# Patient Record
Sex: MALE | Race: WHITE | NOT HISPANIC OR LATINO | Employment: OTHER | URBAN - METROPOLITAN AREA
[De-identification: names, ages, dates, MRNs, and addresses within clinical notes are randomized per-mention and may not be internally consistent; named-entity substitution may affect disease eponyms.]

---

## 2017-01-30 ENCOUNTER — ALLSCRIPTS OFFICE VISIT (OUTPATIENT)
Dept: OTHER | Facility: OTHER | Age: 62
End: 2017-01-30

## 2017-01-30 ENCOUNTER — TRANSCRIBE ORDERS (OUTPATIENT)
Dept: ADMINISTRATIVE | Facility: HOSPITAL | Age: 62
End: 2017-01-30

## 2017-01-30 DIAGNOSIS — E78.5 HYPERLIPIDEMIA: ICD-10-CM

## 2017-01-30 DIAGNOSIS — J45.20 ASTHMATIC BRONCHITIS, MILD INTERMITTENT, UNCOMPLICATED: Primary | ICD-10-CM

## 2017-01-30 DIAGNOSIS — I10 ESSENTIAL (PRIMARY) HYPERTENSION: ICD-10-CM

## 2017-01-30 DIAGNOSIS — R73.9 HYPERGLYCEMIA: ICD-10-CM

## 2017-01-30 DIAGNOSIS — I50.42 CHRONIC COMBINED SYSTOLIC AND DIASTOLIC HEART FAILURE (HCC): ICD-10-CM

## 2017-02-15 ENCOUNTER — GENERIC CONVERSION - ENCOUNTER (OUTPATIENT)
Dept: OTHER | Facility: OTHER | Age: 62
End: 2017-02-15

## 2017-02-15 ENCOUNTER — HOSPITAL ENCOUNTER (OUTPATIENT)
Dept: PULMONOLOGY | Facility: HOSPITAL | Age: 62
Discharge: HOME | End: 2017-02-15
Attending: INTERNAL MEDICINE
Payer: COMMERCIAL

## 2017-02-15 DIAGNOSIS — J45.20 ASTHMATIC BRONCHITIS, MILD INTERMITTENT, UNCOMPLICATED: ICD-10-CM

## 2017-02-15 PROCEDURE — 94729 DIFFUSING CAPACITY: CPT

## 2017-02-15 PROCEDURE — 94060 EVALUATION OF WHEEZING: CPT

## 2017-02-15 PROCEDURE — 94727 GAS DIL/WSHOT DETER LNG VOL: CPT

## 2017-02-15 RX ORDER — ALBUTEROL SULFATE 2.5 MG/3ML
2.5 SOLUTION RESPIRATORY (INHALATION) ONCE AS NEEDED
Status: DISCONTINUED | OUTPATIENT
Start: 2017-02-15 | End: 2017-02-19 | Stop reason: HOSPADM

## 2017-08-07 ENCOUNTER — LAB CONVERSION - ENCOUNTER (OUTPATIENT)
Dept: OTHER | Facility: OTHER | Age: 62
End: 2017-08-07

## 2017-08-07 LAB — BNP SERPL-MCNC: 59 PG/ML (ref 1–100)

## 2017-08-21 ENCOUNTER — ALLSCRIPTS OFFICE VISIT (OUTPATIENT)
Dept: OTHER | Facility: OTHER | Age: 62
End: 2017-08-21

## 2017-10-16 ENCOUNTER — ALLSCRIPTS OFFICE VISIT (OUTPATIENT)
Dept: OTHER | Facility: OTHER | Age: 62
End: 2017-10-16

## 2017-10-16 DIAGNOSIS — J45.909 UNCOMPLICATED ASTHMA: ICD-10-CM

## 2017-10-17 NOTE — CONSULTS
Assessment  1  Obstructive sleep apnea (327 23) (G47 33)   2  COPD, severe (496) (J44 9)    Plan  Asthma    · (1) CBC/PLT/DIFF; Status:Active; Requested ODR:[id number];    Perform:[facility]; Due:16Oct2018; Ordered; For:Asthma; Ordered By:Windy Corley; Asthma, Cough    · (Q) ALLERGY EVALUATION 1, [facility]; Status:Active; Requested WAZ:[id number];    Perform:[facility]; Due:16Oct2018; Ordered; For:Asthma, Cough; Ordered By:Windy Corley;  Chronic combined systolic and diastolic CHF (congestive heart failure), Obstructive  sleep apnea    · *Polysomnography, Sleep Study, Diagnostic; Status:Need Information - Financial  Authorization; Requested PPZ:[id number];    Perform:[facility]; Due:16Oct2018; Ordered; For:Chronic combined systolic and diastolic CHF (congestive heart failure), Obstructive sleep apnea; Ordered By:Windy Corley;  there any other medical conditions or medications that would explain these      symptoms? : No  is the sleep disturbance affecting the patient's ability to function? : daytime      sleepiness  History/Symptoms: : snoring, daytime sleepiness, CORDELL  a face-to-face visit, with sleep documentation, performed prior to the order for sleep      study? : Yes  Study Only or Consult : Sleep Study Only Follow up with Referring Physician    Results/Data  Results Free Text Form  Lu:   Results   Chest X-ray [place] - 8/2017 - patchy atelectasis noted  PFT Results v2:     Spirometry:   Post Bronchodilator Spirometry:   Lung Volumes:   DLCO:    PFT Interpretation:   spirometry shows evidence of severe expiratory obstruction  Following Bronchodilator therapy there is a 39% increase in expiratory flow which brings the FEV1 to the mild expiratory range  This is a significant response and is compatible with a diagnosis of asthma/COPD overlap given the patient's smoking history  volumes show evidence of trapped gas in that the residual volume is greater than 120% of predicted  diffusing capacity is normal Severe expiratory obstruction which corrects to near normal after bronchodilator therapy  Discussion/Summary  Discussion Summary:   64 y/o M wi PMHx of COPD, CORDELL and CHF who comes in for evaluation and treatment of COPD and CORDELL  Severe obstructive lung disease - possible ACOS (FEV1 49% prebronchodilator) - Positive bronchodilator response in FEV1 with 68% but significant smoking history  - Continue Advair 250/50 BID, PRN albuterol nebulizer and MDI  - Trial of Spiriva 2 5mg Daily  - check CBC, IgE and [place] panel to assess for possible atopic component to asthma  - consider screening CT chest at next visit  History of CORDELL - never pursued treatment  - Check baseline polysomnogram  - may benefit from BiPAP if significant evidence of alveolar hypoventilation  CHF - management as per primary care physician, cardiology  Active Problems   · Anxiety (300 00) (F41 9)   · Asthma (493 90) (J45 909)   · Borderline hyperglycemia (790 29) (R73 9)   · Chronic combined systolic and diastolic CHF (congestive heart failure) (428 42,428 0)  (I50 42)   · Cough (786 2) (R05)   · Dyslipidemia (272 4) (E78 5)   · Hypertension (401 9) (I10)   · Nonischemic cardiomyopathy (425 4) (I42 8)   · Refused influenza vaccine (V64 06) (Z28 21)   · Thyroid disease (246 9) (E07 9)   · Vitamin D deficiency (268 9) (E55 9)    Chief Complaint  Chief Complaint Free Text Note Form: shortness of breath      History of Present Illness  HPI: 64 y/o M wi PMHx of COPD, CORDELL and CHF who comes in for evaluation and treatment of COPD and CORDELL  Mr Becky Houser states that he had an extensive smoking history with a history of approximately 3 packs per day for approximately 20 years and then intermittent smoking for another 15 years  He did not quit smoking until 3 years ago  In addition, he also had significant alcohol abuse history and quit that at approximately the same time    He has noted previous episodes of significant shortness of breath and has not been required emergency room visits and hospitalizations which were frequent in the past 2 years  He had at least 2-3 episodes that he can recall of shortness of breath that required hospitalization  As a result of these hospitalizations and the limitations he experience with his breathing he has since quit smoking and has attempted to become physically fit  He states that he is biking approximately 2 to 3 times a week at almost 20 miles each time  He also will go running as an alternative to biking as well  However, he does admit that he still has significant limitations with his breathing  There are some days where he is unable to perform either bike or running  In addition, he does not chronic cough that is occasionally productive of white sputum  He denies fever, chills, chest tightness, wheezing, night sweats, cough or hemoptysis  For his medications, on average she will be using his nebulizer once a day and his albuterol MDI 4 to 5 times a week  He is concerned as he feels that the albuterol has grown to be less effective over time  In addition he takes advair 250/50 BID everyday  For his sleep, he had been diagnosed with obstructive sleep apnea 20 years ago but never pursued treatment  He states that he has combated this by sleeping in a recliner upright  He admits to snoring, daytime sleepiness with an epworth of 11 but denies witnessed apneas, morning headaches or awakening with dry mouth  He states that he feels that he sleeps well  Review of Systems  Complete-Male Pulm:   Constitutional: No fever or chills, feels well, no tiredness, no recent weight gain or weight loss  Eyes: no complaints of vision problems  ENT: no rhinitis, no PND, no epistaxis  Cardiovascular: no palpitations, no chest pain  Respiratory: as noted in HPI  Gastrointestinal: no complaints of esophageal reflux, no abdominal pain  Genitourinary: no urinary retention  Musculoskeletal: no arthralgias, no joint swelling, no myalgias  Integumentary: no rash, no lesions  Neurological: no headache, no fainting, no weakness  Psychiatric: no anxiety, no depression  Endocrine: Negative  Hematologic/Lymphatic: no complaints of swollen glands  ROS Reviewed:   ROS reviewed  Past Medical History  1  History of Acute sinusitis (461 9) (J01 90)   2  History of Acute upper respiratory infection (465 9) (J06 9)   3  History of Encounter for screening colonoscopy (V76 51) (Z12 11)   4  History of congestive heart failure (V12 59) (Z86 79)   5  History of non-ST elevation myocardial infarction (NSTEMI) (412) (I25 2)   6  History of Mumps (072 9)   7  History of Need for influenza vaccination (V04 81) (Z23)   8  History of Pneumonia (V12 61)   9  History of Special screening examination for neoplasm of prostate (V76 44) (Z12 5)   10  History of Upper respiratory infection (465 9) (J06 9)  Active Problems And Past Medical History Reviewed: The active problems and past medical history were reviewed and updated today  Surgical History  1  History of Cardiac Cath Post-Procedure Data   2  History of Inguinal Hernia Repair   3  History of Knee Replacement   4  History of Tonsillectomy   5  History of Umbilical Hernia Repair  Surgical History Reviewed: The surgical history was reviewed and updated today  Family History  Father    1  Family history of myocardial infarction (V17 3) (Z82 49)  Son    2  Family history of Tuberculosis  Sister    3  Family history of Diabetes Mellitus (V18 0)  Family History    4  Family history of Arthritis (V17 7)   5  Family history of Coronary Artery Disease (V17 49)   6  Family history of Hypertension (V17 49)  Family History Reviewed: The family history was reviewed and updated today         Social History   · Denied: History of Alcohol Use (History)   · Caffeine Use   · Denied: History of Drug Use   · Former smoker (S89 04) (V39 117) ·   Social History Reviewed: The social history was reviewed and updated today  The social history was reviewed and is unchanged  Current Meds   1  Advair Diskus 250-50 MCG/DOSE Inhalation Aerosol Powder Breath Activated; inhale 1   dose by mouth twice a day; Therapy: 28NKQ4214 to (Evaluate:67Pjl7672)  Requested for: 34AOT2057; Last   Rx:20Uhm0049 Ordered   2  Albuterol Sulfate (2 5 MG/3ML) 0 083% Inhalation Nebulization Solution; inhale contents   of 1 vial in nebulizer every 6 hours if needed; Therapy: 90OBB7660 to (Evaluate:22Mar2017)  Requested for: 79Pzb0855; Last   Rx:54Mqs9752 Ordered   3  ALPRAZolam 0 5 MG Oral Tablet; take 1 tablet by mouth once daily; Therapy: 18ZVX5272 to (Evaluate:30Sug3363)  Requested for: 39ZIA9329; Last   Rx:23Nov2015 Ordered   4  ALPRAZolam 0 5 MG Oral Tablet; take 1 tablet daily as needed; Therapy: 82MGW5749 to (Evaluate:01Mar2017); Last Rx:30Jan2017 Ordered   5  Aspirin 325 MG Oral Tablet; Take 1 tablet daily Recorded   6  Carvedilol 6 25 MG Oral Tablet; TAKE 1 TABLET BY MOUTH TWICE A DAY WITH   MORNING AND EVENING MEALS; Therapy: 24JOB1398 to (TNCIXCCY:26SAR9240)  Requested for: 10EWH5114; Last   Rx:49Jmj7075 Ordered   7  Furosemide 20 MG Oral Tablet; take 1 tablet daily prn; Therapy: 81JTF7049 to (Evaluate:47Drx9877)  Requested for: 48Fmi6961; Last   Rx:72Vmn0850 Ordered   8  Lisinopril 20 MG Oral Tablet; take 1 tablet by mouth twice a day; Therapy: 89TRG2139 to (Evaluate:48Jcm1647)  Requested for: 10Oby3446; Last   Rx:58Nmx0763 Ordered   9  PredniSONE 10 MG Oral Tablet; Take 1 daily; Therapy: 38RGM9768 to (Last Rx:30Jan2017)  Requested for: 30Jan2017 Ordered   10  ProAir  (90 Base) MCG/ACT Inhalation Aerosol Solution; inhale 2 puffs by mouth    every 6 hours if needed; Therapy: 91TYC0848 to (Evaluate:90Gvg8049)  Requested for: 18Kvp7608; Last    Rx:33Wxj2948 Ordered   11   Promethazine-Codeine 6 25-10 MG/5ML Oral Syrup; TAKE 1 TEASPOON  EVERY 6    HOURS PRN; Therapy: 16EFH9231 to (Evaluate:17Ufl8651); Last Rx:26Jun2017 Ordered   12  Pulmicort Flexhaler 180 MCG/ACT Inhalation Aerosol Powder Breath Activated; INHALE    TWO PUFFS BID RINSE MOUTH AFTER USE; Therapy: 92JUN4809 to (Evaluate:35Muw4686)  Requested for: 85WTS4970; Last    Rx:38Nqb4805 Ordered   13  Ventolin  (90 Base) MCG/ACT Inhalation Aerosol Solution; inhale 2 puffs by    mouth every 6 hours if needed; Therapy: 04FYU3751 to (Evaluate:16Nov2017)  Requested for: 48UNF5475; Last    Rx:73Jiy5281 Ordered  Medication List Reviewed: The medication list was reviewed and updated today  Allergies  1  No Known Drug Allergies    Vitals  Vital Signs    Recorded: 95KWO2219 03:07PM   Heart Rate 70   Systolic 505   Diastolic 62   Height 6 ft 2 in   Weight 235 lb    BMI Calculated 30 17   BSA Calculated 2 33   O2 Saturation 97, RA     Physical Exam    Constitutional   General appearance: Abnormal  -- disheveled  Head and Face   Head and face: Normal     Palpation of the face and sinuses: No sinus tenderness  Ears, Nose, Mouth, and Throat   Nasal mucosa, septum, and turbinates: Normal without edema or erythema  Lips, teeth, and gums: Normal, good dentition  Oropharynx: Abnormal  -- Mallampati class IV  Neck   Neck: Supple, symmetric, trachea midline, no masses  Thyroid: Normal, no thyromegaly  Jugular veins: Normal     Pulmonary   Chest: Normal     Respiratory effort: No increased work of breathing or signs of respiratory distress  Percussion of chest: Normal     Palpation of chest: Normal     Auscultation of lungs: Abnormal  -- Decreased breath sounds, no wheezing, rhonci or rales  Cardiovascular   Auscultation of heart: Normal rate and rhythm, normal S1 and S2, no murmurs  Pedal pulses: 2+ bilaterally  Examination of extremities for edema and/or varicosities: Normal     Abdomen   Abdomen: Non-tender, no masses      Liver and spleen: No hepatomegaly or splenomegaly  Lymphatic   Palpation of lymph nodes in neck: No lymphadenopathy  Palpation of lymph nodes in axillae: No lymphadenopathy  Palpation of lymph nodes in groin: No lymphadenopathy  Palpation of lymph nodes in other areas: No lymphadenopathy  Musculoskeletal   Gait and station: Normal     Muscle strength/tone: Normal     Skin   Skin and subcutaneous tissue: Normal without rashes or lesions  Palpation of skin and subcutaneous tissue: Normal turgor  Neurologic   Sensation: No sensory loss  Coordination: Normal finger to nose and heel to shin      Psychiatric   Orientation to person, place and time: Normal     Mood and affect: Normal        Signatures   Electronically signed by : Robert Cruz DO; Oct 16 2017 11:10PM EST                       (Author)

## 2017-10-23 ENCOUNTER — APPOINTMENT (OUTPATIENT)
Dept: LAB | Facility: CLINIC | Age: 62
End: 2017-10-23
Payer: COMMERCIAL

## 2017-10-23 ENCOUNTER — TRANSCRIBE ORDERS (OUTPATIENT)
Dept: LAB | Facility: CLINIC | Age: 62
End: 2017-10-23

## 2017-10-23 DIAGNOSIS — J45.909 UNCOMPLICATED ASTHMA, UNSPECIFIED ASTHMA SEVERITY, UNSPECIFIED WHETHER PERSISTENT: Primary | ICD-10-CM

## 2017-10-23 DIAGNOSIS — J45.909 UNCOMPLICATED ASTHMA, UNSPECIFIED ASTHMA SEVERITY, UNSPECIFIED WHETHER PERSISTENT: ICD-10-CM

## 2017-10-23 DIAGNOSIS — J45.909 UNCOMPLICATED ASTHMA: ICD-10-CM

## 2017-10-23 LAB
BASOPHILS # BLD AUTO: 0.08 THOUSANDS/ΜL (ref 0–0.1)
BASOPHILS NFR BLD AUTO: 1 % (ref 0–1)
EOSINOPHIL # BLD AUTO: 0.5 THOUSAND/ΜL (ref 0–0.61)
EOSINOPHIL NFR BLD AUTO: 6 % (ref 0–6)
ERYTHROCYTE [DISTWIDTH] IN BLOOD BY AUTOMATED COUNT: 15.5 % (ref 11.6–15.1)
HCT VFR BLD AUTO: 42.1 % (ref 36.5–49.3)
HGB BLD-MCNC: 13.7 G/DL (ref 12–17)
LYMPHOCYTES # BLD AUTO: 1.77 THOUSANDS/ΜL (ref 0.6–4.47)
LYMPHOCYTES NFR BLD AUTO: 22 % (ref 14–44)
MCH RBC QN AUTO: 27.7 PG (ref 26.8–34.3)
MCHC RBC AUTO-ENTMCNC: 32.5 G/DL (ref 31.4–37.4)
MCV RBC AUTO: 85 FL (ref 82–98)
MONOCYTES # BLD AUTO: 0.6 THOUSAND/ΜL (ref 0.17–1.22)
MONOCYTES NFR BLD AUTO: 7 % (ref 4–12)
NEUTROPHILS # BLD AUTO: 5.21 THOUSANDS/ΜL (ref 1.85–7.62)
NEUTS SEG NFR BLD AUTO: 64 % (ref 43–75)
NRBC BLD AUTO-RTO: 0 /100 WBCS
PLATELET # BLD AUTO: 247 THOUSANDS/UL (ref 149–390)
PMV BLD AUTO: 10.5 FL (ref 8.9–12.7)
RBC # BLD AUTO: 4.95 MILLION/UL (ref 3.88–5.62)
WBC # BLD AUTO: 8.17 THOUSAND/UL (ref 4.31–10.16)

## 2017-10-23 PROCEDURE — 82785 ASSAY OF IGE: CPT

## 2017-10-23 PROCEDURE — 86003 ALLG SPEC IGE CRUDE XTRC EA: CPT

## 2017-10-23 PROCEDURE — 85025 COMPLETE CBC W/AUTO DIFF WBC: CPT

## 2017-10-23 PROCEDURE — 36415 COLL VENOUS BLD VENIPUNCTURE: CPT

## 2017-10-23 NOTE — PROGRESS NOTES
Assessment  Assessed    1  Nonischemic cardiomyopathy (425 4) (I42 8)   2  Chronic combined systolic and diastolic CHF (congestive heart failure) (428 42,428 0)   (I50 42)   3  Hypertension (401 9) (I10)   4  Dyslipidemia (272 4) (E78 5)    Plan  Asthma    · Breo Ellipta 100-25 MCG/INH Inhalation Aerosol Powder Breath Activated   Rx By: Cecilia Weeks; Dispense: 0 Days ; #:1 X 60 Aerosol Powder Breath Activated Disp Pack; Refill: 5;For: Asthma; AUBREE = N; Sent To: [facility] [place] [address] ; Msg to Pharmacy: Please advise patinet of the change due to insurance coverage; Last Updated By: Olena Lee; 8/21/2017 12:55:56 PM  Chronic combined systolic and diastolic CHF (congestive heart failure)    · Furosemide 20 MG Oral Tablet; take 1 tablet daily prn   Rx By: Olena Lee; Dispense: 30 Days ; #:30 Tablet; Refill: 11; For: Chronic combined systolic and diastolic CHF (congestive heart failure); AUBREE = N; [facility] [address] ; Last Updated By: System, [employer]; 8/21/2017 12:56:30 PM  Nonischemic cardiomyopathy    · Lisinopril 20 MG Oral Tablet; take 1 tablet by mouth twice a day   Rx By: Olena Lee; Dispense: 30 Days ; #:60 Tablet; Refill: 11; For: Nonischemic cardiomyopathy; AUBREE = N; Verified Transmission to [place] [address] ; Last Updated By: System, [employer]Scripts; 8/21/2017 12:56:28 PM    Discussion/Summary  Cardiology Discussion Summary Free Text Note Form [facility]:   Mr Kaushal Truong is a pleasant 68-year-old male who presents to the office today for routine followup  Since his last visit he has noted some decrease in his exercise tolerance as he had repeated URIs over the winter  He has noted some improvement since he resumed Advair  I will attempt to assist in making him an appointment with pulmonary pulmonary whom he was referred to by his primary care physician  Otherwise he is on an appropriate regimen for his cardiomyopathy including an ACE-I and beta-blocker   He doesn't appear grossly volume overloaded on exam  He admits to some abdominal bloating but he did run out of his Lasix which I have refilled  His blood pressure is well-controlled  I do not have any recent assessment of his lipids but due to out-of-pocket cost with laboratory testing he declines  He also will not undergo a repeat echo for the same reason  We again discussed total cessation of alcohol given his nonischemic cardiomyopathy which was likely secondary to his alcohol abuse  I will see him back in the office in six months or sooner if deemed necessary  History of Present Illness  Cardiology HPI Free Text Note Form [facility]: Mr Raeford Castleman is a pleasant 72-year-old male who presents to the office for routine follow-up  Since his last visit he has not been feeling well  He states he struggled with multiple upper respiratory infections necessitating the use of antibiotics over the winter  Since that time he feels like his exercise tolerance has decreased  He had not been exercising consistently at the gym  More recently he started biking  He was able to bike for 17 miles in the recent past  He also rode a stationary bike recently for 5 miles  In doing so he got mildly short of breath  He does admit to not being able to obtain his Advair due to cost  However he recently did fill a prescription and notes overall improvement in his respiratory status  He denies any exertional chest pain  He denies any signs or symptoms of heart failure including increasing lower extremity edema, paroxysmal nocturnal dyspnea, or orthopnea although he does admit to some abdominal bloating although he did run out of his Lasix which he had been taking as needed  His weight has been stable at home between 223 and 226 pounds  He denies lightheadedness, syncope, or presyncope  He denies palpitations or claudication  He drinks a six beers a month and also a glass of red wine a month        Review of Systems  Cardiology Male ROS:     Cardiac: as noted in HPI  Skin: No complaints of nonhealing sores or skin rash  Genitourinary: No complaints of recurrent urinary tract infections, frequent urination at night, difficult urination, blood in urine, kidney stones, loss of bladder control, no kidney or prostate problems, no erectile dysfunction  Psychological: No complaints of feeling depressed, anxiety, panic attacks, or difficulty concentrating  General: as noted in HPI  Respiratory: as noted in HPI  HEENT: No complaints of serious problems, hearing problems, nose problems, throat problems, or snoring  Gastrointestinal: No complaints of liver problems, nausea, vomiting, heartburn, constipation, bloody stools, diarrhea, problems swallowing, adbominal pain, or rectal bleeding  Hematologic: No complaints of bleeding disorders, anemia, blood clots, or excessive brusing  Neurological: No complaints of numbness, tingling, dizziness, weakness, seizures, headaches, syncope or fainting, AM fatigue, daytime sleepiness, no witnessed apnea episodes  Musculoskeletal: No complaints of arthritis, back pain, or painfull swelling  Active Problems  Problems    1  Anxiety (300 00) (F41 9)   2  Asthma (493 90) (J45 909)   3  Borderline hyperglycemia (790 29) (R73 9)   4  Chronic combined systolic and diastolic CHF (congestive heart failure) (428 42,428 0)   (I50 42)   5  Cough (786 2) (R05)   6  Dyslipidemia (272 4) (E78 5)   7  Hypertension (401 9) (I10)   8  Nonischemic cardiomyopathy (425 4) (I42 8)   9  Refused influenza vaccine (V64 06) (Z28 21)   10  Thyroid disease (246 9) (E07 9)   11  Vitamin D deficiency (268 9) (E55 9)            Asthma (493 90) (J45 909)       Dyslipidemia (272 4) (E78 5)       Nonischemic cardiomyopathy (425 4) (I42 9)          Past Medical History  Problems    1  History of Acute sinusitis (461 9) (J01 90)   2  History of Acute upper respiratory infection (465 9) (J06 9)   3   History of Encounter for screening colonoscopy (V76 51) (Z12 11)   4  History of congestive heart failure (V12 59) (Z86 79)   5  History of non-ST elevation myocardial infarction (NSTEMI) (412) (I25 2)   6  History of Mumps (072 9)   7  History of Need for influenza vaccination (V04 81) (Z23)   8  History of Pneumonia (V12 61)   9  History of Special screening examination for neoplasm of prostate (V76 44) (Z12 5)   10  History of Upper respiratory infection (465 9) (J06 9)  Active Problems And Past Medical History Reviewed: The active problems and past medical history were reviewed and updated today  Surgical History  Problems    1  History of Cardiac Cath Post-Procedure Data   2  History of Inguinal Hernia Repair   3  History of Knee Replacement   4  History of Tonsillectomy   5  History of Umbilical Hernia Repair  Surgical History Reviewed: The surgical history was reviewed and updated today  Family History  Father    1  Family history of myocardial infarction (V17 3) (Z82 49)  Son    2  Family history of Tuberculosis  Sister    3  Family history of Diabetes Mellitus (V18 0)  Family History    4  Family history of Arthritis (V17 7)   5  Family history of Coronary Artery Disease (V17 49)   6  Family history of Hypertension (V17 49)  Family History Reviewed: The family history was reviewed and updated today  Social History  Problems    · Denied: History of Alcohol Use (History)   · Caffeine Use   · Denied: History of Drug Use   · Former smoker (J73 57) (T28 144)   ·   Social History Reviewed: The social history was reviewed and updated today  Current Meds   1  Advair Diskus 250-50 MCG/DOSE Inhalation Aerosol Powder Breath Activated; inhale 1   dose by mouth twice a day; Therapy: 14HWW8416 to (Evaluate:45Zgm9447)  Requested for: 62SPF6112; Last   Rx:74Ogd5208 Ordered   2  Albuterol Sulfate (2 5 MG/3ML) 0 083% Inhalation Nebulization Solution; inhale contents   of 1 vial in nebulizer every 6 hours if needed;    Therapy: 37RPX4151 to (Kailee Berkowitz)  Requested for: 11Fqp8369; Last   Rx:71Mnk4803 Ordered   3  ALPRAZolam 0 5 MG Oral Tablet; take 1 tablet by mouth once daily; Therapy: 49ADL9027 to (Evaluate:69Jto9361)  Requested for: 60BBJ4798; Last   Rx:23Nov2015 Ordered   4  ALPRAZolam 0 5 MG Oral Tablet; take 1 tablet daily as needed; Therapy: 30HEZ7772 to (Evaluate:01Mar2017); Last Rx:30Jan2017 Ordered   5  Aspirin 325 MG Oral Tablet; Take 1 tablet daily Recorded   6  Azithromycin 250 MG Oral Tablet; TAKE 2 TABLETS ON DAY 1 THEN TAKE 1 TABLET A   DAY FOR 4 DAYS; Therapy: 92NZB7409 to (Abbott Goodpasture)  Requested for: 26Jun2017; Last   LT:14LMT1152 Ordered   7  Breo Ellipta 100-25 MCG/INH Inhalation Aerosol Powder Breath Activated; USE AS   DIRECTED ON PACKAGE; Therapy: 87DBB6139 to (Last Rx:07Feb2017)  Requested for: 51CYC0829 Ordered   8  Carvedilol 6 25 MG Oral Tablet; TAKE 1 TABLET BY MOUTH TWICE A DAY WITH   MORNING AND EVENING MEALS; Therapy: 82EBZ3195 to (OKTWLKNF:54TYG4820)  Requested for: 49UDR7353; Last   Rx:85Mrf8618 Ordered   9  Furosemide 20 MG Oral Tablet; take 1 tablet daily prn; Therapy: 00PRU5160 to (Evaluate:17May2017)  Requested for: 30HWG8438; Last   Rx:18Nov2016 Ordered   10  Lisinopril 20 MG Oral Tablet; take 1 tablet by mouth twice a day; Therapy: 81RUM8300 to (Nadine Valverde)  Requested for: 48RVQ1933; Last    Rx:05May2017 Ordered   11  PredniSONE 10 MG Oral Tablet; Take 1 daily; Therapy: 04OIN9152 to (Last Rx:30Jan2017)  Requested for: 30Jan2017 Ordered   12  ProAir  (90 Base) MCG/ACT Inhalation Aerosol Solution; inhale 2 puffs by mouth    every 6 hours if needed; Therapy: 32BCG7324 to (Evaluate:83Pzc5546)  Requested for: 20Jpi3764; Last    Rx:48Pvl1479 Ordered   13  Promethazine-Codeine 6 25-10 MG/5ML Oral Syrup; TAKE 1 TEASPOON  EVERY 6    HOURS PRN; Therapy: 08CYF5620 to (Evaluate:70Fza4521); Last Rx:26Jun2017 Ordered   14   Pulmicort Flexhaler 180 MCG/ACT Inhalation Aerosol Powder Breath Activated; INHALE    TWO PUFFS BID RINSE MOUTH AFTER USE; Therapy: 66UYI7015 to (Evaluate:21Qda6223)  Requested for: 12LRT1424; Last    Rx:89Lez0789 Ordered   15  Ventolin  (90 Base) MCG/ACT Inhalation Aerosol Solution; inhale 2 puffs by    mouth every 6 hours if needed; Therapy: 55JPF8404 to (Evaluate:79Ivh0046)  Requested for: 54GBT3545; Last    Rx:48Tqn0352 Ordered  Medication List Reviewed: The medication list was reviewed and updated today  Allergies  Medication    1  No Known Drug Allergies    Vitals  Vital Signs    Recorded: 21Aug2017 12:45PM   Heart Rate 66   Systolic 194, LUE, Sitting   Diastolic 70, LUE, Sitting   Height 6 ft 2 in   Weight 229 lb 6 oz   BMI Calculated 29 45   BSA Calculated 2 3     Physical Exam    Constitutional   General appearance: No acute distress, well appearing and well nourished  Eyes   Conjunctiva and Sclera examination: Conjunctiva pink, sclera anicteric  Ears, Nose, Mouth, and Throat - Oropharynx: Clear, nares are clear, mucous membranes are moist    Neck   Neck and thyroid: Normal, supple, trachea midline, no thyromegaly  Pulmonary   Respiratory effort: No increased work of breathing or signs of respiratory distress  Auscultation of lungs: Clear to auscultation, no rales, no rhonchi, no wheezing, good air movement  Cardiovascular   Auscultation of heart: Normal rate and rhythm, normal S1 and S2, no murmurs  Carotid pulses: Normal, 2+ bilaterally  Peripheral vascular exam: Normal pulses throughout, no tenderness, erythema or swelling  Pedal pulses: Normal, 2+ bilaterally  Examination of extremities for edema and/or varicosities: Abnormal   varicosities noted bilaterally  Abdomen   Abdomen: Non-tender and no distention  Liver and spleen: No hepatomegaly or splenomegaly  Musculoskeletal Gait and station: Normal gait  --v6Fdhzit and nails: Normal without clubbing or cyanosis  --j3Rmywixwehu/palpation of joints, bones, and muscles: Normal, ROM normal     Skin - Skin and subcutaneous tissue: Normal without rashes or lesions  Skin is warm and well perfused, normal turgor  Neurologic - Cranial nerves: II - XII intact  --a2Xukmau: Normal     Psychiatric - Orientation to person, place, and time: Normal --b0Mood and affect: Normal       Results/Data  ECG Report: A 12 lead ECG was performed and was normal    Rhythm and rate:  normal sinus rhythm        Signatures   Electronically signed by : Familia Diaz DO; Aug 21 2017  1:19PM EST                       (Author)

## 2017-10-24 LAB
A ALTERNATA IGE QN: <0.1 KUA/I
A FUMIGATUS IGE QN: <0.1 KUA/I
ALLERGEN COMMENT: ABNORMAL
BERMUDA GRASS IGE QN: <0.1 KUA/I
BOXELDER IGE QN: <0.1 KUA/I
C HERBARUM IGE QN: <0.1 KUA/I
CAT DANDER IGE QN: 1.17 KUA/I
CMN PIGWEED IGE QN: <0.1 KUA/I
COMMON RAGWEED IGE QN: 3.28 KUA/I
COTTONWOOD IGE QN: <0.1 KUA/I
D FARINAE IGE QN: <0.1 KUA/I
D PTERONYSS IGE QN: <0.1 KUA/I
DOG DANDER IGE QN: 0.33 KUA/I
LONDON PLANE IGE QN: <0.1 KUA/I
MOUSE URINE PROT IGE QN: <0.1 KUA/I
MT JUNIPER IGE QN: <0.1 KUA/I
MUGWORT IGE QN: <0.1 KUA/I
P NOTATUM IGE QN: <0.1 KUA/I
ROACH IGE QN: <0.1 KUA/I
SHEEP SORREL IGE QN: <0.1 KUA/I
SILVER BIRCH IGE QN: <0.1 KUA/I
TIMOTHY IGE QN: <0.1 KUA/I
TOTAL IGE SMQN RAST: 65.2 KU/L (ref 0–113)
WALNUT IGE QN: 0.15 KUA/I
WHITE ASH IGE QN: 0.38 KUA/I
WHITE ELM IGE QN: <0.1 KUA/I
WHITE MULBERRY IGE QN: <0.1 KUA/I
WHITE OAK IGE QN: <0.1 KUA/I

## 2017-12-05 ENCOUNTER — TRANSCRIBE ORDERS (OUTPATIENT)
Dept: SLEEP CENTER | Facility: HOSPITAL | Age: 62
End: 2017-12-05

## 2017-12-05 DIAGNOSIS — G47.33 OBSTRUCTIVE SLEEP APNEA: Primary | ICD-10-CM

## 2017-12-15 ENCOUNTER — APPOINTMENT (OUTPATIENT)
Dept: RADIOLOGY | Facility: CLINIC | Age: 62
End: 2017-12-15
Payer: COMMERCIAL

## 2017-12-15 ENCOUNTER — OFFICE VISIT (OUTPATIENT)
Dept: URGENT CARE | Facility: CLINIC | Age: 62
End: 2017-12-15
Payer: COMMERCIAL

## 2017-12-15 DIAGNOSIS — M54.9 DORSALGIA: ICD-10-CM

## 2017-12-15 LAB
BILIRUB UR QL STRIP: NORMAL
CLARITY UR: NORMAL
COLOR UR: YELLOW
GLUCOSE (HISTORICAL): NORMAL
HGB UR QL STRIP.AUTO: NORMAL
KETONES UR STRIP-MCNC: NORMAL MG/DL
LEUKOCYTE ESTERASE UR QL STRIP: NORMAL
NITRITE UR QL STRIP: NORMAL
PH UR STRIP.AUTO: 6 [PH]
PROT UR STRIP-MCNC: NORMAL MG/DL
SP GR UR STRIP.AUTO: 1.01
UROBILINOGEN UR QL STRIP.AUTO: 0.2

## 2017-12-15 PROCEDURE — 81002 URINALYSIS NONAUTO W/O SCOPE: CPT

## 2017-12-15 PROCEDURE — 72110 X-RAY EXAM L-2 SPINE 4/>VWS: CPT

## 2017-12-15 PROCEDURE — 99213 OFFICE O/P EST LOW 20 MIN: CPT

## 2017-12-16 NOTE — PROGRESS NOTES
Assessment  1  Acute back pain (724 5) (M54 9)    Plan  Acute back pain    · Cyclobenzaprine HCl - 10 MG Oral Tablet; TAKE 1 TABLET 3 TIMES DAILY ASNEEDED   · PredniSONE 10 MG Oral Tablet; 3 tabs BID X 2 days, 2 Tabs BID X 2 days, 1 TabBID X 2 Days, then 1 Tab daily X 2 days  Back pain    · * XR SPINE LUMBAR MINIMUM 4 VIEWS NON INJURY; Status:Resulted - RequiresVerification,Retrospective By Protocol Authorization;   Done: 35URQ4841 01:28PM   · Urine Dip Non-Automated- POC; Status:Complete - Retrospective By ProtocolAuthorization;   Done: 46USG7231 01:56PM    Discussion/Summary  Discussion Summary:   Start prednisone to reduce inflammation  Can use muscle relaxer as needed  If symptoms are not improving over the next week, follow up with PCP  X-ray appears negative for any fractures  Will follow with radiologist report when available  Medication Side Effects Reviewed: Possible side effects of new medications were reviewed with the patient/guardian today  Understands and agrees with treatment plan: The treatment plan was reviewed with the patient/guardian  The patient/guardian understands and agrees with the treatment plan      Chief Complaint  1  Back Pain  Chief Complaint Free Text Note Form: Pt c/o lower back pain for two weeks  Pt reports no injuries  History of Present Illness  HPI: 82-year-old male here with complaint of lower back pain for the last 2 weeks  No known injury  Also reports having some urinary frequency  Denies dysuria  No fever or chills  No radiation of the pain  Pain is worse on the right lower lumbar area over SI joint  Pain sometimes made worse with certain movements like getting into his truck  Denies any related numbness, tingling or weakness  Back Pain: Joe Espinoza presents with complaints of back pain    Associated symptoms include spasm-- and-- stiffness, but-- no fever,-- no night sweats,-- no abdominal pain,-- no general malaise,-- no weight loss,-- no arm numbness,-- no leg numbness,-- no arm weakness,-- no leg weakness,-- no urinary incontinence,-- no fecal incontinence,-- no urinary retention-- and-- no rash localized to the area of pain  Review of Systems  Focused-Male:  Constitutional: no fever or chills, feels well, no tiredness, no recent weight loss or weight gain  ENT: no complaints of earache, no loss of hearing, no nosebleeds or nasal discharge, no sore throat or hoarseness  Cardiovascular: no complaints of slow or fast heart rate, no chest pain, no palpitations, no leg claudication or lower extremity edema  Respiratory: no complaints of shortness of breath, no wheezing or cough, no dyspnea on exertion, no orthopnea or PND  Gastrointestinal: vomiting  Genitourinary: urinary frequency, but-- as noted in HPI  Musculoskeletal: as noted in HPI  Neurological: no complaints of headache, no confusion, no numbness or tingling, no dizziness or fainting  ROS Reviewed:   ROS reviewed  Active Problems  1  Anxiety (300 00) (F41 9)   2  Asthma (493 90) (J45 909)   3  Borderline hyperglycemia (790 29) (R73 9)   4  Chronic combined systolic and diastolic CHF (congestive heart failure) (428 42,428 0) (I50 42)   5  COPD, severe (496) (J44 9)   6  Cough (786 2) (R05)   7  Dyslipidemia (272 4) (E78 5)   8  Hemorrhoids (455 6) (K64 9)   9  Hypertension (401 9) (I10)   10  Nonischemic cardiomyopathy (425 4) (I42 8)   11  Obstructive sleep apnea (327 23) (G47 33)   12  Refused influenza vaccine (V64 06) (Z28 21)   13  Thyroid disease (246 9) (E07 9)   14  Vitamin D deficiency (268 9) (E55 9)    Past Medical History  1  History of Acute sinusitis (461 9) (J01 90)   2  History of Acute upper respiratory infection (465 9) (J06 9)   3  History of Encounter for screening colonoscopy (V76 51) (Z12 11)   4  History of congestive heart failure (V12 59) (Z86 79)   5  History of non-ST elevation myocardial infarction (NSTEMI) (412) (I25 2)   6  History of Mumps (072 9)   7   History of Need for influenza vaccination (V04 81) (Z23)   8  History of Pneumonia (V12 61)   9  History of Special screening examination for neoplasm of prostate (V76 44) (Z12 5)   10  History of Upper respiratory infection (465 9) (J06 9)  Active Problems And Past Medical History Reviewed: The active problems and past medical history were reviewed and updated today  Family History  Father    1  Family history of myocardial infarction (V17 3) (Z82 49)  Son    2  Family history of Tuberculosis  Sister    3  Family history of Diabetes Mellitus (V18 0)  Family History    4  Family history of Arthritis (V17 7)   5  Family history of Coronary Artery Disease (V17 49)   6  Family history of Hypertension (V17 49)  Family History Reviewed: The family history was reviewed and updated today  Social History   · Denied: History of Alcohol Use (History)   · Caffeine Use   · Denied: History of Drug Use   · Former smoker (Z37 09) (I03 989)   ·   Social History Reviewed: The social history was reviewed and updated today  The social history was reviewed and is unchanged  Surgical History  1  History of Cardiac Cath Post-Procedure Data   2  History of Inguinal Hernia Repair   3  History of Knee Replacement   4  History of Tonsillectomy   5  History of Umbilical Hernia Repair  Surgical History Reviewed: The surgical history was reviewed and updated today  Current Meds   1  Advair Diskus 250-50 MCG/DOSE Inhalation Aerosol Powder Breath Activated; inhale 1 dose by mouth twice a day; Therapy: 95WCQ9755 to (Evaluate:07Iau6583)  Requested for: 56JUT8450; Last Rx:18Ktj4319 Ordered   2  Albuterol Sulfate (2 5 MG/3ML) 0 083% Inhalation Nebulization Solution; inhale contents of 1 vial in nebulizer every 6 hours if needed; Therapy: 75DOQ2109 to (Evaluate:22Mar2017)  Requested for: 38Snk3352; Last Rx:77Ger1988 Ordered   3  ALPRAZolam 0 5 MG Oral Tablet; take 1 tablet by mouth once daily;  Therapy: 14ICK2547 to (Evaluate:23Saz0877)  Requested for: 56PGT3642; Last Rx:2015 Ordered   4  ALPRAZolam 0 5 MG Oral Tablet; take 1 tablet daily as needed; Therapy: 41UMC5659 to (Evaluate:2017); Last Rx:2017 Ordered   5  Aspirin 325 MG Oral Tablet; Take 1 tablet daily Recorded   6  Carvedilol 6 25 MG Oral Tablet; TAKE 1 TABLET BY MOUTH TWICE A DAY WITH MORNING AND EVENING MEALS; Therapy: 69RDA4108 to (HEUQWVHR:55CMV0954)  Requested for: 17NNE4559; Last Rx:13Aun3570 Ordered   7  Furosemide 20 MG Oral Tablet; take 1 tablet daily prn; Therapy: 38EKW8543 to (Evaluate:74Qfz0345)  Requested for: 32Fcg8682; Last Rx:28Wbe6945 Ordered   8  Lisinopril 20 MG Oral Tablet; take 1 tablet by mouth twice a day; Therapy: 17YWX8448 to (Evaluate:20Xrv7184)  Requested for: 26Far2251; Last Rx:45Xhh9687 Ordered   9  ProAir  (90 Base) MCG/ACT Inhalation Aerosol Solution; inhale 2 puffs by mouth every 6 hours if needed; Therapy: 42ZGU7576 to (Evaluate:84Ojs3276)  Requested for: 79Pfn3260; Last Rx:12Qhp9247 Ordered   10  Promethazine-Codeine 6 25-10 MG/5ML Oral Syrup; TAKE 1 TEASPOON  EVERY 6  HOURS PRN; Therapy: 47JHY1631 to (Evaluate:51Clk3713); Last O37WJQ9930 Ordered   11  Pulmicort Flexhaler 180 MCG/ACT Inhalation Aerosol Powder Breath Activated; INHALE  TWO PUFFS BID RINSE MOUTH AFTER USE; Therapy: 66XPE2767 to (Evaluate:21Pjy9133)  Requested for: 09KKS3120; Last  Rx:55Vxc5338 Ordered   12  Ventolin  (90 Base) MCG/ACT Inhalation Aerosol Solution; inhale 2 puffs by  mouth every 6 hours if needed; Therapy: 41JJP5652 to (Evaluate:2017)  Requested for: 82PMJ0394; Last  Rx:31Zek5555 Ordered  Medication List Reviewed: The medication list was reviewed and updated today  Allergies  1   No Known Drug Allergies    Vitals  Signs   Recorded: 15Cda7940 01:12PM   Temperature: 97 4 F  Heart Rate: 77  Respiration: 18  Systolic: 322  Diastolic: 74  O2 Saturation: 95    Physical Exam   Constitutional  General appearance: No acute distress, well appearing and well nourished  Pulmonary  Respiratory effort: No increased work of breathing or signs of respiratory distress  Auscultation of lungs: Clear to auscultation  Cardiovascular  Auscultation of heart: Normal rate and rhythm, normal S1 and S2, without murmurs  Abdomen  Abdomen: Non-tender, no masses  Lymphatic  Palpation of lymph nodes in neck: No lymphadenopathy  Musculoskeletal  Gait and station: Normal  -- Lumbar spine with full range of motion  Tender to palpation over right SI joint area with palpable muscle spasm  Positive straight leg raise on the right  DTRs +2 bilateral lower extremities  Sensation intact to light touch grossly  Strength 5/5 bilateral lower extremities  Results/Data  * XR SPINE LUMBAR MINIMUM 4 VIEWS NON INJURY 04Noo1725 01:28PM [employer] Order Number: [id number]  Performing Comments: RM2     Test Name Result Flag Reference   XR SPINE LUMBAR MINIMUM 4 VIEWS (Report)     LUMBAR SPINE   INDICATION: Lower back pain  COMPARISON: None   VIEWS: AP, lateral, bilateral oblique and coned down projections   IMAGES: 5   FINDINGS:   Straightening of the lumbar lordosis without listhesis or scoliosis  5 lumbar type vertebral bodies  Chronic fragmentation around the facets at L2-3  No acute fractures or focally destructive osseous lesions  Multilevel disc height loss, greatest at L5-S1  Bulky anterolateral osteophytes at multiple levels  Small posteriorly directed discogenic osteophytes are seen at and below L3-4  Facet arthropathy at and below L2-3  Large stool burden  Aortoiliac atherosclerosis  IMPRESSION:   1  Straightening of the lumbar lordosis may suggest muscle spasm  No other acute findings  2  Moderate multilevel discogenic and facet-related degenerative changes      Workstation performed: [id number]   Signed by:  Parker Shafer MD  12/15/17                               Future Appointments    Date/Time Provider Specialty Site   02/01/2018 02:00 PM Disha Ram,  Pulmonary Medicine [facility]     Signatures   Electronically signed by : Damian Moya [facility]; Dec 15 2017  2:07PM EST                       (Author)    Electronically signed by : DIVYA Oliveira ; Dec 15 2017  4:06PM EST                       (Co-author)

## 2018-01-10 ENCOUNTER — TRANSCRIBE ORDERS (OUTPATIENT)
Dept: SLEEP CENTER | Facility: HOSPITAL | Age: 63
End: 2018-01-10

## 2018-01-10 DIAGNOSIS — G47.33 OBSTRUCTIVE SLEEP APNEA: Primary | ICD-10-CM

## 2018-01-11 NOTE — CONSULTS
I had the pleasure of evaluating your patient, Mali Metz  My full evaluation follows:      History of Present Illness  Mr Kassi Jain is a pleasant 29-year-old male who presents to the office for routine follow-up  Since his last visit he has not been feeling well  He states he struggled with multiple upper respiratory infections necessitating the use of antibiotics over the winter  Since that time he feels like his exercise tolerance has decreased  He had not been exercising consistently at the gym  More recently he started biking  He was able to bike for 17 miles in the recent past  He also rode a stationary bike recently for 5 miles  In doing so he got mildly short of breath  He does admit to not being able to obtain his Advair due to cost  However he recently did fill a prescription and notes overall improvement in his respiratory status  He denies any exertional chest pain  He denies any signs or symptoms of heart failure including increasing lower extremity edema, paroxysmal nocturnal dyspnea, or orthopnea although he does admit to some abdominal bloating although he did run out of his Lasix which he had been taking as needed  His weight has been stable at home between 223 and 226 pounds  He denies lightheadedness, syncope, or presyncope  He denies palpitations or claudication  He drinks a six beers a month and also a glass of red wine a month  Review of Systems      Cardiac: as noted in HPI  Skin: No complaints of nonhealing sores or skin rash  Genitourinary: No complaints of recurrent urinary tract infections, frequent urination at night, difficult urination, blood in urine, kidney stones, loss of bladder control, no kidney or prostate problems, no erectile dysfunction  Psychological: No complaints of feeling depressed, anxiety, panic attacks, or difficulty concentrating  General: as noted in HPI  Respiratory: as noted in HPI     HEENT: No complaints of serious problems, hearing problems, nose problems, throat problems, or snoring  Gastrointestinal: No complaints of liver problems, nausea, vomiting, heartburn, constipation, bloody stools, diarrhea, problems swallowing, adbominal pain, or rectal bleeding  Hematologic: No complaints of bleeding disorders, anemia, blood clots, or excessive brusing  Neurological: No complaints of numbness, tingling, dizziness, weakness, seizures, headaches, syncope or fainting, AM fatigue, daytime sleepiness, no witnessed apnea episodes  Musculoskeletal: No complaints of arthritis, back pain, or painfull swelling  Active Problems    1  Anxiety (300 00) (F41 9)   2  Asthma (493 90) (J45 909)   3  Borderline hyperglycemia (790 29) (R73 9)   4  Chronic combined systolic and diastolic CHF (congestive heart failure) (428 42,428 0)   (I50 42)   5  Cough (786 2) (R05)   6  Dyslipidemia (272 4) (E78 5)   7  Hypertension (401 9) (I10)   8  Nonischemic cardiomyopathy (425 4) (I42 8)   9  Refused influenza vaccine (V64 06) (Z28 21)   10  Thyroid disease (246 9) (E07 9)   11  Vitamin D deficiency (268 9) (E55 9)            Asthma (493 90) (J45 909)       Dyslipidemia (272 4) (E78 5)       Nonischemic cardiomyopathy (425 4) (I42 9)          Past Medical History    · History of Acute sinusitis (461 9) (J01 90)   · History of Acute upper respiratory infection (465 9) (J06 9)   · History of Encounter for screening colonoscopy (V76 51) (Z12 11)   · History of congestive heart failure (V12 59) (Z86 79)   · History of non-ST elevation myocardial infarction (NSTEMI) (412) (I25 2)   · History of Mumps (072 9)   · History of Need for influenza vaccination (V04 81) (Z23)   · History of Pneumonia (V12 61)   · History of Special screening examination for neoplasm of prostate (V76 44) (Z12 5)   · History of Upper respiratory infection (465 9) (J06 9)    The active problems and past medical history were reviewed and updated today        Surgical History    · History of Cardiac Cath Post-Procedure Data   · History of Inguinal Hernia Repair   · History of Knee Replacement   · History of Tonsillectomy   · History of Umbilical Hernia Repair    The surgical history was reviewed and updated today  Family History    · Family history of myocardial infarction (V17 3) (Z82 49)    · Family history of Tuberculosis    · Family history of Diabetes Mellitus (V18 0)    · Family history of Arthritis (V17 7)   · Family history of Coronary Artery Disease (V17 49)   · Family history of Hypertension (V17 49)    The family history was reviewed and updated today  Social History    · Denied: History of Alcohol Use (History)   · Caffeine Use   · Denied: History of Drug Use   · Former smoker (D44 34) (U84 124)   ·   The social history was reviewed and updated today  Current Meds   1  Advair Diskus 250-50 MCG/DOSE Inhalation Aerosol Powder Breath Activated; inhale 1   dose by mouth twice a day; Therapy: 44NIW1770 to (Evaluate:44Fiy8960)  Requested for: 18EFS6916; Last   Rx:55Sui9857 Ordered   2  Albuterol Sulfate (2 5 MG/3ML) 0 083% Inhalation Nebulization Solution; inhale contents   of 1 vial in nebulizer every 6 hours if needed; Therapy: 82ECI0543 to (Evaluate:22Mar2017)  Requested for: 35Jhw3028; Last   Rx:86Oer2930 Ordered   3  ALPRAZolam 0 5 MG Oral Tablet; take 1 tablet by mouth once daily; Therapy: 67LPZ5638 to (Evaluate:19Lgn9613)  Requested for: 59SXG9564; Last   Rx:23Nov2015 Ordered   4  ALPRAZolam 0 5 MG Oral Tablet; take 1 tablet daily as needed; Therapy: 36SDL1341 to (Evaluate:01Mar2017); Last Rx:30Jan2017 Ordered   5  Aspirin 325 MG Oral Tablet; Take 1 tablet daily Recorded   6  Azithromycin 250 MG Oral Tablet; TAKE 2 TABLETS ON DAY 1 THEN TAKE 1 TABLET A   DAY FOR 4 DAYS; Therapy: 19WYR5954 to (Zeynep Bassett)  Requested for: 26Jun2017; Last   XL:57HFD7975 Ordered   7   Breo Ellipta 100-25 MCG/INH Inhalation Aerosol Powder Breath Activated; USE AS DIRECTED ON PACKAGE; Therapy: 61JCQ7859 to (Last Rx:12Avt0742)  Requested for: 49OBG2253 Ordered   8  Carvedilol 6 25 MG Oral Tablet; TAKE 1 TABLET BY MOUTH TWICE A DAY WITH   MORNING AND EVENING MEALS; Therapy: 44BFJ0765 to (XWXLCSVF:58SRC0786)  Requested for: 54KXP4419; Last   Rx:73Nob1730 Ordered   9  Furosemide 20 MG Oral Tablet; take 1 tablet daily prn; Therapy: 99FPR3448 to (Evaluate:72Nxk7362)  Requested for: 40WFR0727; Last   Rx:18Nov2016 Ordered   10  Lisinopril 20 MG Oral Tablet; take 1 tablet by mouth twice a day; Therapy: 26RIY7156 to (Jacque Garcia)  Requested for: 87CZN1438; Last    Rx:62Jgi6419 Ordered   11  PredniSONE 10 MG Oral Tablet; Take 1 daily; Therapy: 12GCQ3684 to (Last Rx:30Jan2017)  Requested for: 30Jan2017 Ordered   12  ProAir  (90 Base) MCG/ACT Inhalation Aerosol Solution; inhale 2 puffs by mouth    every 6 hours if needed; Therapy: 01LOR9951 to (Evaluate:27Kjt9829)  Requested for: 62Nnn5707; Last    Rx:78Rfi5702 Ordered   13  Promethazine-Codeine 6 25-10 MG/5ML Oral Syrup; TAKE 1 TEASPOON  EVERY 6    HOURS PRN; Therapy: 38DAY3032 to (Evaluate:70Ibh9555); Last Rx:26Jun2017 Ordered   14  Pulmicort Flexhaler 180 MCG/ACT Inhalation Aerosol Powder Breath Activated; INHALE    TWO PUFFS BID RINSE MOUTH AFTER USE; Therapy: 01NYC2641 to (Evaluate:88Awi9852)  Requested for: 92NXX5358; Last    Rx:83Cmr7063 Ordered   15  Ventolin  (90 Base) MCG/ACT Inhalation Aerosol Solution; inhale 2 puffs by    mouth every 6 hours if needed; Therapy: 00WIT1308 to (Evaluate:16Nov2017)  Requested for: 68LZX1941; Last    Rx:26Fyu0782 Ordered    The medication list was reviewed and updated today  Allergies    1   No Known Drug Allergies    Vitals   Recorded: 21Aug2017 12:45PM   Heart Rate 66   Systolic 802, LUE, Sitting   Diastolic 70, LUE, Sitting   Height 6 ft 2 in   Weight 229 lb 6 oz   BMI Calculated 29 45   BSA Calculated 2 3     Physical Exam    Constitutional General appearance: No acute distress, well appearing and well nourished  Eyes   Conjunctiva and Sclera examination: Conjunctiva pink, sclera anicteric  Ears, Nose, Mouth, and Throat - Oropharynx: Clear, nares are clear, mucous membranes are moist    Neck   Neck and thyroid: Normal, supple, trachea midline, no thyromegaly  Pulmonary   Respiratory effort: No increased work of breathing or signs of respiratory distress  Auscultation of lungs: Clear to auscultation, no rales, no rhonchi, no wheezing, good air movement  Cardiovascular   Auscultation of heart: Normal rate and rhythm, normal S1 and S2, no murmurs  Carotid pulses: Normal, 2+ bilaterally  Peripheral vascular exam: Normal pulses throughout, no tenderness, erythema or swelling  Pedal pulses: Normal, 2+ bilaterally  Examination of extremities for edema and/or varicosities: Abnormal   varicosities noted bilaterally  Abdomen   Abdomen: Non-tender and no distention  Liver and spleen: No hepatomegaly or splenomegaly  Musculoskeletal Gait and station: Normal gait  Digits and nails: Normal without clubbing or cyanosis  Inspection/palpation of joints, bones, and muscles: Normal, ROM normal     Skin - Skin and subcutaneous tissue: Normal without rashes or lesions  Skin is warm and well perfused, normal turgor  Neurologic - Cranial nerves: II - XII intact  Speech: Normal     Psychiatric - Orientation to person, place, and time: Normal  Mood and affect: Normal       Results/Data  A 12 lead ECG was performed and was normal    Rhythm and rate:  normal sinus rhythm  Assessment    1  Nonischemic cardiomyopathy (425 4) (I42 8)   2  Chronic combined systolic and diastolic CHF (congestive heart failure) (428 42,428 0)   (I50 42)   3  Hypertension (401 9) (I10)   4   Dyslipidemia (272 4) (E78 5)    Plan  Asthma    · Breo Ellipta 100-25 MCG/INH Inhalation Aerosol Powder Breath Activated   Rx By: Flo Leblanc; Dispense: 0 Days ; #:1 X 60 Aerosol Powder Breath Activated Disp Pack; Refill: 5; For: Asthma; AUBREE = N; Sent To: [address] ; Msg to Pharmacy: Please advise sara of the change due to insurance coverage; Last Updated By: Kaushal Smith; 8/21/2017 12:55:56 PM  Chronic combined systolic and diastolic CHF (congestive heart failure)    · Furosemide 20 MG Oral Tablet; take 1 tablet daily prn   Rx By: Kaushal Smith; Dispense: 30 Days ; #:30 Tablet; Refill: 11; For: Chronic combined systolic and diastolic CHF (congestive heart failure); AUBREE = N; Verified Transmission to [address] ; Last Updated By: System [employer]; 8/21/2017 12:56:30 PM  Nonischemic cardiomyopathy    · Lisinopril 20 MG Oral Tablet; take 1 tablet by mouth twice a day   Rx By: Kaushal Smith; Dispense: 30 Days ; #:60 Tablet; Refill: 11; For: Nonischemic cardiomyopathy; AUBREE = N; Verified Transmission to [place] [address] ; Last Updated By: System, [employer]; 8/21/2017 12:56:28 PM    Discussion/Summary    Mr Sujata Vitale is a pleasant 79-year-old male who presents to the office today for routine followup  Since his last visit he has noted some decrease in his exercise tolerance as he had repeated URIs over the winter  He has noted some improvement since he resumed Advair  I will attempt to assist in making him an appointment with pulmonary pulmonary whom he was referred to by his primary care physician  Otherwise he is on an appropriate regimen for his cardiomyopathy including an ACE-I and beta-blocker  He doesn't appear grossly volume overloaded on exam  He admits to some abdominal bloating but he did run out of his Lasix which I have refilled  His blood pressure is well-controlled  I do not have any recent assessment of his lipids but due to out-of-pocket cost with laboratory testing he declines  He also will not undergo a repeat echo for the same reason      We again discussed total cessation of alcohol given his nonischemic cardiomyopathy which was likely secondary to his alcohol abuse  I will see him back in the office in six months or sooner if deemed necessary  Thank you very much for allowing me to participate in the care of this patient  If you have any questions, please do not hesitate to contact me        Signatures   Electronically signed by : David Olea DO; Aug 21 2017  1:19PM EST                       (Author)

## 2018-01-13 VITALS
SYSTOLIC BLOOD PRESSURE: 122 MMHG | TEMPERATURE: 96.5 F | WEIGHT: 223.25 LBS | BODY MASS INDEX: 28.65 KG/M2 | DIASTOLIC BLOOD PRESSURE: 78 MMHG | HEIGHT: 74 IN

## 2018-01-13 VITALS
SYSTOLIC BLOOD PRESSURE: 136 MMHG | DIASTOLIC BLOOD PRESSURE: 70 MMHG | BODY MASS INDEX: 29.44 KG/M2 | WEIGHT: 229.38 LBS | HEIGHT: 74 IN | HEART RATE: 66 BPM

## 2018-01-14 VITALS
BODY MASS INDEX: 30.16 KG/M2 | HEART RATE: 70 BPM | DIASTOLIC BLOOD PRESSURE: 62 MMHG | WEIGHT: 235 LBS | HEIGHT: 74 IN | SYSTOLIC BLOOD PRESSURE: 102 MMHG | OXYGEN SATURATION: 97 %

## 2018-01-23 ENCOUNTER — HOSPITAL ENCOUNTER (OUTPATIENT)
Dept: SLEEP CENTER | Facility: HOSPITAL | Age: 63
Discharge: HOME | End: 2018-01-23
Attending: INTERNAL MEDICINE
Payer: COMMERCIAL

## 2018-01-23 VITALS
OXYGEN SATURATION: 95 % | HEART RATE: 77 BPM | RESPIRATION RATE: 18 BRPM | SYSTOLIC BLOOD PRESSURE: 145 MMHG | TEMPERATURE: 97.4 F | DIASTOLIC BLOOD PRESSURE: 74 MMHG

## 2018-01-23 DIAGNOSIS — G47.33 OBSTRUCTIVE SLEEP APNEA: ICD-10-CM

## 2018-01-23 DIAGNOSIS — G47.33 OSA (OBSTRUCTIVE SLEEP APNEA): ICD-10-CM

## 2018-01-23 PROCEDURE — G0399 HOME SLEEP TEST/TYPE 3 PORTA: HCPCS

## 2018-01-28 DIAGNOSIS — G47.33 OSA (OBSTRUCTIVE SLEEP APNEA): Primary | ICD-10-CM

## 2018-02-14 ENCOUNTER — OFFICE VISIT (OUTPATIENT)
Dept: PULMONOLOGY | Facility: HOSPITAL | Age: 63
End: 2018-02-14
Payer: COMMERCIAL

## 2018-02-14 VITALS
HEART RATE: 80 BPM | DIASTOLIC BLOOD PRESSURE: 80 MMHG | WEIGHT: 245 LBS | HEIGHT: 74 IN | SYSTOLIC BLOOD PRESSURE: 148 MMHG | BODY MASS INDEX: 31.44 KG/M2

## 2018-02-14 DIAGNOSIS — G47.33 OSA (OBSTRUCTIVE SLEEP APNEA): ICD-10-CM

## 2018-02-14 DIAGNOSIS — J45.40 CHRONIC ASTHMA, MODERATE PERSISTENT, UNCOMPLICATED: Primary | ICD-10-CM

## 2018-02-14 PROCEDURE — 99213 OFFICE O/P EST LOW 20 MIN: CPT | Performed by: INTERNAL MEDICINE

## 2018-02-14 RX ORDER — ALPRAZOLAM 0.5 MG/1
1 TABLET ORAL DAILY PRN
COMMUNITY
Start: 2017-01-30 | End: 2018-04-10 | Stop reason: SDUPTHER

## 2018-02-14 RX ORDER — FLUTICASONE FUROATE AND VILANTEROL 200; 25 UG/1; UG/1
1 POWDER RESPIRATORY (INHALATION) DAILY
Qty: 1 EACH | Refills: 12 | Status: SHIPPED | OUTPATIENT
Start: 2018-02-14 | End: 2019-02-21 | Stop reason: SDUPTHER

## 2018-02-14 RX ORDER — PROMETHAZINE HYDROCHLORIDE AND CODEINE PHOSPHATE 6.25; 1 MG/5ML; MG/5ML
SYRUP ORAL
COMMUNITY
Start: 2017-01-30 | End: 2018-04-10 | Stop reason: ALTCHOICE

## 2018-02-14 RX ORDER — CARVEDILOL 6.25 MG/1
1 TABLET ORAL 2 TIMES DAILY
COMMUNITY
Start: 2017-07-25 | End: 2018-03-29 | Stop reason: SDUPTHER

## 2018-02-14 RX ORDER — PREDNISONE 10 MG/1
TABLET ORAL
COMMUNITY
Start: 2017-12-15 | End: 2018-02-14 | Stop reason: ALTCHOICE

## 2018-02-14 RX ORDER — IPRATROPIUM BROMIDE AND ALBUTEROL SULFATE 2.5; .5 MG/3ML; MG/3ML
3 SOLUTION RESPIRATORY (INHALATION) EVERY 6 HOURS PRN
Qty: 360 ML | Refills: 4 | Status: SHIPPED | OUTPATIENT
Start: 2018-02-14 | End: 2019-07-30 | Stop reason: SDUPTHER

## 2018-02-14 RX ORDER — CYCLOBENZAPRINE HCL 10 MG
10 TABLET ORAL 3 TIMES DAILY PRN
Refills: 0 | Status: ON HOLD | COMMUNITY
Start: 2017-12-15 | End: 2019-03-11

## 2018-02-14 RX ORDER — LISINOPRIL 20 MG/1
1 TABLET ORAL 2 TIMES DAILY
COMMUNITY
Start: 2017-05-05 | End: 2018-09-06 | Stop reason: SDUPTHER

## 2018-02-14 RX ORDER — FUROSEMIDE 20 MG/1
1 TABLET ORAL DAILY PRN
COMMUNITY
Start: 2015-08-04 | End: 2018-03-29 | Stop reason: SDUPTHER

## 2018-02-14 NOTE — LETTER
February 14, 2018     Jennifer Martell,   [address]  [place] [address] [id number]    Patient: Laura Garcia   YOB: 1955   Date of Visit: 2/14/2018       Dear Dr Stephanie Araujo: Thank you for referring Laura Garcia to me for evaluation  Below are my notes for this consultation  If you have questions, please do not hesitate to call me  I look forward to following your patient along with you  Sincerely,        Delvin Antoine MD        CC: No Recipients  Delvin Antoine MD  2/14/2018  3:07 PM  Sign at close encounter  Assessment/Plan:    Asthma, chronic, moderate persistent, uncomplicated  He was advised to find a way to take his maintenance inhalers  He was given a coupon for Breo 1 puff daily, brush teeth afterward    CORDELL (obstructive sleep apnea)  He never had the home CPAP titration  Will reorder and plan final CPAP setting in 2 weeks  Diagnoses and all orders for this visit:    Chronic asthma, moderate persistent, uncomplicated  -     ipratropium-albuterol (DUO-NEB) 0 5-2 5 mg/3 mL; Take 3 mL by nebulization every 6 (six) hours as needed for wheezing  -     fluticasone furoate-vilanterol (BREO ELLIPTA) 200-25 MCG/INH inhaler; Inhale 1 puff daily    CORDELL (obstructive sleep apnea)  -     Durable Medical Equipment    Other orders  -     Discontinue: fluticasone-salmeterol (ADVAIR DISKUS) 250-50 mcg/dose inhaler; Inhale 2 (two) times a day  -     VENTOLIN  (90 Base) MCG/ACT inhaler;   -     ALPRAZolam (XANAX) 0 5 mg tablet; Take 1 tablet by mouth daily as needed  -     Discontinue: budesonide (PULMICORT FLEXHALER) 180 MCG/ACT inhaler; Inhale  -     promethazine-codeine (PHENERGAN WITH CODEINE) 6 25-10 mg/5 mL syrup; Take by mouth  -     Discontinue: predniSONE 10 mg tablet; Take by mouth  -     lisinopril (ZESTRIL) 20 mg tablet; Take 1 tablet by mouth 2 (two) times a day  -     furosemide (LASIX) 20 mg tablet;  Take 1 tablet by mouth daily as needed  -     cyclobenzaprine (FLEXERIL) 10 mg tablet; Take 10 mg by mouth 3 (three) times a day as needed  -     carvedilol (COREG) 6 25 mg tablet; Take 1 tablet by mouth Twice daily        Subjective:      Patient ID: Romario Osman is a 63 y o  male  Pt is here for re evaluation  He saw Dr Chrissy Galdamez in November and had both allergy testing and a sleep study but doesn't know the results of either  Regarding his asthma:  He is NOT taking his maintenance meds due to cost   He takes Duoneb about 1-2 X daily  He is SOB much of the time  Allergy testing shows he is allergic to cats, dogs, trees and ragweeds  He isn't interested in getting allergy testing nor is he interested in changing his relationship w/ his dogs  Regarding his CORDELL:  He doesn't recall the request to get a CPAP titration but is willing to follow thru  Will set this up thru [facility]         The following portions of the patient's history were reviewed and updated as appropriate: allergies, current medications, past family history, past medical history, past social history, past surgical history and problem list     Review of Systems   Constitutional: Negative  HENT: Negative  Respiratory: Positive for cough, shortness of breath and wheezing  Cardiovascular: Negative for chest pain  Objective:    Vitals:    02/14/18 1339   BP: 148/80   Pulse: 80        Physical Exam   Constitutional: He is oriented to person, place, and time  He appears well-developed and well-nourished  HENT:   Head: Normocephalic and atraumatic  Mouth/Throat: Oropharynx is clear and moist    Teeth in need of dental cleaning   Neck: Normal range of motion  Neck supple  No tracheal deviation present  No thyromegaly present  Cardiovascular: Normal rate and regular rhythm  Exam reveals no gallop and no friction rub  No murmur heard  Pulmonary/Chest: Effort normal and breath sounds normal  He has no wheezes  He has no rales  He exhibits no tenderness  Abdominal: Soft   Bowel sounds are normal    Musculoskeletal: Normal range of motion  He exhibits no edema or deformity  Neurological: He is alert and oriented to person, place, and time  Psychiatric: He has a normal mood and affect   His behavior is normal

## 2018-02-14 NOTE — PROGRESS NOTES
Assessment/Plan:    Asthma, chronic, moderate persistent, uncomplicated  He was advised to find a way to take his maintenance inhalers  He was given a coupon for Breo 1 puff daily, brush teeth afterward    CORDELL (obstructive sleep apnea)  He never had the home CPAP titration  Will reorder and plan final CPAP setting in 2 weeks  Diagnoses and all orders for this visit:    Chronic asthma, moderate persistent, uncomplicated  -     ipratropium-albuterol (DUO-NEB) 0 5-2 5 mg/3 mL; Take 3 mL by nebulization every 6 (six) hours as needed for wheezing  -     fluticasone furoate-vilanterol (BREO ELLIPTA) 200-25 MCG/INH inhaler; Inhale 1 puff daily    CORDELL (obstructive sleep apnea)  -     Durable Medical Equipment    Other orders  -     Discontinue: fluticasone-salmeterol (ADVAIR DISKUS) 250-50 mcg/dose inhaler; Inhale 2 (two) times a day  -     VENTOLIN  (90 Base) MCG/ACT inhaler;   -     ALPRAZolam (XANAX) 0 5 mg tablet; Take 1 tablet by mouth daily as needed  -     Discontinue: budesonide (PULMICORT FLEXHALER) 180 MCG/ACT inhaler; Inhale  -     promethazine-codeine (PHENERGAN WITH CODEINE) 6 25-10 mg/5 mL syrup; Take by mouth  -     Discontinue: predniSONE 10 mg tablet; Take by mouth  -     lisinopril (ZESTRIL) 20 mg tablet; Take 1 tablet by mouth 2 (two) times a day  -     furosemide (LASIX) 20 mg tablet; Take 1 tablet by mouth daily as needed  -     cyclobenzaprine (FLEXERIL) 10 mg tablet; Take 10 mg by mouth 3 (three) times a day as needed  -     carvedilol (COREG) 6 25 mg tablet; Take 1 tablet by mouth Twice daily        Subjective:      Patient ID: Erik Warren is a 63 y o  male  Pt is here for re evaluation  He saw Dr Barbara Whipple in November and had both allergy testing and a sleep study but doesn't know the results of either  Regarding his asthma:  He is NOT taking his maintenance meds due to cost   He takes Duoneb about 1-2 X daily  He is SOB much of the time    Allergy testing shows he is allergic to cats, dogs, trees and ragweeds  He isn't interested in getting allergy testing nor is he interested in changing his relationship w/ his dogs  Regarding his CORDELL:  He doesn't recall the request to get a CPAP titration but is willing to follow thru  Will set this up thru [facility]         The following portions of the patient's history were reviewed and updated as appropriate: allergies, current medications, past family history, past medical history, past social history, past surgical history and problem list     Review of Systems   Constitutional: Negative  HENT: Negative  Respiratory: Positive for cough, shortness of breath and wheezing  Cardiovascular: Negative for chest pain  Objective:    Vitals:    02/14/18 1339   BP: 148/80   Pulse: 80        Physical Exam   Constitutional: He is oriented to person, place, and time  He appears well-developed and well-nourished  HENT:   Head: Normocephalic and atraumatic  Mouth/Throat: Oropharynx is clear and moist    Teeth in need of dental cleaning   Neck: Normal range of motion  Neck supple  No tracheal deviation present  No thyromegaly present  Cardiovascular: Normal rate and regular rhythm  Exam reveals no gallop and no friction rub  No murmur heard  Pulmonary/Chest: Effort normal and breath sounds normal  He has no wheezes  He has no rales  He exhibits no tenderness  Abdominal: Soft  Bowel sounds are normal    Musculoskeletal: Normal range of motion  He exhibits no edema or deformity  Neurological: He is alert and oriented to person, place, and time  Psychiatric: He has a normal mood and affect   His behavior is normal

## 2018-02-14 NOTE — ASSESSMENT & PLAN NOTE
He was advised to find a way to take his maintenance inhalers    He was given a coupon for Breo 1 puff daily, brush teeth afterward

## 2018-03-19 ENCOUNTER — TELEPHONE (OUTPATIENT)
Dept: INTERNAL MEDICINE CLINIC | Facility: CLINIC | Age: 63
End: 2018-03-19

## 2018-03-19 DIAGNOSIS — J06.9 UPPER RESPIRATORY TRACT INFECTION, UNSPECIFIED TYPE: Primary | ICD-10-CM

## 2018-03-19 RX ORDER — AZITHROMYCIN 250 MG/1
TABLET, FILM COATED ORAL
Qty: 6 TABLET | Refills: 0 | Status: SHIPPED | OUTPATIENT
Start: 2018-03-19 | End: 2018-03-23

## 2018-03-26 ENCOUNTER — TELEPHONE (OUTPATIENT)
Dept: INTERNAL MEDICINE CLINIC | Facility: CLINIC | Age: 63
End: 2018-03-26

## 2018-03-26 DIAGNOSIS — R05.9 COUGH: Primary | ICD-10-CM

## 2018-03-26 RX ORDER — PROMETHAZINE HYDROCHLORIDE AND CODEINE PHOSPHATE 6.25; 1 MG/5ML; MG/5ML
5 SYRUP ORAL EVERY 4 HOURS PRN
Qty: 120 ML | Refills: 0 | Status: SHIPPED | OUTPATIENT
Start: 2018-03-26 | End: 2018-12-18 | Stop reason: ALTCHOICE

## 2018-03-29 ENCOUNTER — OFFICE VISIT (OUTPATIENT)
Dept: CARDIOLOGY CLINIC | Facility: HOSPITAL | Age: 63
End: 2018-03-29
Payer: COMMERCIAL

## 2018-03-29 VITALS
SYSTOLIC BLOOD PRESSURE: 142 MMHG | HEART RATE: 77 BPM | BODY MASS INDEX: 31.01 KG/M2 | WEIGHT: 241.6 LBS | HEIGHT: 74 IN | DIASTOLIC BLOOD PRESSURE: 69 MMHG

## 2018-03-29 DIAGNOSIS — E78.5 DYSLIPIDEMIA: ICD-10-CM

## 2018-03-29 DIAGNOSIS — G47.33 OBSTRUCTIVE SLEEP APNEA: ICD-10-CM

## 2018-03-29 DIAGNOSIS — I50.42 CHRONIC COMBINED SYSTOLIC AND DIASTOLIC CHF (CONGESTIVE HEART FAILURE) (HCC): ICD-10-CM

## 2018-03-29 DIAGNOSIS — I10 HYPERTENSION: ICD-10-CM

## 2018-03-29 DIAGNOSIS — I42.8 NONISCHEMIC CARDIOMYOPATHY (HCC): Primary | ICD-10-CM

## 2018-03-29 PROCEDURE — 99214 OFFICE O/P EST MOD 30 MIN: CPT | Performed by: INTERNAL MEDICINE

## 2018-03-29 RX ORDER — CARVEDILOL 6.25 MG/1
12.5 TABLET ORAL 2 TIMES DAILY WITH MEALS
Qty: 30 TABLET | Refills: 11 | Status: SHIPPED | OUTPATIENT
Start: 2018-03-29 | End: 2018-04-24 | Stop reason: DRUGHIGH

## 2018-03-29 RX ORDER — FUROSEMIDE 20 MG/1
20 TABLET ORAL DAILY PRN
Qty: 90 TABLET | Refills: 0 | Status: SHIPPED | OUTPATIENT
Start: 2018-03-29 | End: 2020-08-18 | Stop reason: SDUPTHER

## 2018-03-29 NOTE — PROGRESS NOTES
Cardiology Follow Up    Idris Patient  1955  [id number]  [address]      1  Nonischemic cardiomyopathy (HCC)  furosemide (LASIX) 20 mg tablet    carvedilol (COREG) 6 25 mg tablet    Echo complete with contrast if indicated   2  Chronic combined systolic and diastolic CHF (congestive heart failure) ([address] )     3  Hypertension     4  Dyslipidemia     5  Obstructive sleep apnea         Discussion/Summary:  Mr Michelle Platt is a pleasant 57-year-old male who presents to the office today for routine follow up  Since his last visit he has been feeling about the same  He is on an appropriate regimen for his cardiomyopathy including an ACE-I and beta-blocker  He is agreeable to undergoing reassessment of his EF with an echo  His blood pressure is high in the office today  I have asked that he increase his carvedilol to 12 5 mg twice daily  He will see his primary care physician in the near future who will reassess his blood pressure  His volume status appears stable  He will continue to utilize Lasix on an as-needed basis  He declines blood work  He was asked to have a CPAP titration study given his CORDELL  He declines  I encouraged him to reconsider  We again discussed total cessation of alcohol given his nonischemic cardiomyopathy which was likely secondary to his alcohol abuse  I will see him back in the office in six months or sooner if deemed necessary  Interval History:   Mr Michelle Platt is a pleasant 57-year-old male who presents to the office for routine follow-up  He has been exercising consistently at the gym  In doing so he got mildly short of breath  His Advair was changed to [place] and he notes improvement in his breathing  He denies any exertional chest pain   He denies any signs or symptoms of heart failure including increasing lower extremity edema, paroxysmal nocturnal dyspnea, or orthopnea, acute weight gain or increasing abdominal girth  He weighs himself daily at home with stable weights  He utilizes Lasix on an as-needed basis which he does rarely  He denies lightheadedness, syncope, or presyncope  He denies palpitations or claudication  He drinks a hard liquor and wine a few times per month  He was diagnosed with moderate obstructive sleep apnea after his last visit  Problem List     Chronic combined systolic and diastolic CHF (congestive heart failure) (HCC)    Nonischemic cardiomyopathy (HCC)    Dyslipidemia    Hypertension    Asthma, chronic, moderate persistent, uncomplicated    Obstructive sleep apnea        Past Medical History:   Diagnosis Date    [facility]congest[facility]) ([facility] [address] )     Mumps     Old MI (myocardial infarction)     Pneumonia      Social History     Social History    Marital status: /Civil Union     Spouse name: N/A    Number of children: N/A    Years of education: N/A     Occupational History    Not on file  Social History Main Topics    Smoking status: Former Smoker    Smokeless tobacco: Never Used    Alcohol use No    Drug use: No    Sexual activity: Not on file     Other Topics Concern    Not on file     Social History Narrative    Caffeine use          Family History   Problem Relation Age of Onset    Heart attack Father      MI    Diabetes Sister      DM    Arthritis Family     Coronary artery disease Family     Hypertension Family     Tuberculosis Son      Past Surgical History:   Procedure Laterality Date    CARDIAC CATHETERIZATION  07/24/2015    Left main- normal and maidly tortuous  Circumflex - normal and moderately tortuous  RCA- normal and mildy tortuous  Global LV function was severely depressed  Robby Mode INGUINAL HERNIA REPAIR Bilateral     JOINT REPLACEMENT      knee    TONSILLECTOMY      UMBILICAL HERNIA REPAIR  06/21/2006       Current Outpatient Prescriptions:     albuterol (2 5 mg/3 mL) 0 083 % nebulizer solution, Inhale 1 vial every 6 (six) hours as needed, Disp: , Rfl:     ALPRAZolam (XANAX) 0 5 mg tablet, Take 1 tablet by mouth daily as needed, Disp: , Rfl:     aspirin 325 mg tablet, Take 1 tablet by mouth daily, Disp: , Rfl:     carvedilol (COREG) 6 25 mg tablet, Take 2 tablets (12 5 mg total) by mouth 2 (two) times a day with meals, Disp: 30 tablet, Rfl: 11    fluticasone furoate-vilanterol (BREO ELLIPTA) 200-25 MCG/INH inhaler, Inhale 1 puff daily, Disp: 1 each, Rfl: 12    furosemide (LASIX) 20 mg tablet, Take 1 tablet (20 mg total) by mouth daily as needed (weight gain or edema), Disp: 90 tablet, Rfl: 0    ipratropium-albuterol (DUO-NEB) 0 5-2 5 mg/3 mL, Take 3 mL by nebulization every 6 (six) hours as needed for wheezing, Disp: 360 mL, Rfl: 4    lisinopril (ZESTRIL) 20 mg tablet, Take 1 tablet by mouth 2 (two) times a day, Disp: , Rfl:     promethazine-codeine (PHENERGAN WITH CODEINE) 6 25-10 mg/5 mL syrup, Take 5 mL by mouth every 4 (four) hours as needed for cough, Disp: 120 mL, Rfl: 0    VENTOLIN  (90 Base) MCG/ACT inhaler, , Disp: , Rfl: 0    cyclobenzaprine (FLEXERIL) 10 mg tablet, Take 10 mg by mouth 3 (three) times a day as needed, Disp: , Rfl: 0    promethazine-codeine (PHENERGAN WITH CODEINE) 6 25-10 mg/5 mL syrup, Take by mouth, Disp: , Rfl:   No Known Allergies    Labs:     Chemistry        Component Value Date/Time     07/27/2015 0559    K 3 8 07/27/2015 0559     07/27/2015 0559    CO2 32 07/27/2015 0559    BUN 19 07/27/2015 0559    CREATININE 1 05 07/27/2015 0559        Component Value Date/Time    CALCIUM 8 6 07/27/2015 0559    ALKPHOS 75 07/22/2015 1021    AST 30 07/22/2015 1021    ALT 74 07/22/2015 1021    BILITOT 1 05 (H) 07/22/2015 1021            Lab Results   Component Value Date    CHOL 105 07/23/2015     Lab Results   Component Value Date    HDL 21 07/23/2015     Lab Results   Component Value Date    LDLCALC 69 07/23/2015     Lab Results Component Value Date    TRIG 74 07/23/2015     No components found for: CHOLHDL    Imaging: No results found  Review of Systems   Cardiovascular: Positive for dyspnea on exertion  Negative for chest pain  Respiratory: Positive for cough  All other systems reviewed and are negative  Vitals:    03/29/18 1403   BP: 142/69   Pulse: 77     Vitals:    03/29/18 1403   Weight: 110 kg (241 lb 9 6 oz)     Height: 6' 2" (188 cm)   Body mass index is 31 02 kg/m²      Physical Exam:   General appearance:  Appears stated age, alert, well appearing and in no distress  HEENT:  PERRLA, EOMI, no scleral icterus, no conjunctival pallor  NECK:  Supple, No elevated JVP, no thyromegaly, no carotid bruits  HEART:  Regular rate and rhythm, normal S1/S2, no S3/S4, no murmur or rub  LUNGS:  Clear to auscultation bilaterally  ABDOMEN:  Soft, non-tender, positive bowel sounds, no rebound or guarding, no organomegaly   EXTREMITIES:  No edema  VASCULAR:  Normal pedal pulses   SKIN: No lesions or rashes on exposed skin  NEURO:  CN II-XII intact, no focal deficits

## 2018-04-10 ENCOUNTER — OFFICE VISIT (OUTPATIENT)
Dept: INTERNAL MEDICINE CLINIC | Facility: CLINIC | Age: 63
End: 2018-04-10
Payer: COMMERCIAL

## 2018-04-10 VITALS
HEIGHT: 74 IN | WEIGHT: 242.38 LBS | BODY MASS INDEX: 31.11 KG/M2 | HEART RATE: 70 BPM | OXYGEN SATURATION: 97 % | TEMPERATURE: 95.7 F | DIASTOLIC BLOOD PRESSURE: 70 MMHG | SYSTOLIC BLOOD PRESSURE: 122 MMHG

## 2018-04-10 DIAGNOSIS — I50.42 CHRONIC COMBINED SYSTOLIC AND DIASTOLIC CHF (CONGESTIVE HEART FAILURE) (HCC): ICD-10-CM

## 2018-04-10 DIAGNOSIS — I42.8 NONISCHEMIC CARDIOMYOPATHY (HCC): ICD-10-CM

## 2018-04-10 DIAGNOSIS — J45.40 ASTHMA, CHRONIC, MODERATE PERSISTENT, UNCOMPLICATED: ICD-10-CM

## 2018-04-10 DIAGNOSIS — G47.33 OBSTRUCTIVE SLEEP APNEA: ICD-10-CM

## 2018-04-10 DIAGNOSIS — E03.8 HYPOTHYROIDISM DUE TO HASHIMOTO'S THYROIDITIS: ICD-10-CM

## 2018-04-10 DIAGNOSIS — F41.9 ANXIETY: Primary | ICD-10-CM

## 2018-04-10 DIAGNOSIS — Z12.5 SCREENING FOR MALIGNANT NEOPLASM OF PROSTATE: ICD-10-CM

## 2018-04-10 DIAGNOSIS — E06.3 HYPOTHYROIDISM DUE TO HASHIMOTO'S THYROIDITIS: ICD-10-CM

## 2018-04-10 PROBLEM — J44.9 COPD, SEVERE (HCC): Status: ACTIVE | Noted: 2017-10-16

## 2018-04-10 PROBLEM — M54.9 BACK PAIN: Status: ACTIVE | Noted: 2017-12-15

## 2018-04-10 PROBLEM — R73.9 BORDERLINE HYPERGLYCEMIA: Status: ACTIVE | Noted: 2017-01-30

## 2018-04-10 PROBLEM — K64.9 HEMORRHOIDS: Status: ACTIVE | Noted: 2017-11-27

## 2018-04-10 PROCEDURE — 99214 OFFICE O/P EST MOD 30 MIN: CPT | Performed by: INTERNAL MEDICINE

## 2018-04-10 PROCEDURE — 3008F BODY MASS INDEX DOCD: CPT | Performed by: INTERNAL MEDICINE

## 2018-04-10 PROCEDURE — 1036F TOBACCO NON-USER: CPT | Performed by: INTERNAL MEDICINE

## 2018-04-10 RX ORDER — ALPRAZOLAM 0.5 MG/1
0.5 TABLET ORAL
Qty: 30 TABLET | Refills: 0 | Status: SHIPPED | OUTPATIENT
Start: 2018-04-10 | End: 2018-12-18 | Stop reason: SDUPTHER

## 2018-04-10 RX ORDER — DULOXETIN HYDROCHLORIDE 20 MG/1
20 CAPSULE, DELAYED RELEASE ORAL DAILY
Qty: 30 CAPSULE | Refills: 5 | Status: SHIPPED | OUTPATIENT
Start: 2018-04-10 | End: 2018-12-18 | Stop reason: ALTCHOICE

## 2018-04-10 RX ORDER — ALPRAZOLAM 0.5 MG/1
0.5 TABLET ORAL
Qty: 30 TABLET | Refills: 0 | Status: SHIPPED | OUTPATIENT
Start: 2018-04-10 | End: 2018-12-15 | Stop reason: SDUPTHER

## 2018-04-10 NOTE — PROGRESS NOTES
Assessment/Plan:      Diagnoses and all orders for this visit:    Chronic combined systolic and diastolic CHF (congestive heart failure) ([facility] [address] )  Echo ordered per cardio Saw Dr Lisa De La Cruz recently  He does exercise regularly and is trying to maintain diet    Screening for malignant neoplasm of prostate  -     PSA, total and free; Future    Asthma, chronic, moderate persistent, uncomplicated  Breo has made a positive difference  He does follow with pulmonary    Obstructive sleep apnea  Pt had sleep study and was told should use cpap but he feels he is ok at present  Sleeps in recliner at Pm    Nonischemic cardiomyopathy [facility])         Patient ID: Mario Paul is a 63 y o  male  HPI   Pt doing ok but tires more easily and has upcoming echo after recent Cardio  No chest pain but sob with exertion  Mahseh Dimes has been helpful for his asthma  Has had more stress at work and increased anxiety  Not smoking and drinking    Review of Systems   Constitutional: Positive for fatigue  HENT: Negative  Eyes: Negative  Respiratory: Positive for shortness of breath  Negative for wheezing  Cardiovascular: Negative for chest pain, palpitations and leg swelling  Gastrointestinal: Negative  Endocrine: Negative  Genitourinary: Negative  Musculoskeletal: Negative  Skin: Negative  Allergic/Immunologic: Negative  Neurological: Negative  Hematological: Negative  Psychiatric/Behavioral: The patient is nervous/anxious  Vitals:    04/10/18 1415 04/10/18 1448   BP:  122/70   Pulse: 70    Temp: (!) 95 7 °F (35 4 °C)    TempSrc: Tympanic    SpO2: 97%    Weight: 110 kg (242 lb 6 oz)    Height: 6' 2" (1 88 m)         Physical Exam   Constitutional: He is oriented to person, place, and time  He appears well-developed and well-nourished  No distress  HENT:   Head: Normocephalic and atraumatic     Right Ear: External ear normal    Left Ear: External ear normal    Nose: Nose normal    Mouth/Throat: Oropharynx is clear and moist  No oropharyngeal exudate  Eyes: Conjunctivae and EOM are normal  Pupils are equal, round, and reactive to light  No scleral icterus  Neck: Normal range of motion  Neck supple  No JVD present  Cardiovascular: Normal rate, regular rhythm and intact distal pulses  Murmur heard  Pulmonary/Chest: Effort normal and breath sounds normal  No respiratory distress  He has no wheezes  He has no rales  Abdominal: Soft  Bowel sounds are normal  He exhibits no distension  There is no tenderness  There is no rebound and no guarding  Musculoskeletal: He exhibits deformity  He exhibits no edema or tenderness  Lymphadenopathy:     He has no cervical adenopathy  Neurological: He is alert and oriented to person, place, and time  He has normal reflexes  He displays normal reflexes  No cranial nerve deficit  He exhibits normal muscle tone  Coordination normal    Skin: Skin is warm and dry  He is not diaphoretic  Psychiatric: He has a normal mood and affect  His behavior is normal  Judgment and thought content normal    Nursing note and vitals reviewed

## 2018-04-18 ENCOUNTER — HOSPITAL ENCOUNTER (OUTPATIENT)
Dept: NON INVASIVE DIAGNOSTICS | Facility: HOSPITAL | Age: 63
Discharge: HOME | End: 2018-04-18
Attending: INTERNAL MEDICINE
Payer: COMMERCIAL

## 2018-04-18 DIAGNOSIS — I42.8 NONISCHEMIC CARDIOMYOPATHY (HCC): ICD-10-CM

## 2018-04-18 PROCEDURE — 93306 TTE W/DOPPLER COMPLETE: CPT

## 2018-04-18 PROCEDURE — 93306 TTE W/DOPPLER COMPLETE: CPT | Performed by: INTERNAL MEDICINE

## 2018-04-24 DIAGNOSIS — I42.8 OTHER CARDIOMYOPATHY (HCC): Primary | ICD-10-CM

## 2018-04-24 RX ORDER — CARVEDILOL 12.5 MG/1
12.5 TABLET ORAL 2 TIMES DAILY WITH MEALS
Qty: 60 TABLET | Refills: 5 | Status: SHIPPED | OUTPATIENT
Start: 2018-04-24 | End: 2018-12-07 | Stop reason: SDUPTHER

## 2018-08-07 DIAGNOSIS — J45.20 INTERMITTENT ASTHMA, UNSPECIFIED ASTHMA SEVERITY, UNSPECIFIED WHETHER COMPLICATED: Primary | ICD-10-CM

## 2018-09-06 DIAGNOSIS — I10 ESSENTIAL HYPERTENSION: Primary | ICD-10-CM

## 2018-09-06 RX ORDER — LISINOPRIL 20 MG/1
TABLET ORAL
Qty: 60 TABLET | Refills: 11 | Status: SHIPPED | OUTPATIENT
Start: 2018-09-06 | End: 2019-09-27 | Stop reason: SDUPTHER

## 2018-12-07 DIAGNOSIS — I42.8 OTHER CARDIOMYOPATHY (HCC): ICD-10-CM

## 2018-12-07 RX ORDER — CARVEDILOL 12.5 MG/1
TABLET ORAL
Qty: 60 TABLET | Refills: 5 | Status: SHIPPED | OUTPATIENT
Start: 2018-12-07 | End: 2019-07-03 | Stop reason: SDUPTHER

## 2018-12-12 ENCOUNTER — TELEPHONE (OUTPATIENT)
Dept: INTERNAL MEDICINE CLINIC | Facility: CLINIC | Age: 63
End: 2018-12-12

## 2018-12-12 ENCOUNTER — APPOINTMENT (OUTPATIENT)
Dept: LAB | Facility: CLINIC | Age: 63
End: 2018-12-12
Payer: COMMERCIAL

## 2018-12-12 DIAGNOSIS — N30.00 ACUTE CYSTITIS WITHOUT HEMATURIA: Primary | ICD-10-CM

## 2018-12-12 LAB
BACTERIA UR QL AUTO: NORMAL /HPF
BILIRUB UR QL STRIP: NEGATIVE
CLARITY UR: CLEAR
COLOR UR: YELLOW
GLUCOSE UR STRIP-MCNC: NEGATIVE MG/DL
HGB UR QL STRIP.AUTO: NEGATIVE
HYALINE CASTS #/AREA URNS LPF: NORMAL /LPF
KETONES UR STRIP-MCNC: NEGATIVE MG/DL
LEUKOCYTE ESTERASE UR QL STRIP: NEGATIVE
NITRITE UR QL STRIP: NEGATIVE
NON-SQ EPI CELLS URNS QL MICRO: NORMAL /HPF
PH UR STRIP.AUTO: 6 [PH] (ref 4.5–8)
PROT UR STRIP-MCNC: ABNORMAL MG/DL
RBC #/AREA URNS AUTO: NORMAL /HPF
SP GR UR STRIP.AUTO: 1.01 (ref 1–1.03)
UROBILINOGEN UR QL STRIP.AUTO: 1 E.U./DL
WBC #/AREA URNS AUTO: NORMAL /HPF

## 2018-12-12 PROCEDURE — 81001 URINALYSIS AUTO W/SCOPE: CPT | Performed by: INTERNAL MEDICINE

## 2018-12-12 RX ORDER — CIPROFLOXACIN 500 MG/1
500 TABLET, FILM COATED ORAL EVERY 12 HOURS SCHEDULED
Qty: 14 TABLET | Refills: 0 | Status: SHIPPED | OUTPATIENT
Start: 2018-12-12 | End: 2018-12-18

## 2018-12-12 NOTE — TELEPHONE ENCOUNTER
Patient has increase in urinary frequency for one week    He is also had constipation, started  Miralax yesterday    nkda

## 2018-12-15 ENCOUNTER — HOSPITAL ENCOUNTER (EMERGENCY)
Facility: HOSPITAL | Age: 63
Discharge: HOME | End: 2018-12-15
Attending: EMERGENCY MEDICINE
Payer: COMMERCIAL

## 2018-12-15 ENCOUNTER — APPOINTMENT (EMERGENCY)
Dept: RADIOLOGY | Facility: HOSPITAL | Age: 63
End: 2018-12-15
Payer: COMMERCIAL

## 2018-12-15 VITALS
SYSTOLIC BLOOD PRESSURE: 146 MMHG | HEIGHT: 74 IN | DIASTOLIC BLOOD PRESSURE: 65 MMHG | RESPIRATION RATE: 18 BRPM | BODY MASS INDEX: 30.8 KG/M2 | HEART RATE: 67 BPM | TEMPERATURE: 98.4 F | OXYGEN SATURATION: 100 % | WEIGHT: 240 LBS

## 2018-12-15 DIAGNOSIS — B34.9 VIRAL SYNDROME: Primary | ICD-10-CM

## 2018-12-15 DIAGNOSIS — R06.02 SOB (SHORTNESS OF BREATH): ICD-10-CM

## 2018-12-15 DIAGNOSIS — R19.7 DIARRHEA, UNSPECIFIED TYPE: ICD-10-CM

## 2018-12-15 LAB
ALBUMIN SERPL BCP-MCNC: 4.4 G/DL (ref 3.5–5.7)
ALP SERPL-CCNC: 62 U/L (ref 55–165)
ALT SERPL W P-5'-P-CCNC: 13 U/L (ref 7–52)
ANION GAP SERPL CALCULATED.3IONS-SCNC: 6 MMOL/L (ref 4–13)
AST SERPL W P-5'-P-CCNC: 15 U/L (ref 13–39)
BASOPHILS # BLD AUTO: 0 THOUSANDS/ΜL (ref 0–0.1)
BASOPHILS NFR BLD AUTO: 0 % (ref 0–2)
BILIRUB SERPL-MCNC: 1 MG/DL (ref 0.2–1)
BNP SERPL-MCNC: 43 PG/ML (ref 1–100)
BUN SERPL-MCNC: 34 MG/DL (ref 7–25)
CALCIUM SERPL-MCNC: 9.8 MG/DL (ref 8.6–10.5)
CHLORIDE SERPL-SCNC: 97 MMOL/L (ref 98–107)
CO2 SERPL-SCNC: 28 MMOL/L (ref 21–31)
CREAT SERPL-MCNC: 1.17 MG/DL (ref 0.7–1.3)
DEPRECATED D DIMER PPP: 223 NG/ML (FEU)
EOSINOPHIL # BLD AUTO: 0.4 THOUSAND/ΜL (ref 0–0.61)
EOSINOPHIL NFR BLD AUTO: 3 % (ref 0–5)
ERYTHROCYTE [DISTWIDTH] IN BLOOD BY AUTOMATED COUNT: 14.9 % (ref 11.5–14.5)
FLUAV AG SPEC QL IA: NEGATIVE
FLUBV AG SPEC QL IA: NEGATIVE
GFR SERPL CREATININE-BSD FRML MDRD: 67 ML/MIN/1.73SQ M
GLUCOSE SERPL-MCNC: 110 MG/DL (ref 65–99)
HCT VFR BLD AUTO: 47 % (ref 36.5–49.3)
HGB BLD-MCNC: 15.3 G/DL (ref 14–18)
LYMPHOCYTES # BLD AUTO: 0.3 THOUSANDS/ΜL (ref 0.6–4.47)
LYMPHOCYTES NFR BLD AUTO: 2 % (ref 21–51)
MAGNESIUM SERPL-MCNC: 2 MG/DL (ref 1.9–2.7)
MCH RBC QN AUTO: 27.7 PG (ref 26–34)
MCHC RBC AUTO-ENTMCNC: 32.5 G/DL (ref 31–37)
MCV RBC AUTO: 85 FL (ref 81–99)
MONOCYTES # BLD AUTO: 0.6 THOUSAND/ΜL (ref 0.17–1.22)
MONOCYTES NFR BLD AUTO: 5 % (ref 2–12)
NEUTROPHILS # BLD AUTO: 11.1 THOUSANDS/ΜL (ref 1.4–6.5)
NEUTS SEG NFR BLD AUTO: 89 % (ref 42–75)
NRBC BLD AUTO-RTO: 0 /100 WBCS
PLATELET # BLD AUTO: 264 THOUSANDS/UL (ref 149–390)
PMV BLD AUTO: 7.7 FL (ref 8.6–11.7)
POTASSIUM SERPL-SCNC: 5 MMOL/L (ref 3.5–5.5)
PROT SERPL-MCNC: 7.3 G/DL (ref 6.4–8.9)
RBC # BLD AUTO: 5.52 MILLION/UL (ref 4.3–5.9)
SODIUM SERPL-SCNC: 131 MMOL/L (ref 134–143)
TROPONIN I SERPL-MCNC: 0.03 NG/ML
WBC # BLD AUTO: 12.4 THOUSAND/UL (ref 4.8–10.8)

## 2018-12-15 PROCEDURE — 80053 COMPREHEN METABOLIC PANEL: CPT | Performed by: PHYSICIAN ASSISTANT

## 2018-12-15 PROCEDURE — 36415 COLL VENOUS BLD VENIPUNCTURE: CPT | Performed by: PHYSICIAN ASSISTANT

## 2018-12-15 PROCEDURE — 85025 COMPLETE CBC W/AUTO DIFF WBC: CPT | Performed by: PHYSICIAN ASSISTANT

## 2018-12-15 PROCEDURE — 99284 EMERGENCY DEPT VISIT MOD MDM: CPT

## 2018-12-15 PROCEDURE — 93005 ELECTROCARDIOGRAM TRACING: CPT

## 2018-12-15 PROCEDURE — 83735 ASSAY OF MAGNESIUM: CPT | Performed by: PHYSICIAN ASSISTANT

## 2018-12-15 PROCEDURE — 85379 FIBRIN DEGRADATION QUANT: CPT | Performed by: PHYSICIAN ASSISTANT

## 2018-12-15 PROCEDURE — 96360 HYDRATION IV INFUSION INIT: CPT

## 2018-12-15 PROCEDURE — 71045 X-RAY EXAM CHEST 1 VIEW: CPT

## 2018-12-15 PROCEDURE — 87631 RESP VIRUS 3-5 TARGETS: CPT | Performed by: PHYSICIAN ASSISTANT

## 2018-12-15 PROCEDURE — 84484 ASSAY OF TROPONIN QUANT: CPT | Performed by: PHYSICIAN ASSISTANT

## 2018-12-15 PROCEDURE — 83880 ASSAY OF NATRIURETIC PEPTIDE: CPT | Performed by: PHYSICIAN ASSISTANT

## 2018-12-15 RX ADMIN — SODIUM CHLORIDE 1000 ML: 0.9 INJECTION, SOLUTION INTRAVENOUS at 15:11

## 2018-12-15 NOTE — ED PROVIDER NOTES
History  Chief Complaint   Patient presents with    Allergic Reaction     Patient believes he is having an allergic reaction to cipro c/o shortness of breath, diahrea, fatigue  Was placed on cipro for a UTI on Wensday  82-year-old male, presents for evaluation of possible allergic reaction to ciprofloxacin to the ed  Patient was started on Cipro, for possible urinary tract infection/cystitis although patient's urine dip was negative for leukocytes, nitrates, blood  Patient presented to his PCP, requesting to be evaluated because he has had increased urinary frequency and urgency, and believes that it might be his prostate  PSA was not checked at the doctor's office  Urine culture is not back yet  Ever since Thursday, patient has been having diarrhea  Patient also states that he has COPD, and frequently gets shortness of breath  Ever since Wednesday/Thursday, he has had increased shortness of breath even at rest   He does have a history of CHF, that he only takes the Lasix for when he notices that his legs are swollen  He has not taken it in a long time as per patient  He denies any noticeable upper lower extremity swelling  Denies any facial paralysis, facial numbness  He denies actual chest pain  The diarrhea has been nonbloody nonbilious in nature, he feels has been approximately 3-4 times since Thursday  He denies any vomiting episodes  Overall he does feel fatigued as well  He has never been on Cipro before            History provided by:  Patient   used: No    Allergic Reaction   Presenting symptoms: difficulty breathing    Presenting symptoms: no difficulty swallowing and no wheezing    Severity:  Moderate  Duration:  3 days  Prior allergic episodes:  Unable to specify  Context: medications    Context: not chemicals and not food allergies    Relieved by:  Nothing  Worsened by:  Nothing  Ineffective treatments:  None tried      Prior to Admission Medications Prescriptions Last Dose Informant Patient Reported? Taking?    ALPRAZolam (XANAX) 0 5 mg tablet Past Week at Unknown time  No Yes   Sig: Take 1 tablet (0 5 mg total) by mouth daily at bedtime as needed for anxiety   DULoxetine (CYMBALTA) 20 mg capsule Not Taking at Unknown time  No No   Sig: Take 1 capsule (20 mg total) by mouth daily   Patient not taking: Reported on 12/15/2018    VENTOLIN  (90 Base) MCG/ACT inhaler 12/15/2018 at Unknown time  No Yes   Sig: inhale 2 puffs by mouth every 6 hours if needed   albuterol (2 5 mg/3 mL) 0 083 % nebulizer solution Not Taking at Unknown time  Yes No   Sig: Inhale 1 vial every 6 (six) hours as needed   aspirin 325 mg tablet 12/15/2018 at Unknown time  Yes Yes   Sig: Take 1 tablet by mouth daily   carvedilol (COREG) 12 5 mg tablet 12/15/2018 at Unknown time  No Yes   Sig: take 1 tablet by mouth twice a day with meals   ciprofloxacin (CIPRO) 500 mg tablet 12/15/2018 at Unknown time  No Yes   Sig: Take 1 tablet (500 mg total) by mouth every 12 (twelve) hours for 7 days   cyclobenzaprine (FLEXERIL) 10 mg tablet Past Month at Unknown time Self Yes Yes   Sig: Take 10 mg by mouth 3 (three) times a day as needed   fluticasone furoate-vilanterol (BREO ELLIPTA) 200-25 MCG/INH inhaler 12/15/2018 at Unknown time  No Yes   Sig: Inhale 1 puff daily   furosemide (LASIX) 20 mg tablet Past Month at Unknown time  No Yes   Sig: Take 1 tablet (20 mg total) by mouth daily as needed (weight gain or edema)   ipratropium-albuterol (DUO-NEB) 0 5-2 5 mg/3 mL 12/15/2018 at Unknown time  No Yes   Sig: Take 3 mL by nebulization every 6 (six) hours as needed for wheezing   lisinopril (ZESTRIL) 20 mg tablet 12/15/2018 at Unknown time  No Yes   Sig: take 1 tablet by mouth twice a day   promethazine-codeine (PHENERGAN WITH CODEINE) 6 25-10 mg/5 mL syrup Not Taking at Unknown time  No No   Sig: Take 5 mL by mouth every 4 (four) hours as needed for cough   Patient not taking: Reported on 12/15/2018 Facility-Administered Medications: None       Past Medical History:   Diagnosis Date    Asthma     CHF (congestive heart failure) (HCC)     COPD (chronic obstructive pulmonary disease) (HCC)     Mumps     Old MI (myocardial infarction)     Pneumonia        Past Surgical History:   Procedure Laterality Date    CARDIAC CATHETERIZATION  07/24/2015    Left main- normal and maidly tortuous  Circumflex - normal and moderately tortuous  RCA- normal and mildy tortuous  Global LV function was severely depressed  Curtis Giron INGUINAL HERNIA REPAIR Bilateral     TONSILLECTOMY      UMBILICAL HERNIA REPAIR  06/21/2006       Family History   Problem Relation Age of Onset    Heart attack Father         MI    Diabetes Sister         DM    Arthritis Family     Coronary artery disease Family     Hypertension Family     Tuberculosis Son      I have reviewed and agree with the history as documented  Social History   Substance Use Topics    Smoking status: Former Smoker    Smokeless tobacco: Never Used    Alcohol use Yes      Comment: social         Review of Systems   Constitutional: Negative for fever  HENT: Negative for ear pain, sinus pressure, sore throat and trouble swallowing  Eyes: Negative for photophobia, pain and discharge  Respiratory: Positive for shortness of breath  Negative for wheezing  Cardiovascular: Negative for chest pain  Gastrointestinal: Positive for constipation and diarrhea  Negative for abdominal pain, nausea and vomiting  Genitourinary: Positive for urgency  Negative for dysuria and hematuria  Physical Exam  Physical Exam   Constitutional: He is oriented to person, place, and time  He appears well-developed and well-nourished  HENT:   Head: Normocephalic and atraumatic  Nose: Nose normal    Mouth/Throat: Oropharynx is clear and moist  No oropharyngeal exudate  Eyes: Pupils are equal, round, and reactive to light   Conjunctivae and EOM are normal  No scleral icterus  Neck: Normal range of motion  Neck supple  No JVD present  No tracheal deviation present  Cardiovascular: Normal rate, regular rhythm and normal heart sounds  Pulmonary/Chest: Effort normal and breath sounds normal  No respiratory distress  He has no wheezes  He has no rales  Abdominal: Soft  Bowel sounds are normal  There is no tenderness  There is no guarding  Musculoskeletal: Normal range of motion  He exhibits no edema or tenderness  Lymphadenopathy:     He has no cervical adenopathy  Neurological: He is alert and oriented to person, place, and time  No cranial nerve deficit or sensory deficit  He exhibits normal muscle tone  5/5 motor, nl sens   Skin: Skin is warm and dry  Capillary refill takes less than 2 seconds  Psychiatric: He has a normal mood and affect  His behavior is normal    Nursing note and vitals reviewed  Vital Signs  ED Triage Vitals [12/15/18 1330]   Temperature Pulse Respirations Blood Pressure SpO2   99 °F (37 2 °C) 81 18 140/59 98 %      Temp Source Heart Rate Source Patient Position - Orthostatic VS BP Location FiO2 (%)   Temporal Monitor Sitting Left arm --      Pain Score       No Pain           Vitals:    12/15/18 1330 12/15/18 1345 12/15/18 1622   BP: 140/59 140/59 146/65   Pulse: 81 78 67   Patient Position - Orthostatic VS: Sitting  Lying       Visual Acuity      ED Medications  Medications   sodium chloride 0 9 % bolus 1,000 mL (0 mL Intravenous Stopped 12/15/18 1622)       Diagnostic Studies  Results Reviewed     Procedure Component Value Units Date/Time    Rapid Influenza Screen with Reflex PCR [[id number]]  (Normal) Collected:  12/15/18 1357    Lab Status:  Final result Specimen:  Nasopharyngeal from Nasopharyngeal Swab Updated:  12/15/18 1515     Rapid Influenza A Ag Negative     Rapid Influenza B Ag Negative    Influenza A/B and RSV by PCR [[id number]] Collected:  12/15/18 1357    Lab Status:   In process Specimen:  Nasopharyngeal from Nasopharyngeal Swab Updated:  12/15/18 1514    B-Type Natriuretic Peptide [facility], [place], operated by [facility] ONLY) [[id number]]  (Normal) Collected:  12/15/18 1408    Lab Status:  Final result Specimen:  Blood from Arm, Right Updated:  12/15/18 1445     BNP 43 pg/mL     Magnesium [[id number]]  (Normal) Collected:  12/15/18 1408    Lab Status:  Final result Specimen:  Blood from Arm, Right Updated:  12/15/18 1445     Magnesium 2 0 mg/dL     Troponin I [[id number]]  (Normal) Collected:  12/15/18 1408    Lab Status:  Final result Specimen:  Blood from Arm, Right Updated:  12/15/18 1445     Troponin I 0 03 ng/mL     Comprehensive metabolic panel [[id number]]  (Abnormal) Collected:  12/15/18 1408    Lab Status:  Final result Specimen:  Blood from Arm, Right Updated:  12/15/18 1445     Sodium 131 (L) mmol/L      Potassium 5 0 mmol/L      Chloride 97 (L) mmol/L      CO2 28 mmol/L      ANION GAP 6 mmol/L      BUN 34 (H) mg/dL      Creatinine 1 17 mg/dL      Glucose 110 (H) mg/dL      Calcium 9 8 mg/dL      AST 15 U/L      ALT 13 U/L      Alkaline Phosphatase 62 U/L      Total Protein 7 3 g/dL      Albumin 4 4 g/dL      Total Bilirubin 1 00 mg/dL      eGFR 67 ml/min/1 73sq m     Narrative:         National Kidney Disease Education Program recommendations are as follows:  GFR calculation is accurate only with a steady state creatinine  Chronic Kidney disease less than 60 ml/min/1 73 sq  meters  Kidney failure less than 15 ml/min/1 73 sq  meters      D-Dimer [[id number]]  (Normal) Collected:  12/15/18 1408    Lab Status:  Final result Specimen:  Blood from Arm, Right Updated:  12/15/18 1433     D-Dimer, Quant 223 ng/ml (FEU)     CBC and differential [[id number]]  (Abnormal) Collected:  12/15/18 1408    Lab Status:  Final result Specimen:  Blood from Arm, Right Updated:  12/15/18 1423     WBC 12 40 (H) Thousand/uL      RBC 5 52 Million/uL      Hemoglobin 15 3 g/dL      Hematocrit 47 0 %      MCV 85 fL      MCH 27 7 pg      MCHC 32 5 g/dL      RDW 14 9 (H) %      MPV 7 7 (L) fL      Platelets 386 Thousands/uL      nRBC 0 /100 WBCs      Neutrophils Relative 89 (H) %      Lymphocytes Relative 2 (L) %      Monocytes Relative 5 %      Eosinophils Relative 3 %      Basophils Relative 0 %      Neutrophils Absolute 11 10 (H) Thousands/µL      Lymphocytes Absolute 0 30 (L) Thousands/µL      Monocytes Absolute 0 60 Thousand/µL      Eosinophils Absolute 0 40 Thousand/µL      Basophils Absolute 0 00 Thousands/µL                  XR chest 1 view portable   Final Result by Garrett Morejon (12/15 3581)   Mild underaeration  No active cardiopulmonary pathology  Signed by Claribel Tapia MD                 Procedures  ECG 12 Lead Documentation  Date/Time: 12/15/2018 2:20 PM  Performed by: Yenifer Gilmore by: Bob Laureano     Indications / Diagnosis:  SOB  ECG reviewed by me, the ED Provider: yes    Patient location:  ED and bedside  Previous ECG:     Previous ECG:  Compared to current    Comparison ECG info:  2015    Similarity:  Changes noted    Comparison to cardiac monitor: No    Interpretation:     Interpretation: abnormal    Rate:     ECG rate:  70    ECG rate assessment: normal    Rhythm:     Rhythm: sinus rhythm    Ectopy:     Ectopy: none    QRS:     QRS axis:  Left    QRS intervals:  Normal  Conduction:     Conduction: normal    ST segments:     ST segments:  Normal  T waves:     T waves: non-specific    Other findings:     Other findings: LAE             Phone Contacts  ED Phone Contact    ED Course  ED Course as of Dec 15 1732   Sat Dec 15, 2018   1511 Symptom etiology likely viral in nature causing the acute symptoms  Will give 1L of NS and d/c patient  1539 Questionable if true allergy to cipro, but FQ have well known documented adverse effects  It is generally not first line in treatment for UTI  Will advise patient to d/c cipro  Patient urine dip showed no signs of infection  Etiology may be related to prostate   He will follow up with PCP                                 MDM  Number of Diagnoses or Management Options  Diarrhea, unspecified type: new and requires workup  SOB (shortness of breath): new and requires workup  Viral syndrome: new and requires workup  Diagnosis management comments: Symptom etiology, likely viral syndrome patient is slightly hyponatremic, as well as slightly dehydrated, will give 1 L of normal saline  Questionable if true allergy to cipro, but fluoroquinolones have well known documented adverse effects  It is generally not first line in treatment for possible UTI/cystitis  Will advise patient to d/c cipro  Patient urine dip showed no signs of infection  Etiology may be related to prostate  He will follow up with PCP  Amount and/or Complexity of Data Reviewed  Clinical lab tests: reviewed and ordered  Tests in the radiology section of CPT®: ordered and reviewed  Review and summarize past medical records: yes  Independent visualization of images, tracings, or specimens: yes    Patient Progress  Patient progress: stable    CritCare Time    Disposition  Final diagnoses:   Viral syndrome   Diarrhea, unspecified type   SOB (shortness of breath)     Time reflects when diagnosis was documented in both MDM as applicable and the Disposition within this note     Time User Action Codes Description Comment    12/15/2018  3:54 PM Marcela Drafts [B34 9] Viral syndrome     12/15/2018  3:54 PM Katrinka Faster Add [R19 7] Diarrhea, unspecified type     12/15/2018  3:54 PM Katrinka Faster Add [R06 02] SOB (shortness of breath)       ED Disposition     ED Disposition Condition Comment    Discharge  Rommel Solis discharge to home/self care      Condition at discharge: Stable        Follow-up Information     Follow up With Specialties Details Why [facility], DO Internal Medicine Call For Urine Culture Results [address] [address] Emergency Department Emergency Medicine  If symptoms worsen [address] [facility] [id number] [phone]          Discharge Medication List as of 12/15/2018  3:55 PM      CONTINUE these medications which have NOT CHANGED    Details   ALPRAZolam (XANAX) 0 5 mg tablet Take 1 tablet (0 5 mg total) by mouth daily at bedtime as needed for anxiety, Starting Tue 4/10/2018, Normal      aspirin 325 mg tablet Take 1 tablet by mouth daily, Historical Med      carvedilol (COREG) 12 5 mg tablet take 1 tablet by mouth twice a day with meals, Normal      ciprofloxacin (CIPRO) 500 mg tablet Take 1 tablet (500 mg total) by mouth every 12 (twelve) hours for 7 days, Starting Wed 12/12/2018, Until Wed 12/19/2018, Normal      cyclobenzaprine (FLEXERIL) 10 mg tablet Take 10 mg by mouth 3 (three) times a day as needed, Starting Fri 12/15/2017, Historical Med      fluticasone furoate-vilanterol (BREO ELLIPTA) 200-25 MCG/INH inhaler Inhale 1 puff daily, Starting Wed 2/14/2018, Normal      furosemide (LASIX) 20 mg tablet Take 1 tablet (20 mg total) by mouth daily as needed (weight gain or edema), Starting Thu 3/29/2018, Normal      ipratropium-albuterol (DUO-NEB) 0 5-2 5 mg/3 mL Take 3 mL by nebulization every 6 (six) hours as needed for wheezing, Starting Wed 2/14/2018, Normal      lisinopril (ZESTRIL) 20 mg tablet take 1 tablet by mouth twice a day, Normal      VENTOLIN  (90 Base) MCG/ACT inhaler inhale 2 puffs by mouth every 6 hours if needed, Normal      albuterol (2 5 mg/3 mL) 0 083 % nebulizer solution Inhale 1 vial every 6 (six) hours as needed, Starting Mon 4/7/2014, Historical Med      DULoxetine (CYMBALTA) 20 mg capsule Take 1 capsule (20 mg total) by mouth daily, Starting Tue 4/10/2018, Normal      promethazine-codeine (PHENERGAN WITH CODEINE) 6 25-10 mg/5 mL syrup Take 5 mL by mouth every 4 (four) hours as needed for cough, Starting Mon 3/26/2018, Print           No discharge procedures on file      ED Provider  Electronically Signed by           Xenia Magallon PA-C  12/15/18 1910

## 2018-12-15 NOTE — DISCHARGE INSTRUCTIONS
Acute Diarrhea   WHAT YOU NEED TO KNOW:   Acute diarrhea starts quickly and lasts a short time, usually 1 to 3 days  It can last up to 2 weeks  You may not be able to control your diarrhea  Acute diarrhea usually stops on its own  DISCHARGE INSTRUCTIONS:   Return to the emergency department if:   · You feel confused  · Your heartbeat is faster than normal      · Your eyes look deeply sunken, or you have no tears when you cry  · You urinate less than usual, or your urine is dark yellow  · You have blood or mucus in your stools  · You have severe abdominal pain  · You are unable to drink any liquids  Contact your healthcare provider if:   · Your symptoms do not get better with treatment  · You have a fever higher than 101 3°F (38 5°C)  · You have trouble eating and drinking because you are vomiting  · You are thirsty or have a dry mouth  · Your diarrhea does not get better in 7 days  · You have questions or concerns about your condition or care  Follow up with your healthcare provider as directed:  Write down your questions so you remember to ask them during your visits  Medicines:  · Diarrhea medicine  is an over-the-counter medicine that helps slow or stop your diarrhea  If you take other medicines, talk to your healthcare provider before you take diarrhea medicine  · Antibiotics  may be given to help treat an infection caused by bacteria  · Antiparasitics  may be given to treat an infection caused by parasites  · Take your medicine as directed  Contact your healthcare provider if you think your medicine is not helping or if you have side effects  Tell him of her if you are allergic to any medicine  Keep a list of the medicines, vitamins, and herbs you take  Include the amounts, and when and why you take them  Bring the list or the pill bottles to follow-up visits  Carry your medicine list with you in case of an emergency    Self-care:   · Drink liquids as directed  Liquids will help prevent dehydration caused by diarrhea  Ask your healthcare provider how much liquid to drink each day and which liquids are best for you  You may need to drink an oral rehydration solution (ORS)  An ORS has the right amounts of water, salts, and sugar you need to replace body fluids  You can buy an ORS at most grocery stores and pharmacies  · Eat foods that are easy to digest   Examples include rice, lentils, cereal, bananas, potatoes, and bread  It also includes some fruits (bananas, melon), well-cooked vegetables, and lean meats  Avoid foods high in fiber, fat, and sugar  Also avoid caffeine, alcohol, dairy, and red meat until your diarrhea is gone  Prevent acute diarrhea:   · Wash your hands often  Use soap and water  Wash your hands before you eat or prepare food  Also wash your hands after you use the bathroom  Use an alcohol-based hand gel when soap and water are not available  · Keep bathroom surfaces clean  This helps prevent the spread of germs that cause acute diarrhea  · Wash fruits and vegetables well before you eat them  This can help remove germs that cause diarrhea  If possible, remove the skin from fruits and vegetables, or cook them well before you eat them  · Cook meat as directed  ¨ Cook ground meat  to 160°F      ¨ Cook ground poultry, whole poultry, or cuts of poultry  to at least 165°F  Remove the meat from heat  Let it stand for 3 minutes before you eat it  ¨ Cook whole cuts of meat other than poultry  to at least 145°F  Remove the meat from heat  Let it stand for 3 minutes before you eat it  · Wash dishes that have touched raw meat with hot water and soap  This includes cutting boards, utensils, dishes, and serving containers  · Place raw or cooked meat in the refrigerator as soon as possible  Bacteria can grow in meat that is left at room temperature too long  · Do not eat raw or undercooked oysters, clams, or mussels  These foods may be contaminated and cause infection  · Drink filtered or treated water only when you travel  Do not put ice in your drinks  Drink bottled water whenever possible  © 2017 [address] [employer] Information is for End User's use only and may not be sold, redistributed or otherwise used for commercial purposes  All illustrations and images included in CareNotes® are the copyrighted property of A [employer] M [employer]  or [employer] Hicks  The above information is an  only  It is not intended as medical advice for individual conditions or treatments  Talk to your doctor, nurse or pharmacist before following any medical regimen to see if it is safe and effective for you

## 2018-12-16 LAB
ATRIAL RATE: 70 BPM
FLUAV AG SPEC QL: NORMAL
FLUBV AG SPEC QL: NORMAL
P AXIS: 69 DEGREES
PR INTERVAL: 194 MS
QRS AXIS: -48 DEGREES
QRSD INTERVAL: 106 MS
QT INTERVAL: 396 MS
QTC INTERVAL: 427 MS
RSV B RNA SPEC QL NAA+PROBE: NORMAL
T WAVE AXIS: 57 DEGREES
VENTRICULAR RATE: 70 BPM

## 2018-12-16 PROCEDURE — 93010 ELECTROCARDIOGRAM REPORT: CPT | Performed by: INTERNAL MEDICINE

## 2018-12-18 ENCOUNTER — APPOINTMENT (OUTPATIENT)
Dept: LAB | Facility: CLINIC | Age: 63
End: 2018-12-18
Payer: COMMERCIAL

## 2018-12-18 ENCOUNTER — OFFICE VISIT (OUTPATIENT)
Dept: INTERNAL MEDICINE CLINIC | Facility: CLINIC | Age: 63
End: 2018-12-18
Payer: COMMERCIAL

## 2018-12-18 ENCOUNTER — TELEPHONE (OUTPATIENT)
Dept: UROLOGY | Facility: AMBULATORY SURGERY CENTER | Age: 63
End: 2018-12-18

## 2018-12-18 VITALS
HEIGHT: 74 IN | WEIGHT: 250.5 LBS | DIASTOLIC BLOOD PRESSURE: 70 MMHG | HEART RATE: 62 BPM | SYSTOLIC BLOOD PRESSURE: 124 MMHG | TEMPERATURE: 95 F | BODY MASS INDEX: 32.15 KG/M2 | OXYGEN SATURATION: 96 %

## 2018-12-18 DIAGNOSIS — F41.9 ANXIETY: Primary | ICD-10-CM

## 2018-12-18 DIAGNOSIS — J44.9 COPD, SEVERE (HCC): ICD-10-CM

## 2018-12-18 DIAGNOSIS — I50.42 CHRONIC COMBINED SYSTOLIC AND DIASTOLIC CHF (CONGESTIVE HEART FAILURE) (HCC): ICD-10-CM

## 2018-12-18 DIAGNOSIS — R35.0 URINARY FREQUENCY: ICD-10-CM

## 2018-12-18 DIAGNOSIS — E06.3 HYPOTHYROIDISM DUE TO HASHIMOTO'S THYROIDITIS: ICD-10-CM

## 2018-12-18 DIAGNOSIS — J44.9 CHRONIC OBSTRUCTIVE PULMONARY DISEASE, UNSPECIFIED COPD TYPE (HCC): Primary | ICD-10-CM

## 2018-12-18 DIAGNOSIS — K59.00 CONSTIPATION, UNSPECIFIED CONSTIPATION TYPE: ICD-10-CM

## 2018-12-18 DIAGNOSIS — Z12.5 SCREENING FOR MALIGNANT NEOPLASM OF PROSTATE: ICD-10-CM

## 2018-12-18 DIAGNOSIS — I42.8 NONISCHEMIC CARDIOMYOPATHY (HCC): ICD-10-CM

## 2018-12-18 DIAGNOSIS — J43.9 PULMONARY EMPHYSEMA, UNSPECIFIED EMPHYSEMA TYPE (HCC): ICD-10-CM

## 2018-12-18 DIAGNOSIS — E03.8 HYPOTHYROIDISM DUE TO HASHIMOTO'S THYROIDITIS: ICD-10-CM

## 2018-12-18 LAB
ALBUMIN SERPL BCP-MCNC: 3.4 G/DL (ref 3.5–5)
ALP SERPL-CCNC: 67 U/L (ref 46–116)
ALT SERPL W P-5'-P-CCNC: 20 U/L (ref 12–78)
ANION GAP SERPL CALCULATED.3IONS-SCNC: 4 MMOL/L (ref 4–13)
AST SERPL W P-5'-P-CCNC: 17 U/L (ref 5–45)
BILIRUB SERPL-MCNC: 0.63 MG/DL (ref 0.2–1)
BUN SERPL-MCNC: 29 MG/DL (ref 5–25)
CALCIUM SERPL-MCNC: 8.6 MG/DL (ref 8.3–10.1)
CHLORIDE SERPL-SCNC: 104 MMOL/L (ref 100–108)
CHOLEST SERPL-MCNC: 134 MG/DL (ref 50–200)
CO2 SERPL-SCNC: 27 MMOL/L (ref 21–32)
CREAT SERPL-MCNC: 1.44 MG/DL (ref 0.6–1.3)
GFR SERPL CREATININE-BSD FRML MDRD: 52 ML/MIN/1.73SQ M
GLUCOSE P FAST SERPL-MCNC: 80 MG/DL (ref 65–99)
HDLC SERPL-MCNC: 24 MG/DL (ref 40–60)
LDLC SERPL CALC-MCNC: 89 MG/DL (ref 0–100)
NONHDLC SERPL-MCNC: 110 MG/DL
POTASSIUM SERPL-SCNC: 5 MMOL/L (ref 3.5–5.3)
PROT SERPL-MCNC: 6.8 G/DL (ref 6.4–8.2)
SODIUM SERPL-SCNC: 135 MMOL/L (ref 136–145)
TRIGL SERPL-MCNC: 107 MG/DL
TSH SERPL DL<=0.05 MIU/L-ACNC: 1.57 UIU/ML (ref 0.36–3.74)

## 2018-12-18 PROCEDURE — 36415 COLL VENOUS BLD VENIPUNCTURE: CPT

## 2018-12-18 PROCEDURE — 99214 OFFICE O/P EST MOD 30 MIN: CPT | Performed by: INTERNAL MEDICINE

## 2018-12-18 PROCEDURE — 80053 COMPREHEN METABOLIC PANEL: CPT

## 2018-12-18 PROCEDURE — 80061 LIPID PANEL: CPT

## 2018-12-18 PROCEDURE — 84153 ASSAY OF PSA TOTAL: CPT

## 2018-12-18 PROCEDURE — 84443 ASSAY THYROID STIM HORMONE: CPT

## 2018-12-18 PROCEDURE — 84154 ASSAY OF PSA FREE: CPT

## 2018-12-18 RX ORDER — ALPRAZOLAM 0.5 MG/1
0.5 TABLET ORAL
Qty: 30 TABLET | Refills: 0 | Status: SHIPPED | OUTPATIENT
Start: 2018-12-18 | End: 2019-03-13 | Stop reason: SDUPTHER

## 2018-12-18 RX ORDER — VENLAFAXINE HYDROCHLORIDE 75 MG/1
75 CAPSULE, EXTENDED RELEASE ORAL DAILY
Qty: 30 CAPSULE | Refills: 2 | Status: SHIPPED | OUTPATIENT
Start: 2018-12-18 | End: 2019-03-19 | Stop reason: SDUPTHER

## 2018-12-18 NOTE — PROGRESS NOTES
Assessment/Plan:         Diagnoses and all orders for this visit:    Pulmonary emphysema, unspecified emphysema type ([facility] )  -     Ambulatory referral to Pulmonology; Future  -     Complete pulmonary function test; Future    Anxiety  -     ALPRAZolam (XANAX) 0 5 mg tablet; Take 1 tablet (0 5 mg total) by mouth daily at bedtime as needed for anxiety    Constipation, unspecified constipation type  -     Ambulatory referral to Colorectal Surgery; Future  Increase water, fiber    Nonischemic cardiomyopathy ([facility] )  Pt feels these sxs are stable  Saw cardio in march    Urinary frequency  -     Ambulatory referral to Urology; Future  PSA ordered    COPD, severe ([place] [address] )  -     Complete pulmonary function test; Future  Pulmonary reeval ordered    Rto 2 months       Patient ID: Carissa Bello is a 66 y o  male  HPI   Pt has several issues He has been constipated and using a variety of otc meds  Has had congestion and using neb up to three times/day  He had been exercising regularly up to about 2 weeks ago  Wants to see pulmonary again  He was in er for reaction to cipro - diarrhea and urine was negative but still has frequency  No chest pain Some hwang  No melena No abdominal pain Bowels have been abnormal for awhile  Feels more anxious        Review of Systems   Constitutional: Positive for fatigue  HENT: Positive for congestion  Respiratory: Positive for shortness of breath  Cardiovascular: Negative  Negative for chest pain  Gastrointestinal: Negative  Genitourinary: Positive for frequency  Musculoskeletal: Negative  Skin: Negative  Allergic/Immunologic: Negative  Neurological: Negative  Hematological: Negative  Psychiatric/Behavioral: Negative        Past Medical History:   Diagnosis Date    Asthma     [facility]     COPD [facility]chronic obstructive pulmonary disease) [facility]     Mumps     Old MI (myocardial infarction)     Pneumonia      Past Surgical History: Procedure Laterality Date    CARDIAC CATHETERIZATION  07/24/2015    Left main- normal and maidly tortuous  Circumflex - normal and moderately tortuous  RCA- normal and mildy tortuous  Global LV function was severely depressed  Windy Espinal INGUINAL HERNIA REPAIR Bilateral     TONSILLECTOMY      UMBILICAL HERNIA REPAIR  06/21/2006     Social History     Social History    Marital status: /Civil Union     Spouse name: N/A    Number of children: N/A    Years of education: N/A     Occupational History    Not on file  Social History Main Topics    Smoking status: Former Smoker    Smokeless tobacco: Never Used    Alcohol use Yes      Comment: social     Drug use: No    Sexual activity: Not on file     Other Topics Concern    Not on file     Social History Narrative    Caffeine use         Allergies   Allergen Reactions    Ciprofloxacin Shortness Of Breath and Diarrhea             /70   Pulse 62   Temp (!) 95 °F (35 °C) (Tympanic)   Ht 6' 2" (1 88 m)   Wt 114 kg (250 lb 8 oz)   SpO2 96%   BMI 32 16 kg/m²          Physical Exam   Constitutional: He is oriented to person, place, and time  He appears well-developed and well-nourished  No distress  HENT:   Head: Normocephalic and atraumatic  Right Ear: External ear normal    Left Ear: External ear normal    Nose: Nose normal    Mouth/Throat: Oropharyngeal exudate present  Eyes: Pupils are equal, round, and reactive to light  Conjunctivae and EOM are normal  No scleral icterus  Neck: Normal range of motion  Neck supple  No JVD present  Cardiovascular: Normal rate, regular rhythm, normal heart sounds and intact distal pulses  No murmur heard  Pulmonary/Chest: Effort normal  No respiratory distress  He has wheezes  He has no rales  Abdominal: Soft  Bowel sounds are normal    Musculoskeletal: Normal range of motion  Lymphadenopathy:     He has no cervical adenopathy     Neurological: He is alert and oriented to person, place, and time  He has normal reflexes  He displays normal reflexes  No cranial nerve deficit  He exhibits normal muscle tone  Skin: Skin is warm and dry  He is not diaphoretic  Psychiatric: He has a normal mood and affect  His behavior is normal  Judgment and thought content normal    Nursing note and vitals reviewed

## 2018-12-18 NOTE — TELEPHONE ENCOUNTER
Reason for appointment/Complaint/Diagnosis : urinary frequency      Insurance:[facility] blue cross  History of Cancer? no      If yes, what kind? n/a    Previous urologist? no        Records requested/where?  epic  Outside testing/where?no    Location Preference for office visit? [place]

## 2018-12-19 ENCOUNTER — OFFICE VISIT (OUTPATIENT)
Dept: PULMONOLOGY | Facility: HOSPITAL | Age: 63
End: 2018-12-19
Payer: COMMERCIAL

## 2018-12-19 VITALS
SYSTOLIC BLOOD PRESSURE: 120 MMHG | WEIGHT: 247 LBS | HEIGHT: 74 IN | HEART RATE: 58 BPM | OXYGEN SATURATION: 97 % | BODY MASS INDEX: 31.7 KG/M2 | DIASTOLIC BLOOD PRESSURE: 82 MMHG

## 2018-12-19 DIAGNOSIS — Z87.891 HISTORY OF TOBACCO ABUSE: ICD-10-CM

## 2018-12-19 DIAGNOSIS — J44.9 COPD, SEVERE (HCC): Primary | ICD-10-CM

## 2018-12-19 DIAGNOSIS — J45.40 ASTHMA, CHRONIC, MODERATE PERSISTENT, UNCOMPLICATED: ICD-10-CM

## 2018-12-19 DIAGNOSIS — G47.33 OBSTRUCTIVE SLEEP APNEA: ICD-10-CM

## 2018-12-19 DIAGNOSIS — R05.9 COUGH: ICD-10-CM

## 2018-12-19 LAB
PSA FREE MFR SERPL: 30 %
PSA FREE SERPL-MCNC: 0.3 NG/ML
PSA SERPL-MCNC: 1 NG/ML (ref 0–4)

## 2018-12-19 PROCEDURE — 99214 OFFICE O/P EST MOD 30 MIN: CPT | Performed by: INTERNAL MEDICINE

## 2018-12-19 NOTE — ASSESSMENT & PLAN NOTE
Will proceed with chest CT for lung cancer screening purposes given his significant tobacco history    He quit in 2015 and has a 50+ pack year  history

## 2018-12-19 NOTE — PROGRESS NOTES
Pulmonary Follow Up Note   Zbigniew Niño 66 y o  male MRN: [id number]  12/19/2018      Assessment/Plan:     COPD, severe ([address] )   Based on pulmonary function testing from 2017, he does have moderate to severe obstruction with dramatic improvement with bronchodilators  He is scheduled for repeat PFTs later this month  He derives significant benefit from a nebulizer therapy  He tried Spiriva in the past without improvement  We will try to optimize his regimen by adding Tudorza twice daily  He was counseled on possible side effects, specifically urinary retention  If he develops any change in his urinary symptoms, he was instructed to discontinue the inhaler immediately  He will also continue with [employer]  Asthma, chronic, moderate persistent, uncomplicated   He has an overlap of asthma and COPD  See above for changes to his regimen    History of tobacco abuse   Will proceed with chest CT for lung cancer screening purposes given his significant tobacco history  He quit in 2015 and has a 50+ pack year  history    He declines flu vaccine    Visit orders:    Diagnoses and all orders for this visit:    COPD, severe (HCC)  -     aclidinium (TUDORZA PRESSAIR) 400 MCG/ACT inhaler; Inhale 1 puff (400 mcg total) 2 (two) times a day  -     CT lung screening program; Future    Obstructive sleep apnea    Cough    History of tobacco abuse  -     CT lung screening program; Future    Asthma, chronic, moderate persistent, uncomplicated        Return in about 4 months (around 4/19/2019)  History of Present Illness   HPI:  Zbigniew Niño is a 66 y o  male who is here today for follow-up regarding asthma/ COPD  He is using  Breo Ellipta once daily, but still requiring Ventolin or his nebulizer up to 4 times per day  His symptoms are notable for cough with sputum production  He denies hemoptysis  He has episodic wheezing with associated shortness of breath  He has no change in his environment    He does have dogs which he has known allergy to  Review of Systems   Constitutional: Negative for chills, fever and unexpected weight change  HENT: Negative for postnasal drip and sore throat  Eyes: Negative for visual disturbance  Respiratory:        As noted in HPI   Cardiovascular: Negative for chest pain and leg swelling  Gastrointestinal: Positive for constipation  Negative for abdominal pain, diarrhea and vomiting  Genitourinary: Positive for frequency  Negative for difficulty urinating  Skin: Negative for rash  Neurological: Negative for headaches  Hematological: Negative for adenopathy  Psychiatric/Behavioral: Negative  All other systems reviewed and are negative  Historical Information   Past Medical History:   Diagnosis Date    Asthma     [facility]     COPD [facility]     Mumps     Old MI (myocardial infarction)     Pneumonia      Past Surgical History:   Procedure Laterality Date    CARDIAC CATHETERIZATION  07/24/2015    Left main- normal and maidly tortuous  Circumflex - normal and moderately tortuous  RCA- normal and mildy tortuous  Global LV function was severely depressed  [place] INGUINAL HERNIA REPAIR Bilateral     TONSILLECTOMY      UMBILICAL HERNIA REPAIR  06/21/2006     Family History   Problem Relation Age of Onset    Heart attack Father         MI    Diabetes Sister         DM    Arthritis Family     Coronary artery disease Family     Hypertension Family     Tuberculosis Son        History   Smoking Status    Former Smoker    Packs/day: 3 00    Years: 30 00   Smokeless Tobacco    Never Used       Meds/Allergies     Current Outpatient Prescriptions:     ALPRAZolam (XANAX) 0 5 mg tablet, Take 1 tablet (0 5 mg total) by mouth daily at bedtime as needed for anxiety, Disp: 30 tablet, Rfl: 0    aspirin 325 mg tablet, Take 1 tablet by mouth daily, Disp: , Rfl:     carvedilol (COREG) 12 5 mg tablet, take 1 tablet by mouth twice a day with meals, Disp: 60 tablet, Rfl: 5    cyclobenzaprine (FLEXERIL) 10 mg tablet, Take 10 mg by mouth 3 (three) times a day as needed, Disp: , Rfl: 0    fluticasone furoate-vilanterol (BREO ELLIPTA) 200-25 MCG/INH inhaler, Inhale 1 puff daily, Disp: 1 each, Rfl: 12    furosemide (LASIX) 20 mg tablet, Take 1 tablet (20 mg total) by mouth daily as needed (weight gain or edema), Disp: 90 tablet, Rfl: 0    ipratropium-albuterol (DUO-NEB) 0 5-2 5 mg/3 mL, Take 3 mL by nebulization every 6 (six) hours as needed for wheezing, Disp: 360 mL, Rfl: 4    lisinopril (ZESTRIL) 20 mg tablet, take 1 tablet by mouth twice a day, Disp: 60 tablet, Rfl: 11    venlafaxine (EFFEXOR-XR) 75 mg 24 hr capsule, Take 1 capsule (75 mg total) by mouth daily, Disp: 30 capsule, Rfl: 2    aclidinium (TUDORZA PRESSAIR) 400 MCG/ACT inhaler, Inhale 1 puff (400 mcg total) 2 (two) times a day, Disp: 1 Inhaler, Rfl: 5  Allergies   Allergen Reactions    Ciprofloxacin Shortness Of Breath and Diarrhea       Vitals: Blood pressure 120/82, pulse 58, height 6' 2" (1 88 m), weight 112 kg (247 lb), SpO2 97 %  Body mass index is 31 71 kg/m²  Oxygen Therapy  SpO2: 97 %    Physical Exam   Physical Exam   Constitutional: He is oriented to person, place, and time  No distress  HENT:   Head: Normocephalic  Mouth/Throat: No oropharyngeal exudate  Eyes: Pupils are equal, round, and reactive to light  No scleral icterus  Neck: Neck supple  No JVD present  Cardiovascular: Normal rate and regular rhythm  No murmur heard  Pulmonary/Chest: Effort normal  He has no wheezes  He has no rales  Abdominal: Soft  There is no tenderness  Musculoskeletal: He exhibits no edema  Lymphadenopathy:     He has no cervical adenopathy  Neurological: He is alert and oriented to person, place, and time  Skin: Skin is warm and dry  Psychiatric: He has a normal mood and affect  Labs:  I have personally reviewed pertinent lab results  Lab Results   Component Value Date    WBC 12 40 (H) 12/15/2018    HGB 15 3 12/15/2018    HCT 47 0 12/15/2018    MCV 85 12/15/2018     12/15/2018     Lab Results   Component Value Date    GLUCOSE 87 07/27/2015    CALCIUM 8 6 12/18/2018     07/27/2015    K 5 0 12/18/2018    CO2 27 12/18/2018     12/18/2018    BUN 29 (H) 12/18/2018    CREATININE 1 44 (H) 12/18/2018     Lab Results   Component Value Date    IGE 65 2 10/23/2017     Lab Results   Component Value Date    ALT 20 12/18/2018    AST 17 12/18/2018    ALKPHOS 67 12/18/2018    BILITOT 1 05 (H) 07/22/2015       [place] [facility] - (+) for  Cat epithelium, dog dander, ragweed, walnut and D[place] trees    Imaging and other studies: I have personally reviewed pertinent reports  and I have personally reviewed pertinent films in PACS  Chest x-ray from 12/15/18 shows clear lungs    Pulmonary function testing:  Performed  2017   FEV1/FVC ratio 41%   FEV1 49% predicted  FVC 41% predicted  (+) response to bronchodilators   post bronchodilator FEV1 is 68% predicted   % predicted   % predicted  DLCO corrected for hemoglobin 95 % predicted  PFTs show moderate to severe obstruction with dramatic improvement after administration of bronchodilators    Diffusion capacity is normal

## 2018-12-19 NOTE — ASSESSMENT & PLAN NOTE
Based on pulmonary function testing from 2017, he does have moderate to severe obstruction with dramatic improvement with bronchodilators  He is scheduled for repeat PFTs later this month  He derives significant benefit from a nebulizer therapy  He tried Spiriva in the past without improvement  We will try to optimize his regimen by adding Tudorza twice daily  He was counseled on possible side effects, specifically urinary retention  If he develops any change in his urinary symptoms, he was instructed to discontinue the inhaler immediately  He will also continue with [employer]

## 2018-12-26 ENCOUNTER — HOSPITAL ENCOUNTER (OUTPATIENT)
Dept: PULMONOLOGY | Facility: HOSPITAL | Age: 63
Discharge: HOME | End: 2018-12-26
Attending: INTERNAL MEDICINE
Payer: COMMERCIAL

## 2018-12-26 DIAGNOSIS — J43.9 PULMONARY EMPHYSEMA, UNSPECIFIED EMPHYSEMA TYPE (HCC): ICD-10-CM

## 2018-12-26 DIAGNOSIS — J44.9 COPD, SEVERE (HCC): ICD-10-CM

## 2018-12-26 PROCEDURE — 94729 DIFFUSING CAPACITY: CPT | Performed by: INTERNAL MEDICINE

## 2018-12-26 PROCEDURE — 94726 PLETHYSMOGRAPHY LUNG VOLUMES: CPT | Performed by: INTERNAL MEDICINE

## 2018-12-26 PROCEDURE — 94729 DIFFUSING CAPACITY: CPT

## 2018-12-26 PROCEDURE — 94760 N-INVAS EAR/PLS OXIMETRY 1: CPT

## 2018-12-26 PROCEDURE — 94060 EVALUATION OF WHEEZING: CPT

## 2018-12-26 PROCEDURE — 94727 GAS DIL/WSHOT DETER LNG VOL: CPT

## 2018-12-26 PROCEDURE — 94060 EVALUATION OF WHEEZING: CPT | Performed by: INTERNAL MEDICINE

## 2018-12-26 RX ORDER — ALBUTEROL SULFATE 2.5 MG/3ML
2.5 SOLUTION RESPIRATORY (INHALATION) ONCE AS NEEDED
Status: COMPLETED | OUTPATIENT
Start: 2018-12-26 | End: 2018-12-26

## 2018-12-26 RX ADMIN — ALBUTEROL SULFATE 2.5 MG: 2.5 SOLUTION RESPIRATORY (INHALATION) at 07:47

## 2019-01-11 NOTE — PROGRESS NOTES
Colon and Rectal Surgery   Haydee Ortiz 66 y o  male MRN [id number]  Encounter: [id number]  01/14/19 9:43 AM            Assessment: Haydee Ortiz is a 66 y o  male who presents today for evaluation of constipation  Plan:   Hypertrophied anal papilla  This is large  It is annoying to him  I recommend excision  Risks, not limited to bleeding, pain, persistent disease, need for future surgery, fecal incontinence, or nonhealing wounds were reviewed at length  If fissurectomy is needed, it will be considered at the OR  No sphincterotomy is planned  Questions were answered  He agrees to the procedure  Rectal bleeding  Colonoscopy is recommended  Colonoscopy risks, not limited to bleeding, perforated colon, need for surgery, and missed lesions were discussed  Alternatives were discussed  Questions were answered  He agreed to the procedure  Subjective     HPI    Haydee Ortiz is a 66 y o  male who is referred today by Dr Hortencia Marshall for constipation  He states about 3 weeks ago he experienced a change in bowel habit to constipation  He was having a bowel movement a every couple days with hard stool  He denies abdominal pain, nausea/vomitng, or mucus in the stool  He states symptoms lasted a couple weeks  He also complains of occasional rectal bleeding seen on the toilet paper and believes it's due to hemorrhoids  He also had a questionable UTI for which he took Cipro, resulting in diarrhea and a rash  Thereafter, his constipation recurred  Constipation resolved with Miralax  He denies family history of colon cancer  He had a colonoscopy years ago but doesn't know who did it   His last colonoscopy was about 11 years ago, per patient results were normal      Historical Information   Past Medical History:   Diagnosis Date    Asthma     [facility]     [facility]     Mumps     Old MI (myocardial infarction)     Pneumonia      Past Surgical History:   Procedure Laterality Date    CARDIAC CATHETERIZATION  07/24/2015    Left main- normal and maidly tortuous  Circumflex - normal and moderately tortuous  RCA- normal and mildy tortuous  Global LV function was severely depressed  Maribell Leal INGUINAL HERNIA REPAIR Bilateral     TONSILLECTOMY      UMBILICAL HERNIA REPAIR  06/21/2006       Meds/Allergies       Current Outpatient Prescriptions:     aclidinium (TUDORZA PRESSAIR) 400 MCG/ACT inhaler, Inhale 1 puff (400 mcg total) 2 (two) times a day, Disp: 1 Inhaler, Rfl: 5    ALPRAZolam (XANAX) 0 5 mg tablet, Take 1 tablet (0 5 mg total) by mouth daily at bedtime as needed for anxiety, Disp: 30 tablet, Rfl: 0    aspirin 325 mg tablet, Take 1 tablet by mouth daily, Disp: , Rfl:     carvedilol (COREG) 12 5 mg tablet, take 1 tablet by mouth twice a day with meals, Disp: 60 tablet, Rfl: 5    fluticasone furoate-vilanterol (BREO ELLIPTA) 200-25 MCG/INH inhaler, Inhale 1 puff daily, Disp: 1 each, Rfl: 12    furosemide (LASIX) 20 mg tablet, Take 1 tablet (20 mg total) by mouth daily as needed (weight gain or edema), Disp: 90 tablet, Rfl: 0    ipratropium-albuterol (DUO-NEB) 0 5-2 5 mg/3 mL, Take 3 mL by nebulization every 6 (six) hours as needed for wheezing, Disp: 360 mL, Rfl: 4    lisinopril (ZESTRIL) 20 mg tablet, take 1 tablet by mouth twice a day, Disp: 60 tablet, Rfl: 11    venlafaxine (EFFEXOR-XR) 75 mg 24 hr capsule, Take 1 capsule (75 mg total) by mouth daily, Disp: 30 capsule, Rfl: 2    cyclobenzaprine (FLEXERIL) 10 mg tablet, Take 10 mg by mouth 3 (three) times a day as needed, Disp: , Rfl: 0  Allergies   Allergen Reactions    Ciprofloxacin Shortness Of Breath and Diarrhea       Social History   History   Drug Use No     History   Smoking Status    Former Smoker    Packs/day: 3 00    Years: 30 00   Smokeless Tobacco    Never Used         Family History   Problem Relation Age of Onset    Heart attack Father         MI    Diabetes Sister         DM    Arthritis Family     Coronary artery disease Family     Hypertension Family     Tuberculosis Son          Review of Systems   Constitutional: Negative  Eyes: Negative  Respiratory: Positive for shortness of breath  Negative for apnea, cough, choking, chest tightness, wheezing and stridor  Cardiovascular: Negative  Gastrointestinal: Positive for anal bleeding, blood in stool and constipation  Negative for abdominal distention, abdominal pain, diarrhea, nausea, rectal pain and vomiting  Genitourinary: Negative  Objective   Current Vitals:  Vitals:    01/14/19 0910   Weight: 110 kg (242 lb)   Height: 6' 2" (1 88 m)         Physical Exam   Constitutional: He is oriented to person, place, and time  He appears well-developed and well-nourished  HENT:   Head: Normocephalic and atraumatic  Eyes: Conjunctivae are normal    Cardiovascular: Normal rate and regular rhythm  Pulmonary/Chest: Effort normal  No respiratory distress  He has no wheezes  He has no rales  He exhibits no tenderness  Diminished excursions, minimally   Abdominal: Soft  He exhibits no distension and no mass  There is no tenderness  There is no rebound and no guarding  A hernia is present  Irreducible, nontender ventral hernia   Genitourinary:   Genitourinary Comments: Large, prolapsed hypertrophied anal papilla is present  Neurological: He is alert and oriented to person, place, and time  Psychiatric: He has a normal mood and affect   His behavior is normal  Judgment and thought content normal

## 2019-01-14 ENCOUNTER — OFFICE VISIT (OUTPATIENT)
Dept: SURGERY | Facility: HOSPITAL | Age: 64
End: 2019-01-14
Attending: INTERNAL MEDICINE
Payer: COMMERCIAL

## 2019-01-14 VITALS — WEIGHT: 242 LBS | HEIGHT: 74 IN | BODY MASS INDEX: 31.06 KG/M2

## 2019-01-14 DIAGNOSIS — K62.5 RECTAL BLEEDING: ICD-10-CM

## 2019-01-14 DIAGNOSIS — K62.89 HYPERTROPHIED ANAL PAPILLA: Primary | ICD-10-CM

## 2019-01-14 DIAGNOSIS — K59.00 CONSTIPATION, UNSPECIFIED CONSTIPATION TYPE: ICD-10-CM

## 2019-01-14 PROCEDURE — 99244 OFF/OP CNSLTJ NEW/EST MOD 40: CPT | Performed by: COLON & RECTAL SURGERY

## 2019-01-14 NOTE — ASSESSMENT & PLAN NOTE
This is large  It is annoying to him  I recommend excision  Risks, not limited to bleeding, pain, persistent disease, need for future surgery, fecal incontinence, or nonhealing wounds were reviewed at length  If fissurectomy is needed, it will be considered at the OR  No sphincterotomy is planned  Questions were answered  He agrees to the procedure

## 2019-01-14 NOTE — ASSESSMENT & PLAN NOTE
Colonoscopy is recommended  Colonoscopy risks, not limited to bleeding, perforated colon, need for surgery, and missed lesions were discussed  Alternatives were discussed  Questions were answered  He agreed to the procedure

## 2019-01-14 NOTE — LETTER
January 14, 2019     Luis Carlos Durand, DO  [address] [address] [place] [id number]    Patient: Romario Osman   YOB: 1955   Date of Visit: 1/14/2019       Dear Dr Joanie Oviedo: Thank you for referring Romario Osman to me for evaluation  Below are my notes for this consultation  If you have questions, please do not hesitate to call me  I look forward to following your patient along with you  Sincerely,        Ludivina Perdue MD        CC: No Recipients  Ludivina Perdue MD  1/14/2019  9:43 AM  Sign at close encounter  Colon and Rectal Surgery   Romario Osman 66 y o  male MRN [id number]  Encounter: [id number]  01/14/19 9:43 AM            Assessment: Romario Osman is a 66 y o  male who presents today for evaluation of constipation  Plan:   Hypertrophied anal papilla  This is large  It is annoying to him  I recommend excision  Risks, not limited to bleeding, pain, persistent disease, need for future surgery, fecal incontinence, or nonhealing wounds were reviewed at length  If fissurectomy is needed, it will be considered at the OR  No sphincterotomy is planned  Questions were answered  He agrees to the procedure  Rectal bleeding  Colonoscopy is recommended  Colonoscopy risks, not limited to bleeding, perforated colon, need for surgery, and missed lesions were discussed  Alternatives were discussed  Questions were answered  He agreed to the procedure  Subjective     HPI    Romario Osman is a 66 y o  male who is referred today by Dr Joanie Oviedo for constipation  He states about 3 weeks ago he experienced a change in bowel habit to constipation  He was having a bowel movement a every couple days with hard stool  He denies abdominal pain, nausea/vomitng, or mucus in the stool  He states symptoms lasted a couple weeks  He also complains of occasional rectal bleeding seen on the toilet paper and believes it's due to hemorrhoids   He also had a questionable UTI for which he took Cipro, resulting in diarrhea and a rash  Thereafter, his constipation recurred  Constipation resolved with Miralax  He denies family history of colon cancer  He had a colonoscopy years ago but doesn't know who did it  His last colonoscopy was about 11 years ago, per patient results were normal      Historical Information   Past Medical History:   Diagnosis Date    Asthma     CHF (congestive heart failure) ([facility] [place] [address] )     [facility] )     Mumps     Old MI (myocardial infarction)     Pneumonia      Past Surgical History:   Procedure Laterality Date    CARDIAC CATHETERIZATION  07/24/2015    Left main- normal and maidly tortuous  Circumflex - normal and moderately tortuous  RCA- normal and mildy tortuous  Global LV function was severely depressed  Murali Marquez INGUINAL HERNIA REPAIR Bilateral     TONSILLECTOMY      UMBILICAL HERNIA REPAIR  06/21/2006       Meds/Allergies       Current Outpatient Prescriptions:     aclidinium (TUDORZA PRESSAIR) 400 MCG/ACT inhaler, Inhale 1 puff (400 mcg total) 2 (two) times a day, Disp: 1 Inhaler, Rfl: 5    ALPRAZolam (XANAX) 0 5 mg tablet, Take 1 tablet (0 5 mg total) by mouth daily at bedtime as needed for anxiety, Disp: 30 tablet, Rfl: 0    aspirin 325 mg tablet, Take 1 tablet by mouth daily, Disp: , Rfl:     carvedilol (COREG) 12 5 mg tablet, take 1 tablet by mouth twice a day with meals, Disp: 60 tablet, Rfl: 5    fluticasone furoate-vilanterol (BREO ELLIPTA) 200-25 MCG/INH inhaler, Inhale 1 puff daily, Disp: 1 each, Rfl: 12    furosemide (LASIX) 20 mg tablet, Take 1 tablet (20 mg total) by mouth daily as needed (weight gain or edema), Disp: 90 tablet, Rfl: 0    ipratropium-albuterol (DUO-NEB) 0 5-2 5 mg/3 mL, Take 3 mL by nebulization every 6 (six) hours as needed for wheezing, Disp: 360 mL, Rfl: 4    lisinopril (ZESTRIL) 20 mg tablet, take 1 tablet by mouth twice a day, Disp: 60 tablet, Rfl: 11    venlafaxine (EFFEXOR-XR) 75 mg 24 hr capsule, Take 1 capsule (75 mg total) by mouth daily, Disp: 30 capsule, Rfl: 2    cyclobenzaprine (FLEXERIL) 10 mg tablet, Take 10 mg by mouth 3 (three) times a day as needed, Disp: , Rfl: 0  Allergies   Allergen Reactions    Ciprofloxacin Shortness Of Breath and Diarrhea       Social History   History   Drug Use No     History   Smoking Status    Former Smoker    Packs/day: 3 00    Years: 30 00   Smokeless Tobacco    Never Used         Family History   Problem Relation Age of Onset    Heart attack Father         MI    Diabetes Sister         DM    Arthritis Family     Coronary artery disease Family     Hypertension Family     Tuberculosis Son          Review of Systems   Constitutional: Negative  Eyes: Negative  Respiratory: Positive for shortness of breath  Negative for apnea, cough, choking, chest tightness, wheezing and stridor  Cardiovascular: Negative  Gastrointestinal: Positive for anal bleeding, blood in stool and constipation  Negative for abdominal distention, abdominal pain, diarrhea, nausea, rectal pain and vomiting  Genitourinary: Negative  Objective   Current Vitals:  Vitals:    01/14/19 0910   Weight: 110 kg (242 lb)   Height: 6' 2" (1 88 m)         Physical Exam   Constitutional: He is oriented to person, place, and time  He appears well-developed and well-nourished  HENT:   Head: Normocephalic and atraumatic  Eyes: Conjunctivae are normal    Cardiovascular: Normal rate and regular rhythm  Pulmonary/Chest: Effort normal  No respiratory distress  He has no wheezes  He has no rales  He exhibits no tenderness  Diminished excursions, minimally   Abdominal: Soft  He exhibits no distension and no mass  There is no tenderness  There is no rebound and no guarding  A hernia is present  Irreducible, nontender ventral hernia   Genitourinary:   Genitourinary Comments: Large, prolapsed hypertrophied anal papilla is present     Neurological: He is alert and oriented to person, place, and time  Psychiatric: He has a normal mood and affect   His behavior is normal  Judgment and thought content normal

## 2019-01-14 NOTE — LETTER
January 14, 2019     Sita Mccormick DO  [address] [address] [place] [address]    Patient: Crist Hammans   YOB: 1955   Date of Visit: 1/14/2019       Dear Dr Saloni Kerr: Thank you for referring Crist Hammans to me for evaluation  Below are my notes for this consultation  If you have questions, please do not hesitate to call me  I look forward to following your patient along with you  Sincerely,        Ameya Arshad MD        CC: No Recipients  Ameya Arshad MD  1/14/2019  9:43 AM  Sign at close encounter  Colon and Rectal Surgery   Crist Hammans 66 y o  male MRN [id number]  Encounter: [id number]  01/14/19 9:43 AM            Assessment: Crist Hammans is a 66 y o  male who presents today for evaluation of constipation  Plan:   Hypertrophied anal papilla  This is large  It is annoying to him  I recommend excision  Risks, not limited to bleeding, pain, persistent disease, need for future surgery, fecal incontinence, or nonhealing wounds were reviewed at length  If fissurectomy is needed, it will be considered at the OR  No sphincterotomy is planned  Questions were answered  He agrees to the procedure  Rectal bleeding  Colonoscopy is recommended  Colonoscopy risks, not limited to bleeding, perforated colon, need for surgery, and missed lesions were discussed  Alternatives were discussed  Questions were answered  He agreed to the procedure  Subjective     HPI    Crist Hammans is a 66 y o  male who is referred today by Dr Saloni Kerr for constipation  He states about 3 weeks ago he experienced a change in bowel habit to constipation  He was having a bowel movement a every couple days with hard stool  He denies abdominal pain, nausea/vomitng, or mucus in the stool  He states symptoms lasted a couple weeks  He also complains of occasional rectal bleeding seen on the toilet paper and believes it's due to hemorrhoids   He also had a questionable UTI for which he took Cipro, resulting in diarrhea and a rash  Thereafter, his constipation recurred  Constipation resolved with Miralax  He denies family history of colon cancer  He had a colonoscopy years ago but doesn't know who did it  His last colonoscopy was about 11 years ago, per patient results were normal      Historical Information   Past Medical History:   Diagnosis Date    Asthma     [facility] [address] [facility]     [facility] )     Mumps     Old MI (myocardial infarction)     Pneumonia      Past Surgical History:   Procedure Laterality Date    CARDIAC CATHETERIZATION  07/24/2015    Left main- normal and maidly tortuous  Circumflex - normal and moderately tortuous  RCA- normal and mildy tortuous  Global LV function was severely depressed  Olive [place] INGUINAL HERNIA REPAIR Bilateral     TONSILLECTOMY      UMBILICAL HERNIA REPAIR  06/21/2006       Meds/Allergies       Current Outpatient Prescriptions:     aclidinium (TUDORZA PRESSAIR) 400 MCG/ACT inhaler, Inhale 1 puff (400 mcg total) 2 (two) times a day, Disp: 1 Inhaler, Rfl: 5    ALPRAZolam (XANAX) 0 5 mg tablet, Take 1 tablet (0 5 mg total) by mouth daily at bedtime as needed for anxiety, Disp: 30 tablet, Rfl: 0    aspirin 325 mg tablet, Take 1 tablet by mouth daily, Disp: , Rfl:     carvedilol (COREG) 12 5 mg tablet, take 1 tablet by mouth twice a day with meals, Disp: 60 tablet, Rfl: 5    fluticasone furoate-vilanterol (BREO ELLIPTA) 200-25 MCG/INH inhaler, Inhale 1 puff daily, Disp: 1 each, Rfl: 12    furosemide (LASIX) 20 mg tablet, Take 1 tablet (20 mg total) by mouth daily as needed (weight gain or edema), Disp: 90 tablet, Rfl: 0    ipratropium-albuterol (DUO-NEB) 0 5-2 5 mg/3 mL, Take 3 mL by nebulization every 6 (six) hours as needed for wheezing, Disp: 360 mL, Rfl: 4    lisinopril (ZESTRIL) 20 mg tablet, take 1 tablet by mouth twice a day, Disp: 60 tablet, Rfl: 11    venlafaxine (EFFEXOR-XR) 75 mg 24 hr capsule, Take 1 capsule (75 mg total) by mouth daily, Disp: 30 capsule, Rfl: 2    cyclobenzaprine (FLEXERIL) 10 mg tablet, Take 10 mg by mouth 3 (three) times a day as needed, Disp: , Rfl: 0  Allergies   Allergen Reactions    Ciprofloxacin Shortness Of Breath and Diarrhea       Social History   History   Drug Use No     History   Smoking Status    Former Smoker    Packs/day: 3 00    Years: 30 00   Smokeless Tobacco    Never Used         Family History   Problem Relation Age of Onset    Heart attack Father         MI    Diabetes Sister         DM    Arthritis Family     Coronary artery disease Family     Hypertension Family     Tuberculosis Son          Review of Systems   Constitutional: Negative  Eyes: Negative  Respiratory: Positive for shortness of breath  Negative for apnea, cough, choking, chest tightness, wheezing and stridor  Cardiovascular: Negative  Gastrointestinal: Positive for anal bleeding, blood in stool and constipation  Negative for abdominal distention, abdominal pain, diarrhea, nausea, rectal pain and vomiting  Genitourinary: Negative  Objective   Current Vitals:  Vitals:    01/14/19 0910   Weight: 110 kg (242 lb)   Height: 6' 2" (1 88 m)         Physical Exam   Constitutional: He is oriented to person, place, and time  He appears well-developed and well-nourished  HENT:   Head: Normocephalic and atraumatic  Eyes: Conjunctivae are normal    Cardiovascular: Normal rate and regular rhythm  Pulmonary/Chest: Effort normal  No respiratory distress  He has no wheezes  He has no rales  He exhibits no tenderness  Diminished excursions, minimally   Abdominal: Soft  He exhibits no distension and no mass  There is no tenderness  There is no rebound and no guarding  A hernia is present  Irreducible, nontender ventral hernia   Genitourinary:   Genitourinary Comments: Large, prolapsed hypertrophied anal papilla is present     Neurological: He is alert and oriented to person, place, and time  Psychiatric: He has a normal mood and affect   His behavior is normal  Judgment and thought content normal

## 2019-01-18 ENCOUNTER — TELEPHONE (OUTPATIENT)
Dept: PULMONOLOGY | Facility: CLINIC | Age: 64
End: 2019-01-18

## 2019-01-18 NOTE — TELEPHONE ENCOUNTER
Received fax saying patients insurance does not cover [place]  They prefer [facility]  I see from office note, he already tried [facility]  Please start prior auth

## 2019-01-23 PROBLEM — Z12.11 SPECIAL SCREENING FOR MALIGNANT NEOPLASMS, COLON: Status: ACTIVE | Noted: 2019-01-23

## 2019-02-21 DIAGNOSIS — J45.40 CHRONIC ASTHMA, MODERATE PERSISTENT, UNCOMPLICATED: ICD-10-CM

## 2019-02-27 ENCOUNTER — OFFICE VISIT (OUTPATIENT)
Dept: UROLOGY | Facility: CLINIC | Age: 64
End: 2019-02-27
Payer: COMMERCIAL

## 2019-02-27 VITALS
WEIGHT: 248 LBS | DIASTOLIC BLOOD PRESSURE: 70 MMHG | HEIGHT: 74 IN | HEART RATE: 76 BPM | SYSTOLIC BLOOD PRESSURE: 130 MMHG | BODY MASS INDEX: 31.83 KG/M2

## 2019-02-27 DIAGNOSIS — Z12.5 SCREENING FOR PROSTATE CANCER: ICD-10-CM

## 2019-02-27 DIAGNOSIS — R35.0 URINARY FREQUENCY: Primary | ICD-10-CM

## 2019-02-27 LAB
POST-VOID RESIDUAL VOLUME, ML POC: 14 ML
SL AMB  POCT GLUCOSE, UA: NORMAL
SL AMB LEUKOCYTE ESTERASE,UA: NORMAL
SL AMB POCT BILIRUBIN,UA: NORMAL
SL AMB POCT BLOOD,UA: NORMAL
SL AMB POCT CLARITY,UA: NORMAL
SL AMB POCT COLOR,UA: YELLOW
SL AMB POCT KETONES,UA: NORMAL
SL AMB POCT NITRITE,UA: NORMAL
SL AMB POCT PH,UA: 5
SL AMB POCT SPECIFIC GRAVITY,UA: 1.02
SL AMB POCT URINE PROTEIN: NORMAL
SL AMB POCT UROBILINOGEN: NORMAL

## 2019-02-27 PROCEDURE — 51798 US URINE CAPACITY MEASURE: CPT | Performed by: UROLOGY

## 2019-02-27 PROCEDURE — 81002 URINALYSIS NONAUTO W/O SCOPE: CPT | Performed by: UROLOGY

## 2019-02-27 PROCEDURE — 99244 OFF/OP CNSLTJ NEW/EST MOD 40: CPT | Performed by: UROLOGY

## 2019-02-27 NOTE — PROGRESS NOTES
UROLOGY NEW CONSULT NOTE     CHIEF COMPLAINT   Elida Queen is a 66 y o  male with a complaint of   Chief Complaint   Patient presents with    Urinary Frequency     PVR        History of Present Illness:     66 y o  male with a prior episode of constipation and associated urinary frequency  This occurred approximately 2 months ago  The patient's constipation resolved with MiraLax  His urinary symptoms resolved as well  Patient has no family history of prostate cancer no prior urinary bother  AUA symptom score today is 8 with a quality Life 1  Patient has undergone routine PSA testing but not had a digital rectal exam   He has been referred to Gastroenterology for his GI issues  Past Medical History:     Past Medical History:   Diagnosis Date    Asthma     [facility]) [facility] [address] [facility]     [facility]     Mumps     Old MI (myocardial infarction)     Pneumonia        PAST SURGICAL HISTORY:     Past Surgical History:   Procedure Laterality Date    CARDIAC CATHETERIZATION  07/24/2015    Left main- normal and maidly tortuous  Circumflex - normal and moderately tortuous  RCA- normal and mildy tortuous  Global LV function was severely depressed  Kareem Chaudhry INGUINAL HERNIA REPAIR Bilateral     TONSILLECTOMY      UMBILICAL HERNIA REPAIR  06/21/2006       CURRENT MEDICATIONS:     Current Outpatient Medications   Medication Sig Dispense Refill    aclidinium (TUDORZA PRESSAIR) 400 MCG/ACT inhaler Inhale 1 puff (400 mcg total) 2 (two) times a day 1 Inhaler 5    ALPRAZolam (XANAX) 0 5 mg tablet Take 1 tablet (0 5 mg total) by mouth daily at bedtime as needed for anxiety 30 tablet 0    aspirin 325 mg tablet Take 1 tablet by mouth daily      BREO ELLIPTA 200-25 MCG/INH inhaler INHALE 1 PUFF BY MOUTH ONCE DAILY 1 Inhaler 12    carvedilol (COREG) 12 5 mg tablet take 1 tablet by mouth twice a day with meals 60 tablet 5    cyclobenzaprine (FLEXERIL) 10 mg tablet Take 10 mg by mouth 3 (three) times a day as needed  0    furosemide (LASIX) 20 mg tablet Take 1 tablet (20 mg total) by mouth daily as needed (weight gain or edema) 90 tablet 0    ipratropium-albuterol (DUO-NEB) 0 5-2 5 mg/3 mL Take 3 mL by nebulization every 6 (six) hours as needed for wheezing 360 mL 4    lisinopril (ZESTRIL) 20 mg tablet take 1 tablet by mouth twice a day 60 tablet 11    venlafaxine (EFFEXOR-XR) 75 mg 24 hr capsule Take 1 capsule (75 mg total) by mouth daily 30 capsule 2     No current facility-administered medications for this visit          ALLERGIES:     Allergies   Allergen Reactions    Ciprofloxacin Shortness Of Breath and Diarrhea       SOCIAL HISTORY:     Social History     Socioeconomic History    Marital status: /Civil Union     Spouse name: None    Number of children: None    Years of education: None    Highest education level: None   Occupational History    None   Social Needs    Financial resource strain: None    Food insecurity:     Worry: None     Inability: None    Transportation needs:     Medical: None     Non-medical: None   Tobacco Use    Smoking status: Former Smoker     Packs/day: 3 00     Years: 30 00     Pack years: 90 00    Smokeless tobacco: Never Used   Substance and Sexual Activity    Alcohol use: Yes     Comment: social     Drug use: No    Sexual activity: None   Lifestyle    Physical activity:     Days per week: None     Minutes per session: None    Stress: None   Relationships    Social connections:     Talks on phone: None     Gets together: None     Attends Sabianist service: None     Active member of club or organization: None     Attends meetings of clubs or organizations: None     Relationship status: None    Intimate partner violence:     Fear of current or ex partner: None     Emotionally abused: None     Physically abused: None     Forced sexual activity: None   Other Topics Concern    None   Social History Narrative    Caffeine use       SOCIAL HISTORY:     Family History   Problem Relation Age of Onset    Heart attack Father         MI    Diabetes Sister         DM    Arthritis Family     Coronary artery disease Family     Hypertension Family     Tuberculosis Son        REVIEW OF SYSTEMS:     Review of Systems   Constitutional: Negative  Respiratory: Negative  Cardiovascular: Negative  Gastrointestinal: Positive for constipation  Genitourinary: Negative  Musculoskeletal: Negative  Skin: Negative  Psychiatric/Behavioral: Negative  PHYSICAL EXAM:     /70 (BP Location: Left arm, Patient Position: Sitting, Cuff Size: Adult)   Pulse 76   Ht 6' 2" (1 88 m)   Wt 112 kg (248 lb)   BMI 31 84 kg/m²     General:  Healthy appearing male in no acute distress  They have a normal affect  There is not appear to be any gross neurologic defects or abnormalities  HEENT:  Normocephalic, atraumatic  Neck is supple without any palpable lymphadenopathy  Cardiovascular:  Patient has normal palpable distal radial pulses  There is no significant peripheral edema  No JVD is noted  Respiratory:  Patient has unlabored respirations  There is no audible wheeze or rhonchi  Abdomen:  Abdomen with multiple surgical scars  Abdomen is soft and nontender  Large supraumbilical bulge, reducible  There is no tympany  Bilateral healed inguinal hernia scars, persistent right inguinal hernia which is soft and reducible  Genitourinary: no penile lesions or discharge, no testicular masses or tenderness, no hernias, KAUR with external/internal hemorrhoids, prostate smooth no nodules    Musculoskeletal:  Patient does not have significant CVA tenderness in the flank with palpation or percussion  They full range of motion in all 4 extremities  Strength in all 4 extremities appears congruent  Patient is able to ambulate without assistance or difficulty  Dermatologic:  Patient has no skin abnormalities or rashes      LABS: CBC:   Lab Results   Component Value Date    WBC 12 40 (H) 12/15/2018    HGB 15 3 12/15/2018    HCT 47 0 12/15/2018    MCV 85 12/15/2018     12/15/2018       BMP:   Lab Results   Component Value Date    GLUCOSE 87 07/27/2015    CALCIUM 8 6 12/18/2018     07/27/2015    K 5 0 12/18/2018    CO2 27 12/18/2018     12/18/2018    BUN 29 (H) 12/18/2018    CREATININE 1 44 (H) 12/18/2018     Lab Results   Component Value Date    PSA 1 0 12/18/2018 12/12/18  1:27 PM     RBC, UA None Seen, 0-5 /hpf None Seen    WBC, UA None Seen, 0-5, 5-55, 5-65 /hpf None Seen    Epithelial Cells None Seen, Occasional /hpf None Seen    Bacteria, UA None Seen, Occasional /hpf None Seen    Hyaline Casts, UA None Seen /lpf None Seen      PROCEDURE:     Recent Results (from the past 2 hour(s))   POCT urine dip    Collection Time: 02/27/19  1:44 PM   Result Value Ref Range    LEUKOCYTE ESTERASE,UA -     NITRITE,UA -     SL AMB POCT UROBILINOGEN -     POCT URINE PROTEIN -      PH,UA 5 0     BLOOD,UA trace     SPECIFIC GRAVITY,UA 1 025     KETONES,UA -     BILIRUBIN,UA -     GLUCOSE, UA -      COLOR,UA yellow     CLARITY,UA oliveira    POCT Measure PVR    Collection Time: 02/27/19  1:44 PM   Result Value Ref Range    POST-VOID RESIDUAL VOLUME, ML POC 14 mL        ASSESSMENT:     66 y o  male with self-limited episode of constipation and urinary frequency    PLAN:      Patient's urinary issues were likely related to the constipation episode  I have discussed with him routine fiber supplements, as needed stool softeners and laxatives to reduce the risk of constipation  He does have internal and external hemorrhoids  Patient will be seeing gastroenterology for routine colonoscopy in the near future  I would not recommend any medication for the patient's prostate is urinary symptoms are well controlled at the current time  PSA is low and digital rectal exam is normal   Continued yearly prostate cancer screening

## 2019-02-27 NOTE — PATIENT INSTRUCTIONS
Decision Aid for Clinically Localized Prostate Cancer   AMBULATORY CARE:   What you need to know about decisions for clinically localized prostate cancer: You can work with your healthcare provider to make decisions about being screened or treated for prostate cancer  Screening is a test done to find prostate cancer early  Screening is different from diagnosis because screening is used before you first start to have signs or symptoms  This means management or treatment can start early  You can also help plan treatment if cancer is found with screening, or you develop it later on  What you need to know about clinically localized prostate cancer:   · The prostate is a small gland that is part of the reproductive system  It sits around your urethra (tube that carries urine out of your bladder)  Cancer cells start to grow inside the prostate gland  The cells form a mass that continues to grow if left untreated  Clinically localized means that the cancer has not moved beyond the prostate gland  · Prostate cancer is the second most common form of cancer in men (skin cancer is most common)  About 17% of men in the [place] develop prostate cancer  About 3% of men in the [place] die from prostate cancer  · Prostate cancer often grows slowly  Sometimes the tumor does not grow at all  The cancer may not grow quickly enough to be life-threatening in your lifetime  · The risk for prostate cancer increases with age  Your risk is highest if you are older than 65 years  Your risk is lowest if you are younger than 45 years  Your risk is also increased if you have a history of prostate cancer in your father, brother, or son  · You may have no signs or symptoms of prostate cancer until the tumor grows large  You may have trouble urinating or keeping a strong urine stream because of the tumor  At later stages, prostate cancer can spread to your bones or lymph nodes  You may have bone pain or fractures    How to know if you are a good candidate for prostate cancer screening:  Screening may be helpful for you if any of the following is true:  · You are 72 years or older  · You have a family history of prostate cancer or other prostate problems  · You do not have another health condition that may prevent you from living longer than another 10 years  · You want to have treatment as early as possible if needed  · You are willing to have screening as often as recommended by your healthcare provider  How screening is done:   · A digital rectal exam  is used to check your prostate  Your healthcare provider will insert a gloved finger into your rectum  The provider will be able to feel your prostate for lumps or hard areas that could be tumors  The exam may be repeated over time to check for new or worsening problems  · A PSA test  is used to measure the amount of a protein made by your prostate gland  A blood sample is taken for this test  A high PSA level may be a sign of prostate cancer  Benefits and risks of screening:  Talk with your healthcare provider about the risks and benefits of screening:  · Benefits  include finding prostate cancer early  Cancer treatment is often more successful when it starts early  This means you can make more decisions about treatment  · Risks  include the following:     ¨ You may have a false belief that you will not develop prostate cancer if your screening result is negative  You can still develop prostate cancer later on  ¨ A PSA test can give a false-negative result  This means the result looks like you do not have cancer even though you do  Treatment or monitoring may be delayed because the tests suggested you do not have cancer  ¨ The PSA test can also give a false-positive result  This means you do not actually have cancer but the test shows you do  You may get more tests, a biopsy, or even treatments that are not needed   It can also be stressful to think you have prostate cancer when you do not  Questions to ask your healthcare provider to help you make decisions about screening:   · How high is my risk for prostate cancer? · How often do I need to have screening? · Where is the screening done? · Do I need to do anything to get ready to have screening? What happens after you have screening:   · You will meet with your healthcare provider to go over the results of your screening  · You may need more tests to diagnose anything that showed up on the screening test  Only a biopsy (tissue sample) can show for sure that you have prostate cancer  · After cancer is found, your healthcare provider will assign a number called a [place] score  The number can help you understand how quickly the cancer is likely to grow, and if it may spread:     ¨ A number of 6 or lower means the cancer is likely to grow more slowly  ¨ A score of 7 means it is likely to grow faster, but it may not spread to other areas  ¨ A score of 8 to 10 means it is likely to grow more quickly and also spread  · Your healthcare provider will also assign a T stage to the tumor  This number shows the growth of the tumor and if it is likely to spread to other areas  · You and your healthcare provider will use the [place] score, T stage, and PSA results to talk about your treatment options  Your provider will tell you if your prostate cancer is at low, medium, or high risk for growing and spreading  The need for more tests and the range of treatment options depend on the risk level  Together you and your provider can create a treatment plan that is right for you  How clinically localized prostate cancer is treated, and the benefits of treatment:   · Watchful waiting  means you do not receive treatment right away  If the cancer does not grow or spread, you may never need treatment  Watchful waiting may be used if your tumor risk is low   The benefit of this option is that you will not have invasive or difficult treatment  You can choose a different treatment option if tests show the tumor is growing or spreading over time  · Active surveillance  also means you do not receive treatment right away  You will need to have tests over time to continue to check the tumor  A benefit of this option is that follow-up tests can show a change early  Treatment options may be less invasive than if the tumor is found at a later stage  · Cryoablation  is used to kill cancer cells by freezing them  This is a less invasive treatment  You may have little or no pain after this procedure  · Radiation  may be used to destroy the cancer cells  Radiation is about as effective as surgery to remove the prostate gland  This treatment leaves the prostate in place and only targets the cancer cells  · Surgery  is used to remove the prostate gland  The tumor is in the gland, so it will be removed along with the gland  · Hormone therapy  may be added to surgery or radiation treatment  Your testosterone level is lowered, or it is blocked  This can stop the cancer cells from growing, or slow the growth  Hormone therapy may be given as an injection every 1 to 6 months  Another way to lower your testosterone level is to have surgery to remove your testicles  Your body will no longer make testosterone if your testicles are removed  Risks of treatment:   · Watchful waiting and active surveillance  can cause you to worry that the cancer is growing or spreading  · Surgery  can damage tissue around your prostate  Surgery can increase your risk for trouble urinating, incontinence (leaking), or urinary retention  Surgery can also cause problems with your ability to have or keep an erection  You may bleed more than expected during surgery or develop an infection  You may also need to be treated again if the surgery you have does not relieve your symptoms  Surgery may not be able to remove all the tumor cells      · Radiation  may not kill all the cancer cells  Radiation can increase your risk for trouble urinating, incontinence (leaking), or urinary retention  You may have temporary or permanent hair loss in your scrotum  Your skin can become dry or irritated  You may develop problems urinating or having a bowel movement  Radiation can also cause problems with your ability to have or keep an erection  · Cryoablation  may not kill all the cancer cells  You may develop scar tissue  You can also have swelling, problems urinating, or pain in your pelvis or scrotum  Tissue outside the prostate may be damaged during cryoablation  You may have permanent erection problems  Questions to ask your healthcare provider to help you make decisions about treatment:   · What is my Nice score, T score, and risk number? · How often will I need follow-up tests if I choose active surveillance first?    · How will each treatment option affect my daily activities? · What is the risk for erectile dysfunction or impotence with each treatment? · Am I a good candidate for surgery? · Which surgery may work best to treat my symptoms? · Where is the surgery done? · How long is recovery after surgery? · Will my insurance cover treatment? Questions to consider to help you make decisions about treatment:   · If my cancer risk is low, will I be comfortable with watchful waiting or active surveillance instead of immediate treatment? How will I feel if the cancer grows or spreads? · Will I go in for follow-up tests over time if I do not want treatment right away? · If I have treatment and lose my ability to have an erection, how will I feel? · Am I willing to accept problems with urinating or having a bowel movement that might happen with treatment? · How will my family or others in my life be affected by my treatment decisions?   © 2017 [employer] Information is for End User's use only and may not be sold, redistributed or otherwise used for commercial purposes  All illustrations and images included in CareNotes® are the copyrighted property of A D [employer] M [employer] Inc  or Jorgito Hicks  The above information is an  only  It is not intended as medical advice for individual conditions or treatments  Talk to your doctor, nurse or pharmacist before following any medical regimen to see if it is safe and effective for you

## 2019-03-04 ENCOUNTER — TELEPHONE (OUTPATIENT)
Dept: PULMONOLOGY | Facility: HOSPITAL | Age: 64
End: 2019-03-04

## 2019-03-04 NOTE — TELEPHONE ENCOUNTER
Received call from [employer] pt's prior auth for May Gleason has been approved valid today with no end date

## 2019-03-08 ENCOUNTER — TELEPHONE (OUTPATIENT)
Dept: INTERNAL MEDICINE CLINIC | Facility: CLINIC | Age: 64
End: 2019-03-08

## 2019-03-08 NOTE — TELEPHONE ENCOUNTER
Pt has c/o non prod cough, started yesterday  Afebrile  Asking for cough med with codeine  Scheduled for colonoscopy for Monday, asking if okay to still have done  States he called their office but hasn't heard back yet?      All-cipro

## 2019-03-08 NOTE — TELEPHONE ENCOUNTER
Can take mucinex dm bid and increase fluids  I would change appt if sxs are productive or has a significant cough

## 2019-03-11 ENCOUNTER — HOSPITAL ENCOUNTER (OUTPATIENT)
Facility: HOSPITAL | Age: 64
Setting detail: OUTPATIENT SURGERY
Discharge: HOME | End: 2019-03-11
Attending: COLON & RECTAL SURGERY | Admitting: COLON & RECTAL SURGERY
Payer: COMMERCIAL

## 2019-03-11 ENCOUNTER — ANESTHESIA EVENT (OUTPATIENT)
Dept: PERIOP | Facility: HOSPITAL | Age: 64
End: 2019-03-11
Payer: COMMERCIAL

## 2019-03-11 ENCOUNTER — ANESTHESIA (OUTPATIENT)
Dept: PERIOP | Facility: HOSPITAL | Age: 64
End: 2019-03-11
Payer: COMMERCIAL

## 2019-03-11 VITALS
SYSTOLIC BLOOD PRESSURE: 142 MMHG | DIASTOLIC BLOOD PRESSURE: 72 MMHG | OXYGEN SATURATION: 98 % | RESPIRATION RATE: 18 BRPM | HEART RATE: 57 BPM | TEMPERATURE: 97 F

## 2019-03-11 DIAGNOSIS — Z12.11 SPECIAL SCREENING FOR MALIGNANT NEOPLASMS, COLON: ICD-10-CM

## 2019-03-11 PROCEDURE — 88304 TISSUE EXAM BY PATHOLOGIST: CPT | Performed by: PATHOLOGY

## 2019-03-11 PROCEDURE — 46230 REMOVAL OF ANAL TAGS: CPT | Performed by: COLON & RECTAL SURGERY

## 2019-03-11 PROCEDURE — 88305 TISSUE EXAM BY PATHOLOGIST: CPT | Performed by: PATHOLOGY

## 2019-03-11 PROCEDURE — 45384 COLONOSCOPY W/LESION REMOVAL: CPT | Performed by: COLON & RECTAL SURGERY

## 2019-03-11 RX ORDER — PROPOFOL 10 MG/ML
INJECTION, EMULSION INTRAVENOUS CONTINUOUS PRN
Status: DISCONTINUED | OUTPATIENT
Start: 2019-03-11 | End: 2019-03-11 | Stop reason: SURG

## 2019-03-11 RX ORDER — SODIUM CHLORIDE, SODIUM LACTATE, POTASSIUM CHLORIDE, CALCIUM CHLORIDE 600; 310; 30; 20 MG/100ML; MG/100ML; MG/100ML; MG/100ML
125 INJECTION, SOLUTION INTRAVENOUS CONTINUOUS
Status: DISCONTINUED | OUTPATIENT
Start: 2019-03-11 | End: 2019-03-11 | Stop reason: HOSPADM

## 2019-03-11 RX ORDER — PROPOFOL 10 MG/ML
INJECTION, EMULSION INTRAVENOUS AS NEEDED
Status: DISCONTINUED | OUTPATIENT
Start: 2019-03-11 | End: 2019-03-11 | Stop reason: SURG

## 2019-03-11 RX ORDER — LIDOCAINE HYDROCHLORIDE AND EPINEPHRINE 10; 10 MG/ML; UG/ML
INJECTION, SOLUTION INFILTRATION; PERINEURAL AS NEEDED
Status: DISCONTINUED | OUTPATIENT
Start: 2019-03-11 | End: 2019-03-11 | Stop reason: HOSPADM

## 2019-03-11 RX ORDER — DEXTROMETHORPHAN HYDROBROMIDE AND PROMETHAZINE HYDROCHLORIDE 15; 6.25 MG/5ML; MG/5ML
1.25 SYRUP ORAL 4 TIMES DAILY PRN
COMMUNITY
End: 2019-08-21 | Stop reason: ALTCHOICE

## 2019-03-11 RX ORDER — ALBUTEROL SULFATE 90 UG/1
2 AEROSOL, METERED RESPIRATORY (INHALATION) EVERY 6 HOURS PRN
COMMUNITY
End: 2020-02-24 | Stop reason: ALTCHOICE

## 2019-03-11 RX ORDER — METOPROLOL TARTRATE 5 MG/5ML
INJECTION INTRAVENOUS AS NEEDED
Status: DISCONTINUED | OUTPATIENT
Start: 2019-03-11 | End: 2019-03-11 | Stop reason: SURG

## 2019-03-11 RX ORDER — MIDAZOLAM HYDROCHLORIDE 1 MG/ML
INJECTION INTRAMUSCULAR; INTRAVENOUS AS NEEDED
Status: DISCONTINUED | OUTPATIENT
Start: 2019-03-11 | End: 2019-03-11 | Stop reason: SURG

## 2019-03-11 RX ADMIN — SODIUM CHLORIDE, SODIUM LACTATE, POTASSIUM CHLORIDE, AND CALCIUM CHLORIDE 125 ML/HR: .6; .31; .03; .02 INJECTION, SOLUTION INTRAVENOUS at 12:04

## 2019-03-11 RX ADMIN — PROPOFOL 50 MG: 10 INJECTION, EMULSION INTRAVENOUS at 12:47

## 2019-03-11 RX ADMIN — METOPROLOL TARTRATE 2 MG: 1 INJECTION, SOLUTION INTRAVENOUS at 12:58

## 2019-03-11 RX ADMIN — MIDAZOLAM HYDROCHLORIDE 2 MG: 1 INJECTION, SOLUTION INTRAMUSCULAR; INTRAVENOUS at 12:46

## 2019-03-11 RX ADMIN — PROPOFOL 30 MG: 10 INJECTION, EMULSION INTRAVENOUS at 12:58

## 2019-03-11 RX ADMIN — PROPOFOL 130 MCG/KG/MIN: 10 INJECTION, EMULSION INTRAVENOUS at 12:47

## 2019-03-11 NOTE — DISCHARGE INSTR - AVS FIRST PAGE
OPERATIVE REPORT  PATIENT NAME: Sana Vega    :  1955  MRN: [id number]  Pt Location: [facility] OR ROOM 03    SURGERY DATE: 3/11/2019    Surgeon(s) and Role:     * Falguni Lamas MD - Primary    Preop Diagnosis:  Special screening for malignant neoplasms, colon [Z12 11]    Post-Op Diagnosis Codes: * Special screening for malignant neoplasms, colon [Z12 11]    Procedure:  Colonoscopy and hot biopsy polypectomy  Removal of 2 anal papillae  Specimen(s):  ID Type Source Tests Collected by Time Destination   1 : polyp retrieved by hot biopsy forceps Tissue Large Intestine, Transverse Colon TISSUE EXAM Falguni Lamas MD 3/11/2019 1309    2 : anal papilla x2 Tissue Anus TISSUE EXAM Falguni Lamas MD 3/11/2019 1320        Estimated Blood Loss:   Minimal    Drains:  * No LDAs found *    Anesthesia Type:   IV Sedation with Anesthesia    Operative Indications:  Special screening for malignant neoplasms, colon [Z12 11]  Anal papillae    Operative Findings:  above    Complications:   None    Procedure and Technique:  Digital rectal exam was performed  He revealed 2 large hypertrophied anal papillae, 1 in the anterior midline and 1 in the posterior midline  These were 2 cm in length and had narrow bases  The prostate was nonnodular and unremarkable  Hemorrhoids were substantially engorged  The colonoscope was inserted and advanced to the cecum  The appendiceal orifice, confluence of the tenia coli, and ileocecal valve were clearly seen  The terminal ileum was entered  All the structures were normal   Upon withdrawal of the scope, and under an excellent procedure preparation, the colon was evaluated  A single small flat polyp measuring less than 1 cm in diameter was found in the transverse colon  The polyp was removed with hot biopsy polypectomy technique and sent for pathologic evaluation  Sigmoid diverticulosis was moderate but not associated with any other changes    The study was otherwise unremarkable  Attention was then directed to excision of the anal papillae  The anterior and posterior midline anal papillae were very long but had narrow bases  No fissure was identified in association with either papilla  The papillae were removed with cautery at their base  The posterior papilla left a substantial defect which was oversewn using 3 0 chromic suture  The anterior defect was less than a mm in diameter and was not closed  The area was injected with 1% xylocaine and epinephrine  The patient tolerated the procedure well  Sponge needle and instrument counts were correct  I was present for the entire procedure    Patient Disposition:  PACU      Plan: Colonoscopy in 5 years      SIGNATURE: Ludivina Perdue MD  DATE: March 11, 2019  TIME: 1:27 PM

## 2019-03-11 NOTE — ANESTHESIA PREPROCEDURE EVALUATION
Review of Systems/Medical History  Patient summary reviewed  Chart reviewed      Cardiovascular  Exercise tolerance (METS): >4,  Hypertension controlled, Past MI , CHF , NYHA Classification: II compensated CHF,   Comment: EF 45%,  Pulmonary  Smoker ex-smoker  , Pneumonia, COPD moderate- medication dependent , Asthma , well controlled/ stable Last rescue: today Asthma type of rescue: daily inhaler, No shortness of breath, Sleep apnea ,        GI/Hepatic    Bowel prep       Negative  ROS        Endo/Other  No history of thyroid disease ,      GYN       Hematology  Negative hematology ROS      Musculoskeletal    Arthritis     Neurology  Negative neurology ROS      Psychology   Anxiety,              Physical Exam    Airway    Mallampati score: II  TM Distance: >3 FB  Neck ROM: full     Dental   No notable dental hx     Cardiovascular  Cardiovascular exam normal    Pulmonary  Comment: Left low lobe, Rhonchi,     Other Findings        Anesthesia Plan  ASA Score- 3     Anesthesia Type- IV sedation with anesthesia with ASA Monitors  Additional Monitors:   Airway Plan:     Comment: Warning pt bronchitis   Plan Factors-  Patient did not smoke on day of surgery  Induction- intravenous  Postoperative Plan-     Informed Consent- Anesthetic plan and risks discussed with patient  I personally reviewed this patient with the CRNA  Discussed and agreed on the Anesthesia Plan with the CRNA  Isamar Murillo

## 2019-03-11 NOTE — OP NOTE
OPERATIVE REPORT  PATIENT NAME: Yari Lopes    :  1955  MRN: [id number]  Pt Location: [facility] RO[facility] 03    SURGERY DATE: 3/11/2019    Surgeon(s) and Role:     * Efrain Padgett MD - Primary    Preop Diagnosis:  Special screening for malignant neoplasms, colon [Z12 11]    Post-Op Diagnosis Codes: * Special screening for malignant neoplasms, colon [Z12 11]    Procedure:  Colonoscopy and hot biopsy polypectomy  Removal of 2 anal papillae  Specimen(s):  ID Type Source Tests Collected by Time Destination   1 : polyp retrieved by hot biopsy forceps Tissue Large Intestine, Transverse Colon TISSUE EXAM Efrain Padgett MD 3/11/2019 1309    2 : anal papilla x2 Tissue Anus TISSUE EXAM Efrain Padgett MD 3/11/2019 1320        Estimated Blood Loss:   Minimal    Drains:  * No LDAs found *    Anesthesia Type:   IV Sedation with Anesthesia    Operative Indications:  Special screening for malignant neoplasms, colon [Z12 11]  Anal papillae    Operative Findings:  above    Complications:   None    Procedure and Technique:  Digital rectal exam was performed  He revealed 2 large hypertrophied anal papillae, 1 in the anterior midline and 1 in the posterior midline  These were 2 cm in length and had narrow bases  The prostate was nonnodular and unremarkable  Hemorrhoids were substantially engorged  The colonoscope was inserted and advanced to the cecum  The appendiceal orifice, confluence of the tenia coli, and ileocecal valve were clearly seen  The terminal ileum was entered  All the structures were normal   Upon withdrawal of the scope, and under an excellent procedure preparation, the colon was evaluated  A single small flat polyp measuring less than 1 cm in diameter was found in the transverse colon  The polyp was removed with hot biopsy polypectomy technique and sent for pathologic evaluation  Sigmoid diverticulosis was moderate but not associated with any other changes    The study was otherwise unremarkable  Attention was then directed to excision of the anal papillae  The anterior and posterior midline anal papillae were very long but had narrow bases  No fissure was identified in association with either papilla  The papillae were removed with cautery at their base  The posterior papilla left a substantial defect which was oversewn using 3 0 chromic suture  The anterior defect was less than a mm in diameter and was not closed  The area was injected with 1% xylocaine and epinephrine  The patient tolerated the procedure well  Sponge needle and instrument counts were correct  I was present for the entire procedure    Patient Disposition:  PACU      Plan: Colonoscopy in 5 years      SIGNATURE: Kath Mackenzie MD  DATE: March 11, 2019  TIME: 1:27 PM

## 2019-03-11 NOTE — H&P
History and Physical   Colon and Rectal Surgery   Rosalba Partida 66 y o  male MRN: [id number]  Unit/Bed#: OR [place] Encounter: [id number]  03/11/19   12:35 PM      CC: anal tag/fissure  Colon screeing  History of Present Illness   HPI:  oRsalba Partida is a 66 y o  male with an anal tag and fissure  He has not had colonoscopy for a decade  Historical Information   Past Medical History:   Diagnosis Date    Asthma     [facility]     COPD [facility]ch[facility] obstructive pulmonary disease[facility]     Mumps     Old MI (myocardial infarction)     Pneumonia     Pulmonary emphysema ([facility] [address] )     Sleep apnea      Past Surgical History:   Procedure Laterality Date    CARDIAC CATHETERIZATION  07/24/2015    Left main- normal and maidly tortuous  Circumflex - normal and moderately tortuous  RCA- normal and mildy tortuous  Global LV function was severely depressed  [facility] INGUINAL HERNIA REPAIR Bilateral     TONSILLECTOMY      UMBILICAL HERNIA REPAIR  06/21/2006       Meds/Allergies     Medications Prior to Admission   Medication    albuterol (PROVENTIL HFA,VENTOLIN HFA) 90 mcg/act inhaler    ALPRAZolam (XANAX) 0 5 mg tablet    aspirin 325 mg tablet    BREO ELLIPTA 200-25 MCG/INH inhaler    carvedilol (COREG) 12 5 mg tablet    furosemide (LASIX) 20 mg tablet    ipratropium-albuterol (DUO-NEB) 0 5-2 5 mg/3 mL    lisinopril (ZESTRIL) 20 mg tablet    promethazine-dextromethorphan (PHENERGAN-DM) 6 25-15 mg/5 mL oral syrup    venlafaxine (EFFEXOR-XR) 75 mg 24 hr capsule         Current Facility-Administered Medications:     lactated ringers infusion, 125 mL/hr, Intravenous, Continuous, Patricia Novak MD, Last Rate: 125 mL/hr at 03/11/19 1204, 125 mL/hr at 03/11/19 1204    Allergies   Allergen Reactions    Ciprofloxacin Shortness Of Breath and Diarrhea         Social History   Social History     Substance and Sexual Activity   Alcohol Use Yes    Comment: rarely     Social History     Substance and Sexual Activity   Drug Use No     Social History     Tobacco Use   Smoking Status Former Smoker    Packs/day: 3 00    Years: 30 00    Pack years: 90 00   Smokeless Tobacco Never Used         Family History:   Family History   Problem Relation Age of Onset    Heart attack Father         MI    Diabetes Sister         DM    Arthritis Family     Coronary artery disease Family     Hypertension Family     Tuberculosis Son          Objective     Current Vitals:   Blood Pressure: 147/69 (03/11/19 1157)  Pulse: 58 (03/11/19 1157)  Temperature: 98 °F (36 7 °C) (03/11/19 1157)  Temp Source: Tympanic (03/11/19 1157)  Respirations: 18 (03/11/19 1157)  SpO2: 99 % (03/11/19 1157)  No intake or output data in the 24 hours ending 03/11/19 1235    Physical Exam:  General:  Well nourished, no distress  Neuro: Alert and oriented  Eyes:Sclera anicteric, conjunctiva pink  Pulm: Clear to auscultation bilaterally  No respiratory Distress  CV:  Regular rate and rhythm  No murmurs  Abdomen:  Soft, flat, non-tender, without masses or hepatosplenomegaly  Lab Results:       ASSESSMENT:  Nabeel Álvarez is a 66 y o  male for treatment of anal tag and fissure and colonoscopy  PLAN:  Colonoscopy, anal fissure treatment and removal anal tag  Risks , including, but not limited to, bleeding, perforation, missed lesions, and potential need for surgery, were reviewed  Alternatives to colonoscopy were discussed  Risks from fissure treatment, not limited to bleeding, pain, persistent disease, need for future surgery, fecal incontinence, or nonhealing wounds were reviewed at length  Questions were answered   Carissa Pak MD

## 2019-03-11 NOTE — ANESTHESIA POSTPROCEDURE EVALUATION
Post-Op Assessment Note    CV Status:  Stable  Pain Score: 0    Pain management: adequate     Mental Status:  Alert and sleepy   Hydration Status:  Euvolemic   PONV Controlled:  Controlled   Airway Patency:  Patent   Post Op Vitals Reviewed: Yes      Staff: CRNA           /57 (03/11/19 1327)    Temp (!) 97 °F (36 1 °C) (03/11/19 1327)    Pulse 56 (03/11/19 1327)   Resp 20 (03/11/19 1327)    SpO2 100 % (03/11/19 1327)

## 2019-03-13 DIAGNOSIS — F41.9 ANXIETY: ICD-10-CM

## 2019-03-13 RX ORDER — ALPRAZOLAM 0.5 MG/1
0.5 TABLET ORAL
Qty: 30 TABLET | Refills: 0 | Status: SHIPPED | OUTPATIENT
Start: 2019-03-13 | End: 2019-10-22 | Stop reason: SDUPTHER

## 2019-03-19 DIAGNOSIS — F41.9 ANXIETY: ICD-10-CM

## 2019-03-19 RX ORDER — VENLAFAXINE HYDROCHLORIDE 75 MG/1
CAPSULE, EXTENDED RELEASE ORAL
Qty: 30 CAPSULE | Refills: 2 | Status: SHIPPED | OUTPATIENT
Start: 2019-03-19 | End: 2020-02-24 | Stop reason: ALTCHOICE

## 2019-07-03 DIAGNOSIS — I42.8 OTHER CARDIOMYOPATHY (HCC): ICD-10-CM

## 2019-07-03 RX ORDER — CARVEDILOL 12.5 MG/1
TABLET ORAL
Qty: 60 TABLET | Refills: 5 | Status: SHIPPED | OUTPATIENT
Start: 2019-07-03 | End: 2020-02-04

## 2019-07-16 DIAGNOSIS — J44.9 CHRONIC OBSTRUCTIVE PULMONARY DISEASE, UNSPECIFIED COPD TYPE (HCC): ICD-10-CM

## 2019-07-16 RX ORDER — IPRATROPIUM BROMIDE 17 UG/1
AEROSOL, METERED RESPIRATORY (INHALATION)
Qty: 12.9 G | Refills: 3 | Status: SHIPPED | OUTPATIENT
Start: 2019-07-16 | End: 2020-02-04

## 2019-07-30 DIAGNOSIS — J45.40 CHRONIC ASTHMA, MODERATE PERSISTENT, UNCOMPLICATED: ICD-10-CM

## 2019-08-08 RX ORDER — IPRATROPIUM BROMIDE AND ALBUTEROL SULFATE 2.5; .5 MG/3ML; MG/3ML
SOLUTION RESPIRATORY (INHALATION)
Qty: 360 ML | Refills: 4 | Status: SHIPPED | OUTPATIENT
Start: 2019-08-08 | End: 2019-08-21 | Stop reason: SDUPTHER

## 2019-08-20 RX ORDER — HYDROCODONE BITARTRATE AND ACETAMINOPHEN 5; 325 MG/1; MG/1
1 TABLET ORAL EVERY 6 HOURS PRN
Refills: 0 | COMMUNITY
Start: 2019-07-10 | End: 2019-08-21 | Stop reason: ALTCHOICE

## 2019-08-20 RX ORDER — IBUPROFEN 600 MG/1
TABLET ORAL
Refills: 0 | COMMUNITY
Start: 2019-07-09 | End: 2019-08-21 | Stop reason: ALTCHOICE

## 2019-08-20 RX ORDER — ACLIDINIUM BROMIDE 400 UG/1
POWDER, METERED RESPIRATORY (INHALATION)
Refills: 0 | COMMUNITY
Start: 2019-07-08 | End: 2019-08-21 | Stop reason: SDUPTHER

## 2019-08-21 ENCOUNTER — OFFICE VISIT (OUTPATIENT)
Dept: PULMONOLOGY | Facility: HOSPITAL | Age: 64
End: 2019-08-21
Payer: COMMERCIAL

## 2019-08-21 VITALS
SYSTOLIC BLOOD PRESSURE: 124 MMHG | WEIGHT: 235 LBS | BODY MASS INDEX: 30.16 KG/M2 | DIASTOLIC BLOOD PRESSURE: 80 MMHG | HEART RATE: 58 BPM | OXYGEN SATURATION: 98 % | HEIGHT: 74 IN

## 2019-08-21 DIAGNOSIS — J44.9 COPD, MODERATE (HCC): Primary | ICD-10-CM

## 2019-08-21 DIAGNOSIS — J45.40 CHRONIC ASTHMA, MODERATE PERSISTENT, UNCOMPLICATED: ICD-10-CM

## 2019-08-21 PROCEDURE — 99214 OFFICE O/P EST MOD 30 MIN: CPT | Performed by: PHYSICIAN ASSISTANT

## 2019-08-21 RX ORDER — FLUTICASONE FUROATE AND VILANTEROL 200; 25 UG/1; UG/1
1 POWDER RESPIRATORY (INHALATION) DAILY
Qty: 3 INHALER | Refills: 3 | Status: SHIPPED | OUTPATIENT
Start: 2019-08-21 | End: 2020-09-08

## 2019-08-21 RX ORDER — ACLIDINIUM BROMIDE 400 UG/1
1 POWDER, METERED RESPIRATORY (INHALATION) 2 TIMES DAILY
Qty: 3 INHALER | Refills: 0 | Status: SHIPPED | OUTPATIENT
Start: 2019-08-21 | End: 2019-12-03 | Stop reason: SDUPTHER

## 2019-08-21 RX ORDER — IPRATROPIUM BROMIDE AND ALBUTEROL SULFATE 2.5; .5 MG/3ML; MG/3ML
3 SOLUTION RESPIRATORY (INHALATION) EVERY 6 HOURS PRN
Qty: 90 VIAL | Refills: 3 | Status: SHIPPED | OUTPATIENT
Start: 2019-08-21 | End: 2020-09-14

## 2019-08-21 NOTE — ASSESSMENT & PLAN NOTE
Reviewed results of PFTs with patient in the office today  There was significant improvement after bronchodilator use, consistent with bronchial asthma however given his smoking history and fixed obstruction there is also a component of irreversible COPD  He will continue his current regimen of Tudoza twice daily and Breo once daily  He was reminded to rinse his mouth out after each use  He may also continue his nebulizer on an as needed basis, typically for him once a day  Refills were sent to pharmacy

## 2019-08-21 NOTE — ASSESSMENT & PLAN NOTE
For some reason, patient never underwent low dose CT chest lung screening when ordered by Dr Rob Gregorio 12/28  We will schedule this now

## 2019-08-21 NOTE — ASSESSMENT & PLAN NOTE
Minda Cota has moderate CORDELL based on previous sleep study with AHI of almost 18  Unfortunately, Minda Cota does not wish to wear CPAP at this time  We spent at least 5 minutes in the office today discussing CORDELL and the potential health risk associated with untreated CORDELL including heart disease and pulmonary hypertension  He verbalized understanding and states he would think about it for his next visit

## 2019-08-21 NOTE — PROGRESS NOTES
Pulmonary Follow Up Note   Julia Candelaria 60 y o  male MRN: [id number]  8/21/2019      Assessment:    Asthma-COPD overlap syndrome [facility])  Reviewed results of PFTs with patient in the office today  There was significant improvement after bronchodilator use, consistent with bronchial asthma however given his smoking history and fixed obstruction there is also a component of irreversible COPD  He will continue his current regimen of Tudoza twice daily and Breo once daily  He was reminded to rinse his mouth out after each use  He may also continue his nebulizer on an as needed basis, typically for him once a day  Refills were sent to pharmacy  Obstructive sleep apnea  Cricket has moderate CORDELL based on previous sleep study with AHI of almost 18  Unfortunately, Viviana Estrada does not wish to wear CPAP at this time  We spent at least 5 minutes in the office today discussing CORDELL and the potential health risk associated with untreated CORDELL including heart disease and pulmonary hypertension  He verbalized understanding and states he would think about it for his next visit  History of tobacco abuse  For some reason, patient never underwent low dose CT chest lung screening when ordered by Dr Dony Ram 12/28  We will schedule this now  Plan:    Diagnoses and all orders for this visit:    COPD, moderate ([address] )  -     TUDORZA PRESSAIR 400 MCG/ACT inhaler; Inhale 1 puff (400 mcg total) 2 (two) times a day    Chronic asthma, moderate persistent, uncomplicated  -     fluticasone-vilanterol (BREO ELLIPTA) 200-25 MCG/INH inhaler; Inhale 1 puff daily Rinse mouth after use  -     ipratropium-albuterol (DUO-NEB) 0 5-2 5 mg/3 mL nebulizer solution; Take 1 vial (3 mL total) by nebulization every 6 (six) hours as needed for wheezing or shortness of breath    Other orders  -     Discontinue: TUDORZA PRESSAIR 400 MCG/ACT inhaler  -     Discontinue: HYDROcodone-acetaminophen (NORCO) 5-325 mg per tablet;  Take 1 tablet by mouth every 6 (six) hours as needed  -     Discontinue: ibuprofen (MOTRIN) 600 mg tablet; take 1 tablet by mouth every 6 hours or if needed for pain        Return in about 6 months (around 2/21/2020)  History of Present Illness   HPI:  Davin Pyle is a 60 y o  male who presents to the office today for routine follow-up  Patient was last seen by Dr Afia Posada in December of 2018  He states that his health has been very stable since then  He has not required hospitalizations or steroids for exacerbations  He tells me that he is here for his " COPD check up and refills " Patient has asthma- COPD overlap syndrome for which he takes [place], and as needed duoneb  Patient denies SOB at rest and only notices occasional dyspnea on exertion  He does have a cough every morning productive of clear phlegm  Denies hemoptysis  Denies wheezing, chest pain, pleurisy, or leg swelling  Patient also has CHF for which he follows with cardiology  Patient is a former smoker with approximately a 60 pack year history and a quit date less than 15 years ago  Review of Systems   Respiratory: Positive for cough and shortness of breath (occasional)  Negative for wheezing  Cardiovascular: Negative for chest pain and leg swelling  All other systems reviewed and are negative  Historical Information   Past Medical History:   Diagnosis Date    Asthma     CHF (congestive heart failure) (HCC)     [facility])     Mumps     Old MI (myocardial infarction)     Pneumonia     Pulmonary emphysema ([address] )     Sleep apnea      Past Surgical History:   Procedure Laterality Date    CARDIAC CATHETERIZATION  07/24/2015    Left main- normal and maidly tortuous  Circumflex - normal and moderately tortuous  RCA- normal and mildy tortuous  Global LV function was severely depressed  Jemma Marquez INGUINAL HERNIA REPAIR Bilateral     [place] COLONOSCOPY FLX DX W/COLLJ SPEC WHEN PFRMD N/A 3/11/2019    Procedure: COLONOSCOPY with removal of anal papilla;   Surgeon: Tyler Weiss MD;  Location: [facility];  Service: Colorectal    TONSILLECTOMY      UMBILICAL HERNIA REPAIR  06/21/2006     Family History   Problem Relation Age of Onset    Heart attack Father         MI    Diabetes Sister         DM    Arthritis Family     Coronary artery disease Family     Hypertension Family     Tuberculosis Son        Social History     Tobacco Use   Smoking Status Former Smoker    Packs/day: 3 00    Years: 30 00    Pack years: 90 00   Smokeless Tobacco Never Used         Meds/Allergies     Current Outpatient Medications:     albuterol (PROVENTIL HFA,VENTOLIN HFA) 90 mcg/act inhaler, Inhale 2 puffs every 6 (six) hours as needed for wheezing, Disp: , Rfl:     ALPRAZolam (XANAX) 0 5 mg tablet, Take 1 tablet (0 5 mg total) by mouth daily at bedtime as needed for anxiety, Disp: 30 tablet, Rfl: 0    aspirin 325 mg tablet, Take 1 tablet by mouth daily, Disp: , Rfl:     ATROVENT HFA 17 MCG/ACT inhaler, inhale 1 puff by mouth four times a day, Disp: 12 9 g, Rfl: 3    carvedilol (COREG) 12 5 mg tablet, take 1 tablet by mouth twice a day with meals, Disp: 60 tablet, Rfl: 5    fluticasone-vilanterol (BREO ELLIPTA) 200-25 MCG/INH inhaler, Inhale 1 puff daily Rinse mouth after use , Disp: 3 Inhaler, Rfl: 3    furosemide (LASIX) 20 mg tablet, Take 1 tablet (20 mg total) by mouth daily as needed (weight gain or edema), Disp: 90 tablet, Rfl: 0    ipratropium-albuterol (DUO-NEB) 0 5-2 5 mg/3 mL nebulizer solution, Take 1 vial (3 mL total) by nebulization every 6 (six) hours as needed for wheezing or shortness of breath, Disp: 90 vial, Rfl: 3    lisinopril (ZESTRIL) 20 mg tablet, take 1 tablet by mouth twice a day, Disp: 60 tablet, Rfl: 11    TUDORZA PRESSAIR 400 MCG/ACT inhaler, Inhale 1 puff (400 mcg total) 2 (two) times a day, Disp: 3 Inhaler, Rfl: 0    venlafaxine (EFFEXOR-XR) 75 mg 24 hr capsule, take 1 capsule by mouth once daily, Disp: 30 capsule, Rfl: 2  Allergies   Allergen Reactions    Ciprofloxacin Shortness Of Breath and Diarrhea       Vitals: Blood pressure 124/80, pulse 58, height 6' 2" (1 88 m), weight 107 kg (235 lb), SpO2 98 %  Body mass index is 30 17 kg/m²  Oxygen Therapy  SpO2: 98 %    Physical Exam  Physical Exam   Constitutional: He is oriented to person, place, and time  He appears well-developed and well-nourished  No distress  HENT:   Head: Normocephalic and atraumatic  Right Ear: External ear normal    Left Ear: External ear normal    Mouth/Throat: Oropharynx is clear and moist  No oropharyngeal exudate  Eyes: Pupils are equal, round, and reactive to light  EOM are normal  Right eye exhibits no discharge  Left eye exhibits no discharge  Neck: Normal range of motion  Neck supple  No JVD present  No tracheal deviation present  Cardiovascular: Normal rate, regular rhythm, normal heart sounds and intact distal pulses  No murmur heard  Pulmonary/Chest: Effort normal and breath sounds normal  No respiratory distress  He has no wheezes  He has no rales  He exhibits no tenderness  Abdominal: Soft  Bowel sounds are normal  He exhibits no distension  Musculoskeletal: Normal range of motion  He exhibits no edema or deformity  Neurological: He is alert and oriented to person, place, and time  No cranial nerve deficit  Skin: Skin is warm and dry  He is not diaphoretic  Psychiatric: He has a normal mood and affect  His behavior is normal  Judgment and thought content normal    Vitals reviewed  Labs: I have personally reviewed pertinent lab results    Lab Results   Component Value Date    WBC 12 40 (H) 12/15/2018    HGB 15 3 12/15/2018    HCT 47 0 12/15/2018    MCV 85 12/15/2018     12/15/2018     Lab Results   Component Value Date    GLUCOSE 87 07/27/2015    CALCIUM 8 6 12/18/2018     07/27/2015    K 5 0 12/18/2018    CO2 27 12/18/2018     12/18/2018    BUN 29 (H) 12/18/2018 CREATININE 1 44 (H) 12/18/2018     Lab Results   Component Value Date    IGE 65 2 10/23/2017     Lab Results   Component Value Date    ALT 20 12/18/2018    AST 17 12/18/2018    ALKPHOS 67 12/18/2018    BILITOT 1 05 (H) 07/22/2015       Imaging and other studies: None to review for this visit    Pulmonary function testing:  Performed 12/26/2018  FEV1/FVC ratio 54 %   FEV1 73 % predicted  FVC 93 % predicted  There is significant response to bronchodilators   % predicted   % predicted  DLCO corrected for hemoglobin 110 % predicted  Normal lung volumes with moderate airflow limited patient the bronchus  PFTs consistent with bronchial asthma

## 2019-08-21 NOTE — PATIENT INSTRUCTIONS
Get CT check for lung screening  Keep doing the same inhalers as prescribed  Consider CPAP for CORDELL   Return in 6 months or sooner if needed

## 2019-09-04 ENCOUNTER — HOSPITAL ENCOUNTER (OUTPATIENT)
Dept: CT IMAGING | Facility: HOSPITAL | Age: 64
Discharge: HOME | End: 2019-09-04
Attending: INTERNAL MEDICINE
Payer: COMMERCIAL

## 2019-09-04 DIAGNOSIS — Z87.891 HISTORY OF TOBACCO ABUSE: ICD-10-CM

## 2019-09-04 DIAGNOSIS — J44.9 COPD, SEVERE (HCC): ICD-10-CM

## 2019-09-04 PROCEDURE — G0297 LDCT FOR LUNG CA SCREEN: HCPCS

## 2019-09-27 DIAGNOSIS — I10 ESSENTIAL HYPERTENSION: ICD-10-CM

## 2019-09-27 RX ORDER — LISINOPRIL 20 MG/1
TABLET ORAL
Qty: 60 TABLET | Refills: 11 | Status: SHIPPED | OUTPATIENT
Start: 2019-09-27 | End: 2020-10-04

## 2019-10-22 ENCOUNTER — OFFICE VISIT (OUTPATIENT)
Dept: INTERNAL MEDICINE CLINIC | Facility: CLINIC | Age: 64
End: 2019-10-22
Payer: COMMERCIAL

## 2019-10-22 VITALS
SYSTOLIC BLOOD PRESSURE: 124 MMHG | HEIGHT: 74 IN | WEIGHT: 239.38 LBS | HEART RATE: 64 BPM | BODY MASS INDEX: 30.72 KG/M2 | DIASTOLIC BLOOD PRESSURE: 70 MMHG | OXYGEN SATURATION: 95 % | TEMPERATURE: 98.4 F

## 2019-10-22 DIAGNOSIS — J45.41 MODERATE PERSISTENT ASTHMATIC BRONCHITIS WITH ACUTE EXACERBATION: Primary | ICD-10-CM

## 2019-10-22 DIAGNOSIS — F41.9 ANXIETY: ICD-10-CM

## 2019-10-22 PROCEDURE — 99213 OFFICE O/P EST LOW 20 MIN: CPT | Performed by: INTERNAL MEDICINE

## 2019-10-22 PROCEDURE — 3008F BODY MASS INDEX DOCD: CPT | Performed by: INTERNAL MEDICINE

## 2019-10-22 PROCEDURE — 96372 THER/PROPH/DIAG INJ SC/IM: CPT | Performed by: INTERNAL MEDICINE

## 2019-10-22 RX ORDER — AZITHROMYCIN 250 MG/1
TABLET, FILM COATED ORAL
Qty: 6 TABLET | Refills: 0 | Status: SHIPPED | OUTPATIENT
Start: 2019-10-22 | End: 2019-10-27

## 2019-10-22 RX ORDER — BENZONATATE 100 MG/1
100 CAPSULE ORAL 3 TIMES DAILY PRN
Qty: 20 CAPSULE | Refills: 0 | Status: SHIPPED | OUTPATIENT
Start: 2019-10-22 | End: 2019-10-29

## 2019-10-22 RX ORDER — DEXTROMETHORPHAN HYDROBROMIDE AND PROMETHAZINE HYDROCHLORIDE 15; 6.25 MG/5ML; MG/5ML
5 SOLUTION ORAL 4 TIMES DAILY PRN
Qty: 240 ML | Refills: 0 | Status: SHIPPED | OUTPATIENT
Start: 2019-10-22 | End: 2020-02-24 | Stop reason: SDUPTHER

## 2019-10-22 RX ORDER — ALPRAZOLAM 0.5 MG/1
0.5 TABLET ORAL
Qty: 30 TABLET | Refills: 0 | Status: SHIPPED | OUTPATIENT
Start: 2019-10-22 | End: 2020-02-24 | Stop reason: SDUPTHER

## 2019-10-22 RX ORDER — METHYLPREDNISOLONE ACETATE 40 MG/ML
40 INJECTION, SUSPENSION INTRA-ARTICULAR; INTRALESIONAL; INTRAMUSCULAR; SOFT TISSUE ONCE
Status: COMPLETED | OUTPATIENT
Start: 2019-10-22 | End: 2019-10-22

## 2019-10-22 RX ORDER — PREDNISONE 20 MG/1
20 TABLET ORAL DAILY
Qty: 20 TABLET | Refills: 0 | Status: SHIPPED | OUTPATIENT
Start: 2019-10-22 | End: 2020-02-24 | Stop reason: ALTCHOICE

## 2019-10-22 RX ADMIN — METHYLPREDNISOLONE ACETATE 40 MG: 40 INJECTION, SUSPENSION INTRA-ARTICULAR; INTRALESIONAL; INTRAMUSCULAR; SOFT TISSUE at 10:17

## 2019-10-22 NOTE — PATIENT INSTRUCTIONS
Low Fat Diet   AMBULATORY CARE:   A low-fat diet  is an eating plan that is low in total fat, unhealthy fat, and cholesterol  You may need to follow a low-fat diet if you have trouble digesting or absorbing fat  You may also need to follow this diet if you have high cholesterol  You can also lower your cholesterol by increasing the amount of fiber in your diet  Soluble fiber is a type of fiber that helps to decrease cholesterol levels  Different types of fat in food:   · Limit unhealthy fats  A diet that is high in cholesterol, saturated fat, and trans fat may cause unhealthy cholesterol levels  Unhealthy cholesterol levels increase your risk of heart disease  ¨ Cholesterol:  Limit intake of cholesterol to less than 200 mg per day  Cholesterol is found in meat, eggs, and dairy  ¨ Saturated fat:  Limit saturated fat to less than 7% of your total daily calories  Ask your dietitian how many calories you need each day  Saturated fat is found in butter, cheese, ice cream, whole milk, and palm oil  Saturated fat is also found in meat, such as beef, pork, chicken skin, and processed meats  Processed meats include sausage, hot dogs, and bologna  ¨ Trans fat:  Avoid trans fat as much as possible  Trans fat is used in fried and baked foods  Foods that say trans fat free on the label may still have up to 0 5 grams of trans fat per serving  · Include healthy fats  Replace foods that are high in saturated and trans fat with foods high in healthy fats  This may help to decrease high cholesterol levels  ¨ Monounsaturated fats: These are found in avocados, nuts, and vegetable oils, such as olive, canola, and sunflower oil  ¨ Polyunsaturated fats: These can be found in vegetable oils, such as soybean or corn oil  Omega-3 fats can help to decrease the risk of heart disease  Omega-3 fats are found in fish, such as salmon, herring, trout, and tuna   Omega-3 fats can also be found in plant foods, such as walnuts, flaxseed, soybeans, and canola oil    Foods to limit or avoid:   · Grains:      ¨ Snacks that are made with partially hydrogenated oils, such as chips, regular crackers, and butter-flavored popcorn    ¨ High-fat baked goods, such as biscuits, croissants, doughnuts, pies, cookies, and pastries    · Dairy:      ¨ Whole milk, 2% milk, and yogurt and ice cream made with whole milk    ¨ Half and half creamer, heavy cream, and whipping cream    ¨ Cheese, cream cheese, and sour cream    · Meats and proteins:      ¨ High-fat cuts of meat (T-bone steak, regular hamburger, and ribs)    ¨ Fried meat, poultry (turkey and chicken), and fish    ¨ Poultry (chicken and turkey) with skin    ¨ Cold cuts (salami or bologna), hot dogs, richards, and sausage    ¨ Whole eggs and egg yolks    · Vegetables and fruits with added fat:      ¨ Fried vegetables or vegetables in butter or high-fat sauces, such as cream or cheese sauces    ¨ Fried fruit or fruit served with butter or cream    · Fats:      ¨ Butter, stick margarine, and shortening    ¨ Coconut, palm oil, and palm kernel oil  Foods to include:   · Grains:      ¨ Whole-grain breads, cereals, pasta, and brown rice    ¨ Low-fat crackers and pretzels    · Vegetables and fruits:      ¨ Fresh, frozen, or canned vegetables (no salt or low-sodium)    ¨ Fresh, frozen, dried, or canned fruit (canned in light syrup or fruit juice)    ¨ Avocado    · Low-fat dairy products:      ¨ Nonfat (skim) or 1% milk    ¨ Nonfat or low-fat cheese, yogurt, and cottage cheese    · Meats and proteins:      ¨ Chicken or turkey with no skin    ¨ Baked or broiled fish    ¨ Lean beef and pork (loin, round, extra lean hamburger)    ¨ Beans and peas, unsalted nuts, soy products    ¨ Egg whites and substitutes    ¨ Seeds and nuts    · Fats:      ¨ Unsaturated oil, such as canola, olive, peanut, soybean, or sunflower oil    ¨ Soft or liquid margarine and vegetable oil spread    ¨ Low-fat salad dressing  Other ways to decrease fat:   · Read food labels before you buy foods  Choose foods that have less than 30% of calories from fat  Choose low-fat or fat-free dairy products  Remember that fat free does not mean calorie free  These foods still contain calories, and too many calories can lead to weight gain  · Trim fat from meat and avoid fried food  Trim all visible fat from meat before you cook it  Remove the skin from poultry  Do not martini meat, fish, or poultry  Bake, roast, boil, or broil these foods instead  Avoid fried foods  Eat a baked potato instead of Western Sheri fries  Steam vegetables instead of sautéing them in butter  · Add less fat to foods  Use imitation richards bits on salads and baked potatoes instead of regular richards bits  Use fat-free or low-fat salad dressings instead of regular dressings  Use low-fat or nonfat butter-flavored topping instead of regular butter or margarine on popcorn and other foods  Ways to decrease fat in recipes:  Replace high-fat ingredients with low-fat or nonfat ones  This may cause baked goods to be drier than usual  You may need to use nonfat cooking spray on pans to prevent food from sticking  You also may need to change the amount of other ingredients, such as water, in the recipe  Try the following:  · Use low-fat or light margarine instead of regular margarine or shortening  · Use lean ground turkey breast or chicken, or lean ground beef (less than 5% fat) instead of hamburger  · Add 1 teaspoon of canola oil to 8 ounces of skim milk instead of using cream or half and half  · Use grated zucchini, carrots, or apples in breads instead of coconut  · Use blenderized, low-fat cottage cheese, plain tofu, or low-fat ricotta cheese instead of cream cheese  · Use 1 egg white and 1 teaspoon of canola oil, or use ¼ cup (2 ounces) of fat-free egg substitute instead of a whole egg       · Replace half of the oil that is called for in a recipe with applesauce when you bake  Use 3 tablespoons of cocoa powder and 1 tablespoon of canola oil instead of a square of baking chocolate  How to increase fiber:  Eat enough high-fiber foods to get 20 to 30 grams of fiber every day  Slowly increase your fiber intake to avoid stomach cramps, gas, and other problems  · Eat 3 ounces of whole-grain foods each day  An ounce is about 1 slice of bread  Eat whole-grain breads, such as whole-wheat bread  Whole wheat, whole-wheat flour, or other whole grains should be listed as the first ingredient on the food label  Replace white flour with whole-grain flour or use half of each in recipes  Whole-grain flour is heavier than white flour, so you may have to add more yeast or baking powder  · Eat a high-fiber cereal for breakfast   Oatmeal is a good source of soluble fiber  Look for cereals that have bran or fiber in the name  Choose whole-grain products, such as brown rice, barley, and whole-wheat pasta  · Eat more beans, peas, and lentils  For example, add beans to soups or salads  Eat at least 5 cups of fruits and vegetables each day  Eat fruits and vegetables with the peel because the peel is high in fiber  © 2017 [address] [employer] Information is for End User's use only and may not be sold, redistributed or otherwise used for commercial purposes  All illustrations and images included in CareNotes® are the copyrighted property of A D A M [employer] Inc  or [employer]org[employer] Hicks  The above information is an  only  It is not intended as medical advice for individual conditions or treatments  Talk to your doctor, nurse or pharmacist before following any medical regimen to see if it is safe and effective for you

## 2019-10-22 NOTE — PROGRESS NOTES
Assessment/Plan:      Diagnoses and all orders for this visit:    Moderate persistent asthmatic bronchitis with acute exacerbation  -     methylPREDNISolone acetate (DEPO-MEDROL) injection 40 mg  -     azithromycin (ZITHROMAX) 250 mg tablet; Take 2 tablets (500 mg total) by mouth daily for 1 day, THEN 1 tablet (250 mg total) daily for 4 days  -     predniSONE 20 mg tablet; Take 1 tablet (20 mg total) by mouth daily  -     benzonatate (TESSALON PERLES) 100 mg capsule; Take 1 capsule (100 mg total) by mouth 3 (three) times a day as needed for cough for up to 7 days  -     Promethazine-DM (PHENERGAN-DM) 6 25-15 mg/5 mL oral syrup; Take 5 mL by mouth 4 (four) times a day as needed for cough  Increase water intake , rest Prednisone taper over next 10 days  Depo today - start prednisone taper tomorrow  Anxiety  -     ALPRAZolam (XANAX) 0 5 mg tablet; Take 1 tablet (0 5 mg total) by mouth daily at bedtime as needed for anxiety             Patient ID: Jose Moeller is a 60 y o  male  HPI   Cough,wheeze and congestion since Saturday with progression of sxs Feels feverish Decrease appetite increase sob He did see Pulmonary in August and meds adjusted Sxs are worsening over the past day or so  No n/v/d He has been using his nebulizer regularly without signficant benefit and now has yellow mucous production    Review of Systems   Constitutional: Positive for activity change and fatigue  Negative for chills and fever  HENT: Positive for congestion, postnasal drip and rhinorrhea  Respiratory: Positive for shortness of breath and wheezing  Cardiovascular: Negative  Gastrointestinal: Negative  Musculoskeletal: Negative  Skin: Negative  Neurological: Positive for headaches  Hematological: Negative  Psychiatric/Behavioral: Positive for sleep disturbance         Past Medical History:   Diagnosis Date    Asthma     CHF (congestive heart failure) (HCC)     [facility]     Mumps     Old MI (myocardial infarction)     Pneumonia     Pulmonary emphysema [facility]     Sleep apnea      Past Surgical History:   Procedure Laterality Date    CARDIAC CATHETERIZATION  07/24/2015    Left main- normal and maidly tortuous  Circumflex - normal and moderately tortuous  RCA- normal and mildy tortuous  Global LV function was severely depressed  Consuelo Meo INGUINAL HERNIA REPAIR Bilateral     [place] COLONOSCOPY FLX DX W/COLLJ SPEC WHEN PFRMD N/A 3/11/2019    Procedure: COLONOSCOPY with removal of anal papilla;   Surgeon: Modesto Mcgee MD;  Location: [facility];  Service: Colorectal    TONSILLECTOMY      UMBILICAL HERNIA REPAIR  06/21/2006     Social History     Socioeconomic History    Marital status: /Civil Union     Spouse name: Not on file    Number of children: Not on file    Years of education: Not on file    Highest education level: Not on file   Occupational History    Not on file   Social Needs    Financial resource strain: Not on file    Food insecurity:     Worry: Not on file     Inability: Not on file    Transportation needs:     Medical: Not on file     Non-medical: Not on file   Tobacco Use    Smoking status: Former Smoker     Packs/day: 3 00     Years: 30 00     Pack years: 90 00    Smokeless tobacco: Never Used   Substance and Sexual Activity    Alcohol use: Yes     Comment: rarely    Drug use: No    Sexual activity: Not on file   Lifestyle    Physical activity:     Days per week: Not on file     Minutes per session: Not on file    Stress: Not on file   Relationships    Social connections:     Talks on phone: Not on file     Gets together: Not on file     Attends Restorationist service: Not on file     Active member of club or organization: Not on file     Attends meetings of clubs or organizations: Not on file     Relationship status: Not on file    Intimate partner violence:     Fear of current or ex partner: Not on file     Emotionally abused: Not on file Physically abused: Not on file     Forced sexual activity: Not on file   Other Topics Concern    Not on file   Social History Narrative    Caffeine use     Allergies   Allergen Reactions    Ciprofloxacin Shortness Of Breath and Diarrhea       Vitals:    10/22/19 0958 10/22/19 1011   BP:  124/70   Pulse: 64    Temp: 98 4 °F (36 9 °C)    TempSrc: Tympanic    SpO2: 95%    Weight: 109 kg (239 lb 6 oz)    Height: 6' 2" (1 88 m)           Physical Exam   Constitutional: He is oriented to person, place, and time  He appears well-developed and well-nourished  Harsh cough   HENT:   Head: Normocephalic and atraumatic  Mouth/Throat: Oropharyngeal exudate present  Eyes: Pupils are equal, round, and reactive to light  Conjunctivae and EOM are normal  No scleral icterus  Neck: Normal range of motion  Neck supple  No JVD present  Cardiovascular: Normal rate and regular rhythm  No murmur heard  Pulmonary/Chest: Effort normal  He has wheezes  He exhibits tenderness  Abdominal: Soft  Bowel sounds are normal    Musculoskeletal: Normal range of motion  Lymphadenopathy:     He has no cervical adenopathy  Neurological: He is alert and oriented to person, place, and time  He displays normal reflexes  No cranial nerve deficit  He exhibits abnormal muscle tone  Coordination normal    Skin: Skin is warm and dry  Capillary refill takes less than 2 seconds  No rash noted  He is not diaphoretic  Psychiatric: He has a normal mood and affect  His behavior is normal  Judgment and thought content normal    Nursing note and vitals reviewed  BMI Counseling: Body mass index is 30 73 kg/m²  The BMI is above normal  Nutrition recommendations include decreasing overall calorie intake

## 2019-12-03 DIAGNOSIS — J44.9 COPD, MODERATE (HCC): ICD-10-CM

## 2019-12-03 RX ORDER — ACLIDINIUM BROMIDE 400 UG/1
POWDER, METERED RESPIRATORY (INHALATION)
Qty: 3 INHALER | Refills: 2 | Status: SHIPPED | OUTPATIENT
Start: 2019-12-03 | End: 2020-10-12

## 2020-02-04 DIAGNOSIS — J44.9 CHRONIC OBSTRUCTIVE PULMONARY DISEASE, UNSPECIFIED COPD TYPE (HCC): ICD-10-CM

## 2020-02-04 DIAGNOSIS — I42.8 OTHER CARDIOMYOPATHY (HCC): ICD-10-CM

## 2020-02-04 RX ORDER — IPRATROPIUM BROMIDE 17 UG/1
AEROSOL, METERED RESPIRATORY (INHALATION)
Qty: 12.9 G | Refills: 0 | Status: SHIPPED | OUTPATIENT
Start: 2020-02-04 | End: 2020-03-24

## 2020-02-04 RX ORDER — CARVEDILOL 12.5 MG/1
TABLET ORAL
Qty: 60 TABLET | Refills: 0 | Status: SHIPPED | OUTPATIENT
Start: 2020-02-04 | End: 2020-03-04

## 2020-02-24 ENCOUNTER — OFFICE VISIT (OUTPATIENT)
Dept: INTERNAL MEDICINE CLINIC | Facility: CLINIC | Age: 65
End: 2020-02-24
Payer: COMMERCIAL

## 2020-02-24 VITALS
OXYGEN SATURATION: 96 % | SYSTOLIC BLOOD PRESSURE: 124 MMHG | TEMPERATURE: 99.3 F | DIASTOLIC BLOOD PRESSURE: 70 MMHG | WEIGHT: 246.13 LBS | HEART RATE: 95 BPM | HEIGHT: 74 IN | BODY MASS INDEX: 31.59 KG/M2

## 2020-02-24 DIAGNOSIS — J40 BRONCHITIS: Primary | ICD-10-CM

## 2020-02-24 DIAGNOSIS — F32.A DEPRESSION, UNSPECIFIED DEPRESSION TYPE: ICD-10-CM

## 2020-02-24 DIAGNOSIS — J45.41 MODERATE PERSISTENT ASTHMATIC BRONCHITIS WITH ACUTE EXACERBATION: ICD-10-CM

## 2020-02-24 DIAGNOSIS — F41.9 ANXIETY: ICD-10-CM

## 2020-02-24 PROBLEM — R05.9 COUGH: Status: RESOLVED | Noted: 2018-12-19 | Resolved: 2020-02-24

## 2020-02-24 PROCEDURE — 99214 OFFICE O/P EST MOD 30 MIN: CPT | Performed by: INTERNAL MEDICINE

## 2020-02-24 PROCEDURE — 3074F SYST BP LT 130 MM HG: CPT | Performed by: INTERNAL MEDICINE

## 2020-02-24 PROCEDURE — 3008F BODY MASS INDEX DOCD: CPT | Performed by: INTERNAL MEDICINE

## 2020-02-24 PROCEDURE — 1036F TOBACCO NON-USER: CPT | Performed by: INTERNAL MEDICINE

## 2020-02-24 PROCEDURE — 3078F DIAST BP <80 MM HG: CPT | Performed by: INTERNAL MEDICINE

## 2020-02-24 RX ORDER — DULOXETIN HYDROCHLORIDE 30 MG/1
30 CAPSULE, DELAYED RELEASE ORAL DAILY
Qty: 30 CAPSULE | Refills: 5 | Status: SHIPPED | OUTPATIENT
Start: 2020-02-24 | End: 2020-08-18 | Stop reason: SDUPTHER

## 2020-02-24 RX ORDER — DEXTROMETHORPHAN HYDROBROMIDE AND PROMETHAZINE HYDROCHLORIDE 15; 6.25 MG/5ML; MG/5ML
5 SOLUTION ORAL 4 TIMES DAILY PRN
Qty: 240 ML | Refills: 0 | Status: SHIPPED | OUTPATIENT
Start: 2020-02-24 | End: 2020-09-01 | Stop reason: ALTCHOICE

## 2020-02-24 RX ORDER — AZITHROMYCIN 250 MG/1
TABLET, FILM COATED ORAL
Qty: 6 TABLET | Refills: 0 | Status: SHIPPED | OUTPATIENT
Start: 2020-02-24 | End: 2020-02-29

## 2020-02-24 RX ORDER — ALPRAZOLAM 0.5 MG/1
0.5 TABLET ORAL
Qty: 30 TABLET | Refills: 0 | Status: SHIPPED | OUTPATIENT
Start: 2020-02-24 | End: 2020-03-26 | Stop reason: SDUPTHER

## 2020-02-24 NOTE — PROGRESS NOTES
Assessment/Plan:      Diagnoses and all orders for this visit:    Bronchitis  -     azithromycin (Zithromax) 250 mg tablet; Take 2 tablets (500 mg total) by mouth daily for 1 day, THEN 1 tablet (250 mg total) daily for 4 days  Increase fluids, rest Use nebulizer up to four times/day    Moderate persistent asthmatic bronchitis with acute exacerbation  -     Promethazine-DM (PHENERGAN-DM) 6 25-15 mg/5 mL oral syrup; Take 5 mL by mouth 4 (four) times a day as needed for cough    Anxiety  -     ALPRAZolam (XANAX) 0 5 mg tablet; Take 1 tablet (0 5 mg total) by mouth daily at bedtime as needed for anxiety  -     Ambulatory referral to Psychiatry; Future  -     DULoxetine (CYMBALTA) 30 mg delayed release capsule; Take 1 capsule (30 mg total) by mouth daily  He is agreeable to look into options for therapist and start Cymbalta    Depression, unspecified depression type  -     Ambulatory referral to Psychiatry; Future  -     DULoxetine (CYMBALTA) 30 mg delayed release capsule; Take 1 capsule (30 mg total) by mouth daily    Rto 2 months     Patient ID: Charlie Johnson is a 60 y o  male  HPI   Started with usual bronchitis sxs Friday pm and they blossomed by Saturday AM Cough, congestion He used last bit of cough syrup   Feels more tired, run down Not sleeping due to congestion Platter chills over the weekend More stress with family business and has had increasing anxiety Using xanax more than he had in past He did see therapist awhile back and is not opposed to try again as the sxs can be overwhelming     Review of Systems   Constitutional: Positive for activity change and fatigue  HENT: Positive for congestion and postnasal drip  Respiratory: Positive for cough  Cardiovascular: Negative  Gastrointestinal: Negative  Genitourinary: Negative  Musculoskeletal: Positive for arthralgias  Skin: Negative  Neurological: Positive for headaches  Negative for dizziness  Hematological: Negative  Psychiatric/Behavioral: The patient is nervous/anxious  Past Medical History:   Diagnosis Date    Asthma     CHF (congestive heart failure) (HCC)     COPD (chronic obstructive pulmonary disease) (HCC)     Mumps     Old MI (myocardial infarction)     Pneumonia     Pulmonary emphysema ([address] )     Sleep apnea      Past Surgical History:   Procedure Laterality Date    CARDIAC CATHETERIZATION  07/24/2015    Left main- normal and maidly tortuous  Circumflex - normal and moderately tortuous  RCA- normal and mildy tortuous  Global LV function was severely depressed  Lynlatoya Paget INGUINAL HERNIA REPAIR Bilateral     NM COLONOSCOPY FLX DX W/COLLJ SPEC WHEN PFRMD N/A 3/11/2019    Procedure: COLONOSCOPY with removal of anal papilla;   Surgeon: Yisel Mark MD;  [facility]: [facility];  Service: Colorectal    TONSILLECTOMY      UMBILICAL HERNIA REPAIR  06/21/2006     Social History     Socioeconomic History    Marital status: /Civil Union     Spouse name: Not on file    Number of children: Not on file    Years of education: Not on file    Highest education level: Not on file   Occupational History    Not on file   Social Needs    Financial resource strain: Not on file    Food insecurity:     Worry: Not on file     Inability: Not on file    Transportation needs:     Medical: Not on file     Non-medical: Not on file   Tobacco Use    Smoking status: Former Smoker     Packs/day: 3 00     Years: 30 00     Pack years: 90 00    Smokeless tobacco: Never Used   Substance and Sexual Activity    Alcohol use: Yes     Comment: rarely    Drug use: No    Sexual activity: Not on file   Lifestyle    Physical activity:     Days per week: Not on file     Minutes per session: Not on file    Stress: Not on file   Relationships    Social connections:     Talks on phone: Not on file     Gets together: Not on file     Attends [employer] service: Not on file     Active member of club or organization: Not on file Attends meetings of clubs or organizations: Not on file     Relationship status: Not on file    Intimate partner violence:     Fear of current or ex partner: Not on file     Emotionally abused: Not on file     Physically abused: Not on file     Forced sexual activity: Not on file   Other Topics Concern    Not on file   Social History Narrative    Caffeine use     Allergies   Allergen Reactions    Ciprofloxacin Shortness Of Breath and Diarrhea     Vitals:    02/24/20 1420 02/24/20 1435   BP:  124/70   Pulse: 95    Temp: 99 3 °F (37 4 °C)    TempSrc: Tympanic    SpO2: 96%    Weight: 112 kg (246 lb 2 oz)    Height: 6' 2" (1 88 m)      BMI Counseling: Body mass index is 31 6 kg/m²  The BMI is above normal  Nutrition recommendations include moderation in carbohydrate intake  Exercise recommendations include strength training exercises  Physical Exam   Constitutional: He is oriented to person, place, and time  He appears well-developed and well-nourished  No distress  HENT:   Head: Normocephalic and atraumatic  Mouth/Throat: Oropharyngeal exudate present  Eyes: Pupils are equal, round, and reactive to light  Conjunctivae and EOM are normal  No scleral icterus  Neck: Normal range of motion  Neck supple  No JVD present  Cardiovascular: Normal rate and regular rhythm  Murmur heard  Pulmonary/Chest: Effort normal  No respiratory distress  He has wheezes  Abdominal: Soft  Bowel sounds are normal  He exhibits no distension  There is no tenderness  Musculoskeletal: Normal range of motion  He exhibits no edema  Lymphadenopathy:     He has no cervical adenopathy  Neurological: He is alert and oriented to person, place, and time  He displays normal reflexes  No cranial nerve deficit  He exhibits normal muscle tone  Coordination normal    Skin: Skin is warm and dry  Capillary refill takes less than 2 seconds  He is not diaphoretic  Psychiatric: He has a normal mood and affect   His behavior is normal  Judgment and thought content normal    Nursing note and vitals reviewed

## 2020-03-04 DIAGNOSIS — I42.8 OTHER CARDIOMYOPATHY (HCC): ICD-10-CM

## 2020-03-04 RX ORDER — CARVEDILOL 12.5 MG/1
TABLET ORAL
Qty: 60 TABLET | Refills: 11 | Status: SHIPPED | OUTPATIENT
Start: 2020-03-04 | End: 2021-03-27 | Stop reason: SDUPTHER

## 2020-03-11 ENCOUNTER — OFFICE VISIT (OUTPATIENT)
Dept: PULMONOLOGY | Facility: CLINIC | Age: 65
End: 2020-03-11
Payer: COMMERCIAL

## 2020-03-11 VITALS
OXYGEN SATURATION: 97 % | WEIGHT: 250 LBS | SYSTOLIC BLOOD PRESSURE: 130 MMHG | HEIGHT: 74 IN | HEART RATE: 64 BPM | DIASTOLIC BLOOD PRESSURE: 72 MMHG | BODY MASS INDEX: 32.08 KG/M2

## 2020-03-11 DIAGNOSIS — F17.200 NICOTINE DEPENDENCE: ICD-10-CM

## 2020-03-11 DIAGNOSIS — J44.9 ASTHMA-COPD OVERLAP SYNDROME (HCC): Primary | ICD-10-CM

## 2020-03-11 DIAGNOSIS — G47.33 OBSTRUCTIVE SLEEP APNEA: ICD-10-CM

## 2020-03-11 DIAGNOSIS — R05.9 COUGH: ICD-10-CM

## 2020-03-11 PROCEDURE — 3075F SYST BP GE 130 - 139MM HG: CPT | Performed by: PHYSICIAN ASSISTANT

## 2020-03-11 PROCEDURE — 3008F BODY MASS INDEX DOCD: CPT | Performed by: PHYSICIAN ASSISTANT

## 2020-03-11 PROCEDURE — 1036F TOBACCO NON-USER: CPT | Performed by: PHYSICIAN ASSISTANT

## 2020-03-11 PROCEDURE — 99214 OFFICE O/P EST MOD 30 MIN: CPT | Performed by: PHYSICIAN ASSISTANT

## 2020-03-11 PROCEDURE — 3078F DIAST BP <80 MM HG: CPT | Performed by: PHYSICIAN ASSISTANT

## 2020-03-11 RX ORDER — FLUTICASONE PROPIONATE 50 MCG
1 SPRAY, SUSPENSION (ML) NASAL DAILY
Qty: 1 BOTTLE | Refills: 5 | Status: SHIPPED | OUTPATIENT
Start: 2020-03-11 | End: 2021-02-04 | Stop reason: SDUPTHER

## 2020-03-11 RX ORDER — PREDNISONE 10 MG/1
40 TABLET ORAL DAILY
Qty: 20 TABLET | Refills: 0 | Status: SHIPPED | OUTPATIENT
Start: 2020-03-11 | End: 2020-04-28 | Stop reason: ALTCHOICE

## 2020-03-11 NOTE — PROGRESS NOTES
Pulmonary Follow Up Note   Zackary Veras 60 y o  male MRN: [id number]  3/11/2020      Assessment:    Asthma-COPD overlap syndrome [facility])  Patient is currently in an exacerbation of his COPD/Asthma overlap syndrome  Unclear whether this is related to the bronchitis he had 3 weeks ago  Regardless, will treat with prednisone taper now with instructions to start at 40 mg daily and decrease by 10 mg every 3 days until completion  Will hold off on additional antibiotics given complete course was taken 3 weeks ago  In addition to that, patient will continue pulmonary regimen with Tudorza twice daily, Breo daily, and as needed duo nebs/Atrovent HFA  Cough  In addition to the above mentioned patient may continue promethazine DM 5 mL p o  4 times daily p r n   I have also added Flonase 1 spray each nostril for suspected postnasal drip worse at night and in AM  Patient reports being on this in the past with improvement  He may continue OTC antihistamine as needed for allergic component as well  Nicotine dependence  Reviewed results of LDCT lung cancer screening from September of 2019  No suspicious nodules but we will repeat imaging in September of 2020 based on patient's smoking history  Continue smoking cessation was encouraged  Obstructive sleep apnea  Patient once again declined CPAP therapy  He understands the associated health risks daughter linked to untreated CORDELL  Plan:    Diagnoses and all orders for this visit:    Asthma-COPD overlap syndrome (HCC)  -     predniSONE 10 mg tablet; Take 4 tablets (40 mg total) by mouth daily    Nicotine dependence  -     Cancel: CT lung screening program; Future  -     CT lung screening program; Future    Cough  -     fluticasone (FLONASE) 50 mcg/act nasal spray; 1 spray into each nostril daily    Obstructive sleep apnea        Return in about 6 months (around 9/11/2020)      History of Present Illness   HPI:  Zackary Veras is a 60 y o  male who presents to the office today for 6 month follow up for his asthma-COPD overlap syndrome, CORDELL, CHF, and history of tobacco abuse  Patient was last seen out office in August  Since then he has had one exacerbation about 3 weeks ago and treated by systemic steroids and Zithromax according to patient however per chart review appears it was just a Z-bobby  Patient now stating that over the last few months he has had increased cough productive of thick white phlegm  He notices that the cough is worse first thing in the morning and can sometimes wake him up  Other pertinent symptoms include HADDAD and exertional wheeze, post nasal drip  He does report allergic symptoms that he will use OTC antihistamine as needed  He has not used any in recent months stating that his symptoms are worse in the spring and summer  States he has allergies to cats, dogs, pollen, and dust  He has dogs at home and has no intention of getting rid of them stating that he has them his whole life  Denies recent fevers or chills  No palpitations, chest pain, leg swelling  Denies N/V/D, abdominal pain  Patient has been compliant on his pulmonary regimen of Tudorza, breo, and as needed DuoNeb, and Atrovent HFA  Patient states that he uses DuoNeb daily, maybe sometimes twice daily  The same goes for Atrovent  Patient states that he is very stressed out and this has caused him to lapse in his cessation in the past  He is vague about the last time he smoked a cigarette but it appears that it has not been in the last year at least      Review of Systems   All other systems reviewed and are negative        Historical Information   Past Medical History:   Diagnosis Date    Asthma     [facility]     [facility] disease[facility])     Mumps     Old MI (myocardial infarction)     Pneumonia     Pulmonary emphysema ([facility] [address]     Sleep apnea      Past Surgical History:   Procedure Laterality Date    CARDIAC CATHETERIZATION  07/24/2015 Left main- normal and maidly tortuous  Circumflex - normal and moderately tortuous  RCA- normal and mildy tortuous  Global LV function was severely depressed  Jerry David INGUINAL HERNIA REPAIR Bilateral     [place] COLONOSCOPY FLX DX W/COLLJ SPEC WHEN PFRMD N/A 3/11/2019    Procedure: COLONOSCOPY with removal of anal papilla;   Surgeon: Jean-Paul Natarajan MD;  Location: [facility];  Service: Colorectal    TONSILLECTOMY      UMBILICAL HERNIA REPAIR  06/21/2006     Family History   Problem Relation Age of Onset    Heart attack Father         MI    Diabetes Sister         DM    Arthritis Family     Coronary artery disease Family     Hypertension Family     Tuberculosis Son        Social History     Tobacco Use   Smoking Status Former Smoker    Packs/day: 3 00    Years: 30 00    Pack years: 90 00   Smokeless Tobacco Never Used         Meds/Allergies     Current Outpatient Medications:     ALPRAZolam (XANAX) 0 5 mg tablet, Take 1 tablet (0 5 mg total) by mouth daily at bedtime as needed for anxiety, Disp: 30 tablet, Rfl: 0    aspirin 325 mg tablet, Take 1 tablet by mouth daily, Disp: , Rfl:     ATROVENT HFA 17 MCG/ACT inhaler, inhale 1 puff by mouth four times a day, Disp: 12 9 g, Rfl: 0    carvedilol (COREG) 12 5 mg tablet, take 1 tablet by mouth twice a day with meals, Disp: 60 tablet, Rfl: 11    DULoxetine (CYMBALTA) 30 mg delayed release capsule, Take 1 capsule (30 mg total) by mouth daily, Disp: 30 capsule, Rfl: 5    fluticasone-vilanterol (BREO ELLIPTA) 200-25 MCG/INH inhaler, Inhale 1 puff daily Rinse mouth after use , Disp: 3 Inhaler, Rfl: 3    furosemide (LASIX) 20 mg tablet, Take 1 tablet (20 mg total) by mouth daily as needed (weight gain or edema), Disp: 90 tablet, Rfl: 0    ipratropium-albuterol (DUO-NEB) 0 5-2 5 mg/3 mL nebulizer solution, Take 1 vial (3 mL total) by nebulization every 6 (six) hours as needed for wheezing or shortness of breath, Disp: 90 vial, Rfl: 3    lisinopril (ZESTRIL) 20 mg tablet, take 1 tablet by mouth twice a day, Disp: 60 tablet, Rfl: 11    Promethazine-DM (PHENERGAN-DM) 6 25-15 mg/5 mL oral syrup, Take 5 mL by mouth 4 (four) times a day as needed for cough, Disp: 240 mL, Rfl: 0    TUDORZA PRESSAIR 400 MCG/ACT inhaler, inhale 1 puff by mouth twice a day, Disp: 3 Inhaler, Rfl: 2    fluticasone (FLONASE) 50 mcg/act nasal spray, 1 spray into each nostril daily, Disp: 1 Bottle, Rfl: 5    predniSONE 10 mg tablet, Take 4 tablets (40 mg total) by mouth daily, Disp: 20 tablet, Rfl: 0  Allergies   Allergen Reactions    Ciprofloxacin Shortness Of Breath and Diarrhea       Vitals: Blood pressure 130/72, pulse 64, height 6' 2" (1 88 m), weight 113 kg (250 lb), SpO2 97 %  Body mass index is 32 1 kg/m²  Oxygen Therapy  SpO2: 97 %    Physical Exam  Physical Exam   Constitutional: He is oriented to person, place, and time  He appears well-developed and well-nourished  No distress  HENT:   Head: Normocephalic and atraumatic  Eyes: Pupils are equal, round, and reactive to light  EOM are normal  Right eye exhibits no discharge  Left eye exhibits no discharge  Neck: Normal range of motion  Neck supple  No tracheal deviation present  Cardiovascular: Normal rate, regular rhythm, normal heart sounds and intact distal pulses  No murmur heard  Pulmonary/Chest: Effort normal  No respiratory distress  He has wheezes  He has no rales  He exhibits no tenderness  Abdominal: Soft  Bowel sounds are normal  There is no tenderness  Musculoskeletal: Normal range of motion  He exhibits no edema or deformity  Neurological: He is alert and oriented to person, place, and time  Coordination normal    Skin: Skin is warm and dry  No rash noted  He is not diaphoretic  No erythema  No pallor  Psychiatric: He has a normal mood and affect  His behavior is normal  Judgment and thought content normal    Vitals reviewed  Labs: I have personally reviewed pertinent lab results  , ABG: No results found for: PHART, RYO9YQM, PO2ART, FAA0RQL, U5UXHWYH, BEART, SOURCE, BNP: No results found for: BNP, CBC: No results found for: WBC, HGB, HCT, MCV, PLT, ADJUSTEDWBC, MCH, MCHC, RDW, MPV, NRBC, CMP: No results found for: SODIUM, K, CL, CO2, ANIONGAP, BUN, CREATININE, GLUCOSE, CALCIUM, AST, ALT, ALKPHOS, PROT, BILITOT, EGFR, PT/INR: No results found for: PT, INR, Troponin: No results found for: TROPONINI  Lab Results   Component Value Date    WBC 12 40 (H) 12/15/2018    HGB 15 3 12/15/2018    HCT 47 0 12/15/2018    MCV 85 12/15/2018     12/15/2018     Lab Results   Component Value Date    GLUCOSE 87 07/27/2015    CALCIUM 8 6 12/18/2018     07/27/2015    K 5 0 12/18/2018    CO2 27 12/18/2018     12/18/2018    BUN 29 (H) 12/18/2018    CREATININE 1 44 (H) 12/18/2018     Lab Results   Component Value Date    IGE 65 2 10/23/2017     Lab Results   Component Value Date    ALT 20 12/18/2018    AST 17 12/18/2018    ALKPHOS 67 12/18/2018    BILITOT 1 05 (H) 07/22/2015       Imaging and other studies: I have personally reviewed pertinent reports  and I have personally reviewed pertinent films in PACS     LDCT lung cancer screening 9/4/19  No suspicion nodules     Pulmonary function testing:  Performed 12/26/2018  FEV1/FVC ratio 54 %   FEV1 73 % predicted  FVC 93 % predicted  There is significant response to bronchodilators   % predicted   % predicted  DLCO corrected for hemoglobin 110 % predicted  Normal lung volumes and moderate airflow limitation with significant improvement following bronchodilator administration  Normal lung volumes  Normal diffusion capacity  Consistent with bronchial asthma

## 2020-03-11 NOTE — ASSESSMENT & PLAN NOTE
Reviewed results of LDCT lung cancer screening from September of 2019  No suspicious nodules but we will repeat imaging in September of 2020 based on patient's smoking history  Continue smoking cessation was encouraged

## 2020-03-11 NOTE — ASSESSMENT & PLAN NOTE
In addition to the above mentioned patient may continue promethazine DM 5 mL p o  4 times daily p r n   I have also added Flonase 1 spray each nostril for suspected postnasal drip worse at night and in AM  Patient reports being on this in the past with improvement  He may continue OTC antihistamine as needed for allergic component as well

## 2020-03-11 NOTE — ASSESSMENT & PLAN NOTE
Patient is currently in an exacerbation of his COPD/Asthma overlap syndrome  Unclear whether this is related to the bronchitis he had 3 weeks ago  Regardless, will treat with prednisone taper now with instructions to start at 40 mg daily and decrease by 10 mg every 3 days until completion  Will hold off on additional antibiotics given complete course was taken 3 weeks ago  In addition to that, patient will continue pulmonary regimen with Tudorza twice daily, Breo daily, and as needed duo nebs/Atrovent HFA

## 2020-03-11 NOTE — ASSESSMENT & PLAN NOTE
Patient once again declined CPAP therapy  He understands the associated health risks daughter linked to untreated CORDELL

## 2020-03-11 NOTE — PROGRESS NOTES
Cardiology Follow Up    Scot Deluna  1955  [id number]  [facility] [id number]  [phone]    1  Chronic combined systolic and diastolic congestive heart failure (HCC)  POCT ECG    Comprehensive metabolic panel   2  Nonischemic cardiomyopathy (HCC)  POCT ECG    Echo complete with contrast if indicated    Comprehensive metabolic panel   3  Essential hypertension  Comprehensive metabolic panel   4  Dyslipidemia  Lipid Panel with Direct LDL reflex   5  Obstructive sleep apnea     6  Atypical chest pain  POCT ECG       Discussion/Summary:  Chest pain: with typical and atypical features  Patient has been physically active since without any cardiopulmonary symptoms  EKG in the office today shows normal sinus rhythm without any ischemic changes  Discussed proceeding with a stress test; however will start with an echocardiogram to recheck ejection fraction  Chronic combined systolic and diastolic congestive heart failure: euvolemic on exam  There has been no acute weight gain  Patient is compliant with daily weights and knows to take lasix on a prn basis only for weight gain >3lbs overnight, >5lbs in 1 week, and/or worsening edema  Nonischemic cardiomyopathy: EF of 45% in 2018  Will recheck an echocardiogram  Continue carvedilol and lisinopril  Stressed the importance of alcohol cessation as this is likely the etiology of his cardiomyopathy  Hypertension: initially elevated although it came down upon my recheck  It has been well controlled at two office visits over the past 3 weeks  No changes will be made to his regimen  Continue management for anxiety/depression per PCP and psychiatry  Stressed the importance of having this addressed  He will RTO in 3 months with Dr Wilfred Hagan or sooner if necessary  He will call with any concerns       Interval History:   Scot Deluna is a 60 y o  male with a non-ischemic cardiomyopathy likely secondary to alcohol abuse, chronic combined systolic and diastolic congestive heart failure, hypertension, dyslipidemia, obstructive sleep apnea, and COPD/asthma who presents to the office today to discuss chest pain  He was last seen in the office in 2018  Since then he admits that he has been struggling with anxiety and depression  As a result he states he has reverted back to some old habits  He has been smoking cigarettes on and off  2 weeks ago he smoked a pack of cigarettes in 1 week  He has abstained since  He additionally has been drinking 1-2 beers a few days each week  He has been eating a poor diet  He has been under a lot of stress at work and with personal issues  On Sunday he was working at his job at [employer]  He developed a substernal chest heaviness  He had radiation into his ears and jaw bilaterally  He took a few baby aspirin and the pain resolved in approximately 10 minutes  He has been just as physically active since including ascending flights of stairs without any chest pain or pressure  He denies any shortness of breath  He denies lower extremity edema, orthopnea, and PND  He does weigh himself daily and has gradually gained 20 lbs over the past 6 months or so  He denies any acute weight gain  He does have lasix prescribed prn but has not needed to use it in several months  He denies lightheadedness, dizziness, palpitations, and syncope      Problem List     Asthma, chronic, moderate persistent, uncomplicated    Obstructive sleep apnea    Overview Signed 12/19/2018 10:16 AM by Victorina Wood DO       Declines CPAP         Chronic combined systolic and diastolic CHF (congestive heart failure) (HCC)    Wt Readings from Last 3 Encounters:   03/12/20 111 kg (245 lb 9 5 oz)   03/11/20 113 kg (250 lb)   02/24/20 112 kg (246 lb 2 oz)                 Nonischemic cardiomyopathy (HCC)    Dyslipidemia    Hypertension    Anxiety    Asthma-COPD overlap syndrome ([email or web address] )    Back pain    Borderline hyperglycemia    COPD, severe ([email or web address] )    Overview Signed 12/19/2018 10:16 AM by Uzma Sol DO      Post bronchodilator FEV1 is 68% predicted, 2017         Hemorrhoids    Thyroid disease    Vitamin D deficiency    Constipation    Pulmonary emphysema (HCC)    Urinary frequency    History of tobacco abuse    Hypertrophied anal papilla    Rectal bleeding    Special screening for malignant neoplasms, colon    Overview Signed 1/23/2019 10:32 AM by Laura Patient     Added automatically from request for surgery [id number]         Screening for prostate cancer    Bronchitis        Past Medical History:   Diagnosis Date    Asthma     [facility]congest[facility] failure) ([facility] )     COPD (chronic obstructive pulmonary disease) ([facility] )     Mumps     Old MI (myocardial infarction)     Pneumonia     Pulmonary emphysema ([facility] )     Sleep apnea      Social History     Socioeconomic History    Marital status: /Civil Union     Spouse name: Not on file    Number of children: Not on file    Years of education: Not on file    Highest education level: Not on file   Occupational History    Not on file   Social Needs    Financial resource strain: Not on file    Food insecurity:     Worry: Not on file     Inability: Not on file    Transportation needs:     Medical: Not on file     Non-medical: Not on file   Tobacco Use    Smoking status: Former Smoker     Packs/day: 3 00     Years: 30 00     Pack years: 90 00    Smokeless tobacco: Never Used   Substance and Sexual Activity    Alcohol use: Yes     Comment: rarely    Drug use: No    Sexual activity: Not on file   Lifestyle    Physical activity:     Days per week: Not on file     Minutes per session: Not on file    Stress: Not on file   Relationships    Social connections:     Talks on phone: Not on file     Gets together: Not on file     Attends [employer] service: Not on file     Active member of club or organization: Not on file Attends meetings of clubs or organizations: Not on file     Relationship status: Not on file    Intimate partner violence:     Fear of current or ex partner: Not on file     Emotionally abused: Not on file     Physically abused: Not on file     Forced sexual activity: Not on file   Other Topics Concern    Not on file   Social History Narrative    Caffeine use      Family History   Problem Relation Age of Onset    Heart attack Father         MI    Diabetes Sister         DM    Arthritis Family     Coronary artery disease Family     Hypertension Family     Tuberculosis Son      Past Surgical History:   Procedure Laterality Date    CARDIAC CATHETERIZATION  07/24/2015    Left main- normal and maidly tortuous  Circumflex - normal and moderately tortuous  RCA- normal and mildy tortuous  Global LV function was severely depressed  [facility] INGUINAL HERNIA REPAIR Bilateral     NC COLONOSCOPY FLX DX W/COLLJ SPEC WHEN PFRMD N/A 3/11/2019    Procedure: COLONOSCOPY with removal of anal papilla;   Surgeon: Abran Johnson MD;  Location: [facility];  Service: Colorectal    TONSILLECTOMY      UMBILICAL HERNIA REPAIR  06/21/2006       Current Outpatient Medications:     ALPRAZolam (XANAX) 0 5 mg tablet, Take 1 tablet (0 5 mg total) by mouth daily at bedtime as needed for anxiety, Disp: 30 tablet, Rfl: 0    aspirin 325 mg tablet, Take 1 tablet by mouth daily, Disp: , Rfl:     ATROVENT HFA 17 MCG/ACT inhaler, inhale 1 puff by mouth four times a day, Disp: 12 9 g, Rfl: 0    carvedilol (COREG) 12 5 mg tablet, take 1 tablet by mouth twice a day with meals, Disp: 60 tablet, Rfl: 11    DULoxetine (CYMBALTA) 30 mg delayed release capsule, Take 1 capsule (30 mg total) by mouth daily, Disp: 30 capsule, Rfl: 5    fluticasone-vilanterol (BREO ELLIPTA) 200-25 MCG/INH inhaler, Inhale 1 puff daily Rinse mouth after use , Disp: 3 Inhaler, Rfl: 3    furosemide (LASIX) 20 mg tablet, Take 1 tablet (20 mg total) by mouth daily as needed (weight gain or edema), Disp: 90 tablet, Rfl: 0    ipratropium-albuterol (DUO-NEB) 0 5-2 5 mg/3 mL nebulizer solution, Take 1 vial (3 mL total) by nebulization every 6 (six) hours as needed for wheezing or shortness of breath, Disp: 90 vial, Rfl: 3    lisinopril (ZESTRIL) 20 mg tablet, take 1 tablet by mouth twice a day, Disp: 60 tablet, Rfl: 11    Promethazine-DM (PHENERGAN-DM) 6 25-15 mg/5 mL oral syrup, Take 5 mL by mouth 4 (four) times a day as needed for cough, Disp: 240 mL, Rfl: 0    TUDORZA PRESSAIR 400 MCG/ACT inhaler, inhale 1 puff by mouth twice a day, Disp: 3 Inhaler, Rfl: 2    fluticasone (FLONASE) 50 mcg/act nasal spray, 1 spray into each nostril daily (Patient not taking: Reported on 3/12/2020), Disp: 1 Bottle, Rfl: 5    predniSONE 10 mg tablet, Take 4 tablets (40 mg total) by mouth daily (Patient not taking: Reported on 3/12/2020), Disp: 20 tablet, Rfl: 0  Allergies   Allergen Reactions    Ciprofloxacin Shortness Of Breath and Diarrhea       Labs:     Chemistry        Component Value Date/Time     07/27/2015 0559    K 5 0 12/18/2018 0947    K 3 8 07/27/2015 0559     12/18/2018 0947     07/27/2015 0559    CO2 27 12/18/2018 0947    CO2 32 07/27/2015 0559    BUN 29 (H) 12/18/2018 0947    BUN 19 07/27/2015 0559    CREATININE 1 44 (H) 12/18/2018 0947    CREATININE 1 05 07/27/2015 0559        Component Value Date/Time    CALCIUM 8 6 12/18/2018 0947    CALCIUM 8 6 07/27/2015 0559    ALKPHOS 67 12/18/2018 0947    ALKPHOS 75 07/22/2015 1021    AST 17 12/18/2018 0947    AST 30 07/22/2015 1021    ALT 20 12/18/2018 0947    ALT 74 07/22/2015 1021    BILITOT 1 05 (H) 07/22/2015 1021            Lab Results   Component Value Date    CHOL 105 07/23/2015     Lab Results   Component Value Date    HDL 24 (L) 12/18/2018    HDL 21 07/23/2015     Lab Results   Component Value Date    LDLCALC 89 12/18/2018    LDLCALC 69 07/23/2015     Lab Results   Component Value Date    TRIG 107 12/18/2018 TRIG 74 07/23/2015     No results found for: CHOLHDL    Imaging: No results found  ECG:  Normal sinus rhythm  Normal ECG      Review of Systems   Constitution: Positive for weight gain  Negative for chills and fever  HENT: Negative  Cardiovascular: Positive for chest pain  Negative for dyspnea on exertion, leg swelling, orthopnea, palpitations, paroxysmal nocturnal dyspnea and syncope  Respiratory: Negative for cough and shortness of breath  Gastrointestinal: Negative for diarrhea, nausea and vomiting  Genitourinary: Negative  Neurological: Negative for dizziness and light-headedness  Psychiatric/Behavioral: Positive for depression  The patient is nervous/anxious  All other systems reviewed and are negative  Vitals:    03/12/20 1506   BP: 138/88   Pulse:      Vitals:    03/12/20 1430   Weight: 111 kg (245 lb 9 5 oz)     Height: 6' 2" (188 cm)   Body mass index is 31 53 kg/m²  Physical Exam:  Physical Exam   Constitutional: He is oriented to person, place, and time  No distress  HENT:   Head: Normocephalic and atraumatic  Eyes: Pupils are equal, round, and reactive to light  Neck: Normal range of motion  No JVD present  Carotid bruit is not present  Cardiovascular: Normal rate, regular rhythm, S1 normal and S2 normal    No murmur heard  Pulses:       Radial pulses are 2+ on the right side, and 2+ on the left side  Dorsalis pedis pulses are 2+ on the right side, and 2+ on the left side  Pulmonary/Chest: Effort normal and breath sounds normal  No respiratory distress  He has no wheezes  He has no rales  Abdominal: Soft  There is no tenderness  A hernia is present  Musculoskeletal: Normal range of motion  He exhibits no edema or deformity  Neurological: He is alert and oriented to person, place, and time  Skin: Skin is warm and dry  He is not diaphoretic  No erythema     Varicose veins noted in bilateral lower extremities   Psychiatric: He has a normal mood and affect  His behavior is normal    Vitals reviewed

## 2020-03-12 ENCOUNTER — OFFICE VISIT (OUTPATIENT)
Dept: CARDIOLOGY CLINIC | Facility: HOSPITAL | Age: 65
End: 2020-03-12
Payer: COMMERCIAL

## 2020-03-12 VITALS
DIASTOLIC BLOOD PRESSURE: 88 MMHG | SYSTOLIC BLOOD PRESSURE: 138 MMHG | WEIGHT: 245.59 LBS | HEART RATE: 76 BPM | HEIGHT: 74 IN | BODY MASS INDEX: 31.52 KG/M2

## 2020-03-12 DIAGNOSIS — E78.5 DYSLIPIDEMIA: ICD-10-CM

## 2020-03-12 DIAGNOSIS — G47.33 OBSTRUCTIVE SLEEP APNEA: ICD-10-CM

## 2020-03-12 DIAGNOSIS — I50.42 CHRONIC COMBINED SYSTOLIC AND DIASTOLIC CONGESTIVE HEART FAILURE (HCC): Primary | ICD-10-CM

## 2020-03-12 DIAGNOSIS — I10 ESSENTIAL HYPERTENSION: ICD-10-CM

## 2020-03-12 DIAGNOSIS — I42.8 NONISCHEMIC CARDIOMYOPATHY (HCC): ICD-10-CM

## 2020-03-12 DIAGNOSIS — R07.89 ATYPICAL CHEST PAIN: ICD-10-CM

## 2020-03-12 PROCEDURE — 99214 OFFICE O/P EST MOD 30 MIN: CPT | Performed by: PHYSICIAN ASSISTANT

## 2020-03-12 PROCEDURE — 3075F SYST BP GE 130 - 139MM HG: CPT | Performed by: PHYSICIAN ASSISTANT

## 2020-03-12 PROCEDURE — 1036F TOBACCO NON-USER: CPT | Performed by: PHYSICIAN ASSISTANT

## 2020-03-12 PROCEDURE — 3079F DIAST BP 80-89 MM HG: CPT | Performed by: PHYSICIAN ASSISTANT

## 2020-03-12 PROCEDURE — 3008F BODY MASS INDEX DOCD: CPT | Performed by: PHYSICIAN ASSISTANT

## 2020-03-12 PROCEDURE — 93000 ELECTROCARDIOGRAM COMPLETE: CPT | Performed by: PHYSICIAN ASSISTANT

## 2020-03-12 PROCEDURE — 3008F BODY MASS INDEX DOCD: CPT | Performed by: INTERNAL MEDICINE

## 2020-03-16 DIAGNOSIS — J40 BRONCHITIS: Primary | ICD-10-CM

## 2020-03-16 RX ORDER — AZITHROMYCIN 250 MG/1
TABLET, FILM COATED ORAL
Qty: 6 TABLET | Refills: 0 | Status: SHIPPED | OUTPATIENT
Start: 2020-03-16 | End: 2020-03-20

## 2020-03-16 NOTE — TELEPHONE ENCOUNTER
Pt called with concern of upper respiratory symptoms  Pt has chest congestion, phlegm, tightness in lungs, thick yellow mucous  Started yesterday  No fever at all  Pt hasn't been around anyone that is sick that he is aware of at this time  He is a cook at [employer]  He is also stating there are cases in [place] area of corona virus  Pt usually gets prednisone however his pulmonary dr already put him on it  Allergies- Ciproflaxin     Uses [facility] [place]

## 2020-03-24 DIAGNOSIS — J44.9 CHRONIC OBSTRUCTIVE PULMONARY DISEASE, UNSPECIFIED COPD TYPE (HCC): ICD-10-CM

## 2020-03-24 RX ORDER — IPRATROPIUM BROMIDE 17 UG/1
AEROSOL, METERED RESPIRATORY (INHALATION)
Qty: 12.9 G | Refills: 0 | Status: SHIPPED | OUTPATIENT
Start: 2020-03-24 | End: 2020-06-15

## 2020-03-26 ENCOUNTER — TELEMEDICINE (OUTPATIENT)
Dept: INTERNAL MEDICINE CLINIC | Facility: CLINIC | Age: 65
End: 2020-03-26
Payer: COMMERCIAL

## 2020-03-26 VITALS — TEMPERATURE: 97.6 F

## 2020-03-26 DIAGNOSIS — J45.40 ASTHMA, CHRONIC, MODERATE PERSISTENT, UNCOMPLICATED: Primary | ICD-10-CM

## 2020-03-26 DIAGNOSIS — F41.9 ANXIETY: ICD-10-CM

## 2020-03-26 PROCEDURE — 99213 OFFICE O/P EST LOW 20 MIN: CPT | Performed by: INTERNAL MEDICINE

## 2020-03-26 RX ORDER — ALPRAZOLAM 0.5 MG/1
0.5 TABLET ORAL
Qty: 30 TABLET | Refills: 0 | Status: SHIPPED | OUTPATIENT
Start: 2020-03-26 | End: 2020-08-18 | Stop reason: SDUPTHER

## 2020-03-26 RX ORDER — ALPRAZOLAM 0.5 MG/1
0.5 TABLET ORAL
Qty: 30 TABLET | Refills: 0 | Status: CANCELLED | OUTPATIENT
Start: 2020-03-26

## 2020-03-26 NOTE — TELEPHONE ENCOUNTER
Pt does have a smart phone  He is requesting his Alprazolam and he has some concerns about the COVID-19 due to his medical conditions

## 2020-03-26 NOTE — PROGRESS NOTES
Virtual Regular Visit    Problem List Items Addressed This Visit        Respiratory    Asthma, chronic, moderate persistent, uncomplicated - Primary       Other    Anxiety    Relevant Medications    ALPRAZolam (XANAX) 0 5 mg tablet      Pt is compliant with his daily respiratory regimen and stressed the need for him to stay inside especially for the next couple weeks given his underlying asthma hx His sxs seem more stable and he is using nebulizer at least Bid He has a supply of nebules and a new inhaler  Refill sent for his alprazolam as he has been under increasing stress with his family [employer] business which is now closed due to Coronavirus He is unsure of what the future holds         Reason for visit is medication refll    Encounter provider Marcello Morales DO    Provider located at [address] [place] [id number]      Recent Visits  No visits were found meeting these conditions  Showing recent visits within past 7 days and meeting all other requirements     Future Appointments  No visits were found meeting these conditions  Showing future appointments within next 150 days and meeting all other requirements        After connecting through [email or web address], the patient was identified by name and date of birth  Luana Wade was informed that this is a telemedicine visit and that the visit is being conducted through telephone which may not be secure and therefore, might not be HIPAA-compliant  My office door was closed  No one else was in the room  He acknowledged consent and understanding of privacy and security of the video platform  The patient has agreed to participate and understands they can discontinue the visit at any time      Subjective  Luana Wade is a 60 y o  male who has hx of asthma and for the past 7-10days had increasing wheeze but he does get similar sxs this time of year No fever and he has been monitoring at home 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 and the virus which has created more stress/anxiety for him  He has not had fever or chills and today his breathing is about baseline Occasional loose cough No significant wheeze and sxs have been improving slowly not regressing His wife and daughter have no sxs and he has been self quarantining for past 12 days (on his own accord- he did not go to work in past 2 weeks)      Past Medical History:   Diagnosis Date    Asthma     [facility]estive [facility] )     COPD [facility]chronic obstructive pulmonary disease) ([facility] )     Mumps     Old MI (myocardial infarction)     Pneumonia     Pulmonary emphysema ([facility] )     Sleep apnea        Past Surgical History:   Procedure Laterality Date    CARDIAC CATHETERIZATION  07/24/2015    Left main- normal and maidly tortuous  Circumflex - normal and moderately tortuous  RCA- normal and mildy tortuous  Global LV function was severely depressed  [facility] INGUINAL HERNIA REPAIR Bilateral     AK COLONOSCOPY FLX DX W/COLLJ SPEC WHEN PFRMD N/A 3/11/2019    Procedure: COLONOSCOPY with removal of anal papilla;   Surgeon: Sage Bacon MD;  Location: [facility];  Service: Colorectal    TONSILLECTOMY      UMBILICAL HERNIA REPAIR  06/21/2006       Current Outpatient Medications   Medication Sig Dispense Refill    ALPRAZolam (XANAX) 0 5 mg tablet Take 1 tablet (0 5 mg total) by mouth daily at bedtime as needed for anxiety 30 tablet 0    aspirin 325 mg tablet Take 1 tablet by mouth daily      ATROVENT HFA 17 MCG/ACT inhaler inhale 1 puff by mouth four times a day 12 9 g 0    carvedilol (COREG) 12 5 mg tablet take 1 tablet by mouth twice a day with meals 60 tablet 11    DULoxetine (CYMBALTA) 30 mg delayed release capsule Take 1 capsule (30 mg total) by mouth daily 30 capsule 5    fluticasone (FLONASE) 50 mcg/act nasal spray 1 spray into each nostril daily (Patient not taking: Reported on 3/12/2020) 1 Bottle 5  fluticasone-vilanterol (BREO ELLIPTA) 200-25 MCG/INH inhaler Inhale 1 puff daily Rinse mouth after use  3 Inhaler 3    furosemide (LASIX) 20 mg tablet Take 1 tablet (20 mg total) by mouth daily as needed (weight gain or edema) 90 tablet 0    ipratropium-albuterol (DUO-NEB) 0 5-2 5 mg/3 mL nebulizer solution Take 1 vial (3 mL total) by nebulization every 6 (six) hours as needed for wheezing or shortness of breath 90 vial 3    lisinopril (ZESTRIL) 20 mg tablet take 1 tablet by mouth twice a day 60 tablet 11    predniSONE 10 mg tablet Take 4 tablets (40 mg total) by mouth daily (Patient not taking: Reported on 3/12/2020) 20 tablet 0    Promethazine-DM (PHENERGAN-DM) 6 25-15 mg/5 mL oral syrup Take 5 mL by mouth 4 (four) times a day as needed for cough 240 mL 0    TUDORZA PRESSAIR 400 MCG/ACT inhaler inhale 1 puff by mouth twice a day 3 Inhaler 2     No current facility-administered medications for this visit  Allergies   Allergen Reactions    Ciprofloxacin Shortness Of Breath and Diarrhea       Review of Systems   Constitutional: Positive for fatigue  Negative for activity change, chills and fever  HENT: Positive for congestion and postnasal drip  Respiratory: Positive for cough and stridor  Negative for wheezing  Cardiovascular: Negative  Gastrointestinal: Negative  Genitourinary: Negative  Musculoskeletal: Negative  Skin: Negative for rash  Allergic/Immunologic: Positive for environmental allergies  Neurological: Negative for dizziness, light-headedness and headaches  Hematological: Negative  Psychiatric/Behavioral: The patient is nervous/anxious  Vitals:    03/26/20 1547   Temp: 97 6 °F (36 4 °C)         Physical Exam   Constitutional: He is oriented to person, place, and time  He appears well-developed and well-nourished  No distress  HENT:   Head: Normocephalic and atraumatic  Neck: Normal range of motion  Neck supple  No JVD present     Pulmonary/Chest: Effort normal    Musculoskeletal: He exhibits no edema  Neurological: He is alert and oriented to person, place, and time  No cranial nerve deficit  Skin: Capillary refill takes less than 2 seconds  He is not diaphoretic  No erythema  Psychiatric: He has a normal mood and affect  His behavior is normal  Judgment and thought content normal         I spent 15 minutes with the patient during this visit

## 2020-04-28 ENCOUNTER — APPOINTMENT (OUTPATIENT)
Dept: RADIOLOGY | Facility: CLINIC | Age: 65
End: 2020-04-28
Payer: COMMERCIAL

## 2020-04-28 ENCOUNTER — OFFICE VISIT (OUTPATIENT)
Dept: URGENT CARE | Facility: CLINIC | Age: 65
End: 2020-04-28
Payer: COMMERCIAL

## 2020-04-28 VITALS
SYSTOLIC BLOOD PRESSURE: 151 MMHG | TEMPERATURE: 98 F | HEIGHT: 74 IN | DIASTOLIC BLOOD PRESSURE: 78 MMHG | WEIGHT: 245 LBS | OXYGEN SATURATION: 96 % | HEART RATE: 77 BPM | BODY MASS INDEX: 31.44 KG/M2 | RESPIRATION RATE: 18 BRPM

## 2020-04-28 DIAGNOSIS — M79.671 RIGHT FOOT PAIN: Primary | ICD-10-CM

## 2020-04-28 DIAGNOSIS — M79.671 RIGHT FOOT PAIN: ICD-10-CM

## 2020-04-28 PROCEDURE — S9088 SERVICES PROVIDED IN URGENT: HCPCS | Performed by: PHYSICIAN ASSISTANT

## 2020-04-28 PROCEDURE — 73630 X-RAY EXAM OF FOOT: CPT

## 2020-04-28 PROCEDURE — 99213 OFFICE O/P EST LOW 20 MIN: CPT | Performed by: PHYSICIAN ASSISTANT

## 2020-04-28 RX ORDER — PREDNISONE 10 MG/1
TABLET ORAL
Qty: 26 TABLET | Refills: 0 | Status: SHIPPED | OUTPATIENT
Start: 2020-04-28 | End: 2020-09-01 | Stop reason: ALTCHOICE

## 2020-05-29 ENCOUNTER — TELEPHONE (OUTPATIENT)
Dept: CARDIOLOGY CLINIC | Facility: HOSPITAL | Age: 65
End: 2020-05-29

## 2020-06-15 DIAGNOSIS — J44.9 CHRONIC OBSTRUCTIVE PULMONARY DISEASE, UNSPECIFIED COPD TYPE (HCC): ICD-10-CM

## 2020-06-15 RX ORDER — IPRATROPIUM BROMIDE 17 UG/1
AEROSOL, METERED RESPIRATORY (INHALATION)
Qty: 12.9 G | Refills: 0 | Status: SHIPPED | OUTPATIENT
Start: 2020-06-15 | End: 2020-07-20

## 2020-06-25 ENCOUNTER — TELEPHONE (OUTPATIENT)
Dept: PSYCHIATRY | Facility: CLINIC | Age: 65
End: 2020-06-25

## 2020-06-30 ENCOUNTER — TELEPHONE (OUTPATIENT)
Dept: PSYCHIATRY | Facility: CLINIC | Age: 65
End: 2020-06-30

## 2020-07-14 ENCOUNTER — TELEPHONE (OUTPATIENT)
Dept: PSYCHIATRY | Facility: CLINIC | Age: 65
End: 2020-07-14

## 2020-07-14 NOTE — TELEPHONE ENCOUNTER
Patient called and left a voice message that he would like to set up an apt please call him back thank you

## 2020-07-16 ENCOUNTER — APPOINTMENT (EMERGENCY)
Dept: RADIOLOGY | Facility: HOSPITAL | Age: 65
End: 2020-07-16
Payer: COMMERCIAL

## 2020-07-16 ENCOUNTER — HOSPITAL ENCOUNTER (EMERGENCY)
Facility: HOSPITAL | Age: 65
Discharge: HOME | End: 2020-07-16
Attending: FAMILY MEDICINE | Admitting: FAMILY MEDICINE
Payer: COMMERCIAL

## 2020-07-16 ENCOUNTER — OFFICE VISIT (OUTPATIENT)
Dept: URGENT CARE | Facility: CLINIC | Age: 65
End: 2020-07-16
Payer: COMMERCIAL

## 2020-07-16 VITALS
BODY MASS INDEX: 31.7 KG/M2 | RESPIRATION RATE: 18 BRPM | WEIGHT: 247 LBS | OXYGEN SATURATION: 96 % | TEMPERATURE: 97.3 F | DIASTOLIC BLOOD PRESSURE: 74 MMHG | SYSTOLIC BLOOD PRESSURE: 152 MMHG | HEIGHT: 74 IN | HEART RATE: 67 BPM

## 2020-07-16 VITALS
TEMPERATURE: 98.3 F | HEIGHT: 74 IN | SYSTOLIC BLOOD PRESSURE: 150 MMHG | WEIGHT: 247 LBS | DIASTOLIC BLOOD PRESSURE: 70 MMHG | BODY MASS INDEX: 31.7 KG/M2 | HEART RATE: 78 BPM | OXYGEN SATURATION: 96 % | RESPIRATION RATE: 16 BRPM

## 2020-07-16 DIAGNOSIS — S61.209A AVULSION OF FINGER, INITIAL ENCOUNTER: Primary | ICD-10-CM

## 2020-07-16 DIAGNOSIS — S61.209A FINGER AVULSION, INITIAL ENCOUNTER: Primary | ICD-10-CM

## 2020-07-16 LAB
ANION GAP SERPL CALCULATED.3IONS-SCNC: 8 MMOL/L (ref 4–13)
APTT PPP: 33 SECONDS (ref 23–37)
BASOPHILS # BLD AUTO: 0.1 THOUSANDS/ΜL (ref 0–0.1)
BASOPHILS NFR BLD AUTO: 1 % (ref 0–2)
BUN SERPL-MCNC: 28 MG/DL (ref 7–25)
CALCIUM SERPL-MCNC: 9.4 MG/DL (ref 8.6–10.5)
CHLORIDE SERPL-SCNC: 106 MMOL/L (ref 98–107)
CO2 SERPL-SCNC: 23 MMOL/L (ref 21–31)
CREAT SERPL-MCNC: 1.22 MG/DL (ref 0.7–1.3)
EOSINOPHIL # BLD AUTO: 0.4 THOUSAND/ΜL (ref 0–0.61)
EOSINOPHIL NFR BLD AUTO: 5 % (ref 0–5)
ERYTHROCYTE [DISTWIDTH] IN BLOOD BY AUTOMATED COUNT: 15.2 % (ref 11.5–14.5)
GFR SERPL CREATININE-BSD FRML MDRD: 63 ML/MIN/1.73SQ M
GLUCOSE SERPL-MCNC: 96 MG/DL (ref 65–99)
HCT VFR BLD AUTO: 41.9 % (ref 42–47)
HGB BLD-MCNC: 14 G/DL (ref 14–18)
INR PPP: 1.02 (ref 0.84–1.19)
LYMPHOCYTES # BLD AUTO: 1.8 THOUSANDS/ΜL (ref 0.6–4.47)
LYMPHOCYTES NFR BLD AUTO: 20 % (ref 21–51)
MCH RBC QN AUTO: 28.6 PG (ref 26–34)
MCHC RBC AUTO-ENTMCNC: 33.4 G/DL (ref 31–37)
MCV RBC AUTO: 86 FL (ref 81–99)
MONOCYTES # BLD AUTO: 0.7 THOUSAND/ΜL (ref 0.17–1.22)
MONOCYTES NFR BLD AUTO: 7 % (ref 2–12)
NEUTROPHILS # BLD AUTO: 6.1 THOUSANDS/ΜL (ref 1.4–6.5)
NEUTS SEG NFR BLD AUTO: 67 % (ref 42–75)
PLATELET # BLD AUTO: 278 THOUSANDS/UL (ref 149–390)
PMV BLD AUTO: 7.6 FL (ref 8.6–11.7)
POTASSIUM SERPL-SCNC: 4.3 MMOL/L (ref 3.5–5.5)
PROTHROMBIN TIME: 13.3 SECONDS (ref 11.6–14.5)
RBC # BLD AUTO: 4.89 MILLION/UL (ref 4.3–5.9)
SODIUM SERPL-SCNC: 137 MMOL/L (ref 134–143)
WBC # BLD AUTO: 9.1 THOUSAND/UL (ref 4.8–10.8)

## 2020-07-16 PROCEDURE — 85610 PROTHROMBIN TIME: CPT | Performed by: PHYSICIAN ASSISTANT

## 2020-07-16 PROCEDURE — 85730 THROMBOPLASTIN TIME PARTIAL: CPT | Performed by: PHYSICIAN ASSISTANT

## 2020-07-16 PROCEDURE — 90471 IMMUNIZATION ADMIN: CPT

## 2020-07-16 PROCEDURE — 99284 EMERGENCY DEPT VISIT MOD MDM: CPT

## 2020-07-16 PROCEDURE — 80048 BASIC METABOLIC PNL TOTAL CA: CPT | Performed by: PHYSICIAN ASSISTANT

## 2020-07-16 PROCEDURE — 99212 OFFICE O/P EST SF 10 MIN: CPT | Performed by: PHYSICIAN ASSISTANT

## 2020-07-16 PROCEDURE — 96365 THER/PROPH/DIAG IV INF INIT: CPT

## 2020-07-16 PROCEDURE — 85025 COMPLETE CBC W/AUTO DIFF WBC: CPT | Performed by: PHYSICIAN ASSISTANT

## 2020-07-16 PROCEDURE — 99284 EMERGENCY DEPT VISIT MOD MDM: CPT | Performed by: PHYSICIAN ASSISTANT

## 2020-07-16 PROCEDURE — 36415 COLL VENOUS BLD VENIPUNCTURE: CPT | Performed by: PHYSICIAN ASSISTANT

## 2020-07-16 PROCEDURE — 90715 TDAP VACCINE 7 YRS/> IM: CPT | Performed by: PHYSICIAN ASSISTANT

## 2020-07-16 PROCEDURE — S9088 SERVICES PROVIDED IN URGENT: HCPCS | Performed by: PHYSICIAN ASSISTANT

## 2020-07-16 PROCEDURE — 73140 X-RAY EXAM OF FINGER(S): CPT

## 2020-07-16 RX ORDER — CEPHALEXIN 500 MG/1
500 CAPSULE ORAL EVERY 8 HOURS SCHEDULED
Qty: 21 CAPSULE | Refills: 0 | Status: SHIPPED | OUTPATIENT
Start: 2020-07-16 | End: 2020-07-23

## 2020-07-16 RX ORDER — CEFAZOLIN SODIUM 2 G/50ML
2000 SOLUTION INTRAVENOUS ONCE
Status: COMPLETED | OUTPATIENT
Start: 2020-07-16 | End: 2020-07-16

## 2020-07-16 RX ADMIN — CEFAZOLIN SODIUM 2000 MG: 2 SOLUTION INTRAVENOUS at 15:54

## 2020-07-16 RX ADMIN — SODIUM CHLORIDE 1000 ML: 0.9 INJECTION, SOLUTION INTRAVENOUS at 15:51

## 2020-07-16 RX ADMIN — TETANUS TOXOID, REDUCED DIPHTHERIA TOXOID AND ACELLULAR PERTUSSIS VACCINE, ADSORBED 0.5 ML: 5; 2.5; 8; 8; 2.5 SUSPENSION INTRAMUSCULAR at 16:27

## 2020-07-16 NOTE — ED PROVIDER NOTES
History  Chief Complaint   Patient presents with    Finger Injury     right fifth finger injury, was using  and wounded right fifth finger 1 hour PTA  not UTD on tetanus, takes aspirin daily     Patient presents to the emergency department today for evaluation right 5th finger injury  Patient states he was utilizing a  when he accidentally cut the tip off of right 5th finger  Went to urgent care  They sent him here for potential arterial bleeding  He denies any other injuries associated with this incident  He does take aspirin  Denies range of motion deficits  He denies feeling lightheaded or dizzy  No weakness  Prior to Admission Medications   Prescriptions Last Dose Informant Patient Reported? Taking? ALPRAZolam (XANAX) 0 5 mg tablet Past Week at Unknown time  No Yes   Sig: Take 1 tablet (0 5 mg total) by mouth daily at bedtime as needed for anxiety   ATROVENT HFA 17 MCG/ACT inhaler 2020 at Unknown time  No Yes   Sig: inhale 1 puff by mouth four times a day   DULoxetine (CYMBALTA) 30 mg delayed release capsule More than a month at Unknown time  No No   Sig: Take 1 capsule (30 mg total) by mouth daily   Promethazine-DM (PHENERGAN-DM) 6 25-15 mg/5 mL oral syrup   No No   Sig: Take 5 mL by mouth 4 (four) times a day as needed for cough   TUDORZA PRESSAIR 400 MCG/ACT inhaler 2020 at Unknown time  No Yes   Sig: inhale 1 puff by mouth twice a day   aspirin 325 mg tablet 2020 at Unknown time Self Yes Yes   Sig: Take 1 tablet by mouth daily   carvedilol (COREG) 12 5 mg tablet 2020 at Unknown time  No Yes   Sig: take 1 tablet by mouth twice a day with meals   fluticasone (FLONASE) 50 mcg/act nasal spray   No No   Si spray into each nostril daily   Patient not taking: Reported on 3/12/2020   fluticasone-vilanterol (BREO ELLIPTA) 200-25 MCG/INH inhaler 2020 at Unknown time  No Yes   Sig: Inhale 1 puff daily Rinse mouth after use     furosemide (LASIX) 20 mg tablet Past Month at Unknown time Self No Yes   Sig: Take 1 tablet (20 mg total) by mouth daily as needed (weight gain or edema)   ipratropium-albuterol (DUO-NEB) 0 5-2 5 mg/3 mL nebulizer solution 7/16/2020 at Unknown time  No Yes   Sig: Take 1 vial (3 mL total) by nebulization every 6 (six) hours as needed for wheezing or shortness of breath   lisinopril (ZESTRIL) 20 mg tablet 7/16/2020 at Unknown time  No Yes   Sig: take 1 tablet by mouth twice a day   predniSONE 10 mg tablet Not Taking at Unknown time  No No   Sig: Take 3 tabs BID X 2 days, 2 tabs BID X 2 days, 1 tab BID X 2 days, 1 tab daily X 2 days   Patient not taking: Reported on 7/16/2020      Facility-Administered Medications: None       Past Medical History:   Diagnosis Date    Asthma     [facility]     [facility]     Mumps     Old MI (myocardial infarction)     Pneumonia     [facility]     Sleep apnea        Past Surgical History:   Procedure Laterality Date    CARDIAC CATHETERIZATION  07/24/2015    Left main- normal and maidly tortuous  Circumflex - normal and moderately tortuous  RCA- normal and mildy tortuous  Global LV function was severely depressed  Jimmie Hendry INGUINAL HERNIA REPAIR Bilateral     [place] COLONOSCOPY FLX DX W/COLLJ SPEC WHEN PFRMD N/A 3/11/2019    Procedure: COLONOSCOPY with removal of anal papilla; Surgeon: Wilber Lamb MD;  [facility];  Service: Colorectal    TONSILLECTOMY      UMBILICAL HERNIA REPAIR  06/21/2006       Family History   Problem Relation Age of Onset    Heart attack Father         MI    Diabetes Sister         DM    Arthritis Family     Coronary artery disease Family     Hypertension Family     Tuberculosis Son      I have reviewed and agree with the history as documented      E-Cigarette/Vaping    E-Cigarette Use Never User      E-Cigarette/Vaping Substances    Nicotine No     THC No     CBD No     Flavoring No     Other No     Unknown No      Social History     Tobacco Use    Smoking status: Former Smoker     Packs/day: 3 00     Years: 30 00     Pack years: 90 00    Smokeless tobacco: Never Used   Substance Use Topics    Alcohol use: Yes     Comment: rarely    Drug use: Yes     Types: Marijuana       Review of Systems   Constitutional: Negative  Negative for activity change, appetite change, chills, diaphoresis, fatigue, fever and unexpected weight change  HENT: Negative  Negative for sore throat, trouble swallowing and voice change  Eyes: Negative  Respiratory: Negative  Negative for cough, chest tightness, shortness of breath and wheezing  Cardiovascular: Negative  Negative for chest pain, palpitations and leg swelling  Gastrointestinal: Negative  Negative for abdominal pain, blood in stool, nausea and vomiting  Endocrine: Negative  Genitourinary: Negative  Negative for flank pain and hematuria  Musculoskeletal: Negative  Negative for arthralgias, back pain, gait problem, joint swelling, myalgias, neck pain and neck stiffness  Skin: Positive for wound  Negative for rash  Right 5th finger wound   Allergic/Immunologic: Negative  Neurological: Negative  Negative for dizziness, seizures, syncope, weakness, light-headedness and headaches  Hematological: Negative  Psychiatric/Behavioral: Negative  All other systems reviewed and are negative  Physical Exam  Physical Exam   Constitutional: He is oriented to person, place, and time  He appears well-developed and well-nourished  No distress  HENT:   Head: Normocephalic and atraumatic  Eyes: Pupils are equal, round, and reactive to light  EOM are normal    Cardiovascular: Normal rate  Pulmonary/Chest: Effort normal    Musculoskeletal: He exhibits tenderness  He exhibits no edema or deformity  Neurological: He is alert and oriented to person, place, and time  Skin: Skin is warm   Capillary refill takes less than 2 seconds  No rash noted  He is not diaphoretic  No erythema  No pallor  Apparently both low-grade venous oozing mixed with arterial bleeding of the right 5th distal phalanx  Vitals reviewed        Vital Signs  ED Triage Vitals [07/16/20 1457]   Temperature Pulse Respirations Blood Pressure SpO2   (!) 97 3 °F (36 3 °C) 67 18 152/74 96 %      Temp Source Heart Rate Source Patient Position - Orthostatic VS BP Location FiO2 (%)   Temporal -- -- -- --      Pain Score       2           Vitals:    07/16/20 1457   BP: 152/74   Pulse: 67         Visual Acuity      ED Medications  Medications   sodium chloride 0 9 % bolus 1,000 mL (0 mL Intravenous Stopped 7/16/20 1645)   tetanus-diphtheria-acellular pertussis (BOOSTRIX) IM injection 0 5 mL (0 5 mL Intramuscular Given 7/16/20 1627)   ceFAZolin (ANCEF) IVPB (premix) 2,000 mg 50 mL (0 mg Intravenous Stopped 7/16/20 1628)       Diagnostic Studies  Results Reviewed     Procedure Component Value Units Date/Time    Basic metabolic panel [[id number]]  (Abnormal) Collected:  07/16/20 1550    Lab Status:  Final result Specimen:  Blood from Arm, Left Updated:  07/16/20 1615     Sodium 137 mmol/L      Potassium 4 3 mmol/L      Chloride 106 mmol/L      CO2 23 mmol/L      ANION GAP 8 mmol/L      BUN 28 mg/dL      Creatinine 1 22 mg/dL      Glucose 96 mg/dL      Calcium 9 4 mg/dL      eGFR 63 ml/min/1 73sq m     Narrative:       [place] guidelines for Chronic Kidney Disease (CKD):     Stage 1 with normal or high GFR (GFR > 90 mL/min/1 73 square meters)    Stage 2 Mild CKD (GFR = 60-89 mL/min/1 73 square meters)    Stage 3A Moderate CKD (GFR = 45-59 mL/min/1 73 square meters)    Stage 3B Moderate CKD (GFR = 30-44 mL/min/1 73 square meters)    Stage 4 Severe CKD (GFR = 15-29 mL/min/1 73 square meters)    Stage 5 End Stage CKD (GFR <15 mL/min/1 73 square meters)  Note: GFR calculation is accurate only with a steady state creatinine    Protime-INR [[id number]]  (Normal) Collected:  07/16/20 1550    Lab Status:  Final result Specimen:  Blood from Arm, Left Updated:  07/16/20 1608     Protime 13 3 seconds      INR 1 02    APTT [[id number]]  (Normal) Collected:  07/16/20 1550    Lab Status:  Final result Specimen:  Blood from Arm, Left Updated:  07/16/20 1608     PTT 33 seconds     CBC and differential [[id number]]  (Abnormal) Collected:  07/16/20 1550    Lab Status:  Final result Specimen:  Blood from Arm, Left Updated:  07/16/20 1557     WBC 9 10 Thousand/uL      RBC 4 89 Million/uL      Hemoglobin 14 0 g/dL      Hematocrit 41 9 %      MCV 86 fL      MCH 28 6 pg      MCHC 33 4 g/dL      RDW 15 2 %      MPV 7 6 fL      Platelets 052 Thousands/uL      Neutrophils Relative 67 %      Lymphocytes Relative 20 %      Monocytes Relative 7 %      Eosinophils Relative 5 %      Basophils Relative 1 %      Neutrophils Absolute 6 10 Thousands/µL      Lymphocytes Absolute 1 80 Thousands/µL      Monocytes Absolute 0 70 Thousand/µL      Eosinophils Absolute 0 40 Thousand/µL      Basophils Absolute 0 10 Thousands/µL                  XR finger fifth digit-pinkie RIGHT   Final Result by Chuck Bowen DO (07/16 1614)      No fracture identified  Workstation performed: [id number]                    Procedures  Procedures         ED Course  ED Course as of Jul 16 1947   Thu Jul 16, 2020   1556 Unable to control bleeding with direct pressure therefore rubber band was wrapped around the finger distally along with direct pressure  1631 Sodium: 137   1631 Potassium: 4 3   1631 CO2: 23   1631 Calcium: 9 4   1631 eGFR: 63   1631 INR: 1 02   1631 PTT: 33   1631 WBC: 9 10   1631 Hemoglobin: 14 0   1631 Platelet Count: 413   5120 FINDINGS:     There is no acute fracture or dislocation      No significant degenerative changes      No lytic or blastic osseous lesion      Soft tissues are unremarkable      IMPRESSION:     No fracture identified        [facility]  BETADINE, HEMOSDTASIS ACHIEVED, SURGICAL FOAM PLACED TO WOUND, FOLLOWED BY NONADHERANT DRESSING  US AUDIT      Most Recent Value   Initial Alcohol Screen: US AUDIT-C    1  How often do you have a drink containing alcohol? 6 Filed at: 07/16/2020 1458   2  How many drinks containing alcohol do you have on a typical day you are drinking? 1 Filed at: 07/16/2020 1458   3a  Male UNDER 65: How often do you have five or more drinks on one occasion? 0 Filed at: 07/16/2020 1458   3b  FEMALE Any Age, or MALE 65+: How often do you have 4 or more drinks on one occassion? 0 Filed at: 07/16/2020 1458   Audit-C Score  7 Filed at: 07/16/2020 1458                  MAURY/DAST-10      Most Recent Value   How many times in the past year have you    Used an illegal drug or used a prescription medication for non-medical reasons? Never Filed at: 07/16/2020 1458                                MDM      Disposition  Final diagnoses:   Finger avulsion, initial encounter     Time reflects when diagnosis was documented in both MDM as applicable and the Disposition within this note     Time User Action Codes Description Comment    7/16/2020  5:29 PM Bubba Mariee Add [S61 209A] Finger avulsion, initial encounter       ED Disposition     ED Disposition Condition Date/Time Comment    Discharge Stable Thu Jul 16, 2020  5:29 PM Tico Granados discharge to home/self care              Follow-up Information     Follow up With Specialties Details Why Contact Info    Tyron Crocker MD Orthopedic Surgery, Hand Surgery, Orthopedics Schedule an appointment as soon as possible for a visit   [address] [address]  [address]            Discharge Medication List as of 7/16/2020  5:45 PM      START taking these medications    Details   cephalexin (KEFLEX) 500 mg capsule Take 1 capsule (500 mg total) by mouth every 8 (eight) hours for 7 days, Starting Thu 7/16/2020, Until Thu 7/23/2020, Normal         CONTINUE these medications which have NOT CHANGED    Details   ALPRAZolam (XANAX) 0 5 mg tablet Take 1 tablet (0 5 mg total) by mouth daily at bedtime as needed for anxiety, Starting Thu 3/26/2020, Normal      aspirin 325 mg tablet Take 1 tablet by mouth daily, Historical Med      ATROVENT HFA 17 MCG/ACT inhaler inhale 1 puff by mouth four times a day, Normal      carvedilol (COREG) 12 5 mg tablet take 1 tablet by mouth twice a day with meals, Normal      fluticasone-vilanterol (BREO ELLIPTA) 200-25 MCG/INH inhaler Inhale 1 puff daily Rinse mouth after use , Starting Wed 8/21/2019, Normal      furosemide (LASIX) 20 mg tablet Take 1 tablet (20 mg total) by mouth daily as needed (weight gain or edema), Starting Thu 3/29/2018, Normal      ipratropium-albuterol (DUO-NEB) 0 5-2 5 mg/3 mL nebulizer solution Take 1 vial (3 mL total) by nebulization every 6 (six) hours as needed for wheezing or shortness of breath, Starting Wed 8/21/2019, Normal      lisinopril (ZESTRIL) 20 mg tablet take 1 tablet by mouth twice a day, Normal      TUDORZA PRESSAIR 400 MCG/ACT inhaler inhale 1 puff by mouth twice a day, Normal      DULoxetine (CYMBALTA) 30 mg delayed release capsule Take 1 capsule (30 mg total) by mouth daily, Starting Mon 2/24/2020, Normal      fluticasone (FLONASE) 50 mcg/act nasal spray 1 spray into each nostril daily, Starting Wed 3/11/2020, Normal      predniSONE 10 mg tablet Take 3 tabs BID X 2 days, 2 tabs BID X 2 days, 1 tab BID X 2 days, 1 tab daily X 2 days, Normal      Promethazine-DM (PHENERGAN-DM) 6 25-15 mg/5 mL oral syrup Take 5 mL by mouth 4 (four) times a day as needed for cough, Starting Mon 2/24/2020, Normal           No discharge procedures on file      PDMP Review       Value Time User    PDMP Reviewed  Yes 3/26/2020  3:41 PM Luis Carlos Durand DO          ED Provider  Electronically Signed by           Dipti Chahal PA-C  07/16/20 1947

## 2020-07-16 NOTE — PROGRESS NOTES
[address] [facility] [place]          NAME: Crist Hammans is a 63 y o  male  : 1955    MRN: [id number]  DATE: 2020  TIME: 9:26 AM    Assessment and Plan   Avulsion of finger, initial encounter [S61 209A]  1  Avulsion of finger, initial encounter  Transfer to other facility       Patient Instructions   Pulsating avulsion of right 5th fingertip, patient redressed and to proceed to ER  Patient's wife to drive patient to ER for further workup  Proceed to Er  Chief Complaint     Chief Complaint   Patient presents with    Laceration         History of Present Illness   Crist Hammans presents to the clinic with wife c/o    Patient presents to the clinic with a right 5th finger avulsion while working with a   This happened around an hour ago  He reports he cut part of his finger off  He has good ROM  Bleeding not controlled  Not up to date on tetanus  Some distal finger numbness  No finger nail involvement  Not light headed or dizzy  Does take asa  Review of Systems   Review of Systems   Constitutional: Negative for chills, fatigue and fever  Respiratory: Negative for cough, chest tightness, shortness of breath and wheezing  Cardiovascular: Negative for chest pain and palpitations  Skin: Positive for wound  Neurological: Negative for dizziness and headaches  Hematological: Negative for adenopathy           Current Medications     Long-Term Medications   Medication Sig Dispense Refill    ALPRAZolam (XANAX) 0 5 mg tablet Take 1 tablet (0 5 mg total) by mouth daily at bedtime as needed for anxiety 30 tablet 0    aspirin 325 mg tablet Take 1 tablet by mouth daily      ATROVENT HFA 17 MCG/ACT inhaler inhale 1 puff by mouth four times a day 12 9 g 0    carvedilol (COREG) 12 5 mg tablet take 1 tablet by mouth twice a day with meals 60 tablet 11    DULoxetine (CYMBALTA) 30 mg delayed release capsule Take 1 capsule (30 mg total) by mouth daily 30 capsule 5    fluticasone-vilanterol (BREO ELLIPTA) 200-25 MCG/INH inhaler Inhale 1 puff daily Rinse mouth after use  3 Inhaler 3    furosemide (LASIX) 20 mg tablet Take 1 tablet (20 mg total) by mouth daily as needed (weight gain or edema) 90 tablet 0    ipratropium-albuterol (DUO-NEB) 0 5-2 5 mg/3 mL nebulizer solution Take 1 vial (3 mL total) by nebulization every 6 (six) hours as needed for wheezing or shortness of breath 90 vial 3    lisinopril (ZESTRIL) 20 mg tablet take 1 tablet by mouth twice a day 60 tablet 11    TUDORZA PRESSAIR 400 MCG/ACT inhaler inhale 1 puff by mouth twice a day 3 Inhaler 2    fluticasone (FLONASE) 50 mcg/act nasal spray 1 spray into each nostril daily (Patient not taking: Reported on 3/12/2020) 1 Bottle 5       Current Allergies     Allergies as of 07/16/2020 - Reviewed 07/16/2020   Allergen Reaction Noted    Ciprofloxacin Shortness Of Breath and Diarrhea 12/18/2018            The following portions of the patient's history were reviewed and updated as appropriate: allergies, current medications, past family history, past medical history, past social history, past surgical history and problem list   Past Medical History:   Diagnosis Date    Asthma     [facility]     [facility]     Mumps     Old MI (myocardial infarction)     Pneumonia     [facility]     Sleep apnea      Past Surgical History:   Procedure Laterality Date    CARDIAC CATHETERIZATION  07/24/2015    Left main- normal and maidly tortuous  Circumflex - normal and moderately tortuous  RCA- normal and mildy tortuous  Global LV function was severely depressed  [facility] INGUINAL HERNIA REPAIR Bilateral     [place] COLONOSCOPY FLX DX W/COLLJ SPEC WHEN PFRMD N/A 3/11/2019    Procedure: COLONOSCOPY with removal of anal papilla;   Surgeon: Yakov Casiano MD;  [facility];  Service: Colorectal    TONSILLECTOMY      UMBILICAL HERNIA REPAIR 06/21/2006     Social History     Socioeconomic History    Marital status: /Civil Union     Spouse name: Not on file    Number of children: Not on file    Years of education: Not on file    Highest education level: Not on file   Occupational History    Not on file   Social Needs    Financial resource strain: Not on file    Food insecurity:     Worry: Not on file     Inability: Not on file    Transportation needs:     Medical: Not on file     Non-medical: Not on file   Tobacco Use    Smoking status: Former Smoker     Packs/day: 3 00     Years: 30 00     Pack years: 90 00    Smokeless tobacco: Never Used   Substance and Sexual Activity    Alcohol use: Yes     Comment: rarely    Drug use: Yes     Types: Marijuana    Sexual activity: Not on file   Lifestyle    Physical activity:     Days per week: Not on file     Minutes per session: Not on file    Stress: Not on file   Relationships    Social connections:     Talks on phone: Not on file     Gets together: Not on file     Attends [employer] service: Not on file     Active member of club or organization: Not on file     Attends meetings of clubs or organizations: Not on file     Relationship status: Not on file    Intimate partner violence:     Fear of current or ex partner: Not on file     Emotionally abused: Not on file     Physically abused: Not on file     Forced sexual activity: Not on file   Other Topics Concern    Not on file   Social History Narrative    Caffeine use       Objective   /70   Pulse 78   Temp 98 3 °F (36 8 °C)   Resp 16   Ht 6' 2" (1 88 m)   Wt 112 kg (247 lb)   SpO2 96%   BMI 31 71 kg/m²      Physical Exam     Physical Exam   Constitutional: He appears well-developed and well-nourished  No distress  HENT:   Head: Normocephalic and atraumatic  Right Ear: External ear normal    Left Ear: External ear normal    Nose: Nose normal    Eyes: Conjunctivae are normal  Right eye exhibits no discharge   Left eye exhibits no discharge  No scleral icterus  Cardiovascular: Normal rate, regular rhythm and normal heart sounds  Exam reveals no gallop and no friction rub  No murmur heard  Pulmonary/Chest: Effort normal and breath sounds normal  No stridor  No respiratory distress  He has no wheezes  He has no rales  He exhibits no tenderness  Musculoskeletal:        Right hand: He exhibits normal range of motion  Hands:  Skin: Skin is warm  He is not diaphoretic  Nursing note and vitals reviewed        En De La Fuente PA-C

## 2020-07-16 NOTE — ED NOTES
[facility] presented from [facility] with injury to right 5th digit  Was sent here due to extensive nature of injury to digit  Wrapped heavily in gauze and janak as pt states bleeding would not stop "they think maybe I nicked an artery because it was pulsating" Dressing intact from [facility] and not soiled at this time        Anat Alvarado, RN  07/16/20 0954

## 2020-07-20 ENCOUNTER — OFFICE VISIT (OUTPATIENT)
Dept: URGENT CARE | Facility: CLINIC | Age: 65
End: 2020-07-20
Payer: COMMERCIAL

## 2020-07-20 VITALS
BODY MASS INDEX: 31.7 KG/M2 | SYSTOLIC BLOOD PRESSURE: 125 MMHG | HEIGHT: 74 IN | RESPIRATION RATE: 20 BRPM | DIASTOLIC BLOOD PRESSURE: 60 MMHG | HEART RATE: 69 BPM | OXYGEN SATURATION: 97 % | TEMPERATURE: 97 F | WEIGHT: 247 LBS

## 2020-07-20 DIAGNOSIS — Z51.89 VISIT FOR WOUND CHECK: Primary | ICD-10-CM

## 2020-07-20 DIAGNOSIS — J44.9 CHRONIC OBSTRUCTIVE PULMONARY DISEASE, UNSPECIFIED COPD TYPE (HCC): ICD-10-CM

## 2020-07-20 PROCEDURE — S9088 SERVICES PROVIDED IN URGENT: HCPCS | Performed by: NURSE PRACTITIONER

## 2020-07-20 PROCEDURE — 99212 OFFICE O/P EST SF 10 MIN: CPT | Performed by: NURSE PRACTITIONER

## 2020-07-20 RX ORDER — IPRATROPIUM BROMIDE 17 UG/1
AEROSOL, METERED RESPIRATORY (INHALATION)
Qty: 12.9 G | Refills: 0 | Status: SHIPPED | OUTPATIENT
Start: 2020-07-20 | End: 2020-09-30

## 2020-07-20 NOTE — PATIENT INSTRUCTIONS
Use the bacitracin and nonstick dressing (telfa + gauze the next few days, then bandaid is ok) daily  Cleanse very gently/gingerly  Finish the keflex as ordered  Skin Avulsion   WHAT YOU NEED TO KNOW:   Skin avulsion is a wound that happens when skin is torn from your body during an accident or other injury  The torn skin may be lost or too damaged to be repaired, and it must be removed  A wound of this type cannot be stitched closed because there is tissue missing  Avulsion wounds are usually bigger and have more scars because of the missing tissue  DISCHARGE INSTRUCTIONS:   Medicines:   · Antibiotic ointment:  Your healthcare provider may tell you to gently rub a topical antibiotic ointment on your wound  This will help prevent an infection and help your wound heal faster  · Pain medicine: You may be given medicine to take away or decrease pain  Do not wait until the pain is severe before you take your medicine  · NSAIDs , such as ibuprofen, help decrease swelling, pain, and fever  This medicine is available with or without a doctor's order  NSAIDs can cause stomach bleeding or kidney problems in certain people  If you take blood thinner medicine, always ask if NSAIDs are safe for you  Always read the medicine label and follow directions  Do not give these medicines to children under 10months of age without direction from your child's healthcare provider  · Take your medicine as directed  Contact your healthcare provider if you think your medicine is not helping or if you have side effects  Tell him of her if you are allergic to any medicine  Keep a list of the medicines, vitamins, and herbs you take  Include the amounts, and when and why you take them  Bring the list or the pill bottles to follow-up visits  Carry your medicine list with you in case of an emergency  Care for your wound:  Avulsion wounds may take longer to heal because they cannot be closed with tape or stitches   Keep your wound clean and protected to prevent infection and speed healing  · Clean your wound:  Wash your hands with soap and water before and after you care for your wound  You may be able to use a soft cloth to gently clean the wound after the first 24 to 48 hours  After that, gently clean the wound once or twice a day with cool water  Do not soak your wound  Use soap to clean around the wound, but try not to get any on the wound itself  Do not use alcohol or hydrogen peroxide to clean your wound unless you are directed to  Gently pat the area dry and reapply the bandage as directed  · Elevate your wound:  Prop your injured area on pillows to raise it above the level of your heart  This will help reduce pain and swelling  Do this for 30 minutes at a time, as often as you can  · Bandage your wound:  Bandages keep your wound clean, dry, and protected from infection  They may also prevent swelling  Use a bandage that does not stick to your wound, and has a spongy layer to absorb fluids  Leave your bandage on as long as directed  Ask your healthcare provider when and how to change your bandage  Do not wrap the bandage too tightly  This could cut off blood flow and cause more injury  · Use cool compresses:  Wet a washcloth or towel with cool water and hold it on your wound as directed  Ask how often to apply the compress and for how long each time  · Reduce scarring:  Avoid direct sunlight on your wound  Sunlight may burn or change the color of the new skin over your wound  Use sunscreen (SPF 30 or higher) on the new skin for at least 1 year after it heals  Support for leg and arm wounds: You may need to use crutches if the wound is on your leg  You may need to use a sling if the wound is on your arm  Crutches and slings help protect the injured area, prevent further injury, and heal the area in the right position  Follow up with your healthcare provider within 2 days or as directed:   If you have stitches, ask when to return to have them removed  Write down your questions so you remember to ask them during your visits  Contact your healthcare provider if:   · You have new pain, or it gets worse  · You have trouble moving the injured body area  · Your wound splits open or does not seem to be healing  Return to the emergency department if:   · You have a fever  · You have painful swelling, redness, or warmth around your wound  · Your wound is red and there are red streaks on your skin starting at your wound and moving upward  · Your wound is draining pus  · You have heavy bleeding or bleeding that does not stop after 10 minutes of holding firm, direct pressure over the wound  · You feel like there is an object stuck in your wound  © 2017 [address] [employer] Information is for End User's use only and may not be sold, redistributed or otherwise used for commercial purposes  All illustrations and images included in CareNotes® are the copyrighted property of [employer]  or Jorgito Hicks  The above information is an  only  It is not intended as medical advice for individual conditions or treatments  Talk to your doctor, nurse or pharmacist before following any medical regimen to see if it is safe and effective for you

## 2020-07-22 NOTE — PROGRESS NOTES
[facility]        NAME: Kayla Garcia is a 63 y o  male  : 1955    MRN: [id number]  DATE: 2020  TIME: 6:06 PM    Assessment and Plan   Visit for wound check [Z51 89]  1  Visit for wound check       Remaining gauze carefully cut with scissors to loosen then unrolled  The gel foam came off in one intact piece with the gauze when removed  The pinky has an intact scab and no active bleeding  Bacitracin, telfa, roll gauze applied  Explained to patient that I recommend using the bacitracin + telfa+roll gauze for the next several days to that bleeding does not happen if the finger gets accidentally bumped  After another 4-5 days, he can switch to just covering with bandaid(s) until healed  Patient Instructions     Patient Instructions   Use the bacitracin and nonstick dressing (telfa + gauze the next few days, then bandaid is ok) daily  Cleanse very gently/gingerly  Finish the keflex as ordered  Skin Avulsion   WHAT YOU NEED TO KNOW:   Skin avulsion is a wound that happens when skin is torn from your body during an accident or other injury  The torn skin may be lost or too damaged to be repaired, and it must be removed  A wound of this type cannot be stitched closed because there is tissue missing  Avulsion wounds are usually bigger and have more scars because of the missing tissue  DISCHARGE INSTRUCTIONS:   Medicines:   · Antibiotic ointment:  Your healthcare provider may tell you to gently rub a topical antibiotic ointment on your wound  This will help prevent an infection and help your wound heal faster  · Pain medicine: You may be given medicine to take away or decrease pain  Do not wait until the pain is severe before you take your medicine  · NSAIDs , such as ibuprofen, help decrease swelling, pain, and fever  This medicine is available with or without a doctor's order  NSAIDs can cause stomach bleeding or kidney problems in certain people   If you take blood thinner medicine, always ask if NSAIDs are safe for you  Always read the medicine label and follow directions  Do not give these medicines to children under 10months of age without direction from your child's healthcare provider  · Take your medicine as directed  Contact your healthcare provider if you think your medicine is not helping or if you have side effects  Tell him of her if you are allergic to any medicine  Keep a list of the medicines, vitamins, and herbs you take  Include the amounts, and when and why you take them  Bring the list or the pill bottles to follow-up visits  Carry your medicine list with you in case of an emergency  Care for your wound:  Avulsion wounds may take longer to heal because they cannot be closed with tape or stitches  Keep your wound clean and protected to prevent infection and speed healing  · Clean your wound:  Wash your hands with soap and water before and after you care for your wound  You may be able to use a soft cloth to gently clean the wound after the first 24 to 48 hours  After that, gently clean the wound once or twice a day with cool water  Do not soak your wound  Use soap to clean around the wound, but try not to get any on the wound itself  Do not use alcohol or hydrogen peroxide to clean your wound unless you are directed to  Gently pat the area dry and reapply the bandage as directed  · Elevate your wound:  Prop your injured area on pillows to raise it above the level of your heart  This will help reduce pain and swelling  Do this for 30 minutes at a time, as often as you can  · Bandage your wound:  Bandages keep your wound clean, dry, and protected from infection  They may also prevent swelling  Use a bandage that does not stick to your wound, and has a spongy layer to absorb fluids  Leave your bandage on as long as directed  Ask your healthcare provider when and how to change your bandage  Do not wrap the bandage too tightly   This could cut off blood flow and cause more injury  · Use cool compresses:  Wet a washcloth or towel with cool water and hold it on your wound as directed  Ask how often to apply the compress and for how long each time  · Reduce scarring:  Avoid direct sunlight on your wound  Sunlight may burn or change the color of the new skin over your wound  Use sunscreen (SPF 30 or higher) on the new skin for at least 1 year after it heals  Support for leg and arm wounds: You may need to use crutches if the wound is on your leg  You may need to use a sling if the wound is on your arm  Crutches and slings help protect the injured area, prevent further injury, and heal the area in the right position  Follow up with your healthcare provider within 2 days or as directed: If you have stitches, ask when to return to have them removed  Write down your questions so you remember to ask them during your visits  Contact your healthcare provider if:   · You have new pain, or it gets worse  · You have trouble moving the injured body area  · Your wound splits open or does not seem to be healing  Return to the emergency department if:   · You have a fever  · You have painful swelling, redness, or warmth around your wound  · Your wound is red and there are red streaks on your skin starting at your wound and moving upward  · Your wound is draining pus  · You have heavy bleeding or bleeding that does not stop after 10 minutes of holding firm, direct pressure over the wound  · You feel like there is an object stuck in your wound  © 2017 [address] Information is for End User's use only and may not be sold, redistributed or otherwise used for commercial purposes  All illustrations and images included in CareNotes® are the copyrighted property of [employer]  or Jorgito Hicks  The above information is an  only  It is not intended as medical advice for individual conditions or treatments  Talk to your doctor, nurse or pharmacist before following any medical regimen to see if it is safe and effective for you  Follow up with PCP in 3-5 days  Proceed to  ER if symptoms worsen  Chief Complaint     Chief Complaint   Patient presents with    Wound Check     avulsion of right 5th finger 3 days ago, wants dressing change         History of Present Illness       Patient presents for initial dressing change of right pinky skin avulsion  He was seen at urgent care and transferred to ER due to arterial bleed  ER was able to get the bleeding stopped with tourniquet, pressure and betadine soak (see both encounter records from 7/16); gel foam applied  Today, nurse stated she was able to remove majority of dressing, but some of the inner gauze layers were stuck, so she placed patient in a sterile saline soak to loosen the gauze before I saw patient  Patient states his wife (not present today) is able to help with dressing changes  He just wanted it checked for the first one to make sure it was healing ok  Review of Systems   Review of Systems   Skin: Positive for wound  All other systems reviewed and are negative          Current Medications       Current Outpatient Medications:     ALPRAZolam (XANAX) 0 5 mg tablet, Take 1 tablet (0 5 mg total) by mouth daily at bedtime as needed for anxiety, Disp: 30 tablet, Rfl: 0    aspirin 325 mg tablet, Take 1 tablet by mouth daily, Disp: , Rfl:     ATROVENT HFA 17 MCG/ACT inhaler, inhale 1 puff by mouth four times a day, Disp: 12 9 g, Rfl: 0    carvedilol (COREG) 12 5 mg tablet, take 1 tablet by mouth twice a day with meals, Disp: 60 tablet, Rfl: 11    cephalexin (KEFLEX) 500 mg capsule, Take 1 capsule (500 mg total) by mouth every 8 (eight) hours for 7 days, Disp: 21 capsule, Rfl: 0    DULoxetine (CYMBALTA) 30 mg delayed release capsule, Take 1 capsule (30 mg total) by mouth daily, Disp: 30 capsule, Rfl: 5    fluticasone (FLONASE) 50 mcg/act nasal spray, 1 spray into each nostril daily, Disp: 1 Bottle, Rfl: 5    fluticasone-vilanterol (BREO ELLIPTA) 200-25 MCG/INH inhaler, Inhale 1 puff daily Rinse mouth after use , Disp: 3 Inhaler, Rfl: 3    furosemide (LASIX) 20 mg tablet, Take 1 tablet (20 mg total) by mouth daily as needed (weight gain or edema), Disp: 90 tablet, Rfl: 0    ipratropium-albuterol (DUO-NEB) 0 5-2 5 mg/3 mL nebulizer solution, Take 1 vial (3 mL total) by nebulization every 6 (six) hours as needed for wheezing or shortness of breath, Disp: 90 vial, Rfl: 3    lisinopril (ZESTRIL) 20 mg tablet, take 1 tablet by mouth twice a day, Disp: 60 tablet, Rfl: 11    TUDORZA PRESSAIR 400 MCG/ACT inhaler, inhale 1 puff by mouth twice a day, Disp: 3 Inhaler, Rfl: 2    predniSONE 10 mg tablet, Take 3 tabs BID X 2 days, 2 tabs BID X 2 days, 1 tab BID X 2 days, 1 tab daily X 2 days (Patient not taking: Reported on 7/16/2020), Disp: 26 tablet, Rfl: 0    Promethazine-DM (PHENERGAN-DM) 6 25-15 mg/5 mL oral syrup, Take 5 mL by mouth 4 (four) times a day as needed for cough (Patient not taking: Reported on 7/20/2020), Disp: 240 mL, Rfl: 0    Current Allergies     Allergies as of 07/20/2020 - Reviewed 07/20/2020   Allergen Reaction Noted    Ciprofloxacin Shortness Of Breath and Diarrhea 12/18/2018            The following portions of the patient's history were reviewed and updated as appropriate: allergies, current medications, past family history, past medical history, past social history, past surgical history and problem list      Past Medical History:   Diagnosis Date    Asthma     [facility]     [facility] [address] [facility]     Mumps     Old MI (myocardial infarction)     Pneumonia     [facility] emphysema [facility] [address] [facility]     Sleep apnea        Past Surgical History:   Procedure Laterality Date    CARDIAC CATHETERIZATION  07/24/2015    Left main- normal and maidly tortuous    Circumflex - normal and moderately tortuous  RCA- normal and mildy tortuous  Global LV function was severely depressed  Marlaine Jose Francisco INGUINAL HERNIA REPAIR Bilateral     [place] COLONOSCOPY FLX DX W/COLLJ SPEC WHEN PFRMD N/A 3/11/2019    Procedure: COLONOSCOPY with removal of anal papilla; Surgeon: Lashawn Cobb MD;  Location: [facility];  Service: Colorectal    TONSILLECTOMY      UMBILICAL HERNIA REPAIR  06/21/2006       Family History   Problem Relation Age of Onset    Heart attack Father         MI    Diabetes Sister         DM    Arthritis Family     Coronary artery disease Family     Hypertension Family     Tuberculosis Son          Medications have been verified  Objective   /60   Pulse 69   Temp (!) 97 °F (36 1 °C) (Tympanic)   Resp 20   Ht 6' 2" (1 88 m)   Wt 112 kg (247 lb)   SpO2 97%   BMI 31 71 kg/m²        Physical Exam     Physical Exam   Constitutional: He is oriented to person, place, and time  He appears well-developed and well-nourished  No distress  HENT:   Head: Normocephalic and atraumatic  Pulmonary/Chest: Effort normal  No respiratory distress  Abdominal: Soft  He exhibits no distension  Musculoskeletal: Normal range of motion  Right hand: He exhibits tenderness and laceration (avulsion with scab intact on tip of 5th digit  No true damage to nail--tiny sliver of tip missing, but no deeper than a regular nail trim)  He exhibits normal range of motion, no bony tenderness, normal two-point discrimination, normal capillary refill, no deformity and no swelling  Hands:  Neurological: He is alert and oriented to person, place, and time  Skin: Skin is warm and dry  Capillary refill takes less than 2 seconds  Laceration (tip of right 5th finger/ healing avulsion) noted  He is not diaphoretic  Psychiatric: He has a normal mood and affect  His behavior is normal  Judgment and thought content normal    Nursing note and vitals reviewed

## 2020-08-14 ENCOUNTER — OFFICE VISIT (OUTPATIENT)
Dept: URGENT CARE | Facility: CLINIC | Age: 65
End: 2020-08-14
Payer: COMMERCIAL

## 2020-08-14 VITALS
RESPIRATION RATE: 18 BRPM | OXYGEN SATURATION: 97 % | HEART RATE: 61 BPM | SYSTOLIC BLOOD PRESSURE: 168 MMHG | TEMPERATURE: 97.5 F | DIASTOLIC BLOOD PRESSURE: 81 MMHG

## 2020-08-14 DIAGNOSIS — L08.9 WOUND INFECTION: Primary | ICD-10-CM

## 2020-08-14 DIAGNOSIS — T14.8XXA WOUND INFECTION: Primary | ICD-10-CM

## 2020-08-14 PROCEDURE — S9088 SERVICES PROVIDED IN URGENT: HCPCS | Performed by: PHYSICIAN ASSISTANT

## 2020-08-14 PROCEDURE — 99213 OFFICE O/P EST LOW 20 MIN: CPT | Performed by: PHYSICIAN ASSISTANT

## 2020-08-14 RX ORDER — AMOXICILLIN AND CLAVULANATE POTASSIUM 875; 125 MG/1; MG/1
1 TABLET, FILM COATED ORAL EVERY 12 HOURS SCHEDULED
Qty: 14 TABLET | Refills: 0 | Status: SHIPPED | OUTPATIENT
Start: 2020-08-14 | End: 2020-08-21

## 2020-08-14 NOTE — PROGRESS NOTES
[facility]        NAME: Sana Vega is a 63 y o  male  : 1955    MRN: [id number]  DATE: 2020  TIME: 9:01 AM    Assessment and Plan   Wound infection [T14  8XXA, L08 9]  1  Wound infection  amoxicillin-clavulanate (AUGMENTIN) 875-125 mg per tablet         Patient Instructions     Augmentin as prescribed  Wash with soap and water twice per day, apply petroleum jelly and change bandage  Follow up with PCP for wound check   Tylenol and Ibuprofen for pain  Follow up with PCP in 3-5 days  Proceed to  ER if symptoms worsen  Chief Complaint     Chief Complaint   Patient presents with    Toe Pain     Pt c/o left great toe pain and swelling for two days  History of Present Illness       Patient states he has been struggling with eczema over his left foot  Recently his dog has been licking and gnawing at the wounds  Within the last few days he noticed pain, redness, and swelling  He has been applying cream, taking Tylenol, clean the wounds, and applying a bandage  Denies fever or chills  Tetanus up-to-date  Denies history of gout  Review of Systems   Review of Systems   Constitutional: Negative for chills and fever  Musculoskeletal: Positive for joint swelling  Skin: Positive for color change  Neurological: Negative for weakness and numbness           Current Medications       Current Outpatient Medications:     ALPRAZolam (XANAX) 0 5 mg tablet, Take 1 tablet (0 5 mg total) by mouth daily at bedtime as needed for anxiety, Disp: 30 tablet, Rfl: 0    amoxicillin-clavulanate (AUGMENTIN) 875-125 mg per tablet, Take 1 tablet by mouth every 12 (twelve) hours for 7 days, Disp: 14 tablet, Rfl: 0    aspirin 325 mg tablet, Take 1 tablet by mouth daily, Disp: , Rfl:     ATROVENT HFA 17 MCG/ACT inhaler, inhale 1 puff by mouth four times a day, Disp: 12 9 g, Rfl: 0    carvedilol (COREG) 12 5 mg tablet, take 1 tablet by mouth twice a day with meals, Disp: 60 tablet, Rfl: 11    DULoxetine (CYMBALTA) 30 mg delayed release capsule, Take 1 capsule (30 mg total) by mouth daily, Disp: 30 capsule, Rfl: 5    fluticasone (FLONASE) 50 mcg/act nasal spray, 1 spray into each nostril daily, Disp: 1 Bottle, Rfl: 5    fluticasone-vilanterol (BREO ELLIPTA) 200-25 MCG/INH inhaler, Inhale 1 puff daily Rinse mouth after use , Disp: 3 Inhaler, Rfl: 3    furosemide (LASIX) 20 mg tablet, Take 1 tablet (20 mg total) by mouth daily as needed (weight gain or edema), Disp: 90 tablet, Rfl: 0    ipratropium-albuterol (DUO-NEB) 0 5-2 5 mg/3 mL nebulizer solution, Take 1 vial (3 mL total) by nebulization every 6 (six) hours as needed for wheezing or shortness of breath, Disp: 90 vial, Rfl: 3    lisinopril (ZESTRIL) 20 mg tablet, take 1 tablet by mouth twice a day, Disp: 60 tablet, Rfl: 11    predniSONE 10 mg tablet, Take 3 tabs BID X 2 days, 2 tabs BID X 2 days, 1 tab BID X 2 days, 1 tab daily X 2 days (Patient not taking: Reported on 7/16/2020), Disp: 26 tablet, Rfl: 0    Promethazine-DM (PHENERGAN-DM) 6 25-15 mg/5 mL oral syrup, Take 5 mL by mouth 4 (four) times a day as needed for cough (Patient not taking: Reported on 7/20/2020), Disp: 240 mL, Rfl: 0    TUDORZA PRESSAIR 400 MCG/ACT inhaler, inhale 1 puff by mouth twice a day, Disp: 3 Inhaler, Rfl: 2    Current Allergies     Allergies as of 08/14/2020 - Reviewed 08/14/2020   Allergen Reaction Noted    Ciprofloxacin Shortness Of Breath and Diarrhea 12/18/2018            The following portions of the patient's history were reviewed and updated as appropriate: allergies, current medications, past family history, past medical history, past social history, past surgical history and problem list      Past Medical History:   Diagnosis Date    Asthma     [facility]     [facility]     Mumps     Old MI (myocardial infarction)     Pneumonia     Pulmonary emphysema ([address] )     Sleep apnea Past Surgical History:   Procedure Laterality Date    CARDIAC CATHETERIZATION  07/24/2015    Left main- normal and maidly tortuous  Circumflex - normal and moderately tortuous  RCA- normal and mildy tortuous  Global LV function was severely depressed  Humble Taylor INGUINAL HERNIA REPAIR Bilateral     [place] COLONOSCOPY FLX DX W/COLLJ SPEC WHEN PFRMD N/A 3/11/2019    Procedure: COLONOSCOPY with removal of anal papilla; Surgeon: Yakov Casiano MD;  [facility]: [facility];  Service: Colorectal    TONSILLECTOMY      UMBILICAL HERNIA REPAIR  06/21/2006       Family History   Problem Relation Age of Onset    Heart attack Father         MI    Diabetes Sister         DM    Arthritis Family     Coronary artery disease Family     Hypertension Family     Tuberculosis Son          Medications have been verified  Objective   /81   Pulse 61   Temp 97 5 °F (36 4 °C)   Resp 18   SpO2 97%        Physical Exam     Physical Exam  Nursing note reviewed  Constitutional:       General: He is not in acute distress  Appearance: He is well-developed  He is not diaphoretic  Cardiovascular:      Rate and Rhythm: Normal rate and regular rhythm  Heart sounds: Normal heart sounds  No murmur  No friction rub  No gallop  Pulmonary:      Effort: Pulmonary effort is normal  No respiratory distress  Breath sounds: Normal breath sounds  No wheezing or rales  Chest:      Chest wall: No tenderness  Musculoskeletal: Normal range of motion  General: Swelling and tenderness present  Comments: See photo below  Lymphadenopathy:      Cervical: No cervical adenopathy  Skin:     General: Skin is warm  Capillary Refill: Capillary refill takes less than 2 seconds  Findings: Erythema present  Neurological:      Mental Status: He is alert

## 2020-08-14 NOTE — PATIENT INSTRUCTIONS
Augmentin as prescribed  Wash with soap and water twice per day, apply petroleum jelly and change bandage  Follow up with PCP for wound check   Tylenol and Ibuprofen for pain  Follow up with PCP in 3-5 days  Proceed to  ER if symptoms worsen  Cellulitis   WHAT YOU NEED TO KNOW:   Cellulitis is a skin infection caused by bacteria  Cellulitis may go away on its own or you may need treatment  Your healthcare provider may draw a [place] around the outside edges of your cellulitis  If your cellulitis spreads, your healthcare provider will see it outside of the [place]  DISCHARGE INSTRUCTIONS:   Call 911 if:   · You have sudden trouble breathing or chest pain  Return to the emergency department if:   · Your wound gets larger and more painful  · You feel a crackling under your skin when you touch it  · You have purple dots or bumps on your skin, or you see bleeding under your skin  · You have new swelling and pain in your legs  · The red, warm, swollen area gets larger  · You see red streaks coming from the infected area  Contact your healthcare provider if:   · You have a fever  · Your fever or pain does not go away or gets worse  · The area does not get smaller after 2 days of antibiotics  · Your skin is flaking or peeling off  · You have questions or concerns about your condition or care  Medicines:   · Antibiotics  help treat the bacterial infection  · NSAIDs , such as ibuprofen, help decrease swelling, pain, and fever  NSAIDs can cause stomach bleeding or kidney problems in certain people  If you take blood thinner medicine, always ask if NSAIDs are safe for you  Always read the medicine label and follow directions  Do not give these medicines to children under 10months of age without direction from your child's healthcare provider  · Acetaminophen  decreases pain and fever  It is available without a doctor's order  Ask how much to take and how often to take it   Follow directions  Read the labels of all other medicines you are using to see if they also contain acetaminophen, or ask your doctor or pharmacist  Acetaminophen can cause liver damage if not taken correctly  Do not use more than 4 grams (4,000 milligrams) total of acetaminophen in one day  · Take your medicine as directed  Contact your healthcare provider if you think your medicine is not helping or if you have side effects  Tell him or her if you are allergic to any medicine  Keep a list of the medicines, vitamins, and herbs you take  Include the amounts, and when and why you take them  Bring the list or the pill bottles to follow-up visits  Carry your medicine list with you in case of an emergency  Self-care:   · Elevate the area above the level of your heart  as often as you can  This will help decrease swelling and pain  Prop the area on pillows or blankets to keep it elevated comfortably  · Clean the area daily until the wound scabs over  Gently wash the area with soap and water  Pat dry  Use dressings as directed  · Place cool or warm, wet cloths on the area as directed  Use clean cloths and clean water  Leave it on the area until the cloth is room temperature  Pat the area dry with a clean, dry cloth  The cloths may help decrease pain  Prevent cellulitis:   · Do not scratch bug bites or areas of injury  You increase your risk for cellulitis by scratching these areas  · Do not share personal items, such as towels, clothing, and razors  · Clean exercise equipment  with germ-killing  before and after you use it  · Wash your hands often  Use soap and water  Wash your hands after you use the bathroom, change a child's diapers, or sneeze  Wash your hands before you prepare or eat food  Use lotion to prevent dry, cracked skin  · Wear pressure stockings as directed  You may be told to wear the stockings if you have peripheral edema   The stockings improve blood flow and decrease swelling  · Treat athlete's foot  This can help prevent the spread of a bacterial skin infection  Follow up with your healthcare provider within 3 days, or as directed: Your healthcare provider will check if your cellulitis is getting better  You may need different medicine  Write down your questions so you remember to ask them during your visits  © 2017 [employer]  Information is for End User's use only and may not be sold, redistributed or otherwise used for commercial purposes  All illustrations and images included in CareNotes® are the copyrighted property of A D A M [employer] Inc  or Jorg[employer] Hicks  The above information is an  only  It is not intended as medical advice for individual conditions or treatments  Talk to your doctor, nurse or pharmacist before following any medical regimen to see if it is safe and effective for you

## 2020-08-18 ENCOUNTER — TELEPHONE (OUTPATIENT)
Dept: INTERNAL MEDICINE CLINIC | Facility: CLINIC | Age: 65
End: 2020-08-18

## 2020-08-18 ENCOUNTER — TELEMEDICINE (OUTPATIENT)
Dept: INTERNAL MEDICINE CLINIC | Facility: CLINIC | Age: 65
End: 2020-08-18
Payer: COMMERCIAL

## 2020-08-18 DIAGNOSIS — I42.8 NONISCHEMIC CARDIOMYOPATHY (HCC): ICD-10-CM

## 2020-08-18 DIAGNOSIS — L08.9 TOE INFECTION: Primary | ICD-10-CM

## 2020-08-18 DIAGNOSIS — F41.9 ANXIETY: ICD-10-CM

## 2020-08-18 DIAGNOSIS — S91.332D PENETRATING WOUND OF LEFT FOOT, SUBSEQUENT ENCOUNTER: Primary | ICD-10-CM

## 2020-08-18 DIAGNOSIS — F32.A DEPRESSION, UNSPECIFIED DEPRESSION TYPE: ICD-10-CM

## 2020-08-18 PROCEDURE — 3077F SYST BP >= 140 MM HG: CPT | Performed by: INTERNAL MEDICINE

## 2020-08-18 PROCEDURE — 3079F DIAST BP 80-89 MM HG: CPT | Performed by: INTERNAL MEDICINE

## 2020-08-18 PROCEDURE — 1036F TOBACCO NON-USER: CPT | Performed by: INTERNAL MEDICINE

## 2020-08-18 PROCEDURE — 99213 OFFICE O/P EST LOW 20 MIN: CPT | Performed by: INTERNAL MEDICINE

## 2020-08-18 RX ORDER — FUROSEMIDE 20 MG/1
20 TABLET ORAL DAILY PRN
Qty: 90 TABLET | Refills: 0 | Status: SHIPPED | OUTPATIENT
Start: 2020-08-18 | End: 2020-11-10

## 2020-08-18 RX ORDER — DULOXETIN HYDROCHLORIDE 30 MG/1
30 CAPSULE, DELAYED RELEASE ORAL DAILY
Qty: 30 CAPSULE | Refills: 5 | Status: SHIPPED | OUTPATIENT
Start: 2020-08-18 | End: 2021-02-22

## 2020-08-18 RX ORDER — ALPRAZOLAM 0.5 MG/1
0.5 TABLET ORAL
Qty: 30 TABLET | Refills: 0 | Status: SHIPPED | OUTPATIENT
Start: 2020-08-18 | End: 2021-04-23

## 2020-08-18 NOTE — TELEPHONE ENCOUNTER
----- Message from Austen Abreu DO sent at 8/18/2020 10:33 AM EDT -----  Regarding: podiatry appt  Rferral to [facility] for toe infection/wound Can youcall their office and schedule or have them call pt for appt He is on antibx for this week

## 2020-08-18 NOTE — PROGRESS NOTES
Virtual Regular Visit      Assessment/Plan:    Problem List Items Addressed This Visit        Cardiovascular and Mediastinum    Nonischemic cardiomyopathy (HCC)    Relevant Medications    furosemide (LASIX) 20 mg tablet       Other    Anxiety    Relevant Medications    ALPRAZolam (XANAX) 0 5 mg tablet    DULoxetine (CYMBALTA) 30 mg delayed release capsule      Other Visit Diagnoses     Penetrating wound of left foot, subsequent encounter    -  Primary    Relevant Orders    Ambulatory referral to Podiatry    Depression, unspecified depression type        Relevant Medications    ALPRAZolam (XANAX) 0 5 mg tablet    DULoxetine (CYMBALTA) 30 mg delayed release capsule      Referral to podiatry - request Bonnie hollingsworth Referred to Dr Ian Kwon [facility]   Refills sent to pharmacy at pt request   Finish Augmentin rx        Reason for visit is followup toe infection     Encounter provider Spencer Chan DO    Provider located at [address] [place] [id number]      Recent Visits  No visits were found meeting these conditions  Showing recent visits within past 7 days and meeting all other requirements     Future Appointments  No visits were found meeting these conditions  Showing future appointments within next 150 days and meeting all other requirements        The patient was identified by name and date of birth  Rudy Trejo was informed that this is a telemedicine visit and that the visit is being conducted through [facility] and patient was informed that this is a secure, HIPAA-compliant platform  He agrees to proceed     My office door was closed  No one else was in the room  He acknowledged consent and understanding of privacy and security of the video platform  The patient has agreed to participate and understands they can discontinue the visit at any time  Patient is aware this is a billable service       Subjective  Rudy Trejo is a 63 y o  male recently in UC for toe infection On Augmentin now No fever or chills but still swelling and discomfort      HPI   Pt had toe lesion end of July then his dog licked it and opened area this weekend Seen in UC and told has infection and started on Augmentin No fever but has swelling and discomfort Some drainage and redness The site is open and the adjacent tow has some redness     Past Medical History:   Diagnosis Date    Asthma     [facility]est[facility] [address] [facility]     COPD [facility]chronic obstructive pulmonary disease) ([facility] )     Mumps     Old MI (myocardial infarction)     Pneumonia     Pulmonary emphysema ([facility] )     Sleep apnea        Past Surgical History:   Procedure Laterality Date    CARDIAC CATHETERIZATION  07/24/2015    Left main- normal and maidly tortuous  Circumflex - normal and moderately tortuous  RCA- normal and mildy tortuous  Global LV function was severely depressed  [place] INGUINAL HERNIA REPAIR Bilateral     [place] COLONOSCOPY FLX DX W/COLLJ SPEC WHEN PFRMD N/A 3/11/2019    Procedure: COLONOSCOPY with removal of anal papilla;   Surgeon: Krish York MD;  Location: [facility];  Service: Colorectal    TONSILLECTOMY      UMBILICAL HERNIA REPAIR  06/21/2006       Current Outpatient Medications   Medication Sig Dispense Refill    ALPRAZolam (XANAX) 0 5 mg tablet Take 1 tablet (0 5 mg total) by mouth daily at bedtime as needed for anxiety 30 tablet 0    amoxicillin-clavulanate (AUGMENTIN) 875-125 mg per tablet Take 1 tablet by mouth every 12 (twelve) hours for 7 days 14 tablet 0    aspirin 325 mg tablet Take 1 tablet by mouth daily      ATROVENT HFA 17 MCG/ACT inhaler inhale 1 puff by mouth four times a day 12 9 g 0    carvedilol (COREG) 12 5 mg tablet take 1 tablet by mouth twice a day with meals 60 tablet 11    DULoxetine (CYMBALTA) 30 mg delayed release capsule Take 1 capsule (30 mg total) by mouth daily 30 capsule 5    fluticasone (FLONASE) 50 mcg/act nasal spray 1 spray into each nostril daily 1 Bottle 5    fluticasone-vilanterol (BREO ELLIPTA) 200-25 MCG/INH inhaler Inhale 1 puff daily Rinse mouth after use  3 Inhaler 3    furosemide (LASIX) 20 mg tablet Take 1 tablet (20 mg total) by mouth daily as needed (weight gain or edema) 90 tablet 0    ipratropium-albuterol (DUO-NEB) 0 5-2 5 mg/3 mL nebulizer solution Take 1 vial (3 mL total) by nebulization every 6 (six) hours as needed for wheezing or shortness of breath 90 vial 3    lisinopril (ZESTRIL) 20 mg tablet take 1 tablet by mouth twice a day 60 tablet 11    predniSONE 10 mg tablet Take 3 tabs BID X 2 days, 2 tabs BID X 2 days, 1 tab BID X 2 days, 1 tab daily X 2 days (Patient not taking: Reported on 7/16/2020) 26 tablet 0    Promethazine-DM (PHENERGAN-DM) 6 25-15 mg/5 mL oral syrup Take 5 mL by mouth 4 (four) times a day as needed for cough (Patient not taking: Reported on 7/20/2020) 240 mL 0    TUDORZA PRESSAIR 400 MCG/ACT inhaler inhale 1 puff by mouth twice a day 3 Inhaler 2     No current facility-administered medications for this visit  Allergies   Allergen Reactions    Ciprofloxacin Shortness Of Breath and Diarrhea       Review of Systems   Constitutional: Positive for activity change  Negative for chills and fever  Respiratory: Negative for shortness of breath  Musculoskeletal: Positive for joint swelling  Skin: Positive for wound  Video Exam    There were no vitals filed for this visit  Physical Exam  Constitutional:       General: He is not in acute distress  Appearance: Normal appearance  He is normal weight  He is not ill-appearing, toxic-appearing or diaphoretic  Musculoskeletal:         General: Swelling present  Comments: Difficult view of tow due to camera position but it is swollen and open area present    Skin:     Findings: Erythema present  No rash  Neurological:      General: No focal deficit present  Mental Status: He is alert and oriented to person, place, and time  Mental status is at baseline  Cranial Nerves: No cranial nerve deficit  Coordination: Coordination abnormal           I spent 7 minutes directly with the patient during this visit      VIRTUAL VISIT DISCLAIMER    Kadi Coy acknowledges that he has consented to an online visit or consultation  He understands that the online visit is based solely on information provided by him, and that, in the absence of a face-to-face physical evaluation by the physician, the diagnosis he receives is both limited and provisional in terms of accuracy and completeness  This is not intended to replace a full medical face-to-face evaluation by the physician  Kadi Coy understands and accepts these terms

## 2020-08-31 NOTE — PROGRESS NOTES
Cardiology Follow Up    Kayla Garcia  1955  [id number]  [address] [facility]  [facility] [employer] [facility]  [place]  [place] [id number]  Phone#  [phone]  Fax#  [phone]      1  Nonischemic cardiomyopathy ([address] )     2  Chronic combined systolic and diastolic CHF (congestive heart failure) ([facility] [facility] )     3  Benign essential hypertension     4  Dyslipidemia     5  History of tobacco abuse     6  Obstructive sleep apnea         Discussion/Summary:  Mr Syed Lim is a pleasant 72-year-old male who presents to the office today for routine follow up  Since his last visit in 2018 he has been feeling unwell  He also started drinking alcohol again  He has a history of a cardiomyopathy which was attributed to alcohol consumption  He is scheduled to undergo an echocardiogram in the near future  We discussed the importance of complete alcohol cessation  He is maintained on an appropriate medication regimen for his cardiomyopathy in the form of carvedilol and lisinopril  His blood pressure control appears adequate  He does not appear volume overloaded on exam   He continues to utilize Lasix as needed which he has not done in the recent past   The importance of daily weights and a low-salt diet was reinforced  He has a known history of moderate obstructive sleep apnea  This may contribute to his cardiomyopathy  He does not think he would be able to tolerate a CPAP mask  I have asked him to reconsider a home sleep study  He will contemplate further testing  I will see him back in the office in a few months once the above-noted testing is complete  Interval History:   Mr Syed Lim is a pleasant 72-year-old male who presents to the office for routine follow-up  He was last seen by me in 2018  He was seen by one of our advanced practitioners a few months ago  He admits that over the past year he started drinking again    He will drink a few beers in one sitting as well as a few shots of tequila  He does so just about every day of the week  He also smokes marijuana on occasion  He admits to feeling depressed  He was recently started on duloxetine by his primary care provider which he feels has helped somewhat  Over the past year he has felt generally unwell  He admits to decreased exercise tolerance  He used to ride 40 miles on a bike on a flat trail  He recently attempted to ride a bike outdoors and could not do so as he felt his heart pounding and he was fatigued  He was not necessarily short of breath  He denies any exertional chest pain  He denies any signs or symptoms of volume overload including progressive lower extremity edema or increasing abdominal girth  He did gain about 15 lb slowly in the last few months which he thinks is due to inactivity during the coronavirus outbreak  He sleeps chronically in a recliner  He denies paroxysmal nocturnal dyspnea or orthopnea  He denies any sensation of palpitations  He denies lightheadedness, syncope or presyncope  He denies symptoms of claudication      Problem List     Chronic combined systolic and diastolic CHF (congestive heart failure) (HCC)    Nonischemic cardiomyopathy (HCC)    Dyslipidemia    Hypertension    Asthma, chronic, moderate persistent, uncomplicated    Obstructive sleep apnea        Past Medical History:   Diagnosis Date    Asthma     [facility]     COPD [facility]ch[facility] obstructive [facility] disease) [facility] )     Mumps     Old MI (myocardial infarction)     Pneumonia     Pulmonary [facility]sema ([facility] )     Sleep apnea      Social History     Socioeconomic History    Marital status: /Civil Union     Spouse name: Not on file    Number of children: Not on file    Years of education: Not on file    Highest education level: Not on file   Occupational History    Not on file   Social Needs    Financial resource strain: Not on file   [place] insecurity     Worry: Not on file     Inability: Not on file    Transportation needs     Medical: Not on file     Non-medical: Not on file   Tobacco Use    Smoking status: Former Smoker     Packs/day: 3 00     Years: 30 00     Pack years: 90 00    Smokeless tobacco: Never Used   Substance and Sexual Activity    Alcohol use: Yes     Comment: rarely    Drug use: Yes     Types: Marijuana    Sexual activity: Not on file   Lifestyle    Physical activity     Days per week: Not on file     Minutes per session: Not on file    Stress: Not on file   Relationships    Social connections     Talks on phone: Not on file     Gets together: Not on file     Attends Judaism service: Not on file     Active member of club or organization: Not on file     Attends meetings of clubs or organizations: Not on file     Relationship status: Not on file    Intimate partner violence     Fear of current or ex partner: Not on file     Emotionally abused: Not on file     Physically abused: Not on file     Forced sexual activity: Not on file   Other Topics Concern    Not on file   Social History Narrative    Caffeine use      Family History   Problem Relation Age of Onset    Heart attack Father         MI    Diabetes Sister         DM    Arthritis Family     Coronary artery disease Family     Hypertension Family     Tuberculosis Son      Past Surgical History:   Procedure Laterality Date    CARDIAC CATHETERIZATION  07/24/2015    Left main- normal and maidly tortuous  Circumflex - normal and moderately tortuous  RCA- normal and mildy tortuous  Global LV function was severely depressed  [place] INGUINAL HERNIA REPAIR Bilateral     [place] COLONOSCOPY FLX DX W/COLLJ SPEC WHEN PFRMD N/A 3/11/2019    Procedure: COLONOSCOPY with removal of anal papilla;   Surgeon: Chaparro Osborne MD;  [facility];  Service: Colorectal    TONSILLECTOMY      UMBILICAL HERNIA REPAIR  06/21/2006       Current Outpatient Medications:    ALPRAZolam (XANAX) 0 5 mg tablet, Take 1 tablet (0 5 mg total) by mouth daily at bedtime as needed for anxiety, Disp: 30 tablet, Rfl: 0    aspirin 325 mg tablet, Take 1 tablet by mouth daily, Disp: , Rfl:     ATROVENT HFA 17 MCG/ACT inhaler, inhale 1 puff by mouth four times a day, Disp: 12 9 g, Rfl: 0    carvedilol (COREG) 12 5 mg tablet, take 1 tablet by mouth twice a day with meals, Disp: 60 tablet, Rfl: 11    DULoxetine (CYMBALTA) 30 mg delayed release capsule, Take 1 capsule (30 mg total) by mouth daily, Disp: 30 capsule, Rfl: 5    fluticasone-vilanterol (BREO ELLIPTA) 200-25 MCG/INH inhaler, Inhale 1 puff daily Rinse mouth after use , Disp: 3 Inhaler, Rfl: 3    furosemide (LASIX) 20 mg tablet, Take 1 tablet (20 mg total) by mouth daily as needed (weight gain or edema), Disp: 90 tablet, Rfl: 0    ipratropium-albuterol (DUO-NEB) 0 5-2 5 mg/3 mL nebulizer solution, Take 1 vial (3 mL total) by nebulization every 6 (six) hours as needed for wheezing or shortness of breath, Disp: 90 vial, Rfl: 3    lisinopril (ZESTRIL) 20 mg tablet, take 1 tablet by mouth twice a day, Disp: 60 tablet, Rfl: 11    TUDORZA PRESSAIR 400 MCG/ACT inhaler, inhale 1 puff by mouth twice a day, Disp: 3 Inhaler, Rfl: 2    fluticasone (FLONASE) 50 mcg/act nasal spray, 1 spray into each nostril daily (Patient not taking: Reported on 9/1/2020), Disp: 1 Bottle, Rfl: 5  Allergies   Allergen Reactions    Ciprofloxacin Shortness Of Breath and Diarrhea       Labs:     Chemistry        Component Value Date/Time     07/27/2015 0559    K 4 3 07/16/2020 1550    K 3 8 07/27/2015 0559     07/16/2020 1550     07/27/2015 0559    CO2 23 07/16/2020 1550    CO2 32 07/27/2015 0559    BUN 28 (H) 07/16/2020 1550    BUN 19 07/27/2015 0559    CREATININE 1 22 07/16/2020 1550    CREATININE 1 05 07/27/2015 0559        Component Value Date/Time    CALCIUM 9 4 07/16/2020 1550    CALCIUM 8 6 07/27/2015 0559    ALKPHOS 67 12/18/2018 0947 ALKPHOS 75 07/22/2015 1021    AST 17 12/18/2018 0947    AST 30 07/22/2015 1021    ALT 20 12/18/2018 0947    ALT 74 07/22/2015 1021    BILITOT 1 05 (H) 07/22/2015 1021            Lab Results   Component Value Date    CHOL 105 07/23/2015     Lab Results   Component Value Date    HDL 24 (L) 12/18/2018    HDL 21 07/23/2015     Lab Results   Component Value Date    LDLCALC 89 12/18/2018    LDLCALC 69 07/23/2015     Lab Results   Component Value Date    TRIG 107 12/18/2018    TRIG 74 07/23/2015     No results found for: CHOLHDL    Imaging: No results found  Review of Systems   Constitution: Positive for malaise/fatigue and weight gain  Cardiovascular: Negative for dyspnea on exertion  Respiratory: Negative for shortness of breath  Vitals:    09/01/20 1444   BP: 128/66   Pulse:    SpO2:      Vitals:    09/01/20 1417   Weight: 114 kg (252 lb 3 2 oz)     Height: 6' 2" (188 cm)   Body mass index is 32 38 kg/m²      Physical Exam:  General:  Alert and cooperative, appears stated age  HEENT:  PERRLA, EOMI, no scleral icterus, no conjunctival pallor  Neck:  No lymphadenopathy, no thyromegaly, no carotid bruits, no elevated JVP  Heart:  Regular rate and rhythm, normal S1/S2, no S3/S4, no murmur  Lungs:  Clear to auscultation bilaterally   Abdomen:  Soft, non-tender, positive bowel sounds, no rebound or guarding,   no organomegaly   Extremities:  No clubbing, cyanosis or edema   Vascular:  2+ pedal pulses, bilateral lower extremity varicosities noted  Skin:  No rashes or lesions on exposed skin  Neurologic:  Cranial nerves II-XII grossly intact without focal deficits

## 2020-09-01 ENCOUNTER — OFFICE VISIT (OUTPATIENT)
Dept: CARDIOLOGY CLINIC | Facility: HOSPITAL | Age: 65
End: 2020-09-01
Payer: COMMERCIAL

## 2020-09-01 VITALS
DIASTOLIC BLOOD PRESSURE: 66 MMHG | SYSTOLIC BLOOD PRESSURE: 128 MMHG | BODY MASS INDEX: 32.37 KG/M2 | OXYGEN SATURATION: 96 % | WEIGHT: 252.2 LBS | HEART RATE: 68 BPM | HEIGHT: 74 IN

## 2020-09-01 DIAGNOSIS — I42.8 NONISCHEMIC CARDIOMYOPATHY (HCC): Primary | ICD-10-CM

## 2020-09-01 DIAGNOSIS — I50.42 CHRONIC COMBINED SYSTOLIC AND DIASTOLIC CHF (CONGESTIVE HEART FAILURE) (HCC): ICD-10-CM

## 2020-09-01 DIAGNOSIS — I10 BENIGN ESSENTIAL HYPERTENSION: ICD-10-CM

## 2020-09-01 DIAGNOSIS — E78.5 DYSLIPIDEMIA: ICD-10-CM

## 2020-09-01 DIAGNOSIS — G47.33 OBSTRUCTIVE SLEEP APNEA: ICD-10-CM

## 2020-09-01 DIAGNOSIS — Z87.891 HISTORY OF TOBACCO ABUSE: ICD-10-CM

## 2020-09-01 PROCEDURE — 1036F TOBACCO NON-USER: CPT | Performed by: INTERNAL MEDICINE

## 2020-09-01 PROCEDURE — 99214 OFFICE O/P EST MOD 30 MIN: CPT | Performed by: INTERNAL MEDICINE

## 2020-09-01 PROCEDURE — 3078F DIAST BP <80 MM HG: CPT | Performed by: INTERNAL MEDICINE

## 2020-09-08 DIAGNOSIS — J45.40 CHRONIC ASTHMA, MODERATE PERSISTENT, UNCOMPLICATED: ICD-10-CM

## 2020-09-10 ENCOUNTER — HOSPITAL ENCOUNTER (OUTPATIENT)
Dept: NON INVASIVE DIAGNOSTICS | Facility: HOSPITAL | Age: 65
Discharge: HOME | End: 2020-09-10
Payer: COMMERCIAL

## 2020-09-10 DIAGNOSIS — I42.8 NONISCHEMIC CARDIOMYOPATHY (HCC): ICD-10-CM

## 2020-09-10 PROCEDURE — 93306 TTE W/DOPPLER COMPLETE: CPT

## 2020-09-10 PROCEDURE — 93306 TTE W/DOPPLER COMPLETE: CPT | Performed by: INTERNAL MEDICINE

## 2020-09-11 DIAGNOSIS — J45.40 CHRONIC ASTHMA, MODERATE PERSISTENT, UNCOMPLICATED: ICD-10-CM

## 2020-09-14 RX ORDER — IPRATROPIUM BROMIDE AND ALBUTEROL SULFATE 2.5; .5 MG/3ML; MG/3ML
SOLUTION RESPIRATORY (INHALATION)
Qty: 120 VIAL | Refills: 3 | Status: SHIPPED | OUTPATIENT
Start: 2020-09-14 | End: 2021-07-19

## 2020-09-15 DIAGNOSIS — I42.9 CARDIOMYOPATHY, UNSPECIFIED TYPE (HCC): Primary | ICD-10-CM

## 2020-09-15 DIAGNOSIS — R06.00 DYSPNEA ON EXERTION: ICD-10-CM

## 2020-09-25 ENCOUNTER — HOSPITAL ENCOUNTER (OUTPATIENT)
Dept: NUCLEAR MEDICINE | Facility: HOSPITAL | Age: 65
Discharge: HOME | End: 2020-09-25
Payer: COMMERCIAL

## 2020-09-25 DIAGNOSIS — R06.00 DYSPNEA ON EXERTION: ICD-10-CM

## 2020-09-25 DIAGNOSIS — I42.9 CARDIOMYOPATHY, UNSPECIFIED TYPE (HCC): ICD-10-CM

## 2020-09-25 PROCEDURE — A9502 TC99M TETROFOSMIN: HCPCS

## 2020-09-25 PROCEDURE — 93018 CV STRESS TEST I&R ONLY: CPT | Performed by: INTERNAL MEDICINE

## 2020-09-25 PROCEDURE — G1004 CDSM NDSC: HCPCS

## 2020-09-25 PROCEDURE — 78452 HT MUSCLE IMAGE SPECT MULT: CPT | Performed by: INTERNAL MEDICINE

## 2020-09-25 PROCEDURE — 93017 CV STRESS TEST TRACING ONLY: CPT

## 2020-09-25 PROCEDURE — 78452 HT MUSCLE IMAGE SPECT MULT: CPT

## 2020-09-25 PROCEDURE — 93016 CV STRESS TEST SUPVJ ONLY: CPT | Performed by: INTERNAL MEDICINE

## 2020-09-25 RX ADMIN — REGADENOSON 0.4 MG: 0.08 INJECTION, SOLUTION INTRAVENOUS at 10:44

## 2020-09-29 LAB
CHEST PAIN STATEMENT: NORMAL
MAX DIASTOLIC BP: 80 MMHG
MAX HEART RATE: 99 BPM
MAX PREDICTED HEART RATE: 157 BPM
MAX. SYSTOLIC BP: 124 MMHG
PROTOCOL NAME: NORMAL
REASON FOR TERMINATION: NORMAL
TARGET HR FORMULA: NORMAL
TEST INDICATION: NORMAL
TIME IN EXERCISE PHASE: NORMAL

## 2020-09-30 DIAGNOSIS — J44.9 CHRONIC OBSTRUCTIVE PULMONARY DISEASE, UNSPECIFIED COPD TYPE (HCC): ICD-10-CM

## 2020-09-30 DIAGNOSIS — R06.00 DYSPNEA ON EXERTION: Primary | ICD-10-CM

## 2020-09-30 RX ORDER — IPRATROPIUM BROMIDE 17 UG/1
AEROSOL, METERED RESPIRATORY (INHALATION)
Qty: 12.9 G | Refills: 0 | Status: SHIPPED | OUTPATIENT
Start: 2020-09-30 | End: 2020-11-13

## 2020-09-30 RX ORDER — ISOSORBIDE MONONITRATE 30 MG/1
30 TABLET, EXTENDED RELEASE ORAL DAILY
Qty: 30 TABLET | Refills: 11 | Status: SHIPPED | OUTPATIENT
Start: 2020-09-30 | End: 2021-07-23 | Stop reason: SDUPTHER

## 2020-10-04 DIAGNOSIS — I10 ESSENTIAL HYPERTENSION: ICD-10-CM

## 2020-10-04 RX ORDER — LISINOPRIL 20 MG/1
TABLET ORAL
Qty: 60 TABLET | Refills: 11 | Status: SHIPPED | OUTPATIENT
Start: 2020-10-04 | End: 2021-10-06 | Stop reason: SDUPTHER

## 2020-10-11 DIAGNOSIS — J44.9 COPD, MODERATE (HCC): ICD-10-CM

## 2020-10-12 RX ORDER — ACLIDINIUM BROMIDE 400 UG/1
POWDER, METERED RESPIRATORY (INHALATION)
Qty: 1 INHALER | Refills: 2 | Status: SHIPPED | OUTPATIENT
Start: 2020-10-12 | End: 2021-03-02

## 2020-11-10 DIAGNOSIS — I42.8 NONISCHEMIC CARDIOMYOPATHY (HCC): ICD-10-CM

## 2020-11-10 RX ORDER — FUROSEMIDE 20 MG/1
TABLET ORAL
Qty: 90 TABLET | Refills: 0 | Status: SHIPPED | OUTPATIENT
Start: 2020-11-10 | End: 2021-02-09

## 2020-11-13 DIAGNOSIS — J44.9 CHRONIC OBSTRUCTIVE PULMONARY DISEASE, UNSPECIFIED COPD TYPE (HCC): ICD-10-CM

## 2020-11-13 RX ORDER — IPRATROPIUM BROMIDE 17 UG/1
AEROSOL, METERED RESPIRATORY (INHALATION)
Qty: 12.9 G | Refills: 0 | Status: SHIPPED | OUTPATIENT
Start: 2020-11-13 | End: 2021-01-29

## 2020-11-23 ENCOUNTER — OFFICE VISIT (OUTPATIENT)
Dept: CARDIOLOGY CLINIC | Facility: HOSPITAL | Age: 65
End: 2020-11-23
Payer: COMMERCIAL

## 2020-11-23 VITALS
BODY MASS INDEX: 33.88 KG/M2 | OXYGEN SATURATION: 96 % | SYSTOLIC BLOOD PRESSURE: 118 MMHG | HEART RATE: 72 BPM | HEIGHT: 74 IN | WEIGHT: 264 LBS | DIASTOLIC BLOOD PRESSURE: 60 MMHG

## 2020-11-23 DIAGNOSIS — I10 BENIGN ESSENTIAL HYPERTENSION: ICD-10-CM

## 2020-11-23 DIAGNOSIS — E78.5 DYSLIPIDEMIA: ICD-10-CM

## 2020-11-23 DIAGNOSIS — I50.42 CHRONIC COMBINED SYSTOLIC AND DIASTOLIC CHF (CONGESTIVE HEART FAILURE) (HCC): ICD-10-CM

## 2020-11-23 DIAGNOSIS — I42.8 NONISCHEMIC CARDIOMYOPATHY (HCC): Primary | ICD-10-CM

## 2020-11-23 DIAGNOSIS — Z87.891 HISTORY OF TOBACCO ABUSE: ICD-10-CM

## 2020-11-23 DIAGNOSIS — G47.33 OBSTRUCTIVE SLEEP APNEA: ICD-10-CM

## 2020-11-23 PROCEDURE — 3074F SYST BP LT 130 MM HG: CPT | Performed by: INTERNAL MEDICINE

## 2020-11-23 PROCEDURE — 99214 OFFICE O/P EST MOD 30 MIN: CPT | Performed by: INTERNAL MEDICINE

## 2020-11-23 PROCEDURE — 1036F TOBACCO NON-USER: CPT | Performed by: INTERNAL MEDICINE

## 2020-11-23 PROCEDURE — 3008F BODY MASS INDEX DOCD: CPT | Performed by: INTERNAL MEDICINE

## 2020-11-23 PROCEDURE — 3078F DIAST BP <80 MM HG: CPT | Performed by: INTERNAL MEDICINE

## 2020-11-24 ENCOUNTER — TELEPHONE (OUTPATIENT)
Dept: OTOLARYNGOLOGY | Facility: CLINIC | Age: 65
End: 2020-11-24

## 2020-11-24 DIAGNOSIS — G47.33 OBSTRUCTIVE SLEEP APNEA: Primary | ICD-10-CM

## 2020-12-07 ENCOUNTER — TELEPHONE (OUTPATIENT)
Dept: OTOLARYNGOLOGY | Facility: CLINIC | Age: 65
End: 2020-12-07

## 2021-01-29 DIAGNOSIS — J44.9 CHRONIC OBSTRUCTIVE PULMONARY DISEASE, UNSPECIFIED COPD TYPE (HCC): ICD-10-CM

## 2021-01-29 RX ORDER — IPRATROPIUM BROMIDE 17 UG/1
AEROSOL, METERED RESPIRATORY (INHALATION)
Qty: 12.9 G | Refills: 0 | Status: SHIPPED | OUTPATIENT
Start: 2021-01-29 | End: 2021-03-01

## 2021-02-04 ENCOUNTER — OFFICE VISIT (OUTPATIENT)
Dept: PULMONOLOGY | Facility: CLINIC | Age: 66
End: 2021-02-04
Payer: COMMERCIAL

## 2021-02-04 VITALS
HEART RATE: 69 BPM | TEMPERATURE: 99.6 F | DIASTOLIC BLOOD PRESSURE: 69 MMHG | BODY MASS INDEX: 34.91 KG/M2 | WEIGHT: 272 LBS | HEIGHT: 74 IN | OXYGEN SATURATION: 96 % | SYSTOLIC BLOOD PRESSURE: 128 MMHG

## 2021-02-04 DIAGNOSIS — I50.42 CHRONIC COMBINED SYSTOLIC AND DIASTOLIC CONGESTIVE HEART FAILURE (HCC): ICD-10-CM

## 2021-02-04 DIAGNOSIS — F17.211 CIGARETTE NICOTINE DEPENDENCE IN REMISSION: ICD-10-CM

## 2021-02-04 DIAGNOSIS — R09.82 POST-NASAL DRIP: ICD-10-CM

## 2021-02-04 DIAGNOSIS — J44.9 ASTHMA-COPD OVERLAP SYNDROME (HCC): Primary | ICD-10-CM

## 2021-02-04 PROCEDURE — 3074F SYST BP LT 130 MM HG: CPT | Performed by: PHYSICIAN ASSISTANT

## 2021-02-04 PROCEDURE — 3078F DIAST BP <80 MM HG: CPT | Performed by: PHYSICIAN ASSISTANT

## 2021-02-04 PROCEDURE — 99214 OFFICE O/P EST MOD 30 MIN: CPT | Performed by: PHYSICIAN ASSISTANT

## 2021-02-04 RX ORDER — FLUTICASONE PROPIONATE 50 MCG
1 SPRAY, SUSPENSION (ML) NASAL DAILY
Qty: 1 BOTTLE | Refills: 5 | Status: SHIPPED | OUTPATIENT
Start: 2021-02-04 | End: 2022-01-10

## 2021-02-04 NOTE — PROGRESS NOTES
Pulmonary Follow Up Note   Marcello Diallo 63 y o  male MRN: [id number]  2/4/2021      Assessment:    Asthma-COPD overlap syndrome (HCC)  Continue Tudorza  1 puff twice daily and Breo 1 puff daily  Continue DuoNeb q 6 hours p r n     Continue Atrovent HFA q 6 hours p r n  Konrad Chandler Given his progression of symptoms will update pulmonary function tests  If they are worse   Will consider tinkering with inhalers although I suspect his symptoms are multifactorial  He feels his allergies are well controlled  He will not rehome his dogs even though he knows they exacerbate his symptoms  He does not wish to have NE allergy panel  He is willing to try Flonase 1 spray each nostril to see it that helps his PND  Chronic combined systolic and diastolic congestive heart failure (HCC)  Wt Readings from Last 3 Encounters:   02/04/21 123 kg (272 lb)   11/23/20 120 kg (264 lb)   09/01/20 114 kg (252 lb 3 2 oz)       Treatment per Cardiology recommendations  Recommended that he weigh himself daily and call his cardiologist she notes 3 lb weight gain overnight or 5 lb in a week  Recommend low-sodium diet and fluid restrictions as tolerated  I think patient would benefit greatly cardiopulmonary rehab  And he is in agreement  Referral was placed  Nicotine dependence    Update low-dose lung cancer screening CT as he is overdue for now  He remains committed abstinence currently for the last year  Post-nasal drip    Trial Flonase 1 spray each nostril daily p r n  Konrad Chandler Plan:    Diagnoses and all orders for this visit:    Asthma-COPD overlap syndrome ([facility] [address] )  -     Complete PFT with post bronchodilator; Future    Chronic combined systolic and diastolic congestive heart failure (HCC)  -     Complete PFT with post bronchodilator; Future  -     Ambulatory Referral to Pulmonary Rehabilitation;  Future    Cigarette nicotine dependence in remission  -     CT lung screening program; Future    Post-nasal drip  -     fluticasone (FLONASE) 50 mcg/act nasal spray; 1 spray into each nostril daily        Return in about 3 months (around 5/4/2021)  History of Present Illness   HPI:  Ethan Thurman is a 63 y o  male who  Presents to the office today for routine follow-up  Patient's past medical history positive for asthma COPD overlap, chronic combined systolic and diastolic CHF, nicotine dependence in remission, and CORDELL  Patient was last seen in our office in March 2020  Since last time he was seen he has noticed a steady decline in his respiratory symptoms  He attributes it to a both a combination of his lung disease and heart disease  He has not needed recent systemic steroids or antibiotics  He tells me that he was sick as a dog back when COVID for started for 4 weeks and feels that he had virus though  He was never severe enough to go to the hospital  More recently in the last 3 weeks he has 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[address] [place] [address]  His biggest complaint is cough productive of thick white sputum  He attributes this to postnasal drip  Patient tells me that he wheezes all the time  He is able to walk 100 yd before becoming short of breath  This is disheartened for him as he is tells me that he used to bike miles a day  Denies shortness of breath at rest when he is sitting or lying down  Patient tells me that he does not really have leg swelling because he keeps an eye on it closely and monitor his weight and signs for edema  He has Lasix at home that he takes on an as-needed basis  Cold exacerbate his symptoms  He is compliant on Tudorza, Breo, DuoNeb and Atrovent HFA  Review of Systems   All other systems reviewed and are negative        Historical Information   Past Medical History:   Diagnosis Date    Asthma     [facility]congestive heart failure) ([facility] [address] )     COPD (chronic obstructive pulmonary disease) ([facility])     Mumps     Old MI (myocardial infarction)     Pneumonia     Pulmonary emphysema ([facility] [address] )     Sleep apnea      Past Surgical History:   Procedure Laterality Date    CARDIAC CATHETERIZATION  07/24/2015    Left main- normal and maidly tortuous  Circumflex - normal and moderately tortuous  RCA- normal and mildy tortuous  Global LV function was severely depressed  Aline Almeida INGUINAL HERNIA REPAIR Bilateral     PA COLONOSCOPY FLX DX W/COLLJ SPEC WHEN PFRMD N/A 3/11/2019    Procedure: COLONOSCOPY with removal of anal papilla;   Surgeon: Yon Yusuf MD;  Location: [facility];  Service: Colorectal    TONSILLECTOMY      UMBILICAL HERNIA REPAIR  06/21/2006     Family History   Problem Relation Age of Onset    Heart attack Father         MI    Diabetes Sister         DM    Arthritis Family     Coronary artery disease Family     Hypertension Family     Tuberculosis Son        Social History     Tobacco Use   Smoking Status Former Smoker    Packs/day: 3 00    Years: 30 00    Pack years: 90 00   Smokeless Tobacco Never Used         Meds/Allergies     Current Outpatient Medications:     ALPRAZolam (XANAX) 0 5 mg tablet, Take 1 tablet (0 5 mg total) by mouth daily at bedtime as needed for anxiety, Disp: 30 tablet, Rfl: 0    aspirin 325 mg tablet, Take 1 tablet by mouth daily, Disp: , Rfl:     Atrovent HFA 17 MCG/ACT inhaler, inhale 1 puff by mouth four times a day, Disp: 12 9 g, Rfl: 0    Breo Ellipta 200-25 MCG/INH inhaler, inhale 1 puff by mouth and INTO THE LUNGS once daily, Disp: 1 Inhaler, Rfl: 5    carvedilol (COREG) 12 5 mg tablet, take 1 tablet by mouth twice a day with meals, Disp: 60 tablet, Rfl: 11    DULoxetine (CYMBALTA) 30 mg delayed release capsule, Take 1 capsule (30 mg total) by mouth daily, Disp: 30 capsule, Rfl: 5    furosemide (LASIX) 20 mg tablet, take 1 tablet by mouth once daily if needed for WEIGHT GAIN OR EDEMA, Disp: 90 tablet, Rfl: 0    ipratropium-albuterol (DUO-NEB) 0 5-2 5 mg/3 mL nebulizer solution, inhale contents of 1 vial ( 3 milliliters ) in nebulizer by mouth and INTO THE LUNGS every 6 hours if needed for wheezing, Disp: 120 vial, Rfl: 3    isosorbide mononitrate (IMDUR) 30 mg 24 hr tablet, Take 1 tablet (30 mg total) by mouth daily, Disp: 30 tablet, Rfl: 11    lisinopril (ZESTRIL) 20 mg tablet, take 1 tablet by mouth twice a day, Disp: 60 tablet, Rfl: 11    Tudorza Pressair 400 MCG/ACT inhaler, INHALE 1 PUFF BY MOUTH TWICE A DAY, Disp: 1 Inhaler, Rfl: 2    fluticasone (FLONASE) 50 mcg/act nasal spray, 1 spray into each nostril daily, Disp: 1 Bottle, Rfl: 5  Allergies   Allergen Reactions    Ciprofloxacin Shortness Of Breath and Diarrhea       Vitals: Blood pressure 128/69, pulse 69, temperature 99 6 °F (37 6 °C), temperature source Tympanic, height 6' 2" (1 88 m), weight 123 kg (272 lb), SpO2 96 %  Body mass index is 34 92 kg/m²  Oxygen Therapy  SpO2: 96 %    Physical Exam  Physical Exam  Vitals signs reviewed  Constitutional:       Appearance: Normal appearance  He is well-developed  He is obese  HENT:      Head: Normocephalic and atraumatic  Right Ear: External ear normal       Left Ear: External ear normal    Eyes:      Extraocular Movements: Extraocular movements intact  Pupils: Pupils are equal, round, and reactive to light  Neck:      Musculoskeletal: Normal range of motion and neck supple  Cardiovascular:      Rate and Rhythm: Normal rate and regular rhythm  Pulses: Normal pulses  Heart sounds: Normal heart sounds  No murmur  Pulmonary:      Effort: Pulmonary effort is normal  No respiratory distress  Breath sounds: Normal breath sounds  No stridor  No wheezing, rhonchi or rales  Abdominal:      Palpations: Abdomen is soft  Tenderness: There is no abdominal tenderness  Hernia: No hernia is present  Musculoskeletal: Normal range of motion  General: No tenderness or deformity  Right lower leg: Edema (trace) present  Left lower leg: Edema (trace) present  Skin:     General: Skin is warm and dry  Capillary Refill: Capillary refill takes less than 2 seconds  Neurological:      General: No focal deficit present  Mental Status: He is alert and oriented to person, place, and time  Mental status is at baseline  Psychiatric:         Behavior: Behavior normal          Thought Content: Thought content normal          Judgment: Judgment normal          Labs: I have personally reviewed pertinent lab results  , ABG: No results found for: PHART, GIM7SBJ, PO2ART, OCV4CUZ, C7GSQLPB, BEART, SOURCE, BNP: No results found for: BNP, CBC: No results found for: WBC, HGB, HCT, MCV, PLT, ADJUSTEDWBC, MCH, MCHC, RDW, MPV, NRBC, CMP: No results found for: SODIUM, K, CL, CO2, ANIONGAP, BUN, CREATININE, GLUCOSE, CALCIUM, AST, ALT, ALKPHOS, PROT, BILITOT, EGFR, PT/INR: No results found for: PT, INR, Troponin: No results found for: TROPONINI  Lab Results   Component Value Date    WBC 9 10 07/16/2020    HGB 14 0 07/16/2020    HCT 41 9 (L) 07/16/2020    MCV 86 07/16/2020     07/16/2020     Lab Results   Component Value Date    GLUCOSE 87 07/27/2015    CALCIUM 9 4 07/16/2020     07/27/2015    K 4 3 07/16/2020    CO2 23 07/16/2020     07/16/2020    BUN 28 (H) 07/16/2020    CREATININE 1 22 07/16/2020     Lab Results   Component Value Date    IGE 65 2 10/23/2017     Lab Results   Component Value Date    ALT 20 12/18/2018    AST 17 12/18/2018    ALKPHOS 67 12/18/2018    BILITOT 1 05 (H) 07/22/2015       Imaging and other studies: I have personally reviewed pertinent reports  and I have personally reviewed pertinent films in PACS       CT lung cancer screening 09/04/2019   Mild atelectatic changes posterior lung bases  No suspicious pulmonary nodules or masses      Pulmonary function testing:  Performed   12/26/2018  FEV1/FVC ratio  54%   FEV1  73% predicted  FVC  93% predicted   there is significant response to bronchodilators  TLC  102 % predicted  RV  103 % predicted  DLCO corrected for hemoglobin  110 % predicted  Moderate airflow limitation with significant improvement following bronchodilator administration  Normal lung volumes  Normal diffusion capacity  Consistent with bronchial asthma  Other Studies: I have personally reviewed pertinent reports  Echo 09/10/2020   EF 40%  Grade 2 diastolic dysfunction  Right ventricular size and systolic function normal   Trace mitral regurgitation  Mild tricuspid regurgitation

## 2021-02-04 NOTE — ASSESSMENT & PLAN NOTE
Continue Tudorza  1 puff twice daily and Breo 1 puff daily  Continue DuoNeb q 6 hours p r n     Continue Atrovent HFA q 6 hours p r n  Oracio Odell Given his progression of symptoms will update pulmonary function tests  If they are worse   Will consider tinkering with inhalers although I suspect his symptoms are multifactorial  He feels his allergies are well controlled  He will not rehome his dogs even though he knows they exacerbate his symptoms  He does not wish to have [place] allergy panel  He is willing to try Flonase 1 spray each nostril to see it that helps his PND

## 2021-02-04 NOTE — ASSESSMENT & PLAN NOTE
Wt Readings from Last 3 Encounters:   02/04/21 123 kg (272 lb)   11/23/20 120 kg (264 lb)   09/01/20 114 kg (252 lb 3 2 oz)       Treatment per Cardiology recommendations  Recommended that he weigh himself daily and call his cardiologist she notes 3 lb weight gain overnight or 5 lb in a week  Recommend low-sodium diet and fluid restrictions as tolerated  I think patient would benefit greatly cardiopulmonary rehab  And he is in agreement  Referral was placed

## 2021-02-04 NOTE — ASSESSMENT & PLAN NOTE
Update low-dose lung cancer screening CT as he is overdue for now  He remains committed abstinence currently for the last year

## 2021-02-09 DIAGNOSIS — I42.8 NONISCHEMIC CARDIOMYOPATHY (HCC): ICD-10-CM

## 2021-02-09 RX ORDER — FUROSEMIDE 20 MG/1
TABLET ORAL
Qty: 90 TABLET | Refills: 0 | Status: SHIPPED | OUTPATIENT
Start: 2021-02-09

## 2021-02-15 ENCOUNTER — TELEPHONE (OUTPATIENT)
Dept: SLEEP CENTER | Facility: CLINIC | Age: 66
End: 2021-02-15

## 2021-02-15 ENCOUNTER — OFFICE VISIT (OUTPATIENT)
Dept: SLEEP CENTER | Facility: CLINIC | Age: 66
End: 2021-02-15
Payer: COMMERCIAL

## 2021-02-15 VITALS
SYSTOLIC BLOOD PRESSURE: 122 MMHG | HEART RATE: 62 BPM | HEIGHT: 74 IN | BODY MASS INDEX: 33.62 KG/M2 | TEMPERATURE: 97.1 F | DIASTOLIC BLOOD PRESSURE: 70 MMHG | WEIGHT: 262 LBS | OXYGEN SATURATION: 98 %

## 2021-02-15 DIAGNOSIS — Z20.822 ENCOUNTER FOR PREPROCEDURE SCREENING LABORATORY TESTING FOR COVID-19: Primary | ICD-10-CM

## 2021-02-15 DIAGNOSIS — J44.9 ASTHMA-COPD OVERLAP SYNDROME (HCC): ICD-10-CM

## 2021-02-15 DIAGNOSIS — I42.8 NONISCHEMIC CARDIOMYOPATHY (HCC): ICD-10-CM

## 2021-02-15 DIAGNOSIS — Z01.812 ENCOUNTER FOR PREPROCEDURE SCREENING LABORATORY TESTING FOR COVID-19: Primary | ICD-10-CM

## 2021-02-15 DIAGNOSIS — G47.33 OBSTRUCTIVE SLEEP APNEA: Primary | ICD-10-CM

## 2021-02-15 DIAGNOSIS — I50.42 CHRONIC COMBINED SYSTOLIC AND DIASTOLIC CONGESTIVE HEART FAILURE (HCC): ICD-10-CM

## 2021-02-15 PROCEDURE — 99244 OFF/OP CNSLTJ NEW/EST MOD 40: CPT | Performed by: PSYCHIATRY & NEUROLOGY

## 2021-02-15 NOTE — LETTER
February 15, 2021     Juanpablo Long DO  [address] [address] [place] [id number]    Patient: Maksim Sanders   YOB: 1955   Date of Visit: 2/15/2021       Dear Dr Mesha Mac: Thank you for referring Maksim Sanders to me for evaluation  Below are my notes for this consultation  If you have questions, please do not hesitate to call me  I look forward to following your patient along with you  Sincerely,        Keily Calix MD        CC: DO Keily Harmon MD  2/15/2021 12:05 PM  Sign when Signing Visit  Sleep Medicine Consultation Note    HPI:  Mr Maksim Sanders is a 63 y o  male seen at the request of Keysha Molina DO for advice regarding sleep disordered breathing  The patient was diagnosed with CORDELL in 1/2018 by HST:  respiratory event index (MANI) of 17 9  The lowest SpO2 recorded is 80%  APAP was ordered at that time  Dr Federico Rodrigues saw him in 10/2017 for an evaluation for this, but no noted follow up in Epic  The patient presented today and endorsed that he has CHF and that his CORDELL should be treated  He did not want a machine at that time  He feels that his breathing is worsened by his CORDELL  He has COPD which has a risk of worsening with co-morbid CORDELL  He sleeps in a recliner which helps his snoring and gasping at night  He wakes up feeling like he wakes up and needs his nebulizer  He does not use oxygen at night  His cardiologist believes that he needs his CORDELL treated at this point as he has worsening heart function  The patient is no longer able to work  He doesn't feel like he sleeping poorly, but just feels his lungs are tight       Please see below for continuation of the HPI:      Sleep Disordered Breathing:  -Snoring: doesn't believe so, but sometimes his throat does hurt  -Observed Apneas: not since being in a recliner, but when lying down yes  -Mouth Breathing at night: yes  -Dry Mouth in morning: yes   -Nocturnal Gasping: yes  -Nasal Obstruction: chronic sinus issues  -Weight: gained 30 lbs since COVID    Sleep Pattern:  -Location: living room   -Bed/Recliner/Wedge: recliner  -Bed Partner: no  -HOB: inclined  -# of pillows under head: 0-1  -Position: reclined on back  -Bedtime: 11pm  -Lights out: same time  -Environmental: has TV on  -Latency: 1-2am, but his varies  -Awakenings: 0-1   -Reason: v0id   -Duration: mins  -Wake time: 430-5am   -Alarm: no  -Rise time: 515am  -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  -Shift Work: mornings  -Patient's estimate of total sleep time: 5-6h    Daytime Symptoms:  -Upon Awakening: chest feels tight, needs neb  Sometimes needs to take some time to wake up and start moving around   -Daytime fatigue/sleepiness: no  -Naps: no  -Involuntary Dozing: in conversation can doze off  -Cognitive Symptoms: denied  -Driving: Difficulty with sleepiness and driving:  Sometimes had sleepiness when working 80-90h  No longer working  Not having this often now  -- Close calls related to sleepiness: no   -- Accidents related to sleepiness: no      Questionnaires:   Sitting and reading:  Moderate chance of dozing  Watching TV: High chance of dozing  Sitting, inactive in a public place (e g  a theatre or a meeting): Slight chance of dozing  As a passenger in a car for an hour without a break: Would never doze  Lying down to rest in the afternoon when circumstances permit: Would never doze  Sitting and talking to someone: Slight chance of dozing  Sitting quietly after a lunch without alcohol: Slight chance of dozing  In a car, while stopped for a few minutes in traffic: Would never doze  Total score: 8      Sleep Review of Symptoms:  -Parasomnias:  --Sleep Walking: no  --Dream Enactment: no  --Bruxism: no  -Motor:  --RLS: sometimes, can keep him from falling asleep  --PLMS: no  -Narcolepsy:  --Hallucinations: no  --Paralysis: no  --Cataplexy: no    Childhood Sleep History: bed wetting until age 12    Prior Sleep Studies/Evaluations:  See HPI    Family History:  Family history of sleep disorders: none    Patient Active Problem List   Diagnosis    Asthma, chronic, moderate persistent, uncomplicated    Obstructive sleep apnea    Chronic combined systolic and diastolic congestive heart failure (HCC)    Nonischemic cardiomyopathy (HCC)    Dyslipidemia    Benign essential hypertension    Anxiety    Asthma-COPD overlap syndrome (HCC)    Back pain    Borderline hyperglycemia    COPD, severe (HCC)    Hemorrhoids    Thyroid disease    Vitamin D deficiency    Constipation    Pulmonary emphysema (HCC)    Urinary frequency    Cough    History of tobacco abuse    Hypertrophied anal papilla    Rectal bleeding    Special screening for malignant neoplasms, colon    Screening for prostate cancer    Bronchitis    Nicotine dependence    Post-nasal drip     Past Medical History:   Diagnosis Date    Asthma     CHF (congestive heart failure) (HCC)     COPD [facility]ch[facility] obstructive [facility] disease) [facility] [address] [facility]     Mumps     Old MI (myocardial infarction)     Pneumonia     Pulmonary emphysema ([facility] [address] )     Sleep apnea      --> Seizure hx: denies  --> Head injury with LOC: denies  --> Supplemental Oxygen Use: denies    Labs     Results for Shayla Niñored (MRN [id number]) as of 2/15/2021 11:34   Ref   Range 7/16/2020 15:50   Sodium Latest Ref Range: 134 - 143 mmol/L 137   Potassium Latest Ref Range: 3 5 - 5 5 mmol/L 4 3   Chloride Latest Ref Range: 98 - 107 mmol/L 106   CO2 Latest Ref Range: 21 - 31 mmol/L 23   Anion Gap Latest Ref Range: 4 - 13 mmol/L 8   BUN Latest Ref Range: 7 - 25 mg/dL 28 (H)   Creatinine Latest Ref Range: 0 70 - 1 30 mg/dL 1 22   Glucose, Random Latest Ref Range: 65 - 99 mg/dL 96   Calcium Latest Ref Range: 8 6 - 10 5 mg/dL 9 4   eGFR Latest Units: ml/min/1 73sq m 63   WBC Latest Ref Range: 4 80 - 10 80 Thousand/uL 9 10   Red Blood Cell Count Latest Ref Range: 4 30 - 5 90 Million/uL 4 89   Hemoglobin Latest Ref Range: 14 0 - 18 0 g/dL 14 0   HCT Latest Ref Range: 42 0 - 47 0 % 41 9 (L)   MCV Latest Ref Range: 81 - 99 fL 86   MCH Latest Ref Range: 26 0 - 34 0 pg 28 6   MCHC Latest Ref Range: 31 0 - 37 0 g/dL 33 4   RDW Latest Ref Range: 11 5 - 14 5 % 15 2 (H)   Platelet Count Latest Ref Range: 149 - 390 Thousands/uL 278   MPV Latest Ref Range: 8 6 - 11 7 fL 7 6 (L)   Neutrophils % Latest Ref Range: 42 - 75 % 67   Lymphocytes Relative Latest Ref Range: 21 - 51 % 20 (L)   Monocytes Relative Latest Ref Range: 2 - 12 % 7   Eosinophils Latest Ref Range: 0 - 5 % 5   Basophils Relative Latest Ref Range: 0 - 2 % 1   Absolute Neutrophils Latest Ref Range: 1 40 - 6 50 Thousands/µL 6 10   Lymphocytes Absolute Latest Ref Range: 0 60 - 4 47 Thousands/µL 1 80   Absolute Monocytes Latest Ref Range: 0 17 - 1 22 Thousand/µL 0 70   Absolute Eosinophils Latest Ref Range: 0 00 - 0 61 Thousand/µL 0 40   Basophils Absolute Latest Ref Range: 0 00 - 0 10 Thousands/µL 0 10   Protime Latest Ref Range: 11 6 - 14 5 seconds 13 3   INR Latest Ref Range: 0 84 - 1 19  1 02   PTT Latest Ref Range: 23 - 37 seconds 33     NM stress test: SUMMARY:  -  Stress results: There was no chest pain during stress  -  ECG conclusions: The stress ECG was non-diagnostic   -  Perfusion imaging: There was a moderate-sized, moderately severe, fixed myocardial perfusion defect of the basal to mid inferolateral wall  There was a moderate-sized, moderately severe, fixed myocardial perfusion defect of the basal to  mid inferior wall  -  Gated SPECT: The calculated left ventricular ejection fraction was 39 %  Left ventricular ejection fraction was moderately decreased by visual estimate  There was moderately reduced myocardial thickening and motion of  the basal to mid inferior and inferolateral walls of the left ventricle      IMPRESSIONS: Abnormal study after pharmacologic vasodilation   There was a moderate-sized infarct in the distribution of right coronary and left circumflex artery  Left ventricular systolic function was reduced, with regional wall motion  abnormalities      Prepared and signed by  Kuldeep Padgett, DO  Signed 09/25/2020 12:20:49    Echo 9/2020: SUMMARY     LEFT VENTRICLE:  Systolic function was moderately reduced  Ejection fraction was estimated to be 40 %  There was akinesis of the basal-mid inferior and basal-mid inferolateral wall(s)  Wall thickness was mildly increased  Features were consistent with a pseudonormal left ventricular filling pattern, with concomitant abnormal relaxation and increased filling pressure (grade 2 diastolic dysfunction)      LEFT ATRIUM:  The atrium was mildly dilated      MITRAL VALVE:  There was trace regurgitation      TRICUSPID VALVE:  There was mild regurgitation           Past Surgical History:   Procedure Laterality Date    CARDIAC CATHETERIZATION  07/24/2015    Left main- normal and maidly tortuous  Circumflex - normal and moderately tortuous  RCA- normal and mildy tortuous  Global LV function was severely depressed  Dali Joyner INGUINAL HERNIA REPAIR Bilateral     UT COLONOSCOPY FLX DX W/COLLJ SPEC WHEN PFRMD N/A 3/11/2019    Procedure: COLONOSCOPY with removal of anal papilla;   Surgeon: Aníbal Valderrama MD;  Location: [facility];  Service: Colorectal    TONSILLECTOMY      UMBILICAL HERNIA REPAIR  06/21/2006       Current Outpatient Medications   Medication Sig Dispense Refill    ALPRAZolam (XANAX) 0 5 mg tablet Take 1 tablet (0 5 mg total) by mouth daily at bedtime as needed for anxiety 30 tablet 0    aspirin 325 mg tablet Take 1 tablet by mouth daily      Atrovent HFA 17 MCG/ACT inhaler inhale 1 puff by mouth four times a day 12 9 g 0    Breo Ellipta 200-25 MCG/INH inhaler inhale 1 puff by mouth and INTO THE LUNGS once daily 1 Inhaler 5    carvedilol (COREG) 12 5 mg tablet take 1 tablet by mouth twice a day with meals 60 tablet 11    DULoxetine (CYMBALTA) 30 mg delayed release capsule Take 1 capsule (30 mg total) by mouth daily 30 capsule 5    fluticasone (FLONASE) 50 mcg/act nasal spray 1 spray into each nostril daily 1 Bottle 5    furosemide (LASIX) 20 mg tablet take 1 tablet by mouth once daily if needed for WEIGHT GAIN OR EDEMA 90 tablet 0    ipratropium-albuterol (DUO-NEB) 0 5-2 5 mg/3 mL nebulizer solution inhale contents of 1 vial ( 3 milliliters ) in nebulizer by mouth and INTO THE LUNGS every 6 hours if needed for wheezing 120 vial 3    isosorbide mononitrate (IMDUR) 30 mg 24 hr tablet Take 1 tablet (30 mg total) by mouth daily 30 tablet 11    lisinopril (ZESTRIL) 20 mg tablet take 1 tablet by mouth twice a day 60 tablet 11    Tudorza Pressair 400 MCG/ACT inhaler INHALE 1 PUFF BY MOUTH TWICE A DAY 1 Inhaler 2     No current facility-administered medications for this visit            Social History:  -Employment: restaurant owner/ and semi retired  -Smoking: quit 25 years ago  -Caffeine: 48 oz of coffee a day  -Alcohol: was drinking 1-2 drinks normally, but quit 2 weeks ago  -THC: occasionally  -OTC/Supplements/herbals: denied  -Illicits:  denies  -Family: lives with wife and daughter with her boyfriend    ROS:  Genitourinary none   Cardiology Frequent chest pain or angina,  and palpitations/fluttering feeling in the chest   Gastrointestinal none   Neurology need to move extremities   Constitutional none   Integumentary itching   Psychiatry none   Musculoskeletal joint pain and muscle aches   Pulmonary shortness of breath with activity, chest tightness, frequent cough and difficulty breathing when lying flat    ENT throat clearing and ringing in ears   Endocrine none   Hematological none       MSE:  -Alert and appropriate: alert, calm, coopetative  -Oriented to person, place and time:  name, age, location, day/date/mon/yr  -Behavior: good, sustained eye contact  -Speech: Unremarkable rate/rhythm/volume  -Mood: "alright"  -Affect: constricted  -Thought Processes: linear, logical, goal directed  PE:  Body mass index is 33 64 kg/m²  Vitals:    02/15/21 1100   BP: 122/70   Pulse: 62   Temp: (!) 97 1 °F (36 2 °C)   SpO2: 98%   Weight: 119 kg (262 lb)   Height: 6' 2" (1 88 m)       -General:  In NAD    -Eyes: Conjunctival injection: none     -EOM:  PERRLA, EOMI   -Eyelid hooding: yes    -ENT: MP: 4/4   -Facial deformity: no retrognathia   -Hard palate: moderate to high arch   -Soft palate:  Crowding with redundant tissue   -Gums and teeth: normal dentition   -Tongue:  Scalloping   -Nares:  Patent    -Neck/Lymphatics: Lymphadenopathy:  none appreciated   -Masses:  none appreciated   -Circumference: Neck Circumference: 18 1 "    -Cardiac: Auscultation:  Muffles and distant heart sounds   - LE edema over shins: none appreciated    -Pulm: -Respirations: unlaboured         -Auscultation:  CTA bilaterally, posterior fields    -Neuro: No resting tremor     -Musculoskeletal: Gait and stance: normal turning and ambulation; unremarkable  Assessment:  Mr Xiang Solomon is a 63 y o  male who is seen to evaluate for treatment of obstructive sleep apnea  The patient has CORDELL that was diagnosed in 2018, at which point he already had CHF and COPD  These have worsened overtime  He is sleeping in a recliner that is mitigating his nocturnal symptoms  He is also drinking a lot of coffee during the day also mitigating his daytime symptoms  He has a hard time sleeping outside of his home and is concerfned that he will not be able to sleep  He can use his Xanax  The pathophysiology of, the reasons to treat and treatment options for obstructive sleep apnea were all reviewed with the patient today  Discussed the testing options and reviewed the benefits and downsides of both, patient opted an in lab CPAP study  He is amenable to treatment with PAP therapy  Discussed keeping nasal passages clear, abstaining from alcohol, and other sedating drugs at night- which will worsen symptoms of CORDELL        --History provided by: patient   --Records reviewed: in chart      Recommendations:  1) CPAP study  2) Driving safety was reviewed with patient  If the patient feels too sleepy to drive he knows not to drive  If he becomes sleepy while driving he will pull over and nap  3) Follow-up after initiating treatment  4) Call with any questions or concerns  All questions answered for the patient, who indicated understanding and agreed with the plan       Kamini Malik MD  Psychiatry/ Sleep medicine

## 2021-02-15 NOTE — PROGRESS NOTES
Sleep Medicine Consultation Note    HPI:  Mr Cm Hernadez is a 63 y o  male seen at the request of Aisya Corona DO for advice regarding sleep disordered breathing  The patient was diagnosed with CORDELL in 1/2018 by HST:  respiratory event index (MANI) of 17 9  The lowest SpO2 recorded is 80%  APAP was ordered at that time  Dr Anamika Pardo saw him in 10/2017 for an evaluation for this, but no noted follow up in Epic  The patient presented today and endorsed that he has CHF and that his CORDELL should be treated  He did not want a machine at that time  He feels that his breathing is worsened by his CORDELL  He has COPD which has a risk of worsening with co-morbid CORDELL  He sleeps in a recliner which helps his snoring and gasping at night  He wakes up feeling like he wakes up and needs his nebulizer  He does not use oxygen at night  His cardiologist believes that he needs his CORDELL treated at this point as he has worsening heart function  The patient is no longer able to work  He doesn't feel like he sleeping poorly, but just feels his lungs are tight  Please see below for continuation of the HPI:      Sleep Disordered Breathing:  -Snoring: doesn't believe so, but sometimes his throat does hurt  -Observed Apneas: not since being in a recliner, but when lying down yes  -Mouth Breathing at night: yes  -Dry Mouth in morning: yes   -Nocturnal Gasping: yes  -Nasal Obstruction: chronic sinus issues  -Weight: gained 30 lbs since COVID    Sleep Pattern:  -Location: living room   -Bed/Recliner/Wedge: recliner  -Bed Partner: no  -HOB: inclined  -# of pillows under head: 0-1  -Position: reclined on back  -Bedtime: 11pm  -Lights out: same time  -Environmental: has TV on  -Latency: 1-2am, but his varies  -Awakenings: 0-1   -Reason: v0id   -Duration: mins  -Wake time: 430-5am   -Alarm: no  -Rise time: 515am  -Days off: semi retired   Only opens the restaurant now  -Shift Work: mornings  -Patient's estimate of total sleep time: 5-6h    Daytime Symptoms:  -Upon Awakening: chest feels tight, needs neb  Sometimes needs to take some time to wake up and start moving around   -Daytime fatigue/sleepiness: no  -Naps: no  -Involuntary Dozing: in conversation can doze off  -Cognitive Symptoms: denied  -Driving: Difficulty with sleepiness and driving:  Sometimes had sleepiness when working 80-90h  No longer working  Not having this often now  -- Close calls related to sleepiness: no   -- Accidents related to sleepiness: no      Questionnaires:   Sitting and reading:  Moderate chance of dozing  Watching TV: High chance of dozing  Sitting, inactive in a public place (e g  a theatre or a meeting): Slight chance of dozing  As a passenger in a car for an hour without a break: Would never doze  Lying down to rest in the afternoon when circumstances permit: Would never doze  Sitting and talking to someone: Slight chance of dozing  Sitting quietly after a lunch without alcohol: Slight chance of dozing  In a car, while stopped for a few minutes in traffic: Would never doze  Total score: 8      Sleep Review of Symptoms:  -Parasomnias:  --Sleep Walking: no  --Dream Enactment: no  --Bruxism: no  -Motor:  --RLS: sometimes, can keep him from falling asleep  --PLMS: no  -Narcolepsy:  --Hallucinations: no  --Paralysis: no  --Cataplexy: no    Childhood Sleep History: bed wetting until age 12    Prior Sleep Studies/Evaluations:  See HPI    Family History:  Family history of sleep disorders: none    Patient Active Problem List   Diagnosis    Asthma, chronic, moderate persistent, uncomplicated    Obstructive sleep apnea    Chronic combined systolic and diastolic congestive heart failure (HCC)    [facility]ischemic cardiomyopathy [facility]    Dyslipidemia    Benign essential hypertension    Anxiety    Asthma-COPD overlap syndrome ([facility] [facility]    Back pain    Borderline hyperglycemia    COPD, severe ([facility] [address] [facility]    Hemorrhoids    Thyroid disease    Vitamin D deficiency    Constipation    Pulmonary emphysema (HCC)    Urinary frequency    Cough    History of tobacco abuse    Hypertrophied anal papilla    Rectal bleeding    Special screening for malignant neoplasms, colon    Screening for prostate cancer    Bronchitis    Nicotine dependence    Post-nasal drip     Past Medical History:   Diagnosis Date    Asthma     CHF (congestive heart failure) (HCC)     COPD (chronic obstructive pulmonary disease) (HCC)     Mumps     Old MI (myocardial infarction)     Pneumonia     [facility] emphysema [facility] [address] [facility]     Sleep apnea      --> Seizure hx: denies  --> Head injury with LOC: denies  --> Supplemental Oxygen Use: denies    Labs     Results for Ronel Arreguin (MRN [id number]) as of 2/15/2021 11:34   Ref   Range 7/16/2020 15:50   Sodium Latest Ref Range: 134 - 143 mmol/L 137   Potassium Latest Ref Range: 3 5 - 5 5 mmol/L 4 3   Chloride Latest Ref Range: 98 - 107 mmol/L 106   CO2 Latest Ref Range: 21 - 31 mmol/L 23   Anion Gap Latest Ref Range: 4 - 13 mmol/L 8   BUN Latest Ref Range: 7 - 25 mg/dL 28 (H)   Creatinine Latest Ref Range: 0 70 - 1 30 mg/dL 1 22   Glucose, Random Latest Ref Range: 65 - 99 mg/dL 96   Calcium Latest Ref Range: 8 6 - 10 5 mg/dL 9 4   eGFR Latest Units: ml/min/1 73sq m 63   WBC Latest Ref Range: 4 80 - 10 80 Thousand/uL 9 10   Red Blood Cell Count Latest Ref Range: 4 30 - 5 90 Million/uL 4 89   Hemoglobin Latest Ref Range: 14 0 - 18 0 g/dL 14 0   HCT Latest Ref Range: 42 0 - 47 0 % 41 9 (L)   MCV Latest Ref Range: 81 - 99 fL 86   MCH Latest Ref Range: 26 0 - 34 0 pg 28 6   MCHC Latest Ref Range: 31 0 - 37 0 g/dL 33 4   RDW Latest Ref Range: 11 5 - 14 5 % 15 2 (H)   Platelet Count Latest Ref Range: 149 - 390 Thousands/uL 278   MPV Latest Ref Range: 8 6 - 11 7 fL 7 6 (L)   Neutrophils % Latest Ref Range: 42 - 75 % 67   Lymphocytes Relative Latest Ref Range: 21 - 51 % 20 (L)   Monocytes Relative Latest Ref Range: 2 - 12 % 7   Eosinophils Latest Ref Range: 0 - 5 % 5 Basophils Relative Latest Ref Range: 0 - 2 % 1   Absolute Neutrophils Latest Ref Range: 1 40 - 6 50 Thousands/µL 6 10   Lymphocytes Absolute Latest Ref Range: 0 60 - 4 47 Thousands/µL 1 80   Absolute Monocytes Latest Ref Range: 0 17 - 1 22 Thousand/µL 0 70   Absolute Eosinophils Latest Ref Range: 0 00 - 0 61 Thousand/µL 0 40   Basophils Absolute Latest Ref Range: 0 00 - 0 10 Thousands/µL 0 10   Protime Latest Ref Range: 11 6 - 14 5 seconds 13 3   INR Latest Ref Range: 0 84 - 1 19  1 02   PTT Latest Ref Range: 23 - 37 seconds 33     NM stress test: SUMMARY:  -  Stress results: There was no chest pain during stress  -  ECG conclusions: The stress ECG was non-diagnostic   -  Perfusion imaging: There was a moderate-sized, moderately severe, fixed myocardial perfusion defect of the basal to mid inferolateral wall  There was a moderate-sized, moderately severe, fixed myocardial perfusion defect of the basal to  mid inferior wall  -  Gated SPECT: The calculated left ventricular ejection fraction was 39 %  Left ventricular ejection fraction was moderately decreased by visual estimate  There was moderately reduced myocardial thickening and motion of  the basal to mid inferior and inferolateral walls of the left ventricle      IMPRESSIONS: Abnormal study after pharmacologic vasodilation  There was a moderate-sized infarct in the distribution of right coronary and left circumflex artery  Left ventricular systolic function was reduced, with regional wall motion  abnormalities      Prepared and signed by  Jad Bailon DO  Signed 09/25/2020 12:20:49    Echo 9/2020: SUMMARY     LEFT VENTRICLE:  Systolic function was moderately reduced  Ejection fraction was estimated to be 40 %  There was akinesis of the basal-mid inferior and basal-mid inferolateral wall(s)  Wall thickness was mildly increased    Features were consistent with a pseudonormal left ventricular filling pattern, with concomitant abnormal relaxation and increased filling pressure (grade 2 diastolic dysfunction)      LEFT ATRIUM:  The atrium was mildly dilated      MITRAL VALVE:  There was trace regurgitation      TRICUSPID VALVE:  There was mild regurgitation           Past Surgical History:   Procedure Laterality Date    CARDIAC CATHETERIZATION  07/24/2015    Left main- normal and maidly tortuous  Circumflex - normal and moderately tortuous  RCA- normal and mildy tortuous  Global LV function was severely depressed  Marianne Miner INGUINAL HERNIA REPAIR Bilateral     OH COLONOSCOPY FLX DX W/COLLJ SPEC WHEN PFRMD N/A 3/11/2019    Procedure: COLONOSCOPY with removal of anal papilla;   Surgeon: Michelle Baker MD;  Location: [facility];  Service: Colorectal    TONSILLECTOMY      UMBILICAL HERNIA REPAIR  06/21/2006       Current Outpatient Medications   Medication Sig Dispense Refill    ALPRAZolam (XANAX) 0 5 mg tablet Take 1 tablet (0 5 mg total) by mouth daily at bedtime as needed for anxiety 30 tablet 0    aspirin 325 mg tablet Take 1 tablet by mouth daily      Atrovent HFA 17 MCG/ACT inhaler inhale 1 puff by mouth four times a day 12 9 g 0    Breo Ellipta 200-25 MCG/INH inhaler inhale 1 puff by mouth and INTO THE LUNGS once daily 1 Inhaler 5    carvedilol (COREG) 12 5 mg tablet take 1 tablet by mouth twice a day with meals 60 tablet 11    DULoxetine (CYMBALTA) 30 mg delayed release capsule Take 1 capsule (30 mg total) by mouth daily 30 capsule 5    fluticasone (FLONASE) 50 mcg/act nasal spray 1 spray into each nostril daily 1 Bottle 5    furosemide (LASIX) 20 mg tablet take 1 tablet by mouth once daily if needed for WEIGHT GAIN OR EDEMA 90 tablet 0    ipratropium-albuterol (DUO-NEB) 0 5-2 5 mg/3 mL nebulizer solution inhale contents of 1 vial ( 3 milliliters ) in nebulizer by mouth and INTO THE LUNGS every 6 hours if needed for wheezing 120 vial 3    isosorbide mononitrate (IMDUR) 30 mg 24 hr tablet Take 1 tablet (30 mg total) by mouth daily 30 tablet 11    lisinopril (ZESTRIL) 20 mg tablet take 1 tablet by mouth twice a day 60 tablet 11    Tudorza Pressair 400 MCG/ACT inhaler INHALE 1 PUFF BY MOUTH TWICE A DAY 1 Inhaler 2     No current facility-administered medications for this visit  Social History:  -Employment: restaurant owner/ and semi retired  -Smoking: quit 25 years ago  -Caffeine: 48 oz of coffee a day  -Alcohol: was drinking 1-2 drinks normally, but quit 2 weeks ago  -THC: occasionally  -OTC/Supplements/herbals: denied  -Illicits:  denies  -Family: lives with wife and daughter with her boyfriend    ROS:  Genitourinary none   Cardiology Frequent chest pain or angina,  and palpitations/fluttering feeling in the chest   Gastrointestinal none   Neurology need to move extremities   Constitutional none   Integumentary itching   Psychiatry none   Musculoskeletal joint pain and muscle aches   Pulmonary shortness of breath with activity, chest tightness, frequent cough and difficulty breathing when lying flat    ENT throat clearing and ringing in ears   Endocrine none   Hematological none       MSE:  -Alert and appropriate: alert, calm, coopetative  -Oriented to person, place and time:  name, age, location, day/date/mon/yr  -Behavior: good, sustained eye contact  -Speech: Unremarkable rate/rhythm/volume  -Mood: "alright"  -Affect: constricted  -Thought Processes: linear, logical, goal directed  PE:  Body mass index is 33 64 kg/m²    Vitals:    02/15/21 1100   BP: 122/70   Pulse: 62   Temp: (!) 97 1 °F (36 2 °C)   SpO2: 98%   Weight: 119 kg (262 lb)   Height: 6' 2" (1 88 m)       -General:  In NAD    -Eyes: Conjunctival injection: none     -EOM:  PERRLA, EOMI   -Eyelid hooding: yes    -ENT: MP: 4/4   -Facial deformity: no retrognathia   -Hard palate: moderate to high arch   -Soft palate:  Crowding with redundant tissue   -Gums and teeth: normal dentition   -Tongue:  Scalloping   -Nares:  Patent    -Neck/Lymphatics: Lymphadenopathy:  none appreciated   -Masses:  none appreciated   -Circumference: Neck Circumference: 18 1 "    -Cardiac: Auscultation:  Muffles and distant heart sounds   - LE edema over shins: none appreciated    -Pulm: -Respirations: unlaboured         -Auscultation:  CTA bilaterally, posterior fields    -Neuro: No resting tremor     -Musculoskeletal: Gait and stance: normal turning and ambulation; unremarkable  Assessment:  Mr Stephen Rincon is a 63 y o  male who is seen to evaluate for treatment of obstructive sleep apnea  The patient has CORDELL that was diagnosed in 2018, at which point he already had CHF and COPD  These have worsened overtime  He is sleeping in a recliner that is mitigating his nocturnal symptoms  He is also drinking a lot of coffee during the day also mitigating his daytime symptoms  He has a hard time sleeping outside of his home and is concerfned that he will not be able to sleep  He can use his Xanax  The pathophysiology of, the reasons to treat and treatment options for obstructive sleep apnea were all reviewed with the patient today  Discussed the testing options and reviewed the benefits and downsides of both, patient opted an in lab CPAP study  He is amenable to treatment with PAP therapy  Discussed keeping nasal passages clear, abstaining from alcohol, and other sedating drugs at night- which will worsen symptoms of CORDELL  --History provided by: patient   --Records reviewed: in chart      Recommendations:  1) CPAP study  2) Driving safety was reviewed with patient  If the patient feels too sleepy to drive he knows not to drive  If he becomes sleepy while driving he will pull over and nap  3) Follow-up after initiating treatment  4) Call with any questions or concerns  All questions answered for the patient, who indicated understanding and agreed with the plan       Devang Haines MD  Psychiatry/ Sleep medicine

## 2021-02-15 NOTE — TELEPHONE ENCOUNTER
Patient informed that COVID testing is to be performed 10 days prior to PAP study  Patient is to tell site that it is needed for a procedure so it is expedited  Testing site information provided      PT was given all his papers to when and where he needs to get his covid testing done

## 2021-02-15 NOTE — PATIENT INSTRUCTIONS
Recommendations:  1) CPAP study  2) Driving safety was reviewed with patient  If the patient feels too sleepy to drive he knows not to drive  If he becomes sleepy while driving he will pull over and nap  3) Follow-up after initiating treatment  4) Call with any questions or concerns

## 2021-02-21 DIAGNOSIS — F41.9 ANXIETY: ICD-10-CM

## 2021-02-21 DIAGNOSIS — F32.A DEPRESSION, UNSPECIFIED DEPRESSION TYPE: ICD-10-CM

## 2021-02-22 RX ORDER — DULOXETIN HYDROCHLORIDE 30 MG/1
CAPSULE, DELAYED RELEASE ORAL
Qty: 30 CAPSULE | Refills: 5 | Status: SHIPPED | OUTPATIENT
Start: 2021-02-22 | End: 2021-09-20 | Stop reason: ALTCHOICE

## 2021-02-22 NOTE — PROGRESS NOTES
Pulmonary Rehabilitation Plan of Care   Care Plan           Today's date: 2021  Total visits to date:   Patient name: Cm Hernadez      : 1955  Age: 63 y o  MRN: [id number]  Referring Physician: Cristino Murrieta PA-C/Dr Landeros Pap  Provider: [facility]  Clinician: Verba Felty Cristino Coke, MPT    Dx: Asthma -COPD Overlap  Date of onset: 2021    COMMENTS:  Patient seen for initial evaluation this date  He performed a 6 MWT on room air w/correct hemodynamic responses  His resting vitals were HR 78, /80 and 02 Sat 97%  His peak vitals were HR 98, BP and 02 Sat 96%  At recovery his vitals were HR 72, /76 and 02 Sat 97%  He rated the test a 5 on a 1-10 RPE Scale  He rated his SOB as 2/10  He denied any other symptoms  Patient expresses frustration w/weight gain due to COVID quarantine  He states he is 30 pounds over his normal weight  He was also regualrly attending a fitness center but is hesitant to resume  He would also like to be able to ride his bike on the [employer] [place]  Prior to [facility] he was able to ride for 20-30 miles a few times a week  Mr Alexander Wu was issued the [employer] COPD Guide and information on the [employer]  He is very motivated to progress  He is an excellent rehabilitation candidate due to high prior level of function, support system and willingness to participate  He is scheduled to begin RI 3/1/2021  Medication compliance: Yes   Comments: Patient admits to medication compliance  Fall Risk: Low   Comments: Patient ambulates w/out an AD  BLE strength is 4+/5 to 5/5  Patient notes arthritic changes in both of his knees       EXERCISE/ACTIVITY    Cardiopulmonary Goals:   Min: 30-35   HR: 20-30 > RHR  RPE: 4-6  (moderate to moderately hard exercise)   O2 sat: >90%    Modalities: Treadmill, UBE, NuStep and Recumbent bike  Strength trainin-3 days / week, 12-15 repitations  and 1-2 sets per modality    Modalities: Leg press    Exercise Progression: Progress as tolerated to maintain RPE 4-5      RPE 4-6  Home activity: Very Sedentary  Goals: 10% improvement in functional capacity, home exercise days opposite [place], improved DASI score and >150 mins of exercise/wk  Education: Benefit of exercise, home exercise, pursed lip breathing, RPE scale and O2 saturation monitoring   Plan:home exercise target 30 mins, 2 days opposite [place] and Improved 6MW results  Readiness to change: Preparation:  (Getting ready to change)     NUTRITION    Weight control: Patient states he has gained > 30 pounds since [employer]  Starting weight: 262        Current Weight: 259     Diabetes: N/A  Goals:BMI <25 and Improved Rate Your Plate score  Education:More frequent meals, smaller portions  hydration  weight loss  Plan: Education Class: Healthy Eating  Readiness to change: Preparation:  (Getting ready to change)     PSYCHOSOCIAL    Emotional:  10-14 = Moderate Depression  Social support: Very Good  Goals: Reduce perceived stress to 1-3/10, improved [facility] QOL < 27, PHQ-9 - reduced severity by one level, Physical Fitness in [facility] Score < 3, Social Support in [facility] Score < 3, Daily Activity in [place] Score < 3, Overall Health in [facility] Score < 3, Increased interest in doing things, improved sleep, improved positive thoughts of well being and increased energy  Education: signs/sxs of depression  benefits of positive support system  coping mechanisms  depression and chronic disease  Plan: Education Class: Stress and Your Lungs, Relaxation and Mindfulness and Anxiety and Lung Disease  Readiness to change: Preparation:  (Getting ready to change)     OTHER CORE COMPONENTS     Tobacco:   Social History     Tobacco Use   Smoking Status Former Smoker    Packs/day: 3 00    Years: 30 00    Pack years: 90 00   Smokeless Tobacco Never Used     Oxygen: No supplemental 02  Patient is scheduled for a sleep study next month    Blood pressure: Restin/80   Exercise:   168/78   Recovery: 150/76    Goals: consistent BP < 130/80, reduced dietary sodium <2300mg, moderate intensity exercise >150 mins/wk and medication compliance  Education: Pulmonary Disease, physiology, Exercise, strength, flexibility, Conservation of energy and oxygen, breathing techniques  Plan: Causes of lung disease, Prevention and treatment, Exercise benefits and Proper nutrition  Readiness to change: Preparation:  (Getting ready to change)     CARDIAC/PULMONARY REHAB ASSESSMENT    Today's date: 2021  Patient name: Kiah Lane      : 1955       MRN: [id number]  PCP: Lilia Lama DO  Pulmonologist: Dr Hubert Koyanagi [facility]  Dx: Asthma-COPD Overlap  Date of onset: 2021  Cultural needs: None    Height:   74 inches  Weight:    262 pounds  Medical History:   Past Medical History:   Diagnosis Date    Asthma     [facility]) [facility]     COPD [facility] disease) [facility] [address] [facility]     Mumps     Old MI (myocardial infarction)     Pneumonia     Pulmonary emphysema [facility] [address] [facility]     Sleep apnea        Physical Limitations: None    Risk Factors   Smoking: Yes -Former 3 packs for 30 years  HTN: Yes  DM:No  Obesity: Yes  Inactivity: Yes  Family History:   Family History   Problem Relation Age of Onset    Heart attack Father         MI    Diabetes Sister         DM    Arthritis Family     Coronary artery disease Family     Hypertension Family     Tuberculosis Son      Allergies:    Allergies   Allergen Reactions    Ciprofloxacin Shortness Of Breath and Diarrhea     Other: NA    Current Medications:   Current Outpatient Medications   Medication Sig Dispense Refill    ALPRAZolam (XANAX) 0 5 mg tablet Take 1 tablet (0 5 mg total) by mouth daily at bedtime as needed for anxiety 30 tablet 0    aspirin 325 mg tablet Take 1 tablet by mouth daily      Atrovent HFA 17 MCG/ACT inhaler inhale 1 puff by mouth four times a day 12 9 g 0  Breo Ellipta 200-25 MCG/INH inhaler inhale 1 puff by mouth and INTO THE LUNGS once daily 1 Inhaler 5    carvedilol (COREG) 12 5 mg tablet take 1 tablet by mouth twice a day with meals 60 tablet 11    DULoxetine (CYMBALTA) 30 mg delayed release capsule take 1 capsule by mouth once daily 30 capsule 5    fluticasone (FLONASE) 50 mcg/act nasal spray 1 spray into each nostril daily 1 Bottle 5    furosemide (LASIX) 20 mg tablet take 1 tablet by mouth once daily if needed for WEIGHT GAIN OR EDEMA 90 tablet 0    ipratropium-albuterol (DUO-NEB) 0 5-2 5 mg/3 mL nebulizer solution inhale contents of 1 vial ( 3 milliliters ) in nebulizer by mouth and INTO THE LUNGS every 6 hours if needed for wheezing 120 vial 3    isosorbide mononitrate (IMDUR) 30 mg 24 hr tablet Take 1 tablet (30 mg total) by mouth daily 30 tablet 11    lisinopril (ZESTRIL) 20 mg tablet take 1 tablet by mouth twice a day (Patient not taking: Reported on 2/15/2021) 60 tablet 11    Tudorza Pressair 400 MCG/ACT inhaler INHALE 1 PUFF BY MOUTH TWICE A DAY 1 Inhaler 2     No current facility-administered medications for this visit  Functional Status Prior to Diagnosis for Treatment   Occupation: Retired- [employer]: Riding his bike on [address]: All ADLs, driving and moderate household chores  Exercise: Patient has been sedentary since [facility]  Normally attended a fitness center 3-5 X week and rode his bike for 20-30 miles on a [employer] [place]  Other:     Short Term Program Goals: Decrease shortness of breath, improve stamina, increase strength   Long Term Goals: Be able to participate in favorite activity, sport, hobby (golf, hunt, sew, play games, cook) and enjoy family, friends without breathing difficulties    Comments:Patient requires skilled CA interventions in order to attain his goals and maximal level of function      Ability to reach goals/rehabilitation potential: high    Projected return to function: Full    Emotional/Social    Marital status:   Full-time     Life Stressors: Health and overall functional decline    Goals: Weight loss, decrease HADDAD, decrease fatigue, improve strength, educated in HEP, improve ability to ambulate and attain his maximal level of function  Comments: Patient presents w/weakness, debility and HADDAD  He is very motivated to progress  He requires skilled CT interventions in order to attain his goals and maximal level of function

## 2021-02-23 ENCOUNTER — CLINICAL SUPPORT (OUTPATIENT)
Dept: PULMONOLOGY | Facility: HOSPITAL | Age: 66
End: 2021-02-23

## 2021-02-23 DIAGNOSIS — J45.909 ASTHMA, UNSPECIFIED ASTHMA SEVERITY, UNSPECIFIED WHETHER COMPLICATED, UNSPECIFIED WHETHER PERSISTENT: ICD-10-CM

## 2021-02-23 DIAGNOSIS — J44.9 CHRONIC OBSTRUCTIVE PULMONARY DISEASE, UNSPECIFIED COPD TYPE (HCC): Primary | ICD-10-CM

## 2021-02-23 DIAGNOSIS — I50.42 CHRONIC COMBINED SYSTOLIC AND DIASTOLIC CONGESTIVE HEART FAILURE (HCC): ICD-10-CM

## 2021-02-25 ENCOUNTER — HOSPITAL ENCOUNTER (OUTPATIENT)
Dept: CT IMAGING | Facility: HOSPITAL | Age: 66
Discharge: HOME | End: 2021-02-25
Payer: COMMERCIAL

## 2021-02-25 ENCOUNTER — HOSPITAL ENCOUNTER (OUTPATIENT)
Dept: PULMONOLOGY | Facility: HOSPITAL | Age: 66
Discharge: HOME | End: 2021-02-25
Payer: COMMERCIAL

## 2021-02-25 DIAGNOSIS — F17.211 CIGARETTE NICOTINE DEPENDENCE IN REMISSION: ICD-10-CM

## 2021-02-25 DIAGNOSIS — J44.9 ASTHMA-COPD OVERLAP SYNDROME (HCC): ICD-10-CM

## 2021-02-25 DIAGNOSIS — I50.42 CHRONIC COMBINED SYSTOLIC AND DIASTOLIC CONGESTIVE HEART FAILURE (HCC): ICD-10-CM

## 2021-02-25 PROCEDURE — 94010 BREATHING CAPACITY TEST: CPT

## 2021-02-25 PROCEDURE — 71271 CT THORAX LUNG CANCER SCR C-: CPT

## 2021-02-25 PROCEDURE — 94760 N-INVAS EAR/PLS OXIMETRY 1: CPT

## 2021-02-25 PROCEDURE — 94729 DIFFUSING CAPACITY: CPT

## 2021-02-25 PROCEDURE — 94727 GAS DIL/WSHOT DETER LNG VOL: CPT

## 2021-02-25 PROCEDURE — 94010 BREATHING CAPACITY TEST: CPT | Performed by: INTERNAL MEDICINE

## 2021-02-25 PROCEDURE — 94729 DIFFUSING CAPACITY: CPT | Performed by: INTERNAL MEDICINE

## 2021-02-25 PROCEDURE — 94727 GAS DIL/WSHOT DETER LNG VOL: CPT | Performed by: INTERNAL MEDICINE

## 2021-02-28 DIAGNOSIS — J44.9 CHRONIC OBSTRUCTIVE PULMONARY DISEASE, UNSPECIFIED COPD TYPE (HCC): ICD-10-CM

## 2021-03-01 ENCOUNTER — CLINICAL SUPPORT (OUTPATIENT)
Dept: PULMONOLOGY | Facility: HOSPITAL | Age: 66
End: 2021-03-01
Payer: COMMERCIAL

## 2021-03-01 ENCOUNTER — TELEPHONE (OUTPATIENT)
Dept: OTHER | Facility: HOSPITAL | Age: 66
End: 2021-03-01

## 2021-03-01 DIAGNOSIS — J44.9 COPD, SEVERE (HCC): ICD-10-CM

## 2021-03-01 PROCEDURE — G0424 PULMONARY REHAB W EXER: HCPCS

## 2021-03-01 RX ORDER — IPRATROPIUM BROMIDE 17 UG/1
AEROSOL, METERED RESPIRATORY (INHALATION)
Qty: 12.9 G | Refills: 0 | Status: SHIPPED | OUTPATIENT
Start: 2021-03-01 | End: 2021-03-27

## 2021-03-01 NOTE — TELEPHONE ENCOUNTER
Called patient to review results of low-dose lung cancer screening CT and pulmonary function test   Follow-up as previously scheduled

## 2021-03-02 DIAGNOSIS — J44.9 COPD, MODERATE (HCC): ICD-10-CM

## 2021-03-02 RX ORDER — ACLIDINIUM BROMIDE 400 UG/1
POWDER, METERED RESPIRATORY (INHALATION)
Qty: 1 INHALER | Refills: 5 | Status: SHIPPED | OUTPATIENT
Start: 2021-03-02 | End: 2021-09-07

## 2021-03-03 ENCOUNTER — CLINICAL SUPPORT (OUTPATIENT)
Dept: PULMONOLOGY | Facility: HOSPITAL | Age: 66
End: 2021-03-03
Payer: COMMERCIAL

## 2021-03-03 DIAGNOSIS — J44.9 COPD, SEVERE (HCC): ICD-10-CM

## 2021-03-03 PROCEDURE — G0424 PULMONARY REHAB W EXER: HCPCS

## 2021-03-05 ENCOUNTER — TELEPHONE (OUTPATIENT)
Dept: PULMONOLOGY | Facility: HOSPITAL | Age: 66
End: 2021-03-05

## 2021-03-05 ENCOUNTER — CLINICAL SUPPORT (OUTPATIENT)
Dept: PULMONOLOGY | Facility: HOSPITAL | Age: 66
End: 2021-03-05
Payer: COMMERCIAL

## 2021-03-05 DIAGNOSIS — Z20.822 ENCOUNTER FOR PREPROCEDURE SCREENING LABORATORY TESTING FOR COVID-19: ICD-10-CM

## 2021-03-05 DIAGNOSIS — J44.9 COPD, SEVERE (HCC): ICD-10-CM

## 2021-03-05 DIAGNOSIS — Z01.812 ENCOUNTER FOR PREPROCEDURE SCREENING LABORATORY TESTING FOR COVID-19: ICD-10-CM

## 2021-03-05 PROCEDURE — U0005 INFEC AGEN DETEC AMPLI PROBE: HCPCS | Performed by: INTERNAL MEDICINE

## 2021-03-05 PROCEDURE — U0003 INFECTIOUS AGENT DETECTION BY NUCLEIC ACID (DNA OR RNA); SEVERE ACUTE RESPIRATORY SYNDROME CORONAVIRUS 2 (SARS-COV-2) (CORONAVIRUS DISEASE [COVID-19]), AMPLIFIED PROBE TECHNIQUE, MAKING USE OF HIGH THROUGHPUT TECHNOLOGIES AS DESCRIBED BY CMS-2020-01-R: HCPCS | Performed by: INTERNAL MEDICINE

## 2021-03-05 PROCEDURE — G0424 PULMONARY REHAB W EXER: HCPCS

## 2021-03-06 LAB — SARS-COV-2 RNA RESP QL NAA+PROBE: NEGATIVE

## 2021-03-08 ENCOUNTER — CLINICAL SUPPORT (OUTPATIENT)
Dept: PULMONOLOGY | Facility: HOSPITAL | Age: 66
End: 2021-03-08
Payer: COMMERCIAL

## 2021-03-08 DIAGNOSIS — J44.9 COPD, SEVERE (HCC): ICD-10-CM

## 2021-03-08 PROCEDURE — G0424 PULMONARY REHAB W EXER: HCPCS

## 2021-03-10 ENCOUNTER — CLINICAL SUPPORT (OUTPATIENT)
Dept: PULMONOLOGY | Facility: HOSPITAL | Age: 66
End: 2021-03-10
Payer: COMMERCIAL

## 2021-03-10 DIAGNOSIS — J44.9 COPD, SEVERE (HCC): ICD-10-CM

## 2021-03-10 PROCEDURE — G0424 PULMONARY REHAB W EXER: HCPCS

## 2021-03-12 ENCOUNTER — CLINICAL SUPPORT (OUTPATIENT)
Dept: PULMONOLOGY | Facility: HOSPITAL | Age: 66
End: 2021-03-12
Payer: COMMERCIAL

## 2021-03-12 DIAGNOSIS — J44.9 COPD, SEVERE (HCC): ICD-10-CM

## 2021-03-12 PROCEDURE — G0424 PULMONARY REHAB W EXER: HCPCS

## 2021-03-14 ENCOUNTER — HOSPITAL ENCOUNTER (OUTPATIENT)
Dept: SLEEP CENTER | Facility: CLINIC | Age: 66
Discharge: HOME | End: 2021-03-14
Payer: COMMERCIAL

## 2021-03-14 DIAGNOSIS — I42.8 NONISCHEMIC CARDIOMYOPATHY (HCC): ICD-10-CM

## 2021-03-14 DIAGNOSIS — J44.9 ASTHMA-COPD OVERLAP SYNDROME (HCC): ICD-10-CM

## 2021-03-14 DIAGNOSIS — I50.42 CHRONIC COMBINED SYSTOLIC AND DIASTOLIC CONGESTIVE HEART FAILURE (HCC): ICD-10-CM

## 2021-03-14 DIAGNOSIS — G47.33 OBSTRUCTIVE SLEEP APNEA: ICD-10-CM

## 2021-03-14 PROCEDURE — 95811 POLYSOM 6/>YRS CPAP 4/> PARM: CPT

## 2021-03-14 PROCEDURE — 95811 POLYSOM 6/>YRS CPAP 4/> PARM: CPT | Performed by: PSYCHIATRY & NEUROLOGY

## 2021-03-15 ENCOUNTER — CLINICAL SUPPORT (OUTPATIENT)
Dept: PULMONOLOGY | Facility: HOSPITAL | Age: 66
End: 2021-03-15
Payer: COMMERCIAL

## 2021-03-15 DIAGNOSIS — J44.9 ASTHMA-COPD OVERLAP SYNDROME (HCC): ICD-10-CM

## 2021-03-15 PROCEDURE — G0424 PULMONARY REHAB W EXER: HCPCS

## 2021-03-15 NOTE — PROGRESS NOTES
Sleep Study Documentation    Pre-Sleep Study       Sleep testing procedure explained to patient:YES    Patient napped prior to study:NO    Caffeine:Dayshift worker after 12PM   Caffeine use:YES- coffee  6 to 18 ounces    Alcohol:Dayshift workers after 5PM: Alcohol use:NO    Typical day for patient:YES       Study Documentation    Sleep Study Indications: Moderate obstructive sleep apnea diagnosed on home study 01/23/2018  Sleep Study: Treatment   Optimal PAP pressure: 13cm   Leak:Small  Snore:Eliminated  REM Obtained:yes  Supplemental O2: no    Minimum SaO2 89%  Baseline SaO2 96%  PAP mask tried (list all) [employer] Simplus, size large   PAP mask choice (final) [employer] Simplus, size large   PAP mask type:full face  PAP pressure at which snoring was eliminated 5cm   Minimum SaO2 at final PAP pressure 94%  Mode of Therapy:CPAP  ETCO2:No  CPAP changed to BiPAP:No        EKG abnormalities: no     EEG abnormalities: no    Sleep Study Recorded < 2 hours: N/A    Sleep Study Recorded > 2 hours but incomplete study: N/A    Sleep Study Recorded 6 hours but no sleep obtained: NO    Patient classification: retired       Post-Sleep Study    Medication used at bedtime or during sleep study:YES prescription sleep aid    Patient reports time it took to fall asleep:less than 20 minutes    Patient reports waking up during study:3 or more times  Patient reports returning to sleep in greater than 30 minutes  Patient reports sleeping 4 to 6 hours without dreaming  Patient reports sleep during study:typical    Patient rated sleepiness: Not sleepy or tired    PAP treatment:yes: Post PAP treatment patient reports feeling unsure if a change is noted and  would wear PAP mask at home

## 2021-03-17 ENCOUNTER — CLINICAL SUPPORT (OUTPATIENT)
Dept: PULMONOLOGY | Facility: HOSPITAL | Age: 66
End: 2021-03-17
Payer: COMMERCIAL

## 2021-03-17 DIAGNOSIS — J44.9 ASTHMA-COPD OVERLAP SYNDROME (HCC): ICD-10-CM

## 2021-03-17 PROCEDURE — G0424 PULMONARY REHAB W EXER: HCPCS

## 2021-03-18 ENCOUNTER — TELEPHONE (OUTPATIENT)
Dept: SLEEP CENTER | Facility: CLINIC | Age: 66
End: 2021-03-18

## 2021-03-18 DIAGNOSIS — G47.33 OBSTRUCTIVE SLEEP APNEA: Primary | ICD-10-CM

## 2021-03-18 NOTE — TELEPHONE ENCOUNTER
----- Message from Keily Calix MD sent at 3/18/2021 10:59 AM EDT -----  CPAP study read   APAP ordered

## 2021-03-18 NOTE — TELEPHONE ENCOUNTER
Called patient and advised sleep study resulted  APAP ordered  Advised patient that his insurance requires Rx for APAP to be sent to [facility]  They will arrange DME provider and will reach out to him  Rx for APAP, office note and sleep study faxed to [facility]  [phone]  Scheduled compliance follow up 6/21/21 in [place]

## 2021-03-19 ENCOUNTER — CLINICAL SUPPORT (OUTPATIENT)
Dept: PULMONOLOGY | Facility: HOSPITAL | Age: 66
End: 2021-03-19
Payer: COMMERCIAL

## 2021-03-19 DIAGNOSIS — J44.9 ASTHMA-COPD OVERLAP SYNDROME (HCC): ICD-10-CM

## 2021-03-19 PROCEDURE — G0424 PULMONARY REHAB W EXER: HCPCS

## 2021-03-22 ENCOUNTER — CLINICAL SUPPORT (OUTPATIENT)
Dept: PULMONOLOGY | Facility: HOSPITAL | Age: 66
End: 2021-03-22
Payer: COMMERCIAL

## 2021-03-22 DIAGNOSIS — J44.9 ASTHMA-COPD OVERLAP SYNDROME (HCC): ICD-10-CM

## 2021-03-22 PROCEDURE — G0424 PULMONARY REHAB W EXER: HCPCS

## 2021-03-22 NOTE — PROGRESS NOTES
Pulmonary Rehabilitation Plan of Care  30 Day Progress Note        Today's date: 3/22/2021   Total visits to date: 6  Patient name: Ethan Thurman      : 1955  Age: 63 y o  MRN: [id number]  Referring Physician: Chrystal Olszewski PA-C/Dr eJssika Alberto  Provider: [facility]  Clinician: Darylene Loge RN    Dx: Asthma-COPD overlap  Date of onset: 21    COMMENTS:  Ravi Lee is doing 40 min aerobic exercise  He is in NSR and exercises on room air  His oxygen saturation is 97-99% at rest and 94-97% with exercise  He is doing 3 6 MET on the treadmill  His PHQ9 score improved from a 12 to a 2  He states he feels much better since he started exercising       Medication compliance: yes   Comments: states he takes his medication  Fall Risk: Low   Comments: Ambulates without assistance    EXERCISE/ACTIVITY    Cardiopulmonary Goals:   Min: 40   HR: 20-30 bpm above restring   RPE: 4-6  (moderate to moderately hard exercise)   O2 sat: >90%    Modalities: Treadmill, AD bike, UBE, Lifecycle, NuStep and Recumbent bike  Strength trainin-3 days / week, 12-15 repitations  and 1-2 sets per modality    Modalities: Chest press and Lateral pull down     Exercise Progression: Progress as tolerated to maintain RPE 4-5      RPE 4-6  Home activity: joining a gym  Goals: home exercise days opposite [place] and >150 mins of exercise/wk  Education: Benefit of exercise, home exercise and RPE scale   Plan:home exercise target 30 mins, 2 days opposite [place]  Readiness to change: Action:  (Changing behavior)    NUTRITION    Weight control:    Starting weight: 262        Current Weight: 266     Diabetes: N/A  Goals:decreased body fat% and Improved Rate Your Plate score  Education:weight loss  portion control    Label Reading  Plan: Education Video- Diet and Nutrition and Education Class: Healthy Eating  Readiness to change: Action:  (Changing behavior)    PSYCHOSOCIAL    Emotional:  1-4 = Minimal Depression  Social support: Very Good  Goals: Reduce perceived stress to 1-3/10, improved sleep, improved concentration, improved positive thoughts of well being, increased energy, stop worrying, take time to relax and Feel less anxious  Education: signs/sxs of depression  benefits of positive support system  coping mechanisms  Plan: Education Class: Stress and Your Lungs, Relaxation and Mindfulness and Anxiety and Lung Disease  Readiness to change: Action:  (Changing behavior)    OTHER CORE COMPONENTS     Tobacco:   Social History     Tobacco Use   Smoking Status Former Smoker    Packs/day: 3 00    Years: 30 00    Pack years: 90 00   Smokeless Tobacco Never Used     Oxygen: room air  Blood pressure:    Restin/62   Exercise:  142/70  Goals: consistent BP < 130/80, reduced dietary sodium <2300mg and moderate intensity exercise >150 mins/wk  Education: Pulmonary Disease, physiology, oxygen, breathing techniques and Avoiding Infections  Plan: Prevention and treatment, Exercise benefits and Proper nutrition  Readiness to change: Action:  (Changing behavior)

## 2021-03-24 ENCOUNTER — APPOINTMENT (OUTPATIENT)
Dept: PULMONOLOGY | Facility: HOSPITAL | Age: 66
End: 2021-03-24
Payer: COMMERCIAL

## 2021-03-26 ENCOUNTER — CLINICAL SUPPORT (OUTPATIENT)
Dept: PULMONOLOGY | Facility: HOSPITAL | Age: 66
End: 2021-03-26
Payer: COMMERCIAL

## 2021-03-26 DIAGNOSIS — J44.9 ASTHMA-COPD OVERLAP SYNDROME (HCC): ICD-10-CM

## 2021-03-26 PROCEDURE — G0424 PULMONARY REHAB W EXER: HCPCS

## 2021-03-27 DIAGNOSIS — I42.8 OTHER CARDIOMYOPATHY (HCC): ICD-10-CM

## 2021-03-27 DIAGNOSIS — J44.9 CHRONIC OBSTRUCTIVE PULMONARY DISEASE, UNSPECIFIED COPD TYPE (HCC): ICD-10-CM

## 2021-03-27 RX ORDER — CARVEDILOL 12.5 MG/1
TABLET ORAL
Qty: 60 TABLET | Refills: 11 | Status: SHIPPED | OUTPATIENT
Start: 2021-03-27 | End: 2022-03-30

## 2021-03-27 RX ORDER — IPRATROPIUM BROMIDE 17 UG/1
AEROSOL, METERED RESPIRATORY (INHALATION)
Qty: 12.9 G | Refills: 0 | Status: SHIPPED | OUTPATIENT
Start: 2021-03-27 | End: 2021-04-28

## 2021-03-29 ENCOUNTER — CLINICAL SUPPORT (OUTPATIENT)
Dept: PULMONOLOGY | Facility: HOSPITAL | Age: 66
End: 2021-03-29
Payer: COMMERCIAL

## 2021-03-29 DIAGNOSIS — J44.9 ASTHMA-COPD OVERLAP SYNDROME (HCC): ICD-10-CM

## 2021-03-29 PROCEDURE — G0424 PULMONARY REHAB W EXER: HCPCS

## 2021-03-31 ENCOUNTER — CLINICAL SUPPORT (OUTPATIENT)
Dept: PULMONOLOGY | Facility: HOSPITAL | Age: 66
End: 2021-03-31
Payer: COMMERCIAL

## 2021-03-31 ENCOUNTER — OFFICE VISIT (OUTPATIENT)
Dept: CARDIOLOGY CLINIC | Facility: HOSPITAL | Age: 66
End: 2021-03-31
Payer: COMMERCIAL

## 2021-03-31 VITALS
WEIGHT: 271.4 LBS | DIASTOLIC BLOOD PRESSURE: 76 MMHG | HEART RATE: 57 BPM | SYSTOLIC BLOOD PRESSURE: 130 MMHG | HEIGHT: 74 IN | BODY MASS INDEX: 34.83 KG/M2 | OXYGEN SATURATION: 95 %

## 2021-03-31 DIAGNOSIS — I42.8 NONISCHEMIC CARDIOMYOPATHY (HCC): Primary | ICD-10-CM

## 2021-03-31 DIAGNOSIS — J44.9 ASTHMA-COPD OVERLAP SYNDROME (HCC): ICD-10-CM

## 2021-03-31 DIAGNOSIS — I50.42 CHRONIC COMBINED SYSTOLIC AND DIASTOLIC CONGESTIVE HEART FAILURE (HCC): ICD-10-CM

## 2021-03-31 DIAGNOSIS — E66.09 CLASS 1 OBESITY DUE TO EXCESS CALORIES WITH SERIOUS COMORBIDITY AND BODY MASS INDEX (BMI) OF 34.0 TO 34.9 IN ADULT: ICD-10-CM

## 2021-03-31 DIAGNOSIS — G47.33 OBSTRUCTIVE SLEEP APNEA: ICD-10-CM

## 2021-03-31 DIAGNOSIS — I10 BENIGN ESSENTIAL HYPERTENSION: ICD-10-CM

## 2021-03-31 DIAGNOSIS — E78.5 DYSLIPIDEMIA: ICD-10-CM

## 2021-03-31 PROBLEM — E66.811 CLASS 1 OBESITY DUE TO EXCESS CALORIES WITH SERIOUS COMORBIDITY AND BODY MASS INDEX (BMI) OF 34.0 TO 34.9 IN ADULT: Status: ACTIVE | Noted: 2021-03-31

## 2021-03-31 PROCEDURE — 1036F TOBACCO NON-USER: CPT | Performed by: INTERNAL MEDICINE

## 2021-03-31 PROCEDURE — 99214 OFFICE O/P EST MOD 30 MIN: CPT | Performed by: INTERNAL MEDICINE

## 2021-03-31 PROCEDURE — G0424 PULMONARY REHAB W EXER: HCPCS

## 2021-03-31 PROCEDURE — 3008F BODY MASS INDEX DOCD: CPT | Performed by: INTERNAL MEDICINE

## 2021-03-31 NOTE — PROGRESS NOTES
Cardiology Follow Up    Tad Pastrana  1955  [id number]  [facility] [address] [address] [address]  Phone#  [phone]  Fax#  [phone]      1  Nonischemic cardiomyopathy ([address] )     2  Chronic combined systolic and diastolic congestive heart failure ([address] )     3  Benign essential hypertension     4  Dyslipidemia     5  Obstructive sleep apnea     6  Class 1 obesity due to excess calories with serious comorbidity and body mass index (BMI) of 34 0 to 34 9 in adult         Discussion/Summary:  Mr Catherine Hubbard is a pleasant 71-year-old male who presents to the office today for routine follow up  Since his last visit he began pulmonary rehabilitation and overall feels well  He also underwent a CPAP titration study and the day after the test he states he felt very well  He is anxiously awaiting his home equipment  His blood pressure is well controlled in the office today  No changes were made to his medication regimen for his nonischemic cardiomyopathy including his beta-blocker and ACE-inhibitor  He is euvolemic on exam   He will continue to take diuretics on an as-needed basis  He cut back significantly on his alcohol intake since his last visit  Complete cessation was recommended  Once his sleep apnea is treated I will reassess his ejection fraction with an echocardiogram     I have no recent assessment of his lipids and after his next visit I will ask that he have blood work performed  I will see him back in the office in a few months for re-evaluation  Interval History:   Mr Catherine Hubbard is a pleasant 71-year-old male who presents to the office for routine follow-up  Since his last visit overall he is feeling much better  He started pulmonary rehabilitation  With the activity he performs there he feels well    He does continue to report some exertional shortness of breath if he carries heavy objects  He denies any exertional chest pain  He has taken Lasix on a few occasions since his last visit  He denies any signs or symptoms of congestive heart failure including progressive lower extremity edema or increasing abdominal girth  He chronically sleeps in a recliner  He denies any lightheadedness, syncope or presyncope  He denies palpitations  He denies symptoms of claudication  He has significantly cut back on his alcohol intake since his last visit  He no longer drinks on a daily basis  He states he has a beer when he goes out to dinner with his wife which is every few weeks  He also underwent a CPAP titration study since his last visit  The next day he states he felt very well  He is waiting to receive his home equipment  Problem List     Chronic combined systolic and diastolic CHF (congestive heart failure) ([facility])    Nonischemic cardiomyopathy ([facility])    Dyslipidemia    Hypertension    Asthma, chronic, moderate persistent, uncomplicated    Obstructive sleep apnea        Past Medical History:   Diagnosis Date    Asthma     CHF (congestive heart failure) ([facility])     COPD (chronic obstructive pulmonary disease) ([facility] )     Mumps     Old MI (myocardial infarction)     Pneumonia     Pulmonary emphysema ([facility] )     Sleep apnea      Social History     Socioeconomic History    Marital status: /Civil Union     Spouse name: Not on file    Number of children: Not on file    Years of education: Not on file    Highest education level: Not on file   Occupational History    Not on file   Social Needs    Financial resource strain: Not on file    Food insecurity     Worry: Not on file     Inability: Not on file   [employer] needs     Medical: Not on file     Non-medical: Not on file   Tobacco Use    Smoking status: Former Smoker     Packs/day: 3 00     Years: 30 00     Pack years: 90 00    Smokeless tobacco: Never Used   Substance and Sexual Activity    Alcohol use:  Yes Comment: rarely    Drug use: Yes     Types: Marijuana    Sexual activity: Not on file   Lifestyle    Physical activity     Days per week: Not on file     Minutes per session: Not on file    Stress: Not on file   Relationships    Social connections     Talks on phone: Not on file     Gets together: Not on file     Attends Bahai service: Not on file     Active member of club or organization: Not on file     Attends meetings of clubs or organizations: Not on file     Relationship status: Not on file    Intimate partner violence     Fear of current or ex partner: Not on file     Emotionally abused: Not on file     Physically abused: Not on file     Forced sexual activity: Not on file   Other Topics Concern    Not on file   Social History Narrative    Caffeine use      Family History   Problem Relation Age of Onset    Heart attack Father         MI    Diabetes Sister         DM    Arthritis Family     Coronary artery disease Family     Hypertension Family     Tuberculosis Son      Past Surgical History:   Procedure Laterality Date    CARDIAC CATHETERIZATION  07/24/2015    Left main- normal and maidly tortuous  Circumflex - normal and moderately tortuous  RCA- normal and mildy tortuous  Global LV function was severely depressed  Genora Langlade INGUINAL HERNIA REPAIR Bilateral     ME COLONOSCOPY FLX DX W/COLLJ SPEC WHEN PFRMD N/A 3/11/2019    Procedure: COLONOSCOPY with removal of anal papilla;   Surgeon: Talia Castillo MD;  Location: [facility];  Service: Colorectal    TONSILLECTOMY      UMBILICAL HERNIA REPAIR  06/21/2006       Current Outpatient Medications:     ALPRAZolam (XANAX) 0 5 mg tablet, Take 1 tablet (0 5 mg total) by mouth daily at bedtime as needed for anxiety, Disp: 30 tablet, Rfl: 0    aspirin 325 mg tablet, Take 1 tablet by mouth daily, Disp: , Rfl:     Atrovent HFA 17 MCG/ACT inhaler, inhale 1 puff by mouth four times a day, Disp: 12 9 g, Rfl: 0    Breo Ellipta 200-25 MCG/INH inhaler, inhale 1 puff by mouth and INTO THE LUNGS once daily, Disp: 1 Inhaler, Rfl: 5    carvedilol (COREG) 12 5 mg tablet, take 1 tablet by mouth twice a day with meals, Disp: 60 tablet, Rfl: 11    DULoxetine (CYMBALTA) 30 mg delayed release capsule, take 1 capsule by mouth once daily, Disp: 30 capsule, Rfl: 5    fluticasone (FLONASE) 50 mcg/act nasal spray, 1 spray into each nostril daily, Disp: 1 Bottle, Rfl: 5    furosemide (LASIX) 20 mg tablet, take 1 tablet by mouth once daily if needed for WEIGHT GAIN OR EDEMA, Disp: 90 tablet, Rfl: 0    ipratropium-albuterol (DUO-NEB) 0 5-2 5 mg/3 mL nebulizer solution, inhale contents of 1 vial ( 3 milliliters ) in nebulizer by mouth and INTO THE LUNGS every 6 hours if needed for wheezing, Disp: 120 vial, Rfl: 3    lisinopril (ZESTRIL) 20 mg tablet, take 1 tablet by mouth twice a day, Disp: 60 tablet, Rfl: 11    Tudorza Pressair 400 MCG/ACT inhaler, INHALE 1 PUFF BY MOUTH TWICE A DAY, Disp: 1 Inhaler, Rfl: 5    isosorbide mononitrate (IMDUR) 30 mg 24 hr tablet, Take 1 tablet (30 mg total) by mouth daily (Patient not taking: Reported on 3/31/2021), Disp: 30 tablet, Rfl: 11  Allergies   Allergen Reactions    Ciprofloxacin Shortness Of Breath and Diarrhea       Labs:     Chemistry        Component Value Date/Time     07/27/2015 0559    K 4 3 07/16/2020 1550    K 3 8 07/27/2015 0559     07/16/2020 1550     07/27/2015 0559    CO2 23 07/16/2020 1550    CO2 32 07/27/2015 0559    BUN 28 (H) 07/16/2020 1550    BUN 19 07/27/2015 0559    CREATININE 1 22 07/16/2020 1550    CREATININE 1 05 07/27/2015 0559        Component Value Date/Time    CALCIUM 9 4 07/16/2020 1550    CALCIUM 8 6 07/27/2015 0559    ALKPHOS 67 12/18/2018 0947    ALKPHOS 75 07/22/2015 1021    AST 17 12/18/2018 0947    AST 30 07/22/2015 1021    ALT 20 12/18/2018 0947    ALT 74 07/22/2015 1021    BILITOT 1 05 (H) 07/22/2015 1021            Lab Results   Component Value Date    CHOL 105 07/23/2015 Lab Results   Component Value Date    HDL 24 (L) 12/18/2018    HDL 21 07/23/2015     Lab Results   Component Value Date    LDLCALC 89 12/18/2018    LDLCALC 69 07/23/2015     Lab Results   Component Value Date    TRIG 107 12/18/2018    TRIG 74 07/23/2015     No results found for: CHOLHDL    Imaging: No results found  Review of Systems   Cardiovascular: Positive for dyspnea on exertion  Negative for chest pain, irregular heartbeat, leg swelling, near-syncope, orthopnea and palpitations  Vitals:    03/31/21 1305   BP: 130/76   Pulse: 57   SpO2: 95%     Vitals:    03/31/21 1305   Weight: 123 kg (271 lb 6 4 oz)     Height: 6' 2" (188 cm)   Body mass index is 34 85 kg/m²      Physical Exam:  General:  Alert and cooperative, appears stated age  HEENT:  PERRLA, EOMI, no scleral icterus, no conjunctival pallor  Neck:  No lymphadenopathy, no thyromegaly, no carotid bruits, no elevated JVP  Heart:  Regular rate and rhythm, normal S1/S2, no S3/S4, no murmur  Lungs:  Clear to auscultation bilaterally   Abdomen:  Soft, non-tender, positive bowel sounds, no rebound or guarding,   no organomegaly   Extremities:    No edema with bilateral varicosities noted   Vascular:  2+ pedal pulses  Skin:  No rashes or lesions on exposed skin  Neurologic:  Cranial nerves II-XII grossly intact without focal deficits

## 2021-04-02 ENCOUNTER — CLINICAL SUPPORT (OUTPATIENT)
Dept: PULMONOLOGY | Facility: HOSPITAL | Age: 66
End: 2021-04-02
Payer: COMMERCIAL

## 2021-04-02 ENCOUNTER — TELEPHONE (OUTPATIENT)
Dept: OTOLARYNGOLOGY | Facility: CLINIC | Age: 66
End: 2021-04-02

## 2021-04-02 DIAGNOSIS — J44.9 ASTHMA-COPD OVERLAP SYNDROME (HCC): ICD-10-CM

## 2021-04-02 PROCEDURE — G0424 PULMONARY REHAB W EXER: HCPCS

## 2021-04-02 NOTE — TELEPHONE ENCOUNTER
Received a call from Parveen Lock from cardiology just following up from her message from the other day  I had stated to vijaya that we have an opening on Monday with dr Iman Melendez  I called the patient and asked if he had gotten my message from the other day and he stated yes he did  Patient did speak with norah and they informed him that his machine is in process  I informed the patient that he will receive it at him home  We should wait until you get your machine and see if you would need to come in for a mask fitting  You should get a mask inside your machine bag  The appointment in June is to come in for compliance from using the machine

## 2021-04-05 ENCOUNTER — APPOINTMENT (OUTPATIENT)
Dept: PULMONOLOGY | Facility: HOSPITAL | Age: 66
End: 2021-04-05
Payer: COMMERCIAL

## 2021-04-07 ENCOUNTER — CLINICAL SUPPORT (OUTPATIENT)
Dept: PULMONOLOGY | Facility: HOSPITAL | Age: 66
End: 2021-04-07
Payer: COMMERCIAL

## 2021-04-07 DIAGNOSIS — J44.9 ASTHMA-COPD OVERLAP SYNDROME (HCC): ICD-10-CM

## 2021-04-07 PROCEDURE — G0424 PULMONARY REHAB W EXER: HCPCS

## 2021-04-09 ENCOUNTER — CLINICAL SUPPORT (OUTPATIENT)
Dept: PULMONOLOGY | Facility: HOSPITAL | Age: 66
End: 2021-04-09
Payer: COMMERCIAL

## 2021-04-09 DIAGNOSIS — J44.9 COPD, SEVERE (HCC): ICD-10-CM

## 2021-04-09 PROCEDURE — G0424 PULMONARY REHAB W EXER: HCPCS

## 2021-04-12 ENCOUNTER — APPOINTMENT (OUTPATIENT)
Dept: PULMONOLOGY | Facility: HOSPITAL | Age: 66
End: 2021-04-12
Payer: COMMERCIAL

## 2021-04-14 ENCOUNTER — CLINICAL SUPPORT (OUTPATIENT)
Dept: PULMONOLOGY | Facility: HOSPITAL | Age: 66
End: 2021-04-14
Payer: COMMERCIAL

## 2021-04-14 DIAGNOSIS — J44.9 COPD, SEVERE (HCC): ICD-10-CM

## 2021-04-14 PROCEDURE — G0424 PULMONARY REHAB W EXER: HCPCS

## 2021-04-16 ENCOUNTER — CLINICAL SUPPORT (OUTPATIENT)
Dept: PULMONOLOGY | Facility: HOSPITAL | Age: 66
End: 2021-04-16
Payer: COMMERCIAL

## 2021-04-16 DIAGNOSIS — J44.9 COPD, SEVERE (HCC): ICD-10-CM

## 2021-04-16 PROCEDURE — G0424 PULMONARY REHAB W EXER: HCPCS

## 2021-04-19 ENCOUNTER — CLINICAL SUPPORT (OUTPATIENT)
Dept: PULMONOLOGY | Facility: HOSPITAL | Age: 66
End: 2021-04-19
Payer: COMMERCIAL

## 2021-04-19 DIAGNOSIS — J44.9 ASTHMA-COPD OVERLAP SYNDROME (HCC): ICD-10-CM

## 2021-04-19 PROCEDURE — G0424 PULMONARY REHAB W EXER: HCPCS

## 2021-04-19 NOTE — PROGRESS NOTES
Pulmonary Rehabilitation Plan of Care  30 Day Progress Note        Today's date: 2021   Total visits to date: 20  Patient name: Rommel Solis      : 1955  Age: 63 y o  MRN: [id number]  Referring Physician: Sergio Griggs PA-C/Dr Gustabo Sol  Provider: [facility]  Clinician: Vargas Carver RN    Dx: Asthma-COPD overlap  Date of onset: 21    COMMENTS:  Heaven Krishnan is doing 40 min aerobic exercise  He is in NSR and exercises on room air  His oxygen saturation is 97-99% at rest and 94-97% with exercise  He is doing 3 6 MET on the treadmill  He is exercising 7 day /week  He also joined a gym and rides his bike at home  His weight is 268lb  He understands his low salt diet but likes to eat  He is a retired chief        Medication compliance: yes   Comments: states he takes his medication  Fall Risk: Low   Comments: Ambulates without assistance    EXERCISE/ACTIVITY    Cardiopulmonary Goals:   Min: 40   HR: 20-30 bpm above restring   RPE: 4-6  (moderate to moderately hard exercise)   O2 sat: >90%    Modalities: Treadmill, AD bike, UBE, Lifecycle, NuStep and Recumbent bike  Strength trainin-3 days / week, 12-15 repitations  and 1-2 sets per modality    Modalities: Chest press and Lateral pull down     Exercise Progression: Progress as tolerated to maintain RPE 4-5      RPE 4-6  Home activity: goes to  Gym regularly  Goals: home exercise days opposite [place] and >150 mins of exercise/wk  Education: Benefit of exercise, home exercise and RPE scale   Plan:home exercise target 30 mins, 2 days opposite [place]  Readiness to change: Action:  (Changing behavior)    NUTRITION    Weight control:    Starting weight: 262        Current Weight: 268     Diabetes: N/A  Goals:decreased body fat% and Improved Rate Your Plate score  Education:weight loss  portion control    Label Reading  Plan: Education Video- Diet and Nutrition and Education Class: Healthy Eating  Readiness to change: Action:  (Changing behavior)    PSYCHOSOCIAL    Emotional:  1-4 = Minimal Depression  Social support: Very Good  Goals: Reduce perceived stress to 1-3/10, improved sleep, improved concentration, improved positive thoughts of well being, increased energy, stop worrying, take time to relax and Feel less anxious  Education: signs/sxs of depression  benefits of positive support system  coping mechanisms  Plan: Education Class: Stress and Your Lungs, Relaxation and Mindfulness and Anxiety and Lung Disease  Readiness to change: Action:  (Changing behavior)    OTHER CORE COMPONENTS     Tobacco:   Social History     Tobacco Use   Smoking Status Former Smoker    Packs/day: 3 00    Years: 30 00    Pack years: 90 00   Smokeless Tobacco Never Used     Oxygen: room air  Blood pressure:    Restin/70   Exercise:  160/70               Recovery: 140/80  Goals: consistent BP < 130/80, reduced dietary sodium <2300mg and moderate intensity exercise >150 mins/wk  Education: Pulmonary Disease, physiology, oxygen, breathing techniques and Avoiding Infections  Plan: Prevention and treatment, Exercise benefits and Proper nutrition  Readiness to change: Action:  (Changing behavior)

## 2021-04-21 ENCOUNTER — CLINICAL SUPPORT (OUTPATIENT)
Dept: PULMONOLOGY | Facility: HOSPITAL | Age: 66
End: 2021-04-21
Payer: COMMERCIAL

## 2021-04-21 DIAGNOSIS — J44.9 COPD, SEVERE (HCC): ICD-10-CM

## 2021-04-21 PROCEDURE — G0424 PULMONARY REHAB W EXER: HCPCS

## 2021-04-22 DIAGNOSIS — F41.9 ANXIETY: ICD-10-CM

## 2021-04-23 ENCOUNTER — CLINICAL SUPPORT (OUTPATIENT)
Dept: PULMONOLOGY | Facility: HOSPITAL | Age: 66
End: 2021-04-23
Payer: COMMERCIAL

## 2021-04-23 DIAGNOSIS — J44.9 COPD, SEVERE (HCC): ICD-10-CM

## 2021-04-23 PROCEDURE — G0424 PULMONARY REHAB W EXER: HCPCS

## 2021-04-23 RX ORDER — ALPRAZOLAM 0.5 MG/1
TABLET ORAL
Qty: 30 TABLET | Refills: 0 | Status: SHIPPED | OUTPATIENT
Start: 2021-04-23 | End: 2021-07-22

## 2021-04-26 ENCOUNTER — CLINICAL SUPPORT (OUTPATIENT)
Dept: PULMONOLOGY | Facility: HOSPITAL | Age: 66
End: 2021-04-26
Payer: COMMERCIAL

## 2021-04-26 DIAGNOSIS — J44.9 ASTHMA-COPD OVERLAP SYNDROME (HCC): ICD-10-CM

## 2021-04-26 PROCEDURE — G0424 PULMONARY REHAB W EXER: HCPCS

## 2021-04-28 ENCOUNTER — CLINICAL SUPPORT (OUTPATIENT)
Dept: PULMONOLOGY | Facility: HOSPITAL | Age: 66
End: 2021-04-28
Payer: COMMERCIAL

## 2021-04-28 DIAGNOSIS — J44.9 CHRONIC OBSTRUCTIVE PULMONARY DISEASE, UNSPECIFIED COPD TYPE (HCC): ICD-10-CM

## 2021-04-28 DIAGNOSIS — J44.9 COPD, SEVERE (HCC): ICD-10-CM

## 2021-04-28 PROCEDURE — G0424 PULMONARY REHAB W EXER: HCPCS

## 2021-04-28 RX ORDER — IPRATROPIUM BROMIDE 17 UG/1
AEROSOL, METERED RESPIRATORY (INHALATION)
Qty: 12.9 G | Refills: 0 | Status: SHIPPED | OUTPATIENT
Start: 2021-04-28 | End: 2021-07-26

## 2021-04-30 ENCOUNTER — CLINICAL SUPPORT (OUTPATIENT)
Dept: PULMONOLOGY | Facility: HOSPITAL | Age: 66
End: 2021-04-30
Payer: COMMERCIAL

## 2021-04-30 DIAGNOSIS — J44.9 ASTHMA-COPD OVERLAP SYNDROME (HCC): ICD-10-CM

## 2021-04-30 PROCEDURE — G0424 PULMONARY REHAB W EXER: HCPCS

## 2021-05-03 ENCOUNTER — CLINICAL SUPPORT (OUTPATIENT)
Dept: PULMONOLOGY | Facility: HOSPITAL | Age: 66
End: 2021-05-03
Payer: COMMERCIAL

## 2021-05-03 DIAGNOSIS — J44.9 ASTHMA-COPD OVERLAP SYNDROME (HCC): ICD-10-CM

## 2021-05-03 PROCEDURE — G0424 PULMONARY REHAB W EXER: HCPCS

## 2021-05-04 ENCOUNTER — OFFICE VISIT (OUTPATIENT)
Dept: PULMONOLOGY | Facility: CLINIC | Age: 66
End: 2021-05-04
Payer: COMMERCIAL

## 2021-05-04 VITALS
HEIGHT: 74 IN | TEMPERATURE: 97.8 F | OXYGEN SATURATION: 96 % | WEIGHT: 267.2 LBS | HEART RATE: 62 BPM | DIASTOLIC BLOOD PRESSURE: 60 MMHG | SYSTOLIC BLOOD PRESSURE: 113 MMHG | BODY MASS INDEX: 34.29 KG/M2

## 2021-05-04 DIAGNOSIS — I50.42 CHRONIC COMBINED SYSTOLIC AND DIASTOLIC CONGESTIVE HEART FAILURE (HCC): ICD-10-CM

## 2021-05-04 DIAGNOSIS — R09.82 POST-NASAL DRIP: ICD-10-CM

## 2021-05-04 DIAGNOSIS — E66.09 CLASS 1 OBESITY DUE TO EXCESS CALORIES WITH SERIOUS COMORBIDITY AND BODY MASS INDEX (BMI) OF 34.0 TO 34.9 IN ADULT: ICD-10-CM

## 2021-05-04 DIAGNOSIS — G47.33 OBSTRUCTIVE SLEEP APNEA: ICD-10-CM

## 2021-05-04 DIAGNOSIS — F17.211 CIGARETTE NICOTINE DEPENDENCE IN REMISSION: ICD-10-CM

## 2021-05-04 DIAGNOSIS — J44.9 ASTHMA-COPD OVERLAP SYNDROME (HCC): Primary | ICD-10-CM

## 2021-05-04 PROCEDURE — 3074F SYST BP LT 130 MM HG: CPT | Performed by: PHYSICIAN ASSISTANT

## 2021-05-04 PROCEDURE — 1036F TOBACCO NON-USER: CPT | Performed by: PHYSICIAN ASSISTANT

## 2021-05-04 PROCEDURE — 99214 OFFICE O/P EST MOD 30 MIN: CPT | Performed by: PHYSICIAN ASSISTANT

## 2021-05-04 PROCEDURE — 3008F BODY MASS INDEX DOCD: CPT | Performed by: PHYSICIAN ASSISTANT

## 2021-05-04 PROCEDURE — 3078F DIAST BP <80 MM HG: CPT | Performed by: PHYSICIAN ASSISTANT

## 2021-05-04 NOTE — ASSESSMENT & PLAN NOTE
Patient will benefit from weight loss  Lifestyle modifications including decreased caloric intake, healthier diet options and increased activity were tolerated  I congratulated him on the success she has had thus far encouraged him to keep up the good work

## 2021-05-04 NOTE — PROGRESS NOTES
Pulmonary Follow Up Note   Crist Hammans 63 y o  male MRN: [id number]  5/4/2021      Assessment:    Asthma-COPD overlap syndrome ([address] )  I reviewed results of pulmonary function test with patient today  Though he is still consider to have moderate obstruction has had a significant decrease in his FEV1 from 73% predicted to 55% predicted  He is still clinical stage IIB and reports relatively good symptom control on   Tudorza and Breo  Will continue  Tudorza 400 mcg 1 puff twice daily and Breo 200/ 25 mcg 1 puff daily  He was reminded to rinse mouth out after each use of Breo  Recommend continued use of DuoNeb q 6 hours p r n  and Atrovent HFA q 6 hours p r n  Recommended he use Flonase 1 spray each nostril before bed to get the most benefit from this medication  I also offered to prescribe Singulair 10 mg p o  daily at bedtime but he refused stating that he wanted to try Flonase at night 1st  He has completed 5 of the 12 weeks of pulmonary rehab and will continue this  Obstructive sleep apnea    Discussed importance of wearing CPAP with patient today  He reports trying to be more compliant with it over the last couple of weeks as slowly getting used to it  Will plan for compliance check at his next follow-up  He understands the importance of wearing CPAP including the risks associated with untreated CORDELL  Chronic combined systolic and diastolic congestive heart failure (HCC)  Wt Readings from Last 3 Encounters:   05/04/21 121 kg (267 lb 3 2 oz)   03/31/21 123 kg (271 lb 6 4 oz)   02/15/21 119 kg (262 lb)       Weights are stable  Recommend he continue treatment per Cardiology recommendations  Including p r n  diuretics  Recommend he weigh himself every day and notes 3 lb weight gain overnight or 5 lb in a week she should call Cardiology  Recommend low-sodium diet and fluid restrictions as tolerated  Discussed healthy diet options today in the office  Continue pulmonary rehab          Class 1 obesity due to excess calories with serious comorbidity and body mass index (BMI) of 34 0 to 34 9 in adult  Patient will benefit from weight loss  Lifestyle modifications including decreased caloric intake, healthier diet options and increased activity were tolerated  I congratulated him on the success she has had thus far encouraged him to keep up the good work  Nicotine dependence    Remains committed to abstinence from cigarettes  I reviewed low-dose lung cancer screening CT with him in the office  Recommend annual low-dose lung cancer screening CT due April 2022  Post-nasal drip   Continue Flonase 1 spray each nostril daily p r n  but instructed him to take it night instead  Plan:    Diagnoses and all orders for this visit:    Asthma-COPD overlap syndrome ([facility] [address] )    Obstructive sleep apnea    Class 1 obesity due to excess calories with serious comorbidity and body mass index (BMI) of 34 0 to 34 9 in adult    Chronic combined systolic and diastolic congestive heart failure (HCC)    Post-nasal drip    Cigarette nicotine dependence in remission        Return in about 6 months (around 11/4/2021)  History of Present Illness   HPI:  Jameel Bailey is a 63 y o  male who  Presents to the office today for routine follow-up to go over pulmonary function tests and lung cancer screening CT  Patient has a past medical history positive for asthma COPD overlap syndrome, chronic combined systolic and diastolic CHF, nicotine dependence with remission, postnasal drip, and CORDELL  In the last 2 months patient has not required the use of systemic steroids, antibiotics or been hospitalized for respiratory related illness  At last visit he was referred to pulmonary rehab which he reports has improved his dyspnea on exertion significantly  He is very appreciative of this referral and states that he gets better on a daily basis    At baseline he reports dyspnea on exertion when doing strenuous activities around the house likely carrying laundry up steps or groceries in from the car  On the same note though he is able to exercise using dumbbells and use exercise bike/ treadmill without any limitations at all from his respiratory symptoms  Patient reports some mild cough that is productive of white sputum occasionally  He attributes this to postnasal drip  He was also given Flonase at last visit and feels that this has helped some but not alleviated  He takes it 1st thing in the morning  Denies significant wheeze or chest tightness  No chest pain or palpitations  Denies fevers, chills, headaches or dizziness  Denies other allergic symptoms including itchy watery eyes, sneezing, nasal congestion or rhinorrhea  No GI or urinary symptoms  He monitors her his legs for leg swelling closely and will take a Lasix 20 mg if he notes increased swelling on an as-needed basis  Patient reports compliance on Verlie Handsome and DuoNeb  He take his DuoNeb every other day  Reports very infrequent use of Atrovent HFA  Review of Systems   All other systems reviewed and are negative  Historical Information   Past Medical History:   Diagnosis Date    Asthma     CH[facility]congestive [facility]) [facility]     COPD (chronic obstructive pulmonary disease) ([facility])     Mumps     Old MI (myocardial infarction)     Pneumonia     Pulmonary emphysema [address] [address] )     Sleep apnea      Past Surgical History:   Procedure Laterality Date    CARDIAC CATHETERIZATION  07/24/2015    Left main- normal and maidly tortuous  Circumflex - normal and moderately tortuous  RCA- normal and mildy tortuous  Global LV function was severely depressed  Roro Harmon INGUINAL HERNIA REPAIR Bilateral     FL COLONOSCOPY FLX DX W/COLLJ SPEC WHEN PFRMD N/A 3/11/2019    Procedure: COLONOSCOPY with removal of anal papilla;   Surgeon: Brennen Son MD;  Location: [facility];  Service: Colorectal    TONSILLECTOMY      UMBILICAL HERNIA REPAIR  06/21/2006     Family History Problem Relation Age of Onset    Heart attack Father         MI    Diabetes Sister         DM    Arthritis Family     Coronary artery disease Family     Hypertension Family     Tuberculosis Son        Social History     Tobacco Use   Smoking Status Former Smoker    Packs/day: 3 00    Years: 30 00    Pack years: 90 00   Smokeless Tobacco Never Used         Meds/Allergies     Current Outpatient Medications:     ALPRAZolam (XANAX) 0 5 mg tablet, take 1 tablet by mouth at bedtime if needed for anxiety, Disp: 30 tablet, Rfl: 0    aspirin 325 mg tablet, Take 1 tablet by mouth daily, Disp: , Rfl:     Atrovent HFA 17 MCG/ACT inhaler, inhale 1 puff by mouth four times a day, Disp: 12 9 g, Rfl: 0    Breo Ellipta 200-25 MCG/INH inhaler, inhale 1 puff by mouth and INTO THE LUNGS once daily, Disp: 1 Inhaler, Rfl: 5    carvedilol (COREG) 12 5 mg tablet, take 1 tablet by mouth twice a day with meals, Disp: 60 tablet, Rfl: 11    DULoxetine (CYMBALTA) 30 mg delayed release capsule, take 1 capsule by mouth once daily, Disp: 30 capsule, Rfl: 5    fluticasone (FLONASE) 50 mcg/act nasal spray, 1 spray into each nostril daily, Disp: 1 Bottle, Rfl: 5    furosemide (LASIX) 20 mg tablet, take 1 tablet by mouth once daily if needed for WEIGHT GAIN OR EDEMA, Disp: 90 tablet, Rfl: 0    ipratropium-albuterol (DUO-NEB) 0 5-2 5 mg/3 mL nebulizer solution, inhale contents of 1 vial ( 3 milliliters ) in nebulizer by mouth and INTO THE LUNGS every 6 hours if needed for wheezing, Disp: 120 vial, Rfl: 3    lisinopril (ZESTRIL) 20 mg tablet, take 1 tablet by mouth twice a day, Disp: 60 tablet, Rfl: 11    Tudorza Pressair 400 MCG/ACT inhaler, INHALE 1 PUFF BY MOUTH TWICE A DAY, Disp: 1 Inhaler, Rfl: 5    isosorbide mononitrate (IMDUR) 30 mg 24 hr tablet, Take 1 tablet (30 mg total) by mouth daily (Patient not taking: Reported on 3/31/2021), Disp: 30 tablet, Rfl: 11  Allergies   Allergen Reactions    Ciprofloxacin Shortness Of Breath and Diarrhea       Vitals: Blood pressure 113/60, pulse 62, temperature 97 8 °F (36 6 °C), temperature source Tympanic, height 6' 2" (1 88 m), weight 121 kg (267 lb 3 2 oz), SpO2 96 %  Body mass index is 34 31 kg/m²  Oxygen Therapy  SpO2: 96 %  Oxygen Therapy: None (Room air)    Physical Exam  Physical Exam  Vitals signs reviewed  Constitutional:       Appearance: Normal appearance  He is well-developed  HENT:      Head: Normocephalic and atraumatic  Right Ear: External ear normal       Left Ear: External ear normal       Nose: Nose normal       Mouth/Throat:      Mouth: Mucous membranes are moist       Pharynx: Oropharynx is clear  Eyes:      Extraocular Movements: Extraocular movements intact  Pupils: Pupils are equal, round, and reactive to light  Neck:      Musculoskeletal: Normal range of motion and neck supple  Cardiovascular:      Rate and Rhythm: Normal rate and regular rhythm  Pulses: Normal pulses  Heart sounds: Normal heart sounds  No murmur  Pulmonary:      Effort: Pulmonary effort is normal  No respiratory distress  Breath sounds: Normal breath sounds  No stridor  No wheezing, rhonchi or rales  Abdominal:      Palpations: Abdomen is soft  Tenderness: There is no abdominal tenderness  Hernia: No hernia is present  Musculoskeletal: Normal range of motion  General: No swelling, tenderness or deformity  Skin:     General: Skin is warm and dry  Capillary Refill: Capillary refill takes less than 2 seconds  Neurological:      General: No focal deficit present  Mental Status: He is alert and oriented to person, place, and time  Mental status is at baseline  Psychiatric:         Behavior: Behavior normal          Thought Content: Thought content normal          Judgment: Judgment normal          Labs: I have personally reviewed pertinent lab results  , ABG: No results found for: PHART, UOQ9KQW, PO2ART, ZWP5HNU, C6OQDAPY, BEART, SOURCE, BNP: No results found for: BNP, CBC: No results found for: WBC, HGB, HCT, MCV, PLT, ADJUSTEDWBC, MCH, MCHC, RDW, MPV, NRBC, CMP: No results found for: SODIUM, K, CL, CO2, ANIONGAP, BUN, CREATININE, GLUCOSE, CALCIUM, AST, ALT, ALKPHOS, PROT, BILITOT, EGFR, PT/INR: No results found for: PT, INR, Troponin: No results found for: TROPONINI  Lab Results   Component Value Date    WBC 9 10 07/16/2020    HGB 14 0 07/16/2020    HCT 41 9 (L) 07/16/2020    MCV 86 07/16/2020     07/16/2020     Lab Results   Component Value Date    GLUCOSE 87 07/27/2015    CALCIUM 9 4 07/16/2020     07/27/2015    K 4 3 07/16/2020    CO2 23 07/16/2020     07/16/2020    BUN 28 (H) 07/16/2020    CREATININE 1 22 07/16/2020     Lab Results   Component Value Date    IGE 65 2 10/23/2017     Lab Results   Component Value Date    ALT 20 12/18/2018    AST 17 12/18/2018    ALKPHOS 67 12/18/2018    BILITOT 1 05 (H) 07/22/2015       Imaging and other studies: I have personally reviewed pertinent reports  and I have personally reviewed pertinent films in PACS     CT lung cancer screening 02/25/2021   No focal consolidation, pleural effusion, pneumothorax  Calcified granuloma in lingula unchanged  No new or suspicious pulmonary nodules  Mild coronary and aortic atherosclerosis  Several posterior mediastinal lymph nodes upper limits of normal in size but unchanged since 09/04/2019    Pulmonary function testing:  Performed  02/25/2021   FEV1/FVC ratio  54%   FEV1  55% predicted  FVC  69% predicted   bronchodilator not administered due to COVID-19 protocol  TLC  87 % predicted  RV  112 % predicted  DLCO corrected for hemoglobin  104 % predicted  Moderate obstructive airflow defect with decreased vital capacity  Normal lung volumes with increased RV/TLC ratio which may be indicative of air trapping  Normal diffusion capacity  Obstructive flow volume loop      When compared to pulmonary function test from 2018, most recent PFT show decrease in FEV1 from 2 60L/73% predicted to 1 92 L/ 55% predicted  Other Studies: I have personally reviewed pertinent reports        echocardiogram 09/10/2020   EF 40%  akinesis of the basal-mid inferior and basal-mid inferolateral wall(s)  Wall thickness was mildly increased  grade 2 diastolic dysfunction  Right ventricular size and systolic function normal   Trace mitral regurgitation  Mild tricuspid regurgitation

## 2021-05-04 NOTE — ASSESSMENT & PLAN NOTE
Discussed importance of wearing CPAP with patient today  He reports trying to be more compliant with it over the last couple of weeks as slowly getting used to it  Will plan for compliance check at his next follow-up  He understands the importance of wearing CPAP including the risks associated with untreated CORDELL

## 2021-05-04 NOTE — ASSESSMENT & PLAN NOTE
Remains committed to abstinence from cigarettes  I reviewed low-dose lung cancer screening CT with him in the office  Recommend annual low-dose lung cancer screening CT due April 2022

## 2021-05-04 NOTE — ASSESSMENT & PLAN NOTE
Wt Readings from Last 3 Encounters:   05/04/21 121 kg (267 lb 3 2 oz)   03/31/21 123 kg (271 lb 6 4 oz)   02/15/21 119 kg (262 lb)       Weights are stable  Recommend he continue treatment per Cardiology recommendations  Including p r n  diuretics  Recommend he weigh himself every day and notes 3 lb weight gain overnight or 5 lb in a week she should call Cardiology  Recommend low-sodium diet and fluid restrictions as tolerated  Discussed healthy diet options today in the office  Continue pulmonary rehab

## 2021-05-05 ENCOUNTER — APPOINTMENT (OUTPATIENT)
Dept: PULMONOLOGY | Facility: HOSPITAL | Age: 66
End: 2021-05-05
Payer: COMMERCIAL

## 2021-05-07 ENCOUNTER — CLINICAL SUPPORT (OUTPATIENT)
Dept: PULMONOLOGY | Facility: HOSPITAL | Age: 66
End: 2021-05-07
Payer: COMMERCIAL

## 2021-05-07 DIAGNOSIS — J44.9 ASTHMA-COPD OVERLAP SYNDROME (HCC): ICD-10-CM

## 2021-05-07 PROCEDURE — G0424 PULMONARY REHAB W EXER: HCPCS

## 2021-05-10 ENCOUNTER — CLINICAL SUPPORT (OUTPATIENT)
Dept: PULMONOLOGY | Facility: HOSPITAL | Age: 66
End: 2021-05-10
Payer: COMMERCIAL

## 2021-05-10 DIAGNOSIS — J44.9 ASTHMA-COPD OVERLAP SYNDROME (HCC): ICD-10-CM

## 2021-05-10 PROCEDURE — G0424 PULMONARY REHAB W EXER: HCPCS

## 2021-05-12 ENCOUNTER — CLINICAL SUPPORT (OUTPATIENT)
Dept: PULMONOLOGY | Facility: HOSPITAL | Age: 66
End: 2021-05-12
Payer: COMMERCIAL

## 2021-05-12 DIAGNOSIS — J44.9 COPD, SEVERE (HCC): ICD-10-CM

## 2021-05-12 PROCEDURE — G0424 PULMONARY REHAB W EXER: HCPCS

## 2021-05-14 ENCOUNTER — CLINICAL SUPPORT (OUTPATIENT)
Dept: PULMONOLOGY | Facility: HOSPITAL | Age: 66
End: 2021-05-14
Payer: COMMERCIAL

## 2021-05-14 DIAGNOSIS — J44.9 COPD, SEVERE (HCC): ICD-10-CM

## 2021-05-14 PROCEDURE — G0424 PULMONARY REHAB W EXER: HCPCS

## 2021-05-17 ENCOUNTER — CLINICAL SUPPORT (OUTPATIENT)
Dept: PULMONOLOGY | Facility: HOSPITAL | Age: 66
End: 2021-05-17
Payer: COMMERCIAL

## 2021-05-17 DIAGNOSIS — J44.9 COPD, SEVERE (HCC): ICD-10-CM

## 2021-05-17 PROCEDURE — G0424 PULMONARY REHAB W EXER: HCPCS

## 2021-05-17 NOTE — PROGRESS NOTES
Pulmonary Rehabilitation Plan of Care  90 Day Progress Note        Today's date: 2021   Total visits to date: 32  Patient name: Liam Marc      : 1955  Age: 63 y o  MRN: [id number]  Referring Physician: Rosie Zhao PA-C/Dr Yvon Roberts  Provider: [facility]  Clinician: Rachel Rosario RN    Dx: Asthma-COPD overlap  Date of onset: 21    COMMENTS:  Maame Issa is doing 40 min aerobic exercise  He is in NSR and exercises on room air  His oxygen saturation is 97-99% at rest and 94-97% with exercise  He is doing 3 6 MET on the treadmill  He also joined a gym and rides his bike at home  His weight is 270lb          Medication compliance: yes   Comments: states he takes his medication  Fall Risk: Low   Comments: Ambulates without assistance    EXERCISE/ACTIVITY    Cardiopulmonary Goals:   Min: 40   HR: 20-30 bpm above restring   RPE: 4-6  (moderate to moderately hard exercise)   O2 sat: >90%    Modalities: Treadmill, AD bike, UBE, Lifecycle, NuStep and Recumbent bike  Strength trainin-3 days / week, 12-15 repitations  and 1-2 sets per modality    Modalities: Chest press and Lateral pull down     Exercise Progression: Progress as tolerated to maintain RPE 4-5      RPE 4-6  Home activity: goes to  Gym regularly  Goals: home exercise days opposite VT and >150 mins of exercise/wk  Education: Benefit of exercise, home exercise and RPE scale   Plan:home exercise target 30 mins, 2 days opposite VT  Readiness to change: Action:  (Changing behavior)    NUTRITION    Weight control:    Starting weight: 262        Current Weight: 270     Diabetes: N/A  Goals:decreased body fat% and Improved Rate Your Plate score  Education:weight loss  portion control    Label Reading  Plan: Education Video- Diet and Nutrition and Education Class: Healthy Eating  Readiness to change: Action:  (Changing behavior)    PSYCHOSOCIAL    Emotional:  1-4 = Minimal Depression  Social support: Very Good  Goals: Reduce perceived stress to 1-3/10, improved sleep, improved concentration, improved positive thoughts of well being, increased energy, stop worrying, take time to relax and Feel less anxious  Education: signs/sxs of depression  benefits of positive support system  coping mechanisms  Plan: Education Class: Stress and Your Lungs, Relaxation and Mindfulness and Anxiety and Lung Disease  Readiness to change: Action:  (Changing behavior)    OTHER CORE COMPONENTS     Tobacco:   Social History     Tobacco Use   Smoking Status Former Smoker    Packs/day: 3 00    Years: 30 00    Pack years: 90 00   Smokeless Tobacco Never Used     Oxygen: room air  Blood pressure:    Restin/84   Exercise:  180/80               Recovery: 150/80  Goals: consistent BP < 130/80, reduced dietary sodium <2300mg and moderate intensity exercise >150 mins/wk  Education: Pulmonary Disease, physiology, oxygen, breathing techniques and Avoiding Infections  Plan: Prevention and treatment, Exercise benefits and Proper nutrition  Readiness to change: Action:  (Changing behavior)

## 2021-05-19 ENCOUNTER — CLINICAL SUPPORT (OUTPATIENT)
Dept: PULMONOLOGY | Facility: HOSPITAL | Age: 66
End: 2021-05-19
Payer: COMMERCIAL

## 2021-05-19 DIAGNOSIS — J44.9 COPD, SEVERE (HCC): ICD-10-CM

## 2021-05-19 PROCEDURE — G0424 PULMONARY REHAB W EXER: HCPCS

## 2021-05-21 ENCOUNTER — CLINICAL SUPPORT (OUTPATIENT)
Dept: PULMONOLOGY | Facility: HOSPITAL | Age: 66
End: 2021-05-21
Payer: COMMERCIAL

## 2021-05-21 DIAGNOSIS — J44.9 COPD, SEVERE (HCC): ICD-10-CM

## 2021-05-21 PROCEDURE — G0424 PULMONARY REHAB W EXER: HCPCS

## 2021-05-24 ENCOUNTER — CLINICAL SUPPORT (OUTPATIENT)
Dept: PULMONOLOGY | Facility: HOSPITAL | Age: 66
End: 2021-05-24
Payer: COMMERCIAL

## 2021-05-24 DIAGNOSIS — J44.9 COPD, SEVERE (HCC): ICD-10-CM

## 2021-05-24 PROCEDURE — G0424 PULMONARY REHAB W EXER: HCPCS

## 2021-05-26 ENCOUNTER — CLINICAL SUPPORT (OUTPATIENT)
Dept: PULMONOLOGY | Facility: HOSPITAL | Age: 66
End: 2021-05-26
Payer: COMMERCIAL

## 2021-05-26 DIAGNOSIS — J44.9 ASTHMA-COPD OVERLAP SYNDROME (HCC): ICD-10-CM

## 2021-05-26 PROCEDURE — G0424 PULMONARY REHAB W EXER: HCPCS

## 2021-05-28 ENCOUNTER — CLINICAL SUPPORT (OUTPATIENT)
Dept: PULMONOLOGY | Facility: HOSPITAL | Age: 66
End: 2021-05-28
Payer: COMMERCIAL

## 2021-05-28 DIAGNOSIS — J44.9 COPD, SEVERE (HCC): ICD-10-CM

## 2021-05-28 PROCEDURE — G0424 PULMONARY REHAB W EXER: HCPCS

## 2021-05-28 NOTE — PROGRESS NOTES
Pulmonary Rehabilitation Plan of Care  DischargeNote        Today's date: 2021   Total visits to date: 39  Patient name: Yari Lopes      : 1955  Age: 63 y o  MRN: [id number]  Referring Physician: Ammon Frank PA-C/Dr Harley Mills  Provider: [facility]  Clinician: Gerhard Resendiz RN    Dx: Asthma-COPD overlap  Date of onset: 21    COMMENTS:  Dena Thibodeaux is doing 40 min aerobic exercise  He is in NSR and exercises on room air  His oxygen saturation is 97-99% at rest and 94-97% with exercise  He  Did 750 feet in his final 6MWT    He plans on continuing his exercise at a gym  He did gain 12 lb  During the program   He does  Understand the low saltr diet but admits he likes to eat  His rate your plate score is 69  His CAT score is 18  His SOBQ score is 29      Medication compliance: yes   Comments: states he takes his medication  Fall Risk: Low   Comments: Ambulates without assistance    EXERCISE/ACTIVITY    Cardiopulmonary Goals:   Min: 40   HR: 20-30 bpm above restring   RPE: 4-6  (moderate to moderately hard exercise)   O2 sat: >90%    Modalities: Treadmill, AD bike, UBE, Lifecycle, NuStep and Recumbent bike  Strength trainin-3 days / week, 12-15 repitations  and 1-2 sets per modality    Modalities: Chest press and Lateral pull down     Exercise Progression: Progress as tolerated to maintain RPE 4-5      RPE 4-6  Home activity: goes to  Gym regularly  Goals: home exercise days opposite IA and >150 mins of exercise/wk  Education: Benefit of exercise, home exercise and RPE scale   Plan:home exercise target 30 mins, 2 days opposite IA  Readiness to change: Action:  (Changing behavior)    NUTRITION    Weight control:    Starting weight: 262        Current Weight: 274     Diabetes: N/A  Goals:decreased body fat% and Improved Rate Your Plate score  Education:weight loss  portion control    Label Reading  Plan: Education Video- Diet and Nutrition and Education Class: Healthy Eating  Readiness to change: Action:  (Changing behavior)    PSYCHOSOCIAL    Emotional:  1-4 = Minimal Depression  Social support: Very Good  Goals: Reduce perceived stress to 1-3/10, improved sleep, improved concentration, improved positive thoughts of well being, increased energy, stop worrying, take time to relax and Feel less anxious  Education: signs/sxs of depression  benefits of positive support system  coping mechanisms  Plan: Education Class: Stress and Your Lungs, Relaxation and Mindfulness and Anxiety and Lung Disease  Readiness to change: Action:  (Changing behavior)    OTHER CORE COMPONENTS     Tobacco:   Social History     Tobacco Use   Smoking Status Former Smoker    Packs/day: 3 00    Years: 30 00    Pack years: 90 00   Smokeless Tobacco Never Used     Oxygen: room air  Blood pressure:    Restin/76   Exercise:  144/80               Recovery: 150/82  Goals: consistent BP < 130/80, reduced dietary sodium <2300mg and moderate intensity exercise >150 mins/wk  Education: Pulmonary Disease, physiology, oxygen, breathing techniques and Avoiding Infections  Plan: Prevention and treatment, Exercise benefits and Proper nutrition  Readiness to change: Action:  (Changing behavior)

## 2021-06-21 ENCOUNTER — OFFICE VISIT (OUTPATIENT)
Dept: SLEEP CENTER | Facility: CLINIC | Age: 66
End: 2021-06-21
Payer: COMMERCIAL

## 2021-06-21 VITALS
OXYGEN SATURATION: 97 % | BODY MASS INDEX: 34.91 KG/M2 | HEART RATE: 59 BPM | DIASTOLIC BLOOD PRESSURE: 82 MMHG | HEIGHT: 74 IN | WEIGHT: 272 LBS | SYSTOLIC BLOOD PRESSURE: 132 MMHG | TEMPERATURE: 96.8 F

## 2021-06-21 DIAGNOSIS — G47.33 OBSTRUCTIVE SLEEP APNEA: Primary | ICD-10-CM

## 2021-06-21 PROCEDURE — 1036F TOBACCO NON-USER: CPT | Performed by: PSYCHIATRY & NEUROLOGY

## 2021-06-21 PROCEDURE — 3075F SYST BP GE 130 - 139MM HG: CPT | Performed by: PSYCHIATRY & NEUROLOGY

## 2021-06-21 PROCEDURE — 99213 OFFICE O/P EST LOW 20 MIN: CPT | Performed by: PSYCHIATRY & NEUROLOGY

## 2021-06-21 PROCEDURE — 3079F DIAST BP 80-89 MM HG: CPT | Performed by: PSYCHIATRY & NEUROLOGY

## 2021-06-21 PROCEDURE — 3008F BODY MASS INDEX DOCD: CPT | Performed by: PSYCHIATRY & NEUROLOGY

## 2021-06-21 NOTE — PROGRESS NOTES
Compliance Card Data:    Date Range: 21-21   [place]   Residual AHI: 7 2   Vibratory Snore Index: 0   %  Night in Large Leak: 1h 31m   Average usage days used: 1h 50m   % Days used: 57%   % Days with USage > 4 hrs: 7%

## 2021-06-21 NOTE — PATIENT INSTRUCTIONS
Recommendations:    1) APAP at 9-13cm with mask from sleep study  2) Safe driving reviewed  3) Follow-up in 2 months  4) Call with any questions or concerns

## 2021-06-21 NOTE — PROGRESS NOTES
Sleep Medicine Follow-Up Note    HPI: 60yo M with CORDELL being seen for a follow up  Treatment Summary: The patient was diagnosed with CORDELL in 1/2018 by [facility]:  respiratory event index (MANI) of 17 9  The lowest SpO2 recorded is 80%  CPAP study done 3/2021: APAP 9-13cm  HPI:   Today, patient presents unaccompanied  He stated that he go his CPAP and has been using it, but he doesn't find it benefiting him  He stated that now he is unable to use it because his [facility] is down and this is making it very hot to wear the mask  When he was wearing it he feels that he had to concentrate a lot on his breathing  He feels it was uncomfortable wearing it and would remove it in an hour  From our records he uses [facility]  His company mentioned that he is not meeting compliance and if he doesn't use it he will have to pay for it or give it back  Respiratory:  -Ongoing Snoring: yes  -Mouth Breathing: yes  -Dry Mouth: yes  -Nocturnal Gasping: no    ROS:   Genitourinary none   Cardiology Frequent chest pain or angina,    Gastrointestinal none   Neurology numbness/tingling of an extremity   Constitutional weight change   Integumentary none   Psychiatry none   Musculoskeletal joint pain   Pulmonary shortness of breath with activity, chest tightness, wheezing, frequent cough and difficulty breathing when lying flat    ENT throat clearing   Endocrine excessive thirst   Hematological none       Sleep Pattern:  -Position: in a recliner  -Bedtime: 12-1am  -Lights Out: same time  -Environment: no Lights/TV  -Latency: mins  -Awakenings: 1  -Wake Time: 5-6am  -Rise Time: same leah  -Patient's estimate of Sleep Time: 5-6h    Daytime Symptoms:  -Upon Awakening: fine  -Daytime fatigue/sleepiness: "not more than normal "  -Naps: no  -Involuntary Dozing: sometimes  -Cognitive Symptoms: not worse  -Driving: Difficulty with sleepiness and driving:  Sometimes he gets sleepy      -- Close calls related to sleepiness: no   -- Accidents related to sleepiness: no    Substance Use:  -Caffeine: "lots" all day  -Alcohol: denied  -THC: edibles    --> Denies any significant medical changes since last visit  --> Supplemental Oxygen Use: denies    Questionnaire:  Sitting and reading: Slight chance of dozing  Watching TV: Moderate chance of dozing  Sitting, inactive in a public place (e g  a theatre or a meeting): Moderate chance of dozing  As a passenger in a car for an hour without a break: Would never doze  Lying down to rest in the afternoon when circumstances permit: Slight chance of dozing  Sitting and talking to someone: Moderate chance of dozing  Sitting quietly after a lunch without alcohol: Slight chance of dozing  In a car, while stopped for a few minutes in traffic: Slight chance of dozing  Total score: 10      PE:    /82 (BP Location: Left arm, Cuff Size: Large)   Pulse 59   Temp (!) 96 8 °F (36 °C) (Temporal)   Ht 6' 2" (1 88 m)   Wt 123 kg (272 lb)   SpO2 97%   BMI 34 92 kg/m²     General:  In NAD  Pul: Respirations: unlaboured  MS: No atrophy  Neuro: No resting tremor  Gait normal turning & station; unremarkable overall  Psych: Socially appropriate  Pleasant  No overt dysphoria  Assessment: The patient has not been compliant with his CPAP as he has to concentrate on his breathing with it  He had no problems on the CPAP study night and felt great the next day  He liked the mask used on that night better then what he got  He was told to use that mask as he has it at home  He is waiting for a part to fix his broken [facility] and then will start using his CPAP again  He has a lot of co-morbid cardiopulmonary conditions that may worsen without treatment of his CORDELL  He voiced understanding  Recommendations:    1) APAP at 9-13cm with mask from sleep study  2) Safe driving reviewed  3) Follow-up in 2 months  4) Call with any questions or concerns        All questions answered for the patient, who indicated understanding and agreed with the plan       Lisa Rubalcava MD  Psychiatry/ Sleep medicine

## 2021-07-02 ENCOUNTER — TELEPHONE (OUTPATIENT)
Dept: SLEEP CENTER | Facility: CLINIC | Age: 66
End: 2021-07-02

## 2021-07-02 DIAGNOSIS — I42.8 NONISCHEMIC CARDIOMYOPATHY (HCC): ICD-10-CM

## 2021-07-02 DIAGNOSIS — G47.33 OBSTRUCTIVE SLEEP APNEA: Primary | ICD-10-CM

## 2021-07-02 DIAGNOSIS — I50.42 CHRONIC COMBINED SYSTOLIC AND DIASTOLIC CONGESTIVE HEART FAILURE (HCC): ICD-10-CM

## 2021-07-02 DIAGNOSIS — J44.9 ASTHMA-COPD OVERLAP SYNDROME (HCC): ICD-10-CM

## 2021-07-02 NOTE — TELEPHONE ENCOUNTER
Received call from patient  He states his A/C was not working and he was not able to use his CPAP  His A/C is now working and he plans to resume use  His insurance company advised him that because he did not meet compliance they will not pay for the machine  They are requiring a new Rx be written by the physician in order to cover machine  Advised I will send message to Dr Elysia Silvestre to write new Rx if agreeable  Patient would like Rx to be sent to [facility]  Fax [phone]  Dr Martin Lopez would you be willing to write Rx?

## 2021-07-07 NOTE — TELEPHONE ENCOUNTER
Fax to Rene Pickering not able to be sent  Checked fax number  Correct fax number to Rene Pickering is [phone]  Order faxed to corrected number

## 2021-07-16 DIAGNOSIS — J45.40 CHRONIC ASTHMA, MODERATE PERSISTENT, UNCOMPLICATED: ICD-10-CM

## 2021-07-19 RX ORDER — IPRATROPIUM BROMIDE AND ALBUTEROL SULFATE 2.5; .5 MG/3ML; MG/3ML
SOLUTION RESPIRATORY (INHALATION)
Qty: 360 ML | Refills: 2 | Status: SHIPPED | OUTPATIENT
Start: 2021-07-19 | End: 2022-06-15 | Stop reason: SDUPTHER

## 2021-07-21 DIAGNOSIS — F41.9 ANXIETY: ICD-10-CM

## 2021-07-22 ENCOUNTER — TELEPHONE (OUTPATIENT)
Dept: PULMONOLOGY | Facility: CLINIC | Age: 66
End: 2021-07-22

## 2021-07-22 RX ORDER — ALPRAZOLAM 0.5 MG/1
TABLET ORAL
Qty: 30 TABLET | Refills: 0 | Status: SHIPPED | OUTPATIENT
Start: 2021-07-22 | End: 2021-09-20 | Stop reason: SDUPTHER

## 2021-07-23 ENCOUNTER — OFFICE VISIT (OUTPATIENT)
Dept: CARDIOLOGY CLINIC | Facility: HOSPITAL | Age: 66
End: 2021-07-23
Payer: COMMERCIAL

## 2021-07-23 VITALS
OXYGEN SATURATION: 95 % | HEART RATE: 65 BPM | BODY MASS INDEX: 35.37 KG/M2 | DIASTOLIC BLOOD PRESSURE: 76 MMHG | HEIGHT: 74 IN | SYSTOLIC BLOOD PRESSURE: 132 MMHG | WEIGHT: 275.6 LBS

## 2021-07-23 DIAGNOSIS — I50.42 CHRONIC COMBINED SYSTOLIC AND DIASTOLIC CONGESTIVE HEART FAILURE (HCC): ICD-10-CM

## 2021-07-23 DIAGNOSIS — R06.00 DYSPNEA ON EXERTION: ICD-10-CM

## 2021-07-23 DIAGNOSIS — E78.5 DYSLIPIDEMIA: ICD-10-CM

## 2021-07-23 DIAGNOSIS — G47.33 OBSTRUCTIVE SLEEP APNEA: ICD-10-CM

## 2021-07-23 DIAGNOSIS — I10 BENIGN ESSENTIAL HYPERTENSION: ICD-10-CM

## 2021-07-23 DIAGNOSIS — I42.8 NONISCHEMIC CARDIOMYOPATHY (HCC): Primary | ICD-10-CM

## 2021-07-23 PROCEDURE — 99214 OFFICE O/P EST MOD 30 MIN: CPT | Performed by: INTERNAL MEDICINE

## 2021-07-23 RX ORDER — ISOSORBIDE MONONITRATE 30 MG/1
30 TABLET, EXTENDED RELEASE ORAL DAILY
Qty: 30 TABLET | Refills: 11 | Status: SHIPPED | OUTPATIENT
Start: 2021-07-23 | End: 2021-09-20 | Stop reason: ALTCHOICE

## 2021-07-26 ENCOUNTER — OFFICE VISIT (OUTPATIENT)
Dept: PULMONOLOGY | Facility: CLINIC | Age: 66
End: 2021-07-26
Payer: COMMERCIAL

## 2021-07-26 VITALS
HEIGHT: 74 IN | HEART RATE: 63 BPM | DIASTOLIC BLOOD PRESSURE: 68 MMHG | OXYGEN SATURATION: 96 % | SYSTOLIC BLOOD PRESSURE: 138 MMHG | RESPIRATION RATE: 18 BRPM | WEIGHT: 273.2 LBS | BODY MASS INDEX: 35.06 KG/M2 | TEMPERATURE: 96.8 F

## 2021-07-26 DIAGNOSIS — G47.33 OBSTRUCTIVE SLEEP APNEA: ICD-10-CM

## 2021-07-26 DIAGNOSIS — I50.42 CHRONIC COMBINED SYSTOLIC AND DIASTOLIC CONGESTIVE HEART FAILURE (HCC): ICD-10-CM

## 2021-07-26 DIAGNOSIS — J44.9 ASTHMA-COPD OVERLAP SYNDROME (HCC): Primary | ICD-10-CM

## 2021-07-26 DIAGNOSIS — Z87.891 HISTORY OF TOBACCO ABUSE: ICD-10-CM

## 2021-07-26 DIAGNOSIS — R09.82 POST-NASAL DRIP: ICD-10-CM

## 2021-07-26 PROCEDURE — 99215 OFFICE O/P EST HI 40 MIN: CPT | Performed by: INTERNAL MEDICINE

## 2021-07-26 PROCEDURE — 94618 PULMONARY STRESS TESTING: CPT | Performed by: INTERNAL MEDICINE

## 2021-07-26 PROCEDURE — 3008F BODY MASS INDEX DOCD: CPT | Performed by: INTERNAL MEDICINE

## 2021-07-26 PROCEDURE — 90732 PPSV23 VACC 2 YRS+ SUBQ/IM: CPT

## 2021-07-26 PROCEDURE — 90471 IMMUNIZATION ADMIN: CPT

## 2021-07-26 PROCEDURE — 1036F TOBACCO NON-USER: CPT | Performed by: INTERNAL MEDICINE

## 2021-07-26 RX ORDER — GUAIFENESIN 600 MG
1200 TABLET, EXTENDED RELEASE 12 HR ORAL EVERY 12 HOURS SCHEDULED
Qty: 60 TABLET | Refills: 11 | Status: SHIPPED | OUTPATIENT
Start: 2021-07-26

## 2021-07-26 RX ORDER — ALBUTEROL SULFATE 90 UG/1
2 AEROSOL, METERED RESPIRATORY (INHALATION) EVERY 6 HOURS PRN
Qty: 18 G | Refills: 11 | Status: SHIPPED | OUTPATIENT
Start: 2021-07-26

## 2021-07-26 NOTE — PROGRESS NOTES
Pulmonary Follow Up Note   [facility] 61 y o  male MRN: [id number]  7/26/2021    Assessment:  Asthma COPD overlap  · Probably more of asthmatic with fixed airway obstruction features   · Multiple PFT showed normal DLCO, sometimes mild air trapping that improve with symptoms however ratio remained reduced with variable degree of reversibility  · Appears to be moderately controlled with regimen  · Current regimen includes Breo Ellipta 200, Tudorza 400, p r n  DuoNeb and Atrovent HFA that he is using frequently  · Suspect contribution to the dyspnea on exertion from myocardial dysfunction, systolic/diastolic heart failure  · Received the COVID-19 vaccine, however was not vaccinated for the flu or Pneumovax  · Completed pulmonary rehabilitation in 5/2021    Plan:   · Continue Tudorza, Breo Ellipta 200  · Reviewed the proper inhaler use techniques, noted that he was not holding his breath the following each use, rinse his mouth following each use of the Breo  · Discontinue Atrovent HFA to avoid over effect from anti muscarinic  · Start p r n  albuterol HFA, continue DuoNeb  · 6 minute walk today showing no significant oxygen desaturation  · Agreeable for vaccination for Pneumovax, given in the clinic today  · Will give a trial of mucolytic, Mucinex 1200 b i d  · Will check CBC/[place] allergy panel  · Patient will send us the forms to fill disability application    History of tobacco abuse  · Continues to be abstinent from any smoking, does not feel the urge to go back  · Next due for low-dose CT chest for lung cancer screening in 02/2022    CORDELL   · Using CPAP consistently   · Follows with Sleep Medicine, last was in 66/8006    Systolic/diastolic heart failure   · On p r n  Lasix, getting weight every day   · No signs of hypervolemia on exam today    Class 1 obesity   · Recommended restricting calories intake      Return in about 8 weeks (around 9/20/2021)      History of Present Illness     Follow up for: COPD/asthma    Background:  61 y o  male with a h/o CORDELL asthma COPD, class 1 obesity, dyslipidemia, CHF (systolic and diastolic), tobacco abuse about 40 pack year history quit 2019, marijuana use, quit 1 year ago    Last evaluated by Pulmonary on 05/04-recommended to continue Tudorza 400 mcg, Breo 200, p r n  DuoNeb and Atrovent  He states that he was 1st diagnosed with asthma about 30 years ago, known triggers of temperature changes, exposure to grass and sometimes strong perfumes in addition to dog dander  Previously tested positive for allergens Ragweed and dog dander  He [facility] pack per 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, closed since [place] pandemic, reopened recently however could not perform duties as before  Currently seeking disability application  He is currently using the above mentioned regimen, complains of frequent chest congestion, moderately productive cough of whitish/thick and sometimes clear sputum, feel better after that  Symptoms appear to get worse more with heat/humidity  Using DuoNeb almost daily 2-3 times, Atrovent once or twice a day  He is able to use CPAP for longer hours, states that he is used to it more than before, last compliance report showed residual AHI of 7 2    Reports better symptoms control compared to before     Review of Systems  As per hpi, all other systems reviewed and were negative    Studies:    Imaging and other studies: I have personally reviewed pertinent films in PACS  CT chest 02/25/2021-no suspicious lesions, or nodules, few calcified granuloma at the lingula that unchanged from before, posterior mediastinal lymph node/top normal size unchanged from 2019    Pulmonary function testing:   PFT 02/04/2021-ratio 54%, FEV1 1 92 L/55%, FVC 3 54 L/69%, TLC 87%, %, DLCO 104%  PFT 12/18/2018-ratio 54%, FEV1 2 6 L/73%, FVC 4 8 L/93%, positive bronchodilator response at the level of FVC and FEV1, %, %, DLCO 114%   PFT 01/30/2017-ratio 41%, FEV1 1 74 L/49%, FVC 4 25 L/84%, positive bronchodilator response at the level of FEV1, %, %, DLCO 95%    6 minute walk test 07/26/2021-baseline SpO2 96% on room air, heart rate 64, able to walk 336 m in 6 minutes, lowest SpO2 at 94%, maximal heart rate at 88, dyspnea scale of 4      EKG, Pathology, and Other Studies: I have personally reviewed pertinent reports  Myocardial perfusion scan 09/25/2020-moderate-size infarct in the distribution of RCA/left circumflex, reduced LV systolic function with WM a    TTE 09/10/2020-EF 40%, akinesis at basal/mid inferior/mid inferior lateral wall, mildly increased LV wall thickness, grade 2 diastolic dysfunction, LA mildly dilated, normal RV size and function    Past medical, surgical, social and family histories reviewed  Medications/Allergies: Reviewed      Vitals: Blood pressure 138/68, pulse 63, temperature (!) 96 8 °F (36 °C), temperature source Tympanic, resp  rate 18, height 6' 2" (1 88 m), weight 124 kg (273 lb 3 2 oz), SpO2 96 %  Body mass index is 35 08 kg/m²  Oxygen Therapy  SpO2: 96 %  Oxygen Therapy: None (Room air)      Physical Exam  Body mass index is 35 08 kg/m²    Gen: not in acute distress, obese  HEENT: supple, no JVD appreciated  Chest: normal respiratory efforts, clear breath sounds bilaterally  CV: RRR, no murmurs appreciated  Abdomen: soft, non tender, normal bowel sounds  Extremities: 1+ pitting above ankle edema more on the right, no calf tenderness or erythema  Skin: unremarkable  Neuro: AAO X3, no focal motor deficit      Labs:  Lab Results   Component Value Date    WBC 9 10 07/16/2020    HGB 14 0 07/16/2020    HCT 41 9 (L) 07/16/2020    MCV 86 07/16/2020     07/16/2020     Lab Results   Component Value Date    GLUCOSE 87 07/27/2015    CALCIUM 9 4 07/16/2020     07/27/2015    K 4 3 07/16/2020    CO2 23 07/16/2020     07/16/2020    BUN 28 (H) 07/16/2020    CREATININE 1 22 07/16/2020     Lab Results   Component Value Date    IGE 65 2 10/23/2017     Lab Results   Component Value Date    ALT 20 12/18/2018    AST 17 12/18/2018    ALKPHOS 67 12/18/2018    BILITOT 1 05 (H) 07/22/2015           Portions of the record may have been created with voice recognition software  Occasional wrong word or "sound a like" substitutions may have occurred due to the inherent limitations of voice recognition software  Read the chart carefully and recognize, using context, where substitutions have occurred    [facility]

## 2021-07-29 ENCOUNTER — APPOINTMENT (OUTPATIENT)
Dept: RADIOLOGY | Facility: CLINIC | Age: 66
End: 2021-07-29
Payer: COMMERCIAL

## 2021-07-29 ENCOUNTER — OFFICE VISIT (OUTPATIENT)
Dept: URGENT CARE | Facility: CLINIC | Age: 66
End: 2021-07-29
Payer: COMMERCIAL

## 2021-07-29 VITALS
OXYGEN SATURATION: 98 % | SYSTOLIC BLOOD PRESSURE: 134 MMHG | DIASTOLIC BLOOD PRESSURE: 64 MMHG | TEMPERATURE: 97.8 F | RESPIRATION RATE: 16 BRPM | HEART RATE: 63 BPM

## 2021-07-29 DIAGNOSIS — M79.671 RIGHT FOOT PAIN: ICD-10-CM

## 2021-07-29 DIAGNOSIS — M79.671 RIGHT FOOT PAIN: Primary | ICD-10-CM

## 2021-07-29 PROCEDURE — S9088 SERVICES PROVIDED IN URGENT: HCPCS | Performed by: PHYSICIAN ASSISTANT

## 2021-07-29 PROCEDURE — 99213 OFFICE O/P EST LOW 20 MIN: CPT | Performed by: PHYSICIAN ASSISTANT

## 2021-07-29 PROCEDURE — 73630 X-RAY EXAM OF FOOT: CPT

## 2021-07-29 RX ORDER — PREDNISONE 10 MG/1
TABLET ORAL
Qty: 20 TABLET | Refills: 0 | Status: SHIPPED | OUTPATIENT
Start: 2021-07-29 | End: 2021-08-06

## 2021-07-29 NOTE — PROGRESS NOTES
[facility]        NAME: Manual Reasons is a 61 y o  male  : 1955    MRN: [id number]  DATE: 2021  TIME: 10:46 AM    Assessment and Plan   Right foot pain [M79 671]  1  Right foot pain  XR foot 3+ vw right    predniSONE 10 mg tablet         Patient Instructions     Patient Instructions   Gout vs OA vs 5th metatarsal fx  degenerative changes noted  Awaiting radiology read to confirm  Highly suspicious for gout  Post-op shoe  Start   Prednisone  Will avoid NSAIDs due to cardiovascular history  PCP follow-up next week  Go to emergency room with worsening symptoms, increased pain, fever chills, numbness, weakness or tingling  **Portions of the record may have been created with voice recognition software  Occasional wrong word or "sound a like" substitutions may have occurred due to the inherent limitations of voice recognition software  Read the chart carefully and recognize, using context, where substitutions have occurred  **     Chief Complaint     Chief Complaint   Patient presents with    Foot Pain     2 days ago woke up with pain in right foot, swelling noted, trouble ambulating, painful when applying pressure  Took tylenol today  Denies trauma  History of Present Illness       65-year-old male presents clinic with complaints of right foot pain x1 day  States he woke up this morning due to the pain in his right foot  He reports some heat and tenderness to the touch  He denies any known injury, numbness or tingling  Review of Systems     Review of Systems   Constitutional: Negative for chills and fever  Respiratory: Negative for shortness of breath  Cardiovascular: Negative for chest pain  Gastrointestinal: Negative for abdominal pain  Musculoskeletal: Positive for arthralgias and gait problem  Negative for back pain, joint swelling and myalgias  Skin: Negative for color change and rash  Neurological: Negative for weakness and numbness  Current Medications     Current Outpatient Medications:     albuterol (ProAir HFA) 90 mcg/act inhaler, Inhale 2 puffs every 6 (six) hours as needed for wheezing, Disp: 18 g, Rfl: 11    ALPRAZolam (XANAX) 0 5 mg tablet, take 1 tablet by mouth at bedtime if needed for anxiety, Disp: 30 tablet, Rfl: 0    aspirin 325 mg tablet, Take 1 tablet by mouth daily, Disp: , Rfl:     Breo Ellipta 200-25 MCG/INH inhaler, inhale 1 puff by mouth and INTO THE LUNGS once daily, Disp: 1 Inhaler, Rfl: 5    carvedilol (COREG) 12 5 mg tablet, take 1 tablet by mouth twice a day with meals, Disp: 60 tablet, Rfl: 11    DULoxetine (CYMBALTA) 30 mg delayed release capsule, take 1 capsule by mouth once daily, Disp: 30 capsule, Rfl: 5    fluticasone (FLONASE) 50 mcg/act nasal spray, 1 spray into each nostril daily, Disp: 1 Bottle, Rfl: 5    furosemide (LASIX) 20 mg tablet, take 1 tablet by mouth once daily if needed for WEIGHT GAIN OR EDEMA, Disp: 90 tablet, Rfl: 0    guaiFENesin (MUCINEX) 600 mg 12 hr tablet, Take 2 tablets (1,200 mg total) by mouth every 12 (twelve) hours, Disp: 60 tablet, Rfl: 11    ipratropium-albuterol (DUO-NEB) 0 5-2 5 mg/3 mL nebulizer solution, inhale contents of 1 vial ( 3 milliliters ) in nebulizer by mouth and INTO THE LUNGS every 6 hours if needed for wheezing, Disp: 360 mL, Rfl: 2    lisinopril (ZESTRIL) 20 mg tablet, take 1 tablet by mouth twice a day, Disp: 60 tablet, Rfl: 11    Tudorza Pressair 400 MCG/ACT inhaler, INHALE 1 PUFF BY MOUTH TWICE A DAY, Disp: 1 Inhaler, Rfl: 5    isosorbide mononitrate (IMDUR) 30 mg 24 hr tablet, Take 1 tablet (30 mg total) by mouth daily (Patient not taking: Reported on 7/29/2021), Disp: 30 tablet, Rfl: 11    predniSONE 10 mg tablet, Take 4 tablets (40 mg total) by mouth daily for 2 days, THEN 3 tablets (30 mg total) daily for 2 days, THEN 2 tablets (20 mg total) daily for 2 days, THEN 1 tablet (10 mg total) daily for 2 days  , Disp: 20 tablet, Rfl: 0    Current Allergies     Allergies as of 07/29/2021 - Reviewed 07/29/2021   Allergen Reaction Noted    Ciprofloxacin Shortness Of Breath and Diarrhea 12/18/2018            The following portions of the patient's history were reviewed and updated as appropriate: allergies, current medications, past family history, past medical history, past social history, past surgical history and problem list      Past Medical History:   Diagnosis Date    Asthma     [facility]cong[facility] failure) [facility]     COPD [facility]chronic obstructive pulmonary disease) ([facility] )     Mumps     Old MI (myocardial infarction)     Pneumonia     Pulmonary emphysema ([place] [address] )     Sleep apnea        Past Surgical History:   Procedure Laterality Date    CARDIAC CATHETERIZATION  07/24/2015    Left main- normal and maidly tortuous  Circumflex - normal and moderately tortuous  RCA- normal and mildy tortuous  Global LV function was severely depressed  Maame Smith INGUINAL HERNIA REPAIR Bilateral     ID COLONOSCOPY FLX DX W/COLLJ SPEC WHEN PFRMD N/A 3/11/2019    Procedure: COLONOSCOPY with removal of anal papilla; Surgeon: Arcadio Noel MD;  Location: [facility];  Service: Colorectal    TONSILLECTOMY      UMBILICAL HERNIA REPAIR  06/21/2006       Family History   Problem Relation Age of Onset    Heart attack Father         MI    Diabetes Sister         DM    Arthritis Family     Coronary artery disease Family     Hypertension Family     Tuberculosis Son          Medications have been verified  Objective     /64   Pulse 63   Temp 97 8 °F (36 6 °C)   Resp 16   SpO2 98%        Physical Exam     Physical Exam  Vitals and nursing note reviewed  Constitutional:       General: He is not in acute distress  Appearance: Normal appearance  He is not ill-appearing  HENT:      Head: Normocephalic and atraumatic  Cardiovascular:      Rate and Rhythm: Normal rate     Pulmonary:      Effort: Pulmonary effort is normal    Musculoskeletal: Right foot: Normal range of motion and normal capillary refill  Bunion, tenderness and bony tenderness present  No swelling, deformity, laceration or crepitus  Normal pulse  Comments:   Warmth present  Skin:     General: Skin is warm and dry  Capillary Refill: Capillary refill takes less than 2 seconds  Coloration: Skin is not pale  Findings: No erythema or rash  Neurological:      Mental Status: He is alert  Sensory: No sensory deficit

## 2021-07-29 NOTE — PATIENT INSTRUCTIONS
Gout vs OA vs 5th metatarsal fx  degenerative changes noted  Awaiting radiology read to confirm  Highly suspicious for gout  Post-op shoe  Start   Prednisone  Will avoid NSAIDs due to cardiovascular history  PCP follow-up next week  Go to emergency room with worsening symptoms, increased pain, fever chills, numbness, weakness or tingling

## 2021-09-07 DIAGNOSIS — J44.9 COPD, MODERATE (HCC): ICD-10-CM

## 2021-09-07 RX ORDER — ACLIDINIUM BROMIDE 400 UG/1
POWDER, METERED RESPIRATORY (INHALATION)
Qty: 1 EACH | Refills: 2 | Status: SHIPPED | OUTPATIENT
Start: 2021-09-07 | End: 2021-12-22

## 2021-09-20 ENCOUNTER — OFFICE VISIT (OUTPATIENT)
Dept: FAMILY MEDICINE CLINIC | Facility: CLINIC | Age: 66
End: 2021-09-20
Payer: COMMERCIAL

## 2021-09-20 VITALS
BODY MASS INDEX: 35.61 KG/M2 | DIASTOLIC BLOOD PRESSURE: 78 MMHG | SYSTOLIC BLOOD PRESSURE: 122 MMHG | HEIGHT: 74 IN | WEIGHT: 277.5 LBS | TEMPERATURE: 97.6 F

## 2021-09-20 DIAGNOSIS — F33.1 MODERATE EPISODE OF RECURRENT MAJOR DEPRESSIVE DISORDER (HCC): ICD-10-CM

## 2021-09-20 DIAGNOSIS — Z00.00 ANNUAL PHYSICAL EXAM: Primary | ICD-10-CM

## 2021-09-20 DIAGNOSIS — I10 BENIGN ESSENTIAL HYPERTENSION: ICD-10-CM

## 2021-09-20 DIAGNOSIS — I50.42 CHRONIC COMBINED SYSTOLIC AND DIASTOLIC CONGESTIVE HEART FAILURE (HCC): ICD-10-CM

## 2021-09-20 DIAGNOSIS — J44.9 ASTHMA-COPD OVERLAP SYNDROME (HCC): ICD-10-CM

## 2021-09-20 DIAGNOSIS — J43.9 PULMONARY EMPHYSEMA, UNSPECIFIED EMPHYSEMA TYPE (HCC): ICD-10-CM

## 2021-09-20 DIAGNOSIS — F41.9 ANXIETY: ICD-10-CM

## 2021-09-20 PROBLEM — R05.9 COUGH: Status: RESOLVED | Noted: 2018-12-19 | Resolved: 2021-09-20

## 2021-09-20 PROBLEM — M54.9 BACK PAIN: Status: RESOLVED | Noted: 2017-12-15 | Resolved: 2021-09-20

## 2021-09-20 PROCEDURE — 3725F SCREEN DEPRESSION PERFORMED: CPT | Performed by: INTERNAL MEDICINE

## 2021-09-20 PROCEDURE — 99396 PREV VISIT EST AGE 40-64: CPT | Performed by: INTERNAL MEDICINE

## 2021-09-20 PROCEDURE — 3078F DIAST BP <80 MM HG: CPT | Performed by: INTERNAL MEDICINE

## 2021-09-20 PROCEDURE — 3074F SYST BP LT 130 MM HG: CPT | Performed by: INTERNAL MEDICINE

## 2021-09-20 RX ORDER — ALPRAZOLAM 0.5 MG/1
0.5 TABLET ORAL
Qty: 30 TABLET | Refills: 0 | Status: SHIPPED | OUTPATIENT
Start: 2021-09-20 | End: 2021-12-02 | Stop reason: SDUPTHER

## 2021-09-20 RX ORDER — DULOXETIN HYDROCHLORIDE 60 MG/1
60 CAPSULE, DELAYED RELEASE ORAL DAILY
Qty: 30 CAPSULE | Refills: 5 | Status: SHIPPED | OUTPATIENT
Start: 2021-09-20 | End: 2022-04-20 | Stop reason: SDUPTHER

## 2021-09-20 NOTE — PROGRESS NOTES
[facility]    NAME: Ney Patterson  AGE: 61 y o  SEX: male  : 1955     DATE: 2021     Assessment and Plan:     Problem List Items Addressed This Visit        Respiratory    Asthma-COPD overlap syndrome ([address] )    Relevant Orders    Comprehensive metabolic panel    CBC and Platelet    Pulmonary emphysema (HCC)       Cardiovascular and Mediastinum    Chronic combined systolic and diastolic congestive heart failure (HCC)    Relevant Orders    Comprehensive metabolic panel    Benign essential hypertension       Other    Anxiety    Relevant Medications    ALPRAZolam (XANAX) 0 5 mg tablet    Annual physical exam - Primary      Other Visit Diagnoses     Moderate episode of recurrent major depressive disorder (HCC)        Relevant Medications    DULoxetine (CYMBALTA) 60 mg delayed release capsule    ALPRAZolam (XANAX) 0 5 mg tablet      Pt is looking into medical marijuana - website provided  He has pulmonary appt next week which he initially wa snot aware of  Increase Cymbalta to 60mg daily and hope that medical marijuana may also help  Not interested in ortho followup at this time Handicap placard form completed  He is covid vaccinated He does not get flu shot   rto 6 months/prn    Immunizations and preventive care screenings were discussed with patient today  Appropriate education was printed on patient's after visit summary  Counseling:  · Exercise: the importance of regular exercise/physical activity was discussed  Recommend exercise 3-5 times per week for at least 30 minutes  BMI Counseling: Body mass index is 35 63 kg/m²  The BMI is above normal  Nutrition recommendations include consuming healthier snacks, moderation in carbohydrate intake and increasing intake of lean protein  Rationale for BMI follow-up plan is due to patient being overweight or obese       Depression Screening and Follow-up Plan:   Patient was screened for depression during today's encounter  They screened negative with a PHQ-2 score of 2  Return in about 6 months (around 3/20/2022), or if symptoms worsen or fail to improve, for Recheck  Chief Complaint:     Chief Complaint   Patient presents with    Annual Exam      History of Present Illness:     Adult Annual Physical   Patient here for a comprehensive physical exam  The patient reports problems - breathing and knee pain have been more progressive has been feeling more depressed due to medical issues  Diet and Physical Activity  · Diet/Nutrition: limited junk food  · Exercise: no formal exercise  Depression Screening  PHQ-9 Depression Screening    PHQ-9:   Frequency of the following problems over the past two weeks:      Little interest or pleasure in doing things: 1 - several days  Feeling down, depressed, or hopeless: 1 - several days  PHQ-2 Score: 2       General Health  · Sleep: gets 7-8 hours of sleep on average  · Hearing: normal - bilateral   · Vision: no vision problems and most recent eye exam <1 year ago  · Dental: no dental visits for >1 year   Health  · Symptoms include: none     Review of Systems:     Review of Systems   Constitutional: Negative for chills and fever  HENT: Positive for congestion and postnasal drip  Eyes: Negative for visual disturbance  Respiratory: Positive for shortness of breath  Cardiovascular: Negative for chest pain, palpitations and leg swelling  Gastrointestinal: Negative for abdominal distention and abdominal pain  Genitourinary: Negative for difficulty urinating, flank pain and frequency  Musculoskeletal: Positive for arthralgias and gait problem  Neurological: Negative for dizziness, light-headedness and headaches  Psychiatric/Behavioral: Positive for decreased concentration  Negative for sleep disturbance  The patient is nervous/anxious         Past Medical History:     Past Medical History:   Diagnosis Date    Asthma  CHF (congestive heart failure) (HCC)     COPD (chronic obstructive pulmonary disease) (HCC)     Mumps     Old MI (myocardial infarction)     Pneumonia     Pulmonary emphysema ([address] )     Sleep apnea       Past Surgical History:     Past Surgical History:   Procedure Laterality Date    CARDIAC CATHETERIZATION  07/24/2015    Left main- normal and maidly tortuous  Circumflex - normal and moderately tortuous  RCA- normal and mildy tortuous  Global LV function was severely depressed  Win Lua INGUINAL HERNIA REPAIR Bilateral     NM COLONOSCOPY FLX DX W/COLLJ SPEC WHEN PFRMD N/A 3/11/2019    Procedure: COLONOSCOPY with removal of anal papilla;   Surgeon: Delia Oropeza MD;  [facility]: [facility];  Service: Colorectal    TONSILLECTOMY      UMBILICAL HERNIA REPAIR  06/21/2006      Family History:     Family History   Problem Relation Age of Onset    Heart attack Father         MI    Diabetes Sister         DM    Arthritis Family     Coronary artery disease Family     Hypertension Family     Tuberculosis Son       Social History:     Social History     Socioeconomic History    Marital status: /Civil Union     Spouse name: None    Number of children: None    Years of education: None    Highest education level: None   Occupational History    None   Tobacco Use    Smoking status: Former Smoker     Packs/day: 3 00     Years: 43 00     Pack years: 129 00     Start date: 1975     Quit date: 2018     Years since quitting: 3 7    Smokeless tobacco: Never Used   Vaping Use    Vaping Use: Never used   Substance and Sexual Activity    Alcohol use: Not Currently     Comment: rarely    Drug use: Yes     Types: Marijuana    Sexual activity: None   Other Topics Concern    None   Social History Narrative    Caffeine use     Social Determinants of Health     Financial Resource Strain:     Difficulty of Paying Living Expenses:    Food Insecurity:     Worried About Running Out of Food in the Last Year:  Ran Out of Food in the Last Year:    Transportation Needs:     Lack of Transportation (Medical):      Lack of Transportation (Non-Medical):    Physical Activity:     Days of Exercise per Week:     Minutes of Exercise per Session:    Stress:     Feeling of Stress :    Social Connections:     Frequency of Communication with Friends and Family:     Frequency of Social Gatherings with Friends and Family:     Attends Shinto Services:     Active Member of Clubs or Organizations:     Attends Club or Organization Meetings:     Marital Status:    Intimate Partner Violence:     Fear of Current or Ex-Partner:     Emotionally Abused:     Physically Abused:     Sexually Abused:       Current Medications:     Current Outpatient Medications   Medication Sig Dispense Refill    albuterol (ProAir HFA) 90 mcg/act inhaler Inhale 2 puffs every 6 (six) hours as needed for wheezing 18 g 11    ALPRAZolam (XANAX) 0 5 mg tablet Take 1 tablet (0 5 mg total) by mouth daily at bedtime as needed for anxiety 30 tablet 0    aspirin 325 mg tablet Take 1 tablet by mouth daily      Breo Ellipta 200-25 MCG/INH inhaler inhale 1 puff by mouth and INTO THE LUNGS once daily 1 Inhaler 5    carvedilol (COREG) 12 5 mg tablet take 1 tablet by mouth twice a day with meals 60 tablet 11    fluticasone (FLONASE) 50 mcg/act nasal spray 1 spray into each nostril daily 1 Bottle 5    furosemide (LASIX) 20 mg tablet take 1 tablet by mouth once daily if needed for WEIGHT GAIN OR EDEMA 90 tablet 0    guaiFENesin (MUCINEX) 600 mg 12 hr tablet Take 2 tablets (1,200 mg total) by mouth every 12 (twelve) hours 60 tablet 11    ipratropium-albuterol (DUO-NEB) 0 5-2 5 mg/3 mL nebulizer solution inhale contents of 1 vial ( 3 milliliters ) in nebulizer by mouth and INTO THE LUNGS every 6 hours if needed for wheezing 360 mL 2    lisinopril (ZESTRIL) 20 mg tablet take 1 tablet by mouth twice a day 60 tablet 11    Tudorza Pressair 400 MCG/ACT inhaler INHALE 1 PUFF BY MOUTH TWICE A DAY 1 each 2    DULoxetine (CYMBALTA) 60 mg delayed release capsule Take 1 capsule (60 mg total) by mouth daily 30 capsule 5     No current facility-administered medications for this visit  Allergies:      Allergies   Allergen Reactions    Ciprofloxacin Shortness Of Breath and Diarrhea      Physical Exam:     /78   Temp 97 6 °F (36 4 °C) (Temporal)   Ht 6' 2" (1 88 m)   Wt 126 kg (277 lb 8 oz)   BMI 35 63 kg/m²     Physical Exam     [facility]

## 2021-09-20 NOTE — PATIENT INSTRUCTIONS

## 2021-09-22 ENCOUNTER — APPOINTMENT (OUTPATIENT)
Dept: LAB | Facility: CLINIC | Age: 66
End: 2021-09-22
Payer: COMMERCIAL

## 2021-09-22 DIAGNOSIS — E78.5 DYSLIPIDEMIA: ICD-10-CM

## 2021-09-22 DIAGNOSIS — I50.42 CHRONIC COMBINED SYSTOLIC AND DIASTOLIC CONGESTIVE HEART FAILURE (HCC): ICD-10-CM

## 2021-09-22 DIAGNOSIS — J44.9 ASTHMA-COPD OVERLAP SYNDROME (HCC): ICD-10-CM

## 2021-09-22 LAB
ALBUMIN SERPL BCP-MCNC: 3.4 G/DL (ref 3.5–5)
ALP SERPL-CCNC: 80 U/L (ref 46–116)
ALT SERPL W P-5'-P-CCNC: 27 U/L (ref 12–78)
ANION GAP SERPL CALCULATED.3IONS-SCNC: 3 MMOL/L (ref 4–13)
AST SERPL W P-5'-P-CCNC: 13 U/L (ref 5–45)
BASOPHILS # BLD AUTO: 0.09 THOUSANDS/ΜL (ref 0–0.1)
BASOPHILS NFR BLD AUTO: 1 % (ref 0–1)
BILIRUB SERPL-MCNC: 0.7 MG/DL (ref 0.2–1)
BUN SERPL-MCNC: 31 MG/DL (ref 5–25)
CALCIUM ALBUM COR SERPL-MCNC: 9.4 MG/DL (ref 8.3–10.1)
CALCIUM SERPL-MCNC: 8.9 MG/DL (ref 8.3–10.1)
CHLORIDE SERPL-SCNC: 108 MMOL/L (ref 100–108)
CHOLEST SERPL-MCNC: 160 MG/DL (ref 50–200)
CO2 SERPL-SCNC: 24 MMOL/L (ref 21–32)
CREAT SERPL-MCNC: 1.2 MG/DL (ref 0.6–1.3)
EOSINOPHIL # BLD AUTO: 0.85 THOUSAND/ΜL (ref 0–0.61)
EOSINOPHIL NFR BLD AUTO: 9 % (ref 0–6)
ERYTHROCYTE [DISTWIDTH] IN BLOOD BY AUTOMATED COUNT: 15.3 % (ref 11.6–15.1)
GFR SERPL CREATININE-BSD FRML MDRD: 64 ML/MIN/1.73SQ M
GLUCOSE P FAST SERPL-MCNC: 95 MG/DL (ref 65–99)
HCT VFR BLD AUTO: 42.5 % (ref 36.5–49.3)
HDLC SERPL-MCNC: 27 MG/DL
HGB BLD-MCNC: 13.4 G/DL (ref 12–17)
IMM GRANULOCYTES # BLD AUTO: 0.06 THOUSAND/UL (ref 0–0.2)
IMM GRANULOCYTES NFR BLD AUTO: 1 % (ref 0–2)
LDLC SERPL CALC-MCNC: 107 MG/DL (ref 0–100)
LYMPHOCYTES # BLD AUTO: 1.83 THOUSANDS/ΜL (ref 0.6–4.47)
LYMPHOCYTES NFR BLD AUTO: 19 % (ref 14–44)
MCH RBC QN AUTO: 26.9 PG (ref 26.8–34.3)
MCHC RBC AUTO-ENTMCNC: 31.5 G/DL (ref 31.4–37.4)
MCV RBC AUTO: 85 FL (ref 82–98)
MONOCYTES # BLD AUTO: 0.72 THOUSAND/ΜL (ref 0.17–1.22)
MONOCYTES NFR BLD AUTO: 7 % (ref 4–12)
NEUTROPHILS # BLD AUTO: 6.29 THOUSANDS/ΜL (ref 1.85–7.62)
NEUTS SEG NFR BLD AUTO: 63 % (ref 43–75)
NRBC BLD AUTO-RTO: 0 /100 WBCS
PLATELET # BLD AUTO: 304 THOUSANDS/UL (ref 149–390)
PMV BLD AUTO: 10.3 FL (ref 8.9–12.7)
POTASSIUM SERPL-SCNC: 4.4 MMOL/L (ref 3.5–5.3)
PROT SERPL-MCNC: 7.1 G/DL (ref 6.4–8.2)
RBC # BLD AUTO: 4.99 MILLION/UL (ref 3.88–5.62)
SODIUM SERPL-SCNC: 135 MMOL/L (ref 136–145)
TRIGL SERPL-MCNC: 131 MG/DL
WBC # BLD AUTO: 9.84 THOUSAND/UL (ref 4.31–10.16)

## 2021-09-22 PROCEDURE — 80061 LIPID PANEL: CPT

## 2021-09-22 PROCEDURE — 86003 ALLG SPEC IGE CRUDE XTRC EA: CPT

## 2021-09-22 PROCEDURE — 80053 COMPREHEN METABOLIC PANEL: CPT

## 2021-09-22 PROCEDURE — 82785 ASSAY OF IGE: CPT

## 2021-09-22 PROCEDURE — 36415 COLL VENOUS BLD VENIPUNCTURE: CPT

## 2021-09-22 PROCEDURE — 85025 COMPLETE CBC W/AUTO DIFF WBC: CPT

## 2021-09-23 LAB
A ALTERNATA IGE QN: <0.1 KUA/I
A FUMIGATUS IGE QN: <0.1 KUA/I
ALLERGEN COMMENT: ABNORMAL
BERMUDA GRASS IGE QN: <0.1 KUA/I
BOXELDER IGE QN: <0.1 KUA/I
C HERBARUM IGE QN: <0.1 KUA/I
CAT DANDER IGE QN: 1.53 KUA/I
CMN PIGWEED IGE QN: <0.1 KUA/I
COMMON RAGWEED IGE QN: 1.13 KUA/I
COTTONWOOD IGE QN: <0.1 KUA/I
D FARINAE IGE QN: <0.1 KUA/I
D PTERONYSS IGE QN: <0.1 KUA/I
DOG DANDER IGE QN: 0.4 KUA/I
LONDON PLANE IGE QN: <0.1 KUA/I
MOUSE URINE PROT IGE QN: <0.1 KUA/I
MT JUNIPER IGE QN: <0.1 KUA/I
MUGWORT IGE QN: <0.1 KUA/I
P NOTATUM IGE QN: <0.1 KUA/I
ROACH IGE QN: <0.1 KUA/I
SHEEP SORREL IGE QN: <0.1 KUA/I
SILVER BIRCH IGE QN: <0.1 KUA/I
TIMOTHY IGE QN: <0.1 KUA/I
TOTAL IGE SMQN RAST: 38.5 KU/L (ref 0–113)
WALNUT IGE QN: <0.1 KUA/I
WHITE ASH IGE QN: <0.1 KUA/I
WHITE ELM IGE QN: <0.1 KUA/I
WHITE MULBERRY IGE QN: <0.1 KUA/I
WHITE OAK IGE QN: <0.1 KUA/I

## 2021-09-24 ENCOUNTER — TELEPHONE (OUTPATIENT)
Dept: PULMONOLOGY | Facility: CLINIC | Age: 66
End: 2021-09-24

## 2021-09-27 ENCOUNTER — OFFICE VISIT (OUTPATIENT)
Dept: PULMONOLOGY | Facility: CLINIC | Age: 66
End: 2021-09-27
Payer: COMMERCIAL

## 2021-09-27 VITALS
WEIGHT: 286.4 LBS | TEMPERATURE: 98.5 F | SYSTOLIC BLOOD PRESSURE: 136 MMHG | HEART RATE: 68 BPM | DIASTOLIC BLOOD PRESSURE: 72 MMHG | BODY MASS INDEX: 36.76 KG/M2 | OXYGEN SATURATION: 96 % | HEIGHT: 74 IN | RESPIRATION RATE: 18 BRPM

## 2021-09-27 DIAGNOSIS — J44.9 ASTHMA-COPD OVERLAP SYNDROME (HCC): Primary | ICD-10-CM

## 2021-09-27 PROCEDURE — 99214 OFFICE O/P EST MOD 30 MIN: CPT | Performed by: INTERNAL MEDICINE

## 2021-09-27 PROCEDURE — 3008F BODY MASS INDEX DOCD: CPT | Performed by: INTERNAL MEDICINE

## 2021-09-27 PROCEDURE — 1036F TOBACCO NON-USER: CPT | Performed by: INTERNAL MEDICINE

## 2021-09-27 NOTE — PROGRESS NOTES
Pulmonary Follow Up Note   Martin Jarrell 61 y o  male MRN: [id number]  9/27/2021    Assessment:  Asthma/COPD overlap   · Most likely asthma/with fixed airway obstruction   · Severe persistent, last FEV1 55% in 02/2021,   · not well controlled ACT score of 16/25  · No history of severe exacerbation   · Suspect contribution to the dyspnea on exertion from myocardial dysfunction, systolic/diastolic heart failure  · Completed pulmonary rehab in 05/2021  · Lab work in 07/2021, showed eosinophilia at 850 cells, positive [facility] allergy panel to dog dander/ragweed    Plan:   · Step up treatment, switched from Breo Ellipta 202 Advair/Wixela 500, continue Tudorza  · Review response in 8 weeks, possible further escalation of therapy to biologic injection  · Up-to-date on immunization for Pneumovax/COVID-19    Former tobacco abuse   · About 40 pack year history quit in 2019   · No suspicious lesions on CT lung cancer screening in 02/2021, next due in 1 year from the date    CORDELL   · Follows with Sleep Medicine    Return in about 8 weeks (around 11/22/2021)  History of Present Illness     Follow up for: asthma/copd overlap    Background:  61 y o  male with a h/o CORDELL asthma COPD, class 1 obesity, dyslipidemia, CHF (systolic and diastolic), tobacco abuse about 40 pack year history quit 2019, marijuana use, quit 2020  Reported 1st diagnosis of asthma approximately 30 years ago, known triggers of extreme temperature/changes, exposure to grass/stronger orders and dog dander  No history of severe exacerbation, last oral steroid intake was in mid 2019  Completed pulmonary rehab program in 05/2021     Initially evaluated by Pulmonary on 05/04-recommended to continue Tudorza 400 mcg, Breo 200, p r n  DuoNeb and Atrovent  7/26 visit-continued on tudorza and Breo Ellipta 200, discontinued Atrovent  6 minute walk test showed no oxygen desaturation    [facility] allergy panel weakly positive for dog dander 0 4, common a ragweed positive at 1 13, IgE 38 5  CBC with eosinophilia at 850 cell  Given the pneumococcal vaccine, given trial of mucolytic with Mucinex  Interval History  Continued to have frequent productive cough/wheezing and sometimes nocturnal symptoms, asthma control test score today is 16/25, scored 1/5 at dyspnea question and use of p r n  bronchodilators  Uses DuoNeb almost daily, HFA 2 times a week  Since last seen, no ED visits, hospitalization, or treatment with oral steroids  States that he is currently under a lot of stress, 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  Still using the Breo Ellipta 200 and Tudorza      Review of Systems  As per hpi, all other systems reviewed and were negative    Studies:     Imaging and other studies: I have personally reviewed pertinent films in PACS  CT chest 02/25/2021-no suspicious lesions, or nodules, few calcified granuloma at the lingula that unchanged from before, posterior mediastinal lymph node/top normal size unchanged from 2019     Pulmonary function testing:   PFT 02/04/2021-ratio 54%, FEV1 1 92 L/55%, FVC 3 54 L/69%, TLC 87%, %, DLCO 104%  PFT 12/18/2018-ratio 54%, FEV1 2 6 L/73%, FVC 4 8 L/93%, positive bronchodilator response at the level of FVC and FEV1, %, %, DLCO 114%   PFT 01/30/2017-ratio 41%, FEV1 1 74 L/49%, FVC 4 25 L/84%, positive bronchodilator response at the level of FEV1, %, %, DLCO 95%     6 minute walk test 07/26/2021-baseline SpO2 96% on room air, heart rate 64, able to walk 336 m in 6 minutes, lowest SpO2 at 94%, maximal heart rate at 88, dyspnea scale of 4        EKG, Pathology, and Other Studies: I have personally reviewed pertinent reports      Myocardial perfusion scan 09/25/2020-moderate-size infarct in the distribution of RCA/left circumflex, reduced LV systolic function with WM a     TTE 09/10/2020-EF 40%, akinesis at basal/mid inferior/mid inferior lateral wall, mildly increased LV wall thickness, grade 2 diastolic dysfunction, LA mildly dilated, normal RV size and function    Past medical, surgical, social and family histories reviewed  Medications/Allergies: Reviewed      Vitals: Blood pressure 136/72, pulse 68, temperature 98 5 °F (36 9 °C), temperature source Tympanic, resp  rate 18, height 6' 2" (1 88 m), weight 130 kg (286 lb 6 4 oz), SpO2 96 %  Body mass index is 36 77 kg/m²  Oxygen Therapy  SpO2: 96 %  Oxygen Therapy: None (Room air)      Physical Exam      Body mass index is 36 77 kg/m²  Gen: not in acute distress, obese  Neck/Eyes: supple, no adenopathy, PERRLA  Ear: normal appearance, no significant hearing impairment  Nose:  normal nasal mucosa, no drainage  Mouth:  unremarkable/normal appearance of lips, teeth and gums  Oropharynx: mucosa is moist, no focal lesions or erythema  Salivary glands: soft nontender  Chest: normal respiratory efforts, clear breath sounds bilaterally  CV: RRR, no murmurs appreciated, trace pitting edema at the ankle  Abdomen: soft, non tender, normal bowel sounds  Extremities:  No observed deformity   Skin: unremarkable  Neuro: AAO X3, no focal motor deficit        Labs:  Lab Results   Component Value Date    WBC 9 84 09/22/2021    HGB 13 4 09/22/2021    HCT 42 5 09/22/2021    MCV 85 09/22/2021     09/22/2021     Lab Results   Component Value Date    GLUCOSE 87 07/27/2015    CALCIUM 8 9 09/22/2021     07/27/2015    K 4 4 09/22/2021    CO2 24 09/22/2021     09/22/2021    BUN 31 (H) 09/22/2021    CREATININE 1 20 09/22/2021     Lab Results   Component Value Date    IGE 38 5 09/22/2021     Lab Results   Component Value Date    ALT 27 09/22/2021    AST 13 09/22/2021    ALKPHOS 80 09/22/2021    BILITOT 1 05 (H) 07/22/2015           Portions of the record may have been created with voice recognition software    Occasional wrong word or "sound a like" substitutions may have occurred due to the inherent limitations of voice recognition software  Read the chart carefully and recognize, using context, where substitutions have occurred    [facility]

## 2021-10-06 DIAGNOSIS — I10 ESSENTIAL HYPERTENSION: ICD-10-CM

## 2021-10-06 RX ORDER — LISINOPRIL 20 MG/1
TABLET ORAL
Qty: 60 TABLET | Refills: 11 | Status: SHIPPED | OUTPATIENT
Start: 2021-10-06

## 2021-10-15 NOTE — PROGRESS NOTES
Cardiology Follow Up    Davin Pyle  1955  [id number]  [facility] [address] [id number]  [phone]    1  Nonischemic cardiomyopathy ([facility] [address] )     2  Chronic combined systolic and diastolic congestive heart failure ([facility] [address] )     3  Benign essential hypertension     4  Dyslipidemia  Lipid Panel with Direct LDL reflex   5  Obstructive sleep apnea     6  Dyspnea on exertion  isosorbide mononitrate (IMDUR) 30 mg 24 hr tablet       Discussion/Summary:  Non-ischemic cardiomyopathy: stable low ejection fraction at 40% per last echocardiogram in September 2020  He is maintained on carvedilol and lisinopril  He has abstained from alcohol  Shortness of breath and chest pain: he had an abnormal nuclear stress test in September 2020 showing inferior and inferolateral scar without ischemia  At that time he decided to proceed with medical management vs  Further investigation  He states he felt better when he was taking Imdur  A prescription has been provided  If his symptoms do not improve we will re-discuss pursing a repeat cardiac catheterization or coronary CTA  Cardiac catheterization in 2015 did not show any obstructive coronary artery disease  Chronic systolic and diastolic congestive heart failure: he appears euvolemic on exam  Weight gain has been gradual  He will continue to utilize lasix on a prn basis  daily weights and a low sodium diet were discussed  Hypertension: controlled    Dyslipidemia: needs reassessment of lipids - last lipid panel was in 2018  Prescription provided     He will RTO in 3-4 months with Dr Tommy Schaefer or sooner if necessary  He will call with any concerns       Interval History:   Davin Pyle is a 61 y o  male with a non-ischemic cardiomyopathy likely secondary to alcohol abuse, chronic combined systolic and diastolic congestive heart failure, hypertension, dyslipidemia, obstructive sleep apnea, and From: Natasha Wiggins  To: Francoise Wagner  Sent: 10/15/2021 10:28 AM CDT  Subject: Antibiotic not received     Hello Doctor Radha Wagner never received my antibiotic prescription. Can you resend it please!  Se lo giuliana arora    COPD/asthma who presents to the office today for routine follow up  Since his last office visit he has been feeling more short of breath with exertion  He is no longer working  He admits that he has gained about 50 lbs of caloric weight since the pandemic  He recently started going to the gym again  Yesterday he rode a stationary bicycle for 40 minutes and performed an upper body weight lifting workout  He was able to do so without any shortness of breath or chest pain  However, if he needs to carry laundry upstairs he notices shortness of breath  He has been utilizing lasix 20 mg a few times per month based on lower extremity edema  He denies orthopnea and PND  He does admit to abdominal bloating  He occasionally has been experiencing chest pain  It can occur at rest or with exertion  It seems to be sporadic  It resolves quickly if he takes a few extra aspirin tablets  In the past Imdur was prescribed  He states he took the medication for approximately one month and did not think it helped his symptoms  However, he realized when he stopped taking the Imdur his shortness of breath started to increase  He denies lightheadedness, dizziness, palpitations, and syncope  He has abstained from alcohol use        Medical Problems     Problem List     Asthma, chronic, moderate persistent, uncomplicated    Obstructive sleep apnea    Overview Signed 12/19/2018 10:16 AM by Becky Daily DO       Declines CPAP         Chronic combined systolic and diastolic congestive heart failure (HCC)     Wt Readings from Last 3 Encounters:   07/23/21 125 kg (275 lb 9 6 oz)   06/21/21 123 kg (272 lb)   05/04/21 121 kg (267 lb 3 2 oz)                      Nonischemic cardiomyopathy (HCC)    Dyslipidemia    Benign essential hypertension    Anxiety    Asthma-COPD overlap syndrome (HCC)    Back pain    Borderline hyperglycemia    COPD, severe ([facility] [address] )    Overview Signed 12/19/2018 10:16 AM by Becky Daily DO      Post bronchodilator FEV1 is 68% predicted, 2017         Hemorrhoids    Thyroid disease    Vitamin D deficiency    Constipation    Pulmonary emphysema (HCC)    Urinary frequency    Cough    History of tobacco abuse    Hypertrophied anal papilla    Rectal bleeding    Special screening for malignant neoplasms, colon    Overview Signed 1/23/2019 10:32 AM by Raysa Turner     Added automatically from request for surgery [id number]         Screening for prostate cancer    Bronchitis    Nicotine dependence    Post-nasal drip    PLMD (periodic limb movement disorder)    Class 1 obesity due to excess calories with serious comorbidity and body mass index (BMI) of 34 0 to 34 9 in adult              Past Medical History:   Diagnosis Date    Asthma     [facility]     COPD (chronic obstructive pulmonary disease) ([facility] [address] [facility]     Mumps     Old MI (myocardial infarction)     Pneumonia     Pulmonary emphysema ([facility] )     Sleep apnea      Social History     Socioeconomic History    Marital status: /Civil Union     Spouse name: Not on file    Number of children: Not on file    Years of education: Not on file    Highest education level: Not on file   Occupational History    Not on file   Tobacco Use    Smoking status: Former Smoker     Packs/day: 3 00     Years: 30 00     Pack years: 90 00    Smokeless tobacco: Never Used   Vaping Use    Vaping Use: Never used   Substance and Sexual Activity    Alcohol use: Yes     Comment: rarely    Drug use: Yes     Types: Marijuana    Sexual activity: Not on file   Other Topics Concern    Not on file   Social History Narrative    Caffeine use     Social Determinants of Health     Financial Resource Strain:     Difficulty of Paying Living Expenses:    Food Insecurity:     Worried About Running Out of Food in the Last Year:     Ran Out of Food in the Last Year:    Transportation Needs:     Lack of Transportation (Medical):      Lack of Transportation (Non-Medical):    Physical Activity:     Days of Exercise per Week:     Minutes of Exercise per Session:    Stress:     Feeling of Stress :    Social Connections:     Frequency of Communication with Friends and Family:     Frequency of Social Gatherings with Friends and Family:     Attends Sabianist Services:     Active Member of Clubs or Organizations:     Attends Club or Organization Meetings:     Marital Status:    Intimate Partner Violence:     Fear of Current or Ex-Partner:     Emotionally Abused:     Physically Abused:     Sexually Abused:       Family History   Problem Relation Age of Onset    Heart attack Father         MI    Diabetes Sister         DM    Arthritis Family     Coronary artery disease Family     Hypertension Family     Tuberculosis Son      Past Surgical History:   Procedure Laterality Date    CARDIAC CATHETERIZATION  07/24/2015    Left main- normal and maidly tortuous  Circumflex - normal and moderately tortuous  RCA- normal and mildy tortuous  Global LV function was severely depressed  Tevin Lashon INGUINAL HERNIA REPAIR Bilateral     ME COLONOSCOPY FLX DX W/COLLJ SPEC WHEN PFRMD N/A 3/11/2019    Procedure: COLONOSCOPY with removal of anal papilla;   Surgeon: Ed Jaime MD;  Location: [facility];  Service: Colorectal    TONSILLECTOMY      UMBILICAL HERNIA REPAIR  06/21/2006       Current Outpatient Medications:     ALPRAZolam (XANAX) 0 5 mg tablet, take 1 tablet by mouth at bedtime if needed for anxiety, Disp: 30 tablet, Rfl: 0    aspirin 325 mg tablet, Take 1 tablet by mouth daily, Disp: , Rfl:     Atrovent HFA 17 MCG/ACT inhaler, inhale 1 puff by mouth four times a day, Disp: 12 9 g, Rfl: 0    Breo Ellipta 200-25 MCG/INH inhaler, inhale 1 puff by mouth and INTO THE LUNGS once daily, Disp: 1 Inhaler, Rfl: 5    carvedilol (COREG) 12 5 mg tablet, take 1 tablet by mouth twice a day with meals, Disp: 60 tablet, Rfl: 11    DULoxetine (CYMBALTA) 30 mg delayed release capsule, take 1 capsule by mouth once daily, Disp: 30 capsule, Rfl: 5    fluticasone (FLONASE) 50 mcg/act nasal spray, 1 spray into each nostril daily, Disp: 1 Bottle, Rfl: 5    furosemide (LASIX) 20 mg tablet, take 1 tablet by mouth once daily if needed for WEIGHT GAIN OR EDEMA, Disp: 90 tablet, Rfl: 0    ipratropium-albuterol (DUO-NEB) 0 5-2 5 mg/3 mL nebulizer solution, inhale contents of 1 vial ( 3 milliliters ) in nebulizer by mouth and INTO THE LUNGS every 6 hours if needed for wheezing, Disp: 360 mL, Rfl: 2    lisinopril (ZESTRIL) 20 mg tablet, take 1 tablet by mouth twice a day, Disp: 60 tablet, Rfl: 11    Tudorza Pressair 400 MCG/ACT inhaler, INHALE 1 PUFF BY MOUTH TWICE A DAY, Disp: 1 Inhaler, Rfl: 5    isosorbide mononitrate (IMDUR) 30 mg 24 hr tablet, Take 1 tablet (30 mg total) by mouth daily, Disp: 30 tablet, Rfl: 11  Allergies   Allergen Reactions    Ciprofloxacin Shortness Of Breath and Diarrhea       Labs:     Chemistry        Component Value Date/Time     07/27/2015 0559    K 4 3 07/16/2020 1550    K 3 8 07/27/2015 0559     07/16/2020 1550     07/27/2015 0559    CO2 23 07/16/2020 1550    CO2 32 07/27/2015 0559    BUN 28 (H) 07/16/2020 1550    BUN 19 07/27/2015 0559    CREATININE 1 22 07/16/2020 1550    CREATININE 1 05 07/27/2015 0559        Component Value Date/Time    CALCIUM 9 4 07/16/2020 1550    CALCIUM 8 6 07/27/2015 0559    ALKPHOS 67 12/18/2018 0947    ALKPHOS 75 07/22/2015 1021    AST 17 12/18/2018 0947    AST 30 07/22/2015 1021    ALT 20 12/18/2018 0947    ALT 74 07/22/2015 1021    BILITOT 1 05 (H) 07/22/2015 1021            Lab Results   Component Value Date    CHOL 105 07/23/2015     Lab Results   Component Value Date    HDL 24 (L) 12/18/2018    HDL 21 07/23/2015     Lab Results   Component Value Date    LDLCALC 89 12/18/2018    LDLCALC 69 07/23/2015     Lab Results   Component Value Date    TRIG 107 12/18/2018    TRIG 74 07/23/2015     No results found for: CHOLHDL    Imaging: No results found  Review of Systems   Constitutional: Positive for weight gain  Negative for chills and fever  HENT: Negative  Cardiovascular: Positive for chest pain and dyspnea on exertion  Negative for leg swelling, orthopnea, palpitations, paroxysmal nocturnal dyspnea and syncope  Respiratory: Negative for cough and shortness of breath  Gastrointestinal: Positive for bloating  Negative for diarrhea, nausea and vomiting  Genitourinary: Negative  Neurological: Negative for dizziness and light-headedness  All other systems reviewed and are negative  Vitals:    07/23/21 1359   BP: 132/76   Pulse: 65   SpO2: 95%     Vitals:    07/23/21 1359   Weight: 125 kg (275 lb 9 6 oz)     Height: 6' 2" (188 cm)   Body mass index is 35 38 kg/m²  Physical Exam:  Physical Exam  Vitals reviewed  Constitutional:       General: He is not in acute distress  Appearance: He is well-developed  He is obese  He is not diaphoretic  HENT:      Head: Normocephalic and atraumatic  Eyes:      Pupils: Pupils are equal, round, and reactive to light  Neck:      Vascular: No carotid bruit  Cardiovascular:      Rate and Rhythm: Normal rate and regular rhythm  Pulses:           Radial pulses are 2+ on the right side and 2+ on the left side  Dorsalis pedis pulses are 2+ on the right side and 2+ on the left side  Heart sounds: S1 normal and S2 normal  No murmur heard  Pulmonary:      Effort: Pulmonary effort is normal  No respiratory distress  Breath sounds: Normal breath sounds  No wheezing or rales  Abdominal:      General: There is distension  Palpations: Abdomen is soft  Tenderness: There is no abdominal tenderness  Musculoskeletal:         General: Normal range of motion  Cervical back: Normal range of motion  Right lower leg: No edema  Left lower leg: Edema (trace ankle edema) present  Skin:     General: Skin is warm and dry  Findings: No erythema  Neurological:      General: No focal deficit present  Mental Status: He is alert and oriented to person, place, and time     Psychiatric:         Mood and Affect: Mood normal          Behavior: Behavior normal

## 2021-11-06 DIAGNOSIS — J44.9 ASTHMA-COPD OVERLAP SYNDROME (HCC): Primary | ICD-10-CM

## 2021-11-25 ENCOUNTER — APPOINTMENT (EMERGENCY)
Dept: RADIOLOGY | Facility: HOSPITAL | Age: 66
End: 2021-11-25
Payer: COMMERCIAL

## 2021-11-25 ENCOUNTER — HOSPITAL ENCOUNTER (EMERGENCY)
Facility: HOSPITAL | Age: 66
Discharge: HOME | End: 2021-11-25
Attending: EMERGENCY MEDICINE | Admitting: EMERGENCY MEDICINE
Payer: COMMERCIAL

## 2021-11-25 VITALS
HEART RATE: 67 BPM | SYSTOLIC BLOOD PRESSURE: 200 MMHG | RESPIRATION RATE: 18 BRPM | DIASTOLIC BLOOD PRESSURE: 97 MMHG | HEIGHT: 74 IN | OXYGEN SATURATION: 97 % | TEMPERATURE: 96.8 F | WEIGHT: 277 LBS | BODY MASS INDEX: 35.55 KG/M2

## 2021-11-25 DIAGNOSIS — S61.011A LACERATION OF RIGHT THUMB: Primary | ICD-10-CM

## 2021-11-25 PROCEDURE — 99284 EMERGENCY DEPT VISIT MOD MDM: CPT | Performed by: PHYSICIAN ASSISTANT

## 2021-11-25 PROCEDURE — 12001 RPR S/N/AX/GEN/TRNK 2.5CM/<: CPT | Performed by: PHYSICIAN ASSISTANT

## 2021-11-25 PROCEDURE — 73140 X-RAY EXAM OF FINGER(S): CPT

## 2021-11-25 PROCEDURE — 99283 EMERGENCY DEPT VISIT LOW MDM: CPT

## 2021-11-25 RX ORDER — LIDOCAINE HYDROCHLORIDE 10 MG/ML
5 INJECTION, SOLUTION EPIDURAL; INFILTRATION; INTRACAUDAL; PERINEURAL ONCE
Status: COMPLETED | OUTPATIENT
Start: 2021-11-25 | End: 2021-11-25

## 2021-11-25 RX ADMIN — LIDOCAINE HYDROCHLORIDE 5 ML: 10 INJECTION, SOLUTION EPIDURAL; INFILTRATION; INTRACAUDAL; PERINEURAL at 16:02

## 2021-11-28 ENCOUNTER — APPOINTMENT (OUTPATIENT)
Dept: RADIOLOGY | Facility: CLINIC | Age: 66
End: 2021-11-28
Payer: COMMERCIAL

## 2021-11-28 ENCOUNTER — OFFICE VISIT (OUTPATIENT)
Dept: URGENT CARE | Facility: CLINIC | Age: 66
End: 2021-11-28
Payer: COMMERCIAL

## 2021-11-28 VITALS
RESPIRATION RATE: 18 BRPM | BODY MASS INDEX: 35.56 KG/M2 | TEMPERATURE: 98.1 F | DIASTOLIC BLOOD PRESSURE: 78 MMHG | OXYGEN SATURATION: 92 % | HEART RATE: 82 BPM | SYSTOLIC BLOOD PRESSURE: 163 MMHG | WEIGHT: 277 LBS

## 2021-11-28 DIAGNOSIS — R05.9 COUGH: Primary | ICD-10-CM

## 2021-11-28 DIAGNOSIS — R06.02 SHORTNESS OF BREATH: ICD-10-CM

## 2021-11-28 PROCEDURE — 71046 X-RAY EXAM CHEST 2 VIEWS: CPT

## 2021-11-28 PROCEDURE — 99214 OFFICE O/P EST MOD 30 MIN: CPT | Performed by: PHYSICIAN ASSISTANT

## 2021-11-28 PROCEDURE — S9088 SERVICES PROVIDED IN URGENT: HCPCS | Performed by: PHYSICIAN ASSISTANT

## 2021-11-28 RX ORDER — DULOXETIN HYDROCHLORIDE 30 MG/1
CAPSULE, DELAYED RELEASE ORAL
COMMUNITY
Start: 2021-09-20 | End: 2021-12-04

## 2021-11-29 ENCOUNTER — OFFICE VISIT (OUTPATIENT)
Dept: PULMONOLOGY | Facility: CLINIC | Age: 66
End: 2021-11-29
Payer: COMMERCIAL

## 2021-11-29 VITALS
RESPIRATION RATE: 20 BRPM | TEMPERATURE: 97.5 F | WEIGHT: 283.8 LBS | OXYGEN SATURATION: 95 % | SYSTOLIC BLOOD PRESSURE: 156 MMHG | DIASTOLIC BLOOD PRESSURE: 74 MMHG | HEART RATE: 72 BPM | BODY MASS INDEX: 36.42 KG/M2 | HEIGHT: 74 IN

## 2021-11-29 DIAGNOSIS — J44.9 ASTHMA-COPD OVERLAP SYNDROME (HCC): Primary | ICD-10-CM

## 2021-11-29 DIAGNOSIS — J44.1 COPD EXACERBATION (HCC): ICD-10-CM

## 2021-11-29 PROCEDURE — 3008F BODY MASS INDEX DOCD: CPT | Performed by: INTERNAL MEDICINE

## 2021-11-29 PROCEDURE — 99214 OFFICE O/P EST MOD 30 MIN: CPT | Performed by: INTERNAL MEDICINE

## 2021-11-29 PROCEDURE — 1036F TOBACCO NON-USER: CPT | Performed by: INTERNAL MEDICINE

## 2021-11-29 RX ORDER — PREDNISONE 20 MG/1
TABLET ORAL
Qty: 19 TABLET | Refills: 0 | Status: SHIPPED | OUTPATIENT
Start: 2021-11-29 | End: 2021-12-13

## 2021-11-29 RX ORDER — DOXYCYCLINE 100 MG/1
100 TABLET ORAL 2 TIMES DAILY
Qty: 14 TABLET | Refills: 0 | Status: SHIPPED | OUTPATIENT
Start: 2021-11-29 | End: 2021-12-06

## 2021-12-02 ENCOUNTER — TELEPHONE (OUTPATIENT)
Dept: FAMILY MEDICINE CLINIC | Facility: CLINIC | Age: 66
End: 2021-12-02

## 2021-12-02 DIAGNOSIS — B34.9 VIRAL INFECTION, UNSPECIFIED: Primary | ICD-10-CM

## 2021-12-02 DIAGNOSIS — F41.9 ANXIETY: ICD-10-CM

## 2021-12-02 PROCEDURE — U0003 INFECTIOUS AGENT DETECTION BY NUCLEIC ACID (DNA OR RNA); SEVERE ACUTE RESPIRATORY SYNDROME CORONAVIRUS 2 (SARS-COV-2) (CORONAVIRUS DISEASE [COVID-19]), AMPLIFIED PROBE TECHNIQUE, MAKING USE OF HIGH THROUGHPUT TECHNOLOGIES AS DESCRIBED BY CMS-2020-01-R: HCPCS | Performed by: INTERNAL MEDICINE

## 2021-12-02 PROCEDURE — U0005 INFEC AGEN DETEC AMPLI PROBE: HCPCS | Performed by: INTERNAL MEDICINE

## 2021-12-02 RX ORDER — ALPRAZOLAM 0.5 MG/1
0.5 TABLET ORAL
Qty: 30 TABLET | Refills: 0 | Status: SHIPPED | OUTPATIENT
Start: 2021-12-02 | End: 2022-03-28 | Stop reason: SDUPTHER

## 2021-12-03 ENCOUNTER — APPOINTMENT (EMERGENCY)
Dept: RADIOLOGY | Facility: HOSPITAL | Age: 66
End: 2021-12-03
Payer: COMMERCIAL

## 2021-12-03 ENCOUNTER — TELEPHONE (OUTPATIENT)
Dept: FAMILY MEDICINE CLINIC | Facility: CLINIC | Age: 66
End: 2021-12-03

## 2021-12-03 ENCOUNTER — HOSPITAL ENCOUNTER (OUTPATIENT)
Facility: HOSPITAL | Age: 66
Setting detail: OBSERVATION
Discharge: HOME | End: 2021-12-04
Attending: EMERGENCY MEDICINE | Admitting: INTERNAL MEDICINE
Payer: COMMERCIAL

## 2021-12-03 DIAGNOSIS — U07.1 COVID: Primary | ICD-10-CM

## 2021-12-03 PROBLEM — E66.811 CLASS 1 OBESITY DUE TO EXCESS CALORIES WITH SERIOUS COMORBIDITY AND BODY MASS INDEX (BMI) OF 34.0 TO 34.9 IN ADULT: Chronic | Status: ACTIVE | Noted: 2021-03-31

## 2021-12-03 PROBLEM — E66.09 CLASS 1 OBESITY DUE TO EXCESS CALORIES WITH SERIOUS COMORBIDITY AND BODY MASS INDEX (BMI) OF 34.0 TO 34.9 IN ADULT: Chronic | Status: ACTIVE | Noted: 2021-03-31

## 2021-12-03 LAB
ANION GAP SERPL CALCULATED.3IONS-SCNC: 9 MMOL/L (ref 4–13)
APTT PPP: 33 SECONDS (ref 23–37)
BASOPHILS # BLD AUTO: 0.02 THOUSANDS/ΜL (ref 0–0.1)
BASOPHILS NFR BLD AUTO: 0 % (ref 0–1)
BNP SERPL-MCNC: 47 PG/ML (ref 1–100)
BUN SERPL-MCNC: 37 MG/DL (ref 5–25)
CALCIUM SERPL-MCNC: 8.8 MG/DL (ref 8.4–10.2)
CARDIAC TROPONIN I PNL SERPL HS: 59 NG/L
CHLORIDE SERPL-SCNC: 101 MMOL/L (ref 96–108)
CO2 SERPL-SCNC: 21 MMOL/L (ref 21–32)
CREAT SERPL-MCNC: 1.28 MG/DL (ref 0.6–1.3)
D DIMER PPP FEU-MCNC: 0.5 UG/ML FEU
EOSINOPHIL # BLD AUTO: 0.01 THOUSAND/ΜL (ref 0–0.61)
EOSINOPHIL NFR BLD AUTO: 0 % (ref 0–6)
ERYTHROCYTE [DISTWIDTH] IN BLOOD BY AUTOMATED COUNT: 14.7 % (ref 11.6–15.1)
FLUAV RNA RESP QL NAA+PROBE: NEGATIVE
FLUBV RNA RESP QL NAA+PROBE: NEGATIVE
GFR SERPL CREATININE-BSD FRML MDRD: 59 ML/MIN/1.73SQ M
GLUCOSE SERPL-MCNC: 134 MG/DL (ref 65–140)
HCT VFR BLD AUTO: 44.8 % (ref 36.5–49.3)
HGB BLD-MCNC: 14.1 G/DL (ref 12–17)
IMM GRANULOCYTES # BLD AUTO: 0.03 THOUSAND/UL (ref 0–0.2)
IMM GRANULOCYTES NFR BLD AUTO: 1 % (ref 0–2)
INR PPP: 0.94 (ref 0.84–1.19)
LYMPHOCYTES # BLD AUTO: 1.01 THOUSANDS/ΜL (ref 0.6–4.47)
LYMPHOCYTES NFR BLD AUTO: 18 % (ref 14–44)
MAGNESIUM SERPL-MCNC: 1.9 MG/DL (ref 1.9–2.7)
MCH RBC QN AUTO: 26.8 PG (ref 26.8–34.3)
MCHC RBC AUTO-ENTMCNC: 31.5 G/DL (ref 31.4–37.4)
MCV RBC AUTO: 85 FL (ref 82–98)
MONOCYTES # BLD AUTO: 0.43 THOUSAND/ΜL (ref 0.17–1.22)
MONOCYTES NFR BLD AUTO: 8 % (ref 4–12)
NEUTROPHILS # BLD AUTO: 4.13 THOUSANDS/ΜL (ref 1.85–7.62)
NEUTS SEG NFR BLD AUTO: 73 % (ref 43–75)
NRBC BLD AUTO-RTO: 0 /100 WBCS
PLATELET # BLD AUTO: 257 THOUSANDS/UL (ref 149–390)
PMV BLD AUTO: 9.5 FL (ref 8.9–12.7)
POTASSIUM SERPL-SCNC: 4.2 MMOL/L (ref 3.5–5.3)
PROTHROMBIN TIME: 12.5 SECONDS (ref 11.6–14.5)
RBC # BLD AUTO: 5.27 MILLION/UL (ref 3.88–5.62)
RSV RNA RESP QL NAA+PROBE: NEGATIVE
SARS-COV-2 RNA RESP QL NAA+PROBE: POSITIVE
SODIUM SERPL-SCNC: 131 MMOL/L (ref 135–147)
WBC # BLD AUTO: 5.63 THOUSAND/UL (ref 4.31–10.16)

## 2021-12-03 PROCEDURE — 71045 X-RAY EXAM CHEST 1 VIEW: CPT

## 2021-12-03 PROCEDURE — 99285 EMERGENCY DEPT VISIT HI MDM: CPT

## 2021-12-03 PROCEDURE — 83880 ASSAY OF NATRIURETIC PEPTIDE: CPT | Performed by: EMERGENCY MEDICINE

## 2021-12-03 PROCEDURE — 94640 AIRWAY INHALATION TREATMENT: CPT

## 2021-12-03 PROCEDURE — 85610 PROTHROMBIN TIME: CPT | Performed by: EMERGENCY MEDICINE

## 2021-12-03 PROCEDURE — 99220 PR INITIAL OBSERVATION CARE/DAY 70 MINUTES: CPT | Performed by: PHYSICIAN ASSISTANT

## 2021-12-03 PROCEDURE — 83735 ASSAY OF MAGNESIUM: CPT | Performed by: EMERGENCY MEDICINE

## 2021-12-03 PROCEDURE — 36415 COLL VENOUS BLD VENIPUNCTURE: CPT | Performed by: EMERGENCY MEDICINE

## 2021-12-03 PROCEDURE — 93005 ELECTROCARDIOGRAM TRACING: CPT

## 2021-12-03 PROCEDURE — 84484 ASSAY OF TROPONIN QUANT: CPT | Performed by: EMERGENCY MEDICINE

## 2021-12-03 PROCEDURE — 85379 FIBRIN DEGRADATION QUANT: CPT | Performed by: EMERGENCY MEDICINE

## 2021-12-03 PROCEDURE — 85730 THROMBOPLASTIN TIME PARTIAL: CPT | Performed by: EMERGENCY MEDICINE

## 2021-12-03 PROCEDURE — 85025 COMPLETE CBC W/AUTO DIFF WBC: CPT | Performed by: EMERGENCY MEDICINE

## 2021-12-03 PROCEDURE — 80048 BASIC METABOLIC PNL TOTAL CA: CPT | Performed by: EMERGENCY MEDICINE

## 2021-12-03 PROCEDURE — 0241U HB NFCT DS VIR RESP RNA 4 TRGT: CPT | Performed by: EMERGENCY MEDICINE

## 2021-12-03 RX ORDER — SODIUM CHLORIDE 9 MG/ML
125 INJECTION, SOLUTION INTRAVENOUS CONTINUOUS
Status: DISCONTINUED | OUTPATIENT
Start: 2021-12-04 | End: 2021-12-04

## 2021-12-03 RX ORDER — DEXAMETHASONE SODIUM PHOSPHATE 4 MG/ML
6 INJECTION, SOLUTION INTRA-ARTICULAR; INTRALESIONAL; INTRAMUSCULAR; INTRAVENOUS; SOFT TISSUE EVERY 24 HOURS
Status: DISCONTINUED | OUTPATIENT
Start: 2021-12-04 | End: 2021-12-04 | Stop reason: HOSPADM

## 2021-12-03 RX ORDER — IPRATROPIUM BROMIDE AND ALBUTEROL SULFATE 2.5; .5 MG/3ML; MG/3ML
3 SOLUTION RESPIRATORY (INHALATION) ONCE
Status: COMPLETED | OUTPATIENT
Start: 2021-12-03 | End: 2021-12-03

## 2021-12-03 RX ORDER — ACETAMINOPHEN 325 MG/1
650 TABLET ORAL EVERY 4 HOURS PRN
Status: DISCONTINUED | OUTPATIENT
Start: 2021-12-03 | End: 2021-12-04 | Stop reason: HOSPADM

## 2021-12-03 RX ORDER — ONDANSETRON 2 MG/ML
4 INJECTION INTRAMUSCULAR; INTRAVENOUS EVERY 6 HOURS PRN
Status: DISCONTINUED | OUTPATIENT
Start: 2021-12-03 | End: 2021-12-04 | Stop reason: HOSPADM

## 2021-12-03 RX ADMIN — IPRATROPIUM BROMIDE AND ALBUTEROL SULFATE 3 ML: 2.5; .5 SOLUTION RESPIRATORY (INHALATION) at 21:08

## 2021-12-04 VITALS
SYSTOLIC BLOOD PRESSURE: 130 MMHG | WEIGHT: 280 LBS | OXYGEN SATURATION: 92 % | HEIGHT: 74 IN | RESPIRATION RATE: 17 BRPM | TEMPERATURE: 97.6 F | HEART RATE: 82 BPM | DIASTOLIC BLOOD PRESSURE: 75 MMHG | BODY MASS INDEX: 35.94 KG/M2

## 2021-12-04 LAB
2HR DELTA HS TROPONIN: -7 NG/L
4HR DELTA HS TROPONIN: 2 NG/L
ALBUMIN SERPL BCP-MCNC: 3.7 G/DL (ref 3.5–5)
ALP SERPL-CCNC: 78 U/L (ref 34–104)
ALT SERPL W P-5'-P-CCNC: 60 U/L (ref 7–52)
ANION GAP SERPL CALCULATED.3IONS-SCNC: 9 MMOL/L (ref 4–13)
AST SERPL W P-5'-P-CCNC: 27 U/L (ref 13–39)
BACTERIA UR QL AUTO: ABNORMAL /HPF
BASOPHILS # BLD AUTO: 0.01 THOUSANDS/ΜL (ref 0–0.1)
BASOPHILS NFR BLD AUTO: 0 % (ref 0–1)
BILIRUB SERPL-MCNC: 0.53 MG/DL (ref 0.2–1)
BILIRUB UR QL STRIP: NEGATIVE
BUN SERPL-MCNC: 33 MG/DL (ref 5–25)
CALCIUM SERPL-MCNC: 8.6 MG/DL (ref 8.4–10.2)
CARDIAC TROPONIN I PNL SERPL HS: 52 NG/L
CARDIAC TROPONIN I PNL SERPL HS: 60 NG/L
CARDIAC TROPONIN I PNL SERPL HS: 61 NG/L
CHLORIDE SERPL-SCNC: 102 MMOL/L (ref 96–108)
CLARITY UR: CLEAR
CO2 SERPL-SCNC: 22 MMOL/L (ref 21–32)
COLOR UR: YELLOW
CREAT SERPL-MCNC: 1.16 MG/DL (ref 0.6–1.3)
CRP SERPL QL: 8 MG/L
EOSINOPHIL # BLD AUTO: 0 THOUSAND/ΜL (ref 0–0.61)
EOSINOPHIL NFR BLD AUTO: 0 % (ref 0–6)
ERYTHROCYTE [DISTWIDTH] IN BLOOD BY AUTOMATED COUNT: 14.7 % (ref 11.6–15.1)
GFR SERPL CREATININE-BSD FRML MDRD: 66 ML/MIN/1.73SQ M
GLUCOSE SERPL-MCNC: 147 MG/DL (ref 65–140)
GLUCOSE UR STRIP-MCNC: NEGATIVE MG/DL
HCT VFR BLD AUTO: 44.1 % (ref 36.5–49.3)
HGB BLD-MCNC: 13.8 G/DL (ref 12–17)
HGB UR QL STRIP.AUTO: ABNORMAL
IMM GRANULOCYTES # BLD AUTO: 0.03 THOUSAND/UL (ref 0–0.2)
IMM GRANULOCYTES NFR BLD AUTO: 1 % (ref 0–2)
KETONES UR STRIP-MCNC: NEGATIVE MG/DL
LEUKOCYTE ESTERASE UR QL STRIP: NEGATIVE
LYMPHOCYTES # BLD AUTO: 1.03 THOUSANDS/ΜL (ref 0.6–4.47)
LYMPHOCYTES NFR BLD AUTO: 16 % (ref 14–44)
MCH RBC QN AUTO: 26.5 PG (ref 26.8–34.3)
MCHC RBC AUTO-ENTMCNC: 31.3 G/DL (ref 31.4–37.4)
MCV RBC AUTO: 85 FL (ref 82–98)
MONOCYTES # BLD AUTO: 0.34 THOUSAND/ΜL (ref 0.17–1.22)
MONOCYTES NFR BLD AUTO: 5 % (ref 4–12)
NEUTROPHILS # BLD AUTO: 5.15 THOUSANDS/ΜL (ref 1.85–7.62)
NEUTS SEG NFR BLD AUTO: 78 % (ref 43–75)
NITRITE UR QL STRIP: NEGATIVE
NON-SQ EPI CELLS URNS QL MICRO: ABNORMAL /HPF
NRBC BLD AUTO-RTO: 0 /100 WBCS
PH UR STRIP.AUTO: 5.5 [PH]
PLATELET # BLD AUTO: 239 THOUSANDS/UL (ref 149–390)
PMV BLD AUTO: 9.3 FL (ref 8.9–12.7)
POTASSIUM SERPL-SCNC: 4.3 MMOL/L (ref 3.5–5.3)
PROT SERPL-MCNC: 6.8 G/DL (ref 6.4–8.4)
PROT UR STRIP-MCNC: ABNORMAL MG/DL
RBC # BLD AUTO: 5.2 MILLION/UL (ref 3.88–5.62)
RBC #/AREA URNS AUTO: ABNORMAL /HPF
SODIUM SERPL-SCNC: 133 MMOL/L (ref 135–147)
SP GR UR STRIP.AUTO: >=1.03 (ref 1–1.03)
UROBILINOGEN UR QL STRIP.AUTO: 0.2 E.U./DL
WBC # BLD AUTO: 6.56 THOUSAND/UL (ref 4.31–10.16)
WBC #/AREA URNS AUTO: ABNORMAL /HPF

## 2021-12-04 PROCEDURE — 36415 COLL VENOUS BLD VENIPUNCTURE: CPT | Performed by: EMERGENCY MEDICINE

## 2021-12-04 PROCEDURE — 84484 ASSAY OF TROPONIN QUANT: CPT | Performed by: PHYSICIAN ASSISTANT

## 2021-12-04 PROCEDURE — 99285 EMERGENCY DEPT VISIT HI MDM: CPT | Performed by: EMERGENCY MEDICINE

## 2021-12-04 PROCEDURE — 99217 PR OBSERVATION CARE DISCHARGE MANAGEMENT: CPT | Performed by: INTERNAL MEDICINE

## 2021-12-04 PROCEDURE — 81001 URINALYSIS AUTO W/SCOPE: CPT | Performed by: EMERGENCY MEDICINE

## 2021-12-04 PROCEDURE — 86140 C-REACTIVE PROTEIN: CPT | Performed by: PHYSICIAN ASSISTANT

## 2021-12-04 PROCEDURE — 84484 ASSAY OF TROPONIN QUANT: CPT | Performed by: EMERGENCY MEDICINE

## 2021-12-04 PROCEDURE — 85025 COMPLETE CBC W/AUTO DIFF WBC: CPT | Performed by: PHYSICIAN ASSISTANT

## 2021-12-04 PROCEDURE — 80053 COMPREHEN METABOLIC PANEL: CPT | Performed by: PHYSICIAN ASSISTANT

## 2021-12-04 RX ORDER — CARVEDILOL 3.12 MG/1
12.5 TABLET ORAL 2 TIMES DAILY WITH MEALS
Status: DISCONTINUED | OUTPATIENT
Start: 2021-12-04 | End: 2021-12-04 | Stop reason: HOSPADM

## 2021-12-04 RX ORDER — DOXYCYCLINE HYCLATE 100 MG/1
100 CAPSULE ORAL EVERY 12 HOURS SCHEDULED
Status: DISCONTINUED | OUTPATIENT
Start: 2021-12-05 | End: 2021-12-04 | Stop reason: HOSPADM

## 2021-12-04 RX ORDER — FUROSEMIDE 40 MG/1
20 TABLET ORAL DAILY PRN
Status: DISCONTINUED | OUTPATIENT
Start: 2021-12-04 | End: 2021-12-04 | Stop reason: HOSPADM

## 2021-12-04 RX ORDER — LISINOPRIL 20 MG/1
20 TABLET ORAL 2 TIMES DAILY
Status: DISCONTINUED | OUTPATIENT
Start: 2021-12-04 | End: 2021-12-04 | Stop reason: HOSPADM

## 2021-12-04 RX ORDER — FLUTICASONE FUROATE AND VILANTEROL 200; 25 UG/1; UG/1
1 POWDER RESPIRATORY (INHALATION) DAILY
Status: DISCONTINUED | OUTPATIENT
Start: 2021-12-04 | End: 2021-12-04 | Stop reason: HOSPADM

## 2021-12-04 RX ORDER — ASPIRIN 325 MG
325 TABLET ORAL DAILY
Status: DISCONTINUED | OUTPATIENT
Start: 2021-12-04 | End: 2021-12-04 | Stop reason: HOSPADM

## 2021-12-04 RX ORDER — GUAIFENESIN 600 MG
1200 TABLET, EXTENDED RELEASE 12 HR ORAL EVERY 12 HOURS SCHEDULED
Status: DISCONTINUED | OUTPATIENT
Start: 2021-12-04 | End: 2021-12-04 | Stop reason: HOSPADM

## 2021-12-04 RX ORDER — ALPRAZOLAM 0.5 MG/1
0.5 TABLET ORAL
Status: DISCONTINUED | OUTPATIENT
Start: 2021-12-04 | End: 2021-12-04 | Stop reason: HOSPADM

## 2021-12-04 RX ORDER — DULOXETIN HYDROCHLORIDE 30 MG/1
60 CAPSULE, DELAYED RELEASE ORAL DAILY
Status: DISCONTINUED | OUTPATIENT
Start: 2021-12-04 | End: 2021-12-04 | Stop reason: HOSPADM

## 2021-12-04 RX ORDER — HYDROCODONE POLISTIREX AND CHLORPHENIRAMINE POLISTIREX 10; 8 MG/5ML; MG/5ML
5 SUSPENSION, EXTENDED RELEASE ORAL EVERY 12 HOURS PRN
Status: DISCONTINUED | OUTPATIENT
Start: 2021-12-04 | End: 2021-12-04 | Stop reason: HOSPADM

## 2021-12-04 RX ORDER — ALBUTEROL SULFATE 90 UG/1
2 AEROSOL, METERED RESPIRATORY (INHALATION) EVERY 6 HOURS PRN
Status: DISCONTINUED | OUTPATIENT
Start: 2021-12-04 | End: 2021-12-04 | Stop reason: HOSPADM

## 2021-12-04 RX ADMIN — ALPRAZOLAM 0.5 MG: 0.5 TABLET ORAL at 01:44

## 2021-12-04 RX ADMIN — DULOXETINE 60 MG: 30 CAPSULE, DELAYED RELEASE ORAL at 08:04

## 2021-12-04 RX ADMIN — HYDROCODONE POLISTIREX AND CHLORPHENIRAMINE POLISTIREX 5 ML: 10; 8 SUSPENSION, EXTENDED RELEASE ORAL at 01:45

## 2021-12-04 RX ADMIN — LISINOPRIL 20 MG: 20 TABLET ORAL at 08:05

## 2021-12-04 RX ADMIN — FLUTICASONE FUROATE AND VILANTEROL TRIFENATATE 1 PUFF: 200; 25 POWDER RESPIRATORY (INHALATION) at 09:20

## 2021-12-04 RX ADMIN — DEXAMETHASONE SODIUM PHOSPHATE 6 MG: 4 INJECTION, SOLUTION INTRAMUSCULAR; INTRAVENOUS at 00:51

## 2021-12-04 RX ADMIN — REMDESIVIR 200 MG: 100 INJECTION, POWDER, LYOPHILIZED, FOR SOLUTION INTRAVENOUS at 01:12

## 2021-12-04 RX ADMIN — GUAIFENESIN 1200 MG: 600 TABLET, EXTENDED RELEASE ORAL at 01:44

## 2021-12-04 RX ADMIN — SODIUM CHLORIDE 125 ML/HR: 0.9 INJECTION, SOLUTION INTRAVENOUS at 01:11

## 2021-12-04 RX ADMIN — ENOXAPARIN SODIUM 30 MG: 30 INJECTION SUBCUTANEOUS at 00:52

## 2021-12-04 RX ADMIN — TIOTROPIUM BROMIDE 18 MCG: 18 CAPSULE ORAL; RESPIRATORY (INHALATION) at 09:20

## 2021-12-04 RX ADMIN — GUAIFENESIN 1200 MG: 600 TABLET, EXTENDED RELEASE ORAL at 09:20

## 2021-12-04 RX ADMIN — ENOXAPARIN SODIUM 30 MG: 30 INJECTION SUBCUTANEOUS at 09:20

## 2021-12-04 RX ADMIN — ASPIRIN 325 MG: 325 TABLET ORAL at 08:05

## 2021-12-04 RX ADMIN — CARVEDILOL 12.5 MG: 3.12 TABLET, FILM COATED ORAL at 08:03

## 2021-12-06 ENCOUNTER — TRANSITIONAL CARE MANAGEMENT (OUTPATIENT)
Dept: FAMILY MEDICINE CLINIC | Facility: CLINIC | Age: 66
End: 2021-12-06

## 2021-12-07 LAB
ATRIAL RATE: 69 BPM
P AXIS: 82 DEGREES
PR INTERVAL: 206 MS
QRS AXIS: -59 DEGREES
QRSD INTERVAL: 114 MS
QT INTERVAL: 418 MS
QTC INTERVAL: 447 MS
T WAVE AXIS: 66 DEGREES
VENTRICULAR RATE: 69 BPM

## 2021-12-07 PROCEDURE — 93010 ELECTROCARDIOGRAM REPORT: CPT | Performed by: INTERNAL MEDICINE

## 2021-12-08 ENCOUNTER — TELEPHONE (OUTPATIENT)
Dept: PULMONOLOGY | Facility: CLINIC | Age: 66
End: 2021-12-08

## 2021-12-09 ENCOUNTER — OFFICE VISIT (OUTPATIENT)
Dept: FAMILY MEDICINE CLINIC | Facility: CLINIC | Age: 66
End: 2021-12-09
Payer: COMMERCIAL

## 2021-12-09 VITALS — TEMPERATURE: 97 F | OXYGEN SATURATION: 91 %

## 2021-12-09 DIAGNOSIS — U07.1 COVID-19 VIRUS INFECTION: Primary | ICD-10-CM

## 2021-12-09 DIAGNOSIS — E66.09 CLASS 1 OBESITY DUE TO EXCESS CALORIES WITH SERIOUS COMORBIDITY AND BODY MASS INDEX (BMI) OF 34.0 TO 34.9 IN ADULT: Chronic | ICD-10-CM

## 2021-12-09 DIAGNOSIS — J44.9 ASTHMA-COPD OVERLAP SYNDROME (HCC): Chronic | ICD-10-CM

## 2021-12-09 DIAGNOSIS — I50.42 CHRONIC COMBINED SYSTOLIC AND DIASTOLIC CONGESTIVE HEART FAILURE (HCC): Chronic | ICD-10-CM

## 2021-12-09 PROBLEM — Z12.11 SPECIAL SCREENING FOR MALIGNANT NEOPLASMS, COLON: Status: RESOLVED | Noted: 2019-01-23 | Resolved: 2021-12-09

## 2021-12-09 PROBLEM — R35.0 URINARY FREQUENCY: Status: RESOLVED | Noted: 2018-12-18 | Resolved: 2021-12-09

## 2021-12-09 PROBLEM — R09.82 POST-NASAL DRIP: Status: RESOLVED | Noted: 2021-02-04 | Resolved: 2021-12-09

## 2021-12-09 PROCEDURE — 99495 TRANSJ CARE MGMT MOD F2F 14D: CPT | Performed by: INTERNAL MEDICINE

## 2021-12-09 PROCEDURE — 1111F DSCHRG MED/CURRENT MED MERGE: CPT | Performed by: INTERNAL MEDICINE

## 2021-12-10 ENCOUNTER — APPOINTMENT (EMERGENCY)
Dept: RADIOLOGY | Facility: HOSPITAL | Age: 66
End: 2021-12-10
Payer: COMMERCIAL

## 2021-12-10 ENCOUNTER — OFFICE VISIT (OUTPATIENT)
Dept: URGENT CARE | Facility: CLINIC | Age: 66
End: 2021-12-10
Payer: COMMERCIAL

## 2021-12-10 ENCOUNTER — HOSPITAL ENCOUNTER (EMERGENCY)
Facility: HOSPITAL | Age: 66
Discharge: HOME | End: 2021-12-10
Attending: EMERGENCY MEDICINE
Payer: COMMERCIAL

## 2021-12-10 VITALS
TEMPERATURE: 97.1 F | DIASTOLIC BLOOD PRESSURE: 58 MMHG | HEART RATE: 60 BPM | BODY MASS INDEX: 35.29 KG/M2 | OXYGEN SATURATION: 95 % | RESPIRATION RATE: 17 BRPM | HEIGHT: 74 IN | SYSTOLIC BLOOD PRESSURE: 117 MMHG | WEIGHT: 275 LBS

## 2021-12-10 VITALS — HEART RATE: 70 BPM | SYSTOLIC BLOOD PRESSURE: 145 MMHG | OXYGEN SATURATION: 96 % | DIASTOLIC BLOOD PRESSURE: 82 MMHG

## 2021-12-10 DIAGNOSIS — R07.9 CHEST PAIN, UNSPECIFIED: Primary | ICD-10-CM

## 2021-12-10 DIAGNOSIS — R07.9 CHEST PAIN, UNSPECIFIED TYPE: Primary | ICD-10-CM

## 2021-12-10 DIAGNOSIS — U07.1 COVID-19: ICD-10-CM

## 2021-12-10 LAB
2HR DELTA HS TROPONIN: -3 NG/L
ALBUMIN SERPL BCP-MCNC: 3.5 G/DL (ref 3.5–5)
ALP SERPL-CCNC: 62 U/L (ref 34–104)
ALT SERPL W P-5'-P-CCNC: 46 U/L (ref 7–52)
ANION GAP SERPL CALCULATED.3IONS-SCNC: 9 MMOL/L (ref 4–13)
APTT PPP: 29 SECONDS (ref 23–37)
AST SERPL W P-5'-P-CCNC: 15 U/L (ref 13–39)
BASOPHILS # BLD MANUAL: 0 THOUSAND/UL (ref 0–0.1)
BASOPHILS NFR MAR MANUAL: 0 % (ref 0–1)
BILIRUB SERPL-MCNC: 0.48 MG/DL (ref 0.2–1)
BUN SERPL-MCNC: 40 MG/DL (ref 5–25)
CALCIUM SERPL-MCNC: 9.1 MG/DL (ref 8.4–10.2)
CARDIAC TROPONIN I PNL SERPL HS: 40 NG/L
CARDIAC TROPONIN I PNL SERPL HS: 43 NG/L
CHLORIDE SERPL-SCNC: 102 MMOL/L (ref 96–108)
CO2 SERPL-SCNC: 24 MMOL/L (ref 21–32)
CREAT SERPL-MCNC: 1.36 MG/DL (ref 0.6–1.3)
D DIMER PPP FEU-MCNC: 0.4 UG/ML FEU
EOSINOPHIL # BLD MANUAL: 0.25 THOUSAND/UL (ref 0–0.4)
EOSINOPHIL NFR BLD MANUAL: 2 % (ref 0–6)
ERYTHROCYTE [DISTWIDTH] IN BLOOD BY AUTOMATED COUNT: 15.1 % (ref 11.6–15.1)
GFR SERPL CREATININE-BSD FRML MDRD: 55 ML/MIN/1.73SQ M
GLUCOSE SERPL-MCNC: 108 MG/DL (ref 65–140)
HCT VFR BLD AUTO: 41.8 % (ref 36.5–49.3)
HGB BLD-MCNC: 13.2 G/DL (ref 12–17)
INR PPP: 1.03 (ref 0.84–1.19)
LYMPHOCYTES # BLD AUTO: 1.63 THOUSAND/UL (ref 0.6–4.47)
LYMPHOCYTES # BLD AUTO: 13 % (ref 14–44)
MCH RBC QN AUTO: 26.3 PG (ref 26.8–34.3)
MCHC RBC AUTO-ENTMCNC: 31.6 G/DL (ref 31.4–37.4)
MCV RBC AUTO: 83 FL (ref 82–98)
MONOCYTES # BLD AUTO: 0.75 THOUSAND/UL (ref 0–1.22)
MONOCYTES NFR BLD: 6 % (ref 4–12)
NEUTROPHILS # BLD MANUAL: 9.88 THOUSAND/UL (ref 1.85–7.62)
NEUTS BAND NFR BLD MANUAL: 3 % (ref 0–8)
NEUTS SEG NFR BLD AUTO: 76 % (ref 43–75)
PLATELET # BLD AUTO: 317 THOUSANDS/UL (ref 149–390)
PLATELET BLD QL SMEAR: ADEQUATE
PMV BLD AUTO: 9.9 FL (ref 8.9–12.7)
POTASSIUM SERPL-SCNC: 4.4 MMOL/L (ref 3.5–5.3)
PROT SERPL-MCNC: 6 G/DL (ref 6.4–8.4)
PROTHROMBIN TIME: 13.4 SECONDS (ref 11.6–14.5)
RBC # BLD AUTO: 5.02 MILLION/UL (ref 3.88–5.62)
RBC MORPH BLD: NORMAL
SODIUM SERPL-SCNC: 135 MMOL/L (ref 135–147)
WBC # BLD AUTO: 12.5 THOUSAND/UL (ref 4.31–10.16)

## 2021-12-10 PROCEDURE — 84484 ASSAY OF TROPONIN QUANT: CPT | Performed by: EMERGENCY MEDICINE

## 2021-12-10 PROCEDURE — 99213 OFFICE O/P EST LOW 20 MIN: CPT | Performed by: PHYSICIAN ASSISTANT

## 2021-12-10 PROCEDURE — 99285 EMERGENCY DEPT VISIT HI MDM: CPT

## 2021-12-10 PROCEDURE — 85730 THROMBOPLASTIN TIME PARTIAL: CPT | Performed by: EMERGENCY MEDICINE

## 2021-12-10 PROCEDURE — 93005 ELECTROCARDIOGRAM TRACING: CPT

## 2021-12-10 PROCEDURE — S9088 SERVICES PROVIDED IN URGENT: HCPCS | Performed by: PHYSICIAN ASSISTANT

## 2021-12-10 PROCEDURE — 36415 COLL VENOUS BLD VENIPUNCTURE: CPT | Performed by: EMERGENCY MEDICINE

## 2021-12-10 PROCEDURE — 71045 X-RAY EXAM CHEST 1 VIEW: CPT

## 2021-12-10 PROCEDURE — 80053 COMPREHEN METABOLIC PANEL: CPT | Performed by: EMERGENCY MEDICINE

## 2021-12-10 PROCEDURE — 93005 ELECTROCARDIOGRAM TRACING: CPT | Performed by: PHYSICIAN ASSISTANT

## 2021-12-10 PROCEDURE — 85379 FIBRIN DEGRADATION QUANT: CPT | Performed by: EMERGENCY MEDICINE

## 2021-12-10 PROCEDURE — 85027 COMPLETE CBC AUTOMATED: CPT | Performed by: EMERGENCY MEDICINE

## 2021-12-10 PROCEDURE — 99285 EMERGENCY DEPT VISIT HI MDM: CPT | Performed by: EMERGENCY MEDICINE

## 2021-12-10 PROCEDURE — 85007 BL SMEAR W/DIFF WBC COUNT: CPT | Performed by: EMERGENCY MEDICINE

## 2021-12-10 PROCEDURE — 85610 PROTHROMBIN TIME: CPT | Performed by: EMERGENCY MEDICINE

## 2021-12-11 LAB
ATRIAL RATE: 58 BPM
ATRIAL RATE: 63 BPM
P AXIS: 73 DEGREES
P AXIS: 81 DEGREES
PR INTERVAL: 210 MS
PR INTERVAL: 214 MS
QRS AXIS: -33 DEGREES
QRS AXIS: -37 DEGREES
QRSD INTERVAL: 112 MS
QRSD INTERVAL: 114 MS
QT INTERVAL: 396 MS
QT INTERVAL: 426 MS
QTC INTERVAL: 405 MS
QTC INTERVAL: 418 MS
T WAVE AXIS: 157 DEGREES
T WAVE AXIS: 170 DEGREES
VENTRICULAR RATE: 58 BPM
VENTRICULAR RATE: 63 BPM

## 2021-12-11 PROCEDURE — 93010 ELECTROCARDIOGRAM REPORT: CPT | Performed by: INTERNAL MEDICINE

## 2021-12-13 LAB
ATRIAL RATE: 74 BPM
P AXIS: 81 DEGREES
PR INTERVAL: 212 MS
QRS AXIS: -25 DEGREES
QRSD INTERVAL: 114 MS
QT INTERVAL: 364 MS
QTC INTERVAL: 404 MS
T WAVE AXIS: 112 DEGREES
VENTRICULAR RATE: 74 BPM

## 2021-12-13 PROCEDURE — 93010 ELECTROCARDIOGRAM REPORT: CPT | Performed by: INTERNAL MEDICINE

## 2021-12-17 ENCOUNTER — OFFICE VISIT (OUTPATIENT)
Dept: CARDIOLOGY CLINIC | Facility: HOSPITAL | Age: 66
End: 2021-12-17
Payer: COMMERCIAL

## 2021-12-17 VITALS
HEIGHT: 74 IN | WEIGHT: 282.6 LBS | BODY MASS INDEX: 36.27 KG/M2 | HEART RATE: 76 BPM | SYSTOLIC BLOOD PRESSURE: 136 MMHG | DIASTOLIC BLOOD PRESSURE: 70 MMHG

## 2021-12-17 DIAGNOSIS — E78.5 DYSLIPIDEMIA: ICD-10-CM

## 2021-12-17 DIAGNOSIS — R07.9 CHEST PAIN, UNSPECIFIED TYPE: ICD-10-CM

## 2021-12-17 DIAGNOSIS — I10 BENIGN ESSENTIAL HYPERTENSION: Chronic | ICD-10-CM

## 2021-12-17 DIAGNOSIS — I50.42 CHRONIC COMBINED SYSTOLIC AND DIASTOLIC CONGESTIVE HEART FAILURE (HCC): Primary | Chronic | ICD-10-CM

## 2021-12-17 DIAGNOSIS — I42.8 NONISCHEMIC CARDIOMYOPATHY (HCC): ICD-10-CM

## 2021-12-17 DIAGNOSIS — R94.39 ABNORMAL NUCLEAR STRESS TEST: ICD-10-CM

## 2021-12-17 PROCEDURE — 93000 ELECTROCARDIOGRAM COMPLETE: CPT | Performed by: INTERNAL MEDICINE

## 2021-12-17 PROCEDURE — 1111F DSCHRG MED/CURRENT MED MERGE: CPT | Performed by: INTERNAL MEDICINE

## 2021-12-17 PROCEDURE — 99214 OFFICE O/P EST MOD 30 MIN: CPT | Performed by: INTERNAL MEDICINE

## 2021-12-17 RX ORDER — RANOLAZINE 500 MG/1
500 TABLET, EXTENDED RELEASE ORAL 2 TIMES DAILY
Qty: 60 TABLET | Refills: 0 | Status: SHIPPED | OUTPATIENT
Start: 2021-12-17

## 2021-12-22 ENCOUNTER — APPOINTMENT (OUTPATIENT)
Dept: RADIOLOGY | Facility: CLINIC | Age: 66
End: 2021-12-22
Payer: COMMERCIAL

## 2021-12-22 ENCOUNTER — OFFICE VISIT (OUTPATIENT)
Dept: URGENT CARE | Facility: CLINIC | Age: 66
End: 2021-12-22
Payer: COMMERCIAL

## 2021-12-22 VITALS
HEART RATE: 103 BPM | OXYGEN SATURATION: 96 % | RESPIRATION RATE: 16 BRPM | SYSTOLIC BLOOD PRESSURE: 160 MMHG | TEMPERATURE: 97.3 F | DIASTOLIC BLOOD PRESSURE: 68 MMHG

## 2021-12-22 DIAGNOSIS — S86.912A KNEE STRAIN, LEFT, INITIAL ENCOUNTER: Primary | ICD-10-CM

## 2021-12-22 DIAGNOSIS — J44.9 COPD, MODERATE (HCC): ICD-10-CM

## 2021-12-22 DIAGNOSIS — S89.92XA LEFT KNEE INJURY, INITIAL ENCOUNTER: ICD-10-CM

## 2021-12-22 PROCEDURE — 73564 X-RAY EXAM KNEE 4 OR MORE: CPT

## 2021-12-22 PROCEDURE — 99213 OFFICE O/P EST LOW 20 MIN: CPT

## 2021-12-22 PROCEDURE — S9088 SERVICES PROVIDED IN URGENT: HCPCS

## 2021-12-22 RX ORDER — ACLIDINIUM BROMIDE 400 UG/1
POWDER, METERED RESPIRATORY (INHALATION)
Qty: 1 EACH | Refills: 2 | Status: SHIPPED | OUTPATIENT
Start: 2021-12-22 | End: 2022-05-23 | Stop reason: SDUPTHER

## 2021-12-29 ENCOUNTER — OFFICE VISIT (OUTPATIENT)
Dept: OBGYN CLINIC | Facility: CLINIC | Age: 66
End: 2021-12-29
Payer: COMMERCIAL

## 2021-12-29 VITALS
WEIGHT: 286 LBS | BODY MASS INDEX: 36.7 KG/M2 | HEART RATE: 84 BPM | DIASTOLIC BLOOD PRESSURE: 98 MMHG | SYSTOLIC BLOOD PRESSURE: 154 MMHG | HEIGHT: 74 IN

## 2021-12-29 DIAGNOSIS — S86.912A KNEE STRAIN, LEFT, INITIAL ENCOUNTER: ICD-10-CM

## 2021-12-29 DIAGNOSIS — M17.12 PRIMARY OSTEOARTHRITIS OF LEFT KNEE: Primary | ICD-10-CM

## 2021-12-29 PROCEDURE — 1036F TOBACCO NON-USER: CPT | Performed by: ORTHOPAEDIC SURGERY

## 2021-12-29 PROCEDURE — 3080F DIAST BP >= 90 MM HG: CPT | Performed by: ORTHOPAEDIC SURGERY

## 2021-12-29 PROCEDURE — 3077F SYST BP >= 140 MM HG: CPT | Performed by: ORTHOPAEDIC SURGERY

## 2021-12-29 PROCEDURE — 20610 DRAIN/INJ JOINT/BURSA W/O US: CPT | Performed by: ORTHOPAEDIC SURGERY

## 2021-12-29 PROCEDURE — 3008F BODY MASS INDEX DOCD: CPT | Performed by: ORTHOPAEDIC SURGERY

## 2021-12-29 PROCEDURE — 99243 OFF/OP CNSLTJ NEW/EST LOW 30: CPT | Performed by: ORTHOPAEDIC SURGERY

## 2021-12-29 RX ORDER — TRIAMCINOLONE ACETONIDE 40 MG/ML
80 INJECTION, SUSPENSION INTRA-ARTICULAR; INTRAMUSCULAR
Status: COMPLETED | OUTPATIENT
Start: 2021-12-29 | End: 2021-12-29

## 2021-12-29 RX ORDER — BUPIVACAINE HYDROCHLORIDE 2.5 MG/ML
8 INJECTION, SOLUTION INFILTRATION; PERINEURAL
Status: COMPLETED | OUTPATIENT
Start: 2021-12-29 | End: 2021-12-29

## 2021-12-29 RX ADMIN — TRIAMCINOLONE ACETONIDE 80 MG: 40 INJECTION, SUSPENSION INTRA-ARTICULAR; INTRAMUSCULAR at 11:12

## 2021-12-29 RX ADMIN — BUPIVACAINE HYDROCHLORIDE 8 ML: 2.5 INJECTION, SOLUTION INFILTRATION; PERINEURAL at 11:12

## 2022-01-10 ENCOUNTER — OFFICE VISIT (OUTPATIENT)
Dept: PULMONOLOGY | Facility: CLINIC | Age: 67
End: 2022-01-10
Payer: COMMERCIAL

## 2022-01-10 VITALS
TEMPERATURE: 98.5 F | BODY MASS INDEX: 36.35 KG/M2 | HEIGHT: 74 IN | SYSTOLIC BLOOD PRESSURE: 138 MMHG | WEIGHT: 283.2 LBS | OXYGEN SATURATION: 98 % | HEART RATE: 61 BPM | RESPIRATION RATE: 16 BRPM | DIASTOLIC BLOOD PRESSURE: 68 MMHG

## 2022-01-10 DIAGNOSIS — F17.201 NICOTINE DEPENDENCE IN REMISSION, UNSPECIFIED NICOTINE PRODUCT TYPE: Primary | ICD-10-CM

## 2022-01-10 PROCEDURE — 99214 OFFICE O/P EST MOD 30 MIN: CPT | Performed by: INTERNAL MEDICINE

## 2022-01-10 PROCEDURE — 3075F SYST BP GE 130 - 139MM HG: CPT | Performed by: INTERNAL MEDICINE

## 2022-01-10 PROCEDURE — 3078F DIAST BP <80 MM HG: CPT | Performed by: INTERNAL MEDICINE

## 2022-01-10 NOTE — PROGRESS NOTES
Pulmonary Follow Up Note   Amrit Wharton 61 y o  male MRN: [id number]  1/10/2022    Assessment:  Asthma/COPD overlap   · Most likely asthma with fixed airway disease  · Severe persistent per last PFT, FEV1 of 55% in 02/2021  · Better well controlled with triple inhaler therapy  · Last exacerbation treated as an outpatient in 11/2021  · Completed pulmonary rehab in 05/2021  · [facility] allergy panel positive for dogs dander/ragweed, CBC in 07/2021 with 850 cells of eosinophilia    Plan:   · Continue current therapy with high-dose Advair 500, to does a   · No need for further escalation of therapy to biologic injection given the controlled symptoms   · Declined influenza vaccination, up-to-date for Pneumovax/COVID-19 which he states that he received it in April at local pharmacy    Former tobacco abuse   · About 40 pack year history quit in 2019  · No suspicious lesion on CT chest lung cancer screening in 02/2021   · Due for next lung cancer screening CT chest ordered    CORDELL   · Follows with Sleep Medicine  · Encouraged to call to reschedule follow-up appointment    Return in about 1 year (around 1/10/2023)  History of Present Illness     Follow up for:  Asthma/COPD overlap    Background:  61 y o  male with a h/o CORDELL, asthma COPD, class 1 obesity, dyslipidemia, CHF (systolic and diastolic), tobacco abuse about 40 pack year history quit 2019, marijuana use, quit 2020   Reported 1st diagnosis of asthma approximately 30 years ago, known triggers of extreme temperature/changes, exposure to grass/stronger orders and dog dander   No history of severe exacerbation, last oral steroid intake was in mid 2019   Completed pulmonary rehab program in 05/2021     Initially evaluated by Pulmonary on 05/04-recommended to continue Tudorza 400 mcg, Breo 200, p r n   DuoNeb and Atrovent          7/26 visit-continued on tudorza and Breo Ellipta 200, discontinued Atrovent   6 minute walk test showed no oxygen desaturation   [facility] allergy panel weakly positive for dog dander 0 4, common a ragweed positive at 1 13, IgE 38 5   CBC with eosinophilia at 850 cell   Given the pneumococcal vaccine, given trial of mucolytic with Mucinex      9/27 visit-switched to high-dose ICS Advair 500    11/29 visit-treated for exacerbation with steroid taper, doxycycline    Interval History  Hospitalized on 11/03 for COVID-19 pneumonia, no noted hypoxemia, no oxygen needs he was discharged on a steroid taper to follow-up with PCP  Since last discharge and treatment for exacerbation, feeling better at baseline activity  No symptoms at rest, using Tudorza and Advair as previously recommended  Feeling worse usually when exposed to a cold weather        Review of Systems  As per hpi, all other systems reviewed and were negative    Studies:  Imaging and other studies: I have personally reviewed pertinent films in PACS  CT chest 02/25/2021-no suspicious lesions, or nodules, few calcified granuloma at the lingula that unchanged from before, posterior mediastinal lymph node/top normal size unchanged from 2019     Pulmonary function testing:   PFT 02/04/2021-ratio 54%, FEV1 1 92 L/55%, FVC 3 54 L/69%, TLC 87%, %, DLCO 104%  PFT 12/18/2018-ratio 54%, FEV1 2 6 L/73%, FVC 4 8 L/93%, positive bronchodilator response at the level of FVC and FEV1, %, %, DLCO 114%   PFT 01/30/2017-ratio 41%, FEV1 1 74 L/49%, FVC 4 25 L/84%, positive bronchodilator response at the level of FEV1, %, %, DLCO 95%     6 minute walk test 07/26/2021-baseline SpO2 96% on room air, heart rate 64, able to walk 336 m in 6 minutes, lowest SpO2 at 94%, maximal heart rate at 88, dyspnea scale of 4        EKG, Pathology, and Other Studies: I have personally reviewed pertinent reports     Myocardial perfusion scan 09/25/2020-moderate-size infarct in the distribution of RCA/left circumflex, reduced LV systolic function with WM a     TTE 09/10/2020-EF 40%, akinesis at basal/mid inferior/mid inferior lateral wall, mildly increased LV wall thickness, grade 2 diastolic dysfunction, LA mildly dilated, normal RV size and function         Past medical, surgical, social and family histories reviewed  Medications/Allergies: Reviewed      Vitals: Blood pressure 138/68, pulse 61, temperature 98 5 °F (36 9 °C), temperature source Tympanic, resp  rate 16, height 6' 2" (1 88 m), weight 128 kg (283 lb 3 2 oz), SpO2 98 %  Body mass index is 36 36 kg/m²  Oxygen Therapy  SpO2: 98 %  Oxygen Therapy: None (Room air)      Physical Exam  Body mass index is 36 36 kg/m²  Gen: not in acute distress, obese  Neck/Eyes: supple, no adenopathy, PERRL  Ear: normal appearance, no significant hearing impairment  Nose:  normal nasal mucosa, no drainage  Mouth:  unremarkable/normal appearance of lips, teeth and gums  Oropharynx: mucosa is moist, no focal lesions or erythema  Salivary glands: soft nontender  Chest: normal respiratory efforts, diminished but clear breath sounds bilaterally  CV: RRR, distant heart sounds, no edema  Abdomen: soft, non tender  Extremities:  No observed deformity   Skin: unremarkable  Neuro: AAO X3, no focal motor deficit        Labs:  Lab Results   Component Value Date    WBC 12 50 (H) 12/10/2021    HGB 13 2 12/10/2021    HCT 41 8 12/10/2021    MCV 83 12/10/2021     12/10/2021     Lab Results   Component Value Date    GLUCOSE 87 07/27/2015    CALCIUM 9 1 12/10/2021     07/27/2015    K 4 4 12/10/2021    CO2 24 12/10/2021     12/10/2021    BUN 40 (H) 12/10/2021    CREATININE 1 36 (H) 12/10/2021     Lab Results   Component Value Date    IGE 38 5 09/22/2021     Lab Results   Component Value Date    ALT 46 12/10/2021    AST 15 12/10/2021    ALKPHOS 62 12/10/2021    BILITOT 1 05 (H) 07/22/2015           Portions of the record may have been created with voice recognition software    Occasional wrong word or "sound a like" substitutions may have occurred due to the inherent limitations of voice recognition software  Read the chart carefully and recognize, using context, where substitutions have occurred    [facility]

## 2022-01-19 ENCOUNTER — HOSPITAL ENCOUNTER (OUTPATIENT)
Dept: CT IMAGING | Facility: HOSPITAL | Age: 67
Discharge: HOME | End: 2022-01-19
Attending: INTERNAL MEDICINE
Payer: COMMERCIAL

## 2022-01-19 DIAGNOSIS — F17.201 NICOTINE DEPENDENCE IN REMISSION, UNSPECIFIED NICOTINE PRODUCT TYPE: ICD-10-CM

## 2022-01-19 PROCEDURE — 71271 CT THORAX LUNG CANCER SCR C-: CPT

## 2022-01-20 ENCOUNTER — TELEPHONE (OUTPATIENT)
Dept: PULMONOLOGY | Facility: CLINIC | Age: 67
End: 2022-01-20

## 2022-01-20 NOTE — TELEPHONE ENCOUNTER
Message left for patient to return call to office in ref to: No suspicious or new lesions on CT chest lung cancer screening    Will resume annual low-dose CT chest next is due in 01/2023

## 2022-01-24 NOTE — PROGRESS NOTES
Cardiology Follow Up    Esau Jason  1955  [id number]  [facility] [place]  [address] [address]  Phone#  [phone]  Fax#  [phone]      1  Chronic combined systolic and diastolic congestive heart failure ([address] )     2  Nonischemic cardiomyopathy ([address] )     3  Benign essential hypertension     4  Dyslipidemia  atorvastatin (LIPITOR) 20 mg tablet    Lipid Panel With Direct LDL    Comprehensive metabolic panel   5  Obstructive sleep apnea     6  Class 1 obesity due to excess calories with serious comorbidity and body mass index (BMI) of 34 0 to 34 9 in adult         Discussion/Summary:  Mr Corry Virk is a pleasant 17-year-old male who presents to the office today for routine follow up  He continues with shortness of breath with exertion in the setting of known COPD  He had a stress test done in 2020 revealing inferior and inferolateral scar  He does not carry the diagnosis of coronary disease  He had a heart catheterization in 2015 in the setting of his cardiomyopathy revealing no obstructive coronary disease  We had discussed further diagnostic options including a catheterization versus of coronary CTA versus observation  For now no further testing will be pursued  Ranexa was unaffordable as prescribed by our advanced practitioner  He does have isosorbide at home  He cannot recall why this was discontinued  I have asked that he resume this to see if this has any effect on his symptoms  He was noted to have coronary artery calcifications on a recent CT scan and due to this and his abnormal stress test advise statin therapy to which he is agreeable  A prescription for atorvastatin 20 mg daily was provided and he will reassess his lipids and LFTs in a few months  Otherwise he is on appropriate medication regimen for his cardiomyopathy in the form of carvedilol and lisinopril    His blood pressure is slightly elevated in the office today and as noted above he was asked to resume isosorbide mononitrate  He has also been noncompliant with CPAP  I have asked that he attempt to resume this  He appears euvolemic on diuretics which he takes as needed  We again discussed his alcohol intake which likely was a contributor to his cardiomyopathy  Complete cessation is advised  I will see him back in the office in a few months for re-evaluation  Interval History:   Mr Michelle Platt is a pleasant 72-year-old male who presents to the office for routine follow-up  Back in December he was diagnosed with COVID  At that time he had a minimal troponin spill  He was seen in follow-up by our advanced practitioner  He was not having any anginal like chest discomfort  Due to ongoing shortness of breath with exertion a trial of Ranexa was recommended  This was not affordable  He has not been attending pulmonary rehabilitation  With the activity he performs he reports ongoing shortness of breath with exertion  He denies any exertional chest pain  He denies any signs or symptoms of congestive heart failure including lower extremity edema, paroxysmal nocturnal dyspnea, orthopnea, acute weight gain or increasing abdominal girth  He denies lightheadedness, syncope or presyncope  He denies palpitations  He denies symptoms of claudication  He has been drinking more consistently since his last visit  He drinks at least one alcoholic beverage a few days a week  This is mostly scotch  Since his last visit he did obtain CPAP equipment but has not been compliant with CPAP        Problem List     Chronic combined systolic and diastolic CHF (congestive heart failure) (HCC)    Nonischemic cardiomyopathy (HCC)    Dyslipidemia    Hypertension    Asthma, chronic, moderate persistent, uncomplicated    Obstructive sleep apnea        Past Medical History:   Diagnosis Date    Asthma     CHF (congestive heart failure) ([address] )  COPD (chronic obstructive pulmonary disease) (HCC)     COVID-19     Mumps     Old MI (myocardial infarction)     Pneumonia     Pulmonary emphysema (HCC)     Sleep apnea      Social History     Socioeconomic History    Marital status: /Civil Union     Spouse name: Not on file    Number of children: Not on file    Years of education: Not on file    Highest education level: Not on file   Occupational History    Not on file   Tobacco Use    Smoking status: Former Smoker     Packs/day: 3 00     Years: 43 00     Pack years: 129 00     Start date:      Quit date: 2018     Years since quittin 0    Smokeless tobacco: Never Used   Vaping Use    Vaping Use: Never used   Substance and Sexual Activity    Alcohol use: Not Currently     Comment: rarely    Drug use: Yes     Types: Marijuana     Comment: occasionally edible    Sexual activity: Not on file   Other Topics Concern    Not on file   Social History Narrative    Caffeine use     Social Determinants of Health     Financial Resource Strain: Not on file   Food Insecurity: Not on file   Transportation Needs: Not on file   Physical Activity: Not on file   Stress: Not on file   Social Connections: Not on file   Intimate Partner Violence: Not on file   Housing Stability: Not on file      Family History   Problem Relation Age of Onset    Heart attack Father         MI    Heart disease Father     Diabetes Sister         DM    Arthritis Family     Coronary artery disease Family     Hypertension Family     Tuberculosis Son     Alzheimer's disease Mother      Past Surgical History:   Procedure Laterality Date    CARDIAC CATHETERIZATION  2015    Left main- normal and maidly tortuous  Circumflex - normal and moderately tortuous  RCA- normal and mildy tortuous  Global LV function was severely depressed  Ned Santoro INGUINAL HERNIA REPAIR Bilateral     MD COLONOSCOPY FLX DX W/COLLJ SPEC WHEN PFRMD N/A 3/11/2019    Procedure: COLONOSCOPY with removal of anal papilla;   Surgeon: Lev Zamudio MD;  Location: [facility];  Service: Colorectal    TONSILLECTOMY      UMBILICAL HERNIA REPAIR  06/21/2006       Current Outpatient Medications:     albuterol (ProAir HFA) 90 mcg/act inhaler, Inhale 2 puffs every 6 (six) hours as needed for wheezing, Disp: 18 g, Rfl: 11    ALPRAZolam (XANAX) 0 5 mg tablet, Take 1 tablet (0 5 mg total) by mouth daily at bedtime as needed for anxiety, Disp: 30 tablet, Rfl: 0    aspirin 325 mg tablet, Take 1 tablet by mouth daily, Disp: , Rfl:     carvedilol (COREG) 12 5 mg tablet, take 1 tablet by mouth twice a day with meals, Disp: 60 tablet, Rfl: 11    DULoxetine (CYMBALTA) 60 mg delayed release capsule, Take 1 capsule (60 mg total) by mouth daily, Disp: 30 capsule, Rfl: 5    fluticasone-salmeterol (Wixela Inhub) 500-50 mcg/dose inhaler, Inhale 1 puff 2 (two) times a day Rinse mouth after use , Disp: 60 blister, Rfl: 11    furosemide (LASIX) 20 mg tablet, take 1 tablet by mouth once daily if needed for WEIGHT GAIN OR EDEMA, Disp: 90 tablet, Rfl: 0    guaiFENesin (MUCINEX) 600 mg 12 hr tablet, Take 2 tablets (1,200 mg total) by mouth every 12 (twelve) hours, Disp: 60 tablet, Rfl: 11    ipratropium-albuterol (DUO-NEB) 0 5-2 5 mg/3 mL nebulizer solution, inhale contents of 1 vial ( 3 milliliters ) in nebulizer by mouth and INTO THE LUNGS every 6 hours if needed for wheezing, Disp: 360 mL, Rfl: 2    lisinopril (ZESTRIL) 20 mg tablet, take 1 tablet by mouth twice a day, Disp: 60 tablet, Rfl: 11    Tudorza Pressair 400 MCG/ACT inhaler, INHALE 1 PUFF BY MOUTH TWICE A DAY, Disp: 1 each, Rfl: 2    atorvastatin (LIPITOR) 20 mg tablet, Take 1 tablet (20 mg total) by mouth daily, Disp: 90 tablet, Rfl: 3    ranolazine (RANEXA) 500 mg 12 hr tablet, Take 1 tablet (500 mg total) by mouth 2 (two) times a day (Patient not taking: Reported on 1/25/2022 ), Disp: 60 tablet, Rfl: 0  Allergies   Allergen Reactions    Ciprofloxacin Shortness Of Breath and Diarrhea       Labs:     Chemistry        Component Value Date/Time     07/27/2015 0559    K 4 4 12/10/2021 1251    K 3 8 07/27/2015 0559     12/10/2021 1251     07/27/2015 0559    CO2 24 12/10/2021 1251    CO2 32 07/27/2015 0559    BUN 40 (H) 12/10/2021 1251    BUN 19 07/27/2015 0559    CREATININE 1 36 (H) 12/10/2021 1251    CREATININE 1 05 07/27/2015 0559        Component Value Date/Time    CALCIUM 9 1 12/10/2021 1251    CALCIUM 8 6 07/27/2015 0559    ALKPHOS 62 12/10/2021 1251    ALKPHOS 75 07/22/2015 1021    AST 15 12/10/2021 1251    AST 30 07/22/2015 1021    ALT 46 12/10/2021 1251    ALT 74 07/22/2015 1021    BILITOT 1 05 (H) 07/22/2015 1021            Lab Results   Component Value Date    CHOL 105 07/23/2015     Lab Results   Component Value Date    HDL 27 (L) 09/22/2021    HDL 24 (L) 12/18/2018    HDL 21 07/23/2015     Lab Results   Component Value Date    LDLCALC 107 (H) 09/22/2021    LDLCALC 89 12/18/2018    LDLCALC 69 07/23/2015     Lab Results   Component Value Date    TRIG 131 09/22/2021    TRIG 107 12/18/2018    TRIG 74 07/23/2015     No results found for: CHOLHDL    Imaging: No results found  Review of Systems   Cardiovascular: Positive for dyspnea on exertion  Negative for cyanosis, irregular heartbeat, leg swelling, near-syncope and orthopnea  Psychiatric/Behavioral: Positive for depression  All other systems reviewed and are negative  Vitals:    01/25/22 1322   BP: 142/76   Pulse: 96     Vitals:    01/25/22 1322   Weight: 129 kg (285 lb)     Height: 6' 2" (188 cm)   Body mass index is 36 59 kg/m²      Physical Exam:  General:  Alert and cooperative, appears stated age  HEENT:  PERRLA, EOMI, no scleral icterus, no conjunctival pallor  Neck:  No lymphadenopathy, no thyromegaly, no carotid bruits, no elevated JVP  Heart:  Regular rate and rhythm, normal S1/S2, no S3/S4, no murmur  Lungs:  Clear to auscultation bilaterally   Abdomen:  Soft, non-tender, positive bowel sounds, no rebound or guarding,   no organomegaly   Extremities:  No clubbing, cyanosis or edema   Vascular:  2+ pedal pulses  Skin:  No rashes or lesions on exposed skin  Neurologic:  Cranial nerves II-XII grossly intact without focal deficits

## 2022-01-25 ENCOUNTER — OFFICE VISIT (OUTPATIENT)
Dept: CARDIOLOGY CLINIC | Facility: HOSPITAL | Age: 67
End: 2022-01-25
Payer: COMMERCIAL

## 2022-01-25 VITALS
BODY MASS INDEX: 36.57 KG/M2 | SYSTOLIC BLOOD PRESSURE: 142 MMHG | HEART RATE: 96 BPM | DIASTOLIC BLOOD PRESSURE: 76 MMHG | WEIGHT: 285 LBS | HEIGHT: 74 IN

## 2022-01-25 DIAGNOSIS — G47.33 OBSTRUCTIVE SLEEP APNEA: ICD-10-CM

## 2022-01-25 DIAGNOSIS — I50.42 CHRONIC COMBINED SYSTOLIC AND DIASTOLIC CONGESTIVE HEART FAILURE (HCC): Primary | Chronic | ICD-10-CM

## 2022-01-25 DIAGNOSIS — I42.8 NONISCHEMIC CARDIOMYOPATHY (HCC): ICD-10-CM

## 2022-01-25 DIAGNOSIS — E66.09 CLASS 1 OBESITY DUE TO EXCESS CALORIES WITH SERIOUS COMORBIDITY AND BODY MASS INDEX (BMI) OF 34.0 TO 34.9 IN ADULT: Chronic | ICD-10-CM

## 2022-01-25 DIAGNOSIS — I10 BENIGN ESSENTIAL HYPERTENSION: Chronic | ICD-10-CM

## 2022-01-25 DIAGNOSIS — E78.5 DYSLIPIDEMIA: ICD-10-CM

## 2022-01-25 PROCEDURE — 3008F BODY MASS INDEX DOCD: CPT | Performed by: INTERNAL MEDICINE

## 2022-01-25 PROCEDURE — 99214 OFFICE O/P EST MOD 30 MIN: CPT | Performed by: INTERNAL MEDICINE

## 2022-01-25 PROCEDURE — 1036F TOBACCO NON-USER: CPT | Performed by: INTERNAL MEDICINE

## 2022-01-25 RX ORDER — ATORVASTATIN CALCIUM 20 MG/1
20 TABLET, FILM COATED ORAL DAILY
Qty: 90 TABLET | Refills: 3 | Status: SHIPPED | OUTPATIENT
Start: 2022-01-25

## 2022-02-04 ENCOUNTER — HOSPITAL ENCOUNTER (EMERGENCY)
Facility: HOSPITAL | Age: 67
Discharge: HOME | End: 2022-02-04
Attending: EMERGENCY MEDICINE | Admitting: EMERGENCY MEDICINE
Payer: COMMERCIAL

## 2022-02-04 ENCOUNTER — OFFICE VISIT (OUTPATIENT)
Dept: URGENT CARE | Facility: CLINIC | Age: 67
End: 2022-02-04

## 2022-02-04 VITALS
SYSTOLIC BLOOD PRESSURE: 151 MMHG | TEMPERATURE: 97.4 F | BODY MASS INDEX: 36.57 KG/M2 | HEIGHT: 74 IN | DIASTOLIC BLOOD PRESSURE: 72 MMHG | WEIGHT: 285 LBS | OXYGEN SATURATION: 97 % | HEART RATE: 69 BPM | RESPIRATION RATE: 18 BRPM

## 2022-02-04 VITALS
HEIGHT: 74 IN | OXYGEN SATURATION: 98 % | WEIGHT: 285 LBS | HEART RATE: 79 BPM | RESPIRATION RATE: 18 BRPM | SYSTOLIC BLOOD PRESSURE: 182 MMHG | BODY MASS INDEX: 36.57 KG/M2 | TEMPERATURE: 97.9 F | DIASTOLIC BLOOD PRESSURE: 86 MMHG

## 2022-02-04 DIAGNOSIS — R04.0 LEFT-SIDED EPISTAXIS: Primary | ICD-10-CM

## 2022-02-04 DIAGNOSIS — R04.0 EPISTAXIS: Primary | ICD-10-CM

## 2022-02-04 PROCEDURE — 99284 EMERGENCY DEPT VISIT MOD MDM: CPT | Performed by: PHYSICIAN ASSISTANT

## 2022-02-04 PROCEDURE — 99283 EMERGENCY DEPT VISIT LOW MDM: CPT

## 2022-02-04 RX ORDER — OXYMETAZOLINE HYDROCHLORIDE 0.05 G/100ML
2 SPRAY NASAL ONCE
Status: COMPLETED | OUTPATIENT
Start: 2022-02-04 | End: 2022-02-04

## 2022-02-04 RX ORDER — TRANEXAMIC ACID 100 MG/ML
500 INJECTION, SOLUTION INTRAVENOUS ONCE
Status: COMPLETED | OUTPATIENT
Start: 2022-02-04 | End: 2022-02-04

## 2022-02-04 RX ADMIN — OXYMETAZOLINE HYDROCHLORIDE 2 SPRAY: 0.05 SPRAY NASAL at 11:14

## 2022-02-04 RX ADMIN — TRANEXAMIC ACID 500 MG: 1 INJECTION, SOLUTION INTRAVENOUS at 11:14

## 2022-02-04 NOTE — DISCHARGE INSTRUCTIONS
If your nose bleed occurs again, cover the opposite side and tried to blow out the blood clot  Then spray Afrin up the nose  Stick gauze or paper towel up the nose and clamp your nose closed for a minimum of 15 minutes  You may repeat this twice and if bleeding still occurs, please return to the ER  If nose bleeds become a recurrent issue, please follow-up with ENT doctor Dr Seth Ricks

## 2022-02-04 NOTE — ED PROVIDER NOTES
History  Chief Complaint   Patient presents with   Rumson James     seen at [facility] and sent in for eval     81-year-old male history of CHF, COPD on 325 mg aspirin daily presents accompanied by his daughter complaining of nose bleed  Patient reports that it started spontaneously without any trauma and lasted for approximately 1-1/2 hours  Patient reports he was stubbing paper towels in his nostrils and went to urgent care  He reports that urgent care told him to proceed to the ER because they apparently did not have the necessary supplies to pack the nose  Patient did not receive any medications at urgent care and has not yet tried anything besides paper towels in his nostril yet  He reports that the nosebleed resolved spontaneously  Took the paper towels out of his nose and there is no bleeding  Had patient blow his nose obstructing the right naris and no blood came out  Patient states that his nose bleed was unilateral coming out of the left nares  He denies any blood in his posterior oropharynx  Prior to Admission Medications   Prescriptions Last Dose Informant Patient Reported? Taking?    ALPRAZolam (XANAX) 0 5 mg tablet  Self No No   Sig: Take 1 tablet (0 5 mg total) by mouth daily at bedtime as needed for anxiety   DULoxetine (CYMBALTA) 60 mg delayed release capsule  Self No No   Sig: Take 1 capsule (60 mg total) by mouth daily   Tudorza Pressair 400 MCG/ACT inhaler  Self No No   Sig: INHALE 1 PUFF BY MOUTH TWICE A DAY   albuterol (ProAir HFA) 90 mcg/act inhaler  Self No No   Sig: Inhale 2 puffs every 6 (six) hours as needed for wheezing   aspirin 325 mg tablet  Self Yes No   Sig: Take 1 tablet by mouth daily   atorvastatin (LIPITOR) 20 mg tablet   No No   Sig: Take 1 tablet (20 mg total) by mouth daily   carvedilol (COREG) 12 5 mg tablet  Self No No   Sig: take 1 tablet by mouth twice a day with meals   fluticasone-salmeterol (Wixela Inhub) 500-50 mcg/dose inhaler  Self No No   Sig: Inhale 1 puff 2 (two) times a day Rinse mouth after use  furosemide (LASIX) 20 mg tablet  Self No No   Sig: take 1 tablet by mouth once daily if needed for WEIGHT GAIN OR EDEMA   guaiFENesin (MUCINEX) 600 mg 12 hr tablet  Self No No   Sig: Take 2 tablets (1,200 mg total) by mouth every 12 (twelve) hours   ipratropium-albuterol (DUO-NEB) 0 5-2 5 mg/3 mL nebulizer solution  Self No No   Sig: inhale contents of 1 vial ( 3 milliliters ) in nebulizer by mouth and INTO THE LUNGS every 6 hours if needed for wheezing   lisinopril (ZESTRIL) 20 mg tablet  Self No No   Sig: take 1 tablet by mouth twice a day   ranolazine (RANEXA) 500 mg 12 hr tablet  Self No No   Sig: Take 1 tablet (500 mg total) by mouth 2 (two) times a day   Patient not taking: Reported on 1/25/2022       Facility-Administered Medications: None       Past Medical History:   Diagnosis Date    Asthma     [facility]     [facility] [facility]     COVID-19     Mumps     Old MI (myocardial infarction)     Pneumonia     Pulmonary emphysema [facility] [address] [facility]     Sleep apnea        Past Surgical History:   Procedure Laterality Date    CARDIAC CATHETERIZATION  07/24/2015    Left main- normal and maidly tortuous  Circumflex - normal and moderately tortuous  RCA- normal and mildy tortuous  Global LV function was severely depressed  Renelda Conch INGUINAL HERNIA REPAIR Bilateral     MN COLONOSCOPY FLX DX W/COLLJ SPEC WHEN PFRMD N/A 3/11/2019    Procedure: COLONOSCOPY with removal of anal papilla;   Surgeon: Jessika Yarbrough MD;  [facility];  Service: Colorectal    TONSILLECTOMY      UMBILICAL HERNIA REPAIR  06/21/2006       Family History   Problem Relation Age of Onset    Heart attack Father         MI    Heart disease Father     Diabetes Sister         DM    Arthritis Family     Coronary artery disease Family     Hypertension Family     Tuberculosis Son     Alzheimer's disease Mother      I have reviewed and agree with the history as documented  E-Cigarette/Vaping    E-Cigarette Use Never User      E-Cigarette/Vaping Substances    Nicotine No     THC No     CBD No     Flavoring No     Other No     Unknown No      Social History     Tobacco Use    Smoking status: Former Smoker     Packs/day: 3 00     Years: 43 00     Pack years: 129 00     Start date:      Quit date:      Years since quittin 0    Smokeless tobacco: Never Used   Vaping Use    Vaping Use: Never used   Substance Use Topics    Alcohol use: Not Currently     Comment: rarely    Drug use: Yes     Types: Marijuana     Comment: occasionally edible       Review of Systems   Constitutional: Negative for chills, fatigue and fever  HENT: Positive for nosebleeds  Negative for congestion and sore throat  Eyes: Negative for pain  Respiratory: Negative for cough, chest tightness, shortness of breath and wheezing  Cardiovascular: Negative for chest pain, palpitations and leg swelling  Gastrointestinal: Negative for abdominal pain, constipation, diarrhea, nausea and vomiting  Endocrine: Negative for polyuria  Genitourinary: Negative for dysuria  Musculoskeletal: Negative for arthralgias, back pain, myalgias and neck pain  Skin: Negative for rash  Neurological: Negative for dizziness, syncope, light-headedness and headaches  All other systems reviewed and are negative  Physical Exam  Physical Exam  Vitals reviewed  Constitutional:       Appearance: Normal appearance  He is well-developed  HENT:      Head: Normocephalic and atraumatic  Nose:      Comments: No active bleeding     Mouth/Throat:      Mouth: Mucous membranes are moist       Comments: No blood in posterior oropharynx  Eyes:      Conjunctiva/sclera: Conjunctivae normal    Cardiovascular:      Rate and Rhythm: Normal rate and regular rhythm  Heart sounds: Normal heart sounds     Pulmonary:      Effort: Pulmonary effort is normal  Breath sounds: Normal breath sounds  Abdominal:      General: Bowel sounds are normal       Palpations: Abdomen is soft  Tenderness: There is no abdominal tenderness  Musculoskeletal:         General: Normal range of motion  Cervical back: Normal range of motion  Skin:     General: Skin is warm and dry  Capillary Refill: Capillary refill takes less than 2 seconds  Neurological:      General: No focal deficit present  Mental Status: He is alert and oriented to person, place, and time  Psychiatric:         Mood and Affect: Mood normal          Behavior: Behavior normal          Vital Signs  ED Triage Vitals   Temperature Pulse Respirations Blood Pressure SpO2   02/04/22 1054 02/04/22 1054 02/04/22 1054 02/04/22 1055 02/04/22 1054   (!) 97 4 °F (36 3 °C) 69 18 151/72 97 %      Temp Source Heart Rate Source Patient Position - Orthostatic VS BP Location FiO2 (%)   02/04/22 1054 02/04/22 1054 02/04/22 1055 02/04/22 1055 --   Tympanic Monitor Sitting Right arm       Pain Score       02/04/22 1054       No Pain           Vitals:    02/04/22 1054 02/04/22 1055   BP:  151/72   Pulse: 69    Patient Position - Orthostatic VS:  Sitting         Visual Acuity      ED Medications  Medications   oxymetazoline (AFRIN) 0 05 % nasal spray 2 spray (2 sprays Each Nare Given 2/4/22 1114)   tranexamic Acid 1000 MG/10ML injection 500 mg (500 mg Nasal Given 2/4/22 1114)       Diagnostic Studies  Results Reviewed     None                 No orders to display              Procedures  Epistaxis management    Date/Time: 2/4/2022 12:15 PM  Performed by: Mendel Mallick, PA-C  Authorized by: Mendel Mallick, PA-C   [place] Protocol:  Consent: Verbal consent obtained    Consent given by: patient  Patient understanding: patient states understanding of the procedure being performed  Required items: required blood products, implants, devices, and special equipment available      Patient location:  ED  Procedure details:     Treatment site:  L anterior    Treatment complexity:  Limited    Treatment episode: initial    Post-procedure details:     Assessment:  No improvement    Patient tolerance of procedure: Tolerated well, no immediate complications             ED Course                               SBIRT 20yo+      Most Recent Value   SBIRT (22 yo +)    In order to provide better care to our patients, we are screening all of our patients for alcohol and drug use  Would it be okay to ask you these screening questions? Yes Filed at: 02/04/2022 1117   Initial Alcohol Screen: US AUDIT-C     1  How often do you have a drink containing alcohol? 0 Filed at: 02/04/2022 1117   2  How many drinks containing alcohol do you have on a typical day you are drinking? 0 Filed at: 02/04/2022 1117   3a  Male UNDER 65: How often do you have five or more drinks on one occasion? 0 Filed at: 02/04/2022 1117   3b  FEMALE Any Age, or MALE 65+: How often do you have 4 or more drinks on one occassion? 0 Filed at: 02/04/2022 1117   Audit-C Score 0 Filed at: 02/04/2022 1117   MAURY: How many times in the past year have you    Used an illegal drug or used a prescription medication for non-medical reasons? Never Filed at: 02/04/2022 1117                    MDM  Number of Diagnoses or Management Options  Left-sided epistaxis  Diagnosis management comments: Nose bleed stopped prior to arrival   TXA and Afrin still applied for prophylaxis  Nasal clamp applied for 15 minutes  Re-evaluated the patient and no return of bleeding  Gave instructions on how to stop future nose bleeds  ENT referral given  All questions were answered, return precautions were advised patient was agreeable to plan         Disposition  Final diagnoses:   Left-sided epistaxis     Time reflects when diagnosis was documented in both MDM as applicable and the Disposition within this note     Time User Action Codes Description Comment    2/4/2022 12:12 PM Marine Led Add [R04 0] Left-sided epistaxis       ED Disposition     ED Disposition Condition Date/Time Comment    Discharge Stable Fri Feb 4, 2022 12:12 PM Twana Boxer discharge to home/self care  Follow-up Information     Follow up With Specialties Details Why Storm Rudolph, DO Otolaryngology Schedule an appointment as soon as possible for a visit   [address]  [address] [place] [address]  [phone]            Patient's Medications   Discharge Prescriptions    No medications on file       No discharge procedures on file      PDMP Review       Value Time User    PDMP Reviewed  Yes 12/2/2021  9:58 AM Kane Keyes DO          ED Provider  Electronically Signed by           Burgess Meg PA-C  02/04/22 1213

## 2022-02-04 NOTE — PROGRESS NOTES
[facility]        NAME: Greta Avalos is a 61 y o  male  : 1955    MRN: [id number]  DATE: 2022  TIME: 10:32 AM    Assessment and Plan   Epistaxis [R04 0]  1  Epistaxis         Patient was sent to the ER for additional interventions  Patient Instructions     Patient sent to the ER for additional interventions  Follow up with PCP in 3-5 days  Proceed to  ER if symptoms worsen  Chief Complaint     Chief Complaint   Patient presents with    Nose Bleed     started 1 hour ago          History of Present Illness       Patient is a 61 YOM presenting with a nosebleed since this morning  Patient denies any blood thinner use, n/v, SOB  Patient arrives with his left nostril packed  Review of Systems   Review of Systems   HENT: Positive for nosebleeds  Negative for trouble swallowing  Respiratory: Negative for shortness of breath  Gastrointestinal: Negative for nausea and vomiting  All other systems reviewed and are negative          Current Medications       Current Outpatient Medications:     albuterol (ProAir HFA) 90 mcg/act inhaler, Inhale 2 puffs every 6 (six) hours as needed for wheezing, Disp: 18 g, Rfl: 11    ALPRAZolam (XANAX) 0 5 mg tablet, Take 1 tablet (0 5 mg total) by mouth daily at bedtime as needed for anxiety, Disp: 30 tablet, Rfl: 0    aspirin 325 mg tablet, Take 1 tablet by mouth daily, Disp: , Rfl:     atorvastatin (LIPITOR) 20 mg tablet, Take 1 tablet (20 mg total) by mouth daily, Disp: 90 tablet, Rfl: 3    carvedilol (COREG) 12 5 mg tablet, take 1 tablet by mouth twice a day with meals, Disp: 60 tablet, Rfl: 11    DULoxetine (CYMBALTA) 60 mg delayed release capsule, Take 1 capsule (60 mg total) by mouth daily, Disp: 30 capsule, Rfl: 5    fluticasone-salmeterol (Wixela Inhub) 500-50 mcg/dose inhaler, Inhale 1 puff 2 (two) times a day Rinse mouth after use , Disp: 60 blister, Rfl: 11    furosemide (LASIX) 20 mg tablet, take 1 tablet by mouth once daily if needed for WEIGHT GAIN OR EDEMA, Disp: 90 tablet, Rfl: 0    guaiFENesin (MUCINEX) 600 mg 12 hr tablet, Take 2 tablets (1,200 mg total) by mouth every 12 (twelve) hours, Disp: 60 tablet, Rfl: 11    ipratropium-albuterol (DUO-NEB) 0 5-2 5 mg/3 mL nebulizer solution, inhale contents of 1 vial ( 3 milliliters ) in nebulizer by mouth and INTO THE LUNGS every 6 hours if needed for wheezing, Disp: 360 mL, Rfl: 2    lisinopril (ZESTRIL) 20 mg tablet, take 1 tablet by mouth twice a day, Disp: 60 tablet, Rfl: 11    ranolazine (RANEXA) 500 mg 12 hr tablet, Take 1 tablet (500 mg total) by mouth 2 (two) times a day (Patient not taking: Reported on 1/25/2022 ), Disp: 60 tablet, Rfl: 0    Tudorza Pressair 400 MCG/ACT inhaler, INHALE 1 PUFF BY MOUTH TWICE A DAY, Disp: 1 each, Rfl: 2    Current Allergies     Allergies as of 02/04/2022 - Reviewed 02/04/2022   Allergen Reaction Noted    Ciprofloxacin Shortness Of Breath and Diarrhea 12/18/2018            The following portions of the patient's history were reviewed and updated as appropriate: allergies, current medications, past family history, past medical history, past social history, past surgical history and problem list      Past Medical History:   Diagnosis Date    Asthma     [facility] [address] [facility]     [facility]     COVID-19     Mumps     Old MI (myocardial infarction)     Pneumonia     [facility]     Sleep apnea        Past Surgical History:   Procedure Laterality Date    CARDIAC CATHETERIZATION  07/24/2015    Left main- normal and maidly tortuous  Circumflex - normal and moderately tortuous  RCA- normal and mildy tortuous  Global LV function was severely depressed  Sandra Reese INGUINAL HERNIA REPAIR Bilateral     CT COLONOSCOPY FLX DX W/COLLJ SPEC WHEN PFRMD N/A 3/11/2019    Procedure: COLONOSCOPY with removal of anal papilla;   Surgeon: Nuris Nazario MD;  [facility]: [facility]; Service: Colorectal    TONSILLECTOMY      UMBILICAL HERNIA REPAIR  06/21/2006       Family History   Problem Relation Age of Onset    Heart attack Father         MI    Heart disease Father     Diabetes Sister         DM    Arthritis Family     Coronary artery disease Family     Hypertension Family     Tuberculosis Son     Alzheimer's disease Mother          Medications have been verified  Objective   BP (!) 182/86   Pulse 79   Temp 97 9 °F (36 6 °C)   Resp 18   Ht 6' 2" (1 88 m)   Wt 129 kg (285 lb)   SpO2 98%   BMI 36 59 kg/m²        Physical Exam     Physical Exam  Vitals and nursing note reviewed  Constitutional:       General: He is not in acute distress  Appearance: Normal appearance  He is not ill-appearing or toxic-appearing  HENT:      Nose:      Comments: Left nostril packed with blood saturated gauze,     Mouth/Throat:      Mouth: Mucous membranes are dry  Pharynx: Oropharynx is clear  Cardiovascular:      Rate and Rhythm: Normal rate  Pulmonary:      Effort: Pulmonary effort is normal    Skin:     General: Skin is warm and dry  Neurological:      General: No focal deficit present  Mental Status: He is alert and oriented to person, place, and time

## 2022-03-28 ENCOUNTER — OFFICE VISIT (OUTPATIENT)
Dept: FAMILY MEDICINE CLINIC | Facility: CLINIC | Age: 67
End: 2022-03-28
Payer: COMMERCIAL

## 2022-03-28 VITALS
WEIGHT: 296 LBS | DIASTOLIC BLOOD PRESSURE: 70 MMHG | HEART RATE: 74 BPM | BODY MASS INDEX: 37.99 KG/M2 | RESPIRATION RATE: 18 BRPM | SYSTOLIC BLOOD PRESSURE: 136 MMHG | HEIGHT: 74 IN | TEMPERATURE: 97.8 F

## 2022-03-28 DIAGNOSIS — E66.01 CLASS 2 SEVERE OBESITY DUE TO EXCESS CALORIES WITH SERIOUS COMORBIDITY AND BODY MASS INDEX (BMI) OF 38.0 TO 38.9 IN ADULT (HCC): ICD-10-CM

## 2022-03-28 DIAGNOSIS — J45.40 CHRONIC ASTHMA, MODERATE PERSISTENT, UNCOMPLICATED: ICD-10-CM

## 2022-03-28 DIAGNOSIS — I50.42 CHRONIC COMBINED SYSTOLIC AND DIASTOLIC CONGESTIVE HEART FAILURE (HCC): Chronic | ICD-10-CM

## 2022-03-28 DIAGNOSIS — F41.9 ANXIETY: ICD-10-CM

## 2022-03-28 DIAGNOSIS — R73.9 HYPERGLYCEMIA: Primary | ICD-10-CM

## 2022-03-28 PROBLEM — K62.5 RECTAL BLEEDING: Status: RESOLVED | Noted: 2019-01-14 | Resolved: 2022-03-28

## 2022-03-28 PROBLEM — E66.9 OBESITY (BMI 30-39.9): Status: ACTIVE | Noted: 2022-03-28

## 2022-03-28 PROCEDURE — 99214 OFFICE O/P EST MOD 30 MIN: CPT | Performed by: INTERNAL MEDICINE

## 2022-03-28 PROCEDURE — 3078F DIAST BP <80 MM HG: CPT | Performed by: INTERNAL MEDICINE

## 2022-03-28 PROCEDURE — 1036F TOBACCO NON-USER: CPT | Performed by: INTERNAL MEDICINE

## 2022-03-28 PROCEDURE — 3008F BODY MASS INDEX DOCD: CPT | Performed by: INTERNAL MEDICINE

## 2022-03-28 RX ORDER — ALPRAZOLAM 0.5 MG/1
0.5 TABLET ORAL
Qty: 30 TABLET | Refills: 0 | Status: SHIPPED | OUTPATIENT
Start: 2022-03-28

## 2022-03-28 NOTE — PROGRESS NOTES
Assessment/Plan:         Diagnoses and all orders for this visit:    Hyperglycemia  -     HEMOGLOBIN A1C W/ EAG ESTIMATION; Future  Check A1c as pt has had elevated BS in past when on steroids   Lo carb diet and low level exercise at home encouraged     Anxiety  -     ALPRAZolam (XANAX) 0 5 mg tablet; Take 1 tablet (0 5 mg total) by mouth daily at bedtime as needed for anxiety  Refill sent to pharmacy   Chronic asthma, moderate persistent, uncomplicated  Pt is followed by pulmonary and recent note reviewed on chart Tobacco cessation   Continue neb treatments and current respiratory regimen  Pt has DC cpap as did not find effective     Chronic combined systolic and diastolic congestive heart failure ([facility] [address] [facility]  Pt followed by Dr Marylen Ano and note from 1/22 reviewed on chart     Class 2 severe obesity due to excess calories with serious comorbidity and body mass index (BMI) of 38 0 to 38 9 in [facility])  Low fat diet, portion control Home exercise encouraged              Patient ID: Norris Saini is a 61 y o  male  HPI  Pt is ok He no longer works at [employer] 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 enjoying that He is limited due to sob still and did see Cardio and Pulmonary in recent months He is hoping the warmer temps help maintain status better He returned cpap as that did not seem to be beneficial No chest pain 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 He uses diuretic prn swelling and used the past 2 days and uses nebulizer thru the day BMI Counseling: Body mass index is 38 kg/m²  The BMI is above normal  Nutrition recommendations include decreasing portion sizes, consuming healthier snacks and moderation in carbohydrate intake  Exercise recommendations include strength training exercises  Rationale for BMI follow-up plan is due to patient being overweight or obese  Review of Systems   Constitutional: Positive for activity change  Negative for chills and fever     HENT: Positive for congestion and postnasal drip  Eyes: Negative for visual disturbance  Respiratory: Positive for shortness of breath  Negative for cough  Cardiovascular: Positive for leg swelling  Negative for chest pain and palpitations  Gastrointestinal: Negative for abdominal distention and abdominal pain  Genitourinary: Negative for difficulty urinating, flank pain and frequency  Musculoskeletal: Positive for arthralgias  Neurological: Negative for dizziness, light-headedness and headaches  Psychiatric/Behavioral: Positive for sleep disturbance  The patient is not nervous/anxious  Past Medical History:   Diagnosis Date    Asthma     [facility]     [facility] ([facility]     COVID-19     Mumps     Old MI (myocardial infarction)     Pneumonia     Pulmonary emphysema ([facility] )     Rectal bleeding 2019    Sleep apnea      Past Surgical History:   Procedure Laterality Date    CARDIAC CATHETERIZATION  2015    Left main- normal and maidly tortuous  Circumflex - normal and moderately tortuous  RCA- normal and mildy tortuous  Global LV function was severely depressed  Jannine Hand INGUINAL HERNIA REPAIR Bilateral     NM COLONOSCOPY FLX DX W/COLLJ SPEC WHEN PFRMD N/A 3/11/2019    Procedure: COLONOSCOPY with removal of anal papilla;   Surgeon: Mallory Washington MD;  Location: [facility];  Service: Colorectal    TONSILLECTOMY      UMBILICAL HERNIA REPAIR  2006     Social History     Socioeconomic History    Marital status: /Civil Union     Spouse name: Not on file    Number of children: Not on file    Years of education: Not on file    Highest education level: Not on file   Occupational History    Not on file   Tobacco Use    Smoking status: Former Smoker     Packs/day: 3 00     Years: 43 00     Pack years: 129 00     Start date: 67     Quit date: 2018     Years since quittin 2    Smokeless tobacco: Never Used   Vaping Use  Vaping Use: Never used   Substance and Sexual Activity    Alcohol use: Not Currently     Comment: rarely    Drug use: Yes     Types: Marijuana     Comment: occasionally edible    Sexual activity: Not on file   Other Topics Concern    Not on file   Social History Narrative    Caffeine use     Social Determinants of Health     Financial Resource Strain: Not on file   Food Insecurity: Not on file   Transportation Needs: Not on file   Physical Activity: Not on file   Stress: Not on file   Social Connections: Not on file   Intimate Partner Violence: Not on file   Housing Stability: Not on file     Allergies   Allergen Reactions    Ciprofloxacin Shortness Of Breath and Diarrhea           /70   Pulse 74   Temp 97 8 °F (36 6 °C) (Temporal)   Resp 18   Ht 6' 2" (1 88 m)   Wt 134 kg (296 lb)   BMI 38 00 kg/m²          Physical Exam  Vitals reviewed  Constitutional:       General: He is not in acute distress  Appearance: Normal appearance  He is not ill-appearing, toxic-appearing or diaphoretic  HENT:      Head: Normocephalic and atraumatic  Right Ear: External ear normal       Left Ear: External ear normal       Mouth/Throat:      Mouth: Mucous membranes are dry  Eyes:      General: No scleral icterus  Extraocular Movements: Extraocular movements intact  Conjunctiva/sclera: Conjunctivae normal       Pupils: Pupils are equal, round, and reactive to light  Cardiovascular:      Rate and Rhythm: Normal rate and regular rhythm  Pulses: Normal pulses  Heart sounds: Normal heart sounds  Pulmonary:      Effort: Pulmonary effort is normal  No respiratory distress  Breath sounds: Normal breath sounds  No wheezing  Abdominal:      General: Bowel sounds are normal  There is no distension  Palpations: Abdomen is soft  Tenderness: There is no abdominal tenderness  Musculoskeletal:      Cervical back: Normal range of motion and neck supple  No rigidity        Right lower leg: Edema present  Left lower leg: Edema present  Lymphadenopathy:      Cervical: No cervical adenopathy  Neurological:      General: No focal deficit present  Mental Status: He is alert and oriented to person, place, and time  Mental status is at baseline  Psychiatric:         Mood and Affect: Mood normal          Behavior: Behavior normal          Thought Content:  Thought content normal          Judgment: Judgment normal

## 2022-03-30 DIAGNOSIS — I42.8 OTHER CARDIOMYOPATHY (HCC): ICD-10-CM

## 2022-03-30 RX ORDER — CARVEDILOL 12.5 MG/1
12.5 TABLET ORAL 2 TIMES DAILY WITH MEALS
Qty: 60 TABLET | Refills: 11 | Status: SHIPPED | OUTPATIENT
Start: 2022-03-30

## 2022-04-19 ENCOUNTER — TELEPHONE (OUTPATIENT)
Dept: FAMILY MEDICINE CLINIC | Facility: CLINIC | Age: 67
End: 2022-04-19

## 2022-04-19 DIAGNOSIS — F41.9 ANXIETY: Primary | ICD-10-CM

## 2022-04-19 RX ORDER — BUSPIRONE HYDROCHLORIDE 5 MG/1
5 TABLET ORAL 2 TIMES DAILY
Qty: 60 TABLET | Refills: 0 | Status: SHIPPED | OUTPATIENT
Start: 2022-04-19

## 2022-04-19 NOTE — TELEPHONE ENCOUNTER
He already has prn anxiety med and cymbalt so I sent buspar - at least one daily but can be up to bid

## 2022-04-19 NOTE — TELEPHONE ENCOUNTER
Pt said his dog is dying and is very upset, asking if there's anything you could prescribe to help keep him calm and relaxed? Pt uses [facility] in [facility]

## 2022-04-20 DIAGNOSIS — F33.1 MODERATE EPISODE OF RECURRENT MAJOR DEPRESSIVE DISORDER (HCC): ICD-10-CM

## 2022-04-20 RX ORDER — DULOXETIN HYDROCHLORIDE 60 MG/1
60 CAPSULE, DELAYED RELEASE ORAL DAILY
Qty: 30 CAPSULE | Refills: 5 | Status: SHIPPED | OUTPATIENT
Start: 2022-04-20

## 2022-04-22 ENCOUNTER — TELEPHONE (OUTPATIENT)
Dept: ENDOCRINOLOGY | Facility: CLINIC | Age: 67
End: 2022-04-22

## 2022-04-22 NOTE — TELEPHONE ENCOUNTER
Pt informed to  prednisone, follow up scheduled with gladys on 5/6  Pt aware of date, time, and location

## 2022-04-22 NOTE — TELEPHONE ENCOUNTER
Prednisone taper ordered, please schedule him a follow up visit in the office with Dr Estes Nurse or an Ap

## 2022-04-22 NOTE — TELEPHONE ENCOUNTER
Pt called the office today asking to make and appointment to see Dr Maribel Prieto  Notes he feels like he is not getting enough oxygen the last few weeks  Also notes the colder weather is not helping       Could someone from the Pulmonaryoffice please call the pt and advise    Thanks

## 2022-04-22 NOTE — TELEPHONE ENCOUNTER
Called and spoke with pt, he reports trouble breathing and catching his breath  He does not wear O2  Pt states he does cough up clear mucous often but this is not new for him as he has COPD and is wheezing as well  Pt using inhalers and neb as prescribed  Does pt need sick visit? Pls advise

## 2022-05-06 ENCOUNTER — OFFICE VISIT (OUTPATIENT)
Dept: PULMONOLOGY | Facility: CLINIC | Age: 67
End: 2022-05-06
Payer: COMMERCIAL

## 2022-05-06 VITALS
DIASTOLIC BLOOD PRESSURE: 72 MMHG | SYSTOLIC BLOOD PRESSURE: 146 MMHG | RESPIRATION RATE: 20 BRPM | OXYGEN SATURATION: 96 % | HEIGHT: 74 IN | HEART RATE: 81 BPM | TEMPERATURE: 98.2 F | BODY MASS INDEX: 38.37 KG/M2 | WEIGHT: 299 LBS

## 2022-05-06 DIAGNOSIS — J44.1 CHRONIC OBSTRUCTIVE PULMONARY DISEASE WITH ACUTE EXACERBATION (HCC): Primary | ICD-10-CM

## 2022-05-06 DIAGNOSIS — J44.9 ASTHMA-COPD OVERLAP SYNDROME (HCC): Chronic | ICD-10-CM

## 2022-05-06 DIAGNOSIS — G47.33 OBSTRUCTIVE SLEEP APNEA: ICD-10-CM

## 2022-05-06 PROCEDURE — 3077F SYST BP >= 140 MM HG: CPT | Performed by: PHYSICIAN ASSISTANT

## 2022-05-06 PROCEDURE — 99214 OFFICE O/P EST MOD 30 MIN: CPT | Performed by: PHYSICIAN ASSISTANT

## 2022-05-06 PROCEDURE — 1036F TOBACCO NON-USER: CPT | Performed by: PHYSICIAN ASSISTANT

## 2022-05-06 PROCEDURE — 3008F BODY MASS INDEX DOCD: CPT | Performed by: PHYSICIAN ASSISTANT

## 2022-05-06 PROCEDURE — 3078F DIAST BP <80 MM HG: CPT | Performed by: PHYSICIAN ASSISTANT

## 2022-05-06 RX ORDER — PREDNISONE 10 MG/1
10 TABLET ORAL DAILY
Qty: 30 TABLET | Refills: 0 | Status: SHIPPED | OUTPATIENT
Start: 2022-05-06

## 2022-05-06 RX ORDER — AZITHROMYCIN 250 MG/1
TABLET, FILM COATED ORAL
Qty: 6 TABLET | Refills: 0 | Status: SHIPPED | OUTPATIENT
Start: 2022-05-06 | End: 2022-05-10

## 2022-05-06 NOTE — ASSESSMENT & PLAN NOTE
Patient is having an acute exacerbation of his asthma COPD overlap syndrome  Recommended we restart prednisone taper starting at 40 mg and tapering by 10 mg every 3 days until completion  Also sent in CPAP for him  Continue Advair 500/50 mcg puff twice daily  Continue Tudorza 1 puff twice daily  Continue DuoNeb q 4-6 hours p r n  For shortness of breath wheeze

## 2022-05-06 NOTE — PROGRESS NOTES
Pulmonary Follow Up Note   Dc Fitch 61 y o  male MRN: [id number]  5/6/2022      Assessment:    Chronic obstructive pulmonary disease with acute exacerbation [facility])  Patient is having an acute exacerbation of his asthma COPD overlap syndrome  Recommended we restart prednisone taper starting at 40 mg and tapering by 10 mg every 3 days until completion  Also sent in CPAP for him  Continue Advair 500/50 mcg puff twice daily  Continue Tudorza 1 puff twice daily  Continue DuoNeb q 4-6 hours p r n  For shortness of breath wheeze  Asthma-COPD overlap syndrome (HCC)  Inhalers and nebulizers as above  Obstructive sleep apnea  Continue CPAP during all hours of sleep  Plan:    Diagnoses and all orders for this visit:    Chronic obstructive pulmonary disease with acute exacerbation (HCC)  -     predniSONE 10 mg tablet; Take 1 tablet (10 mg total) by mouth daily 40mg x 3 days, 30 mg x 3 days, 20 mg x 3 days, 10 mg x 3 days  -     azithromycin (ZITHROMAX) 250 mg tablet; Take 2 tablets today then 1 tablet daily x 4 days    Asthma-COPD overlap syndrome (HCC)    Obstructive sleep apnea        Return in about 4 months (around 9/6/2022)  History of Present Illness   HPI:  Dc Fitch is a 61 y o  male who presents for sick visit  Patient called the office 4/22 explaining that he was short of breath and had increased coughing and wheeze  He ultimately has been feeling ill for the last 3 weeks  He was prescribed a prednisone taper and did note improvements but as the taper went down he started to notice worsening cough and wheeze  He has minimal relief from his inhalers and nebulizers though they do work some  Patient is unsure of triggers at this time  He does note allergies but usually in the fall  He states that 1 of his friends had a contact with COVID around [place] time though is unsure if the friend ever developed symptoms himself    Patient himself denies fevers, chills, loss of taste or smell, GI symptoms, etc     Review of Systems   All other systems reviewed and are negative  Historical Information   Past Medical History:   Diagnosis Date    Asthma     CHF (congestive heart failure) (HCC)     [facility]     COVID-19     Mumps     Old MI (myocardial infarction)     Pneumonia     Pulmonary emphysema ([facility] )     Rectal bleeding 2019    Sleep apnea      Past Surgical History:   Procedure Laterality Date    CARDIAC CATHETERIZATION  2015    Left main- normal and maidly tortuous  Circumflex - normal and moderately tortuous  RCA- normal and mildy tortuous  Global LV function was severely depressed  Dali Joyner INGUINAL HERNIA REPAIR Bilateral     ME COLONOSCOPY FLX DX W/COLLJ SPEC WHEN PFRMD N/A 3/11/2019    Procedure: COLONOSCOPY with removal of anal papilla;   Surgeon: Aníbal Valderrama MD;  Location: [facility];  Service: Colorectal    TONSILLECTOMY      UMBILICAL HERNIA REPAIR  2006     Family History   Problem Relation Age of Onset    Heart attack Father         MI    Heart disease Father     Diabetes Sister         DM    Arthritis Family     Coronary artery disease Family     Hypertension Family     Tuberculosis Son     Alzheimer's disease Mother        Social History     Tobacco Use   Smoking Status Former Smoker    Packs/day: 3 00    Years: 43 00    Pack years: 129 00    Start date:     Quit date:     Years since quittin 3   Smokeless Tobacco Never Used         Meds/Allergies     Current Outpatient Medications:     albuterol (ProAir HFA) 90 mcg/act inhaler, Inhale 2 puffs every 6 (six) hours as needed for wheezing, Disp: 18 g, Rfl: 11    ALPRAZolam (XANAX) 0 5 mg tablet, Take 1 tablet (0 5 mg total) by mouth daily at bedtime as needed for anxiety, Disp: 30 tablet, Rfl: 0    aspirin 325 mg tablet, Take 1 tablet by mouth daily, Disp: , Rfl:     atorvastatin (LIPITOR) 20 mg tablet, Take 1 tablet (20 mg total) by mouth daily, Disp: 90 tablet, Rfl: 3    busPIRone (BUSPAR) 5 mg tablet, Take 1 tablet (5 mg total) by mouth 2 (two) times a day, Disp: 60 tablet, Rfl: 0    carvedilol (COREG) 12 5 mg tablet, Take 1 tablet (12 5 mg total) by mouth 2 (two) times a day with meals, Disp: 60 tablet, Rfl: 11    DULoxetine (CYMBALTA) 60 mg delayed release capsule, Take 1 capsule (60 mg total) by mouth daily, Disp: 30 capsule, Rfl: 5    furosemide (LASIX) 20 mg tablet, take 1 tablet by mouth once daily if needed for WEIGHT GAIN OR EDEMA, Disp: 90 tablet, Rfl: 0    guaiFENesin (MUCINEX) 600 mg 12 hr tablet, Take 2 tablets (1,200 mg total) by mouth every 12 (twelve) hours, Disp: 60 tablet, Rfl: 11    ipratropium-albuterol (DUO-NEB) 0 5-2 5 mg/3 mL nebulizer solution, inhale contents of 1 vial ( 3 milliliters ) in nebulizer by mouth and INTO THE LUNGS every 6 hours if needed for wheezing, Disp: 360 mL, Rfl: 2    lisinopril (ZESTRIL) 20 mg tablet, take 1 tablet by mouth twice a day, Disp: 60 tablet, Rfl: 11    predniSONE 10 mg tablet, Take 1 tablet (10 mg total) by mouth daily 40mg x 3 days, 30 mg x 3 days, 20 mg x 3 days, 10 mg x 3 days, Disp: 30 tablet, Rfl: 0    Tudorza Pressair 400 MCG/ACT inhaler, INHALE 1 PUFF BY MOUTH TWICE A DAY, Disp: 1 each, Rfl: 2    azithromycin (ZITHROMAX) 250 mg tablet, Take 2 tablets today then 1 tablet daily x 4 days, Disp: 6 tablet, Rfl: 0    fluticasone-salmeterol (Wixela Inhub) 500-50 mcg/dose inhaler, Inhale 1 puff 2 (two) times a day Rinse mouth after use , Disp: 60 blister, Rfl: 11    ranolazine (RANEXA) 500 mg 12 hr tablet, Take 1 tablet (500 mg total) by mouth 2 (two) times a day (Patient not taking: Reported on 1/25/2022 ), Disp: 60 tablet, Rfl: 0  Allergies   Allergen Reactions    Ciprofloxacin Shortness Of Breath and Diarrhea       Vitals: Blood pressure 146/72, pulse 81, temperature 98 2 °F (36 8 °C), resp  rate 20, height 6' 2" (1 88 m), weight 136 kg (299 lb), SpO2 96 %   Body mass index is 38 39 kg/m²  Oxygen Therapy  SpO2: 96 %    Physical Exam  Physical Exam  Vitals reviewed  Constitutional:       Appearance: He is obese  HENT:      Head: Normocephalic and atraumatic  Nose: Nose normal       Mouth/Throat:      Mouth: Mucous membranes are moist       Pharynx: Oropharynx is clear  Eyes:      Conjunctiva/sclera: Conjunctivae normal    Cardiovascular:      Rate and Rhythm: Normal rate  Pulses: Normal pulses  Pulmonary:      Effort: Pulmonary effort is normal       Breath sounds: Wheezing present  Musculoskeletal:         General: No swelling  Skin:     General: Skin is warm and dry  Neurological:      General: No focal deficit present  Mental Status: He is alert  Mental status is at baseline  Psychiatric:         Mood and Affect: Mood normal          Behavior: Behavior normal          Thought Content: Thought content normal          Labs: I have personally reviewed pertinent lab results  , ABG: No results found for: PHART, TOV7ZJM, PO2ART, HEA8BJL, T5WGMDYN, BEART, SOURCE, BNP: No results found for: BNP, CBC: No results found for: WBC, HGB, HCT, MCV, PLT, ADJUSTEDWBC, MCH, MCHC, RDW, MPV, NRBC, CMP: No results found for: SODIUM, K, CL, CO2, ANIONGAP, BUN, CREATININE, GLUCOSE, CALCIUM, AST, ALT, ALKPHOS, PROT, BILITOT, EGFR, PT/INR: No results found for: PT, INR, Troponin: No results found for: TROPONINI  Lab Results   Component Value Date    WBC 12 50 (H) 12/10/2021    HGB 13 2 12/10/2021    HCT 41 8 12/10/2021    MCV 83 12/10/2021     12/10/2021     Lab Results   Component Value Date    GLUCOSE 87 07/27/2015    CALCIUM 9 1 12/10/2021     07/27/2015    K 4 4 12/10/2021    CO2 24 12/10/2021     12/10/2021    BUN 40 (H) 12/10/2021    CREATININE 1 36 (H) 12/10/2021     Lab Results   Component Value Date    IGE 38 5 09/22/2021     Lab Results   Component Value Date    ALT 46 12/10/2021    AST 15 12/10/2021    ALKPHOS 62 12/10/2021    BILITOT 1 05 (H) 07/22/2015       Imaging and other studies: I have personally reviewed pertinent reports  and I have personally reviewed pertinent films in PACS     CT lung cancer screening room 01/19/2022  Minimal subsegmental atelectasis in the lingula surrounding the calcified granuloma  Minimal subsegmental atelectasis in right middle lobe medially to a lesser extent  Scattered linear atelectasis versus scarring bilaterally  No new or suspicious pulmonary nodules or masses  Pulmonary function testing:  Performed 2/25/2021  FEV1/FVC ratio 54%   FEV1 55% predicted  FVC 69% predicted  no response to bronchodilators  TLC 87 % predicted   % predicted  DLCO corrected for hemoglobin 104 % predicted  Moderately obstructive airflow defect  No response to bronchodilators  Normal lung volumes  Normal diffusion capacity

## 2022-05-23 ENCOUNTER — TELEPHONE (OUTPATIENT)
Dept: SURGERY | Facility: CLINIC | Age: 67
End: 2022-05-23

## 2022-05-23 DIAGNOSIS — J44.9 COPD, MODERATE (HCC): ICD-10-CM

## 2022-05-23 RX ORDER — ACLIDINIUM BROMIDE 400 UG/1
1 POWDER, METERED RESPIRATORY (INHALATION) 2 TIMES DAILY
Qty: 1 EACH | Refills: 2 | Status: SHIPPED | OUTPATIENT
Start: 2022-05-23

## 2022-05-27 ENCOUNTER — TELEPHONE (OUTPATIENT)
Dept: ENDOCRINOLOGY | Facility: CLINIC | Age: 67
End: 2022-05-27

## 2022-05-27 ENCOUNTER — TELEPHONE (OUTPATIENT)
Dept: PULMONOLOGY | Facility: CLINIC | Age: 67
End: 2022-05-27

## 2022-05-27 NOTE — TELEPHONE ENCOUNTER
[facility] from [facility], called re: Juliane Daily for the Refugia Memos has been approved from 5/27/22- lifetime    Ref # is [id number]

## 2022-05-27 NOTE — TELEPHONE ENCOUNTER
Spoke to patient he was inquiring into his Rosina Tomlinson needing a prior Dicky Foot completed, I informed he they had already started one and they where just waiting on approval/denial per notes in chart  Patient was content

## 2022-06-14 DIAGNOSIS — J45.40 CHRONIC ASTHMA, MODERATE PERSISTENT, UNCOMPLICATED: ICD-10-CM

## 2022-06-14 DIAGNOSIS — R07.9 CHEST PAIN, UNSPECIFIED TYPE: Primary | ICD-10-CM

## 2022-06-14 RX ORDER — ISOSORBIDE MONONITRATE 30 MG/1
30 TABLET, EXTENDED RELEASE ORAL DAILY
Qty: 90 TABLET | Refills: 3 | Status: SHIPPED | OUTPATIENT
Start: 2022-06-14

## 2022-06-15 ENCOUNTER — TELEPHONE (OUTPATIENT)
Dept: DIABETES SERVICES | Facility: CLINIC | Age: 67
End: 2022-06-15

## 2022-06-15 DIAGNOSIS — J45.40 CHRONIC ASTHMA, MODERATE PERSISTENT, UNCOMPLICATED: ICD-10-CM

## 2022-06-15 RX ORDER — IPRATROPIUM BROMIDE AND ALBUTEROL SULFATE 2.5; .5 MG/3ML; MG/3ML
3 SOLUTION RESPIRATORY (INHALATION) EVERY 6 HOURS PRN
Qty: 360 ML | Refills: 2 | Status: SHIPPED | OUTPATIENT
Start: 2022-06-15 | End: 2022-06-15

## 2022-06-15 RX ORDER — IPRATROPIUM BROMIDE AND ALBUTEROL SULFATE 2.5; .5 MG/3ML; MG/3ML
SOLUTION RESPIRATORY (INHALATION)
Qty: 360 ML | Refills: 2 | Status: SHIPPED | OUTPATIENT
Start: 2022-06-15

## 2022-06-15 NOTE — TELEPHONE ENCOUNTER
Pt in urgent need of his duo neb  STRESSED to me the urgency that Rx is called in today   Aware we don't have a provider in the office today but I would reach out to one of the other locations    Needs sent to [facility]

## 2022-08-04 ENCOUNTER — TELEPHONE (OUTPATIENT)
Dept: ENDOCRINOLOGY | Facility: CLINIC | Age: 67
End: 2022-08-04

## 2022-08-04 NOTE — TELEPHONE ENCOUNTER
Pt called that Khari Griffith is not covered by Medicare and it would cost $336 00  Pt not able to afford that

## 2022-08-08 NOTE — TELEPHONE ENCOUNTER
Called and lvm asking pt if he switched insurances as we have [employer] on file and not Medicare  If so, the cost is most likely due to a deductible he has to meet, asked pt to call ins company for further details and call back if he has any other concerns

## 2022-08-17 ENCOUNTER — TELEPHONE (OUTPATIENT)
Dept: PULMONOLOGY | Facility: CLINIC | Age: 67
End: 2022-08-17

## 2022-08-17 NOTE — TELEPHONE ENCOUNTER
Dima Payne, from [address] DENISE Hameed re: they received a DWO for Jose Marks but are unable to read the reference number  They are requesting that we re-fax the [address] to [phone]    Please advise Madhav Tyler): [phone]

## 2022-08-18 NOTE — TELEPHONE ENCOUNTER
Trish Pena and [place] approved until 8/18/23-- I called and informed pt  Also informed him to reach out to his insurance company to see if he can be reimbursed since he paid out of pocket for the meds yesterday  He agreed and was thankful

## 2022-08-18 NOTE — TELEPHONE ENCOUNTER
Pt called office again today, 8/18  Pt stated that [employer] will not cover tudorza or wixela inhub  I called [employer] and they stated both medications would be covered  Tried to called [employer] twice and there was no answer  Would suggest a 3rd attempt to be made  Is there anything else that can or should be done to see what we can do for this pt?

## 2022-08-18 NOTE — TELEPHONE ENCOUNTER
Called pt's pharmacy, pt picked up both the [place] and Wixela inhalers yesterday and paid out of pocket  The [place] was $336 and [place] was $47  The pharmacy did inform me that both meds may require auth even through pt paid out of pocket for both  I submitted the auth today for both inhalers

## 2022-08-26 ENCOUNTER — PREP FOR PROCEDURE (OUTPATIENT)
Dept: CARDIOLOGY CLINIC | Facility: HOSPITAL | Age: 67
End: 2022-08-26

## 2022-08-26 ENCOUNTER — OFFICE VISIT (OUTPATIENT)
Dept: CARDIOLOGY CLINIC | Facility: HOSPITAL | Age: 67
End: 2022-08-26
Payer: MEDICARE

## 2022-08-26 VITALS
OXYGEN SATURATION: 95 % | DIASTOLIC BLOOD PRESSURE: 68 MMHG | WEIGHT: 297.2 LBS | HEIGHT: 74 IN | HEART RATE: 71 BPM | BODY MASS INDEX: 38.14 KG/M2 | SYSTOLIC BLOOD PRESSURE: 130 MMHG

## 2022-08-26 DIAGNOSIS — E78.5 DYSLIPIDEMIA: ICD-10-CM

## 2022-08-26 DIAGNOSIS — E66.9 OBESITY (BMI 30-39.9): ICD-10-CM

## 2022-08-26 DIAGNOSIS — I42.8 NONISCHEMIC CARDIOMYOPATHY (HCC): Primary | ICD-10-CM

## 2022-08-26 DIAGNOSIS — G47.33 OBSTRUCTIVE SLEEP APNEA: ICD-10-CM

## 2022-08-26 DIAGNOSIS — I50.42 CHRONIC COMBINED SYSTOLIC AND DIASTOLIC CONGESTIVE HEART FAILURE (HCC): Chronic | ICD-10-CM

## 2022-08-26 DIAGNOSIS — I10 BENIGN ESSENTIAL HYPERTENSION: Chronic | ICD-10-CM

## 2022-08-26 DIAGNOSIS — R94.39 ABNORMAL CARDIOVASCULAR STRESS TEST: Primary | ICD-10-CM

## 2022-08-26 PROCEDURE — 99214 OFFICE O/P EST MOD 30 MIN: CPT | Performed by: INTERNAL MEDICINE

## 2022-08-26 NOTE — PROGRESS NOTES
Cardiology Follow Up    [facility]  1955  [id number]  [facility] [address] [id number]  Phone#  [phone]  Fax#  [phone]      1  Nonischemic cardiomyopathy ([address] )  Echo complete w/ contrast if indicated   2  Chronic combined systolic and diastolic congestive heart failure ([address] )     3  Benign essential hypertension     4  Dyslipidemia     5  Obstructive sleep apnea     6  Obesity (BMI 30-39  9)         Discussion/Summary:  Mr Deedee Jean is a pleasant 26-year-old male who presents to the office today for routine follow up  He continues with shortness of breath with exertion in the setting of known COPD  He had a stress test done in 2020 revealing inferior and inferolateral scar  His echo at that time also revealed regional wall motion abnormalities and ejection fraction of 40%  He does not carry the diagnosis of coronary disease  He had a heart catheterization in 2015 in the setting of his cardiomyopathy revealing no obstructive coronary disease  During his last visit we had discussed further diagnostic options including a repeat heart catheterization versus a coronary CTA  He did not want to pursue any invasive testing but due to worsening symptoms he is now agreeable to a heart catheterization  Otherwise he does appear mildly volume overloaded on exam today  I will increase his Lasix to 40 mg daily  A low-salt diet and daily weights were reinforced  He will undergo repeat echocardiogram for re-evaluation of his LV function  After his last visit he was also started on statin therapy given his abnormal stress test and coronary artery calcifications noted on CT scan  I have no reassessment of his lipids  Another prescription was provided  Otherwise he is on appropriate medication regimen for his cardiomyopathy in the form of carvedilol and lisinopril    I will discuss transition to Entresto at his next visit if affordable  He is noncompliant with CPAP despite obtaining a CPAP machine  Compliance was encouraged  We discussed his alcohol intake which likely was a contributor to his cardiomyopathy  Complete cessation is advised  He will return to the office in a month for re-evaluation by our advanced practitioner  Interval History:   Mr Eryn Hopson is a pleasant 61-year-old male who presents to the office for routine follow-up  Since his last visit he has not been feeling very well  He notes worsening shortness of breath with minimal exertion  He does report chest discomfort albeit at rest   He was started on Imdur after his last visit which he feels has helped the chest discomfort  He also reports that he has gained about 6 lb in the last week  He started utilizing Lasix last Wednesday and has lost a few lb  He does report lower extremity edema  He chronically sleeps upright in a recliner  He admits to some abdominal bloating  He reports that he has not been compliant with a low-salt diet  He eats breakfast meats and has been utilizing sea salt  He denies lightheadedness, syncope or presyncope  He denies palpitations  He denies symptoms of claudication  He has been abstaining from alcohol over the last three months although it was his girlfriend's birthday in the recent past and he does admit to drinking alcohol around that time  Since his last visit he did obtain CPAP equipment but has not been compliant with CPAP        Problem List     Chronic combined systolic and diastolic CHF (congestive heart failure) (HCC)    Nonischemic cardiomyopathy (HCC)    Dyslipidemia    Hypertension    Asthma, chronic, moderate persistent, uncomplicated    Obstructive sleep apnea        Past Medical History:   Diagnosis Date    Asthma     [facility]     [facility]     COVID-19     Mumps     Old MI (myocardial infarction)     Pneumonia     Pulmonary [facility]     Rectal bleeding 2019    Sleep apnea      Social History     Socioeconomic History    Marital status: /Civil Union     Spouse name: Not on file    Number of children: Not on file    Years of education: Not on file    Highest education level: Not on file   Occupational History    Not on file   Tobacco Use    Smoking status: Former Smoker     Packs/day: 3 00     Years: 43 00     Pack years: 129 00     Start date:      Quit date:      Years since quittin 6    Smokeless tobacco: Never Used   Vaping Use    Vaping Use: Never used   Substance and Sexual Activity    Alcohol use: Not Currently     Comment: rarely    Drug use: Yes     Types: Marijuana     Comment: occasionally edible    Sexual activity: Not on file   Other Topics Concern    Not on file   Social History Narrative    Caffeine use     Social Determinants of Health     Financial Resource Strain: Not on file   Food Insecurity: Not on file   Transportation Needs: Not on file   Physical Activity: Not on file   Stress: Not on file   Social Connections: Not on file   Intimate Partner Violence: Not on file   Housing Stability: Not on file      Family History   Problem Relation Age of Onset    Heart attack Father         MI    Heart disease Father     Diabetes Sister         DM    Arthritis Family     Coronary artery disease Family     Hypertension Family     Tuberculosis Son     Alzheimer's disease Mother      Past Surgical History:   Procedure Laterality Date    CARDIAC CATHETERIZATION  2015    Left main- normal and maidly tortuous  Circumflex - normal and moderately tortuous  RCA- normal and mildy tortuous  Global LV function was severely depressed  Rozella Hesselbach INGUINAL HERNIA REPAIR Bilateral     AK COLONOSCOPY FLX DX W/COLLJ SPEC WHEN PFRMD N/A 3/11/2019    Procedure: COLONOSCOPY with removal of anal papilla;   Surgeon: Yari Villeda MD;  [facility]: [facility];  Service: Colorectal    TONSILLECTOMY      UMBILICAL HERNIA REPAIR  06/21/2006       Current Outpatient Medications:     aclidinium (Tudorza Pressair) 400 MCG/ACT inhaler, Inhale 1 puff (400 mcg total)  in the morning and 1 puff (400 mcg total) in the evening , Disp: 1 each, Rfl: 2    albuterol (ProAir HFA) 90 mcg/act inhaler, Inhale 2 puffs every 6 (six) hours as needed for wheezing, Disp: 18 g, Rfl: 11    ALPRAZolam (XANAX) 0 5 mg tablet, Take 1 tablet (0 5 mg total) by mouth daily at bedtime as needed for anxiety, Disp: 30 tablet, Rfl: 0    aspirin 325 mg tablet, Take 1 tablet by mouth daily, Disp: , Rfl:     atorvastatin (LIPITOR) 20 mg tablet, Take 1 tablet (20 mg total) by mouth daily, Disp: 90 tablet, Rfl: 3    carvedilol (COREG) 12 5 mg tablet, Take 1 tablet (12 5 mg total) by mouth 2 (two) times a day with meals, Disp: 60 tablet, Rfl: 11    DULoxetine (CYMBALTA) 60 mg delayed release capsule, Take 1 capsule (60 mg total) by mouth daily, Disp: 30 capsule, Rfl: 5    fluticasone-salmeterol (Wixela Inhub) 500-50 mcg/dose inhaler, Inhale 1 puff 2 (two) times a day Rinse mouth after use , Disp: 60 blister, Rfl: 11    furosemide (LASIX) 20 mg tablet, take 1 tablet by mouth once daily if needed for WEIGHT GAIN OR EDEMA, Disp: 90 tablet, Rfl: 0    guaiFENesin (MUCINEX) 600 mg 12 hr tablet, Take 2 tablets (1,200 mg total) by mouth every 12 (twelve) hours, Disp: 60 tablet, Rfl: 11    ipratropium-albuterol (DUO-NEB) 0 5-2 5 mg/3 mL nebulizer solution, inhale contents of 1 vial ( 3 milliliters ) in nebulizer by mouth and INTO THE LUNGS every 6 hours if needed for wheezing, Disp: 360 mL, Rfl: 2    isosorbide mononitrate (IMDUR) 30 mg 24 hr tablet, Take 1 tablet (30 mg total) by mouth daily, Disp: 90 tablet, Rfl: 3    lisinopril (ZESTRIL) 20 mg tablet, take 1 tablet by mouth twice a day, Disp: 60 tablet, Rfl: 11    busPIRone (BUSPAR) 5 mg tablet, Take 1 tablet (5 mg total) by mouth 2 (two) times a day (Patient not taking: Reported on 8/26/2022), Disp: 60 tablet, Rfl: 0    predniSONE 10 mg tablet, Take 1 tablet (10 mg total) by mouth daily 40mg x 3 days, 30 mg x 3 days, 20 mg x 3 days, 10 mg x 3 days (Patient not taking: Reported on 8/26/2022), Disp: 30 tablet, Rfl: 0    ranolazine (RANEXA) 500 mg 12 hr tablet, Take 1 tablet (500 mg total) by mouth 2 (two) times a day (Patient not taking: No sig reported), Disp: 60 tablet, Rfl: 0  Allergies   Allergen Reactions    Ciprofloxacin Shortness Of Breath and Diarrhea       Labs:     Chemistry        Component Value Date/Time     07/27/2015 0559    K 4 4 12/10/2021 1251    K 3 8 07/27/2015 0559     12/10/2021 1251     07/27/2015 0559    CO2 24 12/10/2021 1251    CO2 32 07/27/2015 0559    BUN 40 (H) 12/10/2021 1251    BUN 19 07/27/2015 0559    CREATININE 1 36 (H) 12/10/2021 1251    CREATININE 1 05 07/27/2015 0559        Component Value Date/Time    CALCIUM 9 1 12/10/2021 1251    CALCIUM 8 6 07/27/2015 0559    ALKPHOS 62 12/10/2021 1251    ALKPHOS 75 07/22/2015 1021    AST 15 12/10/2021 1251    AST 30 07/22/2015 1021    ALT 46 12/10/2021 1251    ALT 74 07/22/2015 1021    BILITOT 1 05 (H) 07/22/2015 1021            Lab Results   Component Value Date    CHOL 105 07/23/2015     Lab Results   Component Value Date    HDL 27 (L) 09/22/2021    HDL 24 (L) 12/18/2018    HDL 21 07/23/2015     Lab Results   Component Value Date    LDLCALC 107 (H) 09/22/2021    LDLCALC 89 12/18/2018    LDLCALC 69 07/23/2015     Lab Results   Component Value Date    TRIG 131 09/22/2021    TRIG 107 12/18/2018    TRIG 74 07/23/2015     No results found for: CHOLHDL    Imaging: No results found  Review of Systems   Constitutional: Positive for malaise/fatigue  Cardiovascular: Positive for chest pain, dyspnea on exertion and leg swelling         Vitals:    08/26/22 1510   BP: 130/68   Pulse: 71   SpO2: 95%     Vitals:    08/26/22 1510   Weight: 135 kg (297 lb 3 2 oz) Height: 6' 2" (188 cm)   Body mass index is 38 16 kg/m²      Physical Exam:  General:  Alert and cooperative, appears stated age  HEENT:  PERRLA, EOMI, no scleral icterus, no conjunctival pallor  Neck:  No lymphadenopathy, no thyromegaly, no carotid bruits, elevated JVP  Heart:  Regular rate and rhythm, normal S1/S2, no S3/S4, no murmur  Lungs:  Coarse breath sounds bilaterally  Abdomen:  Soft, non-tender, positive bowel sounds, no rebound or guarding,   no organomegaly   Extremities:  Positive pedal edema   Vascular:  2+ pedal pulses  Skin:  No rashes or lesions on exposed skin  Neurologic:  Cranial nerves II-XII grossly intact without focal deficits

## 2022-09-01 ENCOUNTER — TELEPHONE (OUTPATIENT)
Dept: CARDIOLOGY CLINIC | Facility: CLINIC | Age: 67
End: 2022-09-01

## 2022-09-01 ENCOUNTER — HOSPITAL ENCOUNTER (OUTPATIENT)
Dept: NON INVASIVE DIAGNOSTICS | Facility: HOSPITAL | Age: 67
Discharge: HOME | End: 2022-09-01
Attending: INTERNAL MEDICINE
Payer: MEDICARE

## 2022-09-01 ENCOUNTER — APPOINTMENT (OUTPATIENT)
Dept: LAB | Facility: HOSPITAL | Age: 67
End: 2022-09-01
Attending: INTERNAL MEDICINE
Payer: MEDICARE

## 2022-09-01 VITALS
HEIGHT: 74 IN | BODY MASS INDEX: 38.12 KG/M2 | HEART RATE: 80 BPM | WEIGHT: 297 LBS | SYSTOLIC BLOOD PRESSURE: 130 MMHG | DIASTOLIC BLOOD PRESSURE: 68 MMHG

## 2022-09-01 DIAGNOSIS — E78.5 DYSLIPIDEMIA: ICD-10-CM

## 2022-09-01 DIAGNOSIS — I42.8 NONISCHEMIC CARDIOMYOPATHY (HCC): ICD-10-CM

## 2022-09-01 DIAGNOSIS — R73.9 HYPERGLYCEMIA: ICD-10-CM

## 2022-09-01 LAB
ALBUMIN SERPL BCP-MCNC: 3.6 G/DL (ref 3.5–5)
ALP SERPL-CCNC: 72 U/L (ref 46–116)
ALT SERPL W P-5'-P-CCNC: 40 U/L (ref 12–78)
ANION GAP SERPL CALCULATED.3IONS-SCNC: 9 MMOL/L (ref 4–13)
AORTIC ROOT: 3.5 CM
APICAL FOUR CHAMBER EJECTION FRACTION: 44 %
AST SERPL W P-5'-P-CCNC: 17 U/L (ref 5–45)
BILIRUB SERPL-MCNC: 0.73 MG/DL (ref 0.2–1)
BUN SERPL-MCNC: 37 MG/DL (ref 5–25)
CALCIUM SERPL-MCNC: 9.5 MG/DL (ref 8.3–10.1)
CHLORIDE SERPL-SCNC: 102 MMOL/L (ref 96–108)
CHOLEST SERPL-MCNC: 104 MG/DL
CO2 SERPL-SCNC: 27 MMOL/L (ref 21–32)
CREAT SERPL-MCNC: 1.68 MG/DL (ref 0.6–1.3)
E WAVE DECELERATION TIME: 266 MS
EST. AVERAGE GLUCOSE BLD GHB EST-MCNC: 143 MG/DL
FRACTIONAL SHORTENING: 28 (ref 28–44)
GFR SERPL CREATININE-BSD FRML MDRD: 41 ML/MIN/1.73SQ M
GLUCOSE P FAST SERPL-MCNC: 117 MG/DL (ref 65–99)
HBA1C MFR BLD: 6.6 %
HDLC SERPL-MCNC: 27 MG/DL
INTERVENTRICULAR SEPTUM IN DIASTOLE (PARASTERNAL SHORT AXIS VIEW): 1.2 CM
INTERVENTRICULAR SEPTUM: 1.2 CM (ref 0.6–1.1)
LAAS-AP2: 18.9 CM2
LAAS-AP4: 23.1 CM2
LDLC SERPL CALC-MCNC: 49 MG/DL (ref 0–100)
LDLC SERPL DIRECT ASSAY-MCNC: 60 MG/DL (ref 0–100)
LEFT ATRIUM AREA SYSTOLE SINGLE PLANE A4C: 26.6 CM2
LEFT ATRIUM SIZE: 4.7 CM
LEFT INTERNAL DIMENSION IN SYSTOLE: 4.4 CM (ref 2.1–4)
LEFT VENTRICULAR INTERNAL DIMENSION IN DIASTOLE: 6.1 CM (ref 3.5–6)
LEFT VENTRICULAR POSTERIOR WALL IN END DIASTOLE: 1.2 CM
LEFT VENTRICULAR STROKE VOLUME: 96 ML
LVSV (TEICH): 96 ML
MV E'TISSUE VEL-SEP: 9 CM/S
MV PEAK A VEL: 0.71 M/S
MV PEAK E VEL: 50 CM/S
MV STENOSIS PRESSURE HALF TIME: 77 MS
MV VALVE AREA P 1/2 METHOD: 2.86
NONHDLC SERPL-MCNC: 77 MG/DL
POTASSIUM SERPL-SCNC: 4.5 MMOL/L (ref 3.5–5.3)
PROT SERPL-MCNC: 7.4 G/DL (ref 6.4–8.4)
RIGHT ATRIUM AREA SYSTOLE A4C: 19 CM2
RIGHT VENTRICLE ID DIMENSION: 3.1 CM
SL CV LEFT ATRIUM LENGTH A2C: 5 CM
SL CV LV EF: 45
SL CV PED ECHO LEFT VENTRICLE DIASTOLIC VOLUME (MOD BIPLANE) 2D: 186 ML
SL CV PED ECHO LEFT VENTRICLE SYSTOLIC VOLUME (MOD BIPLANE) 2D: 90 ML
SODIUM SERPL-SCNC: 138 MMOL/L (ref 135–147)
TR MAX PG: 19 MMHG
TR PEAK VELOCITY: 2.2 M/S
TRICUSPID ANNULAR PLANE SYSTOLIC EXCURSION: 2.7 CM
TRICUSPID VALVE PEAK REGURGITATION VELOCITY: 2.15 M/S
TRIGL SERPL-MCNC: 138 MG/DL

## 2022-09-01 PROCEDURE — 83036 HEMOGLOBIN GLYCOSYLATED A1C: CPT

## 2022-09-01 PROCEDURE — 80061 LIPID PANEL: CPT

## 2022-09-01 PROCEDURE — 93306 TTE W/DOPPLER COMPLETE: CPT | Performed by: INTERNAL MEDICINE

## 2022-09-01 PROCEDURE — 83721 ASSAY OF BLOOD LIPOPROTEIN: CPT

## 2022-09-01 PROCEDURE — 93306 TTE W/DOPPLER COMPLETE: CPT

## 2022-09-09 ENCOUNTER — TELEPHONE (OUTPATIENT)
Dept: CARDIOLOGY CLINIC | Facility: CLINIC | Age: 67
End: 2022-09-09

## 2022-09-09 DIAGNOSIS — I42.8 NONISCHEMIC CARDIOMYOPATHY (HCC): Primary | ICD-10-CM

## 2022-09-09 NOTE — TELEPHONE ENCOUNTER
Patient scheduled for LHC at [facility] on 9/27/22 with Dr Ben Camejo  Patient aware of general instructions and labs required  Lisinopril, hold the day prior  Can we please have insurance check for service

## 2022-09-22 ENCOUNTER — APPOINTMENT (OUTPATIENT)
Dept: LAB | Facility: CLINIC | Age: 67
End: 2022-09-22
Payer: MEDICARE

## 2022-09-22 DIAGNOSIS — J44.9 COPD, MODERATE (HCC): ICD-10-CM

## 2022-09-22 LAB
ALBUMIN SERPL BCP-MCNC: 3.4 G/DL (ref 3.5–5)
ALP SERPL-CCNC: 80 U/L (ref 46–116)
ALT SERPL W P-5'-P-CCNC: 34 U/L (ref 12–78)
ANION GAP SERPL CALCULATED.3IONS-SCNC: 6 MMOL/L (ref 4–13)
AST SERPL W P-5'-P-CCNC: 13 U/L (ref 5–45)
BASOPHILS # BLD AUTO: 0.07 THOUSANDS/ΜL (ref 0–0.1)
BASOPHILS NFR BLD AUTO: 1 % (ref 0–1)
BILIRUB SERPL-MCNC: 1.31 MG/DL (ref 0.2–1)
BUN SERPL-MCNC: 25 MG/DL (ref 5–25)
CALCIUM ALBUM COR SERPL-MCNC: 9.9 MG/DL (ref 8.3–10.1)
CALCIUM SERPL-MCNC: 9.4 MG/DL (ref 8.3–10.1)
CHLORIDE SERPL-SCNC: 103 MMOL/L (ref 96–108)
CO2 SERPL-SCNC: 26 MMOL/L (ref 21–32)
CREAT SERPL-MCNC: 1.4 MG/DL (ref 0.6–1.3)
EOSINOPHIL # BLD AUTO: 0.26 THOUSAND/ΜL (ref 0–0.61)
EOSINOPHIL NFR BLD AUTO: 3 % (ref 0–6)
ERYTHROCYTE [DISTWIDTH] IN BLOOD BY AUTOMATED COUNT: 14.9 % (ref 11.6–15.1)
GFR SERPL CREATININE-BSD FRML MDRD: 52 ML/MIN/1.73SQ M
GLUCOSE SERPL-MCNC: 138 MG/DL (ref 65–140)
HCT VFR BLD AUTO: 42.4 % (ref 36.5–49.3)
HGB BLD-MCNC: 13.1 G/DL (ref 12–17)
IMM GRANULOCYTES # BLD AUTO: 0.05 THOUSAND/UL (ref 0–0.2)
IMM GRANULOCYTES NFR BLD AUTO: 1 % (ref 0–2)
LYMPHOCYTES # BLD AUTO: 1.06 THOUSANDS/ΜL (ref 0.6–4.47)
LYMPHOCYTES NFR BLD AUTO: 12 % (ref 14–44)
MCH RBC QN AUTO: 27.3 PG (ref 26.8–34.3)
MCHC RBC AUTO-ENTMCNC: 30.9 G/DL (ref 31.4–37.4)
MCV RBC AUTO: 89 FL (ref 82–98)
MONOCYTES # BLD AUTO: 0.53 THOUSAND/ΜL (ref 0.17–1.22)
MONOCYTES NFR BLD AUTO: 6 % (ref 4–12)
NEUTROPHILS # BLD AUTO: 6.86 THOUSANDS/ΜL (ref 1.85–7.62)
NEUTS SEG NFR BLD AUTO: 77 % (ref 43–75)
NRBC BLD AUTO-RTO: 0 /100 WBCS
PLATELET # BLD AUTO: 248 THOUSANDS/UL (ref 149–390)
PMV BLD AUTO: 10.2 FL (ref 8.9–12.7)
POTASSIUM SERPL-SCNC: 4.2 MMOL/L (ref 3.5–5.3)
PROT SERPL-MCNC: 7.3 G/DL (ref 6.4–8.4)
RBC # BLD AUTO: 4.79 MILLION/UL (ref 3.88–5.62)
SODIUM SERPL-SCNC: 135 MMOL/L (ref 135–147)
WBC # BLD AUTO: 8.83 THOUSAND/UL (ref 4.31–10.16)

## 2022-09-22 PROCEDURE — 80053 COMPREHEN METABOLIC PANEL: CPT

## 2022-09-22 PROCEDURE — 36415 COLL VENOUS BLD VENIPUNCTURE: CPT

## 2022-09-22 PROCEDURE — 85025 COMPLETE CBC W/AUTO DIFF WBC: CPT

## 2022-09-22 RX ORDER — ACLIDINIUM BROMIDE 400 UG/1
1 POWDER, METERED RESPIRATORY (INHALATION) 2 TIMES DAILY
Qty: 1 EACH | Refills: 2 | Status: SHIPPED | OUTPATIENT
Start: 2022-09-22

## 2022-09-23 DIAGNOSIS — J34.89 SINUS DRAINAGE: Primary | ICD-10-CM

## 2022-09-23 NOTE — TELEPHONE ENCOUNTER
Patient labs results from 9/22/22, GFR levels 52  Patient aware to not take Lisinopril the day before and the morning of the procedure

## 2022-09-27 ENCOUNTER — HOSPITAL ENCOUNTER (OUTPATIENT)
Facility: HOSPITAL | Age: 67
Setting detail: OUTPATIENT SURGERY
Discharge: HOME | End: 2022-09-27
Attending: INTERNAL MEDICINE | Admitting: INTERNAL MEDICINE
Payer: MEDICARE

## 2022-09-27 VITALS
SYSTOLIC BLOOD PRESSURE: 157 MMHG | WEIGHT: 290 LBS | HEIGHT: 74 IN | HEART RATE: 71 BPM | OXYGEN SATURATION: 97 % | DIASTOLIC BLOOD PRESSURE: 83 MMHG | BODY MASS INDEX: 37.22 KG/M2 | RESPIRATION RATE: 18 BRPM | TEMPERATURE: 98.1 F

## 2022-09-27 DIAGNOSIS — R94.39 ABNORMAL CARDIOVASCULAR STRESS TEST: ICD-10-CM

## 2022-09-27 DIAGNOSIS — I42.9 CARDIOMYOPATHY (HCC): Primary | ICD-10-CM

## 2022-09-27 LAB
ATRIAL RATE: 68 BPM
P AXIS: 82 DEGREES
PR INTERVAL: 218 MS
QRS AXIS: -27 DEGREES
QRSD INTERVAL: 118 MS
QT INTERVAL: 430 MS
QTC INTERVAL: 457 MS
T WAVE AXIS: 86 DEGREES
VENTRICULAR RATE: 68 BPM

## 2022-09-27 PROCEDURE — 93010 ELECTROCARDIOGRAM REPORT: CPT | Performed by: INTERNAL MEDICINE

## 2022-09-27 PROCEDURE — C1887 CATHETER, GUIDING: HCPCS | Performed by: INTERNAL MEDICINE

## 2022-09-27 PROCEDURE — 99152 MOD SED SAME PHYS/QHP 5/>YRS: CPT | Performed by: INTERNAL MEDICINE

## 2022-09-27 PROCEDURE — C1894 INTRO/SHEATH, NON-LASER: HCPCS | Performed by: INTERNAL MEDICINE

## 2022-09-27 PROCEDURE — 99153 MOD SED SAME PHYS/QHP EA: CPT | Performed by: INTERNAL MEDICINE

## 2022-09-27 PROCEDURE — C1769 GUIDE WIRE: HCPCS | Performed by: INTERNAL MEDICINE

## 2022-09-27 PROCEDURE — 93458 L HRT ARTERY/VENTRICLE ANGIO: CPT | Performed by: INTERNAL MEDICINE

## 2022-09-27 PROCEDURE — NC001 PR NO CHARGE: Performed by: STUDENT IN AN ORGANIZED HEALTH CARE EDUCATION/TRAINING PROGRAM

## 2022-09-27 PROCEDURE — NC001 PR NO CHARGE: Performed by: INTERNAL MEDICINE

## 2022-09-27 PROCEDURE — 93005 ELECTROCARDIOGRAM TRACING: CPT

## 2022-09-27 RX ORDER — NITROGLYCERIN 20 MG/100ML
INJECTION INTRAVENOUS AS NEEDED
Status: DISCONTINUED | OUTPATIENT
Start: 2022-09-27 | End: 2022-09-27 | Stop reason: HOSPADM

## 2022-09-27 RX ORDER — SODIUM CHLORIDE 9 MG/ML
125 INJECTION, SOLUTION INTRAVENOUS CONTINUOUS
Status: DISCONTINUED | OUTPATIENT
Start: 2022-09-27 | End: 2022-09-27

## 2022-09-27 RX ORDER — SODIUM CHLORIDE 9 MG/ML
100 INJECTION, SOLUTION INTRAVENOUS CONTINUOUS
Status: DISCONTINUED | OUTPATIENT
Start: 2022-09-27 | End: 2022-09-27 | Stop reason: HOSPADM

## 2022-09-27 RX ORDER — ASPIRIN 81 MG/1
324 TABLET, CHEWABLE ORAL ONCE
Status: COMPLETED | OUTPATIENT
Start: 2022-09-27 | End: 2022-09-27

## 2022-09-27 RX ORDER — HEPARIN SODIUM 1000 [USP'U]/ML
INJECTION, SOLUTION INTRAVENOUS; SUBCUTANEOUS AS NEEDED
Status: DISCONTINUED | OUTPATIENT
Start: 2022-09-27 | End: 2022-09-27 | Stop reason: HOSPADM

## 2022-09-27 RX ORDER — LIDOCAINE HYDROCHLORIDE 10 MG/ML
INJECTION, SOLUTION EPIDURAL; INFILTRATION; INTRACAUDAL; PERINEURAL AS NEEDED
Status: DISCONTINUED | OUTPATIENT
Start: 2022-09-27 | End: 2022-09-27 | Stop reason: HOSPADM

## 2022-09-27 RX ORDER — VERAPAMIL HCL 2.5 MG/ML
AMPUL (ML) INTRAVENOUS AS NEEDED
Status: DISCONTINUED | OUTPATIENT
Start: 2022-09-27 | End: 2022-09-27 | Stop reason: HOSPADM

## 2022-09-27 RX ORDER — MIDAZOLAM HYDROCHLORIDE 2 MG/2ML
INJECTION, SOLUTION INTRAMUSCULAR; INTRAVENOUS AS NEEDED
Status: DISCONTINUED | OUTPATIENT
Start: 2022-09-27 | End: 2022-09-27 | Stop reason: HOSPADM

## 2022-09-27 RX ORDER — ACETAMINOPHEN 325 MG/1
650 TABLET ORAL EVERY 4 HOURS PRN
Status: DISCONTINUED | OUTPATIENT
Start: 2022-09-27 | End: 2022-09-27 | Stop reason: HOSPADM

## 2022-09-27 RX ORDER — ONDANSETRON 2 MG/ML
4 INJECTION INTRAMUSCULAR; INTRAVENOUS EVERY 6 HOURS PRN
Status: DISCONTINUED | OUTPATIENT
Start: 2022-09-27 | End: 2022-09-27 | Stop reason: HOSPADM

## 2022-09-27 RX ORDER — FENTANYL CITRATE 50 UG/ML
INJECTION, SOLUTION INTRAMUSCULAR; INTRAVENOUS AS NEEDED
Status: DISCONTINUED | OUTPATIENT
Start: 2022-09-27 | End: 2022-09-27 | Stop reason: HOSPADM

## 2022-09-27 RX ADMIN — ASPIRIN 81 MG CHEWABLE TABLET 324 MG: 81 TABLET CHEWABLE at 07:31

## 2022-09-27 RX ADMIN — SODIUM CHLORIDE 125 ML/HR: 0.9 INJECTION, SOLUTION INTRAVENOUS at 07:32

## 2022-09-27 NOTE — DISCHARGE INSTRUCTIONS
1  Please see the post cardiac catheterization dishcarge instructions  No heavy lifting, greater than 10 lbs  or strenuous  activity for 48 hrs  2 Remove band aid tomorrow  Shower and wash area- wrist gently with soap and water- beginning tomorrow  Rinse and pat dry  Apply new water seal band aid  Repeat this process for 5 days  No powders, creams lotions or antibiotic ointments  for 5 days  No tub baths, hot tubs or swimming for 5 days  3  Please call our office ([phone]) if you have any fever, redness, swelling, discharge from your wrist access site      4 No driving for 1 day

## 2022-09-28 NOTE — PROGRESS NOTES
Cardiology Follow Up    Norris Saini  1955  [id number]  [facility] [address] [address]  [phone]    1  Non-ischemic cardiomyopathy ([facility] [address] )  sacubitril-valsartan (Entresto) 24-26 MG TABS    POCT ECG   2  Chronic combined systolic and diastolic congestive heart failure ([facility] [address] )     3  Benign essential hypertension     4  Dyslipidemia     5  History of tobacco abuse     6  Stage 3 chronic kidney disease, unspecified whether stage 3a or 3b CKD (HCC)         Discussion/Summary:  Non-ischemic cardiomyopathy:  EF 40-45% on most recent echo from earlier this month  C yesterday did not reveal any obstructive coronary artery disease  Cardiomyopathy may be secondary to alcohol  Currently on GDMT with carvedilol and lisinopril  Discussed transition to Entresto if affordable - sent to pharmacy for price check  Patient aware he must discontinue lisinopril at least 36 hours before initiation of Entresto  He will need a BMP 1 week if he does start the [facility]ait[facility] Caro  Advised him to abstain completely from alcohol   Will discuss with primary cardiologist if there is any utility in checking a cardiac MRI for further cardiomyopathy evaluation     Chronic combined systolic and diastolic congestive heart failure:  Currently appears euvolemic  He will continue to utilize lasix on a prn basis as renal function worsened with increased/daily lasix dose  Daily weights and a low sodium diet were reinforced    Hypertension:  Elevated initially but did come down to 122/90 on my recheck  Was 130/70 at office visit earlier today  No changes will be made at this time    Dyslipidemia:  Lipid panel from earlier this month was reviewed  Lipids are at goal  Continue atorvastatin 20 mg daily    He will RTO in 3 months with Dr Marylen Ano or sooner if necessary  He will call with any concerns       Interval History:   Norris Saini is a 74 y o  male with a non-ischemic cardiomyopathy thought to be secondary to alcohol abuse, chronic combined systolic and diastolic congestive heart failure, hypertension, dyslipidemia, obstructive sleep apnea, and COPD/asthma who presents to the office today for follow up  Since his last visit he underwent a repeat cardiac catheterization due to persistently moderately reduced ejection fraction without improvement  Cardiac catheterization showed minor luminal irregularities only  Overall he presents today without any acute complaints  He did increase his lasix to 40 mg daily as instructed at his last visit  He did so for a few days  This improved his edema but did not change his dyspnea on exertion which is chronic and unchanged   He was instructed to return to taking lasix 20 mg prn due to worsening creatinine  He denies any chest pain/pressure/discomfort  He does have orthopnea/PND and sleeps upright  He purchased a CPAP for his known obstructive sleep apnea but unfortunately has been unable to tolerate this  He denies lightheadedness, dizziness, and syncope  He denies palpitations  He has been drinking a few alcoholic beverages about once a week      Medical Problems             Problem List     Chronic asthma, moderate persistent, uncomplicated    Obstructive sleep apnea    Overview Signed 12/19/2018 10:16 AM by Baron León DO       Declines CPAP         Chronic combined systolic and diastolic congestive heart failure (HCC) (Chronic)    Wt Readings from Last 3 Encounters:   09/29/22 134 kg (296 lb)   09/29/22 135 kg (296 lb 9 6 oz)   09/27/22 132 kg (290 lb)                 Non-ischemic cardiomyopathy with HFmEF (HCC) (Chronic)    Dyslipidemia    Benign essential hypertension (Chronic)    Anxiety (Chronic)    Asthma-COPD overlap syndrome (HCC) (Chronic)    Borderline hyperglycemia    COPD, severe ([facility] )    Overview Signed 12/19/2018 10:16 AM by Baron León DO      Post bronchodilator FEV1 is 68% predicted, 2017 Hemorrhoids    Thyroid disease    Vitamin D deficiency    Constipation    Pulmonary emphysema ([place] [address] )    History of tobacco abuse    Hypertrophied anal papilla    Annual physical exam    Bronchitis    PLMD (periodic limb movement disorder)    COVID-19 virus infection    Obesity (BMI 30-39  9)    Chronic obstructive pulmonary disease with acute exacerbation (HCC)              Past Medical History:   Diagnosis Date    Asthma     [facility])     COPD (chronic obstructive pulmonary disease) ([facility] )     COVID-19     Mumps     Old MI (myocardial infarction)     Pneumonia     Pulmonary emphysema ([place] [address] )     Rectal bleeding 2019    Sleep apnea      Social History     Socioeconomic History    Marital status: /Civil Union     Spouse name: Not on file    Number of children: Not on file    Years of education: Not on file    Highest education level: Not on file   Occupational History    Not on file   Tobacco Use    Smoking status: Former Smoker     Packs/day: 3 00     Years: 43 00     Pack years: 129 00     Start date:      Quit date:      Years since quittin 7    Smokeless tobacco: Never Used   Vaping Use    Vaping Use: Never used   Substance and Sexual Activity    Alcohol use: Not Currently     Comment: rarely    Drug use: Yes     Types: Marijuana     Comment: occasionally edible    Sexual activity: Not on file   Other Topics Concern    Not on file   Social History Narrative    Caffeine use     Social Determinants of Health     Financial Resource Strain: Not on file   Food Insecurity: Not on file   Transportation Needs: Not on file   Physical Activity: Not on file   Stress: Not on file   Social Connections: Not on file   Intimate Partner Violence: Not on file   Housing Stability: Not on file      Family History   Problem Relation Age of Onset    Heart attack Father         MI    Heart disease Father     Diabetes Sister         DM    Arthritis Family     Coronary artery disease Family     Hypertension Family     Tuberculosis Son     Alzheimer's disease Mother      Past Surgical History:   Procedure Laterality Date    CARDIAC CATHETERIZATION  07/24/2015    Left main- normal and maidly tortuous  Circumflex - normal and moderately tortuous  RCA- normal and mildy tortuous  Global LV function was severely depressed  Colie Rouleau CARDIAC CATHETERIZATION Left 9/27/2022    Procedure: Cardiac Left Heart Cath;  Surgeon: Martín Lo DO;  Location: [facility]; Service: Cardiology    CARDIAC CATHETERIZATION N/A 9/27/2022    Procedure: Cardiac Coronary Angiogram;  Surgeon: Martín Lo DO;  [facility]; Service: Cardiology    CARDIAC CATHETERIZATION  9/27/2022    Procedure: Cardiac catheterization;  Surgeon: Martín Lo DO;  Location: [facility]; Service: Cardiology    INGUINAL HERNIA REPAIR Bilateral     UT COLONOSCOPY FLX DX W/COLLJ SPEC WHEN PFRMD N/A 3/11/2019    Procedure: COLONOSCOPY with removal of anal papilla;   Surgeon: Joslyn Rangel MD;  Location: [facility] MAIN OR;  Service: Colorectal    TONSILLECTOMY      UMBILICAL HERNIA REPAIR  06/21/2006       Current Outpatient Medications:     aclidinium (Tudorza Pressair) 400 MCG/ACT inhaler, Inhale 1 puff (400 mcg total) 2 (two) times a day, Disp: 1 each, Rfl: 2    albuterol (ProAir HFA) 90 mcg/act inhaler, Inhale 2 puffs every 6 (six) hours as needed for wheezing, Disp: 18 g, Rfl: 11    ALPRAZolam (XANAX) 0 5 mg tablet, Take 1 tablet (0 5 mg total) by mouth daily at bedtime as needed for anxiety, Disp: 30 tablet, Rfl: 0    aspirin 325 mg tablet, Take 1 tablet by mouth daily, Disp: , Rfl:     atorvastatin (LIPITOR) 20 mg tablet, Take 1 tablet (20 mg total) by mouth daily, Disp: 90 tablet, Rfl: 3    carvedilol (COREG) 12 5 mg tablet, Take 1 tablet (12 5 mg total) by mouth 2 (two) times a day with meals, Disp: 60 tablet, Rfl: 11    DULoxetine (CYMBALTA) 60 mg delayed release capsule, Take 1 capsule (60 mg total) by mouth daily, Disp: 30 capsule, Rfl: 5    fluticasone-salmeterol (Wixela Inhub) 500-50 mcg/dose inhaler, Inhale 1 puff 2 (two) times a day Rinse mouth after use , Disp: 60 blister, Rfl: 11    furosemide (LASIX) 20 mg tablet, take 1 tablet by mouth once daily if needed for WEIGHT GAIN OR EDEMA, Disp: 90 tablet, Rfl: 0    guaiFENesin (MUCINEX) 600 mg 12 hr tablet, Take 2 tablets (1,200 mg total) by mouth every 12 (twelve) hours, Disp: 60 tablet, Rfl: 11    ipratropium-albuterol (DUO-NEB) 0 5-2 5 mg/3 mL nebulizer solution, inhale contents of 1 vial ( 3 milliliters ) in nebulizer by mouth and INTO THE LUNGS every 6 hours if needed for wheezing, Disp: 360 mL, Rfl: 2    isosorbide mononitrate (IMDUR) 30 mg 24 hr tablet, Take 1 tablet (30 mg total) by mouth daily, Disp: 90 tablet, Rfl: 3    lisinopril (ZESTRIL) 20 mg tablet, take 1 tablet by mouth twice a day, Disp: 60 tablet, Rfl: 11    sacubitril-valsartan (Entresto) 24-26 MG TABS, Take 1 tablet by mouth 2 (two) times a day This will replace your lisinopril  You must wait at least 36 hours after your last lisinopril dose to take this medication  , Disp: 60 tablet, Rfl: 0  Allergies   Allergen Reactions    Ciprofloxacin Shortness Of Breath and Diarrhea       Labs:     Chemistry        Component Value Date/Time     07/27/2015 0559    K 4 2 09/22/2022 1241    K 3 8 07/27/2015 0559     09/22/2022 1241     07/27/2015 0559    CO2 26 09/22/2022 1241    CO2 32 07/27/2015 0559    BUN 25 09/22/2022 1241    BUN 19 07/27/2015 0559    CREATININE 1 40 (H) 09/22/2022 1241    CREATININE 1 05 07/27/2015 0559        Component Value Date/Time    CALCIUM 9 4 09/22/2022 1241    CALCIUM 8 6 07/27/2015 0559    ALKPHOS 80 09/22/2022 1241    ALKPHOS 75 07/22/2015 1021    AST 13 09/22/2022 1241    AST 30 07/22/2015 1021    ALT 34 09/22/2022 1241    ALT 74 07/22/2015 1021    BILITOT 1 05 (H) 07/22/2015 1021 Lab Results   Component Value Date    CHOL 105 07/23/2015     Lab Results   Component Value Date    HDL 27 (L) 09/01/2022    HDL 27 (L) 09/22/2021    HDL 24 (L) 12/18/2018     Lab Results   Component Value Date    LDLCALC 49 09/01/2022    LDLCALC 107 (H) 09/22/2021    LDLCALC 89 12/18/2018     Lab Results   Component Value Date    TRIG 138 09/01/2022    TRIG 131 09/22/2021    TRIG 107 12/18/2018     No results found for: CHOLHDL    Imaging: Cardiac catheterization    Result Date: 9/27/2022  Narrative: · No angiographic evidence of significant obstructive CAD · Mild luminal irregularities amenable to medical management      Echo complete w/ contrast if indicated    Result Date: 9/1/2022  Narrative: [facility]  Left Ventricle: Left ventricular cavity size is normal when indexed for BSA  Wall thickness is mildly increased  There is eccentric hypertrophy  The left ventricular ejection fraction is 40-45%  Systolic function is moderately reduced  There is global hypokinesis with specific regional wall abnormalities  Diastolic function is mildly abnormal, consistent with grade I (abnormal) relaxation    Right Ventricle: Right ventricular cavity size is normal  Systolic function is normal    Left Atrium: The atrium is mildly dilated    Right Atrium: The atrium is mildly dilated    The following segments are akinetic: basal inferoseptal, basal inferior, basal inferolateral and mid inferolateral    The following segments are hypokinetic: basal anterior, basal anteroseptal, basal anterolateral, mid anterior, mid anteroseptal, mid inferoseptal, mid inferior, mid anterolateral, apical anterior, apical septal, apical inferior, apical lateral and apex  ECG:    Sinus rhythm with first degree AV block  Low voltage  LAFB    Review of Systems   Cardiovascular: Positive for dyspnea on exertion, orthopnea and paroxysmal nocturnal dyspnea  All other systems reviewed and are negative        Vitals:    09/29/22 1228   BP: 122/90 Pulse: 63     Vitals:    09/29/22 1228   Weight: 134 kg (296 lb)     Height: 6' 2" (188 cm)   Body mass index is 38 kg/m²  Physical Exam:  Physical Exam  Vitals reviewed  Constitutional:       General: He is not in acute distress  Appearance: He is well-developed  He is obese  He is not diaphoretic  HENT:      Head: Normocephalic and atraumatic  Eyes:      Pupils: Pupils are equal, round, and reactive to light  Neck:      Vascular: No carotid bruit  Cardiovascular:      Rate and Rhythm: Normal rate and regular rhythm  Pulses:           Radial pulses are 2+ on the right side and 2+ on the left side  Heart sounds: S1 normal and S2 normal  No murmur heard  Pulmonary:      Effort: Pulmonary effort is normal  No respiratory distress  Breath sounds: Normal breath sounds  No wheezing or rales  Abdominal:      General: There is no distension  Palpations: Abdomen is soft  Tenderness: There is no abdominal tenderness  Musculoskeletal:         General: Normal range of motion  Cervical back: Normal range of motion  Right lower leg: Edema (trace edema bilaterally) present  Left lower leg: Edema present  Skin:     General: Skin is warm and dry  Findings: No erythema  Neurological:      General: No focal deficit present  Mental Status: He is alert and oriented to person, place, and time  Gait: Gait abnormal (ambulates with a cane)     Psychiatric:         Mood and Affect: Mood normal          Behavior: Behavior normal

## 2022-09-29 ENCOUNTER — OFFICE VISIT (OUTPATIENT)
Dept: CARDIOLOGY CLINIC | Facility: HOSPITAL | Age: 67
End: 2022-09-29
Payer: MEDICARE

## 2022-09-29 ENCOUNTER — OFFICE VISIT (OUTPATIENT)
Dept: PULMONOLOGY | Facility: CLINIC | Age: 67
End: 2022-09-29
Payer: MEDICARE

## 2022-09-29 VITALS
DIASTOLIC BLOOD PRESSURE: 90 MMHG | WEIGHT: 296 LBS | HEART RATE: 63 BPM | SYSTOLIC BLOOD PRESSURE: 122 MMHG | HEIGHT: 74 IN | BODY MASS INDEX: 37.99 KG/M2

## 2022-09-29 VITALS
OXYGEN SATURATION: 98 % | RESPIRATION RATE: 18 BRPM | WEIGHT: 296.6 LBS | TEMPERATURE: 97.4 F | HEIGHT: 74 IN | DIASTOLIC BLOOD PRESSURE: 70 MMHG | HEART RATE: 80 BPM | SYSTOLIC BLOOD PRESSURE: 132 MMHG | BODY MASS INDEX: 38.07 KG/M2

## 2022-09-29 DIAGNOSIS — E78.5 DYSLIPIDEMIA: ICD-10-CM

## 2022-09-29 DIAGNOSIS — F17.211 NICOTINE DEPENDENCE, CIGARETTES, IN REMISSION: ICD-10-CM

## 2022-09-29 DIAGNOSIS — F17.201 NICOTINE DEPENDENCE IN REMISSION, UNSPECIFIED NICOTINE PRODUCT TYPE: Primary | ICD-10-CM

## 2022-09-29 DIAGNOSIS — I10 BENIGN ESSENTIAL HYPERTENSION: Chronic | ICD-10-CM

## 2022-09-29 DIAGNOSIS — Z87.891 HISTORY OF TOBACCO ABUSE: ICD-10-CM

## 2022-09-29 DIAGNOSIS — N18.30 STAGE 3 CHRONIC KIDNEY DISEASE, UNSPECIFIED WHETHER STAGE 3A OR 3B CKD (HCC): ICD-10-CM

## 2022-09-29 DIAGNOSIS — I42.8 NON-ISCHEMIC CARDIOMYOPATHY (HCC): Primary | ICD-10-CM

## 2022-09-29 DIAGNOSIS — I50.42 CHRONIC COMBINED SYSTOLIC AND DIASTOLIC CONGESTIVE HEART FAILURE (HCC): Chronic | ICD-10-CM

## 2022-09-29 PROCEDURE — 99214 OFFICE O/P EST MOD 30 MIN: CPT | Performed by: PHYSICIAN ASSISTANT

## 2022-09-29 PROCEDURE — 99214 OFFICE O/P EST MOD 30 MIN: CPT | Performed by: INTERNAL MEDICINE

## 2022-09-29 PROCEDURE — 93000 ELECTROCARDIOGRAM COMPLETE: CPT | Performed by: PHYSICIAN ASSISTANT

## 2022-09-29 RX ORDER — SACUBITRIL AND VALSARTAN 24; 26 MG/1; MG/1
1 TABLET, FILM COATED ORAL 2 TIMES DAILY
Qty: 60 TABLET | Refills: 0 | Status: SHIPPED | OUTPATIENT
Start: 2022-09-29

## 2022-09-29 NOTE — PROGRESS NOTES
Pulmonary Follow Up Note   Jamilah Bedoya 74 y o  male MRN: [id number]  9/29/2022    Assessment:  Asthma/COPD overlap  · Severe persistent per last PFT FEV1 of 55% in 02/2021  · Appears to be well controlled on triple inhaler/high-dose ICS  · Last exacerbation/outpatient treatment in 05/2022  · Usually feels worse between November/April  · [place] allergy panel positive for dogs dander/ragweed, CBC in 07/2021 with 850 cells of eosinophilia  · Completed pulmonary rehab in 05/2021    Plan:  · Appears to be appropriate to deescalate therapy however given the worse symptoms during the winter will continue the same treatment  · Continue Tudorza/Advair 500, reinforce the proper inhaler use technique and rinse mouth following each use of the Advair  · Not interested in vaccination, counseled about avoiding sick contact exposure, wash hands regularly      Former tobacco abuse  · About 40 pack year quit in 2019  · No suspicious lesion on lung cancer screening CT chest in 01/2022  · Next due for lung cancer screening CT chest in 01/2023/ordered    CORDELL-follows with Sleep Medicine      Return in about 6 months (around 3/29/2023)  History of Present Illness  Follow up for:  Asthma/COPD overlap     Background:  74 y o  male with a h/o CORDELL, asthma COPD, class 1 obesity, dyslipidemia, CHF (systolic and diastolic), tobacco abuse about 40 pack year history quit 2019, marijuana use, quit 2020       1st diagnosis of asthma approximately 30 years ago, known triggers of extreme temperature/changes, exposure to grass/stronger orders and dog dander   No history of severe exacerbation, last oral steroid intake was in mid 2019   Completed pulmonary rehab program in 05/2021     Initially evaluated by Pulmonary on 05/04/21-recommended to continue Tudorza 400 mcg, Breo 200, p r n   DuoNeb and Atrovent          7/26 visit-continued on tudorza and Breo Ellipta 200, discontinued Atrovent   6 minute walk test showed no oxygen desaturation   [facility] allergy panel weakly positive for dog dander 0 4, common a ragweed positive at 1 13, IgE 38 5   CBC with eosinophilia at 850 cell   Given the pneumococcal vaccine, given trial of mucolytic with Mucinex      9/27 visit-switched to high-dose ICS Advair 500     11/29/2021 visit-treated for exacerbation with steroid taper, doxycycline    1/2022 - continued on advair 500/tudorza, declined vaccination    5/6/22 - treated for exacerbation OP, LDCT    Interval History  Since last seen, no major events or illness  Continued to be active/independent at baseline  No frequent use of p r n  Albuterol, noted usage of DuoNeb once or twice a day  States it is mainly during chest congestion, with exertion feeling some dyspnea  No significant wheezing, cough, or sputum production  Still using Tudorza/Advair 500  Felt worse when Rip Meo was off during insurance not covering  Otherwise, no ED visits, hospitalization, or treatment with oral steroids for asthma since 05/2022      Review of Systems  As per hpi, all other systems reviewed and were negative    Studies:  Imaging and other studies: I have personally reviewed pertinent films in PACS  CT chest 02/25/2021-no suspicious lesions, or nodules, few calcified granuloma at the lingula that unchanged from before, posterior mediastinal lymph node/top normal size unchanged from 2019    CT chest 1/19/2022 - mild biapical/scarring  Calcified granuloma att he lingula   No suspicious lesions     Pulmonary function testing:   PFT 02/04/2021-ratio 54%, FEV1 1 92 L/55%, FVC 3 54 L/69%, TLC 87%, %, DLCO 104%    PFT 12/18/2018-ratio 54%, FEV1 2 6 L/73%, FVC 4 8 L/93%, positive bronchodilator response at the level of FVC and FEV1, %, %, DLCO 114%     PFT 01/30/2017-ratio 41%, FEV1 1 74 L/49%, FVC 4 25 L/84%, positive bronchodilator response at the level of FEV1, %, %, DLCO 95%     6 minute walk test 07/26/2021-baseline SpO2 96% on room air, heart rate 64, able to walk 336 m in 6 minutes, lowest SpO2 at 94%, maximal heart rate at 88, dyspnea scale of 4        EKG, Pathology, and Other Studies: I have personally reviewed pertinent reports     Myocardial perfusion scan 09/25/2020-moderate-size infarct in the distribution of RCA/left circumflex, reduced LV systolic function with WM a     TTE 09/10/2020-EF 40%, akinesis at basal/mid inferior/mid inferior lateral wall, mildly increased LV wall thickness, grade 2 diastolic dysfunction, LA mildly dilated, normal RV size and function       Past medical, surgical, social and family histories reviewed  Medications/Allergies: Reviewed      Vitals: Blood pressure 132/70, pulse 80, temperature (!) 97 4 °F (36 3 °C), resp  rate 18, height 6' 2" (1 88 m), weight 135 kg (296 lb 9 6 oz), SpO2 98 %  Body mass index is 38 08 kg/m²  Oxygen Therapy  SpO2: 98 %  Oxygen Therapy: None (Room air)      Physical Exam  Body mass index is 38 08 kg/m²     Gen: not in acute distress, obese  Neck/Eyes: supple, no adenopathy, PERRL  Ear: normal appearance, no significant hearing impairment  Nose:  normal nasal mucosa, no drainage  Mouth:  unremarkable/normal appearance of lips, teeth and gums  Oropharynx: mucosa is moist, no focal lesions or erythema  Salivary glands: soft nontender  Chest: normal respiratory efforts, diminished but clear breath sounds bilaterally  CV:  Distant heart sounds, no murmurs appreciated, no edema  Abdomen: soft, non tender  Extremities:  No observed deformity   Skin: unremarkable  Neuro: AAO X3, no focal motor deficit        Labs:  Lab Results   Component Value Date    WBC 8 83 09/22/2022    HGB 13 1 09/22/2022    HCT 42 4 09/22/2022    MCV 89 09/22/2022     09/22/2022     Lab Results   Component Value Date    GLUCOSE 87 07/27/2015    CALCIUM 9 4 09/22/2022     07/27/2015    K 4 2 09/22/2022    CO2 26 09/22/2022     09/22/2022    BUN 25 09/22/2022    CREATININE 1 40 (H) 09/22/2022 Lab Results   Component Value Date    IGE 38 5 09/22/2021     Lab Results   Component Value Date    ALT 34 09/22/2022    AST 13 09/22/2022    ALKPHOS 80 09/22/2022    BILITOT 1 05 (H) 07/22/2015           Portions of the record may have been created with voice recognition software  Occasional wrong word or "sound a like" substitutions may have occurred due to the inherent limitations of voice recognition software  Read the chart carefully and recognize, using context, where substitutions have occurred    [facility]

## 2022-09-30 ENCOUNTER — OFFICE VISIT (OUTPATIENT)
Dept: URGENT CARE | Facility: CLINIC | Age: 67
End: 2022-09-30
Payer: MEDICARE

## 2022-09-30 VITALS
HEART RATE: 89 BPM | OXYGEN SATURATION: 95 % | RESPIRATION RATE: 20 BRPM | TEMPERATURE: 97.4 F | WEIGHT: 296 LBS | BODY MASS INDEX: 38 KG/M2

## 2022-09-30 DIAGNOSIS — R05.1 ACUTE COUGH: ICD-10-CM

## 2022-09-30 DIAGNOSIS — J20.9 ACUTE BRONCHITIS, UNSPECIFIED ORGANISM: Primary | ICD-10-CM

## 2022-09-30 PROCEDURE — 99213 OFFICE O/P EST LOW 20 MIN: CPT | Performed by: PHYSICIAN ASSISTANT

## 2022-09-30 PROCEDURE — G0463 HOSPITAL OUTPT CLINIC VISIT: HCPCS | Performed by: PHYSICIAN ASSISTANT

## 2022-09-30 PROCEDURE — U0003 INFECTIOUS AGENT DETECTION BY NUCLEIC ACID (DNA OR RNA); SEVERE ACUTE RESPIRATORY SYNDROME CORONAVIRUS 2 (SARS-COV-2) (CORONAVIRUS DISEASE [COVID-19]), AMPLIFIED PROBE TECHNIQUE, MAKING USE OF HIGH THROUGHPUT TECHNOLOGIES AS DESCRIBED BY CMS-2020-01-R: HCPCS | Performed by: PHYSICIAN ASSISTANT

## 2022-09-30 PROCEDURE — U0005 INFEC AGEN DETEC AMPLI PROBE: HCPCS | Performed by: PHYSICIAN ASSISTANT

## 2022-09-30 RX ORDER — DOXYCYCLINE HYCLATE 100 MG/1
100 CAPSULE ORAL EVERY 12 HOURS SCHEDULED
Qty: 20 CAPSULE | Refills: 0 | Status: SHIPPED | OUTPATIENT
Start: 2022-09-30 | End: 2022-10-10

## 2022-09-30 RX ORDER — PREDNISONE 10 MG/1
TABLET ORAL
Qty: 26 TABLET | Refills: 0 | Status: SHIPPED | OUTPATIENT
Start: 2022-09-30

## 2022-10-01 LAB — SARS-COV-2 RNA RESP QL NAA+PROBE: NEGATIVE

## 2022-10-01 NOTE — PATIENT INSTRUCTIONS
I have prescribed an antibiotic for the infection  Please take the antibiotic as prescribed and finish the entire prescription  I recommend that the patient takes an over the counter probiotic or eats yogurt with live cultures in it [place]) to keep good bacteria in the gut and help prevent diarrhea  Wash hands frequently to prevent the spread of infection  Can use over the counter cough and cold medications to help with symptoms  Ibuprofen and/or tylenol as needed for pain or fever  If not improving over the next 7-10 days, follow up with PCP  Prednisone as directed    Will rule out covid/flu

## 2022-10-01 NOTE — PROGRESS NOTES
[email or web address] [email or web address]    NAME: Theo Kirby is a 74 y o  male  : 1955    MRN: [id number]  DATE: 2022  TIME: 8:11 PM    Assessment and Plan   Acute bronchitis, unspecified organism [J20 [id number]]  1  Acute bronchitis, unspecified organism  predniSONE 10 mg tablet    doxycycline hyclate (VIBRAMYCIN) 100 mg capsule   2  Acute cough         Patient Instructions   Patient Instructions   I have prescribed an antibiotic for the infection  Please take the antibiotic as prescribed and finish the entire prescription  I recommend that the patient takes an over the counter probiotic or eats yogurt with live cultures in it [place]) to keep good bacteria in the gut and help prevent diarrhea  Wash hands frequently to prevent the spread of infection  Can use over the counter cough and cold medications to help with symptoms  Ibuprofen and/or tylenol as needed for pain or fever  If not improving over the next 7-10 days, follow up with PCP  Prednisone as directed  Will rule out covid/flu      Chief Complaint     Chief Complaint   Patient presents with    Cough     today       History of Present Illness   57-year-old male here with complaint of cough and chest congestion started today  Patient has a history of COPD and CHF  He does feel slightly short of breath  No fever  Has had some chills and sweats today  Review of Systems   Review of Systems   Constitutional: Positive for chills and diaphoresis  Negative for fatigue and fever  HENT: Positive for sore throat  Negative for congestion, ear pain, postnasal drip and sinus pressure  Respiratory: Positive for cough and chest tightness  Negative for shortness of breath and wheezing  Cardiovascular: Negative for chest pain, palpitations and leg swelling  Neurological: Negative for headaches  All other systems reviewed and are negative        Current Medications     Current Outpatient Medications:     [facility]idinium [facility] 400 MCG/ACT inhaler, Inhale 1 puff (400 mcg total) 2 (two) times a day, Disp: 1 each, Rfl: 2    albuterol (ProAir HFA) 90 mcg/act inhaler, Inhale 2 puffs every 6 (six) hours as needed for wheezing, Disp: 18 g, Rfl: 11    ALPRAZolam (XANAX) 0 5 mg tablet, Take 1 tablet (0 5 mg total) by mouth daily at bedtime as needed for anxiety, Disp: 30 tablet, Rfl: 0    aspirin 325 mg tablet, Take 1 tablet by mouth daily, Disp: , Rfl:     atorvastatin (LIPITOR) 20 mg tablet, Take 1 tablet (20 mg total) by mouth daily, Disp: 90 tablet, Rfl: 3    carvedilol (COREG) 12 5 mg tablet, Take 1 tablet (12 5 mg total) by mouth 2 (two) times a day with meals, Disp: 60 tablet, Rfl: 11    doxycycline hyclate (VIBRAMYCIN) 100 mg capsule, Take 1 capsule (100 mg total) by mouth every 12 (twelve) hours for 10 days, Disp: 20 capsule, Rfl: 0    DULoxetine (CYMBALTA) 60 mg delayed release capsule, Take 1 capsule (60 mg total) by mouth daily, Disp: 30 capsule, Rfl: 5    furosemide (LASIX) 20 mg tablet, take 1 tablet by mouth once daily if needed for WEIGHT GAIN OR EDEMA, Disp: 90 tablet, Rfl: 0    guaiFENesin (MUCINEX) 600 mg 12 hr tablet, Take 2 tablets (1,200 mg total) by mouth every 12 (twelve) hours, Disp: 60 tablet, Rfl: 11    ipratropium-albuterol (DUO-NEB) 0 5-2 5 mg/3 mL nebulizer solution, inhale contents of 1 vial ( 3 milliliters ) in nebulizer by mouth and INTO THE LUNGS every 6 hours if needed for wheezing, Disp: 360 mL, Rfl: 2    isosorbide mononitrate (IMDUR) 30 mg 24 hr tablet, Take 1 tablet (30 mg total) by mouth daily, Disp: 90 tablet, Rfl: 3    lisinopril (ZESTRIL) 20 mg tablet, take 1 tablet by mouth twice a day, Disp: 60 tablet, Rfl: 11    predniSONE 10 mg tablet, Take 3 tabs BID X 2 days, 2 tabs BID X 2 days, 1 tab BID X 2 days, 1 tab daily X 2 days, Disp: 26 tablet, Rfl: 0    sacubitril-valsartan (Entresto) 24-26 MG TABS, Take 1 tablet by mouth 2 (two) times a day This will replace your lisinopril   You must wait at least 36 hours after your last lisinopril dose to take this medication  , Disp: 60 tablet, Rfl: 0    fluticasone-salmeterol (Roxana Bushy) 500-50 mcg/dose inhaler, Inhale 1 puff 2 (two) times a day Rinse mouth after use , Disp: 60 blister, Rfl: 11    Current Allergies     Allergies as of 09/30/2022 - Reviewed 09/30/2022   Allergen Reaction Noted    Ciprofloxacin Shortness Of Breath and Diarrhea 12/18/2018          The following portions of the patient's history were reviewed and updated as appropriate: allergies, current medications, past family history, past medical history, past social history, past surgical history and problem list    Past Medical History:   Diagnosis Date    Asthma     [facility]) [facility]     COPD (chronic obstructive pulmonary disease) ([facility] )     COVID-19     Mumps     Old MI (myocardial infarction)     Pneumonia     Pulmonary emphysema ([facility] )     Rectal bleeding 1/14/2019    Sleep apnea      Past Surgical History:   Procedure Laterality Date    CARDIAC CATHETERIZATION  07/24/2015    Left main- normal and maidly tortuous  Circumflex - normal and moderately tortuous  RCA- normal and mildy tortuous  Global LV function was severely depressed  Murali Marquez CARDIAC CATHETERIZATION Left 9/27/2022    Procedure: Cardiac Left Heart Cath;  Surgeon: Modesta Kaur DO;  Location:  CARDIAC CATH LAB; Service: Cardiology    CARDIAC CATHETERIZATION N/A 9/27/2022    Procedure: Cardiac Coronary Angiogram;  Surgeon: Modesta Kaur DO;  Location:  CARDIAC CATH LAB; Service: Cardiology    CARDIAC CATHETERIZATION  9/27/2022    Procedure: Cardiac catheterization;  Surgeon: Modesta Kaur DO;  Location:  CARDIAC CATH LAB; Service: Cardiology    INGUINAL HERNIA REPAIR Bilateral     NC COLONOSCOPY FLX DX W/COLLJ SPEC WHEN PFRMD N/A 3/11/2019    Procedure: COLONOSCOPY with removal of anal papilla;   Surgeon: Chente Gandara MD;  Location: [facility];  Service: Colorectal  TONSILLECTOMY      UMBILICAL HERNIA REPAIR  2006     Family History   Problem Relation Age of Onset    Heart attack Father         MI    Heart disease Father     Diabetes Sister         DM    Arthritis Family     Coronary artery disease Family     Hypertension Family     Tuberculosis Son     Alzheimer's disease Mother      Social History     Socioeconomic History    Marital status: /Civil Union     Spouse name: Not on file    Number of children: Not on file    Years of education: Not on file    Highest education level: Not on file   Occupational History    Not on file   Tobacco Use    Smoking status: Former Smoker     Packs/day: 3 00     Years: 43 00     Pack years: 129 00     Start date:      Quit date:      Years since quittin 7    Smokeless tobacco: Never Used   Vaping Use    Vaping Use: Never used   Substance and Sexual Activity    Alcohol use: Not Currently     Comment: rarely    Drug use: Yes     Types: Marijuana     Comment: occasionally edible    Sexual activity: Not on file   Other Topics Concern    Not on file   Social History Narrative    Caffeine use     Social Determinants of Health     Financial Resource Strain: Not on file   Food Insecurity: Not on file   Transportation Needs: Not on file   Physical Activity: Not on file   Stress: Not on file   Social Connections: Not on file   Intimate Partner Violence: Not on file   Housing Stability: Not on file     Medications have been verified  Objective   Pulse 89   Temp (!) 97 4 °F (36 3 °C)   Resp 20   Wt 134 kg (296 lb)   SpO2 95%   BMI 38 00 kg/m²      Physical Exam   Physical Exam  Vitals and nursing note reviewed  Constitutional:       General: He is not in acute distress  Appearance: He is well-developed  HENT:      Head: Normocephalic and atraumatic  Right Ear: Tympanic membrane normal       Left Ear: Tympanic membrane normal       Nose: Nose normal  No mucosal edema  Mouth/Throat:      Mouth: Mucous membranes are moist       Pharynx: No posterior oropharyngeal erythema  Cardiovascular:      Rate and Rhythm: Normal rate and regular rhythm  Heart sounds: Normal heart sounds  Pulmonary:      Effort: Pulmonary effort is normal  No respiratory distress  Breath sounds: Wheezing present

## 2022-10-07 ENCOUNTER — TELEPHONE (OUTPATIENT)
Dept: FAMILY MEDICINE CLINIC | Facility: CLINIC | Age: 67
End: 2022-10-07

## 2022-10-07 ENCOUNTER — HOSPITAL ENCOUNTER (OUTPATIENT)
Dept: RADIOLOGY | Facility: HOSPITAL | Age: 67
Discharge: HOME | End: 2022-10-07
Attending: INTERNAL MEDICINE
Payer: MEDICARE

## 2022-10-07 DIAGNOSIS — J43.9 PULMONARY EMPHYSEMA, UNSPECIFIED EMPHYSEMA TYPE (HCC): ICD-10-CM

## 2022-10-07 DIAGNOSIS — J43.9 PULMONARY EMPHYSEMA, UNSPECIFIED EMPHYSEMA TYPE (HCC): Primary | ICD-10-CM

## 2022-10-07 PROCEDURE — 71046 X-RAY EXAM CHEST 2 VIEWS: CPT

## 2022-10-07 RX ORDER — AZITHROMYCIN 250 MG/1
TABLET, FILM COATED ORAL
Qty: 6 TABLET | Refills: 0 | Status: SHIPPED | OUTPATIENT
Start: 2022-10-07 | End: 2022-10-11

## 2022-10-07 NOTE — TELEPHONE ENCOUNTER
Pt seen in UC on 9/30, was given prednisone and doxycycline, was negative for covid  Still coughing up mucus and wheezing, said he doesn't want this to turn into pneumonia since he has COPD  Please advise on next step

## 2022-10-18 ENCOUNTER — RA CDI HCC (OUTPATIENT)
Dept: OTHER | Facility: HOSPITAL | Age: 67
End: 2022-10-18

## 2022-10-18 DIAGNOSIS — J45.40 CHRONIC ASTHMA, MODERATE PERSISTENT, UNCOMPLICATED: ICD-10-CM

## 2022-10-18 RX ORDER — IPRATROPIUM BROMIDE AND ALBUTEROL SULFATE 2.5; .5 MG/3ML; MG/3ML
3 SOLUTION RESPIRATORY (INHALATION) EVERY 6 HOURS PRN
Qty: 360 ML | Refills: 11 | Status: SHIPPED | OUTPATIENT
Start: 2022-10-18

## 2022-10-18 NOTE — PROGRESS NOTES
[facility]  coding opportunities          Chart Reviewed number of suggestions sent to Provider: 1     Patients Insurance     Medicare Insurance: Medicare        [id number]

## 2022-10-20 DIAGNOSIS — J44.9 ASTHMA-COPD OVERLAP SYNDROME (HCC): ICD-10-CM

## 2022-10-20 RX ORDER — FLUTICASONE PROPIONATE AND SALMETEROL 500; 50 UG/1; UG/1
POWDER RESPIRATORY (INHALATION)
Qty: 60 BLISTER | Refills: 5 | Status: SHIPPED | OUTPATIENT
Start: 2022-10-20 | End: 2022-10-21 | Stop reason: SDUPTHER

## 2022-10-21 DIAGNOSIS — J44.9 ASTHMA-COPD OVERLAP SYNDROME (HCC): ICD-10-CM

## 2022-10-21 RX ORDER — FLUTICASONE PROPIONATE AND SALMETEROL 500; 50 UG/1; UG/1
POWDER RESPIRATORY (INHALATION)
Qty: 60 BLISTER | Refills: 5 | Status: SHIPPED | OUTPATIENT
Start: 2022-10-21

## 2022-10-31 ENCOUNTER — APPOINTMENT (OUTPATIENT)
Dept: RADIOLOGY | Facility: CLINIC | Age: 67
End: 2022-10-31

## 2022-10-31 ENCOUNTER — OFFICE VISIT (OUTPATIENT)
Dept: PULMONOLOGY | Facility: CLINIC | Age: 67
End: 2022-10-31

## 2022-10-31 VITALS
HEIGHT: 74 IN | DIASTOLIC BLOOD PRESSURE: 72 MMHG | HEART RATE: 97 BPM | SYSTOLIC BLOOD PRESSURE: 146 MMHG | BODY MASS INDEX: 38.22 KG/M2 | OXYGEN SATURATION: 96 % | WEIGHT: 297.8 LBS | TEMPERATURE: 98.3 F

## 2022-10-31 DIAGNOSIS — F33.1 MODERATE EPISODE OF RECURRENT MAJOR DEPRESSIVE DISORDER (HCC): ICD-10-CM

## 2022-10-31 DIAGNOSIS — J44.9 ASTHMA-COPD OVERLAP SYNDROME (HCC): ICD-10-CM

## 2022-10-31 DIAGNOSIS — J44.9 ASTHMA-COPD OVERLAP SYNDROME (HCC): Primary | ICD-10-CM

## 2022-10-31 RX ORDER — DULOXETIN HYDROCHLORIDE 60 MG/1
60 CAPSULE, DELAYED RELEASE ORAL DAILY
Qty: 30 CAPSULE | Refills: 5 | Status: SHIPPED | OUTPATIENT
Start: 2022-10-31

## 2022-10-31 RX ORDER — PREDNISONE 20 MG/1
TABLET ORAL
Qty: 19 TABLET | Refills: 0 | Status: SHIPPED | OUTPATIENT
Start: 2022-10-31 | End: 2022-11-14

## 2022-10-31 NOTE — PROGRESS NOTES
New Prescription: Pred Forte (prednisolone acetate): drops,suspension: 1% 1 drop three times a day as directed into right eye 09- Pulmonary Follow Up Note   Jose Miguel Paulson 74 y o  male MRN: [id number]  10/31/2022    Assessment:  Asthma/COPD-with acute exacerbation   · Likely triggered by exposure to a sick contact 4 weeks ago   · Improving but not resolved symptoms  · Still with ongoing pleuritic chest pain/chest tightness and cough that improve with p r n  inhalers  · Mild wheezing on exam  · On Tudorza/high-dose Advair 500    Plan:   · Continue current therapy without change   · Added steroid taper prednisone 40 mg for 5 days, down by 10 mg every 3 days   · Check two views chest x-ray, if noted new infiltrates/opacity will consider antibiotics      Follow-up in 4 months/around March    History of Present Illness     Follow up for: Asthma/COPD/exacerbation     Background:   74 y o  male with a h/o CORDELL, asthma COPD, class 1 obesity, dyslipidemia, CHF (systolic and diastolic), tobacco abuse about 40 pack year history quit 2019, marijuana use, quit 2020      1st diagnosis of asthma approximately 30 years ago, known triggers of extreme temperature/changes, exposure to grass/stronger orders and dog dander   No history of severe exacerbation, last oral steroid intake was in mid 2019   Completed pulmonary rehab program in 05/2021     Initially evaluated by Pulmonary on 05/04/21-recommended to continue Tudorza 400 mcg, Breo 200, p r n   DuoNeb and Atrovent          7/26 visit-continued on tudorza and Breo Ellipta 200, discontinued Atrovent   6 minute walk test showed no oxygen desaturation   [place] allergy panel weakly positive for dog dander 0 4, common a ragweed positive at 1 13, IgE 38 5   CBC with eosinophilia at 850 cell   Given the pneumococcal vaccine, given trial of mucolytic with Mucinex      9/27 visit-switched to high-dose ICS Advair 500     11/29/2021 visit-treated for exacerbation with steroid taper, doxycycline     1/2022 - continued on advair 500/tudorza, declined vaccination     5/6/22 - treated for exacerbation OP, LDCT     09/29/2022 visit-not interested in deescalation of therapy, continued on Tudorza/Advair 500, not interested in vaccination  Interval History  Since last seen, reported exposure to a sick contact  Daughter had a flu/cold like symptoms, a day after exposure noted worsening cough, sputum production, wheezing and pleuritic chest pain  Seen at the urgent care clinic, given steroid taper and azithromycin for 5 days with some improvement of symptoms  Presented today due to persistent minimally productive cough, mild pleuritic chest pain with a deep breathing, reports improvement of the constitutional symptoms, still with it  Review of Systems  As per hpi, all other systems reviewed and were negative    Studies:    Imaging and other studies: I have personally reviewed pertinent films in PACS  CT chest 02/25/2021-no suspicious lesions, or nodules, few calcified granuloma at the lingula that unchanged from before, posterior mediastinal lymph node/top normal size unchanged from 2019     CT chest 1/19/2022 - mild biapical/scarring  Calcified granuloma att he lingula   No suspicious lesions     Pulmonary function testing:   PFT 02/04/2021-ratio 54%, FEV1 1 92 L/55%, FVC 3 54 L/69%, TLC 87%, %, DLCO 104%     PFT 12/18/2018-ratio 54%, FEV1 2 6 L/73%, FVC 4 8 L/93%, positive bronchodilator response at the level of FVC and FEV1, %, %, DLCO 114%      PFT 01/30/2017-ratio 41%, FEV1 1 74 L/49%, FVC 4 25 L/84%, positive bronchodilator response at the level of FEV1, %, %, DLCO 95%     6 minute walk test 07/26/2021-baseline SpO2 96% on room air, heart rate 64, able to walk 336 m in 6 minutes, lowest SpO2 at 94%, maximal heart rate at 88, dyspnea scale of 4        EKG, Pathology, and Other Studies: I have personally reviewed pertinent reports       Myocardial perfusion scan 09/25/2020-moderate-size infarct in the distribution of RCA/left circumflex, reduced LV systolic function with WM a     TTE 09/10/2020-EF 40%, akinesis at basal/mid inferior/mid inferior lateral wall, mildly increased LV wall thickness, grade 2 diastolic dysfunction, LA mildly dilated, normal RV size and function        Past medical, surgical, social and family histories reviewed  Medications/Allergies: Reviewed      Vitals: Blood pressure 146/72, pulse 97, temperature 98 3 °F (36 8 °C), temperature source Temporal, height 6' 2" (1 88 m), weight 135 kg (297 lb 12 8 oz), SpO2 96 %  Body mass index is 38 24 kg/m²  Oxygen Therapy  SpO2: 96 %  Oxygen Therapy: None (Room air)      Physical Exam  Body mass index is 38 24 kg/m²  Gen: not in acute distress, obese  Neck/Eyes: supple, PERRL  Ear: normal appearance, no significant hearing impairment  Nose:  normal nasal mucosa, no drainage  Mouth:  unremarkable/normal appearance of lips, teeth and gums  Oropharynx: mucosa is moist, no focal lesions or erythema  Salivary glands: soft nontender  Chest: normal respiratory efforts, mild scattered wheeze on the right base/mid lung fields, otherwise clear breath sounds bilaterally  CV: RRR, no murmurs appreciated, no edema  Abdomen: soft, non tender  Extremities:  No observed deformity   Skin: unremarkable  Neuro: AAO X3, no focal motor deficit        Labs:  Lab Results   Component Value Date    WBC 8 83 09/22/2022    HGB 13 1 09/22/2022    HCT 42 4 09/22/2022    MCV 89 09/22/2022     09/22/2022     Lab Results   Component Value Date    GLUCOSE 87 07/27/2015    CALCIUM 9 4 09/22/2022     07/27/2015    K 4 2 09/22/2022    CO2 26 09/22/2022     09/22/2022    BUN 25 09/22/2022    CREATININE 1 40 (H) 09/22/2022     Lab Results   Component Value Date    IGE 38 5 09/22/2021     Lab Results   Component Value Date    ALT 34 09/22/2022    AST 13 09/22/2022    ALKPHOS 80 09/22/2022    BILITOT 1 05 (H) 07/22/2015           Portions of the record may have been created with voice recognition software    Occasional wrong word or "sound a like" substitutions may have occurred due to the inherent limitations of voice recognition software  Read the chart carefully and recognize, using context, where substitutions have occurred    Marcela MONIS[facility]

## 2022-11-01 ENCOUNTER — OFFICE VISIT (OUTPATIENT)
Dept: OTOLARYNGOLOGY | Facility: CLINIC | Age: 67
End: 2022-11-01

## 2022-11-01 VITALS
WEIGHT: 295.2 LBS | DIASTOLIC BLOOD PRESSURE: 72 MMHG | HEIGHT: 74 IN | BODY MASS INDEX: 37.88 KG/M2 | TEMPERATURE: 96.9 F | HEART RATE: 105 BPM | SYSTOLIC BLOOD PRESSURE: 130 MMHG | OXYGEN SATURATION: 94 %

## 2022-11-01 DIAGNOSIS — R09.82 ALLERGIC RHINITIS WITH POSTNASAL DRIP: ICD-10-CM

## 2022-11-01 DIAGNOSIS — J34.89 SINUS DRAINAGE: ICD-10-CM

## 2022-11-01 DIAGNOSIS — R09.82 POSTNASAL DRIP: Primary | ICD-10-CM

## 2022-11-01 DIAGNOSIS — J30.9 ALLERGIC RHINITIS WITH POSTNASAL DRIP: ICD-10-CM

## 2022-11-01 DIAGNOSIS — J30.81 ALLERGIC RHINITIS DUE TO ANIMAL HAIR AND DANDER: ICD-10-CM

## 2022-11-01 DIAGNOSIS — J31.0 CHRONIC RHINITIS: ICD-10-CM

## 2022-11-01 RX ORDER — FLUTICASONE PROPIONATE 50 MCG
1 SPRAY, SUSPENSION (ML) NASAL 2 TIMES DAILY
Qty: 16 G | Refills: 6 | Status: SHIPPED | OUTPATIENT
Start: 2022-11-01

## 2022-11-01 RX ORDER — ASPIRIN 81 MG/1
81 TABLET, CHEWABLE ORAL DAILY
COMMUNITY

## 2022-11-03 DIAGNOSIS — J44.9 ASTHMA-COPD OVERLAP SYNDROME (HCC): ICD-10-CM

## 2022-11-03 RX ORDER — ALBUTEROL SULFATE 90 UG/1
2 AEROSOL, METERED RESPIRATORY (INHALATION) EVERY 6 HOURS PRN
Qty: 18 G | Refills: 11 | Status: SHIPPED | OUTPATIENT
Start: 2022-11-03

## 2022-11-28 DIAGNOSIS — J44.9 COPD, MODERATE (HCC): ICD-10-CM

## 2022-11-28 RX ORDER — ACLIDINIUM BROMIDE 400 UG/1
1 POWDER, METERED RESPIRATORY (INHALATION) 2 TIMES DAILY
Qty: 1 EACH | Refills: 5 | Status: SHIPPED | OUTPATIENT
Start: 2022-11-28

## 2022-11-28 NOTE — TELEPHONE ENCOUNTER
Patient called and stated that he tried to  his aclidinium 400 mcg at [address] and they told him that this order was cancelled   Patient wants us to resend over the prescription to [address] in [place]

## 2022-11-30 DIAGNOSIS — J44.9 ASTHMA-COPD OVERLAP SYNDROME (HCC): ICD-10-CM

## 2022-12-02 ENCOUNTER — TELEPHONE (OUTPATIENT)
Dept: GASTROENTEROLOGY | Facility: CLINIC | Age: 67
End: 2022-12-02

## 2022-12-02 RX ORDER — FLUTICASONE PROPIONATE AND SALMETEROL 500; 50 UG/1; UG/1
POWDER RESPIRATORY (INHALATION)
Qty: 60 BLISTER | Refills: 2 | Status: SHIPPED | OUTPATIENT
Start: 2022-12-02

## 2022-12-05 ENCOUNTER — OFFICE VISIT (OUTPATIENT)
Dept: CARDIOLOGY CLINIC | Facility: HOSPITAL | Age: 67
End: 2022-12-05

## 2022-12-05 VITALS
BODY MASS INDEX: 39.01 KG/M2 | HEIGHT: 74 IN | DIASTOLIC BLOOD PRESSURE: 84 MMHG | WEIGHT: 304 LBS | HEART RATE: 88 BPM | OXYGEN SATURATION: 98 % | SYSTOLIC BLOOD PRESSURE: 116 MMHG

## 2022-12-05 DIAGNOSIS — E78.5 DYSLIPIDEMIA: ICD-10-CM

## 2022-12-05 DIAGNOSIS — E66.9 OBESITY (BMI 30-39.9): ICD-10-CM

## 2022-12-05 DIAGNOSIS — I10 BENIGN ESSENTIAL HYPERTENSION: Chronic | ICD-10-CM

## 2022-12-05 DIAGNOSIS — I42.8 NON-ISCHEMIC CARDIOMYOPATHY (HCC): Primary | Chronic | ICD-10-CM

## 2022-12-05 DIAGNOSIS — G47.33 OBSTRUCTIVE SLEEP APNEA: ICD-10-CM

## 2022-12-05 DIAGNOSIS — I50.42 CHRONIC COMBINED SYSTOLIC AND DIASTOLIC CONGESTIVE HEART FAILURE (HCC): Chronic | ICD-10-CM

## 2022-12-05 RX ORDER — FUROSEMIDE 20 MG/1
20 TABLET ORAL DAILY
Qty: 30 TABLET | Refills: 11 | Status: ON HOLD | OUTPATIENT
Start: 2022-12-05

## 2022-12-05 NOTE — PROGRESS NOTES
Cardiology Follow Up    Addy Pinto  1955  [id number]  [facility] [address] [address]  Phone#  [phone]  Fax#  [phone]      1  Non-ischemic cardiomyopathy with HFmEF (HCC)  Basic metabolic panel    furosemide (LASIX) 20 mg tablet      2  Chronic combined systolic and diastolic congestive heart failure (HCC)  Basic metabolic panel      3  Benign essential hypertension        4  Dyslipidemia        5  Obstructive sleep apnea        6  Obesity (BMI 30-39  9)            Discussion/Summary:  Mr Kristina Lentz is a pleasant 82-year-old male who presents to the office today for routine follow up  He continues with shortness of breath with exertion  After his last visit he did undergo repeat heart catheterization revealing no obstructive coronary disease  His weight is up since his last visit  I have asked that he take Lasix on a consistent basis  I will reassess his renal function and electrolytes in a week or so  A low-salt diet was reinforced with which he has been noncompliant  Otherwise he is on proper GDMT for his cardiomyopathy with Entresto and carvedilol  I will increase his Entresto to 49/51 mg daily  He will return to the office in a week for a blood pressure check as well as blood work  He is maintained on statin therapy in the setting of coronary artery calcifications on CT scan  His most recent labs reveal acceptable numbers  He is noncompliant with CPAP despite obtaining a CPAP machine  Compliance was encouraged  We discussed his alcohol intake which likely was a contributor to his cardiomyopathy  Complete cessation is advised  I will see him back in the office in a few months for re-evaluation  Interval History:   Mr Kristina Lentz is a pleasant 82-year-old male who presents to the office for routine follow-up        After his last due to worsening symptoms, a persistently reduced ejection fraction and regional wall motion abnormalities he underwent a repeat left heart catheterization revealing no evidence of obstructive coronary disease  He was seen in follow-up by our advanced practitioner  At that time a prescription for Bjorn Flatter was provided  Overall he feels better on the Bjorn Flatter  He does get short of breath with minimal activity  This is unchanged since his last visit  He utilizes Lasix on an as-needed basis which he does infrequently  He chronically sleeps upright in a recliner  He is noncompliant with CPAP  He denies any increasing abdominal girth, paroxysmal nocturnal dyspnea or orthopnea  He denies any exertional chest pain  He denies lightheadedness, syncope or presyncope  He denies palpitations  He denies symptoms of claudication  He reports he is noncompliant with a low-salt diet  He recently admits to eating cheeseburgers and turkey sandwiches  He also has been drinking alcohol again  He drinks about eight beers in a week and can do four or five shots in a week       Problem List     Chronic combined systolic and diastolic CHF (congestive heart failure) ([facility])    Nonischemic cardiomyopathy ([facility])    Dyslipidemia    Hypertension    Asthma, chronic, moderate persistent, uncomplicated    Obstructive sleep apnea        Past Medical History:   Diagnosis Date   • Asthma    • CHF (congestive heart failure) ([facility])    • COPD (chronic obstructive pulmonary disease) ([facility] )    • COVID-19    • Mumps    • Old MI (myocardial infarction)    • Pneumonia    • Pulmonary emphysema ([facility] )    • Rectal bleeding 1/14/2019   • Sleep apnea      Social History     Socioeconomic History   • Marital status: /Civil Union     Spouse name: Not on file   • Number of children: Not on file   • Years of education: Not on file   • Highest education level: Not on file   Occupational History   • Not on file   Tobacco Use   • Smoking status: Former     Packs/day: 3 00 Years: 43 00     Pack years: 129 00     Types: Cigarettes     Start date: 67     Quit date: 2018     Years since quittin 9   • Smokeless tobacco: Never   Vaping Use   • Vaping Use: Never used   Substance and Sexual Activity   • Alcohol use: Not Currently     Comment: rarely   • Drug use: Yes     Types: Marijuana     Comment: occasionally edible   • Sexual activity: Not on file   Other Topics Concern   • Not on file   Social History Narrative    Caffeine use     Social Determinants of Health     Financial Resource Strain: Not on file   Food Insecurity: Not on file   Transportation Needs: Not on file   Physical Activity: Not on file   Stress: Not on file   Social Connections: Not on file   Intimate Partner Violence: Not on file   Housing Stability: Not on file      Family History   Problem Relation Age of Onset   • Heart attack Father         MI   • Heart disease Father    • Diabetes Sister         DM   • Arthritis Family    • Coronary artery disease Family    • Hypertension Family    • Tuberculosis Son    • Alzheimer's disease Mother      Past Surgical History:   Procedure Laterality Date   • CARDIAC CATHETERIZATION  2015    Left main- normal and maidly tortuous  Circumflex - normal and moderately tortuous  RCA- normal and mildy tortuous  Global LV function was severely depressed      • CARDIAC CATHETERIZATION Left 2022    Procedure: Cardiac Left Heart Cath;  Surgeon: Magdaleno Fagan DO;  Location: [facility]; Service: Cardiology   • CARDIAC CATHETERIZATION N/A 2022    Procedure: Cardiac Coronary Angiogram;  Surgeon: Magdaleno Fagan DO;  Location: [facility]; Service: Cardiology   • CARDIAC CATHETERIZATION  2022    Procedure: Cardiac catheterization;  Surgeon: Magdaleno Fagan DO;  Location: [facility];   Service: Cardiology   • INGUINAL HERNIA REPAIR Bilateral    • MS COLONOSCOPY FLX DX W/COLLJ SPEC WHEN PFRMD N/A 3/11/2019    Procedure: COLONOSCOPY with removal of anal papilla; Surgeon: Jim Wagner MD;  Location: [facility];  Service: Colorectal   • TONSILLECTOMY     • UMBILICAL HERNIA REPAIR  06/21/2006       Current Outpatient Medications:   •  aclidinium (Sandra Can) 400 MCG/ACT inhaler, Inhale 1 puff (400 mcg total) 2 (two) times a day, Disp: 1 each, Rfl: 5  •  albuterol (ProAir HFA) 90 mcg/act inhaler, Inhale 2 puffs every 6 (six) hours as needed for wheezing, Disp: 18 g, Rfl: 11  •  ALPRAZolam (XANAX) 0 5 mg tablet, Take 1 tablet (0 5 mg total) by mouth daily at bedtime as needed for anxiety, Disp: 30 tablet, Rfl: 0  •  aspirin 81 mg chewable tablet, Chew 81 mg daily, Disp: , Rfl:   •  atorvastatin (LIPITOR) 20 mg tablet, Take 1 tablet (20 mg total) by mouth daily, Disp: 90 tablet, Rfl: 3  •  carvedilol (COREG) 12 5 mg tablet, Take 1 tablet (12 5 mg total) by mouth 2 (two) times a day with meals, Disp: 60 tablet, Rfl: 11  •  DULoxetine (CYMBALTA) 60 mg delayed release capsule, Take 1 capsule (60 mg total) by mouth daily, Disp: 30 capsule, Rfl: 5  •  fluticasone (FLONASE) 50 mcg/act nasal spray, 1 spray into each nostril 2 (two) times a day, Disp: 16 g, Rfl: 6  •  Fluticasone-Salmeterol (Advair) 500-50 mcg/dose inhaler, INHALE ONE PUFF BY MOUTH AND INTO THE LUNGS TWICE A DAY  RINSE MOUTH AFTER USE, Disp: 60 blister, Rfl: 2  •  furosemide (LASIX) 20 mg tablet, Take 1 tablet (20 mg total) by mouth daily, Disp: 30 tablet, Rfl: 11  •  guaiFENesin (MUCINEX) 600 mg 12 hr tablet, Take 2 tablets (1,200 mg total) by mouth every 12 (twelve) hours, Disp: 60 tablet, Rfl: 11  •  ipratropium-albuterol (DUO-NEB) 0 5-2 5 mg/3 mL nebulizer solution, Take 3 mL by nebulization every 6 (six) hours as needed for wheezing or shortness of breath, Disp: 360 mL, Rfl: 11  •  sacubitril-valsartan (Entresto) 24-26 MG TABS, Take 1 tablet by mouth 2 (two) times a day This will replace your lisinopril   You must wait at least 36 hours after your last lisinopril dose to take this medication  , Disp: 60 tablet, Rfl: 0  •  aspirin 325 mg tablet, Take 1 tablet by mouth daily (Patient not taking: Reported on 12/5/2022), Disp: , Rfl:   •  isosorbide mononitrate (IMDUR) 30 mg 24 hr tablet, Take 1 tablet (30 mg total) by mouth daily (Patient not taking: Reported on 12/5/2022), Disp: 90 tablet, Rfl: 3  •  lisinopril (ZESTRIL) 20 mg tablet, take 1 tablet by mouth twice a day (Patient not taking: No sig reported), Disp: 60 tablet, Rfl: 11  Allergies   Allergen Reactions   • Ciprofloxacin Shortness Of Breath and Diarrhea       Labs:     Chemistry        Component Value Date/Time     07/27/2015 0559    K 4 2 09/22/2022 1241    K 3 8 07/27/2015 0559     09/22/2022 1241     07/27/2015 0559    CO2 26 09/22/2022 1241    CO2 32 07/27/2015 0559    BUN 25 09/22/2022 1241    BUN 19 07/27/2015 0559    CREATININE 1 40 (H) 09/22/2022 1241    CREATININE 1 05 07/27/2015 0559        Component Value Date/Time    CALCIUM 9 4 09/22/2022 1241    CALCIUM 8 6 07/27/2015 0559    ALKPHOS 80 09/22/2022 1241    ALKPHOS 75 07/22/2015 1021    AST 13 09/22/2022 1241    AST 30 07/22/2015 1021    ALT 34 09/22/2022 1241    ALT 74 07/22/2015 1021    BILITOT 1 05 (H) 07/22/2015 1021            Lab Results   Component Value Date    CHOL 105 07/23/2015     Lab Results   Component Value Date    HDL 27 (L) 09/01/2022    HDL 27 (L) 09/22/2021    HDL 24 (L) 12/18/2018     Lab Results   Component Value Date    LDLCALC 49 09/01/2022    LDLCALC 107 (H) 09/22/2021    LDLCALC 89 12/18/2018     Lab Results   Component Value Date    TRIG 138 09/01/2022    TRIG 131 09/22/2021    TRIG 107 12/18/2018     No results found for: CHOLHDL    Imaging: No results found  Review of Systems   Cardiovascular: Positive for dyspnea on exertion and leg swelling  Negative for chest pain, near-syncope, orthopnea, palpitations and paroxysmal nocturnal dyspnea  All other systems reviewed and are negative        Vitals:    12/05/22 1258 BP: 116/84   Pulse: 88   SpO2: 98%     Vitals:    12/05/22 1258   Weight: (!) 138 kg (304 lb)     Height: 6' 2" (188 cm)   Body mass index is 39 03 kg/m²      Physical Exam:  General:  Alert and cooperative, appears stated age  HEENT:  PERRLA, EOMI, no scleral icterus, no conjunctival pallor  Neck:  No lymphadenopathy, no thyromegaly, no carotid bruits, no elevated JVP  Heart:  Regular rate and rhythm, normal S1/S2, no S3/S4, no murmur  Lungs:  Clear to auscultation bilaterally   Abdomen:  Soft, non-tender, positive bowel sounds, no rebound or guarding,   no organomegaly   Extremities:  + edema    Skin:  No rashes or lesions on exposed skin  Neurologic:  Cranial nerves II-XII grossly intact without focal deficits

## 2022-12-13 ENCOUNTER — APPOINTMENT (OUTPATIENT)
Dept: LAB | Facility: HOSPITAL | Age: 67
End: 2022-12-13
Attending: INTERNAL MEDICINE

## 2022-12-13 ENCOUNTER — CLINICAL SUPPORT (OUTPATIENT)
Dept: CARDIOLOGY CLINIC | Facility: HOSPITAL | Age: 67
End: 2022-12-13

## 2022-12-13 VITALS
SYSTOLIC BLOOD PRESSURE: 146 MMHG | BODY MASS INDEX: 38.65 KG/M2 | OXYGEN SATURATION: 96 % | HEIGHT: 74 IN | HEART RATE: 78 BPM | WEIGHT: 301.2 LBS | DIASTOLIC BLOOD PRESSURE: 80 MMHG

## 2022-12-13 DIAGNOSIS — I42.8 NON-ISCHEMIC CARDIOMYOPATHY (HCC): Chronic | ICD-10-CM

## 2022-12-13 DIAGNOSIS — I10 HYPERTENSION, UNSPECIFIED TYPE: Primary | ICD-10-CM

## 2022-12-13 DIAGNOSIS — I50.42 CHRONIC COMBINED SYSTOLIC AND DIASTOLIC CONGESTIVE HEART FAILURE (HCC): Chronic | ICD-10-CM

## 2022-12-13 LAB
ANION GAP SERPL CALCULATED.3IONS-SCNC: 8 MMOL/L (ref 4–13)
BUN SERPL-MCNC: 24 MG/DL (ref 5–25)
CALCIUM SERPL-MCNC: 9.2 MG/DL (ref 8.3–10.1)
CHLORIDE SERPL-SCNC: 103 MMOL/L (ref 96–108)
CO2 SERPL-SCNC: 29 MMOL/L (ref 21–32)
CREAT SERPL-MCNC: 1.2 MG/DL (ref 0.6–1.3)
GFR SERPL CREATININE-BSD FRML MDRD: 63 ML/MIN/1.73SQ M
GLUCOSE SERPL-MCNC: 153 MG/DL (ref 65–140)
POTASSIUM SERPL-SCNC: 4 MMOL/L (ref 3.5–5.3)
SODIUM SERPL-SCNC: 140 MMOL/L (ref 135–147)

## 2022-12-13 NOTE — PROGRESS NOTES
Twanna Fleischer is here today under the direction of Dr Suzy Camp for a BP Check  At his last visit on 12/5 Dr Suzy Camp increased his entresto to 49/51  He is currently taking the entresto 49/51, carvedilol 12 5mg BID  Today he is feeling good no complain  His BP was 146/ 80  Per Dr Suzy Camp increased Carvedilol to 25mg

## 2022-12-16 ENCOUNTER — HOSPITAL ENCOUNTER (INPATIENT)
Facility: HOSPITAL | Age: 67
LOS: 2 days | Discharge: HOME | End: 2022-12-19
Attending: EMERGENCY MEDICINE | Admitting: INTERNAL MEDICINE

## 2022-12-16 ENCOUNTER — APPOINTMENT (EMERGENCY)
Dept: RADIOLOGY | Facility: HOSPITAL | Age: 67
End: 2022-12-16

## 2022-12-16 DIAGNOSIS — J45.40 CHRONIC ASTHMA, MODERATE PERSISTENT, UNCOMPLICATED: ICD-10-CM

## 2022-12-16 DIAGNOSIS — I42.8 OTHER CARDIOMYOPATHY (HCC): ICD-10-CM

## 2022-12-16 DIAGNOSIS — J44.9 ASTHMA-COPD OVERLAP SYNDROME (HCC): ICD-10-CM

## 2022-12-16 DIAGNOSIS — I42.8 NON-ISCHEMIC CARDIOMYOPATHY (HCC): ICD-10-CM

## 2022-12-16 DIAGNOSIS — U07.1 COVID: Primary | ICD-10-CM

## 2022-12-16 DIAGNOSIS — R09.02 HYPOXIA: ICD-10-CM

## 2022-12-16 LAB
ALBUMIN SERPL BCP-MCNC: 3.6 G/DL (ref 3.5–5)
ALP SERPL-CCNC: 82 U/L (ref 46–116)
ALT SERPL W P-5'-P-CCNC: 33 U/L (ref 12–78)
ANION GAP SERPL CALCULATED.3IONS-SCNC: 8 MMOL/L (ref 4–13)
AST SERPL W P-5'-P-CCNC: 27 U/L (ref 5–45)
BASOPHILS # BLD AUTO: 0.06 THOUSANDS/ÂΜL (ref 0–0.1)
BASOPHILS NFR BLD AUTO: 1 % (ref 0–1)
BILIRUB SERPL-MCNC: 1.29 MG/DL (ref 0.2–1)
BUN SERPL-MCNC: 26 MG/DL (ref 5–25)
CALCIUM SERPL-MCNC: 8.9 MG/DL (ref 8.3–10.1)
CARDIAC TROPONIN I PNL SERPL HS: 48 NG/L
CHLORIDE SERPL-SCNC: 100 MMOL/L (ref 96–108)
CO2 SERPL-SCNC: 30 MMOL/L (ref 21–32)
CREAT SERPL-MCNC: 1.42 MG/DL (ref 0.6–1.3)
EOSINOPHIL # BLD AUTO: 0.44 THOUSAND/ÂΜL (ref 0–0.61)
EOSINOPHIL NFR BLD AUTO: 4 % (ref 0–6)
ERYTHROCYTE [DISTWIDTH] IN BLOOD BY AUTOMATED COUNT: 14.7 % (ref 11.6–15.1)
FLUAV RNA RESP QL NAA+PROBE: NEGATIVE
FLUBV RNA RESP QL NAA+PROBE: NEGATIVE
GFR SERPL CREATININE-BSD FRML MDRD: 51 ML/MIN/1.73SQ M
GLUCOSE SERPL-MCNC: 128 MG/DL (ref 65–140)
GLUCOSE SERPL-MCNC: 157 MG/DL (ref 65–140)
GLUCOSE SERPL-MCNC: 169 MG/DL (ref 65–140)
HCT VFR BLD AUTO: 43.4 % (ref 36.5–49.3)
HGB BLD-MCNC: 13.6 G/DL (ref 12–17)
IMM GRANULOCYTES # BLD AUTO: 0.05 THOUSAND/UL (ref 0–0.2)
IMM GRANULOCYTES NFR BLD AUTO: 0 % (ref 0–2)
LACTATE SERPL-SCNC: 1.4 MMOL/L (ref 0.5–2)
LYMPHOCYTES # BLD AUTO: 0.78 THOUSANDS/ÂΜL (ref 0.6–4.47)
LYMPHOCYTES NFR BLD AUTO: 6 % (ref 14–44)
MAGNESIUM SERPL-MCNC: 1.8 MG/DL (ref 1.6–2.6)
MCH RBC QN AUTO: 26.6 PG (ref 26.8–34.3)
MCHC RBC AUTO-ENTMCNC: 31.3 G/DL (ref 31.4–37.4)
MCV RBC AUTO: 85 FL (ref 82–98)
MONOCYTES # BLD AUTO: 0.95 THOUSAND/ÂΜL (ref 0.17–1.22)
MONOCYTES NFR BLD AUTO: 8 % (ref 4–12)
NEUTROPHILS # BLD AUTO: 9.87 THOUSANDS/ÂΜL (ref 1.85–7.62)
NEUTS SEG NFR BLD AUTO: 81 % (ref 43–75)
NRBC BLD AUTO-RTO: 0 /100 WBCS
NT-PROBNP SERPL-MCNC: 248 PG/ML
PLATELET # BLD AUTO: 365 THOUSANDS/UL (ref 149–390)
PMV BLD AUTO: 9.5 FL (ref 8.9–12.7)
POTASSIUM SERPL-SCNC: 4.1 MMOL/L (ref 3.5–5.3)
PROT SERPL-MCNC: 7.6 G/DL (ref 6.4–8.4)
RBC # BLD AUTO: 5.12 MILLION/UL (ref 3.88–5.62)
RSV RNA RESP QL NAA+PROBE: NEGATIVE
SARS-COV-2 RNA RESP QL NAA+PROBE: POSITIVE
SODIUM SERPL-SCNC: 138 MMOL/L (ref 135–147)
WBC # BLD AUTO: 12.15 THOUSAND/UL (ref 4.31–10.16)

## 2022-12-16 RX ORDER — CARVEDILOL 12.5 MG/1
25 TABLET ORAL 2 TIMES DAILY WITH MEALS
Status: DISCONTINUED | OUTPATIENT
Start: 2022-12-16 | End: 2022-12-19 | Stop reason: HOSPADM

## 2022-12-16 RX ORDER — ACETAMINOPHEN 325 MG/1
650 TABLET ORAL EVERY 6 HOURS PRN
Status: DISCONTINUED | OUTPATIENT
Start: 2022-12-16 | End: 2022-12-19 | Stop reason: HOSPADM

## 2022-12-16 RX ORDER — GUAIFENESIN 600 MG/1
1200 TABLET, EXTENDED RELEASE ORAL EVERY 12 HOURS SCHEDULED
Status: DISCONTINUED | OUTPATIENT
Start: 2022-12-16 | End: 2022-12-16

## 2022-12-16 RX ORDER — DULOXETIN HYDROCHLORIDE 30 MG/1
60 CAPSULE, DELAYED RELEASE ORAL DAILY
Status: DISCONTINUED | OUTPATIENT
Start: 2022-12-16 | End: 2022-12-19 | Stop reason: HOSPADM

## 2022-12-16 RX ORDER — FUROSEMIDE 20 MG/1
20 TABLET ORAL DAILY
Status: DISCONTINUED | OUTPATIENT
Start: 2022-12-16 | End: 2022-12-19 | Stop reason: HOSPADM

## 2022-12-16 RX ORDER — ENOXAPARIN SODIUM 100 MG/ML
40 INJECTION SUBCUTANEOUS DAILY
Status: DISCONTINUED | OUTPATIENT
Start: 2022-12-16 | End: 2022-12-19 | Stop reason: HOSPADM

## 2022-12-16 RX ORDER — ONDANSETRON 2 MG/ML
4 INJECTION INTRAMUSCULAR; INTRAVENOUS EVERY 6 HOURS PRN
Status: DISCONTINUED | OUTPATIENT
Start: 2022-12-16 | End: 2022-12-19 | Stop reason: HOSPADM

## 2022-12-16 RX ORDER — IPRATROPIUM BROMIDE AND ALBUTEROL SULFATE 2.5; .5 MG/3ML; MG/3ML
3 SOLUTION RESPIRATORY (INHALATION) ONCE
Status: COMPLETED | OUTPATIENT
Start: 2022-12-16 | End: 2022-12-16

## 2022-12-16 RX ORDER — ALPRAZOLAM 0.5 MG/1
0.5 TABLET ORAL
Status: DISCONTINUED | OUTPATIENT
Start: 2022-12-16 | End: 2022-12-17

## 2022-12-16 RX ORDER — LEVALBUTEROL 1.25 MG/.5ML
1.25 SOLUTION, CONCENTRATE RESPIRATORY (INHALATION)
Status: DISCONTINUED | OUTPATIENT
Start: 2022-12-16 | End: 2022-12-19 | Stop reason: HOSPADM

## 2022-12-16 RX ORDER — FUROSEMIDE 10 MG/ML
40 INJECTION INTRAMUSCULAR; INTRAVENOUS ONCE
Status: COMPLETED | OUTPATIENT
Start: 2022-12-16 | End: 2022-12-16

## 2022-12-16 RX ORDER — GUAIFENESIN/DEXTROMETHORPHAN 100-10MG/5
10 SYRUP ORAL EVERY 4 HOURS PRN
Status: DISCONTINUED | OUTPATIENT
Start: 2022-12-16 | End: 2022-12-19 | Stop reason: HOSPADM

## 2022-12-16 RX ORDER — MAGNESIUM HYDROXIDE/ALUMINUM HYDROXICE/SIMETHICONE 120; 1200; 1200 MG/30ML; MG/30ML; MG/30ML
30 SUSPENSION ORAL EVERY 6 HOURS PRN
Status: DISCONTINUED | OUTPATIENT
Start: 2022-12-16 | End: 2022-12-19 | Stop reason: HOSPADM

## 2022-12-16 RX ORDER — GUAIFENESIN 600 MG/1
600 TABLET, EXTENDED RELEASE ORAL EVERY 12 HOURS SCHEDULED
Status: DISCONTINUED | OUTPATIENT
Start: 2022-12-16 | End: 2022-12-19 | Stop reason: HOSPADM

## 2022-12-16 RX ORDER — METHYLPREDNISOLONE SODIUM SUCCINATE 40 MG/ML
40 INJECTION, POWDER, LYOPHILIZED, FOR SOLUTION INTRAMUSCULAR; INTRAVENOUS EVERY 12 HOURS SCHEDULED
Status: DISCONTINUED | OUTPATIENT
Start: 2022-12-16 | End: 2022-12-18

## 2022-12-16 RX ORDER — ALBUTEROL SULFATE 2.5 MG/3ML
2.5 SOLUTION RESPIRATORY (INHALATION) EVERY 6 HOURS PRN
Status: DISCONTINUED | OUTPATIENT
Start: 2022-12-16 | End: 2022-12-19 | Stop reason: HOSPADM

## 2022-12-16 RX ORDER — INSULIN LISPRO 100 [IU]/ML
1-5 INJECTION, SOLUTION INTRAVENOUS; SUBCUTANEOUS
Status: DISCONTINUED | OUTPATIENT
Start: 2022-12-16 | End: 2022-12-19 | Stop reason: HOSPADM

## 2022-12-16 RX ORDER — FLUTICASONE PROPIONATE 50 MCG
1 SPRAY, SUSPENSION (ML) NASAL 2 TIMES DAILY
Status: DISCONTINUED | OUTPATIENT
Start: 2022-12-16 | End: 2022-12-19 | Stop reason: HOSPADM

## 2022-12-16 RX ORDER — ATORVASTATIN CALCIUM 10 MG/1
20 TABLET, FILM COATED ORAL DAILY
Status: DISCONTINUED | OUTPATIENT
Start: 2022-12-16 | End: 2022-12-19 | Stop reason: HOSPADM

## 2022-12-16 RX ADMIN — METHYLPREDNISOLONE SODIUM SUCCINATE 40 MG: 40 INJECTION, POWDER, FOR SOLUTION INTRAMUSCULAR; INTRAVENOUS at 17:21

## 2022-12-16 RX ADMIN — ATORVASTATIN CALCIUM 20 MG: 10 TABLET, FILM COATED ORAL at 17:21

## 2022-12-16 RX ADMIN — REMDESIVIR 200 MG: 100 INJECTION, POWDER, LYOPHILIZED, FOR SOLUTION INTRAVENOUS at 17:21

## 2022-12-16 RX ADMIN — SACUBITRIL AND VALSARTAN 1 TABLET: 24; 26 TABLET, FILM COATED ORAL at 17:21

## 2022-12-16 RX ADMIN — IPRATROPIUM BROMIDE 0.5 MG: 0.5 SOLUTION RESPIRATORY (INHALATION) at 17:55

## 2022-12-16 RX ADMIN — FUROSEMIDE 40 MG: 10 INJECTION, SOLUTION INTRAVENOUS at 11:54

## 2022-12-16 RX ADMIN — ENOXAPARIN SODIUM 40 MG: 40 INJECTION SUBCUTANEOUS at 17:21

## 2022-12-16 RX ADMIN — DULOXETINE HYDROCHLORIDE 60 MG: 30 CAPSULE, DELAYED RELEASE ORAL at 17:21

## 2022-12-16 RX ADMIN — CARVEDILOL 25 MG: 12.5 TABLET, FILM COATED ORAL at 17:21

## 2022-12-16 RX ADMIN — FUROSEMIDE 20 MG: 20 TABLET ORAL at 17:21

## 2022-12-16 RX ADMIN — GUAIFENESIN 600 MG: 600 TABLET ORAL at 17:21

## 2022-12-16 RX ADMIN — IPRATROPIUM BROMIDE AND ALBUTEROL SULFATE 3 ML: 2.5; .5 SOLUTION RESPIRATORY (INHALATION) at 11:54

## 2022-12-16 RX ADMIN — LEVALBUTEROL HYDROCHLORIDE 1.25 MG: 1.25 SOLUTION, CONCENTRATE RESPIRATORY (INHALATION) at 17:55

## 2022-12-16 RX ADMIN — FLUTICASONE PROPIONATE 1 SPRAY: 50 SPRAY, METERED NASAL at 20:21

## 2022-12-16 NOTE — PLAN OF CARE
Problem: INFECTION - ADULT  Goal: Absence or prevention of progression during hospitalization  Description: INTERVENTIONS:  - Assess and monitor for signs and symptoms of infection  - Monitor lab/diagnostic results  - Monitor all insertion sites, i e  indwelling lines, tubes, and drains  - Monitor endotracheal if appropriate and nasal secretions for changes in amount and color  - [place] appropriate cooling/warming therapies per order  - Administer medications as ordered  - Instruct and encourage patient and family to use good hand hygiene technique  - Identify and instruct in appropriate isolation precautions for identified infection/condition  Outcome: Progressing  Goal: Absence of fever/infection during neutropenic period  Description: INTERVENTIONS:  - Monitor WBC    Outcome: Progressing     Problem: RESPIRATORY - ADULT  Goal: Achieves optimal ventilation and oxygenation  Description: INTERVENTIONS:  - Assess for changes in respiratory status  - Assess for changes in mentation and behavior  - Position to facilitate oxygenation and minimize respiratory effort  - Oxygen administered by appropriate delivery if ordered  - Initiate smoking cessation education as indicated  - Encourage broncho-pulmonary hygiene including cough, deep breathe, Incentive Spirometry  - Assess the need for suctioning and aspirate as needed  - Assess and instruct to report SOB or any respiratory difficulty  - Respiratory Therapy support as indicated  Outcome: Progressing

## 2022-12-16 NOTE — ASSESSMENT & PLAN NOTE
Check procalcitonin in a m , follow-up a m  labs    Chest x-ray negative for pneumonia, continue to monitor symptoms

## 2022-12-16 NOTE — ASSESSMENT & PLAN NOTE
Lab Results   Component Value Date    EGFR 51 12/16/2022    EGFR 63 12/13/2022    EGFR 52 09/22/2022    CREATININE 1 42 (H) 12/16/2022    CREATININE 1 20 12/13/2022    CREATININE 1 40 (H) 09/22/2022

## 2022-12-16 NOTE — ASSESSMENT & PLAN NOTE
Patient did have some relative hypoxia, reported pulse ox 90% on room air with associated tachypnea present in the ER  Pulse ox currently 94% on 2 L nasal cannula  Start remdesivir, monitor respiratory status, possible discharge home tomorrow pending evaluation of respiratory status and pulse ox

## 2022-12-16 NOTE — RESPIRATORY THERAPY NOTE
Progress Notes by Princess Bingham Princess Bingham MD Physician     M - Manual             RT Protocol Note  Jero Robins 74 y o  male MRN: [id number]  Unit/Bed#: 415-01 Encounter: [id number]    Assessment    Principal Problem:    COPD, severe ([place] [address] )  Active Problems:    Obstructive sleep apnea    Chronic combined systolic and diastolic congestive heart failure (HCC)    Asthma-COPD overlap syndrome ([facility])    Borderline hyperglycemia    Bronchitis    COVID-19 virus infection    Stage 3 chronic kidney disease, unspecified whether stage 3a or 3b CKD ([facility])      Home Pulmonary Medications:  advair BID, duoneb nebulizer TID at home daily, proair inhaler prn  Home Devices/Therapy: Other (Comment) (duoneb TID, advair BID, and proair inhaler)    Past Medical History:   Diagnosis Date   • Asthma    • CHF (congestive heart failure) ([facility])    • COPD (chronic obstructive pulmonary disease) ([place] [address] )    • COVID-19    • Mumps    • Old MI (myocardial infarction)    • Pneumonia    • Pulmonary emphysema ([place] [address] )    • Rectal bleeding 2019   • Sleep apnea      Social History     Socioeconomic History   • Marital status: /Civil Union     Spouse name: None   • Number of children: None   • Years of education: None   • Highest education level: None   Occupational History   • None   Tobacco Use   • Smoking status: Former     Packs/day: 3 00     Years: 43 00     Pack years: 129 00     Types: Cigarettes     Start date: 67     Quit date: 2018     Years since quittin 9   • Smokeless tobacco: Never   Vaping Use   • Vaping Use: Never used   Substance and Sexual Activity   • Alcohol use: Yes     Comment: rarely   • Drug use: Yes     Types: Marijuana     Comment: occasionally edible   • Sexual activity: None   Other Topics Concern   • None   Social History Narrative    Caffeine use     Social Determinants of Health     Financial Resource Strain: Not on file   Food Insecurity: Not on file   Transportation Needs: Not on file   Physical Activity: Not on file   Stress: Not on file   Social Connections: Not on file   Intimate Partner Violence: Not on file   Housing Stability: Not on file       Subjective  SOB       Objective    Physical Exam:   Assessment Type: Pre-treatment  General Appearance: Alert, Awake  Respiratory Pattern: Labored, Tachypneic  Chest Assessment: Chest expansion symmetrical, Trachea midline  Bilateral Breath Sounds: Diminished, Expiratory wheezes (> RT)  Cough: None  O2 Device: 2 l/m nc    Vitals:  Blood pressure 137/88, pulse (!) 113, temperature 99 3 °F (37 4 °C), temperature source Temporal, resp  rate (!) 24, height 6' 2" (1 88 m), weight (!) 137 kg (302 lb 0 5 oz), SpO2 93 %  Imaging and other studies: I have personally reviewed pertinent reports        O2 Device: 2 l/m nc     Plan  Bronchodilator therapy with xopenex 1 25mgAtrovent 0 5mg TID, albuterol 0 083% Q6prn for SOB and wheezing, oxygen therapy 2 l/m nc with titration to room air as tolerated, flutter valve for mobilizatin of secretions           Resp Comments: pt having SOB and wheezing, very diminished, pulse ox  on RA 90% placed on 2 l/m nc with extension tubing

## 2022-12-16 NOTE — ED NOTES
XR at bedside     Ely Willams, [facility]  12/16/22 2548

## 2022-12-16 NOTE — H&P
[address] [facility] [address]  H&P- Robbie Mccarty 1955, 74 y o  male MRN: [id number]  Unit/Bed#: 018-75 Encounter: [id number]  Primary Care Provider: Alexia Bah DO   Date and time admitted to hospital: 12/16/2022 11:10 AM    * COPD, severe ([address] )  Assessment & Plan  Suspected acute exacerbation of underlying COPD, IV steroids Solu-Medrol 40 mg IV every 12, respiratory protocol  Exacerbation likely secondary to underlying COVID-19 infection,/suspected COVID-19 bronchitis  COVID-19 virus infection  Assessment & Plan  Patient did have some relative hypoxia, reported pulse ox 90% on room air with associated tachypnea present in the ER  Pulse ox currently 94% on 2 L nasal cannula  Start remdesivir, monitor respiratory status, possible discharge home tomorrow pending evaluation of respiratory status and pulse ox      Asthma-COPD overlap syndrome (HCC)  Assessment & Plan  As above, respiratory protocol, IV steroids    Bronchitis  Assessment & Plan  Check procalcitonin in a m , follow-up a m  labs    Chest x-ray negative for pneumonia, continue to monitor symptoms    Borderline hyperglycemia  Assessment & Plan  Monitor Accu-Cheks, diabetic diet given IV steroids, I suspect that patient may develop steroid-induced hyperglycemia, will monitor Accu-Cheks before meals and at bedtime    No basal insulin ordered at this time, check repeat hemoglobin A1c, last hemoglobin A1c was completed on 106 days ago    Chronic combined systolic and diastolic congestive heart failure (HCC)  Assessment & Plan  Wt Readings from Last 3 Encounters:   12/13/22 (!) 137 kg (301 lb 3 2 oz)   12/05/22 (!) 138 kg (304 lb)   11/01/22 134 kg (295 lb 3 2 oz)     Patient appears euvolemic, continue home regimen, did receive dose of IV diuretics in the ER        Stage 3 chronic kidney disease, unspecified whether stage 3a or 3b CKD [facility])  Assessment & Plan  Lab Results   Component Value Date    EGFR 51 12/16/2022    EGFR 63 12/13/2022    EGFR 52 09/22/2022    CREATININE 1 42 (H) 12/16/2022    CREATININE 1 20 12/13/2022    CREATININE 1 40 (H) 09/22/2022       Obstructive sleep apnea  Assessment & Plan  Patient has declined CPAP, monitor on Masimo for nocturnal hypoxia      VTE Pharmacologic Prophylaxis: VTE Score: 5 High Risk (Score >/= 5) - Pharmacological DVT Prophylaxis Ordered: enoxaparin (Lovenox)  Sequential Compression Devices Ordered  Code Status: Level 1 - Full Code   Discussion with family: Updated  (wife) at bedside  Anticipated Length of Stay: Patient will be admitted on an observation basis with an anticipated length of stay of less than 2 midnights secondary to Relative hypoxia  Total Time for Visit, including Counseling / Coordination of Care: 45 minutes Greater than 50% of this total time spent on direct patient counseling and coordination of care  Chief Complaint: Shortness of breath    History of Present Illness:  Rico Lynch is a 74 y o  male with a PMH of COPD/asthma overlap syndrome, underlying CHF who presents with shortness of breath which occurred at rest     Patient reports general malaise and fatigue for 24 to 48 hours, last 2 evenings patient had difficulty sleeping with increasing cough, increasing postnasal drip  Today prior to coming to the emergency room he had shortness of breath which was significantly debilitating, this was present at rest, his activity level at baseline is minimal due to chronic ambulatory dysfunction  Patient denies any specific pain, no lightheadedness no dizziness, has some general malaise and fatigue  Low-grade fevers reported, no shaking chills  Review of Systems:  Review of Systems   All other systems reviewed and are negative        Past Medical and Surgical History:   Past Medical History:   Diagnosis Date   • Asthma    • CHF (congestive heart failure) ([facility] )    • COPD [facility]chronic obstructive pulmonary disease) ([facility] )    • COVID-19    • Mumps • Old MI (myocardial infarction)    • Pneumonia    • Pulmonary emphysema ([facility] [address] )    • Rectal bleeding 1/14/2019   • Sleep apnea        Past Surgical History:   Procedure Laterality Date   • CARDIAC CATHETERIZATION  07/24/2015    Left main- normal and maidly tortuous  Circumflex - normal and moderately tortuous  RCA- normal and mildy tortuous  Global LV function was severely depressed      • CARDIAC CATHETERIZATION Left 9/27/2022    Procedure: Cardiac Left Heart Cath;  Surgeon: Jameson Canales DO;  Location: [facility] CARDIAC CATH LAB; Service: Cardiology   • CARDIAC CATHETERIZATION N/A 9/27/2022    Procedure: Cardiac Coronary Angiogram;  Surgeon: Jameson Canales DO;  Location: [facility] CARDIAC C[facility] LAB; Service: Cardiology   • CARDIAC CATHETERIZATION  9/27/2022    Procedure: Cardiac catheterization;  Surgeon: Jameson Canales DO;  Location: [facility] CARDIAC CATH LAB; Service: Cardiology   • INGUINAL HERNIA REPAIR Bilateral    • VT COLONOSCOPY FLX DX W/COLLJ SPEC WHEN PFRMD N/A 3/11/2019    Procedure: COLONOSCOPY with removal of anal papilla; Surgeon: Ida Adair MD;  Location: [facility] MAIN OR;  Service: Colorectal   • TONSILLECTOMY     • UMBILICAL HERNIA REPAIR  06/21/2006       Meds/Allergies:  Prior to Admission medications    Medication Sig Start Date End Date Taking?  Authorizing Provider   albuterol (ProAir HFA) 90 mcg/act inhaler Inhale 2 puffs every 6 (six) hours as needed for wheezing 11/3/22  Yes Georges Barger MD   ALPRAZolam [place]) 0 5 mg tablet Take 1 tablet (0 5 mg total) by mouth daily at bedtime as needed for anxiety 3/28/22  Yes Maribel Antoine DO   atorvastatin (LIPITOR) 20 mg tablet Take 1 tablet (20 mg total) by mouth daily 1/25/22  Yes Dionicio Merino DO   carvedilol (COREG) 12 5 mg tablet Take 1 tablet (12 5 mg total) by mouth 2 (two) times a day with meals  Patient taking differently: Take 25 mg by mouth 2 (two) times a day with meals 3/30/22  Yes Dionicio Merino DO   DULoxetine (CYMBALTA) 60 mg delayed release capsule Take 1 capsule (60 mg total) by mouth daily 10/31/22  Yes Chana Coley DO   fluticasone [facility]) 50 mcg/act nasal spray 1 spray into each nostril 2 (two) times a day 11/1/22  Yes Dorene Wills MD   furosemide (LASIX) 20 mg tablet Take 1 tablet (20 mg total) by mouth daily 12/5/22  Yes Chip Payan DO   ipratropium-albuterol (DUO-NEB) 0 5-2 5 mg/3 mL nebulizer solution Take 3 mL by nebulization every 6 (six) hours as needed for wheezing or shortness of breath 10/18/22  Yes Nadia Perdomo MD   sacubitril-valsartan Anika Bolaños) 24-26 MG TABS Take 1 tablet by mouth 2 (two) times a day This will replace your lisinopril  You must wait at least 36 hours after your last lisinopril dose to take this medication  Patient taking differently: Take 1 tablet by mouth 2 (two) times a day 49-51mg 9/29/22  Yes Kylah Rhodes PA-C   aclidinium Elise Ovidio Pressair) 400 MCG/ACT inhaler Inhale 1 puff (400 mcg total) 2 (two) times a day  Patient not taking: Reported on 12/16/2022 11/28/22   Barry Landaverde PA-C   aspirin 325 mg tablet Take 1 tablet by mouth daily  Patient not taking: Reported on 12/5/2022    Historical Provider, MD   aspirin 81 mg chewable tablet Chew 81 mg daily  Patient not taking: Reported on 12/16/2022    Historical Provider, MD   Fluticasone-Salmeterol (Advair) 500-50 mcg/dose inhaler INHALE ONE PUFF BY MOUTH AND INTO THE LUNGS TWICE A DAY   RINSE MOUTH AFTER USE  Patient not taking: Reported on 12/16/2022 12/2/22   Barry Landaverde PA-C   guaiFENesin (MUCINEX) 600 mg 12 hr tablet Take 2 tablets (1,200 mg total) by mouth every 12 (twelve) hours  Patient not taking: Reported on 12/13/2022 7/26/21   Nadia Perdomo MD   isosorbide mononitrate (IMDUR) 30 mg 24 hr tablet Take 1 tablet (30 mg total) by mouth daily  Patient not taking: Reported on 12/5/2022 6/14/22   Kylah Rhodes PA-C   lisinopril (ZESTRIL) 20 mg tablet take 1 tablet by mouth twice a day  Patient not taking: Reported on 10/31/2022 10/6/21   Marcelina Armijo DO     I have reviewed home medications using recent Epic encounter  Allergies: Allergies   Allergen Reactions   • Ciprofloxacin Shortness Of Breath and Diarrhea       Social History:  Marital Status: /Civil Union   Substance Use History:   Social History     Substance and Sexual Activity   Alcohol Use Yes    Comment: rarely     Social History     Tobacco Use   Smoking Status Former   • Packs/day: 3 00   • Years: 43 00   • Pack years: 129 00   • Types: Cigarettes   • Start date: 67   • Quit date:    • Years since quittin 9   Smokeless Tobacco Never     Social History     Substance and Sexual Activity   Drug Use Yes   • Types: Marijuana    Comment: occasionally edible       Family History:  Family History   Problem Relation Age of Onset   • Heart attack Father         MI   • Heart disease Father    • Diabetes Sister         DM   • Arthritis Family    • Coronary artery disease Family    • Hypertension Family    • Tuberculosis Son    • Alzheimer's disease Mother        Physical Exam:     Vitals:   Blood Pressure: 137/88 (22 1514)  Pulse: (!) 110 (22 1514)  Temperature: 99 3 °F (37 4 °C) (22 1514)  Temp Source: Temporal (22 1116)  Respirations: 16 (22 1514)  SpO2: 94 % (22 1514)    Physical Exam  Vitals and nursing note reviewed  Constitutional:       General: He is not in acute distress  Appearance: He is obese  He is not ill-appearing, toxic-appearing or diaphoretic  HENT:      Head: Normocephalic and atraumatic  Cardiovascular:      Rate and Rhythm: Normal rate and regular rhythm  Pulmonary:      Effort: No respiratory distress  Breath sounds: Wheezing present  No rhonchi or rales  Comments: Shallow respirations  Chest:      Chest wall: No tenderness  Abdominal:      General: Abdomen is flat  Bowel sounds are normal  There is no distension        Palpations: Abdomen is soft       Tenderness: There is no abdominal tenderness  Musculoskeletal:      Comments: Trace lower extremity edema   Skin:     General: Skin is warm and dry  Findings: No lesion or rash  Neurological:      General: No focal deficit present  Mental Status: He is oriented to person, place, and time  Mental status is at baseline  Cranial Nerves: No cranial nerve deficit  Additional Data:     Lab Results:  Results from last 7 days   Lab Units 12/16/22  1148   WBC Thousand/uL 12 15*   HEMOGLOBIN g/dL 13 6   HEMATOCRIT % 43 4   PLATELETS Thousands/uL 365   NEUTROS PCT % 81*   LYMPHS PCT % 6*   MONOS PCT % 8   EOS PCT % 4     Results from last 7 days   Lab Units 12/16/22  1148   SODIUM mmol/L 138   POTASSIUM mmol/L 4 1   CHLORIDE mmol/L 100   CO2 mmol/L 30   BUN mg/dL 26*   CREATININE mg/dL 1 42*   ANION GAP mmol/L 8   CALCIUM mg/dL 8 9   ALBUMIN g/dL 3 6   TOTAL BILIRUBIN mg/dL 1 29*   ALK PHOS U/L 82   ALT U/L 33   AST U/L 27   GLUCOSE RANDOM mg/dL 128                 Results from last 7 days   Lab Units 12/16/22  1148   LACTIC ACID mmol/L 1 4       Lines/Drains:  Invasive Devices     Peripheral Intravenous Line  Duration           Peripheral IV 12/16/22 Left Antecubital <1 day                    Imaging: Reviewed radiology reports from this admission including: chest xray  XR chest 1 view portable   Final Result by Maribel Amato MD (12/16 1218)      No acute cardiopulmonary disease  Findings are stable            Workstation performed: [id number]           ** Please Note: This note has been constructed using a voice recognition system   **

## 2022-12-16 NOTE — ASSESSMENT & PLAN NOTE
Wt Readings from Last 3 Encounters:   12/13/22 (!) 137 kg (301 lb 3 2 oz)   12/05/22 (!) 138 kg (304 lb)   11/01/22 134 kg (295 lb 3 2 oz)     Patient appears euvolemic, continue home regimen, did receive dose of IV diuretics in the ER

## 2022-12-16 NOTE — ASSESSMENT & PLAN NOTE
Monitor Accu-Cheks, diabetic diet given IV steroids, I suspect that patient may develop steroid-induced hyperglycemia, will monitor Accu-Cheks before meals and at bedtime    No basal insulin ordered at this time, check repeat hemoglobin A1c, last hemoglobin A1c was completed on 106 days ago

## 2022-12-16 NOTE — ASSESSMENT & PLAN NOTE
Suspected acute exacerbation of underlying COPD, IV steroids Solu-Medrol 40 mg IV every 12, respiratory protocol  Exacerbation likely secondary to underlying COVID-19 infection,/suspected COVID-19 bronchitis

## 2022-12-17 LAB
ALBUMIN SERPL BCP-MCNC: 3.7 G/DL (ref 3.5–5)
ALP SERPL-CCNC: 86 U/L (ref 46–116)
ALT SERPL W P-5'-P-CCNC: 37 U/L (ref 12–78)
ANION GAP SERPL CALCULATED.3IONS-SCNC: 11 MMOL/L (ref 4–13)
AST SERPL W P-5'-P-CCNC: 23 U/L (ref 5–45)
BASOPHILS # BLD AUTO: 0.04 THOUSANDS/ÂΜL (ref 0–0.1)
BASOPHILS NFR BLD AUTO: 0 % (ref 0–1)
BILIRUB SERPL-MCNC: 0.79 MG/DL (ref 0.2–1)
BUN SERPL-MCNC: 32 MG/DL (ref 5–25)
CALCIUM SERPL-MCNC: 9 MG/DL (ref 8.3–10.1)
CHLORIDE SERPL-SCNC: 99 MMOL/L (ref 96–108)
CK MB SERPL-MCNC: 1.5 % (ref 0–2.5)
CK MB SERPL-MCNC: 3.6 NG/ML (ref 0–5)
CK SERPL-CCNC: 238 U/L (ref 39–308)
CO2 SERPL-SCNC: 28 MMOL/L (ref 21–32)
CREAT SERPL-MCNC: 1.5 MG/DL (ref 0.6–1.3)
CRP SERPL QL: 103.1 MG/L
EOSINOPHIL # BLD AUTO: 0 THOUSAND/ÂΜL (ref 0–0.61)
EOSINOPHIL NFR BLD AUTO: 0 % (ref 0–6)
ERYTHROCYTE [DISTWIDTH] IN BLOOD BY AUTOMATED COUNT: 14.9 % (ref 11.6–15.1)
EST. AVERAGE GLUCOSE BLD GHB EST-MCNC: 137 MG/DL
GFR SERPL CREATININE-BSD FRML MDRD: 48 ML/MIN/1.73SQ M
GLUCOSE SERPL-MCNC: 133 MG/DL (ref 65–140)
GLUCOSE SERPL-MCNC: 134 MG/DL (ref 65–140)
GLUCOSE SERPL-MCNC: 164 MG/DL (ref 65–140)
GLUCOSE SERPL-MCNC: 187 MG/DL (ref 65–140)
GLUCOSE SERPL-MCNC: 190 MG/DL (ref 65–140)
HBA1C MFR BLD: 6.4 %
HCT VFR BLD AUTO: 45.6 % (ref 36.5–49.3)
HGB BLD-MCNC: 14.6 G/DL (ref 12–17)
IMM GRANULOCYTES # BLD AUTO: 0.05 THOUSAND/UL (ref 0–0.2)
IMM GRANULOCYTES NFR BLD AUTO: 1 % (ref 0–2)
LYMPHOCYTES # BLD AUTO: 0.74 THOUSANDS/ÂΜL (ref 0.6–4.47)
LYMPHOCYTES NFR BLD AUTO: 7 % (ref 14–44)
MCH RBC QN AUTO: 26.8 PG (ref 26.8–34.3)
MCHC RBC AUTO-ENTMCNC: 32 G/DL (ref 31.4–37.4)
MCV RBC AUTO: 84 FL (ref 82–98)
MONOCYTES # BLD AUTO: 0.54 THOUSAND/ÂΜL (ref 0.17–1.22)
MONOCYTES NFR BLD AUTO: 5 % (ref 4–12)
NEUTROPHILS # BLD AUTO: 9.63 THOUSANDS/ÂΜL (ref 1.85–7.62)
NEUTS SEG NFR BLD AUTO: 87 % (ref 43–75)
NRBC BLD AUTO-RTO: 0 /100 WBCS
NT-PROBNP SERPL-MCNC: 2277 PG/ML
PLATELET # BLD AUTO: 373 THOUSANDS/UL (ref 149–390)
PMV BLD AUTO: 9.6 FL (ref 8.9–12.7)
POTASSIUM SERPL-SCNC: 4 MMOL/L (ref 3.5–5.3)
PROCALCITONIN SERPL-MCNC: 0.11 NG/ML
PROT SERPL-MCNC: 8.4 G/DL (ref 6.4–8.4)
RBC # BLD AUTO: 5.44 MILLION/UL (ref 3.88–5.62)
SODIUM SERPL-SCNC: 138 MMOL/L (ref 135–147)
TSH SERPL DL<=0.05 MIU/L-ACNC: 0.41 UIU/ML (ref 0.45–4.5)
WBC # BLD AUTO: 11 THOUSAND/UL (ref 4.31–10.16)

## 2022-12-17 PROCEDURE — XW033E5 INTRODUCTION OF REMDESIVIR ANTI-INFECTIVE INTO PERIPHERAL VEIN, PERCUTANEOUS APPROACH, NEW TECHNOLOGY GROUP 5: ICD-10-PCS | Performed by: STUDENT IN AN ORGANIZED HEALTH CARE EDUCATION/TRAINING PROGRAM

## 2022-12-17 RX ORDER — ALPRAZOLAM 0.5 MG/1
0.5 TABLET ORAL
Status: DISCONTINUED | OUTPATIENT
Start: 2022-12-17 | End: 2022-12-19 | Stop reason: HOSPADM

## 2022-12-17 RX ADMIN — IPRATROPIUM BROMIDE 0.5 MG: 0.5 SOLUTION RESPIRATORY (INHALATION) at 08:30

## 2022-12-17 RX ADMIN — LEVALBUTEROL HYDROCHLORIDE 1.25 MG: 1.25 SOLUTION, CONCENTRATE RESPIRATORY (INHALATION) at 12:45

## 2022-12-17 RX ADMIN — METHYLPREDNISOLONE SODIUM SUCCINATE 40 MG: 40 INJECTION, POWDER, FOR SOLUTION INTRAMUSCULAR; INTRAVENOUS at 20:56

## 2022-12-17 RX ADMIN — GUAIFENESIN 600 MG: 600 TABLET ORAL at 09:12

## 2022-12-17 RX ADMIN — ATORVASTATIN CALCIUM 20 MG: 10 TABLET, FILM COATED ORAL at 18:19

## 2022-12-17 RX ADMIN — FLUTICASONE PROPIONATE 1 SPRAY: 50 SPRAY, METERED NASAL at 20:56

## 2022-12-17 RX ADMIN — IPRATROPIUM BROMIDE 0.5 MG: 0.5 SOLUTION RESPIRATORY (INHALATION) at 19:53

## 2022-12-17 RX ADMIN — LEVALBUTEROL HYDROCHLORIDE 1.25 MG: 1.25 SOLUTION, CONCENTRATE RESPIRATORY (INHALATION) at 08:30

## 2022-12-17 RX ADMIN — DULOXETINE HYDROCHLORIDE 60 MG: 30 CAPSULE, DELAYED RELEASE ORAL at 09:12

## 2022-12-17 RX ADMIN — FLUTICASONE PROPIONATE 1 SPRAY: 50 SPRAY, METERED NASAL at 09:12

## 2022-12-17 RX ADMIN — ENOXAPARIN SODIUM 40 MG: 40 INJECTION SUBCUTANEOUS at 18:19

## 2022-12-17 RX ADMIN — IPRATROPIUM BROMIDE 0.5 MG: 0.5 SOLUTION RESPIRATORY (INHALATION) at 12:45

## 2022-12-17 RX ADMIN — METHYLPREDNISOLONE SODIUM SUCCINATE 40 MG: 40 INJECTION, POWDER, FOR SOLUTION INTRAMUSCULAR; INTRAVENOUS at 09:12

## 2022-12-17 RX ADMIN — INSULIN LISPRO 1 UNITS: 100 INJECTION, SOLUTION INTRAVENOUS; SUBCUTANEOUS at 09:13

## 2022-12-17 RX ADMIN — SACUBITRIL AND VALSARTAN 1 TABLET: 24; 26 TABLET, FILM COATED ORAL at 18:19

## 2022-12-17 RX ADMIN — SACUBITRIL AND VALSARTAN 1 TABLET: 24; 26 TABLET, FILM COATED ORAL at 09:12

## 2022-12-17 RX ADMIN — ALPRAZOLAM 0.5 MG: 0.5 TABLET ORAL at 20:56

## 2022-12-17 RX ADMIN — CARVEDILOL 25 MG: 12.5 TABLET, FILM COATED ORAL at 18:19

## 2022-12-17 RX ADMIN — CARVEDILOL 25 MG: 12.5 TABLET, FILM COATED ORAL at 09:12

## 2022-12-17 RX ADMIN — GUAIFENESIN 600 MG: 600 TABLET ORAL at 20:56

## 2022-12-17 RX ADMIN — REMDESIVIR 100 MG: 100 INJECTION, POWDER, LYOPHILIZED, FOR SOLUTION INTRAVENOUS at 18:25

## 2022-12-17 RX ADMIN — FUROSEMIDE 20 MG: 20 TABLET ORAL at 09:12

## 2022-12-17 RX ADMIN — INSULIN LISPRO 1 UNITS: 100 INJECTION, SOLUTION INTRAVENOUS; SUBCUTANEOUS at 18:19

## 2022-12-17 RX ADMIN — LEVALBUTEROL HYDROCHLORIDE 1.25 MG: 1.25 SOLUTION, CONCENTRATE RESPIRATORY (INHALATION) at 19:53

## 2022-12-17 NOTE — PROGRESS NOTES
[facility] [address] [place]  Progress Note - Fatimah Randolph 1955, 74 y o  male MRN: [id number]  Unit/Bed#: 415-01 Encounter: [id number]  Primary Care Provider: Reginald Montanez DO   Date and time admitted to hospital: 12/16/2022 11:10 AM    * COPD, severe ([facility] )  Assessment & Plan  · With wheezing on examination  · Secondary to COVID infection  · Initiated on IV solumedrol 40 mg IV BID, continue with this for now  · Hopeful transition to oral steroids in the next 24-48 hours  · Continue with mucinex      COVID-19 virus infection  Assessment & Plan  · Was requiring 2L O2 NC but now on room air, does drop in saturations with walking  · Can utilize oxygen PRN  · Initiated on mild pathway:  · remdesivir IV for 5 treatments  · IV steroids as above  · No need for abx currently   · Can utilize VTE prophylaxis  · Supportive care with cough medications  · Respiratory protocol    Stage 3 chronic kidney disease, unspecified whether stage 3a or 3b CKD ([facility] )  Assessment & Plan  Lab Results   Component Value Date    EGFR 48 12/17/2022    EGFR 51 12/16/2022    EGFR 63 12/13/2022    CREATININE 1 50 (H) 12/17/2022    CREATININE 1 42 (H) 12/16/2022    CREATININE 1 20 12/13/2022     · Did have slight increase in Cr in the setting of IV diuretic in the ED  · Can continue with entresto and PO lasix for now  · If further decline in kidney function overnight, will hold these medications  · Continue to monitor    Borderline hyperglycemia  Assessment & Plan  · Likely secondary to acute illness and IV steroids  · ADA diet for now  · Continue to monitor accu cheks  · A1c 6 4, should go home on metformin  · Initiate on SSI      Chronic combined systolic and diastolic congestive heart failure (HCC)  Assessment & Plan  Wt Readings from Last 3 Encounters:   12/17/22 (!) 136 kg (300 lb 11 3 oz)   12/13/22 (!) 137 kg (301 lb 3 2 oz)   12/05/22 (!) 138 kg (304 lb)     · Patient appears euvolemic but BNP elevated in the setting of COVID  · continue home regimen   · did receive dose of IV diuretics in the ER, can hold off on further IV diuretics for now  · Continue home oral lasix 20 mg  · Continue with daily weights         Obstructive sleep apnea  Assessment & Plan  · Patient has declined CPAP, monitor on Masimo for nocturnal hypoxia          VTE Pharmacologic Prophylaxis: VTE Score: 5 High Risk (Score >/= 5) - Pharmacological DVT Prophylaxis Ordered: enoxaparin (Lovenox)  Sequential Compression Devices Ordered  Patient Centered Rounds: I performed bedside rounds with nursing staff today  Discussions with Specialists or Other Care Team Provider: none    Education and Discussions with Family / Patient: Patient declined call to   Time Spent for Care: 30 minutes  More than 50% of total time spent on counseling and coordination of care as described above  Current Length of Stay: 0 day(s)  Current Patient Status: Observation   Certification Statement: The patient will continue to require additional inpatient hospital stay due to IV steroids, COVID treatments  Discharge Plan: Anticipate discharge in 24-48 hrs to home  Code Status: Level 1 - Full Code    Subjective:   Patient reports he does feel improved but still feeling SOB especially with walking  Still productive cough  Lack of appetite  Objective:     Vitals:   Temp (24hrs), Av 8 °F (37 1 °C), Min:97 9 °F (36 6 °C), Max:99 3 °F (37 4 °C)    Temp:  [97 9 °F (36 6 °C)-99 3 °F (37 4 °C)] 97 9 °F (36 6 °C)  HR:  [] 98  Resp:  [16-24] 16  BP: (122-151)/(70-88) 122/71  SpO2:  [90 %-94 %] 92 %  Body mass index is 38 61 kg/m²  Input and Output Summary (last 24 hours): Intake/Output Summary (Last 24 hours) at 2022 1132  Last data filed at 2022 0910  Gross per 24 hour   Intake 240 ml   Output 300 ml   Net -60 ml       Physical Exam:   Physical Exam  Vitals and nursing note reviewed     Constitutional:       General: He is not in acute distress  Appearance: He is obese  He is ill-appearing  HENT:      Head: Normocephalic and atraumatic  Cardiovascular:      Rate and Rhythm: Normal rate and regular rhythm  Pulses: Normal pulses  Heart sounds: No murmur heard  No gallop  Pulmonary:      Effort: Pulmonary effort is normal       Breath sounds: Wheezing present  No rhonchi or rales  Comments: Currently on RA  Abdominal:      General: Bowel sounds are normal       Tenderness: There is no abdominal tenderness  There is no guarding or rebound  Musculoskeletal:      Cervical back: Normal range of motion and neck supple  Right lower leg: No edema  Left lower leg: No edema  Skin:     General: Skin is warm and dry  Neurological:      General: No focal deficit present  Mental Status: He is alert and oriented to person, place, and time     Psychiatric:         Mood and Affect: Mood normal          Behavior: Behavior normal           Additional Data:     Labs:  Results from last 7 days   Lab Units 12/17/22  0436   WBC Thousand/uL 11 00*   HEMOGLOBIN g/dL 14 6   HEMATOCRIT % 45 6   PLATELETS Thousands/uL 373   NEUTROS PCT % 87*   LYMPHS PCT % 7*   MONOS PCT % 5   EOS PCT % 0     Results from last 7 days   Lab Units 12/17/22  0436   SODIUM mmol/L 138   POTASSIUM mmol/L 4 0   CHLORIDE mmol/L 99   CO2 mmol/L 28   BUN mg/dL 32*   CREATININE mg/dL 1 50*   ANION GAP mmol/L 11   CALCIUM mg/dL 9 0   ALBUMIN g/dL 3 7   TOTAL BILIRUBIN mg/dL 0 79   ALK PHOS U/L 86   ALT U/L 37   AST U/L 23   GLUCOSE RANDOM mg/dL 164*         Results from last 7 days   Lab Units 12/17/22  0909 12/16/22  2115 12/16/22  1629   POC GLUCOSE mg/dl 187* 169* 157*     Results from last 7 days   Lab Units 12/17/22  0436   HEMOGLOBIN A1C % 6 4*     Results from last 7 days   Lab Units 12/17/22  0436 12/16/22  1148   LACTIC ACID mmol/L  --  1 4   PROCALCITONIN ng/ml 0 11  --        Lines/Drains:  Invasive Devices     Peripheral Intravenous Line Duration           Peripheral IV 12/17/22 Right Antecubital <1 day                      Imaging: No pertinent imaging reviewed  Recent Cultures (last 7 days):   Results from last 7 days   Lab Units 12/16/22  1148   BLOOD CULTURE  Received in Microbiology Lab  Culture in Progress  Received in Microbiology Lab  Culture in Progress  Last 24 Hours Medication List:   Current Facility-Administered Medications   Medication Dose Route Frequency Provider Last Rate   • acetaminophen  650 mg Oral Q6H PRN [facility] Kinsey, DO     • albuterol  2 5 mg Nebulization Q6H PRN Rj Amaya, DO     • ALPRAZolam  0 5 mg Oral HS PRN Robby Murrell PA-C     • aluminum-magnesium hydroxide-simethicone  30 mL Oral Q6H PRN Mercedes Bal, DO     • atorvastatin  20 mg Oral Daily [facility], DO     • carvedilol  25 mg Oral BID With Meals [facility], DO     • dextromethorphan-guaiFENesin  10 mL Oral Q4H PRN [facility], DO     • DULoxetine  60 mg Oral Daily [facility], DO     • enoxaparin  40 mg Subcutaneous Daily [facility], DO     • fluticasone  1 spray Nasal BID [facility], DO     • furosemide  20 mg Oral Daily [facility], DO     • guaiFENesin  600 mg Oral Q12H [address] [facility], DO     • insulin lispro  1-5 Units Subcutaneous TID [facility], DO     • ipratropium  0 5 mg Nebulization TID Rj Amaya, DO     • levalbuterol  1 25 mg Nebulization TID Rj Amaya, DO     • methylPREDNISolone sodium succinate  40 mg Intravenous Q12H [address] [facility], DO     • ondansetron  4 mg Intravenous Q6H PRN Mercedes Bal, DO     • remdesivir  100 mg Intravenous Q24H [facility], DO     • sacubitril-valsartan  1 tablet Oral BID Mercedes Bal, DO          Today, Patient Was Seen By: Robby Murrell PA-C    **Please Note: This note may have been constructed using a voice recognition system  **

## 2022-12-17 NOTE — CASE MANAGEMENT
Case Management Assessment & Discharge Planning Note    Patient name Fatimah Randolph  Location /537-71 MRN [id number]  : 1955 Date 2022       Current Admission Date: 2022  Current Admission Diagnosis:COPD, severe [facility])   Patient Active Problem List    Diagnosis Date Noted   • Stage 3 chronic kidney disease, unspecified whether stage 3a or 3b CKD ([place] [address] ) 2022   • Chronic obstructive pulmonary disease with acute exacerbation ([place] [address] ) 2022   • Obesity (BMI 30-39 9) 2022   • COVID-19 virus infection 2021   • PLMD (periodic limb movement disorder)    • Bronchitis 2020   • Annual physical exam 2019   • Hypertrophied anal papilla 2019   • History of tobacco abuse 2018   • Constipation 2018   • Pulmonary emphysema ([place] [address] ) 2018   • Chronic asthma, moderate persistent, uncomplicated    • Obstructive sleep apnea 2018   • Hemorrhoids 2017   • COPD, severe ([place] [address] ) 10/16/2017   • Borderline hyperglycemia 2017   • Chronic combined systolic and diastolic congestive heart failure ([place] [address] ) 2015   • Non-ischemic cardiomyopathy with HFmEF ([place] [address] ) 2015   • Anxiety 2014   • Thyroid disease 2014   • Benign essential hypertension 2013   • Vitamin D deficiency 2013   • Dyslipidemia 2013   • Asthma-COPD overlap syndrome ([place] [address] ) 2012      LOS (days): 0  Geometric Mean LOS (GMLOS) (days):   Days to GMLOS:     OBJECTIVE:              Current admission status: Observation       Preferred Pharmacy:   [address] [place] [address] [address], [address] [address]  [address]  [address]   DR Luis CRAIN [address]  Phone: [phone] Fax: [address] [place] [address] [address], [address] [facility] [address] [address]  Phone: [phone] Fax: [phone]    [facility] DR JAMEY VILCHIS [address] [place] [address] -  Valery Shearer Vivienne Pathak [facility] [address]  Phone: [phone] Fax: [phone]    Primary Care Provider: Chana Coley,     Primary Insurance: MEDICARE  Secondary Insurance: Ayde Waite    ASSESSMENT:  Active Health Care Proxies    There are no active Health Care Proxies on file  Advance Directives  Does patient have a [address]?: No  Was patient offered paperwork?: Yes (declines)  Does patient currently have a Health Care decision maker?: Yes, please see Health Care Proxy section  Does patient have Advance Directives?: No  Was patient offered paperwork?: Yes (declines)  Primary Contact: Maria De Jesus Baez (Spouse)    [phone] (H)         Readmission Root Cause  30 Day Readmission: No    Patient Information  Admitted from[de-identified] Home  Mental Status: Alert  During Assessment patient was accompanied by: Not accompanied during assessment  Assessment information provided by[de-identified] Patient  Primary Caregiver: Self  Support Systems: Spouse/significant other  [place] of Residence: [address] [place] [address] do you live in?: Via Link_[employer]_ Media entry access options   Select all that apply : No steps to enter home  Type of Current Residence: Other (Comment) (split level; 6 steps between levels)  In the last 12 months, was there a time when you were not able to pay the mortgage or rent on time?: No  In the last 12 months, how many places have you lived?: 1  In the last 12 months, was there a time when you did not have a steady place to sleep or slept in a shelter (including now)?: No  Homeless/housing insecurity resource given?: N/A  Living Arrangements: Lives w/ Spouse/significant other  Is patient a ?: No    Activities of Daily Living Prior to Admission  Functional Status: Independent  Completes ADLs independently?: Yes  Ambulates independently?: Yes  Does patient use assisted devices?: Yes  Assisted Devices (DME) used: Straight Cane, CPAP, Nebulizer  DME Company Name (respiratory supplies): unsure of provider  Does patient currently own DME?: Yes  What DME does the patient currently own?: CPAP, Straight Cane, Nebulizer  Does patient have a history of Outpatient Therapy (PT/OT)?: No  Does the patient have a history of Short-Term Rehab?: No  Does patient have a history of HHC?: No  Does patient currently have [facility]?: No         Patient Information Continued  Income Source: Pension/MCFP  Does patient have prescription coverage?: Yes  Within the past 12 months, you worried that your food would run out before you got the money to buy more : Never true  Within the past 12 months, the food you bought just didn't last and you didn't have money to get more : Never true  Food insecurity resource given?: N/A  Does patient receive dialysis treatments?: No  Does patient have a history of substance abuse?: No  Does patient have a history of Mental Health Diagnosis?: No    PHQ 2/9 Screening   Reviewed PHQ 2/9 Depression Screening Score?: No    Means of Transportation  Means of Transport to Appts[de-identified] Drives Self  In the past 12 months, has lack of transportation kept you from medical appointments or from getting medications?: No  In the past 12 months, has lack of transportation kept you from meetings, work, or from getting things needed for daily living?: No  Was application for public transport provided?: N/A        DISCHARGE DETAILS:    Discharge planning discussed with[de-identified] patient        CM contacted family/caregiver?: No- see comments (declines)  Were Treatment Team discharge recommendations reviewed with patient/caregiver?: Yes  Did patient/caregiver verbalize understanding of patient care needs?: Yes  Were patient/caregiver advised of the risks associated with not following Treatment Team discharge recommendations?: Yes         [address]         Is the patient interested in [facility] at discharge?: No    DME Referral Provided  Referral made for DME?: No         Would you like to participate in our [address] service program?  : No - Declined Discharge Destination Plan[de-identified] Home  Transport at Discharge : Family wife      Plans at this time are home on dc with OP follow up  CM will follow and assist in dc planning

## 2022-12-17 NOTE — ASSESSMENT & PLAN NOTE
· Likely secondary to acute illness and IV steroids  · ADA diet for now  · Continue to monitor accu cheks  · A1c 6 4, should go home on metformin  · Initiate on SSI

## 2022-12-17 NOTE — ASSESSMENT & PLAN NOTE
Wt Readings from Last 3 Encounters:   12/17/22 (!) 136 kg (300 lb 11 3 oz)   12/13/22 (!) 137 kg (301 lb 3 2 oz)   12/05/22 (!) 138 kg (304 lb)     · Patient appears euvolemic but BNP elevated in the setting of COVID  · continue home regimen   · did receive dose of IV diuretics in the ER, can hold off on further IV diuretics for now  · Continue home oral lasix 20 mg  · Continue with daily weights

## 2022-12-17 NOTE — PLAN OF CARE
Problem: INFECTION - ADULT  Goal: Absence or prevention of progression during hospitalization  Description: INTERVENTIONS:  - Assess and monitor for signs and symptoms of infection  - Monitor lab/diagnostic results  - Monitor all insertion sites, i e  indwelling lines, tubes, and drains  - Monitor endotracheal if appropriate and nasal secretions for changes in amount and color  - [place] appropriate cooling/warming therapies per order  - Administer medications as ordered  - Instruct and encourage patient and family to use good hand hygiene technique  - Identify and instruct in appropriate isolation precautions for identified infection/condition  Outcome: Progressing  Goal: Absence of fever/infection during neutropenic period  Description: INTERVENTIONS:  - Monitor WBC    Outcome: Progressing     Problem: RESPIRATORY - ADULT  Goal: Achieves optimal ventilation and oxygenation  Description: INTERVENTIONS:  - Assess for changes in respiratory status  - Assess for changes in mentation and behavior  - Position to facilitate oxygenation and minimize respiratory effort  - Oxygen administered by appropriate delivery if ordered  - Initiate smoking cessation education as indicated  - Encourage broncho-pulmonary hygiene including cough, deep breathe, Incentive Spirometry  - Assess the need for suctioning and aspirate as needed  - Assess and instruct to report SOB or any respiratory difficulty  - Respiratory Therapy support as indicated  Outcome: Progressing     Problem: Potential for Falls  Goal: Patient will remain free of falls  Description: INTERVENTIONS:  - Educate patient/family on patient safety including physical limitations  - Instruct patient to call for assistance with activity   - Consult OT/PT to assist with strengthening/mobility   - Keep Call bell within reach  - Keep bed low and locked with side rails adjusted as appropriate  - Keep care items and personal belongings within reach  - Initiate and maintain comfort rounds  - Make Fall Risk Sign visible to staff  - Offer Toileting every 2 Hours, in advance of need  - Initiate/Maintain bed/chair alarm  - Obtain necessary fall risk management equipment: bed/chair alarm  - Apply yellow socks and bracelet for high fall risk patients  - Consider moving patient to room near nurses station  Outcome: Progressing

## 2022-12-17 NOTE — ASSESSMENT & PLAN NOTE
Lab Results   Component Value Date    EGFR 48 12/17/2022    EGFR 51 12/16/2022    EGFR 63 12/13/2022    CREATININE 1 50 (H) 12/17/2022    CREATININE 1 42 (H) 12/16/2022    CREATININE 1 20 12/13/2022     · Did have slight increase in Cr in the setting of IV diuretic in the ED  · Can continue with entresto and PO lasix for now  · If further decline in kidney function overnight, will hold these medications  · Continue to monitor

## 2022-12-17 NOTE — PLAN OF CARE
Problem: INFECTION - ADULT  Goal: Absence or prevention of progression during hospitalization  Description: INTERVENTIONS:  - Assess and monitor for signs and symptoms of infection  - Monitor lab/diagnostic results  - Monitor all insertion sites, i e  indwelling lines, tubes, and drains  - Monitor endotracheal if appropriate and nasal secretions for changes in amount and color  - [place] appropriate cooling/warming therapies per order  - Administer medications as ordered  - Instruct and encourage patient and family to use good hand hygiene technique  - Identify and instruct in appropriate isolation precautions for identified infection/condition  Outcome: Progressing  Goal: Absence of fever/infection during neutropenic period  Description: INTERVENTIONS:  - Monitor WBC    Outcome: Progressing     Problem: RESPIRATORY - ADULT  Goal: Achieves optimal ventilation and oxygenation  Description: INTERVENTIONS:  - Assess for changes in respiratory status  - Assess for changes in mentation and behavior  - Position to facilitate oxygenation and minimize respiratory effort  - Oxygen administered by appropriate delivery if ordered  - Initiate smoking cessation education as indicated  - Encourage broncho-pulmonary hygiene including cough, deep breathe, Incentive Spirometry  - Assess the need for suctioning and aspirate as needed  - Assess and instruct to report SOB or any respiratory difficulty  - Respiratory Therapy support as indicated  Outcome: Progressing     Problem: Potential for Falls  Goal: Patient will remain free of falls  Description: INTERVENTIONS:  - Educate patient/family on patient safety including physical limitations  - Instruct patient to call for assistance with activity   - Consult OT/PT to assist with strengthening/mobility   - Keep Call bell within reach  - Keep bed low and locked with side rails adjusted as appropriate  - Keep care items and personal belongings within reach  - Initiate and maintain comfort rounds  - Make Fall Risk Sign visible to staff  - Offer Toileting every 2 Hours, in advance of need  - Initiate/Maintain bed/chair alarm  - Obtain necessary fall risk management equipment: bed/chair alarm, call bell within reach, nonskid socks  - Apply yellow socks and bracelet for high fall risk patients  - Consider moving patient to room near nurses station  Outcome: Progressing

## 2022-12-17 NOTE — RESPIRATORY THERAPY NOTE
12/17/22 0830   Inhalation Therapy Tx   $ Inhalation Therapy Performed Yes   $ Pulse Oximetry Spot Check Charge Completed   SpO2 92 %   Pre-Treatment Pulse 98   Pre-Treatment Respirations 20   Duration 20   Breath Sounds Pre-Treatment Bilateral Diminished;Coarse; Expiratory wheeze   Breath Sounds Post-Treatment Bilateral Diminished;Coarse; Expiratory wheezes   Post-Treatment Pulse 94   Post-Treatment Respirations 20   Delivery Source Oxygen;UDN   Position Up in chair   Treatment Tolerance Tolerated well   Resp Comments patient heard coughing out of the room, he is using the flutter valve   He is on 2 lpm nasal cannula   Cough Description   Sputum Amount None   Sputum How Obtained Spontaneous cough

## 2022-12-17 NOTE — ASSESSMENT & PLAN NOTE
· With wheezing on examination  · Secondary to COVID infection  · Initiated on IV solumedrol 40 mg IV BID, continue with this for now  · Hopeful transition to oral steroids in the next 24-48 hours  · Continue with mucinex

## 2022-12-17 NOTE — ASSESSMENT & PLAN NOTE
· Was requiring 2L O2 NC but now on room air, does drop in saturations with walking  · Can utilize oxygen PRN  · Initiated on mild pathway:  · remdesivir IV for 5 treatments  · IV steroids as above  · No need for abx currently   · Can utilize VTE prophylaxis  · Supportive care with cough medications  · Respiratory protocol

## 2022-12-18 LAB
ALBUMIN SERPL BCP-MCNC: 3.5 G/DL (ref 3.5–5)
ALP SERPL-CCNC: 79 U/L (ref 46–116)
ALT SERPL W P-5'-P-CCNC: 32 U/L (ref 12–78)
ANION GAP SERPL CALCULATED.3IONS-SCNC: 11 MMOL/L (ref 4–13)
AST SERPL W P-5'-P-CCNC: 23 U/L (ref 5–45)
BASOPHILS # BLD AUTO: 0.03 THOUSANDS/ÂΜL (ref 0–0.1)
BASOPHILS NFR BLD AUTO: 0 % (ref 0–1)
BILIRUB SERPL-MCNC: 0.63 MG/DL (ref 0.2–1)
BUN SERPL-MCNC: 44 MG/DL (ref 5–25)
CALCIUM SERPL-MCNC: 8.5 MG/DL (ref 8.3–10.1)
CHLORIDE SERPL-SCNC: 98 MMOL/L (ref 96–108)
CO2 SERPL-SCNC: 25 MMOL/L (ref 21–32)
CREAT SERPL-MCNC: 1.55 MG/DL (ref 0.6–1.3)
EOSINOPHIL # BLD AUTO: 0 THOUSAND/ÂΜL (ref 0–0.61)
EOSINOPHIL NFR BLD AUTO: 0 % (ref 0–6)
ERYTHROCYTE [DISTWIDTH] IN BLOOD BY AUTOMATED COUNT: 14.8 % (ref 11.6–15.1)
GFR SERPL CREATININE-BSD FRML MDRD: 46 ML/MIN/1.73SQ M
GLUCOSE SERPL-MCNC: 140 MG/DL (ref 65–140)
GLUCOSE SERPL-MCNC: 167 MG/DL (ref 65–140)
GLUCOSE SERPL-MCNC: 185 MG/DL (ref 65–140)
GLUCOSE SERPL-MCNC: 201 MG/DL (ref 65–140)
GLUCOSE SERPL-MCNC: 238 MG/DL (ref 65–140)
HCT VFR BLD AUTO: 44.9 % (ref 36.5–49.3)
HGB BLD-MCNC: 14.6 G/DL (ref 12–17)
IMM GRANULOCYTES # BLD AUTO: 0.07 THOUSAND/UL (ref 0–0.2)
IMM GRANULOCYTES NFR BLD AUTO: 1 % (ref 0–2)
LYMPHOCYTES # BLD AUTO: 0.9 THOUSANDS/ÂΜL (ref 0.6–4.47)
LYMPHOCYTES NFR BLD AUTO: 7 % (ref 14–44)
MCH RBC QN AUTO: 27.2 PG (ref 26.8–34.3)
MCHC RBC AUTO-ENTMCNC: 32.5 G/DL (ref 31.4–37.4)
MCV RBC AUTO: 84 FL (ref 82–98)
MONOCYTES # BLD AUTO: 0.59 THOUSAND/ÂΜL (ref 0.17–1.22)
MONOCYTES NFR BLD AUTO: 4 % (ref 4–12)
NEUTROPHILS # BLD AUTO: 12.16 THOUSANDS/ÂΜL (ref 1.85–7.62)
NEUTS SEG NFR BLD AUTO: 88 % (ref 43–75)
NRBC BLD AUTO-RTO: 0 /100 WBCS
PLATELET # BLD AUTO: 383 THOUSANDS/UL (ref 149–390)
PMV BLD AUTO: 9.8 FL (ref 8.9–12.7)
POTASSIUM SERPL-SCNC: 4 MMOL/L (ref 3.5–5.3)
PROT SERPL-MCNC: 7.9 G/DL (ref 6.4–8.4)
RBC # BLD AUTO: 5.37 MILLION/UL (ref 3.88–5.62)
SODIUM SERPL-SCNC: 134 MMOL/L (ref 135–147)
WBC # BLD AUTO: 13.75 THOUSAND/UL (ref 4.31–10.16)

## 2022-12-18 RX ORDER — PREDNISONE 20 MG/1
40 TABLET ORAL DAILY
Status: DISCONTINUED | OUTPATIENT
Start: 2022-12-19 | End: 2022-12-19 | Stop reason: HOSPADM

## 2022-12-18 RX ORDER — METHYLPREDNISOLONE SODIUM SUCCINATE 40 MG/ML
40 INJECTION, POWDER, LYOPHILIZED, FOR SOLUTION INTRAMUSCULAR; INTRAVENOUS EVERY 12 HOURS SCHEDULED
Status: COMPLETED | OUTPATIENT
Start: 2022-12-18 | End: 2022-12-18

## 2022-12-18 RX ADMIN — IPRATROPIUM BROMIDE 0.5 MG: 0.5 SOLUTION RESPIRATORY (INHALATION) at 20:19

## 2022-12-18 RX ADMIN — REMDESIVIR 100 MG: 100 INJECTION, POWDER, LYOPHILIZED, FOR SOLUTION INTRAVENOUS at 18:02

## 2022-12-18 RX ADMIN — FLUTICASONE PROPIONATE 1 SPRAY: 50 SPRAY, METERED NASAL at 21:03

## 2022-12-18 RX ADMIN — FUROSEMIDE 20 MG: 20 TABLET ORAL at 09:29

## 2022-12-18 RX ADMIN — INSULIN LISPRO 1 UNITS: 100 INJECTION, SOLUTION INTRAVENOUS; SUBCUTANEOUS at 09:34

## 2022-12-18 RX ADMIN — LEVALBUTEROL HYDROCHLORIDE 1.25 MG: 1.25 SOLUTION, CONCENTRATE RESPIRATORY (INHALATION) at 14:12

## 2022-12-18 RX ADMIN — SACUBITRIL AND VALSARTAN 1 TABLET: 24; 26 TABLET, FILM COATED ORAL at 18:01

## 2022-12-18 RX ADMIN — IPRATROPIUM BROMIDE 0.5 MG: 0.5 SOLUTION RESPIRATORY (INHALATION) at 08:01

## 2022-12-18 RX ADMIN — ATORVASTATIN CALCIUM 20 MG: 10 TABLET, FILM COATED ORAL at 18:01

## 2022-12-18 RX ADMIN — SACUBITRIL AND VALSARTAN 1 TABLET: 24; 26 TABLET, FILM COATED ORAL at 09:33

## 2022-12-18 RX ADMIN — IPRATROPIUM BROMIDE 0.5 MG: 0.5 SOLUTION RESPIRATORY (INHALATION) at 14:11

## 2022-12-18 RX ADMIN — CARVEDILOL 25 MG: 12.5 TABLET, FILM COATED ORAL at 09:29

## 2022-12-18 RX ADMIN — ENOXAPARIN SODIUM 40 MG: 40 INJECTION SUBCUTANEOUS at 18:01

## 2022-12-18 RX ADMIN — CARVEDILOL 25 MG: 12.5 TABLET, FILM COATED ORAL at 18:01

## 2022-12-18 RX ADMIN — GUAIFENESIN 600 MG: 600 TABLET ORAL at 21:03

## 2022-12-18 RX ADMIN — METHYLPREDNISOLONE SODIUM SUCCINATE 40 MG: 40 INJECTION, POWDER, FOR SOLUTION INTRAMUSCULAR; INTRAVENOUS at 21:03

## 2022-12-18 RX ADMIN — DULOXETINE HYDROCHLORIDE 60 MG: 30 CAPSULE, DELAYED RELEASE ORAL at 09:29

## 2022-12-18 RX ADMIN — LEVALBUTEROL HYDROCHLORIDE 1.25 MG: 1.25 SOLUTION, CONCENTRATE RESPIRATORY (INHALATION) at 20:19

## 2022-12-18 RX ADMIN — GUAIFENESIN 600 MG: 600 TABLET ORAL at 09:29

## 2022-12-18 RX ADMIN — INSULIN LISPRO 1 UNITS: 100 INJECTION, SOLUTION INTRAVENOUS; SUBCUTANEOUS at 12:21

## 2022-12-18 RX ADMIN — INSULIN LISPRO 2 UNITS: 100 INJECTION, SOLUTION INTRAVENOUS; SUBCUTANEOUS at 18:02

## 2022-12-18 RX ADMIN — LEVALBUTEROL HYDROCHLORIDE 1.25 MG: 1.25 SOLUTION, CONCENTRATE RESPIRATORY (INHALATION) at 08:01

## 2022-12-18 RX ADMIN — ALPRAZOLAM 0.5 MG: 0.5 TABLET ORAL at 21:08

## 2022-12-18 RX ADMIN — ALBUTEROL SULFATE 2.5 MG: 2.5 SOLUTION RESPIRATORY (INHALATION) at 02:59

## 2022-12-18 RX ADMIN — METHYLPREDNISOLONE SODIUM SUCCINATE 40 MG: 40 INJECTION, POWDER, FOR SOLUTION INTRAMUSCULAR; INTRAVENOUS at 09:29

## 2022-12-18 RX ADMIN — FLUTICASONE PROPIONATE 1 SPRAY: 50 SPRAY, METERED NASAL at 09:33

## 2022-12-18 NOTE — OCCUPATIONAL THERAPY NOTE
Occupational Therapy Evaluation     Patient Name: Remigio COLMENARES Date: 12/18/2022  Problem List  Principal Problem:    COPD, severe ([facility] )  Active Problems:    Obstructive sleep apnea    Chronic combined systolic and diastolic congestive heart failure (HCC)    Asthma-COPD overlap syndrome (HCC)    Borderline hyperglycemia    Bronchitis    COVID-19 virus infection    Stage 3 chronic kidney disease, unspecified whether stage 3a or 3b CKD ([facility] )    Past Medical History  Past Medical History:   Diagnosis Date    Asthma     CHF (congestive heart failure) (HCC)     [facility]chronic obstructive [facility] disease[facility] )     COVID-19     Mumps     Old MI (myocardial infarction)     Pneumonia     Pulmonary emphysema ([email or web address] )     Rectal bleeding 1/14/2019    Sleep apnea      Past Surgical History  Past Surgical History:   Procedure Laterality Date    CARDIAC CATHETERIZATION  07/24/2015    Left main- normal and maidly tortuous  Circumflex - normal and moderately tortuous  RCA- normal and mildy tortuous  Global LV function was severely depressed  Delona Many CARDIAC CATHETERIZATION Left 9/27/2022    Procedure: Cardiac Left Heart Cath;  Surgeon: Ragini Catalan DO;  Location: [facility] CARDIAC CATH LAB; Service: Cardiology    CARDIAC CATHETERIZATION N/A 9/27/2022    Procedure: Cardiac Coronary Angiogram;  Surgeon: Ragini Catalan DO;  Location: [facility] CARDIAC CATH LAB; Service: Cardiology    CARDIAC CATHETERIZATION  9/27/2022    Procedure: Cardiac catheterization;  Surgeon: Ragini Catalan DO;  Location: [facility] CARDIAC CATH LAB; Service: Cardiology    INGUINAL HERNIA REPAIR Bilateral     ME COLONOSCOPY FLX DX W/COLLJ SPEC WHEN PFRMD N/A 3/11/2019    Procedure: COLONOSCOPY with removal of anal papilla;   Surgeon: Rajat Matthews MD;  Location: [facility] MAIN OR;  Service: Colorectal    TONSILLECTOMY      UMBILICAL HERNIA REPAIR  06/21/2006 12/18/22 1124   OT Last Visit   OT Visit Date 12/18/22   Note Type   Note type Evaluation Pain Assessment   Pain Assessment Tool 0-10   Pain Score No Pain   Restrictions/Precautions   Weight Bearing Precautions Per Order No   Other Precautions Contact/isolation; Airborne/isolation   Home Living   Type of [address] [place] [address] Multi-level;Stairs to enter with rails; Performs ADLs on one level  (6 AXEL c HR)   Bathroom Shower/Tub Tub/shower unit   Bathroom Toilet Standard   Home Equipment Cane   Additional Comments Pt reports use of SPC for functional mobility outside of the home, no use of AD inside home  Prior Function   Level of [place] Independent with ADLs; Needs assistance with IADLS; Independent with functional mobility   Lives With Spouse; Family   Receives Help From Family   IADLs Independent with driving; Independent with medication management; Family/Friend/Other provides meals  (wife A with cooking, cleaning, laundry, pt does help with these tasks as well but can become fatigued easily and needs A)   Falls in the last 6 months 0   Vocational Retired  (owned diner)   Comments (+) driving   Lifestyle   Autonomy pt reporting independence in ADls, requires some A for IADLs, and use of SPC for functional mobility outside of the home, no use of AD inside the home  Pt lives in multi-level house with 6 AXEL  Pt drives  Reciprocal Relationships spouse   Intrinsic Gratification Pt enjoys drawing  Subjective   Subjective "I have been walking around the room, sometimes I get tired"   ADL   LB Dressing Assistance 7  Independent   LB Dressing Deficit Don/doff L shoe;Don/doff R shoe   Additional Comments Pt don/doff shoes while seated in recliner independently  No difficulty with task  pt also reporting he has been toileting independently since admission  Bed Mobility   Additional Comments Pt seated in recliner at beginning of session  Pt on RA, SPO2 at 96%  Other vitals WNL  Pt returning to recliner at end of session     Transfers   Sit to Stand 7  Independent   Stand to Sit 7  Independent Additional Comments No AD  Independent in functional transfers, No LOB or unsteadiness  Functional Mobility   Functional Mobility 7  Independent   Additional Comments Pt performed functional mobility in room independently, ~30 feet, able to go back and forth across room 5-6 times  Pt remained on RA, SPO2 decreased to 92%  No LOB or unsteadiness   Balance   Static Sitting Good   Dynamic Sitting Good   Static Standing Good   Dynamic Standing Fair +   Ambulatory Fair +   Activity Tolerance   Activity Tolerance Patient limited by fatigue   Medical Staff Made Aware ALETA Brice verablized pt appropriate for OT evaluation  RUE Assessment   RUE Assessment WNL  (strength 5/5, ROM WNL)   LUE Assessment   LUE Assessment WNL  (strength 5/5, ROM WNL)   Hand Function   Gross Motor Coordination Functional   Fine Motor Coordination Functional   Vision-Basic Assessment   Current Vision Wears glasses all the time   Psychosocial   Psychosocial (WDL) WDL   Cognition   Overall Cognitive Status WFL   Arousal/Participation Responsive; Alert; Cooperative   Attention Within functional limits   Orientation Level Oriented X4   Memory Within functional limits   Following Commands Follows all commands and directions without difficulty   Comments Pt pleasant and agreeable to OT evaluation  Assessment   Assessment Pt is a 74 y o  male seen for OT evaluation s/p admit to [facility]16[facility]2022 w/ COPD, severe ([facility] )  Comorbidities affecting pt's functional performance at time of assessment include: CORDELL, CHF, asthma-COPD overlap syndrome, CKD, COPD, dyslipidemia, HTN, anxiety, thyroid disease  Personal factors affecting pt at time of IE include:steps to enter environment and health management   Prior to admission, pt was independent in ADLs, requiring minimal A for IADLs, and use of SPC for functional mobility outside of home and independent inside home, no AD  Upon OT evaluation, patient requires independent  for UB ADLs and independent  for LB ADLs  Patient presents with functional use of BUEs, with intact prehension and fine motor coordination, and symmetrical muscle strength  Patient appears to be functioning at baseline, no further Acute OT needs identified at this time to warrant OT services  D/C OT and from OT standpoint, recommendation at time of d/c would be home with family support   Plan   OT Frequency Eval only   Recommendation   OT Discharge Recommendation No rehabilitation needs   Additional Comments  Pt left seated in recliner, all needs met, call bell in reach     AM-PAC Daily Activity Inpatient   Lower Body Dressing 4   Bathing 4   Toileting 4   Upper Body Dressing 4   Grooming 4   Eating 4   Daily Activity Raw Score 24   Daily Activity Standardized Score (Calc for Raw Score >=11) 57 54   AM-PAC Applied Cognition Inpatient   Following a Speech/Presentation 4   Understanding Ordinary Conversation 4   Taking Medications 4   Remembering Where Things Are Placed or Put Away 4   Remembering List of 4-5 Errands 4   Taking Care of Complicated Tasks 4   Applied Cognition Raw Score 24   Applied Cognition Standardized Score 62 21       Manav Links

## 2022-12-18 NOTE — PLAN OF CARE
Problem: INFECTION - ADULT  Goal: Absence or prevention of progression during hospitalization  Description: INTERVENTIONS:  - Assess and monitor for signs and symptoms of infection  - Monitor lab/diagnostic results  - Monitor all insertion sites, i e  indwelling lines, tubes, and drains  - Monitor endotracheal if appropriate and nasal secretions for changes in amount and color  - [place] appropriate cooling/warming therapies per order  - Administer medications as ordered  - Instruct and encourage patient and family to use good hand hygiene technique  - Identify and instruct in appropriate isolation precautions for identified infection/condition  Outcome: Progressing  Goal: Absence of fever/infection during neutropenic period  Description: INTERVENTIONS:  - Monitor WBC    Outcome: Progressing     Problem: RESPIRATORY - ADULT  Goal: Achieves optimal ventilation and oxygenation  Description: INTERVENTIONS:  - Assess for changes in respiratory status  - Assess for changes in mentation and behavior  - Position to facilitate oxygenation and minimize respiratory effort  - Oxygen administered by appropriate delivery if ordered  - Initiate smoking cessation education as indicated  - Encourage broncho-pulmonary hygiene including cough, deep breathe, Incentive Spirometry  - Assess the need for suctioning and aspirate as needed  - Assess and instruct to report SOB or any respiratory difficulty  - Respiratory Therapy support as indicated  Outcome: Progressing     Problem: Potential for Falls  Goal: Patient will remain free of falls  Description: INTERVENTIONS:  - Educate patient/family on patient safety including physical limitations  - Instruct patient to call for assistance with activity   - Consult OT/PT to assist with strengthening/mobility   - Keep Call bell within reach  - Keep bed low and locked with side rails adjusted as appropriate  - Keep care items and personal belongings within reach  - Initiate and maintain comfort rounds  - Make Fall Risk Sign visible to staff  - Offer Toileting every 2 Hours, in advance of need  - Initiate/Maintain bed/chair alarm  - Obtain necessary fall risk management equipment: bed/chair alarm, nonskid socks   - Apply yellow socks and bracelet for high fall risk patients  - Consider moving patient to room near nurses station  Outcome: Progressing     Problem: MOBILITY - ADULT  Goal: Maintain or return to baseline ADL function  Description: INTERVENTIONS:  -  Assess patient's ability to carry out ADLs; assess patient's baseline for ADL function and identify physical deficits which impact ability to perform ADLs (bathing, care of mouth/teeth, toileting, grooming, dressing, etc )  - Assess/evaluate cause of self-care deficits   - Assess range of motion  - Assess patient's mobility; develop plan if impaired  - Assess patient's need for assistive devices and provide as appropriate  - Encourage maximum independence but intervene and supervise when necessary  - Involve family in performance of ADLs  - Assess for home care needs following discharge   - Consider OT consult to assist with ADL evaluation and planning for discharge  - Provide patient education as appropriate  Outcome: Progressing  Goal: Maintains/Returns to pre admission functional level  Description: INTERVENTIONS:  - Perform BMAT or MOVE assessment daily    - Set and communicate daily mobility goal to care team and patient/family/caregiver  - Collaborate with rehabilitation services on mobility goals if consulted  - Perform Range of Motion 3 times a day  - Reposition patient every 3 hours    - Dangle patient 3 times a day  - Stand patient 3 times a day  - Ambulate patient 3 times a day  - Out of bed to chair 3 times a day   - Out of bed for meals 3 times a day  - Out of bed for toileting  - Record patient progress and toleration of activity level   Outcome: Progressing

## 2022-12-18 NOTE — ASSESSMENT & PLAN NOTE
Lab Results   Component Value Date    EGFR 46 12/18/2022    EGFR 48 12/17/2022    EGFR 51 12/16/2022    CREATININE 1 55 (H) 12/18/2022    CREATININE 1 50 (H) 12/17/2022    CREATININE 1 42 (H) 12/16/2022     · Did have slight increase in Cr in the setting of IV diuretic in the ED  · Can continue with entresto and PO lasix for now  · Continue to monitor

## 2022-12-18 NOTE — PROGRESS NOTES
[facility] [facility] [address]  Progress Note - Eileen Hopping 1955, 74 y o  male MRN: [id number]  Unit/Bed#: 415-01 Encounter: [id number]  Primary Care Provider: Tomi Khoury DO   Date and time admitted to hospital: 12/16/2022 11:10 AM    * COPD, severe ([address] )  Assessment & Plan  · With wheezing on examination  · Secondary to COVID infection  · Initiated on IV solumedrol 40 mg IV BID, continue with this for now  · Significant improvement with wheezing at this time, plan to transition to oral steroids tomorrow AM and discharge  · Continue with mucinex      COVID-19 virus infection  Assessment & Plan  · Was requiring 2L O2 NC but now on room air, does drop in saturations with walking  · Can utilize oxygen PRN  · Initiated on mild pathway:  · remdesivir IV for 5 treatments  · IV steroids as above  · No need for abx currently   · Can utilize VTE prophylaxis  · Supportive care with cough medications  · Respiratory protocol    Stage 3 chronic kidney disease, unspecified whether stage 3a or 3b CKD [facility])  Assessment & Plan  Lab Results   Component Value Date    EGFR 46 12/18/2022    EGFR 48 12/17/2022    EGFR 51 12/16/2022    CREATININE 1 55 (H) 12/18/2022    CREATININE 1 50 (H) 12/17/2022    CREATININE 1 42 (H) 12/16/2022     · Did have slight increase in Cr in the setting of IV diuretic in the ED  · Can continue with entresto and PO lasix for now  · Continue to monitor    Borderline hyperglycemia  Assessment & Plan  · Likely secondary to acute illness and IV steroids  · ADA diet for now  · Continue to monitor accu cheks  · A1c 6 4, should go home on metformin  · Initiate on SSI      Chronic combined systolic and diastolic congestive heart failure (HCC)  Assessment & Plan  Wt Readings from Last 3 Encounters:   12/18/22 134 kg (295 lb 3 1 oz)   12/13/22 (!) 137 kg (301 lb 3 2 oz)   12/05/22 (!) 138 kg (304 lb)     · Patient appears euvolemic but BNP elevated in the setting of COVID  · continue home regimen · did receive dose of IV diuretics in the ER, can hold off on further IV diuretics for now  · Continue home oral lasix 20 mg  · Continue with daily weights     Obstructive sleep apnea  Assessment & Plan  · Patient has declined CPAP, monitor on [place] for nocturnal hypoxia          VTE Pharmacologic Prophylaxis: VTE Score: 5 High Risk (Score >/= 5) - Pharmacological DVT Prophylaxis Ordered: enoxaparin (Lovenox)  Sequential Compression Devices Ordered  Patient Centered Rounds: I performed bedside rounds with nursing staff today  Discussions with Specialists or Other Care Team Provider: RT    Education and Discussions with Family / Patient: Patient declined call to   Time Spent for Care: 30 minutes  More than 50% of total time spent on counseling and coordination of care as described above  Current Length of Stay: 1 day(s)  Current Patient Status: Inpatient   Certification Statement: The patient will continue to require additional inpatient hospital stay due to COVID treatments, monitoring respriatory status  Discharge Plan: Anticipate discharge tomorrow to home  Code Status: Level 1 - Full Code    Subjective:   Patient continues to improve  Has no complaints at this time  No events overnight    Objective:     Vitals:   Temp (24hrs), Av °F (36 7 °C), Min:97 5 °F (36 4 °C), Max:98 2 °F (36 8 °C)    Temp:  [97 5 °F (36 4 °C)-98 2 °F (36 8 °C)] 98 2 °F (36 8 °C)  HR:  [69-79] 69  Resp:  [20] 20  BP: (137-151)/(75-98) 151/98  SpO2:  [92 %-96 %] 96 %  Body mass index is 37 9 kg/m²  Input and Output Summary (last 24 hours): Intake/Output Summary (Last 24 hours) at 2022 1315  Last data filed at 2022 0802  Gross per 24 hour   Intake 540 ml   Output 400 ml   Net 140 ml       Physical Exam:   Physical Exam  Vitals and nursing note reviewed  Constitutional:       General: He is not in acute distress  Appearance: He is obese  He is not ill-appearing     HENT:      Head: Normocephalic and atraumatic  Cardiovascular:      Rate and Rhythm: Normal rate and regular rhythm  Pulses: Normal pulses  Heart sounds: Normal heart sounds  No murmur heard  No gallop  Pulmonary:      Effort: Pulmonary effort is normal       Breath sounds: No wheezing, rhonchi or rales  Comments: Diminished breath sounds  Abdominal:      General: Bowel sounds are normal       Palpations: Abdomen is soft  Tenderness: There is no abdominal tenderness  There is no guarding or rebound  Musculoskeletal:         General: Normal range of motion  Right lower leg: No edema  Left lower leg: No edema  Skin:     General: Skin is warm and dry  Neurological:      General: No focal deficit present  Mental Status: He is alert and oriented to person, place, and time     Psychiatric:         Mood and Affect: Mood normal          Behavior: Behavior normal           Additional Data:     Labs:  Results from last 7 days   Lab Units 12/18/22  0434   WBC Thousand/uL 13 75*   HEMOGLOBIN g/dL 14 6   HEMATOCRIT % 44 9   PLATELETS Thousands/uL 383   NEUTROS PCT % 88*   LYMPHS PCT % 7*   MONOS PCT % 4   EOS PCT % 0     Results from last 7 days   Lab Units 12/18/22  0434   SODIUM mmol/L 134*   POTASSIUM mmol/L 4 0   CHLORIDE mmol/L 98   CO2 mmol/L 25   BUN mg/dL 44*   CREATININE mg/dL 1 55*   ANION GAP mmol/L 11   CALCIUM mg/dL 8 5   ALBUMIN g/dL 3 5   TOTAL BILIRUBIN mg/dL 0 63   ALK PHOS U/L 79   ALT U/L 32   AST U/L 23   GLUCOSE RANDOM mg/dL 185*         Results from last 7 days   Lab Units 12/18/22  1142 12/18/22  0724 12/17/22  2126 12/17/22  1622 12/17/22  1127 12/17/22  0909 12/16/22  2115 12/16/22  1629   POC GLUCOSE mg/dl 201* 167* 133 190* 134 187* 169* 157*     Results from last 7 days   Lab Units 12/17/22  0436   HEMOGLOBIN A1C % 6 4*     Results from last 7 days   Lab Units 12/17/22  0436 12/16/22  1148   LACTIC ACID mmol/L  --  1 4   PROCALCITONIN ng/ml 0 11  -- Lines/Drains:  Invasive Devices     Peripheral Intravenous Line  Duration           Peripheral IV 12/17/22 Right Antecubital 1 day                      Imaging: No pertinent imaging reviewed  Recent Cultures (last 7 days):   Results from last 7 days   Lab Units 12/16/22  1148   BLOOD CULTURE  No Growth at 24 hrs  No Growth at 24 hrs         Last 24 Hours Medication List:   Current Facility-Administered Medications   Medication Dose Route Frequency Provider Last Rate   • acetaminophen  650 mg Oral Q6H PRN [facility], DO     • albuterol  2 5 mg Nebulization Q6H PRN Hema Amaya, DO     • ALPRAZolam  0 5 mg Oral HS PRN Kiana Chamberlain PA-C     • aluminum-magnesium hydroxide-simethicone  30 mL Oral Q6H PRN Hilda Zheng, DO     • atorvastatin  20 mg Oral Daily [facility], DO     • carvedilol  25 mg Oral BID With Meals [facility], DO     • dextromethorphan-guaiFENesin  10 mL Oral Q4H PRN [facility], DO     • DULoxetine  60 mg Oral Daily [facility], DO     • enoxaparin  40 mg Subcutaneous Daily [facility], DO     • fluticasone  1 spray Nasal BID [facility], DO     • furosemide  20 mg Oral Daily [facility], DO     • guaiFENesin  600 mg Oral Q12H [address] [facility], DO     • insulin lispro  1-5 Units Subcutaneous TID [facility], DO     • ipratropium  0 5 mg Nebulization TID Hema Amaya, DO     • levalbuterol  1 25 mg Nebulization TID Hema Amaya, DO     • methylPREDNISolone sodium succinate  40 mg Intravenous Q12H [address] Kiana Chamberlain PA-C     • ondansetron  4 mg Intravenous Q6H PRN [facility] Kinsey, DO     • [START ON 12/19/2022] predniSONE  40 mg Oral Daily Kiana Chamberlain PA-C     • remdesivir  100 mg Intravenous Q24H [facility] gorge, DO     • sacubitril-valsartan  1 tablet Oral BID Hilda Zheng, DO          Today, Patient Was Seen By: Kiana Chamberlain PA-C    **Please Note: This note may have been constructed using a voice recognition system  **

## 2022-12-18 NOTE — RESPIRATORY THERAPY NOTE
12/18/22 0802   Inhalation Therapy Tx   $ Inhalation Therapy Performed Yes   $ Pulse Oximetry Spot Check Charge Completed   SpO2 96 %   Pre-Treatment Pulse 71   Pre-Treatment Respirations 20   Duration 20   Breath Sounds Pre-Treatment Bilateral Diminished; Expiratory wheeze   Breath Sounds Post-Treatment Bilateral Diminished; Expiratory wheezes   Post-Treatment Pulse 69   Post-Treatment Respirations 20   Delivery Source [employer]   Position Up in chair   Treatment Tolerance Tolerated well   Resp Comments BS much improved, patient stating he is coughing mucous up   He is currently on room air,

## 2022-12-18 NOTE — ASSESSMENT & PLAN NOTE
Wt Readings from Last 3 Encounters:   12/18/22 134 kg (295 lb 3 1 oz)   12/13/22 (!) 137 kg (301 lb 3 2 oz)   12/05/22 (!) 138 kg (304 lb)     · Patient appears euvolemic but BNP elevated in the setting of COVID  · continue home regimen   · did receive dose of IV diuretics in the ER, can hold off on further IV diuretics for now  · Continue home oral lasix 20 mg  · Continue with daily weights

## 2022-12-18 NOTE — ASSESSMENT & PLAN NOTE
· With wheezing on examination  · Secondary to COVID infection  · Initiated on IV solumedrol 40 mg IV BID, continue with this for now  · Significant improvement with wheezing at this time, plan to transition to oral steroids tomorrow AM and discharge  · Continue with mucinex

## 2022-12-18 NOTE — PLAN OF CARE
Problem: INFECTION - ADULT  Goal: Absence or prevention of progression during hospitalization  Description: INTERVENTIONS:  - Assess and monitor for signs and symptoms of infection  - Monitor lab/diagnostic results  - Monitor all insertion sites, i e  indwelling lines, tubes, and drains  - Monitor endotracheal if appropriate and nasal secretions for changes in amount and color  - [place] appropriate cooling/warming therapies per order  - Administer medications as ordered  - Instruct and encourage patient and family to use good hand hygiene technique  - Identify and instruct in appropriate isolation precautions for identified infection/condition  Outcome: Progressing  Goal: Absence of fever/infection during neutropenic period  Description: INTERVENTIONS:  - Monitor WBC    Outcome: Progressing     Problem: RESPIRATORY - ADULT  Goal: Achieves optimal ventilation and oxygenation  Description: INTERVENTIONS:  - Assess for changes in respiratory status  - Assess for changes in mentation and behavior  - Position to facilitate oxygenation and minimize respiratory effort  - Oxygen administered by appropriate delivery if ordered  - Initiate smoking cessation education as indicated  - Encourage broncho-pulmonary hygiene including cough, deep breathe, Incentive Spirometry  - Assess the need for suctioning and aspirate as needed  - Assess and instruct to report SOB or any respiratory difficulty  - Respiratory Therapy support as indicated  Outcome: Progressing     Problem: Potential for Falls  Goal: Patient will remain free of falls  Description: INTERVENTIONS:  - Educate patient/family on patient safety including physical limitations  - Instruct patient to call for assistance with activity   - Consult OT/PT to assist with strengthening/mobility   - Keep Call bell within reach  - Keep bed low and locked with side rails adjusted as appropriate  - Keep care items and personal belongings within reach  - Initiate and maintain comfort rounds  - Make Fall Risk Sign visible to staff  - Offer Toileting every 2 Hours, in advance of need  - Obtain necessary fall risk management equipment: call bell within reach, nonskid socks  - Apply yellow socks and bracelet for high fall risk patients  - Consider moving patient to room near nurses station  Outcome: Progressing     Problem: MOBILITY - ADULT  Goal: Maintain or return to baseline ADL function  Description: INTERVENTIONS:  -  Assess patient's ability to carry out ADLs; assess patient's baseline for ADL function and identify physical deficits which impact ability to perform ADLs (bathing, care of mouth/teeth, toileting, grooming, dressing, etc )  - Assess/evaluate cause of self-care deficits   - Assess range of motion  - Assess patient's mobility; develop plan if impaired  - Assess patient's need for assistive devices and provide as appropriate  - Encourage maximum independence but intervene and supervise when necessary  - Involve family in performance of ADLs  - Assess for home care needs following discharge   - Consider OT consult to assist with ADL evaluation and planning for discharge  - Provide patient education as appropriate  Outcome: Progressing  Goal: Maintains/Returns to pre admission functional level  Description: INTERVENTIONS:  - Perform BMAT or MOVE assessment daily    - Set and communicate daily mobility goal to care team and patient/family/caregiver  - Collaborate with rehabilitation services on mobility goals if consulted  - Perform Range of Motion 3 times a day  - Reposition patient every 2 hours    - Dangle patient 3 times a day  - Stand patient 3 times a day  - Ambulate patient 3 times a day  - Out of bed to chair 3 times a day   - Out of bed for meals 3 times a day  - Out of bed for toileting  - Record patient progress and toleration of activity level   Outcome: Progressing

## 2022-12-19 ENCOUNTER — TRANSITIONAL CARE MANAGEMENT (OUTPATIENT)
Dept: FAMILY MEDICINE CLINIC | Facility: CLINIC | Age: 67
End: 2022-12-19

## 2022-12-19 VITALS
SYSTOLIC BLOOD PRESSURE: 143 MMHG | DIASTOLIC BLOOD PRESSURE: 90 MMHG | RESPIRATION RATE: 20 BRPM | BODY MASS INDEX: 38.03 KG/M2 | WEIGHT: 296.3 LBS | HEIGHT: 74 IN | OXYGEN SATURATION: 92 % | TEMPERATURE: 98.5 F | HEART RATE: 72 BPM

## 2022-12-19 LAB
GLUCOSE SERPL-MCNC: 146 MG/DL (ref 65–140)
GLUCOSE SERPL-MCNC: 165 MG/DL (ref 65–140)

## 2022-12-19 RX ORDER — FLUTICASONE PROPIONATE AND SALMETEROL 500; 50 UG/1; UG/1
POWDER RESPIRATORY (INHALATION)
Qty: 60 BLISTER | Refills: 0 | Status: SHIPPED | OUTPATIENT
Start: 2022-12-19

## 2022-12-19 RX ORDER — IPRATROPIUM BROMIDE AND ALBUTEROL SULFATE 2.5; .5 MG/3ML; MG/3ML
3 SOLUTION RESPIRATORY (INHALATION) EVERY 6 HOURS PRN
Qty: 360 ML | Refills: 0 | Status: SHIPPED | OUTPATIENT
Start: 2022-12-19 | End: 2023-01-18

## 2022-12-19 RX ORDER — CARVEDILOL 12.5 MG/1
25 TABLET ORAL 2 TIMES DAILY WITH MEALS
Start: 2022-12-19

## 2022-12-19 RX ORDER — SACUBITRIL AND VALSARTAN 24; 26 MG/1; MG/1
1 TABLET, FILM COATED ORAL 2 TIMES DAILY
Start: 2022-12-19

## 2022-12-19 RX ORDER — PREDNISONE 10 MG/1
TABLET ORAL
Qty: 30 TABLET | Refills: 0 | Status: SHIPPED | OUTPATIENT
Start: 2022-12-19 | End: 2022-12-31

## 2022-12-19 RX ADMIN — IPRATROPIUM BROMIDE 0.5 MG: 0.5 SOLUTION RESPIRATORY (INHALATION) at 09:27

## 2022-12-19 RX ADMIN — LEVALBUTEROL HYDROCHLORIDE 1.25 MG: 1.25 SOLUTION, CONCENTRATE RESPIRATORY (INHALATION) at 09:28

## 2022-12-19 RX ADMIN — SACUBITRIL AND VALSARTAN 1 TABLET: 24; 26 TABLET, FILM COATED ORAL at 08:22

## 2022-12-19 RX ADMIN — INSULIN LISPRO 1 UNITS: 100 INJECTION, SOLUTION INTRAVENOUS; SUBCUTANEOUS at 08:23

## 2022-12-19 RX ADMIN — FUROSEMIDE 20 MG: 20 TABLET ORAL at 08:22

## 2022-12-19 RX ADMIN — DULOXETINE HYDROCHLORIDE 60 MG: 30 CAPSULE, DELAYED RELEASE ORAL at 08:22

## 2022-12-19 RX ADMIN — FLUTICASONE PROPIONATE 1 SPRAY: 50 SPRAY, METERED NASAL at 08:24

## 2022-12-19 RX ADMIN — CARVEDILOL 25 MG: 12.5 TABLET, FILM COATED ORAL at 08:22

## 2022-12-19 RX ADMIN — PREDNISONE 40 MG: 20 TABLET ORAL at 08:22

## 2022-12-19 RX ADMIN — GUAIFENESIN 600 MG: 600 TABLET ORAL at 08:22

## 2022-12-19 NOTE — RESPIRATORY THERAPY NOTE
Home Oxygen Qualifying Test     Patient name: Kandy Sites        : 1955   Date of Test:  2022  Diagnosis: severe COPD   Home Oxygen Test:    **Medicare Guidelines require item(s) 1-5 on all ambulatory patients or 1 and 2 on non-ambulatory patients  1  Baseline SPO2 on Room Air at rest 93 %   a  If <= 88% on Room Air add O2 via NC to obtain SpO2 >=88%  If LPM needed, document LPM  needed to reach =>88%    2  SPO2 during exertion on Room Air 90  %  a  During exertion monitor SPO2  If SPO2 increases >=89%, do not add supplemental oxygen    3  SPO2 on Oxygen at Rest na % at na LPM    4  SPO2 during exertion on Oxygen na % at na LPM    5  Test performed during exertion activity  Walking, 60 ft 10 minutes     []  Supplemental Home Oxygen is indicated  [x]  Client does not qualify for home oxygen  Respiratory Additional Notes- pt ambulated on room air approx 60 ft  Pt states he usually doesn't walk much distance than that       Pat Kelly, RT

## 2022-12-19 NOTE — ASSESSMENT & PLAN NOTE
Wt Readings from Last 3 Encounters:   12/19/22 134 kg (296 lb 4 8 oz)   12/13/22 (!) 137 kg (301 lb 3 2 oz)   12/05/22 (!) 138 kg (304 lb)     · Patient appears euvolemic but BNP elevated in the setting of COVID  · Continue home Entresto, Lasix

## 2022-12-19 NOTE — ASSESSMENT & PLAN NOTE
· Recommend for patient to continue follow-up with sleep medicine, as patient would likely benefit with CPAP though he has declined in the past

## 2022-12-19 NOTE — PHYSICAL THERAPY NOTE
PT Screen       12/19/22 1041   PT Last Visit   PT Visit Date 12/19/22   Note Type   Note type Screen     OT evaluation revealed that pt was Indep with all functional mobility and lacked deficits and is safe to return home with no needs     [place] Do

## 2022-12-19 NOTE — DISCHARGE SUMMARY
[address] [facility] [address] [place]  Discharge- Fatimah Randolph 1955, 74 y o  male MRN: [id number]  Unit/Bed#: 415-01 Encounter: [id number]  Primary Care Provider: Reginald Montanez DO   Date and time admitted to hospital: 12/16/2022 11:10 AM    * COPD, severe ([facility] [address] )  Assessment & Plan  · With wheezing on examination  · Secondary to COVID infection  · Completed IV steroids, transition to p o  prednisone  · Prescription for prednisone 40 mg once daily, decreasing 10 mg every 3 days until completion  · Continue home inhalers/nebulizers, refill given for duo nebs as patient reports running out      Stage 3 chronic kidney disease, unspecified whether stage 3a or 3b CKD [facility])  Assessment & Plan  Lab Results   Component Value Date    EGFR 46 12/18/2022    EGFR 48 12/17/2022    EGFR 51 12/16/2022    CREATININE 1 55 (H) 12/18/2022    CREATININE 1 50 (H) 12/17/2022    CREATININE 1 42 (H) 12/16/2022   · Renal functions currently stable    COVID-19 virus infection  Assessment & Plan  · Oxygen wean down to room air, discontinue COVID treatments on discharge    Chronic combined systolic and diastolic congestive heart failure ([facility] [address] )  Assessment & Plan  Wt Readings from Last 3 Encounters:   12/19/22 134 kg (296 lb 4 8 oz)   12/13/22 (!) 137 kg (301 lb 3 2 oz)   12/05/22 (!) 138 kg (304 lb)     · Patient appears euvolemic but BNP elevated in the setting of COVID  · Continue home Entresto, Lasix    Obstructive sleep apnea  Assessment & Plan  · Recommend for patient to continue follow-up with sleep medicine, as patient would likely benefit with CPAP though he has declined in the past      Discharging Physician / Practitioner: Bijan Rubalcava MD  PCP: Reginald Montanez DO  Admission Date:   Admission Orders (From admission, onward)     Ordered        12/17/22 1531  Inpatient Admission  Once            12/16/22 1428  Place in Observation  Once                      Discharge Date: 12/19/22    Medical Problems     Resolved Problems Date Reviewed: 12/19/2022   None         Consultations During Hospital Stay:  · none    Procedures Performed:   · none    Significant Findings / Test Results:   XR chest 1 view portable    Result Date: 12/16/2022  · Impression: No acute cardiopulmonary disease  Findings are stable Workstation performed: [id number]   ·     Incidental Findings:   · none     Test Results Pending at Discharge (will require follow up):   · none     Outpatient Tests Requested:  · none    Complications:  none    Reason for Admission: SOB    Hospital Course:     Stephie Garcia is a 74 y o  male patient who originally presented to the hospital on 12/16/2022 due to shortness of breath  Initially treated with IV steroids, nebulizers  Also found to be positive for COVID without pneumonia  He did briefly require oxygen, but was able to be weaned down to room air  It was suspected that COVID may have induced patient's COPD exacerbation  Over the course of several days, IV steroids were titrated down and patient was transitioned to oral prednisone  He was discharged home with p o  prednisone taper  PT and OT evaluated recommended no home health needs and or rehab  History of CHF, patient appears euvolemic, renal functions have been stable and patient diuretics were resumed  Please see above list of diagnoses and related plan for additional information  Condition at Discharge: fair     Discharge Day Visit / Exam:     Subjective: No events overnight, reports doing well  States his cough and breathing has improved  Ambulating without difficulty  Vitals: Blood Pressure: 143/90 (12/19/22 0715)  Pulse: 72 (12/19/22 0715)  Temperature: 98 5 °F (36 9 °C) (12/19/22 0715)  Temp Source: Temporal (12/19/22 0715)  Respirations: 20 (12/19/22 0715)  Height: 6' 2" (188 cm) (12/16/22 1543)  Weight - Scale: 134 kg (296 lb 4 8 oz) (12/19/22 0600)  SpO2: 92 % (12/19/22 0930)  Exam:   Physical Exam  Vitals and nursing note reviewed  Constitutional:       Appearance: He is obese  HENT:      Head: Normocephalic and atraumatic  Eyes:      General: No scleral icterus  Conjunctiva/sclera: Conjunctivae normal    Cardiovascular:      Rate and Rhythm: Normal rate and regular rhythm  Pulmonary:      Effort: Pulmonary effort is normal  No respiratory distress  Breath sounds: Wheezing ( mild at bases) present  Abdominal:      General: Bowel sounds are normal  There is no distension  Palpations: Abdomen is soft  Tenderness: There is no abdominal tenderness  Musculoskeletal:         General: No swelling  Right lower leg: No edema  Left lower leg: No edema  Skin:     General: Skin is warm and dry  Neurological:      General: No focal deficit present  Mental Status: He is alert  Mental status is at baseline  Psychiatric:         Mood and Affect: Mood normal        Discussion with Family: patient    Discharge instructions/Information to patient and family:   See after visit summary for information provided to patient and family  Provisions for Follow-Up Care:  See after visit summary for information related to follow-up care and any pertinent home health orders  Disposition:     Home    Planned Readmission: none     Discharge Statement:  I spent 35 minutes discharging the patient  This time was spent on the day of discharge  I had direct contact with the patient on the day of discharge  Greater than 50% of the total time was spent examining patient, answering all patient questions, arranging and discussing plan of care with patient as well as directly providing post-discharge instructions  Additional time then spent on discharge activities  Discharge Medications:  See after visit summary for reconciled discharge medications provided to patient and family        ** Please Note: This note has been constructed using a voice recognition system **

## 2022-12-19 NOTE — ASSESSMENT & PLAN NOTE
Lab Results   Component Value Date    EGFR 46 12/18/2022    EGFR 48 12/17/2022    EGFR 51 12/16/2022    CREATININE 1 55 (H) 12/18/2022    CREATININE 1 50 (H) 12/17/2022    CREATININE 1 42 (H) 12/16/2022   · Renal functions currently stable

## 2022-12-19 NOTE — PLAN OF CARE
Problem: INFECTION - ADULT  Goal: Absence or prevention of progression during hospitalization  Description: INTERVENTIONS:  - Assess and monitor for signs and symptoms of infection  - Monitor lab/diagnostic results  - Monitor all insertion sites, i e  indwelling lines, tubes, and drains  - Monitor endotracheal if appropriate and nasal secretions for changes in amount and color  - [place] appropriate cooling/warming therapies per order  - Administer medications as ordered  - Instruct and encourage patient and family to use good hand hygiene technique  - Identify and instruct in appropriate isolation precautions for identified infection/condition  Outcome: Progressing  Goal: Absence of fever/infection during neutropenic period  Description: INTERVENTIONS:  - Monitor WBC    Outcome: Progressing     Problem: RESPIRATORY - ADULT  Goal: Achieves optimal ventilation and oxygenation  Description: INTERVENTIONS:  - Assess for changes in respiratory status  - Assess for changes in mentation and behavior  - Position to facilitate oxygenation and minimize respiratory effort  - Oxygen administered by appropriate delivery if ordered  - Initiate smoking cessation education as indicated  - Encourage broncho-pulmonary hygiene including cough, deep breathe, Incentive Spirometry  - Assess the need for suctioning and aspirate as needed  - Assess and instruct to report SOB or any respiratory difficulty  - Respiratory Therapy support as indicated  Outcome: Progressing     Problem: Potential for Falls  Goal: Patient will remain free of falls  Description: INTERVENTIONS:  - Educate patient/family on patient safety including physical limitations  - Instruct patient to call for assistance with activity   - Consult OT/PT to assist with strengthening/mobility   - Keep Call bell within reach  - Keep bed low and locked with side rails adjusted as appropriate  - Keep care items and personal belongings within reach  - Initiate and maintain comfort rounds  - Make Fall Risk Sign visible to staff  - Offer Toileting every 2 Hours, in advance of need  - Initiate/Maintain bed alarm  - Obtain necessary fall risk management equipment:   - Apply yellow socks and bracelet for high fall risk patients  - Consider moving patient to room near nurses station  Outcome: Progressing     Problem: MOBILITY - ADULT  Goal: Maintain or return to baseline ADL function  Description: INTERVENTIONS:  -  Assess patient's ability to carry out ADLs; assess patient's baseline for ADL function and identify physical deficits which impact ability to perform ADLs (bathing, care of mouth/teeth, toileting, grooming, dressing, etc )  - Assess/evaluate cause of self-care deficits   - Assess range of motion  - Assess patient's mobility; develop plan if impaired  - Assess patient's need for assistive devices and provide as appropriate  - Encourage maximum independence but intervene and supervise when necessary  - Involve family in performance of ADLs  - Assess for home care needs following discharge   - Consider OT consult to assist with ADL evaluation and planning for discharge  - Provide patient education as appropriate  Outcome: Progressing  Goal: Maintains/Returns to pre admission functional level  Description: INTERVENTIONS:  - Perform BMAT or MOVE assessment daily    - Set and communicate daily mobility goal to care team and patient/family/caregiver  - Collaborate with rehabilitation services on mobility goals if consulted  - Perform Range of Motion 3 times a day  - Reposition patient every 2 hours    - Dangle patient 3 times a day  - Stand patient 3 times a day  - Ambulate patient 3 times a day  - Out of bed to chair 3 times a day   - Out of bed for meals 3 times a day  - Out of bed for toileting  - Record patient progress and toleration of activity level   Outcome: Progressing

## 2022-12-19 NOTE — ASSESSMENT & PLAN NOTE
· With wheezing on examination  · Secondary to COVID infection  · Completed IV steroids, transition to p o  prednisone  · Prescription for prednisone 40 mg once daily, decreasing 10 mg every 3 days until completion  · Continue home inhalers/nebulizers, refill given for duo nebs as patient reports running out

## 2022-12-19 NOTE — CASE MANAGEMENT
Case Management Discharge Planning Note    Patient name Chris Shi  Location /[id number] MRN [id number]  : 1955 Date 2022       Current Admission Date: 2022  Current Admission Diagnosis:COPD, severe [facility])   Patient Active Problem List    Diagnosis Date Noted   • Stage 3 chronic kidney disease, unspecified whether stage 3a or 3b CKD ([place] [address] ) 2022   • Chronic obstructive pulmonary disease with acute exacerbation ([place] [address] ) 2022   • Obesity (BMI 30-39 9) 2022   • COVID-19 virus infection 2021   • PLMD (periodic limb movement disorder)    • Bronchitis 2020   • Annual physical exam 2019   • Hypertrophied anal papilla 2019   • History of tobacco abuse 2018   • Constipation 2018   • Pulmonary emphysema ([place] [address] ) 2018   • Chronic asthma, moderate persistent, uncomplicated    • Obstructive sleep apnea 2018   • Hemorrhoids 2017   • COPD, severe ([place] [address] ) 10/16/2017   • Borderline hyperglycemia 2017   • Chronic combined systolic and diastolic congestive heart failure ([place] [address] ) 2015   • Non-ischemic cardiomyopathy with HFmEF ([place] [address] ) 2015   • Anxiety 2014   • Thyroid disease 2014   • Benign essential hypertension 2013   • Vitamin D deficiency 2013   • Dyslipidemia 2013   • Asthma-COPD overlap syndrome ([place] [address] ) 2012      LOS (days): 2  Geometric Mean LOS (GMLOS) (days): 3 60  Days to GMLOS:1 9     OBJECTIVE:  Risk of Unplanned Readmission Score: 13 83         Current admission status: Inpatient   Preferred Pharmacy:   [address] [place] [address] [address] Hameed, [address] [place] [address]  [address]  [address]   [address] [address]  Phone: [phone] Fax: [phone]    [address] [place] [address] W Reg Hameed, [address] [facility] [address] [address]  Phone: [phone] Fax: [phone]    [facility] [address] JAMEY VILCHIS [facility] [address], [address] [place] [address] E  [address] [facility] [address]  Phone: [phone] Fax: [phone]    Primary Care Provider: Bernabe Durant DO    Primary Insurance: MEDICARE  Secondary Insurance: AETNA    DISCHARGE DETAILS:  Pt is being dc'd home on this date with OP follow up

## 2022-12-21 ENCOUNTER — APPOINTMENT (EMERGENCY)
Dept: RADIOLOGY | Facility: HOSPITAL | Age: 67
End: 2022-12-21

## 2022-12-21 ENCOUNTER — HOSPITAL ENCOUNTER (INPATIENT)
Facility: HOSPITAL | Age: 67
LOS: 5 days | Discharge: HOME | End: 2022-12-26
Attending: EMERGENCY MEDICINE

## 2022-12-21 ENCOUNTER — TELEPHONE (OUTPATIENT)
Dept: FAMILY MEDICINE CLINIC | Facility: CLINIC | Age: 67
End: 2022-12-21

## 2022-12-21 DIAGNOSIS — K21.9 GERD (GASTROESOPHAGEAL REFLUX DISEASE): ICD-10-CM

## 2022-12-21 DIAGNOSIS — U07.1 COVID-19: ICD-10-CM

## 2022-12-21 DIAGNOSIS — I50.42 CHRONIC COMBINED SYSTOLIC AND DIASTOLIC CONGESTIVE HEART FAILURE (HCC): ICD-10-CM

## 2022-12-21 DIAGNOSIS — J44.1 COPD WITH ACUTE EXACERBATION (HCC): ICD-10-CM

## 2022-12-21 DIAGNOSIS — J69.0 ASPIRATION PNEUMONIA OF RIGHT LOWER LOBE, UNSPECIFIED ASPIRATION PNEUMONIA TYPE (HCC): ICD-10-CM

## 2022-12-21 DIAGNOSIS — R06.00 DYSPNEA: ICD-10-CM

## 2022-12-21 DIAGNOSIS — J18.9 PNEUMONIA: Primary | ICD-10-CM

## 2022-12-21 DIAGNOSIS — R09.02 HYPOXIA: ICD-10-CM

## 2022-12-21 PROBLEM — R91.8 RIGHT LOWER LOBE PULMONARY INFILTRATE: Status: ACTIVE | Noted: 2022-12-21

## 2022-12-21 LAB
2HR DELTA HS TROPONIN: -3 NG/L
4HR DELTA HS TROPONIN: -2 NG/L
ALBUMIN SERPL BCP-MCNC: 3 G/DL (ref 3.5–5)
ALP SERPL-CCNC: 73 U/L (ref 46–116)
ALT SERPL W P-5'-P-CCNC: 40 U/L (ref 12–78)
ANION GAP SERPL CALCULATED.3IONS-SCNC: 9 MMOL/L (ref 4–13)
AST SERPL W P-5'-P-CCNC: 20 U/L (ref 5–45)
BACTERIA BLD CULT: NORMAL
BACTERIA BLD CULT: NORMAL
BASOPHILS # BLD AUTO: 0.06 THOUSANDS/ÂΜL (ref 0–0.1)
BASOPHILS NFR BLD AUTO: 0 % (ref 0–1)
BILIRUB SERPL-MCNC: 0.57 MG/DL (ref 0.2–1)
BUN SERPL-MCNC: 32 MG/DL (ref 5–25)
CALCIUM ALBUM COR SERPL-MCNC: 9.2 MG/DL (ref 8.3–10.1)
CALCIUM SERPL-MCNC: 8.4 MG/DL (ref 8.3–10.1)
CARDIAC TROPONIN I PNL SERPL HS: 41 NG/L
CARDIAC TROPONIN I PNL SERPL HS: 42 NG/L
CARDIAC TROPONIN I PNL SERPL HS: 44 NG/L
CHLORIDE SERPL-SCNC: 100 MMOL/L (ref 96–108)
CO2 SERPL-SCNC: 28 MMOL/L (ref 21–32)
CREAT SERPL-MCNC: 1.22 MG/DL (ref 0.6–1.3)
EOSINOPHIL # BLD AUTO: 0.15 THOUSAND/ÂΜL (ref 0–0.61)
EOSINOPHIL NFR BLD AUTO: 1 % (ref 0–6)
ERYTHROCYTE [DISTWIDTH] IN BLOOD BY AUTOMATED COUNT: 14.7 % (ref 11.6–15.1)
GFR SERPL CREATININE-BSD FRML MDRD: 61 ML/MIN/1.73SQ M
GLUCOSE SERPL-MCNC: 138 MG/DL (ref 65–140)
HCT VFR BLD AUTO: 44 % (ref 36.5–49.3)
HGB BLD-MCNC: 14.1 G/DL (ref 12–17)
IMM GRANULOCYTES # BLD AUTO: 0.15 THOUSAND/UL (ref 0–0.2)
IMM GRANULOCYTES NFR BLD AUTO: 1 % (ref 0–2)
LACTATE SERPL-SCNC: 1.7 MMOL/L (ref 0.5–2)
LYMPHOCYTES # BLD AUTO: 2.99 THOUSANDS/ÂΜL (ref 0.6–4.47)
LYMPHOCYTES NFR BLD AUTO: 22 % (ref 14–44)
MCH RBC QN AUTO: 27 PG (ref 26.8–34.3)
MCHC RBC AUTO-ENTMCNC: 32 G/DL (ref 31.4–37.4)
MCV RBC AUTO: 84 FL (ref 82–98)
MONOCYTES # BLD AUTO: 1.27 THOUSAND/ÂΜL (ref 0.17–1.22)
MONOCYTES NFR BLD AUTO: 9 % (ref 4–12)
NEUTROPHILS # BLD AUTO: 9.04 THOUSANDS/ÂΜL (ref 1.85–7.62)
NEUTS SEG NFR BLD AUTO: 67 % (ref 43–75)
NRBC BLD AUTO-RTO: 0 /100 WBCS
NT-PROBNP SERPL-MCNC: 333 PG/ML
PLATELET # BLD AUTO: 338 THOUSANDS/UL (ref 149–390)
PMV BLD AUTO: 9.2 FL (ref 8.9–12.7)
POTASSIUM SERPL-SCNC: 3.8 MMOL/L (ref 3.5–5.3)
PROCALCITONIN SERPL-MCNC: <0.05 NG/ML
PROT SERPL-MCNC: 6.8 G/DL (ref 6.4–8.4)
RBC # BLD AUTO: 5.22 MILLION/UL (ref 3.88–5.62)
SODIUM SERPL-SCNC: 137 MMOL/L (ref 135–147)
WBC # BLD AUTO: 13.66 THOUSAND/UL (ref 4.31–10.16)

## 2022-12-21 RX ORDER — LEVALBUTEROL 1.25 MG/.5ML
1.25 SOLUTION, CONCENTRATE RESPIRATORY (INHALATION)
Status: DISCONTINUED | OUTPATIENT
Start: 2022-12-22 | End: 2022-12-26 | Stop reason: HOSPADM

## 2022-12-21 RX ORDER — ALPRAZOLAM 0.5 MG/1
0.5 TABLET ORAL
Status: DISCONTINUED | OUTPATIENT
Start: 2022-12-21 | End: 2022-12-26 | Stop reason: HOSPADM

## 2022-12-21 RX ORDER — ATORVASTATIN CALCIUM 10 MG/1
20 TABLET, FILM COATED ORAL EVERY EVENING
Status: DISCONTINUED | OUTPATIENT
Start: 2022-12-21 | End: 2022-12-26 | Stop reason: HOSPADM

## 2022-12-21 RX ORDER — METHYLPREDNISOLONE SODIUM SUCCINATE 40 MG/ML
40 INJECTION, POWDER, LYOPHILIZED, FOR SOLUTION INTRAMUSCULAR; INTRAVENOUS DAILY
Status: DISCONTINUED | OUTPATIENT
Start: 2022-12-21 | End: 2022-12-22

## 2022-12-21 RX ORDER — ALBUTEROL SULFATE 90 UG/1
2 AEROSOL, METERED RESPIRATORY (INHALATION) EVERY 6 HOURS PRN
Status: DISCONTINUED | OUTPATIENT
Start: 2022-12-21 | End: 2022-12-26 | Stop reason: HOSPADM

## 2022-12-21 RX ORDER — IPRATROPIUM BROMIDE AND ALBUTEROL SULFATE 2.5; .5 MG/3ML; MG/3ML
3 SOLUTION RESPIRATORY (INHALATION) EVERY 6 HOURS PRN
Status: DISCONTINUED | OUTPATIENT
Start: 2022-12-21 | End: 2022-12-26 | Stop reason: HOSPADM

## 2022-12-21 RX ORDER — ENOXAPARIN SODIUM 100 MG/ML
40 INJECTION SUBCUTANEOUS DAILY
Status: DISCONTINUED | OUTPATIENT
Start: 2022-12-21 | End: 2022-12-26 | Stop reason: HOSPADM

## 2022-12-21 RX ORDER — FLUTICASONE PROPIONATE 50 MCG
1 SPRAY, SUSPENSION (ML) NASAL 2 TIMES DAILY
Status: DISCONTINUED | OUTPATIENT
Start: 2022-12-21 | End: 2022-12-26 | Stop reason: HOSPADM

## 2022-12-21 RX ORDER — ONDANSETRON 2 MG/ML
4 INJECTION INTRAMUSCULAR; INTRAVENOUS EVERY 6 HOURS PRN
Status: DISCONTINUED | OUTPATIENT
Start: 2022-12-21 | End: 2022-12-26 | Stop reason: HOSPADM

## 2022-12-21 RX ORDER — CEFTRIAXONE 1 G/50ML
1000 INJECTION, SOLUTION INTRAVENOUS EVERY 24 HOURS
Status: DISCONTINUED | OUTPATIENT
Start: 2022-12-22 | End: 2022-12-23

## 2022-12-21 RX ORDER — FLUTICASONE FUROATE AND VILANTEROL 200; 25 UG/1; UG/1
1 POWDER RESPIRATORY (INHALATION) DAILY
Status: DISCONTINUED | OUTPATIENT
Start: 2022-12-22 | End: 2022-12-26 | Stop reason: HOSPADM

## 2022-12-21 RX ORDER — CARVEDILOL 12.5 MG/1
25 TABLET ORAL 2 TIMES DAILY WITH MEALS
Status: DISCONTINUED | OUTPATIENT
Start: 2022-12-21 | End: 2022-12-26 | Stop reason: HOSPADM

## 2022-12-21 RX ORDER — FUROSEMIDE 10 MG/ML
60 INJECTION INTRAMUSCULAR; INTRAVENOUS ONCE
Status: COMPLETED | OUTPATIENT
Start: 2022-12-21 | End: 2022-12-21

## 2022-12-21 RX ORDER — DULOXETIN HYDROCHLORIDE 30 MG/1
60 CAPSULE, DELAYED RELEASE ORAL DAILY
Status: DISCONTINUED | OUTPATIENT
Start: 2022-12-22 | End: 2022-12-26 | Stop reason: HOSPADM

## 2022-12-21 RX ORDER — SODIUM CHLORIDE FOR INHALATION 0.9 %
VIAL, NEBULIZER (ML) INHALATION
Status: COMPLETED
Start: 2022-12-21 | End: 2022-12-21

## 2022-12-21 RX ORDER — ACETAMINOPHEN 325 MG/1
650 TABLET ORAL EVERY 6 HOURS PRN
Status: DISCONTINUED | OUTPATIENT
Start: 2022-12-21 | End: 2022-12-26 | Stop reason: HOSPADM

## 2022-12-21 RX ORDER — GUAIFENESIN 600 MG/1
600 TABLET, EXTENDED RELEASE ORAL EVERY 12 HOURS SCHEDULED
Status: DISCONTINUED | OUTPATIENT
Start: 2022-12-21 | End: 2022-12-23

## 2022-12-21 RX ORDER — CEFTRIAXONE 1 G/50ML
1000 INJECTION, SOLUTION INTRAVENOUS ONCE
Status: COMPLETED | OUTPATIENT
Start: 2022-12-21 | End: 2022-12-21

## 2022-12-21 RX ORDER — TRIAMCINOLONE ACETONIDE 1 MG/G
CREAM TOPICAL 2 TIMES DAILY
Status: DISCONTINUED | OUTPATIENT
Start: 2022-12-21 | End: 2022-12-26 | Stop reason: HOSPADM

## 2022-12-21 RX ADMIN — IPRATROPIUM BROMIDE 0.5 MG: 0.5 SOLUTION RESPIRATORY (INHALATION) at 12:19

## 2022-12-21 RX ADMIN — ALBUTEROL SULFATE 5 MG: 2.5 SOLUTION RESPIRATORY (INHALATION) at 12:19

## 2022-12-21 RX ADMIN — FUROSEMIDE 60 MG: 10 INJECTION, SOLUTION INTRAMUSCULAR; INTRAVENOUS at 15:53

## 2022-12-21 RX ADMIN — CEFTRIAXONE 1000 MG: 1 INJECTION, SOLUTION INTRAVENOUS at 13:06

## 2022-12-21 RX ADMIN — IPRATROPIUM BROMIDE AND ALBUTEROL SULFATE 3 ML: 2.5; .5 SOLUTION RESPIRATORY (INHALATION) at 17:25

## 2022-12-21 RX ADMIN — METHYLPREDNISOLONE SODIUM SUCCINATE 40 MG: 40 INJECTION, POWDER, FOR SOLUTION INTRAMUSCULAR; INTRAVENOUS at 15:10

## 2022-12-21 RX ADMIN — FLUTICASONE PROPIONATE 1 SPRAY: 50 SPRAY, METERED NASAL at 15:11

## 2022-12-21 RX ADMIN — SACUBITRIL AND VALSARTAN 1 TABLET: 24; 26 TABLET, FILM COATED ORAL at 17:13

## 2022-12-21 RX ADMIN — ATORVASTATIN CALCIUM 20 MG: 10 TABLET, FILM COATED ORAL at 17:13

## 2022-12-21 RX ADMIN — FLUTICASONE PROPIONATE 1 SPRAY: 50 SPRAY, METERED NASAL at 20:45

## 2022-12-21 RX ADMIN — ALPRAZOLAM 0.5 MG: 0.5 TABLET ORAL at 20:45

## 2022-12-21 RX ADMIN — ISODIUM CHLORIDE 3 ML: 0.03 SOLUTION RESPIRATORY (INHALATION) at 12:20

## 2022-12-21 RX ADMIN — CARVEDILOL 25 MG: 12.5 TABLET, FILM COATED ORAL at 17:13

## 2022-12-21 RX ADMIN — ENOXAPARIN SODIUM 40 MG: 40 INJECTION SUBCUTANEOUS at 15:10

## 2022-12-21 RX ADMIN — GUAIFENESIN 600 MG: 600 TABLET ORAL at 20:45

## 2022-12-21 NOTE — RESPIRATORY THERAPY NOTE
Home Oxygen Qualifying Test     Patient name: Juan Forte        : 1955   Date of Test:  2022  Diagnosis: covid-19 positive   Home Oxygen Test:    **Medicare Guidelines require item(s) 1-5 on all ambulatory patients or 1 and 2 on non-ambulatory patients  1  Baseline SPO2 on Room Air at rest 94 %   a  If <= 88% on Room Air add O2 via NC to obtain SpO2 >=88%  If LPM needed, document LPM na needed to reach =>88%    2  SPO2 during exertion on Room Air 90%  a  During exertion monitor SPO2  If SPO2 increases >=89%, do not add supplemental oxygen    3  SPO2 on Oxygen at Rest na % at na LPM    4  SPO2 during exertion on Oxygen na % at na LPM    5  Test performed during exertion activity  Walking with walker, 140 ft 10 minutes     []  Supplemental Home Oxygen is indicated  [x]  Client does not qualify for home oxygen  Respiratory Additional Notes- pt had albuterol 5 0mg/ Atrovent 0 5mg neb tx prior to ambulation   Pt  ambulated with walker slow pace, pt had labored breathing with increased WOB, and pt has some struggling and having to take deep breaths, pt unable to walk any further     Artist Joe RT

## 2022-12-21 NOTE — ASSESSMENT & PLAN NOTE
Lab Results   Component Value Date    EGFR 61 12/21/2022    EGFR 46 12/18/2022    EGFR 48 12/17/2022    CREATININE 1 22 12/21/2022    CREATININE 1 55 (H) 12/18/2022    CREATININE 1 50 (H) 12/17/2022     Creatinine below baseline  Continue to monitor

## 2022-12-21 NOTE — RESPIRATORY THERAPY NOTE
RT Protocol Note  Maddy Kaur 74 y o  male MRN: [id number]  Unit/Bed#: 407-01 Encounter: [id number]    Assessment    Principal Problem:    COPD, severe ([place] [address] )  Active Problems:    Obstructive sleep apnea    Chronic combined systolic and diastolic congestive heart failure ([place] [address] )    COVID-19 virus infection    Stage 3 chronic kidney disease, unspecified whether stage 3a or 3b CKD ([place] [address] )    Right lower lobe pulmonary infiltrate      Home Pulmonary Medications:  Pro air, advair, duon Q6prn, cpap doesn't wear  Home Devices/Therapy: BiPAP/CPAP, Other (Comment) (proair inhaler, advair inhalr, duoneb Q6prn)    Past Medical History:   Diagnosis Date   • Asthma    • CHF (congestive heart failure) ([facility])    • COPD (chronic obstructive pulmonary disease) ([place] [address] )    • COVID-19    • Mumps    • Old MI (myocardial infarction)    • Pneumonia    • Pulmonary emphysema ([place] [address] )    • Rectal bleeding 2019   • Sleep apnea      Social History     Socioeconomic History   • Marital status: /Civil Union     Spouse name: None   • Number of children: None   • Years of education: None   • Highest education level: None   Occupational History   • None   Tobacco Use   • Smoking status: Former     Packs/day: 3 00     Years: 43 00     Pack years: 129 00     Types: Cigarettes     Start date: 67     Quit date:      Years since quittin 9   • Smokeless tobacco: Never   Vaping Use   • Vaping Use: Never used   Substance and Sexual Activity   • Alcohol use: Not Currently     Comment: rarely   • Drug use: Yes     Types: Marijuana     Comment: occasionally edible   • Sexual activity: None   Other Topics Concern   • None   Social History Narrative    Caffeine use     Social Determinants of Health     Financial Resource Strain: Not on file   Food Insecurity: No Food Insecurity   • Worried About Running Out of Food in the Last Year: Never true   • Ran Out of Food in the Last Year: Never true   Transportation Needs: No Transportation Needs   • Lack of Transportation (Medical): No   • Lack of Transportation (Non-Medical): No   Physical Activity: Not on file   Stress: Not on file   Social Connections: Not on file   Intimate Partner Violence: Not on file   Housing Stability: Low Risk    • Unable to Pay for Housing in the Last Year: No   • Number of Places Lived in the Last Year: 1   • Unstable Housing in the Last Year: No       Subjective         Objective    Physical Exam:   General Appearance: Alert, Awake  Respiratory Pattern: Labored, Tachypneic, Symmetrical, Spontaneous  Chest Assessment: Chest expansion symmetrical, Trachea midline  Bilateral Breath Sounds: Diminished, Rhonchi, Expiratory wheezes  Cough: Productive, Strong, Moist, Congested  O2 Device: 2 l/m nc    Vitals:  Blood pressure 149/82, pulse 84, temperature (!) 97 1 °F (36 2 °C), resp  rate (!) 24, height 6' 2" (1 88 m), weight 133 kg (292 lb 8 8 oz), SpO2 90 %  Imaging and other studies: I have personally reviewed pertinent reports  O2 Device: 2 l/m nc     Plan  Bronchodilator therapy with aerogen ANIRUDHN xopenex 1 25mg/Atrovent 0 5mg TID, duoneb Q6prn for SOB and wheezing, flutter valve for mobilization of secretions   Oxygen therapy with 2 l/m nc with titration to RA as tolerated,       Resp Comments: aerogen ANIRUDHN

## 2022-12-21 NOTE — TELEPHONE ENCOUNTER
Pt called stated he was in hospital Friday and discharged yesterday, he is covid positive  Pt stated he took his SP02 before calling it was at an 86% I advised pt to go to ER

## 2022-12-21 NOTE — ED NOTES
Respiratory bedside     Dandre Castillo RN  12/21/22 1984 Patient is 1) cognitively intact 2) Understands the nature of the medical condition, risks, benefits, alternatives and side-effects of treatment 3) Wants to make decisions voluntarily.  At this time patient has decision making capacity regarding medical decisions.

## 2022-12-21 NOTE — RESPIRATORY THERAPY NOTE
12/21/22 1740   Inhalation Therapy Tx   $ Inhalation Therapy Performed Yes   $ Demo/Eval of Neb, MDI, IPPB, CPT Yes  (pt started on aerogen HHN and instructed on use)   $ Pulse Oximetry Spot Check Charge Completed   Pre-Treatment Pulse 84   Pre-Treatment Respirations 24   Duration 15   Breath Sounds Pre-Treatment Bilateral Diminished;Rhonchi;Expiratory wheeze   Breath Sounds Post-Treatment Bilateral Diminished;Rhonchi;Expiratory wheezes   Post-Treatment Pulse 84   Post-Treatment Respirations 22   Delivery Source Aerogen   Position Sitting   Treatment Tolerance Tolerated well   Resp Comments aerogen HHN

## 2022-12-21 NOTE — ED PROVIDER NOTES
History  Chief Complaint   Patient presents with   • Shortness of Breath     Pt states he was diagnosed with covid on Friday and states today he had a low oxygen level and increased sob  Pt states he feels he has fluid in lungs but unable to bring anything up       73 y/o presents with cough, hypoxia  Checked his pulse ox at home machine and was 86%  Recently discharged from [facility] for SOB/Dyspnea, COPD exacerbation and was diagnosed COVID + on 12/19/22  Will defer nebulizer treatments at this time due to COVID status  Pt states he's short of breath with simply walking from room to room in his house  Denies fevers or chills  Denies any increase in his lower extremity edema  Denies any leaking  Former smoker  Lives at home with multiple family members  Tolerated normal dietary p o  intake  No recent travel  Previous records reviewed  States been compliant with his medication and dietary regimen  Patient is not on supplemental oxygen at home  He states he had a amatory pulse ox testing prior to discharge 2 days ago which did not meet the requirements for home oxygen therapy  Prior to Admission Medications   Prescriptions Last Dose Informant Patient Reported? Taking? ALPRAZolam (XANAX) 0 5 mg tablet   No No   Sig: Take 1 tablet (0 5 mg total) by mouth daily at bedtime as needed for anxiety   DULoxetine (CYMBALTA) 60 mg delayed release capsule   No No   Sig: Take 1 capsule (60 mg total) by mouth daily   Fluticasone-Salmeterol (Advair) 500-50 mcg/dose inhaler   No No   Sig: INHALE ONE PUFF BY MOUTH AND INTO THE LUNGS TWICE A DAY   RINSE MOUTH AFTER USE   albuterol (ProAir HFA) 90 mcg/act inhaler   No No   Sig: Inhale 2 puffs every 6 (six) hours as needed for wheezing   atorvastatin (LIPITOR) 20 mg tablet   No No   Sig: Take 1 tablet (20 mg total) by mouth daily   carvedilol (COREG) 12 5 mg tablet   No No   Sig: Take 2 tablets (25 mg total) by mouth 2 (two) times a day with meals   fluticasone (FLONASE) 50 mcg/act nasal spray   No No   Si spray into each nostril 2 (two) times a day   furosemide (LASIX) 20 mg tablet   No No   Sig: Take 1 tablet (20 mg total) by mouth daily   ipratropium-albuterol (DUO-NEB) 0 5-2 5 mg/3 mL nebulizer solution   No No   Sig: Take 3 mL by nebulization every 6 (six) hours as needed for wheezing or shortness of breath   predniSONE 10 mg tablet   No No   Sig: Take 4 tablets (40 mg total) by mouth daily for 3 days, THEN 3 tablets (30 mg total) daily for 3 days, THEN 2 tablets (20 mg total) daily for 3 days, THEN 1 tablet (10 mg total) daily for 3 days  sacubitril-valsartan (Entresto) 24-26 MG TABS   No No   Sig: Take 1 tablet by mouth 2 (two) times a day 49-51mg      Facility-Administered Medications: None       Past Medical History:   Diagnosis Date   • Asthma    • CHF (congestive heart failure) ([facility])    • COPD (chronic obstructive pulmonary disease) ([facility])    • COVID-19    • Mumps    • Old MI (myocardial infarction)    • Pneumonia    • Pulmonary emphysema ([facility] [place] [address] )    • Rectal bleeding 2019   • Sleep apnea        Past Surgical History:   Procedure Laterality Date   • CARDIAC CATHETERIZATION  2015    Left main- normal and maidly tortuous  Circumflex - normal and moderately tortuous  RCA- normal and mildy tortuous  Global LV function was severely depressed      • CARDIAC CATHETERIZATION Left 2022    Procedure: Cardiac Left Heart Cath;  Surgeon: Brittnee Bone DO;  Location: [facility]; Service: Cardiology   • CARDIAC CATHETERIZATION N/A 2022    Procedure: Cardiac Coronary Angiogram;  Surgeon: Brittnee Bone DO;  Location: [facility]; Service: Cardiology   • CARDIAC CATHETERIZATION  2022    Procedure: Cardiac catheterization;  Surgeon: Brittnee Bone DO;  Location: [facility];   Service: Cardiology   • INGUINAL HERNIA REPAIR Bilateral    • MD COLONOSCOPY FLX DX W/COLLJ SPEC WHEN PFRMD N/A 3/11/2019    Procedure: COLONOSCOPY with removal of anal papilla; Surgeon: Radha Carreon MD;  Location: [facility];  Service: Colorectal   • TONSILLECTOMY     • UMBILICAL HERNIA REPAIR  2006       Family History   Problem Relation Age of Onset   • Heart attack Father         MI   • Heart disease Father    • Diabetes Sister         DM   • Arthritis Family    • Coronary artery disease Family    • Hypertension Family    • Tuberculosis Son    • Alzheimer's disease Mother      I have reviewed and agree with the history as documented  E-Cigarette/Vaping   • E-Cigarette Use Never User      E-Cigarette/Vaping Substances   • Nicotine No    • THC No    • CBD No    • Flavoring No    • Other No    • Unknown No      Social History     Tobacco Use   • Smoking status: Former     Packs/day: 3 00     Years: 43 00     Pack years: 129 00     Types: Cigarettes     Start date:      Quit date: 2018     Years since quittin 9   • Smokeless tobacco: Never   Vaping Use   • Vaping Use: Never used   Substance Use Topics   • Alcohol use: Yes     Comment: rarely   • Drug use: Yes     Types: Marijuana     Comment: occasionally edible       Review of Systems   Constitutional: Negative for activity change, appetite change, chills and fever  HENT: Negative for ear pain, hearing loss, rhinorrhea, sneezing, sore throat and trouble swallowing  Eyes: Negative for pain and visual disturbance  Respiratory: Positive for cough and shortness of breath  Negative for choking, chest tightness, wheezing and stridor  Cardiovascular: Positive for leg swelling  Negative for chest pain and palpitations  Lower extremity b/l edema   Gastrointestinal: Negative for abdominal pain, constipation, diarrhea, nausea and vomiting  Genitourinary: Negative for difficulty urinating, dysuria, frequency, hematuria and testicular pain  Musculoskeletal: Negative for arthralgias, back pain, gait problem and neck pain  Skin: Negative for color change and rash  Allergic/Immunologic: Negative for immunocompromised state  Neurological: Negative for dizziness, seizures, syncope, speech difficulty, weakness, light-headedness, numbness and headaches  Psychiatric/Behavioral: Negative for confusion  The patient is not nervous/anxious  All other systems reviewed and are negative  Physical Exam  Physical Exam  Vitals and nursing note reviewed  Constitutional:       General: He is not in acute distress  Appearance: He is well-developed  He is obese  He is not ill-appearing or diaphoretic  HENT:      Head: Normocephalic and atraumatic  Nose: Nose normal       Mouth/Throat:      Mouth: Mucous membranes are moist       Pharynx: Oropharynx is clear  No oropharyngeal exudate  Eyes:      General: No scleral icterus  Extraocular Movements: Extraocular movements intact  Conjunctiva/sclera: Conjunctivae normal    Cardiovascular:      Rate and Rhythm: Normal rate and regular rhythm  Pulses: Normal pulses  Heart sounds: Normal heart sounds  No murmur heard  Pulmonary:      Effort: Pulmonary effort is normal  No accessory muscle usage or respiratory distress  Breath sounds: Examination of the right-upper field reveals wheezing and rhonchi  Examination of the left-upper field reveals wheezing and rhonchi  Examination of the right-middle field reveals wheezing and rhonchi  Examination of the left-middle field reveals wheezing and rhonchi  Examination of the right-lower field reveals wheezing and rhonchi  Examination of the left-lower field reveals wheezing and rhonchi  Wheezing and rhonchi present  No decreased breath sounds or rales  Chest:      Chest wall: No mass or tenderness  Abdominal:      General: Bowel sounds are normal  There is no distension  Palpations: Abdomen is soft  There is no mass  Tenderness: There is no abdominal tenderness  There is no guarding or rebound     Musculoskeletal:         General: No swelling, tenderness or deformity  Normal range of motion  Cervical back: Normal range of motion and neck supple  Right lower leg: No tenderness  Edema present  Left lower leg: No tenderness  Edema present  Lymphadenopathy:      Cervical: No cervical adenopathy  Skin:     General: Skin is warm and dry  Capillary Refill: Capillary refill takes less than 2 seconds  Findings: No erythema or rash  Nails: There is no clubbing  Neurological:      General: No focal deficit present  Mental Status: He is alert and oriented to person, place, and time  Motor: No abnormal muscle tone     Psychiatric:         Mood and Affect: Mood normal          Behavior: Behavior normal          Vital Signs  ED Triage Vitals   Temperature Pulse Respirations Blood Pressure SpO2   12/21/22 1035 12/21/22 1034 12/21/22 1034 12/21/22 1035 12/21/22 1034   (!) 96 9 °F (36 1 °C) 84 18 133/66 96 %      Temp Source Heart Rate Source Patient Position - Orthostatic VS BP Location FiO2 (%)   12/21/22 1035 12/21/22 1034 12/21/22 1130 12/21/22 1130 12/21/22 1220   Temporal Monitor Sitting Right arm 21      Pain Score       12/21/22 1130       No Pain           Vitals:    12/21/22 1034 12/21/22 1035 12/21/22 1130 12/21/22 1200   BP:  133/66 126/70 117/73   Pulse: 84  70 75   Patient Position - Orthostatic VS:   Sitting Sitting         Visual Acuity      ED Medications  Medications   cefTRIAXone (ROCEPHIN) IVPB (premix in dextrose) 1,000 mg 50 mL (1,000 mg Intravenous New Bag 12/21/22 1306)   ipratropium (ATROVENT) 0 02 % inhalation solution 0 5 mg (0 5 mg Nebulization Given 12/21/22 1219)   albuterol inhalation solution 5 mg (5 mg Nebulization Given 12/21/22 1219)   sodium chloride 0 9 % inhalation solution **ADS Override Pull** (3 mL  Given 12/21/22 1220)       Diagnostic Studies  Results Reviewed     Procedure Component Value Units Date/Time    Lactic acid [[id number]]  (Normal) Collected: 12/21/22 1305    Lab Status: Final result Specimen: Blood from Arm, Right Updated: 12/21/22 1333     LACTIC ACID 1 7 mmol/L     Narrative:      Result may be elevated if tourniquet was used during collection  HS Troponin I 2hr [[id number]] Collected: 12/21/22 1300    Lab Status: In process Specimen: Blood from Arm, Right Updated: 12/21/22 1313    Blood culture #1 [[id number]] Collected: 12/21/22 1305    Lab Status: In process Specimen: Blood from Arm, Left Updated: 12/21/22 1313    Blood culture #2 [[id number]] Collected: 12/21/22 1305    Lab Status:  In process Specimen: Blood from Arm, Right Updated: 12/21/22 1313    HS Troponin I 4hr [[id number]]     Lab Status: No result Specimen: Blood     NT-BNP PRO [[id number]]  (Abnormal) Collected: 12/21/22 1055    Lab Status: Final result Specimen: Blood from Arm, Right Updated: 12/21/22 1124     NT-proBNP 333 pg/mL     HS Troponin 0hr (reflex protocol) [[id number]]  (Normal) Collected: 12/21/22 1055    Lab Status: Final result Specimen: Blood from Arm, Right Updated: 12/21/22 1124     hs TnI 0hr 44 ng/L     Comprehensive metabolic panel [[id number]]  (Abnormal) Collected: 12/21/22 1055    Lab Status: Final result Specimen: Blood from Arm, Right Updated: 12/21/22 1117     Sodium 137 mmol/L      Potassium 3 8 mmol/L      Chloride 100 mmol/L      CO2 28 mmol/L      ANION GAP 9 mmol/L      BUN 32 mg/dL      Creatinine 1 22 mg/dL      Glucose 138 mg/dL      Calcium 8 4 mg/dL      Corrected Calcium 9 2 mg/dL      AST 20 U/L      ALT 40 U/L      Alkaline Phosphatase 73 U/L      Total Protein 6 8 g/dL      Albumin 3 0 g/dL      Total Bilirubin 0 57 mg/dL      eGFR 61 ml/min/1 73sq m     Narrative:      Balta guidelines for Chronic Kidney Disease (CKD):   •  Stage 1 with normal or high GFR (GFR > 90 mL/min/1 73 square meters)  •  Stage 2 Mild CKD (GFR = 60-89 mL/min/1 73 square meters)  •  Stage 3A Moderate CKD (GFR = 45-59 mL/min/1 73 square meters)  •  Stage 3B Moderate CKD (GFR = 30-44 mL/min/1 73 square meters)  •  Stage 4 Severe CKD (GFR = 15-29 mL/min/1 73 square meters)  •  Stage 5 End Stage CKD (GFR <15 mL/min/1 73 square meters)  Note: GFR calculation is accurate only with a steady state creatinine    CBC and differential [[id number]]  (Abnormal) Collected: 12/21/22 1055    Lab Status: Final result Specimen: Blood from Arm, Right Updated: 12/21/22 1101     WBC 13 66 Thousand/uL      RBC 5 22 Million/uL      Hemoglobin 14 1 g/dL      Hematocrit 44 0 %      MCV 84 fL      MCH 27 0 pg      MCHC 32 0 g/dL      RDW 14 7 %      MPV 9 2 fL      Platelets 411 Thousands/uL      nRBC 0 /100 WBCs      Neutrophils Relative 67 %      Immat GRANS % 1 %      Lymphocytes Relative 22 %      Monocytes Relative 9 %      Eosinophils Relative 1 %      Basophils Relative 0 %      Neutrophils Absolute 9 04 Thousands/µL      Immature Grans Absolute 0 15 Thousand/uL      Lymphocytes Absolute 2 99 Thousands/µL      Monocytes Absolute 1 27 Thousand/µL      Eosinophils Absolute 0 15 Thousand/µL      Basophils Absolute 0 06 Thousands/µL                  X-ray chest 1 view portable   Final Result by Yosef Hayward MD (12/21 1157)      Development of right perihilar opacity suspicious for infiltrate                  Workstation performed: [id number]                    Procedures  ECG 12 Lead Documentation Only    Date/Time: 12/21/2022 10:54 AM  Performed by: Isaiah Schafer DO  Authorized by: Isaiah Schafer DO     Indications / Diagnosis:  Sob  ECG reviewed by me, the ED Provider: yes    Patient location:  ED  Rate:     ECG rate:  70    ECG rate assessment: normal    Rhythm:     Rhythm: sinus rhythm    Ectopy:     Ectopy: none    QRS:     QRS axis:  Left    QRS intervals:  Normal  Conduction:     Conduction: abnormal      Abnormal conduction: 1st degree    ST segments:     ST segments:  Normal  T waves:     T waves: non-specific               ED Course MDM    Disposition  Final diagnoses:   Dyspnea   Hypoxia   Pneumonia   COPD with acute exacerbation ([facility] [address] )   COVID-19     Time reflects when diagnosis was documented in both MDM as applicable and the Disposition within this note     Time User Action Codes Description Comment    12/21/2022  1:34 PM Stella Aldair T Add [R06 00] Dyspnea     12/21/2022  1:35 PM Stella Aldair T Add [R09 02] Hypoxia     12/21/2022  1:35 PM Binstead, Caretha Goods T Add [J18 9] Pneumonia     12/21/2022  1:35 PM Binstead, Caretha Goods T Add [J44 1] COPD with acute exacerbation ([facility] [address] )     12/21/2022  1:35 PM Binstead, Caretha Goods T Add [U07 1] COVID-19     12/21/2022  1:35 PM Stella Aldair T Modify [R06 00] Dyspnea     12/21/2022  1:35 PM Stella Aldair T Modify [J18 9] Pneumonia       ED Disposition     ED Disposition   Admit    Condition   Stable    Date/Time   Wed Dec 21, 2022  1:34 PM    Comment   Case was discussed with YOLANDE and the patient's admission status was agreed to be Admission Status: inpatient status to the service of Dr Jeremias Palacios  Follow-up Information    None         Patient's Medications   Discharge Prescriptions    No medications on file       No discharge procedures on file      PDMP Review       Value Time User    PDMP Reviewed  Yes 3/28/2022 11:38 AM Leesa Thorne DO          ED Provider  Electronically Signed by           James Banks DO  12/21/22 0184

## 2022-12-21 NOTE — PLAN OF CARE
Problem: MOBILITY - ADULT  Goal: Maintain or return to baseline ADL function  Description: INTERVENTIONS:  -  Assess patient's ability to carry out ADLs; assess patient's baseline for ADL function and identify physical deficits which impact ability to perform ADLs (bathing, care of mouth/teeth, toileting, grooming, dressing, etc )  - Assess/evaluate cause of self-care deficits   - Assess range of motion  - Assess patient's mobility; develop plan if impaired  - Assess patient's need for assistive devices and provide as appropriate  - Encourage maximum independence but intervene and supervise when necessary  - Involve family in performance of ADLs  - Assess for home care needs following discharge   - Consider OT consult to assist with ADL evaluation and planning for discharge  - Provide patient education as appropriate  Outcome: Progressing  Goal: Maintains/Returns to pre admission functional level  Description: INTERVENTIONS:  - Perform BMAT or MOVE assessment daily    - Set and communicate daily mobility goal to care team and patient/family/caregiver  - Collaborate with rehabilitation services on mobility goals if consulted  - Perform Range of Motion 3 times a day  - Reposition patient every 2 hours    - Dangle patient 3 times a day  - Stand patient 3 times a day  - Ambulate patient 3 times a day  - Out of bed to chair 3 times a day   - Out of bed for meals 3 times a day  - Out of bed for toileting  - Record patient progress and toleration of activity level   Outcome: Progressing     Problem: PAIN - ADULT  Goal: Verbalizes/displays adequate comfort level or baseline comfort level  Description: Interventions:  - Encourage patient to monitor pain and request assistance  - Assess pain using appropriate pain scale  - Administer analgesics based on type and severity of pain and evaluate response  - Implement non-pharmacological measures as appropriate and evaluate response  - Consider cultural and social influences on pain and pain management  - Notify physician/advanced practitioner if interventions unsuccessful or patient reports new pain  Outcome: Progressing     Problem: INFECTION - ADULT  Goal: Absence or prevention of progression during hospitalization  Description: INTERVENTIONS:  - Assess and monitor for signs and symptoms of infection  - Monitor lab/diagnostic results  - Monitor all insertion sites, i e  indwelling lines, tubes, and drains  - Monitor endotracheal if appropriate and nasal secretions for changes in amount and color  - [place] appropriate cooling/warming therapies per order  - Administer medications as ordered  - Instruct and encourage patient and family to use good hand hygiene technique  - Identify and instruct in appropriate isolation precautions for identified infection/condition  Outcome: Progressing  Goal: Absence of fever/infection during neutropenic period  Description: INTERVENTIONS:  - Monitor WBC    Outcome: Progressing     Problem: SAFETY ADULT  Goal: Maintain or return to baseline ADL function  Description: INTERVENTIONS:  -  Assess patient's ability to carry out ADLs; assess patient's baseline for ADL function and identify physical deficits which impact ability to perform ADLs (bathing, care of mouth/teeth, toileting, grooming, dressing, etc )  - Assess/evaluate cause of self-care deficits   - Assess range of motion  - Assess patient's mobility; develop plan if impaired  - Assess patient's need for assistive devices and provide as appropriate  - Encourage maximum independence but intervene and supervise when necessary  - Involve family in performance of ADLs  - Assess for home care needs following discharge   - Consider OT consult to assist with ADL evaluation and planning for discharge  - Provide patient education as appropriate  Outcome: Progressing  Goal: Maintains/Returns to pre admission functional level  Description: INTERVENTIONS:  - Perform BMAT or MOVE assessment daily    - Set and communicate daily mobility goal to care team and patient/family/caregiver  - Collaborate with rehabilitation services on mobility goals if consulted  - Perform Range of Motion 3 times a day  - Reposition patient every 2 hours    - Dangle patient 3 times a day  - Stand patient 3 times a day  - Ambulate patient 3 times a day  - Out of bed to chair 3 times a day   - Out of bed for meals 3 times a day  - Out of bed for toileting  - Record patient progress and toleration of activity level   Outcome: Progressing  Goal: Patient will remain free of falls  Description: INTERVENTIONS:  - Educate patient/family on patient safety including physical limitations  - Instruct patient to call for assistance with activity   - Consult OT/PT to assist with strengthening/mobility   - Keep Call bell within reach  - Keep bed low and locked with side rails adjusted as appropriate  - Keep care items and personal belongings within reach  - Initiate and maintain comfort rounds  - Make Fall Risk Sign visible to staff  - Offer Toileting every 2 Hours, in advance of need  - Initiate/Maintain fall alarm  - Obtain necessary fall risk management equipment: non-skid footwear  - Apply yellow socks and bracelet for high fall risk patients  - Consider moving patient to room near nurses station  Outcome: Progressing     Problem: DISCHARGE PLANNING  Goal: Discharge to home or other facility with appropriate resources  Description: INTERVENTIONS:  - Identify barriers to discharge w/patient and caregiver  - Arrange for needed discharge resources and transportation as appropriate  - Identify discharge learning needs (meds, wound care, etc )  - Arrange for interpretive services to assist at discharge as needed  - Refer to Case Management Department for coordinating discharge planning if the patient needs post-hospital services based on physician/advanced practitioner order or complex needs related to functional status, cognitive ability, or social support system  Outcome: Progressing     Problem: Knowledge Deficit  Goal: Patient/family/caregiver demonstrates understanding of disease process, treatment plan, medications, and discharge instructions  Description: Complete learning assessment and assess knowledge base    Interventions:  - Provide teaching at level of understanding  - Provide teaching via preferred learning methods  Outcome: Progressing     Problem: METABOLIC, FLUID AND ELECTROLYTES - ADULT  Goal: Electrolytes maintained within normal limits  Description: INTERVENTIONS:  - Monitor labs and assess patient for signs and symptoms of electrolyte imbalances  - Administer electrolyte replacement as ordered  - Monitor response to electrolyte replacements, including repeat lab results as appropriate  - Instruct patient on fluid and nutrition as appropriate  Outcome: Progressing  Goal: Fluid balance maintained  Description: INTERVENTIONS:  - Monitor labs   - Monitor I/O and WT  - Instruct patient on fluid and nutrition as appropriate  - Assess for signs & symptoms of volume excess or deficit  Outcome: Progressing  Goal: Glucose maintained within target range  Description: INTERVENTIONS:  - Monitor Blood Glucose as ordered  - Assess for signs and symptoms of hyperglycemia and hypoglycemia  - Administer ordered medications to maintain glucose within target range  - Assess nutritional intake and initiate nutrition service referral as needed  Outcome: Progressing     Problem: RESPIRATORY - ADULT  Goal: Achieves optimal ventilation and oxygenation  Description: INTERVENTIONS:  - Assess for changes in respiratory status  - Assess for changes in mentation and behavior  - Position to facilitate oxygenation and minimize respiratory effort  - Oxygen administered by appropriate delivery if ordered  - Initiate smoking cessation education as indicated  - Encourage broncho-pulmonary hygiene including cough, deep breathe, Incentive Spirometry  - Assess the need for suctioning and aspirate as needed  - Assess and instruct to report SOB or any respiratory difficulty  - Respiratory Therapy support as indicated  Outcome: Progressing     Problem: HEMATOLOGIC - ADULT  Goal: Maintains hematologic stability  Description: INTERVENTIONS  - Assess for signs and symptoms of bleeding or hemorrhage  - Monitor labs  - Administer supportive blood products/factors as ordered and appropriate  Outcome: Progressing     Problem: MUSCULOSKELETAL - ADULT  Goal: Maintain or return mobility to safest level of function  Description: INTERVENTIONS:  - Assess patient's ability to carry out ADLs; assess patient's baseline for ADL function and identify physical deficits which impact ability to perform ADLs (bathing, care of mouth/teeth, toileting, grooming, dressing, etc )  - Assess/evaluate cause of self-care deficits   - Assess range of motion  - Assess patient's mobility  - Assess patient's need for assistive devices and provide as appropriate  - Encourage maximum independence but intervene and supervise when necessary  - Involve family in performance of ADLs  - Assess for home care needs following discharge   - Consider OT consult to assist with ADL evaluation and planning for discharge  - Provide patient education as appropriate  Outcome: Progressing  Goal: Maintain proper alignment of affected body part  Description: INTERVENTIONS:  - Support, maintain and protect limb and body alignment  - Provide patient/ family with appropriate education  Outcome: Progressing

## 2022-12-21 NOTE — ASSESSMENT & PLAN NOTE
· Seen on imaging  · Check procal  · Provided ceftriaxone in the ED, continue for now  · Should have CXR in 4-6 weeks to ensure resolution

## 2022-12-21 NOTE — RESPIRATORY THERAPY NOTE
12/21/22 1755   Chest Physiotherapy Tx   $ Chest Physiotherapy (CPT)  Yes   $ Demo/Eval of Neb, MDI, IPPB, CPT   (instructions on flutter valve)   CPT Delivery Source Acapella   CPT Duration 10   CPT Chest Site Full range   Breath Sounds Pre-Treatment Bilateral Diminished;Scattered wheeze; Rhonchi   Breath Sounds Post-Treatment Bilateral Diminished; Expiratory wheeze;Scattered wheeze; Rhonchi   Cough Productive;Strong;Moist;Congested   Position Sitting   CPT Treatment Tolerance Tolerated well   Cough Description   Sputum Amount Large  (x2)   Sputum Color Yellow;Green; Tan   Sputum Consistency Thick   Sputum How Obtained Spontaneous cough

## 2022-12-21 NOTE — H&P
[address] [address]  H&P- Chris Shi 1955, 74 y o  male MRN: [id number]  Unit/Bed#: RM10 Encounter: [id number]  Primary Care Provider: Sabra Hernandez DO   Date and time admitted to hospital: 12/21/2022 10:31 AM    COPD, severe ([facility] [address] )  Assessment & Plan  · Returns to the ED after being discharged from the hospital 12/19  · Complaining of SOB  · Wheezing on examination  · Restart on IV steroids, solumedrol 40 mg daily  · Inhaler regimen ordered    COVID-19 virus infection  Assessment & Plan  · Recently diagnosed 12/16 and received full remdesivir course  · No further treatments needed  · O2 eval with ambulation performed in the ED and patient remained 90% on room air but becomes symptomatic quickly  · Goal is for patient to be able to ambulate farther without issue  · Symptomatic care    Right lower lobe pulmonary infiltrate  Assessment & Plan  · Seen on imaging  · Check procal  · Provided ceftriaxone in the ED, continue for now  · Should have CXR in 4-6 weeks to ensure resolution    Stage 3 chronic kidney disease, unspecified whether stage 3a or 3b CKD [facility])  Assessment & Plan  Lab Results   Component Value Date    EGFR 61 12/21/2022    EGFR 46 12/18/2022    EGFR 48 12/17/2022    CREATININE 1 22 12/21/2022    CREATININE 1 55 (H) 12/18/2022    CREATININE 1 50 (H) 12/17/2022     Creatinine below baseline  Continue to monitor    Chronic combined systolic and diastolic congestive heart failure ([facility] [address] )  Assessment & Plan  Wt Readings from Last 3 Encounters:   12/19/22 134 kg (296 lb 4 8 oz)   12/13/22 (!) 137 kg (301 lb 3 2 oz)   12/05/22 (!) 138 kg (304 lb)       · Patient complaining of leg swelling  · Torsemide was held during last admission but patient is near baseline weight  · BNP wnl for the patient and improved from last admission  · No vascular congestion on imaging  · Will provide with one time dosing of lasix 60 mg and then hopefully return to oral torsemide tomorrow  · I/O  · Salt restriction      Obstructive sleep apnea  Assessment & Plan  cpap qhs but patient typically declines      VTE Pharmacologic Prophylaxis: VTE Score: 5 High Risk (Score >/= 5) - Pharmacological DVT Prophylaxis Ordered: enoxaparin (Lovenox)  Sequential Compression Devices Ordered  Code Status: Level 1 - Full Code   Discussion with family: Patient declined call to   Anticipated Length of Stay: Patient will be admitted on an inpatient basis with an anticipated length of stay of greater than 2 midnights secondary to need for IV steroids and IV lasix  Total Time for Visit, including Counseling / Coordination of Care: 45 minutes Greater than 50% of this total time spent on direct patient counseling and coordination of care  Chief Complaint: SOB    History of Present Illness:  Rashida Post is a 74 y o  male with a PMH of CHF, COPD, CORDELL who presents with SOB  He had recently been hospitalized and discharged on 1219 after being treated for COVID with remdesivir and IV steroids  Patient states he was taking his prednisone as instructed at home and was feeling well on 12/20  However on 12/21 he began to feel more weak and fatigued  He continues to cough with production  He was not able to ambulate far at home due to shortness of breath and felt his legs were more swollen  He did have an episode of chest pain this morning and did take 4 aspirin at home  EMS was called and patient was brought to the ED  Troponins negative, EKG without signs of STEMI  Chest x-ray showing possible new infiltrate on the right lung  He was ambulated in the emergency room and remained 90% on room air but was unable to make it past 15 feet without feeling short of breath  He denies abdominal pain, nausea, vomiting, diarrhea, constipation, fevers, chills  Review of Systems:  Review of Systems   Constitutional: Positive for activity change  Negative for diaphoresis, fatigue and fever     Eyes: Negative for photophobia and visual disturbance  Respiratory: Positive for cough, shortness of breath and wheezing  Cardiovascular: Positive for leg swelling  Negative for chest pain and palpitations  Gastrointestinal: Negative for abdominal pain, constipation, diarrhea, nausea and vomiting  Endocrine: Negative for polydipsia, polyphagia and polyuria  Genitourinary: Negative for dysuria, frequency and hematuria  Musculoskeletal: Negative for arthralgias, back pain, gait problem and joint swelling  Skin: Negative for color change, pallor, rash and wound  Allergic/Immunologic: Negative for environmental allergies, food allergies and immunocompromised state  Neurological: Negative for dizziness, syncope, weakness and light-headedness  Hematological: Negative for adenopathy  Does not bruise/bleed easily  Psychiatric/Behavioral: Negative for confusion  The patient is not nervous/anxious  Past Medical and Surgical History:   Past Medical History:   Diagnosis Date   • Asthma    • CHF (congestive heart failure) ([facility] )    • COPD (chronic obstructive pulmonary disease) ([facility] )    • COVID-19    • Mumps    • Old MI (myocardial infarction)    • Pneumonia    • Pulmonary emphysema ([facility] )    • Rectal bleeding 1/14/2019   • Sleep apnea        Past Surgical History:   Procedure Laterality Date   • CARDIAC CATHETERIZATION  07/24/2015    Left main- normal and maidly tortuous  Circumflex - normal and moderately tortuous  RCA- normal and mildy tortuous  Global LV function was severely depressed      • CARDIAC CATHETERIZATION Left 9/27/2022    Procedure: Cardiac Left Heart Cath;  Surgeon: Kenroy Fregoso DO;  Location: [facility] CARDIAC C[facility] LAB; Service: Cardiology   • CARDIAC CATHETERIZATION N/A 9/27/2022    Procedure: Cardiac Coronary Angiogram;  Surgeon: Kenroy Fregoso DO;  Location: [facility] CARDIAC CATH [facility];   Service: Cardiology   • CARDIAC CATHETERIZATION  9/27/2022    Procedure: Cardiac catheterization;  Surgeon: Manolo Patel DO;  Location:  CARDIAC CATH LAB; Service: Cardiology   • INGUINAL HERNIA REPAIR Bilateral    • NC COLONOSCOPY FLX DX W/COLLJ SPEC WHEN PFRMD N/A 3/11/2019    Procedure: COLONOSCOPY with removal of anal papilla; Surgeon: Adrianne Amaya MD;  Location: [facility] MAIN OR;  Service: Colorectal   • TONSILLECTOMY     • UMBILICAL HERNIA REPAIR  06/21/2006       Meds/Allergies:  Prior to Admission medications    Medication Sig Start Date End Date Taking? Authorizing Provider   albuterol (ProAir HFA) 90 mcg/act inhaler Inhale 2 puffs every 6 (six) hours as needed for wheezing 11/3/22   Tad Armstrong MD   ALPRAZolam [facility]) 0 5 mg tablet Take 1 tablet (0 5 mg total) by mouth daily at bedtime as needed for anxiety 3/28/22   Bobo Roque DO   atorvastatin (LIPITOR) 20 mg tablet Take 1 tablet (20 mg total) by mouth daily 1/25/22   Liudmila Arriaga DO   carvedilol (COREG) 12 5 mg tablet Take 2 tablets (25 mg total) by mouth 2 (two) times a day with meals 12/19/22   Davey Crystal MD   DULoxetine (CYMBALTA) 60 mg delayed release capsule Take 1 capsule (60 mg total) by mouth daily 10/31/22   Bobo Roque DO   fluticasone [facility]) 50 mcg/act nasal spray 1 spray into each nostril 2 (two) times a day 11/1/22   Teodoro Cline MD   Fluticasone-Salmeterol (Advair) 500-50 mcg/dose inhaler INHALE ONE PUFF BY MOUTH AND INTO THE LUNGS TWICE A DAY   RINSE MOUTH AFTER USE 12/19/22   Davey Crystal MD   furosemide (LASIX) 20 mg tablet Take 1 tablet (20 mg total) by mouth daily 12/5/22   Liudmila Arriaga DO   ipratropium-albuterol (DUO-NEB) 0 5-2 5 mg/3 mL nebulizer solution Take 3 mL by nebulization every 6 (six) hours as needed for wheezing or shortness of breath 12/19/22 1/18/23  Davey Crystal MD   predniSONE 10 mg tablet Take 4 tablets (40 mg total) by mouth daily for 3 days, THEN 3 tablets (30 mg total) daily for 3 days, THEN 2 tablets (20 mg total) daily for 3 days, THEN 1 tablet (10 mg total) daily for 3 days  22  Caroline Donato MD   sacubitril-valsartan [facility]) 24-26 MG TABS Take 1 tablet by mouth 2 (two) times a day 49-51mg 22   Caroline Donato MD     I have reviewed home medications using recent Epic encounter  Allergies: Allergies   Allergen Reactions   • Ciprofloxacin Shortness Of Breath and Diarrhea       Social History:  Marital Status: /Civil Union   Occupation: noncontributory  Patient Pre-hospital Living Situation: Home  Patient Pre-hospital Level of Mobility: walks with walker  Patient Pre-hospital Diet Restrictions: none  Substance Use History:   Social History     Substance and Sexual Activity   Alcohol Use Yes    Comment: rarely     Social History     Tobacco Use   Smoking Status Former   • Packs/day: 3 00   • Years: 43 00   • Pack years: 129 00   • Types: Cigarettes   • Start date: 67   • Quit date:    • Years since quittin 9   Smokeless Tobacco Never     Social History     Substance and Sexual Activity   Drug Use Yes   • Types: Marijuana    Comment: occasionally edible       Family History:  Family History   Problem Relation Age of Onset   • Heart attack Father         MI   • Heart disease Father    • Diabetes Sister         DM   • Arthritis Family    • Coronary artery disease Family    • Hypertension Family    • Tuberculosis Son    • Alzheimer's disease Mother        Physical Exam:     Vitals:   Blood Pressure: 139/73 (22 1430)  Pulse: 75 (22 1430)  Temperature: (!) 96 9 °F (36 1 °C) (22 1035)  Temp Source: Temporal (22 1035)  Respirations: 20 (22 1430)  SpO2: 93 % (22 1430)    Physical Exam  Vitals and nursing note reviewed  Constitutional:       Appearance: He is obese  He is ill-appearing  HENT:      Head: Normocephalic and atraumatic  Cardiovascular:      Rate and Rhythm: Normal rate and regular rhythm  Pulses: Normal pulses  Heart sounds: Normal heart sounds  No murmur heard      No gallop  Pulmonary:      Effort: Pulmonary effort is normal       Breath sounds: Wheezing and rales present  No rhonchi  Abdominal:      General: Bowel sounds are normal       Palpations: Abdomen is soft  Tenderness: There is no abdominal tenderness  There is no right CVA tenderness, guarding or rebound  Musculoskeletal:      Cervical back: Normal range of motion and neck supple  Right lower leg: Edema present  Left lower leg: Edema present  Skin:     General: Skin is warm and dry  Comments: Left shin excoriations and erythema   Neurological:      General: No focal deficit present  Mental Status: He is alert and oriented to person, place, and time     Psychiatric:         Mood and Affect: Mood normal          Behavior: Behavior normal          Additional Data:     Lab Results:  Results from last 7 days   Lab Units 12/21/22  1055   WBC Thousand/uL 13 66*   HEMOGLOBIN g/dL 14 1   HEMATOCRIT % 44 0   PLATELETS Thousands/uL 338   NEUTROS PCT % 67   LYMPHS PCT % 22   MONOS PCT % 9   EOS PCT % 1     Results from last 7 days   Lab Units 12/21/22  1055   SODIUM mmol/L 137   POTASSIUM mmol/L 3 8   CHLORIDE mmol/L 100   CO2 mmol/L 28   BUN mg/dL 32*   CREATININE mg/dL 1 22   ANION GAP mmol/L 9   CALCIUM mg/dL 8 4   ALBUMIN g/dL 3 0*   TOTAL BILIRUBIN mg/dL 0 57   ALK PHOS U/L 73   ALT U/L 40   AST U/L 20   GLUCOSE RANDOM mg/dL 138         Results from last 7 days   Lab Units 12/19/22  1050 12/19/22  0714 12/18/22  2132 12/18/22  1552 12/18/22  1142 12/18/22  0724 12/17/22  2126 12/17/22  1622 12/17/22  1127 12/17/22  0909 12/16/22  2115 12/16/22  1629   POC GLUCOSE mg/dl 146* 165* 140 238* 201* 167* 133 190* 134 187* 169* 157*     Results from last 7 days   Lab Units 12/17/22  0436   HEMOGLOBIN A1C % 6 4*     Results from last 7 days   Lab Units 12/21/22  1305 12/17/22  0436 12/16/22  1148   LACTIC ACID mmol/L 1 7  --  1 4   PROCALCITONIN ng/ml  --  0 11  --        Lines/Drains:  Invasive Devices     Peripheral Intravenous Line  Duration           Peripheral IV 12/21/22 Right Antecubital <1 day                    Imaging: Reviewed radiology reports from this admission including: chest xray  X-ray chest 1 view portable   Final Result by Osvaldo Lopez MD (12/21 1157)      Development of right perihilar opacity suspicious for infiltrate                  Workstation performed: [id number]             EKG and Other Studies Reviewed on Admission:   · EKG: NSR  HR 70     ** Please Note: This note has been constructed using a voice recognition system   **

## 2022-12-21 NOTE — ASSESSMENT & PLAN NOTE
· Recently diagnosed 12/16 and received full remdesivir course  · No further treatments needed  · O2 eval with ambulation performed in the ED and patient remained 90% on room air but becomes symptomatic quickly  · Goal is for patient to be able to ambulate farther without issue  · Symptomatic care

## 2022-12-21 NOTE — ASSESSMENT & PLAN NOTE
Wt Readings from Last 3 Encounters:   12/19/22 134 kg (296 lb 4 8 oz)   12/13/22 (!) 137 kg (301 lb 3 2 oz)   12/05/22 (!) 138 kg (304 lb)       · Patient complaining of leg swelling  · Torsemide was held during last admission but patient is near baseline weight  · BNP wnl for the patient and improved from last admission  · No vascular congestion on imaging  · Will provide with one time dosing of lasix 60 mg and then hopefully return to oral torsemide tomorrow  · I/O  · Salt restriction

## 2022-12-21 NOTE — ASSESSMENT & PLAN NOTE
· Returns to the ED after being discharged from the hospital 12/19  · Complaining of SOB  · Wheezing on examination  · Restart on IV steroids, solumedrol 40 mg daily  · Inhaler regimen ordered

## 2022-12-22 PROBLEM — R07.9 CHEST PAIN: Status: ACTIVE | Noted: 2022-12-22

## 2022-12-22 LAB
2HR DELTA HS TROPONIN: -4 NG/L
4HR DELTA HS TROPONIN: -2 NG/L
ATRIAL RATE: 66 BPM
ATRIAL RATE: 71 BPM
CARDIAC TROPONIN I PNL SERPL HS: 42 NG/L
CARDIAC TROPONIN I PNL SERPL HS: 44 NG/L
CARDIAC TROPONIN I PNL SERPL HS: 46 NG/L
P AXIS: 78 DEGREES
P AXIS: 85 DEGREES
PR INTERVAL: 210 MS
PR INTERVAL: 212 MS
QRS AXIS: -31 DEGREES
QRS AXIS: -36 DEGREES
QRSD INTERVAL: 106 MS
QRSD INTERVAL: 120 MS
QT INTERVAL: 402 MS
QT INTERVAL: 416 MS
QTC INTERVAL: 436 MS
QTC INTERVAL: 436 MS
T WAVE AXIS: 65 DEGREES
T WAVE AXIS: 85 DEGREES
VENTRICULAR RATE: 66 BPM
VENTRICULAR RATE: 71 BPM

## 2022-12-22 RX ORDER — ALBUTEROL SULFATE 90 UG/1
2 AEROSOL, METERED RESPIRATORY (INHALATION) 4 TIMES DAILY
Status: DISCONTINUED | OUTPATIENT
Start: 2022-12-23 | End: 2022-12-23

## 2022-12-22 RX ORDER — PANTOPRAZOLE SODIUM 20 MG/1
20 TABLET, DELAYED RELEASE ORAL
Status: DISCONTINUED | OUTPATIENT
Start: 2022-12-22 | End: 2022-12-26 | Stop reason: HOSPADM

## 2022-12-22 RX ORDER — METHYLPREDNISOLONE SODIUM SUCCINATE 40 MG/ML
40 INJECTION, POWDER, LYOPHILIZED, FOR SOLUTION INTRAMUSCULAR; INTRAVENOUS EVERY 12 HOURS SCHEDULED
Status: DISCONTINUED | OUTPATIENT
Start: 2022-12-22 | End: 2022-12-24

## 2022-12-22 RX ADMIN — SACUBITRIL AND VALSARTAN 1 TABLET: 24; 26 TABLET, FILM COATED ORAL at 08:15

## 2022-12-22 RX ADMIN — ENOXAPARIN SODIUM 40 MG: 40 INJECTION SUBCUTANEOUS at 08:26

## 2022-12-22 RX ADMIN — FLUTICASONE PROPIONATE 1 SPRAY: 50 SPRAY, METERED NASAL at 08:23

## 2022-12-22 RX ADMIN — DULOXETINE HYDROCHLORIDE 60 MG: 30 CAPSULE, DELAYED RELEASE ORAL at 08:15

## 2022-12-22 RX ADMIN — SACUBITRIL AND VALSARTAN 1 TABLET: 24; 26 TABLET, FILM COATED ORAL at 17:20

## 2022-12-22 RX ADMIN — CARVEDILOL 25 MG: 12.5 TABLET, FILM COATED ORAL at 16:10

## 2022-12-22 RX ADMIN — TRIAMCINOLONE ACETONIDE: 1 CREAM TOPICAL at 17:21

## 2022-12-22 RX ADMIN — ALPRAZOLAM 0.5 MG: 0.5 TABLET ORAL at 22:20

## 2022-12-22 RX ADMIN — METHYLPREDNISOLONE SODIUM SUCCINATE 40 MG: 40 INJECTION, POWDER, FOR SOLUTION INTRAMUSCULAR; INTRAVENOUS at 22:20

## 2022-12-22 RX ADMIN — ALBUTEROL SULFATE 2 PUFF: 90 AEROSOL, METERED RESPIRATORY (INHALATION) at 04:57

## 2022-12-22 RX ADMIN — TRIAMCINOLONE ACETONIDE: 1 CREAM TOPICAL at 08:23

## 2022-12-22 RX ADMIN — IPRATROPIUM BROMIDE 0.5 MG: 0.5 SOLUTION RESPIRATORY (INHALATION) at 13:53

## 2022-12-22 RX ADMIN — LEVALBUTEROL HYDROCHLORIDE 1.25 MG: 1.25 SOLUTION, CONCENTRATE RESPIRATORY (INHALATION) at 21:26

## 2022-12-22 RX ADMIN — PANTOPRAZOLE SODIUM 20 MG: 20 TABLET, DELAYED RELEASE ORAL at 12:18

## 2022-12-22 RX ADMIN — LEVALBUTEROL HYDROCHLORIDE 1.25 MG: 1.25 SOLUTION, CONCENTRATE RESPIRATORY (INHALATION) at 13:53

## 2022-12-22 RX ADMIN — CARVEDILOL 25 MG: 12.5 TABLET, FILM COATED ORAL at 08:15

## 2022-12-22 RX ADMIN — LEVALBUTEROL HYDROCHLORIDE 1.25 MG: 1.25 SOLUTION, CONCENTRATE RESPIRATORY (INHALATION) at 07:30

## 2022-12-22 RX ADMIN — METHYLPREDNISOLONE SODIUM SUCCINATE 40 MG: 40 INJECTION, POWDER, FOR SOLUTION INTRAMUSCULAR; INTRAVENOUS at 09:58

## 2022-12-22 RX ADMIN — IPRATROPIUM BROMIDE 0.5 MG: 0.5 SOLUTION RESPIRATORY (INHALATION) at 07:30

## 2022-12-22 RX ADMIN — GUAIFENESIN 600 MG: 600 TABLET ORAL at 22:20

## 2022-12-22 RX ADMIN — ATORVASTATIN CALCIUM 20 MG: 10 TABLET, FILM COATED ORAL at 17:20

## 2022-12-22 RX ADMIN — CEFTRIAXONE 1000 MG: 1 INJECTION, SOLUTION INTRAVENOUS at 12:18

## 2022-12-22 RX ADMIN — GUAIFENESIN 600 MG: 600 TABLET ORAL at 08:15

## 2022-12-22 RX ADMIN — IPRATROPIUM BROMIDE 0.5 MG: 0.5 SOLUTION RESPIRATORY (INHALATION) at 21:26

## 2022-12-22 NOTE — ASSESSMENT & PLAN NOTE
Lab Results   Component Value Date    EGFR 61 12/21/2022    EGFR 46 12/18/2022    EGFR 48 12/17/2022    CREATININE 1 22 12/21/2022    CREATININE 1 55 (H) 12/18/2022    CREATININE 1 50 (H) 12/17/2022     · Creatinine below baseline  · Continue to monitor

## 2022-12-22 NOTE — CASE MANAGEMENT
Case Management Assessment & Discharge Planning Note    Patient name Fransisca Grimaldo  Location [facility] [id number] MRN [id number]  : 1955 Date 2022       Current Admission Date: 2022  Current Admission Diagnosis:COPD, severe [facility])   Patient Active Problem List    Diagnosis Date Noted   • Right lower lobe pulmonary infiltrate 2022   • Stage 3 chronic kidney disease, unspecified whether stage 3a or 3b CKD ([place] [address] ) 2022   • Chronic obstructive pulmonary disease with acute exacerbation ([place] [address] ) 2022   • Obesity (BMI 30-39 9) 2022   • COVID-19 virus infection 2021   • PLMD (periodic limb movement disorder)    • Bronchitis 2020   • Annual physical exam 2019   • Hypertrophied anal papilla 2019   • History of tobacco abuse 2018   • Constipation 2018   • Pulmonary emphysema ([place] [address] ) 2018   • Chronic asthma, moderate persistent, uncomplicated 1941   • Obstructive sleep apnea 2018   • Hemorrhoids 2017   • COPD, severe ([place] [address] ) 10/16/2017   • Borderline hyperglycemia 2017   • Chronic combined systolic and diastolic congestive heart failure ([place] [address] ) 2015   • Non-ischemic cardiomyopathy with HFmEF ([place] [address] ) 2015   • Anxiety 2014   • Thyroid disease 2014   • Benign essential hypertension 2013   • Vitamin D deficiency 2013   • Dyslipidemia 2013   • Asthma-COPD overlap syndrome ([place] [address] ) 2012      LOS (days): 1  Geometric Mean LOS (GMLOS) (days): 2 30  Days to GMLOS:1 5     OBJECTIVE:  PATIENT READMITTED TO HOSPITAL  Risk of Unplanned Readmission Score: 14 06         Current admission status: Inpatient       Preferred Pharmacy:   [address] [place], [place] [address]kendrick Hameed, [address] [address]  [address]  [address]   DR Kaden CRAIN [address]  Phone: [phone] Fax: [address] [place] [address], [address]  [address] [facility] [address]  Phone: [phone] Fax: [address] [place] [address] [place] [address]  Phone: [phone] Fax: [phone]    Primary Care Provider: Christiano Murcia, DO    Primary Insurance: MEDICARE  Secondary Insurance: Martha Leon    ASSESSMENT:  Active Health Care Proxies    There are no active Health Care Proxies on file  Advance Directives  Does patient have a [address]?: No  Was patient offered paperwork?: Yes (declines)  Does patient currently have a Health Care decision maker?: Yes, please see Health Care Proxy section  Does patient have Advance Directives?: No  Was patient offered paperwork?: Yes (declines)  Primary Contact: Raul West (Spouse)    [phone] (H)         Readmission Root Cause  30 Day Readmission: Yes (was here from 12/16-12/19)  Who directed you to return to the hospital?: Self  Did you understand whom to contact if you had questions or problems?: Yes  Did you get your prescriptions before you left the hospital?: No  Reason[de-identified] Preference for own pharmacy  Were you able to get your prescriptions filled when you left the hospital?: Yes  Did you take your medications as prescribed?: Yes  Were you able to get to your follow-up appointments?: No  Reason[de-identified] Readmitted prior to appointment  During previous admission, was a post-acute recommendation made?: No  Patient was readmitted due to: ECOPD; COVID; RLL infiltrate  Action Plan: home w OP follow up    Patient Information  Admitted from[de-identified] Home  Mental Status: Alert  During Assessment patient was accompanied by: Not accompanied during assessment  Assessment information provided by[de-identified] Patient  Primary Caregiver: Self  Support Systems: Spouse/significant other  [place] of Residence: [address] do you live in?: [address] [employer] entry access options   Select all that apply : Stairs  Type of Current Residence: Bi-level (6 sterps between levels)  In the last 12 months, was there a time when you were not able to pay the mortgage or rent on time?: No  In the last 12 months, how many places have you lived?: 1  In the last 12 months, was there a time when you did not have a steady place to sleep or slept in a shelter (including now)?: No  Homeless/housing insecurity resource given?: N/A  Living Arrangements: Lives w/ Spouse/significant other  Is patient a ?: No    Activities of Daily Living Prior to Admission  Functional Status: Independent  Completes ADLs independently?: Yes  Ambulates independently?: Yes  Does patient use assisted devices?: Yes  Assisted Devices (DME) used: CPAP, Straight [address] [facility] [place], [place] [address]  DME Company Name (respiratory supplies): pt unsure of DME provider  Does patient currently own DME?: Yes  What DME does the patient currently own?: CPAP, Straight Cane, Nebulizer  Does patient have a history of Outpatient Therapy (PT/OT)?: No  Does the patient have a history of Short-Term Rehab?: No  Does patient have a history of HHC?: No  Does patient currently have [facility]?: No         Patient Information Continued  Income Source: Pension/shelter  Does patient have prescription coverage?: Yes  Within the past 12 months, you worried that your food would run out before you got the money to buy more : Never true  Within the past 12 months, the food you bought just didn't last and you didn't have money to get more : Never true  Food insecurity resource given?: N/A  Does patient receive dialysis treatments?: No  Does patient have a history of substance abuse?: No  Does patient have a history of Mental Health Diagnosis?: No         Means of Transportation  Means of Transport to Appts[de-identified] Drives Self  In the past 12 months, has lack of transportation kept you from medical appointments or from getting medications?: No  In the past 12 months, has lack of transportation kept you from meetings, work, or from getting things needed for daily living?: No  Was application for public transport provided?: N/A        DISCHARGE DETAILS:    Discharge planning discussed with[de-identified] patient        CM contacted family/caregiver?: No- see comments (declines)  Were Treatment Team discharge recommendations reviewed with patient/caregiver?: Yes  Did patient/caregiver verbalize understanding of patient care needs?: Yes  Were patient/caregiver advised of the risks associated with not following Treatment Team discharge recommendations?: Yes         [address]         Is the patient interested in [place] [address] at discharge?: No    DME Referral Provided  Referral made for DME?: No         Would you like to participate in our [facility] service program?  : No - Declined          Transport at Discharge : Family   Plans at this time are home on dc with OP follow up  Wife to transport pt home on dc  CM will follow and assist in dc planning

## 2022-12-22 NOTE — PLAN OF CARE
Problem: MOBILITY - ADULT  Goal: Maintain or return to baseline ADL function  Description: INTERVENTIONS:  -  Assess patient's ability to carry out ADLs; assess patient's baseline for ADL function and identify physical deficits which impact ability to perform ADLs (bathing, care of mouth/teeth, toileting, grooming, dressing, etc )  - Assess/evaluate cause of self-care deficits   - Assess range of motion  - Assess patient's mobility; develop plan if impaired  - Assess patient's need for assistive devices and provide as appropriate  - Encourage maximum independence but intervene and supervise when necessary  - Involve family in performance of ADLs  - Assess for home care needs following discharge   - Consider OT consult to assist with ADL evaluation and planning for discharge  - Provide patient education as appropriate  Outcome: Progressing  Goal: Maintains/Returns to pre admission functional level  Description: INTERVENTIONS:  - Perform BMAT or MOVE assessment daily    - Set and communicate daily mobility goal to care team and patient/family/caregiver  - Collaborate with rehabilitation services on mobility goals if consulted  - Perform Range of Motion 3 times a day  - Reposition patient every 2 hours    - Dangle patient 3 times a day  - Stand patient 3 times a day  - Ambulate patient 3 times a day  - Out of bed to chair 3 times a day   - Out of bed for meals 3 times a day  - Out of bed for toileting  - Record patient progress and toleration of activity level   Outcome: Progressing     Problem: PAIN - ADULT  Goal: Verbalizes/displays adequate comfort level or baseline comfort level  Description: Interventions:  - Encourage patient to monitor pain and request assistance  - Assess pain using appropriate pain scale  - Administer analgesics based on type and severity of pain and evaluate response  - Implement non-pharmacological measures as appropriate and evaluate response  - Consider cultural and social influences on pain and pain management  - Notify physician/advanced practitioner if interventions unsuccessful or patient reports new pain  Outcome: Progressing     Problem: DISCHARGE PLANNING  Goal: Discharge to home or other facility with appropriate resources  Description: INTERVENTIONS:  - Identify barriers to discharge w/patient and caregiver  - Arrange for needed discharge resources and transportation as appropriate  - Identify discharge learning needs (meds, wound care, etc )  - Arrange for interpretive services to assist at discharge as needed  - Refer to Case Management Department for coordinating discharge planning if the patient needs post-hospital services based on physician/advanced practitioner order or complex needs related to functional status, cognitive ability, or social support system  Outcome: Progressing     Problem: Knowledge Deficit  Goal: Patient/family/caregiver demonstrates understanding of disease process, treatment plan, medications, and discharge instructions  Description: Complete learning assessment and assess knowledge base    Interventions:  - Provide teaching at level of understanding  - Provide teaching via preferred learning methods  Outcome: Progressing     Problem: RESPIRATORY - ADULT  Goal: Achieves optimal ventilation and oxygenation  Description: INTERVENTIONS:  - Assess for changes in respiratory status  - Assess for changes in mentation and behavior  - Position to facilitate oxygenation and minimize respiratory effort  - Oxygen administered by appropriate delivery if ordered  - Initiate smoking cessation education as indicated  - Encourage broncho-pulmonary hygiene including cough, deep breathe, Incentive Spirometry  - Assess the need for suctioning and aspirate as needed  - Assess and instruct to report SOB or any respiratory difficulty  - Respiratory Therapy support as indicated  Outcome: Progressing     Problem: METABOLIC, FLUID AND ELECTROLYTES - ADULT  Goal: Electrolytes maintained within normal limits  Description: INTERVENTIONS:  - Monitor labs and assess patient for signs and symptoms of electrolyte imbalances  - Administer electrolyte replacement as ordered  - Monitor response to electrolyte replacements, including repeat lab results as appropriate  - Instruct patient on fluid and nutrition as appropriate  Outcome: Progressing  Goal: Fluid balance maintained  Description: INTERVENTIONS:  - Monitor labs   - Monitor I/O and WT  - Instruct patient on fluid and nutrition as appropriate  - Assess for signs & symptoms of volume excess or deficit  Outcome: Progressing

## 2022-12-22 NOTE — ASSESSMENT & PLAN NOTE
· Patient had an episode of CP this AM described as mid sternal sharp and burning  · Resolved without intervention  · Troponin x 2 negative pending third  · EKG without any changes  · MEGHAN score 1  · Suspect reflux, will initiate on protonix  · Continue to monitor

## 2022-12-22 NOTE — RESPIRATORY THERAPY NOTE
12/22/22 0735   Inhalation Therapy Tx   $ Inhalation Therapy Performed Yes   $ Pulse Oximetry Spot Check Charge Completed   SpO2 92 %   Pre-Treatment Pulse 82   Pre-Treatment Respirations 20   Duration 20   Breath Sounds Pre-Treatment Bilateral Diminished;Coarse; Expiratory wheeze   Breath Sounds Post-Treatment Bilateral Diminished; Expiratory wheezes; Coarse   Post-Treatment Pulse 81   Post-Treatment Respirations 20   Delivery Source [employer]   Position Up in chair   Treatment Tolerance Tolerated well   Resp Comments patient was on room air he ambulated to the chair, his oxygen saturation dipped to 88%, he does have strong productive cough thick yellow,

## 2022-12-22 NOTE — ASSESSMENT & PLAN NOTE
· Seen on imaging  · Possible pneumonia post COVID  · Check procal  · Provided ceftriaxone in the ED, continue for now  · Should have CXR in 4-6 weeks to ensure resolution

## 2022-12-22 NOTE — PLAN OF CARE
Problem: MOBILITY - ADULT  Goal: Maintain or return to baseline ADL function  Description: INTERVENTIONS:  -  Assess patient's ability to carry out ADLs; assess patient's baseline for ADL function and identify physical deficits which impact ability to perform ADLs (bathing, care of mouth/teeth, toileting, grooming, dressing, etc )  - Assess/evaluate cause of self-care deficits   - Assess range of motion  - Assess patient's mobility; develop plan if impaired  - Assess patient's need for assistive devices and provide as appropriate  - Encourage maximum independence but intervene and supervise when necessary  - Involve family in performance of ADLs  - Assess for home care needs following discharge   - Consider OT consult to assist with ADL evaluation and planning for discharge  - Provide patient education as appropriate  Outcome: Progressing  Goal: Maintains/Returns to pre admission functional level  Description: INTERVENTIONS:  - Perform BMAT or MOVE assessment daily    - Set and communicate daily mobility goal to care team and patient/family/caregiver  - Collaborate with rehabilitation services on mobility goals if consulted  - Perform Range of Motion 3 times a day  - Reposition patient every 2 hours    - Dangle patient 3 times a day  - Stand patient 3 times a day  - Ambulate patient 3 times a day  - Out of bed to chair 3 times a day   - Out of bed for meals 3 times a day  - Out of bed for toileting  - Record patient progress and toleration of activity level   Outcome: Progressing     Problem: PAIN - ADULT  Goal: Verbalizes/displays adequate comfort level or baseline comfort level  Description: Interventions:  - Encourage patient to monitor pain and request assistance  - Assess pain using appropriate pain scale  - Administer analgesics based on type and severity of pain and evaluate response  - Implement non-pharmacological measures as appropriate and evaluate response  - Consider cultural and social influences on pain and pain management  - Notify physician/advanced practitioner if interventions unsuccessful or patient reports new pain  Outcome: Progressing     Problem: INFECTION - ADULT  Goal: Absence or prevention of progression during hospitalization  Description: INTERVENTIONS:  - Assess and monitor for signs and symptoms of infection  - Monitor lab/diagnostic results  - Monitor all insertion sites, i e  indwelling lines, tubes, and drains  - Monitor endotracheal if appropriate and nasal secretions for changes in amount and color  - [place] appropriate cooling/warming therapies per order  - Administer medications as ordered  - Instruct and encourage patient and family to use good hand hygiene technique  - Identify and instruct in appropriate isolation precautions for identified infection/condition  Outcome: Progressing  Goal: Absence of fever/infection during neutropenic period  Description: INTERVENTIONS:  - Monitor WBC    Outcome: Progressing     Problem: SAFETY ADULT  Goal: Maintain or return to baseline ADL function  Description: INTERVENTIONS:  -  Assess patient's ability to carry out ADLs; assess patient's baseline for ADL function and identify physical deficits which impact ability to perform ADLs (bathing, care of mouth/teeth, toileting, grooming, dressing, etc )  - Assess/evaluate cause of self-care deficits   - Assess range of motion  - Assess patient's mobility; develop plan if impaired  - Assess patient's need for assistive devices and provide as appropriate  - Encourage maximum independence but intervene and supervise when necessary  - Involve family in performance of ADLs  - Assess for home care needs following discharge   - Consider OT consult to assist with ADL evaluation and planning for discharge  - Provide patient education as appropriate  Outcome: Progressing  Goal: Maintains/Returns to pre admission functional level  Description: INTERVENTIONS:  - Perform BMAT or MOVE assessment daily    - Set and communicate daily mobility goal to care team and patient/family/caregiver  - Collaborate with rehabilitation services on mobility goals if consulted  - Perform Range of Motion 3 times a day  - Reposition patient every 2 hours    - Dangle patient 3 times a day  - Stand patient 3 times a day  - Ambulate patient 3 times a day  - Out of bed to chair 3 times a day   - Out of bed for meals 3 times a day  - Out of bed for toileting  - Record patient progress and toleration of activity level   Outcome: Progressing  Goal: Patient will remain free of falls  Description: INTERVENTIONS:  - Educate patient/family on patient safety including physical limitations  - Instruct patient to call for assistance with activity   - Consult OT/PT to assist with strengthening/mobility   - Keep Call bell within reach  - Keep bed low and locked with side rails adjusted as appropriate  - Keep care items and personal belongings within reach  - Initiate and maintain comfort rounds  - Make Fall Risk Sign visible to staff  - Offer Toileting every 2 Hours, in advance of need  - Initiate/Maintain fall alarm  - Obtain necessary fall risk management equipment: non-skid footwear  - Apply yellow socks and bracelet for high fall risk patients  - Consider moving patient to room near nurses station  Outcome: Progressing     Problem: DISCHARGE PLANNING  Goal: Discharge to home or other facility with appropriate resources  Description: INTERVENTIONS:  - Identify barriers to discharge w/patient and caregiver  - Arrange for needed discharge resources and transportation as appropriate  - Identify discharge learning needs (meds, wound care, etc )  - Arrange for interpretive services to assist at discharge as needed  - Refer to Case Management Department for coordinating discharge planning if the patient needs post-hospital services based on physician/advanced practitioner order or complex needs related to functional status, cognitive ability, or social support system  Outcome: Progressing     Problem: Knowledge Deficit  Goal: Patient/family/caregiver demonstrates understanding of disease process, treatment plan, medications, and discharge instructions  Description: Complete learning assessment and assess knowledge base    Interventions:  - Provide teaching at level of understanding  - Provide teaching via preferred learning methods  Outcome: Progressing     Problem: RESPIRATORY - ADULT  Goal: Achieves optimal ventilation and oxygenation  Description: INTERVENTIONS:  - Assess for changes in respiratory status  - Assess for changes in mentation and behavior  - Position to facilitate oxygenation and minimize respiratory effort  - Oxygen administered by appropriate delivery if ordered  - Initiate smoking cessation education as indicated  - Encourage broncho-pulmonary hygiene including cough, deep breathe, Incentive Spirometry  - Assess the need for suctioning and aspirate as needed  - Assess and instruct to report SOB or any respiratory difficulty  - Respiratory Therapy support as indicated  Outcome: Progressing     Problem: METABOLIC, FLUID AND ELECTROLYTES - ADULT  Goal: Electrolytes maintained within normal limits  Description: INTERVENTIONS:  - Monitor labs and assess patient for signs and symptoms of electrolyte imbalances  - Administer electrolyte replacement as ordered  - Monitor response to electrolyte replacements, including repeat lab results as appropriate  - Instruct patient on fluid and nutrition as appropriate  Outcome: Progressing  Goal: Fluid balance maintained  Description: INTERVENTIONS:  - Monitor labs   - Monitor I/O and WT  - Instruct patient on fluid and nutrition as appropriate  - Assess for signs & symptoms of volume excess or deficit  Outcome: Progressing  Goal: Glucose maintained within target range  Description: INTERVENTIONS:  - Monitor Blood Glucose as ordered  - Assess for signs and symptoms of hyperglycemia and hypoglycemia  - Administer ordered medications to maintain glucose within target range  - Assess nutritional intake and initiate nutrition service referral as needed  Outcome: Progressing     Problem: HEMATOLOGIC - ADULT  Goal: Maintains hematologic stability  Description: INTERVENTIONS  - Assess for signs and symptoms of bleeding or hemorrhage  - Monitor labs  - Administer supportive blood products/factors as ordered and appropriate  Outcome: Progressing     Problem: MUSCULOSKELETAL - ADULT  Goal: Maintain or return mobility to safest level of function  Description: INTERVENTIONS:  - Assess patient's ability to carry out ADLs; assess patient's baseline for ADL function and identify physical deficits which impact ability to perform ADLs (bathing, care of mouth/teeth, toileting, grooming, dressing, etc )  - Assess/evaluate cause of self-care deficits   - Assess range of motion  - Assess patient's mobility  - Assess patient's need for assistive devices and provide as appropriate  - Encourage maximum independence but intervene and supervise when necessary  - Involve family in performance of ADLs  - Assess for home care needs following discharge   - Consider OT consult to assist with ADL evaluation and planning for discharge  - Provide patient education as appropriate  Outcome: Progressing  Goal: Maintain proper alignment of affected body part  Description: INTERVENTIONS:  - Support, maintain and protect limb and body alignment  - Provide patient/ family with appropriate education  Outcome: Progressing

## 2022-12-22 NOTE — PROGRESS NOTES
[facility] [address]  Progress Note - Mdady Anchors 1955, 74 y o  male MRN: [id number]  Unit/Bed#: 407-01 Encounter: [id number]  Primary Care Provider: Victoriano Caicedo DO   Date and time admitted to hospital: 12/21/2022 10:31 AM    * COPD, severe ([address] )  Assessment & Plan  · Returns to the ED after being discharged from the hospital 12/19  · Complaining of SOB  · Wheezing on examination  · Restart on IV steroids, solumedrol 40 mg daily --> increased to BID dosing  · Inhaler regimen ordered    COVID-19 virus infection  Assessment & Plan  · Recently diagnosed 12/16 and received full remdesivir course  · No further treatments needed  · O2 eval with ambulation performed in the ED and patient remained 90% on room air but becomes symptomatic quickly  · Goal is for patient to be able to ambulate farther without issue  · Symptomatic care    Right lower lobe pulmonary infiltrate  Assessment & Plan  · Seen on imaging  · Possible pneumonia post COVID  · Check procal  · Provided ceftriaxone in the ED, continue for now  · Should have CXR in 4-6 weeks to ensure resolution    Stage 3 chronic kidney disease, unspecified whether stage 3a or 3b CKD [facility])  Assessment & Plan  Lab Results   Component Value Date    EGFR 61 12/21/2022    EGFR 46 12/18/2022    EGFR 48 12/17/2022    CREATININE 1 22 12/21/2022    CREATININE 1 55 (H) 12/18/2022    CREATININE 1 50 (H) 12/17/2022     · Creatinine below baseline  · Continue to monitor    Chronic combined systolic and diastolic congestive heart failure (HCC)  Assessment & Plan  Wt Readings from Last 3 Encounters:   12/22/22 132 kg (291 lb 0 1 oz)   12/19/22 134 kg (296 lb 4 8 oz)   12/13/22 (!) 137 kg (301 lb 3 2 oz)       · Patient complaining of leg swelling  · Torsemide was held during last admission but patient is near baseline weight  · BNP wnl for the patient and improved from last admission  · No vascular congestion on imaging  · Cn hold off on further diuretics at this time  · I/O  · Salt restriction      Obstructive sleep apnea  Assessment & Plan  cpap qhs but patient typically declines        VTE Pharmacologic Prophylaxis: VTE Score: 5 High Risk (Score >/= 5) - Pharmacological DVT Prophylaxis Ordered: enoxaparin (Lovenox)  Sequential Compression Devices Ordered  Patient Centered Rounds: I performed bedside rounds with nursing staff today  Discussions with Specialists or Other Care Team Provider: case management, pulm    Education and Discussions with Family / Patient: Patient declined call to   Time Spent for Care: 20 minutes  More than 50% of total time spent on counseling and coordination of care as described above  Current Length of Stay: 1 day(s)  Current Patient Status: Inpatient   Certification Statement: The patient will continue to require additional inpatient hospital stay due to IV steroids  Discharge Plan: Anticipate discharge in 24-48 hrs to home  Code Status: Level 1 - Full Code    Subjective:   Patient reports feeling improved this AM but did have an episode of sharp chest pain that resolved without intervention    Objective:     Vitals:   Temp (24hrs), Av 9 °F (36 6 °C), Min:97 1 °F (36 2 °C), Max:98 8 °F (37 1 °C)    Temp:  [97 1 °F (36 2 °C)-98 8 °F (37 1 °C)] 98 8 °F (37 1 °C)  HR:  [68-89] 81  Resp:  [12-24] 20  BP: (117-156)/(70-93) 156/93  SpO2:  [90 %-95 %] 92 %  Body mass index is 37 36 kg/m²  Input and Output Summary (last 24 hours): Intake/Output Summary (Last 24 hours) at 2022 1042  Last data filed at 2022 0935  Gross per 24 hour   Intake 1310 ml   Output --   Net 1310 ml       Physical Exam:   Physical Exam  Vitals and nursing note reviewed  Constitutional:       General: He is not in acute distress  Appearance: He is obese  He is not ill-appearing  HENT:      Head: Normocephalic and atraumatic  Cardiovascular:      Rate and Rhythm: Normal rate and regular rhythm        Pulses: Normal pulses  Heart sounds: Normal heart sounds  No murmur heard  No gallop  Pulmonary:      Effort: Pulmonary effort is normal       Breath sounds: Wheezing present  No rales  Abdominal:      General: Bowel sounds are normal       Palpations: Abdomen is soft  Tenderness: There is no abdominal tenderness  There is no guarding or rebound  Musculoskeletal:         General: Normal range of motion  Cervical back: Normal range of motion and neck supple  Right lower leg: No edema  Left lower leg: No edema  Skin:     General: Skin is warm and dry  Neurological:      General: No focal deficit present  Mental Status: He is alert and oriented to person, place, and time     Psychiatric:         Mood and Affect: Mood normal          Behavior: Behavior normal           Additional Data:     Labs:  Results from last 7 days   Lab Units 12/21/22  1055   WBC Thousand/uL 13 66*   HEMOGLOBIN g/dL 14 1   HEMATOCRIT % 44 0   PLATELETS Thousands/uL 338   NEUTROS PCT % 67   LYMPHS PCT % 22   MONOS PCT % 9   EOS PCT % 1     Results from last 7 days   Lab Units 12/21/22  1055   SODIUM mmol/L 137   POTASSIUM mmol/L 3 8   CHLORIDE mmol/L 100   CO2 mmol/L 28   BUN mg/dL 32*   CREATININE mg/dL 1 22   ANION GAP mmol/L 9   CALCIUM mg/dL 8 4   ALBUMIN g/dL 3 0*   TOTAL BILIRUBIN mg/dL 0 57   ALK PHOS U/L 73   ALT U/L 40   AST U/L 20   GLUCOSE RANDOM mg/dL 138         Results from last 7 days   Lab Units 12/19/22  1050 12/19/22  0714 12/18/22  2132 12/18/22  1552 12/18/22  1142 12/18/22  0724 12/17/22  2126 12/17/22  1622 12/17/22  1127 12/17/22  0909 12/16/22  2115 12/16/22  1629   POC GLUCOSE mg/dl 146* 165* 140 238* 201* 167* 133 190* 134 187* 169* 157*     Results from last 7 days   Lab Units 12/17/22  0436   HEMOGLOBIN A1C % 6 4*     Results from last 7 days   Lab Units 12/21/22  1305 12/21/22  1055 12/17/22  0436 12/16/22  1148   LACTIC ACID mmol/L 1 7  --   --  1 4   PROCALCITONIN ng/ml  --  <0 05 0 11  -- Lines/Drains:  Invasive Devices     Peripheral Intravenous Line  Duration           Peripheral IV 12/21/22 Right Antecubital <1 day                      Imaging: No pertinent imaging reviewed  Recent Cultures (last 7 days):   Results from last 7 days   Lab Units 12/21/22  1305 12/16/22  1148   BLOOD CULTURE  Received in Microbiology Lab  Culture in Progress  Received in Microbiology Lab  Culture in Progress  No Growth After 5 Days  No Growth After 5 Days  Last 24 Hours Medication List:   Current Facility-Administered Medications   Medication Dose Route Frequency Provider Last Rate   • acetaminophen  650 mg Oral Q6H PRN CECILIO GarciaC     • albuterol  2 puff Inhalation Q6H PRN LauraBREANN Siddiqui-C     • ALPRAZolam  0 5 mg Oral HS PRN CECILIO GarciaC     • atorvastatin  20 mg Oral QPM CECILIO GarciaC     • carvedilol  25 mg Oral BID With Meals CECILIO GarciaC     • cefTRIAXone  1,000 mg Intravenous Q24H CECILIO GarciaC     • DULoxetine  60 mg Oral Daily CECILIO GarciaC     • enoxaparin  40 mg Subcutaneous Daily Laura Winter PA-C     • fluticasone  1 spray Nasal BID Laura Winter PA-C     • fluticasone-vilanterol  1 puff Inhalation Daily Laura Winter PA-C     • guaiFENesin  600 mg Oral Q12H [address] Laura Winter PA-C     • ipratropium  0 5 mg Nebulization TID Ariana Nielsen MD     • ipratropium-albuterol  3 mL Nebulization Q6H PRN Laura Winter PA-C     • levalbuterol  1 25 mg Nebulization TID Ariana Nielsen MD     • methylPREDNISolone sodium succinate  40 mg Intravenous Q12H [address] CECILIO GarciaC     • ondansetron  4 mg Intravenous Q6H PRN Laura Winter PA-C     • sacubitril-valsartan  1 tablet Oral BID Laura Winter PA-C     • triamcinolone   Topical BID Laura Winter PA-C          Today, Patient Was Seen By: Laura Winter PA-C    **Please Note: This note may have been constructed using a voice recognition system  **

## 2022-12-22 NOTE — ASSESSMENT & PLAN NOTE
· Returns to the ED after being discharged from the hospital 12/19  · Complaining of SOB  · Wheezing on examination  · Restart on IV steroids, solumedrol 40 mg daily --> increased to BID dosing  · Inhaler regimen ordered

## 2022-12-22 NOTE — ASSESSMENT & PLAN NOTE
Wt Readings from Last 3 Encounters:   12/22/22 132 kg (291 lb 0 1 oz)   12/19/22 134 kg (296 lb 4 8 oz)   12/13/22 (!) 137 kg (301 lb 3 2 oz)       · Patient complaining of leg swelling  · Torsemide was held during last admission but patient is near baseline weight  · BNP wnl for the patient and improved from last admission  · No vascular congestion on imaging  · Cn hold off on further diuretics at this time  · I/O  · Salt restriction

## 2022-12-23 ENCOUNTER — APPOINTMENT (INPATIENT)
Dept: CT IMAGING | Facility: HOSPITAL | Age: 67
End: 2022-12-23

## 2022-12-23 LAB
ANION GAP SERPL CALCULATED.3IONS-SCNC: 8 MMOL/L (ref 4–13)
ATRIAL RATE: 108 BPM
BASOPHILS # BLD AUTO: 0.05 THOUSANDS/ÂΜL (ref 0–0.1)
BASOPHILS NFR BLD AUTO: 0 % (ref 0–1)
BUN SERPL-MCNC: 34 MG/DL (ref 5–25)
CALCIUM SERPL-MCNC: 8.6 MG/DL (ref 8.3–10.1)
CHLORIDE SERPL-SCNC: 98 MMOL/L (ref 96–108)
CO2 SERPL-SCNC: 26 MMOL/L (ref 21–32)
CREAT SERPL-MCNC: 1.38 MG/DL (ref 0.6–1.3)
D DIMER PPP FEU-MCNC: 1.49 UG/ML FEU
EOSINOPHIL # BLD AUTO: 0.02 THOUSAND/ÂΜL (ref 0–0.61)
EOSINOPHIL NFR BLD AUTO: 0 % (ref 0–6)
ERYTHROCYTE [DISTWIDTH] IN BLOOD BY AUTOMATED COUNT: 14.7 % (ref 11.6–15.1)
GFR SERPL CREATININE-BSD FRML MDRD: 53 ML/MIN/1.73SQ M
GLUCOSE SERPL-MCNC: 280 MG/DL (ref 65–140)
HCT VFR BLD AUTO: 44.4 % (ref 36.5–49.3)
HGB BLD-MCNC: 14.3 G/DL (ref 12–17)
IMM GRANULOCYTES # BLD AUTO: 0.31 THOUSAND/UL (ref 0–0.2)
IMM GRANULOCYTES NFR BLD AUTO: 2 % (ref 0–2)
LYMPHOCYTES # BLD AUTO: 1.39 THOUSANDS/ÂΜL (ref 0.6–4.47)
LYMPHOCYTES NFR BLD AUTO: 8 % (ref 14–44)
MCH RBC QN AUTO: 27 PG (ref 26.8–34.3)
MCHC RBC AUTO-ENTMCNC: 32.2 G/DL (ref 31.4–37.4)
MCV RBC AUTO: 84 FL (ref 82–98)
MONOCYTES # BLD AUTO: 0.52 THOUSAND/ÂΜL (ref 0.17–1.22)
MONOCYTES NFR BLD AUTO: 3 % (ref 4–12)
NEUTROPHILS # BLD AUTO: 14.49 THOUSANDS/ÂΜL (ref 1.85–7.62)
NEUTS SEG NFR BLD AUTO: 87 % (ref 43–75)
NRBC BLD AUTO-RTO: 0 /100 WBCS
P AXIS: 92 DEGREES
PLATELET # BLD AUTO: 358 THOUSANDS/UL (ref 149–390)
PMV BLD AUTO: 9.6 FL (ref 8.9–12.7)
POTASSIUM SERPL-SCNC: 4.7 MMOL/L (ref 3.5–5.3)
PR INTERVAL: 204 MS
PROCALCITONIN SERPL-MCNC: <0.05 NG/ML
QRS AXIS: -49 DEGREES
QRSD INTERVAL: 110 MS
QT INTERVAL: 336 MS
QTC INTERVAL: 450 MS
RBC # BLD AUTO: 5.3 MILLION/UL (ref 3.88–5.62)
SODIUM SERPL-SCNC: 132 MMOL/L (ref 135–147)
T WAVE AXIS: 64 DEGREES
VENTRICULAR RATE: 108 BPM
WBC # BLD AUTO: 16.78 THOUSAND/UL (ref 4.31–10.16)

## 2022-12-23 RX ORDER — GUAIFENESIN 600 MG/1
1200 TABLET, EXTENDED RELEASE ORAL EVERY 12 HOURS SCHEDULED
Status: DISCONTINUED | OUTPATIENT
Start: 2022-12-23 | End: 2022-12-26 | Stop reason: HOSPADM

## 2022-12-23 RX ORDER — GUAIFENESIN 600 MG/1
600 TABLET, EXTENDED RELEASE ORAL ONCE
Status: COMPLETED | OUTPATIENT
Start: 2022-12-23 | End: 2022-12-23

## 2022-12-23 RX ORDER — SODIUM CHLORIDE FOR INHALATION 3 %
4 VIAL, NEBULIZER (ML) INHALATION 3 TIMES DAILY
Status: DISCONTINUED | OUTPATIENT
Start: 2022-12-23 | End: 2022-12-25

## 2022-12-23 RX ADMIN — SODIUM CHLORIDE SOLN NEBU 3% 4 ML: 3 NEBU SOLN at 20:51

## 2022-12-23 RX ADMIN — IPRATROPIUM BROMIDE 0.5 MG: 0.5 SOLUTION RESPIRATORY (INHALATION) at 13:00

## 2022-12-23 RX ADMIN — LEVALBUTEROL HYDROCHLORIDE 1.25 MG: 1.25 SOLUTION, CONCENTRATE RESPIRATORY (INHALATION) at 13:00

## 2022-12-23 RX ADMIN — ALPRAZOLAM 0.5 MG: 0.5 TABLET ORAL at 22:27

## 2022-12-23 RX ADMIN — TRIAMCINOLONE ACETONIDE: 1 CREAM TOPICAL at 17:18

## 2022-12-23 RX ADMIN — GUAIFENESIN 1200 MG: 600 TABLET ORAL at 22:27

## 2022-12-23 RX ADMIN — METHYLPREDNISOLONE SODIUM SUCCINATE 40 MG: 40 INJECTION, POWDER, FOR SOLUTION INTRAMUSCULAR; INTRAVENOUS at 22:27

## 2022-12-23 RX ADMIN — ATORVASTATIN CALCIUM 20 MG: 10 TABLET, FILM COATED ORAL at 17:13

## 2022-12-23 RX ADMIN — IPRATROPIUM BROMIDE 0.5 MG: 0.5 SOLUTION RESPIRATORY (INHALATION) at 07:20

## 2022-12-23 RX ADMIN — FLUTICASONE PROPIONATE 1 SPRAY: 50 SPRAY, METERED NASAL at 10:19

## 2022-12-23 RX ADMIN — PANTOPRAZOLE SODIUM 20 MG: 20 TABLET, DELAYED RELEASE ORAL at 05:16

## 2022-12-23 RX ADMIN — SACUBITRIL AND VALSARTAN 1 TABLET: 24; 26 TABLET, FILM COATED ORAL at 10:17

## 2022-12-23 RX ADMIN — LEVALBUTEROL HYDROCHLORIDE 1.25 MG: 1.25 SOLUTION, CONCENTRATE RESPIRATORY (INHALATION) at 20:51

## 2022-12-23 RX ADMIN — METHYLPREDNISOLONE SODIUM SUCCINATE 40 MG: 40 INJECTION, POWDER, FOR SOLUTION INTRAMUSCULAR; INTRAVENOUS at 10:19

## 2022-12-23 RX ADMIN — IOHEXOL 100 ML: 350 INJECTION, SOLUTION INTRAVENOUS at 11:01

## 2022-12-23 RX ADMIN — LEVALBUTEROL HYDROCHLORIDE 1.25 MG: 1.25 SOLUTION, CONCENTRATE RESPIRATORY (INHALATION) at 07:20

## 2022-12-23 RX ADMIN — IPRATROPIUM BROMIDE 0.5 MG: 0.5 SOLUTION RESPIRATORY (INHALATION) at 20:51

## 2022-12-23 RX ADMIN — CARVEDILOL 25 MG: 12.5 TABLET, FILM COATED ORAL at 10:18

## 2022-12-23 RX ADMIN — DULOXETINE HYDROCHLORIDE 60 MG: 30 CAPSULE, DELAYED RELEASE ORAL at 10:18

## 2022-12-23 RX ADMIN — CARVEDILOL 25 MG: 12.5 TABLET, FILM COATED ORAL at 17:13

## 2022-12-23 RX ADMIN — CEFTRIAXONE 1000 MG: 1 INJECTION, SOLUTION INTRAVENOUS at 12:19

## 2022-12-23 RX ADMIN — FLUTICASONE PROPIONATE 1 SPRAY: 50 SPRAY, METERED NASAL at 22:28

## 2022-12-23 RX ADMIN — SODIUM CHLORIDE 250 ML: 0.9 INJECTION, SOLUTION INTRAVENOUS at 12:19

## 2022-12-23 RX ADMIN — SACUBITRIL AND VALSARTAN 1 TABLET: 24; 26 TABLET, FILM COATED ORAL at 17:13

## 2022-12-23 RX ADMIN — GUAIFENESIN 600 MG: 600 TABLET ORAL at 10:18

## 2022-12-23 RX ADMIN — SODIUM CHLORIDE SOLN NEBU 3% 4 ML: 3 NEBU SOLN at 13:00

## 2022-12-23 NOTE — SPEECH THERAPY NOTE
Speech-Language Pathology Bedside Swallow Evaluation    Patient Name: Maddy Kaur  QYUVI'V Date: 12/23/2022     Problem List  Principal Problem:    COPD, severe ([facility] )  Active Problems:    Obstructive sleep apnea    Chronic combined systolic and diastolic congestive heart failure ([facility] )    COVID-19 virus infection    Stage 3 chronic kidney disease, unspecified whether stage 3a or 3b CKD ([facility] )    Right lower lobe pulmonary infiltrate    Chest pain    Past Medical History  Past Medical History:   Diagnosis Date   • Asthma    • CHF (congestive heart failure) ([facility])    • COPD (chronic obstructive pulmonary disease) ([facility] )    • COVID-19    • Mumps    • Old MI (myocardial infarction)    • Pneumonia    • Pulmonary emphysema ([place] [id number] )    • Rectal bleeding 1/14/2019   • Sleep apnea      Past Surgical History  Past Surgical History:   Procedure Laterality Date   • CARDIAC CATHETERIZATION  07/24/2015    Left main- normal and maidly tortuous  Circumflex - normal and moderately tortuous  RCA- normal and mildy tortuous  Global LV function was severely depressed      • CARDIAC CATHETERIZATION Left 9/27/2022    Procedure: Cardiac Left Heart Cath;  Surgeon: Nora Santos DO;  Location: [facility] CARD[facility]AC [facility]; Service: Cardiology   • CARDIAC CATHETERIZATION N/A 9/27/2022    Procedure: Cardiac Coronary Angiogram;  Surgeon: Nora Santos DO;  Location: [facility] CARDIAC C[facility]; Service: Cardiology   • CARDIAC CATHETERIZATION  9/27/2022    Procedure: Cardiac catheterization;  Surgeon: Nora Santos DO;  Location: [facility] CARDIAC C[facility]; Service: Cardiology   • INGUINAL HERNIA REPAIR Bilateral    • MT COLONOSCOPY FLX DX W/COLLJ SPEC WHEN PFRMD N/A 3/11/2019    Procedure: COLONOSCOPY with removal of anal papilla;   Surgeon: Alfonso Rocha MD;  Location: [facility] MAIN OR;  Service: Colorectal   • TONSILLECTOMY     • UMBILICAL HERNIA REPAIR  06/21/2006     Summary   Pt presented with functional appearing oral and pharyngeal stage swallowing skills with materials administered today  Pt reports recognizing a single incident that likely caused aspiration event, stating "I inhaled while I was trying to bring some mucus up and I felt it go back down the wrong pipe  Man did that hurt " Dysphagia likely caused by compromised respiratory status, which at time of evaluation, has improved  ST services not warranted at this time  Please reconsult with further changes or concerns  Risk/s for Aspiration: low     Recommended Diet: regular diet and thin liquids   Recommended Form of Meds: whole with liquid   Aspiration precautions and swallowing strategies: upright posture  Other Recommendations: Continue frequent oral care    Current Medical Status  Per 12/21/22 H&P - Pt is a 74 y o  male with a PMH of CHF, COPD, CORDELL who presents with SOB  He had recently been hospitalized and discharged on 1219 after being treated for COVID with remdesivir and IV steroids  Patient states he was taking his prednisone as instructed at home and was feeling well on 12/20  However on 12/21 he began to feel more weak and fatigued  He continues to cough with production  He was not able to ambulate far at home due to shortness of breath and felt his legs were more swollen  He did have an episode of chest pain this morning and did take 4 aspirin at home  EMS was called and patient was brought to the ED  Troponins negative, EKG without signs of STEMI  Chest x-ray showing possible new infiltrate on the right lung  He was ambulated in the emergency room and remained 90% on room air but was unable to make it past 15 feet without feeling short of breath  He denies abdominal pain, nausea, vomiting, diarrhea, constipation, fevers, chills      Current Precautions:  Aspiration  Contact  airborne    Allergies:  No known food allergies    Special Studies:  12/21/22 Chest XR impression - Development of right perihilar opacity suspicious for infiltrate  12/23/22 CTA chest impression - 1  No pulmonary embolism  2   Findings concerning for aspiration with debris filling the right middle lobe and left lower lobe bronchi with middle lobe collapse and significant left lower lobe consolidation/atelectasis  Social/Education/Vocational Hx:  Pt lives with family    Swallow Information   Current Risks for Dysphagia & Aspiration: COVID-19 and change in respiratory status  Current Symptoms/Concerns: change in respiratory status  Current Diet: regular diet and thin liquids   Baseline Diet: regular diet and thin liquids    Baseline Assessment   Behavior/Cognition: alert and oriented x3  Speech/Language Status: able to participate in conversation and able to follow commands  Patient Positioning: upright in recliner  Pain Status/Interventions/Response to Interventions: No report of or nonverbal indications of pain  Swallow Mechanism Exam  Facial: symmetrical  Labial: WFL  Lingual: WFL  Velum: symmetrical  Mandible: adequate ROM  Dentition: adequate  Vocal quality:clear/adequate   Volitional Cough: strong/productive   Respiratory Status: on RA   Tracheostomy: n/a    Consistencies Assessed and Performance   Consistencies Administered: thin liquids, puree and hard solids  Materials administered included : water via cup and straw, applesauce, and tomi cracker    Oral Stage: WFL  Mastication was adequate with the materials administered today  Bolus formation and transfer were functional with no significant oral residue noted  No overt s/s reduced oral control  Pharyngeal Stage: WFL  Swallow Mechanics:  Swallowing initiation appeared prompt  Laryngeal rise was palpated and judged to be within functional limits  No coughing, throat clearing, change in vocal quality or respiratory status noted today       Esophageal Concerns: none reported    Strategies and Efficacy: Patient reports "taking time"     Summary and Recommendations (see above)    Results Reviewed with: patient and RN Treatment Recommended: not at this time, please reconsult with further changes or concerns

## 2022-12-23 NOTE — RESPIRATORY THERAPY NOTE
12/23/22 0720   Inhalation Therapy Tx   $ Inhalation Therapy Performed Yes   $ Pulse Oximetry Spot Check Charge Completed   SpO2 92 %   Pre-Treatment Pulse 66   Pre-Treatment Respirations 20   Duration 20   Breath Sounds Pre-Treatment Bilateral Diminished; Expiratory wheeze   Breath Sounds Post-Treatment Bilateral Diminished   Post-Treatment Pulse 64   Post-Treatment Respirations 20   Delivery Source [employer]   Position Up in chair   Treatment Tolerance Tolerated well   Resp Comments patient is on room air

## 2022-12-23 NOTE — ASSESSMENT & PLAN NOTE
· Recently diagnosed 12/16 and received full remdesivir course  · No further treatments needed  · O2 eval with ambulation performed in the ED and patient remained 90% on room air but becomes symptomatic quickly  · Goal is for patient to be able to ambulate farther without issue  · Symptomatic care  · Recommend home O2 eval prior to discharge  · CTA ordered today due to elevated d dimer and recommendations from arnulfo discussion with pulm

## 2022-12-23 NOTE — ASSESSMENT & PLAN NOTE
Lab Results   Component Value Date    EGFR 53 12/23/2022    EGFR 61 12/21/2022    EGFR 46 12/18/2022    CREATININE 1 38 (H) 12/23/2022    CREATININE 1 22 12/21/2022    CREATININE 1 55 (H) 12/18/2022     · Creatinine below baseline  · Continue to monitor  · Will give small fluid bolus and monitor kidneys closely with contrast being given today no

## 2022-12-23 NOTE — ASSESSMENT & PLAN NOTE
· Seen on imaging  · Possible pneumonia post COVID  · Check procal - negative x 2, will see what CTA shows but will likely dc abx today  · Provided ceftriaxone in the ED, continue for now  · Should have CXR in 4-6 weeks to ensure resolution

## 2022-12-23 NOTE — ASSESSMENT & PLAN NOTE
· Patient had an episode of CP this AM described as mid sternal sharp and burning  · Resolved without intervention  · Troponin x 2 negative pending third  · EKG without any changes  · MEGHAN score 1  · Suspect reflux, will initiate on protonix  · Continue to monitor  · Resolved, no further episodes

## 2022-12-23 NOTE — PLAN OF CARE
Problem: MOBILITY - ADULT  Goal: Maintain or return to baseline ADL function  Description: INTERVENTIONS:  -  Assess patient's ability to carry out ADLs; assess patient's baseline for ADL function and identify physical deficits which impact ability to perform ADLs (bathing, care of mouth/teeth, toileting, grooming, dressing, etc )  - Assess/evaluate cause of self-care deficits   - Assess range of motion  - Assess patient's mobility; develop plan if impaired  - Assess patient's need for assistive devices and provide as appropriate  - Encourage maximum independence but intervene and supervise when necessary  - Involve family in performance of ADLs  - Assess for home care needs following discharge   - Consider OT consult to assist with ADL evaluation and planning for discharge  - Provide patient education as appropriate  12/22/2022 2252 by Veronica Aquino LPN  Outcome: Progressing  12/22/2022 2252 by Veronica Aquino LPN  Outcome: Progressing  Goal: Maintains/Returns to pre admission functional level  Description: INTERVENTIONS:  - Perform BMAT or MOVE assessment daily    - Set and communicate daily mobility goal to care team and patient/family/caregiver  - Collaborate with rehabilitation services on mobility goals if consulted  - Perform Range of Motion 3 times a day  - Reposition patient every 2 hours    - Dangle patient 3 times a day  - Stand patient 3 times a day  - Ambulate patient 3 times a day  - Out of bed to chair 3 times a day   - Out of bed for meals 3 times a day  - Out of bed for toileting  - Record patient progress and toleration of activity level   12/22/2022 2252 by Veronica Aquino LPN  Outcome: Progressing  12/22/2022 2252 by Veronica Aquino LPN  Outcome: Progressing     Problem: PAIN - ADULT  Goal: Verbalizes/displays adequate comfort level or baseline comfort level  Description: Interventions:  - Encourage patient to monitor pain and request assistance  - Assess pain using appropriate pain scale  - Administer analgesics based on type and severity of pain and evaluate response  - Implement non-pharmacological measures as appropriate and evaluate response  - Consider cultural and social influences on pain and pain management  - Notify physician/advanced practitioner if interventions unsuccessful or patient reports new pain  12/22/2022 2252 by Fatou Mujica LPN  Outcome: Progressing  12/22/2022 2252 by Fatou Mujica LPN  Outcome: Progressing

## 2022-12-23 NOTE — ASSESSMENT & PLAN NOTE
· Returns to the ED after being discharged from the hospital 12/19  · Complaining of SOB  · Wheezing on examination  · Restart on IV steroids, solumedrol 40 mg daily --> increased to BID dosing doing well on this likely transition to PO steroids 12/24 with discharge  · Inhaler regimen ordered  · mucinex increased to 1200 BID

## 2022-12-23 NOTE — PLAN OF CARE
Problem: MOBILITY - ADULT  Goal: Maintain or return to baseline ADL function  Description: INTERVENTIONS:  -  Assess patient's ability to carry out ADLs; assess patient's baseline for ADL function and identify physical deficits which impact ability to perform ADLs (bathing, care of mouth/teeth, toileting, grooming, dressing, etc )  - Assess/evaluate cause of self-care deficits   - Assess range of motion  - Assess patient's mobility; develop plan if impaired  - Assess patient's need for assistive devices and provide as appropriate  - Encourage maximum independence but intervene and supervise when necessary  - Involve family in performance of ADLs  - Assess for home care needs following discharge   - Consider OT consult to assist with ADL evaluation and planning for discharge  - Provide patient education as appropriate  Outcome: Progressing  Goal: Maintains/Returns to pre admission functional level  Description: INTERVENTIONS:  - Perform BMAT or MOVE assessment daily    - Set and communicate daily mobility goal to care team and patient/family/caregiver  - Collaborate with rehabilitation services on mobility goals if consulted  - Perform Range of Motion 3 times a day  - Reposition patient every 2 hours    - Dangle patient 3 times a day  - Stand patient 3 times a day  - Ambulate patient 3 times a day  - Out of bed to chair 3 times a day   - Out of bed for meals 3 times a day  - Out of bed for toileting  - Record patient progress and toleration of activity level   Outcome: Progressing     Problem: PAIN - ADULT  Goal: Verbalizes/displays adequate comfort level or baseline comfort level  Description: Interventions:  - Encourage patient to monitor pain and request assistance  - Assess pain using appropriate pain scale  - Administer analgesics based on type and severity of pain and evaluate response  - Implement non-pharmacological measures as appropriate and evaluate response  - Consider cultural and social influences on pain and pain management  - Notify physician/advanced practitioner if interventions unsuccessful or patient reports new pain  Outcome: Progressing     Problem: INFECTION - ADULT  Goal: Absence or prevention of progression during hospitalization  Description: INTERVENTIONS:  - Assess and monitor for signs and symptoms of infection  - Monitor lab/diagnostic results  - Monitor all insertion sites, i e  indwelling lines, tubes, and drains  - Monitor endotracheal if appropriate and nasal secretions for changes in amount and color  - Devine appropriate cooling/warming therapies per order  - Administer medications as ordered  - Instruct and encourage patient and family to use good hand hygiene technique  - Identify and instruct in appropriate isolation precautions for identified infection/condition  Outcome: Progressing  Goal: Absence of fever/infection during neutropenic period  Description: INTERVENTIONS:  - Monitor WBC    Outcome: Progressing     Problem: SAFETY ADULT  Goal: Maintain or return to baseline ADL function  Description: INTERVENTIONS:  -  Assess patient's ability to carry out ADLs; assess patient's baseline for ADL function and identify physical deficits which impact ability to perform ADLs (bathing, care of mouth/teeth, toileting, grooming, dressing, etc )  - Assess/evaluate cause of self-care deficits   - Assess range of motion  - Assess patient's mobility; develop plan if impaired  - Assess patient's need for assistive devices and provide as appropriate  - Encourage maximum independence but intervene and supervise when necessary  - Involve family in performance of ADLs  - Assess for home care needs following discharge   - Consider OT consult to assist with ADL evaluation and planning for discharge  - Provide patient education as appropriate  Outcome: Progressing  Goal: Maintains/Returns to pre admission functional level  Description: INTERVENTIONS:  - Perform BMAT or MOVE assessment daily    - Set and communicate daily mobility goal to care team and patient/family/caregiver  - Collaborate with rehabilitation services on mobility goals if consulted  - Perform Range of Motion 3 times a day  - Reposition patient every 2 hours    - Dangle patient 3 times a day  - Stand patient 3 times a day  - Ambulate patient 3 times a day  - Out of bed to chair 3 times a day   - Out of bed for meals 3 times a day  - Out of bed for toileting  - Record patient progress and toleration of activity level   Outcome: Progressing  Goal: Patient will remain free of falls  Description: INTERVENTIONS:  - Educate patient/family on patient safety including physical limitations  - Instruct patient to call for assistance with activity   - Consult OT/PT to assist with strengthening/mobility   - Keep Call bell within reach  - Keep bed low and locked with side rails adjusted as appropriate  - Keep care items and personal belongings within reach  - Initiate and maintain comfort rounds  - Make Fall Risk Sign visible to staff  - Offer Toileting every 2 Hours, in advance of need  - Initiate/Maintain fall alarm  - Obtain necessary fall risk management equipment: non-skid footwear  - Apply yellow socks and bracelet for high fall risk patients  - Consider moving patient to room near nurses station  Outcome: Progressing     Problem: DISCHARGE PLANNING  Goal: Discharge to home or other facility with appropriate resources  Description: INTERVENTIONS:  - Identify barriers to discharge w/patient and caregiver  - Arrange for needed discharge resources and transportation as appropriate  - Identify discharge learning needs (meds, wound care, etc )  - Arrange for interpretive services to assist at discharge as needed  - Refer to Case Management Department for coordinating discharge planning if the patient needs post-hospital services based on physician/advanced practitioner order or complex needs related to functional status, cognitive ability, or social support system  Outcome: Progressing     Problem: Knowledge Deficit  Goal: Patient/family/caregiver demonstrates understanding of disease process, treatment plan, medications, and discharge instructions  Description: Complete learning assessment and assess knowledge base    Interventions:  - Provide teaching at level of understanding  - Provide teaching via preferred learning methods  Outcome: Progressing     Problem: RESPIRATORY - ADULT  Goal: Achieves optimal ventilation and oxygenation  Description: INTERVENTIONS:  - Assess for changes in respiratory status  - Assess for changes in mentation and behavior  - Position to facilitate oxygenation and minimize respiratory effort  - Oxygen administered by appropriate delivery if ordered  - Initiate smoking cessation education as indicated  - Encourage broncho-pulmonary hygiene including cough, deep breathe, Incentive Spirometry  - Assess the need for suctioning and aspirate as needed  - Assess and instruct to report SOB or any respiratory difficulty  - Respiratory Therapy support as indicated  Outcome: Progressing     Problem: METABOLIC, FLUID AND ELECTROLYTES - ADULT  Goal: Electrolytes maintained within normal limits  Description: INTERVENTIONS:  - Monitor labs and assess patient for signs and symptoms of electrolyte imbalances  - Administer electrolyte replacement as ordered  - Monitor response to electrolyte replacements, including repeat lab results as appropriate  - Instruct patient on fluid and nutrition as appropriate  Outcome: Progressing  Goal: Fluid balance maintained  Description: INTERVENTIONS:  - Monitor labs   - Monitor I/O and WT  - Instruct patient on fluid and nutrition as appropriate  - Assess for signs & symptoms of volume excess or deficit  Outcome: Progressing  Goal: Glucose maintained within target range  Description: INTERVENTIONS:  - Monitor Blood Glucose as ordered  - Assess for signs and symptoms of hyperglycemia and hypoglycemia  - Administer ordered medications to maintain glucose within target range  - Assess nutritional intake and initiate nutrition service referral as needed  Outcome: Progressing     Problem: HEMATOLOGIC - ADULT  Goal: Maintains hematologic stability  Description: INTERVENTIONS  - Assess for signs and symptoms of bleeding or hemorrhage  - Monitor labs  - Administer supportive blood products/factors as ordered and appropriate  Outcome: Progressing     Problem: MUSCULOSKELETAL - ADULT  Goal: Maintain or return mobility to safest level of function  Description: INTERVENTIONS:  - Assess patient's ability to carry out ADLs; assess patient's baseline for ADL function and identify physical deficits which impact ability to perform ADLs (bathing, care of mouth/teeth, toileting, grooming, dressing, etc )  - Assess/evaluate cause of self-care deficits   - Assess range of motion  - Assess patient's mobility  - Assess patient's need for assistive devices and provide as appropriate  - Encourage maximum independence but intervene and supervise when necessary  - Involve family in performance of ADLs  - Assess for home care needs following discharge   - Consider OT consult to assist with ADL evaluation and planning for discharge  - Provide patient education as appropriate  Outcome: Progressing  Goal: Maintain proper alignment of affected body part  Description: INTERVENTIONS:  - Support, maintain and protect limb and body alignment  - Provide patient/ family with appropriate education  Outcome: Progressing

## 2022-12-23 NOTE — PROGRESS NOTES
[facility] [address] [place]  Progress Note - Alicia Murray 1955, 74 y o  male MRN: [id number]  Unit/Bed#: 409-01 Encounter: [id number]  Primary Care Provider: Bernabe Durant DO   Date and time admitted to hospital: 12/21/2022 10:31 AM    * COPD, severe ([address] )  Assessment & Plan  · Returns to the ED after being discharged from the hospital 12/19  · Complaining of SOB  · Wheezing on examination  · Restart on IV steroids, solumedrol 40 mg daily --> increased to BID dosing doing well on this likely transition to PO steroids 12/24 with discharge  · Inhaler regimen ordered  · mucinex increased to 1200 BID    COVID-19 virus infection  Assessment & Plan  · Recently diagnosed 12/16 and received full remdesivir course  · No further treatments needed  · O2 eval with ambulation performed in the ED and patient remained 90% on room air but becomes symptomatic quickly  · Goal is for patient to be able to ambulate farther without issue  · Symptomatic care  · Recommend home O2 eval prior to discharge  · CTA ordered today due to elevated d dimer and recommendations from arnulfo discussion with pulm    Chest pain  Assessment & Plan  · Patient had an episode of CP this AM described as mid sternal sharp and burning  · Resolved without intervention  · Troponin x 2 negative pending third  · EKG without any changes  · MEGHAN score 1  · Suspect reflux, will initiate on protonix  · Continue to monitor  · Resolved, no further episodes    Right lower lobe pulmonary infiltrate  Assessment & Plan  · Seen on imaging  · Possible pneumonia post COVID  · Check procal - negative x 2, will see what CTA shows but will likely dc abx today  · Provided ceftriaxone in the ED, continue for now  · Should have CXR in 4-6 weeks to ensure resolution    Stage 3 chronic kidney disease, unspecified whether stage 3a or 3b CKD [facility])  Assessment & Plan  Lab Results   Component Value Date    EGFR 53 12/23/2022    EGFR 61 12/21/2022    EGFR 46 2022    CREATININE 1 38 (H) 2022    CREATININE 1 22 2022    CREATININE 1 55 (H) 2022     · Creatinine below baseline  · Continue to monitor  · Will give small fluid bolus and monitor kidneys closely with contrast being given today    Chronic combined systolic and diastolic congestive heart failure (HCC)  Assessment & Plan  Wt Readings from Last 3 Encounters:   22 132 kg (291 lb 7 2 oz)   22 134 kg (296 lb 4 8 oz)   22 (!) 137 kg (301 lb 3 2 oz)       · Patient complaining of leg swelling  · Torsemide was held during last admission but patient is near baseline weight  · BNP wnl for the patient and improved from last admission  · No vascular congestion on imaging  · Continue to hold off on further diuretics at this time likely resume on discharge  · I/O  · Salt restriction          VTE Pharmacologic Prophylaxis: VTE Score: 5 High Risk (Score >/= 5) - Pharmacological DVT Prophylaxis Ordered: enoxaparin (Lovenox)  Sequential Compression Devices Ordered  Patient Centered Rounds: I performed bedside rounds with nursing staff today  Discussions with Specialists or Other Care Team Provider: case management, pulm    Education and Discussions with Family / Patient: Patient declined call to   Time Spent for Care: 30 minutes  More than 50% of total time spent on counseling and coordination of care as described above  Current Length of Stay: 2 day(s)  Current Patient Status: Inpatient   Certification Statement: The patient will continue to require additional inpatient hospital stay due to CTA, continued IV steroids  Discharge Plan: Anticipate discharge tomorrow to home  Code Status: Level 1 - Full Code    Subjective:   Patient reports having difficulty coughing up mucus this AM  Denies SOB at rest but still with ambulation   No further chest pains    Objective:     Vitals:   Temp (24hrs), Av 7 °F (36 5 °C), Min:97 5 °F (36 4 °C), Max:97 8 °F (36 6 °C)    Temp:  [97 5 °F (36 4 °C)-97 8 °F (36 6 °C)] 97 5 °F (36 4 °C)  HR:  [67-77] 67  Resp:  [18] 18  BP: (128-151)/(80-83) 137/80  SpO2:  [90 %-93 %] 90 %  Body mass index is 37 42 kg/m²  Input and Output Summary (last 24 hours): Intake/Output Summary (Last 24 hours) at 12/23/2022 1013  Last data filed at 12/23/2022 0841  Gross per 24 hour   Intake 360 ml   Output --   Net 360 ml       Physical Exam:   Physical Exam  Vitals and nursing note reviewed  Constitutional:       General: He is not in acute distress  Appearance: He is obese  He is not ill-appearing  HENT:      Head: Normocephalic and atraumatic  Cardiovascular:      Rate and Rhythm: Normal rate and regular rhythm  Pulses: Normal pulses  Heart sounds: Normal heart sounds  No murmur heard  No gallop  Pulmonary:      Effort: Pulmonary effort is normal       Breath sounds: No wheezing, rhonchi or rales  Abdominal:      General: Bowel sounds are normal       Palpations: Abdomen is soft  Tenderness: There is no abdominal tenderness  There is no guarding or rebound  Musculoskeletal:      Cervical back: Normal range of motion and neck supple  Right lower leg: No edema  Left lower leg: No edema  Skin:     General: Skin is warm and dry  Neurological:      General: No focal deficit present  Mental Status: He is alert and oriented to person, place, and time  Mental status is at baseline     Psychiatric:         Mood and Affect: Mood normal          Behavior: Behavior normal           Additional Data:     Labs:  Results from last 7 days   Lab Units 12/23/22  0532   WBC Thousand/uL 16 78*   HEMOGLOBIN g/dL 14 3   HEMATOCRIT % 44 4   PLATELETS Thousands/uL 358   NEUTROS PCT % 87*   LYMPHS PCT % 8*   MONOS PCT % 3*   EOS PCT % 0     Results from last 7 days   Lab Units 12/23/22  0532 12/21/22  1055   SODIUM mmol/L 132* 137   POTASSIUM mmol/L 4 7 3 8   CHLORIDE mmol/L 98 100   CO2 mmol/L 26 28   BUN mg/dL 34* 32*   CREATININE mg/dL 1 38* 1 22   ANION GAP mmol/L 8 9   CALCIUM mg/dL 8 6 8 4   ALBUMIN g/dL  --  3 0*   TOTAL BILIRUBIN mg/dL  --  0 57   ALK PHOS U/L  --  73   ALT U/L  --  40   AST U/L  --  20   GLUCOSE RANDOM mg/dL 280* 138         Results from last 7 days   Lab Units 12/19/22  1050 12/19/22  0714 12/18/22  2132 12/18/22  1552 12/18/22  1142 12/18/22  0724 12/17/22  2126 12/17/22  1622 12/17/22  1127 12/17/22  0909 12/16/22  2115 12/16/22  1629   POC GLUCOSE mg/dl 146* 165* 140 238* 201* 167* 133 190* 134 187* 169* 157*     Results from last 7 days   Lab Units 12/17/22  0436   HEMOGLOBIN A1C % 6 4*     Results from last 7 days   Lab Units 12/23/22  0532 12/21/22  1305 12/21/22  1055 12/17/22  0436 12/16/22  1148   LACTIC ACID mmol/L  --  1 7  --   --  1 4   PROCALCITONIN ng/ml <0 05  --  <0 05 0 11  --        Lines/Drains:  Invasive Devices     Peripheral Intravenous Line  Duration           Peripheral IV 12/21/22 Right Antecubital 1 day                      Imaging: No pertinent imaging reviewed  Recent Cultures (last 7 days):   Results from last 7 days   Lab Units 12/21/22  1305 12/16/22  1148   BLOOD CULTURE  No Growth at 24 hrs  No Growth at 24 hrs  No Growth After 5 Days  No Growth After 5 Days         Last 24 Hours Medication List:   Current Facility-Administered Medications   Medication Dose Route Frequency Provider Last Rate   • acetaminophen  650 mg Oral Q6H PRN Adelene Lombard, MD     • albuterol  2 puff Inhalation Q6H PRN Adelene Lombard, MD     • ALPRAZolam  0 5 mg Oral HS PRN Adelene Lombard, MD     • atorvastatin  20 mg Oral QPM Adelene Lombard, MD     • carvedilol  25 mg Oral BID With Meals Adelene Lombard, MD     • cefTRIAXone  1,000 mg Intravenous Q24H Adelene Lombard, MD 1,000 mg (12/22/22 1218)   • DULoxetine  60 mg Oral Daily Adelene Lombard, MD     • enoxaparin  40 mg Subcutaneous Daily Adelene Lombard, MD     • fluticasone  1 spray Nasal BID [facility] Mala Giron MD     • fluticasone-vilanterol  1 puff Inhalation Daily Pema Saavedra MD     • guaiFENesin  1,200 mg Oral Q12H [address] [facility], JOSEE     • guaiFENesin  600 mg Oral Once David Romeo PA-C     • ipratropium  0 5 mg Nebulization TID Pema Saavedra MD     • ipratropium-albuterol  3 mL Nebulization Q6H PRN Pema Saavedra MD     • levalbuterol  1 25 mg Nebulization TID Pema Saavedra MD     • methylPREDNISolone sodium succinate  40 mg Intravenous Q12H Kip Burgos MD     • ondansetron  4 mg Intravenous Q6H PRN Pema Saavedra MD     • pantoprazole  20 mg Oral Early Morning Pema Saavedra MD     • sacubitril-valsartan  1 tablet Oral BID Pema Saavedra MD     • triamcinolone   Topical BID Pema Saavedra MD          Today, Patient Was Seen By: David Romeo PA-C    **Please Note: This note may have been constructed using a voice recognition system  **

## 2022-12-23 NOTE — RESPIRATORY THERAPY NOTE
12/23/22 1300   Inhalation Therapy Tx   $ Inhalation Therapy Performed Yes   $ Pulse Oximetry Spot Check Charge Completed   Pre-Treatment Pulse 89   Pre-Treatment Respirations 20   Duration 20   Breath Sounds Pre-Treatment Right Diminished;Coarse; Expiratory wheezes   Breath Sounds Pre-Treatment Left Diminished   Delivery Source [employer]   Position Up in chair   Treatment Tolerance Tolerated well   Resp Comments Patient had a change on his CAT scan  Patient stated to me that he did aspirate material last night that went into his wind pipe  Patient also percussed with the vest therapy  Patient had a tremendous amount of thick, tacky yellow, green tinged secretions  Patient stated his chest felt clearer

## 2022-12-23 NOTE — ASSESSMENT & PLAN NOTE
Wt Readings from Last 3 Encounters:   12/23/22 132 kg (291 lb 7 2 oz)   12/19/22 134 kg (296 lb 4 8 oz)   12/13/22 (!) 137 kg (301 lb 3 2 oz)       · Patient complaining of leg swelling  · Torsemide was held during last admission but patient is near baseline weight  · BNP wnl for the patient and improved from last admission  · No vascular congestion on imaging  · Continue to hold off on further diuretics at this time likely resume on discharge  · I/O  · Salt restriction

## 2022-12-24 PROBLEM — E87.1 HYPONATREMIA: Status: ACTIVE | Noted: 2022-12-24

## 2022-12-24 PROBLEM — J69.0 ASPIRATION PNEUMONIA (HCC): Status: ACTIVE | Noted: 2022-12-21

## 2022-12-24 PROBLEM — E11.9 TYPE 2 DIABETES MELLITUS (HCC): Status: ACTIVE | Noted: 2022-12-24

## 2022-12-24 LAB
ALBUMIN SERPL BCP-MCNC: 3.1 G/DL (ref 3.5–5)
ALP SERPL-CCNC: 78 U/L (ref 46–116)
ALT SERPL W P-5'-P-CCNC: 37 U/L (ref 12–78)
ANION GAP SERPL CALCULATED.3IONS-SCNC: 8 MMOL/L (ref 4–13)
AST SERPL W P-5'-P-CCNC: 10 U/L (ref 5–45)
BASOPHILS # BLD AUTO: 0.04 THOUSANDS/ÂΜL (ref 0–0.1)
BASOPHILS NFR BLD AUTO: 0 % (ref 0–1)
BILIRUB SERPL-MCNC: 0.66 MG/DL (ref 0.2–1)
BUN SERPL-MCNC: 26 MG/DL (ref 5–25)
CALCIUM ALBUM COR SERPL-MCNC: 9.8 MG/DL (ref 8.3–10.1)
CALCIUM SERPL-MCNC: 9.1 MG/DL (ref 8.3–10.1)
CHLORIDE SERPL-SCNC: 96 MMOL/L (ref 96–108)
CO2 SERPL-SCNC: 28 MMOL/L (ref 21–32)
CREAT SERPL-MCNC: 1.29 MG/DL (ref 0.6–1.3)
EOSINOPHIL # BLD AUTO: 0.02 THOUSAND/ÂΜL (ref 0–0.61)
EOSINOPHIL NFR BLD AUTO: 0 % (ref 0–6)
ERYTHROCYTE [DISTWIDTH] IN BLOOD BY AUTOMATED COUNT: 14.7 % (ref 11.6–15.1)
GFR SERPL CREATININE-BSD FRML MDRD: 57 ML/MIN/1.73SQ M
GLUCOSE SERPL-MCNC: 154 MG/DL (ref 65–140)
GLUCOSE SERPL-MCNC: 162 MG/DL (ref 65–140)
GLUCOSE SERPL-MCNC: 168 MG/DL (ref 65–140)
GLUCOSE SERPL-MCNC: 212 MG/DL (ref 65–140)
GLUCOSE SERPL-MCNC: 303 MG/DL (ref 65–140)
HCT VFR BLD AUTO: 45.5 % (ref 36.5–49.3)
HGB BLD-MCNC: 14.5 G/DL (ref 12–17)
IMM GRANULOCYTES # BLD AUTO: 0.38 THOUSAND/UL (ref 0–0.2)
IMM GRANULOCYTES NFR BLD AUTO: 2 % (ref 0–2)
LYMPHOCYTES # BLD AUTO: 1.34 THOUSANDS/ÂΜL (ref 0.6–4.47)
LYMPHOCYTES NFR BLD AUTO: 8 % (ref 14–44)
MCH RBC QN AUTO: 26.3 PG (ref 26.8–34.3)
MCHC RBC AUTO-ENTMCNC: 31.9 G/DL (ref 31.4–37.4)
MCV RBC AUTO: 82 FL (ref 82–98)
MONOCYTES # BLD AUTO: 0.6 THOUSAND/ÂΜL (ref 0.17–1.22)
MONOCYTES NFR BLD AUTO: 4 % (ref 4–12)
NEUTROPHILS # BLD AUTO: 14.8 THOUSANDS/ÂΜL (ref 1.85–7.62)
NEUTS SEG NFR BLD AUTO: 86 % (ref 43–75)
NRBC BLD AUTO-RTO: 0 /100 WBCS
PLATELET # BLD AUTO: 389 THOUSANDS/UL (ref 149–390)
PMV BLD AUTO: 9.3 FL (ref 8.9–12.7)
POTASSIUM SERPL-SCNC: 4.8 MMOL/L (ref 3.5–5.3)
PROCALCITONIN SERPL-MCNC: <0.05 NG/ML
PROT SERPL-MCNC: 7.1 G/DL (ref 6.4–8.4)
RBC # BLD AUTO: 5.52 MILLION/UL (ref 3.88–5.62)
SODIUM SERPL-SCNC: 132 MMOL/L (ref 135–147)
WBC # BLD AUTO: 17.18 THOUSAND/UL (ref 4.31–10.16)

## 2022-12-24 RX ORDER — PREDNISONE 10 MG/1
10 TABLET ORAL DAILY
Status: DISCONTINUED | OUTPATIENT
Start: 2022-12-29 | End: 2022-12-25

## 2022-12-24 RX ORDER — INSULIN LISPRO 100 [IU]/ML
2-12 INJECTION, SOLUTION INTRAVENOUS; SUBCUTANEOUS
Status: DISCONTINUED | OUTPATIENT
Start: 2022-12-24 | End: 2022-12-26 | Stop reason: HOSPADM

## 2022-12-24 RX ORDER — METHYLPREDNISOLONE SODIUM SUCCINATE 40 MG/ML
40 INJECTION, POWDER, LYOPHILIZED, FOR SOLUTION INTRAMUSCULAR; INTRAVENOUS ONCE
Status: COMPLETED | OUTPATIENT
Start: 2022-12-24 | End: 2022-12-24

## 2022-12-24 RX ORDER — PREDNISONE 20 MG/1
20 TABLET ORAL DAILY
Status: DISCONTINUED | OUTPATIENT
Start: 2022-12-27 | End: 2022-12-25

## 2022-12-24 RX ORDER — PREDNISONE 20 MG/1
40 TABLET ORAL DAILY
Status: DISCONTINUED | OUTPATIENT
Start: 2022-12-25 | End: 2022-12-25

## 2022-12-24 RX ORDER — INSULIN LISPRO 100 [IU]/ML
1-6 INJECTION, SOLUTION INTRAVENOUS; SUBCUTANEOUS
Status: DISCONTINUED | OUTPATIENT
Start: 2022-12-24 | End: 2022-12-26 | Stop reason: HOSPADM

## 2022-12-24 RX ADMIN — CARVEDILOL 25 MG: 12.5 TABLET, FILM COATED ORAL at 09:50

## 2022-12-24 RX ADMIN — LEVALBUTEROL HYDROCHLORIDE 1.25 MG: 1.25 SOLUTION, CONCENTRATE RESPIRATORY (INHALATION) at 08:29

## 2022-12-24 RX ADMIN — GUAIFENESIN 1200 MG: 600 TABLET ORAL at 09:50

## 2022-12-24 RX ADMIN — SACUBITRIL AND VALSARTAN 1 TABLET: 24; 26 TABLET, FILM COATED ORAL at 17:03

## 2022-12-24 RX ADMIN — INSULIN LISPRO 1 UNITS: 100 INJECTION, SOLUTION INTRAVENOUS; SUBCUTANEOUS at 21:08

## 2022-12-24 RX ADMIN — IPRATROPIUM BROMIDE 0.5 MG: 0.5 SOLUTION RESPIRATORY (INHALATION) at 20:12

## 2022-12-24 RX ADMIN — TRIAMCINOLONE ACETONIDE: 1 CREAM TOPICAL at 17:02

## 2022-12-24 RX ADMIN — METHYLPREDNISOLONE SODIUM SUCCINATE 40 MG: 40 INJECTION, POWDER, FOR SOLUTION INTRAMUSCULAR; INTRAVENOUS at 09:50

## 2022-12-24 RX ADMIN — DULOXETINE HYDROCHLORIDE 60 MG: 30 CAPSULE, DELAYED RELEASE ORAL at 09:50

## 2022-12-24 RX ADMIN — INSULIN LISPRO 8 UNITS: 100 INJECTION, SOLUTION INTRAVENOUS; SUBCUTANEOUS at 09:51

## 2022-12-24 RX ADMIN — SODIUM CHLORIDE SOLN NEBU 3% 4 ML: 3 NEBU SOLN at 15:13

## 2022-12-24 RX ADMIN — ATORVASTATIN CALCIUM 20 MG: 10 TABLET, FILM COATED ORAL at 17:00

## 2022-12-24 RX ADMIN — CARVEDILOL 25 MG: 12.5 TABLET, FILM COATED ORAL at 17:00

## 2022-12-24 RX ADMIN — TRIAMCINOLONE ACETONIDE: 1 CREAM TOPICAL at 09:51

## 2022-12-24 RX ADMIN — LEVALBUTEROL HYDROCHLORIDE 1.25 MG: 1.25 SOLUTION, CONCENTRATE RESPIRATORY (INHALATION) at 20:13

## 2022-12-24 RX ADMIN — FLUTICASONE PROPIONATE 1 SPRAY: 50 SPRAY, METERED NASAL at 09:50

## 2022-12-24 RX ADMIN — SACUBITRIL AND VALSARTAN 1 TABLET: 24; 26 TABLET, FILM COATED ORAL at 09:50

## 2022-12-24 RX ADMIN — INSULIN LISPRO 2 UNITS: 100 INJECTION, SOLUTION INTRAVENOUS; SUBCUTANEOUS at 13:01

## 2022-12-24 RX ADMIN — PANTOPRAZOLE SODIUM 20 MG: 20 TABLET, DELAYED RELEASE ORAL at 05:40

## 2022-12-24 RX ADMIN — FLUTICASONE FUROATE AND VILANTEROL TRIFENATATE 1 PUFF: 200; 25 POWDER RESPIRATORY (INHALATION) at 09:50

## 2022-12-24 RX ADMIN — SODIUM CHLORIDE SOLN NEBU 3% 4 ML: 3 NEBU SOLN at 08:29

## 2022-12-24 RX ADMIN — IPRATROPIUM BROMIDE 0.5 MG: 0.5 SOLUTION RESPIRATORY (INHALATION) at 15:13

## 2022-12-24 RX ADMIN — ALPRAZOLAM 0.5 MG: 0.5 TABLET ORAL at 21:09

## 2022-12-24 RX ADMIN — FLUTICASONE PROPIONATE 1 SPRAY: 50 SPRAY, METERED NASAL at 21:08

## 2022-12-24 RX ADMIN — IPRATROPIUM BROMIDE 0.5 MG: 0.5 SOLUTION RESPIRATORY (INHALATION) at 08:29

## 2022-12-24 RX ADMIN — GUAIFENESIN 1200 MG: 600 TABLET ORAL at 21:04

## 2022-12-24 RX ADMIN — SODIUM CHLORIDE SOLN NEBU 3% 4 ML: 3 NEBU SOLN at 20:13

## 2022-12-24 RX ADMIN — LEVALBUTEROL HYDROCHLORIDE 1.25 MG: 1.25 SOLUTION, CONCENTRATE RESPIRATORY (INHALATION) at 15:13

## 2022-12-24 RX ADMIN — INSULIN LISPRO 2 UNITS: 100 INJECTION, SOLUTION INTRAVENOUS; SUBCUTANEOUS at 17:00

## 2022-12-24 RX ADMIN — ENOXAPARIN SODIUM 40 MG: 40 INJECTION SUBCUTANEOUS at 09:51

## 2022-12-24 NOTE — ASSESSMENT & PLAN NOTE
Patient had an episode of CP previously described as mid sternal, sharp, and burning  This resolved without intervention, with high-sensitivity troponins negative  ECG was reportedly without changes  Suspected reflux, with patient initiated on PPI therapy  Resolved with no further episodes reported

## 2022-12-24 NOTE — ASSESSMENT & PLAN NOTE
History of vitamin D no recent labs, currently have severe COVID    Recheck vitamin D and reevaluate

## 2022-12-24 NOTE — ASSESSMENT & PLAN NOTE
Timeline    - Originally hospitalized 12/16-12/19 and treated for COVID-19 infection     - Readmitted 12/21 with worsening weakness and SOB  - CXR 12/21 with right perihilar opacity suspicious for infiltrate  Procalcitonin negative X3, ceftriaxone dropped off after 2 days secondary to an ordered end date to this medication   - Was having a significant coughing episode, in the midst of expectorating sputum when he inhaled and experienced significantly worsening dyspnea  Subsequent CT on 12/23 with findings concerning for aspiration, debris in the RML and LLL with significant LLL consolidation  Patient's last procalcitonin predates aspiration event  He has also been off antibiotics  Although he feels significantly improved, will initiate on Unasyn and repeat procalcitonin now and again in the morning  Pending results may need a short course of antibiotic therapy

## 2022-12-24 NOTE — ASSESSMENT & PLAN NOTE
Wt Readings from Last 3 Encounters:   12/24/22 133 kg (292 lb 5 3 oz)   12/19/22 134 kg (296 lb 4 8 oz)   12/13/22 (!) 137 kg (301 lb 3 2 oz)       · Patient complaining of leg swelling  · Torsemide was held during last admission but patient is near baseline weight  · BNP wnl for the patient and improved from last admission  · No vascular congestion on imaging  · Continue to hold off on further diuretics at this time likely resume on discharge  · I/O  · Salt restriction  · On Coreg 25 twice daily, Entresto 24-26 twice daily

## 2022-12-24 NOTE — ASSESSMENT & PLAN NOTE
Lab Results   Component Value Date    EGFR 57 12/24/2022    EGFR 53 12/23/2022    EGFR 61 12/21/2022    CREATININE 1 29 12/24/2022    CREATININE 1 38 (H) 12/23/2022    CREATININE 1 22 12/21/2022     · Creatinine below baseline  · Continue to monitor  · Will give small fluid bolus and monitor kidneys closely with contrast being given today

## 2022-12-24 NOTE — ASSESSMENT & PLAN NOTE
A1c 9/20/2020 was 6 6, most recent A1c 6 4  Blood glucose elevated especially while on steroid, on 12/23-24 fasting blood glucose 212 and highest 280  Added sliding scale coverage and ADA diet

## 2022-12-24 NOTE — ASSESSMENT & PLAN NOTE
Lab Results   Component Value Date    EGFR 57 12/24/2022    EGFR 53 12/23/2022    EGFR 61 12/21/2022    CREATININE 1 29 12/24/2022    CREATININE 1 38 (H) 12/23/2022    CREATININE 1 22 12/21/2022     Remained stable and at baseline of 1 2-1 4

## 2022-12-24 NOTE — ASSESSMENT & PLAN NOTE
Diagnosed 12/16 and received full course of IV remdesivir  No further treatment required as patient does not require supplemental oxygen  However, he has been receiving steroids secondary to concurrent COPD exacerbation  Subjectively improved

## 2022-12-24 NOTE — ASSESSMENT & PLAN NOTE
Subjectively improved  Continue to taper steroids, and continue standing nebs  Antibiotics as above

## 2022-12-24 NOTE — PROGRESS NOTES
[facility] [address]  Progress Note - Gabbi Kim 1955, 74 y o  male MRN: [id number]  Unit/Bed#: 409-01 Encounter: [id number]  Primary Care Provider: Maribel Antoine DO   Date and time admitted to hospital: 12/21/2022 10:31 AM    * COPD, severe ([facility] )  Assessment & Plan  · Returns to the ED after being discharged from the hospital 12/19  · Complaining of SOB  · Wheezing on examination  · On chronic steroid, for asthma/COPD, restarted on IV cell Medrol 40 mg 12/21, increased to 40 mg twice daily 12/22, on 12/24 decreasing to IV Solu-Medrol 40 mg once and switching to prednisone taper starting 12/25   · On Fluticasone-Vilanterol QD ; and on Xopenex / Ipratropium TID Nebs   · Added respiratory protocol   · mucinex increased to 1200 BID    Chronic combined systolic and diastolic congestive heart failure ([place] [address] )  Assessment & Plan  Wt Readings from Last 3 Encounters:   12/24/22 133 kg (292 lb 5 3 oz)   12/19/22 134 kg (296 lb 4 8 oz)   12/13/22 (!) 137 kg (301 lb 3 2 oz)       · Patient complaining of leg swelling  · Torsemide was held during last admission but patient is near baseline weight  · BNP wnl for the patient and improved from last admission  · No vascular congestion on imaging  · Continue to hold off on further diuretics at this time likely resume on discharge  · I/O  · Salt restriction  · On Coreg 25 twice daily, Entresto 24-26 twice daily      Hyponatremia  Assessment & Plan  Baseline sodium 140, trending down gradually 132 on 12/24  Patient on 2 g sodium diet given his CHF  Library to regular diet, with low-carb  Adding fluid restriction 1500 cc a day  Daily BMP    Type 2 diabetes mellitus ([facility] 75 )  Assessment & Plan  A1c 9/20/2020 was 6 6, most recent A1c 6 4    Blood glucose elevated especially while on steroid, on 12/23-24 fasting blood glucose 212 and highest 280    Plan  Adding sliding scale insulin  Changing diet to low-carb  Continue to monitor with hypoglycemic protocol  Counseled on lifestyle modification and potential plan for starting metformin on discharge      Chest pain  Assessment & Plan  · Patient had an episode of CP this AM described as mid sternal sharp and burning  · Resolved without intervention  · Troponin x 2 negative pending third  · EKG without any changes  · MEGHAN score 1  · Suspect reflux, will initiate on protonix  · Continue to monitor  · Resolved, no further episodes    Aspiration pneumonia [facility])  Assessment & Plan  CT PE 12/23 negative for PE but "Findings concerning for aspiration with debris filling the right middle lobe and left lower lobe bronchi with middle lobe collapse and significant left lower lobe consolidation/atelectasis    "   Plan  - Speech eval and treat  - Aspiration precautions; recs for regular diet with thin liquids  -On Rocephin and started 12/21  -Continue to monitor vitals and white blood cells trend, afebrile, has leukocytosis however currently on steroid  -High-frequency chest wall oscillation       Stage 3 chronic kidney disease, unspecified whether stage 3a or 3b CKD ([facility])  Assessment & Plan  Lab Results   Component Value Date    EGFR 57 12/24/2022    EGFR 53 12/23/2022    EGFR 61 12/21/2022    CREATININE 1 29 12/24/2022    CREATININE 1 38 (H) 12/23/2022    CREATININE 1 22 12/21/2022     · Creatinine below baseline  · Continue to monitor  · Will give small fluid bolus and monitor kidneys closely with contrast being given today    COVID-19 virus infection  Assessment & Plan  · Recently diagnosed 12/16 and received full remdesivir course  · No further treatments needed  · O2 eval with ambulation performed in the ED and patient remained 90% on room air but becomes symptomatic quickly  · Goal is for patient to be able to ambulate farther without issue  · Symptomatic care  · Recommend home O2 eval prior to discharge  · CTA ordered today due to elevated d dimer and recommendations from rafaelbside discussion with pulm    Vitamin D deficiency  Assessment & Plan  History of vitamin D no recent labs, currently have severe COVID    Recheck vitamin D and reevaluate    Obstructive sleep apnea  Assessment & Plan  cpap qhs but patient typically declines        VTE Pharmacologic Prophylaxis: VTE Score: 5 on subcutaneous Lovenox    Patient Centered Rounds: I performed bedside rounds with nursing staff today  Discussions with Specialists or Other Care Team Provider: with my attending Dr Radha Veronica      Education and Discussions with Family / Patient: Patient declined call to   Time Spent for Care: 45 minutes  More than 50% of total time spent on counseling and coordination of care as described above  Current Length of Stay: 3 day(s)  Current Patient Status: Inpatient   Certification Statement: The patient will continue to require additional inpatient hospital stay due to MAGDA improving , Leukocytosis, Asp Pneumonia on IV Abx Day 3   Discharge Plan: Anticipate discharge tomorrow to home with home services  Code Status: Level 1 - Full Code    Subjective:   Saleem Bertrand is seen and examined, patient that he is improving, close to his baseline, still having cough and sore throat, feels short of breath/winded when walking few steps in his room however SPO2 stays above 90% and he notes baseline HADDAD  Discussed above assessment and plan including his diagnosis type 2 diabetes, concern of aspiration pneumonia as above, aspiration precautions instructions provided, will taper steroid gradually, and anticipated plan to discharge home next 24 hours if stays stable/improving, last BM yesterday, patient agreeable, declined need to call his family   Denies any new symptoms or concerns denies fever chills, denies headache visual disturbance lightheadedness or chest pain   Discussed with patient's RN at bedside, and made aware of plan as above       Objective:     Vitals:   Temp (24hrs), Av 2 °F (36 8 °C), Min:97 8 °F (36 6 °C), Max:98 6 °F (37 °C)    Temp:  [97 8 °F (36 6 °C)-98 6 °F (37 °C)] 97 8 °F (36 6 °C)  HR:  [66-75] 66  Resp:  [17-18] 17  BP: (136-148)/(72-78) 142/78  SpO2:  [91 %-94 %] 93 %  Body mass index is 37 53 kg/m²  Input and Output Summary (last 24 hours): Intake/Output Summary (Last 24 hours) at 12/24/2022 8016  Last data filed at 12/23/2022 2227  Gross per 24 hour   Intake 370 ml   Output --   Net 370 ml       Physical Exam:   - GEN: Obese,  appears well, alert and oriented x 3, pleasant and cooperative, in no acute distress  - HEENT: Anicteric, mucous membranes moist, PERRL and EOMI   - NECK: No lymphadenopathy, JVD or carotid bruits   - HEART: RRR, normal S1 and S2, no murmurs, clicks, gallops or rubs bilateral lower extremity trace edema +1  - LUNGS: Clear to auscultation bilaterally; no wheezes, rales, or rhonchi; 90 to 92% on ambient air; intermittent dry cough noted  - ABDOMEN: Obese abdomen, decreased bowel sounds, soft, no tenderness, no distention, no organomegaly or masses felt on exam    - EXTREMITIES: Peripheral pulses normal; no clubbing, cyanosis  - NEURO: No focal findings, CN II-XII are grossly intact  - Musculoskeletal: 5/5 strength, normal ROM, no swollen or erythematous joints     - SKIN: Normal without suspicious lesions on exposed skin      Additional Data:     Labs:  Results from last 7 days   Lab Units 12/24/22  0441   WBC Thousand/uL 17 18*   HEMOGLOBIN g/dL 14 5   HEMATOCRIT % 45 5   PLATELETS Thousands/uL 389   NEUTROS PCT % 86*   LYMPHS PCT % 8*   MONOS PCT % 4   EOS PCT % 0     Results from last 7 days   Lab Units 12/24/22  0441   SODIUM mmol/L 132*   POTASSIUM mmol/L 4 8   CHLORIDE mmol/L 96   CO2 mmol/L 28   BUN mg/dL 26*   CREATININE mg/dL 1 29   ANION GAP mmol/L 8   CALCIUM mg/dL 9 1   ALBUMIN g/dL 3 1*   TOTAL BILIRUBIN mg/dL 0 66   ALK PHOS U/L 78   ALT U/L 37   AST U/L 10   GLUCOSE RANDOM mg/dL 212*         Results from last 7 days   Lab Units 12/19/22  1050 12/19/22  0714 12/18/22  2132 12/18/22  1552 12/18/22  1142 12/18/22  0724 12/17/22  2126 12/17/22  1622 12/17/22  1127 12/17/22  0909   POC GLUCOSE mg/dl 146* 165* 140 238* 201* 167* 133 190* 134 187*         Results from last 7 days   Lab Units 12/24/22  0441 12/23/22  0532 12/21/22  1305 12/21/22  1055   LACTIC ACID mmol/L  --   --  1 7  --    PROCALCITONIN ng/ml <0 05 <0 05  --  <0 05       Lines/Drains:  Invasive Devices     Peripheral Intravenous Line  Duration           Peripheral IV 12/23/22 Distal;Left;Upper;Ventral (anterior) Antecubital <1 day                      Imaging: Reviewed radiology reports from this admission including: chest CT scan    Recent Cultures (last 7 days):   Results from last 7 days   Lab Units 12/21/22  1305   BLOOD CULTURE  No Growth at 48 hrs  No Growth at 48 hrs         Last 24 Hours Medication List:   Current Facility-Administered Medications   Medication Dose Route Frequency Provider Last Rate   • acetaminophen  650 mg Oral Q6H PRN Steven Mckeon MD     • albuterol  2 puff Inhalation Q6H PRN Steven Mckeon MD     • ALPRAZolam  0 5 mg Oral HS PRN Steven Mckeon MD     • atorvastatin  20 mg Oral QPM Steven Mckeon MD     • carvedilol  25 mg Oral BID With Meals Steven Mckeon MD     • DULoxetine  60 mg Oral Daily Steven Mckeon MD     • enoxaparin  40 mg Subcutaneous Daily Steven Mckeon MD     • fluticasone  1 spray Nasal BID Steven Mckeon MD     • fluticasone-vilanterol  1 puff Inhalation Daily Steven Mckeon MD     • guaiFENesin  1,200 mg Oral Q12H [facility] Yaw Lamb PA-C     • insulin lispro  1-6 Units Subcutaneous HS Cruz Quintana DO     • insulin lispro  2-12 Units Subcutaneous TID AC Cruz Quintana DO     • ipratropium  0 5 mg Nebulization TID Steven Mckeon MD     • ipratropium-albuterol  3 mL Nebulization Q6H PRN Steven Mckeon MD     • levalbuterol  1 25 mg Nebulization TID Steven Mckeon MD     • methylPREDNISolone sodium succinate  40 mg Intravenous Once Cruz Aiad, DO     • ondansetron  4 mg Intravenous Q6H PRN Jalyn García MD     • pantoprazole  20 mg Oral Early Morning Jalyn García MD     • [START ON 12/29/2022] predniSONE  10 mg Oral Daily Cruz Aiad, DO     • [START ON 12/27/2022] predniSONE  20 mg Oral Daily Cruz Aiad, DO     • [START ON 12/25/2022] predniSONE  40 mg Oral Daily Cruz Aiad, DO     • sacubitril-valsartan  1 tablet Oral BID Jalyn García MD     • sodium chloride  4 mL Nebulization TID Sarika Hobson PA-C     • triamcinolone   Topical BID Jalyn García MD          Today, Patient Was Seen By: Norah Mireles, [facility]   Internal [address]      **Please Note: This note may have been constructed using a voice recognition system  **

## 2022-12-24 NOTE — ASSESSMENT & PLAN NOTE
A1c 9/20/2020 was 6 6, most recent A1c 6 4    Blood glucose elevated especially while on steroid, on 12/23-24 fasting blood glucose 212 and highest 280    Plan  Adding sliding scale insulin  Changing diet to low-carb  Continue to monitor with hypoglycemic protocol  Counseled on lifestyle modification and potential plan for starting metformin on discharge

## 2022-12-24 NOTE — ASSESSMENT & PLAN NOTE
· Returns to the ED after being discharged from the hospital 12/19  · Complaining of SOB  · Wheezing on examination  · On chronic steroid, for asthma/COPD, restarted on IV cell Medrol 40 mg 12/21, increased to 40 mg twice daily 12/22, on 12/24 decreasing to IV Solu-Medrol 40 mg once and switching to prednisone taper starting 12/25   · On Fluticasone-Vilanterol QD ; and on Xopenex / Ipratropium TID Nebs   · Added respiratory protocol   · mucinex increased to 1200 BID

## 2022-12-24 NOTE — ASSESSMENT & PLAN NOTE
Baseline sodium 140, trending down gradually 132 on 12/24  Patient on 2 g sodium diet given his CHF  Library to regular diet, with low-carb  Adding fluid restriction 1500 cc a day  Daily BMP

## 2022-12-24 NOTE — ASSESSMENT & PLAN NOTE
Wt Readings from Last 3 Encounters:   12/24/22 133 kg (292 lb 5 3 oz)   12/19/22 134 kg (296 lb 4 8 oz)   12/13/22 (!) 137 kg (301 lb 3 2 oz)     Last ECHO 9/2022 with LVEF 40%, G1 DD  RV systolic function is normal   Cardiomyopathy reportedly nonischemic  Patient had been complaining of leg swelling previously  Torsemide was held during last hospitalization, with patient remaining at or near baseline weight  BNP also normal for patient  No evidence of vascular congestion by imaging  Continue to hold off on further diuretic therapy, and resume upon discharge  Continue carvedilol, Entresto

## 2022-12-24 NOTE — ASSESSMENT & PLAN NOTE
CT PE 12/23 negative for PE but "Findings concerning for aspiration with debris filling the right middle lobe and left lower lobe bronchi with middle lobe collapse and significant left lower lobe consolidation/atelectasis    "   Plan  - Speech eval and treat  - Aspiration precautions; recs for regular diet with thin liquids  -On Rocephin and started 12/21  -Continue to monitor vitals and white blood cells trend, afebrile, has leukocytosis however currently on steroid  -High-frequency chest wall oscillation

## 2022-12-24 NOTE — CASE MANAGEMENT
Case Management Discharge Planning Note    Patient name Rashida Bessic  Location [address] [facility] [address] [id number]/320-61 MRN [id number]  : 1955 Date 2022       Current Admission Date: 2022  Current Admission Diagnosis:COPD, severe [facility])   Patient Active Problem List    Diagnosis Date Noted   • Type 2 diabetes mellitus ([facility] ) 2022   • Hyponatremia 2022   • Chest pain 2022   • Aspiration pneumonia ([place] [address] ) 2022   • Stage 3 chronic kidney disease, unspecified whether stage 3a or 3b CKD ([place] [address] ) 2022   • COPD with acute exacerbation ([place] [address] ) 2022   • Obesity (BMI 30-39 9) 2022   • COVID-19 virus infection 2021   • PLMD (periodic limb movement disorder)    • Bronchitis 2020   • Annual physical exam 2019   • Hypertrophied anal papilla 2019   • History of tobacco abuse 2018   • Constipation 2018   • Pulmonary emphysema ([place] [address] ) 2018   • Chronic asthma, moderate persistent, uncomplicated    • Obstructive sleep apnea 2018   • Hemorrhoids 2017   • COPD, severe ([place] [address] ) 10/16/2017   • Borderline hyperglycemia 2017   • Chronic combined systolic and diastolic congestive heart failure ([place] [address] ) 2015   • Non-ischemic cardiomyopathy with HFmEF ([place] [address] ) 2015   • Anxiety 2014   • Thyroid disease 2014   • Benign essential hypertension 2013   • Vitamin D deficiency 2013   • Dyslipidemia 2013   • Asthma-COPD overlap syndrome ([facility] ) 2012      LOS (days): 3  Geometric Mean LOS (GMLOS) (days): 5 20  Days to GMLOS:2 1     OBJECTIVE:  Risk of Unplanned Readmission Score: 17 73         Current admission status: Inpatient   Preferred Pharmacy:   [address] [place] [address] [address], [address] [place] [address]  DR [address]  [address] [place] [address]   [place] [address]  Phone: [phone] Fax: [phone]    [address] [place] [address] [id number] [address]  Phone: [phone] Fax: [phone]    [address] [place] [address]  Phone: [phone] Fax: [phone]    Primary Care Provider: Jamilah Neri DO    Primary Insurance: MEDICARE  Secondary Insurance: [employer]    DISCHARGE DETAILS:    IMM Given (Date):: 12/24/22  IMM Given to[de-identified] Patient (reviewed with pt via phone due to +covid)       Discussed with patient preferences on discharge;understanding how to manage health at home; purpose of taking medications; importance of follow up care/appointments; and symptoms to watch out for once discharged home  Pt tentative discharge home tomorrow, no needs currently noted  Spoke with pt via phone due to +covid  CM to follow

## 2022-12-24 NOTE — PLAN OF CARE
Problem: MOBILITY - ADULT  Goal: Maintain or return to baseline ADL function  Description: INTERVENTIONS:  -  Assess patient's ability to carry out ADLs; assess patient's baseline for ADL function and identify physical deficits which impact ability to perform ADLs (bathing, care of mouth/teeth, toileting, grooming, dressing, etc )  - Assess/evaluate cause of self-care deficits   - Assess range of motion  - Assess patient's mobility; develop plan if impaired  - Assess patient's need for assistive devices and provide as appropriate  - Encourage maximum independence but intervene and supervise when necessary  - Involve family in performance of ADLs  - Assess for home care needs following discharge   - Consider OT consult to assist with ADL evaluation and planning for discharge  - Provide patient education as appropriate  Outcome: Progressing  Goal: Maintains/Returns to pre admission functional level  Description: INTERVENTIONS:  - Perform BMAT or MOVE assessment daily    - Set and communicate daily mobility goal to care team and patient/family/caregiver  - Collaborate with rehabilitation services on mobility goals if consulted  - Perform Range of Motion 3 times a day  - Reposition patient every 2 hours    - Dangle patient 3 times a day  - Stand patient 3 times a day  - Ambulate patient 3 times a day  - Out of bed to chair 3 times a day   - Out of bed for meals 3 times a day  - Out of bed for toileting  - Record patient progress and toleration of activity level   Outcome: Progressing     Problem: PAIN - ADULT  Goal: Verbalizes/displays adequate comfort level or baseline comfort level  Description: Interventions:  - Encourage patient to monitor pain and request assistance  - Assess pain using appropriate pain scale  - Administer analgesics based on type and severity of pain and evaluate response  - Implement non-pharmacological measures as appropriate and evaluate response  - Consider cultural and social influences on pain and pain management  - Notify physician/advanced practitioner if interventions unsuccessful or patient reports new pain  Outcome: Progressing     Problem: INFECTION - ADULT  Goal: Absence or prevention of progression during hospitalization  Description: INTERVENTIONS:  - Assess and monitor for signs and symptoms of infection  - Monitor lab/diagnostic results  - Monitor all insertion sites, i e  indwelling lines, tubes, and drains  - Monitor endotracheal if appropriate and nasal secretions for changes in amount and color  - [place] appropriate cooling/warming therapies per order  - Administer medications as ordered  - Instruct and encourage patient and family to use good hand hygiene technique  - Identify and instruct in appropriate isolation precautions for identified infection/condition  Outcome: Progressing  Goal: Absence of fever/infection during neutropenic period  Description: INTERVENTIONS:  - Monitor WBC    Outcome: Progressing     Problem: SAFETY ADULT  Goal: Maintain or return to baseline ADL function  Description: INTERVENTIONS:  -  Assess patient's ability to carry out ADLs; assess patient's baseline for ADL function and identify physical deficits which impact ability to perform ADLs (bathing, care of mouth/teeth, toileting, grooming, dressing, etc )  - Assess/evaluate cause of self-care deficits   - Assess range of motion  - Assess patient's mobility; develop plan if impaired  - Assess patient's need for assistive devices and provide as appropriate  - Encourage maximum independence but intervene and supervise when necessary  - Involve family in performance of ADLs  - Assess for home care needs following discharge   - Consider OT consult to assist with ADL evaluation and planning for discharge  - Provide patient education as appropriate  Outcome: Progressing  Goal: Maintains/Returns to pre admission functional level  Description: INTERVENTIONS:  - Perform BMAT or MOVE assessment daily    - Set and communicate daily mobility goal to care team and patient/family/caregiver  - Collaborate with rehabilitation services on mobility goals if consulted  - Perform Range of Motion 3 times a day  - Reposition patient every 2 hours    - Dangle patient 3 times a day  - Stand patient 3 times a day  - Ambulate patient 3 times a day  - Out of bed to chair 3 times a day   - Out of bed for meals 3 times a day  - Out of bed for toileting  - Record patient progress and toleration of activity level   Outcome: Progressing  Goal: Patient will remain free of falls  Description: INTERVENTIONS:  - Educate patient/family on patient safety including physical limitations  - Instruct patient to call for assistance with activity   - Consult OT/PT to assist with strengthening/mobility   - Keep Call bell within reach  - Keep bed low and locked with side rails adjusted as appropriate  - Keep care items and personal belongings within reach  - Initiate and maintain comfort rounds  - Make Fall Risk Sign visible to staff  - Offer Toileting every 2 Hours, in advance of need  - Initiate/Maintain fall alarm  - Obtain necessary fall risk management equipment: non-skid footwear  - Apply yellow socks and bracelet for high fall risk patients  - Consider moving patient to room near nurses station  Outcome: Progressing     Problem: DISCHARGE PLANNING  Goal: Discharge to home or other facility with appropriate resources  Description: INTERVENTIONS:  - Identify barriers to discharge w/patient and caregiver  - Arrange for needed discharge resources and transportation as appropriate  - Identify discharge learning needs (meds, wound care, etc )  - Arrange for interpretive services to assist at discharge as needed  - Refer to Case Management Department for coordinating discharge planning if the patient needs post-hospital services based on physician/advanced practitioner order or complex needs related to functional status, cognitive ability, or social support system  Outcome: Progressing     Problem: Knowledge Deficit  Goal: Patient/family/caregiver demonstrates understanding of disease process, treatment plan, medications, and discharge instructions  Description: Complete learning assessment and assess knowledge base    Interventions:  - Provide teaching at level of understanding  - Provide teaching via preferred learning methods  Outcome: Progressing     Problem: RESPIRATORY - ADULT  Goal: Achieves optimal ventilation and oxygenation  Description: INTERVENTIONS:  - Assess for changes in respiratory status  - Assess for changes in mentation and behavior  - Position to facilitate oxygenation and minimize respiratory effort  - Oxygen administered by appropriate delivery if ordered  - Initiate smoking cessation education as indicated  - Encourage broncho-pulmonary hygiene including cough, deep breathe, Incentive Spirometry  - Assess the need for suctioning and aspirate as needed  - Assess and instruct to report SOB or any respiratory difficulty  - Respiratory Therapy support as indicated  Outcome: Progressing     Problem: METABOLIC, FLUID AND ELECTROLYTES - ADULT  Goal: Electrolytes maintained within normal limits  Description: INTERVENTIONS:  - Monitor labs and assess patient for signs and symptoms of electrolyte imbalances  - Administer electrolyte replacement as ordered  - Monitor response to electrolyte replacements, including repeat lab results as appropriate  - Instruct patient on fluid and nutrition as appropriate  Outcome: Progressing  Goal: Fluid balance maintained  Description: INTERVENTIONS:  - Monitor labs   - Monitor I/O and WT  - Instruct patient on fluid and nutrition as appropriate  - Assess for signs & symptoms of volume excess or deficit  Outcome: Progressing  Goal: Glucose maintained within target range  Description: INTERVENTIONS:  - Monitor Blood Glucose as ordered  - Assess for signs and symptoms of hyperglycemia and hypoglycemia  - Administer ordered medications to maintain glucose within target range  - Assess nutritional intake and initiate nutrition service referral as needed  Outcome: Progressing     Problem: HEMATOLOGIC - ADULT  Goal: Maintains hematologic stability  Description: INTERVENTIONS  - Assess for signs and symptoms of bleeding or hemorrhage  - Monitor labs  - Administer supportive blood products/factors as ordered and appropriate  Outcome: Progressing     Problem: MUSCULOSKELETAL - ADULT  Goal: Maintain or return mobility to safest level of function  Description: INTERVENTIONS:  - Assess patient's ability to carry out ADLs; assess patient's baseline for ADL function and identify physical deficits which impact ability to perform ADLs (bathing, care of mouth/teeth, toileting, grooming, dressing, etc )  - Assess/evaluate cause of self-care deficits   - Assess range of motion  - Assess patient's mobility  - Assess patient's need for assistive devices and provide as appropriate  - Encourage maximum independence but intervene and supervise when necessary  - Involve family in performance of ADLs  - Assess for home care needs following discharge   - Consider OT consult to assist with ADL evaluation and planning for discharge  - Provide patient education as appropriate  Outcome: Progressing  Goal: Maintain proper alignment of affected body part  Description: INTERVENTIONS:  - Support, maintain and protect limb and body alignment  - Provide patient/ family with appropriate education  Outcome: Progressing

## 2022-12-25 LAB
25(OH)D3 SERPL-MCNC: 32.7 NG/ML (ref 30–100)
ANION GAP SERPL CALCULATED.3IONS-SCNC: 6 MMOL/L (ref 4–13)
BASOPHILS # BLD AUTO: 0.04 THOUSANDS/ÂΜL (ref 0–0.1)
BASOPHILS NFR BLD AUTO: 0 % (ref 0–1)
BUN SERPL-MCNC: 32 MG/DL (ref 5–25)
CALCIUM SERPL-MCNC: 8.9 MG/DL (ref 8.3–10.1)
CHLORIDE SERPL-SCNC: 98 MMOL/L (ref 96–108)
CO2 SERPL-SCNC: 29 MMOL/L (ref 21–32)
CREAT SERPL-MCNC: 1.23 MG/DL (ref 0.6–1.3)
EOSINOPHIL # BLD AUTO: 0.1 THOUSAND/ÂΜL (ref 0–0.61)
EOSINOPHIL NFR BLD AUTO: 1 % (ref 0–6)
ERYTHROCYTE [DISTWIDTH] IN BLOOD BY AUTOMATED COUNT: 14.8 % (ref 11.6–15.1)
GFR SERPL CREATININE-BSD FRML MDRD: 61 ML/MIN/1.73SQ M
GLUCOSE SERPL-MCNC: 120 MG/DL (ref 65–140)
GLUCOSE SERPL-MCNC: 140 MG/DL (ref 65–140)
GLUCOSE SERPL-MCNC: 172 MG/DL (ref 65–140)
GLUCOSE SERPL-MCNC: 179 MG/DL (ref 65–140)
GLUCOSE SERPL-MCNC: 180 MG/DL (ref 65–140)
HCT VFR BLD AUTO: 45.7 % (ref 36.5–49.3)
HGB BLD-MCNC: 14.3 G/DL (ref 12–17)
IMM GRANULOCYTES # BLD AUTO: 0.34 THOUSAND/UL (ref 0–0.2)
IMM GRANULOCYTES NFR BLD AUTO: 2 % (ref 0–2)
LYMPHOCYTES # BLD AUTO: 2.76 THOUSANDS/ÂΜL (ref 0.6–4.47)
LYMPHOCYTES NFR BLD AUTO: 20 % (ref 14–44)
MCH RBC QN AUTO: 25.9 PG (ref 26.8–34.3)
MCHC RBC AUTO-ENTMCNC: 31.3 G/DL (ref 31.4–37.4)
MCV RBC AUTO: 83 FL (ref 82–98)
MONOCYTES # BLD AUTO: 1.14 THOUSAND/ÂΜL (ref 0.17–1.22)
MONOCYTES NFR BLD AUTO: 8 % (ref 4–12)
NEUTROPHILS # BLD AUTO: 9.76 THOUSANDS/ÂΜL (ref 1.85–7.62)
NEUTS SEG NFR BLD AUTO: 69 % (ref 43–75)
NRBC BLD AUTO-RTO: 0 /100 WBCS
PLATELET # BLD AUTO: 350 THOUSANDS/UL (ref 149–390)
PMV BLD AUTO: 9.1 FL (ref 8.9–12.7)
POTASSIUM SERPL-SCNC: 4.2 MMOL/L (ref 3.5–5.3)
PROCALCITONIN SERPL-MCNC: <0.05 NG/ML
RBC # BLD AUTO: 5.52 MILLION/UL (ref 3.88–5.62)
SODIUM SERPL-SCNC: 133 MMOL/L (ref 135–147)
WBC # BLD AUTO: 14.14 THOUSAND/UL (ref 4.31–10.16)

## 2022-12-25 RX ORDER — PREDNISONE 20 MG/1
40 TABLET ORAL 2 TIMES DAILY WITH MEALS
Status: DISCONTINUED | OUTPATIENT
Start: 2022-12-25 | End: 2022-12-26 | Stop reason: HOSPADM

## 2022-12-25 RX ADMIN — FLUTICASONE FUROATE AND VILANTEROL TRIFENATATE 1 PUFF: 200; 25 POWDER RESPIRATORY (INHALATION) at 08:34

## 2022-12-25 RX ADMIN — DULOXETINE HYDROCHLORIDE 60 MG: 30 CAPSULE, DELAYED RELEASE ORAL at 08:34

## 2022-12-25 RX ADMIN — INSULIN LISPRO 2 UNITS: 100 INJECTION, SOLUTION INTRAVENOUS; SUBCUTANEOUS at 11:06

## 2022-12-25 RX ADMIN — SACUBITRIL AND VALSARTAN 1 TABLET: 24; 26 TABLET, FILM COATED ORAL at 17:21

## 2022-12-25 RX ADMIN — IPRATROPIUM BROMIDE 0.5 MG: 0.5 SOLUTION RESPIRATORY (INHALATION) at 15:05

## 2022-12-25 RX ADMIN — AMPICILLIN SODIUM AND SULBACTAM SODIUM 3 G: 2; 1 INJECTION, POWDER, FOR SOLUTION INTRAMUSCULAR; INTRAVENOUS at 16:03

## 2022-12-25 RX ADMIN — INSULIN LISPRO 2 UNITS: 100 INJECTION, SOLUTION INTRAVENOUS; SUBCUTANEOUS at 16:03

## 2022-12-25 RX ADMIN — IPRATROPIUM BROMIDE 0.5 MG: 0.5 SOLUTION RESPIRATORY (INHALATION) at 20:19

## 2022-12-25 RX ADMIN — ENOXAPARIN SODIUM 40 MG: 40 INJECTION SUBCUTANEOUS at 08:34

## 2022-12-25 RX ADMIN — ALPRAZOLAM 0.5 MG: 0.5 TABLET ORAL at 21:47

## 2022-12-25 RX ADMIN — CARVEDILOL 25 MG: 12.5 TABLET, FILM COATED ORAL at 16:04

## 2022-12-25 RX ADMIN — GUAIFENESIN 1200 MG: 600 TABLET ORAL at 08:34

## 2022-12-25 RX ADMIN — SODIUM CHLORIDE SOLN NEBU 3% 4 ML: 3 NEBU SOLN at 09:12

## 2022-12-25 RX ADMIN — INSULIN LISPRO 1 UNITS: 100 INJECTION, SOLUTION INTRAVENOUS; SUBCUTANEOUS at 21:47

## 2022-12-25 RX ADMIN — SACUBITRIL AND VALSARTAN 1 TABLET: 24; 26 TABLET, FILM COATED ORAL at 11:04

## 2022-12-25 RX ADMIN — LEVALBUTEROL HYDROCHLORIDE 1.25 MG: 1.25 SOLUTION, CONCENTRATE RESPIRATORY (INHALATION) at 20:19

## 2022-12-25 RX ADMIN — PREDNISONE 40 MG: 20 TABLET ORAL at 08:34

## 2022-12-25 RX ADMIN — SODIUM CHLORIDE SOLN NEBU 3% 4 ML: 3 NEBU SOLN at 17:25

## 2022-12-25 RX ADMIN — TRIAMCINOLONE ACETONIDE: 1 CREAM TOPICAL at 08:35

## 2022-12-25 RX ADMIN — AMPICILLIN SODIUM AND SULBACTAM SODIUM 3 G: 2; 1 INJECTION, POWDER, FOR SOLUTION INTRAMUSCULAR; INTRAVENOUS at 11:04

## 2022-12-25 RX ADMIN — AMPICILLIN SODIUM AND SULBACTAM SODIUM 3 G: 2; 1 INJECTION, POWDER, FOR SOLUTION INTRAMUSCULAR; INTRAVENOUS at 23:47

## 2022-12-25 RX ADMIN — FLUTICASONE PROPIONATE 1 SPRAY: 50 SPRAY, METERED NASAL at 21:47

## 2022-12-25 RX ADMIN — CARVEDILOL 25 MG: 12.5 TABLET, FILM COATED ORAL at 08:36

## 2022-12-25 RX ADMIN — FLUTICASONE PROPIONATE 1 SPRAY: 50 SPRAY, METERED NASAL at 08:34

## 2022-12-25 RX ADMIN — LEVALBUTEROL HYDROCHLORIDE 1.25 MG: 1.25 SOLUTION, CONCENTRATE RESPIRATORY (INHALATION) at 15:05

## 2022-12-25 RX ADMIN — TRIAMCINOLONE ACETONIDE: 1 CREAM TOPICAL at 17:21

## 2022-12-25 RX ADMIN — PANTOPRAZOLE SODIUM 20 MG: 20 TABLET, DELAYED RELEASE ORAL at 06:03

## 2022-12-25 RX ADMIN — LEVALBUTEROL HYDROCHLORIDE 1.25 MG: 1.25 SOLUTION, CONCENTRATE RESPIRATORY (INHALATION) at 09:12

## 2022-12-25 RX ADMIN — IPRATROPIUM BROMIDE 0.5 MG: 0.5 SOLUTION RESPIRATORY (INHALATION) at 09:12

## 2022-12-25 RX ADMIN — PREDNISONE 40 MG: 20 TABLET ORAL at 16:03

## 2022-12-25 RX ADMIN — GUAIFENESIN 1200 MG: 600 TABLET ORAL at 21:47

## 2022-12-25 RX ADMIN — ATORVASTATIN CALCIUM 20 MG: 10 TABLET, FILM COATED ORAL at 17:21

## 2022-12-25 NOTE — ASSESSMENT & PLAN NOTE
Continue carvedilol  Patient is also on Entresto given history of CHF    Current blood pressures are optimal

## 2022-12-25 NOTE — PLAN OF CARE
Problem: MOBILITY - ADULT  Goal: Maintain or return to baseline ADL function  Description: INTERVENTIONS:  -  Assess patient's ability to carry out ADLs; assess patient's baseline for ADL function and identify physical deficits which impact ability to perform ADLs (bathing, care of mouth/teeth, toileting, grooming, dressing, etc )  - Assess/evaluate cause of self-care deficits   - Assess range of motion  - Assess patient's mobility; develop plan if impaired  - Assess patient's need for assistive devices and provide as appropriate  - Encourage maximum independence but intervene and supervise when necessary  - Involve family in performance of ADLs  - Assess for home care needs following discharge   - Consider OT consult to assist with ADL evaluation and planning for discharge  - Provide patient education as appropriate  Outcome: Progressing  Goal: Maintains/Returns to pre admission functional level  Description: INTERVENTIONS:  - Perform BMAT or MOVE assessment daily    - Set and communicate daily mobility goal to care team and patient/family/caregiver  - Collaborate with rehabilitation services on mobility goals if consulted  - Perform Range of Motion 3 times a day  - Reposition patient every 2 hours    - Dangle patient 3 times a day  - Stand patient 3 times a day  - Ambulate patient 3 times a day  - Out of bed to chair 3 times a day   - Out of bed for meals 3 times a day  - Out of bed for toileting  - Record patient progress and toleration of activity level   Outcome: Progressing     Problem: PAIN - ADULT  Goal: Verbalizes/displays adequate comfort level or baseline comfort level  Description: Interventions:  - Encourage patient to monitor pain and request assistance  - Assess pain using appropriate pain scale  - Administer analgesics based on type and severity of pain and evaluate response  - Implement non-pharmacological measures as appropriate and evaluate response  - Consider cultural and social influences on pain and pain management  - Notify physician/advanced practitioner if interventions unsuccessful or patient reports new pain  Outcome: Progressing     Problem: INFECTION - ADULT  Goal: Absence or prevention of progression during hospitalization  Description: INTERVENTIONS:  - Assess and monitor for signs and symptoms of infection  - Monitor lab/diagnostic results  - Monitor all insertion sites, i e  indwelling lines, tubes, and drains  - Monitor endotracheal if appropriate and nasal secretions for changes in amount and color  - [place] appropriate cooling/warming therapies per order  - Administer medications as ordered  - Instruct and encourage patient and family to use good hand hygiene technique  - Identify and instruct in appropriate isolation precautions for identified infection/condition  Outcome: Progressing  Goal: Absence of fever/infection during neutropenic period  Description: INTERVENTIONS:  - Monitor WBC    Outcome: Progressing     Problem: SAFETY ADULT  Goal: Maintain or return to baseline ADL function  Description: INTERVENTIONS:  -  Assess patient's ability to carry out ADLs; assess patient's baseline for ADL function and identify physical deficits which impact ability to perform ADLs (bathing, care of mouth/teeth, toileting, grooming, dressing, etc )  - Assess/evaluate cause of self-care deficits   - Assess range of motion  - Assess patient's mobility; develop plan if impaired  - Assess patient's need for assistive devices and provide as appropriate  - Encourage maximum independence but intervene and supervise when necessary  - Involve family in performance of ADLs  - Assess for home care needs following discharge   - Consider OT consult to assist with ADL evaluation and planning for discharge  - Provide patient education as appropriate  Outcome: Progressing  Goal: Maintains/Returns to pre admission functional level  Description: INTERVENTIONS:  - Perform BMAT or MOVE assessment daily    - Set and communicate daily mobility goal to care team and patient/family/caregiver  - Collaborate with rehabilitation services on mobility goals if consulted  - Perform Range of Motion 3 times a day  - Reposition patient every 2 hours    - Dangle patient 3 times a day  - Stand patient 3 times a day  - Ambulate patient 3 times a day  - Out of bed to chair 3 times a day   - Out of bed for meals 3 times a day  - Out of bed for toileting  - Record patient progress and toleration of activity level   Outcome: Progressing  Goal: Patient will remain free of falls  Description: INTERVENTIONS:  - Educate patient/family on patient safety including physical limitations  - Instruct patient to call for assistance with activity   - Consult OT/PT to assist with strengthening/mobility   - Keep Call bell within reach  - Keep bed low and locked with side rails adjusted as appropriate  - Keep care items and personal belongings within reach  - Initiate and maintain comfort rounds  - Make Fall Risk Sign visible to staff  - Offer Toileting every 2 Hours, in advance of need  - Initiate/Maintain fall alarm  - Obtain necessary fall risk management equipment: non-skid footwear  - Apply yellow socks and bracelet for high fall risk patients  - Consider moving patient to room near nurses station  Outcome: Progressing     Problem: DISCHARGE PLANNING  Goal: Discharge to home or other facility with appropriate resources  Description: INTERVENTIONS:  - Identify barriers to discharge w/patient and caregiver  - Arrange for needed discharge resources and transportation as appropriate  - Identify discharge learning needs (meds, wound care, etc )  - Arrange for interpretive services to assist at discharge as needed  - Refer to Case Management Department for coordinating discharge planning if the patient needs post-hospital services based on physician/advanced practitioner order or complex needs related to functional status, cognitive ability, or social support system  Outcome: Progressing     Problem: Knowledge Deficit  Goal: Patient/family/caregiver demonstrates understanding of disease process, treatment plan, medications, and discharge instructions  Description: Complete learning assessment and assess knowledge base    Interventions:  - Provide teaching at level of understanding  - Provide teaching via preferred learning methods  Outcome: Progressing     Problem: RESPIRATORY - ADULT  Goal: Achieves optimal ventilation and oxygenation  Description: INTERVENTIONS:  - Assess for changes in respiratory status  - Assess for changes in mentation and behavior  - Position to facilitate oxygenation and minimize respiratory effort  - Oxygen administered by appropriate delivery if ordered  - Initiate smoking cessation education as indicated  - Encourage broncho-pulmonary hygiene including cough, deep breathe, Incentive Spirometry  - Assess the need for suctioning and aspirate as needed  - Assess and instruct to report SOB or any respiratory difficulty  - Respiratory Therapy support as indicated  Outcome: Progressing     Problem: METABOLIC, FLUID AND ELECTROLYTES - ADULT  Goal: Electrolytes maintained within normal limits  Description: INTERVENTIONS:  - Monitor labs and assess patient for signs and symptoms of electrolyte imbalances  - Administer electrolyte replacement as ordered  - Monitor response to electrolyte replacements, including repeat lab results as appropriate  - Instruct patient on fluid and nutrition as appropriate  Outcome: Progressing  Goal: Fluid balance maintained  Description: INTERVENTIONS:  - Monitor labs   - Monitor I/O and WT  - Instruct patient on fluid and nutrition as appropriate  - Assess for signs & symptoms of volume excess or deficit  Outcome: Progressing  Goal: Glucose maintained within target range  Description: INTERVENTIONS:  - Monitor Blood Glucose as ordered  - Assess for signs and symptoms of hyperglycemia and hypoglycemia  - Administer ordered medications to maintain glucose within target range  - Assess nutritional intake and initiate nutrition service referral as needed  Outcome: Progressing     Problem: HEMATOLOGIC - ADULT  Goal: Maintains hematologic stability  Description: INTERVENTIONS  - Assess for signs and symptoms of bleeding or hemorrhage  - Monitor labs  - Administer supportive blood products/factors as ordered and appropriate  Outcome: Progressing     Problem: MUSCULOSKELETAL - ADULT  Goal: Maintain or return mobility to safest level of function  Description: INTERVENTIONS:  - Assess patient's ability to carry out ADLs; assess patient's baseline for ADL function and identify physical deficits which impact ability to perform ADLs (bathing, care of mouth/teeth, toileting, grooming, dressing, etc )  - Assess/evaluate cause of self-care deficits   - Assess range of motion  - Assess patient's mobility  - Assess patient's need for assistive devices and provide as appropriate  - Encourage maximum independence but intervene and supervise when necessary  - Involve family in performance of ADLs  - Assess for home care needs following discharge   - Consider OT consult to assist with ADL evaluation and planning for discharge  - Provide patient education as appropriate  Outcome: Progressing  Goal: Maintain proper alignment of affected body part  Description: INTERVENTIONS:  - Support, maintain and protect limb and body alignment  - Provide patient/ family with appropriate education  Outcome: Progressing

## 2022-12-25 NOTE — PLAN OF CARE
Problem: MOBILITY - ADULT  Goal: Maintain or return to baseline ADL function  Description: INTERVENTIONS:  -  Assess patient's ability to carry out ADLs; assess patient's baseline for ADL function and identify physical deficits which impact ability to perform ADLs (bathing, care of mouth/teeth, toileting, grooming, dressing, etc )  - Assess/evaluate cause of self-care deficits   - Assess range of motion  - Assess patient's mobility; develop plan if impaired  - Assess patient's need for assistive devices and provide as appropriate  - Encourage maximum independence but intervene and supervise when necessary  - Involve family in performance of ADLs  - Assess for home care needs following discharge   - Consider OT consult to assist with ADL evaluation and planning for discharge  - Provide patient education as appropriate  Outcome: Progressing  Goal: Maintains/Returns to pre admission functional level  Description: INTERVENTIONS:  - Perform BMAT or MOVE assessment daily    - Set and communicate daily mobility goal to care team and patient/family/caregiver  - Collaborate with rehabilitation services on mobility goals if consulted  - Perform Range of Motion 3 times a day  - Reposition patient every 2 hours    - Dangle patient 3 times a day  - Stand patient 3 times a day  - Ambulate patient 3 times a day  - Out of bed to chair 3 times a day   - Out of bed for meals 3 times a day  - Out of bed for toileting  - Record patient progress and toleration of activity level   Outcome: Progressing     Problem: PAIN - ADULT  Goal: Verbalizes/displays adequate comfort level or baseline comfort level  Description: Interventions:  - Encourage patient to monitor pain and request assistance  - Assess pain using appropriate pain scale  - Administer analgesics based on type and severity of pain and evaluate response  - Implement non-pharmacological measures as appropriate and evaluate response  - Consider cultural and social influences on pain and pain management  - Notify physician/advanced practitioner if interventions unsuccessful or patient reports new pain  Outcome: Progressing     Problem: INFECTION - ADULT  Goal: Absence or prevention of progression during hospitalization  Description: INTERVENTIONS:  - Assess and monitor for signs and symptoms of infection  - Monitor lab/diagnostic results  - Monitor all insertion sites, i e  indwelling lines, tubes, and drains  - Monitor endotracheal if appropriate and nasal secretions for changes in amount and color  - [place] appropriate cooling/warming therapies per order  - Administer medications as ordered  - Instruct and encourage patient and family to use good hand hygiene technique  - Identify and instruct in appropriate isolation precautions for identified infection/condition  Outcome: Progressing  Goal: Absence of fever/infection during neutropenic period  Description: INTERVENTIONS:  - Monitor WBC    Outcome: Progressing     Problem: SAFETY ADULT  Goal: Maintain or return to baseline ADL function  Description: INTERVENTIONS:  -  Assess patient's ability to carry out ADLs; assess patient's baseline for ADL function and identify physical deficits which impact ability to perform ADLs (bathing, care of mouth/teeth, toileting, grooming, dressing, etc )  - Assess/evaluate cause of self-care deficits   - Assess range of motion  - Assess patient's mobility; develop plan if impaired  - Assess patient's need for assistive devices and provide as appropriate  - Encourage maximum independence but intervene and supervise when necessary  - Involve family in performance of ADLs  - Assess for home care needs following discharge   - Consider OT consult to assist with ADL evaluation and planning for discharge  - Provide patient education as appropriate  Outcome: Progressing  Goal: Maintains/Returns to pre admission functional level  Description: INTERVENTIONS:  - Perform BMAT or MOVE assessment daily    - Set and communicate daily mobility goal to care team and patient/family/caregiver  - Collaborate with rehabilitation services on mobility goals if consulted  - Perform Range of Motion 3 times a day  - Reposition patient every 2 hours    - Dangle patient 3 times a day  - Stand patient 3 times a day  - Ambulate patient 3 times a day  - Out of bed to chair 3 times a day   - Out of bed for meals 3 times a day  - Out of bed for toileting  - Record patient progress and toleration of activity level   Outcome: Progressing  Goal: Patient will remain free of falls  Description: INTERVENTIONS:  - Educate patient/family on patient safety including physical limitations  - Instruct patient to call for assistance with activity   - Consult OT/PT to assist with strengthening/mobility   - Keep Call bell within reach  - Keep bed low and locked with side rails adjusted as appropriate  - Keep care items and personal belongings within reach  - Initiate and maintain comfort rounds  - Make Fall Risk Sign visible to staff  - Offer Toileting every 2 Hours, in advance of need  - Initiate/Maintain fall alarm  - Obtain necessary fall risk management equipment: non-skid footwear  - Apply yellow socks and bracelet for high fall risk patients  - Consider moving patient to room near nurses station  Outcome: Progressing     Problem: DISCHARGE PLANNING  Goal: Discharge to home or other facility with appropriate resources  Description: INTERVENTIONS:  - Identify barriers to discharge w/patient and caregiver  - Arrange for needed discharge resources and transportation as appropriate  - Identify discharge learning needs (meds, wound care, etc )  - Arrange for interpretive services to assist at discharge as needed  - Refer to Case Management Department for coordinating discharge planning if the patient needs post-hospital services based on physician/advanced practitioner order or complex needs related to functional status, cognitive ability, or social support system  Outcome: Progressing     Problem: Knowledge Deficit  Goal: Patient/family/caregiver demonstrates understanding of disease process, treatment plan, medications, and discharge instructions  Description: Complete learning assessment and assess knowledge base    Interventions:  - Provide teaching at level of understanding  - Provide teaching via preferred learning methods  Outcome: Progressing     Problem: METABOLIC, FLUID AND ELECTROLYTES - ADULT  Goal: Electrolytes maintained within normal limits  Description: INTERVENTIONS:  - Monitor labs and assess patient for signs and symptoms of electrolyte imbalances  - Administer electrolyte replacement as ordered  - Monitor response to electrolyte replacements, including repeat lab results as appropriate  - Instruct patient on fluid and nutrition as appropriate  Outcome: Progressing  Goal: Fluid balance maintained  Description: INTERVENTIONS:  - Monitor labs   - Monitor I/O and WT  - Instruct patient on fluid and nutrition as appropriate  - Assess for signs & symptoms of volume excess or deficit  Outcome: Progressing  Goal: Glucose maintained within target range  Description: INTERVENTIONS:  - Monitor Blood Glucose as ordered  - Assess for signs and symptoms of hyperglycemia and hypoglycemia  - Administer ordered medications to maintain glucose within target range  - Assess nutritional intake and initiate nutrition service referral as needed  Outcome: Progressing     Problem: RESPIRATORY - ADULT  Goal: Achieves optimal ventilation and oxygenation  Description: INTERVENTIONS:  - Assess for changes in respiratory status  - Assess for changes in mentation and behavior  - Position to facilitate oxygenation and minimize respiratory effort  - Oxygen administered by appropriate delivery if ordered  - Initiate smoking cessation education as indicated  - Encourage broncho-pulmonary hygiene including cough, deep breathe, Incentive Spirometry  - Assess the need for suctioning and aspirate as needed  - Assess and instruct to report SOB or any respiratory difficulty  - Respiratory Therapy support as indicated  Outcome: Progressing     Problem: MUSCULOSKELETAL - ADULT  Goal: Maintain or return mobility to safest level of function  Description: INTERVENTIONS:  - Assess patient's ability to carry out ADLs; assess patient's baseline for ADL function and identify physical deficits which impact ability to perform ADLs (bathing, care of mouth/teeth, toileting, grooming, dressing, etc )  - Assess/evaluate cause of self-care deficits   - Assess range of motion  - Assess patient's mobility  - Assess patient's need for assistive devices and provide as appropriate  - Encourage maximum independence but intervene and supervise when necessary  - Involve family in performance of ADLs  - Assess for home care needs following discharge   - Consider OT consult to assist with ADL evaluation and planning for discharge  - Provide patient education as appropriate  Outcome: Progressing  Goal: Maintain proper alignment of affected body part  Description: INTERVENTIONS:  - Support, maintain and protect limb and body alignment  - Provide patient/ family with appropriate education  Outcome: Progressing     Problem: HEMATOLOGIC - ADULT  Goal: Maintains hematologic stability  Description: INTERVENTIONS  - Assess for signs and symptoms of bleeding or hemorrhage  - Monitor labs  - Administer supportive blood products/factors as ordered and appropriate  Outcome: Progressing     Problem: Potential for Falls  Goal: Patient will remain free of falls  Description: INTERVENTIONS:  - Educate patient/family on patient safety including physical limitations  - Instruct patient to call for assistance with activity   - Consult OT/PT to assist with strengthening/mobility   - Keep Call bell within reach  - Keep bed low and locked with side rails adjusted as appropriate  - Keep care items and personal belongings within reach  - Initiate and maintain comfort rounds  - Make Fall Risk Sign visible to staff  - Offer Toileting every 2 Hours, in advance of need  - Initiate/Maintain fall alarm  - Obtain necessary fall risk management equipment: non-skid footwear  - Apply yellow socks and bracelet for high fall risk patients  - Consider moving patient to room near nurses station  Outcome: Progressing

## 2022-12-25 NOTE — PROGRESS NOTES
[facility] [address]  Progress Note - Ludmila April 1955, 74 y o  male MRN: [id number]  Unit/Bed#: 409-01 Encounter: [id number]  Primary Care Provider: Rena Willingham DO   Date and time admitted to hospital: 12/21/2022 10:31 AM    * Aspiration pneumonia [facility])  Assessment & Plan  Timeline    - Originally hospitalized 12/16-12/19 and treated for COVID-19 infection     - Readmitted 12/21 with worsening weakness and SOB  - CXR 12/21 with right perihilar opacity suspicious for infiltrate  Procalcitonin negative X3, ceftriaxone dropped off after 2 days secondary to an ordered end date to this medication   - Was having a significant coughing episode, in the midst of expectorating sputum when he inhaled and experienced significantly worsening dyspnea  Subsequent CT on 12/23 with findings concerning for aspiration, debris in the RML and LLL with significant LLL consolidation  Patient's last procalcitonin predates aspiration event  He has also been off antibiotics  Although he feels significantly improved, will initiate on Unasyn and repeat procalcitonin now and again in the morning  Pending results may need a short course of antibiotic therapy  COVID-19 virus infection  Assessment & Plan  Diagnosed 12/16 and received full course of IV remdesivir  No further treatment required as patient does not require supplemental oxygen  However, he has been receiving steroids secondary to concurrent COPD exacerbation  Subjectively improved  COPD, severe ([facility] [address] )  Assessment & Plan  Subjectively improved  Continue to taper steroids, and continue standing nebs  Antibiotics as above  Chronic combined systolic and diastolic congestive heart failure [facility])  Assessment & Plan  Wt Readings from Last 3 Encounters:   12/24/22 133 kg (292 lb 5 3 oz)   12/19/22 134 kg (296 lb 4 8 oz)   12/13/22 (!) 137 kg (301 lb 3 2 oz)     Last ECHO 9/2022 with LVEF 40%, G1 DD    RV systolic function is normal  Cardiomyopathy reportedly nonischemic  Patient had been complaining of leg swelling previously  Torsemide was held during last hospitalization, with patient remaining at or near baseline weight  BNP also normal for patient  No evidence of vascular congestion by imaging  Continue to hold off on further diuretic therapy, and resume upon discharge  Continue carvedilol, Entresto  Benign essential hypertension  Assessment & Plan  Continue carvedilol  Patient is also on Entresto given history of CHF  Current blood pressures are optimal     Obstructive sleep apnea  Assessment & Plan  CPAP qhs ordered But patient typically declines  Vitamin D deficiency  Assessment & Plan  History of vitamin D no recent labs, recent COVID diagnosis  Recheck vitamin D levels pending  Type 2 diabetes mellitus ([facility] [address] )  Assessment & Plan  A1c 9/20/2020 was 6 6, most recent A1c 6 4  Blood glucose elevated especially while on steroid, on 12/23-24 fasting blood glucose 212 and highest 280  Added sliding scale coverage and ADA diet  Chest pain  Assessment & Plan  Patient had an episode of CP previously described as mid sternal, sharp, and burning  This resolved without intervention, with high-sensitivity troponins negative  ECG was reportedly without changes  Suspected reflux, with patient initiated on PPI therapy  Resolved with no further episodes reported  Stage 3 chronic kidney disease, unspecified whether stage 3a or 3b CKD [facility])  Assessment & Plan  Lab Results   Component Value Date    EGFR 57 12/24/2022    EGFR 53 12/23/2022    EGFR 61 12/21/2022    CREATININE 1 29 12/24/2022    CREATININE 1 38 (H) 12/23/2022    CREATININE 1 22 12/21/2022     Remained stable and at baseline of 1 2-1 4  VTE Pharmacologic Prophylaxis: VTE Score: 5 on subcutaneous Lovenox     Patient Centered Rounds: I performed bedside rounds with nursing staff today    Discussions with Specialists or Other Care Team Provider: with my attending Dr Fiorella Durbin       Education and Discussions with Family / Patient: Patient had previously declined call to        Time Spent for Care: 45 minutes  More than 50% of total time spent on counseling and coordination of care as described above      Current Length of Stay: 4 day(s)  Current Patient Status: Inpatient   Certification Statement: The patient will continue to require additional inpatient hospital stay due to  continue treatment of COPD exacerbation and aspiration pneumonia  Discharge Plan: Anticipate discharge tomorrow to home with home services      Code Status: Level 1 - Full Code    Subjective:   Patient seen and examined, reports feeling significantly improved overall  Still with significant rhonchi and mild wheezing by exam, no supplemental oxygen requirement  No overnight events reported  Remains afebrile  Objective:     Vitals:   Temp (24hrs), Av °F (36 7 °C), Min:97 6 °F (36 4 °C), Max:98 4 °F (36 9 °C)    Temp:  [97 6 °F (36 4 °C)-98 4 °F (36 9 °C)] 98 4 °F (36 9 °C)  HR:  [61-77] 72  Resp:  [16-20] 16  BP: (121-137)/(58-80) 126/58  SpO2:  [90 %-93 %] 93 %  Body mass index is 37 42 kg/m²  Input and Output Summary (last 24 hours): Intake/Output Summary (Last 24 hours) at 2022 0955  Last data filed at 2022 0846  Gross per 24 hour   Intake 540 ml   Output --   Net 540 ml       Physical Exam:   Physical Exam  Vitals reviewed  Constitutional:       General: He is not in acute distress  Appearance: He is well-developed  He is ill-appearing  He is not toxic-appearing  Cardiovascular:      Rate and Rhythm: Normal rate and regular rhythm  Heart sounds: No murmur heard  Pulmonary:      Effort: Pulmonary effort is normal  No respiratory distress  Breath sounds: Wheezing and rhonchi present  No rales  Comments: Diffuse mild rhonchi, minimal wheezing  Abdominal:      General: Bowel sounds are normal  There is no distension  Palpations: Abdomen is soft  Tenderness: There is no abdominal tenderness  There is no guarding or rebound  Musculoskeletal:         General: No swelling or tenderness  Cervical back: Neck supple  Skin:     General: Skin is warm and dry  Capillary Refill: Capillary refill takes less than 2 seconds  Neurological:      General: No focal deficit present  Mental Status: He is alert and oriented to person, place, and time     Psychiatric:         Mood and Affect: Mood normal          Behavior: Behavior normal        Additional Data:     Labs:  Results from last 7 days   Lab Units 12/25/22  0534   WBC Thousand/uL 14 14*   HEMOGLOBIN g/dL 14 3   HEMATOCRIT % 45 7   PLATELETS Thousands/uL 350   NEUTROS PCT % 69   LYMPHS PCT % 20   MONOS PCT % 8   EOS PCT % 1     Results from last 7 days   Lab Units 12/25/22  0534 12/24/22  0441   SODIUM mmol/L 133* 132*   POTASSIUM mmol/L 4 2 4 8   CHLORIDE mmol/L 98 96   CO2 mmol/L 29 28   BUN mg/dL 32* 26*   CREATININE mg/dL 1 23 1 29   ANION GAP mmol/L 6 8   CALCIUM mg/dL 8 9 9 1   ALBUMIN g/dL  --  3 1*   TOTAL BILIRUBIN mg/dL  --  0 66   ALK PHOS U/L  --  78   ALT U/L  --  37   AST U/L  --  10   GLUCOSE RANDOM mg/dL 140 212*         Results from last 7 days   Lab Units 12/25/22  0737 12/24/22  2106 12/24/22  1606 12/24/22  1125 12/24/22  0911 12/19/22  1050 12/19/22  0714 12/18/22  2132 12/18/22  1552 12/18/22  1142   POC GLUCOSE mg/dl 120 168* 162* 154* 303* 146* 165* 140 238* 201*         Results from last 7 days   Lab Units 12/24/22  0441 12/23/22  0532 12/21/22  1305 12/21/22  1055   LACTIC ACID mmol/L  --   --  1 7  --    PROCALCITONIN ng/ml <0 05 <0 05  --  <0 05       Lines/Drains:  Invasive Devices     Peripheral Intravenous Line  Duration           Peripheral IV 12/23/22 Distal;Left;Upper;Ventral (anterior) Antecubital 1 day                      Imaging: Reviewed radiology reports from this admission including: chest xray, chest CT scan and Echo from 9/2022    Recent Cultures (last 7 days):   Results from last 7 days   Lab Units 12/21/22  1305   BLOOD CULTURE  No Growth at 72 hrs  No Growth at 72 hrs  Last 24 Hours Medication List:   Current Facility-Administered Medications   Medication Dose Route Frequency Provider Last Rate   • acetaminophen  650 mg Oral Q6H PRN Emil Prakash MD     • albuterol  2 puff Inhalation Q6H PRN Emil Prakash MD     • ALPRAZolam  0 5 mg Oral HS PRN Emil Prakash MD     • ampicillin-sulbactam  3 g Intravenous Q6H Emil Prakash MD     • atorvastatin  20 mg Oral QPM Emil Prakash MD     • carvedilol  25 mg Oral BID With Meals Emil Prakash MD     • DULoxetine  60 mg Oral Daily Emil Prakash MD     • enoxaparin  40 mg Subcutaneous Daily Emil Prakash MD     • fluticasone  1 spray Nasal BID Emil Prakash MD     • fluticasone-vilanterol  1 puff Inhalation Daily Emil Prakash MD     • guaiFENesin  1,200 mg Oral Q12H [address] Turner Cohen PA-C     • insulin lispro  1-6 Units Subcutaneous HS Cruz Mariano, DO     • insulin lispro  2-12 Units Subcutaneous TID AC Cruz Quintana DO     • ipratropium  0 5 mg Nebulization TID Emil Prakash MD     • ipratropium-albuterol  3 mL Nebulization Q6H PRN Emil Prakash MD     • levalbuterol  1 25 mg Nebulization TID Emil Prakash MD     • ondansetron  4 mg Intravenous Q6H PRN Emil Prakash MD     • pantoprazole  20 mg Oral Early Morning Emil Prakash MD     • predniSONE  40 mg Oral BID With Meals Emil Prakash MD     • sacubitril-valsartan  1 tablet Oral BID Emil Prakash MD     • sodium chloride  4 mL Nebulization TID Turner Cohen PA-C     • triamcinolone   Topical BID Emil Prakash MD          Today, Patient Was Seen By: Emil Prakash MD    **Please Note: This note may have been constructed using a voice recognition system  **

## 2022-12-26 VITALS
BODY MASS INDEX: 36.84 KG/M2 | RESPIRATION RATE: 20 BRPM | DIASTOLIC BLOOD PRESSURE: 71 MMHG | HEART RATE: 74 BPM | HEIGHT: 74 IN | WEIGHT: 287.04 LBS | TEMPERATURE: 97.4 F | OXYGEN SATURATION: 90 % | SYSTOLIC BLOOD PRESSURE: 124 MMHG

## 2022-12-26 LAB
ANION GAP SERPL CALCULATED.3IONS-SCNC: 8 MMOL/L (ref 4–13)
BACTERIA BLD CULT: NORMAL
BACTERIA BLD CULT: NORMAL
BASOPHILS # BLD AUTO: 0.03 THOUSANDS/ÂΜL (ref 0–0.1)
BASOPHILS NFR BLD AUTO: 0 % (ref 0–1)
BUN SERPL-MCNC: 34 MG/DL (ref 5–25)
CALCIUM SERPL-MCNC: 8.5 MG/DL (ref 8.3–10.1)
CHLORIDE SERPL-SCNC: 97 MMOL/L (ref 96–108)
CO2 SERPL-SCNC: 28 MMOL/L (ref 21–32)
CREAT SERPL-MCNC: 1.28 MG/DL (ref 0.6–1.3)
EOSINOPHIL # BLD AUTO: 0.01 THOUSAND/ÂΜL (ref 0–0.61)
EOSINOPHIL NFR BLD AUTO: 0 % (ref 0–6)
ERYTHROCYTE [DISTWIDTH] IN BLOOD BY AUTOMATED COUNT: 15.1 % (ref 11.6–15.1)
GFR SERPL CREATININE-BSD FRML MDRD: 58 ML/MIN/1.73SQ M
GLUCOSE SERPL-MCNC: 140 MG/DL (ref 65–140)
GLUCOSE SERPL-MCNC: 162 MG/DL (ref 65–140)
HCT VFR BLD AUTO: 45.3 % (ref 36.5–49.3)
HGB BLD-MCNC: 14.5 G/DL (ref 12–17)
IMM GRANULOCYTES # BLD AUTO: 0.33 THOUSAND/UL (ref 0–0.2)
IMM GRANULOCYTES NFR BLD AUTO: 2 % (ref 0–2)
LYMPHOCYTES # BLD AUTO: 1.61 THOUSANDS/ÂΜL (ref 0.6–4.47)
LYMPHOCYTES NFR BLD AUTO: 11 % (ref 14–44)
MAGNESIUM SERPL-MCNC: 2.4 MG/DL (ref 1.6–2.6)
MCH RBC QN AUTO: 26.7 PG (ref 26.8–34.3)
MCHC RBC AUTO-ENTMCNC: 32 G/DL (ref 31.4–37.4)
MCV RBC AUTO: 83 FL (ref 82–98)
MONOCYTES # BLD AUTO: 1.09 THOUSAND/ÂΜL (ref 0.17–1.22)
MONOCYTES NFR BLD AUTO: 7 % (ref 4–12)
NEUTROPHILS # BLD AUTO: 12.32 THOUSANDS/ÂΜL (ref 1.85–7.62)
NEUTS SEG NFR BLD AUTO: 80 % (ref 43–75)
NRBC BLD AUTO-RTO: 0 /100 WBCS
PLATELET # BLD AUTO: 341 THOUSANDS/UL (ref 149–390)
PMV BLD AUTO: 9.5 FL (ref 8.9–12.7)
POTASSIUM SERPL-SCNC: 4.2 MMOL/L (ref 3.5–5.3)
PROCALCITONIN SERPL-MCNC: <0.05 NG/ML
RBC # BLD AUTO: 5.43 MILLION/UL (ref 3.88–5.62)
SODIUM SERPL-SCNC: 133 MMOL/L (ref 135–147)
WBC # BLD AUTO: 15.39 THOUSAND/UL (ref 4.31–10.16)

## 2022-12-26 RX ORDER — PANTOPRAZOLE SODIUM 20 MG/1
20 TABLET, DELAYED RELEASE ORAL
Qty: 15 TABLET | Refills: 0 | Status: SHIPPED | OUTPATIENT
Start: 2022-12-27

## 2022-12-26 RX ORDER — AMOXICILLIN AND CLAVULANATE POTASSIUM 875; 125 MG/1; MG/1
1 TABLET, FILM COATED ORAL EVERY 12 HOURS SCHEDULED
Status: DISCONTINUED | OUTPATIENT
Start: 2022-12-26 | End: 2022-12-26

## 2022-12-26 RX ORDER — PREDNISONE 20 MG/1
TABLET ORAL
Qty: 18 TABLET | Refills: 0 | Status: SHIPPED | OUTPATIENT
Start: 2022-12-26 | End: 2023-01-01

## 2022-12-26 RX ORDER — GUAIFENESIN 1200 MG/1
1200 TABLET, EXTENDED RELEASE ORAL EVERY 12 HOURS SCHEDULED
Qty: 30 TABLET | Refills: 0 | Status: SHIPPED | OUTPATIENT
Start: 2022-12-26

## 2022-12-26 RX ORDER — AMOXICILLIN AND CLAVULANATE POTASSIUM 875; 125 MG/1; MG/1
1 TABLET, FILM COATED ORAL EVERY 12 HOURS SCHEDULED
Qty: 8 TABLET | Refills: 0 | Status: SHIPPED | OUTPATIENT
Start: 2022-12-26 | End: 2022-12-30

## 2022-12-26 RX ORDER — SODIUM CHLORIDE FOR INHALATION 3 %
4 VIAL, NEBULIZER (ML) INHALATION AS NEEDED
Qty: 30 ML | Refills: 0 | Status: SHIPPED | OUTPATIENT
Start: 2022-12-26

## 2022-12-26 RX ADMIN — FLUTICASONE FUROATE AND VILANTEROL TRIFENATATE 1 PUFF: 200; 25 POWDER RESPIRATORY (INHALATION) at 09:08

## 2022-12-26 RX ADMIN — PANTOPRAZOLE SODIUM 20 MG: 20 TABLET, DELAYED RELEASE ORAL at 06:00

## 2022-12-26 RX ADMIN — FLUTICASONE PROPIONATE 1 SPRAY: 50 SPRAY, METERED NASAL at 09:08

## 2022-12-26 RX ADMIN — IPRATROPIUM BROMIDE 0.5 MG: 0.5 SOLUTION RESPIRATORY (INHALATION) at 07:10

## 2022-12-26 RX ADMIN — PREDNISONE 40 MG: 20 TABLET ORAL at 09:07

## 2022-12-26 RX ADMIN — SACUBITRIL AND VALSARTAN 1 TABLET: 24; 26 TABLET, FILM COATED ORAL at 09:07

## 2022-12-26 RX ADMIN — TRIAMCINOLONE ACETONIDE: 1 CREAM TOPICAL at 09:07

## 2022-12-26 RX ADMIN — GUAIFENESIN 1200 MG: 600 TABLET ORAL at 09:07

## 2022-12-26 RX ADMIN — CARVEDILOL 25 MG: 12.5 TABLET, FILM COATED ORAL at 09:07

## 2022-12-26 RX ADMIN — LEVALBUTEROL HYDROCHLORIDE 1.25 MG: 1.25 SOLUTION, CONCENTRATE RESPIRATORY (INHALATION) at 07:10

## 2022-12-26 RX ADMIN — DULOXETINE HYDROCHLORIDE 60 MG: 30 CAPSULE, DELAYED RELEASE ORAL at 09:07

## 2022-12-26 RX ADMIN — AMPICILLIN SODIUM AND SULBACTAM SODIUM 3 G: 2; 1 INJECTION, POWDER, FOR SOLUTION INTRAMUSCULAR; INTRAVENOUS at 06:00

## 2022-12-26 RX ADMIN — ENOXAPARIN SODIUM 40 MG: 40 INJECTION SUBCUTANEOUS at 09:07

## 2022-12-26 NOTE — CASE MANAGEMENT
Case Management Discharge Planning Note    Patient name Monty Mar  Location [address] [id number] MRN [id number]  : 1955 Date 2022       Current Admission Date: 2022  Current Admission Diagnosis:Aspiration pneumonia [facility])   Patient Active Problem List    Diagnosis Date Noted   • Type 2 diabetes mellitus ([place] [address] ) 2022   • Hyponatremia 2022   • Chest pain 2022   • Aspiration pneumonia ([place] [address] ) 2022   • Stage 3 chronic kidney disease, unspecified whether stage 3a or 3b CKD ([place] [address] ) 2022   • COPD with acute exacerbation ([place] [address] ) 2022   • Obesity (BMI 30-39 9) 2022   • COVID-19 virus infection 2021   • PLMD (periodic limb movement disorder)    • Bronchitis 2020   • Annual physical exam 2019   • Hypertrophied anal papilla 2019   • History of tobacco abuse 2018   • Constipation 2018   • Pulmonary emphysema ([place] [address] ) 2018   • Chronic asthma, moderate persistent, uncomplicated    • Obstructive sleep apnea 2018   • Hemorrhoids 2017   • COPD, severe ([place] [address] ) 10/16/2017   • Borderline hyperglycemia 2017   • Chronic combined systolic and diastolic congestive heart failure ([place] [address] ) 2015   • Non-ischemic cardiomyopathy with HFmEF ([place] [address] ) 2015   • Anxiety 2014   • Thyroid disease 2014   • Benign essential hypertension 2013   • Vitamin D deficiency 2013   • Dyslipidemia 2013   • Asthma-COPD overlap syndrome ([place] [address] ) 2012      LOS (days): 5  Geometric Mean LOS (GMLOS) (days): 5 20  Days to GMLOS:0 3     OBJECTIVE:  Risk of Unplanned Readmission Score: 17 83         Current admission status: Inpatient   Preferred Pharmacy:   [address] [place] [address] [address], [address] [place] [address]  [id number]  [address] [place] [address] Drive   [address] Ker[address] [id number]  Phone: [phone] Fax: [phone] [facility] [id number] Sheran Kayser, [address] [place] [address]  Phone: [phone] Fax: [phone]    [facility] [address] [place] [address]  Phone: [phone] Fax: [phone]    Primary Care Provider: Christiano Murcia DO    Primary Insurance: MEDICARE  Secondary Insurance: Cleo cardoza  Pt was discharged home yesterday, no discharge needs noted

## 2022-12-26 NOTE — ASSESSMENT & PLAN NOTE
· A1c 9/20/2020 was 6 6, most recent A1c 6 4  · Blood glucose elevated especially while on steroid  · on 12/23-24 fasting blood glucose 212 and highest 280  · Added sliding scale coverage and ADA diet

## 2022-12-26 NOTE — ASSESSMENT & PLAN NOTE
Wt Readings from Last 3 Encounters:   12/26/22 130 kg (287 lb 0 6 oz)   12/19/22 134 kg (296 lb 4 8 oz)   12/13/22 (!) 137 kg (301 lb 3 2 oz)     Last ECHO 9/2022 with LVEF 40%, G1 DD  RV systolic function is normal   Cardiomyopathy reportedly nonischemic  · Patient had been complaining of leg swelling previously  · Torsemide was held during last hospitalization, with patient remaining at or near baseline weight  · BNP also normal for patient  · No evidence of vascular congestion by imaging  · Resume torsemide on discharge   · Continue carvedilol, Entresto

## 2022-12-26 NOTE — PLAN OF CARE
Problem: MOBILITY - ADULT  Goal: Maintain or return to baseline ADL function  Description: INTERVENTIONS:  -  Assess patient's ability to carry out ADLs; assess patient's baseline for ADL function and identify physical deficits which impact ability to perform ADLs (bathing, care of mouth/teeth, toileting, grooming, dressing, etc )  - Assess/evaluate cause of self-care deficits   - Assess range of motion  - Assess patient's mobility; develop plan if impaired  - Assess patient's need for assistive devices and provide as appropriate  - Encourage maximum independence but intervene and supervise when necessary  - Involve family in performance of ADLs  - Assess for home care needs following discharge   - Consider OT consult to assist with ADL evaluation and planning for discharge  - Provide patient education as appropriate  Outcome: Progressing  Goal: Maintains/Returns to pre admission functional level  Description: INTERVENTIONS:  - Perform BMAT or MOVE assessment daily    - Set and communicate daily mobility goal to care team and patient/family/caregiver  - Collaborate with rehabilitation services on mobility goals if consulted  - Perform Range of Motion 3 times a day  - Reposition patient every 2 hours    - Dangle patient 3 times a day  - Stand patient 3 times a day  - Ambulate patient 3 times a day  - Out of bed to chair 3 times a day   - Out of bed for meals 3 times a day  - Out of bed for toileting  - Record patient progress and toleration of activity level   Outcome: Progressing     Problem: PAIN - ADULT  Goal: Verbalizes/displays adequate comfort level or baseline comfort level  Description: Interventions:  - Encourage patient to monitor pain and request assistance  - Assess pain using appropriate pain scale  - Administer analgesics based on type and severity of pain and evaluate response  - Implement non-pharmacological measures as appropriate and evaluate response  - Consider cultural and social influences on pain and pain management  - Notify physician/advanced practitioner if interventions unsuccessful or patient reports new pain  Outcome: Progressing     Problem: INFECTION - ADULT  Goal: Absence or prevention of progression during hospitalization  Description: INTERVENTIONS:  - Assess and monitor for signs and symptoms of infection  - Monitor lab/diagnostic results  - Monitor all insertion sites, i e  indwelling lines, tubes, and drains  - Monitor endotracheal if appropriate and nasal secretions for changes in amount and color  - [place] appropriate cooling/warming therapies per order  - Administer medications as ordered  - Instruct and encourage patient and family to use good hand hygiene technique  - Identify and instruct in appropriate isolation precautions for identified infection/condition  Outcome: Progressing  Goal: Absence of fever/infection during neutropenic period  Description: INTERVENTIONS:  - Monitor WBC    Outcome: Progressing     Problem: SAFETY ADULT  Goal: Maintain or return to baseline ADL function  Description: INTERVENTIONS:  -  Assess patient's ability to carry out ADLs; assess patient's baseline for ADL function and identify physical deficits which impact ability to perform ADLs (bathing, care of mouth/teeth, toileting, grooming, dressing, etc )  - Assess/evaluate cause of self-care deficits   - Assess range of motion  - Assess patient's mobility; develop plan if impaired  - Assess patient's need for assistive devices and provide as appropriate  - Encourage maximum independence but intervene and supervise when necessary  - Involve family in performance of ADLs  - Assess for home care needs following discharge   - Consider OT consult to assist with ADL evaluation and planning for discharge  - Provide patient education as appropriate  Outcome: Progressing  Goal: Maintains/Returns to pre admission functional level  Description: INTERVENTIONS:  - Perform BMAT or MOVE assessment daily    - Set and communicate daily mobility goal to care team and patient/family/caregiver  - Collaborate with rehabilitation services on mobility goals if consulted  - Perform Range of Motion 3 times a day  - Reposition patient every 2 hours    - Dangle patient 3 times a day  - Stand patient 3 times a day  - Ambulate patient 3 times a day  - Out of bed to chair 3 times a day   - Out of bed for meals 3 times a day  - Out of bed for toileting  - Record patient progress and toleration of activity level   Outcome: Progressing  Goal: Patient will remain free of falls  Description: INTERVENTIONS:  - Educate patient/family on patient safety including physical limitations  - Instruct patient to call for assistance with activity   - Consult OT/PT to assist with strengthening/mobility   - Keep Call bell within reach  - Keep bed low and locked with side rails adjusted as appropriate  - Keep care items and personal belongings within reach  - Initiate and maintain comfort rounds  - Make Fall Risk Sign visible to staff  - Offer Toileting every 2 Hours, in advance of need  - Initiate/Maintain fall alarm  - Obtain necessary fall risk management equipment: non-skid footwear  - Apply yellow socks and bracelet for high fall risk patients  - Consider moving patient to room near nurses station  Outcome: Progressing     Problem: DISCHARGE PLANNING  Goal: Discharge to home or other facility with appropriate resources  Description: INTERVENTIONS:  - Identify barriers to discharge w/patient and caregiver  - Arrange for needed discharge resources and transportation as appropriate  - Identify discharge learning needs (meds, wound care, etc )  - Arrange for interpretive services to assist at discharge as needed  - Refer to Case Management Department for coordinating discharge planning if the patient needs post-hospital services based on physician/advanced practitioner order or complex needs related to functional status, cognitive ability, or social support system  Outcome: Progressing     Problem: Knowledge Deficit  Goal: Patient/family/caregiver demonstrates understanding of disease process, treatment plan, medications, and discharge instructions  Description: Complete learning assessment and assess knowledge base    Interventions:  - Provide teaching at level of understanding  - Provide teaching via preferred learning methods  Outcome: Progressing     Problem: RESPIRATORY - ADULT  Goal: Achieves optimal ventilation and oxygenation  Description: INTERVENTIONS:  - Assess for changes in respiratory status  - Assess for changes in mentation and behavior  - Position to facilitate oxygenation and minimize respiratory effort  - Oxygen administered by appropriate delivery if ordered  - Initiate smoking cessation education as indicated  - Encourage broncho-pulmonary hygiene including cough, deep breathe, Incentive Spirometry  - Assess the need for suctioning and aspirate as needed  - Assess and instruct to report SOB or any respiratory difficulty  - Respiratory Therapy support as indicated  Outcome: Progressing     Problem: METABOLIC, FLUID AND ELECTROLYTES - ADULT  Goal: Electrolytes maintained within normal limits  Description: INTERVENTIONS:  - Monitor labs and assess patient for signs and symptoms of electrolyte imbalances  - Administer electrolyte replacement as ordered  - Monitor response to electrolyte replacements, including repeat lab results as appropriate  - Instruct patient on fluid and nutrition as appropriate  Outcome: Progressing  Goal: Fluid balance maintained  Description: INTERVENTIONS:  - Monitor labs   - Monitor I/O and WT  - Instruct patient on fluid and nutrition as appropriate  - Assess for signs & symptoms of volume excess or deficit  Outcome: Progressing  Goal: Glucose maintained within target range  Description: INTERVENTIONS:  - Monitor Blood Glucose as ordered  - Assess for signs and symptoms of hyperglycemia and hypoglycemia  - Administer ordered medications to maintain glucose within target range  - Assess nutritional intake and initiate nutrition service referral as needed  Outcome: Progressing     Problem: HEMATOLOGIC - ADULT  Goal: Maintains hematologic stability  Description: INTERVENTIONS  - Assess for signs and symptoms of bleeding or hemorrhage  - Monitor labs  - Administer supportive blood products/factors as ordered and appropriate  Outcome: Progressing     Problem: MUSCULOSKELETAL - ADULT  Goal: Maintain or return mobility to safest level of function  Description: INTERVENTIONS:  - Assess patient's ability to carry out ADLs; assess patient's baseline for ADL function and identify physical deficits which impact ability to perform ADLs (bathing, care of mouth/teeth, toileting, grooming, dressing, etc )  - Assess/evaluate cause of self-care deficits   - Assess range of motion  - Assess patient's mobility  - Assess patient's need for assistive devices and provide as appropriate  - Encourage maximum independence but intervene and supervise when necessary  - Involve family in performance of ADLs  - Assess for home care needs following discharge   - Consider OT consult to assist with ADL evaluation and planning for discharge  - Provide patient education as appropriate  Outcome: Progressing  Goal: Maintain proper alignment of affected body part  Description: INTERVENTIONS:  - Support, maintain and protect limb and body alignment  - Provide patient/ family with appropriate education  Outcome: Progressing     Problem: Potential for Falls  Goal: Patient will remain free of falls  Description: INTERVENTIONS:  - Educate patient/family on patient safety including physical limitations  - Instruct patient to call for assistance with activity   - Consult OT/PT to assist with strengthening/mobility   - Keep Call bell within reach  - Keep bed low and locked with side rails adjusted as appropriate  - Keep care items and personal belongings within reach  - Initiate and maintain comfort rounds  - Make Fall Risk Sign visible to staff  - Offer Toileting every 2 Hours, in advance of need  - Initiate/Maintain fall alarm  - Obtain necessary fall risk management equipment: non-skid footwear  - Apply yellow socks and bracelet for high fall risk patients  - Consider moving patient to room near nurses station  Outcome: Progressing

## 2022-12-26 NOTE — DISCHARGE INSTR - AVS FIRST PAGE
Take rest of antibiotics for aspiration pneumonia  Start with 40 mg prednisone twice a day for three days then 40 mg once daily for 3 days then resume the rest of the steroid pack you had been given on prior discharge and follow those instructions  While on steroids, recommended to remain on Protonix to help protect your stomach from ulcers  Encouraged to take mucinex twice a day while at home while sick to help thin secretions   The saline solution for your nebulizer will also help reduce secretions  COPD pulmonary rehab is highly recommended to help train your lungs to be the best they can be after having COVID, a referral has been made  If no one from the pulm office calls you to set up an appointment in 3 days, please call the office to schedule an appointment

## 2022-12-26 NOTE — ASSESSMENT & PLAN NOTE
Lab Results   Component Value Date    EGFR 58 12/26/2022    EGFR 61 12/25/2022    EGFR 57 12/24/2022    CREATININE 1 28 12/26/2022    CREATININE 1 23 12/25/2022    CREATININE 1 29 12/24/2022     Remained stable and at baseline of 1 2-1 4

## 2022-12-26 NOTE — ASSESSMENT & PLAN NOTE
· Continue carvedilol  · Patient is also on Entresto given history of CHF      · Current blood pressures are optimal

## 2022-12-26 NOTE — RESPIRATORY THERAPY NOTE
12/26/22 0710   Inhalation Therapy Tx   $ Inhalation Therapy Performed Yes   $ Pulse Oximetry Spot Check Charge Completed   Pre-Treatment Pulse 73   Pre-Treatment Respirations 20   Duration 20   Breath Sounds Pre-Treatment Bilateral Diminished   Breath Sounds Post-Treatment Bilateral Diminished   Post-Treatment Pulse 71   Post-Treatment Respirations 20   Delivery Source Aerogen   Position Up in chair   Treatment Tolerance Tolerated well   Resp Comments patient states he feels better, he is on room air

## 2022-12-26 NOTE — DISCHARGE SUMMARY
[address] [facility] [address] [place]  Discharge- Zachariah Ao 1955, 74 y o  male MRN: [id number]  Unit/Bed#: 409-01 Encounter: [id number]  Primary Care Provider: Romero Bonilla DO   Date and time admitted to hospital: 12/21/2022 10:31 AM    COPD, severe ([facility])  Assessment & Plan  · Exacerbation secondary to COVID  · Significant improvement  · Patient tolerated transition to oral steroids  · Able to walk without issue  · Re-initiate steroid taper on discharge  · pulm follow up    COVID-19 virus infection  Assessment & Plan  · Diagnosed 12/16 and received full course of IV remdesivir  · No further inpatient treatments were recommended    Type 2 diabetes mellitus ([facility])  Assessment & Plan  · A1c 9/20/2020 was 6 6, most recent A1c 6 4  · Blood glucose elevated especially while on steroid  · on 12/23-24 fasting blood glucose 212 and highest 280  · Added sliding scale coverage and ADA diet  Chest pain  Assessment & Plan  · Patient had an episode of CP previously described as mid sternal, sharp, and burning  · This resolved without intervention, with high-sensitivity troponins negative  · ECG was reportedly without changes  · Suspected reflux, with patient initiated on PPI therapy  · Resolved with no further episodes reported  Stage 3 chronic kidney disease, unspecified whether stage 3a or 3b CKD [facility])  Assessment & Plan  Lab Results   Component Value Date    EGFR 58 12/26/2022    EGFR 61 12/25/2022    EGFR 57 12/24/2022    CREATININE 1 28 12/26/2022    CREATININE 1 23 12/25/2022    CREATININE 1 29 12/24/2022     Remained stable and at baseline of 1 2-1 4  Benign essential hypertension  Assessment & Plan  · Continue carvedilol  · Patient is also on Entresto given history of CHF      · Current blood pressures are optimal     Chronic combined systolic and diastolic congestive heart failure (HCC)  Assessment & Plan  Wt Readings from Last 3 Encounters:   12/26/22 130 kg (287 lb 0 6 oz) 12/19/22 134 kg (296 lb 4 8 oz)   12/13/22 (!) 137 kg (301 lb 3 2 oz)     Last ECHO 9/2022 with LVEF 40%, G1 DD  RV systolic function is normal   Cardiomyopathy reportedly nonischemic  · Patient had been complaining of leg swelling previously  · Torsemide was held during last hospitalization, with patient remaining at or near baseline weight  · BNP also normal for patient  · No evidence of vascular congestion by imaging  · Resume torsemide on discharge   · Continue carvedilol, Entresto  Obstructive sleep apnea  Assessment & Plan  · CPAP qhs ordered But patient typically declines  * Aspiration pneumonia [facility])  Assessment & Plan  Timeline    - Originally hospitalized 12/16-12/19 and treated for COVID-19 infection     - Readmitted 12/21 with worsening weakness and SOB  - CXR 12/21 with right perihilar opacity suspicious for infiltrate  Procalcitonin negative X3, ceftriaxone dropped off after 2 days secondary to an ordered end date to this medication   - Was having a significant coughing episode, in the midst of expectorating sputum when he inhaled and experienced significantly worsening dyspnea  Subsequent CT on 12/23 with findings concerning for aspiration, debris in the RML and LLL with significant LLL consolidation  Patient's last procalcitonin predates aspiration event  He has also been off antibiotics  Although he feels significantly improved, will initiate on Unasyn and repeat procalcitonin now and again in the morning    Pending results may need a short course of antibiotic therapy - complete course with augmentin outpatient defer to pulm outpatient for further imaging      Medical Problems     Resolved Problems  Date Reviewed: 12/25/2022   None       Discharging Physician / Practitioner: Sarahi Villalobos PA-C  PCP: Francis Stratton DO  Admission Date:   Admission Orders (From admission, onward)     Ordered        12/21/22 3554  [facility]  Once Discharge Date: 12/26/22    Consultations During Hospital Stay:  · none    Procedures Performed:   · none    Significant Findings / Test Results:     CTA chest pe study   Final Result      1  No pulmonary embolism  2   Findings concerning for aspiration with debris filling the right middle lobe and left lower lobe bronchi with middle lobe collapse and significant left lower lobe consolidation/atelectasis  The study was marked in [facility] for immediate notification  Workstation performed: [id number]         X-ray chest 1 view portable   Final Result      Development of right perihilar opacity suspicious for infiltrate                  Workstation performed: [id number]               Incidental Findings:   · none    Test Results Pending at Discharge (will require follow up):   · none     Outpatient Tests Requested:  · none    Complications:  none    Reason for Admission: SOB    Hospital Course:   Rico Lynch is a 74 y o  male patient who originally presented to the hospital on 12/21/2022 due to continued shortness of breath  He had been admitted to the hospital from 12/16 to 12/19 for COVID treatments and management of COPD exacerbation  He received 3 days of remdesivir at that time along with IV steroids and was transitioned to steroid taper on discharge with resolution of wheezing and symptoms  Patient returned on 12/21 with increasing shortness of breath  He states that he was doing well at home but then began to decline again and was unable to walk without becoming short of breath  He was treated once more for COPD exacerbation with IV steroids  Soon after admission, he did experience an event where he was coughing up sputum but then aspirated it  This is likely what happened at home as well causing his worsening condition    He was initiated on vest therapy as well as aggressive pulmonary toilet which resulted in a significant amount of sputum production and patient felt significantly improved  He was able to be weaned to oral steroids without issue  He was also maintained on IV antibiotics for 2 days for coverage of aspiration pneumonia  Home O2 eval performed 12/25 without issue or need for oxygen  Please see above list of diagnoses and related plan for additional information  Condition at Discharge: stable    Discharge Day Visit / Exam:   Subjective:  Patient reports feeling significantly better at this time  Vitals: Blood Pressure: 124/71 (12/26/22 0807)  Pulse: 74 (12/26/22 0807)  Temperature: (!) 97 4 °F (36 3 °C) (12/26/22 0807)  Temp Source: Oral (12/26/22 0807)  Respirations: 20 (12/26/22 0807)  Height: 6' 2" (188 cm) (12/21/22 1626)  Weight - Scale: 130 kg (287 lb 0 6 oz) (12/26/22 0600)  SpO2: 90 % (12/26/22 0807)  Exam:   Physical Exam  Vitals and nursing note reviewed  Constitutional:       General: He is not in acute distress  Appearance: He is obese  He is not ill-appearing  HENT:      Head: Normocephalic and atraumatic  Cardiovascular:      Rate and Rhythm: Normal rate and regular rhythm  Pulses: Normal pulses  Heart sounds: Normal heart sounds  No murmur heard  No gallop  Pulmonary:      Effort: Pulmonary effort is normal       Breath sounds: Wheezing present  No rales  Abdominal:      General: Bowel sounds are normal       Palpations: Abdomen is soft  Tenderness: There is no abdominal tenderness  There is no guarding or rebound  Musculoskeletal:         General: Normal range of motion  Cervical back: Normal range of motion and neck supple  Right lower leg: No edema  Left lower leg: No edema  Skin:     General: Skin is warm and dry  Neurological:      General: No focal deficit present  Mental Status: He is alert and oriented to person, place, and time  Mental status is at baseline     Psychiatric:         Mood and Affect: Mood normal          Behavior: Behavior normal           Discussion with Family: Updated  (wife) at bedside  Discharge instructions/Information to patient and family:   See after visit summary for information provided to patient and family  Provisions for Follow-Up Care:  See after visit summary for information related to follow-up care and any pertinent home health orders  Disposition:   Home with VNA Services (Reminder: Complete face to face encounter)    Planned Readmission: none     Discharge Statement:  I spent 50 minutes discharging the patient  This time was spent on the day of discharge  I had direct contact with the patient on the day of discharge  Greater than 50% of the total time was spent examining patient, answering all patient questions, arranging and discussing plan of care with patient as well as directly providing post-discharge instructions  Additional time then spent on discharge activities  Discharge Medications:  See after visit summary for reconciled discharge medications provided to patient and/or family        **Please Note: This note may have been constructed using a voice recognition system**

## 2022-12-26 NOTE — ASSESSMENT & PLAN NOTE
Timeline    - Originally hospitalized 12/16-12/19 and treated for COVID-19 infection     - Readmitted 12/21 with worsening weakness and SOB  - CXR 12/21 with right perihilar opacity suspicious for infiltrate  Procalcitonin negative X3, ceftriaxone dropped off after 2 days secondary to an ordered end date to this medication   - Was having a significant coughing episode, in the midst of expectorating sputum when he inhaled and experienced significantly worsening dyspnea  Subsequent CT on 12/23 with findings concerning for aspiration, debris in the RML and LLL with significant LLL consolidation  Patient's last procalcitonin predates aspiration event  He has also been off antibiotics  Although he feels significantly improved, will initiate on Unasyn and repeat procalcitonin now and again in the morning    Pending results may need a short course of antibiotic therapy - complete course with augmentin outpatient defer to pulm outpatient for further imaging

## 2022-12-26 NOTE — ASSESSMENT & PLAN NOTE
· Diagnosed 12/16 and received full course of IV remdesivir      · No further inpatient treatments were recommended

## 2022-12-26 NOTE — ASSESSMENT & PLAN NOTE
· Exacerbation secondary to COVID  · Significant improvement  · Patient tolerated transition to oral steroids  · Able to walk without issue  · Re-initiate steroid taper on discharge  · pulm follow up Duration Of Freeze Thaw-Cycle (Seconds): 10 Render Post-Care Instructions In Note?: no Detail Level: Simple Post-Care Instructions: -

## 2022-12-26 NOTE — ASSESSMENT & PLAN NOTE
· Patient had an episode of CP previously described as mid sternal, sharp, and burning  · This resolved without intervention, with high-sensitivity troponins negative  · ECG was reportedly without changes  · Suspected reflux, with patient initiated on PPI therapy  · Resolved with no further episodes reported

## 2022-12-27 ENCOUNTER — TRANSITIONAL CARE MANAGEMENT (OUTPATIENT)
Dept: FAMILY MEDICINE CLINIC | Facility: CLINIC | Age: 67
End: 2022-12-27

## 2023-01-03 ENCOUNTER — OFFICE VISIT (OUTPATIENT)
Dept: FAMILY MEDICINE CLINIC | Facility: CLINIC | Age: 68
End: 2023-01-03

## 2023-01-03 VITALS
BODY MASS INDEX: 23.75 KG/M2 | OXYGEN SATURATION: 97 % | RESPIRATION RATE: 18 BRPM | TEMPERATURE: 97.6 F | WEIGHT: 185 LBS | DIASTOLIC BLOOD PRESSURE: 78 MMHG | SYSTOLIC BLOOD PRESSURE: 126 MMHG | HEART RATE: 77 BPM

## 2023-01-03 DIAGNOSIS — I50.42 CHRONIC COMBINED SYSTOLIC (CONGESTIVE) AND DIASTOLIC (CONGESTIVE) HEART FAILURE (HCC): ICD-10-CM

## 2023-01-03 DIAGNOSIS — E11.9 TYPE 2 DIABETES MELLITUS WITHOUT COMPLICATION, WITHOUT LONG-TERM CURRENT USE OF INSULIN (HCC): ICD-10-CM

## 2023-01-03 DIAGNOSIS — E11.69 TYPE 2 DIABETES MELLITUS WITH OTHER SPECIFIED COMPLICATION, UNSPECIFIED WHETHER LONG TERM INSULIN USE (HCC): Primary | ICD-10-CM

## 2023-01-03 DIAGNOSIS — F33.1 MODERATE EPISODE OF RECURRENT MAJOR DEPRESSIVE DISORDER (HCC): ICD-10-CM

## 2023-01-03 DIAGNOSIS — J43.9 PULMONARY EMPHYSEMA, UNSPECIFIED EMPHYSEMA TYPE (HCC): ICD-10-CM

## 2023-01-03 DIAGNOSIS — E66.01 CLASS 2 SEVERE OBESITY DUE TO EXCESS CALORIES WITH SERIOUS COMORBIDITY AND BODY MASS INDEX (BMI) OF 38.0 TO 38.9 IN ADULT (HCC): ICD-10-CM

## 2023-01-03 DIAGNOSIS — N18.31 STAGE 3A CHRONIC KIDNEY DISEASE (HCC): ICD-10-CM

## 2023-01-03 PROBLEM — E66.812 CLASS 2 SEVERE OBESITY DUE TO EXCESS CALORIES WITH SERIOUS COMORBIDITY AND BODY MASS INDEX (BMI) OF 38.0 TO 38.9 IN ADULT (HCC): Status: ACTIVE | Noted: 2023-01-03

## 2023-01-03 NOTE — PROGRESS NOTES
Name: Yin Murguia      : 1955      MRN: [id number]  Encounter Provider: Daisy Acosta DO  Encounter Date: 1/3/2023   Encounter department: [facility] [address]     1  Type 2 diabetes mellitus with other specified complication, unspecified whether long term insulin use (HCC)  -     HEMOGLOBIN A1C W/ EAG ESTIMATION; Future  -     Comprehensive metabolic panel; Future  -     GFR; Future  -     Microalbumin / creatinine urine ratio  -     Glucometer  -     Glucometer test strips  Suspect steroid induced and pt is planning to do pulmonary rehab and limit carbs  He will start monitoring  Repeat A1c in 3 months but hopeful can resolve with lifestyle changes     2  Moderate episode of recurrent major depressive disorder (HCC)  Stable on current regimen  He is looking forward to Pulmonary rehab and has appt next week     3  Class 2 severe obesity due to excess calories with serious comorbidity and body mass index (BMI) of 38 0 to 38 9 in [facility])  Pt lost 25 pounds since in the hospital and plans to work on further weight loss with dietary changes     4  Pulmonary emphysema, unspecified emphysema type ([facility] )  Improved since hospital and will start pulmonary rehab  Has Pulmonary appt next week     5  Chronic combined systolic (congestive) and diastolic (congestive) heart failure (HCC)  Stable on current rx     6  Type 2 diabetes mellitus without complication, without long-term current use of insulin (HCC)  A1c 6 4 but had been on steroids so he is working on diet/weight loss to improve     7  Stage 3a chronic kidney disease ([facility] )  Stay hydrated Recheck GFR next visit     Rto March with repeat A1c  Encouraged tp to setup eye exam     Depression Screening and Follow-up Plan: Patient was screened for depression during today's encounter  They screened negative with a PHQ-2 score of 0          Subjective      HPI   Pt is improving since discharge Cough has almost resolved and feels breathing much better No chest pain No fever or chills Appetite good but he is cuttingportions as he lost 25 pounds and wants to lose more He is planning to start pulmonary rehab and has appt next week He is aware of elevated sugar but was on steroids as well He does plan to cut portions and eat healthier overall He does not have home oxygen and does not feel needed Uses neb treatment about once/day He has not gotten used to using his cpap yet Lower leg edema much improved   TCM Call     Date and time call was made  12/27/2022  2:30 PM    Hospital care reviewed  Other diagnostic studies pending  PULMONARY REHAB    Patient was hospitialized at  [address] [place] [address]    Date of Admission  12/21/22    Date of discharge  12/26/22    Diagnosis  COVID PNEUMONIA    Disposition  Home    Were the patients medications reviewed and updated  No    Current Symptoms  None      TCM Call     Post hospital issues  None    Should patient be enrolled in anticoag monitoring? No    Scheduled for follow up? Yes  TCM 1/3    Patients specialists  Pulmonlolgist; Other (comment)    Pulmonologist contact #  Adri [phone]    Endocrinologist name  Adri Orr    Other specialists names  DR FERRER-ENT    Did you obtain your prescribed medications  Yes    Do you need help managing your prescriptions or medications  No    Is transportation to your appointment needed  No    I have advised the patient to call PCP with any new or worsening symptoms  KRISTEL HENDRICKS-          Review of Systems   Constitutional: Negative for chills and fever  HENT: Negative  Eyes: Negative for visual disturbance  Respiratory: Negative for cough and shortness of breath  Cardiovascular: Negative for chest pain, palpitations and leg swelling  Gastrointestinal: Negative for abdominal distention and abdominal pain  Genitourinary: Negative  Musculoskeletal: Positive for arthralgias     Neurological: Negative for dizziness, light-headedness and headaches  Psychiatric/Behavioral: Negative for sleep disturbance  The patient is not nervous/anxious  Current Outpatient Medications on File Prior to Visit   Medication Sig   • albuterol (ProAir HFA) 90 mcg/act inhaler Inhale 2 puffs every 6 (six) hours as needed for wheezing   • ALPRAZolam (XANAX) 0 5 mg tablet Take 1 tablet (0 5 mg total) by mouth daily at bedtime as needed for anxiety   • atorvastatin (LIPITOR) 20 mg tablet Take 1 tablet (20 mg total) by mouth daily   • carvedilol (COREG) 12 5 mg tablet Take 2 tablets (25 mg total) by mouth 2 (two) times a day with meals   • DULoxetine (CYMBALTA) 60 mg delayed release capsule Take 1 capsule (60 mg total) by mouth daily   • fluticasone (FLONASE) 50 mcg/act nasal spray 1 spray into each nostril 2 (two) times a day   • Fluticasone-Salmeterol (Advair) 500-50 mcg/dose inhaler INHALE ONE PUFF BY MOUTH AND INTO THE LUNGS TWICE A DAY  RINSE MOUTH AFTER USE   • furosemide (LASIX) 20 mg tablet Take 1 tablet (20 mg total) by mouth daily   • guaiFENesin 1200 MG TB12 Take 1 tablet (1,200 mg total) by mouth every 12 (twelve) hours   • ipratropium-albuterol (DUO-NEB) 0 5-2 5 mg/3 mL nebulizer solution Take 3 mL by nebulization every 6 (six) hours as needed for wheezing or shortness of breath   • pantoprazole (PROTONIX) 20 mg tablet Take 1 tablet (20 mg total) by mouth daily in the early morning Do not start before December 27, 2022  • sacubitril-valsartan (Entresto) 24-26 MG TABS Take 1 tablet by mouth 2 (two) times a day 49-51mg   • sodium chloride 3 % inhalation solution Take 4 mL by nebulization as needed for cough (congestion)       Objective     /78   Pulse 77   Temp 97 6 °F (36 4 °C) (Temporal)   Resp 18   Wt 83 9 kg (185 lb)   SpO2 97%   BMI 23 75 kg/m²   Diabetic Foot Exam    Patient's shoes and socks removed  Right Foot/Ankle   Right Foot Inspection  Skin Exam: skin normal, skin intact and dry skin   No warmth, no callus, no erythema, no maceration, no abnormal color, no pre-ulcer, no ulcer and no callus  Toe Exam: ROM and strength within normal limits  Sensory   Vibration: diminished  Monofilament testing: diminished    Vascular  The right DP pulse is 2+  The right PT pulse is 1+  Left Foot/Ankle  Left Foot Inspection  Skin Exam: skin normal, skin intact and dry skin  No warmth, no erythema, no maceration, normal color, no pre-ulcer, no ulcer and no callus  Toe Exam: ROM and strength within normal limits  Sensory   Vibration: diminished  Monofilament testing: diminished    Vascular  The left DP pulse is 2+  The left PT pulse is 1+  Assign Risk Category  No deformity present  Loss of protective sensation  No weak pulses  Risk: 1    Physical Exam  Vitals reviewed  Constitutional:       General: He is not in acute distress  Appearance: Normal appearance  He is not ill-appearing, toxic-appearing or diaphoretic  HENT:      Head: Normocephalic and atraumatic  Right Ear: Tympanic membrane and external ear normal       Left Ear: Tympanic membrane and external ear normal       Nose: Nose normal       Mouth/Throat:      Mouth: Mucous membranes are dry  Eyes:      General: No scleral icterus  Cardiovascular:      Rate and Rhythm: Normal rate  Rhythm irregular  Pulses: no weak pulses          Dorsalis pedis pulses are 2+ on the right side and 2+ on the left side  Posterior tibial pulses are 1+ on the right side and 1+ on the left side  Heart sounds: Normal heart sounds  Pulmonary:      Effort: Pulmonary effort is normal  No respiratory distress  Breath sounds: Normal breath sounds  No wheezing or rales  Abdominal:      General: Bowel sounds are normal  There is no distension  Palpations: Abdomen is soft  Tenderness: There is no abdominal tenderness  Musculoskeletal:      Cervical back: Normal range of motion and neck supple  No rigidity  Right lower leg: No edema        Left lower leg: No edema  Feet:      Right foot:      Skin integrity: Dry skin present  No ulcer, skin breakdown, erythema, warmth or callus  Left foot:      Skin integrity: Dry skin present  No ulcer, skin breakdown, erythema, warmth or callus  Lymphadenopathy:      Cervical: No cervical adenopathy  Skin:     General: Skin is dry  Coloration: Skin is not jaundiced or pale  Neurological:      General: No focal deficit present  Mental Status: He is alert and oriented to person, place, and time  Mental status is at baseline  Psychiatric:         Mood and Affect: Mood normal          Behavior: Behavior normal          Thought Content:  Thought content normal          Judgment: Judgment normal        Daisy Acosta DO

## 2023-01-05 ENCOUNTER — TELEPHONE (OUTPATIENT)
Dept: FAMILY MEDICINE CLINIC | Facility: CLINIC | Age: 68
End: 2023-01-05

## 2023-01-05 NOTE — TELEPHONE ENCOUNTER
Pt called stated while in hospital he was given pantoprazole  He states he has been dizzy and having diarrhea since on this medication wondering if he should just stop the medication? States only has a few doses left and was unsure why they prescribe the medication in the first place  Please advise

## 2023-01-05 NOTE — TELEPHONE ENCOUNTER
He can stop - it looked like they put him on it due to the meds given in the hospital that might irritate his stomach

## 2023-01-09 ENCOUNTER — OFFICE VISIT (OUTPATIENT)
Dept: PULMONOLOGY | Facility: CLINIC | Age: 68
End: 2023-01-09

## 2023-01-09 VITALS
TEMPERATURE: 98.3 F | DIASTOLIC BLOOD PRESSURE: 72 MMHG | OXYGEN SATURATION: 96 % | HEIGHT: 74 IN | HEART RATE: 82 BPM | BODY MASS INDEX: 37.81 KG/M2 | WEIGHT: 294.6 LBS | SYSTOLIC BLOOD PRESSURE: 134 MMHG | RESPIRATION RATE: 20 BRPM

## 2023-01-09 DIAGNOSIS — G47.33 OBSTRUCTIVE SLEEP APNEA: ICD-10-CM

## 2023-01-09 DIAGNOSIS — J18.9 PNEUMONIA: ICD-10-CM

## 2023-01-09 DIAGNOSIS — J44.9 ASTHMA-COPD OVERLAP SYNDROME (HCC): Primary | ICD-10-CM

## 2023-01-09 DIAGNOSIS — E66.01 CLASS 2 SEVERE OBESITY DUE TO EXCESS CALORIES WITH SERIOUS COMORBIDITY AND BODY MASS INDEX (BMI) OF 37.0 TO 37.9 IN ADULT (HCC): ICD-10-CM

## 2023-01-09 PROBLEM — J44.1 COPD WITH ACUTE EXACERBATION (HCC): Status: RESOLVED | Noted: 2022-05-06 | Resolved: 2023-01-09

## 2023-01-09 RX ORDER — MONTELUKAST SODIUM 10 MG/1
10 TABLET ORAL
Qty: 30 TABLET | Refills: 5 | Status: SHIPPED | OUTPATIENT
Start: 2023-01-09

## 2023-01-09 NOTE — ASSESSMENT & PLAN NOTE
Moderate CORDELL, has Auto-CPAP at home- not using it  I reminded him the negative consequences of untreated moderate CORDELL  Encouraged him to try CPAP again

## 2023-01-09 NOTE — PROGRESS NOTES
Pulmonary Rehabilitation Plan of Care   Initial Care Plan      Today's date: 1/10/2023   # of Exercise Sessions Completed: 1  Patient name: Amadeo Manuel      : 1955  Age: 74 y o  MRN: [id number]  Referring Physician: Quin Mar MD  Pulmonologist: Linell Dancer, MD  Provider: Bonnie hollingsworth  Clinician: Chana Mitchell MS    Dx: Asthma-COPD overlap syndrome  Date of onset: Patient states he has had COPD since  but recently had COVID which exacerbated his symptoms  SUMMARY OF PROGRESS:  Today is Brenden's initial evaluation to begin Pulmonary Rehab for the diagnosis of asthma-COPD overlap syndrome  Patient does have a PFT on file, revealing an FEV1/FVC ratio of 54% and an FEV1 of 55%  This is suggestive of moderate obstruction  Since their diagnosis, the patient has been experiencing increased dyspnea, increased sitting time, decreased physical activity and increased fatigue  They report weakness and fatigue when completing ADLs   The patient currently does not follow a formal exercise program at home  Pt reports the following physical limitations: occasional loss of balance, HADDAD, fatigue  Depression screening using the PHQ-9 interprets the patient's score of 6 5-9 = Mild Depression  Anxiety screening using the ALLYSSA-7 interprets the patients score of 2  0-4  = Not anxious  When addressed, the patient denies having depression/anxiety  Patient reports excellent social/emotional support  Information to begin using [employer] was provided as well as contact information for counseling through [facility]  PHQ-9 score will be reassessed in 30 days  The patient is a former smoker (quit 5 years ago)  Patient admits to 100% medication compliance  At rest, the patient rated dyspnea 1/10 with SpO2 95% on room air  They completed an initial 6MWT, walking 600 ft on room air  The patient’s rating of perceived dyspnea during the 6MWT was 7/10 with SpO2 92-93%    Patient took 0 rest breaks  Resting HR 66 and /64 with appropriate hemodynamic response to exercise reaching 104 and 140/64  During recovery, HR 66, SpO2 95%, and /62Patient will exercise on room air  Education on smoking cessation, oxygen use, breathing techniques, pulmonary anatomy, exercise for the pulmonary patient, healthy eating, stress, and relaxation will be provided  Patient goals include: increase strength, improve balance, improve functional capacity  Netta Sanchez will attend 24 exercise sessions, 2-3x/wk for 8-10 weeks beginning 1/16/2023  Will progress patient as tolerated over the next 30 days  Medication compliance: Yes   Comments: Pt reports to be compliant with medications  Fall Risk: Moderate   Comments: Patient uses walking assist device (walker/cane/rollator) and Denies a fall in the past 6 months    Smoking: Former user    RPD at Rest: 1/10  RPD with Exercise:  7/10    Assessment of progression of lung disease and functional status:  CAT: 20/40  Shortness of breath questionnaire: 58/120      EXERCISE ASSESSMENT and PLAN    Current Exercise Program in Rehab:       Frequency: 2-3 days/week   Supplement with home exercise 2+ days/wk as tolerated        Minutes: 30-35         METS: 2-3              SpO2: 90% or greater              RPD: 0-4                      HR: 20-30 bpm above rest   RPE: 4-5         Modalities: Treadmill, UBE, NuStep and Recumbent bike      Exercise Progression 30 Day Goals :    Frequency: 2-3 days/week    Supplement with home exercise 2+ days/wk as tolerated       Minutes: 40-45         METS: 3-4              SpO2: 90% or greater              RPD: 0-4                      HR: 20-30 bpm above rest   RPE: 4-5        Modalities: Treadmill, UBE, NuStep and Recumbent bike     Strength training:   Will be added following 2-3 weeks of monitored exercise sessions   Modalities: Leg Press, Lateral Raise, Arm Curl and Front Raises    Oxygen Needs: on room air at rest and on room air with exercise  Oxygen Goal: Maintain SpO2>90% during exercise    Home Exercise: none  Education: pursed lipped breathing, diaphragmatic breathing, relaxation breathing, home exercise and benefits of exercise for pulmonary disease    Goals: reduced score on  US Shortness of Breath Questionnaire, Improved 6MW distance by 10%, reduced dyspnea during exercise (0-3/10), improved exercise tolerance (max METs tolerated in pulmonary rehab) and SpO2 >90% during exercise  Progressing:  Reviewed Pt goals and determined plan of care, Will continue to educate and progress as tolerated  Plan: Titrate supplemental oxygen as needed to maintain SpO2>90% with exercise, learn to conserve energy with ADLs , practice breathing techniques 3x/day and reduce time sitting at home    Readiness to change: Preparation:  (Getting ready to change)       NUTRITION ASSESSMENT AND PLAN    Weight control:    Starting weight: 279   Current weight:       Diabetes: T2DM    Goals:reduced BMI to < 25, LDL <100, HDL >40, TRG <150 and CHOL <200  Education: heart healthy eating  low sodium diet  hydration  nutrition for  lipid management  nutrition for Improved BG control  Progressing:Reviewed Pt goals and determined plan of care, Will continue to educate and progress as tolerated    Plan: Education class: Reading Food Labels and Education Class: Heart Healthy Eating  Readiness to change: Preparation:  (Getting ready to change)       PSYCHOSOCIAL ASSESSMENT AND PLAN    Emotional:  Depression assessment:  PHQ-9 = 6 5-9 = Mild Depression            Anxiety measure:  ALLYSSA-7 = 2 0-4  = Not anxious  Self-reported stress level: 1 /10  Social support: Very Good    Goals:  Reduce perceived stress to 1-3/10, improved [facility] QOL < 27, PHQ-9 - reduced severity by one level, Feelings in [facility] Score < 3, Physical Fitness in [facility] Score < 3, Social Support in [facility] Score < 3, Daily Activity in [facility] Score < 3, Social Activities in [facility] Score < 3, Pain in [facility] Score < 3, Overall Health in [facility] Score < 3, Quality of Life in [facility] Score < 3  and Change in Health in [facility] Score < 3   Education: signs/sxs of depression, benefits of a positive support system, stress management techniques and depression and CAD    Progressing:Reviewed Pt goals and determined plan of care, Will continue to educate and progress as tolerated  Plan: Class: Stress and Your Health and Class: Relaxation  Readiness to change: Preparation:  (Getting ready to change)       OTHER CORE COMPONENTS     Tobacco:   Social History     Tobacco Use   Smoking Status Former   • Packs/day: 3 00   • Years: 43 00   • Pack years: 129 00   • Types: Cigarettes   • Start date:    • Quit date:    • Years since quittin 0   Smokeless Tobacco Never       Tobacco Use Intervention: Referral to tobacco expert:   Pt quit 5 years ago   and has abstained    Blood pressure:    Restin/64   Exercise: 140/64    Recovery: 126/62    /Goals: consistent BP < 130/80, reduced dietary sodium <2300mg and moderate intensity exercise >150 mins/wk  Education:  pathophysiology of pulmonary disease, preventing infections, dangers of smoking and tobacco triggers  Progressing:Reviewed Pt goals and determined plan of care, Will continue to educate and progress as tolerated  Plan: Class: Understanding Heart Disease and Class: Common Heart Medications  Readiness to change: Preparation:  (Getting ready to change)         PULMONARY REHAB ASSESSMENT    Today's date: 2023  Patient name: Feli Vaughn     : 1955       MRN: [id number]  PCP: Barry Solis DO  Referring Physician: Gita Porras MD  Pulmonologist: Patrick Landeros MD    Dx: Asthma-COPD overlap syndrome  Date of onset: Patient states he has had COPD since  but recently was sick and exacerbated his symptoms    Cultural needs: None    Weight:    Wt Readings from Last 1 Encounters:   23 83 9 kg (185 lb)      Height:   Ht Readings from Last 1 Encounters:   12/21/22 6' 2" (1 88 m)     Medical History:   Past Medical History:   Diagnosis Date   • Asthma    • CHF (congestive heart failure) ([facility])    • COPD (chronic obstructive pulmonary disease) ([facility] )    • COVID-19    • Mumps    • Old MI (myocardial infarction)    • Pneumonia    • Pulmonary emphysema ([facility] )    • Rectal bleeding 1/14/2019   • Sleep apnea          Physical Limitations: Occasional loss of balance, fatigue, HADDAD    Oxygen needs: Room air    History of Toxic Exposure:  Second hand smoke    Risk Factors   Cholesterol: Yes  Smoking: Former user  HTN: Yes  DM: Type 2   Obesity: Yes   Inactivity: Yes  Stress:  perceived  stress: 1/10   Stressors: Health   Goals for Stress Management: Enjoy a hobby    Family History:  Family History   Problem Relation Age of Onset   • Heart attack Father         MI   • Heart disease Father    • Diabetes Sister         DM   • Arthritis Family    • Coronary artery disease Family    • Hypertension Family    • Tuberculosis Son    • Alzheimer's disease Mother        Allergies: Ciprofloxacin  ETOH:   Social History     Substance and Sexual Activity   Alcohol Use Not Currently    Comment: rarely         Current Medications:   Current Outpatient Medications   Medication Sig Dispense Refill   • albuterol (ProAir HFA) 90 mcg/act inhaler Inhale 2 puffs every 6 (six) hours as needed for wheezing 18 g 11   • ALPRAZolam (XANAX) 0 5 mg tablet Take 1 tablet (0 5 mg total) by mouth daily at bedtime as needed for anxiety 30 tablet 0   • atorvastatin (LIPITOR) 20 mg tablet Take 1 tablet (20 mg total) by mouth daily 90 tablet 3   • carvedilol (COREG) 12 5 mg tablet Take 2 tablets (25 mg total) by mouth 2 (two) times a day with meals     • DULoxetine (CYMBALTA) 60 mg delayed release capsule Take 1 capsule (60 mg total) by mouth daily 30 capsule 5   • fluticasone (FLONASE) 50 mcg/act nasal spray 1 spray into each nostril 2 (two) times a day 16 g 6 • Fluticasone-Salmeterol (Advair) 500-50 mcg/dose inhaler INHALE ONE PUFF BY MOUTH AND INTO THE LUNGS TWICE A DAY  RINSE MOUTH AFTER USE 60 blister 0   • furosemide (LASIX) 20 mg tablet Take 1 tablet (20 mg total) by mouth daily 30 tablet 11   • guaiFENesin 1200 MG TB12 Take 1 tablet (1,200 mg total) by mouth every 12 (twelve) hours 30 tablet 0   • ipratropium-albuterol (DUO-NEB) 0 5-2 5 mg/3 mL nebulizer solution Take 3 mL by nebulization every 6 (six) hours as needed for wheezing or shortness of breath 360 mL 0   • pantoprazole (PROTONIX) 20 mg tablet Take 1 tablet (20 mg total) by mouth daily in the early morning Do not start before December 27, 2022  15 tablet 0   • sacubitril-valsartan (Entresto) 24-26 MG TABS Take 1 tablet by mouth 2 (two) times a day 49-51mg     • sodium chloride 3 % inhalation solution Take 4 mL by nebulization as needed for cough (congestion) 30 mL 0     No current facility-administered medications for this visit  Functional Status Prior to Diagnosis for Treatment   Occupation: retired  Recreation: Enjoy a hobby  ADL’s: No limitations  [place]: No limitations  Exercise: None  Other: none    Current Functional Status  Occupation: retired  Recreation: Enjoy a hobby  ADL’s:Capable of performing light to moderate ADLs  [place]: Capable of performing light to moderate ADLs  Exercise: None  Other: None    Patient Specific Goals:   Increase strength, reduce HADDAD, improve functional capacity    Short Term Program Goals: dietary modifications increased strength improved energy/stamina with ADLs exercise 120-150 mins/wk    Long Term Goals: increased maximal walking duration  increased intial training workload  Improved functional capacity  Improved Quality of Life - [facility] score reduced    Oxygen Goals: Maintain SpO2>90% titrating supplemental oxygen as needed     Ability to reach goals/rehabilitation potential:  Very Good     Projected return to function: 8-12 weeks  Objective tests: 6 MWT      Nutritional   Reviewed details of Rate your Plate  Discussed key elements of heart healthy eating  Reviewed patient goals for dietary modifications and their clinical implications  Reviewed most recent lipid profile  Goals for dietary modification: choose lean cuts of meat  low fat ground meat and poultry  eliminate processed meats  more meatless meals  reduced fat cheese  eliminate butter  low sodium  improved snack choices      Emotional/Social  Patient reports he/she is coping well with good social support and denies depression or anxiety    SOCIAL SUPPORT NETWORK    Marital status:       Domestic Violence Screening: No    Comments: Patient requires skilled MO to achieve goals and maximum functional capacity  MO medically necessary for secondary prevention

## 2023-01-09 NOTE — PROGRESS NOTES
Pulmonary Outpatient Note   Sandoval Diamond 74 y o  male MRN: [id number]  1/9/2023      Referring Physician: Sandra Fischer PA-C    Reason for Consultation:    Chief Complaint   Patient presents with   • COPD         Assessment/Plan:    1  Asthma-COPD overlap syndrome ([facility] [address] )  Assessment & Plan:  Recent hospitalization for exacerbation related to [address]  Also had aspiration pneumonia  Prior to this was on prednisone in the fall  He reports he is on prednisone up to 4 x a year  He currently feels back to his baseline    Plan  Continue Advair 500-50 1 puff BID  Continue Tudorza  Add Singulair 10 mg daily  Consider addition of biologic therapy given he has asthma with multiple exacerbations  He has had multiple CBC w/ diff recently with low eosinophils due to prednisone use but I see a differential with absolute eosinophil count of 440 on 12/16/22  Repeat chest x-ray in 1 month given recent pneumonia  Follow up in around 2 months with his primary pulmonologist Dr Ivon Burrows  Orders:  -     montelukast (SINGULAIR) 10 mg tablet; Take 1 tablet (10 mg total) by mouth daily at bedtime  -     CBC and differential; Future    2  Pneumonia  Comments:  Recent pneumonia  Repeat Chest x-ray in 1 month  Orders:  -     Ambulatory referral to Pulmonology  -     XR chest pa & lateral; Future; Expected date: 01/09/2023    3  Class 2 severe obesity due to excess calories with serious comorbidity and body mass index (BMI) of 37 0 to 37 9 in adult [facility])  Assessment & Plan:  Would benefit from weight loss      4  Obstructive sleep apnea  Assessment & Plan: Moderate CORDELL, has Auto-CPAP at home- not using it  I reminded him the negative consequences of untreated moderate CORDELL  Encouraged him to try CPAP again            Health Maintenance  Immunization History   Administered Date(s) Administered   • Influenza, seasonal, injectable 09/09/2014   • Pneumococcal Polysaccharide PPV23 07/26/2021   • Tdap 07/16/2020            Return in about 2 months (around 3/9/2023)  History of Present Illness   HPI:  Deyanira Florentino is a 74 y o  male who has COPD, moderate CORDELL not compliant with CPAP, chronic systolic/diastolic heart failure, former tobacco use disorder who presents for pulmonary follow-up  Typically follows with Dr Tim Burrell- last seen 10/31/22 in office  At that time had had a recent exacerbation  Was continued on Advair and Tudorza with PRN albuterol  Given a prednisone course  In the interim he has been hospitalized twice- 12/16-12/19 at [facility] with COPD exacerbation treated with IV steroids and prednisone taper  He was found to have COVID  He was readmitted 2 days later and hospitalized 12/21-12/26 with COPD exacerbation  He was treated for possible aspiration pneumonia  Since going home 12/26 feels he is back to his baseline  He was discharged on Advair/Tudorza  He was given saline nebs to go at home with  Completed prednisone taper and antibiotics  Currently with occasional cough  Dry now  Shortness of breath - back to baseline  No wheezing  Review of Systems   Constitutional: Negative for chills and fever  HENT: Negative for ear pain and sore throat  Eyes: Negative for pain and visual disturbance  Respiratory: Positive for cough and shortness of breath  Cardiovascular: Negative for chest pain and palpitations  Gastrointestinal: Negative for abdominal pain and vomiting  Genitourinary: Negative for dysuria and hematuria  Musculoskeletal: Negative for arthralgias and back pain  Skin: Negative for color change and rash  Neurological: Negative for seizures and syncope  All other systems reviewed and are negative            Historical Information   Past Medical History:   Diagnosis Date   • Asthma    • CHF (congestive heart failure) ([facility])    • COPD (chronic obstructive pulmonary disease) ([facility])    • COPD with acute exacerbation ([facility]) 5/6/2022   • COVID-19    • Mumps    • Old MI (myocardial infarction)    • Pneumonia    • Pulmonary emphysema ([facility] [address] )    • Rectal bleeding 1/14/2019   • Sleep apnea      Past Surgical History:   Procedure Laterality Date   • CARDIAC CATHETERIZATION  07/24/2015    Left main- normal and maidly tortuous  Circumflex - normal and moderately tortuous  RCA- normal and mildy tortuous  Global LV function was severely depressed      • CARDIAC CATHETERIZATION Left 9/27/2022    Procedure: Cardiac Left Heart Cath;  Surgeon: Stormy Mancia DO;  [facility]; Service: Cardiology   • CARDIAC CATHETERIZATION N/A 9/27/2022    Procedure: Cardiac Coronary Angiogram;  Surgeon: Stormy Mancia DO;  Location: [facility] CARDIAC CATH [facility]; Service: Cardiology   • CARDIAC CATHETERIZATION  9/27/2022    Procedure: Cardiac catheterization;  Surgeon: Stormy Mancia DO;  Location: [facility] CARDIAC CATH [facility]; Service: Cardiology   • INGUINAL HERNIA REPAIR Bilateral    • [place] COLONOSCOPY FLX DX W/COLLJ SPEC WHEN PFRMD N/A 3/11/2019    Procedure: COLONOSCOPY with removal of anal papilla;   Surgeon: Yari Villeda MD;  Location: [facility] MAIN OR;  Service: Colorectal   • TONSILLECTOMY     • UMBILICAL HERNIA REPAIR  06/21/2006     Family History   Problem Relation Age of Onset   • Heart attack Father         MI   • Heart disease Father    • Diabetes Sister         DM   • Arthritis Family    • Coronary artery disease Family    • Hypertension Family    • Tuberculosis Son    • Alzheimer's disease Mother          Meds/Allergies     Current Outpatient Medications:   •  albuterol (ProAir HFA) 90 mcg/act inhaler, Inhale 2 puffs every 6 (six) hours as needed for wheezing, Disp: 18 g, Rfl: 11  •  ALPRAZolam (XANAX) 0 5 mg tablet, Take 1 tablet (0 5 mg total) by mouth daily at bedtime as needed for anxiety, Disp: 30 tablet, Rfl: 0  •  atorvastatin (LIPITOR) 20 mg tablet, Take 1 tablet (20 mg total) by mouth daily, Disp: 90 tablet, Rfl: 3  •  carvedilol (COREG) 12 5 mg tablet, Take 2 tablets (25 mg total) by mouth 2 (two) times a day with meals, Disp: , Rfl:   •  DULoxetine (CYMBALTA) 60 mg delayed release capsule, Take 1 capsule (60 mg total) by mouth daily, Disp: 30 capsule, Rfl: 5  •  fluticasone (FLONASE) 50 mcg/act nasal spray, 1 spray into each nostril 2 (two) times a day, Disp: 16 g, Rfl: 6  •  Fluticasone-Salmeterol (Advair) 500-50 mcg/dose inhaler, INHALE ONE PUFF BY MOUTH AND INTO THE LUNGS TWICE A DAY  RINSE MOUTH AFTER USE, Disp: 60 blister, Rfl: 0  •  furosemide (LASIX) 20 mg tablet, Take 1 tablet (20 mg total) by mouth daily, Disp: 30 tablet, Rfl: 11  •  guaiFENesin 1200 MG TB12, Take 1 tablet (1,200 mg total) by mouth every 12 (twelve) hours, Disp: 30 tablet, Rfl: 0  •  ipratropium-albuterol (DUO-NEB) 0 5-2 5 mg/3 mL nebulizer solution, Take 3 mL by nebulization every 6 (six) hours as needed for wheezing or shortness of breath, Disp: 360 mL, Rfl: 0  •  montelukast (SINGULAIR) 10 mg tablet, Take 1 tablet (10 mg total) by mouth daily at bedtime, Disp: 30 tablet, Rfl: 5  •  sacubitril-valsartan (Entresto) 24-26 MG TABS, Take 1 tablet by mouth 2 (two) times a day 49-51mg, Disp: , Rfl:   •  sodium chloride 3 % inhalation solution, Take 4 mL by nebulization as needed for cough (congestion), Disp: 30 mL, Rfl: 0  •  pantoprazole (PROTONIX) 20 mg tablet, Take 1 tablet (20 mg total) by mouth daily in the early morning Do not start before December 27, 2022  (Patient not taking: Reported on 1/9/2023), Disp: 15 tablet, Rfl: 0  Allergies   Allergen Reactions   • Ciprofloxacin Shortness Of Breath and Diarrhea       Vitals: Blood pressure 134/72, pulse 82, temperature 98 3 °F (36 8 °C), temperature source Tympanic, resp  rate 20, height 6' 2" (1 88 m), weight 134 kg (294 lb 9 6 oz), SpO2 96 %  Body mass index is 37 82 kg/m²  Oxygen Therapy  SpO2: 96 %  Oxygen Therapy: None (Room air)      Physical Exam  Physical Exam  Vitals and nursing note reviewed     Constitutional:       General: He is not in acute distress  Appearance: He is well-developed  HENT:      Head: Normocephalic and atraumatic  Eyes:      Conjunctiva/sclera: Conjunctivae normal    Cardiovascular:      Rate and Rhythm: Normal rate and regular rhythm  Heart sounds: No murmur heard  Pulmonary:      Effort: Pulmonary effort is normal  No respiratory distress  Breath sounds: Normal breath sounds  Abdominal:      Palpations: Abdomen is soft  Tenderness: There is no abdominal tenderness  Musculoskeletal:         General: No swelling  Cervical back: Neck supple  Right lower leg: No edema  Left lower leg: No edema  Skin:     General: Skin is warm and dry  Capillary Refill: Capillary refill takes less than 2 seconds  Neurological:      Mental Status: He is alert  Psychiatric:         Mood and Affect: Mood normal          Labs: I have personally reviewed pertinent lab results      ABG: No results found for: PHART, BLF2HVB, PO2ART, GBH8SIN, T1LWKPEF, BEART, SOURCE,   BNP:   Lab Results   Component Value Date    BNP 47 12/03/2021   ,   CBC:  Lab Results   Component Value Date    WBC 15 39 (H) 12/26/2022    HGB 14 5 12/26/2022    HCT 45 3 12/26/2022    MCV 83 12/26/2022     12/26/2022    EOSPCT 0 12/26/2022    EOSABS 0 01 12/26/2022    NEUTOPHILPCT 80 (H) 12/26/2022    LYMPHOPCT 11 (L) 12/26/2022   ,   CMP:   Lab Results   Component Value Date    SODIUM 133 (L) 12/26/2022    K 4 2 12/26/2022    CL 97 12/26/2022    CO2 28 12/26/2022    ANIONGAP 4 07/27/2015    BUN 34 (H) 12/26/2022    CREATININE 1 28 12/26/2022    GLUCOSE 87 07/27/2015    CALCIUM 8 5 12/26/2022    AST 10 12/24/2022    ALT 37 12/24/2022    ALKPHOS 78 12/24/2022    PROT 6 1 (L) 07/22/2015    BILITOT 1 05 (H) 07/22/2015    EGFR 58 12/26/2022   ,   PT/INR:   Lab Results   Component Value Date    INR 1 03 12/10/2021   ,   Troponin:   Lab Results   Component Value Date    TROPONINI 0 03 12/15/2018         Imaging and other studies: I have personally reviewed pertinent reports  and I have personally reviewed pertinent films in PACS      CTA Chest 12/23/22  1  No pulmonary embolism  2   Findings concerning for aspiration with debris filling the right middle lobe and left lower lobe bronchi with middle lobe collapse and significant left lower lobe consolidation/atelectasis  CXR 12/21/22  right perihilar opacity suspicious for infiltrate    CXR 12/16/22  Clear except for scarring left lung base  CT Lung 1/19/22  No suspicious nodules  Minimal subsegmental atelectasis in lingula and RML  Mild biapical pleural-parenchymal changes      Pulmonary Functions Testing Results: 2/2021  Spirometry:  FEV1/FVC Ratio is  54%  FEV1 is  1 92L/ 55% of predicted  FVC is  3 54L/ 69% of predicted      Lung volumes:  RV  3 04L/ 112% of predicted, TLC  6 58L/ 87% of predicted, RV/TLC ratio  46%     Diffusing capacity:   104% of predicted     IMPRESSION:  1   moderately severe obstructive air flow limitation with decreased vital capacity  2   normal lung volumes but increased RV/ TLC ratio may indicate some air trapping  3   normal diffusion capacity  4   obstructive flow volume loop        EKG, Pathology, and Other Studies: I have personally reviewed pertinent reports  [facility] 9/27/22  • No angiographic evidence of significant obstructive CAD  • Mild luminal irregularities amenable to medical management    TTE 9/1/22  •  Left Ventricle: Left ventricular cavity size is normal when indexed for BSA  Wall thickness is mildly increased  There is eccentric hypertrophy  The left ventricular ejection fraction is 40-45%  Systolic function is moderately reduced  There is global hypokinesis with specific regional wall abnormalities  Diastolic function is mildly abnormal, consistent with grade I (abnormal) relaxation  •  Right Ventricle: Right ventricular cavity size is normal  Systolic function is normal   •  Left Atrium: The atrium is mildly dilated    •  Right Atrium: The atrium is mildly dilated  •  The following segments are akinetic: basal inferoseptal, basal inferior, basal inferolateral and mid inferolateral   •  The following segments are hypokinetic: basal anterior, basal anteroseptal, basal anterolateral, mid anterior, mid anteroseptal, mid inferoseptal, mid inferior, mid anterolateral, apical anterior, apical septal, apical inferior, apical lateral and apex  MONISHA Conti [facility]

## 2023-01-09 NOTE — ASSESSMENT & PLAN NOTE
Recent hospitalization for exacerbation related to [address]  Also had aspiration pneumonia  Prior to this was on prednisone in the fall  He reports he is on prednisone up to 4 x a year  He currently feels back to his baseline    Plan  Continue Advair 500-50 1 puff BID  Continue Tudorza  Add Singulair 10 mg daily  Consider addition of biologic therapy given he has asthma with multiple exacerbations  He has had multiple CBC w/ diff recently with low eosinophils due to prednisone use but I see a differential with absolute eosinophil count of 440 on 12/16/22  Repeat chest x-ray in 1 month given recent pneumonia  Follow up in around 2 months with his primary pulmonologist Dr Suzette Carroll

## 2023-01-10 ENCOUNTER — CLINICAL SUPPORT (OUTPATIENT)
Dept: PULMONOLOGY | Facility: HOSPITAL | Age: 68
End: 2023-01-10
Attending: INTERNAL MEDICINE

## 2023-01-10 DIAGNOSIS — J18.9 PNEUMONIA: ICD-10-CM

## 2023-01-10 DIAGNOSIS — J44.1 COPD WITH ACUTE EXACERBATION (HCC): ICD-10-CM

## 2023-01-10 DIAGNOSIS — J44.9 ASTHMA-COPD OVERLAP SYNDROME (HCC): Primary | ICD-10-CM

## 2023-01-13 ENCOUNTER — OFFICE VISIT (OUTPATIENT)
Dept: OTOLARYNGOLOGY | Facility: CLINIC | Age: 68
End: 2023-01-13

## 2023-01-13 VITALS
HEART RATE: 91 BPM | TEMPERATURE: 96.5 F | WEIGHT: 293.8 LBS | HEIGHT: 74 IN | BODY MASS INDEX: 37.71 KG/M2 | DIASTOLIC BLOOD PRESSURE: 62 MMHG | OXYGEN SATURATION: 94 % | SYSTOLIC BLOOD PRESSURE: 136 MMHG

## 2023-01-13 DIAGNOSIS — R09.82 POSTNASAL DRIP: ICD-10-CM

## 2023-01-13 DIAGNOSIS — J30.81 ALLERGIC RHINITIS DUE TO ANIMAL HAIR AND DANDER: Primary | ICD-10-CM

## 2023-01-13 DIAGNOSIS — J31.0 CHRONIC RHINITIS: ICD-10-CM

## 2023-01-13 NOTE — PROGRESS NOTES
Review of Systems   Constitutional: Negative  HENT: Positive for sneezing  Eyes: Negative  Respiratory: Positive for cough  Cardiovascular: Negative  Gastrointestinal: Negative  Endocrine: Negative  Genitourinary: Negative  Musculoskeletal: Negative  Skin: Negative  Allergic/Immunologic: Negative  Neurological: Negative  Hematological: Negative  Psychiatric/Behavioral: Negative

## 2023-01-13 NOTE — PROGRESS NOTES
Otolaryngology Clinic Visit  Name:  Michelle Felix  MRN:  [id number]  Date:  1/13/2023 1:11 PM  ________________________________________________________________________       CHIEF COMPLAINT:   PND     HPI:  Michelle Felix is a 74 y o  male who presents with COPD, asthma, CHF and issues with PND  He also reports that the back of his throat is very sensitive  Allergic to dogs, ragweed  Takes zyrtec every day  Not using any nose sprays  Does feel as if dripping is coming down the back of his throat  Which makes him cough  Endorses nasal congestion, pain between eyes, denies discolored nasal drainage, sense of smell ok but has never been perfect  Denies heartburn, reflux  Does have frequent belching  Does endorse frequent throat clearing  Interval history  Doing better today  Mild nose bleeds but doing well  PMHx:  Past Medical History:   Diagnosis Date   • Asthma    • CHF (congestive heart failure) ([facility])    • COPD (chronic obstructive pulmonary disease) ([facility])    • [facility] 5/6/2022   • COVID-19    • Mumps    • Old MI (myocardial infarction)    • Pneumonia    • Pulmonary emphysema ([facility] )    • Rectal bleeding 1/14/2019   • Sleep apnea        PSHx:  Past Surgical History:   Procedure Laterality Date   • CARDIAC CATHETERIZATION  07/24/2015    Left main- normal and maidly tortuous  Circumflex - normal and moderately tortuous  RCA- normal and mildy tortuous  Global LV function was severely depressed      • CARDIAC CATHETERIZATION Left 9/27/2022    Procedure: Cardiac Left Heart Cath;  Surgeon: Zachary Cisneros DO;  Location: [facility]ARD[facility]; Service: Cardiology   • CARDIAC CATHETERIZATION N/A 9/27/2022    Procedure: Cardiac Coronary Angiogram;  Surgeon: Zachary Cisneros DO;  Location: [facility]ARD[facility] C[facility];   Service: Cardiology   • CARDIAC CATHETERIZATION  9/27/2022    Procedure: Cardiac catheterization;  Surgeon: Zachary Cisneros DO;  Location: [facility] CARDI[facility]ATH [facility]; Service: Cardiology   • INGUINAL HERNIA REPAIR Bilateral    • [place] COLONOSCOPY FLX DX W/COLLJ SPEC WHEN PFRMD N/A 3/11/2019    Procedure: COLONOSCOPY with removal of anal papilla; Surgeon: Luis Buenrostro MD;  Location: [facility] MAIN OR;  Service: Colorectal   • TONSILLECTOMY     • UMBILICAL HERNIA REPAIR  2006       FAMHx:  Family History   Problem Relation Age of Onset   • Heart attack Father         MI   • Heart disease Father    • Diabetes Sister         DM   • Arthritis Family    • Coronary artery disease Family    • Hypertension Family    • Tuberculosis Son    • Alzheimer's disease Mother        SOCHx:  Social History     Socioeconomic History   • Marital status: /Civil Union     Spouse name: None   • Number of children: None   • Years of education: None   • Highest education level: None   Occupational History   • None   Tobacco Use   • Smoking status: Former     Packs/day: 3 00     Years: 43 00     Pack years: 129 00     Types: Cigarettes     Start date: 67     Quit date:      Years since quittin 0   • Smokeless tobacco: Never   Vaping Use   • Vaping Use: Never used   Substance and Sexual Activity   • Alcohol use: Not Currently     Comment: rarely   • Drug use: Yes     Types: Marijuana     Comment: occasionally edible   • Sexual activity: None   Other Topics Concern   • None   Social History Narrative    Caffeine use     Social Determinants of Health     Financial Resource Strain: Not on file   Food Insecurity: No Food Insecurity   • Worried About Running Out of Food in the Last Year: Never true   • Ran Out of Food in the Last Year: Never true   Transportation Needs: No Transportation Needs   • Lack of Transportation (Medical): No   • Lack of Transportation (Non-Medical):  No   Physical Activity: Not on file   Stress: Not on file   Social Connections: Not on file   Intimate Partner Violence: Not on file   Housing Stability: Low Risk    • Unable to Pay for Housing in the Last Year: No   • Number of Places Lived in the Last Year: 1   • Unstable Housing in the Last Year: No       Allergies: Allergies   Allergen Reactions   • Ciprofloxacin Shortness Of Breath and Diarrhea        MEDS:  Reviewed    ROS:  As above otherwise:   Review of Systems   All other systems reviewed and are negative  PHYSICAL EXAM:  /62 (BP Location: Left arm, Cuff Size: Large)   Pulse 91   Temp (!) 96 5 °F (35 8 °C) (Temporal)   Ht 6' 2" (1 88 m)   Wt 133 kg (293 lb 12 8 oz)   SpO2 94%   BMI 37 72 kg/m²   General: NAD, AOx4  Eyes:  EOMI  Ears:  Right: ear canal normal, TM normal apperance, no fluid  Left: ear canal normal, TM normal apperance, no fluid  Nose: Septal deviation, + inferior turbinate hypertrophy, no mass/lesions, much improved today  Mild vestibulitis  Oral cavity:  No trismus, no mass/lesions, tonsils 1+  Neck: Trachea is midline; no thyroid nodules, Salivary glands symmetrical, no masses/abnormality on palpation  Lymph:  No cervical lymphadenopathy  Skin:  No obvious facial lesions  Neuro: Face symmetrical, no obvious cranial nerve palsy   No focal deficits   Lungs:  Normal work of breathing, symmetrical chest expansion  Vascular: Well perfused    Procedures:  none    Medical Data Reviewed:  Records reviewed and summarized as in [facility]    Radiology:  none    Labs:  none     Patient Active Problem List   Diagnosis   • Chronic asthma, moderate persistent, uncomplicated   • Obstructive sleep apnea   • Chronic combined systolic and diastolic congestive heart failure (HCC)   • Non-ischemic cardiomyopathy with HFmEF ([facility] [address] )   • Dyslipidemia   • Benign essential hypertension   • Anxiety   • Asthma-COPD overlap syndrome (HCC)   • Borderline hyperglycemia   • COPD, severe (HCC)   • Hemorrhoids   • Thyroid disease   • Vitamin D deficiency   • Constipation   • Pulmonary emphysema (HCC)   • History of tobacco abuse   • Hypertrophied anal papilla   • Annual physical exam   • Bronchitis   • PLMD (periodic limb movement disorder)   • COVID-19 virus infection   • Stage 3 chronic kidney disease, unspecified whether stage 3a or 3b CKD (HCC)   • Chest pain   • Type 2 diabetes mellitus (HCC)   • Hyponatremia   • Moderate episode of recurrent major depressive disorder (HCC)   • Class 2 severe obesity due to excess calories with serious comorbidity and body mass index (BMI) of 37 0 to 37 9 in adult [facility])       ASSESSMENT/PLAN:  Tomi Ortiz is a 74 y o  male with acute and chronic problems as above who presents with:    1  Allergic rhinitis due to animal hair and dander    2  Chronic rhinitis    3  Postnasal drip        74 y o  here today for further evaluation of PND, he has improved with consistent Flonase use and is happy  Encouraged him to continue  Mild dryness and bleeding will have him use Bactroban as needed      RTC PRN

## 2023-01-16 ENCOUNTER — CLINICAL SUPPORT (OUTPATIENT)
Dept: PULMONOLOGY | Facility: HOSPITAL | Age: 68
End: 2023-01-16
Attending: INTERNAL MEDICINE

## 2023-01-16 DIAGNOSIS — J44.9 ASTHMA-COPD OVERLAP SYNDROME (HCC): ICD-10-CM

## 2023-01-17 ENCOUNTER — CLINICAL SUPPORT (OUTPATIENT)
Dept: PULMONOLOGY | Facility: HOSPITAL | Age: 68
End: 2023-01-17
Attending: INTERNAL MEDICINE

## 2023-01-17 DIAGNOSIS — J44.9 ASTHMA-COPD OVERLAP SYNDROME (HCC): ICD-10-CM

## 2023-01-23 ENCOUNTER — TELEPHONE (OUTPATIENT)
Dept: FAMILY MEDICINE CLINIC | Facility: CLINIC | Age: 68
End: 2023-01-23

## 2023-01-23 ENCOUNTER — CLINICAL SUPPORT (OUTPATIENT)
Dept: PULMONOLOGY | Facility: HOSPITAL | Age: 68
End: 2023-01-23
Attending: INTERNAL MEDICINE

## 2023-01-23 DIAGNOSIS — E11.9 TYPE 2 DIABETES MELLITUS WITHOUT COMPLICATION, WITHOUT LONG-TERM CURRENT USE OF INSULIN (HCC): Primary | ICD-10-CM

## 2023-01-23 DIAGNOSIS — E11.65 TYPE 2 DIABETES MELLITUS WITH HYPERGLYCEMIA, WITHOUT LONG-TERM CURRENT USE OF INSULIN (HCC): Primary | ICD-10-CM

## 2023-01-23 DIAGNOSIS — J44.9 ASTHMA-COPD OVERLAP SYNDROME (HCC): ICD-10-CM

## 2023-01-23 RX ORDER — LANCETS
1 EACH MISCELLANEOUS DAILY
COMMUNITY
End: 2023-01-23 | Stop reason: SDUPTHER

## 2023-01-23 RX ORDER — LANCETS
1 EACH MISCELLANEOUS DAILY
Qty: 100 EACH | Refills: 5 | Status: SHIPPED | OUTPATIENT
Start: 2023-01-23

## 2023-01-23 RX ORDER — BLOOD-GLUCOSE METER
1 EACH MISCELLANEOUS DAILY
Qty: 1 KIT | Refills: 0 | Status: SHIPPED | OUTPATIENT
Start: 2023-01-23

## 2023-01-23 RX ORDER — BLOOD-GLUCOSE METER
1 EACH MISCELLANEOUS DAILY
COMMUNITY
End: 2023-01-23 | Stop reason: SDUPTHER

## 2023-01-23 NOTE — TELEPHONE ENCOUNTER
Patient said he was to check his sugars but the pharmacy said his meteor you ordered was not in  I don't see that one was ordered, Liana Ruiz said his insurance will cover the accu chek  Can you order that for him with lancets and test strips?

## 2023-01-25 ENCOUNTER — CLINICAL SUPPORT (OUTPATIENT)
Dept: PULMONOLOGY | Facility: HOSPITAL | Age: 68
End: 2023-01-25
Attending: INTERNAL MEDICINE

## 2023-01-25 DIAGNOSIS — J44.9 ASTHMA-COPD OVERLAP SYNDROME (HCC): ICD-10-CM

## 2023-01-26 DIAGNOSIS — J44.9 ASTHMA-COPD OVERLAP SYNDROME (HCC): Primary | ICD-10-CM

## 2023-01-26 DIAGNOSIS — I10 BENIGN ESSENTIAL HYPERTENSION: Primary | ICD-10-CM

## 2023-01-26 RX ORDER — CARVEDILOL 25 MG/1
25 TABLET ORAL 2 TIMES DAILY WITH MEALS
Qty: 30 TABLET | Refills: 11 | Status: SHIPPED | OUTPATIENT
Start: 2023-01-26

## 2023-01-26 RX ORDER — ACLIDINIUM BROMIDE 400 UG/1
1 POWDER, METERED RESPIRATORY (INHALATION) 2 TIMES DAILY
Qty: 3 EACH | Refills: 1 | Status: SHIPPED | OUTPATIENT
Start: 2023-01-26 | End: 2023-02-07

## 2023-01-26 NOTE — TELEPHONE ENCOUNTER
Patient called and said that the pharmacy said his script for the Onalee Brii was cancelled  Patient wants to know if we could resend this over to [facility]  Patient also wants to know if there is a different medication similar that would be less of a problem to get

## 2023-01-27 ENCOUNTER — APPOINTMENT (OUTPATIENT)
Dept: PULMONOLOGY | Facility: HOSPITAL | Age: 68
End: 2023-01-27
Attending: INTERNAL MEDICINE

## 2023-01-30 ENCOUNTER — CLINICAL SUPPORT (OUTPATIENT)
Dept: PULMONOLOGY | Facility: HOSPITAL | Age: 68
End: 2023-01-30
Attending: INTERNAL MEDICINE

## 2023-01-30 DIAGNOSIS — J44.9 ASTHMA-COPD OVERLAP SYNDROME (HCC): ICD-10-CM

## 2023-01-31 DIAGNOSIS — J44.9 ASTHMA-COPD OVERLAP SYNDROME (HCC): Primary | ICD-10-CM

## 2023-01-31 RX ORDER — TIOTROPIUM BROMIDE INHALATION SPRAY 3.12 UG/1
2 SPRAY, METERED RESPIRATORY (INHALATION) DAILY
Qty: 4 G | Refills: 2 | Status: SHIPPED | OUTPATIENT
Start: 2023-01-31 | End: 2023-02-03 | Stop reason: SDUPTHER

## 2023-01-31 NOTE — TELEPHONE ENCOUNTER
Will switch to Spiriva Respimat 2 5 mcg 2 puffs daily but there's no guarantee that will be cheaper until his deductible is met

## 2023-01-31 NOTE — TELEPHONE ENCOUNTER
I called to confirm with the pharmacy, medication does not need auth but for a 90 day supply the cost is $1009  They said it is due to a deductible and/or copay due to the new year

## 2023-01-31 NOTE — TELEPHONE ENCOUNTER
Called and spoke with the pharmacy, the Spiriva would be $276 for a 30 day supply  I called and informed the pt and he is willing to pay for the Spiriva

## 2023-02-01 ENCOUNTER — CLINICAL SUPPORT (OUTPATIENT)
Dept: PULMONOLOGY | Facility: HOSPITAL | Age: 68
End: 2023-02-01
Attending: INTERNAL MEDICINE

## 2023-02-01 DIAGNOSIS — J44.9 ASTHMA-COPD OVERLAP SYNDROME (HCC): ICD-10-CM

## 2023-02-03 ENCOUNTER — CLINICAL SUPPORT (OUTPATIENT)
Dept: PULMONOLOGY | Facility: HOSPITAL | Age: 68
End: 2023-02-03
Attending: INTERNAL MEDICINE

## 2023-02-03 DIAGNOSIS — J44.9 ASTHMA-COPD OVERLAP SYNDROME (HCC): ICD-10-CM

## 2023-02-06 ENCOUNTER — CLINICAL SUPPORT (OUTPATIENT)
Dept: PULMONOLOGY | Facility: HOSPITAL | Age: 68
End: 2023-02-06
Attending: INTERNAL MEDICINE

## 2023-02-06 DIAGNOSIS — J44.9 ASTHMA-COPD OVERLAP SYNDROME (HCC): Primary | ICD-10-CM

## 2023-02-07 DIAGNOSIS — E78.5 DYSLIPIDEMIA: ICD-10-CM

## 2023-02-07 RX ORDER — TIOTROPIUM BROMIDE INHALATION SPRAY 3.12 UG/1
2 SPRAY, METERED RESPIRATORY (INHALATION) DAILY
Qty: 4 G | Refills: 2 | Status: SHIPPED | OUTPATIENT
Start: 2023-02-07

## 2023-02-07 RX ORDER — ATORVASTATIN CALCIUM 20 MG/1
20 TABLET, FILM COATED ORAL DAILY
Qty: 90 TABLET | Refills: 3 | Status: SHIPPED | OUTPATIENT
Start: 2023-02-07

## 2023-02-08 ENCOUNTER — CLINICAL SUPPORT (OUTPATIENT)
Dept: PULMONOLOGY | Facility: HOSPITAL | Age: 68
End: 2023-02-08
Attending: INTERNAL MEDICINE

## 2023-02-08 DIAGNOSIS — J44.9 ASTHMA-COPD OVERLAP SYNDROME (HCC): ICD-10-CM

## 2023-02-10 ENCOUNTER — CLINICAL SUPPORT (OUTPATIENT)
Dept: PULMONOLOGY | Facility: HOSPITAL | Age: 68
End: 2023-02-10
Attending: INTERNAL MEDICINE

## 2023-02-10 DIAGNOSIS — J44.9 COPD, SEVERE (HCC): ICD-10-CM

## 2023-02-10 DIAGNOSIS — U07.1 COVID-19 VIRUS INFECTION: ICD-10-CM

## 2023-02-13 ENCOUNTER — CLINICAL SUPPORT (OUTPATIENT)
Dept: PULMONOLOGY | Facility: HOSPITAL | Age: 68
End: 2023-02-13
Attending: INTERNAL MEDICINE

## 2023-02-13 DIAGNOSIS — J44.9 CHRONIC OBSTRUCTIVE PULMONARY DISEASE, UNSPECIFIED COPD TYPE (HCC): Primary | ICD-10-CM

## 2023-02-14 NOTE — PROGRESS NOTES
Cardiology Follow Up    Melissa Gaming  1955  [id number]  [address] [facility] [address] [place] [facility]  [place] [address]  [phone]    1  Non-ischemic cardiomyopathy with HFmEF ([facility] )        2  Obstructive sleep apnea        3  Chronic combined systolic and diastolic congestive heart failure ([facility] )        4  Benign essential hypertension        5  Dyslipidemia        6  History of tobacco abuse        7  Stage 3 chronic kidney disease, unspecified whether stage 3a or 3b CKD ([facility] )            Discussion/Summary:    Non-ischemic cardiomyopathy:  EF 40-45% on most recent echo from September 2022   [facility] from September 2022 did not reveal any obstructive coronary artery disease  Cardiomyopathy may be secondary to alcohol use  He is euvolemic on exam; he has chronic HADDAD that he notes is unchanged  Currently on GDMT with carvedilol and Entresto     Chronic combined systolic and diastolic congestive heart failure:  Currently appears euvolemic on physical exam  Prescribed lasix 20 mg PRN which he takes when he has LE edema, last time he took it was last week and it is usually diet related; he does not want to take it daily due to recent issue with incontinence    Hypertension:  controlled  Maintained on Carvedilol 25 mg BID and Entresto 24/26 mg BID     Dyslipidemia:  Lipid panel from September 2022  Lipids are at goal  Continue atorvastatin 20 mg daily     CORDELL  He cannot tolerate CPAP    He will return in 6 months to follow up with Dr Tiago Murillo    Interval History:   Melissa Gaming is a 74 y o  male with PMH of a non-ischemic cardiomyopathy thought to be secondary to alcohol abuse, chronic combined systolic and diastolic congestive heart failure, hypertension, dyslipidemia, obstructive sleep apnea, and COPD/asthma who presents to the office today for follow up    At his last office visit he was admitted to the hospital 12/21 through 12/26/2022 in the setting of aspiration pneumonia, COPD exacerbation and residual COVID-19  Overall the patient feels he is currently stable since his last office visit  He states he has cut back on his alcohol use and currently only drinks about 20 ounces of beer and 2 ounces of tequila once a week  He is attending pulmonary rehab and doing fairly well  The patient notes that he has chronic dyspnea on exertion states this is largely unchanged at his last office visit  Denies any chest discomfort with exertion and also denies any palpitations  He does occasionally get lower extremity edema that he states is typically diet related and he takes his as needed Lasix for this with resolution  He denies any orthopnea  He also denies any dizziness/lightheadedness or near syncope/syncope  Blood pressures controlled today on current medication regimen  We did review his last panel from September 2022 and noted that his LDL is at goal   He is not able to adopt a regular exercise routine secondary to his shortness of breath  He that he typically follows a low-fat/low-cholesterol diet and at times does have food higher in sodium      Medical Problems     Problem List     Chronic asthma, moderate persistent, uncomplicated    Obstructive sleep apnea    Overview Signed 12/19/2018 10:16 AM by Kvng Mccann DO       Declines CPAP         Chronic combined systolic and diastolic congestive heart failure (HCC)    Wt Readings from Last 3 Encounters:   02/24/23 134 kg (295 lb)   01/13/23 133 kg (293 lb 12 8 oz)   01/09/23 134 kg (294 lb 9 6 oz)                 Non-ischemic cardiomyopathy with HFmEF (HCC) (Chronic)    Dyslipidemia    Benign essential hypertension    Anxiety (Chronic)    Asthma-COPD overlap syndrome (HCC)    Borderline hyperglycemia    COPD, severe ([facility] )    Overview Signed 12/19/2018 10:16 AM by Kvng Mccann DO      Post bronchodilator FEV1 is 68% predicted, 2017         Hemorrhoids    Thyroid disease    Vitamin D deficiency    Constipation    Pulmonary emphysema (HCC)    History of tobacco abuse    Hypertrophied anal papilla    Annual physical exam    Bronchitis    PLMD (periodic limb movement disorder)    COVID-19 virus infection    Stage 3 chronic kidney disease, unspecified whether stage 3a or 3b CKD ([facility])    Lab Results   Component Value Date    EGFR 58 2022    EGFR 61 2022    EGFR 57 2022    CREATININE 1 28 2022    CREATININE 1 23 2022    CREATININE 1 29 2022         Chest pain    Type 2 diabetes mellitus ([facility] )      Lab Results   Component Value Date    HGBA1C 6 4 (H) 2022         Hyponatremia    Moderate episode of recurrent major depressive disorder ([facility])    Class 2 severe obesity due to excess calories with serious comorbidity and body mass index (BMI) of 37 0 to 37 9 in [facility])        Past Medical History:   Diagnosis Date   • Asthma    • CHF (congestive heart failure) ([facility])    • COPD (chronic obstructive pulmonary disease) ([facility])    • COPD with acute exacerbation ([facility] 2022   • COVID-19    • Mumps    • Old MI (myocardial infarction)    • Pneumonia    • Pulmonary emphysema ([facility] )    • Rectal bleeding 2019   • Sleep apnea      Social History     Socioeconomic History   • Marital status: /Civil Union     Spouse name: Not on file   • Number of children: Not on file   • Years of education: Not on file   • Highest education level: Not on file   Occupational History   • Not on file   Tobacco Use   • Smoking status: Former     Packs/day: 3 00     Years: 43 00     Pack years: 129 00     Types: Cigarettes     Start date: 67     Quit date: 2018     Years since quittin 1   • Smokeless tobacco: Never   Vaping Use   • Vaping Use: Never used   Substance and Sexual Activity   • Alcohol use: Not Currently     Comment: rarely   • Drug use: Yes     Types: Marijuana     Comment: occasionally edible   • Sexual activity: Not on file   Other Topics Concern   • Not on file   Social History Narrative    Caffeine use     Social Determinants of Health     Financial Resource Strain: Not on file   Food Insecurity: No Food Insecurity   • Worried About Running Out of Food in the Last Year: Never true   • Ran Out of Food in the Last Year: Never true   Transportation Needs: No Transportation Needs   • Lack of Transportation (Medical): No   • Lack of Transportation (Non-Medical): No   Physical Activity: Not on file   Stress: Not on file   Social Connections: Not on file   Intimate Partner Violence: Not on file   Housing Stability: Low Risk    • Unable to Pay for Housing in the Last Year: No   • Number of Places Lived in the Last Year: 1   • Unstable Housing in the Last Year: No      Family History   Problem Relation Age of Onset   • Heart attack Father         MI   • Heart disease Father    • Diabetes Sister         DM   • Arthritis Family    • Coronary artery disease Family    • Hypertension Family    • Tuberculosis Son    • Alzheimer's disease Mother      Past Surgical History:   Procedure Laterality Date   • CARDIAC CATHETERIZATION  07/24/2015    Left main- normal and maidly tortuous  Circumflex - normal and moderately tortuous  RCA- normal and mildy tortuous  Global LV function was severely depressed      • CARDIAC CATHETERIZATION Left 9/27/2022    Procedure: Cardiac Left Heart Cath;  Surgeon: Rocky Franks DO;  Location: [facility]; Service: Cardiology   • CARDIAC CATHETERIZATION N/A 9/27/2022    Procedure: Cardiac Coronary Angiogram;  Surgeon: Rocky Franks DO;  Location: [facility]; Service: Cardiology   • CARDIAC CATHETERIZATION  9/27/2022    Procedure: Cardiac catheterization;  Surgeon: Rocky Franks DO;  Location: [facility]; Service: Cardiology   • INGUINAL HERNIA REPAIR Bilateral    • MD COLONOSCOPY FLX DX W/COLLJ SPEC WHEN PFRMD N/A 3/11/2019    Procedure: COLONOSCOPY with removal of anal papilla;   Surgeon: Leanord Sep Tatiana Becerra MD;  Location: [facility]IN [facility];  Service: Colorectal   • TONSILLECTOMY     • UMBILICAL HERNIA REPAIR  06/21/2006       Current Outpatient Medications:   •  Accu-Chek FastClix Lancets MISC, Use 1 each daily to test blood sugar , Disp: 100 each, Rfl: 5  •  albuterol (ProAir HFA) 90 mcg/act inhaler, Inhale 2 puffs every 6 (six) hours as needed for wheezing, Disp: 18 g, Rfl: 11  •  ALPRAZolam (XANAX) 0 5 mg tablet, Take 1 tablet (0 5 mg total) by mouth daily at bedtime as needed for anxiety, Disp: 30 tablet, Rfl: 0  •  atorvastatin (LIPITOR) 20 mg tablet, Take 1 tablet (20 mg total) by mouth daily, Disp: 90 tablet, Rfl: 3  •  Blood Glucose Monitoring Suppl (Accu-Chek Guide Me) w/Device KIT, Use 1 each daily to test blood sugar , Disp: 1 kit, Rfl: 0  •  carvedilol (COREG) 25 mg tablet, Take 1 tablet (25 mg total) by mouth 2 (two) times a day with meals, Disp: 30 tablet, Rfl: 11  •  DULoxetine (CYMBALTA) 60 mg delayed release capsule, Take 1 capsule (60 mg total) by mouth daily, Disp: 30 capsule, Rfl: 5  •  fluticasone (FLONASE) 50 mcg/act nasal spray, 1 spray into each nostril 2 (two) times a day, Disp: 16 g, Rfl: 6  •  Fluticasone-Salmeterol (Advair) 500-50 mcg/dose inhaler, INHALE ONE PUFF BY MOUTH AND INTO THE LUNGS TWICE A DAY   RINSE MOUTH AFTER USE, Disp: 60 blister, Rfl: 0  •  furosemide (LASIX) 20 mg tablet, Take 1 tablet (20 mg total) by mouth daily, Disp: 30 tablet, Rfl: 11  •  glucose blood test strip, Use 1 each daily to test blood sugar , Disp: 100 strip, Rfl: 5  •  ipratropium-albuterol (DUO-NEB) 0 5-2 5 mg/3 mL nebulizer solution, Take 3 mL by nebulization every 6 (six) hours as needed for wheezing or shortness of breath, Disp: 360 mL, Rfl: 0  •  montelukast (SINGULAIR) 10 mg tablet, Take 1 tablet (10 mg total) by mouth daily at bedtime, Disp: 30 tablet, Rfl: 5  •  mupirocin (BACTROBAN) 2 % ointment, Apply topically 2 (two) times a day, Disp: 22 g, Rfl: 0  •  sacubitril-valsartan (ENTRESTO) 49-51 MG TABS, Take 1 tablet by mouth 2 (two) times a day, Disp: 60 tablet, Rfl: 11  •  sodium chloride 3 % inhalation solution, Take 4 mL by nebulization as needed for cough (congestion), Disp: 30 mL, Rfl: 0  •  tiotropium (Spiriva Respimat) 2 5 MCG/ACT AERS inhaler, Inhale 2 puffs daily, Disp: 4 g, Rfl: 2  •  guaiFENesin 1200 MG TB12, Take 1 tablet (1,200 mg total) by mouth every 12 (twelve) hours (Patient not taking: Reported on 2/24/2023), Disp: 30 tablet, Rfl: 0  •  pantoprazole (PROTONIX) 20 mg tablet, Take 1 tablet (20 mg total) by mouth daily in the early morning Do not start before December 27, 2022  (Patient not taking: Reported on 1/9/2023), Disp: 15 tablet, Rfl: 0  Allergies   Allergen Reactions   • Ciprofloxacin Shortness Of Breath and Diarrhea       Labs:     Chemistry        Component Value Date/Time     07/27/2015 0559    K 4 2 12/26/2022 0634    K 3 8 07/27/2015 0559    CL 97 12/26/2022 0634     07/27/2015 0559    CO2 28 12/26/2022 0634    CO2 32 07/27/2015 0559    BUN 34 (H) 12/26/2022 0634    BUN 19 07/27/2015 0559    CREATININE 1 28 12/26/2022 0634    CREATININE 1 05 07/27/2015 0559        Component Value Date/Time    CALCIUM 8 5 12/26/2022 0634    CALCIUM 8 6 07/27/2015 0559    ALKPHOS 78 12/24/2022 0441    ALKPHOS 75 07/22/2015 1021    AST 10 12/24/2022 0441    AST 30 07/22/2015 1021    ALT 37 12/24/2022 0441    ALT 74 07/22/2015 1021    BILITOT 1 05 (H) 07/22/2015 1021            Lab Results   Component Value Date    CHOL 105 07/23/2015     Lab Results   Component Value Date    HDL 27 (L) 09/01/2022    HDL 27 (L) 09/22/2021    HDL 24 (L) 12/18/2018     Lab Results   Component Value Date    LDLCALC 49 09/01/2022    LDLCALC 107 (H) 09/22/2021    LDLCALC 89 12/18/2018     Lab Results   Component Value Date    TRIG 138 09/01/2022    TRIG 131 09/22/2021    TRIG 107 12/18/2018     No results found for: CHOLHDL    Imaging: No results found        Review of Systems   Constitutional: Negative for chills, fever and malaise/fatigue  HENT: Negative for congestion  Cardiovascular: Positive for dyspnea on exertion (chronic) and leg swelling (intermittent)  Negative for chest pain, orthopnea and palpitations  Respiratory: Negative for cough, shortness of breath (no SOB at rest) and wheezing  Endocrine: Negative  Hematologic/Lymphatic: Negative  Skin: Negative  Musculoskeletal: Negative  Gastrointestinal: Negative for bloating, abdominal pain, nausea and vomiting  Genitourinary: Negative  Neurological: Negative for dizziness and light-headedness  Psychiatric/Behavioral: Negative  All other systems reviewed and are negative  Vitals:    02/24/23 1403   BP: 142/80   Pulse: 87   SpO2: 97%     Vitals:    02/24/23 1403   Weight: 134 kg (295 lb)     Height: 6' 2" (188 cm)   Body mass index is 37 88 kg/m²  Physical Exam:  Physical Exam  Vitals reviewed  Constitutional:       General: He is not in acute distress  HENT:      Head: Normocephalic and atraumatic  Mouth/Throat:      Mouth: Mucous membranes are moist    Cardiovascular:      Rate and Rhythm: Normal rate and regular rhythm  Heart sounds: Normal heart sounds, S1 normal and S2 normal  No murmur heard  Pulmonary:      Effort: Pulmonary effort is normal  No respiratory distress  Breath sounds: Normal breath sounds  Abdominal:      General: Bowel sounds are normal  There is no distension  Palpations: Abdomen is soft  Musculoskeletal:         General: Normal range of motion  Cervical back: Normal range of motion and neck supple  Right lower leg: No edema  Left lower leg: No edema  Skin:     General: Skin is warm and dry  Neurological:      Mental Status: He is alert and oriented to person, place, and time     Psychiatric:         Mood and Affect: Mood normal

## 2023-02-15 ENCOUNTER — CLINICAL SUPPORT (OUTPATIENT)
Dept: PULMONOLOGY | Facility: HOSPITAL | Age: 68
End: 2023-02-15
Attending: INTERNAL MEDICINE

## 2023-02-15 DIAGNOSIS — J44.9 ASTHMA-COPD OVERLAP SYNDROME (HCC): ICD-10-CM

## 2023-02-17 ENCOUNTER — CLINICAL SUPPORT (OUTPATIENT)
Dept: PULMONOLOGY | Facility: HOSPITAL | Age: 68
End: 2023-02-17
Attending: INTERNAL MEDICINE

## 2023-02-17 DIAGNOSIS — J44.9 ASTHMA-COPD OVERLAP SYNDROME (HCC): ICD-10-CM

## 2023-02-20 ENCOUNTER — CLINICAL SUPPORT (OUTPATIENT)
Dept: PULMONOLOGY | Facility: HOSPITAL | Age: 68
End: 2023-02-20
Attending: INTERNAL MEDICINE

## 2023-02-20 DIAGNOSIS — J44.9 ASTHMA-COPD OVERLAP SYNDROME (HCC): ICD-10-CM

## 2023-02-22 ENCOUNTER — CLINICAL SUPPORT (OUTPATIENT)
Dept: PULMONOLOGY | Facility: HOSPITAL | Age: 68
End: 2023-02-22
Attending: INTERNAL MEDICINE

## 2023-02-22 DIAGNOSIS — I42.8 NON-ISCHEMIC CARDIOMYOPATHY (HCC): Primary | Chronic | ICD-10-CM

## 2023-02-22 DIAGNOSIS — J44.9 ASTHMA-COPD OVERLAP SYNDROME (HCC): ICD-10-CM

## 2023-02-24 ENCOUNTER — OFFICE VISIT (OUTPATIENT)
Dept: CARDIOLOGY CLINIC | Facility: HOSPITAL | Age: 68
End: 2023-02-24

## 2023-02-24 ENCOUNTER — APPOINTMENT (OUTPATIENT)
Dept: PULMONOLOGY | Facility: HOSPITAL | Age: 68
End: 2023-02-24
Attending: INTERNAL MEDICINE

## 2023-02-24 VITALS
WEIGHT: 295 LBS | DIASTOLIC BLOOD PRESSURE: 80 MMHG | BODY MASS INDEX: 37.86 KG/M2 | HEART RATE: 87 BPM | SYSTOLIC BLOOD PRESSURE: 142 MMHG | OXYGEN SATURATION: 97 % | HEIGHT: 74 IN

## 2023-02-24 DIAGNOSIS — I50.42 CHRONIC COMBINED SYSTOLIC AND DIASTOLIC CONGESTIVE HEART FAILURE (HCC): ICD-10-CM

## 2023-02-24 DIAGNOSIS — I10 BENIGN ESSENTIAL HYPERTENSION: ICD-10-CM

## 2023-02-24 DIAGNOSIS — G47.33 OBSTRUCTIVE SLEEP APNEA: ICD-10-CM

## 2023-02-24 DIAGNOSIS — E78.5 DYSLIPIDEMIA: ICD-10-CM

## 2023-02-24 DIAGNOSIS — Z87.891 HISTORY OF TOBACCO ABUSE: ICD-10-CM

## 2023-02-24 DIAGNOSIS — I42.8 NON-ISCHEMIC CARDIOMYOPATHY (HCC): Primary | Chronic | ICD-10-CM

## 2023-02-24 DIAGNOSIS — N18.30 STAGE 3 CHRONIC KIDNEY DISEASE, UNSPECIFIED WHETHER STAGE 3A OR 3B CKD (HCC): ICD-10-CM

## 2023-02-27 ENCOUNTER — CLINICAL SUPPORT (OUTPATIENT)
Dept: PULMONOLOGY | Facility: HOSPITAL | Age: 68
End: 2023-02-27
Attending: INTERNAL MEDICINE

## 2023-02-27 DIAGNOSIS — J44.9 ASTHMA-COPD OVERLAP SYNDROME (HCC): ICD-10-CM

## 2023-03-01 ENCOUNTER — APPOINTMENT (OUTPATIENT)
Dept: PULMONOLOGY | Facility: HOSPITAL | Age: 68
End: 2023-03-01
Attending: INTERNAL MEDICINE

## 2023-03-03 ENCOUNTER — CLINICAL SUPPORT (OUTPATIENT)
Dept: PULMONOLOGY | Facility: HOSPITAL | Age: 68
End: 2023-03-03
Attending: INTERNAL MEDICINE

## 2023-03-03 DIAGNOSIS — J44.9 ASTHMA-COPD OVERLAP SYNDROME (HCC): ICD-10-CM

## 2023-03-06 ENCOUNTER — CLINICAL SUPPORT (OUTPATIENT)
Dept: PULMONOLOGY | Facility: HOSPITAL | Age: 68
End: 2023-03-06
Attending: INTERNAL MEDICINE

## 2023-03-06 DIAGNOSIS — J44.9 ASTHMA-COPD OVERLAP SYNDROME (HCC): Primary | ICD-10-CM

## 2023-03-06 NOTE — PROGRESS NOTES
Pulmonary Rehabilitation Plan of Care   60 Day Reassessment      Today's date: 3/6/2023   # of Exercise Sessions Completed:   Patient name: Robbie Mccarty      : 1955  Age: 74 y o  MRN: [id number]  Referring Physician: Michelle Block PA-C  Pulmonologist: Aris Hess MD  Provider: [facility]  Clinician: Garrison Huerta, MPT, CCRP    Dx: Asthma-COPD overlap syndrome  Date of onset: Patient states he has had COPD since  but recently had COVID which exacerbated his symptoms  SUMMARY OF PROGRESS:  Elizabeth Jose has completed 19 session of Pulmonary Rehab for the diagnosis of asthma-COPD overlap syndrome  Patient does have a PFT on file, revealing an FEV1/FVC ratio of 54% and an FEV1 of 55%  This is suggestive of moderate obstruction  Since their diagnosis, the patient has been experiencing increased dyspnea, increased sitting time, decreased physical activity and increased fatigue  They report weakness and fatigue when completing ADLs   The patient currently does not follow a formal exercise program at home  Pt reports the following physical limitations: occasional loss of balance, HADDAD, fatigue  When addressed, the patient continues to deny having depression/anxiety  Patient reports excellent social/emotional support  Information to begin using [employer] was provided as well as contact information for counseling through [employer]  PHQ-9 score will be reassessed in 30 days  The patient is a former smoker (quit 5 years ago)  Patient admits to 100% medication compliance  The patient has been receiving weekly educational handouts; please refer to [facility]  document for full list     At rest, the patient rated dyspnea 0/10 with SpO2 96% on room air  He has been working at a 4 15 MET level  The patient’s rating of perceived dyspnea during exercise is 0-3/10 with SpO2 93-95%   Resting HR 79 and /66 with appropriate hemodynamic response to exercise reaching 89 and 158/72  During recovery, HR 86, SpO2 96%, and /66  Patient will exercise on room air  Education on smoking cessation, oxygen use, breathing techniques, pulmonary anatomy, exercise for the pulmonary patient, healthy eating, stress, and relaxation will be provided  Patient goals include: increase strength, improve balance, improve functional capacity  Ketty Arauz has been very compliant in attending his scheduled pulmonary rehab sessions and gives great effort each session  He is noting less shortness of breath with improved Sp02% during exercise  Often he has a productive cough w/exertion  He has correct hemodynamic responses to the activity  He has been receiving weekly education; refer to [facility]  documentation for a list of completed topics  His program will continue to be progressed as tolerated        Medication compliance: Yes   Comments: Pt reports to be compliant with medications  Fall Risk: Moderate   Comments: Patient uses walking assist device (walker/cane/rollator) and Denies a fall in the past 6 months    Smoking: Former user    RPD at Rest: 0/10  RPD with Exercise:  0-3/10    Assessment of progression of lung disease and functional status:  CAT: 20/40  Shortness of breath questionnaire: 58/120      EXERCISE ASSESSMENT and PLAN    Current Exercise Program in Rehab:       Frequency: 2-3 days/week   Supplement with home exercise 2+ days/wk as tolerated        Minutes: 40-45        METS: 4 15              SpO2: 93% and > on RA              RPD: 0-3/10                      HR: 100-110   RPE: 4-5/10         Modalities: Treadmill, UBE, NuStep and Recumbent bike      Exercise Progression 30 Day Goals :    Frequency: 2-3 days/week    Supplement with home exercise 2+ days/wk as tolerated       Minutes: 45-50        METS: 4 5-5 5              SpO2: 95% or greater on RA              RPD: 0-2/10                      HR:n 100-110   RPE: 4-5/10        Modalities: Treadmill, UBE, NuStep and Recumbent bike   UBC in sit and stand positions    Strength training: Will be added following 2-3 weeks of monitored exercise sessions   Modalities: Leg Press, Lateral Raise, Arm Curl and Front Raises   Leg press- squats and toe raises 60#-80#, 4 X 10 reps each  Incline Board stretch     Oxygen Needs: on room air at rest and on room air with exercise  Oxygen Goal: Maintain SpO2>90% during exercise    Home Exercise: none  Education: pursed lipped breathing, diaphragmatic breathing, relaxation breathing, home exercise and benefits of exercise for pulmonary disease    Goals: reduced score on  USCD Shortness of Breath Questionnaire, Improved 6MW distance by 10%, reduced dyspnea during exercise (0-3/10), improved exercise tolerance (max METs tolerated in pulmonary rehab) and SpO2 >90% during exercise  Progressing:  Will continue to educate and progress as tolerated , Goals not met: has not initaited a HEP   , Goals met: HADDAD has decreased, increasing MET level, attending rehab regularly, SpO2 > 90%  Plan: Titrate supplemental oxygen as needed to maintain SpO2>90% with exercise, learn to conserve energy with ADLs , practice breathing techniques 3x/day and reduce time sitting at home    Readiness to change: Action:  (Changing behavior)      NUTRITION ASSESSMENT AND PLAN    Weight control:    Starting weight: 279   Current weight:   295  No weight loss during this 30 day reporting period  Diabetes: T2DM    There is an order in Epic for an A1C Hemoglobin draw  Goals:reduced BMI to < 25, LDL <100, HDL >40, TRG <150 and CHOL <200  Education: heart healthy eating  low sodium diet  hydration  nutrition for  lipid management  nutrition for Improved BG control  Progressing:Reviewed Pt goals and determined plan of care, Will continue to educate and progress as tolerated    Plan: Education class: Reading Food Labels and Education Class: Heart Healthy Eating  Readiness to change: Preparation:  (Getting ready to change)       PSYCHOSOCIAL ASSESSMENT AND PLAN    Emotional:  Depression assessment:  PHQ-9 = 6 5-9 = Mild Depression            Anxiety measure:  ALLYSSA-7 = 2 0-4  = Not anxious  Self-reported stress level: 1 /10  Social support: Very Good    Goals:  Reduce perceived stress to 1-3/10, improved [facility] QOL < 27, PHQ-9 - reduced severity by one level, Feelings in [facility] Score < 3, Physical Fitness in [facility] Score < 3, Social Support in [place] Score < 3, Daily Activity in [place] Score < 3, Social Activities in [facility] Score < 3, Pain in [place] Score < 3, Overall Health in [facility] Score < 3, Quality of Life in [facility] Score < 3  and Change in Health in [facility] Score < 3      Education: signs/sxs of depression, benefits of a positive support system, stress management techniques and depression and CAD    Progressing:Will continue to educate and progress as tolerated , Goals met: Maintaining a low stress level, imporved physical fitness and improved ability to perform ADLs        Plan: Class: Stress and Your Health and Class: Relaxation     Readiness to change: Action:  (Changing behavior)      OTHER CORE COMPONENTS     Tobacco:   Social History     Tobacco Use   Smoking Status Former   • Packs/day: 3 00   • Years: 43 00   • Pack years: 129 00   • Types: Cigarettes   • Start date:    • Quit date:    • Years since quittin 1   Smokeless Tobacco Never       Tobacco Use Intervention: Referral to tobacco expert:   Pt quit 5 years ago   and has abstained    Blood pressure:    Restin/66   Exercise: 158/72   Recovery: 128/66    Goals: consistent BP < 130/80, reduced dietary sodium <2300mg and moderate intensity exercise >150 mins/wk     Education:  pathophysiology of pulmonary disease, preventing infections, dangers of smoking and tobacco triggers     Progressing:Will continue to educate and progress as tolerated , Goals met: Resting BP consistently < 130/80, medication compliance and watching sodium intake       Plan: Class: Understanding Heart Disease and Class: Common Heart Medications     Readiness to change: Action:  (Changing behavior)

## 2023-03-08 ENCOUNTER — APPOINTMENT (OUTPATIENT)
Dept: PULMONOLOGY | Facility: HOSPITAL | Age: 68
End: 2023-03-08
Attending: INTERNAL MEDICINE

## 2023-03-10 ENCOUNTER — HOSPITAL ENCOUNTER (EMERGENCY)
Facility: HOSPITAL | Age: 68
Discharge: HOME | End: 2023-03-10
Attending: EMERGENCY MEDICINE | Admitting: EMERGENCY MEDICINE

## 2023-03-10 ENCOUNTER — CLINICAL SUPPORT (OUTPATIENT)
Dept: PULMONOLOGY | Facility: HOSPITAL | Age: 68
End: 2023-03-10
Attending: INTERNAL MEDICINE

## 2023-03-10 ENCOUNTER — APPOINTMENT (EMERGENCY)
Dept: RADIOLOGY | Facility: HOSPITAL | Age: 68
End: 2023-03-10

## 2023-03-10 VITALS
TEMPERATURE: 97.7 F | RESPIRATION RATE: 22 BRPM | DIASTOLIC BLOOD PRESSURE: 89 MMHG | OXYGEN SATURATION: 94 % | HEART RATE: 69 BPM | SYSTOLIC BLOOD PRESSURE: 145 MMHG

## 2023-03-10 DIAGNOSIS — J44.9 ASTHMA-COPD OVERLAP SYNDROME (HCC): ICD-10-CM

## 2023-03-10 DIAGNOSIS — R06.00 DYSPNEA: Primary | ICD-10-CM

## 2023-03-10 DIAGNOSIS — I50.9 CHF (CONGESTIVE HEART FAILURE) (HCC): ICD-10-CM

## 2023-03-10 DIAGNOSIS — J44.1 COPD EXACERBATION (HCC): ICD-10-CM

## 2023-03-10 LAB
ALBUMIN SERPL BCP-MCNC: 4 G/DL (ref 3.5–5)
ALP SERPL-CCNC: 72 U/L (ref 34–104)
ALT SERPL W P-5'-P-CCNC: 20 U/L (ref 7–52)
ANION GAP SERPL CALCULATED.3IONS-SCNC: 6 MMOL/L (ref 4–13)
AST SERPL W P-5'-P-CCNC: 15 U/L (ref 13–39)
ATRIAL RATE: 60 BPM
BASOPHILS # BLD AUTO: 0.11 THOUSANDS/ÂΜL (ref 0–0.1)
BASOPHILS NFR BLD AUTO: 1 % (ref 0–1)
BILIRUB SERPL-MCNC: 0.6 MG/DL (ref 0.2–1)
BNP SERPL-MCNC: 112 PG/ML (ref 0–100)
BUN SERPL-MCNC: 25 MG/DL (ref 5–25)
CALCIUM SERPL-MCNC: 9.3 MG/DL (ref 8.4–10.2)
CARDIAC TROPONIN I PNL SERPL HS: 31 NG/L
CHLORIDE SERPL-SCNC: 102 MMOL/L (ref 96–108)
CO2 SERPL-SCNC: 29 MMOL/L (ref 21–32)
CREAT SERPL-MCNC: 1.28 MG/DL (ref 0.6–1.3)
EOSINOPHIL # BLD AUTO: 0.83 THOUSAND/ÂΜL (ref 0–0.61)
EOSINOPHIL NFR BLD AUTO: 9 % (ref 0–6)
ERYTHROCYTE [DISTWIDTH] IN BLOOD BY AUTOMATED COUNT: 15.5 % (ref 11.6–15.1)
FLUAV RNA RESP QL NAA+PROBE: NEGATIVE
FLUBV RNA RESP QL NAA+PROBE: NEGATIVE
GFR SERPL CREATININE-BSD FRML MDRD: 58 ML/MIN/1.73SQ M
GLUCOSE SERPL-MCNC: 107 MG/DL (ref 65–140)
HCT VFR BLD AUTO: 44.7 % (ref 36.5–49.3)
HGB BLD-MCNC: 14.1 G/DL (ref 12–17)
IMM GRANULOCYTES # BLD AUTO: 0.04 THOUSAND/UL (ref 0–0.2)
IMM GRANULOCYTES NFR BLD AUTO: 1 % (ref 0–2)
LYMPHOCYTES # BLD AUTO: 2.22 THOUSANDS/ÂΜL (ref 0.6–4.47)
LYMPHOCYTES NFR BLD AUTO: 25 % (ref 14–44)
MCH RBC QN AUTO: 27.1 PG (ref 26.8–34.3)
MCHC RBC AUTO-ENTMCNC: 31.5 G/DL (ref 31.4–37.4)
MCV RBC AUTO: 86 FL (ref 82–98)
MONOCYTES # BLD AUTO: 0.83 THOUSAND/ÂΜL (ref 0.17–1.22)
MONOCYTES NFR BLD AUTO: 9 % (ref 4–12)
NEUTROPHILS # BLD AUTO: 4.8 THOUSANDS/ÂΜL (ref 1.85–7.62)
NEUTS SEG NFR BLD AUTO: 55 % (ref 43–75)
NRBC BLD AUTO-RTO: 0 /100 WBCS
P AXIS: 72 DEGREES
PLATELET # BLD AUTO: 321 THOUSANDS/UL (ref 149–390)
PMV BLD AUTO: 9.6 FL (ref 8.9–12.7)
POTASSIUM SERPL-SCNC: 4.1 MMOL/L (ref 3.5–5.3)
PR INTERVAL: 230 MS
PROT SERPL-MCNC: 6.6 G/DL (ref 6.4–8.4)
QRS AXIS: -47 DEGREES
QRSD INTERVAL: 112 MS
QT INTERVAL: 396 MS
QTC INTERVAL: 396 MS
RBC # BLD AUTO: 5.21 MILLION/UL (ref 3.88–5.62)
RSV RNA RESP QL NAA+PROBE: NEGATIVE
SARS-COV-2 RNA RESP QL NAA+PROBE: NEGATIVE
SODIUM SERPL-SCNC: 137 MMOL/L (ref 135–147)
T WAVE AXIS: 90 DEGREES
VENTRICULAR RATE: 60 BPM
WBC # BLD AUTO: 8.83 THOUSAND/UL (ref 4.31–10.16)

## 2023-03-10 RX ORDER — FUROSEMIDE 10 MG/ML
40 INJECTION INTRAMUSCULAR; INTRAVENOUS ONCE
Status: COMPLETED | OUTPATIENT
Start: 2023-03-10 | End: 2023-03-10

## 2023-03-10 RX ORDER — PREDNISONE 20 MG/1
40 TABLET ORAL DAILY
Qty: 8 TABLET | Refills: 0 | Status: SHIPPED | OUTPATIENT
Start: 2023-03-11 | End: 2023-03-15

## 2023-03-10 RX ORDER — PREDNISONE 20 MG/1
40 TABLET ORAL ONCE
Status: COMPLETED | OUTPATIENT
Start: 2023-03-10 | End: 2023-03-10

## 2023-03-10 RX ORDER — IPRATROPIUM BROMIDE AND ALBUTEROL SULFATE 2.5; .5 MG/3ML; MG/3ML
3 SOLUTION RESPIRATORY (INHALATION) ONCE
Status: COMPLETED | OUTPATIENT
Start: 2023-03-10 | End: 2023-03-10

## 2023-03-10 RX ADMIN — IPRATROPIUM BROMIDE AND ALBUTEROL SULFATE 3 ML: 2.5; .5 SOLUTION RESPIRATORY (INHALATION) at 12:39

## 2023-03-10 RX ADMIN — FUROSEMIDE 40 MG: 10 INJECTION, SOLUTION INTRAMUSCULAR; INTRAVENOUS at 12:39

## 2023-03-10 RX ADMIN — PREDNISONE 40 MG: 20 TABLET ORAL at 13:48

## 2023-03-10 NOTE — ED PROVIDER NOTES
Final Diagnosis:  1  Dyspnea    2  COPD exacerbation ([facility] )    3  CHF (congestive heart failure) [facility])        Chief Complaint   Patient presents with   • Shortness of Breath     SOB since last night  H/o COPD and CHF  Reports leg swelling       HPI  Patient presents for evaluation of shortness of breath  Patient states that he started to feel more short of breath than his baseline yesterday  He says this was gradual in onset  Denies any fever chills, cough, congestion, chest pain, nausea or vomiting  No sick contacts  Patient states that he is taking all his medications as indicated  Review of records show that the patient is currently going through pulmonary rehab  Has COPD and asthma overlap  Patient states that he is on a fair amount of medications for his pulmonary issues  Also states that he does have CHF  He was seen by cardiology approximately 1 month ago  Was stable at that time  Echo at end of last year with EF in the 40% range  He has Lasix 20 mg that he takes as needed  At that time he felt that his shortness of breath was at his baseline  Mostly dyspnea on exertion  Patient does tell me that he does sleep sitting up as he does have an episode aspect of sleep apnea as well  Unless otherwise specified:  - No language barrier    - History obtained from patient  - There are no limitations to the history obtained  - Previous charting was reviewed    PMH:   has a past medical history of Asthma, CHF (congestive heart failure) ([facility] ), COPD (chronic obstructive pulmonary disease) ([facility] ), COPD with acute exacerbation ([facility] ) (5/6/2022), COVID-19, Mumps, Old MI (myocardial infarction), Pneumonia, Pulmonary emphysema ([facility] ), Rectal bleeding (1/14/2019), and Sleep apnea  PSH:   has a past surgical history that includes Cardiac catheterization (07/24/2015); Inguinal hernia repair (Bilateral);  Tonsillectomy; Umbilical hernia repair (06/21/2006); pr colonoscopy flx dx w/collj spec when pfrmd (N/A, 3/11/2019); Cardiac catheterization (Left, 9/27/2022); Cardiac catheterization (N/A, 9/27/2022); and Cardiac catheterization (9/27/2022)  ROS:  Review of Systems   - 13 point ROS was performed and all are normal unless stated in the history above  - Nursing note reviewed  Vitals reviewed  - Orders placed by myself and/or advanced practitioner / resident  PE:   Vitals:    03/10/23 1121   Pulse: 60   Resp: 21   Temp: 97 7 °F (36 5 °C)   TempSrc: Temporal   SpO2: 97%     Vitals reviewed by me  Patient with nonlabored breathing  Saturating well on room air  No clear wheezing, rhonchi, or crackles on my pulmonary exam   Patient does have a skin irregularity in the middle of his back  He tells me this has been there a long time  No obvious signs of infection  Patient has mild edema around the ankles bilaterally, mild pitting  Unless otherwise specified above:    General: VS reviewed  Appears in NAD    Head: Normocephalic, atraumatic  Eyes: EOM-I      ENT: Atraumatic external nose and ears  No drooling  Neck: No JVD  CV: No pallor noted  Lungs:   No tachypnea  No respiratory distress    Abdomen:  Soft, non-tender, non-distended    MSK:   No obvious deformity    Skin: Dry, intact  No obvious rash  Psychiatric/Behavioral: Appropriate mood and affect   Exam: deferred    Physical Exam     Procedures   A:  - Nursing note reviewed  XR chest 1 view portable   Final Result      No acute cardiopulmonary disease                    Workstation performed: [id number]           Orders Placed This Encounter   Procedures   • FLU/RSV/COVID - if FLU/RSV clinically relevant   • XR chest 1 view portable   • CBC and differential   • Comprehensive metabolic panel   • HS Troponin 0hr (reflex protocol)   • B-Type Natriuretic Peptide(BNP)   • HS Troponin I 2hr   • HS Troponin I 4hr   • Notify physician of an increase in chest pain, symptomatic hypotension, a change in cardiac rhythm, or an O2 saturation of less than 90%  • Notify physician immediately if patient has persistent Chest Pain  • Insert peripheral IV   • Continuous cardiac monitoring   • Continuous pulse oximetry   • ECG 12 lead   • ECG 12 lead     Labs Reviewed   CBC AND DIFFERENTIAL - Abnormal       Result Value Ref Range Status    WBC 8 83  4 31 - 10 16 Thousand/uL Final    RBC 5 21  3 88 - 5 62 Million/uL Final    Hemoglobin 14 1  12 0 - 17 0 g/dL Final    Hematocrit 44 7  36 5 - 49 3 % Final    MCV 86  82 - 98 fL Final    MCH 27 1  26 8 - 34 3 pg Final    MCHC 31 5  31 4 - 37 4 g/dL Final    RDW 15 5 (*) 11 6 - 15 1 % Final    MPV 9 6  8 9 - 12 7 fL Final    Platelets 850  721 - 390 Thousands/uL Final    nRBC 0  /100 WBCs Final    Neutrophils Relative 55  43 - 75 % Final    Immat GRANS % 1  0 - 2 % Final    Lymphocytes Relative 25  14 - 44 % Final    Monocytes Relative 9  4 - 12 % Final    Eosinophils Relative 9 (*) 0 - 6 % Final    Basophils Relative 1  0 - 1 % Final    Neutrophils Absolute 4 80  1 85 - 7 62 Thousands/µL Final    Immature Grans Absolute 0 04  0 00 - 0 20 Thousand/uL Final    Lymphocytes Absolute 2 22  0 60 - 4 47 Thousands/µL Final    Monocytes Absolute 0 83  0 17 - 1 22 Thousand/µL Final    Eosinophils Absolute 0 83 (*) 0 00 - 0 61 Thousand/µL Final    Basophils Absolute 0 11 (*) 0 00 - 0 10 Thousands/µL Final   B-TYPE NATRIURETIC PEPTIDE (BNP) - Abnormal     (*) 0 - 100 pg/mL Final   COVID19, INFLUENZA A/B, RSV PCR, [facility] - Normal    SARS-CoV-2 Negative  Negative Final    INFLUENZA A PCR Negative  Negative Final    INFLUENZA B PCR Negative  Negative Final    RSV PCR Negative  Negative Final    Narrative:     FOR PEDIATRIC PATIENTS - copy/paste COVID Guidelines URL to browser: [email or web address]    SARS-CoV-2 assay is a Nucleic Acid Amplification assay intended for the  qualitative detection of nucleic acid from SARS-CoV-2 in nasopharyngeal  swabs  Results are for the presumptive identification of SARS-CoV-2 RNA  Positive results are indicative of infection with SARS-CoV-2, the virus  causing COVID-19, but do not rule out bacterial infection or co-infection  with other viruses  Laboratories within the [place] and its  territories are required to report all positive results to the appropriate  public health authorities  Negative results do not preclude SARS-CoV-2  infection and should not be used as the sole basis for treatment or other  patient management decisions  Negative results must be combined with  clinical observations, patient history, and epidemiological information  This test has not been FDA cleared or approved  This test has been authorized by FDA under an Emergency Use Authorization  (EUA)  This test is only authorized for the duration of time the  declaration that circumstances exist justifying the authorization of the  emergency use of an in vitro diagnostic tests for detection of SARS-CoV-2  virus and/or diagnosis of COVID-19 infection under section 564(b)(1) of  the Act, 21 U  S C  686UJK-7(P)(0), unless the authorization is terminated  or revoked sooner  The test has been validated but independent review by FDA  and CLIA is pending  Test performed using [employer] GeneXpert: This RT-PCR assay targets N2,  a region unique to SARS-CoV-2  A conserved region in the E-gene was chosen  for pan-Sarbecovirus detection which includes SARS-CoV-2  According to CMS-2020-01-R, this platform meets the definition of high-throughput technology  HS TROPONIN I 0HR - Normal    hs TnI 0hr 31  "Refer to ACS Flowchart"- see link ng/L Final    Comment:                                              Initial (time 0) result  If >=50 ng/L, Myocardial injury suggested ;  Type of myocardial injury and treatment strategy  to be determined    If 5-49 ng/L, a delta result at 2 hours and or 4 hours will be needed to further evaluate  If <4 ng/L, and chest pain has been >3 hours since onset, patient may qualify for discharge based on the HEART score in the ED  If <5 ng/L and <3hours since onset of chest pain, a delta result at 2 hours will be needed to further evaluate  HS Troponin 99th Percentile URL of a Health Population=12 ng/L with a 95% Confidence Interval of 8-18 ng/L  Second Troponin (time 2 hours)  If calculated delta >= 20 ng/L,  Myocardial injury suggested ; Type of myocardial injury and treatment strategy to be determined  If 5-49 ng/L and the calculated delta is 5-19 ng/L, consult medical service for evaluation  Continue evaluation for ischemia on ecg and other possible etiology and repeat hs troponin at 4 hours  If delta is <5 ng/L at 2 hours, consider discharge based on risk stratification via the HEART score (if in ED), or MEGHAN risk score in IP/Observation  HS Troponin 99th Percentile URL of a Health Population=12 ng/L with a 95% Confidence Interval of 8-18 ng/L  COMPREHENSIVE METABOLIC PANEL    Sodium 167  135 - 147 mmol/L Final    Potassium 4 1  3 5 - 5 3 mmol/L Final    Chloride 102  96 - 108 mmol/L Final    CO2 29  21 - 32 mmol/L Final    ANION GAP 6  4 - 13 mmol/L Final    BUN 25  5 - 25 mg/dL Final    Creatinine 1 28  0 60 - 1 30 mg/dL Final    Comment: Standardized to IDMS reference method    Glucose 107  65 - 140 mg/dL Final    Comment: If the patient is fasting, the ADA then defines impaired fasting glucose as > 100 mg/dL and diabetes as > or equal to 123 mg/dL  Specimen collection should occur prior to Sulfasalazine administration due to the potential for falsely depressed results  Specimen collection should occur prior to Sulfapyridine administration due to the potential for falsely elevated results      Calcium 9 3  8 4 - 10 2 mg/dL Final    AST 15  13 - 39 U/L Final    Comment: Specimen collection should occur prior to Sulfasalazine administration due to the potential for falsely depressed results  ALT 20  7 - 52 U/L Final    Comment: Specimen collection should occur prior to Sulfasalazine administration due to the potential for falsely depressed results  Alkaline Phosphatase 72  34 - 104 U/L Final    Total Protein 6 6  6 4 - 8 4 g/dL Final    Albumin 4 0  3 5 - 5 0 g/dL Final    Total Bilirubin 0 60  0 20 - 1 00 mg/dL Final    eGFR 58  ml/min/1 73sq m Final    Narrative:     [place] guidelines for Chronic Kidney Disease (CKD):   •  Stage 1 with normal or high GFR (GFR > 90 mL/min/1 73 square meters)  •  Stage 2 Mild CKD (GFR = 60-89 mL/min/1 73 square meters)  •  Stage 3A Moderate CKD (GFR = 45-59 mL/min/1 73 square meters)  •  Stage 3B Moderate CKD (GFR = 30-44 mL/min/1 73 square meters)  •  Stage 4 Severe CKD (GFR = 15-29 mL/min/1 73 square meters)  •  Stage 5 End Stage CKD (GFR <15 mL/min/1 73 square meters)  Note: GFR calculation is accurate only with a steady state creatinine   HS TROPONIN I 2HR   HS TROPONIN I 4HR   LIGHT BLUE TOP         Final Diagnosis:  1  Dyspnea    2  COPD exacerbation ([address] )    3  CHF (congestive heart failure) ([facility])        P:  -Patient presents for evaluation of shortness of breath  Overall it sounds like he may be having a slight exacerbation of his underlying symptoms  Will evaluate for ACS, arrhythmia, infection   - No obvious findings on chest x-ray  No clear effusion  No obvious consolidation to indicate a pneumonia  EKG and labs are not consistent with ACS  - We will provide with breathing treatment, ambulatory pulse ox to evaluate breathing  We will also provide Lasix for additional diuresis  - BNP is slightly elevated from prior  -Patient ambulates without issue and stable pulse ox  - I went back and discussed options with patient  Admission to the hospital versus treatment of COPD/CHF exacerbation  He is comfortable with treatment as COPD and CHF exacerbation    Discussed with him taking his Lasix at home over the next couple days as well as a burst dose of steroids  Return precautions reviewed  - EKG without any obvious arrhythmia or signs of ischemia by my interpretation  Watching monitor patient does have intermittent PVCs  Unless otherwise noted the patient's medications were reviewed and they should continue as directed  Medications   predniSONE tablet 40 mg (has no administration in time range)   ipratropium-albuterol (DUO-NEB) 0 5-2 5 mg/3 mL inhalation solution 3 mL (3 mL Nebulization Given 3/10/23 1239)   furosemide (LASIX) injection 40 mg (40 mg Intravenous Given 3/10/23 1239)     Time reflects when diagnosis was documented in both MDM as applicable and the Disposition within this note     Time User Action Codes Description Comment    3/10/2023  1:38 PM Leonor Sanchez Add [R06 00] Dyspnea     3/10/2023  1:38 PM Pankaj Lutz Add [J44 1] COPD exacerbation ([address] )     3/10/2023  1:38 PM Jus Lutz Add [I50 9] CHF (congestive heart failure) [facility])       ED Disposition     ED Disposition   Discharge    Condition   Stable    Date/Time   Fri Mar 10, 2023  1:38 PM    Comment   Chuy Capellan discharge to home/self care  Follow-up Information     Follow up With Specialties Details Why Contact Info    Alfredito Palomino DO Internal Medicine, Hospice Services   [address] [place] [address]  [phone]          Patient's Medications   Discharge Prescriptions    PREDNISONE 20 MG TABLET    Take 2 tablets (40 mg total) by mouth daily for 4 days Do not start before March 11, 2023  Start Date: 3/11/2023 End Date: 3/15/2023       Order Dose: 40 mg       Quantity: 8 tablet    Refills: 0     No discharge procedures on file  Prior to Admission Medications   Prescriptions Last Dose Informant Patient Reported? Taking?    ALPRAZolam (XANAX) 0 5 mg tablet   No No   Sig: Take 1 tablet (0 5 mg total) by mouth daily at bedtime as needed for anxiety   Accu-Chek FastClix Lancets MISC   No No   Sig: Use 1 each daily to test blood sugar  Blood Glucose Monitoring Suppl (Accu-Chek Guide Me) w/Device KIT   No No   Sig: Use 1 each daily to test blood sugar  DULoxetine (CYMBALTA) 60 mg delayed release capsule   No No   Sig: Take 1 capsule (60 mg total) by mouth daily   Fluticasone-Salmeterol (Advair) 500-50 mcg/dose inhaler   No No   Sig: INHALE ONE PUFF BY MOUTH AND INTO THE LUNGS TWICE A DAY  RINSE MOUTH AFTER USE   albuterol (ProAir HFA) 90 mcg/act inhaler   No No   Sig: Inhale 2 puffs every 6 (six) hours as needed for wheezing   atorvastatin (LIPITOR) 20 mg tablet   No No   Sig: Take 1 tablet (20 mg total) by mouth daily   carvedilol (COREG) 25 mg tablet   No No   Sig: Take 1 tablet (25 mg total) by mouth 2 (two) times a day with meals   fluticasone (FLONASE) 50 mcg/act nasal spray   No No   Si spray into each nostril 2 (two) times a day   furosemide (LASIX) 20 mg tablet   No No   Sig: Take 1 tablet (20 mg total) by mouth daily   glucose blood test strip   No No   Sig: Use 1 each daily to test blood sugar  guaiFENesin 1200 MG TB12   No No   Sig: Take 1 tablet (1,200 mg total) by mouth every 12 (twelve) hours   Patient not taking: Reported on 2023   ipratropium-albuterol (DUO-NEB) 0 5-2 5 mg/3 mL nebulizer solution   No No   Sig: Take 3 mL by nebulization every 6 (six) hours as needed for wheezing or shortness of breath   montelukast (SINGULAIR) 10 mg tablet   No No   Sig: Take 1 tablet (10 mg total) by mouth daily at bedtime   mupirocin (BACTROBAN) 2 % ointment   No No   Sig: Apply topically 2 (two) times a day   pantoprazole (PROTONIX) 20 mg tablet   No No   Sig: Take 1 tablet (20 mg total) by mouth daily in the early morning Do not start before 2022     Patient not taking: Reported on 2023   sacubitril-valsartan (ENTRESTO) 49-51 MG TABS   No No   Sig: Take 1 tablet by mouth 2 (two) times a day   sodium chloride 3 % inhalation solution   No No   Sig: Take 4 mL by nebulization as needed for cough (congestion)   tiotropium (Spiriva Respimat) 2 5 MCG/ACT AERS inhaler   No No   Sig: Inhale 2 puffs daily      Facility-Administered Medications: None       Portions of the record may have been created with voice recognition software  Occasional wrong word or "sound a like" substitutions may have occurred due to the inherent limitations of voice recognition software  Read the chart carefully and recognize, using context, where substitutions have occurred      Electronically signed by:  MD Perico Lopez MD  03/10/23 5718

## 2023-03-13 ENCOUNTER — CLINICAL SUPPORT (OUTPATIENT)
Dept: PULMONOLOGY | Facility: HOSPITAL | Age: 68
End: 2023-03-13
Attending: INTERNAL MEDICINE

## 2023-03-13 ENCOUNTER — VBI (OUTPATIENT)
Dept: FAMILY MEDICINE CLINIC | Facility: CLINIC | Age: 68
End: 2023-03-13

## 2023-03-13 DIAGNOSIS — J44.9 ASTHMA-COPD OVERLAP SYNDROME (HCC): ICD-10-CM

## 2023-03-13 NOTE — TELEPHONE ENCOUNTER
Constantino Tavarez    ED Visit Information     Ed visit date: 3/10/2023  Diagnosis Description: SOB  In Network? Yes Katie Eliasvenecia  Discharge status: Home  Discharged with meds ? Yes  Number of ED visits to date: 1  ED Severity:2     Outreach Information    Outreach successful: No 3  Date letter mailed:my chart   Date Finalized: 3/14/2023    Care Coordination    Follow up appointment with pcp: no n/a  Transportation issues ?  NA    Value Base Outreach    Outreach type: 7 Day Outreach  03/13/2023 01:45 PM EDT by Lindsey Salas MA 03/13/2023 01:45 PM EDT by SIDRA Sellers (Self) [phone] (Mobile)[phone] (Mobile)  [phone] (Mobile) Remove  - Left Message

## 2023-03-13 NOTE — LETTER
[facility]  [phone]    Date: 03/14/23  [address] [place] [address]    Dear [place]: Thank you for choosing [facility] emergency department for care  As your primary care provider we want to make sure that your ongoing medical care is being addressed  If you require follow up care as a result of your emergency department visit, there are a few things we would like you to know  As part of our continuing commitment to caring for our patient, we have added more same day appointments and have extended our office hours to meet your medical needs  After hours, on-call physicians are available via our main office line  We encourage you to contact our office prior to seeking treatment to discuss your symptoms with our medical staff  Together, we can determine the correct course of action  A majority of non-emergent conditions such as: common cold, flu-like symptoms, fevers, strains/sprains, dislocations, minor burns, cuts and animal bites can be treated at [address] [place] [address] facilities  Diagnostic testing is available at some sites  Of course, if you are experiencing a life threatening medical emergency call 911 or proceed directly to the nearest emergency room      Your nearest [address] [place] [address] facility is conveniently located at:    [address] [place] [address] [facility]  [address] offered at most [facility] locations  [place] your spot online at www [email or web address] or on the [facility]    Sincerely,    [address] CARE  Dept: [phone]

## 2023-03-14 LAB
ATRIAL RATE: 60 BPM
P AXIS: 72 DEGREES
PR INTERVAL: 230 MS
QRS AXIS: -47 DEGREES
QRSD INTERVAL: 112 MS
QT INTERVAL: 396 MS
QTC INTERVAL: 396 MS
T WAVE AXIS: 90 DEGREES
VENTRICULAR RATE: 60 BPM

## 2023-03-15 ENCOUNTER — CLINICAL SUPPORT (OUTPATIENT)
Dept: PULMONOLOGY | Facility: HOSPITAL | Age: 68
End: 2023-03-15
Attending: INTERNAL MEDICINE

## 2023-03-15 DIAGNOSIS — J44.9 ASTHMA-COPD OVERLAP SYNDROME (HCC): ICD-10-CM

## 2023-03-17 ENCOUNTER — RA CDI HCC (OUTPATIENT)
Dept: OTHER | Facility: HOSPITAL | Age: 68
End: 2023-03-17

## 2023-03-17 ENCOUNTER — CLINICAL SUPPORT (OUTPATIENT)
Dept: PULMONOLOGY | Facility: HOSPITAL | Age: 68
End: 2023-03-17
Attending: INTERNAL MEDICINE

## 2023-03-17 DIAGNOSIS — J44.9 ASTHMA-COPD OVERLAP SYNDROME (HCC): ICD-10-CM

## 2023-03-17 NOTE — PROGRESS NOTES
[facility]  coding opportunities          Chart Reviewed number of suggestions sent to Provider: 1  I13 0     Patients Insurance     Medicare Insurance: [employer]

## 2023-03-20 ENCOUNTER — CLINICAL SUPPORT (OUTPATIENT)
Dept: PULMONOLOGY | Facility: HOSPITAL | Age: 68
End: 2023-03-20
Attending: INTERNAL MEDICINE

## 2023-03-20 DIAGNOSIS — J44.9 ASTHMA-COPD OVERLAP SYNDROME (HCC): Primary | ICD-10-CM

## 2023-03-20 DIAGNOSIS — J44.9 ASTHMA-COPD OVERLAP SYNDROME (HCC): ICD-10-CM

## 2023-03-20 RX ORDER — FLUTICASONE PROPIONATE AND SALMETEROL 500; 50 UG/1; UG/1
POWDER RESPIRATORY (INHALATION)
Qty: 60 BLISTER | Refills: 0 | Status: SHIPPED | OUTPATIENT
Start: 2023-03-20

## 2023-03-20 NOTE — PROGRESS NOTES
Pulmonary Rehabilitation Plan of Care   Discharge      Today's date: 3/20/2023   # of Exercise Sessions Completed: 24  Patient name: Luis Angel Dhaliwal      : 1955  Age: 74 y o  MRN: [id number]  Referring Physician: Olive Wade PA-C  Pulmonologist: Ilya Hernandez MD  Provider: Shani Whitmore  Clinician: Kirsten Lefort, MS    Dx: Asthma-COPD overlap syndrome  Date of onset: Patient states he has had COPD since  but recently had COVID which exacerbated his symptoms  SUMMARY OF PROGRESS:  Jose Marks has completed 24 session of Pulmonary Rehab for the diagnosis of asthma-COPD overlap syndrome  Patient does have a PFT on file, revealing an FEV1/FVC ratio of 54% and an FEV1 of 55%  This is suggestive of moderate obstruction  Since their diagnosis, the patient has been experiencing increased dyspnea, increased sitting time, decreased physical activity and increased fatigue  They report weakness and fatigue when completing ADLs   The patient currently does not follow a formal exercise program at home but hopes to join a gym following his discharge  Pt reports improvements in the following physical limitations: occasional loss of balance, HADDAD, fatigue  When addressed, the patient continues to deny having depression/anxiety  Patient reports excellent social/emotional support  Information to begin using [employer] was provided as well as contact information for counseling through [employer]  The patient is a former smoker (quit 5 years ago)  Patient admits to 100% medication compliance  The patient has been receiving weekly educational handouts; please refer to [facility]  document for full list     At rest, the patient rated dyspnea 0/10 with SpO2 96% on room air  He has been working at a 4 15 MET level  The patient’s rating of perceived dyspnea during exercise is 0-3/10 with SpO2 93-95%  Resting HR 79 and /66 with appropriate hemodynamic response to exercise reaching 89 and 158/72  During recovery, HR 86, SpO2 96%, and /66  Patient will exercise on room air  Education on smoking cessation, oxygen use, breathing techniques, pulmonary anatomy, exercise for the pulmonary patient, healthy eating, stress, and relaxation will be provided  Patient goals include: increase strength, improve balance, improve functional capacity  Ayleen Garcia was very compliant in attending his scheduled pulmonary rehab exercise sessions and gave great effort each session  He reports that is feels a lot stronger and is able to do more at home with less shortness of breath  He notes less shortness of breath with exercise and is not coughing as much  While he report that he is feeling better, his questionnaire scores did not reflect that (SOBQ increased to 67 from 58, CAT increased to 24 from 20, and [place] increased to 30 from 25)         Medication compliance: Yes   Comments: Pt reports to be compliant with medications  Fall Risk: Moderate   Comments: Patient uses walking assist device (walker/cane/rollator) and Denies a fall in the past 6 months    Smoking: Former user    RPD at Rest: 0/10  RPD with Exercise:  0-3/10    Assessment of progression of lung disease and functional status:  CAT: 20/40 at initial evaluation, 24/40 at discharge  Shortness of breath questionnaire: 58/120 at initial evaluation, 67/120 at discharge      EXERCISE ASSESSMENT and PLAN    Current Exercise Program in Rehab:       Frequency: 2-3 days/week   Supplement with home exercise 2+ days/wk as tolerated        Minutes: 40-45        METS: 4 15              SpO2: 93% and > on RA              RPD: 0-3/10                      HR: 100-110   RPE: 4-5/10         Modalities: Treadmill, UBE, NuStep and Recumbent bike        Strength trainin-3 days / week  12-15 repetitions  1-2 sets per modality    Modalities: Leg Press and Calf Raises     Oxygen Needs: on room air at rest and on room air with exercise  Oxygen Goal: Maintain SpO2>90% during exercise    Home Exercise: none  Education: pursed lipped breathing, diaphragmatic breathing, relaxation breathing, home exercise and benefits of exercise for pulmonary disease    Goals: reduced score on  US Shortness of Breath Questionnaire, Improved 6MW distance by 10%, reduced dyspnea during exercise (0-3/10), improved exercise tolerance (max METs tolerated in pulmonary rehab) and SpO2 >90% during exercise  Progressing:  Goals not met: has not initaited a HEP   , Goals met: HADDAD has decreased, increasing MET level, attending rehab regularly, SpO2 > 90%  , Patient will be encouraged to focus on lifestyle modifications following discharge  Plan: Titrate supplemental oxygen as needed to maintain SpO2>90% with exercise, learn to conserve energy with ADLs , practice breathing techniques 3x/day and reduce time sitting at home    Readiness to change: Action:  (Changing behavior)      NUTRITION ASSESSMENT AND PLAN    Weight control:    Starting weight: 279   Current weight:   295  No weight loss during this 30 day reporting period  Diabetes: T2DM  There is an order in Epic for an A1C Hemoglobin draw  Goals:reduced BMI to < 25, LDL <100, HDL >40, TRG <150 and CHOL <200  Education: heart healthy eating  low sodium diet  hydration  nutrition for  lipid management  nutrition for Improved BG control  Progressing:Reviewed Pt goals and determined plan of care, Goals not met: No weight loss, no repeat A1C   , Patient will be encouraged to focus on lifestyle modifications following discharge    Plan: Education class: Reading [employer] and Education Class: Heart Healthy Eating  Readiness to change: Preparation:  (Getting ready to change)       PSYCHOSOCIAL ASSESSMENT AND PLAN    Emotional:  Depression assessment:  PHQ-9 = 3 1-4 = Minimal Depression            Anxiety measure:  ALLYSSA-7 = 2 0-4  = Not anxious  Self-reported stress level: 1 /10  Social support: Very Good    Goals:  Reduce perceived stress to 1-3/10, improved [facility] QOL < 27, PHQ-9 - reduced severity by one level, Feelings in [place] Score < 3, Physical Fitness in [place] Score < 3, Social Support in [place] Score < 3, Daily Activity in [place] Score < 3, Social Activities in [facility] Score < 3, Pain in [facility] Score < 3, Overall Health in [facility] Score < 3, Quality of Life in [facility] Score < 3  and Change in Health in [facility] Score < 3      Education: signs/sxs of depression, benefits of a positive support system, stress management techniques and depression and CAD    Progressing:Goals not met: [place] score increased  , Goals met: PHQ9 level decreased, stress remains low , Patient will be encouraged to focus on lifestyle modifications following discharge  Plan: Class: Stress and Your Health and Class: Relaxation     Readiness to change: Action:  (Changing behavior)      OTHER CORE COMPONENTS     Tobacco:   Social History     Tobacco Use   Smoking Status Former   • Packs/day: 3 00   • Years: 43 00   • Pack years: 129 00   • Types: Cigarettes   • Start date:    • Quit date:    • Years since quittin 2   Smokeless Tobacco Never       Tobacco Use Intervention: Referral to tobacco expert:   Pt quit 5 years ago   and has abstained    Blood pressure:    Restin/66   Exercise: 158/72   Recovery: 128/66    Goals: consistent BP < 130/80, reduced dietary sodium <2300mg and moderate intensity exercise >150 mins/wk     Education:  pathophysiology of pulmonary disease, preventing infections, dangers of smoking and tobacco triggers     Progressing:Goals met: Resting BP consistently < 130/80, medication compliance and watching sodium intake , Patient will be encouraged to focus on lifestyle modifications following discharge       Plan: Class: Understanding Heart Disease and Class: Common Heart Medications     Readiness to change: Action:  (Changing behavior)

## 2023-03-21 ENCOUNTER — TELEPHONE (OUTPATIENT)
Dept: PULMONOLOGY | Facility: CLINIC | Age: 68
End: 2023-03-21

## 2023-03-21 DIAGNOSIS — J44.9 ASTHMA-COPD OVERLAP SYNDROME (HCC): ICD-10-CM

## 2023-03-21 RX ORDER — ALBUTEROL SULFATE 90 UG/1
2 AEROSOL, METERED RESPIRATORY (INHALATION) EVERY 6 HOURS PRN
Qty: 18 G | Refills: 11 | Status: SHIPPED | OUTPATIENT
Start: 2023-03-21

## 2023-03-22 ENCOUNTER — APPOINTMENT (OUTPATIENT)
Dept: PULMONOLOGY | Facility: HOSPITAL | Age: 68
End: 2023-03-22
Attending: INTERNAL MEDICINE

## 2023-03-22 DIAGNOSIS — J45.40 CHRONIC ASTHMA, MODERATE PERSISTENT, UNCOMPLICATED: Primary | ICD-10-CM

## 2023-03-22 RX ORDER — ALBUTEROL SULFATE 2.5 MG/3ML
2.5 SOLUTION RESPIRATORY (INHALATION) EVERY 6 HOURS PRN
Qty: 360 ML | Refills: 2 | Status: SHIPPED | OUTPATIENT
Start: 2023-03-22

## 2023-03-22 NOTE — TELEPHONE ENCOUNTER
Please send in new RX, the albuterol neb isn't on his med list so I can't reorder and send it Follow Up:  Fevers    Interval History/ROS: Zosyn stopped yesterday. Afebrile. Feels fine. Planning to go home tomorrow. He asked if there was anything from my end that he needs to do at home. I said just stop drinking which he said he'll do.     Allergies  No Known Allergies        ANTIMICROBIALS:  rifAXIMin 200 three times a day      OTHER MEDS:  folic acid 1 milliGRAM(s) Oral daily  furosemide    Tablet 20 milliGRAM(s) Oral daily  lactulose Syrup 20 Gram(s) Oral every 6 hours  LORazepam     Tablet 0.5 milliGRAM(s) Oral daily  nadolol 20 milliGRAM(s) Oral daily  potassium phosphate / sodium phosphate Tablet (K-PHOS No. 2) 1 Tablet(s) Oral four times a day with meals  sodium chloride 0.9%. 1000 milliLiter(s) IV Continuous <Continuous>  spironolactone 50 milliGRAM(s) Oral daily  thiamine 100 milliGRAM(s) Oral daily      Vital Signs Last 24 Hrs  T(C): 37.3 (2020 05:24), Max: 37.4 (15 Jul 2020 19:18)  T(F): 99.2 (2020 05:24), Max: 99.3 (15 Jul 2020 19:18)  HR: 115 (2020 05:24) (105 - 115)  BP: 129/77 (2020 05:24) (110/64 - 129/77)  BP(mean): --  RR: 18 (2020 05:24) (18 - 19)  SpO2: 94% (2020 05:24) (93% - 96%)    Physical Exam:  General: awake, alert, non toxic  Head: atraumatic, normocephalic  ENT: no oropharyngeal lesions, no cervical lymphadenopathy   Cardio: regular rate and rhythm   Respiratory: nonlabored on room air, clear bilaterally  abd: slightly distended, soft, bowel sounds present, no tenderness  Musculoskeletal: no focal joint swelling, no edema  Skin: no rash  Neurologic: no focal deficit  psych: normal affect                          11.2   6.67  )-----------( 112      ( 15 Jul 2020 09:17 )             32.9       07-16    133<L>  |  98  |  5<L>  ----------------------------<  121<H>  4.2   |  23  |  0.63    Ca    8.2<L>      2020 09:29  Phos  2.3       Mg     2.0         TPro  5.8<L>  /  Alb  3.0<L>  /  TBili  15.8<H>  /  DBili  x   /  AST  83<H>  /  ALT  22  /  AlkPhos  157<H>        Urinalysis Basic - ( 2020 14:35 )    Color: Teresa / Appearance: Slightly Turbid / S.035 / pH: x  Gluc: x / Ketone: Trace  / Bili: Large / Urobili: 3 mg/dL   Blood: x / Protein: 30 mg/dL / Nitrite: Negative   Leuk Esterase: Negative / RBC: 2 /HPF / WBC 7 /HPF   Sq Epi: x / Non Sq Epi: 15 /HPF / Bacteria: Negative        MICROBIOLOGY:  Culture - Urine (collected 20 @ 17:31)  Source: .Urine Clean Catch (Midstream)  Final Report (07-15-20 @ 15:19):    No growth    Culture - Blood (collected 20 @ 17:22)  Source: .Blood Blood-Peripheral  Preliminary Report (07-15-20 @ 18:02):    No growth to date.    Culture - Blood (collected 20 @ 17:22)  Source: .Blood Blood-Peripheral  Preliminary Report (07-15-20 @ 18:02):    No growth to date.    Babesia microti PCR, Blood. (collected 20 @ 09:07)  Source: .Blood  Final Report (20 @ 15:37):    Babesia microti PCR    Results: NOT detected    ***************Result Note*************    The detection of Babesia microti by PCR has only been    validated for whole blood; this test has not been approved    by the [place] Food and Drug Administration (FDA). Performance    characteristics of this assay have been determined by    [email or web address]. The clinical significance    of results should be considered in conjunction with the    overall clinical presentation of the patient. Result is not    intended to be used as the sole means for clinical diagnosis    or patient management decisions.    One negative sample does not necessarily rule    out the presence of a parasitic infection.    Culture - Blood (collected 20 @ 00:52)  Source: .Blood Blood-Peripheral  Preliminary Report (20 @ 01:02):    No growth to date.    Culture - Blood (collected 20 @ 00:52)  Source: .Blood Blood-Peripheral  Preliminary Report (20 @ 01:02):    No growth to date.    CMVPCR Log: NotDetec Oxu82DG/mL ( @ 08:55)    RADIOLOGY:  Images below reviewed personally  US Abdomen Limited (07.15.20 @ 14:55)   Thereis small right upper quadrant and small left upper quadrant free fluid. Trace free fluid within the right lower quadrant.    The main portal vein and the right and left branches of the portal vein demonstrate hepatofugal flow. The main portal vein is1.4 cm with a velocity of 30.2 cm/s. The right hepatic vein appears patent, however there is poor visualization of the middle and left hepatic vein. The main splenic vein is not well visualized. The hepatic artery appears patent with a peak systolic velocity of 219 cm/s indicative of compensatory flow.    Exam Date:      20                                                                EXAM: Abdominal Doppler ultrasound  [facility]   CLINICAL HISTORY: Acute liver failure.     TECHNIQUE: Abdominal Doppler ultrasound was obtained of the hepatic and splenic vasculature.    COMPARISON STUDY: None.    FINDINGS:     Hepatic veins and portal vein are patent and demonstrate normal directionality of flow. Spleen is   enlarged. Visualized portion of the splenic vein is patent.    IMPRESSION:     Hepatic veins and portal vein are patent. No thrombus appreciated.      CT Chest Abdomen Pelvis 20 [facility]  IMPRESSION:     Chest:    -No acute findings.    -Trace pericardial fluid.    Abdomen/pelvis:    -Massive hepatomegaly and morphologic features of hepatic cirrhosis. Heterogeneous background parenchymal hepatic steatosis, possibility of superimposed hepatocellular carcinoma difficult to exclude.    -Evidence of portal hypertension including splenomegaly, upper abdominal/esophageal varices, and splenorenal shunt. Small volume ascites.    IMPRESSION:    No evidence of biliary obstruction or choledocholithiasis.    Marked hepatomegaly with heterogeneous background fat deposition and morphologic features of hepatic cirrhosis.       Secondary signs of portal hypertension including splenomegaly and upper abdominal varices.    Small volume ascites.      MR Head w/wo IV Cont (20 @ 15:48)   Atrophy out of proportion for age. Nonspecific focus of T2 hyperintensity in the left periventricular region at the level of the atria of the left lateral ventricle. This may represent focus of infection inflammation demyelination or ischemia. No other significant pathology. No abnormal enhancement    MRCP20 [facility]   No evidence of biliary obstruction or choledocholithiasis.   Marked hepatomegaly with heterogeneous background fat deposition and morphologic features of hepatic cirrhosis.   Secondary signs of portal hypertension including splenomegaly and upper abdominal varices.   Small volume ascites.

## 2023-03-22 NOTE — TELEPHONE ENCOUNTER
Patient needs his albuterol nebulizer solution sent to [facility] in [place]     The inhaler was sent instead yesterday

## 2023-03-23 ENCOUNTER — OFFICE VISIT (OUTPATIENT)
Dept: FAMILY MEDICINE CLINIC | Facility: CLINIC | Age: 68
End: 2023-03-23

## 2023-03-23 VITALS
TEMPERATURE: 97.3 F | HEIGHT: 74 IN | HEART RATE: 78 BPM | RESPIRATION RATE: 18 BRPM | BODY MASS INDEX: 38.12 KG/M2 | DIASTOLIC BLOOD PRESSURE: 78 MMHG | SYSTOLIC BLOOD PRESSURE: 126 MMHG | WEIGHT: 297 LBS

## 2023-03-23 DIAGNOSIS — F41.9 ANXIETY: ICD-10-CM

## 2023-03-23 DIAGNOSIS — J45.40 CHRONIC ASTHMA, MODERATE PERSISTENT, UNCOMPLICATED: ICD-10-CM

## 2023-03-23 DIAGNOSIS — Z11.59 ENCOUNTER FOR HEPATITIS C SCREENING TEST FOR LOW RISK PATIENT: ICD-10-CM

## 2023-03-23 DIAGNOSIS — Z00.00 MEDICARE ANNUAL WELLNESS VISIT, SUBSEQUENT: Primary | ICD-10-CM

## 2023-03-23 DIAGNOSIS — E11.9 TYPE 2 DIABETES MELLITUS WITHOUT COMPLICATION, WITHOUT LONG-TERM CURRENT USE OF INSULIN (HCC): ICD-10-CM

## 2023-03-23 DIAGNOSIS — Z12.5 SCREENING FOR PROSTATE CANCER: ICD-10-CM

## 2023-03-23 LAB
LEFT EYE DIABETIC RETINOPATHY: NORMAL
LEFT EYE IMAGE QUALITY: NORMAL
LEFT EYE MACULAR EDEMA: NORMAL
LEFT EYE OTHER RETINOPATHY: NORMAL
RIGHT EYE DIABETIC RETINOPATHY: NORMAL
RIGHT EYE IMAGE QUALITY: NORMAL
RIGHT EYE MACULAR EDEMA: NORMAL
RIGHT EYE OTHER RETINOPATHY: NORMAL
SEVERITY (EYE EXAM): NORMAL

## 2023-03-23 RX ORDER — IPRATROPIUM BROMIDE AND ALBUTEROL SULFATE 2.5; .5 MG/3ML; MG/3ML
3 SOLUTION RESPIRATORY (INHALATION) EVERY 6 HOURS PRN
Qty: 360 ML | Refills: 0 | Status: SHIPPED | OUTPATIENT
Start: 2023-03-23 | End: 2023-04-22

## 2023-03-23 RX ORDER — ALPRAZOLAM 0.5 MG/1
0.5 TABLET ORAL
Qty: 30 TABLET | Refills: 0 | Status: SHIPPED | OUTPATIENT
Start: 2023-03-23

## 2023-03-23 NOTE — PATIENT INSTRUCTIONS
Medicare Preventive Visit Patient Instructions  Thank you for completing your Welcome to Medicare Visit or Medicare Annual Wellness Visit today  Your next wellness visit will be due in one year (3/23/2024)  The screening/preventive services that you may require over the next 5-10 years are detailed below  Some tests may not apply to you based off risk factors and/or age  Screening tests ordered at today's visit but not completed yet may show as past due  Also, please note that scanned in results may not display below  Preventive Screenings:  Service Recommendations Previous Testing/Comments   Colorectal Cancer Screening  · Colonoscopy    · Fecal Occult Blood Test (FOBT)/Fecal Immunochemical Test (FIT)  · Fecal DNA/Cologuard Test  · Flexible Sigmoidoscopy Age: 39-70 years old   Colonoscopy: every 10 years (May be performed more frequently if at higher risk)  OR  FOBT/FIT: every 1 year  OR  Cologuard: every 3 years  OR  Sigmoidoscopy: every 5 years  Screening may be recommended earlier than age 39 if at higher risk for colorectal cancer  Also, an individualized decision between you and your healthcare provider will decide whether screening between the ages of 74-80 would be appropriate   Colonoscopy: 03/11/2019  FOBT/FIT: Not on file  Cologuard: Not on file  Sigmoidoscopy: Not on file    Screening Current     Prostate Cancer Screening Individualized decision between patient and health care provider in men between ages of 53-78   Medicare will cover every 12 months beginning on the day after your 50th birthday PSA: 1 0 ng/mL           Hepatitis C Screening Once for adults born between 1945 and 1965  More frequently in patients at high risk for Hepatitis C Hep C Antibody: Not on file        Diabetes Screening 1-2 times per year if you're at risk for diabetes or have pre-diabetes Fasting glucose: 117 mg/dL (9/1/2022)  A1C: 6 4 % (12/17/2022)  Screening Not Indicated  History Diabetes   Cholesterol Screening Once every 5 years if you don't have a lipid disorder  May order more often based on risk factors  Lipid panel: 09/01/2022  Screening Current      Other Preventive Screenings Covered by Medicare:  1  Abdominal Aortic Aneurysm (AAA) Screening: covered once if your at risk  You're considered to be at risk if you have a family history of AAA or a male between the age of 73-68 who smoking at least 100 cigarettes in your lifetime  2  Lung Cancer Screening: covers low dose CT scan once per year if you meet all of the following conditions: (1) Age 50-69; (2) No signs or symptoms of lung cancer; (3) Current smoker or have quit smoking within the last 15 years; (4) You have a tobacco smoking history of at least 20 pack years (packs per day x number of years you smoked); (5) You get a written order from a healthcare provider  3  Glaucoma Screening: covered annually if you're considered high risk: (1) You have diabetes OR (2) Family history of glaucoma OR (3)  aged 48 and older OR (3)  American aged 72 and older  3  Osteoporosis Screening: covered every 2 years if you meet one of the following conditions: (1) Have a vertebral abnormality; (2) On glucocorticoid therapy for more than 3 months; (3) Have primary hyperparathyroidism; (4) On osteoporosis medications and need to assess response to drug therapy  5  HIV Screening: covered annually if you're between the age of 12-76  Also covered annually if you are younger than 13 and older than 72 with risk factors for HIV infection  For pregnant patients, it is covered up to 3 times per pregnancy      Immunizations:  Immunization Recommendations   Influenza Vaccine Annual influenza vaccination during flu season is recommended for all persons aged >= 6 months who do not have contraindications   Pneumococcal Vaccine   * Pneumococcal conjugate vaccine = PCV13 (Prevnar 13), PCV15 (Vaxneuvance), PCV20 (Prevnar 20)  * Pneumococcal polysaccharide vaccine = PPSV23 (Pneumovax) Adults 25-60 years old: 1-3 doses may be recommended based on certain risk factors  Adults 72 years old: 1-2 doses may be recommended based off what pneumonia vaccine you previously received   Hepatitis B Vaccine 3 dose series if at intermediate or high risk (ex: diabetes, end stage renal disease, liver disease)   Tetanus (Td) Vaccine - COST NOT COVERED BY MEDICARE PART B Following completion of primary series, a booster dose should be given every 10 years to maintain immunity against tetanus  Td may also be given as tetanus wound prophylaxis  Tdap Vaccine - COST NOT COVERED BY MEDICARE PART B Recommended at least once for all adults  For pregnant patients, recommended with each pregnancy  Shingles Vaccine (Shingrix) - COST NOT COVERED BY MEDICARE PART B  2 shot series recommended in those aged 48 and above     Health Maintenance Due:      Topic Date Due   • Lung Cancer Screening  12/23/2023   • Colorectal Cancer Screening  03/11/2024   • HIV Screening  Discontinued   • Hepatitis C Screening  Discontinued     Immunizations Due:      Topic Date Due   • COVID-19 Vaccine (1) Never done   • Hepatitis A Vaccine (1 of 2 - Risk 2-dose series) Never done   • Hepatitis B Vaccine (1 of 3 - Risk 3-dose series) Never done   • Pneumococcal Vaccine: 65+ Years (2 - PCV) 07/26/2022   • Influenza Vaccine (1) 09/01/2022     Advance Directives   What are advance directives? Advance directives are legal documents that state your wishes and plans for medical care  These plans are made ahead of time in case you lose your ability to make decisions for yourself  Advance directives can apply to any medical decision, such as the treatments you want, and if you want to donate organs  What are the types of advance directives? There are many types of advance directives, and each state has rules about how to use them  You may choose a combination of any of the following:  · Living will:   This is a written record of the treatment you want  You can also choose which treatments you do not want, which to limit, and which to stop at a certain time  This includes surgery, medicine, IV fluid, and tube feedings  · Durable power of  for healthcare [facility]): This is a written record that states who you want to make healthcare choices for you when you are unable to make them for yourself  This person, called a proxy, is usually a family member or a friend  You may choose more than 1 proxy  · Do not resuscitate (DNR) order:  A DNR order is used in case your heart stops beating or you stop breathing  It is a request not to have certain forms of treatment, such as CPR  A DNR order may be included in other types of advance directives  · Medical directive: This covers the care that you want if you are in a coma, near death, or unable to make decisions for yourself  You can list the treatments you want for each condition  Treatment may include pain medicine, surgery, blood transfusions, dialysis, IV or tube feedings, and a ventilator (breathing machine)  · Values history: This document has questions about your views, beliefs, and how you feel and think about life  This information can help others choose the care that you would choose  Why are advance directives important? An advance directive helps you control your care  Although spoken wishes may be used, it is better to have your wishes written down  Spoken wishes can be misunderstood, or not followed  Treatments may be given even if you do not want them  An advance directive may make it easier for your family to make difficult choices about your care  Weight Management   Why it is important to manage your weight:  Being overweight increases your risk of health conditions such as heart disease, high blood pressure, type 2 diabetes, and certain types of cancer  It can also increase your risk for osteoarthritis, sleep apnea, and other respiratory problems  Aim for a slow, steady weight loss  Even a small amount of weight loss can lower your risk of health problems  How to lose weight safely:  A safe and healthy way to lose weight is to eat fewer calories and get regular exercise  You can lose up about 1 pound a week by decreasing the number of calories you eat by 500 calories each day  Healthy meal plan for weight management:  A healthy meal plan includes a variety of foods, contains fewer calories, and helps you stay healthy  A healthy meal plan includes the following:  · Eat whole-grain foods more often  A healthy meal plan should contain fiber  Fiber is the part of grains, fruits, and vegetables that is not broken down by your body  Whole-grain foods are healthy and provide extra fiber in your diet  Some examples of whole-grain foods are whole-wheat breads and pastas, oatmeal, brown rice, and bulgur  · Eat a variety of vegetables every day  Include dark, leafy greens such as spinach, kale, eloy greens, and mustard greens  Eat yellow and orange vegetables such as carrots, sweet potatoes, and winter squash  · Eat a variety of fruits every day  Choose fresh or canned fruit (canned in its own juice or light syrup) instead of juice  Fruit juice has very little or no fiber  · Eat low-fat dairy foods  Drink fat-free (skim) milk or 1% milk  Eat fat-free yogurt and low-fat cottage cheese  Try low-fat cheeses such as mozzarella and other reduced-fat cheeses  · Choose meat and other protein foods that are low in fat  Choose beans or other legumes such as split peas or lentils  Choose fish, skinless poultry (chicken or turkey), or lean cuts of red meat (beef or pork)  Before you cook meat or poultry, cut off any visible fat  · Use less fat and oil  Try baking foods instead of frying them  Add less fat, such as margarine, sour cream, regular salad dressing and mayonnaise to foods  Eat fewer high-fat foods   Some examples of high-fat foods include french fries, doughnuts, ice cream, and cakes   · Eat fewer sweets  Limit foods and drinks that are high in sugar  This includes candy, cookies, regular soda, and sweetened drinks  Exercise:  Exercise at least 30 minutes per day on most days of the week  Some examples of exercise include walking, biking, dancing, and swimming  You can also fit in more physical activity by taking the stairs instead of the elevator or parking farther away from stores  Ask your healthcare provider about the best exercise plan for you  © Copyright [employer] 2018 Information is for End User's use only and may not be sold, redistributed or otherwise used for commercial purposes   All illustrations and images included in CareNotes® are the copyrighted property of [employer]  or [address] [place] [address]

## 2023-03-23 NOTE — PROGRESS NOTES
Assessment and Plan:     Problem List Items Addressed This Visit        Endocrine    Type 2 diabetes mellitus ([address] )    Relevant Orders    IRIS Diabetic eye exam    Comprehensive metabolic panel    HEMOGLOBIN A1C W/ EAG ESTIMATION    Microalbumin / creatinine urine ratio    LDL cholesterol, direct       Other    Anxiety (Chronic)    Relevant Medications    ALPRAZolam (XANAX) 0 5 mg tablet    Medicare annual wellness visit, subsequent - Primary   Other Visit Diagnoses     Screening for prostate cancer        Relevant Orders    PSA, Total Screen    Encounter for hepatitis C screening test for low risk patient        Relevant Orders    Hepatitis C antibody      Will go for fbw soon and cxr  He is planning to continue exercise now that pulm rehab ended  Stay hydrated  Has Pulmonary appt next week  Info regarding AD and acp documents discussed/provided   BMI Counseling: Body mass index is 38 13 kg/m²  The BMI is above normal  Nutrition recommendations include decreasing portion sizes and consuming healthier snacks  Exercise recommendations include exercising 3-5 times per week  Rationale for BMI follow-up plan is due to patient being overweight or obese  Preventive health issues were discussed with patient, and age appropriate screening tests were ordered as noted in patient's After Visit Summary  Personalized health advice and appropriate referrals for health education or preventive services given if needed, as noted in patient's After Visit Summary       History of Present Illness:     Patient presents for a Medicare Wellness Visit    HPI   Pt completed Pulmonary rehab and feels it was helpful He did start at the gym yesterday and overall feels better Diet improved but not great as of yet Not checking sugars regularly   Patient Care Team:  Iggy Khoury DO as PCP - General (Internal Medicine)  Iggy Khoury DO as PCP - DO Orlin as PCP - [address] [place], [place] [id number] [facility] (E)  Mauricio Faye MD as PCP - PCP-Jethro (RTE)  DO Mike Marsh MD as Endoscopist  Linell Dancer, MD (Pulmonary Disease)  Danielle Carrasco MD (Pulmonary Disease)     Review of Systems:     Review of Systems   Constitutional: Negative for chills and fever  HENT: Negative  Eyes: Negative for visual disturbance  Respiratory: Negative for cough and shortness of breath  Cardiovascular: Negative for chest pain, palpitations and leg swelling  Gastrointestinal: Negative for abdominal distention and abdominal pain  Genitourinary: Negative  Musculoskeletal: Positive for arthralgias  Neurological: Negative for dizziness, light-headedness and headaches  Psychiatric/Behavioral: Negative for sleep disturbance  The patient is not nervous/anxious           Problem List:     Patient Active Problem List   Diagnosis   • Chronic asthma, moderate persistent, uncomplicated   • Obstructive sleep apnea   • Chronic combined systolic and diastolic congestive heart failure (HCC)   • Non-ischemic cardiomyopathy with HFmEF ([facility] )   • Dyslipidemia   • Benign essential hypertension   • Anxiety   • Asthma-COPD overlap syndrome (HCC)   • Borderline hyperglycemia   • COPD, severe (HCC)   • Hemorrhoids   • Thyroid disease   • Vitamin D deficiency   • Constipation   • Pulmonary emphysema (HCC)   • History of tobacco abuse   • Hypertrophied anal papilla   • Medicare annual wellness visit, subsequent   • Bronchitis   • PLMD (periodic limb movement disorder)   • COVID-19 virus infection   • Stage 3 chronic kidney disease, unspecified whether stage 3a or 3b CKD ([facility] )   • Chest pain   • Type 2 diabetes mellitus ([facility] )   • Hyponatremia   • Moderate episode of recurrent major depressive disorder (HCC)   • Class 2 severe obesity due to excess calories with serious comorbidity and body mass index (BMI) of 37 0 to 37 9 in adult [facility])      Past Medical and Surgical History: Past Medical History:   Diagnosis Date   • Asthma    • CHF (congestive heart failure) ([facility])    • COPD (chronic obstructive pulmonary disease) ([facility])    • COPD with acute exacerbation ([facility] ) 5/6/2022   • COVID-19    • Mumps    • Old MI (myocardial infarction)    • Pneumonia    • Pulmonary emphysema ([facility] )    • Rectal bleeding 1/14/2019   • Sleep apnea      Past Surgical History:   Procedure Laterality Date   • CARDIAC CATHETERIZATION  07/24/2015    Left main- normal and maidly tortuous  Circumflex - normal and moderately tortuous  RCA- normal and mildy tortuous  Global LV function was severely depressed      • CARDIAC CATHETERIZATION Left 9/27/2022    Procedure: Cardiac Left Heart Cath;  Surgeon: Stormy Mancia DO;  Location: [facility] CARDIAC CATH LAB; Service: Cardiology   • CARDIAC CATHETERIZATION N/A 9/27/2022    Procedure: Cardiac Coronary Angiogram;  Surgeon: Stormy Mancia DO;  Location: [facility] CARDIAC CATH LAB; Service: Cardiology   • CARDIAC CATHETERIZATION  9/27/2022    Procedure: Cardiac catheterization;  Surgeon: Stormy Mancia DO;  Location: [facility] CARDIAC CATH LAB; Service: Cardiology   • INGUINAL HERNIA REPAIR Bilateral    • OR COLONOSCOPY FLX DX W/COLLJ SPEC WHEN PFRMD N/A 3/11/2019    Procedure: COLONOSCOPY with removal of anal papilla;   Surgeon: Yari Villeda MD;  Location: [facility] MAIN OR;  Service: Colorectal   • TONSILLECTOMY     • UMBILICAL HERNIA REPAIR  06/21/2006      Family History:     Family History   Problem Relation Age of Onset   • Heart attack Father         MI   • Heart disease Father    • Diabetes Sister         DM   • Arthritis Family    • Coronary artery disease Family    • Hypertension Family    • Tuberculosis Son    • Alzheimer's disease Mother       Social History:     Social History     Socioeconomic History   • Marital status: /Civil Union     Spouse name: None   • Number of children: None   • Years of education: None   • Highest education level: None   Occupational History   • None   Tobacco Use   • Smoking status: Former     Packs/day: 3 00     Years: 43 00     Pack years: 129 00     Types: Cigarettes     Start date: 67     Quit date: 2018     Years since quittin 2   • Smokeless tobacco: Never   Vaping Use   • Vaping Use: Never used   Substance and Sexual Activity   • Alcohol use: Not Currently     Comment: rarely   • Drug use: Yes     Types: Marijuana     Comment: occasionally edible   • Sexual activity: None   Other Topics Concern   • None   Social History Narrative    Caffeine use     Social Determinants of Health     Financial Resource Strain: Low Risk    • Difficulty of Paying Living Expenses: Not very hard   Food Insecurity: No Food Insecurity   • Worried About Running Out of Food in the Last Year: Never true   • Ran Out of Food in the Last Year: Never true   Transportation Needs: No Transportation Needs   • Lack of Transportation (Medical): No   • Lack of Transportation (Non-Medical): No   Physical Activity: Not on file   Stress: Not on file   Social Connections: Not on file   Intimate Partner Violence: Not on file   Housing Stability: Low Risk    • Unable to Pay for Housing in the Last Year: No   • Number of Places Lived in the Last Year: 1   • Unstable Housing in the Last Year: No      Medications and Allergies:     Current Outpatient Medications   Medication Sig Dispense Refill   • Accu-Chek FastClix Lancets MISC Use 1 each daily to test blood sugar   100 each 5   • albuterol (2 5 mg/3 mL) 0 083 % nebulizer solution Take 3 mL (2 5 mg total) by nebulization every 6 (six) hours as needed for wheezing or shortness of breath 360 mL 2   • albuterol (ProAir HFA) 90 mcg/act inhaler Inhale 2 puffs every 6 (six) hours as needed for wheezing 18 g 11   • ALPRAZolam (XANAX) 0 5 mg tablet Take 1 tablet (0 5 mg total) by mouth daily at bedtime as needed for anxiety 30 tablet 0   • atorvastatin (LIPITOR) 20 mg tablet Take 1 tablet (20 mg total) by mouth daily 90 tablet 3   • Blood Glucose Monitoring Suppl (Accu-Chek Guide Me) w/Device KIT Use 1 each daily to test blood sugar  1 kit 0   • carvedilol (COREG) 25 mg tablet Take 1 tablet (25 mg total) by mouth 2 (two) times a day with meals 30 tablet 11   • DULoxetine (CYMBALTA) 60 mg delayed release capsule Take 1 capsule (60 mg total) by mouth daily 30 capsule 5   • fluticasone (FLONASE) 50 mcg/act nasal spray 1 spray into each nostril 2 (two) times a day 16 g 6   • Fluticasone-Salmeterol (Advair) 500-50 mcg/dose inhaler INHALE ONE PUFF BY MOUTH AND INTO THE LUNGS TWICE A DAY  RINSE MOUTH AFTER USE 60 blister 0   • furosemide (LASIX) 20 mg tablet Take 1 tablet (20 mg total) by mouth daily 30 tablet 11   • glucose blood test strip Use 1 each daily to test blood sugar  100 strip 5   • guaiFENesin 1200 MG TB12 Take 1 tablet (1,200 mg total) by mouth every 12 (twelve) hours 30 tablet 0   • ipratropium-albuterol (DUO-NEB) 0 5-2 5 mg/3 mL nebulizer solution Take 3 mL by nebulization every 6 (six) hours as needed for wheezing or shortness of breath 360 mL 0   • montelukast (SINGULAIR) 10 mg tablet Take 1 tablet (10 mg total) by mouth daily at bedtime 30 tablet 5   • mupirocin (BACTROBAN) 2 % ointment Apply topically 2 (two) times a day 22 g 0   • sacubitril-valsartan (ENTRESTO) 49-51 MG TABS Take 1 tablet by mouth 2 (two) times a day 60 tablet 11   • sodium chloride 3 % inhalation solution Take 4 mL by nebulization as needed for cough (congestion) 30 mL 0   • tiotropium (Spiriva Respimat) 2 5 MCG/ACT AERS inhaler Inhale 2 puffs daily 4 g 2   • pantoprazole (PROTONIX) 20 mg tablet Take 1 tablet (20 mg total) by mouth daily in the early morning Do not start before December 27, 2022  (Patient not taking: Reported on 1/9/2023) 15 tablet 0     No current facility-administered medications for this visit       Allergies   Allergen Reactions   • Ciprofloxacin Shortness Of Breath and Diarrhea      Immunizations:     Immunization History   Administered Date(s) Administered   • Influenza, seasonal, injectable 09/09/2014   • Pneumococcal Polysaccharide PPV23 07/26/2021   • Tdap 07/16/2020      Health Maintenance:         Topic Date Due   • Lung Cancer Screening  12/23/2023   • Colorectal Cancer Screening  03/11/2024   • HIV Screening  Discontinued   • Hepatitis C Screening  Discontinued         Topic Date Due   • COVID-19 Vaccine (1) Never done   • Hepatitis A Vaccine (1 of 2 - Risk 2-dose series) Never done   • Hepatitis B Vaccine (1 of 3 - Risk 3-dose series) Never done   • Pneumococcal Vaccine: 65+ Years (2 - PCV) 07/26/2022   • Influenza Vaccine (1) 09/01/2022      Medicare Screening Tests and Risk Assessments:     Jammie Pelletier is here for his Welcome to Medicare visit  Health Risk Assessment:   Patient rates overall health as fair  Patient feels that their physical health rating is slightly better  Patient is very satisfied with their life  Eyesight was rated as slightly worse  Hearing was rated as same  Patient feels that their emotional and mental health rating is same  Patients states they are never, rarely angry  Patient states they are often unusually tired/fatigued  Pain experienced in the last 7 days has been none  Patient states that he has experienced no weight loss or gain in last 6 months  Depression Screening:   PHQ-9 Score: 6      Fall Risk Screening: In the past year, patient has experienced: no history of falling in past year      Home Safety:  Patient has trouble with stairs inside or outside of their home  Patient has working smoke alarms and has working carbon monoxide detector  Home safety hazards include: none  Nutrition:   Current diet is No Added Salt and Regular  Medications:   Patient is currently taking over-the-counter supplements  OTC medications include: see medication list  Patient is able to manage medications       Activities of Daily Living (ADLs)/Instrumental Activities of Daily Living (IADLs):   Walk and transfer into and out of bed and chair?: Yes  Dress and groom yourself?: Yes    Bathe or shower yourself?: Yes    Feed yourself? Yes  Do your laundry/housekeeping?: No  Manage your money, pay your bills and track your expenses?: Yes  Make your own meals?: No    Do your own shopping?: No    Previous Hospitalizations:   Any hospitalizations or ED visits within the last 12 months?: Yes    How many hospitalizations have you had in the last year?: 3-4    Advance Care Planning:   Living will: No    Durable POA for healthcare: No    Advanced directive: No    Advanced directive counseling given: Yes    Five wishes given: Yes    End of Life Decisions reviewed with patient: Yes    Provider agrees with end of life decisions: Yes      Cognitive Screening:   Provider or family/friend/caregiver concerned regarding cognition?: No    PREVENTIVE SCREENINGS      Cardiovascular Screening:    General: Screening Current      Diabetes Screening:     General: History Diabetes and Risks and Benefits Discussed    Due for: Blood Glucose      Colorectal Cancer Screening:     General: Screening Current      Prostate Cancer Screening:    General: Risks and Benefits Discussed    Due for: PSA      Osteoporosis Screening:    General: Screening Not Indicated      Abdominal Aortic Aneurysm (AAA) Screening:    Risk factors include: age between 73-69 yo and tobacco use        Lung Cancer Screening:     General: Screening Current      Hepatitis C Screening:    General: Risks and Benefits Discussed    Hep C Screening Accepted: Yes      Screening, Brief Intervention, and Referral to Treatment (SBIRT)    Screening  Typical number of drinks in a day: 0  Typical number of drinks in a week: 4  Interpretation: Low risk drinking behavior      AUDIT-C Screenin) How often did you have a drink containing alcohol in the past year? never  2) How many drinks did you have on a typical day when you were drinking in the past year? 0  3) How often did you have 6 or more drinks on one occasion in the past year? never    AUDIT-C Score: 0  Interpretation: Score 0-3 (male): Negative screen for alcohol misuse    Single Item Drug Screening:  How often have you used an illegal drug (including marijuana) or a prescription medication for non-medical reasons in the past year? never    Single Item Drug Screen Score: 0  Interpretation: Negative screen for possible drug use disorder    Brief Intervention  Alcohol & drug use screenings were reviewed  No concerns regarding substance use disorder identified  Other Counseling Topics:   Regular weightbearing exercise  Vision Screening    Right eye Left eye Both eyes   Without correction      With correction 20/50 20/50 20/50        Physical Exam:     /78   Pulse 78   Temp (!) 97 3 °F (36 3 °C) (Temporal)   Resp 18   Ht 6' 2" (1 88 m)   Wt 135 kg (297 lb)   BMI 38 13 kg/m²     Physical Exam  Vitals reviewed            Wilma Mckinney,

## 2023-03-24 ENCOUNTER — APPOINTMENT (OUTPATIENT)
Dept: LAB | Facility: CLINIC | Age: 68
End: 2023-03-24

## 2023-03-24 ENCOUNTER — APPOINTMENT (OUTPATIENT)
Dept: RADIOLOGY | Facility: CLINIC | Age: 68
End: 2023-03-24

## 2023-03-24 ENCOUNTER — APPOINTMENT (OUTPATIENT)
Dept: PULMONOLOGY | Facility: HOSPITAL | Age: 68
End: 2023-03-24
Attending: INTERNAL MEDICINE

## 2023-03-24 DIAGNOSIS — J18.9 PNEUMONIA: ICD-10-CM

## 2023-03-24 DIAGNOSIS — J44.9 ASTHMA-COPD OVERLAP SYNDROME (HCC): ICD-10-CM

## 2023-03-24 LAB
BASOPHILS # BLD AUTO: 0.13 THOUSANDS/ÂΜL (ref 0–0.1)
BASOPHILS NFR BLD AUTO: 1 % (ref 0–1)
EOSINOPHIL # BLD AUTO: 0.56 THOUSAND/ÂΜL (ref 0–0.61)
EOSINOPHIL NFR BLD AUTO: 4 % (ref 0–6)
ERYTHROCYTE [DISTWIDTH] IN BLOOD BY AUTOMATED COUNT: 15.8 % (ref 11.6–15.1)
HCT VFR BLD AUTO: 46.2 % (ref 36.5–49.3)
HGB BLD-MCNC: 14 G/DL (ref 12–17)
IMM GRANULOCYTES # BLD AUTO: 0.07 THOUSAND/UL (ref 0–0.2)
IMM GRANULOCYTES NFR BLD AUTO: 1 % (ref 0–2)
LYMPHOCYTES # BLD AUTO: 2.38 THOUSANDS/ÂΜL (ref 0.6–4.47)
LYMPHOCYTES NFR BLD AUTO: 17 % (ref 14–44)
MCH RBC QN AUTO: 25.6 PG (ref 26.8–34.3)
MCHC RBC AUTO-ENTMCNC: 30.3 G/DL (ref 31.4–37.4)
MCV RBC AUTO: 85 FL (ref 82–98)
MONOCYTES # BLD AUTO: 1.24 THOUSAND/ÂΜL (ref 0.17–1.22)
MONOCYTES NFR BLD AUTO: 9 % (ref 4–12)
NEUTROPHILS # BLD AUTO: 9.59 THOUSANDS/ÂΜL (ref 1.85–7.62)
NEUTS SEG NFR BLD AUTO: 68 % (ref 43–75)
NRBC BLD AUTO-RTO: 0 /100 WBCS
PLATELET # BLD AUTO: 330 THOUSANDS/UL (ref 149–390)
PMV BLD AUTO: 10.3 FL (ref 8.9–12.7)
RBC # BLD AUTO: 5.47 MILLION/UL (ref 3.88–5.62)
WBC # BLD AUTO: 13.97 THOUSAND/UL (ref 4.31–10.16)

## 2023-03-24 NOTE — TELEPHONE ENCOUNTER
Pt left message on voicemail that forms need to be completed by Medicare for this prescription  Pt voiced concerns on voicemail that he has been trying all week to get this prescription, we sent the wrong thing twice  He states on message "Why is this being so complicated?"   "Why is this such a problem?" "It is not like I am a New Patient  I have been with you and on this medication for ages "    Pt is out of this medication because he has been trying all week to get it

## 2023-03-27 ENCOUNTER — APPOINTMENT (OUTPATIENT)
Dept: PULMONOLOGY | Facility: HOSPITAL | Age: 68
End: 2023-03-27
Attending: INTERNAL MEDICINE

## 2023-03-27 ENCOUNTER — OFFICE VISIT (OUTPATIENT)
Dept: PULMONOLOGY | Facility: CLINIC | Age: 68
End: 2023-03-27

## 2023-03-27 VITALS
DIASTOLIC BLOOD PRESSURE: 78 MMHG | HEIGHT: 74 IN | SYSTOLIC BLOOD PRESSURE: 142 MMHG | TEMPERATURE: 99.3 F | WEIGHT: 303.6 LBS | HEART RATE: 117 BPM | RESPIRATION RATE: 18 BRPM | BODY MASS INDEX: 38.96 KG/M2 | OXYGEN SATURATION: 96 %

## 2023-03-27 DIAGNOSIS — J45.50 SEVERE PERSISTENT ASTHMA WITHOUT COMPLICATION: Primary | ICD-10-CM

## 2023-03-27 DIAGNOSIS — G47.33 OBSTRUCTIVE SLEEP APNEA: ICD-10-CM

## 2023-03-27 DIAGNOSIS — J44.9 ASTHMA-COPD OVERLAP SYNDROME (HCC): ICD-10-CM

## 2023-03-27 DIAGNOSIS — J18.9 PNEUMONIA DUE TO INFECTIOUS ORGANISM, UNSPECIFIED LATERALITY, UNSPECIFIED PART OF LUNG: ICD-10-CM

## 2023-03-27 DIAGNOSIS — Z87.891 HISTORY OF TOBACCO ABUSE: ICD-10-CM

## 2023-03-27 NOTE — PROGRESS NOTES
Pulmonary Follow Up Note   Pily Ortiz 74 y o  male MRN: [id number]  3/27/2023    Assessment:  Severe persistent asthma  · Multiple exacerbations, last FEV1 of 55% in 2/2022 with fixed airway obstruction  · Last oral steroid use 2 weeks ago  · On optimal environment, some dust/fuzz, including dog dander known allergies, has 1 dog at home  • [address] [place] allergy panel positive for dogs dander/ragweed, CBC in 07/2021 with 850 cells of eosinophilia  • Completed pulmonary rehab in 05/2021    Plan:  · Continue triple inhalers now Spiriva/Advair 500, montelukast  · Counseled extensively about avoiding exposure, cleaning up the environment, states that he has a plane this spring/summer to improve conditions/exposure at home  · Given the frequent exacerbation, will start biologic with dupilumab 600 mg once, then 300 mg every 2 weeks  · Follow-up in 6 to 8 weeks to assess response    Former tobacco abuse  • About 40 pack year quit in 2019  • No suspicious lesion on lung cancer screening CT chest in 01/2022    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  · Hospitalized in 12/2020 12  · Noted multiple findings concerning for aspiration/pneumonia and bronchitis  · Repeat CT chest to document resolution/also for lung cancer screening    CORDELL  · Has not used the CPAP for about a year  · Restarted using it yesterday  · Last CPAP study in 3/2021, recommended 9 to 13 cm pressures  · Will review compliance report at next visit  · Ordered new supplies for the CPAP machine, as well as mask fitting trial      Return in about 6 weeks (around 5/8/2023)      History of Present Illness     Follow up for: Asthma/COPD     Background:   74 y o  male with a h/o CORDELL, asthma COPD, class 1 obesity, dyslipidemia, CHF (systolic and diastolic), tobacco abuse about 40 pack year history quit 2019, marijuana use, quit 2020       1st diagnosis of asthma approximately 30 years ago, known triggers of extreme temperature/changes, exposure to grass/stronger orders and dog dander   No history of severe exacerbation, last oral steroid intake was in mid 2019   Completed pulmonary rehab program in 05/2021     Initially evaluated by Pulmonary on 05/04/21-recommended to continue Tudorza 400 mcg, Breo 200, p r n  DuoNeb and Atrovent          7/26 visit-continued on tudorza and Breo Ellipta 200, discontinued Atrovent   6 minute walk test showed no oxygen desaturation   [place] allergy panel weakly positive for dog dander 0 4, common a ragweed positive at 1 13, IgE 38 5   CBC with eosinophilia at 850 cell   Given the pneumococcal vaccine, given trial of mucolytic with Mucinex      9/27 visit-switched to high-dose ICS Advair 500     11/29/2021 visit-treated for exacerbation with steroid taper, doxycycline     1/2022 - continued on advair 500/tudorza, declined vaccination     5/6/22 - treated for exacerbation OP, LDCT     09/29/2022 visit-not interested in deescalation of therapy, continued on Tudorza/Advair 500, not interested in vaccination  12/2022-hospitalized twice for exacerbation symptoms, positive PCR for COVID-19 treated with remdesivir    Interval History  Since last discharge, treated once or twice for exacerbation with outpatient prednisone  Admits to having multiple exposure to allergens around him/environment at home  Including some dusts, animal fuzz/dander, sleeps with his dog  However, over the past few months, has been stable does not use PRN frequently  Noted worse nocturnal symptoms, including frequent awakening/due to gasping for air  Improved with reusing CPAP, has not been using it for about 1 year        Review of Systems  As per hpi, all other systems reviewed and were negative    Studies:    Imaging and other studies: I have personally reviewed pertinent films in PACS  CT chest 02/25/2021-no suspicious lesions, or nodules, few calcified granuloma at the lingula that unchanged from before, posterior mediastinal lymph node/top normal size unchanged from "2019     CT chest 1/19/2022 - mild biapical/scarring  Calcified granuloma att he lingula  No suspicious lesions    CT PE 12/23/2020 toad-during admission, no pulmonary embolism  Secretion at the RML bronchus/associated collapse  Secretions at the RLL/with consolidation      Pulmonary function testing:   PFT 02/04/2021-ratio 54%, FEV1 1 92 L/55%, FVC 3 54 L/69%, TLC 87%, %, DLCO 104%     PFT 12/18/2018-ratio 54%, FEV1 2 6 L/73%, FVC 4 8 L/93%, positive bronchodilator response at the level of FVC and FEV1, %, %, DLCO 114%      PFT 01/30/2017-ratio 41%, FEV1 1 74 L/49%, FVC 4 25 L/84%, positive bronchodilator response at the level of FEV1, %, %, DLCO 95%     6 minute walk test 07/26/2021-baseline SpO2 96% on room air, heart rate 64, able to walk 336 m in 6 minutes, lowest SpO2 at 94%, maximal heart rate at 88, dyspnea scale of 4        EKG, Pathology, and Other Studies: I have personally reviewed pertinent reports        Myocardial perfusion scan 09/25/2020-moderate-size infarct in the distribution of RCA/left circumflex, reduced LV systolic function with WM a     TTE 09/10/2020-EF 40%, akinesis at basal/mid inferior/mid inferior lateral wall, mildly increased LV wall thickness, grade 2 diastolic dysfunction, LA mildly dilated, normal RV size and function    Past medical, surgical, social and family histories reviewed  Medications/Allergies: Reviewed      Vitals: Blood pressure 142/78, pulse (!) 117, temperature 99 3 °F (37 4 °C), temperature source Temporal, resp  rate 18, height 6' 2\" (1 88 m), weight (!) 138 kg (303 lb 9 6 oz), SpO2 96 %  Body mass index is 38 98 kg/m²  Oxygen Therapy  SpO2: 96 %  Oxygen Therapy: None (Room air)      Physical Exam  Body mass index is 38 98 kg/m²     Gen: not in acute distress, obese  Neck/Eyes: supple, no adenopathy, PERRL  Ear: normal appearance, no significant hearing impairment  Nose:  normal nasal mucosa, no drainage  Mouth:  " "unremarkable/normal appearance of lips, teeth and gums  Oropharynx: mucosa is moist, no focal lesions or erythema  Salivary glands: soft nontender  Chest: normal respiratory efforts, clear breath sounds bilaterally  CV: no murmurs appreciated, mild lower extremity pitting edema  Abdomen: soft, non tender  Extremities:  No observed deformity   Skin: unremarkable  Neuro: AAO X3, no focal motor deficit        Labs:  Lab Results   Component Value Date    WBC 13 97 (H) 03/24/2023    HGB 14 0 03/24/2023    HCT 46 2 03/24/2023    MCV 85 03/24/2023     03/24/2023     Lab Results   Component Value Date    GLUCOSE 87 07/27/2015    CALCIUM 9 3 03/10/2023     07/27/2015    K 4 1 03/10/2023    CO2 29 03/10/2023     03/10/2023    BUN 25 03/10/2023    CREATININE 1 28 03/10/2023     Lab Results   Component Value Date    IGE 38 5 09/22/2021     Lab Results   Component Value Date    ALT 20 03/10/2023    AST 15 03/10/2023    ALKPHOS 72 03/10/2023    BILITOT 1 05 (H) 07/22/2015           Portions of the record may have been created with voice recognition software  Occasional wrong word or \"sound a like\" substitutions may have occurred due to the inherent limitations of voice recognition software  Read the chart carefully and recognize, using context, where substitutions have occurred    MONISHA Ott    [facility]  "

## 2023-03-29 ENCOUNTER — APPOINTMENT (OUTPATIENT)
Dept: PULMONOLOGY | Facility: HOSPITAL | Age: 68
End: 2023-03-29
Attending: INTERNAL MEDICINE

## 2023-03-29 ENCOUNTER — TELEPHONE (OUTPATIENT)
Dept: PULMONOLOGY | Facility: CLINIC | Age: 68
End: 2023-03-29

## 2023-03-29 ENCOUNTER — DOCUMENTATION (OUTPATIENT)
Dept: PULMONOLOGY | Facility: CLINIC | Age: 68
End: 2023-03-29

## 2023-03-29 DIAGNOSIS — J44.9 ASTHMA-COPD OVERLAP SYNDROME (HCC): Primary | ICD-10-CM

## 2023-03-29 NOTE — PROGRESS NOTES
Patient has been approved for [facility] nilesh Iyer is effective from: 01/01/2023-03/28/2024

## 2023-03-29 NOTE — TELEPHONE ENCOUNTER
Received approval for Dupixent pens  If you can please place two scripts for the Dupixent pen  One would be for the loading dose and the other for the maintenance dose  If you can please print and sign the scripts so I can fax them into [place] [facility]

## 2023-03-31 ENCOUNTER — APPOINTMENT (OUTPATIENT)
Dept: PULMONOLOGY | Facility: HOSPITAL | Age: 68
End: 2023-03-31
Attending: INTERNAL MEDICINE

## 2023-04-04 RX ORDER — DUPILUMAB 300 MG/2ML
600 INJECTION, SOLUTION SUBCUTANEOUS ONCE
Qty: 4 ML | Refills: 0 | Status: SHIPPED | OUTPATIENT
Start: 2023-04-04 | End: 2023-04-04

## 2023-04-04 RX ORDER — DUPILUMAB 300 MG/2ML
300 INJECTION, SOLUTION SUBCUTANEOUS
Qty: 2 ML | Refills: 11 | Status: SHIPPED | OUTPATIENT
Start: 2023-04-04

## 2023-04-26 ENCOUNTER — HOSPITAL ENCOUNTER (OUTPATIENT)
Dept: CT IMAGING | Facility: HOSPITAL | Age: 68
Discharge: HOME | End: 2023-04-26
Attending: INTERNAL MEDICINE

## 2023-04-26 DIAGNOSIS — G47.33 OBSTRUCTIVE SLEEP APNEA: ICD-10-CM

## 2023-05-03 NOTE — TELEPHONE ENCOUNTER
[facility]        NAME: Brit Powell is a 37 y o  female  : 1987    MRN: [id number]  DATE: May 3, 2023  TIME: 4:52 PM    Assessment and Plan   Noninfectious gastroenteritis, unspecified type [K52 9]  1  Noninfectious gastroenteritis, unspecified type              Patient Instructions     Gastroenteritis  Brat diet  Increase fluid intake  Follow up with PCP in 3-5 days  Proceed to  ER if symptoms worsen  Chief Complaint     Chief Complaint   Patient presents with    Diarrhea     Needs work note for being out of work with diarrhea, nausea; everyone in house has GI bug          History of Present Illness       45-year-old female who presents complaining of nausea, vomiting, diarrhea x2 days  Patient states that the symptoms have subsided but came in because she needs a doctor's note  Denies abdominal pain, fevers, chills, chest pain, shortness of breath  Review of Systems   Review of Systems   Constitutional: Negative  HENT: Negative  Eyes: Negative  Respiratory: Negative  Negative for cough, chest tightness, shortness of breath, wheezing and stridor  Cardiovascular: Negative  Negative for chest pain, palpitations and leg swelling  Gastrointestinal: Positive for diarrhea, nausea and vomiting  Negative for abdominal distention, abdominal pain, anal bleeding, blood in stool and constipation           Current Medications       Current Outpatient Medications:     albuterol (PROVENTIL HFA,VENTOLIN HFA) 90 mcg/act inhaler, Two puffs every four hours as needed for wheezing, Disp: , Rfl:     buPROPion (WELLBUTRIN XL) 300 mg 24 hr tablet, Take 300 mg by mouth daily, Disp: , Rfl:     cetirizine (ZyrTEC) 10 mg tablet, Take 10 mg by mouth, Disp: , Rfl:     fluticasone (FLONASE) 50 mcg/act nasal spray, 2 sprays into each nostril, Disp: , Rfl:     hydrOXYzine HCL (ATARAX) 10 mg tablet, Take 10 mg by mouth, Disp: , Rfl:     hydrOXYzine pamoate (VISTARIL) 25 mg capsule, TAKE ONE Get cxr topday I donto see one was done  What he was prescribed is appropriate for presumed lung infection/bronchitis   I did send zpack to pharmacy since it is a weekend He should use nebulizer four times/day as well "CAPSULE BY MOUTH THREE TIMES A DAY AS NEEDED FOR ANXIETY, Disp: , Rfl:     lidocaine (XYLOCAINE) 2 % topical gel, Apply q 2 hours PRN for pain (Patient not taking: Reported on 2022), Disp: 30 mL, Rfl: 0    meloxicam (MOBIC) 15 mg tablet, Daily (Patient not taking: Reported on 2022), Disp: , Rfl:     montelukast (SINGULAIR) 10 mg tablet, Take 10 mg by mouth, Disp: , Rfl:     norethindrone (Ct) 0 35 MG tablet, Take 1 tablet (0 35 mg total) by mouth daily, Disp: 28 tablet, Rfl: 12    Current Allergies     Allergies as of 2023 - Reviewed 2023   Allergen Reaction Noted    Doxycycline Other (See Comments) 2023    Poison ivy extract Rash 2015            The following portions of the patient's history were reviewed and updated as appropriate: allergies, current medications, past family history, past medical history, past social history, past surgical history and problem list      Past Medical History:   Diagnosis Date    Depression     Hypertension     Varicella        Past Surgical History:   Procedure Laterality Date     SECTION      MYRINGOTOMY W/ TUBES      PA  DELIVERY ONLY N/A 2018    Procedure:  SECTION (); Surgeon: Eun Banegas MD;  Location: [facility] ;  Service: Obstetrics       Family History   Problem Relation Age of Onset    Heart disease Father     Hypertension Father     Spina bifida Cousin     No Known Problems Mother     No Known Problems Brother     No Known Problems Daughter     No Known Problems Son     Breast cancer Neg Hx     Colon cancer Neg Hx     Ovarian cancer Neg Hx     Cervical cancer Neg Hx     Uterine cancer Neg Hx          Medications have been verified          Objective   /82   Pulse 80   Temp 98 9 °F (37 2 °C)   Resp 18   Ht 5' 1\" (1 549 m)   Wt 68 kg (150 lb)   SpO2 99%   BMI 28 34 kg/m²        Physical Exam     Physical Exam  Constitutional:       Appearance: Normal " appearance  She is well-developed  HENT:      Head: Normocephalic and atraumatic  Right Ear: External ear normal       Left Ear: External ear normal       Nose: Nose normal       Mouth/Throat:      Pharynx: No oropharyngeal exudate  Cardiovascular:      Rate and Rhythm: Normal rate and regular rhythm  Heart sounds: Normal heart sounds  Pulmonary:      Effort: Pulmonary effort is normal  No respiratory distress  Breath sounds: Normal breath sounds  No wheezing or rales  Chest:      Chest wall: No tenderness  Abdominal:      General: Bowel sounds are normal  There is no distension  Palpations: Abdomen is soft  There is no mass  Tenderness: There is no abdominal tenderness  There is no right CVA tenderness, left CVA tenderness, guarding or rebound  Musculoskeletal:      Cervical back: Normal range of motion and neck supple  Lymphadenopathy:      Cervical: No cervical adenopathy  Neurological:      Mental Status: She is alert

## 2023-05-04 DIAGNOSIS — F41.9 ANXIETY: ICD-10-CM

## 2023-05-04 RX ORDER — ALPRAZOLAM 0.5 MG/1
TABLET ORAL
Qty: 30 TABLET | Refills: 0 | Status: SHIPPED | OUTPATIENT
Start: 2023-05-04

## 2023-05-11 DIAGNOSIS — I10 BENIGN ESSENTIAL HYPERTENSION: ICD-10-CM

## 2023-05-11 DIAGNOSIS — J44.9 ASTHMA-COPD OVERLAP SYNDROME (HCC): ICD-10-CM

## 2023-05-11 RX ORDER — CARVEDILOL 25 MG/1
25 TABLET ORAL 2 TIMES DAILY WITH MEALS
Qty: 60 TABLET | Refills: 11 | Status: SHIPPED | OUTPATIENT
Start: 2023-05-11

## 2023-05-12 RX ORDER — TIOTROPIUM BROMIDE INHALATION SPRAY 3.12 UG/1
SPRAY, METERED RESPIRATORY (INHALATION)
Qty: 4 G | Refills: 0 | Status: SHIPPED | OUTPATIENT
Start: 2023-05-12

## 2023-05-22 PROBLEM — Z00.00 MEDICARE ANNUAL WELLNESS VISIT, SUBSEQUENT: Status: RESOLVED | Noted: 2019-02-27 | Resolved: 2023-05-22

## 2023-05-26 PROBLEM — J18.9 PNEUMONIA DUE TO INFECTIOUS ORGANISM: Status: RESOLVED | Noted: 2023-03-27 | Resolved: 2023-05-26

## 2023-05-31 DIAGNOSIS — F33.1 MODERATE EPISODE OF RECURRENT MAJOR DEPRESSIVE DISORDER (HCC): ICD-10-CM

## 2023-05-31 RX ORDER — DULOXETIN HYDROCHLORIDE 60 MG/1
CAPSULE, DELAYED RELEASE ORAL
Qty: 30 CAPSULE | Refills: 0 | Status: SHIPPED | OUTPATIENT
Start: 2023-05-31

## 2023-06-01 ENCOUNTER — OFFICE VISIT (OUTPATIENT)
Dept: PULMONOLOGY | Facility: CLINIC | Age: 68
End: 2023-06-01

## 2023-06-01 VITALS
SYSTOLIC BLOOD PRESSURE: 132 MMHG | DIASTOLIC BLOOD PRESSURE: 68 MMHG | TEMPERATURE: 97.6 F | WEIGHT: 315 LBS | BODY MASS INDEX: 40.43 KG/M2 | HEIGHT: 74 IN | OXYGEN SATURATION: 98 % | HEART RATE: 89 BPM

## 2023-06-01 DIAGNOSIS — G47.33 OBSTRUCTIVE SLEEP APNEA: Primary | ICD-10-CM

## 2023-06-01 DIAGNOSIS — J44.9 ASTHMA-COPD OVERLAP SYNDROME (HCC): ICD-10-CM

## 2023-06-01 DIAGNOSIS — J45.40 CHRONIC ASTHMA, MODERATE PERSISTENT, UNCOMPLICATED: ICD-10-CM

## 2023-06-01 RX ORDER — FLUTICASONE PROPIONATE AND SALMETEROL 500; 50 UG/1; UG/1
POWDER RESPIRATORY (INHALATION)
Qty: 60 BLISTER | Refills: 11 | Status: SHIPPED | OUTPATIENT
Start: 2023-06-01

## 2023-06-01 RX ORDER — ISOSORBIDE MONONITRATE 30 MG/1
30 TABLET, EXTENDED RELEASE ORAL DAILY
COMMUNITY
Start: 2023-04-17

## 2023-06-01 NOTE — PROGRESS NOTES
Pulmonary Follow Up Note   Sima Lagos 79 y o  male MRN: [id number]  6/1/2023    Assessment:  Severe persistent asthma  · Multiple exacerbations, steroid use last was in 3/2023  · No oral steroid use since starting the Paxil at the end of March  · Allergic type II asthma, positive [place] allergy panel and peripheral eosinophilia highest at 850 cells in 7/2021  · Suspect continuous exposure to allergens/unoptimal environment at home with some dust/falls and dog danders    Plan:  · Continue high-dose ICS Advair 500/Spiriva and Singulair  · So far received 3 times Dupixent injection, will monitor for another 2 months, seems improving given that no exacerbations since started at  · Counseled again about avoiding exposure to allergens, cleaning environment at home    Former tobacco abuse  · About 40-pack-year quit in 2019  · No suspicious lesions on CT chest in 4/2023, next due for lung cancer screening in 4/2024    CORDELL  · Unclear if he is using the CPAP or not, he admits to not using it every day  · Attempted to obtain the compliance report however not available at the company for now    I have spent a total time of 42 minutes on 06/01/23 in caring for this patient including Diagnostic results, Risks and benefits of tx options, Instructions for management, Importance of tx compliance, Documenting in the medical record, Reviewing / ordering tests, medicine, procedures   and Obtaining or reviewing history    Return in about 10 weeks (around 8/10/2023)      History of Present Illness     Follow up for: Asthma/COPD    Background:   79 y o  male with a h/o CORDELL, asthma COPD, class 1 obesity, dyslipidemia, CHF (systolic and diastolic), tobacco abuse about 40 pack year history quit 2019, marijuana use, quit 2020       1st diagnosis of asthma approximately 30 years ago, known triggers of extreme temperature/changes, exposure to grass/stronger orders and dog dander   No history of severe exacerbation, last oral steroid intake was in mid 2019   Completed pulmonary rehab program in 05/2021     Initially evaluated by Pulmonary on 05/04/21-recommended to continue Tudorza 400 mcg, Breo 200, p r n  DuoNeb and Atrovent       7/26 visit-continued on tudorza and Breo Ellipta 200, discontinued Atrovent   6 minute walk test showed no oxygen desaturation   [place] allergy panel weakly positive for dog dander 0 4, common a ragweed positive at 1 13, IgE 38 5   CBC with eosinophilia at 850 cell   Given the pneumococcal vaccine, given trial of mucolytic with Mucinex      9/27 visit-switched to high-dose ICS Advair 500     11/29/2021 visit-treated for exacerbation with steroid taper, doxycycline     1/2022 - continued on advair 500/tudorza, declined vaccination     5/6/22 - treated for exacerbation OP, LDCT     09/29/2022 visit-not interested in deescalation of therapy, continued on Tudorza/Advair 500, not interested in vaccination      12/2022-hospitalized twice for exacerbation symptoms, positive PCR for COVID-19 treated with remdesivir    3/2023 visit-continue triple inhalers Spiriva/Advair 500, started dupilumab  Mask fitting trial/new supplies for CPAP    Interval History  Since last seen, no oral steroid use otherwise no significant change from last visit  Still using PRN albuterol/or nebs once a day  Dyspnea/wheezing with exertion  Probably less cough  Due for the fourth dose of the Dupixent tomorrow  No reported intolerance, reaction or other adverse effects  Still on the Spiriva, Advair 500  Admits to not using CPAP every night        Review of Systems  As per hpi, all other systems reviewed and were negative    Studies:    Imaging and other studies: I have personally reviewed pertinent films in PACS  CT chest 02/25/2021-no suspicious lesions, or nodules, few calcified granuloma at the lingula that unchanged from before, posterior mediastinal lymph node/top normal size unchanged from 2019     CT chest 1/19/2022 - mild "biapical/scarring  Calcified granuloma att he lingula  No suspicious lesions     CT PE 12/23/2022-during admission, no pulmonary embolism  Secretion at the RML bronchus/associated collapse  Secretions at the RLL/with consolidation  CT chest 4/26/2023-linear opacity likely scarring along the RML/medial border and LLL  Resolved some other atelectatic changes  Improved mucus impaction no consolidation or other concerning lesions      Pulmonary function testing:   PFT 02/04/2021-ratio 54%, FEV1 1 92 L/55%, FVC 3 54 L/69%, TLC 87%, %, DLCO 104%     PFT 12/18/2018-ratio 54%, FEV1 2 6 L/73%, FVC 4 8 L/93%, positive bronchodilator response at the level of FVC and FEV1, %, %, DLCO 114%      PFT 01/30/2017-ratio 41%, FEV1 1 74 L/49%, FVC 4 25 L/84%, positive bronchodilator response at the level of FEV1, %, %, DLCO 95%     6 minute walk test 07/26/2021-baseline SpO2 96% on room air, heart rate 64, able to walk 336 m in 6 minutes, lowest SpO2 at 94%, maximal heart rate at 88, dyspnea scale of 4        EKG, Pathology, and Other Studies: I have personally reviewed pertinent reports        Myocardial perfusion scan 09/25/2020-moderate-size infarct in the distribution of RCA/left circumflex, reduced LV systolic function with WM a     TTE 09/10/2020-EF 40%, akinesis at basal/mid inferior/mid inferior lateral wall, mildly increased LV wall thickness, grade 2 diastolic dysfunction, LA mildly dilated, normal RV size and function    Past medical, surgical, social and family histories reviewed  Medications/Allergies: Reviewed      Vitals: Blood pressure 132/68, pulse 89, temperature 97 6 °F (36 4 °C), temperature source Temporal, height 6' 2\" (1 88 m), weight (!) 144 kg (317 lb 9 6 oz), SpO2 98 %  Body mass index is 40 78 kg/m²  Oxygen Therapy  SpO2: 98 %  Oxygen Therapy: None (Room air)      Physical Exam  Body mass index is 40 78 kg/m²     Gen: not in acute distress, obese  Neck/Eyes: supple, " "PERRL  Ear: normal appearance, no significant hearing impairment  Nose:  normal nasal mucosa, no drainage  Mouth:  unremarkable/normal appearance of lips, teeth and gums  Oropharynx: mucosa is moist, no focal lesions or erythema  Salivary glands: soft nontender  Chest: normal respiratory efforts, faint end expiratory wheeze bilaterally mainly at the upper lung fields, otherwise clear breath sounds bilaterally  CV: RRR, no murmurs appreciated, mild peripheral edema  Abdomen: soft, non tender  Extremities:  No observed deformity   Skin: unremarkable  Neuro: AAO X3, no focal motor deficit        Labs:  Lab Results   Component Value Date    HCT 46 2 03/24/2023    HGB 14 0 03/24/2023    MCV 85 03/24/2023     03/24/2023    WBC 13 97 (H) 03/24/2023     Lab Results   Component Value Date    BUN 25 03/10/2023    CALCIUM 9 3 03/10/2023     03/10/2023    CO2 29 03/10/2023    CREATININE 1 28 03/10/2023    GLUCOSE 87 07/27/2015    K 4 1 03/10/2023     07/27/2015     Lab Results   Component Value Date    IGE 38 5 09/22/2021     Lab Results   Component Value Date    ALKPHOS 72 03/10/2023    ALT 20 03/10/2023    AST 15 03/10/2023    BILITOT 1 05 (H) 07/22/2015           Portions of the record may have been created with voice recognition software  Occasional wrong word or \"sound a like\" substitutions may have occurred due to the inherent limitations of voice recognition software  Read the chart carefully and recognize, using context, where substitutions have occurred    MONISHA Sosa    [facility]  "

## 2023-06-02 ENCOUNTER — TELEPHONE (OUTPATIENT)
Dept: FAMILY MEDICINE CLINIC | Facility: CLINIC | Age: 68
End: 2023-06-02

## 2023-06-02 ENCOUNTER — TELEPHONE (OUTPATIENT)
Dept: GASTROENTEROLOGY | Facility: CLINIC | Age: 68
End: 2023-06-02

## 2023-06-02 DIAGNOSIS — R09.89 CHEST CONGESTION: Primary | ICD-10-CM

## 2023-06-02 DIAGNOSIS — J44.9 ASTHMA-COPD OVERLAP SYNDROME (HCC): ICD-10-CM

## 2023-06-02 RX ORDER — GUAIFENESIN 600 MG/1
1200 TABLET, EXTENDED RELEASE ORAL EVERY 12 HOURS SCHEDULED
Qty: 60 TABLET | Refills: 3 | Status: SHIPPED | OUTPATIENT
Start: 2023-06-02

## 2023-06-02 RX ORDER — ALBUTEROL SULFATE 90 UG/1
2 AEROSOL, METERED RESPIRATORY (INHALATION) EVERY 6 HOURS PRN
Qty: 18 G | Refills: 11 | Status: SHIPPED | OUTPATIENT
Start: 2023-06-02

## 2023-06-02 NOTE — TELEPHONE ENCOUNTER
Pt called that he contacted his pharmacy for a refill of his rescue inhaler  Pt states he was notified from the Pharmacy that it has been canceled by the Provider  Pt is in need of rescue inhaler today

## 2023-06-02 NOTE — TELEPHONE ENCOUNTER
I sent mucus relief but not sure if it will be otc at the pharmacy - it came up as Brand OTC It is a form of guafenessin so mucinex is that medication otc

## 2023-06-02 NOTE — TELEPHONE ENCOUNTER
There's an active order in there since march 2023 so I don't think it was cancelled but nonetheless I sent a new script to avoid hassle

## 2023-06-02 NOTE — TELEPHONE ENCOUNTER
Patient states he is having a lot of issues with post nasal drip and when he was in the hospital they gave him a script called Mucus Relief  Would like script for it? Said the brand name is Mucus relief, it is the next step from OTC? Please advise

## 2023-06-21 DIAGNOSIS — J44.9 ASTHMA-COPD OVERLAP SYNDROME (HCC): ICD-10-CM

## 2023-06-22 RX ORDER — TIOTROPIUM BROMIDE INHALATION SPRAY 3.12 UG/1
SPRAY, METERED RESPIRATORY (INHALATION)
Qty: 4 G | Refills: 0 | Status: SHIPPED | OUTPATIENT
Start: 2023-06-22 | End: 2023-06-23 | Stop reason: SDUPTHER

## 2023-06-23 DIAGNOSIS — J44.9 ASTHMA-COPD OVERLAP SYNDROME (HCC): ICD-10-CM

## 2023-06-23 RX ORDER — TIOTROPIUM BROMIDE INHALATION SPRAY 3.12 UG/1
SPRAY, METERED RESPIRATORY (INHALATION)
Qty: 4 G | Refills: 6 | Status: SHIPPED | OUTPATIENT
Start: 2023-06-23

## 2023-06-23 NOTE — TELEPHONE ENCOUNTER
Spiriva Respimat 2 5 MCG/ACT AERS inhaler, Patient picked up his refill on this medication today but he does not have refills on it  He is asking if we can add refills to his prescription since he is to continue with this medication so he won't have to call every month and ask for refills  Thank you

## 2023-07-10 DIAGNOSIS — F41.9 ANXIETY: ICD-10-CM

## 2023-07-10 DIAGNOSIS — F33.1 MODERATE EPISODE OF RECURRENT MAJOR DEPRESSIVE DISORDER (HCC): ICD-10-CM

## 2023-07-10 RX ORDER — DULOXETIN HYDROCHLORIDE 60 MG/1
CAPSULE, DELAYED RELEASE ORAL
Qty: 30 CAPSULE | Refills: 0 | Status: SHIPPED | OUTPATIENT
Start: 2023-07-10

## 2023-07-10 RX ORDER — ALPRAZOLAM 0.5 MG/1
TABLET ORAL
Qty: 30 TABLET | Refills: 0 | Status: SHIPPED | OUTPATIENT
Start: 2023-07-10

## 2023-07-19 ENCOUNTER — RA CDI HCC (OUTPATIENT)
Dept: OTHER | Facility: HOSPITAL | Age: 68
End: 2023-07-19

## 2023-07-19 NOTE — PROGRESS NOTES
[address] [facility] coding opportunities          Chart Reviewed number of suggestions sent to Provider: 1  I13.0     Patients Insurance     Medicare Insurance: [employer]

## 2023-07-27 ENCOUNTER — APPOINTMENT (EMERGENCY)
Dept: CT IMAGING | Facility: HOSPITAL | Age: 68
End: 2023-07-27
Payer: MEDICARE

## 2023-07-27 ENCOUNTER — APPOINTMENT (EMERGENCY)
Dept: RADIOLOGY | Facility: HOSPITAL | Age: 68
End: 2023-07-27
Payer: MEDICARE

## 2023-07-27 ENCOUNTER — HOSPITAL ENCOUNTER (INPATIENT)
Facility: HOSPITAL | Age: 68
LOS: 3 days | Discharge: HOME | End: 2023-07-30
Attending: EMERGENCY MEDICINE | Admitting: INTERNAL MEDICINE
Payer: MEDICARE

## 2023-07-27 DIAGNOSIS — F41.9 ANXIETY: Chronic | ICD-10-CM

## 2023-07-27 DIAGNOSIS — I50.9 CHF (CONGESTIVE HEART FAILURE) (HCC): Primary | ICD-10-CM

## 2023-07-27 DIAGNOSIS — I42.8 NON-ISCHEMIC CARDIOMYOPATHY (HCC): Chronic | ICD-10-CM

## 2023-07-27 DIAGNOSIS — J44.9 ASTHMA-COPD OVERLAP SYNDROME (HCC): ICD-10-CM

## 2023-07-27 DIAGNOSIS — R06.00 DYSPNEA: ICD-10-CM

## 2023-07-27 DIAGNOSIS — M25.569 KNEE PAIN: ICD-10-CM

## 2023-07-27 DIAGNOSIS — F41.9 ANXIETY AND DEPRESSION: ICD-10-CM

## 2023-07-27 DIAGNOSIS — R60.0 BILATERAL LOWER EXTREMITY EDEMA: ICD-10-CM

## 2023-07-27 DIAGNOSIS — F32.A ANXIETY AND DEPRESSION: ICD-10-CM

## 2023-07-27 DIAGNOSIS — E66.01 CLASS 2 SEVERE OBESITY DUE TO EXCESS CALORIES WITH SERIOUS COMORBIDITY AND BODY MASS INDEX (BMI) OF 37.0 TO 37.9 IN ADULT (HCC): ICD-10-CM

## 2023-07-27 PROBLEM — F10.10 ALCOHOL ABUSE: Status: ACTIVE | Noted: 2023-07-27

## 2023-07-27 PROBLEM — I50.43 ACUTE ON CHRONIC COMBINED SYSTOLIC AND DIASTOLIC CHF (CONGESTIVE HEART FAILURE) (HCC): Status: ACTIVE | Noted: 2023-07-27

## 2023-07-27 LAB
2HR DELTA HS TROPONIN: -2 NG/L
4HR DELTA HS TROPONIN: 1 NG/L
ALBUMIN SERPL BCP-MCNC: 3.9 G/DL (ref 3.5–5)
ALP SERPL-CCNC: 72 U/L (ref 34–104)
ALT SERPL W P-5'-P-CCNC: 20 U/L (ref 7–52)
ANION GAP SERPL CALCULATED.3IONS-SCNC: 9 MMOL/L
APTT PPP: 31 SECONDS (ref 23–37)
AST SERPL W P-5'-P-CCNC: 14 U/L (ref 13–39)
BASE EX.OXY STD BLDV CALC-SCNC: 95.1 % (ref 60–80)
BASE EXCESS BLDV CALC-SCNC: -1.6 MMOL/L
BASOPHILS # BLD AUTO: 0.1 THOUSANDS/ÂΜL (ref 0–0.1)
BASOPHILS NFR BLD AUTO: 1 % (ref 0–1)
BILIRUB SERPL-MCNC: 0.55 MG/DL (ref 0.2–1)
BNP SERPL-MCNC: 138 PG/ML (ref 0–100)
BUN SERPL-MCNC: 25 MG/DL (ref 5–25)
CALCIUM SERPL-MCNC: 9.3 MG/DL (ref 8.4–10.2)
CARDIAC TROPONIN I PNL SERPL HS: 49 NG/L
CARDIAC TROPONIN I PNL SERPL HS: 51 NG/L
CARDIAC TROPONIN I PNL SERPL HS: 52 NG/L
CHLORIDE SERPL-SCNC: 107 MMOL/L (ref 96–108)
CO2 SERPL-SCNC: 23 MMOL/L (ref 21–32)
CREAT SERPL-MCNC: 1.34 MG/DL (ref 0.6–1.3)
D DIMER PPP FEU-MCNC: 0.87 UG/ML FEU
EOSINOPHIL # BLD AUTO: 0.51 THOUSAND/ÂΜL (ref 0–0.61)
EOSINOPHIL NFR BLD AUTO: 6 % (ref 0–6)
ERYTHROCYTE [DISTWIDTH] IN BLOOD BY AUTOMATED COUNT: 15.6 % (ref 11.6–15.1)
GFR SERPL CREATININE-BSD FRML MDRD: 54 ML/MIN/1.73SQ M
GLUCOSE SERPL-MCNC: 110 MG/DL (ref 65–140)
HCO3 BLDV-SCNC: 22.5 MMOL/L (ref 24–30)
HCT VFR BLD AUTO: 41.5 % (ref 36.5–49.3)
HGB BLD-MCNC: 12.9 G/DL (ref 12–17)
IMM GRANULOCYTES # BLD AUTO: 0.05 THOUSAND/UL (ref 0–0.2)
IMM GRANULOCYTES NFR BLD AUTO: 1 % (ref 0–2)
INR PPP: 1.05 (ref 0.84–1.19)
LYMPHOCYTES # BLD AUTO: 1.96 THOUSANDS/ÂΜL (ref 0.6–4.47)
LYMPHOCYTES NFR BLD AUTO: 21 % (ref 14–44)
MCH RBC QN AUTO: 26.7 PG (ref 26.8–34.3)
MCHC RBC AUTO-ENTMCNC: 31.1 G/DL (ref 31.4–37.4)
MCV RBC AUTO: 86 FL (ref 82–98)
MONOCYTES # BLD AUTO: 0.81 THOUSAND/ÂΜL (ref 0.17–1.22)
MONOCYTES NFR BLD AUTO: 9 % (ref 4–12)
NEUTROPHILS # BLD AUTO: 5.9 THOUSANDS/ÂΜL (ref 1.85–7.62)
NEUTS SEG NFR BLD AUTO: 62 % (ref 43–75)
NRBC BLD AUTO-RTO: 0 /100 WBCS
O2 CT BLDV-SCNC: 18.3 ML/DL
PCO2 BLDV: 36.1 MM HG (ref 42–50)
PH BLDV: 7.41 [PH] (ref 7.3–7.4)
PLATELET # BLD AUTO: 271 THOUSANDS/UL (ref 149–390)
PMV BLD AUTO: 10.1 FL (ref 8.9–12.7)
PO2 BLDV: 134.5 MM HG (ref 35–45)
POTASSIUM SERPL-SCNC: 4.3 MMOL/L (ref 3.5–5.3)
PROT SERPL-MCNC: 6.3 G/DL (ref 6.4–8.4)
PROTHROMBIN TIME: 13.9 SECONDS (ref 11.6–14.5)
RBC # BLD AUTO: 4.84 MILLION/UL (ref 3.88–5.62)
SODIUM SERPL-SCNC: 139 MMOL/L (ref 135–147)
WBC # BLD AUTO: 9.33 THOUSAND/UL (ref 4.31–10.16)

## 2023-07-27 PROCEDURE — 99223 1ST HOSP IP/OBS HIGH 75: CPT | Performed by: INTERNAL MEDICINE

## 2023-07-27 PROCEDURE — 85730 THROMBOPLASTIN TIME PARTIAL: CPT | Performed by: EMERGENCY MEDICINE

## 2023-07-27 PROCEDURE — G1004 CDSM NDSC: HCPCS

## 2023-07-27 PROCEDURE — 71045 X-RAY EXAM CHEST 1 VIEW: CPT

## 2023-07-27 PROCEDURE — 85379 FIBRIN DEGRADATION QUANT: CPT | Performed by: EMERGENCY MEDICINE

## 2023-07-27 PROCEDURE — 85025 COMPLETE CBC W/AUTO DIFF WBC: CPT | Performed by: EMERGENCY MEDICINE

## 2023-07-27 PROCEDURE — 85610 PROTHROMBIN TIME: CPT | Performed by: EMERGENCY MEDICINE

## 2023-07-27 PROCEDURE — 36415 COLL VENOUS BLD VENIPUNCTURE: CPT | Performed by: EMERGENCY MEDICINE

## 2023-07-27 PROCEDURE — 71275 CT ANGIOGRAPHY CHEST: CPT

## 2023-07-27 PROCEDURE — 99291 CRITICAL CARE FIRST HOUR: CPT | Performed by: EMERGENCY MEDICINE

## 2023-07-27 PROCEDURE — 84484 ASSAY OF TROPONIN QUANT: CPT | Performed by: EMERGENCY MEDICINE

## 2023-07-27 PROCEDURE — 94640 AIRWAY INHALATION TREATMENT: CPT

## 2023-07-27 PROCEDURE — 93005 ELECTROCARDIOGRAM TRACING: CPT

## 2023-07-27 PROCEDURE — 99284 EMERGENCY DEPT VISIT MOD MDM: CPT

## 2023-07-27 PROCEDURE — 82805 BLOOD GASES W/O2 SATURATION: CPT | Performed by: EMERGENCY MEDICINE

## 2023-07-27 PROCEDURE — 80053 COMPREHEN METABOLIC PANEL: CPT | Performed by: EMERGENCY MEDICINE

## 2023-07-27 PROCEDURE — 96374 THER/PROPH/DIAG INJ IV PUSH: CPT

## 2023-07-27 PROCEDURE — 83880 ASSAY OF NATRIURETIC PEPTIDE: CPT | Performed by: EMERGENCY MEDICINE

## 2023-07-27 RX ORDER — INSULIN LISPRO 100 [IU]/ML
1-5 INJECTION, SOLUTION INTRAVENOUS; SUBCUTANEOUS
Status: DISCONTINUED | OUTPATIENT
Start: 2023-07-28 | End: 2023-07-29

## 2023-07-27 RX ORDER — ACETAMINOPHEN 325 MG/1
650 TABLET ORAL EVERY 6 HOURS PRN
Status: DISCONTINUED | OUTPATIENT
Start: 2023-07-27 | End: 2023-07-30 | Stop reason: HOSPADM

## 2023-07-27 RX ORDER — FLUTICASONE FUROATE AND VILANTEROL 200; 25 UG/1; UG/1
1 POWDER RESPIRATORY (INHALATION) DAILY
Status: DISCONTINUED | OUTPATIENT
Start: 2023-07-28 | End: 2023-07-30 | Stop reason: HOSPADM

## 2023-07-27 RX ORDER — FUROSEMIDE 10 MG/ML
40 INJECTION INTRAMUSCULAR; INTRAVENOUS ONCE
Status: COMPLETED | OUTPATIENT
Start: 2023-07-27 | End: 2023-07-27

## 2023-07-27 RX ORDER — FOLIC ACID 1 MG/1
1 TABLET ORAL DAILY
Status: DISCONTINUED | OUTPATIENT
Start: 2023-07-28 | End: 2023-07-30 | Stop reason: HOSPADM

## 2023-07-27 RX ORDER — DULOXETIN HYDROCHLORIDE 30 MG/1
60 CAPSULE, DELAYED RELEASE ORAL DAILY
Status: DISCONTINUED | OUTPATIENT
Start: 2023-07-28 | End: 2023-07-30 | Stop reason: HOSPADM

## 2023-07-27 RX ORDER — INSULIN LISPRO 100 [IU]/ML
1-5 INJECTION, SOLUTION INTRAVENOUS; SUBCUTANEOUS
Status: DISCONTINUED | OUTPATIENT
Start: 2023-07-28 | End: 2023-07-30 | Stop reason: HOSPADM

## 2023-07-27 RX ORDER — CARVEDILOL 12.5 MG/1
25 TABLET ORAL 2 TIMES DAILY WITH MEALS
Status: DISCONTINUED | OUTPATIENT
Start: 2023-07-28 | End: 2023-07-30 | Stop reason: HOSPADM

## 2023-07-27 RX ORDER — ATORVASTATIN CALCIUM 10 MG/1
20 TABLET, FILM COATED ORAL DAILY
Status: DISCONTINUED | OUTPATIENT
Start: 2023-07-28 | End: 2023-07-30 | Stop reason: HOSPADM

## 2023-07-27 RX ORDER — IPRATROPIUM BROMIDE AND ALBUTEROL SULFATE 2.5; .5 MG/3ML; MG/3ML
3 SOLUTION RESPIRATORY (INHALATION) EVERY 6 HOURS PRN
Status: DISCONTINUED | OUTPATIENT
Start: 2023-07-27 | End: 2023-07-28

## 2023-07-27 RX ORDER — ISOSORBIDE MONONITRATE 30 MG/1
30 TABLET, EXTENDED RELEASE ORAL DAILY
Status: DISCONTINUED | OUTPATIENT
Start: 2023-07-28 | End: 2023-07-30 | Stop reason: HOSPADM

## 2023-07-27 RX ORDER — FUROSEMIDE 10 MG/ML
40 INJECTION INTRAMUSCULAR; INTRAVENOUS 2 TIMES DAILY
Status: DISCONTINUED | OUTPATIENT
Start: 2023-07-28 | End: 2023-07-29

## 2023-07-27 RX ORDER — HEPARIN SODIUM 5000 [USP'U]/ML
5000 INJECTION, SOLUTION INTRAVENOUS; SUBCUTANEOUS EVERY 8 HOURS SCHEDULED
Status: DISCONTINUED | OUTPATIENT
Start: 2023-07-28 | End: 2023-07-30 | Stop reason: HOSPADM

## 2023-07-27 RX ORDER — FLUTICASONE PROPIONATE 50 MCG
1 SPRAY, SUSPENSION (ML) NASAL 2 TIMES DAILY
Status: DISCONTINUED | OUTPATIENT
Start: 2023-07-28 | End: 2023-07-30 | Stop reason: HOSPADM

## 2023-07-27 RX ORDER — LANOLIN ALCOHOL/MO/W.PET/CERES
100 CREAM (GRAM) TOPICAL DAILY
Status: DISCONTINUED | OUTPATIENT
Start: 2023-07-28 | End: 2023-07-30 | Stop reason: HOSPADM

## 2023-07-27 RX ORDER — ALPRAZOLAM 0.5 MG/1
0.5 TABLET ORAL
Status: DISCONTINUED | OUTPATIENT
Start: 2023-07-27 | End: 2023-07-30 | Stop reason: HOSPADM

## 2023-07-27 RX ORDER — GUAIFENESIN 600 MG/1
1200 TABLET, EXTENDED RELEASE ORAL EVERY 12 HOURS SCHEDULED
Status: DISCONTINUED | OUTPATIENT
Start: 2023-07-28 | End: 2023-07-30 | Stop reason: HOSPADM

## 2023-07-27 RX ADMIN — FUROSEMIDE 40 MG: 10 INJECTION, SOLUTION INTRAMUSCULAR; INTRAVENOUS at 20:49

## 2023-07-27 RX ADMIN — IOHEXOL 85 ML: 350 INJECTION, SOLUTION INTRAVENOUS at 20:38

## 2023-07-27 NOTE — ED PROVIDER NOTES
History  Chief Complaint   Patient presents with   • Leg Swelling     Bilateral lower leg swelling worse of the past few days. Increase in shortness of breath has a history of COPD and CHF     HPI  72-year-old male with past medical history significant for asthma/COPD overlap syndrome, nonischemic cardiomyopathy, CHF, HTN, HLD, history of tobacco use, T2DM, obesity presenting to the emergency department for evaluation of leg swelling, dyspnea on exertion. Patient ports gradual onset of symptoms over days to weeks. He notes bilateral lower extremity edema. He is taking 20 mg of Lasix daily in the last few days increased to twice daily with only marginal improvement. He notes difficulty ambulating secondary to leg swelling. He also notes increased dyspnea on exertion. He notes a history of chronic breathing difficulty at baseline secondary to asthma, COPD, CORDELL, CHF but reports that his symptoms have significantly worsened with the onset of the leg swelling and feels like he cannot do much as a result of the symptoms. He denies chest pain. Denies syncope. He reports he has had the symptoms in the past and were attributed to CHF. Denies any fevers chills or productive cough. Reports compliance with his outpatient medications. Prior to Admission Medications   Prescriptions Last Dose Informant Patient Reported? Taking? ALPRAZolam (XANAX) 0.5 mg tablet   No No   Sig: TAKE 1 TABLET BY MOUTH ONCE DAILY AT BEDTIME AS NEEDED FOR ANXIETY   Accu-Chek FastClix Lancets MISC  Self No No   Sig: Use 1 each daily to test blood sugar. Patient not taking: Reported on 3/27/2023   Blood Glucose Monitoring Suppl (Accu-Chek Guide Me) w/Device KIT  Self No No   Sig: Use 1 each daily to test blood sugar.    Patient not taking: Reported on 3/27/2023   DULoxetine (CYMBALTA) 60 mg delayed release capsule   No No   Sig: Take 1 capsule by mouth once daily   Dupilumab (Dupixent) 300 MG/2ML SOPN  Self No No   Sig: Inject 300 mg under the skin every 14 (fourteen) days   Fluticasone-Salmeterol (Advair) 500-50 mcg/dose inhaler  Self No No   Sig: INHALE ONE PUFF BY MOUTH AND INTO THE LUNGS TWICE A DAY. RINSE MOUTH AFTER USE   Fluticasone-Salmeterol (Advair) 500-50 mcg/dose inhaler   No No   Sig: INHALE 1 DOSE BY MOUTH TWICE DAILY RINSE MOUTH AFTER USE   albuterol (2.5 mg/3 mL) 0.083 % nebulizer solution  Self No No   Sig: Take 3 mL (2.5 mg total) by nebulization every 6 (six) hours as needed for wheezing or shortness of breath   albuterol (ProAir HFA) 90 mcg/act inhaler   No No   Sig: Inhale 2 puffs every 6 (six) hours as needed for wheezing   atorvastatin (LIPITOR) 20 mg tablet  Self No No   Sig: Take 1 tablet (20 mg total) by mouth daily   carvedilol (COREG) 25 mg tablet  Self No No   Sig: Take 1 tablet (25 mg total) by mouth 2 (two) times a day with meals   dupilumab (DUPIXENT) subcutaneous injection  Self No No   Sig: Inject 2 mL (300 mg total) under the skin every 14 (fourteen) days Do not start before April 10, 2023. fluticasone (FLONASE) 50 mcg/act nasal spray  Self No No   Si spray into each nostril 2 (two) times a day   furosemide (LASIX) 20 mg tablet  Self No No   Sig: Take 1 tablet (20 mg total) by mouth daily   glucose blood test strip  Self No No   Sig: Use 1 each daily to test blood sugar.    Patient not taking: Reported on 3/27/2023   guaiFENesin (Mucus Relief) 600 mg 12 hr tablet   No No   Sig: Take 2 tablets (1,200 mg total) by mouth every 12 (twelve) hours   guaiFENesin 1200 MG TB12  Self No No   Sig: Take 1 tablet (1,200 mg total) by mouth every 12 (twelve) hours   Patient not taking: Reported on 3/27/2023   ipratropium-albuterol (DUO-NEB) 0.5-2.5 mg/3 mL nebulizer solution  Self No No   Sig: Take 3 mL by nebulization every 6 (six) hours as needed for wheezing or shortness of breath   isosorbide mononitrate (IMDUR) 30 mg 24 hr tablet  Self Yes No   Sig: Take 30 mg by mouth daily   montelukast (SINGULAIR) 10 mg tablet Self No No   Sig: Take 1 tablet (10 mg total) by mouth daily at bedtime   Patient not taking: Reported on 6/1/2023   mupirocin (BACTROBAN) 2 % ointment  Self No No   Sig: Apply topically 2 (two) times a day   pantoprazole (PROTONIX) 20 mg tablet  Self No No   Sig: Take 1 tablet (20 mg total) by mouth daily in the early morning Do not start before December 27, 2022. Patient not taking: Reported on 1/9/2023   sacubitril-valsartan (ENTRESTO) 49-51 MG TABS  Self No No   Sig: Take 1 tablet by mouth 2 (two) times a day   sodium chloride 3 % inhalation solution  Self No No   Sig: Take 4 mL by nebulization as needed for cough (congestion)   tiotropium (Spiriva Respimat) 2.5 MCG/ACT AERS inhaler   No No   Sig: Inhale 2 spray (s) by mouth once daily. Facility-Administered Medications: None       Past Medical History:   Diagnosis Date   • Asthma    • CHF (congestive heart failure) ([facility])    • COPD (chronic obstructive pulmonary disease) ([facility])    • COPD with acute exacerbation ([address]) 5/6/2022   • COVID-19    • Mumps    • Old MI (myocardial infarction)    • Pneumonia    • Pulmonary emphysema ([address])    • Rectal bleeding 1/14/2019   • Sleep apnea        Past Surgical History:   Procedure Laterality Date   • CARDIAC CATHETERIZATION  07/24/2015    Left main- normal and maidly tortuous. Circumflex - normal and moderately tortuous. RCA- normal and mildy tortuous. Global LV function was severely depressed. .   • CARDIAC CATHETERIZATION Left 9/27/2022    Procedure: Cardiac Left Heart Cath;  Surgeon: Aaron Faust DO;  Location: [facility]; Service: Cardiology   • CARDIAC CATHETERIZATION N/A 9/27/2022    Procedure: Cardiac Coronary Angiogram;  Surgeon: Aaron Faust DO;  Location: [facility]; Service: Cardiology   • CARDIAC CATHETERIZATION  9/27/2022    Procedure: Cardiac catheterization;  Surgeon: Aaron Faust DO;  Location: [facility];   Service: Cardiology   • INGUINAL HERNIA REPAIR Bilateral    • MT COLONOSCOPY FLX DX W/COLLJ SPEC WHEN PFRMD N/A 3/11/2019    Procedure: COLONOSCOPY with removal of anal papilla; Surgeon: Mahsa Felix MD;  Location: [facility];  Service: Colorectal   • TONSILLECTOMY     • UMBILICAL HERNIA REPAIR  2006       Family History   Problem Relation Age of Onset   • Heart attack Father         MI   • Heart disease Father    • Diabetes Sister         DM   • Arthritis Family    • Coronary artery disease Family    • Hypertension Family    • Tuberculosis Son    • Alzheimer's disease Mother      I have reviewed and agree with the history as documented. E-Cigarette/Vaping   • E-Cigarette Use Never User      E-Cigarette/Vaping Substances   • Nicotine No    • THC No    • CBD No    • Flavoring No    • Other No    • Unknown No      Social History     Tobacco Use   • Smoking status: Former     Packs/day: 3.00     Years: 43.00     Total pack years: 129.00     Types: Cigarettes     Start date: 67     Quit date:      Years since quittin.5   • Smokeless tobacco: Never   Vaping Use   • Vaping Use: Never used   Substance Use Topics   • Alcohol use: Not Currently     Comment: rarely   • Drug use: Yes     Types: Marijuana     Comment: occasionally edible       Review of Systems   Constitutional: Negative for chills and fever. HENT: Negative for ear pain and sore throat. Eyes: Negative for pain and visual disturbance. Respiratory: Positive for shortness of breath. Negative for cough. Cardiovascular: Positive for leg swelling. Negative for chest pain and palpitations. Gastrointestinal: Negative for abdominal pain, diarrhea, nausea and vomiting. Genitourinary: Negative for dysuria and hematuria. Musculoskeletal: Negative for arthralgias and back pain. Skin: Negative for color change and rash. Neurological: Negative for seizures and syncope. All other systems reviewed and are negative.       Physical Exam  Physical Exam  Vitals and nursing note reviewed. Exam conducted with a chaperone present. Constitutional:       General: He is not in acute distress. Appearance: Normal appearance. He is well-developed. He is obese. He is ill-appearing. HENT:      Head: Normocephalic and atraumatic. Right Ear: External ear normal.      Left Ear: External ear normal.      Nose: Nose normal.      Mouth/Throat:      Mouth: Mucous membranes are moist.      Pharynx: Oropharynx is clear. Eyes:      Conjunctiva/sclera: Conjunctivae normal.   Cardiovascular:      Rate and Rhythm: Normal rate and regular rhythm. Heart sounds: No murmur heard. Pulmonary:      Effort: Pulmonary effort is normal. No respiratory distress. Breath sounds: Rales present. No wheezing or rhonchi. Abdominal:      General: Abdomen is protuberant. Palpations: Abdomen is soft. Tenderness: There is no abdominal tenderness. There is no guarding or rebound. Musculoskeletal:         General: No swelling. Cervical back: Neck supple. Right lower leg: Edema present. Left lower leg: Edema present. Skin:     General: Skin is warm and dry. Capillary Refill: Capillary refill takes less than 2 seconds. Coloration: Skin is not pale. Neurological:      General: No focal deficit present. Mental Status: He is alert. Mental status is at baseline.    Psychiatric:         Mood and Affect: Mood normal.         Vital Signs  ED Triage Vitals [07/27/23 1817]   Temperature Pulse Respirations Blood Pressure SpO2   97.6 °F (36.4 °C) 68 18 143/74 95 %      Temp Source Heart Rate Source Patient Position - Orthostatic VS BP Location FiO2 (%)   Temporal Monitor Sitting Right arm --      Pain Score       2           Vitals:    07/27/23 1817 07/27/23 1900 07/27/23 1930 07/27/23 2230   BP: 143/74 156/69 167/70 143/72   Pulse: 68 63 64 66   Patient Position - Orthostatic VS: Sitting Sitting Lying Lying         Visual Acuity      ED Medications  Medications   furosemide (LASIX) injection 40 mg (40 mg Intravenous Given 7/27/23 2049)   iohexol (OMNIPAQUE) 350 MG/ML injection (SINGLE-DOSE) 85 mL (85 mL Intravenous Given 7/27/23 2038)       Diagnostic Studies  Results Reviewed     Procedure Component Value Units Date/Time    HS Troponin I 4hr [[id number]] Collected: 07/27/23 2256    Lab Status: In process Specimen: Blood from Arm, Left Updated: 07/27/23 2259    HS Troponin I 2hr [[id number]]  (Normal) Collected: 07/27/23 2059    Lab Status: Final result Specimen: Blood from Arm, Left Updated: 07/27/23 2132     hs TnI 2hr 49 ng/L      Delta 2hr hsTnI -2 ng/L     D-dimer, quantitative [[id number]]  (Abnormal) Collected: 07/27/23 1849    Lab Status: Final result Specimen: Blood from Arm, Left Updated: 07/27/23 2009     D-Dimer, Quant 0.87 ug/ml FEU     Narrative: In the evaluation for possible pulmonary embolism, in the appropriate (Well's Score of 4 or less) patient, the age adjusted d-dimer cutoff for this patient can be calculated as:    Age x 0.01 (in ug/mL) for Age-adjusted D-dimer exclusion threshold for a patient over 50 years.     B-Type Natriuretic Peptide(BNP) [[id number]]  (Abnormal) Collected: 07/27/23 1849    Lab Status: Final result Specimen: Blood from Arm, Left Updated: 07/27/23 1928      pg/mL     HS Troponin 0hr (reflex protocol) [[id number]]  (Abnormal) Collected: 07/27/23 1849    Lab Status: Final result Specimen: Blood from Arm, Left Updated: 07/27/23 1922     hs TnI 0hr 51 ng/L     Comprehensive metabolic panel [[id number]]  (Abnormal) Collected: 07/27/23 1849    Lab Status: Final result Specimen: Blood from Arm, Left Updated: 07/27/23 1916     Sodium 139 mmol/L      Potassium 4.3 mmol/L      Chloride 107 mmol/L      CO2 23 mmol/L      ANION GAP 9 mmol/L      BUN 25 mg/dL      Creatinine 1.34 mg/dL      Glucose 110 mg/dL      Calcium 9.3 mg/dL      AST 14 U/L      ALT 20 U/L      Alkaline Phosphatase 72 U/L      Total Protein 6.3 g/dL      Albumin 3.9 g/dL Total Bilirubin 0.55 mg/dL      eGFR 54 ml/min/1.73sq m     Narrative:      National Kidney Disease Foundation guidelines for Chronic Kidney Disease (CKD):   •  Stage 1 with normal or high GFR (GFR > 90 mL/min/1.73 square meters)  •  Stage 2 Mild CKD (GFR = 60-89 mL/min/1.73 square meters)  •  Stage 3A Moderate CKD (GFR = 45-59 mL/min/1.73 square meters)  •  Stage 3B Moderate CKD (GFR = 30-44 mL/min/1.73 square meters)  •  Stage 4 Severe CKD (GFR = 15-29 mL/min/1.73 square meters)  •  Stage 5 End Stage CKD (GFR <15 mL/min/1.73 square meters)  Note: GFR calculation is accurate only with a steady state creatinine    Protime-INR [[id number]]  (Normal) Collected: 07/27/23 1849    Lab Status: Final result Specimen: Blood from Arm, Left Updated: 07/27/23 1911     Protime 13.9 seconds      INR 1.05    APTT [[id number]]  (Normal) Collected: 07/27/23 1849    Lab Status: Final result Specimen: Blood from Arm, Left Updated: 07/27/23 1911     PTT 31 seconds     Blood gas, venous [[id number]]  (Abnormal) Collected: 07/27/23 1849    Lab Status: Final result Specimen: Blood from Arm, Left Updated: 07/27/23 1907     pH, Naveed 7.412     pCO2, Naveed 36.1 mm Hg      pO2, Naveed 134.5 mm Hg      HCO3, Naveed 22.5 mmol/L      Base Excess, Naveed -1.6 mmol/L      O2 Content, Naveed 18.3 ml/dL      O2 HGB, VENOUS 95.1 %     CBC and differential [[id number]]  (Abnormal) Collected: 07/27/23 1849    Lab Status: Final result Specimen: Blood from Arm, Left Updated: 07/27/23 1859     WBC 9.33 Thousand/uL      RBC 4.84 Million/uL      Hemoglobin 12.9 g/dL      Hematocrit 41.5 %      MCV 86 fL      MCH 26.7 pg      MCHC 31.1 g/dL      RDW 15.6 %      MPV 10.1 fL      Platelets 825 Thousands/uL      nRBC 0 /100 WBCs      Neutrophils Relative 62 %      Immat GRANS % 1 %      Lymphocytes Relative 21 %      Monocytes Relative 9 %      Eosinophils Relative 6 %      Basophils Relative 1 %      Neutrophils Absolute 5.90 Thousands/µL      Immature Grans Absolute 0.05 Thousand/uL      Lymphocytes Absolute 1.96 Thousands/µL      Monocytes Absolute 0.81 Thousand/µL      Eosinophils Absolute 0.51 Thousand/µL      Basophils Absolute 0.10 Thousands/µL                  CTA ED chest PE Study   Final Result by Jamel Hanson MD (07/27 2245)      Mildly limited CTA chest demonstrates no intraluminal filling defect to suggest an acute pulmonary embolus. Poor inspiratory effort with bilateral atelectatic lung changes, left greater than right. Mild scarring versus atelectasis at the lingula. No acute infiltrate or pleural effusion. Workstation performed: [id number]         XR chest 1 view portable   ED Interpretation by Rosemarie Grimes DO (07/27 2339)   1 view x-ray of chest interpreted by myself and official radiology report. Mild increased pulmonary vascular congestion, questionable left pleural effusion. No focal infiltrate, pneumothorax visualized.                  Procedures  CriticalCare Time    Date/Time: 7/27/2023 11:06 PM    Performed by: Rosemarie Grimes DO  Authorized by: Rosemarie Grimes DO    Critical care provider statement:     Critical care time (minutes):  35    Critical care time was exclusive of:  Separately billable procedures and treating other patients and teaching time    Critical care was necessary to treat or prevent imminent or life-threatening deterioration of the following conditions:  Respiratory failure    Critical care was time spent personally by me on the following activities:  Ordering and performing treatments and interventions, ordering and review of laboratory studies, ordering and review of radiographic studies, re-evaluation of patient's condition, review of old charts, examination of patient, evaluation of patient's response to treatment, discussions with primary provider, discussions with consultants, development of treatment plan with patient or surrogate and obtaining history from patient or surrogate    I assumed direction of critical care for this patient from another provider in my specialty: no               ED Course  ED Course as of 07/27/23 2307   Thu Jul 27, 2023   1853 EKG interpreted by myself. EKG dated 7/27/2023 at 1848 demonstrates sinus rhythm at 62 bpm with first-degree AV block, prolonged MD interval, normal QRS duration left axis deviation and nonspecific T wave abnormality/flattening, normal QTc interval, no STEMI. When compared to prior EKG from 3/10/2023, no acute ischemic changes are seen. 1950 hs TnI 0hr(!): 51   2137 hs TnI 2hr: 49   2137 Delta 2hr hsTnI: -2                               SBIRT 22yo+    Flowsheet Row Most Recent Value   Initial Alcohol Screen: US AUDIT-C     1. How often do you have a drink containing alcohol? 0 Filed at: 07/27/2023 1819   2. How many drinks containing alcohol do you have on a typical day you are drinking? 0 Filed at: 07/27/2023 1819   3a. Male UNDER 65: How often do you have five or more drinks on one occasion? 0 Filed at: 07/27/2023 1819   Audit-C Score 0 Filed at: 07/27/2023 1819   MAURY: How many times in the past year have you. .. Used an illegal drug or used a prescription medication for non-medical reasons? Never Filed at: 07/27/2023 1819                    Medical Decision Making  60-year-old male presents emerged part for evaluation of progressive lower extremity edema and dyspnea on exertion with history of CHF feeling like this is related to that. He reports compliance with his Lasix he has clearly increased his dosing to twice daily for the last several days without improvement. He does not feel as if this is more related to COPD as he denies significant cough or wheezing. Differential diagnosis includes pneumothorax, pulmonary edema, CHF, pulmonary embolism, COPD exacerbation, pneumonia. Work-up revealed elevated D-dimer, elevated troponin which was stable on repeat D-dimer warranted CTA chest PE protocol which was negative for PE.   Patient started on IV Lasix and discussed with yolande for admission for CHF exacerbation. Bilateral lower extremity edema: acute illness or injury  CHF (congestive heart failure) ([address]): chronic illness or injury with exacerbation, progression, or side effects of treatment  Dyspnea: acute illness or injury  Amount and/or Complexity of Data Reviewed  Labs: ordered. Decision-making details documented in ED Course. Radiology: ordered and independent interpretation performed. ECG/medicine tests: ordered and independent interpretation performed. Decision-making details documented in ED Course. Risk  Prescription drug management. Decision regarding hospitalization. Disposition  Final diagnoses:   CHF (congestive heart failure) (HCC)   Bilateral lower extremity edema   Dyspnea     Time reflects when diagnosis was documented in both MDM as applicable and the Disposition within this note     Time User Action Codes Description Comment    7/27/2023 11:04 PM Duard Aschoff Add [I50.9] CHF (congestive heart failure) ([address])     7/27/2023 11:04 PM Duard Aschoff Add [R60.0] Bilateral lower extremity edema     7/27/2023 11:04 PM Duard Aschoff Add [R06.00] Dyspnea       ED Disposition     ED Disposition   Admit    Condition   Stable    Date/Time   Thu Jul 27, 2023 11:04 PM    Comment   Case was discussed with YOLANDE and the patient's admission status was agreed to be Admission Status: inpatient status to the service of Dr. Keven Lilly . Follow-up Information    None         Patient's Medications   Discharge Prescriptions    No medications on file       No discharge procedures on file.     PDMP Review       Value Time User    PDMP Reviewed  Yes 7/10/2023  3:11 PM Rock Long DO          ED Provider  Electronically Signed by           Dhiraj Ledezma DO  07/27/23 1495

## 2023-07-28 ENCOUNTER — APPOINTMENT (INPATIENT)
Dept: NON INVASIVE DIAGNOSTICS | Facility: HOSPITAL | Age: 68
End: 2023-07-28
Payer: MEDICARE

## 2023-07-28 LAB
ALBUMIN SERPL BCP-MCNC: 3.7 G/DL (ref 3.5–5)
ALP SERPL-CCNC: 69 U/L (ref 34–104)
ALT SERPL W P-5'-P-CCNC: 19 U/L (ref 7–52)
ANION GAP SERPL CALCULATED.3IONS-SCNC: 9 MMOL/L
AORTIC ROOT: 3.9 CM
APTT PPP: 33 SECONDS (ref 23–37)
AST SERPL W P-5'-P-CCNC: 13 U/L (ref 13–39)
ATRIAL RATE: 62 BPM
BASOPHILS # BLD AUTO: 0.08 THOUSANDS/ÂΜL (ref 0–0.1)
BASOPHILS NFR BLD AUTO: 1 % (ref 0–1)
BILIRUB SERPL-MCNC: 0.69 MG/DL (ref 0.2–1)
BUN SERPL-MCNC: 24 MG/DL (ref 5–25)
CALCIUM SERPL-MCNC: 9 MG/DL (ref 8.4–10.2)
CHLORIDE SERPL-SCNC: 103 MMOL/L (ref 96–108)
CO2 SERPL-SCNC: 26 MMOL/L (ref 21–32)
CREAT SERPL-MCNC: 1.37 MG/DL (ref 0.6–1.3)
DOP CALC LVOT AREA: 3.14 CM2
DOP CALC LVOT DIAMETER: 2 CM
E WAVE DECELERATION TIME: 346 MS
EOSINOPHIL # BLD AUTO: 0.49 THOUSAND/ÂΜL (ref 0–0.61)
EOSINOPHIL NFR BLD AUTO: 5 % (ref 0–6)
ERYTHROCYTE [DISTWIDTH] IN BLOOD BY AUTOMATED COUNT: 15.7 % (ref 11.6–15.1)
FRACTIONAL SHORTENING: 23 % (ref 28–44)
GFR SERPL CREATININE-BSD FRML MDRD: 53 ML/MIN/1.73SQ M
GLUCOSE SERPL-MCNC: 113 MG/DL (ref 65–140)
GLUCOSE SERPL-MCNC: 124 MG/DL (ref 65–140)
GLUCOSE SERPL-MCNC: 127 MG/DL (ref 65–140)
GLUCOSE SERPL-MCNC: 130 MG/DL (ref 65–140)
GLUCOSE SERPL-MCNC: 136 MG/DL (ref 65–140)
GLUCOSE SERPL-MCNC: 143 MG/DL (ref 65–140)
HCT VFR BLD AUTO: 41 % (ref 36.5–49.3)
HGB BLD-MCNC: 12.7 G/DL (ref 12–17)
IMM GRANULOCYTES # BLD AUTO: 0.03 THOUSAND/UL (ref 0–0.2)
IMM GRANULOCYTES NFR BLD AUTO: 0 % (ref 0–2)
INR PPP: 1.06 (ref 0.84–1.19)
INTERVENTRICULAR SEPTUM IN DIASTOLE (PARASTERNAL SHORT AXIS VIEW): 1.1 CM
INTERVENTRICULAR SEPTUM: 1.1 CM (ref 0.6–1.1)
LEFT ATRIUM SIZE: 4.1 CM
LEFT INTERNAL DIMENSION IN SYSTOLE: 5.1 CM (ref 2.1–4)
LEFT VENTRICULAR INTERNAL DIMENSION IN DIASTOLE: 6.6 CM (ref 3.5–6)
LEFT VENTRICULAR POSTERIOR WALL IN END DIASTOLE: 0.9 CM
LEFT VENTRICULAR STROKE VOLUME: 98 ML
LVSV (TEICH): 98 ML
LYMPHOCYTES # BLD AUTO: 1.97 THOUSANDS/ÂΜL (ref 0.6–4.47)
LYMPHOCYTES NFR BLD AUTO: 21 % (ref 14–44)
MAGNESIUM SERPL-MCNC: 1.8 MG/DL (ref 1.9–2.7)
MCH RBC QN AUTO: 26.5 PG (ref 26.8–34.3)
MCHC RBC AUTO-ENTMCNC: 31 G/DL (ref 31.4–37.4)
MCV RBC AUTO: 86 FL (ref 82–98)
MONOCYTES # BLD AUTO: 0.82 THOUSAND/ÂΜL (ref 0.17–1.22)
MONOCYTES NFR BLD AUTO: 9 % (ref 4–12)
MV E'TISSUE VEL-LAT: 9 CM/S
MV E'TISSUE VEL-SEP: 6 CM/S
MV PEAK A VEL: 0.91 M/S
MV PEAK E VEL: 80 CM/S
MV STENOSIS PRESSURE HALF TIME: 100 MS
MV VALVE AREA P 1/2 METHOD: 2.2 CM2
NEUTROPHILS # BLD AUTO: 5.81 THOUSANDS/ÂΜL (ref 1.85–7.62)
NEUTS SEG NFR BLD AUTO: 64 % (ref 43–75)
NRBC BLD AUTO-RTO: 0 /100 WBCS
P AXIS: 63 DEGREES
PLATELET # BLD AUTO: 232 THOUSANDS/UL (ref 149–390)
PMV BLD AUTO: 9.8 FL (ref 8.9–12.7)
POTASSIUM SERPL-SCNC: 4 MMOL/L (ref 3.5–5.3)
PR INTERVAL: 232 MS
PROT SERPL-MCNC: 6.1 G/DL (ref 6.4–8.4)
PROTHROMBIN TIME: 14.1 SECONDS (ref 11.6–14.5)
QRS AXIS: -43 DEGREES
QRSD INTERVAL: 110 MS
QT INTERVAL: 392 MS
QTC INTERVAL: 397 MS
RA PRESSURE ESTIMATED: 5 MMHG
RBC # BLD AUTO: 4.79 MILLION/UL (ref 3.88–5.62)
RV PSP: 11 MMHG
SARS-COV-2 RNA RESP QL NAA+PROBE: NEGATIVE
SL CV LV EF: 45
SL CV PED ECHO LEFT VENTRICLE DIASTOLIC VOLUME (MOD BIPLANE) 2D: 223 ML
SL CV PED ECHO LEFT VENTRICLE SYSTOLIC VOLUME (MOD BIPLANE) 2D: 125 ML
SODIUM SERPL-SCNC: 138 MMOL/L (ref 135–147)
T WAVE AXIS: 55 DEGREES
TR MAX PG: 6 MMHG
TR PEAK VELOCITY: 1.2 M/S
TRICUSPID VALVE PEAK E WAVE VELOCITY: 0.1 M/S
TRICUSPID VALVE PEAK REGURGITATION VELOCITY: 1.21 M/S
TSH SERPL DL<=0.05 MIU/L-ACNC: 1.81 UIU/ML (ref 0.45–4.5)
VENTRICULAR RATE: 62 BPM
WBC # BLD AUTO: 9.2 THOUSAND/UL (ref 4.31–10.16)

## 2023-07-28 PROCEDURE — 85025 COMPLETE CBC W/AUTO DIFF WBC: CPT | Performed by: INTERNAL MEDICINE

## 2023-07-28 PROCEDURE — 83735 ASSAY OF MAGNESIUM: CPT | Performed by: INTERNAL MEDICINE

## 2023-07-28 PROCEDURE — 99223 1ST HOSP IP/OBS HIGH 75: CPT | Performed by: INTERNAL MEDICINE

## 2023-07-28 PROCEDURE — 94760 N-INVAS EAR/PLS OXIMETRY 1: CPT

## 2023-07-28 PROCEDURE — 94640 AIRWAY INHALATION TREATMENT: CPT

## 2023-07-28 PROCEDURE — 93321 DOPPLER ECHO F-UP/LMTD STD: CPT

## 2023-07-28 PROCEDURE — 99232 SBSQ HOSP IP/OBS MODERATE 35: CPT | Performed by: PHYSICIAN ASSISTANT

## 2023-07-28 PROCEDURE — 94664 DEMO&/EVAL PT USE INHALER: CPT

## 2023-07-28 PROCEDURE — 85730 THROMBOPLASTIN TIME PARTIAL: CPT | Performed by: INTERNAL MEDICINE

## 2023-07-28 PROCEDURE — 93308 TTE F-UP OR LMTD: CPT

## 2023-07-28 PROCEDURE — 97162 PT EVAL MOD COMPLEX 30 MIN: CPT

## 2023-07-28 PROCEDURE — 84443 ASSAY THYROID STIM HORMONE: CPT | Performed by: INTERNAL MEDICINE

## 2023-07-28 PROCEDURE — 93321 DOPPLER ECHO F-UP/LMTD STD: CPT | Performed by: INTERNAL MEDICINE

## 2023-07-28 PROCEDURE — 85610 PROTHROMBIN TIME: CPT | Performed by: INTERNAL MEDICINE

## 2023-07-28 PROCEDURE — 36415 COLL VENOUS BLD VENIPUNCTURE: CPT | Performed by: INTERNAL MEDICINE

## 2023-07-28 PROCEDURE — 93325 DOPPLER ECHO COLOR FLOW MAPG: CPT | Performed by: INTERNAL MEDICINE

## 2023-07-28 PROCEDURE — 93325 DOPPLER ECHO COLOR FLOW MAPG: CPT

## 2023-07-28 PROCEDURE — 87635 SARS-COV-2 COVID-19 AMP PRB: CPT | Performed by: INTERNAL MEDICINE

## 2023-07-28 PROCEDURE — 80053 COMPREHEN METABOLIC PANEL: CPT | Performed by: INTERNAL MEDICINE

## 2023-07-28 PROCEDURE — 93010 ELECTROCARDIOGRAM REPORT: CPT | Performed by: INTERNAL MEDICINE

## 2023-07-28 PROCEDURE — 82948 REAGENT STRIP/BLOOD GLUCOSE: CPT

## 2023-07-28 PROCEDURE — 94660 CPAP INITIATION&MGMT: CPT

## 2023-07-28 PROCEDURE — 93308 TTE F-UP OR LMTD: CPT | Performed by: INTERNAL MEDICINE

## 2023-07-28 RX ORDER — LEVALBUTEROL INHALATION SOLUTION 1.25 MG/3ML
1.25 SOLUTION RESPIRATORY (INHALATION)
Status: DISCONTINUED | OUTPATIENT
Start: 2023-07-28 | End: 2023-07-30 | Stop reason: HOSPADM

## 2023-07-28 RX ADMIN — IPRATROPIUM BROMIDE 0.5 MG: 0.5 SOLUTION RESPIRATORY (INHALATION) at 19:43

## 2023-07-28 RX ADMIN — FUROSEMIDE 40 MG: 10 INJECTION, SOLUTION INTRAMUSCULAR; INTRAVENOUS at 17:07

## 2023-07-28 RX ADMIN — FUROSEMIDE 40 MG: 10 INJECTION, SOLUTION INTRAMUSCULAR; INTRAVENOUS at 09:43

## 2023-07-28 RX ADMIN — IPRATROPIUM BROMIDE 0.5 MG: 0.5 SOLUTION RESPIRATORY (INHALATION) at 14:49

## 2023-07-28 RX ADMIN — LEVALBUTEROL HYDROCHLORIDE 1.25 MG: 1.25 SOLUTION RESPIRATORY (INHALATION) at 19:43

## 2023-07-28 RX ADMIN — HEPARIN SODIUM 5000 UNITS: 5000 INJECTION INTRAVENOUS; SUBCUTANEOUS at 06:39

## 2023-07-28 RX ADMIN — GUAIFENESIN 1200 MG: 600 TABLET, EXTENDED RELEASE ORAL at 22:26

## 2023-07-28 RX ADMIN — IPRATROPIUM BROMIDE 0.5 MG: 0.5 SOLUTION RESPIRATORY (INHALATION) at 09:46

## 2023-07-28 RX ADMIN — ATORVASTATIN CALCIUM 20 MG: 40 TABLET, FILM COATED ORAL at 09:40

## 2023-07-28 RX ADMIN — FLUTICASONE PROPIONATE 1 SPRAY: 50 SPRAY, METERED NASAL at 01:03

## 2023-07-28 RX ADMIN — THIAMINE HCL TAB 100 MG 100 MG: 100 TAB at 09:39

## 2023-07-28 RX ADMIN — ISOSORBIDE MONONITRATE 30 MG: 30 TABLET, EXTENDED RELEASE ORAL at 09:40

## 2023-07-28 RX ADMIN — HEPARIN SODIUM 5000 UNITS: 5000 INJECTION INTRAVENOUS; SUBCUTANEOUS at 22:26

## 2023-07-28 RX ADMIN — GUAIFENESIN 1200 MG: 600 TABLET, EXTENDED RELEASE ORAL at 09:40

## 2023-07-28 RX ADMIN — CARVEDILOL 25 MG: 12.5 TABLET, FILM COATED ORAL at 17:07

## 2023-07-28 RX ADMIN — ALPRAZOLAM 0.5 MG: 0.5 TABLET ORAL at 01:30

## 2023-07-28 RX ADMIN — FLUTICASONE PROPIONATE 1 SPRAY: 50 SPRAY, METERED NASAL at 09:45

## 2023-07-28 RX ADMIN — IPRATROPIUM BROMIDE AND ALBUTEROL SULFATE 3 ML: 2.5; .5 SOLUTION RESPIRATORY (INHALATION) at 01:21

## 2023-07-28 RX ADMIN — MULTIPLE VITAMINS W/ MINERALS TAB 1 TABLET: TAB at 09:40

## 2023-07-28 RX ADMIN — SACUBITRIL AND VALSARTAN 1 TABLET: 49; 51 TABLET, FILM COATED ORAL at 18:26

## 2023-07-28 RX ADMIN — HEPARIN SODIUM 5000 UNITS: 5000 INJECTION INTRAVENOUS; SUBCUTANEOUS at 14:49

## 2023-07-28 RX ADMIN — SACUBITRIL AND VALSARTAN 1 TABLET: 49; 51 TABLET, FILM COATED ORAL at 09:40

## 2023-07-28 RX ADMIN — DULOXETINE HYDROCHLORIDE 60 MG: 30 CAPSULE, DELAYED RELEASE ORAL at 09:40

## 2023-07-28 RX ADMIN — ALPRAZOLAM 0.5 MG: 0.5 TABLET ORAL at 22:33

## 2023-07-28 RX ADMIN — FLUTICASONE FUROATE AND VILANTEROL TRIFENATATE 1 PUFF: 200; 25 POWDER RESPIRATORY (INHALATION) at 09:43

## 2023-07-28 RX ADMIN — FOLIC ACID 1 MG: 1 TABLET ORAL at 09:45

## 2023-07-28 RX ADMIN — GUAIFENESIN 1200 MG: 600 TABLET, EXTENDED RELEASE ORAL at 01:04

## 2023-07-28 RX ADMIN — CARVEDILOL 25 MG: 12.5 TABLET, FILM COATED ORAL at 08:07

## 2023-07-28 RX ADMIN — HEPARIN SODIUM 5000 UNITS: 5000 INJECTION INTRAVENOUS; SUBCUTANEOUS at 01:04

## 2023-07-28 RX ADMIN — LEVALBUTEROL HYDROCHLORIDE 1.25 MG: 1.25 SOLUTION RESPIRATORY (INHALATION) at 14:49

## 2023-07-28 RX ADMIN — LEVALBUTEROL HYDROCHLORIDE 1.25 MG: 1.25 SOLUTION RESPIRATORY (INHALATION) at 09:46

## 2023-07-28 NOTE — ASSESSMENT & PLAN NOTE
Lab Results   Component Value Date    EGFR 54 07/27/2023    EGFR 58 03/10/2023    EGFR 58 12/26/2022    CREATININE 1.34 (H) 07/27/2023    CREATININE 1.28 03/10/2023    CREATININE 1.28 12/26/2022     · Creatinine at baseline which is 1.2-1.3  · Monitor with daily metabolic panel; monitor creatinine closely as we are aggressively diuresing

## 2023-07-28 NOTE — RESPIRATORY THERAPY NOTE
RT Protocol Note  Gonsalo Shannon 79 y.o. male MRN: [id number]  Unit/Bed#: RM13 Encounter: [id number]    Assessment    Principal Problem:    Acute on chronic combined systolic and diastolic CHF (congestive heart failure) ([facility])  Active Problems:    Obstructive sleep apnea    Asthma-COPD overlap syndrome ([facility])    Stage 3 chronic kidney disease, unspecified whether stage 3a or 3b CKD ([facility])    Type 2 diabetes mellitus ([address])    Alcohol abuse      Home Pulmonary Medications: Albuterol, Advair, DuoNeb, Spiriva Respimat  Home Devices/Therapy: BiPAP/CPAP (Pt stated they have not worn in years but willing to try hospital unit)    Past Medical History:   Diagnosis Date   • Asthma    • CHF (congestive heart failure) ([facility])    • COPD (chronic obstructive pulmonary disease) ([facility])    • COPD with acute exacerbation ([address]) 2022   • COVID-19    • Mumps    • Old MI (myocardial infarction)    • Pneumonia    • Pulmonary emphysema ([address])    • Rectal bleeding 2019   • Sleep apnea      Social History     Socioeconomic History   • Marital status: /Civil Union     Spouse name: None   • Number of children: None   • Years of education: None   • Highest education level: None   Occupational History   • None   Tobacco Use   • Smoking status: Former     Packs/day: 3.00     Years: 43.00     Total pack years: 129.00     Types: Cigarettes     Start date: 67     Quit date:      Years since quittin.5   • Smokeless tobacco: Never   Vaping Use   • Vaping Use: Never used   Substance and Sexual Activity   • Alcohol use: Not Currently     Comment: rarely   • Drug use: Yes     Types: Marijuana     Comment: occasionally edible   • Sexual activity: None   Other Topics Concern   • None   Social History Narrative    Caffeine use     Social Determinants of Health     Financial Resource Strain: Low Risk  (3/23/2023)    Overall Financial Resource Strain (CARDIA)    • Difficulty of Paying Living Expenses: Not very hard   Food Insecurity: No Food Insecurity (12/22/2022)    Hunger Vital Sign    • Worried About Running Out of Food in the Last Year: Never true    • Ran Out of Food in the Last Year: Never true   Transportation Needs: No Transportation Needs (3/23/2023)    PRAPARE - Transportation    • Lack of Transportation (Medical): No    • Lack of Transportation (Non-Medical): No   Physical Activity: Not on file   Stress: Not on file   Social Connections: Not on file   Intimate Partner Violence: Not on file   Housing Stability: Low Risk  (12/22/2022)    Housing Stability Vital Sign    • Unable to Pay for Housing in the Last Year: No    • Number of Places Lived in the Last Year: 1    • Unstable Housing in the Last Year: No       Subjective         Objective    Physical Exam:   Assessment Type: During-treatment  General Appearance: Alert, Awake  Respiratory Pattern: Normal, Dyspnea with exertion  Chest Assessment: Chest expansion symmetrical  Bilateral Breath Sounds: Diminished  Cough: Strong, Productive  O2 Device: RA    Vitals:  Blood pressure 136/71, pulse 62, temperature 97.6 °F (36.4 °C), temperature source Temporal, resp. rate 22, SpO2 95 %. Imaging and other studies: I have personally reviewed pertinent reports. O2 Device: RA     Plan    Respiratory Plan: Mild Distress pathway, Home Bronchodilator Patient pathway, Vent/NIV/HFNC      Plan to order TID Xopenex/Atrovent.

## 2023-07-28 NOTE — PLAN OF CARE
Problem: PHYSICAL THERAPY ADULT  Goal: Performs mobility at highest level of function for planned discharge setting. See evaluation for individualized goals. Description: Treatment/Interventions: Elevations, Gait training, Endurance training          See flowsheet documentation for full assessment, interventions and recommendations. Note: Prognosis: Good  Problem List: Decreased strength, Decreased endurance, Impaired balance, Decreased mobility  Assessment: Patient is a 79 y.o. male evaluated by Physical Therapy s/p admit to [address] [place] [address] on 7/27/2023 with admitting diagnosis of: Leg swelling and principal problem of: Acute on chronic combined systolic and diastolic CHF (congestive heart failure). PT was consulted to assess patient's functional mobility and discharge needs. Ordered are PT Evaluation and treatment with activity level of: up as tolerated. Comorbidities affecting patient's physical performance at time of assessment include: hx of MI, CHF, COPD, asthma, pulmonary emphysema, sleep apnea, hx of COVID-19. Personal factors affecting the patient at time of IE include: lives in 2 story home, ambulating with assistive device, step(s) to enter home, inability to navigate community distances, history of fall(s), inability/difficulty performing IADLs and inability/difficulty performing ADLs. Please locate objective findings from PT assessment regarding body systems outlined above. Upon evaluation, pt able to perform all functional mobility with SUP, SPC, and increased time. Occasional verbal cuing provided for sequencing and direction. Pt able to ambulate ~150' before requiring seated rest break d/t fatigue and SOB. Despite symptoms, HR and SpO2 remained WFL on RA throughout. Moderate postural sway demonstrated with mobility but no true LOB experienced. The patient's AM-PAC Basic Mobility Inpatient Short Form Raw Score is 22.  A Raw score of greater than 16 suggests the patient may benefit from discharge to home. Please also refer to the recommendation of the Physical Therapist for safe discharge planning. Pt will benefit from continued PT intervention during LOS to address current deficits, increase LOF, and facilitate safe d/c to next level of care when medically appropriate. D/c recommendation at this time is home with outpatient rehabilitation services. PT Discharge Recommendation: Home with outpatient rehabilitation    See flowsheet documentation for full assessment.

## 2023-07-28 NOTE — ASSESSMENT & PLAN NOTE
Wt Readings from Last 3 Encounters:   06/01/23 (!) 144 kg (317 lb 9.6 oz)   03/27/23 (!) 138 kg (303 lb 9.6 oz)   03/23/23 135 kg (297 lb)     · Bilateral pitting lower extremity edema worsening over the last 3 weeks as well as dyspnea with exertion  · BNP mildly elevated  · Reports his weight is around 310 pounds; reviewed previous cardiology note from about 6 months ago and patient's weight then was 295. Patient further reports that he thinks he has gained significant amount of weight recently as well as noted abdominal distention  · CTA PE protocol without evidence of acute pulmonary embolism noted poor inspiratory effort with bilateral atelectatic lung changes and mild scarring versus atelectasis in the lingula. No acute infiltrate or pleural effusion  · History of nonischemic cardiomyopathy with LVEF 40-45% on last echo in 2022  · Appears cardiomyopathy thought secondary to alcohol  · On PTA carvedilol as well as Entresto and as needed Lasix 20 mg daily  · Given IV Lasix 40 mg in the ER with significant urine output which was very clear  · Admit to medicine on telemetry for acute on chronic combined systolic and diastolic heart failure exacerbation. We will put on IV Lasix 40 mg twice daily. Continue PTA Coreg as well as Entresto. We will repeat 2D echocardiogram.  Consult cardiology. Monitor renal function as well as blood pressure. Cardiac diet. Monitor I's and O's.

## 2023-07-28 NOTE — CONSULTS
Consultation - Cardiology   eKny Stone 79 y.o. male MRN: [id number]  Unit/Bed#: SU19 Encounter: [id number]  07/28/23  9:28 AM        Impression:    29-year-old male with a history of nonischemic cardiomyopathy suspected to be from alcohol use, EF was 40 to 45% at diagnosis in September 2022 with coronary angiography the same month without obstructive CAD, on carvedilol and Entresto at baseline as well as furosemide 20 mg daily, now admitted with acute on chronic heart failure-predominantly right-sided in the setting of increased salt intake. Plan:    Bilateral lower extremity edema: This is a manifestation of acute on chronic heart failure, predominantly right-sided. Prior ejection fraction was around 40-45% at diagnosis in September 2022,   Repeat echocardiogram  At baseline was on furosemide 20 mg daily, had increased it to 20 twice daily just prior to admission without significant improvement  Currently on furosemide IV 40 mg twice daily-watch response with this. Subjectively improving. No weights. Has CKD with a baseline creatinine of 1.3    Nonischemic cardiomyopathy: At baseline maintained on carvedilol and Entresto,  This was suspected it to be from alcohol use, negative coronary angiography in September 2022  Continues to drink 16 ounces of beer and 2 shots every Tuesday. Dyslipidemia: On statin          Assessment:  Principal Problem:    Acute on chronic combined systolic and diastolic CHF (congestive heart failure) (HCC)  Active Problems:    Obstructive sleep apnea    Asthma-COPD overlap syndrome (HCC)    Stage 3 chronic kidney disease, unspecified whether stage 3a or 3b CKD ([address])    Type 2 diabetes mellitus ([address])    Alcohol abuse    Chief Complaint   Patient presents with   • Leg Swelling     Bilateral lower leg swelling worse of the past few days.  Increase in shortness of breath has a history of COPD and CHF     Admitting diagnosis:  Leg swelling [M79.89]    History of Present Illness Physician Requesting Consult: Linda Baig DO   Reason for Consult / Principal Problem: Lower extremity edema  HPI: Keysha Alvares is a 30-year-old with history of nonischemic cardiomyopathy suspected to be from alcohol use last EF was 40-45% in September 2022, with coronary angiography in September 2022 without obstructive CAD, on carvedilol and Entresto at baseline, chronic systolic congestive congestive heart failure, hypertension, dyslipidemia, sleep apnea, COPD/asthma, at baseline on furosemide 20 mg daily, CKD with baseline creatinine around 1.3 admitted with increasing shortness of breath/lower extremity edema, with weight gain-310 pounds while he was 295 6 months    Admitted to increased salt intake recently but compliance with diuretics. BNP was only 138 at presentation,  troponins were essentially negative. Inpatient consult to Cardiology  Consult performed by: Daya Rivas MD  Consult ordered by: Linda Baig DO          Review of Systems:  feels well  Review of Systems   Constitutional: Negative. HENT: Negative. Eyes: Negative. Respiratory: Negative. Cardiovascular: Positive for leg swelling. Negative for chest pain and palpitations. Endocrine: Negative. Musculoskeletal: Negative. Allergic/Immunologic: Negative. Historical Information   Past Medical History:   Diagnosis Date   • Asthma    • CHF (congestive heart failure) ([facility])    • COPD (chronic obstructive pulmonary disease) ([facility])    • COPD with acute exacerbation ([address]) 5/6/2022   • COVID-19    • Mumps    • Old MI (myocardial infarction)    • Pneumonia    • Pulmonary emphysema ([address])    • Rectal bleeding 1/14/2019   • Sleep apnea      Past Surgical History:   Procedure Laterality Date   • CARDIAC CATHETERIZATION  07/24/2015    Left main- normal and maidly tortuous. Circumflex - normal and moderately tortuous. RCA- normal and mildy tortuous. Global LV function was severely depressed. .   • CARDIAC CATHETERIZATION Left 2022    Procedure: Cardiac Left Heart Cath;  Surgeon: Maria R Peres DO;  Location: [facility]; Service: Cardiology   • CARDIAC CATHETERIZATION N/A 2022    Procedure: Cardiac Coronary Angiogram;  Surgeon: Maria R Peres DO;  Location: [facility]; Service: Cardiology   • CARDIAC CATHETERIZATION  2022    Procedure: Cardiac catheterization;  Surgeon: Maria R Peres DO;  Location: [facility]; Service: Cardiology   • INGUINAL HERNIA REPAIR Bilateral    • OR COLONOSCOPY FLX DX W/COLLJ SPEC WHEN PFRMD N/A 3/11/2019    Procedure: COLONOSCOPY with removal of anal papilla;   Surgeon: Kennedy Juan MD;  Location: [facility];  Service: Colorectal   • TONSILLECTOMY     • UMBILICAL HERNIA REPAIR  2006     Social History     Substance and Sexual Activity   Alcohol Use Not Currently    Comment: rarely     Social History     Substance and Sexual Activity   Drug Use Yes   • Types: Marijuana    Comment: occasionally edible     Social History     Tobacco Use   Smoking Status Former   • Packs/day: 3.00   • Years: 43.00   • Total pack years: 129.00   • Types: Cigarettes   • Start date: 67   • Quit date:    • Years since quittin.5   Smokeless Tobacco Never     Family History:   Family History   Problem Relation Age of Onset   • Heart attack Father         MI   • Heart disease Father    • Diabetes Sister         DM   • Arthritis Family    • Coronary artery disease Family    • Hypertension Family    • Tuberculosis Son    • Alzheimer's disease Mother      No family history of premature CAD or Sudden Cardiac Death    Meds/Allergies     Current Facility-Administered Medications:   •  acetaminophen (TYLENOL) tablet 650 mg, 650 mg, Oral, Q6H PRN, Linda Baig DO  •  ALPRAZolam [facility]) tablet 0.5 mg, 0.5 mg, Oral, HS PRN, Linda Baig DO, 0.5 mg at 23 0130  •  atorvastatin (LIPITOR) tablet 20 mg, 20 mg, Oral, Daily, Linda Baig DO  • carvedilol (COREG) tablet 25 mg, 25 mg, Oral, BID With Meals, Ny Angelo, DO, 25 mg at 07/28/23 0807  •  DULoxetine (CYMBALTA) delayed release capsule 60 mg, 60 mg, Oral, Daily, Ny Angelo, DO  •  fluticasone (FLONASE) 50 mcg/act nasal spray 1 spray, 1 spray, Nasal, BID, Ny Angelo, DO, 1 spray at 07/28/23 0103  •  fluticasone-vilanterol 200-25 mcg/actuation 1 puff, 1 puff, Inhalation, Daily, Ny Angelo DO  •  folic acid (FOLVITE) tablet 1 mg, 1 mg, Oral, Daily, Ny Angelo DO  •  furosemide (LASIX) injection 40 mg, 40 mg, Intravenous, BID, Ny Angelo, DO  •  guaiFENesin (MUCINEX) 12 hr tablet 1,200 mg, 1,200 mg, Oral, Q12H [facility], Ny Angelo DO, 1,200 mg at 07/28/23 0104  •  heparin (porcine) subcutaneous injection 5,000 Units, 5,000 Units, Subcutaneous, Q8H [facility], Ny Angelo DO, 5,000 Units at 07/28/23 0563  •  insulin lispro (HumaLOG) 100 units/mL subcutaneous injection 1-5 Units, 1-5 Units, Subcutaneous, TID AC **AND** Fingerstick Glucose (POCT), , , TID AC, Ny Angelo, DO  •  insulin lispro (HumaLOG) 100 units/mL subcutaneous injection 1-5 Units, 1-5 Units, Subcutaneous, HS, Ny Angelo, DO  •  ipratropium (ATROVENT) 0.02 % inhalation solution 0.5 mg, 0.5 mg, Nebulization, TID, Ny Angelo, DO  •  isosorbide mononitrate (IMDUR) 24 hr tablet 30 mg, 30 mg, Oral, Daily, Ny Angelo, DO  •  levalbuterol (XOPENEX) inhalation solution 1.25 mg, 1.25 mg, Nebulization, TID, Ny Angelo, DO  •  multivitamin-minerals (CENTRUM) tablet 1 tablet, 1 tablet, Oral, Daily, Ny Angelo, DO  •  perflutren lipid microsphere (DEFINITY) injection, 0.6 mL/min, Intravenous, Once in imaging, Ny Angelo, DO  •  sacubitril-valsartan (ENTRESTO) 49-51 MG per tablet 1 tablet, 1 tablet, Oral, BID, Ny Angelo, DO  •  thiamine tablet 100 mg, 100 mg, Oral, Daily, Ny Angelo, DO    Current Outpatient Medications:   •  Accu-Chek FastClix Lancets MISC, Use 1 each daily to test blood sugar.  (Patient not taking: Reported on 3/27/2023), Disp: 100 each, Rfl: 5  •  albuterol (2.5 mg/3 mL) 0.083 % nebulizer solution, Take 3 mL (2.5 mg total) by nebulization every 6 (six) hours as needed for wheezing or shortness of breath, Disp: 360 mL, Rfl: 2  •  albuterol (ProAir HFA) 90 mcg/act inhaler, Inhale 2 puffs every 6 (six) hours as needed for wheezing, Disp: 18 g, Rfl: 11  •  ALPRAZolam (XANAX) 0.5 mg tablet, TAKE 1 TABLET BY MOUTH ONCE DAILY AT BEDTIME AS NEEDED FOR ANXIETY, Disp: 30 tablet, Rfl: 0  •  atorvastatin (LIPITOR) 20 mg tablet, Take 1 tablet (20 mg total) by mouth daily, Disp: 90 tablet, Rfl: 3  •  Blood Glucose Monitoring Suppl (Accu-Chek Guide Me) w/Device KIT, Use 1 each daily to test blood sugar. (Patient not taking: Reported on 3/27/2023), Disp: 1 kit, Rfl: 0  •  carvedilol (COREG) 25 mg tablet, Take 1 tablet (25 mg total) by mouth 2 (two) times a day with meals, Disp: 60 tablet, Rfl: 11  •  DULoxetine (CYMBALTA) 60 mg delayed release capsule, Take 1 capsule by mouth once daily, Disp: 30 capsule, Rfl: 0  •  Dupilumab (Dupixent) 300 MG/2ML SOPN, Inject 300 mg under the skin every 14 (fourteen) days, Disp: 2 mL, Rfl: 11  •  dupilumab (DUPIXENT) subcutaneous injection, Inject 2 mL (300 mg total) under the skin every 14 (fourteen) days Do not start before April 10, 2023., Disp: 2 mL, Rfl: 11  •  fluticasone (FLONASE) 50 mcg/act nasal spray, 1 spray into each nostril 2 (two) times a day, Disp: 16 g, Rfl: 6  •  Fluticasone-Salmeterol (Advair) 500-50 mcg/dose inhaler, INHALE ONE PUFF BY MOUTH AND INTO THE LUNGS TWICE A DAY. RINSE MOUTH AFTER USE, Disp: 60 blister, Rfl: 3  •  Fluticasone-Salmeterol (Advair) 500-50 mcg/dose inhaler, INHALE 1 DOSE BY MOUTH TWICE DAILY RINSE MOUTH AFTER USE, Disp: 60 blister, Rfl: 11  •  furosemide (LASIX) 20 mg tablet, Take 1 tablet (20 mg total) by mouth daily, Disp: 30 tablet, Rfl: 11  •  glucose blood test strip, Use 1 each daily to test blood sugar.  (Patient not taking: Reported on 3/27/2023), Disp: 100 strip, Rfl: 5  •  guaiFENesin (Mucus Relief) 600 mg 12 hr tablet, Take 2 tablets (1,200 mg total) by mouth every 12 (twelve) hours, Disp: 60 tablet, Rfl: 3  •  guaiFENesin 1200 MG TB12, Take 1 tablet (1,200 mg total) by mouth every 12 (twelve) hours (Patient not taking: Reported on 3/27/2023), Disp: 30 tablet, Rfl: 0  •  ipratropium-albuterol (DUO-NEB) 0.5-2.5 mg/3 mL nebulizer solution, Take 3 mL by nebulization every 6 (six) hours as needed for wheezing or shortness of breath, Disp: 360 mL, Rfl: 0  •  isosorbide mononitrate (IMDUR) 30 mg 24 hr tablet, Take 30 mg by mouth daily, Disp: , Rfl:   •  montelukast (SINGULAIR) 10 mg tablet, Take 1 tablet (10 mg total) by mouth daily at bedtime (Patient not taking: Reported on 6/1/2023), Disp: 30 tablet, Rfl: 5  •  mupirocin (BACTROBAN) 2 % ointment, Apply topically 2 (two) times a day, Disp: 22 g, Rfl: 0  •  pantoprazole (PROTONIX) 20 mg tablet, Take 1 tablet (20 mg total) by mouth daily in the early morning Do not start before December 27, 2022. (Patient not taking: Reported on 1/9/2023), Disp: 15 tablet, Rfl: 0  •  sacubitril-valsartan (ENTRESTO) 49-51 MG TABS, Take 1 tablet by mouth 2 (two) times a day, Disp: 60 tablet, Rfl: 11  •  sodium chloride 3 % inhalation solution, Take 4 mL by nebulization as needed for cough (congestion), Disp: 30 mL, Rfl: 0  •  tiotropium (Spiriva Respimat) 2.5 MCG/ACT AERS inhaler, Inhale 2 spray (s) by mouth once daily. , Disp: 4 g, Rfl: 6  Allergies   Allergen Reactions   • Ciprofloxacin Shortness Of Breath and Diarrhea       Objective   Vitals: Blood pressure 162/70, pulse 65, temperature (!) 96.7 °F (35.9 °C), temperature source Temporal, resp. rate 18, SpO2 92 %. , There is no height or weight on file to calculate BMI.,   Orthostatic Blood Pressures    Flowsheet Row Most Recent Value   Blood Pressure 162/70 filed at 07/28/2023 0800   Patient Position - Orthostatic VS Sitting filed at 07/28/2023 0800 Intake/Output Summary (Last 24 hours) at 7/28/2023 0928  Last data filed at 7/28/2023 0457  Gross per 24 hour   Intake --   Output 2920 ml   Net -2920 ml       Weight (last 2 days)     None          Invasive Devices     Peripheral Intravenous Line  Duration           Peripheral IV 07/27/23 Left Antecubital <1 day                  Physical Exam:  GEN: Jesusita Gutierrez appears okay, alert and oriented x 3, pleasant and cooperative   HEENT: pupils equal, round, and reactive to light; extraocular muscles intact  NECK: supple, no carotid bruits or JVD  HEART: regular rhythm, normal S1 and S2, no murmurs, no clicks, gallops or rubs   LUNGS: clear to auscultation bilaterally; no wheezes or rhonchi, no rales  ABDOMEN/GI: normal bowel sounds, soft, no tenderness, no distention  EXTREMITIES/Musculoskeltal: peripheral pulses normal; no clubbing, cyanosis, bilateral 3+ lower extremity edema  NEURO: no focal motor findings   SKIN: normal without suspicious lesions on exposed skin      Lab Results:   Admission on 07/27/2023   Component Date Value   • WBC 07/27/2023 9.33    • RBC 07/27/2023 4.84    • Hemoglobin 07/27/2023 12.9    • Hematocrit 07/27/2023 41.5    • MCV 07/27/2023 86    • MCH 07/27/2023 26.7 (L)    • MCHC 07/27/2023 31.1 (L)    • RDW 07/27/2023 15.6 (H)    • MPV 07/27/2023 10.1    • Platelets 96/07/3495 271    • nRBC 07/27/2023 0    • Neutrophils Relative 07/27/2023 62    • Immat GRANS % 07/27/2023 1    • Lymphocytes Relative 07/27/2023 21    • Monocytes Relative 07/27/2023 9    • Eosinophils Relative 07/27/2023 6    • Basophils Relative 07/27/2023 1    • Neutrophils Absolute 07/27/2023 5.90    • Immature Grans Absolute 07/27/2023 0.05    • Lymphocytes Absolute 07/27/2023 1.96    • Monocytes Absolute 07/27/2023 0.81    • Eosinophils Absolute 07/27/2023 0.51    • Basophils Absolute 07/27/2023 0.10    • Sodium 07/27/2023 139    • Potassium 07/27/2023 4.3    • Chloride 07/27/2023 107    • CO2 07/27/2023 23    • ANION GAP 07/27/2023 9    • BUN 07/27/2023 25    • Creatinine 07/27/2023 1.34 (H)    • Glucose 07/27/2023 110    • Calcium 07/27/2023 9.3    • AST 07/27/2023 14    • ALT 07/27/2023 20    • Alkaline Phosphatase 07/27/2023 72    • Total Protein 07/27/2023 6.3 (L)    • Albumin 07/27/2023 3.9    • Total Bilirubin 07/27/2023 0.55    • eGFR 07/27/2023 54    • BNP 07/27/2023 138 (H)    • hs TnI 0hr 07/27/2023 51 (H)    • Protime 07/27/2023 13.9    • INR 07/27/2023 1.05    • PTT 07/27/2023 31    • pH, Naveed 07/27/2023 7.412 (H)    • pCO2, Naveed 07/27/2023 36.1 (L)    • pO2, Nvaeed 07/27/2023 134.5 (H)    • HCO3, Naveed 07/27/2023 22.5 (L)    • Base Excess, Naveed 07/27/2023 -1.6    • O2 Content, Naveed 07/27/2023 18.3    • O2 HGB, VENOUS 07/27/2023 95.1 (H)    • Ventricular Rate 07/27/2023 62    • Atrial Rate 07/27/2023 62    • TX Interval 07/27/2023 232    • QRSD Interval 07/27/2023 110    • QT Interval 07/27/2023 392    • QTC Interval 07/27/2023 397    • P Axis 07/27/2023 63    • QRS Axis 07/27/2023 -43    • T Wave Axis 07/27/2023 55    • hs TnI 2hr 07/27/2023 49    • Delta 2hr hsTnI 07/27/2023 -2    • D-Dimer, Quant 07/27/2023 0.87 (H)    • hs TnI 4hr 07/27/2023 52 (H)    • Delta 4hr hsTnI 07/27/2023 1    • POC Glucose 07/28/2023 136    • WBC 07/28/2023 9.20    • RBC 07/28/2023 4.79    • Hemoglobin 07/28/2023 12.7    • Hematocrit 07/28/2023 41.0    • MCV 07/28/2023 86    • MCH 07/28/2023 26.5 (L)    • MCHC 07/28/2023 31.0 (L)    • RDW 07/28/2023 15.7 (H)    • MPV 07/28/2023 9.8    • Platelets 25/58/6557 232    • nRBC 07/28/2023 0    • Neutrophils Relative 07/28/2023 64    • Immat GRANS % 07/28/2023 0    • Lymphocytes Relative 07/28/2023 21    • Monocytes Relative 07/28/2023 9    • Eosinophils Relative 07/28/2023 5    • Basophils Relative 07/28/2023 1    • Neutrophils Absolute 07/28/2023 5.81    • Immature Grans Absolute 07/28/2023 0.03    • Lymphocytes Absolute 07/28/2023 1.97    • Monocytes Absolute 07/28/2023 0.82    • Eosinophils Absolute 07/28/2023 0.49    • Basophils Absolute 07/28/2023 0.08    • Sodium 07/28/2023 138    • Potassium 07/28/2023 4.0    • Chloride 07/28/2023 103    • CO2 07/28/2023 26    • ANION GAP 07/28/2023 9    • BUN 07/28/2023 24    • Creatinine 07/28/2023 1.37 (H)    • Glucose 07/28/2023 130    • Calcium 07/28/2023 9.0    • AST 07/28/2023 13    • ALT 07/28/2023 19    • Alkaline Phosphatase 07/28/2023 69    • Total Protein 07/28/2023 6.1 (L)    • Albumin 07/28/2023 3.7    • Total Bilirubin 07/28/2023 0.69    • eGFR 07/28/2023 53    • Magnesium 07/28/2023 1.8 (L)    • Protime 07/28/2023 14.1    • INR 07/28/2023 1.06    • PTT 07/28/2023 33    • TSH 3RD GENERATON 07/28/2023 1.812    • POC Glucose 07/28/2023 127    • SARS-CoV-2 07/28/2023 Negative          Imaging: I have personally reviewed pertinent reports. EKG/Telemetry: No events    Counseling / Coordination of Care    Thank you for allowing us to participate in their care. This note was completed in part utilizing [employer] direct voice recognition software. Grammatical errors, random word insertion, spelling mistakes, occasional wrong word or "sound-alike" substitutions and incomplete sentences may be an occasional consequence of the system secondary to software limitations, ambient noise and hardware issues. At the time of dictation, efforts were made to edit, clarify and /or correct errors. Please read the chart carefully and recognize, using context, where substitutions have occurred. If you have any questions or concerns about the context, text or information contained within the body of this dictation, please contact myself, the provider, for further clarification.     Gissel Tam MD

## 2023-07-28 NOTE — ASSESSMENT & PLAN NOTE
· Reports that he still drinks beer with some occasional hard alcohol but does report significantly cutting back in the last year or so from when he was previously drinking large amounts of scotch daily  · Denies any history of withdrawal symptoms or seizures  · To be safe we will put on CIWA protocol with daily thiamine and folate

## 2023-07-28 NOTE — ASSESSMENT & PLAN NOTE
Wt Readings from Last 3 Encounters:   07/28/23 (!) 144 kg (317 lb 7.4 oz)   06/01/23 (!) 144 kg (317 lb 9.6 oz)   03/27/23 (!) 138 kg (303 lb 9.6 oz)     · Bilateral pitting lower extremity edema worsening over the last 3 weeks as well as dyspnea with exertion  · BNP mildly elevated  · Reports his weight is around 310 pounds; reviewed previous cardiology note from about 6 months ago and patient's weight then was 295. 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    · We will repeat 2D echocardiogram.    · Consult cardiology, appreciate input, continue diuresis likely place on daily diuretic outpatient

## 2023-07-28 NOTE — ED NOTES
Assumed care of patient who is resting on stretcher. Regular, non labored respirations. Patient offers no complaints. Duo neb in progress.  Will continue to monitor      Yvonne Luong RN  07/28/23 1071

## 2023-07-28 NOTE — PROGRESS NOTES
[address]  Progress Note  Name: Cliff Lipscomb I  MRN: [id number]  Unit/Bed#: KG33 I Date of Admission: 7/27/2023   Date of Service: 7/28/2023 I Hospital Day: 1    Assessment/Plan   Alcohol abuse  Assessment & Plan  · Reports that he still drinks beer with some occasional hard alcohol but does report significantly cutting back in the last year or so from when he was previously drinking large amounts of scotch daily  · Denies any history of withdrawal symptoms or seizures  · To be safe we will put on CIWA protocol with daily thiamine and folate    Type 2 diabetes mellitus [facility])  Assessment & Plan  Lab Results   Component Value Date    HGBA1C 6.4 (H) 12/17/2022       Recent Labs     07/28/23  0149 07/28/23  0711 07/28/23  1156   POCGLU 136 127 143*       Blood Sugar Average: Last 72 hrs:  (P) 158.8490670817138256   · Sliding scale insulin while hospitalized    Stage 3 chronic kidney disease, unspecified whether stage 3a or 3b CKD ([address])  Assessment & Plan  Lab Results   Component Value Date    EGFR 53 07/28/2023    EGFR 54 07/27/2023    EGFR 58 03/10/2023    CREATININE 1.37 (H) 07/28/2023    CREATININE 1.34 (H) 07/27/2023    CREATININE 1.28 03/10/2023     · Creatinine at baseline which is 1.2-1.3  · Monitor with daily metabolic panel  · monitor creatinine closely as we are aggressively diuresing    Asthma-COPD overlap syndrome (HCC)  Assessment & Plan  · History of asthma/COPD overlap  · No significant wheezing on exam  · Continue PTA inhalers    Obstructive sleep apnea  Assessment & Plan  · History of CORDELL and has previously been recommended for CPAP but reports he cannot tolerate    * Acute on chronic combined systolic and diastolic CHF (congestive heart failure) (HCC)  Assessment & Plan  Wt Readings from Last 3 Encounters:   07/28/23 (!) 144 kg (317 lb 7.4 oz)   06/01/23 (!) 144 kg (317 lb 9.6 oz)   03/27/23 (!) 138 kg (303 lb 9.6 oz)     · Bilateral pitting lower extremity edema worsening over the last 3 weeks as well as dyspnea with exertion  · BNP mildly elevated  · Reports his weight is around 310 pounds; reviewed previous cardiology note from about 6 months ago and patient's weight then was 295. 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 · We will repeat 2D echocardiogram.    · Consult cardiology, appreciate input, continue diuresis likely place on daily diuretic outpatient                 VTE Pharmacologic Prophylaxis: VTE Score: 4 Moderate Risk (Score 3-4) - Pharmacological DVT Prophylaxis Ordered: heparin. Patient Centered Rounds: I performed bedside rounds with nursing staff today. Discussions with Specialists or Other Care Team Provider: case mnagement, cardiology    Education and Discussions with Family / Patient: Patient declined call to . Total Time Spent on Date of Encounter in care of patient: 45 minutes This time was spent on one or more of the following: performing physical exam; counseling and coordination of care; obtaining or reviewing history; documenting in the medical record; reviewing/ordering tests, medications or procedures; communicating with other healthcare professionals and discussing with patient's family/caregivers. Current Length of Stay: 1 day(s)  Current Patient Status: Inpatient   Certification Statement: The patient will continue to require additional inpatient hospital stay due to iv diuresis  Discharge Plan: Anticipate discharge in 24-48 hrs to home.     Code Status: Level 1 - Full Code    Subjective:   Patient reports still feling shortof breath and swollen but improved    Objective: Vitals:   Temp (24hrs), Av.2 °F (36.2 °C), Min:96.7 °F (35.9 °C), Max:97.6 °F (36.4 °C)    Temp:  [96.7 °F (35.9 °C)-97.6 °F (36.4 °C)] 96.7 °F (35.9 °C)  HR:  [58-77] 67  Resp:  [18-37] 18  BP: (103-167)/(56-90) 122/58  SpO2:  [92 %-96 %] 92 %  Body mass index is 40.76 kg/m². Input and Output Summary (last 24 hours): Intake/Output Summary (Last 24 hours) at 2023 1419  Last data filed at 2023 1340  Gross per 24 hour   Intake --   Output 5570 ml   Net -5570 ml       Physical Exam:   Physical Exam  Vitals and nursing note reviewed. Constitutional:       Appearance: He is obese. He is ill-appearing. Cardiovascular:      Rate and Rhythm: Normal rate and regular rhythm. Pulses: Normal pulses. Heart sounds: Normal heart sounds. Pulmonary:      Effort: Pulmonary effort is normal.      Breath sounds: Rales present. No wheezing. Abdominal:      General: Bowel sounds are normal.      Palpations: Abdomen is soft. Musculoskeletal:      Right lower leg: Edema present. Left lower leg: Edema present. Neurological:      General: No focal deficit present. Mental Status: He is alert and oriented to person, place, and time. Mental status is at baseline.    Psychiatric:         Mood and Affect: Mood normal.         Behavior: Behavior normal.          Additional Data:     Labs:  Results from last 7 days   Lab Units 23  0409   WBC Thousand/uL 9.20   HEMOGLOBIN g/dL 12.7   HEMATOCRIT % 41.0   PLATELETS Thousands/uL 232   NEUTROS PCT % 64   LYMPHS PCT % 21   MONOS PCT % 9   EOS PCT % 5     Results from last 7 days   Lab Units 23  0409   SODIUM mmol/L 138   POTASSIUM mmol/L 4.0   CHLORIDE mmol/L 103   CO2 mmol/L 26   BUN mg/dL 24   CREATININE mg/dL 1.37*   ANION GAP mmol/L 9   CALCIUM mg/dL 9.0   ALBUMIN g/dL 3.7   TOTAL BILIRUBIN mg/dL 0.69   ALK PHOS U/L 69   ALT U/L 19   AST U/L 13   GLUCOSE RANDOM mg/dL 130     Results from last 7 days   Lab Units 23  0409   INR 1.06     Results from last 7 days   Lab Units 07/28/23  1156 07/28/23  0711 07/28/23  0149   POC GLUCOSE mg/dl 143* 127 136               Lines/Drains:  Invasive Devices     Peripheral Intravenous Line  Duration           Peripheral IV 07/27/23 Left Antecubital <1 day                      Imaging: No pertinent imaging reviewed. Recent Cultures (last 7 days):         Last 24 Hours Medication List:   Current Facility-Administered Medications   Medication Dose Route Frequency Provider Last Rate   • acetaminophen  650 mg Oral Q6H PRN [facility], DO     • ALPRAZolam  0.5 mg Oral HS PRN [facility], DO     • atorvastatin  20 mg Oral Daily [facility], DO     • carvedilol  25 mg Oral BID With Meals [facility], DO     • DULoxetine  60 mg Oral Daily [facility], DO     • fluticasone  1 spray Nasal BID [facility], DO     • fluticasone-vilanterol  1 puff Inhalation Daily [facility], DO     • folic acid  1 mg Oral Daily [facility], DO     • furosemide  40 mg Intravenous BID [facility], DO     • guaiFENesin  1,200 mg Oral Q12H [address] [facility], DO     • heparin (porcine)  5,000 Units Subcutaneous Q8H [address] [facility], DO     • insulin lispro  1-5 Units Subcutaneous TID AC [facility], DO     • insulin lispro  1-5 Units Subcutaneous HS [facility], DO     • ipratropium  0.5 mg Nebulization TID [facility], DO     • isosorbide mononitrate  30 mg Oral Daily [facility], DO     • levalbuterol  1.25 mg Nebulization TID [facility], DO     • multivitamin-minerals  1 tablet Oral Daily [facility], DO     • perflutren lipid microsphere  0.6 mL/min Intravenous Once in imaging [facility], DO     • sacubitril-valsartan  1 tablet Oral BID [facility], DO     • Thiamine Mononitrate  100 mg Oral Daily [facility], DO          Today, Patient Was Seen By: Brenden Barbour PA-C    **Please Note: This note may have been constructed using a voice recognition system. **

## 2023-07-28 NOTE — CASE MANAGEMENT
Case Management Assessment & Discharge Planning Note    Patient name Gt Harvey  Location JK01/NI94 MRN [id number]  : 1955 Date 2023       Current Admission Date: 2023  Current Admission Diagnosis:Acute on chronic combined systolic and diastolic CHF (congestive heart failure) [facility])   Patient Active Problem List    Diagnosis Date Noted   • Acute on chronic combined systolic and diastolic CHF (congestive heart failure) ([address]) 2023   • Alcohol abuse 2023   • Moderate episode of recurrent major depressive disorder ([address]) 2023   • Class 2 severe obesity due to excess calories with serious comorbidity and body mass index (BMI) of 37.0 to 37.9 in adult [facility]) 2023   • Type 2 diabetes mellitus ([address]) 2022   • Hyponatremia 2022   • Chest pain 2022   • Stage 3 chronic kidney disease, unspecified whether stage 3a or 3b CKD ([address]) 2022   • COVID-19 virus infection 2021   • PLMD (periodic limb movement disorder)    • Bronchitis 2020   • Hypertrophied anal papilla 2019   • History of tobacco abuse 2018   • Constipation 2018   • Pulmonary emphysema ([address]) 2018   • Chronic asthma, moderate persistent, uncomplicated    • Obstructive sleep apnea 2018   • Hemorrhoids 2017   • COPD, severe ([address]) 10/16/2017   • Borderline hyperglycemia 2017   • Chronic combined systolic and diastolic congestive heart failure ([address]) 2015   • Non-ischemic cardiomyopathy with HFmEF ([address]) 2015   • Anxiety 2014   • Thyroid disease 2014   • Benign essential hypertension 2013   • Vitamin D deficiency 2013   • Dyslipidemia 2013   • Asthma-COPD overlap syndrome ([address]) 2012      LOS (days): 1  Geometric Mean LOS (GMLOS) (days): 3.90  Days to GMLOS:3.3     OBJECTIVE:    Risk of Unplanned Readmission Score: 17.06         Current admission status: Inpatient       Preferred Pharmacy: [address] [place] [address] Paulo Carcamo, [address] [facility] [address]  Phone: [phone] Fax: [phone]    [facility] [id number] - [facility], [address]  [address] [facility] [address]  Phone: [phone] Fax: [phone]    Primary Care Provider: Emery Vargas DO    Primary Insurance: MEDICARE  Secondary Insurance: Berry Alcantar    ASSESSMENT:  Active Health Care Proxies    There are no active Health Care Proxies on file. Readmission Root Cause  30 Day Readmission: No    Patient Information  Admitted from[de-identified] Home  Mental Status: Alert  During Assessment patient was accompanied by: Not accompanied during assessment  Assessment information provided by[de-identified] Patient  Primary Caregiver: Self  Support Systems: Spouse/significant other  [facility]: [address] [place] [address] do you live in?: [address] [facility] entry access options.  Select all that apply.: No steps to enter home  Type of Current Residence: Bi-level  Upon entering residence, is there a bedroom on the main floor (no further steps)?: No  A bedroom is located on the following floor levels of residence (select all that apply):: 2nd Floor  Upon entering residence, is there a bathroom on the main floor (no further steps)?: No  Indicate which floors of current residence have a bathroom (select all the apply):: 2nd Floor  Number of steps to 2nd floor from main floor: 6  In the last 12 months, was there a time when you were not able to pay the mortgage or rent on time?: No  In the last 12 months, how many places have you lived?: 1  In the last 12 months, was there a time when you did not have a steady place to sleep or slept in a shelter (including now)?: No  Homeless/housing insecurity resource given?: N/A  Living Arrangements: Lives w/ Daughter, Lives w/ Spouse/significant other  Is patient a ?: No    Activities of Daily Living Prior to Admission  Functional Status: Independent  Completes ADLs independently?: Yes (Pt uses a cane to ambulate long distances outside.)  Ambulates independently?: Yes  Does patient use assisted devices?: Yes  Assisted Devices (DME) used: CPAP, Straight [address], [address] Name (respiratory supplies): Navidog  Does patient currently own DME?: Yes  What DME does the patient currently own?: CPAP, Straight Cane, Nebulizer  Does patient have a history of Outpatient Therapy (PT/OT)?: Yes (Pt has gone to [facility] and cardiac rehab)  Does the patient have a history of Short-Term Rehab?: No  Does patient have a history of HHC?: No  Does patient currently have [address]?: No         Patient Information Continued  Income Source: Pension/[employer]  Does patient have prescription coverage?: Yes  Within the past 12 months, you worried that your food would run out before you got the money to buy more.: Never true  Within the past 12 months, the food you bought just didn't last and you didn't have money to get more.: Never true  Food insecurity resource given?: N/A  Does patient receive dialysis treatments?: No  Does patient have a history of substance abuse?: No         Means of Transportation  Means of Transport to Appts[de-identified] Drives Self (Pt and his wife both drive, Wife will drive the patient home from [facility])  In the past 12 months, has lack of transportation kept you from medical appointments or from getting medications?: No  In the past 12 months, has lack of transportation kept you from meetings, work, or from getting things needed for daily living?: No  Was application for public transport provided?: N/A        DISCHARGE DETAILS:    Discharge planning discussed with[de-identified] Pt  Freedom of Choice: Yes     CM contacted family/caregiver?: No- see comments (Pt is alert and oriented x3)                I will continue to follow for any Case Management needs

## 2023-07-28 NOTE — ASSESSMENT & PLAN NOTE
Lab Results   Component Value Date    EGFR 53 07/28/2023    EGFR 54 07/27/2023    EGFR 58 03/10/2023    CREATININE 1.37 (H) 07/28/2023    CREATININE 1.34 (H) 07/27/2023    CREATININE 1.28 03/10/2023     · Creatinine at baseline which is 1.2-1.3  · Monitor with daily metabolic panel  · monitor creatinine closely as we are aggressively diuresing

## 2023-07-28 NOTE — MALNUTRITION/BMI
This medical record reflects morbid obesity. BMI 40.76, class 3 obesity. Diet education provided. BMI Findings:  Adult BMI Classifications: Morbid Obesity 40-44.9        Body mass index is 40.76 kg/m². See Nutrition note dated 7/28/2023 for additional details. Completed nutrition assessment is viewable in the nutrition documentation.

## 2023-07-28 NOTE — ASSESSMENT & PLAN NOTE
Lab Results   Component Value Date    HGBA1C 6.4 (H) 12/17/2022       No results for input(s): "POCGLU" in the last 72 hours.     Blood Sugar Average: Last 72 hrs:     · Sliding scale insulin while hospitalized

## 2023-07-28 NOTE — H&P
[address]  H&P  Name: Alda Rodriguez 79 y.o. male I MRN: [id number]  Unit/Bed#: UI41 I Date of Admission: 7/27/2023   Date of Service: 7/27/2023 I Hospital Day: 0      Assessment/Plan   * Acute on chronic combined systolic and diastolic CHF (congestive heart failure) (HCC)  Assessment & Plan  Wt Readings from Last 3 Encounters:   06/01/23 (!) 144 kg (317 lb 9.6 oz)   03/27/23 (!) 138 kg (303 lb 9.6 oz)   03/23/23 135 kg (297 lb)     · Bilateral pitting lower extremity edema worsening over the last 3 weeks as well as dyspnea with exertion  · BNP mildly elevated  · Reports his weight is around 310 pounds; reviewed previous cardiology note from about 6 months ago and patient's weight then was 295. Patient further reports that he thinks he has gained significant amount of weight recently as well as noted abdominal distention  · CTA PE protocol without evidence of acute pulmonary embolism noted poor inspiratory effort with bilateral atelectatic lung changes and mild scarring versus atelectasis in the lingula. No acute infiltrate or pleural effusion  · History of nonischemic cardiomyopathy with LVEF 40-45% on last echo in 2022  · Appears cardiomyopathy thought secondary to alcohol  · On PTA carvedilol as well as Entresto and as needed Lasix 20 mg daily  · Given IV Lasix 40 mg in the ER with significant urine output which was very clear  · Admit to medicine on telemetry for acute on chronic combined systolic and diastolic heart failure exacerbation. We will put on IV Lasix 40 mg twice daily. Continue PTA Coreg as well as Entresto. We will repeat 2D echocardiogram.  Consult cardiology. Monitor renal function as well as blood pressure. Cardiac diet. Monitor I's and O's.         Alcohol abuse  Assessment & Plan  · Reports that he still drinks beer with some occasional hard alcohol but does report significantly cutting back in the last year or so from when he was previously drinking large amounts of scotch daily  · Denies any history of withdrawal symptoms or seizures  · To be safe we will put on CIWA protocol with daily thiamine and folate    Type 2 diabetes mellitus ([address])  Assessment & Plan  Lab Results   Component Value Date    HGBA1C 6.4 (H) 12/17/2022       No results for input(s): "POCGLU" in the last 72 hours. Blood Sugar Average: Last 72 hrs:     · Sliding scale insulin while hospitalized    Stage 3 chronic kidney disease, unspecified whether stage 3a or 3b CKD ([facility])  Assessment & Plan  Lab Results   Component Value Date    EGFR 54 07/27/2023    EGFR 58 03/10/2023    EGFR 58 12/26/2022    CREATININE 1.34 (H) 07/27/2023    CREATININE 1.28 03/10/2023    CREATININE 1.28 12/26/2022     · Creatinine at baseline which is 1.2-1.3  · Monitor with daily metabolic panel; monitor creatinine closely as we are aggressively diuresing    Asthma-COPD overlap syndrome (HCC)  Assessment & Plan  · History of asthma/COPD overlap  · No significant wheezing on exam  · Continue PTA inhalers    Obstructive sleep apnea  Assessment & Plan  · History of CORDELL and has previously been recommended for CPAP but reports he cannot tolerate               VTE Prophylaxis: Heparin  / sequential compression device   Code Status: Level 1 - Full Code       Anticipated Length of Stay:  Patient will be admitted on an Inpatient basis with an anticipated length of stay of  > 2 midnights. Justification for Hospital Stay: Please see detailed plans noted above.     Chief Complaint:     Bilateral lower extremity edema, dyspnea with exertion, weight gain  History of Present Illness:  Susi Rosas is a 79 y.o. male who has past medical history significant for nonischemic cardiomyopathy with LVEF 70-57%, combined systolic and diastolic heart failure, asthma/COPD overlap, alcohol abuse, obesity, diabetes who presented to [facility] ER on the evening of 7/27 with symptoms of progressively worsening lower extremity pitting edema over the last 3 to 4 weeks. Patient reports that his legs have become significantly swollen and that this is what happens when he accumulates fluid specifically over the last month. He reports that he has been taking oral Lasix 20 mg daily with only mild relief. He reports that he also has been having dyspnea with exertion and endorses at least a 15 pound weight gain over the last 6 months. Patient denies any chest pain. Patient denies any wheezing. Patient denies any fevers. Patient does report that he has been not sticking to a low-sodium diet and that he has been eating foods which she knows will accumulate fluid. Patient reports that the last time his legs were like this was about a year ago when he was in the hospital for several days and underwent aggressive IV diuresis. Patient denies any GI symptoms or urinary symptoms.       Review of Systems:    Constitutional:  Denies fever or chills   Eyes:  Denies change in visual acuity   HENT:  Denies nasal congestion or sore throat   Respiratory:  Denies cough or shortness of breath   Cardiovascular: Positive for lower extremity edema as well as dyspnea with exertion; denies chest pain  GI:  Denies abdominal pain or bloody stools  :  Denies dysuria   Musculoskeletal:  Denies back pain or joint pain   Integument:  Denies rash   Neurologic:  Denies headache or sensory changes   Endocrine:  Denies polyuria or polydipsia   Lymphatic:  Denies swollen glands   Psychiatric:  Denies depression or anxiety     Past Medical and Surgical History:   Past Medical History:   Diagnosis Date   • Asthma    • CHF (congestive heart failure) ([facility])    • COPD (chronic obstructive pulmonary disease) ([facility])    • COPD with acute exacerbation ([address]) 5/6/2022   • COVID-19    • Mumps    • Old MI (myocardial infarction)    • Pneumonia    • Pulmonary emphysema ([address])    • Rectal bleeding 1/14/2019   • Sleep apnea      Past Surgical History:   Procedure Laterality Date   • CARDIAC CATHETERIZATION  2015    Left main- normal and maidly tortuous. Circumflex - normal and moderately tortuous. RCA- normal and mildy tortuous. Global LV function was severely depressed. .   • CARDIAC CATHETERIZATION Left 2022    Procedure: Cardiac Left Heart Cath;  Surgeon: Verenice Harvey DO;  [facility]; Service: Cardiology   • CARDIAC CATHETERIZATION N/A 2022    Procedure: Cardiac Coronary Angiogram;  Surgeon: Verenice Harvey DO;  [facility] CARD[facility]; Service: Cardiology   • CARDIAC CATHETERIZATION  2022    Procedure: Cardiac catheterization;  Surgeon: Verenice Harvey DO;  [facility] CARD[facility]; Service: Cardiology   • INGUINAL HERNIA REPAIR Bilateral    • NV COLONOSCOPY FLX DX W/COLLJ SPEC WHEN PFRMD N/A 3/11/2019    Procedure: COLONOSCOPY with removal of anal papilla; Surgeon: Bam Hutchison MD;  Location: [facility] MAIN OR;  Service: Colorectal   • TONSILLECTOMY     • UMBILICAL HERNIA REPAIR  2006       Meds/Allergies:  (Not in a hospital admission)      Allergies:    Allergies   Allergen Reactions   • Ciprofloxacin Shortness Of Breath and Diarrhea       History:  Marital Status: /Civil Union     Substance Use History:   Social History     Substance and Sexual Activity   Alcohol Use Not Currently    Comment: rarely     Social History     Tobacco Use   Smoking Status Former   • Packs/day: 3.00   • Years: 43.00   • Total pack years: 129.00   • Types: Cigarettes   • Start date: 67   • Quit date: 2018   • Years since quittin.5   Smokeless Tobacco Never     Social History     Substance and Sexual Activity   Drug Use Yes   • Types: Marijuana    Comment: occasionally edible       Family History:  Family History   Problem Relation Age of Onset   • Heart attack Father         MI   • Heart disease Father    • Diabetes Sister         DM   • Arthritis Family    • Coronary artery disease Family    • Hypertension Family    • Tuberculosis Son    • Alzheimer's disease Mother        Physical Exam:     Vitals:   Blood Pressure: 143/72 (07/27/23 2230)  Pulse: 66 (07/27/23 2230)  Temperature: 97.6 °F (36.4 °C) (07/27/23 1817)  Temp Source: Temporal (07/27/23 1817)  Respirations: (!) 37 (07/27/23 1930)  SpO2: 93 % (07/27/23 2230)    Constitutional:  A&Ox4, Obese  Eyes:  EOMI, No scleral icterus   HENT:   oropharynx moist, external ears normal, external nose normal   Respiratory:  No respiratory distress, no wheezing   Cardiovascular:  Normal rate, no murmurs   GI:  Soft, mildly distended, no guarding   :  No costovertebral angle tenderness   Musculoskeletal: 3+ pitting edema of the bilateral lower extremities  Integument:  no jaundice, no rash   Neurologic:  Alert &awake, communicative, CN 2-12 normal,  no focal deficits noted         Lab Results: I have personally reviewed pertinent reports. Results from last 7 days   Lab Units 07/27/23  1849   WBC Thousand/uL 9.33   HEMOGLOBIN g/dL 12.9   HEMATOCRIT % 41.5   PLATELETS Thousands/uL 271   NEUTROS PCT % 62   LYMPHS PCT % 21   MONOS PCT % 9   EOS PCT % 6     Results from last 7 days   Lab Units 07/27/23  1849   POTASSIUM mmol/L 4.3   CHLORIDE mmol/L 107   CO2 mmol/L 23   BUN mg/dL 25   CREATININE mg/dL 1.34*   CALCIUM mg/dL 9.3   ALK PHOS U/L 72   ALT U/L 20   AST U/L 14     Results from last 7 days   Lab Units 07/27/23  1849   INR  1.05         Imaging: I have personally reviewed pertinent reports. CTA ED chest PE Study    Result Date: 7/27/2023  Narrative: CTA - CHEST WITH IV CONTRAST - PULMONARY ANGIOGRAM INDICATION:   Pulmonary embolism (PE) suspected, positive D-dimer PE suspected, positive D-dimer. COMPARISON: CT chest dated April 26, 2023. TECHNIQUE: CTA examination of the chest was performed using angiographic technique according to a protocol specifically tailored to evaluate for pulmonary embolism. Multiplanar 2D reformatted images were created from the source data.  In addition, coronal 3D MIP postprocessing was performed on the acquisition scanner. Radiation dose length product (DLP) for this visit:  618.7 mGy-cm . This examination, like all CT scans performed in the [facility], was performed utilizing techniques to minimize radiation dose exposure, including the use of iterative reconstruction and automated exposure control. IV Contrast:  85 mL of iohexol (OMNIPAQUE) FINDINGS: PULMONARY ARTERIAL TREE: The study was limited by the timing of the contrast bolus and patient's body habitus. Although there is no gross evidence of an intraluminal filling defect within a proximal pulmonary artery branch, evaluation for distal subsegmental emboli is limited. LUNGS: There is a poor inspiratory effort with bilateral atelectatic lung changes. A small area of atelectasis versus scarring is noted at the lingula. No pleural effusion or pneumothorax. There is no tracheal or endobronchial lesion. PLEURA:  Unremarkable. HEART/GREAT VESSELS:  Atherosclerotic changes are noted in thoracic aorta and coronary arteries. No thoracic aortic aneurysm. MEDIASTINUM AND KEATON:  Unremarkable. CHEST WALL AND LOWER NECK:   Unremarkable. VISUALIZED STRUCTURES IN THE UPPER ABDOMEN:  Unremarkable. OSSEOUS STRUCTURES:  Spinal degenerative changes are noted. No acute fracture or destructive osseous lesion. Impression: Mildly limited CTA chest demonstrates no intraluminal filling defect to suggest an acute pulmonary embolus. Poor inspiratory effort with bilateral atelectatic lung changes, left greater than right. Mild scarring versus atelectasis at the lingula. No acute infiltrate or pleural effusion. Workstation performed: [id number]       Total time for visit, including counseling/coordination of care: 60 minutes. Greater than 50% of this total time spent on direct patient counseling and coorination of care.      Epic Records Reviewed as well as Records in Care Everywhere    ** Please Note: Dragon 360 Dictation voice to text software was used in the creation of this document.  **

## 2023-07-28 NOTE — PHYSICAL THERAPY NOTE
Physical Therapy Evaluation    Patient Name: Brenda Maynard    OBPPD'I Date: 7/28/2023     Problem List  Principal Problem:    Acute on chronic combined systolic and diastolic CHF (congestive heart failure) ([facility])  Active Problems:    Obstructive sleep apnea    Asthma-COPD overlap syndrome ([facility])    Stage 3 chronic kidney disease, unspecified whether stage 3a or 3b CKD ([address])    Type 2 diabetes mellitus ([address])    Alcohol abuse       Past Medical History  Past Medical History:   Diagnosis Date    Asthma     CHF (congestive heart failure) ([facility])     COPD (chronic obstructive pulmonary disease) ([facility])     COPD with acute exacerbation ([address]) 5/6/2022    COVID-19     Mumps     Old MI (myocardial infarction)     Pneumonia     Pulmonary emphysema ([address])     Rectal bleeding 1/14/2019    Sleep apnea         Past Surgical History  Past Surgical History:   Procedure Laterality Date    CARDIAC CATHETERIZATION  07/24/2015    Left main- normal and maidly tortuous. Circumflex - normal and moderately tortuous. RCA- normal and mildy tortuous. Global LV function was severely depressed. [facility] CARDIAC CATHETERIZATION Left 9/27/2022    Procedure: Cardiac Left Heart Cath;  Surgeon: Fouzia Golud DO;  [facility]; Service: Cardiology    CARDIAC CATHETERIZATION N/A 9/27/2022    Procedure: Cardiac Coronary Angiogram;  Surgeon: Fouzia Gould DO;  [facility]; Service: Cardiology    CARDIAC CATHETERIZATION  9/27/2022    Procedure: Cardiac catheterization;  Surgeon: Fouzia Gould DO;  [facility]; Service: Cardiology    INGUINAL HERNIA REPAIR Bilateral     DC COLONOSCOPY FLX DX W/COLLJ SPEC WHEN PFRMD N/A 3/11/2019    Procedure: COLONOSCOPY with removal of anal papilla;   Surgeon: Kasandra Banks MD;  Location: [facility] OR;  Service: Colorectal    TONSILLECTOMY      UMBILICAL HERNIA REPAIR  06/21/2006 07/28/23 7803 PT Last Visit   PT Visit Date 07/28/23   Note Type   Note type Evaluation   Pain Assessment   Pain Assessment Tool 0-10   Pain Score No Pain   Restrictions/Precautions   Weight Bearing Precautions Per Order No   Other Precautions Fall Risk;Multiple lines   Home Living   Type of [address] Two level;Stairs to enter with rails; Other (Comment)  (split level home with 6 AXEL)   Bathroom Shower/Tub Tub/shower unit   Bathroom Toilet Standard   Bathroom Accessibility Accessible   Home Equipment Cane   Additional Comments pt reports use of SPC in the community   Prior Function   Level of [place] Independent with ADLs; Independent with functional mobility; Needs assistance with IADLS   Lives With Spouse   Receives Help From Family   IADLs Independent with driving   Falls in the last 6 months 1 to 4   General   Family/Caregiver Present No   Cognition   Overall Cognitive Status WFL   Arousal/Participation Alert   Orientation Level Oriented X4   Following Commands Follows all commands and directions without difficulty   Subjective   Subjective pt reports difficulty ascending the bank in from of his home to get to the stairs   RLE Assessment   RLE Assessment WFL  (assessed functionally d/t edema)   LLE Assessment   LLE Assessment WFL  (assessed functionally d/t edema)   Bed Mobility   Supine to Sit 7  Independent   Sit to Supine   (pt OOB at end of session)   Transfers   Sit to Stand 5  Supervision   Additional items Verbal cues   Stand to Sit 5  Supervision   Additional items Verbal cues   Additional Comments SPC used   Ambulation/Elevation   Gait pattern Improper Weight shift; Wide EN   Gait Assistance 5  Supervision   Additional items Verbal cues   Assistive Device [facility]   Distance 150'   Balance   Static Sitting Good   Dynamic Sitting Good   Static Standing Good   Dynamic Standing Fair +   Ambulatory Fair +  (with SPC)   Endurance Deficit   Endurance Deficit Yes   Endurance Deficit Description pt fatigued/SOB following ambulation   Activity Tolerance   Activity Tolerance Patient limited by fatigue   Assessment   Prognosis Good   Problem List Decreased strength;Decreased endurance; Impaired balance;Decreased mobility   Assessment Patient is a 79 y.o. male evaluated by Physical Therapy s/p admit to [address] [place] [address] on 7/27/2023 with admitting diagnosis of: Leg swelling and principal problem of: Acute on chronic combined systolic and diastolic CHF (congestive heart failure). PT was consulted to assess patient's functional mobility and discharge needs. Ordered are PT Evaluation and treatment with activity level of: up as tolerated. Comorbidities affecting patient's physical performance at time of assessment include: hx of MI, CHF, COPD, asthma, pulmonary emphysema, sleep apnea, hx of COVID-19. Personal factors affecting the patient at time of IE include: lives in 2 story home, ambulating with assistive device, step(s) to enter home, inability to navigate community distances, history of fall(s), inability/difficulty performing IADLs and inability/difficulty performing ADLs. Please locate objective findings from PT assessment regarding body systems outlined above. Upon evaluation, pt able to perform all functional mobility with SUP, SPC, and increased time. Occasional verbal cuing provided for sequencing and direction. Pt able to ambulate ~150' before requiring seated rest break d/t fatigue and SOB. Despite symptoms, HR and SpO2 remained WFL on RA throughout. Moderate postural sway demonstrated with mobility but no true LOB experienced. The patient's AM-PAC Basic Mobility Inpatient Short Form Raw Score is 22. A Raw score of greater than 16 suggests the patient may benefit from discharge to home. Please also refer to the recommendation of the Physical Therapist for safe discharge planning.  Pt will benefit from continued PT intervention during LOS to address current deficits, increase LOF, and facilitate safe d/c to next level of care when medically appropriate. D/c recommendation at this time is home with outpatient rehabilitation services. Goals   Patient Goals to feel better   LTG Expiration Date 08/11/23   Long Term Goal #1 Pt will ambulate 450' mod(I) with LRAD while maintaining good functional dynamic balance. Pt will ascend/descend a FFOS with HR and SUP to reflect the ability to safely navigate the home. Plan   Treatment/Interventions Elevations;Gait training; Endurance training   PT Frequency 3-5x/wk   Recommendation   PT Discharge Recommendation Home with outpatient rehabilitation   AM-PAC Basic Mobility Inpatient   Turning in Flat Bed Without Bedrails 4   Lying on Back to Sitting on Edge of Flat Bed Without Bedrails 4   Moving Bed to Chair 4   Standing Up From Chair Using Arms 4   Walk in Room 3   Climb 3-5 Stairs With Railing 3   Basic Mobility Inpatient Raw Score 22   Basic Mobility Standardized Score 47.4   Highest Level Of Mobility   JH-HLM Goal 7: Walk 25 feet or more   JH-HLM Achieved 7: Walk 25 feet or more   End of Consult   Patient Position at End of Consult Bedside chair

## 2023-07-28 NOTE — PLAN OF CARE
Problem: MOBILITY - ADULT  Goal: Maintain or return to baseline ADL function  Description: INTERVENTIONS:  -  Assess patient's ability to carry out ADLs; assess patient's baseline for ADL function and identify physical deficits which impact ability to perform ADLs (bathing, care of mouth/teeth, toileting, grooming, dressing, etc.)  - Assess/evaluate cause of self-care deficits   - Assess range of motion  - Assess patient's mobility; develop plan if impaired  - Assess patient's need for assistive devices and provide as appropriate  - Encourage maximum independence but intervene and supervise when necessary  - Involve family in performance of ADLs  - Assess for home care needs following discharge   - Consider OT consult to assist with ADL evaluation and planning for discharge  - Provide patient education as appropriate  Outcome: Progressing  Goal: Maintains/Returns to pre admission functional level  Description: INTERVENTIONS:  - Perform BMAT or MOVE assessment daily.   - Set and communicate daily mobility goal to care team and patient/family/caregiver.    - Collaborate with rehabilitation services on mobility goals if consulted  - Stand patient 3 times a day  - Ambulate patient 3 times a day  - Out of bed to chair 3 times a day   - Out of bed for meals 3 times a day  - Out of bed for toileting  - Record patient progress and toleration of activity level   Outcome: Progressing     Problem: PAIN - ADULT  Goal: Verbalizes/displays adequate comfort level or baseline comfort level  Description: Interventions:  - Encourage patient to monitor pain and request assistance  - Assess pain using appropriate pain scale  - Administer analgesics based on type and severity of pain and evaluate response  - Implement non-pharmacological measures as appropriate and evaluate response  - Consider cultural and social influences on pain and pain management  - Notify physician/advanced practitioner if interventions unsuccessful or patient reports new pain  Outcome: Progressing     Problem: INFECTION - ADULT  Goal: Absence or prevention of progression during hospitalization  Description: INTERVENTIONS:  - Assess and monitor for signs and symptoms of infection  - Monitor lab/diagnostic results  - Monitor all insertion sites, i.e. indwelling lines, tubes, and drains  - Monitor endotracheal if appropriate and nasal secretions for changes in amount and color  - [place] appropriate cooling/warming therapies per order  - Administer medications as ordered  - Instruct and encourage patient and family to use good hand hygiene technique  - Identify and instruct in appropriate isolation precautions for identified infection/condition  Outcome: Progressing     Problem: SAFETY ADULT  Goal: Maintain or return to baseline ADL function  Description: INTERVENTIONS:  -  Assess patient's ability to carry out ADLs; assess patient's baseline for ADL function and identify physical deficits which impact ability to perform ADLs (bathing, care of mouth/teeth, toileting, grooming, dressing, etc.)  - Assess/evaluate cause of self-care deficits   - Assess range of motion  - Assess patient's mobility; develop plan if impaired  - Assess patient's need for assistive devices and provide as appropriate  - Encourage maximum independence but intervene and supervise when necessary  - Involve family in performance of ADLs  - Assess for home care needs following discharge   - Consider OT consult to assist with ADL evaluation and planning for discharge  - Provide patient education as appropriate  Outcome: Progressing  Goal: Maintains/Returns to pre admission functional level  Description: INTERVENTIONS:  - Perform BMAT or MOVE assessment daily.   - Set and communicate daily mobility goal to care team and patient/family/caregiver.    - Collaborate with rehabilitation services on mobility goals if consulted  - Stand patient 3 times a day  - Ambulate patient 3 times a day  - Out of bed to chair 3 times a day   - Out of bed for meals 3 times a day  - Out of bed for toileting  - Record patient progress and toleration of activity level   Outcome: Progressing  Goal: Patient will remain free of falls  Description: INTERVENTIONS:  - Educate patient/family on patient safety including physical limitations  - Instruct patient to call for assistance with activity   - Consult OT/PT to assist with strengthening/mobility   - Keep Call bell within reach  - Keep bed low and locked with side rails adjusted as appropriate  - Keep care items and personal belongings within reach  - Initiate and maintain comfort rounds  - Make Fall Risk Sign visible to staff  - Offer Toileting every 2 Hours, in advance of need  - Initiate/Maintain bed/chair alarm  - Obtain necessary fall risk management equipment: nonskid socks, call bell in reach   - Apply yellow socks and bracelet for high fall risk patients  - Consider moving patient to room near nurses station  Outcome: Progressing     Problem: DISCHARGE PLANNING  Goal: Discharge to home or other facility with appropriate resources  Description: INTERVENTIONS:  - Identify barriers to discharge w/patient and caregiver  - Arrange for needed discharge resources and transportation as appropriate  - Identify discharge learning needs (meds, wound care, etc.)  - Arrange for interpretive services to assist at discharge as needed  - Refer to Case Management Department for coordinating discharge planning if the patient needs post-hospital services based on physician/advanced practitioner order or complex needs related to functional status, cognitive ability, or social support system  Outcome: Progressing     Problem: Knowledge Deficit  Goal: Patient/family/caregiver demonstrates understanding of disease process, treatment plan, medications, and discharge instructions  Description: Complete learning assessment and assess knowledge base.   Interventions:  - Provide teaching at level of understanding  - Provide teaching via preferred learning methods  Outcome: Progressing     Problem: RESPIRATORY - ADULT  Goal: Achieves optimal ventilation and oxygenation  Description: INTERVENTIONS:  - Assess for changes in respiratory status  - Assess for changes in mentation and behavior  - Position to facilitate oxygenation and minimize respiratory effort  - Oxygen administered by appropriate delivery if ordered  - Initiate smoking cessation education as indicated  - Encourage broncho-pulmonary hygiene including cough, deep breathe, Incentive Spirometry  - Assess the need for suctioning and aspirate as needed  - Assess and instruct to report SOB or any respiratory difficulty  - Respiratory Therapy support as indicated  Outcome: Progressing

## 2023-07-28 NOTE — ASSESSMENT & PLAN NOTE
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    She moved to [facility] last July 7th 2019. She likes her new place and they report no problems.     She reports no falls. No hallucinations or delusions.     She still tries to helps.     Worsening of speech. She recognizes family - but has problems recalling names.     Taking Namenda XR 28 mg a day    She started having problems dressing and showering - they decided she needed more assistance.    Past medical history    Past Medical History:   Diagnosis Date   • Allergy    • Bronchitis     remote history   • Colon polyps    • Fracture     wrist-rt   • Irritable bowel    • MRSA (methicillin resistant Staphylococcus aureus) 04/2016    Intestinal infection   • Negative History of CA, HTN, DM, CAD, CVA, DVT, liver disease, migraine    • Pneumonia     childhood, multiple episodes   • Rectal abscess 3/30/2016   • Sinusitis, chronic    • Staphylococcus aureus infection 04/2016    post infection nasal swab negative per pt.       Past surgical history    Past Surgical History:   Procedure Laterality Date   • Breast surgery      biopsy-left. Benign   • Colonoscopy diagnostic  08/04/2017    Affi 5yr recall, 1 polyp serrated    • Colonoscopy remove lesion by snare  11/15/05    Dr. So, Polyps, F/U 5 years   • Colonoscopy w biopsy  04/26/2012    Dr. So, Benign Polyp, F/U 10 years   • Hysterectomy     • I&d perianal abscess,superficial  3/31/16    EUA, I&D perianal abscesses x 2   • Partial hysterectomy  1980    both ovaries in. uterine polyps   • Removal of ovary(s) Right early 90    Oophorectomy       Allergies    ALLERGIES:   Allergen Reactions   • Donepezil Other (See Comments)     Pancreatitis?   • Penicillins RASH       Medications    Current Outpatient Medications   Medication Sig Dispense Refill   • loperamide (IMODIUM) 2 MG capsule Take  Lab Results   Component Value Date    HGBA1C 6.4 (H) 12/17/2022       Recent Labs     07/28/23  0149 07/28/23  0711 07/28/23  1156   POCGLU 136 127 143*       Blood Sugar Average: Last 72 hrs:  (P) 915.7967699377401264   · Sliding scale insulin while hospitalized 2 mg by mouth 4 times daily as needed for Diarrhea.     • memantine (NAMENDA XR) 28 MG extended-release capsule TAKE 1 CAPSULE BY MOUTH DAILY 30 capsule 4   • fluticasone (FLONASE) 50 MCG/ACT nasal spray USE TWO SPRAYS IN EACH NOSTRIL DAILY 16 g 12   • escitalopram (LEXAPRO) 5 MG tablet TAKE 1 TABLET BY MOUTH DAILY 23 tablet 4   • Multiple Vitamins-Minerals (THEREMS-M) Tab TAKE 1 TABLET BY MOUTH DAILY 23 tablet 4   • cholecalciferol (VITAMIN D3) 1000 units tablet TAKE 1 TABLET BY MOUTH DAILY 23 tablet 4   • hydrOXYzine (ATARAX) 10 MG tablet TAKE 1/2 TO 1 TABLET BY MOUTH TWICE DAILY AS NEEDED FOR ANXIETY. 30 tablet 0   • albuterol 108 (90 Base) MCG/ACT inhaler INHALE 2 PUFFS INTO THE LUNGS EVERY 4 HOURS AS NEEDED FOR SHORTNESS OF BREATH OR WHEEZING 18 g 0     No current facility-administered medications for this visit.      MOCA Scores 10/25/2019 10/5/2018   Visuo 0 0   Naming 0 3   Attention 0 2   Language 0 2   Abstraction 0 1   Delayed recall 0 0   Oreintation 1 6   Formal Ed 0 0   Total Score 1 14     exam    Vitals:  Blood pressure 124/74, pulse 79, SpO2 97 %.  Constitutional:  The patient is awake, alert, in no apparent distress.  Mental Status:  The patient is awake, alert.  The speech is circumstantial, some neologisms and a lot of automatic words.  It is nonsensical but quite fluid.  “Word salad”  Cranial Nerves:  Pupils were equal, round and reactive to light bilaterally and consensually. EOMI without nystagmus. Visual fields were full to confrontation. There was no papilledema on fundoscopy. Face was symmetric. There was intact light touch to V1, V2, V3. Hearing intact bilaterally. Palate elevates symmetrically. Shoulder shrug was normal. Tongue protruded midline.  Motor:  Tone was normal in all four extremities without fasciculations, atrophy or myoclonus. There were no abnormal movements. Strength was intact in bilateral upper and lower extremities.  Reflexes:  The reflexes are 2/4 for biceps, triceps,  brachioradialis, patellar and Achilles tendon bilaterally and symmetrically.   Sensory:  Intact to light touch throughout.   Cerebellar:  There was no dysmetria. Fine motor coordination was intact.  Gait:  Gait was normal based without ataxia.  Normal for age.    Review of diagnostic data    MRI Brain 8/22/18  IMPRESSION:  1.  Moderate atrophy of the frontal and parietal lobes with compensatory  dilatation lateral ventricles.  2.  Small vessel ischemic white matter changes.  3.  No acute intracranial abnormality.  4.    Chronic mucosal disease of the frontal, ethmoid, maxillary, and  sphenoid sinuses  5.  Right mastoid sinus disease.    assessment  Ms. 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    Since last time we saw her she has continued to deteriorate to the point that she has moved to assisted living.  Her speech is significantly worse and she has been experiencing some problems with dressing and other activities of daily life.  They deny any weight loss.  She has been taking Namenda without any significant problems.    Patient is anxious and appears emotional.  Discussed with them the possibility of an increase in Lexapro.  We will increase Lexapro to 10 mg once a day.  They will call us in about 4-6 weeks for an update, we may increase the medication further if needed.    She will return to see us in 6 months but they will call before if any problems or issues.  Unfortunately her progression has been quite rapid.    plan  Continue on Namenda XR 28 mg a day  Increase Lexapro 10 mg once a day  Call in 6-8 weeks, may increase lexapro if needed.    Mauricio Mitchell MD    Total time spent with the patient 30 minutes.  Coordination care counseling indication 25 minutes    Cc Sangita Easley MD

## 2023-07-29 ENCOUNTER — APPOINTMENT (INPATIENT)
Dept: RADIOLOGY | Facility: HOSPITAL | Age: 68
End: 2023-07-29
Payer: MEDICARE

## 2023-07-29 PROBLEM — R07.89 INTERMITTENT RIGHT-SIDED CHEST PAIN: Status: ACTIVE | Noted: 2023-07-29

## 2023-07-29 LAB
ANION GAP SERPL CALCULATED.3IONS-SCNC: 10 MMOL/L
ANION GAP SERPL CALCULATED.3IONS-SCNC: 11 MMOL/L
BASOPHILS # BLD AUTO: 0.09 THOUSANDS/ÂΜL (ref 0–0.1)
BASOPHILS NFR BLD AUTO: 1 % (ref 0–1)
BUN SERPL-MCNC: 31 MG/DL (ref 5–25)
BUN SERPL-MCNC: 35 MG/DL (ref 5–25)
CALCIUM SERPL-MCNC: 9.1 MG/DL (ref 8.4–10.2)
CALCIUM SERPL-MCNC: 9.7 MG/DL (ref 8.4–10.2)
CARDIAC TROPONIN I PNL SERPL HS: 50 NG/L (ref 8–18)
CHLORIDE SERPL-SCNC: 97 MMOL/L (ref 96–108)
CHLORIDE SERPL-SCNC: 98 MMOL/L (ref 96–108)
CO2 SERPL-SCNC: 26 MMOL/L (ref 21–32)
CO2 SERPL-SCNC: 28 MMOL/L (ref 21–32)
CREAT SERPL-MCNC: 1.6 MG/DL (ref 0.6–1.3)
CREAT SERPL-MCNC: 1.72 MG/DL (ref 0.6–1.3)
EOSINOPHIL # BLD AUTO: 0.44 THOUSAND/ÂΜL (ref 0–0.61)
EOSINOPHIL NFR BLD AUTO: 5 % (ref 0–6)
ERYTHROCYTE [DISTWIDTH] IN BLOOD BY AUTOMATED COUNT: 15.8 % (ref 11.6–15.1)
GFR SERPL CREATININE-BSD FRML MDRD: 40 ML/MIN/1.73SQ M
GFR SERPL CREATININE-BSD FRML MDRD: 44 ML/MIN/1.73SQ M
GLUCOSE SERPL-MCNC: 123 MG/DL (ref 65–140)
GLUCOSE SERPL-MCNC: 131 MG/DL (ref 65–140)
GLUCOSE SERPL-MCNC: 133 MG/DL (ref 65–140)
GLUCOSE SERPL-MCNC: 135 MG/DL (ref 65–140)
GLUCOSE SERPL-MCNC: 181 MG/DL (ref 65–140)
GLUCOSE SERPL-MCNC: 207 MG/DL (ref 65–140)
HCT VFR BLD AUTO: 41.8 % (ref 36.5–49.3)
HGB BLD-MCNC: 13.2 G/DL (ref 12–17)
IMM GRANULOCYTES # BLD AUTO: 0.03 THOUSAND/UL (ref 0–0.2)
IMM GRANULOCYTES NFR BLD AUTO: 0 % (ref 0–2)
LYMPHOCYTES # BLD AUTO: 1.89 THOUSANDS/ÂΜL (ref 0.6–4.47)
LYMPHOCYTES NFR BLD AUTO: 22 % (ref 14–44)
MCH RBC QN AUTO: 26.4 PG (ref 26.8–34.3)
MCHC RBC AUTO-ENTMCNC: 31.6 G/DL (ref 31.4–37.4)
MCV RBC AUTO: 84 FL (ref 82–98)
MONOCYTES # BLD AUTO: 0.73 THOUSAND/ÂΜL (ref 0.17–1.22)
MONOCYTES NFR BLD AUTO: 9 % (ref 4–12)
NEUTROPHILS # BLD AUTO: 5.31 THOUSANDS/ÂΜL (ref 1.85–7.62)
NEUTS SEG NFR BLD AUTO: 63 % (ref 43–75)
NRBC BLD AUTO-RTO: 0 /100 WBCS
PLATELET # BLD AUTO: 274 THOUSANDS/UL (ref 149–390)
PMV BLD AUTO: 10.1 FL (ref 8.9–12.7)
POTASSIUM SERPL-SCNC: 3.7 MMOL/L (ref 3.5–5.3)
POTASSIUM SERPL-SCNC: 4.1 MMOL/L (ref 3.5–5.3)
RBC # BLD AUTO: 5 MILLION/UL (ref 3.88–5.62)
SODIUM SERPL-SCNC: 134 MMOL/L (ref 135–147)
SODIUM SERPL-SCNC: 136 MMOL/L (ref 135–147)
WBC # BLD AUTO: 8.49 THOUSAND/UL (ref 4.31–10.16)

## 2023-07-29 PROCEDURE — 80048 BASIC METABOLIC PNL TOTAL CA: CPT | Performed by: PHYSICIAN ASSISTANT

## 2023-07-29 PROCEDURE — 71045 X-RAY EXAM CHEST 1 VIEW: CPT

## 2023-07-29 PROCEDURE — 82948 REAGENT STRIP/BLOOD GLUCOSE: CPT

## 2023-07-29 PROCEDURE — 94760 N-INVAS EAR/PLS OXIMETRY 1: CPT

## 2023-07-29 PROCEDURE — 99232 SBSQ HOSP IP/OBS MODERATE 35: CPT | Performed by: PHYSICIAN ASSISTANT

## 2023-07-29 PROCEDURE — 84484 ASSAY OF TROPONIN QUANT: CPT | Performed by: PHYSICIAN ASSISTANT

## 2023-07-29 PROCEDURE — 94640 AIRWAY INHALATION TREATMENT: CPT

## 2023-07-29 PROCEDURE — 93005 ELECTROCARDIOGRAM TRACING: CPT

## 2023-07-29 PROCEDURE — 94664 DEMO&/EVAL PT USE INHALER: CPT

## 2023-07-29 PROCEDURE — 94660 CPAP INITIATION&MGMT: CPT

## 2023-07-29 PROCEDURE — 85025 COMPLETE CBC W/AUTO DIFF WBC: CPT | Performed by: PHYSICIAN ASSISTANT

## 2023-07-29 PROCEDURE — 94762 N-INVAS EAR/PLS OXIMTRY CONT: CPT

## 2023-07-29 RX ORDER — INSULIN LISPRO 100 [IU]/ML
1-5 INJECTION, SOLUTION INTRAVENOUS; SUBCUTANEOUS
Status: DISCONTINUED | OUTPATIENT
Start: 2023-07-30 | End: 2023-07-29

## 2023-07-29 RX ORDER — INSULIN LISPRO 100 [IU]/ML
1-5 INJECTION, SOLUTION INTRAVENOUS; SUBCUTANEOUS
Status: DISCONTINUED | OUTPATIENT
Start: 2023-07-30 | End: 2023-07-30 | Stop reason: HOSPADM

## 2023-07-29 RX ORDER — ALBUTEROL SULFATE 2.5 MG/3ML
2.5 SOLUTION RESPIRATORY (INHALATION) EVERY 4 HOURS PRN
Status: DISCONTINUED | OUTPATIENT
Start: 2023-07-29 | End: 2023-07-30 | Stop reason: HOSPADM

## 2023-07-29 RX ORDER — OXYCODONE HYDROCHLORIDE 5 MG/1
5 TABLET ORAL EVERY 4 HOURS PRN
Status: DISCONTINUED | OUTPATIENT
Start: 2023-07-29 | End: 2023-07-30 | Stop reason: HOSPADM

## 2023-07-29 RX ORDER — METHOCARBAMOL 500 MG/1
500 TABLET, FILM COATED ORAL EVERY 6 HOURS PRN
Status: DISCONTINUED | OUTPATIENT
Start: 2023-07-29 | End: 2023-07-30 | Stop reason: HOSPADM

## 2023-07-29 RX ADMIN — IPRATROPIUM BROMIDE 0.5 MG: 0.5 SOLUTION RESPIRATORY (INHALATION) at 07:21

## 2023-07-29 RX ADMIN — FOLIC ACID 1 MG: 1 TABLET ORAL at 08:26

## 2023-07-29 RX ADMIN — GUAIFENESIN 1200 MG: 600 TABLET, EXTENDED RELEASE ORAL at 08:26

## 2023-07-29 RX ADMIN — DULOXETINE HYDROCHLORIDE 60 MG: 30 CAPSULE, DELAYED RELEASE ORAL at 08:25

## 2023-07-29 RX ADMIN — IPRATROPIUM BROMIDE 0.5 MG: 0.5 SOLUTION RESPIRATORY (INHALATION) at 19:12

## 2023-07-29 RX ADMIN — ISOSORBIDE MONONITRATE 30 MG: 30 TABLET, EXTENDED RELEASE ORAL at 08:25

## 2023-07-29 RX ADMIN — ALPRAZOLAM 0.5 MG: 0.5 TABLET ORAL at 22:36

## 2023-07-29 RX ADMIN — LEVALBUTEROL HYDROCHLORIDE 1.25 MG: 1.25 SOLUTION RESPIRATORY (INHALATION) at 19:12

## 2023-07-29 RX ADMIN — GUAIFENESIN 1200 MG: 600 TABLET, EXTENDED RELEASE ORAL at 22:33

## 2023-07-29 RX ADMIN — FLUTICASONE PROPIONATE 1 SPRAY: 50 SPRAY, METERED NASAL at 08:27

## 2023-07-29 RX ADMIN — IPRATROPIUM BROMIDE 0.5 MG: 0.5 SOLUTION RESPIRATORY (INHALATION) at 13:50

## 2023-07-29 RX ADMIN — HEPARIN SODIUM 5000 UNITS: 5000 INJECTION INTRAVENOUS; SUBCUTANEOUS at 05:00

## 2023-07-29 RX ADMIN — MULTIPLE VITAMINS W/ MINERALS TAB 1 TABLET: TAB at 08:25

## 2023-07-29 RX ADMIN — CARVEDILOL 25 MG: 12.5 TABLET, FILM COATED ORAL at 16:30

## 2023-07-29 RX ADMIN — ATORVASTATIN CALCIUM 20 MG: 40 TABLET, FILM COATED ORAL at 08:25

## 2023-07-29 RX ADMIN — CARVEDILOL 25 MG: 12.5 TABLET, FILM COATED ORAL at 08:26

## 2023-07-29 RX ADMIN — SACUBITRIL AND VALSARTAN 1 TABLET: 49; 51 TABLET, FILM COATED ORAL at 17:41

## 2023-07-29 RX ADMIN — INSULIN LISPRO 1 UNITS: 100 INJECTION, SOLUTION INTRAVENOUS; SUBCUTANEOUS at 11:45

## 2023-07-29 RX ADMIN — THIAMINE HCL TAB 100 MG 100 MG: 100 TAB at 08:27

## 2023-07-29 RX ADMIN — OXYCODONE HYDROCHLORIDE 5 MG: 5 TABLET ORAL at 16:30

## 2023-07-29 RX ADMIN — FLUTICASONE PROPIONATE 1 SPRAY: 50 SPRAY, METERED NASAL at 22:33

## 2023-07-29 RX ADMIN — HEPARIN SODIUM 5000 UNITS: 5000 INJECTION INTRAVENOUS; SUBCUTANEOUS at 14:15

## 2023-07-29 RX ADMIN — LEVALBUTEROL HYDROCHLORIDE 1.25 MG: 1.25 SOLUTION RESPIRATORY (INHALATION) at 07:21

## 2023-07-29 RX ADMIN — LEVALBUTEROL HYDROCHLORIDE 1.25 MG: 1.25 SOLUTION RESPIRATORY (INHALATION) at 13:50

## 2023-07-29 RX ADMIN — HEPARIN SODIUM 5000 UNITS: 5000 INJECTION INTRAVENOUS; SUBCUTANEOUS at 22:33

## 2023-07-29 RX ADMIN — ALBUTEROL SULFATE 2.5 MG: 2.5 SOLUTION RESPIRATORY (INHALATION) at 04:03

## 2023-07-29 RX ADMIN — SACUBITRIL AND VALSARTAN 1 TABLET: 49; 51 TABLET, FILM COATED ORAL at 11:12

## 2023-07-29 RX ADMIN — FLUTICASONE FUROATE AND VILANTEROL TRIFENATATE 1 PUFF: 200; 25 POWDER RESPIRATORY (INHALATION) at 08:27

## 2023-07-29 NOTE — RESPIRATORY THERAPY NOTE
07/29/23 0727   Inhalation Therapy Tx   $ Inhalation Therapy Performed Yes   $ Pulse Oximetry Spot Check Charge Completed   SpO2 92 %   Pre-Treatment Pulse 76   Pre-Treatment Respirations 20   Duration 20   Breath Sounds Pre-Treatment Bilateral Diminished;Clear   Delivery Source Air;UDN   Position Up in chair   Treatment Tolerance Tolerated well   Resp Comments patient is on room air

## 2023-07-29 NOTE — ASSESSMENT & PLAN NOTE
Wt Readings from Last 3 Encounters:   07/29/23 (!) 140 kg (309 lb 4.9 oz)   06/01/23 (!) 144 kg (317 lb 9.6 oz)   03/27/23 (!) 138 kg (303 lb 9.6 oz)     · Bilateral pitting lower extremity edema worsening over the last 3 weeks as well as dyspnea with exertion  · BNP mildly elevated  · Reports his weight is around 310 pounds; reviewed previous cardiology note from about 6 months ago and patient's weight then was 295. 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    · We will repeat 2D echocardiogram.    · Consult cardiology, appreciate input, continue diuresis likely place on daily diuretic outpatient  · Held lasix 7/29 with decrease in kidney function, will recheck BMP later to see if can be resumed

## 2023-07-29 NOTE — PLAN OF CARE
Problem: MOBILITY - ADULT  Goal: Maintain or return to baseline ADL function  Description: INTERVENTIONS:  -  Assess patient's ability to carry out ADLs; assess patient's baseline for ADL function and identify physical deficits which impact ability to perform ADLs (bathing, care of mouth/teeth, toileting, grooming, dressing, etc.)  - Assess/evaluate cause of self-care deficits   - Assess range of motion  - Assess patient's mobility; develop plan if impaired  - Assess patient's need for assistive devices and provide as appropriate  - Encourage maximum independence but intervene and supervise when necessary  - Involve family in performance of ADLs  - Assess for home care needs following discharge   - Consider OT consult to assist with ADL evaluation and planning for discharge  - Provide patient education as appropriate  Outcome: Progressing  Goal: Maintains/Returns to pre admission functional level  Description: INTERVENTIONS:  - Perform BMAT or MOVE assessment daily.   - Set and communicate daily mobility goal to care team and patient/family/caregiver.    - Collaborate with rehabilitation services on mobility goals if consulted  - Stand patient 3 times a day  - Ambulate patient 3 times a day  - Out of bed to chair 3 times a day   - Out of bed for meals 3 times a day  - Out of bed for toileting  - Record patient progress and toleration of activity level   Outcome: Progressing     Problem: PAIN - ADULT  Goal: Verbalizes/displays adequate comfort level or baseline comfort level  Description: Interventions:  - Encourage patient to monitor pain and request assistance  - Assess pain using appropriate pain scale  - Administer analgesics based on type and severity of pain and evaluate response  - Implement non-pharmacological measures as appropriate and evaluate response  - Consider cultural and social influences on pain and pain management  - Notify physician/advanced practitioner if interventions unsuccessful or patient reports new pain  Outcome: Progressing     Problem: INFECTION - ADULT  Goal: Absence or prevention of progression during hospitalization  Description: INTERVENTIONS:  - Assess and monitor for signs and symptoms of infection  - Monitor lab/diagnostic results  - Monitor all insertion sites, i.e. indwelling lines, tubes, and drains  - Monitor endotracheal if appropriate and nasal secretions for changes in amount and color  - [place] appropriate cooling/warming therapies per order  - Administer medications as ordered  - Instruct and encourage patient and family to use good hand hygiene technique  - Identify and instruct in appropriate isolation precautions for identified infection/condition  Outcome: Progressing     Problem: SAFETY ADULT  Goal: Maintain or return to baseline ADL function  Description: INTERVENTIONS:  -  Assess patient's ability to carry out ADLs; assess patient's baseline for ADL function and identify physical deficits which impact ability to perform ADLs (bathing, care of mouth/teeth, toileting, grooming, dressing, etc.)  - Assess/evaluate cause of self-care deficits   - Assess range of motion  - Assess patient's mobility; develop plan if impaired  - Assess patient's need for assistive devices and provide as appropriate  - Encourage maximum independence but intervene and supervise when necessary  - Involve family in performance of ADLs  - Assess for home care needs following discharge   - Consider OT consult to assist with ADL evaluation and planning for discharge  - Provide patient education as appropriate  Outcome: Progressing  Goal: Maintains/Returns to pre admission functional level  Description: INTERVENTIONS:  - Perform BMAT or MOVE assessment daily.   - Set and communicate daily mobility goal to care team and patient/family/caregiver.    - Collaborate with rehabilitation services on mobility goals if consulted  - Stand patient 3 times a day  - Ambulate patient 3 times a day  - Out of bed to chair 3 times a day   - Out of bed for meals 3 times a day  - Out of bed for toileting  - Record patient progress and toleration of activity level   Outcome: Progressing

## 2023-07-29 NOTE — PLAN OF CARE
Problem: MOBILITY - ADULT  Goal: Maintain or return to baseline ADL function  Description: INTERVENTIONS:  -  Assess patient's ability to carry out ADLs; assess patient's baseline for ADL function and identify physical deficits which impact ability to perform ADLs (bathing, care of mouth/teeth, toileting, grooming, dressing, etc.)  - Assess/evaluate cause of self-care deficits   - Assess range of motion  - Assess patient's mobility; develop plan if impaired  - Assess patient's need for assistive devices and provide as appropriate  - Encourage maximum independence but intervene and supervise when necessary  - Involve family in performance of ADLs  - Assess for home care needs following discharge   - Consider OT consult to assist with ADL evaluation and planning for discharge  - Provide patient education as appropriate  Outcome: Progressing  Goal: Maintains/Returns to pre admission functional level  Description: INTERVENTIONS:  - Perform BMAT or MOVE assessment daily.   - Set and communicate daily mobility goal to care team and patient/family/caregiver.    - Collaborate with rehabilitation services on mobility goals if consulted  - Stand patient 3 times a day  - Ambulate patient 3 times a day  - Out of bed to chair 3 times a day   - Out of bed for meals 3 times a day  - Out of bed for toileting  - Record patient progress and toleration of activity level   Outcome: Progressing     Problem: PAIN - ADULT  Goal: Verbalizes/displays adequate comfort level or baseline comfort level  Description: Interventions:  - Encourage patient to monitor pain and request assistance  - Assess pain using appropriate pain scale  - Administer analgesics based on type and severity of pain and evaluate response  - Implement non-pharmacological measures as appropriate and evaluate response  - Consider cultural and social influences on pain and pain management  - Notify physician/advanced practitioner if interventions unsuccessful or patient reports new pain  Outcome: Progressing     Problem: INFECTION - ADULT  Goal: Absence or prevention of progression during hospitalization  Description: INTERVENTIONS:  - Assess and monitor for signs and symptoms of infection  - Monitor lab/diagnostic results  - Monitor all insertion sites, i.e. indwelling lines, tubes, and drains  - Monitor endotracheal if appropriate and nasal secretions for changes in amount and color  - [place] appropriate cooling/warming therapies per order  - Administer medications as ordered  - Instruct and encourage patient and family to use good hand hygiene technique  - Identify and instruct in appropriate isolation precautions for identified infection/condition  Outcome: Progressing     Problem: SAFETY ADULT  Goal: Maintain or return to baseline ADL function  Description: INTERVENTIONS:  -  Assess patient's ability to carry out ADLs; assess patient's baseline for ADL function and identify physical deficits which impact ability to perform ADLs (bathing, care of mouth/teeth, toileting, grooming, dressing, etc.)  - Assess/evaluate cause of self-care deficits   - Assess range of motion  - Assess patient's mobility; develop plan if impaired  - Assess patient's need for assistive devices and provide as appropriate  - Encourage maximum independence but intervene and supervise when necessary  - Involve family in performance of ADLs  - Assess for home care needs following discharge   - Consider OT consult to assist with ADL evaluation and planning for discharge  - Provide patient education as appropriate  Outcome: Progressing  Goal: Maintains/Returns to pre admission functional level  Description: INTERVENTIONS:  - Perform BMAT or MOVE assessment daily.   - Set and communicate daily mobility goal to care team and patient/family/caregiver.    - Collaborate with rehabilitation services on mobility goals if consulted  - Stand patient 3 times a day  - Ambulate patient 3 times a day  - Out of bed to chair 3 times a day   - Out of bed for meals 3 times a day  - Out of bed for toileting  - Record patient progress and toleration of activity level   Outcome: Progressing  Goal: Patient will remain free of falls  Description: INTERVENTIONS:  - Educate patient/family on patient safety including physical limitations  - Instruct patient to call for assistance with activity   - Consult OT/PT to assist with strengthening/mobility   - Keep Call bell within reach  - Keep bed low and locked with side rails adjusted as appropriate  - Keep care items and personal belongings within reach  - Initiate and maintain comfort rounds  - Make Fall Risk Sign visible to staff  - Offer Toileting every 2 Hours, in advance of need  - Initiate/Maintain bed/chair alarm  - Obtain necessary fall risk management equipment: nonskid socks, call bell in reach   - Apply yellow socks and bracelet for high fall risk patients  - Consider moving patient to room near nurses station  Outcome: Progressing     Problem: DISCHARGE PLANNING  Goal: Discharge to home or other facility with appropriate resources  Description: INTERVENTIONS:  - Identify barriers to discharge w/patient and caregiver  - Arrange for needed discharge resources and transportation as appropriate  - Identify discharge learning needs (meds, wound care, etc.)  - Arrange for interpretive services to assist at discharge as needed  - Refer to Case Management Department for coordinating discharge planning if the patient needs post-hospital services based on physician/advanced practitioner order or complex needs related to functional status, cognitive ability, or social support system  Outcome: Progressing     Problem: Knowledge Deficit  Goal: Patient/family/caregiver demonstrates understanding of disease process, treatment plan, medications, and discharge instructions  Description: Complete learning assessment and assess knowledge base.   Interventions:  - Provide teaching at level of understanding  - Provide teaching via preferred learning methods  Outcome: Progressing     Problem: RESPIRATORY - ADULT  Goal: Achieves optimal ventilation and oxygenation  Description: INTERVENTIONS:  - Assess for changes in respiratory status  - Assess for changes in mentation and behavior  - Position to facilitate oxygenation and minimize respiratory effort  - Oxygen administered by appropriate delivery if ordered  - Initiate smoking cessation education as indicated  - Encourage broncho-pulmonary hygiene including cough, deep breathe, Incentive Spirometry  - Assess the need for suctioning and aspirate as needed  - Assess and instruct to report SOB or any respiratory difficulty  - Respiratory Therapy support as indicated  Outcome: Progressing

## 2023-07-29 NOTE — NURSING NOTE
Patient reporting a sudden onset of right sided sharp/stabbing chest pain which at times feels like it radiates to shoulder blade. States pain comes on in intervals. At worst is 10/10, at best 7/10. No SOB but does note breathing feels more labored, breath sounds clear. No tachycardia noted. Patient reports feeling "drained". Oxygen saturations stable.   TT sent to provider Anat Lockett PA-C.

## 2023-07-30 VITALS
HEIGHT: 74 IN | SYSTOLIC BLOOD PRESSURE: 122 MMHG | TEMPERATURE: 97.7 F | DIASTOLIC BLOOD PRESSURE: 70 MMHG | BODY MASS INDEX: 39.87 KG/M2 | OXYGEN SATURATION: 94 % | HEART RATE: 72 BPM | WEIGHT: 310.63 LBS | RESPIRATION RATE: 18 BRPM

## 2023-07-30 PROBLEM — R07.89 INTERMITTENT RIGHT-SIDED CHEST PAIN: Status: RESOLVED | Noted: 2023-07-29 | Resolved: 2023-07-30

## 2023-07-30 PROBLEM — F32.A ANXIETY AND DEPRESSION: Status: ACTIVE | Noted: 2023-07-30

## 2023-07-30 PROBLEM — F41.9 ANXIETY AND DEPRESSION: Status: ACTIVE | Noted: 2023-07-30

## 2023-07-30 LAB
ALBUMIN SERPL BCP-MCNC: 3.9 G/DL (ref 3.5–5)
ALP SERPL-CCNC: 73 U/L (ref 34–104)
ALT SERPL W P-5'-P-CCNC: 22 U/L (ref 7–52)
ANION GAP SERPL CALCULATED.3IONS-SCNC: 8 MMOL/L
AST SERPL W P-5'-P-CCNC: 16 U/L (ref 13–39)
BASOPHILS # BLD AUTO: 0.08 THOUSANDS/ÂΜL (ref 0–0.1)
BASOPHILS NFR BLD AUTO: 1 % (ref 0–1)
BILIRUB SERPL-MCNC: 0.74 MG/DL (ref 0.2–1)
BUN SERPL-MCNC: 32 MG/DL (ref 5–25)
CALCIUM SERPL-MCNC: 9.4 MG/DL (ref 8.4–10.2)
CHLORIDE SERPL-SCNC: 99 MMOL/L (ref 96–108)
CO2 SERPL-SCNC: 28 MMOL/L (ref 21–32)
CREAT SERPL-MCNC: 1.6 MG/DL (ref 0.6–1.3)
EOSINOPHIL # BLD AUTO: 0.54 THOUSAND/ÂΜL (ref 0–0.61)
EOSINOPHIL NFR BLD AUTO: 6 % (ref 0–6)
ERYTHROCYTE [DISTWIDTH] IN BLOOD BY AUTOMATED COUNT: 15.7 % (ref 11.6–15.1)
GFR SERPL CREATININE-BSD FRML MDRD: 44 ML/MIN/1.73SQ M
GLUCOSE SERPL-MCNC: 133 MG/DL (ref 65–140)
GLUCOSE SERPL-MCNC: 137 MG/DL (ref 65–140)
GLUCOSE SERPL-MCNC: 194 MG/DL (ref 65–140)
HCT VFR BLD AUTO: 41.9 % (ref 36.5–49.3)
HGB BLD-MCNC: 13.2 G/DL (ref 12–17)
IMM GRANULOCYTES # BLD AUTO: 0.04 THOUSAND/UL (ref 0–0.2)
IMM GRANULOCYTES NFR BLD AUTO: 0 % (ref 0–2)
LYMPHOCYTES # BLD AUTO: 2.01 THOUSANDS/ÂΜL (ref 0.6–4.47)
LYMPHOCYTES NFR BLD AUTO: 22 % (ref 14–44)
MAGNESIUM SERPL-MCNC: 2 MG/DL (ref 1.9–2.7)
MCH RBC QN AUTO: 26.6 PG (ref 26.8–34.3)
MCHC RBC AUTO-ENTMCNC: 31.5 G/DL (ref 31.4–37.4)
MCV RBC AUTO: 84 FL (ref 82–98)
MONOCYTES # BLD AUTO: 0.78 THOUSAND/ÂΜL (ref 0.17–1.22)
MONOCYTES NFR BLD AUTO: 9 % (ref 4–12)
NEUTROPHILS # BLD AUTO: 5.51 THOUSANDS/ÂΜL (ref 1.85–7.62)
NEUTS SEG NFR BLD AUTO: 62 % (ref 43–75)
NRBC BLD AUTO-RTO: 0 /100 WBCS
PLATELET # BLD AUTO: 260 THOUSANDS/UL (ref 149–390)
PMV BLD AUTO: 9.4 FL (ref 8.9–12.7)
POTASSIUM SERPL-SCNC: 4 MMOL/L (ref 3.5–5.3)
PROCALCITONIN SERPL-MCNC: <0.05 NG/ML
PROT SERPL-MCNC: 6.8 G/DL (ref 6.4–8.4)
RBC # BLD AUTO: 4.97 MILLION/UL (ref 3.88–5.62)
SODIUM SERPL-SCNC: 135 MMOL/L (ref 135–147)
WBC # BLD AUTO: 8.96 THOUSAND/UL (ref 4.31–10.16)

## 2023-07-30 PROCEDURE — 84145 PROCALCITONIN (PCT): CPT | Performed by: PHYSICIAN ASSISTANT

## 2023-07-30 PROCEDURE — 97166 OT EVAL MOD COMPLEX 45 MIN: CPT

## 2023-07-30 PROCEDURE — 94660 CPAP INITIATION&MGMT: CPT

## 2023-07-30 PROCEDURE — 83735 ASSAY OF MAGNESIUM: CPT | Performed by: PHYSICIAN ASSISTANT

## 2023-07-30 PROCEDURE — 94640 AIRWAY INHALATION TREATMENT: CPT

## 2023-07-30 PROCEDURE — 94664 DEMO&/EVAL PT USE INHALER: CPT

## 2023-07-30 PROCEDURE — 94760 N-INVAS EAR/PLS OXIMETRY 1: CPT

## 2023-07-30 PROCEDURE — 80053 COMPREHEN METABOLIC PANEL: CPT | Performed by: PHYSICIAN ASSISTANT

## 2023-07-30 PROCEDURE — 99239 HOSP IP/OBS DSCHRG MGMT >30: CPT | Performed by: PHYSICIAN ASSISTANT

## 2023-07-30 PROCEDURE — 85025 COMPLETE CBC W/AUTO DIFF WBC: CPT | Performed by: PHYSICIAN ASSISTANT

## 2023-07-30 PROCEDURE — 82948 REAGENT STRIP/BLOOD GLUCOSE: CPT

## 2023-07-30 RX ORDER — FUROSEMIDE 20 MG/1
40 TABLET ORAL DAILY
Qty: 30 TABLET | Refills: 0 | Status: SHIPPED | OUTPATIENT
Start: 2023-07-30

## 2023-07-30 RX ORDER — BUPROPION HYDROCHLORIDE 150 MG/1
150 TABLET ORAL DAILY
Qty: 30 TABLET | Refills: 0 | Status: SHIPPED | OUTPATIENT
Start: 2023-07-30

## 2023-07-30 RX ORDER — MONTELUKAST SODIUM 10 MG/1
10 TABLET ORAL
Qty: 30 TABLET | Refills: 0 | Status: SHIPPED | OUTPATIENT
Start: 2023-07-30

## 2023-07-30 RX ORDER — FUROSEMIDE 10 MG/ML
40 INJECTION INTRAMUSCULAR; INTRAVENOUS ONCE
Status: COMPLETED | OUTPATIENT
Start: 2023-07-30 | End: 2023-07-30

## 2023-07-30 RX ADMIN — GUAIFENESIN 1200 MG: 600 TABLET, EXTENDED RELEASE ORAL at 08:08

## 2023-07-30 RX ADMIN — ATORVASTATIN CALCIUM 20 MG: 40 TABLET, FILM COATED ORAL at 08:09

## 2023-07-30 RX ADMIN — DULOXETINE HYDROCHLORIDE 60 MG: 30 CAPSULE, DELAYED RELEASE ORAL at 08:08

## 2023-07-30 RX ADMIN — FLUTICASONE FUROATE AND VILANTEROL TRIFENATATE 1 PUFF: 200; 25 POWDER RESPIRATORY (INHALATION) at 08:08

## 2023-07-30 RX ADMIN — CARVEDILOL 25 MG: 12.5 TABLET, FILM COATED ORAL at 08:08

## 2023-07-30 RX ADMIN — FLUTICASONE PROPIONATE 1 SPRAY: 50 SPRAY, METERED NASAL at 08:07

## 2023-07-30 RX ADMIN — ISOSORBIDE MONONITRATE 30 MG: 30 TABLET, EXTENDED RELEASE ORAL at 08:09

## 2023-07-30 RX ADMIN — FOLIC ACID 1 MG: 1 TABLET ORAL at 08:08

## 2023-07-30 RX ADMIN — LEVALBUTEROL HYDROCHLORIDE 1.25 MG: 1.25 SOLUTION RESPIRATORY (INHALATION) at 08:43

## 2023-07-30 RX ADMIN — FUROSEMIDE 40 MG: 10 INJECTION, SOLUTION INTRAMUSCULAR; INTRAVENOUS at 11:15

## 2023-07-30 RX ADMIN — SACUBITRIL AND VALSARTAN 1 TABLET: 49; 51 TABLET, FILM COATED ORAL at 11:15

## 2023-07-30 RX ADMIN — IPRATROPIUM BROMIDE 0.5 MG: 0.5 SOLUTION RESPIRATORY (INHALATION) at 08:43

## 2023-07-30 RX ADMIN — MULTIPLE VITAMINS W/ MINERALS TAB 1 TABLET: TAB at 08:08

## 2023-07-30 RX ADMIN — INSULIN LISPRO 1 UNITS: 100 INJECTION, SOLUTION INTRAVENOUS; SUBCUTANEOUS at 12:23

## 2023-07-30 RX ADMIN — THIAMINE HCL TAB 100 MG 100 MG: 100 TAB at 08:09

## 2023-07-30 RX ADMIN — ALBUTEROL SULFATE 2.5 MG: 2.5 SOLUTION RESPIRATORY (INHALATION) at 05:39

## 2023-07-30 RX ADMIN — HEPARIN SODIUM 5000 UNITS: 5000 INJECTION INTRAVENOUS; SUBCUTANEOUS at 05:14

## 2023-07-30 NOTE — ASSESSMENT & PLAN NOTE
· Began 7/29  · EKG unremarkable  · Troponin unremarkable  · CXR with read pending but looks to have mild vascular congestion.  No signs of infiltrate or pneumothorax  · Negative murphys sign on examination  · Possible reflux or gas   · Will continue to monitor throughout the night  · Pain regimen added  · Completely resolved 7/30

## 2023-07-30 NOTE — ASSESSMENT & PLAN NOTE
Lab Results   Component Value Date    EGFR 44 07/30/2023    EGFR 40 07/29/2023    EGFR 44 07/29/2023    CREATININE 1.60 (H) 07/30/2023    CREATININE 1.72 (H) 07/29/2023    CREATININE 1.60 (H) 07/29/2023     · Creatinine at baseline which is 1.2-1.3  · Monitor with daily metabolic panel  · monitor creatinine closely as we are aggressively diuresing  · Kidney function is stable  · Will get bmp in one week to follow up

## 2023-07-30 NOTE — ASSESSMENT & PLAN NOTE
· Reports continued depressive symptoms of guilt and anhedonia despite his cymbalta 60 mg daily  · No history of seizures  · Add on wellbutrin 150 mg for adjunctive therapy  · Highly recommended following with a counselor given a lot of his symptoms are related to situational issues

## 2023-07-30 NOTE — ASSESSMENT & PLAN NOTE
Lab Results   Component Value Date    HGBA1C 6.4 (H) 12/17/2022       Recent Labs     07/29/23  1631 07/29/23  2104 07/30/23  0753 07/30/23  1202   POCGLU 135 131 137 194*       Blood Sugar Average: Last 72 hrs:  (P) 142.1138427009203876   · Sliding scale insulin while hospitalized

## 2023-07-30 NOTE — DISCHARGE SUMMARY
[address]  Discharge- Marnee Oppenheim 1955, 79 y.o. male MRN: [id number]  Unit/Bed#: 401-01 Encounter: [id number]  Primary Care Provider: Saranya Deng DO   Date and time admitted to hospital: 7/27/2023  6:16 PM    * Acute on chronic combined systolic and diastolic CHF (congestive heart failure) [facility])  Assessment & Plan  Wt Readings from Last 3 Encounters:   07/30/23 (!) 141 kg (310 lb 10.1 oz)   06/01/23 (!) 144 kg (317 lb 9.6 oz)   03/27/23 (!) 138 kg (303 lb 9.6 oz)     · Bilateral pitting lower extremity edema worsening over the last 3 weeks as well as dyspnea with exertion  · BNP mildly elevated  · Reports his weight is around 310 pounds; reviewed previous cardiology note from about 6 months ago and patient's weight then was 295. 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    · We will repeat 2D echocardiogram.    · Consult cardiology, appreciate input, continue diuresis likely place on daily diuretic outpatient  · Held lasix 7/29 with decrease in kidney function, will recheck BMP later to see if can be resumed  · Do not feel weights are accurate as he is net negative nearly 6L and continues to urinate  · On discharge, increase home lasix from 20 mg daily to now 40 mg daily        Anxiety and depression  Assessment & Plan  · Reports continued depressive symptoms of guilt and anhedonia despite his cymbalta 60 mg daily  · No history of seizures  · Add on wellbutrin 150 mg for adjunctive therapy  · Highly recommended following with a counselor given a lot of his symptoms are related to situational issues    Alcohol abuse  Assessment & Plan  · Reports that he still drinks beer with some occasional hard alcohol but does report significantly cutting back in the last year or so from when he was previously drinking large amounts of scotch daily  · Denies any history of withdrawal symptoms or seizures  · To be safe we will put on CIWA protocol with daily thiamine and folate    Type 2 diabetes mellitus [facility])  Assessment & Plan  Lab Results   Component Value Date    HGBA1C 6.4 (H) 12/17/2022       Recent Labs     07/29/23  1631 07/29/23  2104 07/30/23  0753 07/30/23  1202   POCGLU 135 131 137 194*       Blood Sugar Average: Last 72 hrs:  (P) 142.9086746411776155   · Sliding scale insulin while hospitalized    Stage 3 chronic kidney disease, unspecified whether stage 3a or 3b CKD ([address])  Assessment & Plan  Lab Results   Component Value Date    EGFR 44 07/30/2023    EGFR 40 07/29/2023    EGFR 44 07/29/2023    CREATININE 1.60 (H) 07/30/2023    CREATININE 1.72 (H) 07/29/2023    CREATININE 1.60 (H) 07/29/2023     · Creatinine at baseline which is 1.2-1.3  · Monitor with daily metabolic panel  · monitor creatinine closely as we are aggressively diuresing  · Kidney function is stable  · Will get bmp in one week to follow up    Asthma-COPD overlap syndrome (HCC)  Assessment & Plan  · History of asthma/COPD overlap  · No significant wheezing on exam  · Continue PTA inhalers  · Should follow with pulmonology on discharge  · Start singulair    Obstructive sleep apnea  Assessment & Plan  · History of CORDELL and has previously been recommended for CPAP but reports he cannot tolerate    Intermittent right-sided chest pain-resolved as of 7/30/2023  Assessment & Plan  · Began 7/29  · EKG unremarkable  · Troponin unremarkable  · CXR with read pending but looks to have mild vascular congestion.  No signs of infiltrate or pneumothorax  · Negative murphys sign on examination  · Possible reflux or gas   · Will continue to monitor throughout the night  · Pain regimen added  · Completely resolved 7/30    Medical Problems     Resolved Problems  Date Reviewed: 3/23/2023          Resolved    Intermittent right-sided chest pain 7/30/2023     Resolved by  Jesus Drew PA-C        Discharging Physician / Practitioner: Jesus Drew PA-C  PCP: Carmen Roque DO  Admission Date:   Admission Orders (From admission, onward)     Ordered        07/27/23 2304  INPATIENT ADMISSION  Once                      Discharge Date: 07/30/23    Consultations During Hospital Stay:  · cardiology    Procedures Performed:   · none    Significant Findings / Test Results:     CTA ED chest PE Study   Final Result      Mildly limited CTA chest demonstrates no intraluminal filling defect to suggest an acute pulmonary embolus. Poor inspiratory effort with bilateral atelectatic lung changes, left greater than right. Mild scarring versus atelectasis at the lingula. No acute infiltrate or pleural effusion. Workstation performed: [id number]         XR chest 1 view portable   ED Interpretation   1 view x-ray of chest interpreted by myself and official radiology report. Mild increased pulmonary vascular congestion, questionable left pleural effusion. No focal infiltrate, pneumothorax visualized. Final Result      Mild pulmonary venous congestion with mild cardiomegaly. Workstation performed: [id number]         XR chest portable    (Results Pending)         Incidental Findings:   · none    Test Results Pending at Discharge (will require follow up):   · none     Outpatient Tests Requested:  · BMP    Complications:  none    Reason for Admission: CHF exacerbation    Hospital Course:   Rian Cohen is a 79 y.o. male patient who originally presented to the hospital on 7/27/2023 due to SOB. He was having progressively worsening lower extremity pitting edema over the last 3 to 4 weeks.   Patient reports that his legs have become significantly swollen and that this is what happens when he accumulates fluid specifically over the last month. He reports that he has been taking oral Lasix 20 mg daily with only mild relief. He reports that he also has been having dyspnea with exertion and endorses at least a 15 pound weight gain over the last 6 months. Patient denies any chest pain. Patient denies any wheezing. Patient denies any fevers. Patient does report that he has been not sticking to a low-sodium diet and that he has been eating foods which he knows will accumulate fluid. Appreciate cardiology seeing the patient. He is net negative approximately 6L and he appears euvolemic on examination. He also states he is back to his baseline in symptoms. He was able to walk around the halls today without significant issue. He did note that he has been having more depressive symptoms but denies SI/HI. He states he feels guilty he cannot do more. He feels his knees are his limiting factor and he would like to see orthopedics outpatient. Additionally, he is agreeable to a trial of wellbutrin in addition to his cybalta to see if it boosts it. He was also referred to psychology. Please see above list of diagnoses and related plan for additional information. Condition at Discharge: good    Discharge Day Visit / Exam:   Subjective:  Patient reports being back to his baseline symptoms at this time  Vitals: Blood Pressure: 122/70 (07/30/23 1114)  Pulse: 72 (07/30/23 0755)  Temperature: 97.7 °F (36.5 °C) (07/30/23 0755)  Temp Source: Tympanic (07/30/23 0755)  Respirations: 18 (07/30/23 0755)  Height: 6' 2" (188 cm) (07/28/23 1610)  Weight - Scale: (!) 141 kg (310 lb 10.1 oz) (07/30/23 0582)  SpO2: 94 % (07/30/23 0846)  Exam:   Physical Exam  Vitals and nursing note reviewed. Constitutional:       General: He is not in acute distress. Appearance: He is obese. HENT:      Head: Normocephalic and atraumatic. Cardiovascular:      Rate and Rhythm: Normal rate and regular rhythm. Pulses: Normal pulses. Heart sounds: Normal heart sounds. Pulmonary:      Effort: Pulmonary effort is normal.      Breath sounds: Normal breath sounds. No wheezing, rhonchi or rales. Abdominal:      General: Bowel sounds are normal.      Palpations: Abdomen is soft. Tenderness: There is no abdominal tenderness. There is no guarding or rebound. Musculoskeletal:         General: Normal range of motion. Right lower leg: No edema. Left lower leg: No edema. Skin:     General: Skin is warm and dry. Neurological:      General: No focal deficit present. Mental Status: He is alert and oriented to person, place, and time. Mental status is at baseline. Psychiatric:         Mood and Affect: Mood normal.         Behavior: Behavior normal.          Discussion with Family: Patient declined call to . Discharge instructions/Information to patient and family:   See after visit summary for information provided to patient and family. Provisions for Follow-Up Care:  See after visit summary for information related to follow-up care and any pertinent home health orders. Disposition:   Home    Planned Readmission: none     Discharge Statement:  I spent 60 minutes discharging the patient. This time was spent on the day of discharge. I had direct contact with the patient on the day of discharge. Greater than 50% of the total time was spent examining patient, answering all patient questions, arranging and discussing plan of care with patient as well as directly providing post-discharge instructions. Additional time then spent on discharge activities. Discharge Medications:  See after visit summary for reconciled discharge medications provided to patient and/or family.       **Please Note: This note may have been constructed using a voice recognition system**

## 2023-07-30 NOTE — ASSESSMENT & PLAN NOTE
Lab Results   Component Value Date    EGFR 44 07/29/2023    EGFR 53 07/28/2023    EGFR 54 07/27/2023    CREATININE 1.60 (H) 07/29/2023    CREATININE 1.37 (H) 07/28/2023    CREATININE 1.34 (H) 07/27/2023     · Creatinine at baseline which is 1.2-1.3  · Monitor with daily metabolic panel  · monitor creatinine closely as we are aggressively diuresing  · Held lasix with increase in cr today

## 2023-07-30 NOTE — PLAN OF CARE
Problem: OCCUPATIONAL THERAPY ADULT  Goal: Performs self-care activities at highest level of function for planned discharge setting. See evaluation for individualized goals. Description: Treatment Interventions: ADL retraining, Functional transfer training, UE strengthening/ROM, Endurance training, Patient/family training, Compensatory technique education, Equipment evaluation/education, Energy conservation, Activityengagement          See flowsheet documentation for full assessment, interventions and recommendations. 7/30/2023 1022 by Neisha Nath OT  Note: Limitation: Decreased ADL status, Decreased UE strength, Decreased Safe judgement during ADL, Decreased endurance, Decreased self-care trans, Decreased high-level ADLs  Prognosis: Good  Assessment: Pt is a 79 y.o. male seen for OT evaluation s/p admit to [facility] on 7/27/2023 w/ Acute on chronic combined systolic and diastolic CHF (congestive heart failure) ([address] Comorbidities affecting pt's functional performance at time of assessment include: CORDELL, asthma-COPD overlap syndrome, CKD, DM, CHF, intermittent right-sided chest pain, dyslipidemia, HTN, anxiety. Personal factors affecting pt at time of IE include:steps to enter environment, difficulty performing ADLS, difficulty performing IADLS  and health management . Prior to admission, pt was independent in ADLs (except noting he never wears socks due to difficulty putting them on), A for IADLs, and use of SPC for functional transfers. Upon evaluation: Pt requires max A for LB dressing, min-mod A for LB bathing and toileting, and no A for UB ADLs, use of SPC and supervision for functional mobility 2* the following deficits impacting occupational performance: weakness, decreased strength, decreased balance and decreased tolerance. Pt to benefit from continued skilled OT tx while in the hospital to address deficits as defined above and maximize level of functional independence w ADL's and functional mobility. Occupational Performance areas to address include: bathing/shower, toilet hygiene, dressing, health maintenance and functional mobility. The patient's raw score on the AM-PAC Daily Activity Inpatient Short Form is 20. A raw score of greater than or equal to 19 suggests the patient may benefit from discharge to home. Please refer to the recommendation of the Occupational Therapist for safe discharge planning. OT Discharge Recommendation: Home with outpatient rehabilitation       7/30/2023 1022 by Gayathri Ronquillo OT  Note: Limitation: Decreased ADL status, Decreased UE strength, Decreased Safe judgement during ADL, Decreased endurance, Decreased self-care trans, Decreased high-level ADLs  Prognosis: Good  Assessment: Pt is a 79 y.o. male seen for OT evaluation s/p admit to [facility] on 7/27/2023 w/ Acute on chronic combined systolic and diastolic CHF (congestive heart failure) ([address] Comorbidities affecting pt's functional performance at time of assessment include: CORDELL, asthma-COPD overlap syndrome, CKD, DM, CHF, intermittent right-sided chest pain, dyslipidemia, HTN, anxiety. Personal factors affecting pt at time of IE include:steps to enter environment, difficulty performing ADLS, difficulty performing IADLS  and health management . Prior to admission, pt was independent in ADLs (except noting he never wears socks due to difficulty putting them on), A for IADLs, and use of SPC for functional transfers. Upon evaluation: Pt requires max A for LB dressing, min-mod A for LB bathing and toileting, and no A for UB ADLs, use of SPC and supervision for functional mobility 2* the following deficits impacting occupational performance: weakness, decreased strength, decreased balance and decreased tolerance. Pt to benefit from continued skilled OT tx while in the hospital to address deficits as defined above and maximize level of functional independence w ADL's and functional mobility.  Occupational Performance areas to address include: bathing/shower, toilet hygiene, dressing, health maintenance and functional mobility. The patient's raw score on the AM-PAC Daily Activity Inpatient Short Form is 20. A raw score of greater than or equal to 19 suggests the patient may benefit from discharge to home. Please refer to the recommendation of the Occupational Therapist for safe discharge planning.      OT Discharge Recommendation: Home with outpatient rehabilitation        Radha Sims

## 2023-07-30 NOTE — ASSESSMENT & PLAN NOTE
· Began this afternoon  · EKG unremarkable  · Troponin unremarkable  · CXR with read pending but looks to have mild vascular congestion.  No signs of infiltrate or pneumothorax  · Negative murphys sign on examination  · Possible reflux or gas   · Will continue to monitor throughout the night  · Pain regimen added

## 2023-07-30 NOTE — ASSESSMENT & PLAN NOTE
Lab Results   Component Value Date    HGBA1C 6.4 (H) 12/17/2022       Recent Labs     07/28/23  2112 07/29/23  0718 07/29/23  1133 07/29/23  1631   POCGLU 124 123 207* 135       Blood Sugar Average: Last 72 hrs:  (P) 138.5   · Sliding scale insulin while hospitalized

## 2023-07-30 NOTE — PLAN OF CARE
Problem: MOBILITY - ADULT  Goal: Maintain or return to baseline ADL function  Description: INTERVENTIONS:  -  Assess patient's ability to carry out ADLs; assess patient's baseline for ADL function and identify physical deficits which impact ability to perform ADLs (bathing, care of mouth/teeth, toileting, grooming, dressing, etc.)  - Assess/evaluate cause of self-care deficits   - Assess range of motion  - Assess patient's mobility; develop plan if impaired  - Assess patient's need for assistive devices and provide as appropriate  - Encourage maximum independence but intervene and supervise when necessary  - Involve family in performance of ADLs  - Assess for home care needs following discharge   - Consider OT consult to assist with ADL evaluation and planning for discharge  - Provide patient education as appropriate  Outcome: Adequate for Discharge  Goal: Maintains/Returns to pre admission functional level  Description: INTERVENTIONS:  - Perform BMAT or MOVE assessment daily.   - Set and communicate daily mobility goal to care team and patient/family/caregiver.    - Collaborate with rehabilitation services on mobility goals if consulted  - Stand patient 3 times a day  - Ambulate patient 3 times a day  - Out of bed to chair 3 times a day   - Out of bed for meals 3 times a day  - Out of bed for toileting  - Record patient progress and toleration of activity level   Outcome: Adequate for Discharge     Problem: PAIN - ADULT  Goal: Verbalizes/displays adequate comfort level or baseline comfort level  Description: Interventions:  - Encourage patient to monitor pain and request assistance  - Assess pain using appropriate pain scale  - Administer analgesics based on type and severity of pain and evaluate response  - Implement non-pharmacological measures as appropriate and evaluate response  - Consider cultural and social influences on pain and pain management  - Notify physician/advanced practitioner if interventions unsuccessful or patient reports new pain  7/30/2023 1258 by Jennifer Mcleod RN  Outcome: Adequate for Discharge  7/30/2023 1258 by Jennifer Mcleod RN  Outcome: Progressing     Problem: INFECTION - ADULT  Goal: Absence or prevention of progression during hospitalization  Description: INTERVENTIONS:  - Assess and monitor for signs and symptoms of infection  - Monitor lab/diagnostic results  - Monitor all insertion sites, i.e. indwelling lines, tubes, and drains  - Monitor endotracheal if appropriate and nasal secretions for changes in amount and color  - [place] appropriate cooling/warming therapies per order  - Administer medications as ordered  - Instruct and encourage patient and family to use good hand hygiene technique  - Identify and instruct in appropriate isolation precautions for identified infection/condition  Outcome: Adequate for Discharge     Problem: SAFETY ADULT  Goal: Maintain or return to baseline ADL function  Description: INTERVENTIONS:  -  Assess patient's ability to carry out ADLs; assess patient's baseline for ADL function and identify physical deficits which impact ability to perform ADLs (bathing, care of mouth/teeth, toileting, grooming, dressing, etc.)  - Assess/evaluate cause of self-care deficits   - Assess range of motion  - Assess patient's mobility; develop plan if impaired  - Assess patient's need for assistive devices and provide as appropriate  - Encourage maximum independence but intervene and supervise when necessary  - Involve family in performance of ADLs  - Assess for home care needs following discharge   - Consider OT consult to assist with ADL evaluation and planning for discharge  - Provide patient education as appropriate  Outcome: Adequate for Discharge  Goal: Maintains/Returns to pre admission functional level  Description: INTERVENTIONS:  - Perform BMAT or MOVE assessment daily.   - Set and communicate daily mobility goal to care team and patient/family/caregiver.    - Collaborate with rehabilitation services on mobility goals if consulted  - Stand patient 3 times a day  - Ambulate patient 3 times a day  - Out of bed to chair 3 times a day   - Out of bed for meals 3 times a day  - Out of bed for toileting  - Record patient progress and toleration of activity level   Outcome: Adequate for Discharge  Goal: Patient will remain free of falls  Description: INTERVENTIONS:  - Educate patient/family on patient safety including physical limitations  - Instruct patient to call for assistance with activity   - Consult OT/PT to assist with strengthening/mobility   - Keep Call bell within reach  - Keep bed low and locked with side rails adjusted as appropriate  - Keep care items and personal belongings within reach  - Initiate and maintain comfort rounds  - Make Fall Risk Sign visible to staff  - Offer Toileting every 2 Hours, in advance of need  - Initiate/Maintain bed/chair alarm  - Obtain necessary fall risk management equipment: nonskid socks, call bell in reach   - Apply yellow socks and bracelet for high fall risk patients  - Consider moving patient to room near nurses station  Outcome: Adequate for Discharge     Problem: DISCHARGE PLANNING  Goal: Discharge to home or other facility with appropriate resources  Description: INTERVENTIONS:  - Identify barriers to discharge w/patient and caregiver  - Arrange for needed discharge resources and transportation as appropriate  - Identify discharge learning needs (meds, wound care, etc.)  - Arrange for interpretive services to assist at discharge as needed  - Refer to Case Management Department for coordinating discharge planning if the patient needs post-hospital services based on physician/advanced practitioner order or complex needs related to functional status, cognitive ability, or social support system  Outcome: Adequate for Discharge     Problem: Knowledge Deficit  Goal: Patient/family/caregiver demonstrates understanding of disease process, treatment plan, medications, and discharge instructions  Description: Complete learning assessment and assess knowledge base.   Interventions:  - Provide teaching at level of understanding  - Provide teaching via preferred learning methods  Outcome: Adequate for Discharge     Problem: RESPIRATORY - ADULT  Goal: Achieves optimal ventilation and oxygenation  Description: INTERVENTIONS:  - Assess for changes in respiratory status  - Assess for changes in mentation and behavior  - Position to facilitate oxygenation and minimize respiratory effort  - Oxygen administered by appropriate delivery if ordered  - Initiate smoking cessation education as indicated  - Encourage broncho-pulmonary hygiene including cough, deep breathe, Incentive Spirometry  - Assess the need for suctioning and aspirate as needed  - Assess and instruct to report SOB or any respiratory difficulty  - Respiratory Therapy support as indicated  Outcome: Adequate for Discharge

## 2023-07-30 NOTE — PLAN OF CARE
Problem: MOBILITY - ADULT  Goal: Maintain or return to baseline ADL function  Description: INTERVENTIONS:  -  Assess patient's ability to carry out ADLs; assess patient's baseline for ADL function and identify physical deficits which impact ability to perform ADLs (bathing, care of mouth/teeth, toileting, grooming, dressing, etc.)  - Assess/evaluate cause of self-care deficits   - Assess range of motion  - Assess patient's mobility; develop plan if impaired  - Assess patient's need for assistive devices and provide as appropriate  - Encourage maximum independence but intervene and supervise when necessary  - Involve family in performance of ADLs  - Assess for home care needs following discharge   - Consider OT consult to assist with ADL evaluation and planning for discharge  - Provide patient education as appropriate  Outcome: Progressing  Goal: Maintains/Returns to pre admission functional level  Description: INTERVENTIONS:  - Perform BMAT or MOVE assessment daily.   - Set and communicate daily mobility goal to care team and patient/family/caregiver.    - Collaborate with rehabilitation services on mobility goals if consulted  - Stand patient 3 times a day  - Ambulate patient 3 times a day  - Out of bed to chair 3 times a day   - Out of bed for meals 3 times a day  - Out of bed for toileting  - Record patient progress and toleration of activity level   Outcome: Progressing     Problem: PAIN - ADULT  Goal: Verbalizes/displays adequate comfort level or baseline comfort level  Description: Interventions:  - Encourage patient to monitor pain and request assistance  - Assess pain using appropriate pain scale  - Administer analgesics based on type and severity of pain and evaluate response  - Implement non-pharmacological measures as appropriate and evaluate response  - Consider cultural and social influences on pain and pain management  - Notify physician/advanced practitioner if interventions unsuccessful or patient reports new pain  Outcome: Progressing     Problem: INFECTION - ADULT  Goal: Absence or prevention of progression during hospitalization  Description: INTERVENTIONS:  - Assess and monitor for signs and symptoms of infection  - Monitor lab/diagnostic results  - Monitor all insertion sites, i.e. indwelling lines, tubes, and drains  - Monitor endotracheal if appropriate and nasal secretions for changes in amount and color  - [place] appropriate cooling/warming therapies per order  - Administer medications as ordered  - Instruct and encourage patient and family to use good hand hygiene technique  - Identify and instruct in appropriate isolation precautions for identified infection/condition  Outcome: Progressing     Problem: SAFETY ADULT  Goal: Maintain or return to baseline ADL function  Description: INTERVENTIONS:  -  Assess patient's ability to carry out ADLs; assess patient's baseline for ADL function and identify physical deficits which impact ability to perform ADLs (bathing, care of mouth/teeth, toileting, grooming, dressing, etc.)  - Assess/evaluate cause of self-care deficits   - Assess range of motion  - Assess patient's mobility; develop plan if impaired  - Assess patient's need for assistive devices and provide as appropriate  - Encourage maximum independence but intervene and supervise when necessary  - Involve family in performance of ADLs  - Assess for home care needs following discharge   - Consider OT consult to assist with ADL evaluation and planning for discharge  - Provide patient education as appropriate  Outcome: Progressing  Goal: Maintains/Returns to pre admission functional level  Description: INTERVENTIONS:  - Perform BMAT or MOVE assessment daily.   - Set and communicate daily mobility goal to care team and patient/family/caregiver.    - Collaborate with rehabilitation services on mobility goals if consulted  - Stand patient 3 times a day  - Ambulate patient 3 times a day  - Out of bed to chair 3 times a day   - Out of bed for meals 3 times a day  - Out of bed for toileting  - Record patient progress and toleration of activity level   Outcome: Progressing  Goal: Patient will remain free of falls  Description: INTERVENTIONS:  - Educate patient/family on patient safety including physical limitations  - Instruct patient to call for assistance with activity   - Consult OT/PT to assist with strengthening/mobility   - Keep Call bell within reach  - Keep bed low and locked with side rails adjusted as appropriate  - Keep care items and personal belongings within reach  - Initiate and maintain comfort rounds  - Make Fall Risk Sign visible to staff  - Offer Toileting every 2 Hours, in advance of need  - Initiate/Maintain bed/chair alarm  - Obtain necessary fall risk management equipment: nonskid socks, call bell in reach   - Apply yellow socks and bracelet for high fall risk patients  - Consider moving patient to room near nurses station  Outcome: Progressing     Problem: DISCHARGE PLANNING  Goal: Discharge to home or other facility with appropriate resources  Description: INTERVENTIONS:  - Identify barriers to discharge w/patient and caregiver  - Arrange for needed discharge resources and transportation as appropriate  - Identify discharge learning needs (meds, wound care, etc.)  - Arrange for interpretive services to assist at discharge as needed  - Refer to Case Management Department for coordinating discharge planning if the patient needs post-hospital services based on physician/advanced practitioner order or complex needs related to functional status, cognitive ability, or social support system  Outcome: Progressing     Problem: Knowledge Deficit  Goal: Patient/family/caregiver demonstrates understanding of disease process, treatment plan, medications, and discharge instructions  Description: Complete learning assessment and assess knowledge base.   Interventions:  - Provide teaching at level of understanding  - Provide teaching via preferred learning methods  Outcome: Progressing     Problem: RESPIRATORY - ADULT  Goal: Achieves optimal ventilation and oxygenation  Description: INTERVENTIONS:  - Assess for changes in respiratory status  - Assess for changes in mentation and behavior  - Position to facilitate oxygenation and minimize respiratory effort  - Oxygen administered by appropriate delivery if ordered  - Initiate smoking cessation education as indicated  - Encourage broncho-pulmonary hygiene including cough, deep breathe, Incentive Spirometry  - Assess the need for suctioning and aspirate as needed  - Assess and instruct to report SOB or any respiratory difficulty  - Respiratory Therapy support as indicated  Outcome: Progressing

## 2023-07-30 NOTE — ASSESSMENT & PLAN NOTE
· History of asthma/COPD overlap  · No significant wheezing on exam  · Continue PTA inhalers  · Should follow with pulmonology on discharge  · Start singulair

## 2023-07-30 NOTE — CASE MANAGEMENT
Case Management Discharge Planning Note    Patient name Dane Mojica  Location [address] [facility] [place] [id number] MRN [id number]  : 1955 Date 2023       Current Admission Date: 2023  Current Admission Diagnosis:Acute on chronic combined systolic and diastolic CHF (congestive heart failure) [facility])   Patient Active Problem List    Diagnosis Date Noted   • Acute on chronic combined systolic and diastolic CHF (congestive heart failure) ([address]) 2023   • Alcohol abuse 2023   • Moderate episode of recurrent major depressive disorder ([address]) 2023   • Class 2 severe obesity due to excess calories with serious comorbidity and body mass index (BMI) of 37.0 to 37.9 in adult [facility]) 2023   • Type 2 diabetes mellitus ([address]) 2022   • Hyponatremia 2022   • Chest pain 2022   • Stage 3 chronic kidney disease, unspecified whether stage 3a or 3b CKD ([address]) 2022   • COVID-19 virus infection 2021   • PLMD (periodic limb movement disorder)    • Bronchitis 2020   • Hypertrophied anal papilla 2019   • History of tobacco abuse 2018   • Constipation 2018   • Pulmonary emphysema ([address]) 2018   • Chronic asthma, moderate persistent, uncomplicated    • Obstructive sleep apnea 2018   • Hemorrhoids 2017   • COPD, severe ([address]) 10/16/2017   • Borderline hyperglycemia 2017   • Chronic combined systolic and diastolic congestive heart failure ([address]) 2015   • Non-ischemic cardiomyopathy with HFmEF ([address]) 2015   • Anxiety 2014   • Thyroid disease 2014   • Benign essential hypertension 2013   • Vitamin D deficiency 2013   • Dyslipidemia 2013   • Asthma-COPD overlap syndrome ([address]) 2012      LOS (days): 3  Geometric Mean LOS (GMLOS) (days): 3.90  Days to GMLOS:1.3     OBJECTIVE:  Risk of Unplanned Readmission Score: 19.27         Current admission status: Inpatient   Preferred Pharmacy:   [facility] [address] [facility] [address] [facility]  Phone: [phone] Fax: [phone]  #[phone] - [phone] [facility] [address], [address] [place] [address] [address]  [address] [facility]. [address] [address]  Phone: [phone] Fax: [phone]    Primary Care Provider: Wilfred Grossman DO    Primary Insurance: MEDICARE  Secondary Insurance: Carolyn Sampson    DISCHARGE DETAILS:    Discharge planning discussed with[de-identified] Pt  Freedom of Choice: Yes     CM contacted family/caregiver?: No- see comments  Were Treatment Team discharge recommendations reviewed with patient/caregiver?: Yes  Did patient/caregiver verbalize understanding of patient care needs?: Yes  Were patient/caregiver advised of the risks associated with not following Treatment Team discharge recommendations?: Yes         [address]         Is the patient interested in [address] at discharge?: No    DME Referral Provided  Referral made for DME?: No              Treatment Team Recommendation: Home  Discharge Destination Plan[de-identified] Home  Transport at Discharge : Family, Automobile              Discussed with patient preferences on discharge : Understanding how to manage health at home , purpose of taking meds, importance of follow up appointments and symptoms to watch out for. Nurse to review AVS with patient. All follow up providers listed .

## 2023-07-30 NOTE — ASSESSMENT & PLAN NOTE
Wt Readings from Last 3 Encounters:   07/30/23 (!) 141 kg (310 lb 10.1 oz)   06/01/23 (!) 144 kg (317 lb 9.6 oz)   03/27/23 (!) 138 kg (303 lb 9.6 oz)     · Bilateral pitting lower extremity edema worsening over the last 3 weeks as well as dyspnea with exertion  · BNP mildly elevated  · Reports his weight is around 310 pounds; reviewed previous cardiology note from about 6 months ago and patient's weight then was 295. 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    · We will repeat 2D echocardiogram.    · Consult cardiology, appreciate input, continue diuresis likely place on daily diuretic outpatient  · Held lasix 7/29 with decrease in kidney function, will recheck BMP later to see if can be resumed  · Do not feel weights are accurate as he is net negative nearly 6L and continues to urinate  · On discharge, increase home lasix from 20 mg daily to now 40 mg daily

## 2023-07-30 NOTE — OCCUPATIONAL THERAPY NOTE
Occupational Therapy Evaluation     Patient Name: Sid Casas  [id number] Date: 7/30/2023  Problem List  Principal Problem:    Acute on chronic combined systolic and diastolic CHF (congestive heart failure) ([facility])  Active Problems:    Obstructive sleep apnea    Asthma-COPD overlap syndrome ([facility])    Stage 3 chronic kidney disease, unspecified whether stage 3a or 3b CKD ([address])    Type 2 diabetes mellitus ([address])    Alcohol abuse    Intermittent right-sided chest pain    Past Medical History  Past Medical History:   Diagnosis Date    Asthma     CHF (congestive heart failure) ([facility])     COPD (chronic obstructive pulmonary disease) ([facility])     COPD with acute exacerbation ([address]) 5/6/2022    COVID-19     Mumps     Old MI (myocardial infarction)     Pneumonia     Pulmonary emphysema ([address])     Rectal bleeding 1/14/2019    Sleep apnea      Past Surgical History  Past Surgical History:   Procedure Laterality Date    CARDIAC CATHETERIZATION  07/24/2015    Left main- normal and maidly tortuous. Circumflex - normal and moderately tortuous. RCA- normal and mildy tortuous. Global LV function was severely depressed. Aretha Carranza CARDIAC CATHETERIZATION Left 9/27/2022    Procedure: Cardiac Left Heart Cath;  Surgeon: Ziggy Olmstead DO;  Location: [facility]; Service: Cardiology    CARDIAC CATHETERIZATION N/A 9/27/2022    Procedure: Cardiac Coronary Angiogram;  Surgeon: Ziggy Olmstead DO;  Location:  CARDIAC CATH LAB; Service: Cardiology    CARDIAC CATHETERIZATION  9/27/2022    Procedure: Cardiac catheterization;  Surgeon: Ziggy Olmstead DO;  Location:  CARDIAC CATH LAB; Service: Cardiology    INGUINAL HERNIA REPAIR Bilateral     AK COLONOSCOPY FLX DX W/COLLJ SPEC WHEN PFRMD N/A 3/11/2019    Procedure: COLONOSCOPY with removal of anal papilla;   Surgeon: Kathryn Riojas MD;  Location: [facility] MAIN OR;  Service: Colorectal    TONSILLECTOMY      UMBILICAL HERNIA REPAIR  06/21/2006 07/30/23 0832   OT Last Visit OT Visit Date 07/30/23   Note Type   Note type Evaluation   Pain Assessment   Pain Assessment Tool 0-10   Pain Score No Pain   Restrictions/Precautions   Weight Bearing Precautions Per Order No   Other Precautions Telemetry   Home Living   Type of [address] [address] to enter with rails  (6 AX[address] c HR, split level home)   Bathroom Shower/Tub Tub/shower unit   Bathroom Toilet Standard   Bathroom Equipment   (none)   [address]   Additional Comments Pt reporting use of SPC for functional mobility at baseline. Prior Function   Level of [place] Independent with ADLs; Independent with functional mobility; Needs assistance with IADLS   Lives With Spouse   Receives Help From Family   IADLs Independent with driving;Family/Friend/Other provides meals   Falls in the last 6 months 1 to 4   Vocational Retired   Comments (+) driving. 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 Pt reporting he requires A for IADls, cooking, cleaning, laundry due to B knee arthritis and fatigue with these tasks. Lifestyle   Autonomy Pt reporting independence in ADLs, use of SPc for functional mobility, and A for IADLs. Pt lives with wife, and drives. Pt lives in split level home with AXEL. Reciprocal Relationships spouse   Intrinsic Gratification Pt enjoys drawing. Subjective   Subjective "I can do it"   ADL   LB Dressing Assistance 2  Maximal Assistance   LB Dressing Deficit Don/doff R sock; Don/doff L sock; Increased time to complete   Additional Comments Pt requiring max A to dobn/doff B socks, difficulty reaching B feet seated in recliner. Pt noting that he never wears socks at home. Bed Mobility   Additional Comments Pt seated in recliner at start of session. Pt on RA. Vitals WNL. Pt returning to recliner at end of session.    Transfers   Sit to Stand 5  Supervision   Additional items Verbal cues   Stand to Sit 5  Supervision   Additional items Verbal cues   Additional Comments Pt performed functional transfers with use of SPC and supervision. No LOB or unsteadiness. Functional Mobility   Functional Mobility 5  Supervision   Additional Comments Pt performed functional mobility in hallway with SPC and supervision, ~200 ft. No LOB or unsteadiness. Pt noting fatigue and SOB post functional mobility, SPO2 at 91% but increaed to 95% with 1-2 minute seated rest break. Additional items SPC   Balance   Static Sitting Good   Dynamic Sitting Good   Static Standing Good   Dynamic Standing Fair +   Ambulatory Fair +   Activity Tolerance   Activity Tolerance Patient limited by fatigue   Medical Staff Made Aware ALETA Day verbalized pt appropriate for OT evaluation. RUE Assessment   RUE Assessment WFL  (strength 5/5, ROM WFL)   LUE Assessment   LUE Assessment WFL  (strength 5/5, ROM WFL)   Hand Function   Gross Motor Coordination Functional   Fine Motor Coordination Functional   Vision-Basic Assessment   Current Vision Wears glasses all the time   Psychosocial   Psychosocial (WDL) WDL   Cognition   Overall Cognitive Status WFL   Arousal/Participation Responsive; Alert; Cooperative   Attention Within functional limits   Orientation Level Oriented X4   Memory Within functional limits   Following Commands Follows all commands and directions without difficulty   Comments Pt pleasant and agreeable to OT evaluation. Assessment   Limitation Decreased ADL status; Decreased UE strength;Decreased Safe judgement during ADL;Decreased endurance;Decreased self-care trans;Decreased high-level ADLs   Prognosis Good   Assessment Pt is a 79 y.o. male seen for OT evaluation s/p admit to [facility] on 7/27/2023 w/ Acute on chronic combined systolic and diastolic CHF (congestive heart failure) ([address]   Comorbidities affecting pt's functional performance at time of assessment include: CORDELL, asthma-COPD overlap syndrome, CKD, DM, CHF, intermittent right-sided chest pain, dyslipidemia, HTN, anxiety. Personal factors affecting pt at time of IE include:steps to enter environment, difficulty performing ADLS, difficulty performing IADLS  and health management . Prior to admission, pt was independent in ADLs (except noting he never wears socks due to difficulty putting them on), A for IADLs, and use of SPC for functional transfers. Upon evaluation: Pt requires max A for LB dressing, min-mod A for LB bathing and toileting, and no A for UB ADLs, use of SPC and supervision for functional mobility 2* the following deficits impacting occupational performance: weakness, decreased strength, decreased balance and decreased tolerance. Pt to benefit from continued skilled OT tx while in the hospital to address deficits as defined above and maximize level of functional independence w ADL's and functional mobility. Occupational Performance areas to address include: bathing/shower, toilet hygiene, dressing, health maintenance and functional mobility. The patient's raw score on the AM-PAC Daily Activity Inpatient Short Form is 20. A raw score of greater than or equal to 19 suggests the patient may benefit from discharge to home. Please refer to the recommendation of the Occupational Therapist for safe discharge planning. Goals   Patient Goals to go home   Plan   Treatment Interventions ADL retraining;Functional transfer training;UE strengthening/ROM; Endurance training;Patient/family training; Compensatory technique education;Equipment evaluation/education; Energy conservation; Activityengagement   Goal Expiration Date 08/13/23   OT Frequency 3-5x/wk   Recommendation   OT Discharge Recommendation Home with outpatient rehabilitation   Additional Comments  Pt left seated in recliner, all needs met, call bell in reach.    -[facility] Daily Activity Inpatient   Lower Body Dressing 2   Bathing 3   Toileting 3   Upper Body Dressing 4   Grooming 4   Eating 4   Daily Activity Raw Score 20   Daily Activity Standardized Score (Calc for Raw Score >=11) 42.03   AM-PAC Applied Cognition Inpatient   Following a Speech/Presentation 4   Understanding Ordinary Conversation 4   Taking Medications 4   Remembering Where Things Are Placed or Put Away 4   Remembering List of 4-5 Errands 4   Taking Care of Complicated Tasks 4   Applied Cognition Raw Score 24   Applied Cognition Standardized Score 62.21     Goals    Pt will perform LB ADLs with mod(I), use of sock aid for LB dressing, and other AE as appropriate to maximize participation in ADLs. Pt will perform toileting/toilet hygiene (I) to increase independence in self care. Pt will perform ADL transfers with mod(I) to increase independence in ADLs. Pt will increase static and dynamic standing balance to good to increase safety awareness and participation in standing ADLs. Pt will increase standing tolerance to 10 minutes without LOB to increase participation in standing ADLs/purposeful tasks. Pt will complete B UE strengthening exercises to increase safety and participation in bed mobility, ADLs, and functional mobility.      Lakshmi Raman

## 2023-07-30 NOTE — DISCHARGE INSTR - AVS FIRST PAGE
Started you on singulair to help with asthma symptoms   Started on wellbutrin for depressive symptoms - may decrease your appetite and may be slightly stimulating, recommend taking in the morning instead of at night and if your anxiety increases because of it then recommend stopping the medication  Referral placed for counseling should you wish to talk to someone about symptoms  Increased lasix to 40 mg daily - weigh yourself daily and if you gain 3 lbs in 24 hours or 5 lbs in one week then take an extra 20 mg   Get blood work in one week to check on kidney function  Follow up with orthopedics for knee pain      It was a pleasure taking part in your care,    Dalton Roberts PA-C

## 2023-07-30 NOTE — RESPIRATORY THERAPY NOTE
07/30/23 0846   Inhalation Therapy Tx   $ Inhalation Therapy Performed Yes   $ Pulse Oximetry Spot Check Charge Completed   SpO2 94 %   Pre-Treatment Pulse 82   Pre-Treatment Respirations 20   Duration 20   Breath Sounds Pre-Treatment Bilateral Diminished;Clear   Delivery Source Air;UDN   Position Up in chair   Treatment Tolerance Tolerated well   Resp Comments patient feeling better, he was out walking with PT, he remains on room air

## 2023-07-30 NOTE — PROGRESS NOTES
[address]  Progress Note  Name: Balta Lyon I  MRN: [id number]  Unit/Bed#: 222-21 I Date of Admission: 7/27/2023   Date of Service: 7/29/2023 I Hospital Day: 2    Assessment/Plan   * Acute on chronic combined systolic and diastolic CHF (congestive heart failure) ([facility])  Assessment & Plan  Wt Readings from Last 3 Encounters:   07/29/23 (!) 140 kg (309 lb 4.9 oz)   06/01/23 (!) 144 kg (317 lb 9.6 oz)   03/27/23 (!) 138 kg (303 lb 9.6 oz)     · Bilateral pitting lower extremity edema worsening over the last 3 weeks as well as dyspnea with exertion  · BNP mildly elevated  · Reports his weight is around 310 pounds; reviewed previous cardiology note from about 6 months ago and patient's weight then was 295. 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 · We will repeat 2D echocardiogram.    · Consult cardiology, appreciate input, continue diuresis likely place on daily diuretic outpatient  · Held lasix 7/29 with decrease in kidney function, will recheck BMP later to see if can be resumed        Intermittent right-sided chest pain  Assessment & Plan  · Began this afternoon  · EKG unremarkable  · Troponin unremarkable  · CXR with read pending but looks to have mild vascular congestion.  No signs of infiltrate or pneumothorax  · Negative murphys sign on examination  · Possible reflux or gas   · Will continue to monitor throughout the night  · Pain regimen added    Alcohol abuse  Assessment & Plan  · Reports that he still drinks beer with some occasional hard alcohol but does report significantly cutting back in the last year or so from when he was previously drinking large amounts of scotch daily  · Denies any history of withdrawal symptoms or seizures  · To be safe we will put on CIWA protocol with daily thiamine and folate    Type 2 diabetes mellitus [facility])  Assessment & Plan  Lab Results   Component Value Date    HGBA1C 6.4 (H) 12/17/2022       Recent Labs     07/28/23  2112 07/29/23  0718 07/29/23  1133 07/29/23  1631   POCGLU 124 123 207* 135       Blood Sugar Average: Last 72 hrs:  (P) 138.5   · Sliding scale insulin while hospitalized    Stage 3 chronic kidney disease, unspecified whether stage 3a or 3b CKD [facility])  Assessment & Plan  Lab Results   Component Value Date    EGFR 44 07/29/2023    EGFR 53 07/28/2023    EGFR 54 07/27/2023    CREATININE 1.60 (H) 07/29/2023    CREATININE 1.37 (H) 07/28/2023    CREATININE 1.34 (H) 07/27/2023     · Creatinine at baseline which is 1.2-1.3  · Monitor with daily metabolic panel  · monitor creatinine closely as we are aggressively diuresing  · Held lasix with increase in cr today    Asthma-COPD overlap syndrome (HCC)  Assessment & Plan  · History of asthma/COPD overlap  · No significant wheezing on exam  · Continue PTA inhalers    Obstructive sleep apnea  Assessment & Plan  · History of CORDELL and has previously been recommended for CPAP but reports he cannot tolerate             VTE Pharmacologic Prophylaxis: VTE Score: 4 Moderate Risk (Score 3-4) - Pharmacological DVT Prophylaxis Ordered: heparin. Patient Centered Rounds: I performed bedside rounds with nursing staff today. Discussions with Specialists or Other Care Team Provider: none    Education and Discussions with Family / Patient: Patient declined call to .      Total Time Spent on Date of Encounter in care of patient: 45 minutes This time was spent on one or more of the following: performing physical exam; counseling and coordination of care; obtaining or reviewing history; documenting in the medical record; reviewing/ordering tests, medications or procedures; communicating with other healthcare professionals and discussing with patient's family/caregivers. Current Length of Stay: 2 day(s)  Current Patient Status: Inpatient   Certification Statement: The patient will continue to require additional inpatient hospital stay due to continued diuresis, new onset pain  Discharge Plan: Anticipate discharge tomorrow to home. Code Status: Level 1 - Full Code    Subjective:   Patient was feeling improved this AM. This afternoon, developed intermittent severe stabbing right sided chest pain    Objective:     Vitals:   Temp (24hrs), Av.3 °F (36.3 °C), Min:97.1 °F (36.2 °C), Max:97.6 °F (36.4 °C)    Temp:  [97.1 °F (36.2 °C)-97.6 °F (36.4 °C)] 97.6 °F (36.4 °C)  HR:  [63-69] 67  Resp:  [18] 18  BP: (127-130)/(65-95) 127/95  SpO2:  [91 %-95 %] 92 %  Body mass index is 39.71 kg/m². Input and Output Summary (last 24 hours): Intake/Output Summary (Last 24 hours) at 2023  Last data filed at 2023 1744  Gross per 24 hour   Intake 190 ml   Output 900 ml   Net -710 ml       Physical Exam:   Physical Exam  Vitals and nursing note reviewed. Constitutional:       General: He is not in acute distress. Appearance: He is obese. Cardiovascular:      Rate and Rhythm: Normal rate and regular rhythm. Pulses: Normal pulses. Heart sounds: Normal heart sounds. Pulmonary:      Effort: Pulmonary effort is normal.      Breath sounds: Normal breath sounds. Abdominal:      General: Bowel sounds are normal.      Palpations: Abdomen is soft. Musculoskeletal:      Right lower leg: Edema present. Left lower leg: Edema present. Neurological:      General: No focal deficit present. Mental Status: He is alert. Mental status is at baseline.    Psychiatric:         Mood and Affect: Mood normal.         Behavior: Behavior normal.          Additional Data:     Labs:  Results from last 7 days   Lab Units 23  0445   WBC Thousand/uL 8.49 HEMOGLOBIN g/dL 13.2   HEMATOCRIT % 41.8   PLATELETS Thousands/uL 274   NEUTROS PCT % 63   LYMPHS PCT % 22   MONOS PCT % 9   EOS PCT % 5     Results from last 7 days   Lab Units 07/29/23  0445 07/28/23  0409   SODIUM mmol/L 136 138   POTASSIUM mmol/L 3.7 4.0   CHLORIDE mmol/L 97 103   CO2 mmol/L 28 26   BUN mg/dL 31* 24   CREATININE mg/dL 1.60* 1.37*   ANION GAP mmol/L 11 9   CALCIUM mg/dL 9.7 9.0   ALBUMIN g/dL  --  3.7   TOTAL BILIRUBIN mg/dL  --  0.69   ALK PHOS U/L  --  69   ALT U/L  --  19   AST U/L  --  13   GLUCOSE RANDOM mg/dL 133 130     Results from last 7 days   Lab Units 07/28/23  0409   INR  1.06     Results from last 7 days   Lab Units 07/29/23  1631 07/29/23  1133 07/29/23  0718 07/28/23  2112 07/28/23  1603 07/28/23  1156 07/28/23  0711 07/28/23  0149   POC GLUCOSE mg/dl 135 207* 123 124 113 143* 127 136               Lines/Drains:  Invasive Devices     Peripheral Intravenous Line  Duration           Peripheral IV 07/27/23 Left Antecubital 2 days                      Imaging: Reviewed radiology reports from this admission including: ECHO and Personally reviewed the following imaging: chest xray    Recent Cultures (last 7 days):         Last 24 Hours Medication List:   Current Facility-Administered Medications   Medication Dose Route Frequency Provider Last Rate   • acetaminophen  650 mg Oral Q6H PRN Minh Rack, DO     • albuterol  2.5 mg Nebulization Q4H PRN Minh Rack, DO     • ALPRAZolam  0.5 mg Oral HS PRN Minh Rack, DO     • atorvastatin  20 mg Oral Daily Minh Rack, DO     • carvedilol  25 mg Oral BID With Meals Minh Rack, DO     • DULoxetine  60 mg Oral Daily Minh Rack, DO     • fluticasone  1 spray Nasal BID Minh Rack, DO     • fluticasone-vilanterol  1 puff Inhalation Daily Minh Rack, DO     • folic acid  1 mg Oral Daily Minh Rack, DO     • guaiFENesin  1,200 mg Oral Q12H [address] Minh Rack, DO     • heparin (porcine)  5,000 Units Subcutaneous Q8H [address] [place] Rani Taylor, DO     • insulin lispro  1-5 Units Subcutaneous TID AC Sharonda Lace, DO     • insulin lispro  1-5 Units Subcutaneous HS Sharonda Lace, DO     • ipratropium  0.5 mg Nebulization TID Sharonda Lace, DO     • isosorbide mononitrate  30 mg Oral Daily Sharonda Lace, DO     • levalbuterol  1.25 mg Nebulization TID Sharonda Lace, DO     • methocarbamol  500 mg Oral Q6H PRN Barney Carvalho PA-C     • multivitamin-minerals  1 tablet Oral Daily Sharonda Lace, DO     • oxyCODONE  5 mg Oral Q4H PRN Barney Carvalho PA-C     • perflutren lipid microsphere  0.6 mL/min Intravenous Once in imaging Sharonda Lace, DO     • sacubitril-valsartan  1 tablet Oral BID Sharonda Lace, DO     • Thiamine Mononitrate  100 mg Oral Daily Sharonda Lace, DO          Today, Patient Was Seen By: Barney Carvalho PA-C    **Please Note: This note may have been constructed using a voice recognition system. **

## 2023-07-31 ENCOUNTER — TRANSITIONAL CARE MANAGEMENT (OUTPATIENT)
Dept: FAMILY MEDICINE CLINIC | Facility: CLINIC | Age: 68
End: 2023-07-31

## 2023-07-31 DIAGNOSIS — F33.1 MODERATE EPISODE OF RECURRENT MAJOR DEPRESSIVE DISORDER (HCC): ICD-10-CM

## 2023-07-31 RX ORDER — DULOXETIN HYDROCHLORIDE 60 MG/1
CAPSULE, DELAYED RELEASE ORAL
Qty: 30 CAPSULE | Refills: 0 | Status: SHIPPED | OUTPATIENT
Start: 2023-07-31

## 2023-08-01 DIAGNOSIS — R07.9 CHEST PAIN, UNSPECIFIED TYPE: Primary | ICD-10-CM

## 2023-08-01 LAB
ATRIAL RATE: 63 BPM
P AXIS: 95 DEGREES
PR INTERVAL: 230 MS
QRS AXIS: -38 DEGREES
QRSD INTERVAL: 116 MS
QT INTERVAL: 446 MS
QTC INTERVAL: 456 MS
T WAVE AXIS: 75 DEGREES
VENTRICULAR RATE: 63 BPM

## 2023-08-01 PROCEDURE — 93010 ELECTROCARDIOGRAM REPORT: CPT | Performed by: INTERNAL MEDICINE

## 2023-08-01 RX ORDER — ISOSORBIDE MONONITRATE 30 MG/1
30 TABLET, EXTENDED RELEASE ORAL DAILY
Qty: 90 TABLET | Refills: 3 | Status: SHIPPED | OUTPATIENT
Start: 2023-08-01

## 2023-08-02 NOTE — PROGRESS NOTES
Cardiology Follow Up    Gonsalo Shannon  1955  [id number]  [address] [place] [address] [facility] [place] [id number] [phone]    1. Chronic combined systolic and diastolic congestive heart failure (HCC)  Basic metabolic panel      2. Non-ischemic cardiomyopathy ([address])        3. Benign essential hypertension        4. Dyslipidemia            Discussion/Summary:  Chronic diastolic congestive heart failure: With recent hospitalization for the same. Appears to be euvolemic on exam. Was discharged on lasix 40 mg daily, previously was on lasix prn. He just started taking lasix today as the pharmacy was out of the medication. Encouraged compliance. Will check BMP in 1 week. Daily weights and a low sodium diet were reinforced. Non-ischemic cardiomyopathy:  Thought to be secondary to alcohol use. LHC in September 2022 did not show any evidence of obstructive CAD. EF is stable at 45% per repeat echo in the hospital. On GDMT with carvedilol and Entresto. Importance of alcohol cessation discussed. Hypertension:  Well controlled on present regimen. He will RTO in 1 month with me and 3 months with Dr. Alfredo Luna or sooner if necessary. He will call the office with any concerns in the interim. Interval History:   Gonsalo Shannon is a 79 y.o. male with a non-ischemic cardiomyopathy thought to be secondary to alcohol abuse, chronic combined systolic and diastolic congestive heart failure, hypertension, dyslipidemia, obstructive sleep apnea, and COPD/asthma who presents to the office today for hospital follow up. He was recently hospitalized with acute on chronic congestive heart failure. He was diuresed with IV lasix. He was discharged on an increased dose of lasix at 40 mg daily. His recorded weight at discharge was 310 lbs. He had a repeat echocardiogram which showed stable mildly reduced EF at 45%.     Since hospital discharge she has been feeling well. He states he has been weighing himself on a regular basis at home. He states his weight at home was 290 pounds this morning. He states he did not  his Lasix until today because the pharmacy was out of it. He notes a significant improvement in his lower extremity edema and abdominal bloating. His shortness of breath is back to his baseline. He denies lightheadedness, dizziness, or syncope. He denies palpitations. He has been trying to reduce his sodium intake. He tells me he has not been drinking alcohol.     Medical Problems     Problem List     Chronic asthma, moderate persistent, uncomplicated    Obstructive sleep apnea    Overview Signed 12/19/2018 10:16 AM by Willem Paniagua DO       Declines CPAP         Chronic combined systolic and diastolic congestive heart failure (HCC)    Wt Readings from Last 3 Encounters:   08/03/23 (!) 141 kg (309 lb 12.8 oz)   07/30/23 (!) 141 kg (310 lb 10.1 oz)   06/01/23 (!) 144 kg (317 lb 9.6 oz)                 Non-ischemic cardiomyopathy with HFmEF (HCC) (Chronic)    Dyslipidemia    Benign essential hypertension    Anxiety (Chronic)    Asthma-COPD overlap syndrome (HCC)    Borderline hyperglycemia    COPD, severe ([address])    Overview Signed 12/19/2018 10:16 AM by Willem Paniagua DO      Post bronchodilator FEV1 is 68% predicted, 2017         Hemorrhoids    Thyroid disease    Vitamin D deficiency    Constipation    Pulmonary emphysema ([address])    History of tobacco abuse    Hypertrophied anal papilla    Bronchitis    PLMD (periodic limb movement disorder)    COVID-19 virus infection    Stage 3 chronic kidney disease, unspecified whether stage 3a or 3b CKD ([address])    Lab Results   Component Value Date    EGFR 44 07/30/2023    EGFR 40 07/29/2023    EGFR 44 07/29/2023    CREATININE 1.60 (H) 07/30/2023    CREATININE 1.72 (H) 07/29/2023    CREATININE 1.60 (H) 07/29/2023         Chest pain    Type 2 diabetes mellitus ([address]) Lab Results   Component Value Date    HGBA1C 6.4 (H) 2022         Hyponatremia    Moderate episode of recurrent major depressive disorder (HCC)    Class 2 severe obesity due to excess calories with serious comorbidity and body mass index (BMI) of 37.0 to 37.9 in adult [facility])    Acute on chronic combined systolic and diastolic CHF (congestive heart failure) ([facility])    Wt Readings from Last 3 Encounters:   23 (!) 141 kg (309 lb 12.8 oz)   23 (!) 141 kg (310 lb 10.1 oz)   23 (!) 144 kg (317 lb 9.6 oz)                 Alcohol abuse    Anxiety and depression        Past Medical History:   Diagnosis Date   • Asthma    • CHF (congestive heart failure) ([facility])    • COPD (chronic obstructive pulmonary disease) ([facility])    • COPD with acute exacerbation ([address]) 2022   • COVID-19    • Mumps    • Old MI (myocardial infarction)    • Pneumonia    • Pulmonary emphysema ([address])    • Rectal bleeding 2019   • Sleep apnea      Social History     Socioeconomic History   • Marital status: /Civil Union     Spouse name: Not on file   • Number of children: Not on file   • Years of education: Not on file   • Highest education level: Not on file   Occupational History   • Not on file   Tobacco Use   • Smoking status: Former     Packs/day: 3.00     Years: 43.00     Total pack years: 129.00     Types: Cigarettes     Start date: 67     Quit date:      Years since quittin.5   • Smokeless tobacco: Never   Vaping Use   • Vaping Use: Never used   Substance and Sexual Activity   • Alcohol use: Not Currently     Comment: rarely   • Drug use: Yes     Types: Marijuana     Comment: occasionally edible   • Sexual activity: Not on file   Other Topics Concern   • Not on file   Social History Narrative    Caffeine use     Social Determinants of Health     Financial Resource Strain: Low Risk  (3/23/2023)    Overall Financial Resource Strain (CARDIA)    • Difficulty of Paying Living Expenses: Not very hard   Food Insecurity: No Food Insecurity (7/28/2023)    Hunger Vital Sign    • Worried About Running Out of Food in the Last Year: Never true    • Ran Out of Food in the Last Year: Never true   Transportation Needs: No Transportation Needs (7/28/2023)    PRAPARE - Transportation    • Lack of Transportation (Medical): No    • Lack of Transportation (Non-Medical): No   Physical Activity: Not on file   Stress: Not on file   Social Connections: Not on file   Intimate Partner Violence: Not on file   Housing Stability: Low Risk  (7/28/2023)    Housing Stability Vital Sign    • Unable to Pay for Housing in the Last Year: No    • Number of Places Lived in the Last Year: 1    • Unstable Housing in the Last Year: No      Family History   Problem Relation Age of Onset   • Heart attack Father         MI   • Heart disease Father    • Diabetes Sister         DM   • Arthritis Family    • Coronary artery disease Family    • Hypertension Family    • Tuberculosis Son    • Alzheimer's disease Mother      Past Surgical History:   Procedure Laterality Date   • CARDIAC CATHETERIZATION  07/24/2015    Left main- normal and maidly tortuous. Circumflex - normal and moderately tortuous. RCA- normal and mildy tortuous. Global LV function was severely depressed. .   • CARDIAC CATHETERIZATION Left 9/27/2022    Procedure: Cardiac Left Heart Cath;  Surgeon: Mikhail Mariee DO;  Location: [facility]; Service: Cardiology   • CARDIAC CATHETERIZATION N/A 9/27/2022    Procedure: Cardiac Coronary Angiogram;  Surgeon: Mikhail Mariee DO;  Location: [facility]; Service: Cardiology   • CARDIAC CATHETERIZATION  9/27/2022    Procedure: Cardiac catheterization;  Surgeon: Mikhail Mariee DO;  Location: [facility]; Service: Cardiology   • INGUINAL HERNIA REPAIR Bilateral    • MA COLONOSCOPY FLX DX W/COLLJ SPEC WHEN PFRMD N/A 3/11/2019    Procedure: COLONOSCOPY with removal of anal papilla;   Surgeon: Pebbles Mclaughlin MD; Location: [facility];  Service: Colorectal   • TONSILLECTOMY     • UMBILICAL HERNIA REPAIR  06/21/2006       Current Outpatient Medications:   •  Accu-Chek FastClix Lancets MISC, Use 1 each daily to test blood sugar., Disp: 100 each, Rfl: 5  •  albuterol (2.5 mg/3 mL) 0.083 % nebulizer solution, Take 3 mL (2.5 mg total) by nebulization every 6 (six) hours as needed for wheezing or shortness of breath, Disp: 360 mL, Rfl: 2  •  albuterol (ProAir HFA) 90 mcg/act inhaler, Inhale 2 puffs every 6 (six) hours as needed for wheezing, Disp: 18 g, Rfl: 11  •  ALPRAZolam (XANAX) 0.5 mg tablet, TAKE 1 TABLET BY MOUTH ONCE DAILY AT BEDTIME AS NEEDED FOR ANXIETY, Disp: 30 tablet, Rfl: 0  •  atorvastatin (LIPITOR) 20 mg tablet, Take 1 tablet (20 mg total) by mouth daily, Disp: 90 tablet, Rfl: 3  •  Blood Glucose Monitoring Suppl (Accu-Chek Guide Me) w/Device KIT, Use 1 each daily to test blood sugar., Disp: 1 kit, Rfl: 0  •  buPROPion (Wellbutrin XL) 150 mg 24 hr tablet, Take 1 tablet (150 mg total) by mouth daily, Disp: 30 tablet, Rfl: 0  •  carvedilol (COREG) 25 mg tablet, Take 1 tablet (25 mg total) by mouth 2 (two) times a day with meals, Disp: 60 tablet, Rfl: 11  •  DULoxetine (CYMBALTA) 60 mg delayed release capsule, Take 1 capsule by mouth once daily, Disp: 30 capsule, Rfl: 0  •  Dupilumab (Dupixent) 300 MG/2ML SOPN, Inject 300 mg under the skin every 14 (fourteen) days, Disp: 2 mL, Rfl: 11  •  dupilumab (DUPIXENT) subcutaneous injection, Inject 2 mL (300 mg total) under the skin every 14 (fourteen) days Do not start before April 10, 2023., Disp: 2 mL, Rfl: 11  •  fluticasone (FLONASE) 50 mcg/act nasal spray, 1 spray into each nostril 2 (two) times a day, Disp: 16 g, Rfl: 6  •  Fluticasone-Salmeterol (Advair) 500-50 mcg/dose inhaler, INHALE ONE PUFF BY MOUTH AND INTO THE LUNGS TWICE A DAY.  RINSE MOUTH AFTER USE, Disp: 60 blister, Rfl: 3  •  furosemide (LASIX) 20 mg tablet, Take 2 tablets (40 mg total) by mouth daily, Disp: 30 tablet, Rfl: 0  •  glucose blood test strip, Use 1 each daily to test blood sugar., Disp: 100 strip, Rfl: 5  •  guaiFENesin (Mucus Relief) 600 mg 12 hr tablet, Take 2 tablets (1,200 mg total) by mouth every 12 (twelve) hours, Disp: 60 tablet, Rfl: 3  •  ipratropium-albuterol (DUO-NEB) 0.5-2.5 mg/3 mL nebulizer solution, Take 3 mL by nebulization every 6 (six) hours as needed for wheezing or shortness of breath, Disp: 360 mL, Rfl: 0  •  isosorbide mononitrate (IMDUR) 30 mg 24 hr tablet, Take 1 tablet by mouth once daily, Disp: 90 tablet, Rfl: 3  •  montelukast (SINGULAIR) 10 mg tablet, Take 1 tablet (10 mg total) by mouth daily at bedtime, Disp: 30 tablet, Rfl: 0  •  sacubitril-valsartan (ENTRESTO) 49-51 MG TABS, Take 1 tablet by mouth 2 (two) times a day, Disp: 60 tablet, Rfl: 11  •  sodium chloride 3 % inhalation solution, Take 4 mL by nebulization as needed for cough (congestion), Disp: 30 mL, Rfl: 0  •  tiotropium (Spiriva Respimat) 2.5 MCG/ACT AERS inhaler, Inhale 2 spray (s) by mouth once daily. , Disp: 4 g, Rfl: 6  •  Fluticasone-Salmeterol (Advair) 500-50 mcg/dose inhaler, INHALE 1 DOSE BY MOUTH TWICE DAILY RINSE MOUTH AFTER USE (Patient not taking: Reported on 8/3/2023), Disp: 60 blister, Rfl: 11  Allergies   Allergen Reactions   • Ciprofloxacin Shortness Of Breath and Diarrhea       Labs:     Chemistry        Component Value Date/Time     07/27/2015 0559    K 4.0 07/30/2023 0510    K 3.8 07/27/2015 0559    CL 99 07/30/2023 0510     07/27/2015 0559    CO2 28 07/30/2023 0510    CO2 32 07/27/2015 0559    BUN 32 (H) 07/30/2023 0510    BUN 19 07/27/2015 0559    CREATININE 1.60 (H) 07/30/2023 0510    CREATININE 1.05 07/27/2015 0559        Component Value Date/Time    CALCIUM 9.4 07/30/2023 0510    CALCIUM 8.6 07/27/2015 0559    ALKPHOS 73 07/30/2023 0510    ALKPHOS 75 07/22/2015 1021    AST 16 07/30/2023 0510    AST 30 07/22/2015 1021    ALT 22 07/30/2023 0510    ALT 74 07/22/2015 1021    BILITOT 1.05 (H) 07/22/2015 1021            Lab Results   Component Value Date    CHOL 105 07/23/2015     Lab Results   Component Value Date    HDL 27 (L) 09/01/2022    HDL 27 (L) 09/22/2021    HDL 24 (L) 12/18/2018     Lab Results   Component Value Date    LDLCALC 49 09/01/2022    LDLCALC 107 (H) 09/22/2021    LDLCALC 89 12/18/2018     Lab Results   Component Value Date    TRIG 138 09/01/2022    TRIG 131 09/22/2021    TRIG 107 12/18/2018     No results found for: "CHOLHDL"    Imaging: XR chest portable    Result Date: 7/30/2023  Narrative: CHEST INDICATION:   chest pain. COMPARISON: CXR and chest CT 7/27/2023. EXAM PERFORMED/VIEWS:  XR CHEST PORTABLE. FINDINGS: Moderate cardiomegaly. Lungs clear. No effusion or pneumothorax. Blunting of the left costophrenic sulcus due to pericardial fat. Upper abdomen normal. Bones normal for age. Impression: No acute cardiopulmonary disease. Workstation performed: [id number]     Echo follow up/limited w/ contrast if indicated    Result Date: 7/28/2023  Narrative: •  Left Ventricle: Left ventricular cavity size is normal. Wall thickness is normal. The left ventricular ejection fraction is 45%. Systolic function is mildly reduced. There is mild global hypokinesis. Diastolic function is mildly abnormal, consistent with grade I (abnormal) relaxation. •  Right Ventricle: Right ventricular cavity size is normal. Systolic function is normal.     XR chest 1 view portable    Result Date: 7/28/2023  Narrative: CHEST INDICATION:   sob. COMPARISON: Chest CT 7/27/2023, CXR 3/24/2023. EXAM PERFORMED/VIEWS:  XR CHEST PORTABLE. FINDINGS: Mild cardiomegaly. Mild pulmonary venous congestion. Blunting of the left costophrenic sulcus is extrapleural fat, not a pleural effusion. No pneumothorax. Upper abdomen normal. Bones normal for age. Impression: Mild pulmonary venous congestion with mild cardiomegaly.  Workstation performed: [id number]     CTA ED chest PE Study    Result Date: 7/27/2023  Narrative: CTA - CHEST WITH IV CONTRAST - PULMONARY ANGIOGRAM INDICATION:   Pulmonary embolism (PE) suspected, positive D-dimer PE suspected, positive D-dimer. COMPARISON: CT chest dated April 26, 2023. TECHNIQUE: CTA examination of the chest was performed using angiographic technique according to a protocol specifically tailored to evaluate for pulmonary embolism. Multiplanar 2D reformatted images were created from the source data. In addition, coronal 3D MIP postprocessing was performed on the acquisition scanner. Radiation dose length product (DLP) for this visit:  618.7 mGy-cm . This examination, like all CT scans performed in the [facility], was performed utilizing techniques to minimize radiation dose exposure, including the use of iterative reconstruction and automated exposure control. IV Contrast:  85 mL of iohexol (OMNIPAQUE) FINDINGS: PULMONARY ARTERIAL TREE: The study was limited by the timing of the contrast bolus and patient's body habitus. Although there is no gross evidence of an intraluminal filling defect within a proximal pulmonary artery branch, evaluation for distal subsegmental emboli is limited. LUNGS: There is a poor inspiratory effort with bilateral atelectatic lung changes. A small area of atelectasis versus scarring is noted at the lingula. No pleural effusion or pneumothorax. There is no tracheal or endobronchial lesion. PLEURA:  Unremarkable. HEART/GREAT VESSELS:  Atherosclerotic changes are noted in thoracic aorta and coronary arteries. No thoracic aortic aneurysm. MEDIASTINUM AND KEATON:  Unremarkable. CHEST WALL AND LOWER NECK:   Unremarkable. VISUALIZED STRUCTURES IN THE UPPER ABDOMEN:  Unremarkable. OSSEOUS STRUCTURES:  Spinal degenerative changes are noted. No acute fracture or destructive osseous lesion. Impression: Mildly limited CTA chest demonstrates no intraluminal filling defect to suggest an acute pulmonary embolus.  Poor inspiratory effort with bilateral atelectatic lung changes, left greater than right. Mild scarring versus atelectasis at the lingula. No acute infiltrate or pleural effusion. Workstation performed: [id number]       Review of Systems   All other systems reviewed and are negative. Vitals:    08/03/23 1229   BP: 118/64   Pulse: 70   SpO2: 94%     Vitals:    08/03/23 1229   Weight: (!) 141 kg (309 lb 12.8 oz)         Body mass index is 39.78 kg/m². Physical Exam:  Physical Exam  Vitals reviewed. Constitutional:       General: He is not in acute distress. Appearance: He is well-developed. He is obese. He is not diaphoretic. HENT:      Head: Normocephalic and atraumatic. Eyes:      Pupils: Pupils are equal, round, and reactive to light. Neck:      Vascular: No carotid bruit. Cardiovascular:      Rate and Rhythm: Normal rate. Rhythm irregular. Pulses:           Radial pulses are 2+ on the right side and 2+ on the left side. Heart sounds: S1 normal and S2 normal. No murmur heard. Pulmonary:      Effort: Pulmonary effort is normal. No respiratory distress. Breath sounds: Normal breath sounds. No wheezing or rales. Abdominal:      General: There is no distension. Palpations: Abdomen is soft. Tenderness: There is no abdominal tenderness. Musculoskeletal:         General: Normal range of motion. Cervical back: Normal range of motion. Right lower leg: No edema. Left lower leg: No edema. Skin:     General: Skin is warm and dry. Findings: No erythema. Neurological:      General: No focal deficit present. Mental Status: He is alert and oriented to person, place, and time. Gait: Gait abnormal (ambulates with a cane).    Psychiatric:         Mood and Affect: Mood normal.         Behavior: Behavior normal.

## 2023-08-03 ENCOUNTER — OFFICE VISIT (OUTPATIENT)
Dept: CARDIOLOGY CLINIC | Facility: HOSPITAL | Age: 68
End: 2023-08-03
Payer: MEDICARE

## 2023-08-03 VITALS
DIASTOLIC BLOOD PRESSURE: 64 MMHG | WEIGHT: 309.8 LBS | SYSTOLIC BLOOD PRESSURE: 118 MMHG | BODY MASS INDEX: 39.78 KG/M2 | OXYGEN SATURATION: 94 % | HEART RATE: 70 BPM

## 2023-08-03 DIAGNOSIS — I10 BENIGN ESSENTIAL HYPERTENSION: ICD-10-CM

## 2023-08-03 DIAGNOSIS — E78.5 DYSLIPIDEMIA: ICD-10-CM

## 2023-08-03 DIAGNOSIS — I42.8 NON-ISCHEMIC CARDIOMYOPATHY (HCC): ICD-10-CM

## 2023-08-03 DIAGNOSIS — I50.42 CHRONIC COMBINED SYSTOLIC AND DIASTOLIC CONGESTIVE HEART FAILURE (HCC): Primary | ICD-10-CM

## 2023-08-03 PROCEDURE — 99214 OFFICE O/P EST MOD 30 MIN: CPT | Performed by: PHYSICIAN ASSISTANT

## 2023-08-04 ENCOUNTER — OFFICE VISIT (OUTPATIENT)
Dept: FAMILY MEDICINE CLINIC | Facility: CLINIC | Age: 68
End: 2023-08-04
Payer: MEDICARE

## 2023-08-04 VITALS
TEMPERATURE: 97.6 F | HEIGHT: 74 IN | DIASTOLIC BLOOD PRESSURE: 78 MMHG | RESPIRATION RATE: 18 BRPM | OXYGEN SATURATION: 94 % | WEIGHT: 310 LBS | SYSTOLIC BLOOD PRESSURE: 126 MMHG | HEART RATE: 67 BPM | BODY MASS INDEX: 39.78 KG/M2

## 2023-08-04 DIAGNOSIS — I50.43 ACUTE ON CHRONIC COMBINED SYSTOLIC AND DIASTOLIC CHF (CONGESTIVE HEART FAILURE) (HCC): ICD-10-CM

## 2023-08-04 DIAGNOSIS — E11.00 TYPE 2 DIABETES MELLITUS WITH HYPEROSMOLARITY WITHOUT COMA, WITHOUT LONG-TERM CURRENT USE OF INSULIN (HCC): Primary | ICD-10-CM

## 2023-08-04 DIAGNOSIS — I10 BENIGN ESSENTIAL HYPERTENSION: ICD-10-CM

## 2023-08-04 DIAGNOSIS — N18.30 STAGE 3 CHRONIC KIDNEY DISEASE, UNSPECIFIED WHETHER STAGE 3A OR 3B CKD (HCC): ICD-10-CM

## 2023-08-04 PROBLEM — U07.1 COVID-19 VIRUS INFECTION: Status: RESOLVED | Noted: 2021-12-03 | Resolved: 2023-08-04

## 2023-08-04 PROBLEM — R07.9 CHEST PAIN: Status: RESOLVED | Noted: 2022-12-22 | Resolved: 2023-08-04

## 2023-08-04 PROBLEM — R73.9 BORDERLINE HYPERGLYCEMIA: Status: RESOLVED | Noted: 2017-01-30 | Resolved: 2023-08-04

## 2023-08-04 PROCEDURE — 99496 TRANSJ CARE MGMT HIGH F2F 7D: CPT | Performed by: INTERNAL MEDICINE

## 2023-08-04 NOTE — PROGRESS NOTES
Name: Valentine Xavier      : 1955      MRN: [id number]  Encounter Provider: Clinton Florez DO  Encounter Date: 2023   Encounter department: [address]     1. Type 2 diabetes mellitus with hyperosmolarity without coma, without long-term current use of insulin ([address])  Pt will get labs prior to followup visit but diet is much better and he did get back to the gym this week    2. Benign essential hypertension  Stable and he will monitor He is limiting sodium and started exercising Has cardio appt one month    3. Acute on chronic combined systolic and diastolic CHF (congestive heart failure) ([address])  Stable and feels better He has made lifestyle changes to continue to improve     4.  Stage 3 chronic kidney disease, unspecified whether stage 3a or 3b CKD (HCC)  Pt getting back on track and will remain on lasix daily with daily weights        Rto 2 months     Subjective      HPI   Pt feeling much better since being hospitalized for chf He admits he derailed few months ago and did not follow diet,take meds as prescribed and had significant weight gain/fluid buildup He was admitted with acute chf and was diuresed with loss of several pounds His breathing and mobility are improved No chest pain He saw Cardiology yesterday and is taking lasix daily and weighing himself at home He does feel he was depressed and is doing better with addition of wellbutrin He started back to the gym this week and is eating more healthy 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 No    Scheduled for follow up? Yes    Patients specialists  --  Concepción Delgado, [facility]    Pulmonologist contact #  [facility]    Endocrinologist name  [facility]    Other specialists names  DR FERRER-ENT    Did you obtain your prescribed medications  Yes    Do you need help managing your prescriptions or medications  No    Is transportation to your appointment needed  No    I have advised the patient to call PCP with any new or worsening symptoms  Hernán Mccrary,     Counseling  Patient          Review of Systems   Constitutional: Positive for activity change. Negative for chills and fever. Started back to gym this week   HENT: Negative. Eyes: Negative for visual disturbance. Respiratory: Positive for shortness of breath. Negative for cough. Cardiovascular: Positive for leg swelling. Negative for chest pain and palpitations. Gastrointestinal: Negative for abdominal distention and abdominal pain. Genitourinary: Positive for frequency. Musculoskeletal: Positive for arthralgias and gait problem. Neurological: Negative for dizziness, light-headedness and headaches. Psychiatric/Behavioral: Negative for sleep disturbance. The patient is not nervous/anxious. Current Outpatient Medications on File Prior to Visit   Medication Sig   • Accu-Chek FastClix Lancets MISC Use 1 each daily to test blood sugar.    • albuterol (2.5 mg/3 mL) 0.083 % nebulizer solution Take 3 mL (2.5 mg total) by nebulization every 6 (six) hours as needed for wheezing or shortness of breath   • albuterol (ProAir HFA) 90 mcg/act inhaler Inhale 2 puffs every 6 (six) hours as needed for wheezing   • ALPRAZolam (XANAX) 0.5 mg tablet TAKE 1 TABLET BY MOUTH ONCE DAILY AT BEDTIME AS NEEDED FOR ANXIETY   • atorvastatin (LIPITOR) 20 mg tablet Take 1 tablet (20 mg total) by mouth daily   • Blood Glucose Monitoring Suppl (Accu-Chek Guide Me) w/Device KIT Use 1 each daily to test blood sugar. • buPROPion (Wellbutrin XL) 150 mg 24 hr tablet Take 1 tablet (150 mg total) by mouth daily   • carvedilol (COREG) 25 mg tablet Take 1 tablet (25 mg total) by mouth 2 (two) times a day with meals   • DULoxetine (CYMBALTA) 60 mg delayed release capsule Take 1 capsule by mouth once daily   • Dupilumab (Dupixent) 300 MG/2ML SOPN Inject 300 mg under the skin every 14 (fourteen) days   • dupilumab (DUPIXENT) subcutaneous injection Inject 2 mL (300 mg total) under the skin every 14 (fourteen) days Do not start before April 10, 2023. • fluticasone (FLONASE) 50 mcg/act nasal spray 1 spray into each nostril 2 (two) times a day   • Fluticasone-Salmeterol (Advair) 500-50 mcg/dose inhaler INHALE ONE PUFF BY MOUTH AND INTO THE LUNGS TWICE A DAY. RINSE MOUTH AFTER USE   • Fluticasone-Salmeterol (Advair) 500-50 mcg/dose inhaler INHALE 1 DOSE BY MOUTH TWICE DAILY RINSE MOUTH AFTER USE   • furosemide (LASIX) 20 mg tablet Take 2 tablets (40 mg total) by mouth daily   • glucose blood test strip Use 1 each daily to test blood sugar. • guaiFENesin (Mucus Relief) 600 mg 12 hr tablet Take 2 tablets (1,200 mg total) by mouth every 12 (twelve) hours   • ipratropium-albuterol (DUO-NEB) 0.5-2.5 mg/3 mL nebulizer solution Take 3 mL by nebulization every 6 (six) hours as needed for wheezing or shortness of breath   • isosorbide mononitrate (IMDUR) 30 mg 24 hr tablet Take 1 tablet by mouth once daily   • montelukast (SINGULAIR) 10 mg tablet Take 1 tablet (10 mg total) by mouth daily at bedtime   • sacubitril-valsartan (ENTRESTO) 49-51 MG TABS Take 1 tablet by mouth 2 (two) times a day   • sodium chloride 3 % inhalation solution Take 4 mL by nebulization as needed for cough (congestion)   • tiotropium (Spiriva Respimat) 2.5 MCG/ACT AERS inhaler Inhale 2 spray (s) by mouth once daily.        Objective     /78   Pulse 67   Temp 97.6 °F (36.4 °C) (Temporal)   Resp 18   Ht 6' 2" (1.88 m)   Wt (!) 141 kg (310 lb)   SpO2 94%   BMI 39.80 kg/m²     Physical Exam  Vitals reviewed. Constitutional:       General: He is not in acute distress. Appearance: Normal appearance. He is not ill-appearing, toxic-appearing or diaphoretic. HENT:      Head: Normocephalic and atraumatic. Right Ear: External ear normal.      Left Ear: External ear normal.      Nose: Nose normal.      Mouth/Throat:      Mouth: Mucous membranes are moist.   Eyes:      General: No scleral icterus. Extraocular Movements: Extraocular movements intact. Conjunctiva/sclera: Conjunctivae normal.      Pupils: Pupils are equal, round, and reactive to light. Cardiovascular:      Rate and Rhythm: Normal rate. Rhythm irregular. Pulses: Normal pulses. Heart sounds: Normal heart sounds. Pulmonary:      Effort: Pulmonary effort is normal. No respiratory distress. Breath sounds: Normal breath sounds. No wheezing. Abdominal:      General: Bowel sounds are normal. There is no distension. Palpations: Abdomen is soft. Tenderness: There is no abdominal tenderness. Musculoskeletal:      Cervical back: Normal range of motion and neck supple. Right lower leg: No edema. Left lower leg: No edema. Lymphadenopathy:      Cervical: No cervical adenopathy. Skin:     General: Skin is warm and dry. Coloration: Skin is not jaundiced or pale. Neurological:      General: No focal deficit present. Mental Status: He is alert and oriented to person, place, and time. Mental status is at baseline. Cranial Nerves: No cranial nerve deficit. Gait: Gait abnormal.   Psychiatric:         Mood and Affect: Mood normal.         Behavior: Behavior normal.         Thought Content:  Thought content normal.         Judgment: Judgment normal.       Emery Vargas DO

## 2023-08-09 ENCOUNTER — APPOINTMENT (OUTPATIENT)
Dept: LAB | Facility: CLINIC | Age: 68
End: 2023-08-09
Payer: MEDICARE

## 2023-08-09 DIAGNOSIS — R09.82 POSTNASAL DRIP: ICD-10-CM

## 2023-08-09 DIAGNOSIS — J34.89 SINUS DRAINAGE: ICD-10-CM

## 2023-08-09 DIAGNOSIS — E11.69 TYPE 2 DIABETES MELLITUS WITH OTHER SPECIFIED COMPLICATION, UNSPECIFIED WHETHER LONG TERM INSULIN USE (HCC): ICD-10-CM

## 2023-08-09 DIAGNOSIS — J31.0 CHRONIC RHINITIS: ICD-10-CM

## 2023-08-09 DIAGNOSIS — Z11.59 ENCOUNTER FOR HEPATITIS C SCREENING TEST FOR LOW RISK PATIENT: ICD-10-CM

## 2023-08-09 DIAGNOSIS — Z12.5 SCREENING FOR PROSTATE CANCER: ICD-10-CM

## 2023-08-09 DIAGNOSIS — I50.9 CHF (CONGESTIVE HEART FAILURE) (HCC): ICD-10-CM

## 2023-08-09 DIAGNOSIS — E11.9 TYPE 2 DIABETES MELLITUS WITHOUT COMPLICATION, WITHOUT LONG-TERM CURRENT USE OF INSULIN (HCC): ICD-10-CM

## 2023-08-09 DIAGNOSIS — J30.81 ALLERGIC RHINITIS DUE TO ANIMAL HAIR AND DANDER: ICD-10-CM

## 2023-08-09 DIAGNOSIS — J30.9 ALLERGIC RHINITIS WITH POSTNASAL DRIP: ICD-10-CM

## 2023-08-09 DIAGNOSIS — R09.82 ALLERGIC RHINITIS WITH POSTNASAL DRIP: ICD-10-CM

## 2023-08-09 LAB
ANION GAP SERPL CALCULATED.3IONS-SCNC: 5 MMOL/L
BUN SERPL-MCNC: 33 MG/DL (ref 5–25)
CALCIUM SERPL-MCNC: 9.6 MG/DL (ref 8.3–10.1)
CHLORIDE SERPL-SCNC: 103 MMOL/L (ref 96–108)
CO2 SERPL-SCNC: 28 MMOL/L (ref 21–32)
CREAT SERPL-MCNC: 1.89 MG/DL (ref 0.6–1.3)
CREAT UR-MCNC: 35.6 MG/DL
GFR SERPL CREATININE-BSD FRML MDRD: 36 ML/MIN/1.73SQ M
GLUCOSE P FAST SERPL-MCNC: 105 MG/DL (ref 65–99)
HCV AB SER QL: NORMAL
LDLC SERPL DIRECT ASSAY-MCNC: 54 MG/DL (ref 0–100)
MICROALBUMIN UR-MCNC: 22.7 MG/L (ref 0–20)
MICROALBUMIN/CREAT 24H UR: 64 MG/G CREATININE (ref 0–30)
POTASSIUM SERPL-SCNC: 4.6 MMOL/L (ref 3.5–5.3)
PSA SERPL-MCNC: 0.56 NG/ML (ref 0–4)
SODIUM SERPL-SCNC: 136 MMOL/L (ref 135–147)

## 2023-08-09 PROCEDURE — 83721 ASSAY OF BLOOD LIPOPROTEIN: CPT

## 2023-08-09 PROCEDURE — 86803 HEPATITIS C AB TEST: CPT

## 2023-08-09 PROCEDURE — 36415 COLL VENOUS BLD VENIPUNCTURE: CPT

## 2023-08-09 PROCEDURE — 86003 ALLG SPEC IGE CRUDE XTRC EA: CPT

## 2023-08-09 PROCEDURE — G0103 PSA SCREENING: HCPCS

## 2023-08-09 PROCEDURE — 80048 BASIC METABOLIC PNL TOTAL CA: CPT | Performed by: PHYSICIAN ASSISTANT

## 2023-08-09 PROCEDURE — 82785 ASSAY OF IGE: CPT

## 2023-08-10 LAB
A ALTERNATA IGE QN: <0.1 KUA/I
A FUMIGATUS IGE QN: <0.1 KUA/I
BERMUDA GRASS IGE QN: <0.1 KUA/I
BOXELDER IGE QN: <0.1 KUA/I
C HERBARUM IGE QN: <0.1 KUA/I
CAT DANDER IGE QN: 1.1 KUA/I
CMN PIGWEED IGE QN: <0.1 KUA/I
COMMON RAGWEED IGE QN: 0.54 KUA/I
COTTONWOOD IGE QN: <0.1 KUA/I
D FARINAE IGE QN: <0.1 KUA/I
D PTERONYSS IGE QN: <0.1 KUA/I
DOG DANDER IGE QN: 0.25 KUA/I
LONDON PLANE IGE QN: <0.1 KUA/I
MOUSE URINE PROT IGE QN: <0.1 KUA/I
MT JUNIPER IGE QN: <0.1 KUA/I
MUGWORT IGE QN: <0.1 KUA/I
P NOTATUM IGE QN: <0.1 KUA/I
ROACH IGE QN: <0.1 KUA/I
SHEEP SORREL IGE QN: <0.1 KUA/I
SILVER BIRCH IGE QN: <0.1 KUA/I
TIMOTHY IGE QN: <0.1 KUA/I
TOTAL IGE SMQN RAST: 20.5 KU/L (ref 0–113)
WALNUT IGE QN: <0.1 KUA/I
WHITE ASH IGE QN: <0.1 KUA/I
WHITE ELM IGE QN: <0.1 KUA/I
WHITE MULBERRY IGE QN: <0.1 KUA/I
WHITE OAK IGE QN: <0.1 KUA/I

## 2023-08-11 DIAGNOSIS — I50.42 CHRONIC COMBINED SYSTOLIC (CONGESTIVE) AND DIASTOLIC (CONGESTIVE) HEART FAILURE (HCC): Primary | ICD-10-CM

## 2023-08-11 NOTE — RESULT ENCOUNTER NOTE
Called patient regarding lab results. Creatinine has been steadily rising. He states he is taking lasix 40 mg daily. He is unable to tell me his weight as he is currently on vacation. I recommended he attempt to take lasix 40 mg every other day with a repeat BMP in 2 weeks.

## 2023-08-15 ENCOUNTER — OFFICE VISIT (OUTPATIENT)
Dept: OBGYN CLINIC | Facility: CLINIC | Age: 68
End: 2023-08-15
Payer: MEDICARE

## 2023-08-15 ENCOUNTER — APPOINTMENT (OUTPATIENT)
Dept: RADIOLOGY | Facility: MEDICAL CENTER | Age: 68
End: 2023-08-15
Payer: MEDICARE

## 2023-08-15 VITALS
DIASTOLIC BLOOD PRESSURE: 63 MMHG | HEIGHT: 74 IN | HEART RATE: 84 BPM | BODY MASS INDEX: 39.91 KG/M2 | SYSTOLIC BLOOD PRESSURE: 115 MMHG | WEIGHT: 311 LBS

## 2023-08-15 DIAGNOSIS — Z51.89 ENCOUNTER FOR OTHER SPECIFIED AFTERCARE: ICD-10-CM

## 2023-08-15 DIAGNOSIS — M25.569 KNEE PAIN: ICD-10-CM

## 2023-08-15 DIAGNOSIS — M17.0 PRIMARY OSTEOARTHRITIS OF BOTH KNEES: ICD-10-CM

## 2023-08-15 DIAGNOSIS — Z01.818 PRE-OP TESTING: ICD-10-CM

## 2023-08-15 DIAGNOSIS — M25.59 PAIN IN OTHER JOINT: ICD-10-CM

## 2023-08-15 DIAGNOSIS — M17.12 PRIMARY OSTEOARTHRITIS OF LEFT KNEE: Primary | ICD-10-CM

## 2023-08-15 PROCEDURE — 73564 X-RAY EXAM KNEE 4 OR MORE: CPT

## 2023-08-15 PROCEDURE — 99215 OFFICE O/P EST HI 40 MIN: CPT | Performed by: STUDENT IN AN ORGANIZED HEALTH CARE EDUCATION/TRAINING PROGRAM

## 2023-08-15 RX ORDER — SODIUM CHLORIDE, SODIUM LACTATE, POTASSIUM CHLORIDE, CALCIUM CHLORIDE 600; 310; 30; 20 MG/100ML; MG/100ML; MG/100ML; MG/100ML
125 INJECTION, SOLUTION INTRAVENOUS CONTINUOUS
Status: CANCELLED | OUTPATIENT
Start: 2023-08-15

## 2023-08-15 RX ORDER — GABAPENTIN 100 MG/1
300 CAPSULE ORAL ONCE
Status: CANCELLED | OUTPATIENT
Start: 2023-08-15 | End: 2023-08-15

## 2023-08-15 RX ORDER — ONDANSETRON 2 MG/ML
4 INJECTION INTRAMUSCULAR; INTRAVENOUS ONCE
Status: CANCELLED | OUTPATIENT
Start: 2023-08-15 | End: 2023-08-15

## 2023-08-15 RX ORDER — ACETAMINOPHEN 325 MG/1
975 TABLET ORAL ONCE
Status: CANCELLED | OUTPATIENT
Start: 2023-08-15 | End: 2023-08-15

## 2023-08-15 RX ORDER — FOLIC ACID 1 MG/1
1 TABLET ORAL DAILY
Qty: 30 TABLET | Refills: 1 | Status: CANCELLED | OUTPATIENT
Start: 2023-08-15

## 2023-08-15 RX ORDER — MULTIVIT-MIN/IRON FUM/FOLIC AC 7.5 MG-4
1 TABLET ORAL DAILY
Qty: 30 TABLET | Refills: 1 | Status: SHIPPED | OUTPATIENT
Start: 2023-08-15

## 2023-08-15 RX ORDER — ASCORBIC ACID 500 MG
500 TABLET ORAL 2 TIMES DAILY
Qty: 60 TABLET | Refills: 1 | Status: CANCELLED | OUTPATIENT
Start: 2023-08-15

## 2023-08-15 RX ORDER — MULTIVIT-MIN/IRON FUM/FOLIC AC 7.5 MG-4
1 TABLET ORAL DAILY
Qty: 30 TABLET | Refills: 1 | Status: CANCELLED | OUTPATIENT
Start: 2023-08-15

## 2023-08-15 RX ORDER — FOLIC ACID 1 MG/1
1 TABLET ORAL DAILY
Qty: 30 TABLET | Refills: 1 | Status: SHIPPED | OUTPATIENT
Start: 2023-08-15

## 2023-08-15 RX ORDER — GABAPENTIN 100 MG/1
300 CAPSULE ORAL ONCE
OUTPATIENT
Start: 2023-08-15 | End: 2023-08-15

## 2023-08-15 RX ORDER — ACETAMINOPHEN 325 MG/1
975 TABLET ORAL ONCE
OUTPATIENT
Start: 2023-08-15 | End: 2023-08-15

## 2023-08-15 RX ORDER — SODIUM CHLORIDE, SODIUM LACTATE, POTASSIUM CHLORIDE, CALCIUM CHLORIDE 600; 310; 30; 20 MG/100ML; MG/100ML; MG/100ML; MG/100ML
125 INJECTION, SOLUTION INTRAVENOUS CONTINUOUS
OUTPATIENT
Start: 2023-08-15

## 2023-08-15 RX ORDER — CHLORHEXIDINE GLUCONATE 4 G/100ML
SOLUTION TOPICAL DAILY PRN
Status: CANCELLED | OUTPATIENT
Start: 2023-08-15

## 2023-08-15 RX ORDER — ASCORBIC ACID 500 MG
500 TABLET ORAL 2 TIMES DAILY
Qty: 60 TABLET | Refills: 1 | Status: SHIPPED | OUTPATIENT
Start: 2023-08-15

## 2023-08-15 RX ORDER — FERROUS SULFATE TAB EC 324 MG (65 MG FE EQUIVALENT) 324 (65 FE) MG
324 TABLET DELAYED RESPONSE ORAL
Status: CANCELLED
Start: 2023-08-15

## 2023-08-15 RX ORDER — FERROUS SULFATE TAB EC 324 MG (65 MG FE EQUIVALENT) 324 (65 FE) MG
324 TABLET DELAYED RESPONSE ORAL
Qty: 60 TABLET | Refills: 1 | Status: SHIPPED | OUTPATIENT
Start: 2023-08-15 | End: 2023-10-14

## 2023-08-15 RX ORDER — ONDANSETRON 2 MG/ML
4 INJECTION INTRAMUSCULAR; INTRAVENOUS ONCE
OUTPATIENT
Start: 2023-08-15 | End: 2023-08-15

## 2023-08-15 RX ORDER — CHLORHEXIDINE GLUCONATE 4 G/100ML
SOLUTION TOPICAL DAILY PRN
OUTPATIENT
Start: 2023-08-15

## 2023-08-15 NOTE — PROGRESS NOTES
Assessment:   Diagnosis ICD-10-CM Associated Orders   1. Primary osteoarthritis of both knees  M17.0 XR knee 4+ vw left injury     XR knee 4+ vw right injury      2. Knee pain  M25.569 XR knee 4+ vw left injury     XR knee 4+ vw right injury     Ambulatory referral to Orthopedic Surgery      3. Primary osteoarthritis of left knee  M17.12           Plan:  Reviewed today's physical exam findings and x-ray findings with patient at time of visit. X-Ray of bilateral knee taken on 08/15/2023 were reviewed and showed severe tricompartmental osteoarthritis affecting the medial tibiofemoral compartment as evidenced by joint space narrowing, osteophyte formation and subchondral sclerosis. Risks and benefits of conservative and operative treatments were discussed in detail with the patient. The risks of Left Total Knee Arthroplasty including infection, bleeding, injury to nerves, injury to the vessels, excess scar tissue formation, risk of failure of the procedure, the possible need for further surgery, and potential risk of loss of limb and life. After weighing all the treatment options available, the patient has opted for surgical intervention and informed consent was obtained. We will schedule the patient to be seen back postoperatively. Patient expresses understanding and is in agreement with this treatment plan. The patient was given the opportunity to ask questions or present concerns.      Elective total knee arthroplasty:   • patient wishes to proceed with a Laterally: left total knee arthoplasty  • patient understands that this is a same-day discharge procedure, and that outpatient PT will begin in the days following surgery  • it has been discussed that their home environment and support system is prepared to assist in the day following surgery  o If there are plans for discharge elsewhere (rehab, facility), joint nurse navigator referral given   o Discharge to places other than home require extended hospital stay 2. Type 2 diabetes mellitus with hyperosmolarity without coma, without long-term current use of insulin ([address])  -cont current treatment via PCP     3. Benign essential hypertension  -Stable and he will monitor He is limiting sodium and started exercising Has cardio appt one month     4. Acute on chronic combined systolic and diastolic CHF (congestive heart failure) ([address])  Stable and feels better He has made lifestyle changes to continue to improve - cont current management via PCP     5. Stage 3 chronic kidney disease, unspecified whether stage 3a or 3b CKD (HCC)  -cont lasix        The patient has failed non operative measures and has opted for surgical intervention. Risks and benefits of the treatment options and surgery were discussed in detail with the patient by Dr. David Ennis. The risks of surgery including infection, bleeding, injury to nerves, injury to the vessels, excess scar tissue formation, risk of failure of the procedure, the possible need for further surgery, and potential risk of loss of limb and life. After weighing all the treatment options available, the patient has opted for surgical intervention and informed consent was obtained. We will schedule the patient to be seen back postoperatively. Pre-op instructions  Medications:  Hold anticoagulation therapy 5-7 days prior to surgery date  Hold vitamins/minerals/supplements/ NSAIDs 7 days prior to surgery to decrease bleeding risk. Hold diabetic medications, if applicable, morning of surgery. Hold Ace/Arbs/CCB, if applicable, day of surgery  OK to take rest of your medications morning of surgery with small sip of water including pain medication. NPO night before surgery at midnight  Advised to discuss this with their prescribers as well. To do next visit:  Return for surgery. The above stated was discussed in layman's terms and the patient expressed understanding. All questions were answered to the patient's satisfaction.      Scribe Attestation    I,:  Albert Bray am acting as a scribe while in the presence of the attending physician.:       I,:  Estefania Alexander,  personally performed the services described in this documentation    as scribed in my presence.:         Subjective:   Stephanie Calderón is a 79 y.o. male who presents today for an Initial evaluation of his bilateral knee due to chronic pain. The patient has a history of thyroid disease, type 2 diabetes, chronic asthma, sleep apnea, COPD, pulmonary emphysema, combined systolic and diastolic congestive heart failure, nonischemic cardiomyopathy with HFmEF, hypertension, stage III chronic kidney disease, anxiety, vitamin D deficiency, and hyponatremia. The patient was last seen on 12/29/2021 by Dr. Carmina Sawant in which he was provided an injection of cortisone to his left knee to provide symptomatic relief. The patient was treated for moderate tricompartmental osteoarthritis of left knee. On today's presentation, the patient states he is experiencing worsening bilateral knee pain with prolonged sedentary positions and weight-bearing activities especially ambulating stairs. The patient states that He is point tender along the medial and lateral joint line. He denies any recent bruising, numbness, tingling, or feelings of instability. He also denies any fevers, chills or shortness of breath. Patient has failed at least three months of conservative therapy including injections of cortisone, physical therapy / home exercise program, medial  bracing, Voltaren Gel, OTC pain medication. Review of systems negative unless otherwise specified in HPI  Review of Systems   Constitutional: Negative for chills and fever. HENT: Negative for ear pain and sore throat. Eyes: Negative for pain and visual disturbance. Respiratory: Negative for cough and shortness of breath. Cardiovascular: Negative for chest pain and palpitations.    Gastrointestinal: Negative for abdominal pain and vomiting. Genitourinary: Negative for dysuria and hematuria. Musculoskeletal: Negative for arthralgias and back pain. Skin: Negative for color change and rash. Neurological: Negative for seizures and syncope. All other systems reviewed and are negative. Past Medical History:   Diagnosis Date   • Asthma    • Chest pain 12/22/2022   • CHF (congestive heart failure) ([facility])    • COPD (chronic obstructive pulmonary disease) ([facility])    • COPD with acute exacerbation ([address]) 5/6/2022   • COVID-19    • COVID-19 virus infection 12/3/2021   • Mumps    • Old MI (myocardial infarction)    • Pneumonia    • Pulmonary emphysema ([address])    • Rectal bleeding 1/14/2019   • Sleep apnea        Past Surgical History:   Procedure Laterality Date   • CARDIAC CATHETERIZATION  07/24/2015    Left main- normal and maidly tortuous. Circumflex - normal and moderately tortuous. RCA- normal and mildy tortuous. Global LV function was severely depressed. .   • CARDIAC CATHETERIZATION Left 9/27/2022    Procedure: Cardiac Left Heart Cath;  Surgeon: Fay Lopez DO;  Location: [facility]; Service: Cardiology   • CARDIAC CATHETERIZATION N/A 9/27/2022    Procedure: Cardiac Coronary Angiogram;  Surgeon: Fay Lopez DO;  Location: [facility]; Service: Cardiology   • CARDIAC CATHETERIZATION  9/27/2022    Procedure: Cardiac catheterization;  Surgeon: Fay Lopez DO;  Location: [facility]; Service: Cardiology   • INGUINAL HERNIA REPAIR Bilateral    • UT COLONOSCOPY FLX DX W/COLLJ SPEC WHEN PFRMD N/A 3/11/2019    Procedure: COLONOSCOPY with removal of anal papilla;   Surgeon: Ruth Cheng MD;  Location: [facility];  Service: Colorectal   • TONSILLECTOMY     • UMBILICAL HERNIA REPAIR  06/21/2006       Family History   Problem Relation Age of Onset   • Heart attack Father         MI   • Heart disease Father    • Diabetes Sister         DM   • Arthritis Family    • Coronary artery disease Family • Hypertension Family    • Tuberculosis Son    • Alzheimer's disease Mother        Social History     Occupational History   • Not on file   Tobacco Use   • Smoking status: Former     Packs/day: 3.00     Years: 43.00     Total pack years: 129.00     Types: Cigarettes     Start date: 67     Quit date: 2018     Years since quittin.6   • Smokeless tobacco: Never   Vaping Use   • Vaping Use: Never used   Substance and Sexual Activity   • Alcohol use: Not Currently     Comment: rarely   • Drug use: Yes     Types: Marijuana     Comment: occasionally edible   • Sexual activity: Not on file         Current Outpatient Medications:   •  Accu-Chek FastClix Lancets MISC, Use 1 each daily to test blood sugar., Disp: 100 each, Rfl: 5  •  albuterol (2.5 mg/3 mL) 0.083 % nebulizer solution, Take 3 mL (2.5 mg total) by nebulization every 6 (six) hours as needed for wheezing or shortness of breath, Disp: 360 mL, Rfl: 2  •  albuterol (ProAir HFA) 90 mcg/act inhaler, Inhale 2 puffs every 6 (six) hours as needed for wheezing, Disp: 18 g, Rfl: 11  •  ALPRAZolam (XANAX) 0.5 mg tablet, TAKE 1 TABLET BY MOUTH ONCE DAILY AT BEDTIME AS NEEDED FOR ANXIETY, Disp: 30 tablet, Rfl: 0  •  atorvastatin (LIPITOR) 20 mg tablet, Take 1 tablet (20 mg total) by mouth daily, Disp: 90 tablet, Rfl: 3  •  Blood Glucose Monitoring Suppl (Accu-Chek Guide Me) w/Device KIT, Use 1 each daily to test blood sugar., Disp: 1 kit, Rfl: 0  •  buPROPion (Wellbutrin XL) 150 mg 24 hr tablet, Take 1 tablet (150 mg total) by mouth daily, Disp: 30 tablet, Rfl: 0  •  carvedilol (COREG) 25 mg tablet, Take 1 tablet (25 mg total) by mouth 2 (two) times a day with meals, Disp: 60 tablet, Rfl: 11  •  DULoxetine (CYMBALTA) 60 mg delayed release capsule, Take 1 capsule by mouth once daily, Disp: 30 capsule, Rfl: 0  •  Dupilumab (Dupixent) 300 MG/2ML SOPN, Inject 300 mg under the skin every 14 (fourteen) days, Disp: 2 mL, Rfl: 11  •  dupilumab (DUPIXENT) subcutaneous injection, Inject 2 mL (300 mg total) under the skin every 14 (fourteen) days Do not start before April 10, 2023., Disp: 2 mL, Rfl: 11  •  fluticasone (FLONASE) 50 mcg/act nasal spray, 1 spray into each nostril 2 (two) times a day, Disp: 16 g, Rfl: 6  •  Fluticasone-Salmeterol (Advair) 500-50 mcg/dose inhaler, INHALE ONE PUFF BY MOUTH AND INTO THE LUNGS TWICE A DAY. RINSE MOUTH AFTER USE, Disp: 60 blister, Rfl: 3  •  Fluticasone-Salmeterol (Advair) 500-50 mcg/dose inhaler, INHALE 1 DOSE BY MOUTH TWICE DAILY RINSE MOUTH AFTER USE, Disp: 60 blister, Rfl: 11  •  furosemide (LASIX) 20 mg tablet, Take 2 tablets (40 mg total) by mouth daily, Disp: 30 tablet, Rfl: 0  •  glucose blood test strip, Use 1 each daily to test blood sugar., Disp: 100 strip, Rfl: 5  •  guaiFENesin (Mucus Relief) 600 mg 12 hr tablet, Take 2 tablets (1,200 mg total) by mouth every 12 (twelve) hours, Disp: 60 tablet, Rfl: 3  •  isosorbide mononitrate (IMDUR) 30 mg 24 hr tablet, Take 1 tablet by mouth once daily, Disp: 90 tablet, Rfl: 3  •  montelukast (SINGULAIR) 10 mg tablet, Take 1 tablet (10 mg total) by mouth daily at bedtime, Disp: 30 tablet, Rfl: 0  •  sacubitril-valsartan (ENTRESTO) 49-51 MG TABS, Take 1 tablet by mouth 2 (two) times a day, Disp: 60 tablet, Rfl: 11  •  sodium chloride 3 % inhalation solution, Take 4 mL by nebulization as needed for cough (congestion), Disp: 30 mL, Rfl: 0  •  tiotropium (Spiriva Respimat) 2.5 MCG/ACT AERS inhaler, Inhale 2 spray (s) by mouth once daily. , Disp: 4 g, Rfl: 6  •  ipratropium-albuterol (DUO-NEB) 0.5-2.5 mg/3 mL nebulizer solution, Take 3 mL by nebulization every 6 (six) hours as needed for wheezing or shortness of breath, Disp: 360 mL, Rfl: 0    Allergies   Allergen Reactions   • Ciprofloxacin Shortness Of Breath and Diarrhea          Vitals:    08/15/23 1300   BP: 115/63   Pulse: 84       Objective:                    Ortho Exam  bilateral knee(s) -   Patient ambulates with antalgic gait pattern  Uses No assistive device  Genu Varus anatomical deformity  Skin is warm and dry to touch with no signs of erythema, ecchymosis, or infection  No soft tissue swelling or effusion noted  ROM (5° - 115°)  MMT: 4/5 throughout  TTP over medial joint line, TTP over lateral joint line, TTP over pes anserine bursa, no popliteal fullness appreciated on exam   Flexor and extensor mechanisms are intact   Knee is stable to varus and valgus stress  - Lachman's  - Anterior Drawer, - Posterior Drawer  - Medial Yessy's, - Lateral Yessy's  - Pivot Shift  Patella tracks centrally with palpable crepitus  Calf compartments are soft and supple  - Jesus's sign  2+ DP and PT pulses with brisk capillary refill to the toes  Sural, saphenous, tibial, superficial, and deep peroneal motor and sensory distributions intact  Sensation light touch intact distally    Diagnostics, reviewed and taken today if performed as documented: The attending physician has personally reviewed the pertinent films in PACS and interpretation is as follows:    X-Ray of bilateral knee taken on 08/15/2023 were reviewed and showed severe tricompartmental osteoarthritis affecting the medial tibiofemoral compartment as evidenced by joint space narrowing, osteophyte formation and subchondral sclerosis. Procedures, if performed today:    None performed    Portions of the record may have been created with voice recognition software. Occasional wrong word or "sound a like" substitutions may have occurred due to the inherent limitations of voice recognition software. Read the chart carefully and recognize, using context, where substitutions have occurred.

## 2023-08-17 DIAGNOSIS — F41.9 ANXIETY: ICD-10-CM

## 2023-08-17 RX ORDER — ALPRAZOLAM 0.5 MG/1
TABLET ORAL
Qty: 30 TABLET | Refills: 0 | Status: SHIPPED | OUTPATIENT
Start: 2023-08-17

## 2023-08-18 ENCOUNTER — TELEPHONE (OUTPATIENT)
Dept: OBGYN CLINIC | Facility: CLINIC | Age: 68
End: 2023-08-18

## 2023-08-18 NOTE — TELEPHONE ENCOUNTER
Contacted patient and provided him with dates for his medical clearance appointments for upcoming surgery. Has an appointment with Dr. Larissa Oseguera on Tuesday, September 12th at 3pm in the [facility] office. Cardiology clearance appointment will be completed on Tuesday, September 5th and 1240pm. Patient was also reminded to have bloodwork completed either 8/24, 8/25, or the following week. Stated to give us a call should he have any other questions or concerns.

## 2023-08-22 ENCOUNTER — EVALUATION (OUTPATIENT)
Dept: PHYSICAL THERAPY | Facility: CLINIC | Age: 68
End: 2023-08-22
Payer: MEDICARE

## 2023-08-22 DIAGNOSIS — Z01.818 PRE-OP TESTING: ICD-10-CM

## 2023-08-22 DIAGNOSIS — M25.59 PAIN IN OTHER JOINT: ICD-10-CM

## 2023-08-22 DIAGNOSIS — M17.12 PRIMARY OSTEOARTHRITIS OF LEFT KNEE: Primary | ICD-10-CM

## 2023-08-22 PROCEDURE — 97161 PT EVAL LOW COMPLEX 20 MIN: CPT | Performed by: PHYSICAL THERAPIST

## 2023-08-22 PROCEDURE — 97535 SELF CARE MNGMENT TRAINING: CPT | Performed by: PHYSICAL THERAPIST

## 2023-08-22 PROCEDURE — 97110 THERAPEUTIC EXERCISES: CPT | Performed by: PHYSICAL THERAPIST

## 2023-08-22 PROCEDURE — 97140 MANUAL THERAPY 1/> REGIONS: CPT | Performed by: PHYSICAL THERAPIST

## 2023-08-22 NOTE — PROGRESS NOTES
PT Evaluation     Today's date: 2023  Patient name: Malik Ramires  : 1955  MRN: [id number]  Referring provider: Aldo Quinn DO  Dx:   Encounter Diagnosis     ICD-10-CM    1. Primary osteoarthritis of left knee  M17.12 Ambulatory referral to Physical Therapy      2. Pain in other joint  M25.59 Ambulatory referral to Physical Therapy      3. Pre-op testing  Z01.818 Ambulatory referral to Physical Therapy                     Assessment  Understanding of Dx/Px/POC: good   Prognosis: good    Goals  STGs: To be complete within 4-6 weeks  - Decrease pain to < 2/10 at worst  - Increase AROM to WNL  - Increase strength to > 4+/5  - Improve gait to WNL for distances < 6 blocks    LTGs: To be complete within 8 weeks  - Able to walk for any extended amount of time/distance without pain or limitation for increased safety and functional capacity with ADLs and home-related duty  - Able to repetitively squat without pain or limitation for increased safety and functional capacity with ADLs and home-related duty  - Able to repetitively ascend/descend a full flight of stairs without pain or limitation for increased safety and functional capacity with ADLs and home-related duty  - Able to repetitively complete transfers without pain or limitation for increased safety and functional capacity with ADLs and home-related duty  - Able to keel for any extended amount of time without pain or limitation for increased safety and functional capacity with ADLs and home-related duty    Plan  Planned therapy interventions: neuromuscular re-education, manual therapy, patient education, self care, therapeutic activities, therapeutic exercise, home exercise program and gait training  Frequency: 2x week  Duration in weeks: 6       Pt is a 79 y.o. male with chronic L Knee pain who presents with functional deficits including decreased capacity with walking, squatting, kneeling, stairs, and transfers.  Upon completion of  initial evaluation, Brenden's sx remain consistent with severe L Knee OA. Pt will be undergoing L Knee TKA 23. Patient will benefit from skilled physical therapy to address current deficits. Subjective Evaluation    Patient Goals  Patient goals for therapy: increased strength, decreased pain and increased motion    Pain  Current pain ratin  At best pain ratin  At worst pain ratin  Location: L Knee         Pt reports he will be undergoing L Knee TKA 23.          Objective Pain level ranges 2-8/10  AROM: L Knee 0-115 degrees; R Knee 0-115 degrees  Strength: L Quad and HS 5/5; R Quad and HS 5/5  Gait: Shortened step length, bilateral knee extension lag  Swelling: Mod (will continue to monitor)  Incisions:   FOTO:   Unable to walk without pain and limitation  Unable to squat without pain and limitation  Unable complete transfers without pain and limitation  Unable to ascend/descend stairs without pain and limitation  Unable to kneel without pain and limitation             Precautions: MD protocol for L Knee TKA 23    Daily Treatment Diary    HEP: Handout provided and discussed      Manuals 23            ART             PROM/Stretch x15'            IASTM             STM/Triggerpoint             JM                          Neuro Re-Ed             Standing 1/2 Roll calf stretch             SL Ecc HR                                                                                           Ther Ex             HS into QS 3x10            Ankle Pumps 2x10            HR/TR             TB TKE             TB Heel to Toes             Sit to stand             Bridge with Add squeeze             SL Bridge             Clam shells             SL Sit to Stand             BOSU SLB             Upside down BOSU SL squat holds             TB Lat Walks             Step ups/downs                                       Ther Activity             TM             Bike             NuStep Forward/backward heel to toe walk                          Gait Training                                                                 Modalities             MHP             CP x10'            US/Stim

## 2023-08-23 ENCOUNTER — APPOINTMENT (OUTPATIENT)
Dept: LAB | Facility: HOSPITAL | Age: 68
End: 2023-08-23
Payer: MEDICARE

## 2023-08-23 ENCOUNTER — OFFICE VISIT (OUTPATIENT)
Dept: LAB | Facility: HOSPITAL | Age: 68
End: 2023-08-23
Payer: MEDICARE

## 2023-08-23 DIAGNOSIS — M25.59 PAIN IN OTHER JOINT: ICD-10-CM

## 2023-08-23 DIAGNOSIS — M17.12 PRIMARY OSTEOARTHRITIS OF LEFT KNEE: ICD-10-CM

## 2023-08-23 DIAGNOSIS — Z01.818 PRE-OP TESTING: ICD-10-CM

## 2023-08-23 DIAGNOSIS — I50.42 CHRONIC COMBINED SYSTOLIC (CONGESTIVE) AND DIASTOLIC (CONGESTIVE) HEART FAILURE (HCC): ICD-10-CM

## 2023-08-23 LAB
ALBUMIN SERPL BCP-MCNC: 4.1 G/DL (ref 3.5–5)
ALP SERPL-CCNC: 70 U/L (ref 34–104)
ALT SERPL W P-5'-P-CCNC: 22 U/L (ref 7–52)
ANION GAP SERPL CALCULATED.3IONS-SCNC: 11 MMOL/L
APTT PPP: 34 SECONDS (ref 23–37)
AST SERPL W P-5'-P-CCNC: 18 U/L (ref 13–39)
ATRIAL RATE: 60 BPM
BASOPHILS # BLD AUTO: 0.06 THOUSANDS/ÂΜL (ref 0–0.1)
BASOPHILS NFR BLD AUTO: 1 % (ref 0–1)
BILIRUB SERPL-MCNC: 0.96 MG/DL (ref 0.2–1)
BUN SERPL-MCNC: 26 MG/DL (ref 5–25)
CALCIUM SERPL-MCNC: 9.5 MG/DL (ref 8.4–10.2)
CHLORIDE SERPL-SCNC: 100 MMOL/L (ref 96–108)
CO2 SERPL-SCNC: 23 MMOL/L (ref 21–32)
CREAT SERPL-MCNC: 1.41 MG/DL (ref 0.6–1.3)
EOSINOPHIL # BLD AUTO: 0.72 THOUSAND/ÂΜL (ref 0–0.61)
EOSINOPHIL NFR BLD AUTO: 8 % (ref 0–6)
ERYTHROCYTE [DISTWIDTH] IN BLOOD BY AUTOMATED COUNT: 15.5 % (ref 11.6–15.1)
EST. AVERAGE GLUCOSE BLD GHB EST-MCNC: 151 MG/DL
GFR SERPL CREATININE-BSD FRML MDRD: 51 ML/MIN/1.73SQ M
GLUCOSE SERPL-MCNC: 103 MG/DL (ref 65–140)
HBA1C MFR BLD: 6.9 %
HCT VFR BLD AUTO: 42.7 % (ref 36.5–49.3)
HGB BLD-MCNC: 13.5 G/DL (ref 12–17)
IMM GRANULOCYTES # BLD AUTO: 0.01 THOUSAND/UL (ref 0–0.2)
IMM GRANULOCYTES NFR BLD AUTO: 0 % (ref 0–2)
INR PPP: 1.04 (ref 0.84–1.19)
LYMPHOCYTES # BLD AUTO: 1.5 THOUSANDS/ÂΜL (ref 0.6–4.47)
LYMPHOCYTES NFR BLD AUTO: 18 % (ref 14–44)
MCH RBC QN AUTO: 27.3 PG (ref 26.8–34.3)
MCHC RBC AUTO-ENTMCNC: 31.6 G/DL (ref 31.4–37.4)
MCV RBC AUTO: 86 FL (ref 82–98)
MONOCYTES # BLD AUTO: 0.7 THOUSAND/ÂΜL (ref 0.17–1.22)
MONOCYTES NFR BLD AUTO: 8 % (ref 4–12)
NEUTROPHILS # BLD AUTO: 5.59 THOUSANDS/ÂΜL (ref 1.85–7.62)
NEUTS SEG NFR BLD AUTO: 65 % (ref 43–75)
NRBC BLD AUTO-RTO: 0 /100 WBCS
P AXIS: 55 DEGREES
PLATELET # BLD AUTO: 239 THOUSANDS/UL (ref 149–390)
PMV BLD AUTO: 9.9 FL (ref 8.9–12.7)
POTASSIUM SERPL-SCNC: 4.4 MMOL/L (ref 3.5–5.3)
PR INTERVAL: 232 MS
PROT SERPL-MCNC: 6.8 G/DL (ref 6.4–8.4)
PROTHROMBIN TIME: 13.7 SECONDS (ref 11.6–14.5)
QRS AXIS: -38 DEGREES
QRSD INTERVAL: 116 MS
QT INTERVAL: 426 MS
QTC INTERVAL: 426 MS
RBC # BLD AUTO: 4.95 MILLION/UL (ref 3.88–5.62)
SODIUM SERPL-SCNC: 134 MMOL/L (ref 135–147)
T WAVE AXIS: 76 DEGREES
VENTRICULAR RATE: 60 BPM
WBC # BLD AUTO: 8.58 THOUSAND/UL (ref 4.31–10.16)

## 2023-08-23 PROCEDURE — 93010 ELECTROCARDIOGRAM REPORT: CPT | Performed by: INTERNAL MEDICINE

## 2023-08-23 PROCEDURE — 85610 PROTHROMBIN TIME: CPT

## 2023-08-23 PROCEDURE — 85025 COMPLETE CBC W/AUTO DIFF WBC: CPT

## 2023-08-23 PROCEDURE — 93005 ELECTROCARDIOGRAM TRACING: CPT

## 2023-08-23 PROCEDURE — 80053 COMPREHEN METABOLIC PANEL: CPT

## 2023-08-23 PROCEDURE — 85730 THROMBOPLASTIN TIME PARTIAL: CPT

## 2023-08-23 PROCEDURE — 36415 COLL VENOUS BLD VENIPUNCTURE: CPT

## 2023-08-23 PROCEDURE — 83036 HEMOGLOBIN GLYCOSYLATED A1C: CPT

## 2023-08-24 ENCOUNTER — APPOINTMENT (OUTPATIENT)
Dept: LAB | Facility: HOSPITAL | Age: 68
End: 2023-08-24
Attending: STUDENT IN AN ORGANIZED HEALTH CARE EDUCATION/TRAINING PROGRAM
Payer: MEDICARE

## 2023-08-24 ENCOUNTER — TELEPHONE (OUTPATIENT)
Dept: OBGYN CLINIC | Facility: HOSPITAL | Age: 68
End: 2023-08-24

## 2023-08-24 DIAGNOSIS — Z01.818 PRE-OP TESTING: ICD-10-CM

## 2023-08-24 DIAGNOSIS — M17.12 PRIMARY OSTEOARTHRITIS OF LEFT KNEE: Primary | ICD-10-CM

## 2023-08-24 DIAGNOSIS — J45.40 CHRONIC ASTHMA, MODERATE PERSISTENT, UNCOMPLICATED: ICD-10-CM

## 2023-08-24 DIAGNOSIS — M25.59 PAIN IN OTHER JOINT: ICD-10-CM

## 2023-08-24 DIAGNOSIS — M17.12 PRIMARY OSTEOARTHRITIS OF LEFT KNEE: ICD-10-CM

## 2023-08-24 LAB
ABO GROUP BLD: NORMAL
BLD GP AB SCN SERPL QL: NEGATIVE
RH BLD: NEGATIVE
SPECIMEN EXPIRATION DATE: NORMAL

## 2023-08-24 PROCEDURE — 86850 RBC ANTIBODY SCREEN: CPT

## 2023-08-24 PROCEDURE — 86900 BLOOD TYPING SEROLOGIC ABO: CPT

## 2023-08-24 PROCEDURE — 86901 BLOOD TYPING SEROLOGIC RH(D): CPT

## 2023-08-24 NOTE — TELEPHONE ENCOUNTER
Preoperative Elective Admission Assessment    Who does pt live with: spouse  What kind of home: split level  How do they enter the home: front  How many levels in home: 2   # of steps to enter home: n/a  # of steps to second floor: 6  Are there handrails: Yes  Are there landings: No  Sleeping arrangement: first/entry floor  Where is Bathroom: second floor  Where is the tub or shower: step in bath tub w/o grab bars, has shower chair  Dogs or ther pets: 1 dog     First Floor Setup: up 6 steps  Is there a bathroom: Yes  Where would pt sleep: bed     DME: cane   DME ordered (RW) on 8/24  [address], [address]  We discussed clearing pathways in the home and making sure there is accessibly to use the walker, for example, removing throw rugs. Patient's Current Level of Function: Ambulates with cane and ADLs: Independent    Post-op Caregiver: spouse  Caregiver Name and phone number for Inpatient discharge needs: Denisse Stevens  Currently receive any HHC/aides/community supports: No     Post-op Transport: spouse  To/from hospital: spouse  To/from PT 2-3x/week: spouse  Uses community transport now: No     Outpatient Physical Therapy Site:  Site: [facility]  pre and post-op appts scheduled? Yes     Medication Management: self and out of bottle  Preferred Pharmacy for Post-op Medications: [facility] in [place]   Blood Management Vitamin Regimen: Pt confirms taking as prescribed  Post-op anticoagulant: to be determined by surgical team postoperatively     DC Plan: Pt plans to be discharged home      Barriers to DC identified preoperatively: none identified    BMI: 39.93    Patient Education:  Pt educated on post-op pain, early mobilization (POD0), LOS goals, OP PT goals, and preoperative bathing. Patient educated that our goal is to appropriately discharge patient based off their post-op function while striving to maintain maximal independence.  The goal is to discharge patient to home and for them to attend outpatient physical therapy. Assigned to care team? Yes    CHW referral placed for medicare with  PCP/[place] wellness PCP?  No

## 2023-08-24 NOTE — TELEPHONE ENCOUNTER
Patient called requesting a refill on this medication. He states Dr. Prince Ruiz normally fills this for him.

## 2023-08-25 LAB
DME PARACHUTE DELIVERY DATE REQUESTED: NORMAL
DME PARACHUTE ITEM DESCRIPTION: NORMAL
DME PARACHUTE ORDER STATUS: NORMAL
DME PARACHUTE SUPPLIER NAME: NORMAL
DME PARACHUTE SUPPLIER PHONE: NORMAL

## 2023-08-28 DIAGNOSIS — N17.9 AKI (ACUTE KIDNEY INJURY) (HCC): Primary | ICD-10-CM

## 2023-08-28 RX ORDER — IPRATROPIUM BROMIDE AND ALBUTEROL SULFATE 2.5; .5 MG/3ML; MG/3ML
3 SOLUTION RESPIRATORY (INHALATION) EVERY 6 HOURS PRN
Qty: 360 ML | Refills: 0 | OUTPATIENT
Start: 2023-08-28 | End: 2023-09-27

## 2023-08-29 NOTE — PROGRESS NOTES
Cardiology Follow Up    Lex Hoover  1955  [id number]  [address] [place] [address] [facility] [place] [address]  [phone]    1. Chronic combined systolic and diastolic congestive heart failure ([address])        2. Non-ischemic cardiomyopathy with HFmEF (HCC)  furosemide (LASIX) 40 mg tablet      3. Benign essential hypertension        4. Dyslipidemia        5. Class 2 severe obesity due to excess calories with serious comorbidity and body mass index (BMI) of 37.0 to 37.9 in adult [facility])            Discussion/Summary:  Chronic diastolic congestive heart failure:  After his last visit his lasix was reduced from 40 mg daily to 40 mg every other day due to worsening renal function on labs. Renal function subsequently has improved with every other day dosing. However, patient states today he has not taken his medication in over 1 week as the pharmacy did not have the medication. Discussed importance of informing cardiology if his medication is unavailable and a different pharmacy can be utilized. Sent 3 month supply to pharmacy and advised him to resume lasix 40 mg every other day. Daily weights and a low sodium diet were reinforced.      Non-ischemic cardiomyopathy:  Thought to be secondary to alcohol use. LHC in September 2022 did not show any evidence of obstructive CAD. EF is stable at 45% per repeat echo from July 2023. On GDMT with carvedilol and Entresto. Continued to reinforce importance of alcohol cessation.      Hypertension:  Well controlled on present regimen. He does have obstructive sleep apnea but is unable to tolerate CPAP. Cardiac clearance:  Patient may proceed with upcoming left total knee replacement without any additional cardiac evaluation. He has a stable mildly reduced ejection fraction and is compensated. He does not have any significant valvular abnormalities.  He has no symptoms of angina and left heart catheterization from 1 year ago did not reveal any obstructive coronary artery disease. Recent EKG does not show any new ischemic changes. He will RTO in 3 months with Dr. Dana Snow or sooner if necessary. He will call with any concerns in the interim. Interval History:   Georgi Camacho is a 79 y.o. male with a non-ischemic cardiomyopathy thought to be secondary to alcohol abuse, chronic combined systolic and diastolic congestive heart failure, hypertension, dyslipidemia, obstructive sleep apnea, and COPD/asthma who presents to the office today for a heart failure follow up. After his last office visit his lasix was reduced to 40 mg every other day due to worsening renal function. Creatinine has improved with dose reduction. Since his last office visit he reports feeling well. He has been attending the gym most days of the week. He has been riding a stationary bicycle for up to 20 minutes and does light weight lifting. He is able to do so without any limiting shortness of breath. He notes improvement in his endurance and a decrease in his recovery time. He denies chest pain/pressure/discomfort. He states he has been out of his lasix for about the last week and a half as the pharmacy did not have the medication. He notes some lower extremity edema. He did eat pizza yesterday. He has been weighing himself daily at the gym. He states his weight is about 305 lbs on this scale. He denies lightheadedness, dizziness, and syncope. He denies palpitations. He reports abstaining from alcohol. He also states he needs cardiac clearance prior to upcoming left knee replacement by Dr. Tammy Piña on 9/18/23.      Medical Problems     Problem List     Chronic asthma, moderate persistent, uncomplicated    Obstructive sleep apnea    Overview Signed 12/19/2018 10:16 AM by Gill Collado DO       Declines CPAP         Chronic combined systolic and diastolic congestive heart failure (HCC)    Wt Readings from Last 3 Encounters:   09/05/23 (!) 140 kg (309 lb)   08/15/23 (!) 141 kg (311 lb)   08/04/23 (!) 141 kg (310 lb)                 Non-ischemic cardiomyopathy with HFmEF (HCC) (Chronic)    Dyslipidemia    Benign essential hypertension    Anxiety (Chronic)    Asthma-COPD overlap syndrome (HCC)    COPD, severe ([address])    Overview Signed 12/19/2018 10:16 AM by Gómez Valdovinos DO      Post bronchodilator FEV1 is 68% predicted, 2017         Hemorrhoids    Thyroid disease    Vitamin D deficiency    Constipation    Pulmonary emphysema (HCC)    History of tobacco abuse    Hypertrophied anal papilla    Bronchitis    PLMD (periodic limb movement disorder)    Stage 3 chronic kidney disease, unspecified whether stage 3a or 3b CKD ([address])    Lab Results   Component Value Date    EGFR 51 08/23/2023    EGFR 36 08/09/2023    EGFR 44 07/30/2023    CREATININE 1.41 (H) 08/23/2023    CREATININE 1.89 (H) 08/09/2023    CREATININE 1.60 (H) 07/30/2023         Type 2 diabetes mellitus ([facility])      Lab Results   Component Value Date    HGBA1C 6.9 (H) 08/23/2023         Hyponatremia    Moderate episode of recurrent major depressive disorder (HCC)    Class 2 severe obesity due to excess calories with serious comorbidity and body mass index (BMI) of 37.0 to 37.9 in adult [facility])    Acute on chronic combined systolic and diastolic CHF (congestive heart failure) ([address])    Wt Readings from Last 3 Encounters:   09/05/23 (!) 140 kg (309 lb)   08/15/23 (!) 141 kg (311 lb)   08/04/23 (!) 141 kg (310 lb)                 Alcohol abuse    Anxiety and depression    Primary osteoarthritis of left knee        Past Medical History:   Diagnosis Date   • Asthma    • Chest pain 12/22/2022   • CHF (congestive heart failure) (HCC)    • COPD (chronic obstructive pulmonary disease) (HCC)    • COPD with acute exacerbation ([address]) 5/6/2022   • COVID-19    • COVID-19 virus infection 12/3/2021   • Mumps    • Old MI (myocardial infarction)    • Pneumonia    • Pulmonary emphysema ([address])    • Rectal bleeding 2019   • Sleep apnea      Social History     Socioeconomic History   • Marital status: /Civil Union     Spouse name: Not on file   • Number of children: Not on file   • Years of education: Not on file   • Highest education level: Not on file   Occupational History   • Not on file   Tobacco Use   • Smoking status: Former     Packs/day: 3.00     Years: 43.00     Total pack years: 129.00     Types: Cigarettes     Start date: 67     Quit date: 2018     Years since quittin.6   • Smokeless tobacco: Never   Vaping Use   • Vaping Use: Never used   Substance and Sexual Activity   • Alcohol use: Not Currently     Comment: rarely   • Drug use: Yes     Types: Marijuana     Comment: occasionally edible   • Sexual activity: Not on file   Other Topics Concern   • Not on file   Social History Narrative    Caffeine use     Social Determinants of Health     Financial Resource Strain: Low Risk  (3/23/2023)    Overall Financial Resource Strain (CARDIA)    • Difficulty of Paying Living Expenses: Not very hard   Food Insecurity: No Food Insecurity (2023)    Hunger Vital Sign    • Worried About Running Out of Food in the Last Year: Never true    • Ran Out of Food in the Last Year: Never true   Transportation Needs: No Transportation Needs (2023)    PRAPARE - Transportation    • Lack of Transportation (Medical): No    • Lack of Transportation (Non-Medical):  No   Physical Activity: Not on file   Stress: Not on file   Social Connections: Not on file   Intimate Partner Violence: Not on file   Housing Stability: Low Risk  (2023)    Housing Stability Vital Sign    • Unable to Pay for Housing in the Last Year: No    • Number of Places Lived in the Last Year: 1    • Unstable Housing in the Last Year: No      Family History   Problem Relation Age of Onset   • Heart attack Father         MI   • Heart disease Father    • Diabetes Sister         DM   • Arthritis Family    • Coronary artery disease Family    • Hypertension Family    • Tuberculosis Son    • Alzheimer's disease Mother      Past Surgical History:   Procedure Laterality Date   • CARDIAC CATHETERIZATION  07/24/2015    Left main- normal and maidly tortuous. Circumflex - normal and moderately tortuous. RCA- normal and mildy tortuous. Global LV function was severely depressed. .   • CARDIAC CATHETERIZATION Left 9/27/2022    Procedure: Cardiac Left Heart Cath;  Surgeon: Rosette Doherty DO;  Location: [facility]; Service: Cardiology   • CARDIAC CATHETERIZATION N/A 9/27/2022    Procedure: Cardiac Coronary Angiogram;  Surgeon: Rosette Doherty DO;  Location: [facility]; Service: Cardiology   • CARDIAC CATHETERIZATION  9/27/2022    Procedure: Cardiac catheterization;  Surgeon: Rosette Doherty DO;  Location: [facility]; Service: Cardiology   • INGUINAL HERNIA REPAIR Bilateral    • MI COLONOSCOPY FLX DX W/COLLJ SPEC WHEN PFRMD N/A 3/11/2019    Procedure: COLONOSCOPY with removal of anal papilla;   Surgeon: Isiah Hatchet, MD;  Location: [facility];  Service: Colorectal   • TONSILLECTOMY     • UMBILICAL HERNIA REPAIR  06/21/2006       Current Outpatient Medications:   •  Accu-Chek FastClix Lancets MISC, Use 1 each daily to test blood sugar., Disp: 100 each, Rfl: 5  •  albuterol (2.5 mg/3 mL) 0.083 % nebulizer solution, Take 3 mL (2.5 mg total) by nebulization every 6 (six) hours as needed for wheezing or shortness of breath, Disp: 360 mL, Rfl: 2  •  albuterol (ProAir HFA) 90 mcg/act inhaler, Inhale 2 puffs every 6 (six) hours as needed for wheezing, Disp: 18 g, Rfl: 11  •  ALPRAZolam (XANAX) 0.5 mg tablet, TAKE 1 TABLET BY MOUTH ONCE DAILY AT BEDTIME AS NEEDED FOR ANXIETY, Disp: 30 tablet, Rfl: 0  •  ascorbic acid (VITAMIN C) 500 MG tablet, Take 1 tablet (500 mg total) by mouth 2 (two) times a day, Disp: 60 tablet, Rfl: 1  •  atorvastatin (LIPITOR) 20 mg tablet, Take 1 tablet (20 mg total) by mouth daily, Disp: 90 tablet, Rfl: 3  •  Blood Glucose Monitoring Suppl (Accu-Chek Guide Me) w/Device KIT, Use 1 each daily to test blood sugar., Disp: 1 kit, Rfl: 0  •  buPROPion (Wellbutrin XL) 150 mg 24 hr tablet, Take 1 tablet (150 mg total) by mouth daily, Disp: 30 tablet, Rfl: 0  •  carvedilol (COREG) 25 mg tablet, Take 1 tablet (25 mg total) by mouth 2 (two) times a day with meals, Disp: 60 tablet, Rfl: 11  •  DULoxetine (CYMBALTA) 60 mg delayed release capsule, Take 1 capsule by mouth once daily, Disp: 30 capsule, Rfl: 0  •  Dupilumab (Dupixent) 300 MG/2ML SOPN, Inject 300 mg under the skin every 14 (fourteen) days, Disp: 2 mL, Rfl: 11  •  dupilumab (DUPIXENT) subcutaneous injection, Inject 2 mL (300 mg total) under the skin every 14 (fourteen) days Do not start before April 10, 2023., Disp: 2 mL, Rfl: 11  •  ferrous sulfate 324 (65 Fe) mg, Take 1 tablet (324 mg total) by mouth 2 (two) times a day before meals, Disp: 60 tablet, Rfl: 1  •  fluticasone (FLONASE) 50 mcg/act nasal spray, 1 spray into each nostril 2 (two) times a day, Disp: 16 g, Rfl: 6  •  Fluticasone-Salmeterol (Advair) 500-50 mcg/dose inhaler, INHALE ONE PUFF BY MOUTH AND INTO THE LUNGS TWICE A DAY.  RINSE MOUTH AFTER USE, Disp: 60 blister, Rfl: 3  •  Fluticasone-Salmeterol (Advair) 500-50 mcg/dose inhaler, INHALE 1 DOSE BY MOUTH TWICE DAILY RINSE MOUTH AFTER USE, Disp: 60 blister, Rfl: 11  •  folic acid (FOLVITE) 1 mg tablet, Take 1 tablet (1 mg total) by mouth daily, Disp: 30 tablet, Rfl: 1  •  furosemide (LASIX) 40 mg tablet, Take 1 tablet (40 mg total) by mouth every other day, Disp: 45 tablet, Rfl: 3  •  glucose blood test strip, Use 1 each daily to test blood sugar., Disp: 100 strip, Rfl: 5  •  guaiFENesin (Mucus Relief) 600 mg 12 hr tablet, Take 2 tablets (1,200 mg total) by mouth every 12 (twelve) hours, Disp: 60 tablet, Rfl: 3  •  ipratropium-albuterol (DUO-NEB) 0.5-2.5 mg/3 mL nebulizer solution, Take 3 mL by nebulization every 6 (six) hours as needed for wheezing or shortness of breath, Disp: 360 mL, Rfl: 11  •  isosorbide mononitrate (IMDUR) 30 mg 24 hr tablet, Take 1 tablet by mouth once daily, Disp: 90 tablet, Rfl: 3  •  montelukast (SINGULAIR) 10 mg tablet, Take 1 tablet (10 mg total) by mouth daily at bedtime, Disp: 30 tablet, Rfl: 0  •  Multiple Vitamins-Minerals (multivitamin with minerals) tablet, Take 1 tablet by mouth daily, Disp: 30 tablet, Rfl: 1  •  sacubitril-valsartan (ENTRESTO) 49-51 MG TABS, Take 1 tablet by mouth 2 (two) times a day, Disp: 60 tablet, Rfl: 11  •  sodium chloride 3 % inhalation solution, Take 4 mL by nebulization as needed for cough (congestion), Disp: 30 mL, Rfl: 0  •  tiotropium (Spiriva Respimat) 2.5 MCG/ACT AERS inhaler, Inhale 2 spray (s) by mouth once daily. , Disp: 4 g, Rfl: 6  Allergies   Allergen Reactions   • Ciprofloxacin Shortness Of Breath and Diarrhea       Labs:     Chemistry        Component Value Date/Time     07/27/2015 0559    K 4.4 08/23/2023 1335    K 3.8 07/27/2015 0559     08/23/2023 1335     07/27/2015 0559    CO2 23 08/23/2023 1335    CO2 32 07/27/2015 0559    BUN 26 (H) 08/23/2023 1335    BUN 19 07/27/2015 0559    CREATININE 1.41 (H) 08/23/2023 1335    CREATININE 1.05 07/27/2015 0559        Component Value Date/Time    CALCIUM 9.5 08/23/2023 1335    CALCIUM 8.6 07/27/2015 0559    ALKPHOS 70 08/23/2023 1335    ALKPHOS 75 07/22/2015 1021    AST 18 08/23/2023 1335    AST 30 07/22/2015 1021    ALT 22 08/23/2023 1335    ALT 74 07/22/2015 1021    BILITOT 1.05 (H) 07/22/2015 1021            Lab Results   Component Value Date    CHOL 105 07/23/2015     Lab Results   Component Value Date    HDL 27 (L) 09/01/2022    HDL 27 (L) 09/22/2021    HDL 24 (L) 12/18/2018     Lab Results   Component Value Date    LDLCALC 49 09/01/2022    LDLCALC 107 (H) 09/22/2021    LDLCALC 89 12/18/2018     Lab Results   Component Value Date    TRIG 138 09/01/2022    TRIG 131 09/22/2021    TRIG 107 12/18/2018     No results found for: "CHOLHDL"    Imaging: XR knee 4+ vw right injury    Result Date: 8/25/2023  Narrative: RIGHT KNEE INDICATION:   M25.569: Pain in unspecified knee M17.0: Bilateral primary osteoarthritis of knee. COMPARISON:  None VIEWS:  XR KNEE 4+ VW RIGHT INJURY FINDINGS: There is no acute fracture or dislocation. There is no joint effusion. Moderate to severe tricompartmental osteoarthritis as evidenced by joint space narrowing, osteophyte formation and subchondral sclerosis. No lytic or blastic osseous lesion. There are atherosclerotic calcifications. Soft tissues are otherwise unremarkable. Impression: No acute osseous abnormality. Moderate to severe osteoarthritic degenerative changes of the right knee joint. Workstation performed: [id number]     XR knee 4+ vw left injury    Result Date: 8/25/2023  Narrative: LEFT KNEE INDICATION:   M25.569: Pain in unspecified knee M17.0: Bilateral primary osteoarthritis of knee. COMPARISON: Left knee x-ray dated December 22, 2021 VIEWS:  XR KNEE 4+ VW LEFT INJURY FINDINGS: There is no acute fracture or dislocation. There is no joint effusion. Tricompartmental osteoarthritis with severe narrowing of the medial tibiofemoral joint space and osteophytes seen in all 3 compartments. There is mild genu varus. There is severe patellofemoral joint space narrowing. No lytic or blastic osseous lesion. There are atherosclerotic calcifications. Soft tissues are otherwise unremarkable. Impression: No acute osseous abnormality. Tricompartmental osteoarthritic degenerative changes of the left knee with severe medial compartment joint space narrowing. Workstation performed: [id number]       Review of Systems   All other systems reviewed and are negative. Vitals:    09/05/23 1229   BP: 138/64   Pulse: 72   SpO2: 96%     Vitals:    09/05/23 1229   Weight: (!) 140 kg (309 lb)     Height: 6' 2" (188 cm)   Body mass index is 39.67 kg/m².     Physical Exam:  Physical Exam  Vitals reviewed. Constitutional:       General: He is not in acute distress. Appearance: He is well-developed. He is obese. He is not diaphoretic. HENT:      Head: Normocephalic and atraumatic. Eyes:      Pupils: Pupils are equal, round, and reactive to light. Neck:      Vascular: No carotid bruit. Cardiovascular:      Rate and Rhythm: Normal rate and regular rhythm. Pulses:           Radial pulses are 2+ on the right side and 2+ on the left side. Heart sounds: S1 normal and S2 normal. No murmur heard. Pulmonary:      Effort: Pulmonary effort is normal. No respiratory distress. Breath sounds: Normal breath sounds. No wheezing or rales. Abdominal:      General: There is no distension. Palpations: Abdomen is soft. Tenderness: There is no abdominal tenderness. Musculoskeletal:         General: Normal range of motion. Cervical back: Normal range of motion. Right lower leg: Edema (mild bilateral lower extremity edema) present. Left lower leg: Edema present. Skin:     General: Skin is warm and dry. Findings: No erythema. Neurological:      General: No focal deficit present. Mental Status: He is alert and oriented to person, place, and time.    Psychiatric:         Mood and Affect: Mood normal.         Behavior: Behavior normal.

## 2023-08-30 LAB
DME PARACHUTE DELIVERY DATE ACTUAL: NORMAL
DME PARACHUTE DELIVERY DATE REQUESTED: NORMAL
DME PARACHUTE ITEM DESCRIPTION: NORMAL
DME PARACHUTE ORDER STATUS: NORMAL
DME PARACHUTE SUPPLIER NAME: NORMAL
DME PARACHUTE SUPPLIER PHONE: NORMAL

## 2023-08-30 RX ORDER — IPRATROPIUM BROMIDE AND ALBUTEROL SULFATE 2.5; .5 MG/3ML; MG/3ML
3 SOLUTION RESPIRATORY (INHALATION) EVERY 6 HOURS PRN
Qty: 360 ML | Refills: 11 | Status: SHIPPED | OUTPATIENT
Start: 2023-08-30 | End: 2023-09-29

## 2023-08-30 NOTE — TELEPHONE ENCOUNTER
Patient is under the care of our pulmonary group, medication request for his inhaler was routed to his care team.

## 2023-09-05 ENCOUNTER — OFFICE VISIT (OUTPATIENT)
Dept: CARDIOLOGY CLINIC | Facility: HOSPITAL | Age: 68
End: 2023-09-05
Payer: MEDICARE

## 2023-09-05 VITALS
HEIGHT: 74 IN | BODY MASS INDEX: 39.66 KG/M2 | OXYGEN SATURATION: 96 % | DIASTOLIC BLOOD PRESSURE: 64 MMHG | SYSTOLIC BLOOD PRESSURE: 138 MMHG | WEIGHT: 309 LBS | HEART RATE: 72 BPM

## 2023-09-05 DIAGNOSIS — I10 BENIGN ESSENTIAL HYPERTENSION: ICD-10-CM

## 2023-09-05 DIAGNOSIS — I42.8 NON-ISCHEMIC CARDIOMYOPATHY (HCC): Chronic | ICD-10-CM

## 2023-09-05 DIAGNOSIS — E78.5 DYSLIPIDEMIA: ICD-10-CM

## 2023-09-05 DIAGNOSIS — E66.01 CLASS 2 SEVERE OBESITY DUE TO EXCESS CALORIES WITH SERIOUS COMORBIDITY AND BODY MASS INDEX (BMI) OF 37.0 TO 37.9 IN ADULT (HCC): ICD-10-CM

## 2023-09-05 DIAGNOSIS — I50.42 CHRONIC COMBINED SYSTOLIC AND DIASTOLIC CONGESTIVE HEART FAILURE (HCC): Primary | ICD-10-CM

## 2023-09-05 PROCEDURE — 99214 OFFICE O/P EST MOD 30 MIN: CPT | Performed by: PHYSICIAN ASSISTANT

## 2023-09-05 RX ORDER — FUROSEMIDE 40 MG/1
40 TABLET ORAL EVERY OTHER DAY
Qty: 45 TABLET | Refills: 3 | Status: SHIPPED | OUTPATIENT
Start: 2023-09-05

## 2023-09-08 ENCOUNTER — TELEPHONE (OUTPATIENT)
Dept: CARDIOLOGY CLINIC | Facility: CLINIC | Age: 68
End: 2023-09-08

## 2023-09-08 RX ORDER — ASPIRIN 81 MG/1
81 TABLET ORAL DAILY
COMMUNITY
End: 2023-09-19

## 2023-09-08 RX ORDER — COVID-19 ANTIGEN TEST
KIT MISCELLANEOUS
COMMUNITY
End: 2023-09-19

## 2023-09-08 NOTE — TELEPHONE ENCOUNTER
Please advise if ok to hold Aspirin prior to upcoming knee surgery scheduled for 9/18. Seen in office on 9/5.

## 2023-09-08 NOTE — PRE-PROCEDURE INSTRUCTIONS
Pre-Surgery Instructions:   Medication Instructions   • albuterol (2.5 mg/3 mL) 0.083 % nebulizer solution Uses PRN- OK to take day of surgery   • albuterol (ProAir HFA) 90 mcg/act inhaler Uses PRN- OK to take day of surgery   • ALPRAZolam (XANAX) 0.5 mg tablet Take night before surgery   • ascorbic acid (VITAMIN C) 500 MG tablet Hold day of surgery. • aspirin (ECOTRIN LOW STRENGTH) 81 mg EC tablet waiting to hear back from cardiology   • atorvastatin (LIPITOR) 20 mg tablet Take night before surgery   • buPROPion (Wellbutrin XL) 150 mg 24 hr tablet Take day of surgery. • carvedilol (COREG) 25 mg tablet Take day of surgery. • DULoxetine (CYMBALTA) 60 mg delayed release capsule Take day of surgery. • Dupilumab (Dupixent) 300 MG/2ML SOPN takes on Sundays   • ferrous sulfate 324 (65 Fe) mg Hold day of surgery. • fluticasone (FLONASE) 50 mcg/act nasal spray Uses PRN- OK to take day of surgery   • Fluticasone-Salmeterol (Advair) 500-50 mcg/dose inhaler Take day of surgery. • folic acid (FOLVITE) 1 mg tablet Hold day of surgery. • furosemide (LASIX) 40 mg tablet Hold day of surgery. • guaiFENesin (Mucus Relief) 600 mg 12 hr tablet Uses PRN- OK to take day of surgery   • ipratropium-albuterol (DUO-NEB) 0.5-2.5 mg/3 mL nebulizer solution Uses PRN- OK to take day of surgery   • isosorbide mononitrate (IMDUR) 30 mg 24 hr tablet Take day of surgery. • montelukast (SINGULAIR) 10 mg tablet Take night before surgery   • Multiple Vitamins-Minerals (multivitamin with minerals) tablet Hold day of surgery. • Naproxen Sodium (Aleve) 220 MG CAPS Stop taking 3 days prior to surgery. • sacubitril-valsartan (ENTRESTO) 49-51 MG TABS Take day of surgery. • sodium chloride 3 % inhalation solution Uses PRN- OK to take day of surgery   • tiotropium (Spiriva Respimat) 2.5 MCG/ACT AERS inhaler Take day of surgery. Medication instructions for day surgery reviewed.  Please use only a sip of water to take your instructed medications. Avoid all over the counter vitamins, supplements and NSAIDS for one week prior to surgery per anesthesia guidelines. Tylenol is ok to take as needed. You will receive a call one business day prior to surgery with an arrival time and hospital directions. If your surgery is scheduled on a Monday, the hospital will be calling you on the Friday prior to your surgery. If you have not heard from anyone by 8pm, please call the hospital supervisor through the hospital  at [phone]. Sunday [place] [phone]). Do not eat or drink anything after midnight the night before your surgery, including candy, mints, lifesavers, or chewing gum. Do not drink alcohol 24hrs before your surgery. Try not to smoke at least 24hrs before your surgery. Follow the pre surgery showering instructions as listed in the [facility] Booklet” or otherwise provided by your surgeon's office. Do not shave the surgical area 24 hours before surgery. Do not apply any lotions, creams, including makeup, cologne, deodorant, or perfumes after showering on the day of your surgery. No contact lenses, eye make-up, or artificial eyelashes. Remove nail polish, including gel polish, and any artificial, gel, or acrylic nails if possible. Remove all jewelry including rings and body piercing jewelry. Wear causal clothing that is easy to take on and off. Consider your type of surgery. Keep any valuables, jewelry, piercings at home. Please bring any specially ordered equipment (sling, braces) if indicated. Arrange for a responsible person to drive you to and from the hospital on the day of your surgery. Visitor Guidelines discussed. Call the surgeon's office with any new illnesses, exposures, or additional questions prior to surgery. Please reference your [facility] Booklet” for additional information to prepare for your upcoming surgery. Pt has surgical soap.

## 2023-09-08 NOTE — TELEPHONE ENCOUNTER
Caller: [facility]    Doctor: [id number]    Reason for call: Request speak w/Tere GARZA re:aspirin hold.  Please contact to discuss    Call back#: [phone]

## 2023-09-11 NOTE — TELEPHONE ENCOUNTER
Called and spoke with Mi Bradford, from [facility], regarding aspirin hold for upcoming surgery. Stated that per Dr. Trina Schaefer there is a 5-7 day hold on aspirin. Mi Bradford will be speaking with the PA and stated that she will call back after she speaks with her to make sure that it is okay.

## 2023-09-11 NOTE — TELEPHONE ENCOUNTER
Called and spoke with patient regarding holding his aspirin for 5-7 days prior to upcoming surgery. Patient verbalized understanding.

## 2023-09-12 ENCOUNTER — CONSULT (OUTPATIENT)
Dept: FAMILY MEDICINE CLINIC | Facility: CLINIC | Age: 68
End: 2023-09-12
Payer: MEDICARE

## 2023-09-12 VITALS
HEART RATE: 70 BPM | BODY MASS INDEX: 38.37 KG/M2 | TEMPERATURE: 97.4 F | HEIGHT: 74 IN | SYSTOLIC BLOOD PRESSURE: 128 MMHG | WEIGHT: 299 LBS | RESPIRATION RATE: 18 BRPM | OXYGEN SATURATION: 95 % | DIASTOLIC BLOOD PRESSURE: 70 MMHG

## 2023-09-12 DIAGNOSIS — I50.42 CHRONIC COMBINED SYSTOLIC AND DIASTOLIC CONGESTIVE HEART FAILURE (HCC): ICD-10-CM

## 2023-09-12 DIAGNOSIS — J44.9 COPD, SEVERE (HCC): ICD-10-CM

## 2023-09-12 DIAGNOSIS — M17.12 PRIMARY OSTEOARTHRITIS OF LEFT KNEE: Primary | ICD-10-CM

## 2023-09-12 DIAGNOSIS — I10 BENIGN ESSENTIAL HYPERTENSION: ICD-10-CM

## 2023-09-12 PROBLEM — I50.43 ACUTE ON CHRONIC COMBINED SYSTOLIC AND DIASTOLIC CHF (CONGESTIVE HEART FAILURE) (HCC): Status: RESOLVED | Noted: 2023-07-27 | Resolved: 2023-09-12

## 2023-09-12 PROBLEM — F10.10 ALCOHOL ABUSE: Status: RESOLVED | Noted: 2023-07-27 | Resolved: 2023-09-12

## 2023-09-12 PROCEDURE — 99214 OFFICE O/P EST MOD 30 MIN: CPT | Performed by: INTERNAL MEDICINE

## 2023-09-12 NOTE — PROGRESS NOTES
Name: Laurie Price      : 1955      MRN: [id number]  Encounter Provider: Juli lAmaguer DO  Encounter Date: 2023   Encounter department: [address]     1. Primary osteoarthritis of left knee  Pt cleared for knee surgery by Dr Ravi Cordova  at [facility]  PATs reviewed and cleared by Cardiology  He is already scheduled for postop PT in [place]     2. Chronic combined systolic and diastolic congestive heart failure (HCC)  Stable on current rx  Pt has been going to the gym and notes improved status     3. COPD, severe ([address])  Stable on current regimen    4. Benign essential hypertension  Continue current rx No added salt diet  Pt aware to take Coreg AM of procedure     Rto as scheduled/will call postop        Subjective      HPI   Pt for knee surgery on  He did see Cardiology and was cleared for procedure He has been felling better and continues to lose weight No chest pain Less hwang He is going to the gym and able to do more each week in terms of time for workout He is limited by his knee pain not his breathing now He is compliant with meds and has been doing better with diet choices Overall feels better other than limiting knee pain  Review of Systems   Constitutional: Positive for activity change. Negative for chills and fever. Respiratory: Positive for shortness of breath. Negative for cough. Cardiovascular: Negative for chest pain, palpitations and leg swelling. Gastrointestinal: Negative for abdominal distention and abdominal pain. Genitourinary: Negative. Musculoskeletal: Positive for arthralgias and gait problem. Neurological: Negative for dizziness, light-headedness and headaches. Psychiatric/Behavioral: Negative for sleep disturbance. The patient is not nervous/anxious. Current Outpatient Medications on File Prior to Visit   Medication Sig   • Accu-Chek FastClix Lancets MISC Use 1 each daily to test blood sugar.    • albuterol (2.5 mg/3 mL) 0. 083 % nebulizer solution Take 3 mL (2.5 mg total) by nebulization every 6 (six) hours as needed for wheezing or shortness of breath   • albuterol (ProAir HFA) 90 mcg/act inhaler Inhale 2 puffs every 6 (six) hours as needed for wheezing   • ALPRAZolam (XANAX) 0.5 mg tablet TAKE 1 TABLET BY MOUTH ONCE DAILY AT BEDTIME AS NEEDED FOR ANXIETY   • ascorbic acid (VITAMIN C) 500 MG tablet Take 1 tablet (500 mg total) by mouth 2 (two) times a day   • aspirin (ECOTRIN LOW STRENGTH) 81 mg EC tablet Take 81 mg by mouth daily   • atorvastatin (LIPITOR) 20 mg tablet Take 1 tablet (20 mg total) by mouth daily   • Blood Glucose Monitoring Suppl (Accu-Chek Guide Me) w/Device KIT Use 1 each daily to test blood sugar. • buPROPion (Wellbutrin XL) 150 mg 24 hr tablet Take 1 tablet (150 mg total) by mouth daily   • carvedilol (COREG) 25 mg tablet Take 1 tablet (25 mg total) by mouth 2 (two) times a day with meals   • DULoxetine (CYMBALTA) 60 mg delayed release capsule Take 1 capsule by mouth once daily   • Dupilumab (Dupixent) 300 MG/2ML SOPN Inject 300 mg under the skin every 14 (fourteen) days (Patient taking differently: Inject 300 mg under the skin every 14 (fourteen) days Sundays)   • dupilumab (DUPIXENT) subcutaneous injection Inject 2 mL (300 mg total) under the skin every 14 (fourteen) days Do not start before April 10, 2023. • ferrous sulfate 324 (65 Fe) mg Take 1 tablet (324 mg total) by mouth 2 (two) times a day before meals   • fluticasone (FLONASE) 50 mcg/act nasal spray 1 spray into each nostril 2 (two) times a day   • Fluticasone-Salmeterol (Advair) 500-50 mcg/dose inhaler INHALE ONE PUFF BY MOUTH AND INTO THE LUNGS TWICE A DAY.  RINSE MOUTH AFTER USE   • Fluticasone-Salmeterol (Advair) 500-50 mcg/dose inhaler INHALE 1 DOSE BY MOUTH TWICE DAILY RINSE MOUTH AFTER USE   • folic acid (FOLVITE) 1 mg tablet Take 1 tablet (1 mg total) by mouth daily   • furosemide (LASIX) 40 mg tablet Take 1 tablet (40 mg total) by mouth every other day   • glucose blood test strip Use 1 each daily to test blood sugar. • guaiFENesin (Mucus Relief) 600 mg 12 hr tablet Take 2 tablets (1,200 mg total) by mouth every 12 (twelve) hours   • ipratropium-albuterol (DUO-NEB) 0.5-2.5 mg/3 mL nebulizer solution Take 3 mL by nebulization every 6 (six) hours as needed for wheezing or shortness of breath   • isosorbide mononitrate (IMDUR) 30 mg 24 hr tablet Take 1 tablet by mouth once daily   • montelukast (SINGULAIR) 10 mg tablet Take 1 tablet (10 mg total) by mouth daily at bedtime   • Multiple Vitamins-Minerals (multivitamin with minerals) tablet Take 1 tablet by mouth daily   • Naproxen Sodium (Aleve) 220 MG CAPS Take by mouth   • sacubitril-valsartan (ENTRESTO) 49-51 MG TABS Take 1 tablet by mouth 2 (two) times a day   • sodium chloride 3 % inhalation solution Take 4 mL by nebulization as needed for cough (congestion)   • tiotropium (Spiriva Respimat) 2.5 MCG/ACT AERS inhaler Inhale 2 spray (s) by mouth once daily. Objective     /70   Pulse 70   Temp (!) 97.4 °F (36.3 °C) (Temporal)   Resp 18   Ht 6' 2" (1.88 m)   Wt 136 kg (299 lb)   SpO2 95%   BMI 38.39 kg/m²     Physical Exam  Vitals reviewed. Constitutional:       General: He is not in acute distress. Appearance: Normal appearance. He is not ill-appearing, toxic-appearing or diaphoretic. HENT:      Head: Normocephalic and atraumatic. Right Ear: External ear normal.      Left Ear: External ear normal.      Nose: Nose normal.      Mouth/Throat:      Mouth: Mucous membranes are moist.   Eyes:      General: No scleral icterus. Extraocular Movements: Extraocular movements intact. Conjunctiva/sclera: Conjunctivae normal.      Pupils: Pupils are equal, round, and reactive to light. Cardiovascular:      Rate and Rhythm: Normal rate and regular rhythm. Pulses: Normal pulses. Heart sounds: Normal heart sounds. No murmur heard.   Pulmonary:      Effort: Pulmonary effort is normal. No respiratory distress. Breath sounds: Normal breath sounds. No wheezing. Abdominal:      General: Bowel sounds are normal. There is no distension. Palpations: Abdomen is soft. Tenderness: There is no abdominal tenderness. Musculoskeletal:      Cervical back: Normal range of motion and neck supple. Right lower leg: No edema. Left lower leg: No edema. Lymphadenopathy:      Cervical: No cervical adenopathy. Skin:     General: Skin is warm and dry. Coloration: Skin is not jaundiced or pale. Neurological:      General: No focal deficit present. Mental Status: He is alert and oriented to person, place, and time. Mental status is at baseline. Cranial Nerves: No cranial nerve deficit. Sensory: No sensory deficit. Psychiatric:         Mood and Affect: Mood normal.         Behavior: Behavior normal.         Thought Content:  Thought content normal.         Judgment: Judgment normal.       Robles Delaney DO

## 2023-09-17 DIAGNOSIS — F33.1 MODERATE EPISODE OF RECURRENT MAJOR DEPRESSIVE DISORDER (HCC): ICD-10-CM

## 2023-09-17 NOTE — H&P
H&P Exam - Orthopedics   Georgi Camacho 79 y.o. male MRN: [id number]  Unit/Bed#:     Assessment/Plan     Assessment:  79 y.o. male with left knee osteoarthritis    Plan: We will proceed with left TKA    History of Present Illness   HPI:  Georgi Camacho is a 79 y.o. male who presents with left knee pain and osteoarthritis. As per previous note, they have failed all conservative measures and wish to proceed with surgery. Since their last orthopedic visit on 8/15, he has seen PT for pre-op evaluation, had some evaluation with sleep medicine, ECG with family medicine showing NSR with 1st degree AV block, and seen by cardiology (for chronic diastolic congestive HF, non-ischemic cardiomyopathy, HTN, and cardiac clearance) and cleared to proceed. He was seen by family medicine who addressed his severe COPD as well, and stable and cleared for surgery. They have been NPO since midnight. They are keen for surgery today. Review of Systems:   Constitutional: Negative for fatigue, fever or loss of appetite. HENT: Negative. Respiratory: Negative for shortness of breath, dyspnea. Cardiovascular: Negative for chest pain/tightness. Gastrointestinal: Negative for abdominal pain, N/V. Endocrine: Negative for cold/heat intolerance, unexplained weight loss/gain. Genitourinary: Negative for flank pain, dysuria  Musculoskeletal: positive for arthralgia   Skin: Negative for rash. Neurological: negative for numbness or tingling  Psychiatric/Behavioral: Negative for agitation. Physical Exam:  Ht 6' 2" (1.88 m)   Wt 136 kg (299 lb)   BMI 38.39 kg/m²   Cons: Appears well. No apparent distress. Psych: Alert. Oriented x3. Mood and affect normal.  Eyes: PERRLA, EOMI  Resp: Normal effort. No audible wheezing or stridor. CV: Extremities warm and well perfused. Abd:    No distention or guarding. Skin: Warm. No visible lesions. Neuro: Normal muscle tone.      Ortho Exam:   Skin is intact over surgical site without erythema, ecchymosis, or lesions  ROM: 5-115  Motor grossly intact to EHL/FHL/DF/PF  SILT distally    Lab Results: Reviewed  Imaging: Reviewed    Historical Information  Past Medical History:   Diagnosis Date   • Acute on chronic combined systolic and diastolic CHF (congestive heart failure) ([address]) 7/27/2023   • Alcohol abuse 7/27/2023   • Ambulates with cane    • Arthritis    • Asthma    • Back pain    • Chest pain 12/22/2022   • CHF (congestive heart failure) ([facility])    • Claustrophobia    • COPD (chronic obstructive pulmonary disease) ([facility])    • COPD with acute exacerbation ([address]) 05/06/2022   • COVID-19    • COVID-19 virus infection 12/03/2021   • Depression    • Hypertension    • Mumps    • Neck pain    • Old MI (myocardial infarction)    • Pneumonia    • Pulmonary emphysema ([address])    • Rectal bleeding 01/14/2019   • Skin abnormalities     rashes and wounds on both legs - scabbed over and healing   • Sleep apnea     no CPAP   • Tingling of both feet    • Wears glasses      Past Surgical History:   Procedure Laterality Date   • APPENDECTOMY     • CARDIAC CATHETERIZATION  07/24/2015    Left main- normal and maidly tortuous. Circumflex - normal and moderately tortuous. RCA- normal and mildy tortuous. Global LV function was severely depressed. .   • CARDIAC CATHETERIZATION Left 09/27/2022    Procedure: Cardiac Left Heart Cath;  Surgeon: Alia Villalobos DO;  Location: [facility]; Service: Cardiology   • CARDIAC CATHETERIZATION N/A 09/27/2022    Procedure: Cardiac Coronary Angiogram;  Surgeon: Alia Villalobos DO;  Location: [facility]; Service: Cardiology   • CARDIAC CATHETERIZATION  09/27/2022    Procedure: Cardiac catheterization;  Surgeon: Alia Villalobos DO;  Location: [facility];   Service: Cardiology   • INGUINAL HERNIA REPAIR Bilateral    • WI COLONOSCOPY FLX DX W/COLLJ SPEC WHEN PFRMD N/A 03/11/2019    Procedure: COLONOSCOPY with removal of anal papilla; Surgeon: Fifi Thomas MD;  Location: [facility];  Service: Colorectal   • TONSILLECTOMY     • UMBILICAL HERNIA REPAIR  06/21/2006       Jose Jason PA-C

## 2023-09-18 ENCOUNTER — HOSPITAL ENCOUNTER (OUTPATIENT)
Facility: HOSPITAL | Age: 68
Setting detail: OUTPATIENT SURGERY
Discharge: HOME | End: 2023-09-19
Attending: STUDENT IN AN ORGANIZED HEALTH CARE EDUCATION/TRAINING PROGRAM | Admitting: STUDENT IN AN ORGANIZED HEALTH CARE EDUCATION/TRAINING PROGRAM
Payer: MEDICARE

## 2023-09-18 ENCOUNTER — APPOINTMENT (OUTPATIENT)
Dept: RADIOLOGY | Facility: HOSPITAL | Age: 68
End: 2023-09-18
Payer: MEDICARE

## 2023-09-18 ENCOUNTER — ANESTHESIA EVENT (OUTPATIENT)
Dept: PERIOP | Facility: HOSPITAL | Age: 68
End: 2023-09-18
Payer: MEDICARE

## 2023-09-18 ENCOUNTER — ANESTHESIA (OUTPATIENT)
Dept: PERIOP | Facility: HOSPITAL | Age: 68
End: 2023-09-18
Payer: MEDICARE

## 2023-09-18 DIAGNOSIS — E55.9 VITAMIN D DEFICIENCY: ICD-10-CM

## 2023-09-18 DIAGNOSIS — I42.8 NON-ISCHEMIC CARDIOMYOPATHY (HCC): ICD-10-CM

## 2023-09-18 DIAGNOSIS — M25.59 PAIN IN OTHER JOINT: ICD-10-CM

## 2023-09-18 DIAGNOSIS — M17.12 PRIMARY OSTEOARTHRITIS OF LEFT KNEE: Primary | ICD-10-CM

## 2023-09-18 DIAGNOSIS — N18.30 STAGE 3 CHRONIC KIDNEY DISEASE, UNSPECIFIED WHETHER STAGE 3A OR 3B CKD (HCC): ICD-10-CM

## 2023-09-18 DIAGNOSIS — J44.89 ASTHMA-COPD OVERLAP SYNDROME: ICD-10-CM

## 2023-09-18 DIAGNOSIS — D72.829 LEUKOCYTOSIS, UNSPECIFIED TYPE: ICD-10-CM

## 2023-09-18 DIAGNOSIS — I50.42 CHRONIC COMBINED SYSTOLIC AND DIASTOLIC CHF (CONGESTIVE HEART FAILURE) (HCC): ICD-10-CM

## 2023-09-18 DIAGNOSIS — Z01.818 PRE-OP TESTING: ICD-10-CM

## 2023-09-18 DIAGNOSIS — Z47.89 AFTERCARE FOLLOWING SURGERY OF THE MUSCULOSKELETAL SYSTEM: ICD-10-CM

## 2023-09-18 DIAGNOSIS — Z51.89 ENCOUNTER FOR OTHER SPECIFIED AFTERCARE: ICD-10-CM

## 2023-09-18 DIAGNOSIS — E11.00 TYPE 2 DIABETES MELLITUS WITH HYPEROSMOLARITY WITHOUT COMA, WITHOUT LONG-TERM CURRENT USE OF INSULIN (HCC): ICD-10-CM

## 2023-09-18 PROBLEM — J44.9 COPD (CHRONIC OBSTRUCTIVE PULMONARY DISEASE) (HCC): Status: ACTIVE | Noted: 2023-09-18

## 2023-09-18 LAB
ABO GROUP BLD: NORMAL
BLD GP AB SCN SERPL QL: NEGATIVE
FLUAV RNA RESP QL NAA+PROBE: NEGATIVE
FLUBV RNA RESP QL NAA+PROBE: NEGATIVE
GLUCOSE SERPL-MCNC: 129 MG/DL (ref 65–140)
RH BLD: NEGATIVE
RSV RNA RESP QL NAA+PROBE: NEGATIVE
SARS-COV-2 RNA RESP QL NAA+PROBE: NEGATIVE
SPECIMEN EXPIRATION DATE: NORMAL

## 2023-09-18 PROCEDURE — C1776 JOINT DEVICE (IMPLANTABLE): HCPCS | Performed by: STUDENT IN AN ORGANIZED HEALTH CARE EDUCATION/TRAINING PROGRAM

## 2023-09-18 PROCEDURE — 99221 1ST HOSP IP/OBS SF/LOW 40: CPT | Performed by: INTERNAL MEDICINE

## 2023-09-18 PROCEDURE — 97167 OT EVAL HIGH COMPLEX 60 MIN: CPT

## 2023-09-18 PROCEDURE — 97163 PT EVAL HIGH COMPLEX 45 MIN: CPT

## 2023-09-18 PROCEDURE — 99024 POSTOP FOLLOW-UP VISIT: CPT | Performed by: STUDENT IN AN ORGANIZED HEALTH CARE EDUCATION/TRAINING PROGRAM

## 2023-09-18 PROCEDURE — C1713 ANCHOR/SCREW BN/BN,TIS/BN: HCPCS | Performed by: STUDENT IN AN ORGANIZED HEALTH CARE EDUCATION/TRAINING PROGRAM

## 2023-09-18 PROCEDURE — 86900 BLOOD TYPING SEROLOGIC ABO: CPT | Performed by: NURSE ANESTHETIST, CERTIFIED REGISTERED

## 2023-09-18 PROCEDURE — C9290 INJ, BUPIVACAINE LIPOSOME: HCPCS | Performed by: ANESTHESIOLOGY

## 2023-09-18 PROCEDURE — 94760 N-INVAS EAR/PLS OXIMETRY 1: CPT

## 2023-09-18 PROCEDURE — 86901 BLOOD TYPING SEROLOGIC RH(D): CPT | Performed by: NURSE ANESTHETIST, CERTIFIED REGISTERED

## 2023-09-18 PROCEDURE — 97116 GAIT TRAINING THERAPY: CPT

## 2023-09-18 PROCEDURE — 97530 THERAPEUTIC ACTIVITIES: CPT

## 2023-09-18 PROCEDURE — 27447 TOTAL KNEE ARTHROPLASTY: CPT | Performed by: STUDENT IN AN ORGANIZED HEALTH CARE EDUCATION/TRAINING PROGRAM

## 2023-09-18 PROCEDURE — 73560 X-RAY EXAM OF KNEE 1 OR 2: CPT

## 2023-09-18 PROCEDURE — 94664 DEMO&/EVAL PT USE INHALER: CPT

## 2023-09-18 PROCEDURE — 27447 TOTAL KNEE ARTHROPLASTY: CPT

## 2023-09-18 PROCEDURE — 86850 RBC ANTIBODY SCREEN: CPT | Performed by: NURSE ANESTHETIST, CERTIFIED REGISTERED

## 2023-09-18 PROCEDURE — 94640 AIRWAY INHALATION TREATMENT: CPT

## 2023-09-18 PROCEDURE — 0241U HB NFCT DS VIR RESP RNA 4 TRGT: CPT | Performed by: INTERNAL MEDICINE

## 2023-09-18 PROCEDURE — 82948 REAGENT STRIP/BLOOD GLUCOSE: CPT

## 2023-09-18 DEVICE — ATTUNE KNEE SYSTEM TIBIAL INSERT FIXED BEARING CRUCIATE RETAINING 9 5MM AOX
Type: IMPLANTABLE DEVICE | Site: KNEE | Status: FUNCTIONAL
Brand: ATTUNE

## 2023-09-18 DEVICE — ATTUNE KNEE SYSTEM FEMORAL CRUCIATE RETAINING SIZE 9 LEFT CEMENTED
Type: IMPLANTABLE DEVICE | Site: KNEE | Status: FUNCTIONAL
Brand: ATTUNE

## 2023-09-18 DEVICE — ATTUNE PATELLA MEDIALIZED DOME 38MM CEMENTED AOX
Type: IMPLANTABLE DEVICE | Status: FUNCTIONAL
Brand: ATTUNE

## 2023-09-18 DEVICE — ATTUNE KNEE SYSTEM TIBIAL BASE FIXED BEARING SIZE 8 CEMENTED
Type: IMPLANTABLE DEVICE | Site: KNEE | Status: FUNCTIONAL
Brand: ATTUNE

## 2023-09-18 DEVICE — SMARTSET HIGH PERFORMANCE MV MEDIUM VISCOSITY BONE CEMENT 40G
Type: IMPLANTABLE DEVICE | Site: KNEE | Status: FUNCTIONAL
Brand: SMARTSET

## 2023-09-18 RX ORDER — TRANEXAMIC ACID 10 MG/ML
1000 INJECTION, SOLUTION INTRAVENOUS ONCE
Status: COMPLETED | OUTPATIENT
Start: 2023-09-18 | End: 2023-09-18

## 2023-09-18 RX ORDER — MAGNESIUM HYDROXIDE 1200 MG/15ML
LIQUID ORAL AS NEEDED
Status: DISCONTINUED | OUTPATIENT
Start: 2023-09-18 | End: 2023-09-18 | Stop reason: HOSPADM

## 2023-09-18 RX ORDER — HYDROMORPHONE HCL/PF 1 MG/ML
0.4 SYRINGE (ML) INJECTION
Status: DISCONTINUED | OUTPATIENT
Start: 2023-09-18 | End: 2023-09-18 | Stop reason: HOSPADM

## 2023-09-18 RX ORDER — DULOXETIN HYDROCHLORIDE 30 MG/1
60 CAPSULE, DELAYED RELEASE ORAL DAILY
Status: DISCONTINUED | OUTPATIENT
Start: 2023-09-18 | End: 2023-09-19 | Stop reason: HOSPADM

## 2023-09-18 RX ORDER — BUPROPION HYDROCHLORIDE 150 MG/1
150 TABLET ORAL DAILY
Status: DISCONTINUED | OUTPATIENT
Start: 2023-09-18 | End: 2023-09-19 | Stop reason: HOSPADM

## 2023-09-18 RX ORDER — KETAMINE HCL IN NACL, ISO-OSM 100MG/10ML
SYRINGE (ML) INJECTION AS NEEDED
Status: DISCONTINUED | OUTPATIENT
Start: 2023-09-18 | End: 2023-09-18

## 2023-09-18 RX ORDER — BUPIVACAINE HYDROCHLORIDE 2.5 MG/ML
INJECTION, SOLUTION EPIDURAL; INFILTRATION; INTRACAUDAL AS NEEDED
Status: DISCONTINUED | OUTPATIENT
Start: 2023-09-18 | End: 2023-09-18

## 2023-09-18 RX ORDER — GLYCOPYRROLATE 0.2 MG/ML
INJECTION INTRAMUSCULAR; INTRAVENOUS AS NEEDED
Status: DISCONTINUED | OUTPATIENT
Start: 2023-09-18 | End: 2023-09-18

## 2023-09-18 RX ORDER — ASCORBIC ACID 500 MG
500 TABLET ORAL 2 TIMES DAILY
Status: DISCONTINUED | OUTPATIENT
Start: 2023-09-18 | End: 2023-09-19 | Stop reason: HOSPADM

## 2023-09-18 RX ORDER — KETOROLAC TROMETHAMINE 30 MG/ML
INJECTION, SOLUTION INTRAMUSCULAR; INTRAVENOUS AS NEEDED
Status: DISCONTINUED | OUTPATIENT
Start: 2023-09-18 | End: 2023-09-18 | Stop reason: HOSPADM

## 2023-09-18 RX ORDER — SODIUM CHLORIDE, SODIUM LACTATE, POTASSIUM CHLORIDE, CALCIUM CHLORIDE 600; 310; 30; 20 MG/100ML; MG/100ML; MG/100ML; MG/100ML
50 INJECTION, SOLUTION INTRAVENOUS CONTINUOUS
Status: DISCONTINUED | OUTPATIENT
Start: 2023-09-18 | End: 2023-09-19 | Stop reason: HOSPADM

## 2023-09-18 RX ORDER — METOCLOPRAMIDE HYDROCHLORIDE 5 MG/ML
10 INJECTION INTRAMUSCULAR; INTRAVENOUS ONCE AS NEEDED
Status: DISCONTINUED | OUTPATIENT
Start: 2023-09-18 | End: 2023-09-18 | Stop reason: HOSPADM

## 2023-09-18 RX ORDER — IPRATROPIUM BROMIDE AND ALBUTEROL SULFATE 2.5; .5 MG/3ML; MG/3ML
3 SOLUTION RESPIRATORY (INHALATION) EVERY 6 HOURS PRN
Status: DISCONTINUED | OUTPATIENT
Start: 2023-09-18 | End: 2023-09-18

## 2023-09-18 RX ORDER — ALBUTEROL SULFATE 2.5 MG/3ML
2.5 SOLUTION RESPIRATORY (INHALATION) EVERY 6 HOURS PRN
Status: DISCONTINUED | OUTPATIENT
Start: 2023-09-18 | End: 2023-09-18

## 2023-09-18 RX ORDER — GUAIFENESIN 600 MG/1
1200 TABLET, EXTENDED RELEASE ORAL EVERY 12 HOURS SCHEDULED
Status: DISCONTINUED | OUTPATIENT
Start: 2023-09-18 | End: 2023-09-19 | Stop reason: HOSPADM

## 2023-09-18 RX ORDER — LEVALBUTEROL INHALATION SOLUTION 1.25 MG/3ML
1.25 SOLUTION RESPIRATORY (INHALATION)
Status: DISCONTINUED | OUTPATIENT
Start: 2023-09-18 | End: 2023-09-19 | Stop reason: HOSPADM

## 2023-09-18 RX ORDER — DULOXETIN HYDROCHLORIDE 60 MG/1
CAPSULE, DELAYED RELEASE ORAL
Qty: 30 CAPSULE | Refills: 0 | Status: SHIPPED | OUTPATIENT
Start: 2023-09-18

## 2023-09-18 RX ORDER — VANCOMYCIN HYDROCHLORIDE 1 G/20ML
INJECTION, POWDER, LYOPHILIZED, FOR SOLUTION INTRAVENOUS AS NEEDED
Status: DISCONTINUED | OUTPATIENT
Start: 2023-09-18 | End: 2023-09-18 | Stop reason: HOSPADM

## 2023-09-18 RX ORDER — SODIUM CHLORIDE FOR INHALATION 3 %
4 VIAL, NEBULIZER (ML) INHALATION
Status: DISCONTINUED | OUTPATIENT
Start: 2023-09-18 | End: 2023-09-18

## 2023-09-18 RX ORDER — OXYCODONE HYDROCHLORIDE 10 MG/1
10 TABLET ORAL EVERY 4 HOURS PRN
Status: DISCONTINUED | OUTPATIENT
Start: 2023-09-18 | End: 2023-09-19 | Stop reason: HOSPADM

## 2023-09-18 RX ORDER — FENTANYL CITRATE 50 UG/ML
INJECTION, SOLUTION INTRAMUSCULAR; INTRAVENOUS AS NEEDED
Status: DISCONTINUED | OUTPATIENT
Start: 2023-09-18 | End: 2023-09-18

## 2023-09-18 RX ORDER — FENTANYL CITRATE/PF 50 MCG/ML
50 SYRINGE (ML) INJECTION
Status: DISCONTINUED | OUTPATIENT
Start: 2023-09-18 | End: 2023-09-18 | Stop reason: HOSPADM

## 2023-09-18 RX ORDER — CHLORHEXIDINE GLUCONATE 4 G/100ML
SOLUTION TOPICAL DAILY PRN
Status: DISCONTINUED | OUTPATIENT
Start: 2023-09-18 | End: 2023-09-18 | Stop reason: HOSPADM

## 2023-09-18 RX ORDER — ALBUTEROL SULFATE 90 UG/1
2 AEROSOL, METERED RESPIRATORY (INHALATION) EVERY 6 HOURS PRN
Status: DISCONTINUED | OUTPATIENT
Start: 2023-09-18 | End: 2023-09-19 | Stop reason: HOSPADM

## 2023-09-18 RX ORDER — PROPOFOL 10 MG/ML
INJECTION, EMULSION INTRAVENOUS AS NEEDED
Status: DISCONTINUED | OUTPATIENT
Start: 2023-09-18 | End: 2023-09-18

## 2023-09-18 RX ORDER — ACETAMINOPHEN 325 MG/1
975 TABLET ORAL ONCE
Status: COMPLETED | OUTPATIENT
Start: 2023-09-18 | End: 2023-09-18

## 2023-09-18 RX ORDER — FOLIC ACID 1 MG/1
1 TABLET ORAL DAILY
Status: DISCONTINUED | OUTPATIENT
Start: 2023-09-19 | End: 2023-09-19 | Stop reason: HOSPADM

## 2023-09-18 RX ORDER — CALCIUM CARBONATE 500 MG/1
1000 TABLET, CHEWABLE ORAL DAILY PRN
Status: DISCONTINUED | OUTPATIENT
Start: 2023-09-18 | End: 2023-09-19 | Stop reason: HOSPADM

## 2023-09-18 RX ORDER — ATORVASTATIN CALCIUM 10 MG/1
20 TABLET, FILM COATED ORAL DAILY
Status: DISCONTINUED | OUTPATIENT
Start: 2023-09-19 | End: 2023-09-19 | Stop reason: HOSPADM

## 2023-09-18 RX ORDER — DEXAMETHASONE SODIUM PHOSPHATE 10 MG/ML
INJECTION, SOLUTION INTRAMUSCULAR; INTRAVENOUS AS NEEDED
Status: DISCONTINUED | OUTPATIENT
Start: 2023-09-18 | End: 2023-09-18

## 2023-09-18 RX ORDER — ACETAMINOPHEN 325 MG/1
975 TABLET ORAL EVERY 8 HOURS
Status: DISCONTINUED | OUTPATIENT
Start: 2023-09-18 | End: 2023-09-19 | Stop reason: HOSPADM

## 2023-09-18 RX ORDER — LIDOCAINE HYDROCHLORIDE 10 MG/ML
INJECTION, SOLUTION EPIDURAL; INFILTRATION; INTRACAUDAL; PERINEURAL AS NEEDED
Status: DISCONTINUED | OUTPATIENT
Start: 2023-09-18 | End: 2023-09-18

## 2023-09-18 RX ORDER — FLUTICASONE FUROATE AND VILANTEROL 200; 25 UG/1; UG/1
1 POWDER RESPIRATORY (INHALATION)
Status: DISCONTINUED | OUTPATIENT
Start: 2023-09-18 | End: 2023-09-18 | Stop reason: SDUPTHER

## 2023-09-18 RX ORDER — MIDAZOLAM HYDROCHLORIDE 2 MG/2ML
INJECTION, SOLUTION INTRAMUSCULAR; INTRAVENOUS AS NEEDED
Status: DISCONTINUED | OUTPATIENT
Start: 2023-09-18 | End: 2023-09-18

## 2023-09-18 RX ORDER — ALPRAZOLAM 0.5 MG/1
0.5 TABLET ORAL
Status: DISCONTINUED | OUTPATIENT
Start: 2023-09-18 | End: 2023-09-19 | Stop reason: HOSPADM

## 2023-09-18 RX ORDER — GABAPENTIN 300 MG/1
300 CAPSULE ORAL ONCE
Status: COMPLETED | OUTPATIENT
Start: 2023-09-18 | End: 2023-09-18

## 2023-09-18 RX ORDER — ONDANSETRON 2 MG/ML
4 INJECTION INTRAMUSCULAR; INTRAVENOUS ONCE
Status: DISCONTINUED | OUTPATIENT
Start: 2023-09-18 | End: 2023-09-18 | Stop reason: HOSPADM

## 2023-09-18 RX ORDER — BUPIVACAINE HYDROCHLORIDE 2.5 MG/ML
INJECTION, SOLUTION EPIDURAL; INFILTRATION; INTRACAUDAL AS NEEDED
Status: DISCONTINUED | OUTPATIENT
Start: 2023-09-18 | End: 2023-09-18 | Stop reason: HOSPADM

## 2023-09-18 RX ORDER — ROCURONIUM BROMIDE 10 MG/ML
INJECTION, SOLUTION INTRAVENOUS AS NEEDED
Status: DISCONTINUED | OUTPATIENT
Start: 2023-09-18 | End: 2023-09-18

## 2023-09-18 RX ORDER — PHENYLEPHRINE HCL IN 0.9% NACL 1 MG/10 ML
SYRINGE (ML) INTRAVENOUS AS NEEDED
Status: DISCONTINUED | OUTPATIENT
Start: 2023-09-18 | End: 2023-09-18

## 2023-09-18 RX ORDER — ONDANSETRON 2 MG/ML
INJECTION INTRAMUSCULAR; INTRAVENOUS AS NEEDED
Status: DISCONTINUED | OUTPATIENT
Start: 2023-09-18 | End: 2023-09-18

## 2023-09-18 RX ORDER — CARVEDILOL 12.5 MG/1
25 TABLET ORAL 2 TIMES DAILY WITH MEALS
Status: DISCONTINUED | OUTPATIENT
Start: 2023-09-18 | End: 2023-09-19 | Stop reason: HOSPADM

## 2023-09-18 RX ORDER — FLUTICASONE PROPIONATE 50 MCG
1 SPRAY, SUSPENSION (ML) NASAL 2 TIMES DAILY
Status: DISCONTINUED | OUTPATIENT
Start: 2023-09-18 | End: 2023-09-19 | Stop reason: HOSPADM

## 2023-09-18 RX ORDER — ONDANSETRON 2 MG/ML
4 INJECTION INTRAMUSCULAR; INTRAVENOUS ONCE AS NEEDED
Status: DISCONTINUED | OUTPATIENT
Start: 2023-09-18 | End: 2023-09-18 | Stop reason: HOSPADM

## 2023-09-18 RX ORDER — FERROUS SULFATE 325(65) MG
325 TABLET ORAL 2 TIMES DAILY WITH MEALS
Status: DISCONTINUED | OUTPATIENT
Start: 2023-09-18 | End: 2023-09-19 | Stop reason: HOSPADM

## 2023-09-18 RX ORDER — SENNOSIDES 8.6 MG
1 TABLET ORAL DAILY
Status: DISCONTINUED | OUTPATIENT
Start: 2023-09-18 | End: 2023-09-19 | Stop reason: HOSPADM

## 2023-09-18 RX ORDER — ISOSORBIDE MONONITRATE 30 MG/1
30 TABLET, EXTENDED RELEASE ORAL DAILY
Status: DISCONTINUED | OUTPATIENT
Start: 2023-09-19 | End: 2023-09-19 | Stop reason: HOSPADM

## 2023-09-18 RX ORDER — OXYCODONE HYDROCHLORIDE 5 MG/1
5 TABLET ORAL EVERY 4 HOURS PRN
Status: DISCONTINUED | OUTPATIENT
Start: 2023-09-18 | End: 2023-09-19 | Stop reason: HOSPADM

## 2023-09-18 RX ORDER — MONTELUKAST SODIUM 10 MG/1
10 TABLET ORAL
Status: DISCONTINUED | OUTPATIENT
Start: 2023-09-18 | End: 2023-09-19 | Stop reason: HOSPADM

## 2023-09-18 RX ORDER — FLUTICASONE FUROATE AND VILANTEROL 200; 25 UG/1; UG/1
1 POWDER RESPIRATORY (INHALATION)
Status: DISCONTINUED | OUTPATIENT
Start: 2023-09-19 | End: 2023-09-19 | Stop reason: HOSPADM

## 2023-09-18 RX ORDER — FUROSEMIDE 40 MG/1
40 TABLET ORAL EVERY OTHER DAY
Status: DISCONTINUED | OUTPATIENT
Start: 2023-09-19 | End: 2023-09-19 | Stop reason: HOSPADM

## 2023-09-18 RX ORDER — SODIUM CHLORIDE, SODIUM LACTATE, POTASSIUM CHLORIDE, CALCIUM CHLORIDE 600; 310; 30; 20 MG/100ML; MG/100ML; MG/100ML; MG/100ML
125 INJECTION, SOLUTION INTRAVENOUS CONTINUOUS
Status: DISCONTINUED | OUTPATIENT
Start: 2023-09-18 | End: 2023-09-19 | Stop reason: HOSPADM

## 2023-09-18 RX ORDER — DOCUSATE SODIUM 100 MG/1
100 CAPSULE, LIQUID FILLED ORAL 2 TIMES DAILY
Status: DISCONTINUED | OUTPATIENT
Start: 2023-09-18 | End: 2023-09-19 | Stop reason: HOSPADM

## 2023-09-18 RX ORDER — HYDROMORPHONE HCL/PF 1 MG/ML
SYRINGE (ML) INJECTION AS NEEDED
Status: DISCONTINUED | OUTPATIENT
Start: 2023-09-18 | End: 2023-09-18

## 2023-09-18 RX ADMIN — SODIUM CHLORIDE, SODIUM LACTATE, POTASSIUM CHLORIDE, AND CALCIUM CHLORIDE: .6; .31; .03; .02 INJECTION, SOLUTION INTRAVENOUS at 09:55

## 2023-09-18 RX ADMIN — BUPROPION HYDROCHLORIDE 150 MG: 150 TABLET, FILM COATED, EXTENDED RELEASE ORAL at 13:13

## 2023-09-18 RX ADMIN — ASPIRIN 81 MG: 81 TABLET, COATED ORAL at 19:33

## 2023-09-18 RX ADMIN — Medication 150 MCG: at 08:19

## 2023-09-18 RX ADMIN — SODIUM CHLORIDE, SODIUM LACTATE, POTASSIUM CHLORIDE, AND CALCIUM CHLORIDE 125 ML/HR: .6; .31; .03; .02 INJECTION, SOLUTION INTRAVENOUS at 07:15

## 2023-09-18 RX ADMIN — DEXAMETHASONE SODIUM PHOSPHATE 8 MG: 10 INJECTION, SOLUTION INTRAMUSCULAR; INTRAVENOUS at 08:19

## 2023-09-18 RX ADMIN — BUPIVACAINE 20 ML: 13.3 INJECTION, SUSPENSION, LIPOSOMAL INFILTRATION at 10:45

## 2023-09-18 RX ADMIN — SENNOSIDES 8.6 MG: 8.6 TABLET, FILM COATED ORAL at 13:13

## 2023-09-18 RX ADMIN — Medication 150 MCG: at 08:10

## 2023-09-18 RX ADMIN — ACETAMINOPHEN 975 MG: 325 TABLET, FILM COATED ORAL at 06:53

## 2023-09-18 RX ADMIN — PROPOFOL 150 MG: 10 INJECTION, EMULSION INTRAVENOUS at 07:53

## 2023-09-18 RX ADMIN — OXYCODONE HYDROCHLORIDE AND ACETAMINOPHEN 500 MG: 500 TABLET ORAL at 17:25

## 2023-09-18 RX ADMIN — DOCUSATE SODIUM 100 MG: 100 CAPSULE, LIQUID FILLED ORAL at 13:13

## 2023-09-18 RX ADMIN — Medication 10 MG: at 09:30

## 2023-09-18 RX ADMIN — CARVEDILOL 25 MG: 12.5 TABLET, FILM COATED ORAL at 15:58

## 2023-09-18 RX ADMIN — Medication 10 MG: at 08:34

## 2023-09-18 RX ADMIN — FENTANYL CITRATE 50 MCG: 50 INJECTION, SOLUTION INTRAMUSCULAR; INTRAVENOUS at 07:53

## 2023-09-18 RX ADMIN — ALPRAZOLAM 0.5 MG: 0.5 TABLET ORAL at 21:08

## 2023-09-18 RX ADMIN — SACUBITRIL AND VALSARTAN 1 TABLET: 49; 51 TABLET, FILM COATED ORAL at 17:25

## 2023-09-18 RX ADMIN — Medication 100 MCG: at 10:13

## 2023-09-18 RX ADMIN — IPRATROPIUM BROMIDE 0.5 MG: 0.5 SOLUTION RESPIRATORY (INHALATION) at 19:33

## 2023-09-18 RX ADMIN — ACETAMINOPHEN 975 MG: 325 TABLET, FILM COATED ORAL at 15:57

## 2023-09-18 RX ADMIN — FENTANYL CITRATE 50 MCG: 50 INJECTION, SOLUTION INTRAMUSCULAR; INTRAVENOUS at 11:35

## 2023-09-18 RX ADMIN — DOCUSATE SODIUM 100 MG: 100 CAPSULE, LIQUID FILLED ORAL at 17:25

## 2023-09-18 RX ADMIN — SODIUM CHLORIDE, SODIUM LACTATE, POTASSIUM CHLORIDE, AND CALCIUM CHLORIDE 50 ML/HR: .6; .31; .03; .02 INJECTION, SOLUTION INTRAVENOUS at 12:37

## 2023-09-18 RX ADMIN — DULOXETINE HYDROCHLORIDE 60 MG: 30 CAPSULE, DELAYED RELEASE ORAL at 13:12

## 2023-09-18 RX ADMIN — LEVALBUTEROL HYDROCHLORIDE 1.25 MG: 1.25 SOLUTION RESPIRATORY (INHALATION) at 19:32

## 2023-09-18 RX ADMIN — ONDANSETRON 4 MG: 2 INJECTION INTRAMUSCULAR; INTRAVENOUS at 09:59

## 2023-09-18 RX ADMIN — SUGAMMADEX 200 MG: 100 INJECTION, SOLUTION INTRAVENOUS at 10:40

## 2023-09-18 RX ADMIN — TRANEXAMIC ACID 1000 MG: 10 INJECTION, SOLUTION INTRAVENOUS at 08:00

## 2023-09-18 RX ADMIN — FENTANYL CITRATE 50 MCG: 50 INJECTION, SOLUTION INTRAMUSCULAR; INTRAVENOUS at 08:34

## 2023-09-18 RX ADMIN — MONTELUKAST 10 MG: 10 TABLET, FILM COATED ORAL at 21:08

## 2023-09-18 RX ADMIN — CEFAZOLIN 3000 MG: 1 INJECTION, POWDER, FOR SOLUTION INTRAMUSCULAR; INTRAVENOUS at 07:45

## 2023-09-18 RX ADMIN — HYDROMORPHONE HYDROCHLORIDE 0.5 MG: 1 INJECTION, SOLUTION INTRAMUSCULAR; INTRAVENOUS; SUBCUTANEOUS at 10:31

## 2023-09-18 RX ADMIN — HYDROMORPHONE HYDROCHLORIDE 0.5 MG: 1 INJECTION, SOLUTION INTRAMUSCULAR; INTRAVENOUS; SUBCUTANEOUS at 09:34

## 2023-09-18 RX ADMIN — ROCURONIUM BROMIDE 20 MG: 10 INJECTION, SOLUTION INTRAVENOUS at 09:22

## 2023-09-18 RX ADMIN — PHENYLEPHRINE HYDROCHLORIDE 20 MCG/MIN: 10 INJECTION INTRAVENOUS at 07:58

## 2023-09-18 RX ADMIN — FENTANYL CITRATE 50 MCG: 50 INJECTION, SOLUTION INTRAMUSCULAR; INTRAVENOUS at 11:45

## 2023-09-18 RX ADMIN — FERROUS SULFATE TAB 325 MG (65 MG ELEMENTAL FE) 325 MG: 325 (65 FE) TAB at 15:58

## 2023-09-18 RX ADMIN — GLYCOPYRROLATE 0.2 MG: 0.2 INJECTION INTRAMUSCULAR; INTRAVENOUS at 07:24

## 2023-09-18 RX ADMIN — Medication 30 MG: at 08:05

## 2023-09-18 RX ADMIN — CEFAZOLIN 3000 MG: 1 INJECTION, POWDER, FOR SOLUTION INTRAMUSCULAR; INTRAVENOUS at 16:01

## 2023-09-18 RX ADMIN — ACETAMINOPHEN 975 MG: 325 TABLET, FILM COATED ORAL at 23:07

## 2023-09-18 RX ADMIN — Medication 200 MCG: at 07:53

## 2023-09-18 RX ADMIN — FLUTICASONE PROPIONATE 1 SPRAY: 50 SPRAY, METERED NASAL at 21:07

## 2023-09-18 RX ADMIN — LIDOCAINE HYDROCHLORIDE 25 MG: 10 INJECTION, SOLUTION EPIDURAL; INFILTRATION; INTRACAUDAL; PERINEURAL at 07:53

## 2023-09-18 RX ADMIN — BUPIVACAINE HYDROCHLORIDE 10 ML: 2.5 INJECTION, SOLUTION EPIDURAL; INFILTRATION; INTRACAUDAL; PERINEURAL at 10:45

## 2023-09-18 RX ADMIN — ROCURONIUM BROMIDE 20 MG: 10 INJECTION, SOLUTION INTRAVENOUS at 08:34

## 2023-09-18 RX ADMIN — GABAPENTIN 300 MG: 300 CAPSULE ORAL at 06:53

## 2023-09-18 RX ADMIN — MIDAZOLAM 2 MG: 1 INJECTION INTRAMUSCULAR; INTRAVENOUS at 07:24

## 2023-09-18 RX ADMIN — ROCURONIUM BROMIDE 50 MG: 10 INJECTION, SOLUTION INTRAVENOUS at 07:53

## 2023-09-18 RX ADMIN — PROPOFOL 80 MCG/KG/MIN: 10 INJECTION, EMULSION INTRAVENOUS at 07:58

## 2023-09-18 RX ADMIN — GUAIFENESIN 1200 MG: 600 TABLET, EXTENDED RELEASE ORAL at 21:08

## 2023-09-18 NOTE — ASSESSMENT & PLAN NOTE
Wt Readings from Last 3 Encounters:   09/18/23 136 kg (299 lb)   09/12/23 136 kg (299 lb)   09/05/23 (!) 140 kg (309 lb)       · Not in acute exacerbation  · Continue PO Coreg, PO Entresto, and PO Lasix  · Monitor his volume status closely  · [facility] evaluation  • Outpatient follow-up with Cardiology

## 2023-09-18 NOTE — RESPIRATORY THERAPY NOTE
RT Protocol Note  Asif Lee 79 y.o. male MRN: [id number]  Unit/Bed#: 415-01 Encounter: [id number]    Assessment    Principal Problem:    Primary osteoarthritis of left knee      Home Pulmonary Medications:  Advair and Spiriva daily, Duoneb BID and prn, he does not use home oxygen.  He has a pap machine but has not used it and does not wish to use it       Past Medical History:   Diagnosis Date   • Acute on chronic combined systolic and diastolic CHF (congestive heart failure) ([address]) 2023   • Alcohol abuse 2023   • Ambulates with cane    • Arthritis    • Asthma    • Back pain    • Chest pain 2022   • CHF (congestive heart failure) ([facility])    • Claustrophobia    • COPD (chronic obstructive pulmonary disease) ([facility])    • COPD with acute exacerbation ([address]) 2022   • COVID-19    • COVID-19 virus infection 2021   • Depression    • Hypertension    • Mumps    • Neck pain    • Old MI (myocardial infarction)    • Pneumonia    • Pulmonary emphysema ([facility])    • Rectal bleeding 2019   • Skin abnormalities     rashes and wounds on both legs - scabbed over and healing   • Sleep apnea     no CPAP   • Tingling of both feet    • Wears glasses      Social History     Socioeconomic History   • Marital status: /Civil Union     Spouse name: None   • Number of children: None   • Years of education: None   • Highest education level: None   Occupational History   • None   Tobacco Use   • Smoking status: Former     Packs/day: 3.00     Years: 43.00     Total pack years: 129.00     Types: Cigarettes     Start date: 67     Quit date: 2018     Years since quittin.7   • Smokeless tobacco: Never   Vaping Use   • Vaping Use: Never used   Substance and Sexual Activity   • Alcohol use: Not Currently   • Drug use: Yes     Types: Marijuana     Comment: occasionally edible   • Sexual activity: None   Other Topics Concern   • None   Social History Narrative    Caffeine use     Social Determinants of Health Financial Resource Strain: Low Risk  (3/23/2023)    Overall Financial Resource Strain (CARDIA)    • Difficulty of Paying Living Expenses: Not very hard   Food Insecurity: No Food Insecurity (7/28/2023)    Hunger Vital Sign    • Worried About Running Out of Food in the Last Year: Never true    • Ran Out of Food in the Last Year: Never true   Transportation Needs: No Transportation Needs (7/28/2023)    PRAPARE - Transportation    • Lack of Transportation (Medical): No    • Lack of Transportation (Non-Medical): No   Physical Activity: Not on file   Stress: Not on file   Social Connections: Not on file   Intimate Partner Violence: Not on file   Housing Stability: Low Risk  (7/28/2023)    Housing Stability Vital Sign    • Unable to Pay for Housing in the Last Year: No    • Number of Places Lived in the Last Year: 1    • Unstable Housing in the Last Year: No       Subjective    Subjective Data: patient denies sob    Objective    Physical Exam:   Assessment Type: During-treatment  General Appearance: Awake, Alert  Respiratory Pattern: Normal  Chest Assessment: Chest expansion symmetrical, Trachea midline  Bilateral Breath Sounds: Diminished  Cough: Non-productive, Dry  O2 Device: room air    Vitals:  Blood pressure 139/69, pulse 75, temperature 98.9 °F (37.2 °C), temperature source Oral, resp. rate 20, height 6' 2" (1.88 m), weight 136 kg (299 lb), SpO2 93 %. Imaging and other studies: I have personally reviewed pertinent reports. O2 Device: room air     Plan    Respiratory Plan: Home Bronchodilator Patient pathway  Airway Clearance Plan: Incentive Spirometer     Resp Comments: Patient is a known patient to the respiratory department. He uses Advair and [place] daily and Duoneb BID and prn. Patient does not use any home oxygen or pap therapy. Patient does have a pap machine at home but does not use it.  Last time in the hospital he tried it, but patient states at this time, he does not want to bother with the therapy.  I have given patient instruction and education on the use of the incentive spirometer to be used Q1H to help promote better bronchial hygiene, patient gave a good return demonstration

## 2023-09-18 NOTE — OP NOTE
OPERATIVE REPORT    PATIENT NAME: Fawn Johnson   :  1955  MRN: [id number]  Pt Location: [facility] OR ROOM 01    SURGERY DATE: 2023    SURGEON(S) and ROLE:  Primary: [place] Apley, DO  Assisting: Dena Niño PA-C    NOTE:  The presence of a physician assistant was necessary to help with patient positioning, surgical exposure, wound retraction, wound closure, and other key portions of the procedure. No qualified resident was available for this case. PREOPERATIVE DIAGNOSES:  Left Knee Osteoarthritis    POSTOPERATIVE DIAGNOSES:  Same as Preoperative Diagnosis    PROCEDURES:  Left Total Knee Arthroplasty      ANESTHESIA TYPE:  General endotracheal and adductor block    ANESTHESIA STAFF:   Anesthesiologist: Nicole Snow MD  CRNA: Adin Biggs CRNA; Jerzy Ward CRNA    ESTIMATED BLOOD LOSS:  100 mL    TOURNIQUET TIME:  90 min    PERIOPERATIVE ANTIBIOTICS:  cefazolin, 3 grams    IMPLANTS: Depuy Attune    Femur:  Size 9    Tibia:  Size 8    Patella: 38 mm    Poly : 5 mm      Implant Name Type Inv. Item Serial No.  Lot No. LRB No. Used Action   COMPONENT FEM SZ 9 LT NRW CMNT CR ATTUNE - [id number]  COMPONENT FEM SZ 9 LT NRW CMNT CR ATTUNE  DEPUY [id number] Left 1 Implanted   COMPONENT PATELLA 38MM MEDIAL DOME ATTUNE - [id number]  COMPONENT PATELLA 38MM MEDIAL DOME ATTUNE  DEPUY [id number] Left 1 Implanted   BASEPLATE TIBIAL SZ 8 CMNT FX BRNG ATTUNE S PLUS - [id number]  BASEPLATE TIBIAL SZ 8 CMNT FX BRNG ATTUNE S PLUS  DEPUY [id number] Left 1 Implanted   CEMENT BONE SMART SET GRAY MED VISC - [id number]  CEMENT BONE SMART SET GRAY MED VISC  DEPUY [id number] Left 2 Implanted   INSERT TIBIAL 5MM SZ 9 CR FX BRNG ATTUNE - [id number]  INSERT TIBIAL 5MM SZ 9 CR FX BRNG ATTUNE  DEPUY [id number] Left 1 Implanted       SPECIMENS:  * No specimens in log *    DRAINS:  None      OPERATIVE INDICATIONS:  The patient is a 79 y.o. male with left knee osteoarthritis.   Surgical treatment was indicated due to persistent symptoms despite non-surgical treatment and liklihood of prolonged or permanent functional impairment with non-surgical treatment. After a thorough discussion of the potential risks, benefits, and alternative treatments, the patient agreed to proceed with surgery. The patient understands that the risks of surgery include, but are not limited to: failure of repair, nonunion, malunion, loss of fixation, infection, neurovascular injury, wound healing complications, venous thromboembolism, persistent pain, stiffness, instability, recurrence of symptoms, potential need for additional surgeries, and loss of limb or life. Oral and written consent for surgery was obtained from the patient preoperatively. PROCEDURE AND TECHNIQUE:  On the day of surgery, the patient was identified in the preoperative holding area. The operative site was marked by the surgeon. The patient was taken into the operating room. A time-out was conducted to confirm the patient's identity, the operative site, and the proposed procedure. The patient was anesthetized, and perioperative antibiotics were administered. The patient was positioned supine on the OR table. All bony prominences were padded. A tourniquet cuff was applied to the operative extremity, set at 300 mmHg. The operative site was prepped and draped using standard sterile technique. Esmarch tourniquet was used to exsanguinate the limb, tourniquet was inflated  An anterior midline incision was carried sharply to the extensor mechanism. Hemostasis was obtained with electrocautery. A medial parapatellar arthrotomy was made, and the patella was subluxated laterally. Severe tri-compartmental degenerative changes were noted. The infrapatellar fat pad, anterior horns of the menisci, anterior cruciate ligament, and synovium over the anterior femur were excised. The lateral patellofemoral ligament was divided. A lateral retinacular release was not performed. The medial soft tissue sleeve was elevated from the tibia due to the varus angulation and malalignment. .    The femoral canal was accessed with a reamer, and the intramedullary alignment morelia was placed. The distal femoral cut was made in 6 degrees of valgus, removing 11 mm of bone in order to compensate for flexion contracture. A size 9 femoral cutting guide was placed. Rotational alignment was referenced using the transepicondylar axis and McCulloch's line. The anterior, posterior, and chamfer cuts were made. The extension and flexion gaps were balanced to accept a 5mmmeter mm insert. The sulcus cuts was made using a sagittal saw. A posterior capsular release was performed with  A Jin elevator. Meniscus remnant and posterior osteophytes were removed from the posterior recess. Hemostasis was obtained with electrocautery. Medial and lateral Hohmann retractors were placed on the tibia. A PCL retractor placed in the intercondylar notch was used to subluxate the tibia anteriorly. The extramedullary tibial guide was used to position the tibial cutting block perpendicular to the tibial mechanical axis. The tibial cut was made with  3 degrees of posterior slope, and referenced off the medial joint space low side. Trial components were placed. Patient was still tight in both flexion extension. 2 more millimeters were taken of the proximal tibia using the jig once more. Trial components were then again placed, the trial liner was adjusted as necessary to provide appropriate soft tissue tension and knee range of motion. The knee demonstrated excellent range of motion from full extension to 120 degrees of flexion and appropriated varus / valgus stability in full extension and mid-flexion. The patella was cut with the jig, sized, and prepared to accept the patellar component. A patellar trial was placed. Patellar tracking was stable using the no-thumbs method.   Tibial component rotation was marked with electocautery. The trial components were removed. Size 8 The tibia was exposed, sized, and prepared with the reamer and keel-punch. Cement was mixed on the back table while the knee was irrigated with sterile saline, and cocktail injection was placed in the posterior capsule. The bone cuts were dried, and the tibial, femoral and patellar components were placed. Compression was applied to the components while the cement cured. Excess cement was removed. The polyethylene liner was placed. Hemostasis was obtained with electrocautery. The knee was again irrigated with sterile saline. A Hemovac drain was not placed. The arthrotomy and the deep fasica were closed with #1 Vicryl sutures infection. The skin was closed with 2-0 monocryl and subcuticular stitch was a running 3-0 Monocryl. Skin was cleansed and dried, skin glue was placed. A sterile dressing was applied, and the drapes were removed. The patient was awakened from anesthesia and taken to the PACU in stable condition.     COMPLICATIONS:  None    PATIENT DISPOSITION:  PACU  and extubated and stable    Plan:   WBAT to operative leg  ROM as tolerated, pillow under achilles (not knee) while in bed  PT/OT  ASA 81mg BID x 2 weeks  Keep mepilex dressing in place until follow up in 10-14 days  May readjust ACE wrap as needed  Multimodal pain control  Ancef x24 hours, duricef x 5 days    SIGNATURE:  Yari Rueda DO  DATE:  September 18, 2023  TIME:  10:37 AM

## 2023-09-18 NOTE — ANESTHESIA PREPROCEDURE EVALUATION
Procedure:  ARTHROPLASTY KNEE TOTAL (Left: Knee)    Relevant Problems   CARDIO   (+) Benign essential hypertension   (+) Chronic combined systolic and diastolic congestive heart failure (HCC)      ENDO   (+) Type 2 diabetes mellitus (HCC)      /RENAL   (+) Stage 3 chronic kidney disease, unspecified whether stage 3a or 3b CKD (HCC)      MUSCULOSKELETAL   (+) Primary osteoarthritis of left knee      NEURO/PSYCH   (+) Anxiety   (+) Anxiety and depression   (+) Moderate episode of recurrent major depressive disorder (HCC)      PULMONARY   (+) Asthma-COPD overlap syndrome (HCC)   (+) COPD, severe (HCC)   (+) Chronic asthma, moderate persistent, uncomplicated   (+) Obstructive sleep apnea   (+) Pulmonary emphysema (HCC)      Cardiovascular and Mediastinum   (+) Non-ischemic cardiomyopathy with HFmEF (HCC)      Other   (+) Class 2 severe obesity due to excess calories with serious comorbidity and body mass index (BMI) of 37.0 to 37.9 in adult (HCC)   (+) Dyslipidemia   (+) History of tobacco abuse   (+) Hyponatremia   (+) PLMD (periodic limb movement disorder)      Plan for spinal anesthesia followed by adductor canal block with Exparel in PACU. Explained to patient risks and benefits of nerve block. Patient and surgeon in agreement with above plan.         Latest Reference Range & Units 08/23/23 13:35   Sodium 135 - 147 mmol/L 134 (L)   Potassium 3.5 - 5.3 mmol/L 4.4   Chloride 96 - 108 mmol/L 100   CO2 21 - 32 mmol/L 23   Anion Gap mmol/L 11   BUN 5 - 25 mg/dL 26 (H)   Creatinine 0.60 - 1.30 mg/dL 1.41 (H)   Glucose, Random 65 - 140 mg/dL 103   (L): Data is abnormally low  (H): Data is abnormally high     Latest Reference Range & Units 08/23/23 13:35   WBC 4.31 - 10.16 Thousand/uL 8.58   Red Blood Cell Count 3.88 - 5.62 Million/uL 4.95   Hemoglobin 12.0 - 17.0 g/dL 13.5   HCT 36.5 - 49.3 % 42.7   MCV 82 - 98 fL 86   MCH 26.8 - 34.3 pg 27.3   MCHC 31.4 - 37.4 g/dL 31.6   RDW 11.6 - 15.1 % 15.5 (H)   Platelet Count 667 - 390 Thousands/uL 239   MPV 8.9 - 12.7 fL 9.9   (H): Data is abnormally high    Sinus rhythm with 1st degree A-V block  Left axis deviation  Nonspecific ST-t wave changes  When compared with ECG of 29-JUL-2023 16:09,  No significant change was found  Confirmed by Lisa Turcios ([id number]) on 8/23/2023 4:20:36 PM    ECHO 7/28/23:     Left Ventricle: Left ventricular cavity size is normal. Wall thickness is normal. The left ventricular ejection fraction is 45%. Systolic function is mildly reduced. There is mild global hypokinesis. Diastolic function is mildly abnormal, consistent with grade I (abnormal) relaxation. •  Right Ventricle: Right ventricular cavity size is normal. Systolic function is normal.     Findings    Left Ventricle Left ventricular cavity size is normal. Wall thickness is normal. The left ventricular ejection fraction is 45%. Systolic function is mildly reduced. There is mild global hypokinesis. Diastolic function is mildly abnormal, consistent with grade I (abnormal) relaxation. Right Ventricle Right ventricular cavity size is normal. Systolic function is normal. Wall thickness is normal.      Left Atrium The atrium is normal in size. Right Atrium The atrium is normal in size. Aortic Valve The aortic valve is trileaflet. The leaflets are not thickened. The leaflets are not calcified. The leaflets exhibit normal mobility. There is no evidence of regurgitation. The aortic valve has no significant stenosis. Mitral Valve There is no evidence of regurgitation. There is no evidence of stenosis. The mitral valve has normal structure and normal function. Tricuspid Valve Tricuspid valve structure is normal. There is no evidence of regurgitation. There is no evidence of stenosis. The estimated right ventricular systolic pressure is 59.54 mmHg. Pulmonic Valve Pulmonic valve structure is normal. There is no evidence of regurgitation. There is no evidence of stenosis.       Ascending Aorta The aortic root is normal in size. IVC/SVC The right atrial pressure is estimated at 5.0 mmHg. The inferior vena cava is normal in size. Respirophasic changes were normal.      Pericardium There is no pericardial effusion. The pericardium is normal in appearance. Physical Exam    Airway    Mallampati score: II  TM Distance: >3 FB  Neck ROM: full     Dental       Cardiovascular      Pulmonary  Breath sounds clear to auscultation,     Other Findings        Anesthesia Plan  ASA Score- 3     Anesthesia Type- spinal with ASA Monitors. Additional Monitors:   Airway Plan:           Plan Factors-Exercise tolerance (METS): <4 METS. Chart reviewed. EKG reviewed. Imaging results reviewed. Existing labs reviewed. Patient summary reviewed. Patient is not a current smoker. Patient did not smoke on day of surgery. Induction- intravenous. Postoperative Plan- Plan for postoperative opioid use. Informed Consent- Anesthetic plan and risks discussed with patient. I personally reviewed this patient with the CRNA. Discussed and agreed on the Anesthesia Plan with the CRNA. Nikko Funk

## 2023-09-18 NOTE — OCCUPATIONAL THERAPY NOTE
Occupational Therapy Evaluation     Patient Name: Lisa Carrillo  TORWR'O Date: 9/18/2023  Problem List  Principal Problem:    Primary osteoarthritis of left knee    Past Medical History  Past Medical History:   Diagnosis Date    Acute on chronic combined systolic and diastolic CHF (congestive heart failure) ([address]) 7/27/2023    Alcohol abuse 7/27/2023    Ambulates with cane     Arthritis     Asthma     Back pain     Chest pain 12/22/2022    CHF (congestive heart failure) ([facility])     Claustrophobia     COPD (chronic obstructive pulmonary disease) ([address])     COPD with acute exacerbation ([address]) 05/06/2022    COVID-19     COVID-19 virus infection 12/03/2021    Depression     Hypertension     Mumps     Neck pain     Old MI (myocardial infarction)     Pneumonia     Pulmonary emphysema ([facility])     Rectal bleeding 01/14/2019    Skin abnormalities     rashes and wounds on both legs - scabbed over and healing    Sleep apnea     no CPAP    Tingling of both feet     Wears glasses      Past Surgical History  Past Surgical History:   Procedure Laterality Date    APPENDECTOMY      CARDIAC CATHETERIZATION  07/24/2015    Left main- normal and maidly tortuous. Circumflex - normal and moderately tortuous. RCA- normal and mildy tortuous. Global LV function was severely depressed. Nikko Funk CARDIAC CATHETERIZATION Left 09/27/2022    Procedure: Cardiac Left Heart Cath;  Surgeon: Jonatan Kulkarni DO;  Location: [facility]; Service: Cardiology    CARDIAC CATHETERIZATION N/A 09/27/2022    Procedure: Cardiac Coronary Angiogram;  Surgeon: Jonatan Kulkarni DO;  Location: [facility]; Service: Cardiology    CARDIAC CATHETERIZATION  09/27/2022    Procedure: Cardiac catheterization;  Surgeon: Jonatan Kulkarni DO;  Location: [facility];   Service: Cardiology    INGUINAL HERNIA REPAIR Bilateral     DE COLONOSCOPY FLX DX W/COLLJ SPEC WHEN PFRMD N/A 03/11/2019    Procedure: COLONOSCOPY with removal of anal papilla; Surgeon: Albin Bryant MD;  Location: [facility] MAIN [facility];  Service: Colorectal    TONSILLECTOMY      UMBILICAL HERNIA REPAIR  06/21/2006 09/18/23 [id number]   OT Last Visit   OT Visit Date 09/18/23   Note Type   Note type Evaluation   Pain Assessment   Pain Assessment Tool 0-10   Pain Score 8   Pain Location/Orientation Orientation: Left; Location: Knee   Restrictions/Precautions   Weight Bearing Precautions Per Order Yes   LLE Weight Bearing Per Order WBAT   Other Precautions Fall Risk;Multiple lines;Pain; Chair Alarm; Bed Alarm;WBS   Home Living   Type of [address] [place] [address] [address] Two level; Other (Comment)  (no AXEL; 6 steps between floors)   Bathroom Shower/Tub Tub/shower unit   Bathroom Toilet Standard   Bathroom Equipment Grab bars in shower; Shower chair   [employer] [place] [employer] Walker;Cane;Grab bars; Other (Comment)  (nebulizer)   Additional Comments pt reports use of SPC at baseline during functional mobility   Prior Function   Level of [place] Independent with ADLs; Independent with functional mobility; Independent with IADLS   Lives With Spouse   Receives Help From Family   IADLs Independent with driving   Falls in the last 6 months 0   Vocational Retired   Subjective   Subjective "It's a different kind of pain"   ADL   Where Assessed Edge of bed   LB Dressing Assistance 4  Minimal Assistance   LB Dressing Deficit Thread RLE into underwear; Thread LLE into underwear;Pull up over hips   Additional Comments pt requires min (A) to don socks and (S) to don underwear; pt educated in LB dressing  technique following TKR   Bed Mobility   Supine to Sit 5  Supervision   Additional items Bedrails; Increased time required   Sit to Supine   (seated in chair at end of session)   Additional Comments pt trialed on RA during session and SPO2 WFL with no complaints of SOB; BP WFL in supine, sitting, and standing with no accompanied symptoms   Transfers   Sit to Stand 5  Supervision   Additional items Increased time required;Verbal cues  (RW)   Stand to Sit 5  Supervision   Additional items Increased time required;Verbal cues  (RW)   Additional Comments pt performs functional transfers with RW; no significant LOB or instability   Functional Mobility   Functional Mobility 5  Supervision   Additional Comments pt performs functional mobility with RW at (S) level; performs ~125ft with x1 standing rest break   Additional items Rolling walker   Balance   Static Sitting Good   Dynamic Sitting Good   Static Standing Fair +   Dynamic Standing Fair +   Ambulatory Fair   Activity Tolerance   Activity Tolerance Patient limited by pain; Patient limited by fatigue   RUE Assessment   RUE Assessment WFL   LUE Assessment   LUE Assessment WFL   Hand Function   Gross Motor Coordination Functional   Fine Motor Coordination Functional   Sensation   Light Touch No apparent deficits   Sharp/Dull No apparent deficits   Psychosocial   Psychosocial (WDL) WDL   Cognition   Overall Cognitive Status WFL   Arousal/Participation Alert   Attention Within functional limits   Orientation Level Oriented X4   Memory Within functional limits   Following Commands Follows all commands and directions without difficulty   Assessment   Limitation Decreased ADL status; Decreased UE strength;Decreased Safe judgement during ADL;Decreased endurance;Decreased self-care trans;Decreased high-level ADLs   Assessment Pt is a 79 y.o. male seen for OT evaluation s/p admit to [facility] on 9/18/2023 w/ Primary osteoarthritis of left knee. Comorbidities affecting pt's functional performance at time of assessment include: CHF, MI, mumps, COPD, asthma, pulmonary  emphysema, sleep apnea, COVID, rectal bleeding, depression, HTN, alc abuse, CHF, back pain. Personal factors affecting pt at time of IE include:difficulty performing ADLS, difficulty performing IADLS  and health management . Prior to admission, pt was (I) ADLs and (A) with IADLs with use of SPC during mobility. Upon evaluation: Pt requires (S)-min (A) level with use of RW during mobility 2* the following deficits impacting occupational performance: weakness, decreased strength, decreased balance, decreased tolerance, impaired initiation, decreased safety awareness and increased pain. Pt to benefit from continued skilled OT tx while in the hospital to address deficits as defined above and maximize level of functional independence w ADL's and functional mobility. Occupational Performance areas to address include: grooming, bathing/shower, toilet hygiene, dressing, functional mobility, community mobility and clothing management. The patient's raw score on the AM-PAC Daily Activity Inpatient Short Form is 20. A raw score of greater than or equal to 19 suggests the patient may benefit from discharge to home. Please refer to the recommendation of the Occupational Therapist for safe discharge planning. Pt benefited from co-evaluation of skilled OT and PT therapists in order to most appropriately address functional deficits d/t extensive assistance required for safe functional mobility, decreased activity tolerance, and regression from functioning level prior to admission and/or onset of present illness. OT/PT objectives were addressed separately; please see PT note for specific goal areas targeted.    Goals   Patient Goals to go home   Short Term Goal  pt will perform UE strengthening exercises   Long Term Goal #1 pt will perform UB/ LB bathing and grooming tasks at (I) level   Long Term Goal #2 pt will demonstrate toilet transfers and hygiene at (I) level   Long Term Goal pt will demonstrate functional mobility with RW at mod (I) level   Recommendation   OT Discharge Recommendation Home with outpatient rehabilitation   Additional Comments  (S)  OP PT   AM-PAC Daily Activity Inpatient   Lower Body Dressing 2   Bathing 3   Toileting 3   Upper Body Dressing 4   Grooming 4   Eating 4   Daily Activity Raw Score 20   Daily Activity Standardized Score (Calc for Raw Score >=11) 42.03   AM-PAC Applied Cognition Inpatient   Following a Speech/Presentation 4   Understanding Ordinary Conversation 4   Taking Medications 4   Remembering Where Things Are Placed or Put Away 4   Remembering List of 4-5 Errands 4   Taking Care of Complicated Tasks 4   Applied Cognition Raw Score 24   Applied Cognition Standardized Score 62.21

## 2023-09-18 NOTE — ASSESSMENT & PLAN NOTE
· POD #0 S/P Left total knee arthroplasty by Dr. Wolf Villarreal (Orthopedic Surgery) on 09/18/2023  · DVT prophylaxis per Orthopedic Surgery with aspirin 81 mg PO BID x 2 weeks and SCD's on both lower extremities  · Incentive spirometry 10 times per hour while awake  · PT/OT evaluations

## 2023-09-18 NOTE — DISCHARGE INSTR - AVS FIRST PAGE
Dr. Nelda Dong Knee Replacement    What to Expect/Activity  It is normal to have some discomfort in your knee for several days to weeks. You are weight bearing as tolerated to your operative leg with assist devices. Please use crutches/walker when ambulating until your follow-up  Swelling and discomfort in the knee is normal for several days after surgery. For the first 2-3 days, use ice around the knee to help. Use for 20-30 minutes every 1-2 hours for 48 hours, while awake. You may continue beyond 48 hours as needed. Place one or two pillows underneath your calf, not your knee, to reduce swelling. Physical therapy on your own at home should start as soon as possible (see below). Please perform heel slides and extension exercises on your own as well (see diagram). Please use incentive spirometer 10 times per hour while awake (see diagram). Dressing/Wound Care/Bathing  You may readjust or re-wrap your toe-to-groin dressing as needed to aid in compression to help with swelling. There will be a silver surgical dressing over your incision that stays in place until follow up. You may start showering when comfortable, but must keep the dressing in place and wrapped to keep dry   No baths, swimming or submerging until cleared by Dr. Reyna Carmona may resume your usual medications. Please take the following medications:  Anti-coagulation (blood clot prevention) - Aspirin 81mg twice daily for 2 weeks  Pain medication:  Narcotic: Take as directed  NSAID/Anti-inflammatory: Take as directed  Acetaminophen (Tylenol) 1000mg every 8 hours  Zofran (ondasetron) - 4mg every 8 hours as needed for nausea  Stool softeners (senna/colace) - take daily to prevent constipation as narcotic pain medication causes constipation  Antibiotic - take as directed if prescribed   If you have questions or pain concerns, please contact the office. Pain medication cannot remove all post-operative pain.     Follow up/Call if:  The findings of your surgery will be explained to you and your family immediately after surgery. However, in the post-operative period, during recovery from anesthesia you may not fully remember or fully understand what was said. This will be again gone over when you return for your post-op appointment.   Please contact the office if you experience the following:  Excessive bleeding (bleeding through your dressing)  Fever greater than 101 degrees F after 48 hours (low grade fevers the day or two after surgery are normal)  Persistent nausea or vomiting  Decreased sensation or discoloration of the operative limb  Pain or swelling that is getting worse and not better with medication    Dr. Anna Calderón: [phone]

## 2023-09-18 NOTE — PLAN OF CARE
Problem: OCCUPATIONAL THERAPY ADULT  Goal: Performs self-care activities at highest level of function for planned discharge setting. See evaluation for individualized goals. Description: Treatment Interventions: ADL retraining, UE strengthening/ROM, Functional transfer training, Endurance training, Patient/family training, Equipment evaluation/education, Activityengagement          See flowsheet documentation for full assessment, interventions and recommendations. Note: Limitation: Decreased ADL status, Decreased UE strength, Decreased Safe judgement during ADL, Decreased endurance, Decreased self-care trans, Decreased high-level ADLs     Assessment: Pt is a 79 y.o. male seen for OT evaluation s/p admit to [facility] on 9/18/2023 w/ Primary osteoarthritis of left knee. Comorbidities affecting pt's functional performance at time of assessment include: CHF, MI, mumps, COPD, asthma, pulmonary  emphysema, sleep apnea, COVID, rectal bleeding, depression, HTN, alc abuse, CHF, back pain. Personal factors affecting pt at time of IE include:difficulty performing ADLS, difficulty performing IADLS  and health management . Prior to admission, pt was (I) ADLs and (A) with IADLs with use of SPC during mobility. Upon evaluation: Pt requires (S)-min (A) level with use of RW during mobility 2* the following deficits impacting occupational performance: weakness, decreased strength, decreased balance, decreased tolerance, impaired initiation, decreased safety awareness and increased pain. Pt to benefit from continued skilled OT tx while in the hospital to address deficits as defined above and maximize level of functional independence w ADL's and functional mobility. Occupational Performance areas to address include: grooming, bathing/shower, toilet hygiene, dressing, functional mobility, community mobility and clothing management. The patient's raw score on the AM-PAC Daily Activity Inpatient Short Form is 20.  A raw score of greater than or equal to 19 suggests the patient may benefit from discharge to home. Please refer to the recommendation of the Occupational Therapist for safe discharge planning. Pt benefited from co-evaluation of skilled OT and PT therapists in order to most appropriately address functional deficits d/t extensive assistance required for safe functional mobility, decreased activity tolerance, and regression from functioning level prior to admission and/or onset of present illness. OT/PT objectives were addressed separately; please see PT note for specific goal areas targeted.      OT Discharge Recommendation: Home with outpatient rehabilitation

## 2023-09-18 NOTE — ASSESSMENT & PLAN NOTE
· Not in acute exacerbation  · Continue his home COPD medication regimen  · Utilize the respiratory protocol  · Incentive spirometry 10 times per hour while awake  · Outpatient follow-up with Pulmonology

## 2023-09-18 NOTE — ANESTHESIA POSTPROCEDURE EVALUATION
Post-Op Assessment Note    CV Status:  Stable  Pain Score: 0    Pain management: adequate     Mental Status:  Alert and awake   Hydration Status:  Euvolemic   PONV Controlled:  Controlled   Airway Patency:  Patent      Post Op Vitals Reviewed: Yes      Staff: Anesthesiologist, CRNA         No notable events documented.     BP   120/57   Temp   97.0   Pulse  68   Resp   21   SpO2   97

## 2023-09-18 NOTE — PLAN OF CARE
Problem: MOBILITY - ADULT  Goal: Maintain or return to baseline ADL function  Description: INTERVENTIONS:  -  Assess patient's ability to carry out ADLs; assess patient's baseline for ADL function and identify physical deficits which impact ability to perform ADLs (bathing, care of mouth/teeth, toileting, grooming, dressing, etc.)  - Assess/evaluate cause of self-care deficits   - Assess range of motion  - Assess patient's mobility; develop plan if impaired  - Assess patient's need for assistive devices and provide as appropriate  - Encourage maximum independence but intervene and supervise when necessary  - Involve family in performance of ADLs  - Assess for home care needs following discharge   - Consider OT consult to assist with ADL evaluation and planning for discharge  - Provide patient education as appropriate  Outcome: Progressing  Goal: Maintains/Returns to pre admission functional level  Description: INTERVENTIONS:  - Perform BMAT or MOVE assessment daily.   - Set and communicate daily mobility goal to care team and patient/family/caregiver. - Collaborate with rehabilitation services on mobility goals if consulted  - Reposition patient every two  hours.   - Out of bed to chair three times a day   - Out of bed for meals three times a day  - Out of bed for toileting  - Record patient progress and toleration of activity level   Outcome: Progressing     Problem: PAIN - ADULT  Goal: Verbalizes/displays adequate comfort level or baseline comfort level  Description: Interventions:  - Encourage patient to monitor pain and request assistance  - Assess pain using appropriate pain scale  - Administer analgesics based on type and severity of pain and evaluate response  - Implement non-pharmacological measures as appropriate and evaluate response  - Consider cultural and social influences on pain and pain management  - Notify physician/advanced practitioner if interventions unsuccessful or patient reports new pain  Outcome: Progressing     Problem: SAFETY ADULT  Goal: Maintain or return to baseline ADL function  Description: INTERVENTIONS:  -  Assess patient's ability to carry out ADLs; assess patient's baseline for ADL function and identify physical deficits which impact ability to perform ADLs (bathing, care of mouth/teeth, toileting, grooming, dressing, etc.)  - Assess/evaluate cause of self-care deficits   - Assess range of motion  - Assess patient's mobility; develop plan if impaired  - Assess patient's need for assistive devices and provide as appropriate  - Encourage maximum independence but intervene and supervise when necessary  - Involve family in performance of ADLs  - Assess for home care needs following discharge   - Consider OT consult to assist with ADL evaluation and planning for discharge  - Provide patient education as appropriate  Outcome: Progressing  Goal: Maintains/Returns to pre admission functional level  Description: INTERVENTIONS:  - Perform BMAT or MOVE assessment daily.   - Set and communicate daily mobility goal to care team and patient/family/caregiver. - Collaborate with rehabilitation services on mobility goals if consulted  - Reposition patient every two  hours.   - Out of bed to chair three times a day   - Out of bed for meals three times a day  - Out of bed for toileting  - Record patient progress and toleration of activity level   Outcome: Progressing  Goal: Patient will remain free of falls  Description: INTERVENTIONS:  - Educate patient/family on patient safety including physical limitations  - Instruct patient to call for assistance with activity   - Consult OT/PT to assist with strengthening/mobility   - Keep Call bell within reach  - Keep bed low and locked with side rails adjusted as appropriate  - Keep care items and personal belongings within reach  - Initiate and maintain comfort rounds  - Make Fall Risk Sign visible to staff  - Initiate/Maintain bed/ chair alarm  - Obtain necessary fall risk management equipment: Chair/bed alarm   - Apply yellow socks and bracelet for high fall risk patients  - Consider moving patient to room near nurses station  Outcome: Progressing     Problem: DISCHARGE PLANNING  Goal: Discharge to home or other facility with appropriate resources  Description: INTERVENTIONS:  - Identify barriers to discharge w/patient and caregiver  - Arrange for needed discharge resources and transportation as appropriate  - Identify discharge learning needs (meds, wound care, etc.)  - Arrange for interpretive services to assist at discharge as needed  - Refer to Case Management Department for coordinating discharge planning if the patient needs post-hospital services based on physician/advanced practitioner order or complex needs related to functional status, cognitive ability, or social support system  Outcome: Progressing

## 2023-09-18 NOTE — PHYSICAL THERAPY NOTE
Physical Therapy Evaluation    Patient Name: Lisa Carrillo    DTSNC'D Date: 9/18/2023     Problem List  Principal Problem:    Primary osteoarthritis of left knee       Past Medical History  Past Medical History:   Diagnosis Date   • Acute on chronic combined systolic and diastolic CHF (congestive heart failure) ([address]) 7/27/2023   • Alcohol abuse 7/27/2023   • Ambulates with cane    • Arthritis    • Asthma    • Back pain    • Chest pain 12/22/2022   • CHF (congestive heart failure) ([facility])    • Claustrophobia    • COPD (chronic obstructive pulmonary disease) ([facility])    • COPD with acute exacerbation ([address]) 05/06/2022   • COVID-19    • COVID-19 virus infection 12/03/2021   • Depression    • Hypertension    • Mumps    • Neck pain    • Old MI (myocardial infarction)    • Pneumonia    • Pulmonary emphysema ([facility])    • Rectal bleeding 01/14/2019   • Skin abnormalities     rashes and wounds on both legs - scabbed over and healing   • Sleep apnea     no CPAP   • Tingling of both feet    • Wears glasses         Past Surgical History  Past Surgical History:   Procedure Laterality Date   • APPENDECTOMY     • CARDIAC CATHETERIZATION  07/24/2015    Left main- normal and maidly tortuous. Circumflex - normal and moderately tortuous. RCA- normal and mildy tortuous. Global LV function was severely depressed. .   • CARDIAC CATHETERIZATION Left 09/27/2022    Procedure: Cardiac Left Heart Cath;  Surgeon: Jonatan Kulkarni DO;  [facility]: [facility]; Service: Cardiology   • CARDIAC CATHETERIZATION N/A 09/27/2022    Procedure: Cardiac Coronary Angiogram;  Surgeon: Jonatan Kulkarni DO;  [facility]: [facility]; Service: Cardiology   • CARDIAC CATHETERIZATION  09/27/2022    Procedure: Cardiac catheterization;  Surgeon: Jonatan Kulkarni DO;  Location: [facility];   Service: Cardiology   • INGUINAL HERNIA REPAIR Bilateral    • TN COLONOSCOPY FLX DX W/COLLJ SPEC WHEN PFRMD N/A 03/11/2019    Procedure: COLONOSCOPY with removal of anal papilla; Surgeon: Santino Diaz MD;  Location: [facility];  Service: Colorectal   • TONSILLECTOMY     • UMBILICAL HERNIA REPAIR  06/21/2006 09/18/23 1355   PT Last Visit   PT Visit Date 09/18/23   Note Type   Note type Evaluation   Pain Assessment   Pain Assessment Tool 0-10   Pain Score 8   Pain Location/Orientation Orientation: Left; Location: Hip   Restrictions/Precautions   Weight Bearing Precautions Per Order Yes   LLE Weight Bearing Per Order WBAT   Other Precautions Pain; Fall Risk;Multiple lines; Chair Alarm;WBS   Home Living   Type of [address] Two level;Performs ADLs on one level; Able to live on main level with bedroom/bathroom  (split level home; 6 stairs to main level c HR)   Bathroom Shower/Tub Tub/shower unit   Bathroom Toilet Standard   Bathroom Equipment Grab bars in shower; Shower chair   Bathroom Accessibility Accessible   Home Equipment Walker;Cane;Other (Comment)  (nebulizer)   Additional Comments pt reports occasiona use of cane at baseline   Prior Function   Level of [place] Independent with ADLs; Independent with functional mobility; Needs assistance with IADLS   Lives With Spouse   Receives Help From Family   IADLs Independent with driving   Falls in the last 6 months 0   Vocational Retired   General   Family/Caregiver Present Yes   Cognition   Overall Cognitive Status WFL   Arousal/Participation Alert   Orientation Level Oriented X4   Following Commands Follows all commands and directions without difficulty   Subjective   Subjective pt reports he has been going to the gym to prepare for this surgery   RLE Assessment   RLE Assessment WFL   LLE Assessment   LLE Assessment X  (4-/5 grossly)   Bed Mobility   Supine to Sit 5  Supervision   Additional items Increased time required;Verbal cues   Sit to Supine   (pt OOB at end of session)   Transfers   Sit to Stand 5  Supervision   Additional items Increased time required;Verbal cues   Stand to Sit 5  Supervision   Additional items Increased time required;Verbal cues   Additional Comments bariatric RW used   Ambulation/Elevation   Gait pattern Short stride; Foward flexed; Wide EN; Antalgic   Gait Assistance 5  Supervision   Additional items Verbal cues   Assistive Device Bariatric Rolling walker   Distance 125'   Balance   Static Sitting Good   Dynamic Sitting Good   Static Standing Fair   Dynamic Standing [address] [facility] -  (with RW)   Endurance Deficit   Endurance Deficit Yes   Endurance Deficit Description pt fatigued with increased ambulation   Activity Tolerance   Activity Tolerance Patient limited by fatigue;Patient limited by pain   Assessment   Prognosis Good   Problem List Decreased strength;Decreased endurance; Impaired balance;Decreased range of motion;Decreased mobility;Pain;Orthopedic restrictions   Assessment Patient is a 79 y.o. male evaluated by Physical Therapy s/p admit to [address] [place] [address] on 9/18/2023 with admitting diagnosis of: Knee pain, Primary osteoarthritis of left knee, Pain in other joint, Pre-op testing, and principal problem of: Primary osteoarthritis of left knee. PT was consulted to assess patient's functional mobility and discharge needs. Ordered are PT Evaluation and treatment with activity level of: WBAT Left LE. Comorbidities affecting patient's physical performance at time of assessment include: CHF, hx of MI, COPD, asthma, pulmonary emphysema, hx of COVID-19, arthritis, HTN. Personal factors affecting the patient at time of IE include: lives in a split level story home, ambulating with assistive device, step(s) to enter home, inability to navigate community distances, inability/difficulty performing IADLs and inability/difficulty performing ADLs. Please locate objective findings from PT assessment regarding body systems outlined above.  Upon evaluation, pt able to perform all functional mobility with SUP, RW, and increased time. Education provided on proper RW use, sequencing, and safety awareness. Pt demonstrating good understanding and able to ambulate 125' before taking seated rest break. All vitals remained WFL on RA with exertion and positional changes. Mild to moderate postural sway demonstrated but no LOB experienced. The patient's AM-[facility] Basic Mobility Inpatient Short Form Raw Score is 18. A Raw score of greater than 16 suggests the patient may benefit from discharge to home. Please also refer to the recommendation of the Physical Therapist for safe discharge planning. Co treatment with OT secondary to complex medical condition of pt, possible A of 2 required to achieve and maintain transitional movements, requiring the need of skilled therapeutic intervention of 2 therapists to achieve delivery of services. Pt will benefit from continued PT intervention during LOS to address current deficits, increase LOF, and facilitate safe d/c to next level of care when medically appropriate. D/c recommendation at this time is home with outpatient rehabilitation services. Goals   Patient Goals to go home   LTG Expiration Date 10/02/23   Long Term Goal #1 Pt will participate in B LE strengthening exercises to facilitate improved functional activity tolerance. Pt will perform all functional transfers and bed mobility mod(I) with good safety awareness. Pt will ambulate 250' mod(I) with LRAD while maintaining good functional dynamic balance. Pt will ascend/descend 6 stairs with HR and SUP to reflect the ability to safely navigate the home. Plan   Treatment/Interventions Functional transfer training;LE strengthening/ROM; Elevations; Therapeutic exercise; Endurance training;Bed mobility;Gait training   PT Frequency BID; 1x/day Sat & Sun   Recommendation   PT Discharge Recommendation Home with outpatient rehabilitation   AM-PAC Basic Mobility Inpatient   Turning in Flat Bed Without Bedrails 3   Lying on Back to Sitting on Edge of Flat Bed Without Bedrails 3   Moving Bed to Chair 3   Standing Up From Chair Using Arms 3   Walk in Room 3   Climb 3-5 Stairs With Railing 3   Basic Mobility Inpatient Raw Score 18   Basic Mobility Standardized Score 41.05   Highest Level Of Mobility   -HLM Goal 6: Walk 10 steps or more   JH-HLM Achieved 7: Walk 25 feet or more   End of Consult   Patient Position at End of Consult Bedside chair;Bed/Chair alarm activated; All needs within reach

## 2023-09-18 NOTE — ANESTHESIA PROCEDURE NOTES
Peripheral Block    Patient location during procedure: OR  Start time: 9/18/2023 10:45 AM  Reason for block: at surgeon's request and post-op pain management  Staffing  Performed by: Lauro Canales MD  Authorized by: Lauro Canales MD    Preanesthetic Checklist  Completed: patient identified, IV checked, site marked, risks and benefits discussed, surgical consent, monitors and equipment checked, pre-op evaluation and timeout performed  Peripheral Block  Patient position: supine  Prep: ChloraPrep  Patient monitoring: frequent blood pressure checks, continuous pulse oximetry and heart rate  Block type: Adductor Canal  Laterality: left  Injection technique: single-shot  Procedures: ultrasound guided, Ultrasound guidance required for the procedure to increase accuracy and safety of medication placement and decrease risk of complications.   Ultrasound permanent image saved  Needle  Needle type: Stimuplex   Needle gauge: 20 G  Needle length: 4 in  Needle localization: anatomical landmarks and ultrasound guidance  Needle insertion depth: 3.5 cm  Assessment  Injection assessment: incremental injection, frequent aspiration, injected with ease, negative aspiration, negative for heart rate change, no paresthesia on injection, no symptoms of intraneural/intravenous injection and needle tip visualized at all times  Paresthesia pain: none  Post-procedure:  site cleaned  patient tolerated the procedure well with no immediate complications

## 2023-09-18 NOTE — OCCUPATIONAL THERAPY NOTE
Occupational Therapy Evaluation     Patient Name: Lisa Carrillo  RXLQU'L Date: 9/18/2023  Problem List  Principal Problem:    Primary osteoarthritis of left knee    Past Medical History  Past Medical History:   Diagnosis Date    Acute on chronic combined systolic and diastolic CHF (congestive heart failure) ([address]) 7/27/2023    Alcohol abuse 7/27/2023    Ambulates with cane     Arthritis     Asthma     Back pain     Chest pain 12/22/2022    CHF (congestive heart failure) (Formerly [facility])     Claustrophobia     COPD (chronic obstructive pulmonary disease) ([address])     COPD with acute exacerbation ([address]) 05/06/2022    COVID-19     COVID-19 virus infection 12/03/2021    Depression     Hypertension     Mumps     Neck pain     Old MI (myocardial infarction)     Pneumonia     Pulmonary emphysema (Former[facility])     Rectal bleeding 01/14/2019    Skin abnormalities     rashes and wounds on both legs - scabbed over and healing    Sleep apnea     no CPAP    Tingling of both feet     Wears glasses      Past Surgical History  Past Surgical History:   Procedure Laterality Date    APPENDECTOMY      CARDIAC CATHETERIZATION  07/24/2015    Left main- normal and maidly tortuous. Circumflex - normal and moderately tortuous. RCA- normal and mildy tortuous. Global LV function was severely depressed. Nikko Funk CARDIAC CATHETERIZATION Left 09/27/2022    Procedure: Cardiac Left Heart Cath;  Surgeon: Jonatan Kulkarni DO;  [facility]: [facility]; Service: Cardiology    CARDIAC CATHETERIZATION N/A 09/27/2022    Procedure: Cardiac Coronary Angiogram;  Surgeon: Jonatan Kulkarni DO;  [facility]: [facility]; Service: Cardiology    CARDIAC CATHETERIZATION  09/27/2022    Procedure: Cardiac catheterization;  Surgeon: Jonatan Kulkarni DO;  [facility]: [facility];   Service: Cardiology    INGUINAL HERNIA REPAIR Bilateral     MN COLONOSCOPY FLX DX W/COLLJ SPEC WHEN PFRMD N/A 03/11/2019    Procedure: COLONOSCOPY with removal of anal papilla; Surgeon: Jose Lambert MD;  Location: [facility];  Service: Colorectal    TONSILLECTOMY      UMBILICAL HERNIA REPAIR  06/21/2006 09/18/23 0774   OT Last Visit   OT Visit Date 09/18/23   Note Type   Note type Evaluation   Pain Assessment   Pain Assessment Tool 0-10   Pain Score 8   Pain Location/Orientation Orientation: Left; Location: Knee   Restrictions/Precautions   Weight Bearing Precautions Per Order Yes   LLE Weight Bearing Per Order WBAT   Other Precautions Fall Risk;Multiple lines;Pain; Chair Alarm; Bed Alarm;WBS   Home Living   Type of [address] [place] [address] [address] Two level; Other (Comment)  (no AXEL; 6 steps between floors)   Bathroom Shower/Tub Tub/shower unit   Bathroom Toilet Standard   Bathroom Equipment Grab bars in shower; Shower chair   [address] Walker;Cane;Grab bars; Other (Comment)  (nebulizer)   Additional Comments pt reports use of SPC at baseline during functional mobility   Prior Function   Level of [place] Independent with ADLs; Independent with functional mobility; Independent with IADLS   Lives With Spouse   Receives Help From Family   IADLs Independent with driving   Falls in the last 6 months 0   Vocational Retired   Subjective   Subjective "It's a different kind of pain"   ADL   Where Assessed Edge of bed   LB Dressing Assistance 4  Minimal Assistance   LB Dressing Deficit Thread RLE into underwear; Thread LLE into underwear;Pull up over hips   Additional Comments pt requires min (A) to don socks and (S) to don underwear; pt educated in LB dressing  technique following TKR   Bed Mobility   Supine to Sit 5  Supervision   Additional items Bedrails; Increased time required   Sit to Supine   (seated in chair at end of session)   Additional Comments pt trialed on RA during session and SPO2 WFL with no complaints of SOB; BP WFL in supine, sitting, and standing with no accompanied symptoms   Transfers   Sit to Stand 5  Supervision   Additional items Increased time required;Verbal cues  (RW)   Stand to Sit 5  Supervision   Additional items Increased time required;Verbal cues  (RW)   Additional Comments pt performs functional transfers with RW; no significant LOB or instability   Functional Mobility   Functional Mobility 5  Supervision   Additional Comments pt performs functional mobility with RW at (S) level; performs ~125ft with x1 standing rest break   Additional items Rolling walker   Balance   Static Sitting Good   Dynamic Sitting Good   Static Standing Fair +   Dynamic Standing Fair +   Ambulatory Fair   Activity Tolerance   Activity Tolerance Patient limited by pain; Patient limited by fatigue   RUE Assessment   RUE Assessment WFL   LUE Assessment   LUE Assessment WFL   Hand Function   Gross Motor Coordination Functional   Fine Motor Coordination Functional   Sensation   Light Touch No apparent deficits   Sharp/Dull No apparent deficits   Psychosocial   Psychosocial (WDL) WDL   Cognition   Overall Cognitive Status WFL   Arousal/Participation Alert   Attention Within functional limits   Orientation Level Oriented X4   Memory Within functional limits   Following Commands Follows all commands and directions without difficulty   Assessment   Limitation Decreased ADL status; Decreased UE strength;Decreased Safe judgement during ADL;Decreased endurance;Decreased self-care trans;Decreased high-level ADLs   Assessment Pt is a 79 y.o. male seen for OT evaluation s/p admit to [facility] on 9/18/2023 w/ Primary osteoarthritis of left knee. Comorbidities affecting pt's functional performance at time of assessment include: CHF, MI, mumps, COPD, asthma, pulmonary  emphysema, sleep apnea, COVID, rectal bleeding, depression, HTN, alc abuse, CHF, back pain. Personal factors affecting pt at time of IE include:difficulty performing ADLS, difficulty performing IADLS  and health management . Prior to admission, pt was (I) ADLs and (A) with IADLs with use of SPC during mobility. Upon evaluation: Pt requires (S)-min (A) level with use of RW during mobility 2* the following deficits impacting occupational performance: weakness, decreased strength, decreased balance, decreased tolerance, impaired initiation, decreased safety awareness and increased pain. Pt to benefit from continued skilled OT tx while in the hospital to address deficits as defined above and maximize level of functional independence w ADL's and functional mobility. Occupational Performance areas to address include: grooming, bathing/shower, toilet hygiene, dressing, functional mobility, community mobility and clothing management. The patient's raw score on the AM-PAC Daily Activity Inpatient Short Form is 20. A raw score of greater than or equal to 19 suggests the patient may benefit from discharge to home. Please refer to the recommendation of the Occupational Therapist for safe discharge planning. Pt benefited from co-evaluation of skilled OT and PT therapists in order to most appropriately address functional deficits d/t extensive assistance required for safe functional mobility, decreased activity tolerance, and regression from functioning level prior to admission and/or onset of present illness. OT/PT objectives were addressed separately; please see PT note for specific goal areas targeted. Goals   Patient Goals to go home   Short Term Goal  pt will perform UE strengthening exercises   Long Term Goal #1 pt will perform UB/ LB bathing and grooming tasks at (I) level   Long Term Goal #2 pt will demonstrate toilet transfers and hygiene at (I) level   Long Term Goal pt will demonstrate functional mobility with RW at mod (I) level   Plan   Treatment Interventions ADL retraining;UE strengthening/ROM; Functional transfer training; Endurance training;Patient/family training;Equipment evaluation/education; Activityengagement   Goal Expiration Date 10/02/23   OT Frequency 3-5x/wk   Recommendation   OT Discharge Recommendation Home with outpatient rehabilitation   Additional Comments  (S)  OP PT   AM-PAC Daily Activity Inpatient   Lower Body Dressing 2   Bathing 3   Toileting 3   Upper Body Dressing 4   Grooming 4   Eating 4   Daily Activity Raw Score 20   Daily Activity Standardized Score (Calc for Raw Score >=11) 42.03   AM-PAC Applied Cognition Inpatient   Following a Speech/Presentation 4   Understanding Ordinary Conversation 4   Taking Medications 4   Remembering Where Things Are Placed or Put Away 4   Remembering List of 4-5 Errands 4   Taking Care of Complicated Tasks 4   Applied Cognition Raw Score 24   Applied Cognition Standardized Score 62.21

## 2023-09-18 NOTE — PLAN OF CARE
Problem: PHYSICAL THERAPY ADULT  Goal: Performs mobility at highest level of function for planned discharge setting. See evaluation for individualized goals. Description: Treatment/Interventions: Functional transfer training, LE strengthening/ROM, Elevations, Therapeutic exercise, Endurance training, Bed mobility, Gait training          See flowsheet documentation for full assessment, interventions and recommendations. Note: Prognosis: Good  Problem List: Decreased strength, Decreased endurance, Impaired balance, Decreased range of motion, Decreased mobility, Pain, Orthopedic restrictions  Assessment: Patient is a 79 y.o. male evaluated by Physical Therapy s/p admit to [address] [place] [address] on 9/18/2023 with admitting diagnosis of: Knee pain, Primary osteoarthritis of left knee, Pain in other joint, Pre-op testing, and principal problem of: Primary osteoarthritis of left knee. PT was consulted to assess patient's functional mobility and discharge needs. Ordered are PT Evaluation and treatment with activity level of: WBAT Left LE. Comorbidities affecting patient's physical performance at time of assessment include: CHF, hx of MI, COPD, asthma, pulmonary emphysema, hx of COVID-19, arthritis, HTN. Personal factors affecting the patient at time of IE include: lives in a split level [place] home, ambulating with assistive device, step(s) to enter home, inability to navigate community distances, inability/difficulty performing IADLs and inability/difficulty performing ADLs. Please locate objective findings from PT assessment regarding body systems outlined above. Upon evaluation, pt able to perform all functional mobility with SUP, RW, and increased time. Education provided on proper RW use, sequencing, and safety awareness. Pt demonstrating good understanding and able to ambulate 125' before taking seated rest break. All vitals remained WFL on RA with exertion and positional changes.  Mild to moderate postural sway demonstrated but no LOB experienced. The patient's AM-PAC Basic Mobility Inpatient Short Form Raw Score is 18. A Raw score of greater than 16 suggests the patient may benefit from discharge to home. Please also refer to the recommendation of the Physical Therapist for safe discharge planning. Co treatment with OT secondary to complex medical condition of pt, possible A of 2 required to achieve and maintain transitional movements, requiring the need of skilled therapeutic intervention of 2 therapists to achieve delivery of services. Pt will benefit from continued PT intervention during LOS to address current deficits, increase LOF, and facilitate safe d/c to next level of care when medically appropriate. D/c recommendation at this time is home with outpatient rehabilitation services. PT Discharge Recommendation: Home with outpatient rehabilitation    See flowsheet documentation for full assessment.

## 2023-09-19 VITALS
HEART RATE: 75 BPM | BODY MASS INDEX: 38.37 KG/M2 | TEMPERATURE: 97.3 F | WEIGHT: 299 LBS | OXYGEN SATURATION: 96 % | HEIGHT: 74 IN | DIASTOLIC BLOOD PRESSURE: 84 MMHG | RESPIRATION RATE: 18 BRPM | SYSTOLIC BLOOD PRESSURE: 158 MMHG

## 2023-09-19 PROBLEM — D72.829 LEUKOCYTOSIS: Status: ACTIVE | Noted: 2023-09-19

## 2023-09-19 PROBLEM — N18.30 CKD (CHRONIC KIDNEY DISEASE) STAGE 3, GFR 30-59 ML/MIN (HCC): Status: ACTIVE | Noted: 2023-09-19

## 2023-09-19 LAB
25(OH)D3 SERPL-MCNC: 17 NG/ML (ref 30–100)
ALBUMIN SERPL BCP-MCNC: 4.2 G/DL (ref 3.5–5)
ALP SERPL-CCNC: 70 U/L (ref 34–104)
ALT SERPL W P-5'-P-CCNC: 12 U/L (ref 7–52)
ANION GAP SERPL CALCULATED.3IONS-SCNC: 11 MMOL/L
APTT PPP: 32 SECONDS (ref 23–37)
AST SERPL W P-5'-P-CCNC: 14 U/L (ref 13–39)
BILIRUB SERPL-MCNC: 0.53 MG/DL (ref 0.2–1)
BUN SERPL-MCNC: 30 MG/DL (ref 5–25)
CALCIUM SERPL-MCNC: 9.4 MG/DL (ref 8.4–10.2)
CHLORIDE SERPL-SCNC: 99 MMOL/L (ref 96–108)
CK SERPL-CCNC: 155 U/L (ref 39–308)
CO2 SERPL-SCNC: 25 MMOL/L (ref 21–32)
CREAT SERPL-MCNC: 1.48 MG/DL (ref 0.6–1.3)
ERYTHROCYTE [DISTWIDTH] IN BLOOD BY AUTOMATED COUNT: 15.3 % (ref 11.6–15.1)
GFR SERPL CREATININE-BSD FRML MDRD: 48 ML/MIN/1.73SQ M
GLUCOSE SERPL-MCNC: 169 MG/DL (ref 65–140)
HAV IGM SER QL: NORMAL
HBV CORE IGM SER QL: NORMAL
HBV SURFACE AG SER QL: NORMAL
HCT VFR BLD AUTO: 43.4 % (ref 36.5–49.3)
HCV AB SER QL: NORMAL
HGB BLD-MCNC: 13.6 G/DL (ref 12–17)
INR PPP: 1.08 (ref 0.84–1.19)
MAGNESIUM SERPL-MCNC: 1.9 MG/DL (ref 1.9–2.7)
MCH RBC QN AUTO: 26.8 PG (ref 26.8–34.3)
MCHC RBC AUTO-ENTMCNC: 31.3 G/DL (ref 31.4–37.4)
MCV RBC AUTO: 85 FL (ref 82–98)
PHOSPHATE SERPL-MCNC: 3.6 MG/DL (ref 2.3–4.1)
PLATELET # BLD AUTO: 330 THOUSANDS/UL (ref 149–390)
PMV BLD AUTO: 9.9 FL (ref 8.9–12.7)
POTASSIUM SERPL-SCNC: 4.2 MMOL/L (ref 3.5–5.3)
PROT SERPL-MCNC: 6.9 G/DL (ref 6.4–8.4)
PROTHROMBIN TIME: 13.9 SECONDS (ref 11.6–14.5)
RBC # BLD AUTO: 5.08 MILLION/UL (ref 3.88–5.62)
SODIUM SERPL-SCNC: 135 MMOL/L (ref 135–147)
WBC # BLD AUTO: 15.3 THOUSAND/UL (ref 4.31–10.16)

## 2023-09-19 PROCEDURE — 99232 SBSQ HOSP IP/OBS MODERATE 35: CPT | Performed by: INTERNAL MEDICINE

## 2023-09-19 PROCEDURE — 97116 GAIT TRAINING THERAPY: CPT

## 2023-09-19 PROCEDURE — 94760 N-INVAS EAR/PLS OXIMETRY 1: CPT

## 2023-09-19 PROCEDURE — 97530 THERAPEUTIC ACTIVITIES: CPT

## 2023-09-19 PROCEDURE — 94640 AIRWAY INHALATION TREATMENT: CPT

## 2023-09-19 PROCEDURE — 80074 ACUTE HEPATITIS PANEL: CPT | Performed by: INTERNAL MEDICINE

## 2023-09-19 PROCEDURE — 82306 VITAMIN D 25 HYDROXY: CPT | Performed by: INTERNAL MEDICINE

## 2023-09-19 PROCEDURE — 83735 ASSAY OF MAGNESIUM: CPT | Performed by: INTERNAL MEDICINE

## 2023-09-19 PROCEDURE — 85730 THROMBOPLASTIN TIME PARTIAL: CPT

## 2023-09-19 PROCEDURE — NC001 PR NO CHARGE

## 2023-09-19 PROCEDURE — 84100 ASSAY OF PHOSPHORUS: CPT | Performed by: INTERNAL MEDICINE

## 2023-09-19 PROCEDURE — 97110 THERAPEUTIC EXERCISES: CPT

## 2023-09-19 PROCEDURE — 85027 COMPLETE CBC AUTOMATED: CPT

## 2023-09-19 PROCEDURE — 85610 PROTHROMBIN TIME: CPT

## 2023-09-19 PROCEDURE — 97535 SELF CARE MNGMENT TRAINING: CPT

## 2023-09-19 PROCEDURE — 99024 POSTOP FOLLOW-UP VISIT: CPT | Performed by: STUDENT IN AN ORGANIZED HEALTH CARE EDUCATION/TRAINING PROGRAM

## 2023-09-19 PROCEDURE — 80053 COMPREHEN METABOLIC PANEL: CPT

## 2023-09-19 PROCEDURE — 82550 ASSAY OF CK (CPK): CPT | Performed by: INTERNAL MEDICINE

## 2023-09-19 RX ORDER — CELECOXIB 100 MG/1
100 CAPSULE ORAL 2 TIMES DAILY
Qty: 28 CAPSULE | Refills: 0 | Status: SHIPPED | OUTPATIENT
Start: 2023-09-19 | End: 2023-10-03

## 2023-09-19 RX ORDER — ONDANSETRON 4 MG/1
4 TABLET, ORALLY DISINTEGRATING ORAL EVERY 6 HOURS PRN
Qty: 15 TABLET | Refills: 0 | Status: SHIPPED | OUTPATIENT
Start: 2023-09-19

## 2023-09-19 RX ORDER — CEFADROXIL 500 MG/1
500 CAPSULE ORAL EVERY 12 HOURS SCHEDULED
Qty: 10 CAPSULE | Refills: 0 | Status: SHIPPED | OUTPATIENT
Start: 2023-09-19 | End: 2023-09-24

## 2023-09-19 RX ORDER — OXYCODONE HYDROCHLORIDE 5 MG/1
5 TABLET ORAL EVERY 6 HOURS PRN
Qty: 30 TABLET | Refills: 0 | Status: SHIPPED | OUTPATIENT
Start: 2023-09-19 | End: 2023-09-29

## 2023-09-19 RX ORDER — MELATONIN
1000 DAILY
Qty: 30 TABLET | Refills: 0 | Status: SHIPPED | OUTPATIENT
Start: 2023-09-19

## 2023-09-19 RX ORDER — ACETAMINOPHEN 500 MG
1000 TABLET ORAL EVERY 8 HOURS
Qty: 60 TABLET | Refills: 0 | Status: SHIPPED | OUTPATIENT
Start: 2023-09-19 | End: 2023-09-29

## 2023-09-19 RX ADMIN — IPRATROPIUM BROMIDE 0.5 MG: 0.5 SOLUTION RESPIRATORY (INHALATION) at 07:18

## 2023-09-19 RX ADMIN — UMECLIDINIUM 1 PUFF: 62.5 AEROSOL, POWDER ORAL at 08:33

## 2023-09-19 RX ADMIN — FOLIC ACID 1 MG: 1 TABLET ORAL at 08:22

## 2023-09-19 RX ADMIN — SACUBITRIL AND VALSARTAN 1 TABLET: 49; 51 TABLET, FILM COATED ORAL at 08:32

## 2023-09-19 RX ADMIN — CEFAZOLIN 3000 MG: 1 INJECTION, POWDER, FOR SOLUTION INTRAMUSCULAR; INTRAVENOUS at 01:05

## 2023-09-19 RX ADMIN — BUPROPION HYDROCHLORIDE 150 MG: 150 TABLET, FILM COATED, EXTENDED RELEASE ORAL at 08:24

## 2023-09-19 RX ADMIN — ASPIRIN 81 MG: 81 TABLET, COATED ORAL at 08:24

## 2023-09-19 RX ADMIN — MULTIPLE VITAMINS W/ MINERALS TAB 1 TABLET: TAB at 08:23

## 2023-09-19 RX ADMIN — GUAIFENESIN 1200 MG: 600 TABLET, EXTENDED RELEASE ORAL at 08:23

## 2023-09-19 RX ADMIN — FUROSEMIDE 40 MG: 40 TABLET ORAL at 08:22

## 2023-09-19 RX ADMIN — FLUTICASONE FUROATE AND VILANTEROL TRIFENATATE 1 PUFF: 200; 25 POWDER RESPIRATORY (INHALATION) at 08:33

## 2023-09-19 RX ADMIN — DULOXETINE HYDROCHLORIDE 60 MG: 30 CAPSULE, DELAYED RELEASE ORAL at 08:23

## 2023-09-19 RX ADMIN — SENNOSIDES 8.6 MG: 8.6 TABLET, FILM COATED ORAL at 08:24

## 2023-09-19 RX ADMIN — SODIUM CHLORIDE, SODIUM LACTATE, POTASSIUM CHLORIDE, AND CALCIUM CHLORIDE 50 ML/HR: .6; .31; .03; .02 INJECTION, SOLUTION INTRAVENOUS at 08:35

## 2023-09-19 RX ADMIN — ISOSORBIDE MONONITRATE 30 MG: 30 TABLET, EXTENDED RELEASE ORAL at 08:31

## 2023-09-19 RX ADMIN — CARVEDILOL 25 MG: 12.5 TABLET, FILM COATED ORAL at 08:21

## 2023-09-19 RX ADMIN — DOCUSATE SODIUM 100 MG: 100 CAPSULE, LIQUID FILLED ORAL at 08:25

## 2023-09-19 RX ADMIN — FLUTICASONE PROPIONATE 1 SPRAY: 50 SPRAY, METERED NASAL at 08:33

## 2023-09-19 RX ADMIN — ATORVASTATIN CALCIUM 20 MG: 10 TABLET, FILM COATED ORAL at 08:21

## 2023-09-19 RX ADMIN — LEVALBUTEROL HYDROCHLORIDE 1.25 MG: 1.25 SOLUTION RESPIRATORY (INHALATION) at 07:18

## 2023-09-19 RX ADMIN — OXYCODONE HYDROCHLORIDE AND ACETAMINOPHEN 500 MG: 500 TABLET ORAL at 08:24

## 2023-09-19 RX ADMIN — ACETAMINOPHEN 975 MG: 325 TABLET, FILM COATED ORAL at 08:32

## 2023-09-19 RX ADMIN — FERROUS SULFATE TAB 325 MG (65 MG ELEMENTAL FE) 325 MG: 325 (65 FE) TAB at 08:23

## 2023-09-19 NOTE — PHYSICAL THERAPY NOTE
PHYSICAL THERAPY NOTE          Patient Name: Randall Nieto  XGEKK'O Date: 9/19/2023 09/19/23 0947   PT Last Visit   PT Visit Date 09/18/23   Pain Assessment   Pain Assessment Tool 0-10   Pain Score 5   Pain Location/Orientation Orientation: Left; Location: Knee   Restrictions/Precautions   Weight Bearing Precautions Per Order Yes   LLE Weight Bearing Per Order WBAT   Other Precautions   (Pain; Fall Risk; Multiple lines; Chair Alarm; WBS)   General   Family/Caregiver Present No   Cognition   Overall Cognitive Status WFL   Arousal/Participation Alert; Cooperative   Following Commands Follows all commands and directions without difficulty   Subjective   Subjective Agreeable to therapy. Bed Mobility   Additional Comments OOB in chair start/end PT session   Transfers   Sit to Stand 5  Supervision   Additional items Increased time required;Verbal cues   Stand to Sit 5  Supervision   Additional items Armrests; Increased time required;Verbal cues   Toilet transfer 5  Supervision   Additional items Increased time required;Verbal cues;Standard toilet   Additional Comments Able to manage clothing and hygiene with toileting fair balance. Bariatric RW used. Ambulation/Elevation   Gait pattern   (Short stride; Foward flexed; Wide EN)   Gait Assistance 5  Supervision   Additional items Verbal cues   Assistive Device Bariatric Rolling walker   Distance 180'   Stair Management Technique One rail L;Step to pattern; Foreward   Number of Stairs 6   Balance   Static Sitting Good   Dynamic Sitting Good   Static Standing Fair   Dynamic Standing Fair   Ambulatory Fair -  (bariatric RW)   Endurance Deficit   Endurance Deficit Yes   Activity Tolerance   Activity Tolerance Patient limited by fatigue   Exercises   Quad Sets Sitting;10 reps;AROM   Knee AROM Long Arc Quad Sitting;10 reps;AROM   Ankle Pumps Sitting;10 reps;AROM   Assessment   Prognosis Good Problem List   (Decreased strength; Decreased endurance; Impaired balance; Decreased range of motion; Decreased mobility; Pain; Orthopedic restrictions)   Assessment Pt. seen for PT treatment session this date with interventions consisting of  therapeutic exercises, toilet transfers,  transfers and  gait training w/ emphasis on improving pt's ability to ambulate. Pt. Requiring occasional cues for sequence and safety. Pt is able to ascend/descend 6 stairs with HR and SUP to reflect the ability to safely navigate the home. In comparison to previous session, Pt. With improvements in activity tolerance. Pt is in need of continued activity in PT to improve strength balance endurance mobility transfers and ambulation with return to maximize LOF. From PT/mobility standpoint, recommendation at time of d/c would be OPPT in order to promote return to PLOF and independence. The patient's AM-PAC Basic Mobility Inpatient Short Form Raw Score is 21. A Raw score of greater than 16 suggests the patient may benefit from discharge to home. Please also refer to physical therapy recommendation for safe DC planning. Goals   LTG Expiration Date 10/02/23   PT Treatment Day 1   Plan   Treatment/Interventions   (Functional transfer training; LE strengthening/ROM; Elevations; Therapeutic exercise;  Endurance training; Bed mobility; Gait training)   Progress Progressing toward goals   PT Frequency 3-5x/wk   Recommendation   PT Discharge Recommendation Home with outpatient rehabilitation   AM-PAC Basic Mobility Inpatient   Turning in Flat Bed Without Bedrails 4   Lying on Back to Sitting on Edge of Flat Bed Without Bedrails 3   Moving Bed to Chair 3   Standing Up From Chair Using Arms 4   Walk in Room 4   Climb 3-5 Stairs With Railing 3   Basic Mobility Inpatient Raw Score 21   Basic Mobility Standardized Score 45.55   Highest Level Of Mobility   JH-HLM Goal 6: Walk 10 steps or more   JH-HLM Achieved 7: Walk 25 feet or more   Education Education Provided Mobility training   Patient Demonstrates verbal understanding   End of Consult   Patient Position at End of Consult Bedside chair; All needs within reach   End of Consult Comments discussed POC with PT

## 2023-09-19 NOTE — PROGRESS NOTES
Orthopedics   Sarah Mccormick 79 y.o. male MRN: [id number]  Unit/Bed#: 415-01    Assessment: 79 y.o.male post operative day 1 left total knee arthroplasty. Doing well. Orthopaedically stable for discharge    Plan:  · WBAT to operative leg  · ROM as tolerated, pillow under achilles (not knee) while in bed  · PT/OT  · ASA 81mg BID x 2 weeks  · Keep mepilex dressing in place until follow up in 10-14 days  · May readjust ACE wrap as needed  · Multimodal pain control  · Ancef x24 hours, duricef x 5 days  · Will continue to assess for acute blood loss anemia    · Per SLIM, follow with outpatient cardiology and pulmonology    Subjective:  68 y.o.male post operative day 1 left total knee arthroplasty. Pt doing well. Pain controlled. Sitting up comfortably in recliner with legs elevated. Feels ready to be discharged home today, only concern is getting to his house [place], acknowledged benefit from discussing this with PT. Has an outpatient PT appointment today that he anticipated going to, will pend dc. Afebrile overnight. Tolerating PO diet well. Seen by SLIM and respiratory. Not in acute COPD exacerbation.      Labs:  0   Lab Value Date/Time    HCT 43.4 09/19/2023 0644    HCT 42.7 08/23/2023 1335    HCT 41.9 07/30/2023 0510    HCT 46.2 07/27/2015 0559    HCT 45.4 07/25/2015 0610    HCT 48.5 07/22/2015 1021    HGB 13.6 09/19/2023 0644    HGB 13.5 08/23/2023 1335    HGB 13.2 07/30/2023 0510    HGB 15.1 07/27/2015 0559    HGB 14.4 07/25/2015 0610    HGB 16.1 07/22/2015 1021    INR 1.08 09/19/2023 0644    INR 1.14 07/22/2015 1021    WBC 15.30 (H) 09/19/2023 0644    WBC 8.58 08/23/2023 1335    WBC 8.96 07/30/2023 0510    WBC 7.20 07/27/2015 0559    WBC 7.65 07/25/2015 0610    WBC 8.87 07/22/2015 1021    .1 (H) 12/17/2022 0436       Meds:    Current Facility-Administered Medications:   •  acetaminophen (TYLENOL) tablet 975 mg, 975 mg, Oral, Q8H, Bernarda Galeano PA-C, 975 mg at 09/18/23 0896  •  albuterol (PROVENTIL HFA,VENTOLIN HFA) inhaler 2 puff, 2 puff, Inhalation, Q6H PRN, JOSEE Arreola  •  ALPRAZolam David Rufus) tablet 0.5 mg, 0.5 mg, Oral, HS PRN, JOSEE Arreola, 0.5 mg at 09/18/23 2108  •  ascorbic acid (VITAMIN C) tablet 500 mg, 500 mg, Oral, BID, Bernarda Galeano PA-C, 500 mg at 09/18/23 1725  •  aspirin (ECOTRIN LOW STRENGTH) EC tablet 81 mg, 81 mg, Oral, BID, Jeffrey Galeano PA-C, 81 mg at 09/19/23 5453  •  atorvastatin (LIPITOR) tablet 20 mg, 20 mg, Oral, Daily, Bernadra Galeano PA-C, 20 mg at 09/19/23 9389  •  buPROPion (WELLBUTRIN XL) 24 hr tablet 150 mg, 150 mg, Oral, Daily, Bernarda Galeano PA-C, 150 mg at 09/18/23 1313  •  calcium carbonate (TUMS) chewable tablet 1,000 mg, 1,000 mg, Oral, Daily PRN, JOSEE Arreola  •  carvedilol (COREG) tablet 25 mg, 25 mg, Oral, BID With Meals, Bernarda Galeano PA-C, 25 mg at 09/19/23 7276  •  docusate sodium (COLACE) capsule 100 mg, 100 mg, Oral, BID, Bernarda Galeano PA-C, 100 mg at 09/18/23 1725  •  DULoxetine (CYMBALTA) delayed release capsule 60 mg, 60 mg, Oral, Daily, Bernarda Galeano PA-C, 60 mg at 09/19/23 3896  •  ferrous sulfate tablet 325 mg, 325 mg, Oral, BID With Meals, Bernarda Galeano PA-C, 325 mg at 09/19/23 8361  •  fluticasone (FLONASE) 50 mcg/act nasal spray 1 spray, 1 spray, Nasal, BID, Bernarda Galeano PA-C, 1 spray at 09/18/23 2107  •  fluticasone-vilanterol 200-25 mcg/actuation 1 puff, 1 puff, Inhalation, Daily, JOSEE Arreola  •  folic acid (FOLVITE) tablet 1 mg, 1 mg, Oral, Daily, Bernarda Galeano PA-C, 1 mg at 09/19/23 2066  •  furosemide (LASIX) tablet 40 mg, 40 mg, Oral, Every Other Day, JOSEE Arreola, 40 mg at 09/19/23 5739  •  guaiFENesin (MUCINEX) 12 hr tablet 1,200 mg, 1,200 mg, Oral, Q12H [facility], Bernarda Galeano PA-C, 1,200 mg at 09/19/23 0266  •  ipratropium (ATROVENT) 0.02 % inhalation solution 0.5 mg, 0.5 mg, Nebulization, BID, David Davis DO, 0.5 mg at 09/19/23 2831  •  isosorbide mononitrate (IMDUR) 24 hr tablet 30 mg, 30 mg, Oral, Daily, Justin Hazel PA-C  •  lactated ringers bolus 1,000 mL, 1,000 mL, Intravenous, Once PRN **AND** lactated ringers bolus 1,000 mL, 1,000 mL, Intravenous, Once PRN, Justin Hazel PA-C  •  lactated ringers infusion, 125 mL/hr, Intravenous, Continuous, BREANN Triana-C, Last Rate: 125 mL/hr at 09/18/23 0715, New Bag at 09/18/23 0955  •  lactated ringers infusion, 50 mL/hr, Intravenous, Continuous, Justin Hazel PA-C, Last Rate: 50 mL/hr at 09/18/23 1237, 50 mL/hr at 09/18/23 1237  •  levalbuterol (XOPENEX) inhalation solution 1.25 mg, 1.25 mg, Nebulization, BID, David Mullen, DO, 1.25 mg at 09/19/23 5617  •  montelukast (SINGULAIR) tablet 10 mg, 10 mg, Oral, HS, Bernarda Galeano PA-C, 10 mg at 09/18/23 2108  •  multivitamin-minerals (CENTRUM) tablet 1 tablet, 1 tablet, Oral, Daily, Justin Hazel PA-C, 1 tablet at 09/19/23 7003  •  oxyCODONE (ROXICODONE) immediate release tablet 10 mg, 10 mg, Oral, Q4H PRN, Justin Hazel PA-C  •  oxyCODONE (ROXICODONE) IR tablet 5 mg, 5 mg, Oral, Q4H PRN, Bernarda Galeano PA-C  •  sacubitril-valsartan (ENTRESTO) 49-51 MG per tablet 1 tablet, 1 tablet, Oral, BID, Justin Hazel PA-C, 1 tablet at 09/18/23 1725  •  senna (SENOKOT) tablet 8.6 mg, 1 tablet, Oral, Daily, Bernarda Galeano PA-C, 8.6 mg at 09/19/23 9700  •  sodium chloride 0.9 % bolus 1,000 mL, 1,000 mL, Intravenous, Once PRN **AND** sodium chloride 0.9 % bolus 1,000 mL, 1,000 mL, Intravenous, Once PRN, Bernarda Galeano PA-C  •  umeclidinium 62.5 mcg/actuation inhaler AEPB 1 puff, 1 puff, Inhalation, Daily, Bernarda Gaelano PA-C    Blood Culture:   Lab Results   Component Value Date    BLOODCX No Growth After 5 Days. 12/21/2022    BLOODCX No Growth After 5 Days. 12/21/2022     Wound Culture:   No results found for: "WOUNDCULT"    Ins and Outs:  I/O last 24 hours:   In: 2980 [P.O.:480; I.V.:2400; IV Piggyback:100]  Out: 200 [Urine:200]    Physical:  Vitals:    09/19/23 0643   BP: 158/84   Pulse: 75   Resp: 18 Temp: (!) 97.3 °F (36.3 °C)   SpO2: 96%     left lower extremity:  · Dressings C/D/I  · Toes warm and well perfused  · Intact FHL/EHL/DF/PF  · Compartments soft and compressible     JOSEE Arreola

## 2023-09-19 NOTE — DISCHARGE SUMMARY
ORTHOPEDICS DISCHARGE SUMMARY  Randall Nieto 79 y.o. male MRN: [id number]  Unit/Bed#: 415-01    Attending Physician: Dr. Uche Little    Admitting diagnosis: Knee pain [M25.569]  Primary osteoarthritis of left knee [M17.12]  Pain in other joint [M25.59]  Pre-op testing [Z01.818]    Discharge diagnosis: Knee pain [M25.569]  Primary osteoarthritis of left knee [M17.12]  Pain in other joint [M25.59]  Pre-op testing [Z01.818]    Date of admission: 9/18/2023    Date of discharge: 09/19/23         Procedure: Left total knee arthroplasty    HPI:  This is a 79y.o. year old male that presented to the office with signs and symptoms of left knee osteoarthritis. They tried and failed conservative treatment measures and wished to proceed with surgical intervention. The risks, benefits, and complications of the procedure were discussed with the patient and informed consent was obtained. Hospital Course: The patient was admitted to the hospital on 9/18/2023 and underwent an uncomplicated left total knee arthroplasty. They were transferred to the floor after a brief stay in the post-anesthesia care unit. Their pain was well managed with IV and oral pain medications. They began therapy on post operative day #1. ASA 81 BID was also started for DVT prophylaxis 12 hours post operatively. On discharge date pt was cleared by PT and the medicine team and determined to be safe for discharge. Daily discussion was had with the patient, nursing staff, orthopaedic team, and family members if present. All questions were answered to the patients satisifaction.       0   Lab Value Date/Time    HGB 13.6 09/19/2023 0644    HGB 13.5 08/23/2023 1335    HGB 13.2 07/30/2023 0510    HGB 13.2 07/29/2023 0445    HGB 12.7 07/28/2023 0409    HGB 12.9 07/27/2023 1849    HGB 14.0 03/24/2023 1255    HGB 14.1 03/10/2023 1129    HGB 14.5 12/26/2022 0634    HGB 14.3 12/25/2022 0534    HGB 14.5 12/24/2022 0441    HGB 14.3 12/23/2022 0532    HGB 14.1 12/21/2022 1055    HGB 14.6 12/18/2022 0434    HGB 14.6 12/17/2022 0436    HGB 13.6 12/16/2022 1148    HGB 13.1 09/22/2022 1241    HGB 13.2 12/10/2021 1251    HGB 13.8 12/04/2021 0500    HGB 14.1 12/03/2021 2052    HGB 13.4 09/22/2021 1140    HGB 14.0 07/16/2020 1550    HGB 15.3 12/15/2018 1408    HGB 13.7 10/23/2017 1005    HGB 15.1 07/27/2015 0559    HGB 14.4 07/25/2015 0610    HGB 16.1 07/22/2015 1021       Body mass index is 38.39 kg/m². Discharge Instructions: The patient was discharged weight bearing as tolerated to the left lower extremity. ASA 81 will be continued for 14 days. Continue PT/OT. Take pain medications as instructed. Follow with PCP, cardiology, pulmonology per [facility]. Discharge Medications: For the complete list of discharge medications, please refer to the patient's medication reconciliation.      Neri Oseguera PA-C

## 2023-09-19 NOTE — ASSESSMENT & PLAN NOTE
Lab Results   Component Value Date    EGFR 48 09/19/2023    EGFR 51 08/23/2023    EGFR 36 08/09/2023    CREATININE 1.48 (H) 09/19/2023    CREATININE 1.41 (H) 08/23/2023    CREATININE 1.89 (H) 08/09/2023     · Baseline serum creatinine around 1.3-1.6 mg/dl  · Avoid all nephrotoxic agents  · Outpatient Nephrology evaluation

## 2023-09-19 NOTE — PROGRESS NOTES
[address]  Progress Note  Name: Cecile Salvador  MRN: [id number]  Unit/Bed#: 880-08 I Date of Admission: 9/18/2023   Date of Service: 9/19/2023 I Hospital Day: 0    Assessment/Plan   * Primary osteoarthritis of left knee  Assessment & Plan  · POD #1 S/P Left total knee arthroplasty by Dr. Ghazal Alvarado (Orthopedic Surgery) on 09/18/2023  · DVT prophylaxis per Orthopedic Surgery with aspirin 81 mg PO BID x 2 weeks and SCD's on both lower extremities  · Incentive spirometry 10 times per hour while awake  · PT/OT evaluations were completed during the hospitalization. · Outpatient follow-up with Orthopedic Surgery    Chronic combined systolic and diastolic CHF (congestive heart failure) (HCC)  Assessment & Plan  Wt Readings from Last 3 Encounters:   09/18/23 136 kg (299 lb)   09/12/23 136 kg (299 lb)   09/05/23 (!) 140 kg (309 lb)       · Not in acute exacerbation  · Continue PO Coreg, PO Entresto, and PO Lasix  · Monitor his volume status closely  · [facility] evaluation  • Outpatient follow-up with Cardiology      Leukocytosis  Assessment & Plan  · Likely reactive in nature  · Recheck a CBC with differential in 1-2 weeks with his PCP  · If the leukocytosis persists as an outpatient, he will need an outpatient Hematology evaluation.     Results from last 7 days   Lab Units 09/19/23  0644   WBC Thousand/uL 15.30*   HEMOGLOBIN g/dL 13.6   HEMATOCRIT % 43.4   PLATELETS Thousands/uL 330         CKD (chronic kidney disease) stage 3, GFR 30-59 ml/min [facility])  Assessment & Plan  Lab Results   Component Value Date    EGFR 48 09/19/2023    EGFR 51 08/23/2023    EGFR 36 08/09/2023    CREATININE 1.48 (H) 09/19/2023    CREATININE 1.41 (H) 08/23/2023    CREATININE 1.89 (H) 08/09/2023     · Baseline serum creatinine around 1.3-1.6 mg/dl  · Avoid all nephrotoxic agents  · Outpatient Nephrology evaluation    COPD (chronic obstructive pulmonary disease) ([address])  Assessment & Plan  · Not in acute exacerbation  · Continue his home COPD medication regimen  · Utilized the respiratory protocol and incentive spirometry 10 times per hour while awake during the hospitalization  · Outpatient follow-up with Pulmonology    Vitamin D deficiency  Assessment & Plan  · Checked a vitamin D 25-OH level, which is pending at the time of discharge  · Treat with cholecalciferol 1000 I. U. PO Qdaily  • Outpatient follow-up with PCP in regards to this matter    Obstructive sleep apnea  Assessment & Plan  · Unable to tolerate a CPAP mask at home  · Outpatient follow-up with Pulmonology to see if they can find a CPAP or BiPAP mask that the patient can tolerate at home        VTE Pharmacologic Prophylaxis: VTE Score: 7 High Risk (Score >/= 5) - Aspirin 81 mg PO BID x 2 weeks per Orthopedic Surgery. Sequential Compression Devices Ordered. Patient Centered Rounds: I performed bedside rounds with nursing staff today. Discussions with Specialists or Other Care Team Provider: I discussed the case with Orthopedic Surgery. Total Time Spent on Date of Encounter in care of patient: 35 mins. This time was spent on one or more of the following: performing physical exam; counseling and coordination of care; obtaining or reviewing history; documenting in the medical record; reviewing/ordering tests, medications or procedures; communicating with other healthcare professionals and discussing with patient's family/caregivers. Current Length of Stay: 0 day(s)  Current Patient Status: Outpatient Surgery  Discharge Plan: [facility] is following this patient on consult. They are medically stable for discharge when deemed appropriate by primary service. Code Status: Level 1 - Full Code    Subjective: The patient was seen and examined. The patient is doing well. He is only experiencing minimal left knee pain. He had some "heart burn" last evening, which has now resolved. No shortness of breath. No abdominal pain.   No nausea or vomiting. Objective:     Vitals:   Temp (24hrs), Av.8 °F (36.6 °C), Min:97.3 °F (36.3 °C), Max:98.9 °F (37.2 °C)    Temp:  [97.3 °F (36.3 °C)-98.9 °F (37.2 °C)] 97.3 °F (36.3 °C)  HR:  [72-78] 75  Resp:  [17-20] 18  BP: (120-161)/() 158/84  SpO2:  [92 %-96 %] 96 %  Body mass index is 38.39 kg/m². Input and Output Summary (last 24 hours): Intake/Output Summary (Last 24 hours) at 2023 1231  Last data filed at 2023 0900  Gross per 24 hour   Intake 2655.33 ml   Output 200 ml   Net 2455.33 ml       Physical Exam:   Physical Exam   General:  NAD, follows commands  HEENT:  NC/AT, mucous membranes moist  Neck:  Supple, No JVP elevation  CV:  + S1, + S2, RRR  Pulm:  Lung fields are CTA bilaterally  Abd:  Soft, Non-tender, Non-distended  Ext:  No clubbing/cyanosis/edema  Skin:  No rashes  Neuro:  Awake, alert, oriented  Psych:  Normal mood and affect      Additional Data:    Labs:  Results from last 7 days   Lab Units 23  0644   WBC Thousand/uL 15.30*   HEMOGLOBIN g/dL 13.6   HEMATOCRIT % 43.4   PLATELETS Thousands/uL 330     Results from last 7 days   Lab Units 23  0644   SODIUM mmol/L 135   POTASSIUM mmol/L 4.2   CHLORIDE mmol/L 99   CO2 mmol/L 25   BUN mg/dL 30*   CREATININE mg/dL 1.48*   ANION GAP mmol/L 11   CALCIUM mg/dL 9.4   ALBUMIN g/dL 4.2   TOTAL BILIRUBIN mg/dL 0.53   ALK PHOS U/L 70   ALT U/L 12   AST U/L 14   GLUCOSE RANDOM mg/dL 169*     Results from last 7 days   Lab Units 23  0644   INR  1.08     Results from last 7 days   Lab Units 23  0704   POC GLUCOSE mg/dl 129               Lines/Drains:  Invasive Devices     Peripheral Intravenous Line  Duration           Peripheral IV 23 Right Forearm 1 day                      Imaging: No pertinent imaging reviewed.     Recent Cultures (last 7 days):         Last 24 Hours Medication List:   Current Facility-Administered Medications   Medication Dose Route Frequency Provider Last Rate   • acetaminophen  975 mg Oral Q8H [facility]     • albuterol  2 puff Inhalation Q6H PRN [facility]     • ALPRAZolam  0.5 mg Oral HS PRN [facility]     • ascorbic acid  500 mg Oral BID [facility] JO[facility]     • aspirin  81 mg Oral BID [place], JOSEE     • atorvastatin  20 mg Oral Daily [facility]     • buPROPion  150 mg Oral Daily [facility]     • calcium carbonate  1,000 mg Oral Daily PRN [facility] JO[facility]     • carvedilol  25 mg Oral BID With Meals [facility]     • docusate sodium  100 mg Oral BID [facility]     • DULoxetine  60 mg Oral Daily [facility] JO[facility]     • ferrous sulfate  325 mg Oral BID With Meals Bernarda Galeano PA-C     • fluticasone  1 spray Nasal BID [facility]     • fluticasone-vilanterol  1 puff Inhalation Daily Mannysc[facility]eyda carreon     • folic acid  1 mg Oral Daily [facility]     • furosemide  40 mg Oral Every Other Day [facility] JO[facility]     • guaiFENesin  1,200 mg Oral Q12H [address]  [facility]     • ipratropium  0.5 mg Nebulization BID Ben Solis DO     • isosorbide mononitrate  30 mg Oral Daily Bernarda Galeano PA-C     • lactated ringers  125 mL/hr Intravenous Continuous Bernarda Galeano PA-C 125 mL/hr (09/18/23 0715)   • lactated ringers  50 mL/hr Intravenous Continuous Bernarda Galeano PA-C 50 mL/hr (09/19/23 9061)   • levalbuterol  1.25 mg Nebulization BID Ben Solis DO     • montelukast  10 mg Oral HS MannyscJOSEE carreon     • multivitamin-minerals  1 tablet Oral Daily [facility] carreon     • oxyCODONE  10 mg Oral Q4H PRN [facility]     • oxyCODONE  5 mg Oral Q4H PRN [facility]     • sacubitril-valsartan  1 tablet Oral BID JOSEE Arreola     • senna  1 tablet Oral Daily JOSEE Arreola     • umeclidinium  1 puff Inhalation Daily JOSEE Arreola          Today, Patient Was Seen By: Federico Freitas DO    **Please Note: This note may have been constructed using a voice recognition system. **

## 2023-09-19 NOTE — RESPIRATORY THERAPY NOTE
09/19/23 0719   Inhalation Therapy Tx   $ Inhalation Therapy Performed Yes   $ Pulse Oximetry Spot Check Charge Completed   Pre-Treatment Pulse 87   Pre-Treatment Respirations 20   Duration 20   Breath Sounds Pre-Treatment Bilateral Diminished   Delivery Source Air;UDN   Position Up in chair   Treatment Tolerance Tolerated well   Resp Comments patient remains on room air, he is using the incentive spirometer

## 2023-09-19 NOTE — PLAN OF CARE
Problem: PHYSICAL THERAPY ADULT  Goal: Performs mobility at highest level of function for planned discharge setting. See evaluation for individualized goals. Description: Treatment/Interventions: Functional transfer training, LE strengthening/ROM, Elevations, Therapeutic exercise, Endurance training, Bed mobility, Gait training          See flowsheet documentation for full assessment, interventions and recommendations. Outcome: Progressing  Note: Prognosis: Good  Problem List:  (Decreased strength; Decreased endurance; Impaired balance; Decreased range of motion; Decreased mobility; Pain; Orthopedic restrictions)  Assessment: Pt. seen for PT treatment session this date with interventions consisting of  therapeutic exercises, toilet transfers,  transfers and  gait training w/ emphasis on improving pt's ability to ambulate. Pt. Requiring occasional cues for sequence and safety. Pt is able to ascend/descend 6 stairs with HR and SUP to reflect the ability to safely navigate the home. In comparison to previous session, Pt. With improvements in activity tolerance. Pt is in need of continued activity in PT to improve strength balance endurance mobility transfers and ambulation with return to maximize LOF. From PT/mobility standpoint, recommendation at time of d/c would be OPPT in order to promote return to PLOF and independence. The patient's AM-PAC Basic Mobility Inpatient Short Form Raw Score is 21. A Raw score of greater than 16 suggests the patient may benefit from discharge to home. Please also refer to physical therapy recommendation for safe DC planning. PT Discharge Recommendation: Home with outpatient rehabilitation    See flowsheet documentation for full assessment.

## 2023-09-19 NOTE — ASSESSMENT & PLAN NOTE
· Checked a vitamin D 25-OH level, which is pending at the time of discharge  · Treat with cholecalciferol 1000 I. U. PO Qdaily  • Outpatient follow-up with PCP in regards to this matter

## 2023-09-19 NOTE — CASE MANAGEMENT
Case Management Discharge Planning Note    Patient name Cricket Lee  Location /940-39 MRN [id number]  : 1955 Date 2023       Current Admission Date: 2023  Current Admission Diagnosis:Primary osteoarthritis of left knee   Patient Active Problem List    Diagnosis Date Noted   • CKD (chronic kidney disease) stage 3, GFR 30-59 ml/min ([address]) 2023   • Leukocytosis 2023   • Chronic combined systolic and diastolic CHF (congestive heart failure) ([address]) 2023   • COPD (chronic obstructive pulmonary disease) ([address]) 2023   • Primary osteoarthritis of left knee 2023   • Anxiety and depression 2023   • Moderate episode of recurrent major depressive disorder ([address]) 2023   • Class 2 severe obesity due to excess calories with serious comorbidity and body mass index (BMI) of 37.0 to 37.9 in adult ([address]) 2023   • Type 2 diabetes mellitus ([address]) 2022   • Hyponatremia 2022   • Stage 3 chronic kidney disease, unspecified whether stage 3a or 3b CKD ([address]) 2022   • PLMD (periodic limb movement disorder)    • Bronchitis 2020   • Hypertrophied anal papilla 2019   • History of tobacco abuse 2018   • Constipation 2018   • Pulmonary emphysema ([address]) 2018   • Chronic asthma, moderate persistent, uncomplicated    • Obstructive sleep apnea 2018   • Hemorrhoids 2017   • COPD, severe ([address]) 10/16/2017   • Chronic combined systolic and diastolic congestive heart failure ([address]) 2015   • Non-ischemic cardiomyopathy with HFmEF ([address]) 2015   • Anxiety 2014   • Thyroid disease 2014   • Benign essential hypertension 2013   • Vitamin D deficiency 2013   • Dyslipidemia 2013   • Asthma-COPD overlap syndrome ([address]) 2012      LOS (days): 0  Geometric Mean LOS (GMLOS) (days):   Days to GMLOS:     OBJECTIVE:            Current admission status: Outpatient Surgery   Preferred Pharmacy:   [facility] [address] - [address] [place] [address]  Phone: [phone] Fax: [phone]    [facility] [place] [address], [address] [place], [place] [address]chaitanya Winter. [address]  [address] [facility]. [address] Emery CRAIN [address]  Phone: [phone] Fax: [phone]    Primary Care Provider: Isha Ward DO    Primary Insurance: MEDICARE  Secondary Insurance: Palak Morales discharging home today. He already has appts set up with o/p therapy with [facility] in [place]. No other discharge needs noted. Nurse to review AVS, all follow up providers listed.

## 2023-09-19 NOTE — ASSESSMENT & PLAN NOTE
· Unable to tolerate a CPAP mask at home  · Outpatient follow-up with Pulmonology to see if they can find a CPAP or BiPAP mask that the patient can tolerate at home

## 2023-09-19 NOTE — ASSESSMENT & PLAN NOTE
· POD #1 S/P Left total knee arthroplasty by Dr. Angela Morris (Orthopedic Surgery) on 09/18/2023  · DVT prophylaxis per Orthopedic Surgery with aspirin 81 mg PO BID x 2 weeks and SCD's on both lower extremities  · Incentive spirometry 10 times per hour while awake  · PT/OT evaluations were completed during the hospitalization.   · Outpatient follow-up with Orthopedic Surgery

## 2023-09-19 NOTE — ASSESSMENT & PLAN NOTE
· Not in acute exacerbation  · Continue his home COPD medication regimen  · Utilized the respiratory protocol and incentive spirometry 10 times per hour while awake during the hospitalization  · Outpatient follow-up with Pulmonology

## 2023-09-19 NOTE — NURSING NOTE
AVS reviewed with patient virtually. Patient verbalizes understanding of DCI and all question answered.

## 2023-09-19 NOTE — PLAN OF CARE
Problem: OCCUPATIONAL THERAPY ADULT  Goal: Performs self-care activities at highest level of function for planned discharge setting. See evaluation for individualized goals. Description: Treatment Interventions: ADL retraining, UE strengthening/ROM, Functional transfer training, Endurance training, Patient/family training, Equipment evaluation/education, Activityengagement          See flowsheet documentation for full assessment, interventions and recommendations. Outcome: Progressing  Note: Limitation: Decreased ADL status, Decreased UE strength, Decreased Safe judgement during ADL, Decreased endurance, Decreased self-care trans, Decreased high-level ADLs     Assessment: Patient participated in Skilled OT session this date with interventions consisting of ADL re training with the use of correct body mechnaics and  therapeutic activities to: increase activity tolerance . Patient agreeable to OT treatment session, upon arrival patient was found seated OOB to Recliner. Pt completed sit to stand txfrs from gurwinder chair with S, standing balance/functional mobility around room and hallway with use of RW and S with no LOB throughout, to increase posture, dynamic standing balance, balance during self cares, use of BUE. Pt completed LB dressing with Min A to abi and doff B  socks with patient reporting not wearing socks at home and only wearing slip on shoes, toileting S, toilet txfr S with use of RW. Patient requiring verbal cues for safety and verbal cues for correct technique. Patient continues to be functioning below baseline level, occupational performance remains limited secondary to factors listed above and increased risk for falls and injury. From OT standpoint, recommendation at time of d/c would be Home with outpatient rehabilitation. The patient's raw score on the AM-PAC Daily Activity inpatient short form is 21, standardized score is 44.27, greater than 39.4.  Patients at this level are likely to benefit from discharge to home. Please refer to the recommendation of the Occupational Therapist for safe discharge planning.      OT Discharge Recommendation: Home with outpatient rehabilitation

## 2023-09-19 NOTE — OCCUPATIONAL THERAPY NOTE
Occupational Therapy Progress Note     Patient Name: Carl Zapien  UYLLM'X Date: 9/19/2023  Problem List  Principal Problem:    Primary osteoarthritis of left knee  Active Problems:    Obstructive sleep apnea    Vitamin D deficiency    Chronic combined systolic and diastolic CHF (congestive heart failure) (Formerly [facility])    COPD (chronic obstructive pulmonary disease) (Formerly [facility])    CKD (chronic kidney disease) stage 3, GFR 30-59 ml/min (Formerly [facility])    Leukocytosis          09/19/23 0946   OT Last Visit   OT Visit Date 09/19/23   Note Type   Note Type Treatment   Pain Assessment   Pain Assessment Tool 0-10   Pain Score 5   Pain Location/Orientation Orientation: Left; Location: Knee   Restrictions/Precautions   Weight Bearing Precautions Per Order Yes   LLE Weight Bearing Per Order WBAT   Other Precautions Pain;Multiple lines; Chair Alarm;WBS;Fall Risk   ADL   LB Dressing Assistance 4  Minimal Assistance   LB Dressing Deficit Setup;Verbal cueing; Increased time to complete;Supervision/safety; Don/doff R sock; Don/doff L sock   LB Dressing Comments Pt required Min A to abi  socks with patient reporting use of slip on shoes at home   [address]  5  Supervision/Setup   Toileting Deficit Setup;Verbal cueing; Increased time to complete;Supervison/safety   Transfers   Sit to Stand 5  Supervision   Additional items Increased time required;Verbal cues;Armrests   Stand to Sit 5  Supervision   Additional items Increased time required;Verbal cues;Armrests   Toilet transfer 5  Supervision   Additional items Increased time required;Standard toilet   Functional Mobility   Functional Mobility 5  Supervision   Additional Comments Pt completed standing balance/functional mobility around room and hallway with use of RW and S with no LOB throughout.    Additional items Rolling walker   Toilet Transfers   Toilet Transfer From Rolling walker   Toilet Transfer Type To and from   Toilet Transfer to [employer] Technique Ambulating   Toilet Transfers Supervision   Cognition   Overall Cognitive Status WFL   Arousal/Participation Alert   Attention Within functional limits   Orientation Level Oriented X4   Memory Within functional limits   Following Commands Follows all commands and directions without difficulty   Assessment   Assessment Patient participated in Skilled OT session this date with interventions consisting of ADL re training with the use of correct body mechnaics and  therapeutic activities to: increase activity tolerance . Patient agreeable to OT treatment session, upon arrival patient was found seated OOB to Recliner. Pt completed sit to stand txfrs from gurwinder chair with S, standing balance/functional mobility around room and hallway with use of RW and S with no LOB throughout, to increase posture, dynamic standing balance, balance during self cares, use of BUE. Pt completed LB dressing with Min A to abi and doff B  socks with patient reporting not wearing socks at home and only wearing slip on shoes, toileting S, toilet txfr S with use of RW. Patient requiring verbal cues for safety and verbal cues for correct technique. Patient continues to be functioning below baseline level, occupational performance remains limited secondary to factors listed above and increased risk for falls and injury. From OT standpoint, recommendation at time of d/c would be Home with outpatient rehabilitation. The patient's raw score on the AM-PAC Daily Activity inpatient short form is 21, standardized score is 44.27, greater than 39.4. Patients at this level are likely to benefit from discharge to home. Please refer to the recommendation of the Occupational Therapist for safe discharge planning.    Plan   Goal Expiration Date 10/02/23   OT Treatment Day 1   OT Frequency 3-5x/wk   Recommendation   OT Discharge Recommendation Home with outpatient rehabilitation   AMPAC Daily Activity Inpatient   Lower Body Dressing 3   Bathing 3 Toileting 3   Upper Body Dressing 4   Grooming 4   Eating 4   Daily Activity Raw Score 21   Daily Activity Standardized Score (Calc for Raw Score >=11) 44.27   AM-PAC Applied Cognition Inpatient   Following a Speech/Presentation 4   Understanding Ordinary Conversation 4   Taking Medications 4   Remembering Where Things Are Placed or Put Away 4   Remembering List of 4-5 Errands 4   Taking Care of Complicated Tasks 4   Applied Cognition Raw Score 24   Applied Cognition Standardized Score 62.21     Pt left seated in gurwinder chair with all needs in reach.

## 2023-09-19 NOTE — ASSESSMENT & PLAN NOTE
· Likely reactive in nature  · Recheck a CBC with differential in 1-2 weeks with his PCP  · If the leukocytosis persists as an outpatient, he will need an outpatient Hematology evaluation.     Results from last 7 days   Lab Units 09/19/23  0644   WBC Thousand/uL 15.30*   HEMOGLOBIN g/dL 13.6   HEMATOCRIT % 43.4   PLATELETS Thousands/uL 330

## 2023-09-20 ENCOUNTER — TELEPHONE (OUTPATIENT)
Age: 68
End: 2023-09-20

## 2023-09-20 ENCOUNTER — TELEPHONE (OUTPATIENT)
Dept: OBGYN CLINIC | Facility: HOSPITAL | Age: 68
End: 2023-09-20

## 2023-09-20 NOTE — TELEPHONE ENCOUNTER
Pt returned my call, full postoperative follow up call assessment was completed with patient. He reports he is "Feeling okay resting today" after having increased pain yesterday. He reports he is  Using the RW to ambulate" and "Swelling is a lot better today then yesterday." He reports his dressing is dry with no drainage, and he is icing. He is taking the following medications:  ASA 81mg BID  Celebrex 100mg BID  Duricef 500mg BID  Tylenol 1000mg TID  He is not using the oxycodone or zofran at this time. He denies any questions, concerns, symptoms or issues.  He reports   "Everything else is pretty good."

## 2023-09-20 NOTE — TELEPHONE ENCOUNTER
Caller: Barbara Grullon from [address] [place] [address]    Doctor: Dr. Kedric Siemens    Reason for call: Barbara Grullon calling stating that a prescription for Oxycodone sent to the pharmacy and he wanted to inform Dr. Kedric Siemens that patient is also taking Xanax. Per Barbara Grullon the 2 drugs can interact. He is just calling for clarification in order to fill the prescription.     Call back#: [phone]

## 2023-09-21 ENCOUNTER — OFFICE VISIT (OUTPATIENT)
Dept: PHYSICAL THERAPY | Facility: CLINIC | Age: 68
End: 2023-09-21
Payer: MEDICARE

## 2023-09-21 DIAGNOSIS — M17.12 PRIMARY OSTEOARTHRITIS OF LEFT KNEE: ICD-10-CM

## 2023-09-21 DIAGNOSIS — M25.59 PAIN IN OTHER JOINT: ICD-10-CM

## 2023-09-21 DIAGNOSIS — Z96.652 STATUS POST TOTAL LEFT KNEE REPLACEMENT: Primary | ICD-10-CM

## 2023-09-21 PROCEDURE — 97112 NEUROMUSCULAR REEDUCATION: CPT | Performed by: PHYSICAL THERAPIST

## 2023-09-21 PROCEDURE — 97140 MANUAL THERAPY 1/> REGIONS: CPT | Performed by: PHYSICAL THERAPIST

## 2023-09-21 PROCEDURE — 97530 THERAPEUTIC ACTIVITIES: CPT | Performed by: PHYSICAL THERAPIST

## 2023-09-21 PROCEDURE — 97110 THERAPEUTIC EXERCISES: CPT | Performed by: PHYSICAL THERAPIST

## 2023-09-21 PROCEDURE — 97164 PT RE-EVAL EST PLAN CARE: CPT | Performed by: PHYSICAL THERAPIST

## 2023-09-21 NOTE — PROGRESS NOTES
PT Re-Evaluation     Today's date: 2023  Patient name: Val Mann  : 1955  MRN: [id number]  Referring provider: Harley Morrison DO  Dx:   Encounter Diagnosis     ICD-10-CM    1. Status post total left knee replacement  Z96.652       2. Primary osteoarthritis of left knee  M17.12       3. Pain in other joint  M25.59                      Assessment  Understanding of Dx/Px/POC: good   Prognosis: good    Goals  STGs: To be complete within 4-6 weeks  - Decrease pain to < 2/10 at worst  - Increase AROM to WNL  - Increase strength to > 4+/5  - Improve gait to WNL for distances < 6 blocks    LTGs: To be complete within 8 weeks  - Able to walk for any extended amount of time/distance without pain or limitation for increased safety and functional capacity with ADLs and home-related duty  - Able to repetitively squat without pain or limitation for increased safety and functional capacity with ADLs and home-related duty  - Able to repetitively ascend/descend a full flight of stairs without pain or limitation for increased safety and functional capacity with ADLs and home-related duty  - Able to repetitively complete transfers without pain or limitation for increased safety and functional capacity with ADLs and home-related duty  - Able to keel for any extended amount of time without pain or limitation for increased safety and functional capacity with ADLs and home-related duty    Plan  Planned therapy interventions: neuromuscular re-education, manual therapy, patient education, self care, therapeutic activities, therapeutic exercise, home exercise program, gait training and postural training  Frequency: 2x week  Duration in weeks: 6       Upon completion of today's re-evaluation, as evidenced by present objective and subjective measures, Brenden's sx remain consistent with continued, fair-paced progress from L Knee TKA 23.  Patient will benefit from continued skilled physical therapy to address current deficits. Subjective Evaluation    Patient Goals  Patient goals for therapy: increased strength, decreased pain and increased motion    Pain  Current pain ratin  At best pain ratin  At worst pain ratin  Location: L Knee         Pt reports he underwent L Knee TKA 23.         Objective Pain level ranges 2-8/10  AROM: L Knee 0-65 degrees; R Knee 0-115 degrees  Strength: L Quad and HS 5/5; R Quad and HS 5/5  Gait: RW, Step-to pattern, L Knee extension lag  Swelling: Mod (will continue to monitor)  Incisions: Clean and healing well (will continue to monitor)  Jesus's: (-)  FOTO: 41; GOAL: 68  Unable to walk without pain and limitation  Unable to squat without pain and limitation  Unable complete transfers without pain and limitation  Unable to ascend/descend stairs without pain and limitation  Unable to kneel without pain and limitation             Precautions: MD protocol for L Knee TKA 23    Daily Treatment Diary    HEP: Handout provided and discussed      Manuals 23       ART        PROM/Stretch x15'       IASTM        STM/Triggerpoint        JM                Neuro Re-Ed        Standing 1/2 Roll calf stretch  This session      SL Ecc HR                                                        Ther Ex        QS Back and Forth 3x10       HS into QS 2x10       Ankle Pumps 2x10       HR/TR  This session      TB TKE  This session      TB Heel to Toes        Sit to stand   This session     Bridge with Add squeeze        SL Bridge        Clam shells        SL Sit to Stand                Upside down BOSU SL squat holds        TB Lat Walks   This session     Step ups/downs  This session                      Ther Activity        TM        Bike  This session      NuStep x10'       Forward/backward heel to toe walk                Gait Training                                        Modalities        MHP        CP x10'       US/Stim

## 2023-09-22 NOTE — TELEPHONE ENCOUNTER
I called and spoke with Jonathan Mariee the pharmacist at [facility], informed him of JM response. Jonathan Mariee documented in patient chart at pharmacy.

## 2023-09-26 ENCOUNTER — OFFICE VISIT (OUTPATIENT)
Dept: PHYSICAL THERAPY | Facility: CLINIC | Age: 68
End: 2023-09-26
Payer: MEDICARE

## 2023-09-26 ENCOUNTER — APPOINTMENT (OUTPATIENT)
Dept: LAB | Facility: CLINIC | Age: 68
End: 2023-09-26
Payer: MEDICARE

## 2023-09-26 DIAGNOSIS — I50.42 CHRONIC COMBINED SYSTOLIC AND DIASTOLIC CHF (CONGESTIVE HEART FAILURE) (HCC): ICD-10-CM

## 2023-09-26 DIAGNOSIS — N18.30 STAGE 3 CHRONIC KIDNEY DISEASE, UNSPECIFIED WHETHER STAGE 3A OR 3B CKD (HCC): ICD-10-CM

## 2023-09-26 DIAGNOSIS — D72.829 LEUKOCYTOSIS, UNSPECIFIED TYPE: ICD-10-CM

## 2023-09-26 DIAGNOSIS — Z96.652 STATUS POST TOTAL LEFT KNEE REPLACEMENT: Primary | ICD-10-CM

## 2023-09-26 DIAGNOSIS — M25.59 PAIN IN OTHER JOINT: ICD-10-CM

## 2023-09-26 DIAGNOSIS — M17.12 PRIMARY OSTEOARTHRITIS OF LEFT KNEE: ICD-10-CM

## 2023-09-26 PROCEDURE — 83735 ASSAY OF MAGNESIUM: CPT

## 2023-09-26 PROCEDURE — 97530 THERAPEUTIC ACTIVITIES: CPT

## 2023-09-26 PROCEDURE — 97110 THERAPEUTIC EXERCISES: CPT

## 2023-09-26 PROCEDURE — 80053 COMPREHEN METABOLIC PANEL: CPT

## 2023-09-26 PROCEDURE — 36415 COLL VENOUS BLD VENIPUNCTURE: CPT

## 2023-09-26 PROCEDURE — 85025 COMPLETE CBC W/AUTO DIFF WBC: CPT

## 2023-09-26 PROCEDURE — 97140 MANUAL THERAPY 1/> REGIONS: CPT

## 2023-09-26 NOTE — PROGRESS NOTES
Daily Note     Today's date: 2023  Patient name: Karena Loving  : 1955  MRN: [id number]  Referring provider: Reyes Parkins, DO  Dx:   Encounter Diagnosis     ICD-10-CM    1. Status post total left knee replacement  Z96.652       2. Primary osteoarthritis of left knee  M17.12       3. Pain in other joint  M25.59                      Subjective: Pt reports he is doing well. States eager to progress to a SPC and DC walker. Objective: See treatment diary below. 90 degrees knee flx PROM. Assessment: Tolerated treatment well. Patient demonstrated fatigue post treatment and exhibited good technique with therapeutic exercises. Pt with mild antalgic gait with SPC today. Ed on cont use of walker as of now. Plan: Continue per plan of care.       Precautions: MD protocol for L Knee TKA 23    Daily Treatment Diary    HEP: Handout provided and discussed      Manuals 23      ART        PROM/Stretch x15' 15'      IASTM        STM/Triggerpoint        JM                Neuro Re-Ed        Standing 1/2 Roll calf stretch  4/20"       SL Ecc HR                                                        Ther Ex        QS Back and Forth 3x10 3/10      HS into QS 2x10 2/10      Ankle Pumps 2x10 2/10      HR/TR  2/10      TB TKE  L3 2/10      TB Heel to Toes        Sit to stand   This session     Bridge with Add squeeze        SL Bridge        Clam shells        SL Sit to Stand                Upside down BOSU SL squat holds        TB Lat Walks   This session     Step ups/downs  6" 2/10                      Ther Activity        TM        Bike  next session      NuStep x10' 10' L4       Forward/backward heel to toe walk  10 laps              Gait Training                                        Modalities        MHP        CP x10' 10'      US/Stim

## 2023-09-27 DIAGNOSIS — F41.9 ANXIETY: ICD-10-CM

## 2023-09-27 LAB
ALBUMIN SERPL BCP-MCNC: 3.9 G/DL (ref 3.5–5)
ALP SERPL-CCNC: 84 U/L (ref 34–104)
ALT SERPL W P-5'-P-CCNC: 33 U/L (ref 7–52)
ANION GAP SERPL CALCULATED.3IONS-SCNC: 10 MMOL/L
AST SERPL W P-5'-P-CCNC: 22 U/L (ref 13–39)
BASOPHILS # BLD AUTO: 0.1 THOUSANDS/ÂΜL (ref 0–0.1)
BASOPHILS NFR BLD AUTO: 1 % (ref 0–1)
BILIRUB SERPL-MCNC: 1.15 MG/DL (ref 0.2–1)
BUN SERPL-MCNC: 43 MG/DL (ref 5–25)
CALCIUM SERPL-MCNC: 9.3 MG/DL (ref 8.4–10.2)
CHLORIDE SERPL-SCNC: 101 MMOL/L (ref 96–108)
CO2 SERPL-SCNC: 27 MMOL/L (ref 21–32)
CREAT SERPL-MCNC: 1.66 MG/DL (ref 0.6–1.3)
EOSINOPHIL # BLD AUTO: 0.47 THOUSAND/ÂΜL (ref 0–0.61)
EOSINOPHIL NFR BLD AUTO: 4 % (ref 0–6)
ERYTHROCYTE [DISTWIDTH] IN BLOOD BY AUTOMATED COUNT: 15.4 % (ref 11.6–15.1)
GFR SERPL CREATININE-BSD FRML MDRD: 42 ML/MIN/1.73SQ M
GLUCOSE P FAST SERPL-MCNC: 123 MG/DL (ref 65–99)
HCT VFR BLD AUTO: 40.5 % (ref 36.5–49.3)
HGB BLD-MCNC: 12.7 G/DL (ref 12–17)
IMM GRANULOCYTES # BLD AUTO: 0.09 THOUSAND/UL (ref 0–0.2)
IMM GRANULOCYTES NFR BLD AUTO: 1 % (ref 0–2)
LYMPHOCYTES # BLD AUTO: 1.78 THOUSANDS/ÂΜL (ref 0.6–4.47)
LYMPHOCYTES NFR BLD AUTO: 17 % (ref 14–44)
MAGNESIUM SERPL-MCNC: 2.4 MG/DL (ref 1.9–2.7)
MCH RBC QN AUTO: 27.1 PG (ref 26.8–34.3)
MCHC RBC AUTO-ENTMCNC: 31.4 G/DL (ref 31.4–37.4)
MCV RBC AUTO: 87 FL (ref 82–98)
MONOCYTES # BLD AUTO: 1.03 THOUSAND/ÂΜL (ref 0.17–1.22)
MONOCYTES NFR BLD AUTO: 10 % (ref 4–12)
NEUTROPHILS # BLD AUTO: 7.18 THOUSANDS/ÂΜL (ref 1.85–7.62)
NEUTS SEG NFR BLD AUTO: 67 % (ref 43–75)
NRBC BLD AUTO-RTO: 0 /100 WBCS
PLATELET # BLD AUTO: 399 THOUSANDS/UL (ref 149–390)
PMV BLD AUTO: 10 FL (ref 8.9–12.7)
POTASSIUM SERPL-SCNC: 4.6 MMOL/L (ref 3.5–5.3)
PROT SERPL-MCNC: 6.9 G/DL (ref 6.4–8.4)
RBC # BLD AUTO: 4.68 MILLION/UL (ref 3.88–5.62)
SODIUM SERPL-SCNC: 138 MMOL/L (ref 135–147)
WBC # BLD AUTO: 10.65 THOUSAND/UL (ref 4.31–10.16)

## 2023-09-27 RX ORDER — ALPRAZOLAM 0.5 MG/1
TABLET ORAL
Qty: 30 TABLET | Refills: 0 | Status: SHIPPED | OUTPATIENT
Start: 2023-09-27

## 2023-09-28 ENCOUNTER — OFFICE VISIT (OUTPATIENT)
Dept: PHYSICAL THERAPY | Facility: CLINIC | Age: 68
End: 2023-09-28
Payer: MEDICARE

## 2023-09-28 DIAGNOSIS — Z96.652 STATUS POST TOTAL LEFT KNEE REPLACEMENT: Primary | ICD-10-CM

## 2023-09-28 DIAGNOSIS — M25.59 PAIN IN OTHER JOINT: ICD-10-CM

## 2023-09-28 DIAGNOSIS — M17.12 PRIMARY OSTEOARTHRITIS OF LEFT KNEE: ICD-10-CM

## 2023-09-28 PROCEDURE — 97140 MANUAL THERAPY 1/> REGIONS: CPT

## 2023-09-28 PROCEDURE — 97530 THERAPEUTIC ACTIVITIES: CPT

## 2023-09-28 PROCEDURE — 97110 THERAPEUTIC EXERCISES: CPT

## 2023-09-28 NOTE — PROGRESS NOTES
Daily Note     Today's date: 2023  Patient name: Geri Campbell  : 1955  MRN: [id number]  Referring provider: Nestor Currie DO  Dx:   Encounter Diagnosis     ICD-10-CM    1. Status post total left knee replacement  Z96.652       2. Primary osteoarthritis of left knee  M17.12       3. Pain in other joint  M25.59           Start Time: 1600  Stop Time: 1700  Total time in clinic (min): 60 minutes    Subjective: The patient states that he has been walking with his [facility] for the past few days with no loss of balance. He states that he took his prescription pain meds at noon and his knee pain is 4/10 currently. Objective: See treatment diary below      Assessment: The patient only complained of increased knee pain while performing quad sets. Added TKE and sit to stand exercises to increase functional knee mobility. Tolerated treatment well. Patient demonstrated fatigue post treatment, exhibited good technique with therapeutic exercises and would benefit from continued PT      Plan: Continue per plan of care.       Precautions: MD protocol for L Knee TKA 23    Daily Treatment Diary    HEP: Handout provided and discussed      Manuals 23     ART        PROM/Stretch x15' 15' 15'     IASTM        STM/Triggerpoint        JM                Neuro Re-Ed        Standing 1/2 Roll calf stretch  4/20"  4/20"     SL Ecc HR                                                        Ther Ex        QS Back and Forth 3x10 3/10 3/10     HS into QS 2x10 2/10 2/10     Ankle Pumps 2x10 2/10 2/10     HR/TR  2/10 2/10     TB TKE  L3 2/10 L3 2/10     TB Heel to Toes        Sit to stand   2/5     Bridge with Add squeeze        SL Bridge        Clam shells        SL Sit to Stand                Upside down BOSU SL squat holds        TB Lat Walks    This session    Step ups/downs  6" 2/10 6" 2/10                     Ther Activity        TM        Bike  next session      NuStep x10' 10' L4  10' L4 Forward/backward heel to toe walk  10 laps 10 laps             Gait Training                                        Modalities        MHP        CP x10' 10' 10'     US/Stim

## 2023-10-02 DIAGNOSIS — J44.89 ASTHMA-COPD OVERLAP SYNDROME: ICD-10-CM

## 2023-10-02 RX ORDER — FLUTICASONE PROPIONATE AND SALMETEROL 500; 50 UG/1; UG/1
POWDER RESPIRATORY (INHALATION)
Qty: 60 BLISTER | Refills: 3 | Status: SHIPPED | OUTPATIENT
Start: 2023-10-02

## 2023-10-03 ENCOUNTER — OFFICE VISIT (OUTPATIENT)
Dept: PHYSICAL THERAPY | Facility: CLINIC | Age: 68
End: 2023-10-03
Payer: MEDICARE

## 2023-10-03 ENCOUNTER — OFFICE VISIT (OUTPATIENT)
Dept: OBGYN CLINIC | Facility: CLINIC | Age: 68
End: 2023-10-03

## 2023-10-03 VITALS
SYSTOLIC BLOOD PRESSURE: 118 MMHG | WEIGHT: 301 LBS | DIASTOLIC BLOOD PRESSURE: 71 MMHG | BODY MASS INDEX: 38.63 KG/M2 | HEART RATE: 64 BPM | HEIGHT: 74 IN

## 2023-10-03 DIAGNOSIS — M17.12 PRIMARY OSTEOARTHRITIS OF LEFT KNEE: Primary | ICD-10-CM

## 2023-10-03 DIAGNOSIS — M17.12 PRIMARY OSTEOARTHRITIS OF LEFT KNEE: ICD-10-CM

## 2023-10-03 DIAGNOSIS — M25.59 PAIN IN OTHER JOINT: ICD-10-CM

## 2023-10-03 DIAGNOSIS — Z96.652 STATUS POST TOTAL LEFT KNEE REPLACEMENT: Primary | ICD-10-CM

## 2023-10-03 PROCEDURE — 99024 POSTOP FOLLOW-UP VISIT: CPT | Performed by: STUDENT IN AN ORGANIZED HEALTH CARE EDUCATION/TRAINING PROGRAM

## 2023-10-03 PROCEDURE — 97110 THERAPEUTIC EXERCISES: CPT | Performed by: PHYSICAL THERAPIST

## 2023-10-03 PROCEDURE — 97140 MANUAL THERAPY 1/> REGIONS: CPT | Performed by: PHYSICAL THERAPIST

## 2023-10-03 PROCEDURE — 97530 THERAPEUTIC ACTIVITIES: CPT | Performed by: PHYSICAL THERAPIST

## 2023-10-03 NOTE — PROGRESS NOTES
Daily Note     Today's date: 10/3/2023  Patient name: Georgi Camacho  : 1955  MRN: [id number]  Referring provider: David Davis DO  Dx:   Encounter Diagnosis     ICD-10-CM    1. Status post total left knee replacement  Z96.652       2. Primary osteoarthritis of left knee  M17.12       3. Pain in other joint  M25.59                      Subjective: Patient states that he saw his surgeon earlier today and he was pleased with his progress. He will recheck him in another month. Objective: See treatment diary below      Assessment: Tolerated treatment well. Patient demonstrated fatigue post treatment and exhibited good technique with therapeutic exercise progressions. Plan: Progress treament per protocol.       Precautions: MD protocol for L Knee TKA 23    Daily Treatment Diary    HEP: Handout provided and discussed      Manuals 9/21/23 9/26 9/28 10/3    ART        PROM/Stretch x15' 15' 15' 15'    IASTM        STM/Triggerpoint        JM                Neuro Re-Ed        Standing 1/ Roll calf stretch  4/20"  /20" 4/20"    SL Ecc HR                                                        Ther Ex        QS Back and Forth 3x10 3/10 3/10 3/10    HS into QS 2x10 2/10 2/10 2/10    Ankle Pumps 2x10 2/10 2/10 2/10    HR/TR  2/10 2/10 2/10    TB TKE  L3 2/10 L3 2/10 L3 2/10    TB Heel to Toes        Sit to stand   2/5 2/5    Bridge with Add squeeze        SL Bridge        Clam shells        SL Sit to Stand                Upside down BOSU SL squat holds        TB Lat Walks    L3 5x // bars    Step ups/downs  6" 2/10 6" 2/10 6" 2/10                    Ther Activity        TM        Bike  next session      NuStep x10' 10' L4  10' L4 10' L4    Forward/backward heel to toe walk  10 laps 10 laps 10 laps            Gait Training                                        Modalities        MHP        CP x10' 10' 10' 10'    US/Stim

## 2023-10-03 NOTE — PROGRESS NOTES
Post-Operative Note: Total Knee Arthroplasty  Maria G Suh (59 y.o. male)  : 1955 Encounter Date: 10/3/2023  Dr. Marcela Jacob DO, Orthopedic Surgeon  Orthopedic Oncology & [facility] Surgery     Assessment/Plan  79 y.o. male 2 week follow up status post left total knee arthroplasty     · Wound Care   · OK to shower. , Dab dry with towel., Steristrips will fall off on their own. · Pain control: PRN  · Continue ice packs/elevation  · Can continue compression   · Continue with physical therapy  · Continue with activities as tolerated  · Discussed post-operative goal of progressively discontinuing use of single point cane. · Complete DVT ppx as prescribed  Return in about 4 weeks (around 10/31/2023) for Repeat X-Ray, Strength and Motion Check. · for evaluation with x-ray  · Patient would like to discuss right TKA at next appointment       Subjective  79 y.o. male presenting to the office for 2 week follow up status post left total knee arthroplasty  DOS: 2023    Patient reports general soreness, however, he states that the pain is much more tolerable than pre-surgery. He states that he has been walking well and resuming his normal routine. • Current concerns: none  • Patient states that pain is well-controlled   • Numbness/weakness extremity: None Reported   • Physical Therapy Progress: Progressing well  • DVT ppx: Return to home anticoagulation  • Eating/Drinking improving. Bowel/Bladder: WNL  • Patient denies symptoms of an infection.      Physical Exam:  left knee(s) -   Patient ambulates with steady gait pattern  Uses Single Point Cane assistive device  No anatomical deformity  Incision is well-healed with no signs of erythema, ecchymosis, or infection   Mild generalized soft tissue swelling or effusion noted  ROM (5° - 90°)   Calf compartments are soft and supple  - Jesus's sign  2+ DP and PT pulses with brisk capillary refill to the toes  Sural, saphenous, tibial, superficial, and deep peroneal motor and sensory distributions intact  Sensation light touch intact distally      Imaging  Post-operative Xrays of left knee from 9/18/23 show well-seated total prosthesis with adequate anatomical alignment.       Scribe Attestation    I,:  Chinyere Chávez am acting as a scribe while in the presence of the attending physician.:       I,:  Yunier Cormier, DO personally performed the services described in this documentation    as scribed in my presence.:

## 2023-10-05 ENCOUNTER — OFFICE VISIT (OUTPATIENT)
Dept: PHYSICAL THERAPY | Facility: CLINIC | Age: 68
End: 2023-10-05
Payer: MEDICARE

## 2023-10-05 DIAGNOSIS — M25.59 PAIN IN OTHER JOINT: ICD-10-CM

## 2023-10-05 DIAGNOSIS — Z96.652 STATUS POST TOTAL LEFT KNEE REPLACEMENT: Primary | ICD-10-CM

## 2023-10-05 DIAGNOSIS — M17.12 PRIMARY OSTEOARTHRITIS OF LEFT KNEE: ICD-10-CM

## 2023-10-05 PROCEDURE — 97530 THERAPEUTIC ACTIVITIES: CPT

## 2023-10-05 PROCEDURE — 97110 THERAPEUTIC EXERCISES: CPT

## 2023-10-05 PROCEDURE — 97140 MANUAL THERAPY 1/> REGIONS: CPT

## 2023-10-05 NOTE — PROGRESS NOTES
Daily Note     Today's date: 10/5/2023  Patient name: Dewaine Soulier  : 1955  MRN: [id number]  Referring provider: Yari Rueda DO  Dx:   Encounter Diagnosis     ICD-10-CM    1. Status post total left knee replacement  Z96.652       2. Primary osteoarthritis of left knee  M17.12       3. Pain in other joint  M25.59                      Subjective: Pt reports he is doing well but has some lateral knee pain today. More pain today than usual      Objective: See treatment diary below      Assessment: Tolerated treatment well. Patient demonstrated fatigue post treatment, exhibited good technique with therapeutic exercises and would benefit from continued PT. Pt cont to sivan program however has some increased pain today. Making cont gains with ROM. Challenged with sit to stands due to fatigue however able to complete all sets. Plan: Continue per plan of care.       Precautions: MD protocol for L Knee TKA 23    Daily Treatment Diary    HEP: Handout provided and discussed      Manuals 9/21/23 9/26 9/28 10/3 10/5   ART        PROM/Stretch x15' 15' 15' 15' 15'   IASTM        STM/Triggerpoint        JM                Neuro Re-Ed        Standing / Roll calf stretch  4/20"  4/20" 4/20" 4/20"   SL Ecc HR                                                        Ther Ex        QS Back and Forth 3x10 3/10 3/10 3/10 3/10   HS into QS 2x10 2/10 2/10 2/10 2/10   Ankle Pumps 2x10 2/10 2/10 2/10 2/10   HR/TR  2/10 2/10 2/10 2/10   TB TKE  L3 2/10 L3 2/10 L3 2/10 L3 2/10   TB Heel to Toes        Sit to stand   2/5 2/5 2/10   Bridge with Add squeeze        SL Bridge        Clam shells        SL Sit to Stand                Upside down BOSU SL squat holds        TB Lat Walks    L3 5x // bars NV   Step ups/downs  6" 2/10 6" 2/10 6" 2/10 6" 2/10                   Ther Activity        TM        Bike  next session      NuStep x10' 10' L4  10' L4 10' L4 10' L4   Forward/backward heel to toe walk  10 laps 10 laps 10 laps 10 Gait Training                                        Modalities        MHP        CP x10' 10' 10' 10' 10'   US/Stim

## 2023-10-10 ENCOUNTER — APPOINTMENT (OUTPATIENT)
Dept: PHYSICAL THERAPY | Facility: CLINIC | Age: 68
End: 2023-10-10
Payer: MEDICARE

## 2023-10-12 ENCOUNTER — OFFICE VISIT (OUTPATIENT)
Dept: PHYSICAL THERAPY | Facility: CLINIC | Age: 68
End: 2023-10-12
Payer: MEDICARE

## 2023-10-12 DIAGNOSIS — Z96.652 STATUS POST TOTAL LEFT KNEE REPLACEMENT: Primary | ICD-10-CM

## 2023-10-12 DIAGNOSIS — M17.12 PRIMARY OSTEOARTHRITIS OF LEFT KNEE: ICD-10-CM

## 2023-10-12 DIAGNOSIS — M25.59 PAIN IN OTHER JOINT: ICD-10-CM

## 2023-10-12 PROCEDURE — 97530 THERAPEUTIC ACTIVITIES: CPT

## 2023-10-12 PROCEDURE — 97110 THERAPEUTIC EXERCISES: CPT

## 2023-10-12 PROCEDURE — 97140 MANUAL THERAPY 1/> REGIONS: CPT

## 2023-10-12 NOTE — PROGRESS NOTES
Daily Note     Today's date: 10/12/2023  Patient name: Flex Alonso  : 1955  MRN: [id number]  Referring provider: Wolfgang Zamora DO  Dx:   Encounter Diagnosis     ICD-10-CM    1. Status post total left knee replacement  Z96.652       2. Primary osteoarthritis of left knee  M17.12       3. Pain in other joint  M25.59                      Subjective: Pt states he is doing well and eager to return to gym program. Pt reports doing really well. Objective: See treatment diary below      Assessment: Tolerated treatment well. Patient exhibited good technique with therapeutic exercises. Pt making cont gains with program. Pt with mild restriction with knee A/PROM with knee ext/flx. Pt will cont to benefit from program to improve overall strength and ROM. Plan: Continue per plan of care.       Precautions: MD protocol for L Knee TKA 23    Daily Treatment Diary    HEP: Handout provided and discussed      Manuals 10/12 9/26 9/28 10/3 10/5   ART        PROM/Stretch x15' 15' 15' 15' 15'   IASTM        STM/Triggerpoint        JM                Neuro Re-Ed        Standing / Roll calf stretch 4/20" 4/20"  4/20" 4/20" 4/20"   SL Ecc HR                                                        Ther Ex        QS Back and Forth 3x10 3/10 3/10 3/10 3/10   HS into QS 2x10 2/10 2/10 2/10 2/10   Ankle Pumps  2/10 2/10 2/10 2/10   HR/TR 2/10 2/10 2/10 2/10 2/10   TB TKE L5 3/10 L3 2/10 L3 2/10 L3 2/10 L3 2/10   TB Heel to Toes        Sit to stand 2/10  2/5 2/5 2/10   Bridge with Add squeeze        SL Bridge        Clam shells        SL Sit to Stand        Leg press P2 2/10       Upside down BOSU SL squat holds        TB Lat Walks L3 5 laps   L3 5x // bars NV   Step ups/downs 6" 2/10 6" 2/10 6" 2/10 6" 2/10 6" 2/10                   Ther Activity        TM        Bike NV       NuStep X10' L4 10' L4  10' L4 10' L4 10' L4   Forward/backward heel to toe walk  10 laps 10 laps 10 laps 10           Gait Training Modalities        MHP        CP x10' 10' 10' 10' 10'   US/Stim

## 2023-10-17 ENCOUNTER — OFFICE VISIT (OUTPATIENT)
Dept: PHYSICAL THERAPY | Facility: CLINIC | Age: 68
End: 2023-10-17
Payer: MEDICARE

## 2023-10-17 DIAGNOSIS — M17.12 PRIMARY OSTEOARTHRITIS OF LEFT KNEE: ICD-10-CM

## 2023-10-17 DIAGNOSIS — M25.59 PAIN IN OTHER JOINT: ICD-10-CM

## 2023-10-17 DIAGNOSIS — Z96.652 STATUS POST TOTAL LEFT KNEE REPLACEMENT: Primary | ICD-10-CM

## 2023-10-17 PROCEDURE — 97140 MANUAL THERAPY 1/> REGIONS: CPT

## 2023-10-17 PROCEDURE — 97110 THERAPEUTIC EXERCISES: CPT

## 2023-10-17 PROCEDURE — 97530 THERAPEUTIC ACTIVITIES: CPT

## 2023-10-17 NOTE — PROGRESS NOTES
Daily Note     Today's date: 10/17/2023  Patient name: Maria G Suh  : 1955  MRN: [id number]  Referring provider: Marcela Jacob DO  Dx:   Encounter Diagnosis     ICD-10-CM    1. Status post total left knee replacement  Z96.652       2. Primary osteoarthritis of left knee  M17.12       3. Pain in other joint  M25.59                      Subjective: Pt reports he is doing well and pleased with cont progress. Reports trying the bike at the gym. Objective: See treatment diary below      Assessment: Tolerated treatment well. Patient exhibited good technique with therapeutic exercises. Pt able to make full revolution with bike today without difficulty. Cont to make steady gains with strength and ROM. Plan: Continue per plan of care.       Precautions: MD protocol for L Knee TKA 23    Daily Treatment Diary    HEP: Handout provided and discussed      Manuals 10/12 10/17 9/28 10/3 10   ART        PROM/Stretch x15' 15' 15' 15' 15'   IASTM        STM/Triggerpoint        JM                Neuro Re-Ed        Standing 1/2 Roll calf stretch /20" 4/20"  4/20" /20" 4/20"   SL Ecc HR                                                        Ther Ex        QS Back and Forth 3x10 3/10 3/10 3/10 3/10   HS into QS 2x10 2/10 2/10 2/10 2/10   Ankle Pumps   2/10 2/10 2/10   HR/TR 2/10 3/10 2/10 2/10 2/10   TB TKE L5 3/10 L5 2/10 L3 2/10 L3 2/10 L3 2/10   TB Heel to Toes        Sit to stand 2/10 2/10 2/5 2/5 2/10   Bridge with Add squeeze        SL Bridge        Clam shells        SL Sit to Stand        Leg press P2 2/10 P2 3/10      Upside down BOSU SL squat holds        TB Lat Walks L3 5 laps L4 10 laps  L3 5x // bars NV   Step ups/downs 6" 2/10 8" 2/10 6" 2/10 6" 2/10 6" 2/10                   Ther Activity        TM        Bike NV 10' L3 ROM      NuStep X10' L4  10' L4 10' L4 10' L4   Forward/backward heel to toe walk   10 laps 10 laps 10           Gait Training Modalities        MHP        CP x10'  10' 10' 10'   US/Stim

## 2023-10-22 DIAGNOSIS — F33.1 MODERATE EPISODE OF RECURRENT MAJOR DEPRESSIVE DISORDER (HCC): ICD-10-CM

## 2023-10-22 RX ORDER — DULOXETIN HYDROCHLORIDE 60 MG/1
CAPSULE, DELAYED RELEASE ORAL
Qty: 30 CAPSULE | Refills: 0 | Status: SHIPPED | OUTPATIENT
Start: 2023-10-22

## 2023-10-24 ENCOUNTER — APPOINTMENT (OUTPATIENT)
Dept: PHYSICAL THERAPY | Facility: CLINIC | Age: 68
End: 2023-10-24
Payer: MEDICARE

## 2023-10-26 ENCOUNTER — APPOINTMENT (OUTPATIENT)
Dept: PHYSICAL THERAPY | Facility: CLINIC | Age: 68
End: 2023-10-26
Payer: MEDICARE

## 2023-10-31 ENCOUNTER — OFFICE VISIT (OUTPATIENT)
Dept: PHYSICAL THERAPY | Facility: CLINIC | Age: 68
End: 2023-10-31
Payer: MEDICARE

## 2023-10-31 DIAGNOSIS — Z96.652 STATUS POST TOTAL LEFT KNEE REPLACEMENT: Primary | ICD-10-CM

## 2023-10-31 DIAGNOSIS — M25.59 PAIN IN OTHER JOINT: ICD-10-CM

## 2023-10-31 DIAGNOSIS — M17.12 PRIMARY OSTEOARTHRITIS OF LEFT KNEE: ICD-10-CM

## 2023-10-31 PROCEDURE — 97530 THERAPEUTIC ACTIVITIES: CPT

## 2023-10-31 PROCEDURE — 97140 MANUAL THERAPY 1/> REGIONS: CPT

## 2023-10-31 PROCEDURE — 97110 THERAPEUTIC EXERCISES: CPT

## 2023-10-31 NOTE — PROGRESS NOTES
Daily Note     Today's date: 10/31/2023  Patient name: Karena Loving  : 1955  MRN: [id number]  Referring provider: Reyes Parkins, DO  Dx:   Encounter Diagnosis     ICD-10-CM    1. Status post total left knee replacement  Z96.652       2. Primary osteoarthritis of left knee  M17.12       3. Pain in other joint  M25.59                      Subjective: Pt reports he is doing well. Reports pleased with progress and is working out on own. Would like to move to 1x per week then DC. MD appt in a couple of weeks. Objective: See treatment diary below. 5-115 degrees knee flx AROM. Passed FOTO. Assessment: Tolerated treatment well. Patient exhibited good technique with therapeutic exercises. Pt cont to make gains with overall strength and function. Cont to sivan well with min pain. Progressing toward goals. Plan: Continue per plan of care.       Precautions: MD protocol for L Knee TKA 23    Daily Treatment Diary    HEP: Handout provided and discussed      Manuals 10/12 10/17 10/31 10/3 10   ART        PROM/Stretch x15' 15' 15' 15' 15'   IASTM        STM/Triggerpoint        JM                Neuro Re-Ed        Standing 1/2 Roll calf stretch 4/20" 4/20"  4/20" 4/20" 4/20"   SL Ecc HR                                                        Ther Ex        QS Back and Forth 3x10 3/10  3/10 3/10   HS into QS 2x10 2/10 2/10 2/10 2/10   Ankle Pumps    2/10 2/10   HR/TR 2/10 3/10 3/10 2/10 2/10   TB TKE L5 3/10 L5 2/10 L5 3/10 L3 2/10 L3 2/10   TB Heel to Toes        Sit to stand 2/10 2/10 2/10 2/5 2/10   Bridge with Add squeeze        SL Bridge        Clam shells        SL Sit to Stand        Leg press P2 2/10 P2 3/10 P3 3/10     HS curl machine   P5 3/10     TB Lat Walks L3 5 laps L4 10 laps  L3 5x // bars NV   Step ups/downs 6" 2/10 8" 2/10 8" 2/10 6" 2/10 6" 2/10                   Ther Activity        TM        Bike NV 10' L3 ROM 10' L3 ROM     NuStep X10' L4   10' L4 10' L4   Forward/backward heel to toe walk    10 laps 10           Gait Training                                        Modalities        MHP        CP x10'   10' 10'   US/Stim

## 2023-11-02 ENCOUNTER — OFFICE VISIT (OUTPATIENT)
Dept: FAMILY MEDICINE CLINIC | Facility: CLINIC | Age: 68
End: 2023-11-02
Payer: MEDICARE

## 2023-11-02 ENCOUNTER — APPOINTMENT (OUTPATIENT)
Dept: PHYSICAL THERAPY | Facility: CLINIC | Age: 68
End: 2023-11-02
Payer: MEDICARE

## 2023-11-02 VITALS
WEIGHT: 301 LBS | SYSTOLIC BLOOD PRESSURE: 126 MMHG | DIASTOLIC BLOOD PRESSURE: 70 MMHG | OXYGEN SATURATION: 95 % | HEIGHT: 74 IN | BODY MASS INDEX: 38.63 KG/M2 | HEART RATE: 83 BPM | RESPIRATION RATE: 18 BRPM | TEMPERATURE: 97.1 F

## 2023-11-02 DIAGNOSIS — Z96.652 S/P TKR (TOTAL KNEE REPLACEMENT), LEFT: Primary | ICD-10-CM

## 2023-11-02 DIAGNOSIS — F41.9 ANXIETY: ICD-10-CM

## 2023-11-02 DIAGNOSIS — I42.8 NON-ISCHEMIC CARDIOMYOPATHY (HCC): Chronic | ICD-10-CM

## 2023-11-02 DIAGNOSIS — E11.21 CONTROLLED TYPE 2 DIABETES MELLITUS WITH DIABETIC NEPHROPATHY, WITHOUT LONG-TERM CURRENT USE OF INSULIN (HCC): ICD-10-CM

## 2023-11-02 DIAGNOSIS — J45.40 CHRONIC ASTHMA, MODERATE PERSISTENT, UNCOMPLICATED: ICD-10-CM

## 2023-11-02 PROBLEM — E87.1 HYPONATREMIA: Status: RESOLVED | Noted: 2022-12-24 | Resolved: 2023-11-02

## 2023-11-02 PROBLEM — E66.01 CLASS 2 SEVERE OBESITY DUE TO EXCESS CALORIES WITH SERIOUS COMORBIDITY AND BODY MASS INDEX (BMI) OF 37.0 TO 37.9 IN ADULT: Status: RESOLVED | Noted: 2023-01-03 | Resolved: 2023-11-02

## 2023-11-02 PROBLEM — J43.9 PULMONARY EMPHYSEMA (HCC): Status: RESOLVED | Noted: 2018-12-18 | Resolved: 2023-11-02

## 2023-11-02 PROBLEM — E66.812 CLASS 2 SEVERE OBESITY DUE TO EXCESS CALORIES WITH SERIOUS COMORBIDITY AND BODY MASS INDEX (BMI) OF 37.0 TO 37.9 IN ADULT (HCC): Status: RESOLVED | Noted: 2023-01-03 | Resolved: 2023-11-02

## 2023-11-02 PROBLEM — J44.9 COPD (CHRONIC OBSTRUCTIVE PULMONARY DISEASE) (HCC): Status: RESOLVED | Noted: 2023-09-18 | Resolved: 2023-11-02

## 2023-11-02 PROBLEM — J40 BRONCHITIS: Status: RESOLVED | Noted: 2020-02-24 | Resolved: 2023-11-02

## 2023-11-02 PROBLEM — N18.30 STAGE 3 CHRONIC KIDNEY DISEASE, UNSPECIFIED WHETHER STAGE 3A OR 3B CKD (HCC): Status: RESOLVED | Noted: 2022-09-29 | Resolved: 2023-11-02

## 2023-11-02 PROBLEM — M17.12 PRIMARY OSTEOARTHRITIS OF LEFT KNEE: Status: RESOLVED | Noted: 2023-08-24 | Resolved: 2023-11-02

## 2023-11-02 PROBLEM — F32.A ANXIETY AND DEPRESSION: Status: RESOLVED | Noted: 2023-07-30 | Resolved: 2023-11-02

## 2023-11-02 PROCEDURE — 99214 OFFICE O/P EST MOD 30 MIN: CPT | Performed by: INTERNAL MEDICINE

## 2023-11-02 RX ORDER — ALPRAZOLAM 0.5 MG/1
0.5 TABLET ORAL
Qty: 30 TABLET | Refills: 0 | Status: SHIPPED | OUTPATIENT
Start: 2023-11-02

## 2023-11-02 NOTE — PROGRESS NOTES
Name: Syed Martell      : 1955      MRN: [id number]  Encounter Provider: Viv Julian DO  Encounter Date: 2023   Encounter department: [address]     1. S/P TKR (total knee replacement), left  Pt doing well and finishing PT He has resumed going to the gym and has ortho followup scheduled    2. Anxiety  -     ALPRAZolam (XANAX) 0.5 mg tablet; Take 1 tablet (0.5 mg total) by mouth daily at bedtime as needed for anxiety    3. Chronic asthma, moderate persistent, uncomplicated  Overall stable Deferred flu shot Will consider rsv vaccine   Has pulmonary followup    4. Non-ischemic cardiomyopathy with HFmEF ([address])  -     Comprehensive metabolic panel; Future; Expected date: 2023  Labs ordered/ stable and he is more active overall  He has cardio appt in December     5. Controlled type 2 diabetes mellitus with diabetic nephropathy, without long-term current use of insulin ([facility])  -     Albumin / creatinine urine ratio; Future; Expected date: 2023  -     Comprehensive metabolic panel; Future; Expected date: 2023  -     Hemoglobin A1C; Future; Expected date: 2023  Recheck A1c Lo carb diet Exercise to continue at the gym as PT finishes        Rto 3 months     Subjective      HPI  Pt overall doing well He is doing PT for left knee procedure and almost finished He feels the surgery helped and has resumed going to the gym No chest pain He has some sob usually with exertion but overall he is able to do more day to day No pain No edema Diet is improved overall He does have ortho followup upcoming but feels he is progressing well postop Still doing his artwork and now venturing back into music which he enjoys as well   Review of Systems   Constitutional:  Negative for chills and fever. Doing PT   HENT: Negative. Eyes:  Negative for visual disturbance. Respiratory:  Positive for shortness of breath. Negative for cough.     Cardiovascular: Negative for chest pain, palpitations and leg swelling. Gastrointestinal:  Negative for abdominal distention and abdominal pain. Genitourinary: Negative. Musculoskeletal:  Positive for arthralgias and gait problem. Neurological:  Negative for dizziness, light-headedness and headaches. Psychiatric/Behavioral:  Negative for sleep disturbance. The patient is not nervous/anxious. Current Outpatient Medications on File Prior to Visit   Medication Sig    Accu-Chek FastClix Lancets MISC Use 1 each daily to test blood sugar. albuterol (2.5 mg/3 mL) 0.083 % nebulizer solution Take 3 mL (2.5 mg total) by nebulization every 6 (six) hours as needed for wheezing or shortness of breath    albuterol (ProAir HFA) 90 mcg/act inhaler Inhale 2 puffs every 6 (six) hours as needed for wheezing    aspirin (ECOTRIN LOW STRENGTH) 81 mg EC tablet Take 1 tablet (81 mg total) by mouth 2 (two) times a day for 14 days    atorvastatin (LIPITOR) 20 mg tablet Take 1 tablet (20 mg total) by mouth daily    Blood Glucose Monitoring Suppl (Accu-Chek Guide Me) w/Device KIT Use 1 each daily to test blood sugar. buPROPion (Wellbutrin XL) 150 mg 24 hr tablet Take 1 tablet (150 mg total) by mouth daily    carvedilol (COREG) 25 mg tablet Take 1 tablet (25 mg total) by mouth 2 (two) times a day with meals    DULoxetine (CYMBALTA) 60 mg delayed release capsule Take 1 capsule by mouth once daily    fluticasone (FLONASE) 50 mcg/act nasal spray 1 spray into each nostril 2 (two) times a day    Fluticasone-Salmeterol (Advair) 500-50 mcg/dose inhaler INHALE 1 DOSE BY MOUTH TWICE DAILY RINSE MOUTH AFTER USE    Fluticasone-Salmeterol (Advair) 500-50 mcg/dose inhaler INHALE ONE PUFF BY MOUTH AND INTO THE LUNGS TWICE A DAY. RINSE MOUTH AFTER USE    furosemide (LASIX) 40 mg tablet Take 1 tablet (40 mg total) by mouth every other day    glucose blood test strip Use 1 each daily to test blood sugar.     guaiFENesin (Mucus Relief) 600 mg 12 hr tablet Take 2 tablets (1,200 mg total) by mouth every 12 (twelve) hours    ipratropium-albuterol (DUO-NEB) 0.5-2.5 mg/3 mL nebulizer solution Take 3 mL by nebulization every 6 (six) hours as needed for wheezing or shortness of breath    isosorbide mononitrate (IMDUR) 30 mg 24 hr tablet Take 1 tablet by mouth once daily    montelukast (SINGULAIR) 10 mg tablet Take 1 tablet (10 mg total) by mouth daily at bedtime    sacubitril-valsartan (ENTRESTO) 49-51 MG TABS Take 1 tablet by mouth 2 (two) times a day    sodium chloride 3 % inhalation solution Take 4 mL by nebulization as needed for cough (congestion)    tiotropium (Spiriva Respimat) 2.5 MCG/ACT AERS inhaler Inhale 2 spray (s) by mouth once daily. [DISCONTINUED] ALPRAZolam (XANAX) 0.5 mg tablet TAKE 1 TABLET BY MOUTH ONCE DAILY AT BEDTIME AS NEEDED FOR ANXIETY    Dupilumab (Dupixent) 300 MG/2ML SOPN Inject 300 mg under the skin every 14 (fourteen) days (Patient not taking: Reported on 11/2/2023)    [DISCONTINUED] ascorbic acid (VITAMIN C) 500 MG tablet Take 1 tablet (500 mg total) by mouth 2 (two) times a day (Patient not taking: Reported on 11/2/2023)    [DISCONTINUED] celecoxib (CeleBREX) 100 mg capsule Take 1 capsule (100 mg total) by mouth 2 (two) times a day for 14 days (Patient not taking: Reported on 11/2/2023)    [DISCONTINUED] cholecalciferol (VITAMIN D3) 1,000 units tablet Take 1 tablet (1,000 Units total) by mouth daily (Patient not taking: Reported on 11/2/2023)    [DISCONTINUED] dupilumab (DUPIXENT) subcutaneous injection Inject 2 mL (300 mg total) under the skin every 14 (fourteen) days Do not start before April 10, 2023.  (Patient not taking: Reported on 11/2/2023)    [DISCONTINUED] ferrous sulfate 324 (65 Fe) mg Take 1 tablet (324 mg total) by mouth 2 (two) times a day before meals (Patient not taking: Reported on 10/3/2023)    [DISCONTINUED] folic acid (FOLVITE) 1 mg tablet Take 1 tablet (1 mg total) by mouth daily (Patient not taking: Reported on 10/3/2023)    [DISCONTINUED] Multiple Vitamins-Minerals (multivitamin with minerals) tablet Take 1 tablet by mouth daily (Patient not taking: Reported on 11/2/2023)    [DISCONTINUED] ondansetron (ZOFRAN-ODT) 4 mg disintegrating tablet Take 1 tablet (4 mg total) by mouth every 6 (six) hours as needed for nausea or vomiting (Patient not taking: Reported on 10/3/2023)       Objective     /70   Pulse 83   Temp (!) 97.1 °F (36.2 °C) (Temporal)   Resp 18   Ht 6' 2" (1.88 m)   Wt (!) 137 kg (301 lb)   SpO2 95%   BMI 38.65 kg/m²     Physical Exam  Vitals and nursing note reviewed. Constitutional:       General: He is not in acute distress. Appearance: Normal appearance. He is not ill-appearing, toxic-appearing or diaphoretic. HENT:      Head: Normocephalic and atraumatic. Right Ear: External ear normal.      Left Ear: External ear normal.      Nose: Nose normal.      Mouth/Throat:      Mouth: Mucous membranes are moist.   Eyes:      General: No scleral icterus. Extraocular Movements: Extraocular movements intact. Conjunctiva/sclera: Conjunctivae normal.      Pupils: Pupils are equal, round, and reactive to light. Cardiovascular:      Rate and Rhythm: Normal rate and regular rhythm. Pulses: Normal pulses. Pulmonary:      Effort: Pulmonary effort is normal. No respiratory distress. Breath sounds: Normal breath sounds. No wheezing. Abdominal:      General: Bowel sounds are normal. There is no distension. Palpations: Abdomen is soft. Tenderness: There is no abdominal tenderness. Musculoskeletal:      Cervical back: Normal range of motion and neck supple. Right lower leg: No edema. Left lower leg: No edema. Lymphadenopathy:      Cervical: No cervical adenopathy. Skin:     General: Skin is warm and dry. Coloration: Skin is not jaundiced or pale. Neurological:      General: No focal deficit present.       Mental Status: He is alert and oriented to person, place, and time. Mental status is at baseline. Psychiatric:         Mood and Affect: Mood normal.         Behavior: Behavior normal.         Thought Content:  Thought content normal.         Judgment: Judgment normal.       Jaimie Marte, DO

## 2023-11-07 ENCOUNTER — OFFICE VISIT (OUTPATIENT)
Dept: PHYSICAL THERAPY | Facility: CLINIC | Age: 68
End: 2023-11-07
Payer: MEDICARE

## 2023-11-07 DIAGNOSIS — M25.59 PAIN IN OTHER JOINT: ICD-10-CM

## 2023-11-07 DIAGNOSIS — M17.12 PRIMARY OSTEOARTHRITIS OF LEFT KNEE: ICD-10-CM

## 2023-11-07 DIAGNOSIS — Z96.652 STATUS POST TOTAL LEFT KNEE REPLACEMENT: Primary | ICD-10-CM

## 2023-11-07 PROCEDURE — 97110 THERAPEUTIC EXERCISES: CPT

## 2023-11-07 PROCEDURE — 97140 MANUAL THERAPY 1/> REGIONS: CPT

## 2023-11-07 PROCEDURE — 97530 THERAPEUTIC ACTIVITIES: CPT

## 2023-11-07 NOTE — PROGRESS NOTES
Daily Note     Today's date: 2023  Patient name: Leela Ferguson  : 1955  MRN: [id number]  Referring provider: Stacy Flaherty DO  Dx:   Encounter Diagnosis     ICD-10-CM    1. Status post total left knee replacement  Z96.652       2. Primary osteoarthritis of left knee  M17.12       3. Pain in other joint  M25.59                      Subjective: Pt reports he is doing very well and cont with gym program. Pt very pleased with progress and will DC today to gym and HEP. Objective: See treatment diary below      Assessment: Tolerated treatment well. Patient exhibited good technique with therapeutic exercises. Pt has cont to make progress toward goals and will dc today. Pt making cont gains with strength and function. Demonstrates overall improved knee flx/ext A/PROM. Plan: DC to HEP.       Precautions: MD protocol for L Knee TKA 23    Daily Treatment Diary    HEP: Handout provided and discussed      Manuals 10/12 10/17 10/31 11/7 10/5   ART        PROM/Stretch x15' 15' 15' 15' 15'   IASTM        STM/Triggerpoint        JM                Neuro Re-Ed        Standing 1/2 Roll calf stretch 4/20" 4/20"  4/20" 4/20" 4/20"   SL Ecc HR                                                        Ther Ex        QS Back and Forth 3x10 3/10   3/10   HS into QS 2x10 2/10 2/10 2/10 2/10   Ankle Pumps     2/10   HR/TR 2/10 3/10 3/10 2/10 2/10   TB TKE L5 3/10 L5 2/10 L5 3/10 CC P2 2/10 L3 2/10   TB Heel to Toes        Sit to stand 2/10 2/10 2/10 2/10 2/10   Bridge with Add squeeze        SL Bridge        Clam shells        SL Sit to Stand        Leg press P2 2/10 P2 3/10 P3 3/10 P3 3/10    HS curl machine   P5 3/10 P5 3/10    TB Lat Walks L3 5 laps L4 10 laps  L4 10x // bars NV   Step ups/downs 6" 2/10 8" 2/10 8" 2/10 8" 2/10 6" 2/10                   Ther Activity        TM        Bike NV 10' L3 ROM 10' L3 ROM 10' L3 ROM    NuStep X10' L4    10' L4   Forward/backward heel to toe walk     10           Gait Training                                        Modalities        MHP        CP x10'    10'   US/Stim

## 2023-11-07 NOTE — PROGRESS NOTES
PT Discharge    Today's date: 2023  Patient name: Arleth Zarate  : 1955  MRN: [id number]  Referring provider: Tao Rivera DO  Dx:   Encounter Diagnosis     ICD-10-CM    1. Status post total left knee replacement  Z96.652       2. Primary osteoarthritis of left knee  M17.12       3. Pain in other joint  M25.59           Start Time: 1555  Stop Time: 1645  Total time in clinic (min): 50 minutes      Goals  STGs: To be complete within 4-6 weeks (met)  - Decrease pain to < 2/10 at worst  - Increase AROM to WNL  - Increase strength to > 4+/5  - Improve gait to WNL for distances < 6 blocks    LTGs: To be complete within 8 weeks (met)  - Able to walk for any extended amount of time/distance without pain or limitation for increased safety and functional capacity with ADLs and home-related duty  - Able to repetitively squat without pain or limitation for increased safety and functional capacity with ADLs and home-related duty  - Able to repetitively ascend/descend a full flight of stairs without pain or limitation for increased safety and functional capacity with ADLs and home-related duty  - Able to repetitively complete transfers without pain or limitation for increased safety and functional capacity with ADLs and home-related duty  - Able to keel for any extended amount of time without pain or limitation for increased safety and functional capacity with ADLs and home-related duty       Pt will be D/C from physical therapy after today's session, as he has achieved all personal and functional goals. Subjective Evaluation    Pain  Current pain ratin  At best pain ratin  At worst pain ratin  Location: L Knee         Pt reports he feels ready to safely D/C to HEP after today's session, as he has achieved all personal and functional goals.         Objective Pain level ranges 0-1/10  AROM: L Knee 0-120 degrees; R Knee 0-115 degrees  Strength: L Quad and HS 5/5; R Quad and HS 5/5  Gait: WNL  Swelling: WNL  Incisions: Clean and well-healed  FOTO: 68; GOAL: 91  Able to walk without pain and limitation  Able to squat without pain and limitation  Able complete transfers without pain and limitation  Able to ascend/descend stairs without pain and limitation  Able to kneel without pain and limitation

## 2023-11-09 ENCOUNTER — APPOINTMENT (OUTPATIENT)
Dept: PHYSICAL THERAPY | Facility: CLINIC | Age: 68
End: 2023-11-09
Payer: MEDICARE

## 2023-11-21 ENCOUNTER — APPOINTMENT (OUTPATIENT)
Dept: RADIOLOGY | Facility: MEDICAL CENTER | Age: 68
End: 2023-11-21
Payer: MEDICARE

## 2023-11-21 ENCOUNTER — OFFICE VISIT (OUTPATIENT)
Dept: OBGYN CLINIC | Facility: CLINIC | Age: 68
End: 2023-11-21

## 2023-11-21 VITALS
RESPIRATION RATE: 16 BRPM | WEIGHT: 299 LBS | SYSTOLIC BLOOD PRESSURE: 124 MMHG | HEIGHT: 74 IN | BODY MASS INDEX: 38.37 KG/M2 | DIASTOLIC BLOOD PRESSURE: 80 MMHG

## 2023-11-21 DIAGNOSIS — Z51.89 ENCOUNTER FOR OTHER SPECIFIED AFTERCARE: Primary | ICD-10-CM

## 2023-11-21 DIAGNOSIS — Z51.89 ENCOUNTER FOR OTHER SPECIFIED AFTERCARE: ICD-10-CM

## 2023-11-21 PROCEDURE — 99024 POSTOP FOLLOW-UP VISIT: CPT | Performed by: STUDENT IN AN ORGANIZED HEALTH CARE EDUCATION/TRAINING PROGRAM

## 2023-11-21 PROCEDURE — 73562 X-RAY EXAM OF KNEE 3: CPT

## 2023-11-21 NOTE — PROGRESS NOTES
Post-Operative Note: Total Knee Arthroplasty  Val Mann (63 y.o. male)  : 1955 Encounter Date: 2023  Dr. Harley Morrison DO, Orthopedic Surgeon  [facility] Oncology [facility]     Assessment/Plan  79 y.o. male 9 week follow up status post left total knee arthroplasty     Wound Care   Wound has completely healed no need for additional care  Pain control:  Patient reports that he has nopain  Discharged from physical therapy approximately 2 weeks  Continue with activities as tolerated  Patient has resumed physical activities weight lifting  Return in about 3 months (around 2024) for Recheck, Repeat X-ray. for evaluation with x-ray      Subjective  79 y.o. male presenting to the office for 9 week follow up status post left total knee arthroplasty  DOS: 2023    Patient reports no pain at time of visit. He states that he has been walking well and resuming his normal routine to include newly adopted physical fitness routine. He works out almost everyday and is pain free    Current concerns: none  Patient states he does not experience any pain  Numbness/weakness extremity: None Reported   Physical Therapy Progress: Patient has been successfully discharged from physical therapy approximately 2 weeks ago. DVT ppx: Patient has completed anticoagulation. Eating/Drinking improving. Bowel/Bladder: WNL  Patient denies symptoms of an infection.      Physical Exam:  left knee(s) -   Patient ambulates with steady gait pattern  Uses Single Point Cane assistive device  No anatomical deformity  Incision is well-healed with no signs of erythema, ecchymosis, or infection   No generalized soft tissue swelling or effusion noted  ROM (5° - 95°)   Calf compartments are soft and supple  2+ DP and PT pulses with brisk capillary refill to the toes  Sural, saphenous, tibial, superficial, and deep peroneal motor and sensory distributions intact  Sensation light touch intact distally      Imaging  Post-operative x-rays of left knee from 11/21/23 show well-seated total prosthesis with adequate anatomical alignment with no sign of loosening.     Scribe Attestation      I,:  Estrella Campo am acting as a scribe while in the presence of the attending physician.:       I,:  Michoacano White, DO personally performed the services described in this documentation    as scribed in my presence.:

## 2023-11-27 NOTE — PROGRESS NOTES
Patient is in today for 3 week post op of left ankle. Patient says she is not doing any better.  Pcp is Suleiman Branch MD  Last ov 9/26/23 Review of Systems   Constitutional: Negative  HENT: Positive for congestion and postnasal drip  Eyes: Negative  Respiratory: Positive for cough and shortness of breath  Cardiovascular: Negative  Gastrointestinal: Negative  Endocrine: Negative  Genitourinary: Negative  Musculoskeletal: Negative  Skin: Negative  Allergic/Immunologic: Negative  Neurological: Negative  Hematological: Negative  Psychiatric/Behavioral: Negative

## 2023-12-08 DIAGNOSIS — F33.1 MODERATE EPISODE OF RECURRENT MAJOR DEPRESSIVE DISORDER (HCC): ICD-10-CM

## 2023-12-08 DIAGNOSIS — F41.9 ANXIETY: ICD-10-CM

## 2023-12-08 RX ORDER — ALPRAZOLAM 0.5 MG/1
TABLET ORAL
Qty: 30 TABLET | Refills: 0 | Status: SHIPPED | OUTPATIENT
Start: 2023-12-08

## 2023-12-08 RX ORDER — DULOXETIN HYDROCHLORIDE 60 MG/1
CAPSULE, DELAYED RELEASE ORAL
Qty: 30 CAPSULE | Refills: 5 | Status: SHIPPED | OUTPATIENT
Start: 2023-12-08

## 2023-12-11 ENCOUNTER — TELEPHONE (OUTPATIENT)
Age: 68
End: 2023-12-11

## 2023-12-11 NOTE — TELEPHONE ENCOUNTER
Received call from patient requesting NEW for FULL BODY. Explained wait/cancellation list processes' and [facility] notification. Understanding was verbalized. Sent Patient [facility] activation link    Created wait/cancellation list for Patient.

## 2023-12-12 ENCOUNTER — OFFICE VISIT (OUTPATIENT)
Dept: CARDIOLOGY CLINIC | Facility: HOSPITAL | Age: 68
End: 2023-12-12
Payer: MEDICARE

## 2023-12-12 VITALS
HEART RATE: 82 BPM | BODY MASS INDEX: 37.86 KG/M2 | HEIGHT: 74 IN | DIASTOLIC BLOOD PRESSURE: 70 MMHG | WEIGHT: 295 LBS | SYSTOLIC BLOOD PRESSURE: 112 MMHG

## 2023-12-12 DIAGNOSIS — G47.33 OBSTRUCTIVE SLEEP APNEA: ICD-10-CM

## 2023-12-12 DIAGNOSIS — I10 BENIGN ESSENTIAL HYPERTENSION: ICD-10-CM

## 2023-12-12 DIAGNOSIS — E78.5 DYSLIPIDEMIA: ICD-10-CM

## 2023-12-12 DIAGNOSIS — I42.8 NON-ISCHEMIC CARDIOMYOPATHY (HCC): Chronic | ICD-10-CM

## 2023-12-12 DIAGNOSIS — I50.42 CHRONIC COMBINED SYSTOLIC AND DIASTOLIC CONGESTIVE HEART FAILURE (HCC): Primary | ICD-10-CM

## 2023-12-12 DIAGNOSIS — N18.31 STAGE 3A CHRONIC KIDNEY DISEASE (HCC): ICD-10-CM

## 2023-12-12 PROCEDURE — 99214 OFFICE O/P EST MOD 30 MIN: CPT | Performed by: INTERNAL MEDICINE

## 2023-12-12 NOTE — PROGRESS NOTES
Cardiology Follow Up    Rosibel Kingsley  1955  [id number]  [facility] [address]  [address] [address]  Phone#  [phone]  Fax#  [phone]      1. Chronic combined systolic and diastolic congestive heart failure (HCC)  Empagliflozin (Jardiance) 10 MG TABS tablet    Comprehensive metabolic panel      2. Non-ischemic cardiomyopathy with HFmEF ([address])        3. Benign essential hypertension        4. Dyslipidemia        5. Obstructive sleep apnea        6. Stage 3a chronic kidney disease [facility])            Discussion/Summary:  Mr. Vipul Jefferson is a pleasant 72-year-old male who presents to the office today for routine follow up. Regarding his heart failure, he appears euvolemic on exam on every other day dosing of Lasix. Otherwise he is on proper GDMT for his cardiomyopathy with Entresto and carvedilol. He had an echocardiogram over the summer revealing a stable ejection fraction at 45%. We discussed the addition of Jardiance to his medication regimen to which he is agreeable. I will reassess his renal function and electrolytes in a week. He is maintained on statin therapy in the setting of coronary artery calcifications on CT scan. He had a recent direct LDL revealing an acceptable number on his current statin regimen. He is noncompliant with CPAP due to inability to tolerate the mask. We discussed evaluation for Inspire. He declines. I will see him back in the office in three months or sooner if deemed necessary. Interval History:   Mr. Vipul Jefferson is a pleasant 72-year-old male who presents to the office for routine follow-up. After his last visit with me he was seen on a few occasions by our advanced practitioners. His diuretic dosing was adjusted due to worsening renal function. He was supposed to be taking Lasix 40 mg every other day. At his last visit he was not compliant with this regimen. He reports he has now been compliant with every other day dosing. He also underwent a left total knee replacement since his last visit with our advanced practitioner in September. Since his last visit he feels pretty well. He reports his chronic shortness of breath is at baseline. He sleeps chronically in a recliner. He denies any increasing abdominal girth, paroxysmal nocturnal dyspnea or orthopnea. He does not weigh himself consistently and reports that he is not always compliant with a low-salt diet but has been trying to be more cognizant of his salt intake. He denies any exertional chest pain. He denies lightheadedness, syncope or presyncope. He denies palpitations. He denies symptoms of claudication. He remains noncompliant with CPAP for his known obstructive sleep apnea.     Problem List       Chronic combined systolic and diastolic CHF (congestive heart failure) ([facility])    Nonischemic cardiomyopathy ([facility])    Dyslipidemia    Hypertension    Asthma, chronic, moderate persistent, uncomplicated    Obstructive sleep apnea          Past Medical History:   Diagnosis Date    Acute on chronic combined systolic and diastolic CHF (congestive heart failure) ([address]) 7/27/2023    Alcohol abuse 7/27/2023    Ambulates with cane     Arthritis     Asthma     Back pain     Chest pain 12/22/2022    CHF (congestive heart failure) ([facility])     Claustrophobia     COPD (chronic obstructive pulmonary disease) ([address])     COPD with acute exacerbation ([address]) 05/06/2022    COVID-19     COVID-19 virus infection 12/03/2021    Depression     Hypertension     Mumps     Neck pain     Old MI (myocardial infarction)     Pneumonia     Pulmonary emphysema ([facility])     Rectal bleeding 01/14/2019    Skin abnormalities     rashes and wounds on both legs - scabbed over and healing    Sleep apnea     no CPAP    Tingling of both feet     Wears glasses      Social History     Socioeconomic History    Marital status: /Civil Union     Spouse name: Not on file    Number of children: Not on file    Years of education: Not on file    Highest education level: Not on file   Occupational History    Not on file   Tobacco Use    Smoking status: Former     Packs/day: 3.00     Years: 43.00     Total pack years: 129.00     Types: Cigarettes     Start date: 67     Quit date: 2018     Years since quittin.9    Smokeless tobacco: Never   Vaping Use    Vaping Use: Never used   Substance and Sexual Activity    Alcohol use: Not Currently    Drug use: Yes     Types: Marijuana     Comment: occasionally edible    Sexual activity: Not on file   Other Topics Concern    Not on file   Social History Narrative    Caffeine use     Social Determinants of Health     Financial Resource Strain: Low Risk  (3/23/2023)    Overall Financial Resource Strain (CARDIA)     Difficulty of Paying Living Expenses: Not very hard   Food Insecurity: No Food Insecurity (2023)    Hunger Vital Sign     Worried About Running Out of Food in the Last Year: Never true     Ran Out of Food in the Last Year: Never true   Transportation Needs: No Transportation Needs (2023)    PRAPARE - Transportation     Lack of Transportation (Medical): No     Lack of Transportation (Non-Medical):  No   Physical Activity: Not on file   Stress: Not on file   Social Connections: Not on file   Intimate Partner Violence: Not on file   Housing Stability: Low Risk  (2023)    Housing Stability Vital Sign     Unable to Pay for Housing in the Last Year: No     Number of Places Lived in the Last Year: 1     Unstable Housing in the Last Year: No      Family History   Problem Relation Age of Onset    Heart attack Father         MI    Heart disease Father     Diabetes Sister         DM    Arthritis Family     Coronary artery disease Family     Hypertension Family     Tuberculosis Son     Alzheimer's disease Mother      Past Surgical History:   Procedure Laterality Date    APPENDECTOMY      CARDIAC CATHETERIZATION 07/24/2015    Left main- normal and maidly tortuous. Circumflex - normal and moderately tortuous. RCA- normal and mildy tortuous. Global LV function was severely depressed. Jeffrey Garvin CARDIAC CATHETERIZATION Left 09/27/2022    Procedure: Cardiac Left Heart Cath;  Surgeon: Kelly Luz DO;  Location:  CARDIAC CATH LAB; Service: Cardiology    CARDIAC CATHETERIZATION N/A 09/27/2022    Procedure: Cardiac Coronary Angiogram;  Surgeon: Kelly Luz DO;  [facility]: [facility]; Service: Cardiology    CARDIAC CATHETERIZATION  09/27/2022    Procedure: Cardiac catheterization;  Surgeon: Klely Luz DO;  Location: [facility] CARDIAC [facility]; Service: Cardiology    INGUINAL HERNIA REPAIR Bilateral     PA COLONOSCOPY FLX DX W/COLLJ SPEC WHEN PFRMD N/A 03/11/2019    Procedure: COLONOSCOPY with removal of anal papilla;   Surgeon: Erica Atkins MD;  Location: [facility];  Service: Colorectal    TONSILLECTOMY      TOTAL KNEE ARTHROPLASTY Left 9/18/2023    Procedure: ARTHROPLASTY KNEE TOTAL;  Surgeon: Yunier Cormier DO;  Location: [facility];  Service: Orthopedics    UMBILICAL HERNIA REPAIR  06/21/2006       Current Outpatient Medications:     Accu-Chek FastClix Lancets MISC, Use 1 each daily to test blood sugar., Disp: 100 each, Rfl: 5    albuterol (2.5 mg/3 mL) 0.083 % nebulizer solution, Take 3 mL (2.5 mg total) by nebulization every 6 (six) hours as needed for wheezing or shortness of breath, Disp: 360 mL, Rfl: 2    albuterol (ProAir HFA) 90 mcg/act inhaler, Inhale 2 puffs every 6 (six) hours as needed for wheezing, Disp: 18 g, Rfl: 11    ALPRAZolam (XANAX) 0.5 mg tablet, TAKE 1 TABLET BY MOUTH ONCE DAILY AT BEDTIME AS NEEDED FOR ANXIETY, Disp: 30 tablet, Rfl: 0    aspirin (ECOTRIN LOW STRENGTH) 81 mg EC tablet, Take 1 tablet (81 mg total) by mouth 2 (two) times a day for 14 days, Disp: 28 tablet, Rfl: 0    atorvastatin (LIPITOR) 20 mg tablet, Take 1 tablet (20 mg total) by mouth daily, Disp: 90 tablet, Rfl: 3    Blood Glucose Monitoring Suppl (Accu-Chek Guide Me) w/Device KIT, Use 1 each daily to test blood sugar., Disp: 1 kit, Rfl: 0    buPROPion (Wellbutrin XL) 150 mg 24 hr tablet, Take 1 tablet (150 mg total) by mouth daily, Disp: 30 tablet, Rfl: 0    carvedilol (COREG) 25 mg tablet, Take 1 tablet (25 mg total) by mouth 2 (two) times a day with meals, Disp: 60 tablet, Rfl: 11    DULoxetine (CYMBALTA) 60 mg delayed release capsule, Take 1 capsule by mouth once daily, Disp: 30 capsule, Rfl: 5    Empagliflozin (Jardiance) 10 MG TABS tablet, Take 1 tablet (10 mg total) by mouth every morning, Disp: 30 tablet, Rfl: 11    fluticasone (FLONASE) 50 mcg/act nasal spray, Use 1 spray(s) in each nostril twice daily, Disp: 16 g, Rfl: 0    Fluticasone-Salmeterol (Advair) 500-50 mcg/dose inhaler, INHALE 1 DOSE BY MOUTH TWICE DAILY RINSE MOUTH AFTER USE, Disp: 60 blister, Rfl: 11    furosemide (LASIX) 40 mg tablet, Take 1 tablet (40 mg total) by mouth every other day, Disp: 45 tablet, Rfl: 3    glucose blood test strip, Use 1 each daily to test blood sugar., Disp: 100 strip, Rfl: 5    guaiFENesin (Mucus Relief) 600 mg 12 hr tablet, Take 2 tablets (1,200 mg total) by mouth every 12 (twelve) hours, Disp: 60 tablet, Rfl: 3    ipratropium-albuterol (DUO-NEB) 0.5-2.5 mg/3 mL nebulizer solution, Take 3 mL by nebulization every 6 (six) hours as needed for wheezing or shortness of breath, Disp: 360 mL, Rfl: 11    isosorbide mononitrate (IMDUR) 30 mg 24 hr tablet, Take 1 tablet by mouth once daily, Disp: 90 tablet, Rfl: 3    montelukast (SINGULAIR) 10 mg tablet, Take 1 tablet (10 mg total) by mouth daily at bedtime, Disp: 30 tablet, Rfl: 0    sacubitril-valsartan (ENTRESTO) 49-51 MG TABS, Take 1 tablet by mouth 2 (two) times a day, Disp: 60 tablet, Rfl: 11    tiotropium (Spiriva Respimat) 2.5 MCG/ACT AERS inhaler, Inhale 2 spray (s) by mouth once daily. , Disp: 4 g, Rfl: 6    Dupilumab (Dupixent) 300 MG/2ML SOPN, Inject 300 mg under the skin every 14 (fourteen) days (Patient not taking: Reported on 11/2/2023), Disp: 2 mL, Rfl: 11    Fluticasone-Salmeterol (Advair) 500-50 mcg/dose inhaler, INHALE ONE PUFF BY MOUTH AND INTO THE LUNGS TWICE A DAY. RINSE MOUTH AFTER USE (Patient not taking: Reported on 11/21/2023), Disp: 60 blister, Rfl: 3    sodium chloride 3 % inhalation solution, Take 4 mL by nebulization as needed for cough (congestion) (Patient not taking: Reported on 12/12/2023), Disp: 30 mL, Rfl: 0  Allergies   Allergen Reactions    Ciprofloxacin Shortness Of Breath and Diarrhea       Labs:     Chemistry        Component Value Date/Time     07/27/2015 0559    K 4.6 09/26/2023 0955    K 3.8 07/27/2015 0559     09/26/2023 0955     07/27/2015 0559    CO2 27 09/26/2023 0955    CO2 32 07/27/2015 0559    BUN 43 (H) 09/26/2023 0955    BUN 19 07/27/2015 0559    CREATININE 1.66 (H) 09/26/2023 0955    CREATININE 1.05 07/27/2015 0559        Component Value Date/Time    CALCIUM 9.3 09/26/2023 0955    CALCIUM 8.6 07/27/2015 0559    ALKPHOS 84 09/26/2023 0955    ALKPHOS 75 07/22/2015 1021    AST 22 09/26/2023 0955    AST 30 07/22/2015 1021    ALT 33 09/26/2023 0955    ALT 74 07/22/2015 1021    BILITOT 1.05 (H) 07/22/2015 1021            Lab Results   Component Value Date    CHOL 105 07/23/2015     Lab Results   Component Value Date    HDL 27 (L) 09/01/2022    HDL 27 (L) 09/22/2021    HDL 24 (L) 12/18/2018     Lab Results   Component Value Date    LDLCALC 49 09/01/2022    LDLCALC 107 (H) 09/22/2021    LDLCALC 89 12/18/2018     Lab Results   Component Value Date    TRIG 138 09/01/2022    TRIG 131 09/22/2021    TRIG 107 12/18/2018     No results found for: "CHOLHDL"    Imaging: No results found. Review of Systems   Cardiovascular:  Positive for dyspnea on exertion. Negative for chest pain, near-syncope, orthopnea, paroxysmal nocturnal dyspnea and syncope. Respiratory:  Positive for cough.     All other systems reviewed and are negative. Vitals:    12/12/23 1520   BP: 112/70   Pulse:        Vitals:    12/12/23 1452   Weight: 134 kg (295 lb)       Height: 6' 2" (188 cm)   Body mass index is 37.88 kg/m².     Physical Exam:  General:  Alert and cooperative, appears stated age  HEENT:  PERRLA, EOMI, no scleral icterus, no conjunctival pallor  Neck:  No lymphadenopathy, no thyromegaly, no carotid bruits, no elevated JVP  Heart:  Regular rate and rhythm, normal S1/S2, no S3/S4, no murmur  Lungs:  Clear to auscultation bilaterally   Abdomen:  Soft, non-tender, positive bowel sounds, no rebound or guarding,   no organomegaly   Extremities:  No edema   Skin:  No rashes or lesions on exposed skin  Neurologic:  Cranial nerves II-XII grossly intact without focal deficits

## 2023-12-20 LAB
LEFT EYE DIABETIC RETINOPATHY: POSITIVE
RIGHT EYE DIABETIC RETINOPATHY: POSITIVE
SEVERITY (EYE EXAM): NORMAL

## 2024-01-05 ENCOUNTER — OFFICE VISIT (OUTPATIENT)
Dept: URGENT CARE | Facility: CLINIC | Age: 69
End: 2024-01-05
Payer: MEDICARE

## 2024-01-05 VITALS
TEMPERATURE: 98 F | HEIGHT: 74 IN | HEART RATE: 80 BPM | BODY MASS INDEX: 36.06 KG/M2 | SYSTOLIC BLOOD PRESSURE: 151 MMHG | RESPIRATION RATE: 18 BRPM | OXYGEN SATURATION: 95 % | DIASTOLIC BLOOD PRESSURE: 86 MMHG | WEIGHT: 281 LBS

## 2024-01-05 DIAGNOSIS — R05.1 ACUTE COUGH: Primary | ICD-10-CM

## 2024-01-05 DIAGNOSIS — J44.1 COPD EXACERBATION (HCC): ICD-10-CM

## 2024-01-05 PROCEDURE — G0463 HOSPITAL OUTPT CLINIC VISIT: HCPCS | Performed by: FAMILY MEDICINE

## 2024-01-05 PROCEDURE — 99213 OFFICE O/P EST LOW 20 MIN: CPT | Performed by: FAMILY MEDICINE

## 2024-01-05 PROCEDURE — 87636 SARSCOV2 & INF A&B AMP PRB: CPT | Performed by: PHYSICIAN ASSISTANT

## 2024-01-05 RX ORDER — DOXYCYCLINE HYCLATE 100 MG/1
100 CAPSULE ORAL EVERY 12 HOURS SCHEDULED
Qty: 20 CAPSULE | Refills: 0 | Status: SHIPPED | OUTPATIENT
Start: 2024-01-05 | End: 2024-01-15

## 2024-01-05 RX ORDER — PREDNISONE 10 MG/1
TABLET ORAL
Qty: 26 TABLET | Refills: 0 | Status: SHIPPED | OUTPATIENT
Start: 2024-01-05

## 2024-01-05 NOTE — PROGRESS NOTES
[facility]    NAME: Cricket Rosales is a 68 y.o. male  : 1955    MRN: [id number]  DATE: 2024  TIME: 9:01 AM    Assessment and Plan   Acute cough [R05.1]  1. Acute cough  XR chest pa & lateral    Covid/Flu- Office Collect Normal      2. COPD exacerbation (HCC)  doxycycline hyclate (VIBRAMYCIN) 100 mg capsule    predniSONE 10 mg tablet          Patient Instructions     Patient Instructions   I have prescribed an antibiotic for the infection.  Please take the antibiotic as prescribed and finish the entire prescription.  I recommend that the patient takes an over the counter probiotic or eats yogurt with live cultures in it (activia) to keep good bacteria in the gut and help prevent diarrhea.  Wash hands frequently to prevent the spread of infection.  Can use over the counter cough and cold medications to help with symptoms.  Ibuprofen and/or tylenol as needed for pain or fever.  If not improving over the next 7-10 days, follow up with PCP.        Chief Complaint     Chief Complaint   Patient presents with    Cold Like Symptoms     Cough, head and chest congestion, dyspnea for 1 day       History of Present Illness   68-year-old male here with complaint of cough, head and chest congestion and shortness of breath for the last day.  No fever or chills.  Has been using his nebulizer but is still slightly short of breath.  Has a history of COPD        Review of Systems   Review of Systems   Constitutional:  Positive for fatigue. Negative for chills and fever.   HENT:  Positive for congestion. Negative for ear pain, postnasal drip, sinus pressure and sore throat.    Respiratory:  Positive for cough, chest tightness, shortness of breath and wheezing.    Neurological:  Negative for headaches.   All other systems reviewed and are negative.      Current Medications     Current Outpatient Medications:     Accu-Chek FastClix Lancets MISC, Use 1 each daily to test blood sugar., Disp: 100 each, Rfl: 5     albuterol (2.5 mg/3 mL) 0.083 % nebulizer solution, Take 3 mL (2.5 mg total) by nebulization every 6 (six) hours as needed for wheezing or shortness of breath, Disp: 360 mL, Rfl: 2    albuterol (ProAir HFA) 90 mcg/act inhaler, Inhale 2 puffs every 6 (six) hours as needed for wheezing, Disp: 18 g, Rfl: 11    ALPRAZolam (XANAX) 0.5 mg tablet, TAKE 1 TABLET BY MOUTH ONCE DAILY AT BEDTIME AS NEEDED FOR ANXIETY, Disp: 30 tablet, Rfl: 0    atorvastatin (LIPITOR) 20 mg tablet, Take 1 tablet (20 mg total) by mouth daily, Disp: 90 tablet, Rfl: 3    Blood Glucose Monitoring Suppl (Accu-Chek Guide Me) w/Device KIT, Use 1 each daily to test blood sugar., Disp: 1 kit, Rfl: 0    carvedilol (COREG) 25 mg tablet, Take 1 tablet (25 mg total) by mouth 2 (two) times a day with meals, Disp: 60 tablet, Rfl: 11    doxycycline hyclate (VIBRAMYCIN) 100 mg capsule, Take 1 capsule (100 mg total) by mouth every 12 (twelve) hours for 10 days, Disp: 20 capsule, Rfl: 0    DULoxetine (CYMBALTA) 60 mg delayed release capsule, Take 1 capsule by mouth once daily, Disp: 30 capsule, Rfl: 5    Empagliflozin (Jardiance) 10 MG TABS tablet, Take 1 tablet (10 mg total) by mouth every morning, Disp: 30 tablet, Rfl: 11    fluticasone (FLONASE) 50 mcg/act nasal spray, Use 1 spray(s) in each nostril twice daily, Disp: 16 g, Rfl: 0    Fluticasone-Salmeterol (Advair) 500-50 mcg/dose inhaler, INHALE 1 DOSE BY MOUTH TWICE DAILY RINSE MOUTH AFTER USE, Disp: 60 blister, Rfl: 11    Fluticasone-Salmeterol (Advair) 500-50 mcg/dose inhaler, INHALE ONE PUFF BY MOUTH AND INTO THE LUNGS TWICE A DAY. RINSE MOUTH AFTER USE, Disp: 60 blister, Rfl: 3    furosemide (LASIX) 40 mg tablet, Take 1 tablet (40 mg total) by mouth every other day, Disp: 45 tablet, Rfl: 3    glucose blood test strip, Use 1 each daily to test blood sugar., Disp: 100 strip, Rfl: 5    guaiFENesin (Mucus Relief) 600 mg 12 hr tablet, Take 2 tablets (1,200 mg total) by mouth every 12 (twelve) hours, Disp: 60  tablet, Rfl: 3    isosorbide mononitrate (IMDUR) 30 mg 24 hr tablet, Take 1 tablet by mouth once daily, Disp: 90 tablet, Rfl: 3    montelukast (SINGULAIR) 10 mg tablet, Take 1 tablet (10 mg total) by mouth daily at bedtime, Disp: 30 tablet, Rfl: 0    predniSONE 10 mg tablet, Take 3 tabs BID X 2 days, 2 tabs BID X 2 days, 1 tab BID X 2 days, 1 tab daily X 2 days, Disp: 26 tablet, Rfl: 0    sacubitril-valsartan (ENTRESTO) 49-51 MG TABS, Take 1 tablet by mouth 2 (two) times a day, Disp: 60 tablet, Rfl: 11    sodium chloride 3 % inhalation solution, Take 4 mL by nebulization as needed for cough (congestion), Disp: 30 mL, Rfl: 0    tiotropium (Spiriva Respimat) 2.5 MCG/ACT AERS inhaler, Inhale 2 spray (s) by mouth once daily., Disp: 4 g, Rfl: 6    aspirin (ECOTRIN LOW STRENGTH) 81 mg EC tablet, Take 1 tablet (81 mg total) by mouth 2 (two) times a day for 14 days, Disp: 28 tablet, Rfl: 0    buPROPion (Wellbutrin XL) 150 mg 24 hr tablet, Take 1 tablet (150 mg total) by mouth daily (Patient not taking: Reported on 1/5/2024), Disp: 30 tablet, Rfl: 0    Dupilumab (Dupixent) 300 MG/2ML SOPN, Inject 300 mg under the skin every 14 (fourteen) days (Patient not taking: Reported on 11/2/2023), Disp: 2 mL, Rfl: 11    ipratropium-albuterol (DUO-NEB) 0.5-2.5 mg/3 mL nebulizer solution, Take 3 mL by nebulization every 6 (six) hours as needed for wheezing or shortness of breath, Disp: 360 mL, Rfl: 11    Current Allergies     Allergies as of 01/05/2024 - Reviewed 01/05/2024   Allergen Reaction Noted    Ciprofloxacin Shortness Of Breath and Diarrhea 12/18/2018          The following portions of the patient's history were reviewed and updated as appropriate: allergies, current medications, past family history, past medical history, past social history, past surgical history and problem list.   Past Medical History:   Diagnosis Date    Acute on chronic combined systolic and diastolic CHF (congestive heart failure) ([facility]) 7/27/2023    Alcohol  abuse 7/27/2023    Ambulates with cane     Arthritis     Asthma     Back pain     Chest pain 12/22/2022    CHF (congestive heart failure) ([facility])     Claustrophobia     COPD (chronic obstructive pulmonary disease) ([facility])     COPD with acute exacerbation ([facility]) 05/06/2022    COVID-19     COVID-19 virus infection 12/03/2021    Depression     Hypertension     Mumps     Neck pain     Old MI (myocardial infarction)     Pneumonia     Pulmonary emphysema ([facility])     Rectal bleeding 01/14/2019    Skin abnormalities     rashes and wounds on both legs - scabbed over and healing    Sleep apnea     no CPAP    Tingling of both feet     Wears glasses      Past Surgical History:   Procedure Laterality Date    APPENDECTOMY      CARDIAC CATHETERIZATION  07/24/2015    Left main- normal and maidly tortuous.  Circumflex - normal and moderately tortuous.  RCA- normal and mildy tortuous.  Global LV function was severely depressed..    CARDIAC CATHETERIZATION Left 09/27/2022    Procedure: Cardiac Left Heart Cath;  Surgeon: Doreen Briones DO;  [facility];  Service: Cardiology    CARDIAC CATHETERIZATION N/A 09/27/2022    Procedure: Cardiac Coronary Angiogram;  Surgeon: Doreen Briones DO;  [facility];  Service: Cardiology    CARDIAC CATHETERIZATION  09/27/2022    Procedure: Cardiac catheterization;  Surgeon: Doreen Briones DO;  [facility];  Service: Cardiology    INGUINAL HERNIA REPAIR Bilateral     ND COLONOSCOPY FLX DX W/COLLJ SPEC WHEN PFRMD N/A 03/11/2019    Procedure: COLONOSCOPY with removal of anal papilla;  Surgeon: ANIL Dale MD;  [facility];  Service: Colorectal    TONSILLECTOMY      TOTAL KNEE ARTHROPLASTY Left 9/18/2023    Procedure: ARTHROPLASTY KNEE TOTAL;  Surgeon: David Mullen DO;  [facility]: [facility];  Service: Orthopedics    UMBILICAL HERNIA REPAIR  06/21/2006     Family History   Problem Relation Age of Onset    Heart attack Father          MI    Heart disease Father     Diabetes Sister         DM    Arthritis Family     Coronary artery disease Family     Hypertension Family     Tuberculosis Son     Alzheimer's disease Mother      Social History     Socioeconomic History    Marital status: /Civil Union     Spouse name: Not on file    Number of children: Not on file    Years of education: Not on file    Highest education level: Not on file   Occupational History    Not on file   Tobacco Use    Smoking status: Former     Current packs/day: 0.00     Average packs/day: 3.0 packs/day for 43.0 years (129.0 ttl pk-yrs)     Types: Cigarettes     Start date:      Quit date: 2018     Years since quittin.0    Smokeless tobacco: Never   Vaping Use    Vaping status: Never Used   Substance and Sexual Activity    Alcohol use: Not Currently    Drug use: Yes     Types: Marijuana     Comment: occasionally edible    Sexual activity: Not on file   Other Topics Concern    Not on file   Social History Narrative    Caffeine use     Social Determinants of Health     Financial Resource Strain: Low Risk  (3/23/2023)    Overall Financial Resource Strain (CARDIA)     Difficulty of Paying Living Expenses: Not very hard   Food Insecurity: No Food Insecurity (2023)    Hunger Vital Sign     Worried About Running Out of Food in the Last Year: Never true     Ran Out of Food in the Last Year: Never true   Transportation Needs: No Transportation Needs (2023)    PRAPARE - Transportation     Lack of Transportation (Medical): No     Lack of Transportation (Non-Medical): No   Physical Activity: Not on file   Stress: Not on file   Social Connections: Not on file   Intimate Partner Violence: Not on file   Housing Stability: Low Risk  (2023)    Housing Stability Vital Sign     Unable to Pay for Housing in the Last Year: No     Number of Places Lived in the Last Year: 1     Unstable Housing in the Last Year: No     Medications have been verified.    Objective   BP  "151/86   Pulse 80   Temp 98 °F (36.7 °C) (Temporal)   Resp 18   Ht 6' 2\" (1.88 m)   Wt 127 kg (281 lb)   SpO2 95%   BMI 36.08 kg/m²      Physical Exam   Physical Exam  Vitals reviewed.   Constitutional:       General: He is not in acute distress.     Appearance: He is well-developed.   HENT:      Head: Normocephalic and atraumatic.      Right Ear: Tympanic membrane normal.      Left Ear: Tympanic membrane normal.      Nose: No mucosal edema.   Cardiovascular:      Rate and Rhythm: Normal rate and regular rhythm.      Heart sounds: Normal heart sounds.   Pulmonary:      Effort: Pulmonary effort is normal. No respiratory distress.      Breath sounds: Wheezing present.      Comments: Decreased breath sounds bilateral                    "

## 2024-01-06 ENCOUNTER — TELEPHONE (OUTPATIENT)
Dept: URGENT CARE | Facility: CLINIC | Age: 69
End: 2024-01-06

## 2024-01-06 LAB
FLUAV RNA RESP QL NAA+PROBE: NEGATIVE
FLUBV RNA RESP QL NAA+PROBE: NEGATIVE
SARS-COV-2 RNA RESP QL NAA+PROBE: POSITIVE

## 2024-01-06 NOTE — TELEPHONE ENCOUNTER
Spoke with patient.  Informed of positive covid test.  Continue with current treatments.  History of CKD, advised to discuss treatment options and to follow up with pcp

## 2024-01-08 ENCOUNTER — TELEPHONE (OUTPATIENT)
Dept: FAMILY MEDICINE CLINIC | Facility: CLINIC | Age: 69
End: 2024-01-08

## 2024-01-08 NOTE — TELEPHONE ENCOUNTER
Pt c/o congestion, cough, phlegm/mucous, fatigue and sinus drainage since Friday.  Seen at [facility] [address] on Saturday and tested positive for covid.  They prescribed him Doxycycline and prednisone but did not prescribe Paxlovid. Pt is not SOB right now but does have COPD.  Please advise

## 2024-01-08 NOTE — TELEPHONE ENCOUNTER
Paxlovid has contraindication with advair use (one of the components together can cause cardiac issues) so that is likely why not offered

## 2024-01-08 NOTE — TELEPHONE ENCOUNTER
Pt made aware of medication contraindications via voice mail and advised to call with any questions/concerns.

## 2024-01-14 DIAGNOSIS — F41.9 ANXIETY: ICD-10-CM

## 2024-01-15 RX ORDER — ALPRAZOLAM 0.5 MG/1
TABLET ORAL
Qty: 30 TABLET | Refills: 0 | Status: SHIPPED | OUTPATIENT
Start: 2024-01-15

## 2024-02-02 ENCOUNTER — OFFICE VISIT (OUTPATIENT)
Dept: FAMILY MEDICINE CLINIC | Facility: CLINIC | Age: 69
End: 2024-02-02
Payer: MEDICARE

## 2024-02-02 ENCOUNTER — TELEPHONE (OUTPATIENT)
Age: 69
End: 2024-02-02

## 2024-02-02 VITALS
TEMPERATURE: 97.5 F | HEART RATE: 79 BPM | BODY MASS INDEX: 36.04 KG/M2 | DIASTOLIC BLOOD PRESSURE: 72 MMHG | HEIGHT: 74 IN | OXYGEN SATURATION: 93 % | WEIGHT: 280.8 LBS | RESPIRATION RATE: 18 BRPM | SYSTOLIC BLOOD PRESSURE: 124 MMHG

## 2024-02-02 DIAGNOSIS — N18.31 STAGE 3A CHRONIC KIDNEY DISEASE (HCC): ICD-10-CM

## 2024-02-02 DIAGNOSIS — I50.42 CHRONIC COMBINED SYSTOLIC AND DIASTOLIC CONGESTIVE HEART FAILURE (HCC): ICD-10-CM

## 2024-02-02 DIAGNOSIS — F33.1 MODERATE EPISODE OF RECURRENT MAJOR DEPRESSIVE DISORDER (HCC): ICD-10-CM

## 2024-02-02 DIAGNOSIS — G56.02 LEFT CARPAL TUNNEL SYNDROME: Primary | ICD-10-CM

## 2024-02-02 DIAGNOSIS — E11.21 CONTROLLED TYPE 2 DIABETES MELLITUS WITH DIABETIC NEPHROPATHY, WITHOUT LONG-TERM CURRENT USE OF INSULIN (HCC): ICD-10-CM

## 2024-02-02 DIAGNOSIS — I42.8 NON-ISCHEMIC CARDIOMYOPATHY (HCC): ICD-10-CM

## 2024-02-02 DIAGNOSIS — J45.50 SEVERE PERSISTENT ASTHMA WITHOUT COMPLICATION: ICD-10-CM

## 2024-02-02 PROCEDURE — 99214 OFFICE O/P EST MOD 30 MIN: CPT | Performed by: INTERNAL MEDICINE

## 2024-02-02 NOTE — TELEPHONE ENCOUNTER
PATIENT IS BEING REFERRED TO [facility]. PLEASE ASSESS AND SCHEDULE ACCORDINGLY

## 2024-02-02 NOTE — PROGRESS NOTES
Name: Cricket Rosales      : 1955      MRN: [id number]  Encounter Provider: Lauren Vee DO  Encounter Date: 2024   Encounter department: [place]    Assessment & Plan     1. Left carpal tunnel syndrome  -     Ambulatory Referral to Orthopedic Surgery; Future  Suggested wrist splint as option now until surgery eval     2. Severe persistent asthma without complication  Sxs are better managed on current rx and pt aware/has number to call pulmonary to r/s appt missed in      3. Chronic combined systolic and diastolic congestive heart failure (HCC)  Stable Rx for fbw has Cardio appt in March     4. Non-ischemic cardiomyopathy (HCC)  Stable He does plan to go back to the gym once transport more secure     5. 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 Stage 3a chronic kidney disease (HCC)  Rx for labs and aide will try to take her next week     7. Controlled type 2 diabetes mellitus with diabetic nephropathy, without long-term current use of insulin (HCC)  Pt has rx for labs and will go at least before cardio appt in march           Depression Screening and Follow-up Plan: Patient was screened for depression during today's encounter. They screened negative with a PHQ-9 score of 3.    Rto 4 months     Subjective      HPI  Pt generally doing ok No acute issues He is not at the gym since he had covid about 4-5 weeks ago He managed ok with otc meds and overall feels back at baseline He has occasional cough but no breathing issues He feels he is due for Pulmonary f/u No acute illness since covid He does hope to get to the gym again but has had car issues He has an art show in the spring in [place] and is looking forward to that and feels that has helped his confidence and mood He has left hand finger numbness and think cts   Review of  Systems   Constitutional:  Negative for chills and fever.   HENT: Negative.     Eyes:  Negative for visual disturbance.   Respiratory:  Positive for cough.         Recovering from covid slight lingering cough   Cardiovascular:  Negative for chest pain, palpitations and leg swelling.   Gastrointestinal:  Negative for abdominal distention and abdominal pain.   Musculoskeletal:  Positive for arthralgias.   Neurological:  Negative for dizziness, light-headedness and headaches.   Psychiatric/Behavioral:  Negative for sleep disturbance. The patient is not nervous/anxious.    Diabetic Foot Exam    Patient's shoes and socks removed.    Right Foot/Ankle   Right Foot Inspection  Skin Exam: skin normal and skin intact. No dry skin, no warmth, no callus, no erythema, no maceration, no abnormal color, no pre-ulcer, no ulcer and no callus.     Toe Exam: ROM and strength within normal limits.     Sensory   Vibration: diminished  Monofilament testing: diminished    Vascular  The right DP pulse is 2+. The right PT pulse is 1+.     Left Foot/Ankle  Left Foot Inspection  Skin Exam: skin normal and skin intact. No dry skin, no warmth, no erythema, no maceration, normal color, no pre-ulcer, no ulcer and no callus.     Toe Exam: ROM and strength within normal limits.     Sensory   Vibration: diminished  Monofilament testing: diminished    Vascular  The left DP pulse is 2+. The left PT pulse is 1+.     Assign Risk Category  No deformity present  Loss of protective sensation  No weak pulses  Risk: 1      Current Outpatient Medications on File Prior to Visit   Medication Sig    Accu-Chek FastClix Lancets MISC Use 1 each daily to test blood sugar.    albuterol (2.5 mg/3 mL) 0.083 % nebulizer solution Take 3 mL (2.5 mg total) by nebulization every 6 (six) hours as needed for wheezing or shortness of breath    albuterol (ProAir HFA) 90 mcg/act inhaler Inhale 2 puffs every 6 (six) hours as needed for wheezing    ALPRAZolam (XANAX) 0.5 mg tablet  TAKE 1 TABLET BY MOUTH ONCE DAILY AT BEDTIME AS NEEDED FOR ANXIETY    aspirin (ECOTRIN LOW STRENGTH) 81 mg EC tablet Take 1 tablet (81 mg total) by mouth 2 (two) times a day for 14 days    atorvastatin (LIPITOR) 20 mg tablet Take 1 tablet (20 mg total) by mouth daily    Blood Glucose Monitoring Suppl (Accu-Chek Guide Me) w/Device KIT Use 1 each daily to test blood sugar.    carvedilol (COREG) 25 mg tablet Take 1 tablet (25 mg total) by mouth 2 (two) times a day with meals    DULoxetine (CYMBALTA) 60 mg delayed release capsule Take 1 capsule by mouth once daily    Empagliflozin (Jardiance) 10 MG TABS tablet Take 1 tablet (10 mg total) by mouth every morning    fluticasone (FLONASE) 50 mcg/act nasal spray Use 1 spray(s) in each nostril twice daily    Fluticasone-Salmeterol (Advair) 500-50 mcg/dose inhaler INHALE 1 DOSE BY MOUTH TWICE DAILY RINSE MOUTH AFTER USE    Fluticasone-Salmeterol (Advair) 500-50 mcg/dose inhaler INHALE ONE PUFF BY MOUTH AND INTO THE LUNGS TWICE A DAY. RINSE MOUTH AFTER USE    furosemide (LASIX) 40 mg tablet Take 1 tablet (40 mg total) by mouth every other day    glucose blood test strip Use 1 each daily to test blood sugar.    guaiFENesin (Mucus Relief) 600 mg 12 hr tablet Take 2 tablets (1,200 mg total) by mouth every 12 (twelve) hours    ipratropium-albuterol (DUO-NEB) 0.5-2.5 mg/3 mL nebulizer solution Take 3 mL by nebulization every 6 (six) hours as needed for wheezing or shortness of breath    isosorbide mononitrate (IMDUR) 30 mg 24 hr tablet Take 1 tablet by mouth once daily    montelukast (SINGULAIR) 10 mg tablet Take 1 tablet (10 mg total) by mouth daily at bedtime    predniSONE 10 mg tablet Take 3 tabs BID X 2 days, 2 tabs BID X 2 days, 1 tab BID X 2 days, 1 tab daily X 2 days    sacubitril-valsartan (ENTRESTO) 49-51 MG TABS Take 1 tablet by mouth 2 (two) times a day    sodium chloride 3 % inhalation solution Take 4 mL by nebulization as needed for cough (congestion)    tiotropium  "(Spiriva Respimat) 2.5 MCG/ACT AERS inhaler Inhale 2 spray (s) by mouth once daily.    buPROPion (Wellbutrin XL) 150 mg 24 hr tablet Take 1 tablet (150 mg total) by mouth daily (Patient not taking: Reported on 1/5/2024)    Dupilumab (Dupixent) 300 MG/2ML SOPN Inject 300 mg under the skin every 14 (fourteen) days (Patient not taking: Reported on 11/2/2023)       Objective     /72   Pulse 79   Temp 97.5 °F (36.4 °C) (Temporal)   Resp 18   Ht 6' 2\" (1.88 m)   Wt 127 kg (280 lb 12.8 oz)   SpO2 93%   BMI 36.05 kg/m²     Physical Exam  Vitals and nursing note reviewed.   Constitutional:       General: He is not in acute distress.     Appearance: Normal appearance. He is not ill-appearing, toxic-appearing or diaphoretic.   HENT:      Head: Normocephalic and atraumatic.      Right Ear: External ear normal.      Left Ear: External ear normal.      Nose: Nose normal.      Mouth/Throat:      Mouth: Mucous membranes are moist.   Eyes:      General: No scleral icterus.  Cardiovascular:      Rate and Rhythm: Normal rate and regular rhythm.      Pulses: no weak pulses          Dorsalis pedis pulses are 2+ on the right side and 2+ on the left side.        Posterior tibial pulses are 1+ on the right side and 1+ on the left side.   Pulmonary:      Effort: Pulmonary effort is normal. No respiratory distress.      Breath sounds: Normal breath sounds. No wheezing.   Abdominal:      General: Bowel sounds are normal. There is no distension.      Palpations: Abdomen is soft.      Tenderness: There is no abdominal tenderness.   Musculoskeletal:      Cervical back: Normal range of motion and neck supple.      Right lower leg: No edema.      Left lower leg: No edema.   Feet:      Right foot:      Skin integrity: No ulcer, skin breakdown, erythema, warmth, callus or dry skin.      Left foot:      Skin integrity: No ulcer, skin breakdown, erythema, warmth, callus or dry skin.   Lymphadenopathy:      Cervical: No cervical " adenopathy.   Skin:     General: Skin is warm and dry.      Coloration: Skin is not jaundiced or pale.   Neurological:      General: No focal deficit present.      Mental Status: He is alert and oriented to person, place, and time. Mental status is at baseline.      Cranial Nerves: No cranial nerve deficit.      Sensory: No sensory deficit.   Psychiatric:         Mood and Affect: Mood normal.         Behavior: Behavior normal.         Thought Content: Thought content normal.         Judgment: Judgment normal.       Lauren Vee, DO

## 2024-02-05 ENCOUNTER — TELEPHONE (OUTPATIENT)
Dept: ADMINISTRATIVE | Facility: OTHER | Age: 69
End: 2024-02-05

## 2024-02-05 NOTE — TELEPHONE ENCOUNTER
Upon review of the In Basket request and the patient's chart, initial outreach has been made via fax to facility. Please see Contacts section for details.     Thank you  Nevin Cho MA

## 2024-02-05 NOTE — LETTER
Diabetic Eye Exam Form    Date Requested: 24  Patient: Cricket Rosales  Patient : 1955   Referring Provider: Lauren Vee,       DIABETIC Eye Exam Date _______________________________      Type of Exam MUST be documented for Diabetic Eye Exams. Please CHECK ONE.     Retinal Exam       Dilated Retinal Exam       OCT       Optomap-Iris Exam      Fundus Photography       Left Eye - Please check Retinopathy or No Retinopathy        Exam did show retinopathy    Exam did not show retinopathy       Right Eye - Please check Retinopathy or No Retinopathy       Exam did show retinopathy    Exam did not show retinopathy       Comments __________________________________________________________    Practice Providing Exam ______________________________________________    Exam Performed By (print name) _______________________________________      Provider Signature ___________________________________________________      These reports are needed for  compliance.  Please fax this completed form and a copy of the Diabetic Eye Exam report to our office located at [address] [place] [address] as soon as possible via Fax [phone] attention Nevin: Phone [phone]  We thank you for your assistance in treating our mutual patient.

## 2024-02-05 NOTE — TELEPHONE ENCOUNTER
----- Message from Sloane Schaefer sent at 2/2/2024  1:17 PM EST -----  Regarding: DM Eye Exam  02/02/24 1:18 PM    Hello, our patient Cricket Rosales has had Diabetic Eye Exam completed/performed. Please assist in updating the patient chart by making an External outreach to [facility] facility located in [place], [place]. The date of service is within the past 6 months.    Thank you,  Sloane Schaefer [facility]

## 2024-02-05 NOTE — LETTER
Diabetic Eye Exam Form    Date Requested: 24  Patient: Cricket Rosales  Patient : 1955   Referring Provider: Lauren Vee,       DIABETIC Eye Exam Date _______________________________      Type of Exam MUST be documented for Diabetic Eye Exams. Please CHECK ONE.     Retinal Exam       Dilated Retinal Exam       OCT       Optomap-Iris Exam      Fundus Photography       Left Eye - Please check Retinopathy or No Retinopathy        Exam did show retinopathy    Exam did not show retinopathy       Right Eye - Please check Retinopathy or No Retinopathy       Exam did show retinopathy    Exam did not show retinopathy       Comments __________________________________________________________    Practice Providing Exam ______________________________________________    Exam Performed By (print name) _______________________________________      Provider Signature ___________________________________________________      These reports are needed for  compliance.  Please fax this completed form and a copy of the Diabetic Eye Exam report to our office located at [address] [place] [address] as soon as possible via Fax [phone] attention Nevin: Phone [phone]  We thank you for your assistance in treating our mutual patient.

## 2024-02-06 ENCOUNTER — TELEPHONE (OUTPATIENT)
Dept: FAMILY MEDICINE CLINIC | Facility: CLINIC | Age: 69
End: 2024-02-06

## 2024-02-06 ENCOUNTER — TELEPHONE (OUTPATIENT)
Age: 69
End: 2024-02-06

## 2024-02-06 DIAGNOSIS — G56.02 LEFT CARPAL TUNNEL SYNDROME: Primary | ICD-10-CM

## 2024-02-06 NOTE — TELEPHONE ENCOUNTER
I was able to schedule pt's EMG for 3/20/24 at 10AM, he is aware. Wasn't sure if he should have his ortho appt pushed out until he has EMG or if he should keep his February appt. Please advise.

## 2024-02-06 NOTE — TELEPHONE ENCOUNTER
Caller: [facility]     Doctor: Sebas    Reason for call: Patient said he is not able to have appointment with ortho until he receives EMG on arm. [facility] does not have EMG appointment available until April. Should patient wait until then to see ortho?  Please advise    Call back#: [phone]

## 2024-02-06 NOTE — TELEPHONE ENCOUNTER
Patient called ortho but they will not see him until he sees an EMG on his left arm. Patient asking if you can order it or recommend him to someone that can do it.

## 2024-02-06 NOTE — TELEPHONE ENCOUNTER
Spoke with pt and got him scheduled with Dr. Gallardo's office for the EMG 3/20 at 10AM. I LVM with appt details.

## 2024-02-08 NOTE — TELEPHONE ENCOUNTER
As a follow-up, a second attempt has been made for outreach via fax to facility. Please see Contacts section for details.    Thank you  Nevin Cho MA

## 2024-02-12 NOTE — TELEPHONE ENCOUNTER
Upon review of the In Basket request we were able to locate, review, and update the patient chart as requested for Diabetic Eye Exam.    Any additional questions or concerns should be emailed to the Practice Liaisons via the appropriate education email address, please do not reply via In Basket.    Thank you  Nevin Cho MA

## 2024-02-16 DIAGNOSIS — F41.9 ANXIETY: ICD-10-CM

## 2024-02-16 RX ORDER — ALPRAZOLAM 0.5 MG/1
TABLET ORAL
Qty: 30 TABLET | Refills: 0 | Status: SHIPPED | OUTPATIENT
Start: 2024-02-16

## 2024-02-27 ENCOUNTER — OFFICE VISIT (OUTPATIENT)
Dept: OBGYN CLINIC | Facility: CLINIC | Age: 69
End: 2024-02-27
Payer: MEDICARE

## 2024-02-27 VITALS — WEIGHT: 280 LBS | OXYGEN SATURATION: 97 % | HEIGHT: 74 IN | RESPIRATION RATE: 19 BRPM | BODY MASS INDEX: 35.94 KG/M2

## 2024-02-27 DIAGNOSIS — R20.0 NUMBNESS AND TINGLING: Primary | ICD-10-CM

## 2024-02-27 DIAGNOSIS — M79.673 PAIN OF FOOT, UNSPECIFIED LATERALITY: ICD-10-CM

## 2024-02-27 DIAGNOSIS — Z51.89 ENCOUNTER FOR OTHER SPECIFIED AFTERCARE: ICD-10-CM

## 2024-02-27 DIAGNOSIS — G56.22 ULNAR NEUROPATHY AT ELBOW OF LEFT UPPER EXTREMITY: ICD-10-CM

## 2024-02-27 DIAGNOSIS — R20.2 NUMBNESS AND TINGLING: Primary | ICD-10-CM

## 2024-02-27 PROCEDURE — 99213 OFFICE O/P EST LOW 20 MIN: CPT | Performed by: STUDENT IN AN ORGANIZED HEALTH CARE EDUCATION/TRAINING PROGRAM

## 2024-02-27 NOTE — PROGRESS NOTES
Post-Operative Note: Total Knee Arthroplasty  Cricket Rosales (68 y.o. male)  : 1955 Encounter Date: 2024  Dr. David Mullen, , Orthopedic Surgeon  Orthopedic Oncology & Sarcoma Surgery     Assessment/Plan  68 y.o. male 6 month follow up status post left total knee arthroplasty     Wound has completely healed no need for additional care  Discharged from physical therapy approximately 2 weeks  Continue with activities as tolerated  Patient has resumed physical activities weight lifting  Return in about 6 months (around 2024) for annual evaluation.   for evaluation with x-ray    Referred to podiatry for bilateral feet pain     Referred to hand PT OT for hand numbness and to discuss proper ergonomics      Subjective  68 y.o. male presenting to the office for 6 month follow up status post left total knee arthroplasty  DOS: 2023    Feeling well today.  He was attending to follow-up with his hand specialist from [facility] for his left hand numbness, but it was noted that he is 6 months out of the total knee and he elected to proceed with the visit surrounding the knee because his EMG was not yet complete.  He describes left hand numbness when holding his iPad, and his pinky and ring finger numbness is affecting him because he would like to learn to play guitar and banjo.  The symptoms were first noted this after long periods of holding an iPad and now also affecting the rest.  His knee has been maintaining level of function, using a cane only due to his bunion pain on his bilateral feet.  He inquired if podiatry would be able to assist      Current concerns: none  Patient states he does not experience any pain  Numbness/weakness extremity: None Reported   Physical Therapy Progress: Patient has been successfully discharged from physical therapy approximately  DVT ppx: Patient has completed anticoagulation.  Eating/Drinking improving. Bowel/Bladder: WNL  Patient denies symptoms of an infection.      Physical Exam:  left knee(s) -   Patient ambulates with steady gait pattern  Uses Single Point Cane assistive device  No anatomical deformity  Incision is well-healed with no signs of erythema, ecchymosis, or infection   Distal anterior shin lesions  No generalized soft tissue swelling or effusion noted  ROM 0-120  Stable to varus/valgus without gapping  Appropriate anterior translocation   Calf compartments are soft and supple  intact pulses with brisk capillary refill to the toes  Sural, saphenous, tibial, superficial, and deep peroneal motor and sensory distributions intact  Sensation light touch intact distally      Imaging  Post-operative x-rays of left knee from 11/21/23 show well-seated total prosthesis with adequate anatomical alignment with no sign of loosening.    Jeffrey Galeano PA-C

## 2024-02-28 ENCOUNTER — TELEPHONE (OUTPATIENT)
Age: 69
End: 2024-02-28

## 2024-02-28 NOTE — TELEPHONE ENCOUNTER
TR; Last fc 3/11/19; Tubular adenoma; BMI 35    Scheduled 3/18/24 @ [facility]   Paperwork mailed to patient.

## 2024-02-28 NOTE — LETTER
Cricket Rosales  [address]  [facility] [address]        FLEXIBLE COLONOSCOPY INSTRUCTIONS  PLEASE NOTE...  AS OF JUNE 1, 2014, OUR OFFICE REQUIRES 72 HOURS NOTICE OF CANCELLATION/RESCHEDULE OF A PROCEDURE TO AVOID INCURRING A MISSED APPOINTMENT FEE.    Your Colonoscopy Procedure has been scheduled at:[address] [address]     The Date of your Procedure is: March 18, 2024      The hospital facility will contact you the evening prior to your scheduled procedure with your arrival time.     Use the bowel preparation as directed.    Check with your family doctor if you are taking a blood thinner (Coumadin, Plavix, Xarelto, Pradaxa, Gingko biloba, Ginseng, Feverfew, Darshan's Wort).  We suggest stopping these for 3 days. Special instructions may be needed if you are taking aspirin or any aspirin-containing medication.  Check with your family physician.      If you are on DIABETIC MEDICATION (tablets or insulin) your doctor may make changes in your preparation.    Take all medications usual unless otherwise instructed.    As always, if you have any questions or concerns, feel free to call the office.  Our  staff will be happy to connect you with one of the nurses to answer any questions or address any concerns you have regarding your procedure.      Do not wear any jewelry the day of your procedure including wedding rings.    Arrangements must be made for a responsible party to drive you home from the procedure.  If you do not have a responsible family member or friend to drive you home, the procedure will not be done.  Vast or a taxi is not acceptable.    It is important you notify our office of any insurance changes prior to your procedure and, if necessary, supply us with referrals from your primary care physician.            COLONOSCOPY PREPARATION INSTRUCTIONS    Purchase (prescription not required):  · 238 gram bottle of Miralax® (Glycolax®)  · 4  Dulcolax® (Bisacodyl) Laxative Tablets  · 64 oz. bottle of Gatorade® or your preference of a non-carbonated clear liquid - NOT RED OR PURPLE     One Day Prior to Colonoscopy Procedure  · Nothing to eat the day before your procedure, only clear liquids.  · It is important that you drink plenty of clear liquids throughout the day to prevent dehydration.    Clear Liquids include:  o Water/Iced Tea/Lemonade/Gatorade®/Black Coffee or tea (no milk or creamers  o Soft drinks: orange, ginger ale, cola, Pepsi®, Sprite®, 7Up®  o Demetris-Aid® (lemonade or orange flavors only)  o Strained fruit juices without pulp such as apple, white grape, white cranberry  o Jell-O®, lemon, lime or orange (no fruit or toppings)  o Popsicles, Italian Ice (No Ice Cream, sherbets, or fruit bars)  o Chicken or beef bouillon/broth  DO NOT EAT OR DRINK ANYTHING RED OR PURPLE  DO NOT DRINK ANY ALCOHOLIC BEVERAGES  DIABETIC PATIENTS: Consult your physician    At 4:00 pm, take (2) Dulcolax® (Bisacodyl) Laxative Tablets. Swallow the tablets whole with an 8 oz. glass of water. At 8:00 pm, take the additional (2) Dulcolax® (Bisacodyl) Laxative Tablets with 8 oz. of water. The package may direct you not to exceed (2) tablets at any time but for the purpose of this examination, you should take (4) total.    Mix the 238 gm of Miralax® in 64 oz. of Gatorade® and shake the solution until the Miralax® is dissolved. You will drink half (32 oz) of this solution this evening, beginning between 4 and 6 o'clock. Drink 8 oz glassfuls at your own pace. It may take several hours to drink the solution.    Remember to stay close to toilet facilities.    DAY OF COLONOSCOPY PROCEDURE    Five (5) hours before your procedure, drink the other half (32 oz) of the Miralax®/Gatorade® mixture within a two (2) hour period. This may require you to get up very early if you are scheduled for an early procedure.    NOTHING IS TO BE TAKEN BY MOUTH 3 HOURS PRIOR TO PROCEDURE.     If you  use an inhaler, please bring it with you to your procedure.                          Medicine Instructions for Adults with Diabetes who Need a Bowel Prep       Follow these instructions when a BOWEL PREP is required for your procedure or surgery!    NOTE:   GLP-1 Agonists taken weekly: do not take in the 7 days before your procedure   SGLT-2 Inhibitors: do not take in the 4 days before your procedure     On the Day Before Surgery/Procedure  If you are having a procedure (e.g. Colonoscopy) or surgery that requires a bowel prep and you may have at least a clear liquid diet, follow the directions below based on the type of medicine you take for your diabetes.     Type of Medicine You Take Examples What to do   Pre-Mixed Insulin - Intermediate Acting Humalog® 75/25, Humulin® 70/30, Novolog® 70/30, Regular Insulin Take ½ your regular dose the evening before your procedure   Rapid/Fast Acting Insulin Humalog® U200, NovoLog®, Apidra®, Fiasp® Take ½ your regular dose the evening before your procedure.   Long-Acting Insulin Lantus®, Levemir®, Tresiba®, Toujeo®, Basaglar® Take your FULL regular dose the day before procedure   Oral Sulfonylurea Glipizide/Glimepiride/Glucotrol® Take ½ your regular dose the evening before your procedure   Other Oral Diabetes Medicines Metformin®, Glucophage®, Glucophage XR®, Riomet®, Glumetza®), Actose®, Avandia®, Glyset®, Prandin® Take your regular dose with dinner in the evening before your procedure   GLP-1 Agonists AdlyxinÒ, ByettaÒ, BydureonÒ, OzempicÒ, SoliquaÒ, TanzeumÒ, TrulicityÒ, VictozaÒ, Saxenda®, Rybelsus® If taken daily, take as normal    If taken weekly, do not take this medicine for 7 days before your procedure including the day of the procedure (resume taking after the procedure)   SGLT-2 Inhibitors Jardiance®, Invokana®, Farxiga®,   Steglatro®, Brenzavvy®, Qtern®, Segluromet®, Glyxambi®, Synjardy®, Synjardy XR®, Invokamet®, Invokamet XR®, Trijary XR®, Xigduo XR®, Steglujan® Do  not take for 4 days before your procedure including the day of the procedure (resume taking after the procedure)                More information continued on back                    Medicine Instructions for Adults with Diabetes who Need a Bowel Prep  Page 2      On the Day of Surgery/Procedure  Follow the directions below based on the type of medicine you take for your diabetes.     Type of Medicine You Take Examples What to do   Long-Acting Insulin Lantus®, Levemir®, Tresiba®, Toujeo®, Basaglar®, Semglee®   If you usually take your Long-Acting Insulin in the morning, take the full dose as scheduled.   GLP-1 Agonists AdlyxinÒ, ByettaÒ, BydureonÒ, OzempicÒ, SoliquaÒ, TanzeumÒ, TrulicityÒ, VictozaÒ, Saxenda®, Rybelsus® Do NOT take this medicine on the day of your procedure (resume taking after the procedure)       On the Day of Surgery/Procedure (continued)  Except for the morning Long-Acting Insulin, DO NOT take ANY diabetic medicine on the day of your procedure unless you were instructed by the doctor who manages your diabetes medicines.    Continue to check your blood sugars.  If you have an insulin pump, ask your endocrinologist for instructions at least 3 days before your procedure. NOTE: If you are not able to ask your endocrinologist in advance, on the day of the procedure set your insulin pump to your basal rate only. Bring your insulin pump supplies to the hospital.     If you have any questions about taking your diabetes medicines prior to your procedure, please contact the doctor who manages your diabetes medicines.

## 2024-03-01 DIAGNOSIS — I42.8 NON-ISCHEMIC CARDIOMYOPATHY (HCC): Chronic | ICD-10-CM

## 2024-03-01 RX ORDER — SACUBITRIL AND VALSARTAN 49; 51 MG/1; MG/1
1 TABLET, FILM COATED ORAL 2 TIMES DAILY
Qty: 60 TABLET | Refills: 11 | Status: SHIPPED | OUTPATIENT
Start: 2024-03-01

## 2024-03-01 NOTE — TELEPHONE ENCOUNTER
Requested medication(s) are due for refill today: Yes  Patient has already received a courtesy refill: No  Other reason request has been forwarded to provider: no protocol

## 2024-03-04 DIAGNOSIS — J44.89 ASTHMA-COPD OVERLAP SYNDROME: ICD-10-CM

## 2024-03-04 RX ORDER — FLUTICASONE PROPIONATE AND SALMETEROL 500; 50 UG/1; UG/1
POWDER RESPIRATORY (INHALATION)
Qty: 60 BLISTER | Refills: 2 | Status: SHIPPED | OUTPATIENT
Start: 2024-03-04

## 2024-03-04 NOTE — TELEPHONE ENCOUNTER
Reason for call:   [x] Refill   [] Prior Auth  [] Other:     Office:   [] PCP/Provider -   [x] Specialty/Provider - Pulmo     Medication: Advair 500 - 50 mcg/dose inhaler, inhale one puff by mouth and into the lungs twice a day         Pharmacy: [facility]     Does the patient have enough for 3 days?   [] Yes   [x] No - Send as HP to POD

## 2024-03-06 ENCOUNTER — APPOINTMENT (OUTPATIENT)
Dept: LAB | Facility: CLINIC | Age: 69
End: 2024-03-06
Payer: MEDICARE

## 2024-03-06 DIAGNOSIS — I42.8 NON-ISCHEMIC CARDIOMYOPATHY (HCC): Chronic | ICD-10-CM

## 2024-03-06 DIAGNOSIS — E11.21 CONTROLLED TYPE 2 DIABETES MELLITUS WITH DIABETIC NEPHROPATHY, WITHOUT LONG-TERM CURRENT USE OF INSULIN (HCC): ICD-10-CM

## 2024-03-06 DIAGNOSIS — E11.00 TYPE 2 DIABETES MELLITUS WITH HYPEROSMOLARITY WITHOUT COMA, WITHOUT LONG-TERM CURRENT USE OF INSULIN (HCC): ICD-10-CM

## 2024-03-06 LAB
ALBUMIN SERPL BCP-MCNC: 4 G/DL (ref 3.5–5)
ALP SERPL-CCNC: 75 U/L (ref 34–104)
ALT SERPL W P-5'-P-CCNC: 15 U/L (ref 7–52)
ANION GAP SERPL CALCULATED.3IONS-SCNC: 11 MMOL/L
AST SERPL W P-5'-P-CCNC: 13 U/L (ref 13–39)
BILIRUB SERPL-MCNC: 0.67 MG/DL (ref 0.2–1)
BUN SERPL-MCNC: 22 MG/DL (ref 5–25)
CALCIUM SERPL-MCNC: 9.3 MG/DL (ref 8.4–10.2)
CHLORIDE SERPL-SCNC: 100 MMOL/L (ref 96–108)
CO2 SERPL-SCNC: 27 MMOL/L (ref 21–32)
CREAT SERPL-MCNC: 1.36 MG/DL (ref 0.6–1.3)
EST. AVERAGE GLUCOSE BLD GHB EST-MCNC: 154 MG/DL
GFR SERPL CREATININE-BSD FRML MDRD: 53 ML/MIN/1.73SQ M
GLUCOSE SERPL-MCNC: 138 MG/DL (ref 65–140)
HBA1C MFR BLD: 7 %
POTASSIUM SERPL-SCNC: 4 MMOL/L (ref 3.5–5.3)
PROT SERPL-MCNC: 6.9 G/DL (ref 6.4–8.4)
SODIUM SERPL-SCNC: 138 MMOL/L (ref 135–147)

## 2024-03-06 PROCEDURE — 83036 HEMOGLOBIN GLYCOSYLATED A1C: CPT

## 2024-03-08 NOTE — PROGRESS NOTES
Cardiology Follow Up    Cricket Rosales  1955  [id number]  [facility] [address] [address] [phone]    1. Non-ischemic cardiomyopathy with HFmEF (HCC)        2. Chronic combined systolic and diastolic congestive heart failure (HCC)        3. Benign essential hypertension        4. Dyslipidemia  atorvastatin (LIPITOR) 20 mg tablet          Discussion/Summary:  Chronic diastolic congestive heart failure:  On lasix 40 mg every other day. Examines compensated.   Stressed the importance of daily standing weights and a low sodium diet. He may take an additional 40 mg of lasix prn for weight gain of 3 or more lbs in 1 day, 5 or more lbs in 1 day or worsening edema.     Non-ischemic cardiomyopathy:  Thought to be secondary to alcohol use. LHC in September 2022 did not show any evidence of obstructive CAD.   Last echo 7/28/23: LVEF stable at 45%  GDMT: Carvedilol 25 mg BID, Entresto 49-51 mg BID, Jardiance 10 mg daily.   Continued to reinforce importance of alcohol cessation.      Hypertension:  Controlled.   He is unable to tolerate CPAP.     He will RTO in 3 months with Dr. Phillip or sooner if necessary. He will call with any concerns in the interim.    Interval History:   Cricket Rosales is a 68 y.o. male with a non-ischemic cardiomyopathy thought to be secondary to alcohol abuse, chronic combined systolic and diastolic congestive heart failure, hypertension, dyslipidemia, obstructive sleep apnea, and COPD/asthma who presents to the office today for routine follow up.     Since his last office visit he had COVID-19 in January. He did not require hospitalization for this.  He feels his breathing has been affected somewhat by this, although it is improving.  He denies any exertional chest pain/pressure/discomfort.  He has been weighing himself on a basis at home stable between 270-273 pounds on  his home scale.  He does have lower extremity edema bilaterally which he feels has been more or less stable.  Denies lightheadedness, dizziness, syncope.  Denies palpitations.  He does admit that he has high sodium foods occasionally such as Chinese food.    He had one beer and one shot a few weeks ago but has been trying to abstain.       Medical Problems       Problem List       Chronic asthma, moderate persistent, uncomplicated    Obstructive sleep apnea    Overview Signed 12/19/2018 10:16 AM by Lala Jimenez,        Declines CPAP         Chronic combined systolic and diastolic congestive heart failure ([facility])    Wt Readings from Last 3 Encounters:   03/11/24 129 kg (283 lb 12.8 oz)   02/27/24 127 kg (280 lb)   02/02/24 127 kg (280 lb 12.8 oz)                 Non-ischemic cardiomyopathy with HFmEF ([facility]) (Chronic)    Dyslipidemia    Benign essential hypertension    Anxiety (Chronic)    Asthma-COPD overlap syndrome    COPD, severe ([facility])    Overview Signed 12/19/2018 10:16 AM by Lala Jimenez DO      Post bronchodilator FEV1 is 68% predicted, 2017         Hemorrhoids    Thyroid disease    Vitamin D deficiency    Constipation    History of tobacco abuse    Hypertrophied anal papilla    PLMD (periodic limb movement disorder)    Controlled type 2 diabetes mellitus with diabetic nephropathy, without long-term current use of insulin ([facility])      Lab Results   Component Value Date    HGBA1C 7.0 (H) 03/06/2024         Moderate episode of recurrent major depressive disorder (HCC)    Chronic combined systolic and diastolic CHF (congestive heart failure) ([facility])    Wt Readings from Last 3 Encounters:   03/11/24 129 kg (283 lb 12.8 oz)   02/27/24 127 kg (280 lb)   02/02/24 127 kg (280 lb 12.8 oz)                 CKD (chronic kidney disease) stage 3, GFR 30-59 ml/min ([facility])    Lab Results   Component Value Date    EGFR 53 03/06/2024    EGFR 42 09/26/2023    EGFR 48 09/19/2023    CREATININE 1.36 (H) 03/06/2024     CREATININE 1.66 (H) 2023    CREATININE 1.48 (H) 2023         Leukocytosis    S/P TKR (total knee replacement), left    Severe persistent asthma without complication        Past Medical History:   Diagnosis Date    Acute on chronic combined systolic and diastolic CHF (congestive heart failure) ([facility]) 2023    Alcohol abuse 2023    Ambulates with cane     Arthritis     Asthma     Back pain     Chest pain 2022    CHF (congestive heart failure) ([facility])     Claustrophobia     COPD (chronic obstructive pulmonary disease) ([facility])     COPD with acute exacerbation ([facility]) 2022    COVID-19     COVID-19 virus infection 2021    Depression     Hypertension     Mumps     Neck pain     Old MI (myocardial infarction)     Pneumonia     Pulmonary emphysema ([facility])     Rectal bleeding 2019    Skin abnormalities     rashes and wounds on both legs - scabbed over and healing    Sleep apnea     no CPAP    Tingling of both feet     Wears glasses      Social History     Socioeconomic History    Marital status: /Civil Union     Spouse name: Not on file    Number of children: Not on file    Years of education: Not on file    Highest education level: Not on file   Occupational History    Not on file   Tobacco Use    Smoking status: Former     Current packs/day: 0.00     Average packs/day: 3.0 packs/day for 43.0 years (129.0 ttl pk-yrs)     Types: Cigarettes     Start date:      Quit date: 2018     Years since quittin.1    Smokeless tobacco: Never   Vaping Use    Vaping status: Never Used   Substance and Sexual Activity    Alcohol use: Not Currently    Drug use: Yes     Types: Marijuana     Comment: occasionally edible    Sexual activity: Not on file   Other Topics Concern    Not on file   Social History Narrative    Caffeine use     Social Determinants of Health     Financial Resource Strain: Low Risk  (3/23/2023)    Overall Financial Resource Strain (CARDIA)     Difficulty of Paying Living  Expenses: Not very hard   Food Insecurity: No Food Insecurity (7/28/2023)    Hunger Vital Sign     Worried About Running Out of Food in the Last Year: Never true     Ran Out of Food in the Last Year: Never true   Transportation Needs: No Transportation Needs (7/28/2023)    PRAPARE - Transportation     Lack of Transportation (Medical): No     Lack of Transportation (Non-Medical): No   Physical Activity: Not on file   Stress: Not on file   Social Connections: Not on file   Intimate Partner Violence: Not on file   Housing Stability: Low Risk  (7/28/2023)    Housing Stability Vital Sign     Unable to Pay for Housing in the Last Year: No     Number of Places Lived in the Last Year: 1     Unstable Housing in the Last Year: No      Family History   Problem Relation Age of Onset    Heart attack Father         MI    Heart disease Father     Diabetes Sister         DM    Arthritis Family     Coronary artery disease Family     Hypertension Family     Tuberculosis Son     Alzheimer's disease Mother      Past Surgical History:   Procedure Laterality Date    APPENDECTOMY      CARDIAC CATHETERIZATION  07/24/2015    Left main- normal and maidly tortuous.  Circumflex - normal and moderately tortuous.  RCA- normal and mildy tortuous.  Global LV function was severely depressed..    CARDIAC CATHETERIZATION Left 09/27/2022    Procedure: Cardiac Left Heart Cath;  Surgeon: Doreen Briones DO;  Location: [facility];  Service: Cardiology    CARDIAC CATHETERIZATION N/A 09/27/2022    Procedure: Cardiac Coronary Angiogram;  Surgeon: Doreen Briones DO;  Location: [facility];  Service: Cardiology    CARDIAC CATHETERIZATION  09/27/2022    Procedure: Cardiac catheterization;  Surgeon: Doreen Briones DO;  Location: [facility];  Service: Cardiology    INGUINAL HERNIA REPAIR Bilateral     TX COLONOSCOPY FLX DX W/COLLJ SPEC WHEN PFRMD N/A 03/11/2019    Procedure: COLONOSCOPY with removal of anal papilla;  Surgeon:  W Nehemiah Dale MD;  Location: [facility];  Service: Colorectal    TONSILLECTOMY      TOTAL KNEE ARTHROPLASTY Left 9/18/2023    Procedure: ARTHROPLASTY KNEE TOTAL;  Surgeon: David Mullen DO;  Location: [facility];  Service: Orthopedics    UMBILICAL HERNIA REPAIR  06/21/2006       Current Outpatient Medications:     Accu-Chek FastClix Lancets MISC, Use 1 each daily to test blood sugar., Disp: 100 each, Rfl: 5    albuterol (2.5 mg/3 mL) 0.083 % nebulizer solution, Take 3 mL (2.5 mg total) by nebulization every 6 (six) hours as needed for wheezing or shortness of breath, Disp: 360 mL, Rfl: 2    albuterol (ProAir HFA) 90 mcg/act inhaler, Inhale 2 puffs every 6 (six) hours as needed for wheezing, Disp: 18 g, Rfl: 11    ALPRAZolam (XANAX) 0.5 mg tablet, TAKE 1 TABLET BY MOUTH ONCE DAILY AT BEDTIME AS NEEDED FOR ANXIETY, Disp: 30 tablet, Rfl: 0    aspirin (ECOTRIN LOW STRENGTH) 81 mg EC tablet, Take 1 tablet (81 mg total) by mouth 2 (two) times a day for 14 days, Disp: 28 tablet, Rfl: 0    atorvastatin (LIPITOR) 20 mg tablet, Take 1 tablet (20 mg total) by mouth daily, Disp: 30 tablet, Rfl: 6    Blood Glucose Monitoring Suppl (Accu-Chek Guide Me) w/Device KIT, Use 1 each daily to test blood sugar., Disp: 1 kit, Rfl: 0    carvedilol (COREG) 25 mg tablet, Take 1 tablet (25 mg total) by mouth 2 (two) times a day with meals, Disp: 60 tablet, Rfl: 11    DULoxetine (CYMBALTA) 60 mg delayed release capsule, Take 1 capsule by mouth once daily, Disp: 30 capsule, Rfl: 5    Empagliflozin (Jardiance) 10 MG TABS tablet, Take 1 tablet (10 mg total) by mouth every morning, Disp: 30 tablet, Rfl: 11    Entresto 49-51 MG TABS, Take 1 tablet by mouth twice daily, Disp: 60 tablet, Rfl: 11    fluticasone (FLONASE) 50 mcg/act nasal spray, Use 1 spray(s) in each nostril twice daily, Disp: 16 g, Rfl: 0    Fluticasone-Salmeterol (Advair) 500-50 mcg/dose inhaler, INHALE 1 DOSE BY MOUTH TWICE DAILY RINSE MOUTH AFTER USE, Disp: 60 blister, Rfl:  11    furosemide (LASIX) 40 mg tablet, Take 1 tablet (40 mg total) by mouth every other day, Disp: 45 tablet, Rfl: 3    glucose blood test strip, Use 1 each daily to test blood sugar., Disp: 100 strip, Rfl: 5    ipratropium-albuterol (DUO-NEB) 0.5-2.5 mg/3 mL nebulizer solution, Take 3 mL by nebulization every 6 (six) hours as needed for wheezing or shortness of breath, Disp: 360 mL, Rfl: 11    isosorbide mononitrate (IMDUR) 30 mg 24 hr tablet, Take 1 tablet by mouth once daily, Disp: 90 tablet, Rfl: 3    montelukast (SINGULAIR) 10 mg tablet, Take 1 tablet (10 mg total) by mouth daily at bedtime, Disp: 30 tablet, Rfl: 0    tiotropium (Spiriva Respimat) 2.5 MCG/ACT AERS inhaler, Inhale 2 spray (s) by mouth once daily., Disp: 4 g, Rfl: 6    buPROPion (Wellbutrin XL) 150 mg 24 hr tablet, Take 1 tablet (150 mg total) by mouth daily (Patient not taking: Reported on 1/5/2024), Disp: 30 tablet, Rfl: 0    Dupilumab (Dupixent) 300 MG/2ML SOPN, Inject 300 mg under the skin every 14 (fourteen) days (Patient not taking: Reported on 11/2/2023), Disp: 2 mL, Rfl: 11    Fluticasone-Salmeterol (Advair) 500-50 mcg/dose inhaler, INHALE ONE PUFF BY MOUTH AND INTO THE LUNGS TWICE A DAY. RINSE MOUTH AFTER USE (Patient not taking: Reported on 3/11/2024), Disp: 60 blister, Rfl: 2    guaiFENesin (Mucus Relief) 600 mg 12 hr tablet, Take 2 tablets (1,200 mg total) by mouth every 12 (twelve) hours (Patient not taking: Reported on 3/11/2024), Disp: 60 tablet, Rfl: 3    predniSONE 10 mg tablet, Take 3 tabs BID X 2 days, 2 tabs BID X 2 days, 1 tab BID X 2 days, 1 tab daily X 2 days (Patient not taking: Reported on 3/11/2024), Disp: 26 tablet, Rfl: 0    sodium chloride 3 % inhalation solution, Take 4 mL by nebulization as needed for cough (congestion) (Patient not taking: Reported on 3/11/2024), Disp: 30 mL, Rfl: 0  Allergies   Allergen Reactions    Ciprofloxacin Shortness Of Breath and Diarrhea       Labs:     Chemistry        Component Value  "Date/Time     07/27/2015 0559    K 4.0 03/06/2024 1212    K 3.8 07/27/2015 0559     03/06/2024 1212     07/27/2015 0559    CO2 27 03/06/2024 1212    CO2 32 07/27/2015 0559    BUN 22 03/06/2024 1212    BUN 19 07/27/2015 0559    CREATININE 1.36 (H) 03/06/2024 1212    CREATININE 1.05 07/27/2015 0559        Component Value Date/Time    CALCIUM 9.3 03/06/2024 1212    CALCIUM 8.6 07/27/2015 0559    ALKPHOS 75 03/06/2024 1212    ALKPHOS 75 07/22/2015 1021    AST 13 03/06/2024 1212    AST 30 07/22/2015 1021    ALT 15 03/06/2024 1212    ALT 74 07/22/2015 1021    BILITOT 1.05 (H) 07/22/2015 1021            Lab Results   Component Value Date    CHOL 105 07/23/2015     Lab Results   Component Value Date    HDL 27 (L) 09/01/2022    HDL 27 (L) 09/22/2021    HDL 24 (L) 12/18/2018     Lab Results   Component Value Date    LDLCALC 49 09/01/2022    LDLCALC 107 (H) 09/22/2021    LDLCALC 89 12/18/2018     Lab Results   Component Value Date    TRIG 138 09/01/2022    TRIG 131 09/22/2021    TRIG 107 12/18/2018     No results found for: \"CHOLHDL\"    Imaging: No results found.      Review of Systems   All other systems reviewed and are negative.      Vitals:    03/11/24 1421   BP: 124/70   Pulse: 82   SpO2: 95%     Vitals:    03/11/24 1421   Weight: 129 kg (283 lb 12.8 oz)     Height: 6' 2\" (188 cm)   Body mass index is 36.44 kg/m².    Physical Exam:  Physical Exam  Vitals reviewed.   Constitutional:       General: He is not in acute distress.     Appearance: He is well-developed. He is obese. He is not diaphoretic.   HENT:      Head: Normocephalic and atraumatic.   Eyes:      Pupils: Pupils are equal, round, and reactive to light.   Neck:      Vascular: No carotid bruit.   Cardiovascular:      Rate and Rhythm: Normal rate and regular rhythm.      Pulses:           Radial pulses are 2+ on the right side and 2+ on the left side.      Heart sounds: S1 normal and S2 normal. No murmur heard.  Pulmonary:      Effort: Pulmonary " effort is normal. No respiratory distress.      Breath sounds: Examination of the right-lower field reveals rales. Examination of the left-lower field reveals rales. Rales present. No wheezing.   Abdominal:      General: There is no distension.      Palpations: Abdomen is soft.      Tenderness: There is no abdominal tenderness.   Musculoskeletal:         General: Normal range of motion.      Cervical back: Normal range of motion.      Right lower leg: Edema (+2 pedal edema) present.      Left lower leg: Edema (+1) present.   Skin:     General: Skin is warm and dry.      Findings: No erythema.   Neurological:      General: No focal deficit present.      Mental Status: He is alert and oriented to person, place, and time.   Psychiatric:         Mood and Affect: Mood normal.         Behavior: Behavior normal.

## 2024-03-11 ENCOUNTER — OFFICE VISIT (OUTPATIENT)
Dept: CARDIOLOGY CLINIC | Facility: HOSPITAL | Age: 69
End: 2024-03-11
Payer: MEDICARE

## 2024-03-11 VITALS
HEART RATE: 82 BPM | OXYGEN SATURATION: 95 % | SYSTOLIC BLOOD PRESSURE: 124 MMHG | HEIGHT: 74 IN | BODY MASS INDEX: 36.42 KG/M2 | WEIGHT: 283.8 LBS | DIASTOLIC BLOOD PRESSURE: 70 MMHG

## 2024-03-11 DIAGNOSIS — I42.8 NON-ISCHEMIC CARDIOMYOPATHY (HCC): Primary | Chronic | ICD-10-CM

## 2024-03-11 DIAGNOSIS — E78.5 DYSLIPIDEMIA: ICD-10-CM

## 2024-03-11 DIAGNOSIS — I10 BENIGN ESSENTIAL HYPERTENSION: ICD-10-CM

## 2024-03-11 DIAGNOSIS — I50.42 CHRONIC COMBINED SYSTOLIC AND DIASTOLIC CONGESTIVE HEART FAILURE (HCC): ICD-10-CM

## 2024-03-11 PROCEDURE — 99214 OFFICE O/P EST MOD 30 MIN: CPT | Performed by: PHYSICIAN ASSISTANT

## 2024-03-11 RX ORDER — ATORVASTATIN CALCIUM 20 MG/1
20 TABLET, FILM COATED ORAL DAILY
Qty: 30 TABLET | Refills: 6 | Status: SHIPPED | OUTPATIENT
Start: 2024-03-11

## 2024-03-18 ENCOUNTER — HOSPITAL ENCOUNTER (OUTPATIENT)
Dept: GASTROENTEROLOGY | Facility: HOSPITAL | Age: 69
Setting detail: OUTPATIENT SURGERY
Discharge: HOME | End: 2024-03-18
Attending: COLON & RECTAL SURGERY

## 2024-03-18 DIAGNOSIS — Z86.010 PERSONAL HISTORY OF COLONIC POLYPS: ICD-10-CM

## 2024-03-19 DIAGNOSIS — F41.9 ANXIETY: ICD-10-CM

## 2024-03-19 DIAGNOSIS — J34.89 SINUS DRAINAGE: ICD-10-CM

## 2024-03-19 RX ORDER — ALPRAZOLAM 0.5 MG/1
TABLET ORAL
Qty: 30 TABLET | Refills: 0 | Status: SHIPPED | OUTPATIENT
Start: 2024-03-19

## 2024-03-19 RX ORDER — FLUTICASONE PROPIONATE 50 MCG
SPRAY, SUSPENSION (ML) NASAL
Qty: 16 G | Refills: 1 | Status: SHIPPED | OUTPATIENT
Start: 2024-03-19

## 2024-03-22 ENCOUNTER — TELEPHONE (OUTPATIENT)
Dept: FAMILY MEDICINE CLINIC | Facility: CLINIC | Age: 69
End: 2024-03-22

## 2024-04-01 ENCOUNTER — TELEPHONE (OUTPATIENT)
Dept: NEPHROLOGY | Facility: CLINIC | Age: 69
End: 2024-04-01

## 2024-04-01 DIAGNOSIS — I10 BENIGN ESSENTIAL HYPERTENSION: Primary | ICD-10-CM

## 2024-04-02 ENCOUNTER — APPOINTMENT (OUTPATIENT)
Dept: RADIOLOGY | Facility: CLINIC | Age: 69
End: 2024-04-02
Payer: MEDICARE

## 2024-04-02 ENCOUNTER — OFFICE VISIT (OUTPATIENT)
Dept: PODIATRY | Facility: CLINIC | Age: 69
End: 2024-04-02
Payer: MEDICARE

## 2024-04-02 VITALS
SYSTOLIC BLOOD PRESSURE: 156 MMHG | WEIGHT: 283 LBS | BODY MASS INDEX: 36.34 KG/M2 | DIASTOLIC BLOOD PRESSURE: 69 MMHG | HEART RATE: 86 BPM

## 2024-04-02 DIAGNOSIS — B35.1 ONYCHOMYCOSIS: ICD-10-CM

## 2024-04-02 DIAGNOSIS — M21.42 PES PLANUS OF BOTH FEET: ICD-10-CM

## 2024-04-02 DIAGNOSIS — M79.671 RIGHT FOOT PAIN: ICD-10-CM

## 2024-04-02 DIAGNOSIS — E11.49 OTHER DIABETIC NEUROLOGICAL COMPLICATION ASSOCIATED WITH TYPE 2 DIABETES MELLITUS (HCC): ICD-10-CM

## 2024-04-02 DIAGNOSIS — M21.41 PES PLANUS OF BOTH FEET: ICD-10-CM

## 2024-04-02 DIAGNOSIS — M21.619 BUNION: ICD-10-CM

## 2024-04-02 DIAGNOSIS — M79.672 LEFT FOOT PAIN: ICD-10-CM

## 2024-04-02 DIAGNOSIS — L84 CALLUS: ICD-10-CM

## 2024-04-02 DIAGNOSIS — M79.672 LEFT FOOT PAIN: Primary | ICD-10-CM

## 2024-04-02 PROCEDURE — 99203 OFFICE O/P NEW LOW 30 MIN: CPT | Performed by: STUDENT IN AN ORGANIZED HEALTH CARE EDUCATION/TRAINING PROGRAM

## 2024-04-02 PROCEDURE — 11721 DEBRIDE NAIL 6 OR MORE: CPT | Performed by: STUDENT IN AN ORGANIZED HEALTH CARE EDUCATION/TRAINING PROGRAM

## 2024-04-02 PROCEDURE — 73630 X-RAY EXAM OF FOOT: CPT

## 2024-04-02 PROCEDURE — 11055 PARING/CUTG B9 HYPRKER LES 1: CPT | Performed by: STUDENT IN AN ORGANIZED HEALTH CARE EDUCATION/TRAINING PROGRAM

## 2024-04-02 NOTE — PROGRESS NOTES
Assessment/Plan:    No problem-specific Assessment & Plan notes found for this encounter.       Diagnoses and all orders for this visit:    Left foot pain  -     X-ray foot left 3+ views; Future  -     Diabetic Shoe  -     Diabetic Shoe Inserts  -     Diclofenac Sodium (VOLTAREN) 1 %; Apply 2 g topically 2 (two) times a day    Right foot pain  -     X-ray foot right 3+ views; Future  -     Diabetic Shoe  -     Diabetic Shoe Inserts  -     Diclofenac Sodium (VOLTAREN) 1 %; Apply 2 g topically 2 (two) times a day    Other diabetic neurological complication associated with type 2 diabetes mellitus (HCC)  -     Ambulatory Referral to Podiatry  -     Diabetic Shoe  -     Diabetic Shoe Inserts    Pes planus of both feet  -     Ambulatory Referral to Podiatry  -     Diabetic Shoe  -     Diabetic Shoe Inserts    Bunion  -     Diabetic Shoe  -     Diabetic Shoe Inserts    Onychomycosis    Callus      Plan:     1. A thorough neurovascular exam was performed. Patient presents for at-risk foot care.  Bilateral foot x-rays reviewed, I personally interpreted x-ray finding with the patient which is consistent with significant osteoarthritis to midfoot with pes planus foot deformity noted bilaterally.  No acute osseous abnormalities noted.    2.  I recommended conservative measure for bilateral foot pain from osteoarthritis and peripheral neuropathy complicated by diabetes.  Rx Voltaren gel to be applied as needed twice a day to bilateral feet.  Discussed importance of wearing supportive shoes Rx diabetic shoes.  Given patient has bunion and pes planus foot deformity along with peripheral neuropathy complicated by diabetes he will benefit from diabetic shoes with inserts to prevent diabetes related foot complications such as pressure injuries, full-thickness ulcers, infections and amputations.    3. All 10 toenails were debridement as follow: using nail nipper, benjamin, and curette, nails were sharply debrided, reduced in thickness  and length. Devitalized nail tissue and fungal debris excised and removed. Patient tolerated well.      4. Hyperkeratotic lesion was sharply trimmed to normal epithelium with a 15 blade without incident.  Patient was instructed to use OTC lotion or prescription to their feet.     5. Patient was educated on importance of glycemic control, daily foot assessment and proper shoe gear. Educational materials were provided.    6. Call if any questions or occurrence of any foot and ankle related complications     7. Return in 10-12 weeks.     8.  Recent PCP notes reviewed, recent blood work reviewed.    Lab Results   Component Value Date    HGBA1C 7.0 (H) 03/06/2024         Subjective:      Patient ID: Cricket Rosales is a 68 y.o. male.    HPI:  Cricket Rosales is a 68 y.o. male who presents with painful, elongated toenails and callus.  Patient reports bilateral foot pain sharp shooting at times.  He denies any injuries.  Patient reports he is diabetic and is on Jardiance.  He also presents with thickened mycotic appearing toenails which are hard for him to trim.  Requires at risk footcare.  No other complaints.        The following portions of the patient's history were reviewed and updated as appropriate: He  has a past medical history of Acute on chronic combined systolic and diastolic CHF (congestive heart failure) ([facility]) (7/27/2023), Alcohol abuse (7/27/2023), Ambulates with cane, Arthritis, Asthma, Back pain, Chest pain (12/22/2022), CHF (congestive heart failure) ([facility]), Claustrophobia, COPD (chronic obstructive pulmonary disease) ([facility]), COPD with acute exacerbation ([facility]) (05/06/2022), COVID-19, COVID-19 virus infection (12/03/2021), Depression, Hypertension, Mumps, Neck pain, Old MI (myocardial infarction), Pneumonia, Pulmonary emphysema ([facility]), Rectal bleeding (01/14/2019), Skin abnormalities, Sleep apnea, Tingling of both feet, and Wears glasses.  He   Patient Active Problem List    Diagnosis Date Noted    [place]  04/02/2024    Other diabetic neurological complication associated with type 2 diabetes mellitus ([facility]) 04/02/2024    Left foot pain 04/02/2024    Right foot pain 04/02/2024    Pes planus of both feet 04/02/2024    Severe persistent asthma without complication 02/02/2024    S/P TKR (total knee replacement), left 11/02/2023    CKD (chronic kidney disease) stage 3, GFR 30-59 ml/min ([facility]) 09/19/2023    Leukocytosis 09/19/2023    Chronic combined systolic and diastolic CHF (congestive heart failure) ([facility]) 09/18/2023    Moderate episode of recurrent major depressive disorder ([facility]) 01/03/2023    Controlled type 2 diabetes mellitus with diabetic nephropathy, without long-term current use of insulin ([facility]) 12/24/2022    PLMD (periodic limb movement disorder)     Hypertrophied anal papilla 01/14/2019    History of tobacco abuse 12/19/2018    Constipation 12/18/2018    Chronic asthma, moderate persistent, uncomplicated 02/14/2018    Obstructive sleep apnea 02/14/2018    Hemorrhoids 11/27/2017    COPD, severe ([facility]) 10/16/2017    Chronic combined systolic and diastolic congestive heart failure ([facility]) 08/04/2015    Non-ischemic cardiomyopathy with HFmEF ([facility]) 08/04/2015    Anxiety 08/06/2014    Thyroid disease 05/06/2014    Benign essential hypertension 06/14/2013    Vitamin D deficiency 06/14/2013    Dyslipidemia 04/03/2013    Asthma-COPD overlap syndrome 09/25/2012     He  has a past surgical history that includes Cardiac catheterization (07/24/2015); Inguinal hernia repair (Bilateral); Tonsillectomy; Umbilical hernia repair (06/21/2006); pr colonoscopy flx dx w/collj spec when pfrmd (N/A, 03/11/2019); Cardiac catheterization (Left, 09/27/2022); Cardiac catheterization (N/A, 09/27/2022); Cardiac catheterization (09/27/2022); Appendectomy; and TOTAL KNEE ARTHROPLASTY (Left, 9/18/2023).  Current Outpatient Medications   Medication Sig Dispense Refill    Accu-Chek FastClix Lancets MISC Use 1 each daily to test blood sugar. 100 each 5     albuterol (2.5 mg/3 mL) 0.083 % nebulizer solution Take 3 mL (2.5 mg total) by nebulization every 6 (six) hours as needed for wheezing or shortness of breath 360 mL 2    albuterol (ProAir HFA) 90 mcg/act inhaler Inhale 2 puffs every 6 (six) hours as needed for wheezing 18 g 11    ALPRAZolam (XANAX) 0.5 mg tablet TAKE 1 TABLET BY MOUTH ONCE DAILY AT BEDTIME AS NEEDED FOR ANXIETY 30 tablet 0    atorvastatin (LIPITOR) 20 mg tablet Take 1 tablet (20 mg total) by mouth daily 30 tablet 6    Blood Glucose Monitoring Suppl (Accu-Chek Guide Me) w/Device KIT Use 1 each daily to test blood sugar. 1 kit 0    carvedilol (COREG) 25 mg tablet Take 1 tablet (25 mg total) by mouth 2 (two) times a day with meals 60 tablet 11    Diclofenac Sodium (VOLTAREN) 1 % Apply 2 g topically 2 (two) times a day 100 g 2    DULoxetine (CYMBALTA) 60 mg delayed release capsule Take 1 capsule by mouth once daily 30 capsule 5    Empagliflozin (Jardiance) 10 MG TABS tablet Take 1 tablet (10 mg total) by mouth every morning 30 tablet 11    Entresto 49-51 MG TABS Take 1 tablet by mouth twice daily 60 tablet 11    fluticasone (FLONASE) 50 mcg/act nasal spray Use 1 spray(s) in each nostril twice daily 16 g 1    Fluticasone-Salmeterol (Advair) 500-50 mcg/dose inhaler INHALE 1 DOSE BY MOUTH TWICE DAILY RINSE MOUTH AFTER USE 60 blister 11    furosemide (LASIX) 40 mg tablet Take 1 tablet (40 mg total) by mouth every other day 45 tablet 3    glucose blood test strip Use 1 each daily to test blood sugar. 100 strip 5    isosorbide mononitrate (IMDUR) 30 mg 24 hr tablet Take 1 tablet by mouth once daily 90 tablet 3    montelukast (SINGULAIR) 10 mg tablet Take 1 tablet (10 mg total) by mouth daily at bedtime 30 tablet 0    tiotropium (Spiriva Respimat) 2.5 MCG/ACT AERS inhaler Inhale 2 spray (s) by mouth once daily. 4 g 6    aspirin (ECOTRIN LOW STRENGTH) 81 mg EC tablet Take 1 tablet (81 mg total) by mouth 2 (two) times a day for 14 days 28 tablet 0     buPROPion (Wellbutrin XL) 150 mg 24 hr tablet Take 1 tablet (150 mg total) by mouth daily (Patient not taking: Reported on 1/5/2024) 30 tablet 0    Dupilumab (Dupixent) 300 MG/2ML SOPN Inject 300 mg under the skin every 14 (fourteen) days (Patient not taking: Reported on 11/2/2023) 2 mL 11    Fluticasone-Salmeterol (Advair) 500-50 mcg/dose inhaler INHALE ONE PUFF BY MOUTH AND INTO THE LUNGS TWICE A DAY. RINSE MOUTH AFTER USE (Patient not taking: Reported on 3/11/2024) 60 blister 2    guaiFENesin (Mucus Relief) 600 mg 12 hr tablet Take 2 tablets (1,200 mg total) by mouth every 12 (twelve) hours (Patient not taking: Reported on 3/11/2024) 60 tablet 3    ipratropium-albuterol (DUO-NEB) 0.5-2.5 mg/3 mL nebulizer solution Take 3 mL by nebulization every 6 (six) hours as needed for wheezing or shortness of breath 360 mL 11    predniSONE 10 mg tablet Take 3 tabs BID X 2 days, 2 tabs BID X 2 days, 1 tab BID X 2 days, 1 tab daily X 2 days (Patient not taking: Reported on 3/11/2024) 26 tablet 0    sodium chloride 3 % inhalation solution Take 4 mL by nebulization as needed for cough (congestion) (Patient not taking: Reported on 3/11/2024) 30 mL 0     No current facility-administered medications for this visit.   .    Review of Systems   All other systems reviewed and are negative.        Objective:      /69   Pulse 86   Wt 128 kg (283 lb)   BMI 36.34 kg/m²          Physical Exam  Vitals reviewed.   Cardiovascular:      Pulses: Pulses are weak.           Dorsalis pedis pulses are 1+ on the right side and 1+ on the left side.        Posterior tibial pulses are 1+ on the right side and 1+ on the left side.   Musculoskeletal:      Right foot: Deformity and bunion present.      Left foot: Deformity and bunion present.      Comments: Discomfort with palpation along the midfoot bilateral at the TMT joints.   Feet:      Right foot:      Protective Sensation: 10 sites tested.  7 sites sensed.      Skin integrity: Callus and  "dry skin present.      Toenail Condition: Right toenails are abnormally thick and long. Fungal disease present.     Left foot:      Protective Sensation: 10 sites tested.  6 sites sensed.      Skin integrity: Dry skin present.      Toenail Condition: Left toenails are abnormally thick and long. Fungal disease present.     Comments: On exam patient has thickened, hypertrophic, discolored, brittle toenails with subungual debris and tenderness x10.   Callus: Right second digit distal  Patient has lower extremity edema  Patients skin is atrophic, thickened nails, and decreased pedal hair. Patient has decreased pinprick and vibratory sensation to his feet and parasthesia.   Bunion foot deformity noted bilaterally.  Pes planus foot deformity noted.          Diabetic Foot Exam    Patient's shoes and socks removed.    Right Foot/Ankle   Right Foot Inspection  Skin Exam: dry skin, callus and callus.     Toe Exam: right toe deformity.     Sensory   Monofilament testing: diminished    Vascular  The right DP pulse is 1+. The right PT pulse is 1+.     Right Toe  - Comprehensive Exam  Hallux valgus: yes      Left Foot/Ankle  Left Foot Inspection  Skin Exam: dry skin.     Toe Exam: left toe deformity.     Sensory   Monofilament testing: diminished    Vascular  The left DP pulse is 1+. The left PT pulse is 1+.     Left Toe  - Comprehensive Exam  Hallux valgus: yes      Assign Risk Category  Deformity present  Loss of protective sensation  Weak pulses  Risk: 2  Lesion Destruction    Date/Time: 4/2/2024 11:30 AM    Performed by: Paola Andrews DPM  Authorized by: Paola Andrews DPM  Universal Protocol:  Consent: Verbal consent obtained.  Risks and benefits: risks, benefits and alternatives were discussed  Consent given by: patient  Time out: Immediately prior to procedure a \"time out\" was called to verify the correct patient, procedure, equipment, support staff and site/side marked as required.  Patient understanding: patient states " understanding of the procedure being performed  Patient identity confirmed: verbally with patient    Procedure Details - Lesion Destruction:     Number of Lesions:  1  Lesion 1:     Body area:  Lower extremity    Lower extremity location:  R second toe    Malignancy: benign hyperkeratotic lesion      Destruction method: scissors used for extraction

## 2024-04-06 ENCOUNTER — APPOINTMENT (OUTPATIENT)
Dept: LAB | Facility: CLINIC | Age: 69
End: 2024-04-06
Payer: MEDICARE

## 2024-04-06 DIAGNOSIS — I10 BENIGN ESSENTIAL HYPERTENSION: ICD-10-CM

## 2024-04-06 LAB
BACTERIA UR QL AUTO: ABNORMAL /HPF
BILIRUB UR QL STRIP: NEGATIVE
CLARITY UR: CLEAR
COLOR UR: YELLOW
CREAT UR-MCNC: 87.1 MG/DL
CREAT UR-MCNC: 87.1 MG/DL
GLUCOSE UR STRIP-MCNC: ABNORMAL MG/DL
HGB UR QL STRIP.AUTO: NEGATIVE
KETONES UR STRIP-MCNC: NEGATIVE MG/DL
LEUKOCYTE ESTERASE UR QL STRIP: NEGATIVE
MICROALBUMIN UR-MCNC: 411.1 MG/L
MICROALBUMIN/CREAT 24H UR: 472 MG/G CREATININE (ref 0–30)
MUCOUS THREADS UR QL AUTO: ABNORMAL
NITRITE UR QL STRIP: NEGATIVE
NON-SQ EPI CELLS URNS QL MICRO: ABNORMAL /HPF
PH UR STRIP.AUTO: 6 [PH]
PROT UR STRIP-MCNC: ABNORMAL MG/DL
PROT UR-MCNC: 135 MG/DL
PROT/CREAT UR: 1.55 MG/G{CREAT} (ref 0–0.1)
RBC #/AREA URNS AUTO: ABNORMAL /HPF
SP GR UR STRIP.AUTO: 1.02 (ref 1–1.03)
UROBILINOGEN UR STRIP-ACNC: <2 MG/DL
WBC #/AREA URNS AUTO: ABNORMAL /HPF

## 2024-04-06 PROCEDURE — 82043 UR ALBUMIN QUANTITATIVE: CPT

## 2024-04-06 PROCEDURE — 84156 ASSAY OF PROTEIN URINE: CPT

## 2024-04-06 PROCEDURE — 82570 ASSAY OF URINE CREATININE: CPT

## 2024-04-06 PROCEDURE — 81001 URINALYSIS AUTO W/SCOPE: CPT

## 2024-04-08 ENCOUNTER — APPOINTMENT (OUTPATIENT)
Dept: LAB | Facility: CLINIC | Age: 69
End: 2024-04-08
Payer: MEDICARE

## 2024-04-08 DIAGNOSIS — R80.9 PROTEINURIA, UNSPECIFIED TYPE: Primary | ICD-10-CM

## 2024-04-08 DIAGNOSIS — R80.9 TYPE 2 DIABETES MELLITUS WITH PROTEINURIA  (HCC): Primary | ICD-10-CM

## 2024-04-08 DIAGNOSIS — I10 BENIGN ESSENTIAL HYPERTENSION: ICD-10-CM

## 2024-04-08 DIAGNOSIS — E11.29 TYPE 2 DIABETES MELLITUS WITH PROTEINURIA  (HCC): Primary | ICD-10-CM

## 2024-04-08 LAB
ALBUMIN SERPL BCP-MCNC: 4.1 G/DL (ref 3.5–5)
ALP SERPL-CCNC: 79 U/L (ref 34–104)
ALT SERPL W P-5'-P-CCNC: 15 U/L (ref 7–52)
ANION GAP SERPL CALCULATED.3IONS-SCNC: 13 MMOL/L (ref 4–13)
AST SERPL W P-5'-P-CCNC: 14 U/L (ref 13–39)
BASOPHILS # BLD AUTO: 0.1 THOUSANDS/ÂΜL (ref 0–0.1)
BASOPHILS NFR BLD AUTO: 1 % (ref 0–1)
BILIRUB SERPL-MCNC: 0.82 MG/DL (ref 0.2–1)
BUN SERPL-MCNC: 28 MG/DL (ref 5–25)
CALCIUM SERPL-MCNC: 9.1 MG/DL (ref 8.4–10.2)
CHLORIDE SERPL-SCNC: 102 MMOL/L (ref 96–108)
CO2 SERPL-SCNC: 21 MMOL/L (ref 21–32)
CREAT SERPL-MCNC: 1.43 MG/DL (ref 0.6–1.3)
EOSINOPHIL # BLD AUTO: 0.44 THOUSAND/ÂΜL (ref 0–0.61)
EOSINOPHIL NFR BLD AUTO: 5 % (ref 0–6)
ERYTHROCYTE [DISTWIDTH] IN BLOOD BY AUTOMATED COUNT: 16.4 % (ref 11.6–15.1)
GFR SERPL CREATININE-BSD FRML MDRD: 50 ML/MIN/1.73SQ M
GLUCOSE P FAST SERPL-MCNC: 107 MG/DL (ref 65–99)
HCT VFR BLD AUTO: 44.2 % (ref 36.5–49.3)
HGB BLD-MCNC: 13.7 G/DL (ref 12–17)
IMM GRANULOCYTES # BLD AUTO: 0.03 THOUSAND/UL (ref 0–0.2)
IMM GRANULOCYTES NFR BLD AUTO: 0 % (ref 0–2)
LYMPHOCYTES # BLD AUTO: 2.01 THOUSANDS/ÂΜL (ref 0.6–4.47)
LYMPHOCYTES NFR BLD AUTO: 23 % (ref 14–44)
MAGNESIUM SERPL-MCNC: 2.1 MG/DL (ref 1.9–2.7)
MCH RBC QN AUTO: 26.2 PG (ref 26.8–34.3)
MCHC RBC AUTO-ENTMCNC: 31 G/DL (ref 31.4–37.4)
MCV RBC AUTO: 85 FL (ref 82–98)
MONOCYTES # BLD AUTO: 0.67 THOUSAND/ÂΜL (ref 0.17–1.22)
MONOCYTES NFR BLD AUTO: 8 % (ref 4–12)
NEUTROPHILS # BLD AUTO: 5.34 THOUSANDS/ÂΜL (ref 1.85–7.62)
NEUTS SEG NFR BLD AUTO: 63 % (ref 43–75)
NRBC BLD AUTO-RTO: 0 /100 WBCS
PHOSPHATE SERPL-MCNC: 3.3 MG/DL (ref 2.3–4.1)
PLATELET # BLD AUTO: 336 THOUSANDS/UL (ref 149–390)
PMV BLD AUTO: 9.7 FL (ref 8.9–12.7)
POTASSIUM SERPL-SCNC: 4.3 MMOL/L (ref 3.5–5.3)
PROT SERPL-MCNC: 6.9 G/DL (ref 6.4–8.4)
RBC # BLD AUTO: 5.23 MILLION/UL (ref 3.88–5.62)
SODIUM SERPL-SCNC: 136 MMOL/L (ref 135–147)
WBC # BLD AUTO: 8.59 THOUSAND/UL (ref 4.31–10.16)

## 2024-04-08 PROCEDURE — 85025 COMPLETE CBC W/AUTO DIFF WBC: CPT

## 2024-04-08 PROCEDURE — 80053 COMPREHEN METABOLIC PANEL: CPT

## 2024-04-08 PROCEDURE — 36415 COLL VENOUS BLD VENIPUNCTURE: CPT

## 2024-04-08 PROCEDURE — 84100 ASSAY OF PHOSPHORUS: CPT

## 2024-04-08 PROCEDURE — 83735 ASSAY OF MAGNESIUM: CPT

## 2024-04-08 RX ORDER — LISINOPRIL 5 MG/1
5 TABLET ORAL DAILY
Qty: 30 TABLET | Refills: 3 | Status: SHIPPED | OUTPATIENT
Start: 2024-04-08 | End: 2024-04-08 | Stop reason: CLARIF

## 2024-04-09 ENCOUNTER — CONSULT (OUTPATIENT)
Dept: NEPHROLOGY | Facility: CLINIC | Age: 69
End: 2024-04-09

## 2024-04-09 VITALS
HEIGHT: 74 IN | WEIGHT: 285 LBS | SYSTOLIC BLOOD PRESSURE: 136 MMHG | HEART RATE: 111 BPM | OXYGEN SATURATION: 96 % | DIASTOLIC BLOOD PRESSURE: 76 MMHG | BODY MASS INDEX: 36.57 KG/M2

## 2024-04-09 DIAGNOSIS — N18.30 STAGE 3 CHRONIC KIDNEY DISEASE, UNSPECIFIED WHETHER STAGE 3A OR 3B CKD (HCC): Primary | ICD-10-CM

## 2024-04-09 DIAGNOSIS — I10 BENIGN ESSENTIAL HYPERTENSION: ICD-10-CM

## 2024-04-09 DIAGNOSIS — E11.21 CONTROLLED TYPE 2 DIABETES MELLITUS WITH DIABETIC NEPHROPATHY, WITHOUT LONG-TERM CURRENT USE OF INSULIN (HCC): ICD-10-CM

## 2024-04-09 DIAGNOSIS — I50.42 CHRONIC COMBINED SYSTOLIC AND DIASTOLIC CONGESTIVE HEART FAILURE (HCC): ICD-10-CM

## 2024-04-09 NOTE — PROGRESS NOTES
[facility]  Consultation - Nephrology    Cricket Rosales 68 y.o. male MRN: [id number]      A/P:    1. Stage 3 chronic kidney disease, unspecified whether stage 3a or 3b CKD ([facility])  -     Ambulatory Referral to Nephrology  -      kidney and bladder; Future; Expected date: 04/09/2024  -     Uric acid; Future; Expected date: 09/04/2024  -     Urinalysis with microscopic; Future; Expected date: 09/04/2024  -     PTH, intact; Future; Expected date: 09/04/2024  -     Albumin / creatinine urine ratio; Future; Expected date: 09/04/2024  -     Comprehensive metabolic panel; Future; Expected date: 09/04/2024  -     CBC and differential; Future; Expected date: 09/04/2024  -     Magnesium; Future; Expected date: 09/04/2024  -     Phosphorus; Future; Expected date: 09/04/2024  -     Protein electrophoresis, serum; Future; Expected date: 09/04/2024  -     Protein electrophoresis, urine; Future; Expected date: 09/04/2024  -     YANN BY MULTIPLEX IMMUNOASSAY,REFLEX TO 5-BIOMARKER PROFILE; Future; Expected date: 09/04/2024  -     Anti-neutrophilic cytoplasmic antibody; Future; Expected date: 09/04/2024    2. Controlled type 2 diabetes mellitus with diabetic nephropathy, without long-term current use of insulin ([facility])    3. Chronic combined systolic and diastolic congestive heart failure (HCC)    4. Benign essential hypertension         I have reviewed pertinent labs and imaging with patient.    1CKD3a:  -Baseline Cr: 1.4-1.6 Mg per deciliter.  4/8/2024 creatinine 1.4 with EGFR 50 mL/min.  -Etiology: Age-related EGFR loss, cardiorenal syndrome, hypertensive nephrosclerosis, obesity, tobacco use.  Patient is not biopsy-proven.  His diet doses of diabetes is relatively new is not in therapy aside from Jardiance for cardio renal protection.  -BP: Normotensive at present.  -Electrolyte/acid-base: Metabolic parameters stable.  He is not anemic.  Albumin/creatinine ratio 472 Mg per due creatinine.   Reviewed previous albuminuria trends with patient.  He has longstanding albuminuria likely due to tobacco use/obesity.  His diagnosis of diabetes is relatively new so unclear if this is contributing factor.  He is not biopsy proven.    Reviewed CKD stage, creatinine and EGFR trends and EGFR meeting with patient.  His blood pressure is well-controlled.  His A1c is 7.0.  Discussed renal protective medications that he is currently on including Jardiance and Entresto.  He is already on Jardiance 10 mg/day which is renal protective dose however, since he has elevated cardiovascular risk and diabetes may be a candidate to increase to 25 mg/day.  There was some concern that he has low sugars at times although, he is not checking them.  He feels like he has hypoglycemic symptoms.  He has been referred to an endocrinologist at this time we will hold off on making adjustments.    Continue goal-directed medical therapy per cardiology.    Advised weight loss.  Advised that he follow a 2 to 3 g sodium diet.  He is able to titrate his Lasix based on his sodium indiscretion.  Check renal ultrasound for baseline.  Will check a limited serologic workup including ANCA, and YANN, paraproteinemia workup.  Follow-up in 6 months with labs prior.      2.  Controlled type 2 diabetes, reviewed A1c with patient.  We had discussed increasing his Jardiance to 25 mg/day however, as he is going to follow with an endocrinologist we will hold on making changes for now.  He feels like he has low sugars at times however, he is not checking his blood sugars at all.      3.  Chronic combined systolic and diastolic heart failure, he does not appear to be in acute exacerbation appears compensated at present.  He does report frequent dietary indiscretions and may take an additional Lasix.  He is able to titrate his own therapy.  Weight overall has been stable.  Continue with goal-directed medical therapy with Entresto, Jardiance, Coreg.      4.  Benign  essential hypertension  Blood pressure trends well-controlled at present.  Will not make any adjustment this at this time.  He does not monitor his blood pressure trends at home.  He does report dietary indiscretions with high sodium diet including Chinese food.  I have encouraged him to follow 2 g sodium diet.  Volume status appears relatively compensated at present.  He does not have any lower extremity edema.  His lungs are clear.      The patient and any family members present were counseled today on risks benefits and alternatives of any medications, and were agreeable to the treatment plan.  They were counseled on diagnostic testing if necessary, and projected outcomes as are available and medically indicated.  All questions were asked and answered during the encounter. If more questions are to arise the patient will call the office. Alarm and return precautions discussed and accepted.       Thank you for allowing us to participate in the care of your patient.        History of Present Illness   Physician Requesting Consult: No att. providers found    HPI: Cricket Rosales is a 68 y.o. year old male who presents with history of nonischemic cardiomyopathy due to alcohol abuse, chronic combined systolic and diastolic heart failure, hypertension, dyslipidemia, CORDELL presented for evaluation of the nephrology office.  Patient has a history of nonischemic cardiopathy EF 45% maintained on carvedilol 25 mg twice daily, Entresto 49/51 mg twice daily and Jardiance 10 mg/day.  He was last seen by cardiology service on 3/11/2024.  He is also maintained on Lasix 40 mg every other day and can take an additional if weight gain.  His weight has been around 280 pounds per previous weight.  He has a history of CKD 3 per specialist documentation.  Patient was hospitalized in September for primary osteoarthritis of left knee.  Patient was not seen by the nephrology service at this time but was recommended to follow-up with an  outpatient nephrology for creatinine kidney disease  Nephrolithiasis:denies  Hematuria: denies  Proteinuria:?   Hypertension: >20 years, does not monitor at home. Patient on coreg 25 mg bid, lasix 40mg/day, imdur 30mg/day.   Family history of renal disease: denies   NSAID use:takes large doses of tylenol?   Pyelo-/UTI:denies  Urology:he had seen a urologist for BPH previously.   He reports indulging salty food like Chinese food and can gain 3 lb in 24 hr and titrates dose as needed.     Most recent blood work on 4/8 showed creatinine 1.43 with EGFR 50 mill per minute.  Urine protein to creatinine ratio 1.55 g.  Urine albumin to creatinine ratio 472 Mg per G creatinine.  Patient has an A1c of 7.0 on 3/6.    Patient uses marijuana gummies.Using THC products.     Denies autoimmune disease. Denies cancer.     Reports vision issues with enlargement black triangle. He has not experienced this in 3-4 years.     Reports rare alcohol use, likes to have beer with sushi.     Thinks cardiac medications messing with BG. DM has not entired his daily life yet.     Constitutional ROS- lost 80 lb   HEENT ROS- vision --floaters ?   Endocrine ROS- DM   Cardiovascular ROS- Denies chest pain, palpitation. Reports extreme pressure in neck/ears. He has been worked up and ER check. Takes 3-4 ASA and goes away.   Pulmonary ROS-  asthma/copd, chronic SOB and follows with pulm. Does not use O2 at home   GI ROS- Denies abdominal pain, diarrhea, nausea, swallowing problems, vomiting, constipation, blood in stools, fecal incontinence.   Hematological ROS- Denies history of easy bruising, blood clots, bleeding or blood transfusions.  Genitourinary ROS- Denies recent hematuria, pyuria, flank pain, change in urinary stream, decreased urinary output, increased urinary frequency, nocturia, foamy urine, or urinary incontinence.  Lymphatic ROS- Denies lymphadenopathy.  Musculoskeletal ROS-   Dermatological ROS- contacted derm, he reports skin  conditions on knees.   Psychiatric ROS- Denies hallucinations, disorientation.  Neurological ROS- No stroke or TIA symptoms.     Historical Information   Past Medical History:   Diagnosis Date    Acute on chronic combined systolic and diastolic CHF (congestive heart failure) ([facility]) 7/27/2023    Alcohol abuse 7/27/2023    Ambulates with cane     Arthritis     Asthma     Back pain     Chest pain 12/22/2022    CHF (congestive heart failure) ([facility])     Claustrophobia     COPD (chronic obstructive pulmonary disease) ([facility])     COPD with acute exacerbation ([facility]) 05/06/2022    COVID-19     COVID-19 virus infection 12/03/2021    Depression     Hypertension     Mumps     Neck pain     Old MI (myocardial infarction)     Pneumonia     Pulmonary emphysema ([facility])     Rectal bleeding 01/14/2019    Skin abnormalities     rashes and wounds on both legs - scabbed over and healing    Sleep apnea     no CPAP    Tingling of both feet     Wears glasses      Past Surgical History:   Procedure Laterality Date    APPENDECTOMY      CARDIAC CATHETERIZATION  07/24/2015    Left main- normal and maidly tortuous.  Circumflex - normal and moderately tortuous.  RCA- normal and mildy tortuous.  Global LV function was severely depressed..    CARDIAC CATHETERIZATION Left 09/27/2022    Procedure: Cardiac Left Heart Cath;  Surgeon: Doreen Briones DO;  Location:  CARDIAC CATH LAB;  Service: Cardiology    CARDIAC CATHETERIZATION N/A 09/27/2022    Procedure: Cardiac Coronary Angiogram;  Surgeon: Doreen Briones DO;  Location:  CARDIAC CATH LAB;  Service: Cardiology    CARDIAC CATHETERIZATION  09/27/2022    Procedure: Cardiac catheterization;  Surgeon: Doreen Briones DO;  Location:  CARDIAC CATH LAB;  Service: Cardiology    INGUINAL HERNIA REPAIR Bilateral     FL COLONOSCOPY FLX DX W/COLLJ SPEC WHEN PFRMD N/A 03/11/2019    Procedure: COLONOSCOPY with removal of anal papilla;  Surgeon: ANIL Dale MD;  Location: [facility] OR;  Service:  Colorectal    TONSILLECTOMY      TOTAL KNEE ARTHROPLASTY Left 2023    Procedure: ARTHROPLASTY KNEE TOTAL;  Surgeon: David Mullen DO;  Location: [facility];  Service: Orthopedics    UMBILICAL HERNIA REPAIR  2006     Social History   Social History     Substance and Sexual Activity   Alcohol Use Not Currently     Social History     Substance and Sexual Activity   Drug Use Yes    Types: Marijuana    Comment: occasionally edible     Social History     Tobacco Use   Smoking Status Former    Current packs/day: 0.00    Average packs/day: 3.0 packs/day for 43.0 years (129.0 ttl pk-yrs)    Types: Cigarettes    Start date:     Quit date:     Years since quittin.2   Smokeless Tobacco Never     Family History   Problem Relation Age of Onset    Heart attack Father         MI    Heart disease Father     Diabetes Sister         DM    Arthritis Family     Coronary artery disease Family     Hypertension Family     Tuberculosis Son     Alzheimer's disease Mother        Meds/Allergies   all current active meds have been reviewed, current meds:     Current Outpatient Medications:     Accu-Chek FastClix Lancets MISC, Use 1 each daily to test blood sugar., Disp: 100 each, Rfl: 5    albuterol (2.5 mg/3 mL) 0.083 % nebulizer solution, Take 3 mL (2.5 mg total) by nebulization every 6 (six) hours as needed for wheezing or shortness of breath, Disp: 360 mL, Rfl: 2    albuterol (ProAir HFA) 90 mcg/act inhaler, Inhale 2 puffs every 6 (six) hours as needed for wheezing, Disp: 18 g, Rfl: 11    ALPRAZolam (XANAX) 0.5 mg tablet, TAKE 1 TABLET BY MOUTH ONCE DAILY AT BEDTIME AS NEEDED FOR ANXIETY, Disp: 30 tablet, Rfl: 0    aspirin (ECOTRIN LOW STRENGTH) 81 mg EC tablet, Take 1 tablet (81 mg total) by mouth 2 (two) times a day for 14 days, Disp: 28 tablet, Rfl: 0    atorvastatin (LIPITOR) 20 mg tablet, Take 1 tablet (20 mg total) by mouth daily, Disp: 30 tablet, Rfl: 6    Blood Glucose Monitoring Suppl (Accu-Chek Guide Me)  w/Device KIT, Use 1 each daily to test blood sugar., Disp: 1 kit, Rfl: 0    carvedilol (COREG) 25 mg tablet, Take 1 tablet (25 mg total) by mouth 2 (two) times a day with meals, Disp: 60 tablet, Rfl: 11    Diclofenac Sodium (VOLTAREN) 1 %, Apply 2 g topically 2 (two) times a day, Disp: 100 g, Rfl: 2    DULoxetine (CYMBALTA) 60 mg delayed release capsule, Take 1 capsule by mouth once daily, Disp: 30 capsule, Rfl: 5    Empagliflozin (Jardiance) 10 MG TABS tablet, Take 1 tablet (10 mg total) by mouth every morning, Disp: 30 tablet, Rfl: 11    Entresto 49-51 MG TABS, Take 1 tablet by mouth twice daily, Disp: 60 tablet, Rfl: 11    fluticasone (FLONASE) 50 mcg/act nasal spray, Use 1 spray(s) in each nostril twice daily, Disp: 16 g, Rfl: 1    Fluticasone-Salmeterol (Advair) 500-50 mcg/dose inhaler, INHALE 1 DOSE BY MOUTH TWICE DAILY RINSE MOUTH AFTER USE, Disp: 60 blister, Rfl: 11    Fluticasone-Salmeterol (Advair) 500-50 mcg/dose inhaler, INHALE ONE PUFF BY MOUTH AND INTO THE LUNGS TWICE A DAY. RINSE MOUTH AFTER USE, Disp: 60 blister, Rfl: 2    furosemide (LASIX) 40 mg tablet, Take 1 tablet (40 mg total) by mouth every other day, Disp: 45 tablet, Rfl: 3    glucose blood test strip, Use 1 each daily to test blood sugar., Disp: 100 strip, Rfl: 5    ipratropium-albuterol (DUO-NEB) 0.5-2.5 mg/3 mL nebulizer solution, Take 3 mL by nebulization every 6 (six) hours as needed for wheezing or shortness of breath, Disp: 360 mL, Rfl: 11    isosorbide mononitrate (IMDUR) 30 mg 24 hr tablet, Take 1 tablet by mouth once daily, Disp: 90 tablet, Rfl: 3    montelukast (SINGULAIR) 10 mg tablet, Take 1 tablet (10 mg total) by mouth daily at bedtime, Disp: 30 tablet, Rfl: 0    tiotropium (Spiriva Respimat) 2.5 MCG/ACT AERS inhaler, Inhale 2 spray (s) by mouth once daily., Disp: 4 g, Rfl: 6    buPROPion (Wellbutrin XL) 150 mg 24 hr tablet, Take 1 tablet (150 mg total) by mouth daily (Patient not taking: Reported on 1/5/2024), Disp: 30 tablet,  "Rfl: 0    Dupilumab (Dupixent) 300 MG/2ML SOPN, Inject 300 mg under the skin every 14 (fourteen) days (Patient not taking: Reported on 11/2/2023), Disp: 2 mL, Rfl: 11     Allergies   Allergen Reactions    Ciprofloxacin Shortness Of Breath and Diarrhea       Objective     Vitals:    04/09/24 1221   BP: 136/76   Pulse: (!) 111   SpO2: 96%       General Appearance:    No acute distress. Cooperative. Appears stated age.   Head:    Normocephalic. Atraumatic.    Eyes:    Lids, conjunctiva normal. No scleral icterus.   Ears:    Normal external ears.   Nose:   Nares normal. No drainage.   Mouth:   Lips, tongue normal. Mucosa normal.    Neck:   Supple. Symmetrical.   Back:     Symmetric. No CVA tenderness.   Lungs:     Normal respiratory effort. Clear to auscultation bilaterally.   Chest wall:    No tenderness or deformity.   Heart:    Regular rate and rhythm. Normal S1 and S2. No murmur. No JVD. No edema.   Abdomen:     Soft. Non-tender. Bowel sounds active. Obese, distended   Genitourinary:      Extremities:   Extremities normal. Atraumatic. No cyanosis.   Skin:   Warm and dry. No pallor, jaundice, rash, ecchymoses.   Neurologic:   Alert and oriented to person, place, time. No focal deficit.         Current Weight: Weight - Scale: 129 kg (285 lb)    First Weight:    Wt Readings from Last 3 Encounters:   04/09/24 129 kg (285 lb)   04/02/24 128 kg (283 lb)   03/11/24 129 kg (283 lb 12.8 oz)        Lab Results:  I have personally reviewed pertinent labs.    CBC: No results found for: \"WBC\", \"HGB\", \"HCT\", \"MCV\", \"PLT\", \"ADJUSTEDWBC\", \"RBC\", \"MCH\", \"MCHC\", \"RDW\", \"MPV\", \"NRBC\"  CMP: No results found for: \"NA\", \"K\", \"CL\", \"CO2\", \"ANIONGAP\", \"BUN\", \"CREATININE\", \"GLUCOSE\", \"CALCIUM\", \"AST\", \"ALT\", \"ALKPHOS\", \"PROT\", \"BILITOT\", \"EGFR\"  Phosphorus: No results found for: \"PHOS\"  Magnesium: No results found for: \"MG\"  Urinalysis: No results found for: \"COLORU\", \"CLARITYU\", \"SPECGRAV\", \"PHUR\", \"LEUKOCYTESUR\", \"NITRITE\", \"PROTEINUA\", " "\"GLUCOSEU\", \"KETONESU\", \"BILIRUBINUR\", \"BLOODU\"  Ionized Calcium: No results found for: \"CAION\"        Radiology review:  No results found.      Susan Muir, DO      This consultation note was produced in part using a dictation device which may document imprecise wording from author's original intent.    "

## 2024-04-09 NOTE — PATIENT INSTRUCTIONS
1 you have stage 3 kidney kidney disease and most recent function is 50%.   2. Avoid motrin,advil,aleve, ibuprofen.   3. You likely have kidney disease due to age, tobacco use, cardiac issues, obesity and high blood pressure.  4. You are on kidney protective medications already jardiance and entresto.   5. You should follow a heart healthy diet with low sodium intake, 2 gram a day.   Will check a workup for causes of kidney disease and will check a kidney ultrasound.  Will hold on increasing Jardiance since you have been referred to endocrinology.

## 2024-04-20 DIAGNOSIS — J44.89 ASTHMA-COPD OVERLAP SYNDROME: ICD-10-CM

## 2024-04-20 DIAGNOSIS — F41.9 ANXIETY: ICD-10-CM

## 2024-04-21 RX ORDER — ALPRAZOLAM 0.5 MG/1
TABLET ORAL
Qty: 30 TABLET | Refills: 0 | Status: SHIPPED | OUTPATIENT
Start: 2024-04-21

## 2024-04-22 RX ORDER — TIOTROPIUM BROMIDE INHALATION SPRAY 3.12 UG/1
SPRAY, METERED RESPIRATORY (INHALATION)
Qty: 4 G | Refills: 5 | Status: SHIPPED | OUTPATIENT
Start: 2024-04-22

## 2024-04-24 ENCOUNTER — APPOINTMENT (EMERGENCY)
Dept: NON INVASIVE DIAGNOSTICS | Facility: HOSPITAL | Age: 69
DRG: 871 | End: 2024-04-24
Payer: MEDICARE

## 2024-04-24 ENCOUNTER — HOSPITAL ENCOUNTER (INPATIENT)
Facility: HOSPITAL | Age: 69
LOS: 2 days | Discharge: HOME | DRG: 871 | End: 2024-04-26
Attending: EMERGENCY MEDICINE | Admitting: INTERNAL MEDICINE
Payer: MEDICARE

## 2024-04-24 ENCOUNTER — APPOINTMENT (EMERGENCY)
Dept: RADIOLOGY | Facility: HOSPITAL | Age: 69
DRG: 871 | End: 2024-04-24
Payer: MEDICARE

## 2024-04-24 DIAGNOSIS — R79.89 ELEVATED TROPONIN: ICD-10-CM

## 2024-04-24 DIAGNOSIS — N17.9 AKI (ACUTE KIDNEY INJURY) (HCC): ICD-10-CM

## 2024-04-24 DIAGNOSIS — J18.9 PNEUMONIA: ICD-10-CM

## 2024-04-24 DIAGNOSIS — E87.1 HYPONATREMIA: Primary | ICD-10-CM

## 2024-04-24 DIAGNOSIS — R50.9 FEVER: ICD-10-CM

## 2024-04-24 DIAGNOSIS — A41.9 SEPSIS (HCC): ICD-10-CM

## 2024-04-24 DIAGNOSIS — L03.115 CELLULITIS OF RIGHT LOWER EXTREMITY: ICD-10-CM

## 2024-04-24 PROBLEM — L03.90 CELLULITIS: Status: ACTIVE | Noted: 2024-04-24

## 2024-04-24 LAB
2HR DELTA HS TROPONIN: -8 NG/L
ALBUMIN SERPL BCP-MCNC: 3.9 G/DL (ref 3.5–5)
ALP SERPL-CCNC: 68 U/L (ref 34–104)
ALT SERPL W P-5'-P-CCNC: 19 U/L (ref 7–52)
ANION GAP SERPL CALCULATED.3IONS-SCNC: 9 MMOL/L (ref 4–13)
ANISOCYTOSIS BLD QL SMEAR: ABNORMAL
AST SERPL W P-5'-P-CCNC: 13 U/L (ref 13–39)
ATRIAL RATE: 78 BPM
ATRIAL RATE: 80 BPM
BASOPHILS # BLD MANUAL: 0 THOUSAND/UL (ref 0–0.1)
BASOPHILS NFR MAR MANUAL: 0 % (ref 0–1)
BILIRUB SERPL-MCNC: 1.31 MG/DL (ref 0.2–1)
BNP SERPL-MCNC: 422 PG/ML (ref 0–100)
BUN SERPL-MCNC: 40 MG/DL (ref 5–25)
CALCIUM SERPL-MCNC: 8.9 MG/DL (ref 8.4–10.2)
CARDIAC TROPONIN I PNL SERPL HS: 50 NG/L
CARDIAC TROPONIN I PNL SERPL HS: 58 NG/L
CHLORIDE SERPL-SCNC: 97 MMOL/L (ref 96–108)
CO2 SERPL-SCNC: 22 MMOL/L (ref 21–32)
CREAT SERPL-MCNC: 1.87 MG/DL (ref 0.6–1.3)
EOSINOPHIL # BLD MANUAL: 0 THOUSAND/UL (ref 0–0.4)
EOSINOPHIL NFR BLD MANUAL: 0 % (ref 0–6)
ERYTHROCYTE [DISTWIDTH] IN BLOOD BY AUTOMATED COUNT: 16.1 % (ref 11.6–15.1)
FLUAV RNA RESP QL NAA+PROBE: NEGATIVE
FLUBV RNA RESP QL NAA+PROBE: NEGATIVE
GFR SERPL CREATININE-BSD FRML MDRD: 36 ML/MIN/1.73SQ M
GLUCOSE SERPL-MCNC: 160 MG/DL (ref 65–140)
HCT VFR BLD AUTO: 42.2 % (ref 36.5–49.3)
HGB BLD-MCNC: 13.5 G/DL (ref 12–17)
LACTATE SERPL-SCNC: 1.3 MMOL/L (ref 0.5–2)
LYMPHOCYTES # BLD AUTO: 1 THOUSAND/UL (ref 0.6–4.47)
LYMPHOCYTES # BLD AUTO: 3 % (ref 14–44)
MCH RBC QN AUTO: 26.1 PG (ref 26.8–34.3)
MCHC RBC AUTO-ENTMCNC: 32 G/DL (ref 31.4–37.4)
MCV RBC AUTO: 82 FL (ref 82–98)
MONOCYTES # BLD AUTO: 0.75 THOUSAND/UL (ref 0–1.22)
MONOCYTES NFR BLD: 3 % (ref 4–12)
NEUTROPHILS # BLD MANUAL: 23.34 THOUSAND/UL (ref 1.85–7.62)
NEUTS BAND NFR BLD MANUAL: 14 % (ref 0–8)
NEUTS SEG NFR BLD AUTO: 79 % (ref 43–75)
P AXIS: 144 DEGREES
P AXIS: 62 DEGREES
PLATELET # BLD AUTO: 259 THOUSANDS/UL (ref 149–390)
PLATELET BLD QL SMEAR: ADEQUATE
PMV BLD AUTO: 9.4 FL (ref 8.9–12.7)
POTASSIUM SERPL-SCNC: 3.9 MMOL/L (ref 3.5–5.3)
PR INTERVAL: 228 MS
PR INTERVAL: 230 MS
PROCALCITONIN SERPL-MCNC: 7.75 NG/ML
PROT SERPL-MCNC: 6.8 G/DL (ref 6.4–8.4)
QRS AXIS: -49 DEGREES
QRS AXIS: -51 DEGREES
QRSD INTERVAL: 114 MS
QRSD INTERVAL: 116 MS
QT INTERVAL: 384 MS
QT INTERVAL: 412 MS
QTC INTERVAL: 442 MS
QTC INTERVAL: 469 MS
RBC # BLD AUTO: 5.18 MILLION/UL (ref 3.88–5.62)
RBC MORPH BLD: PRESENT
RSV RNA RESP QL NAA+PROBE: NEGATIVE
SARS-COV-2 RNA RESP QL NAA+PROBE: NEGATIVE
SODIUM SERPL-SCNC: 128 MMOL/L (ref 135–147)
T WAVE AXIS: 131 DEGREES
T WAVE AXIS: 66 DEGREES
VARIANT LYMPHS # BLD AUTO: 1 %
VENTRICULAR RATE: 78 BPM
VENTRICULAR RATE: 80 BPM
WBC # BLD AUTO: 25.1 THOUSAND/UL (ref 4.31–10.16)

## 2024-04-24 PROCEDURE — 93010 ELECTROCARDIOGRAM REPORT: CPT | Performed by: INTERNAL MEDICINE

## 2024-04-24 PROCEDURE — 0241U HB NFCT DS VIR RESP RNA 4 TRGT: CPT | Performed by: PHYSICIAN ASSISTANT

## 2024-04-24 PROCEDURE — 84484 ASSAY OF TROPONIN QUANT: CPT | Performed by: EMERGENCY MEDICINE

## 2024-04-24 PROCEDURE — 71045 X-RAY EXAM CHEST 1 VIEW: CPT

## 2024-04-24 PROCEDURE — 99223 1ST HOSP IP/OBS HIGH 75: CPT | Performed by: INTERNAL MEDICINE

## 2024-04-24 PROCEDURE — 83880 ASSAY OF NATRIURETIC PEPTIDE: CPT | Performed by: PHYSICIAN ASSISTANT

## 2024-04-24 PROCEDURE — 93970 EXTREMITY STUDY: CPT

## 2024-04-24 PROCEDURE — 96365 THER/PROPH/DIAG IV INF INIT: CPT

## 2024-04-24 PROCEDURE — 85007 BL SMEAR W/DIFF WBC COUNT: CPT | Performed by: EMERGENCY MEDICINE

## 2024-04-24 PROCEDURE — 99285 EMERGENCY DEPT VISIT HI MDM: CPT | Performed by: PHYSICIAN ASSISTANT

## 2024-04-24 PROCEDURE — 93970 EXTREMITY STUDY: CPT | Performed by: SURGERY

## 2024-04-24 PROCEDURE — 96367 TX/PROPH/DG ADDL SEQ IV INF: CPT

## 2024-04-24 PROCEDURE — 93005 ELECTROCARDIOGRAM TRACING: CPT

## 2024-04-24 PROCEDURE — 84145 PROCALCITONIN (PCT): CPT | Performed by: INTERNAL MEDICINE

## 2024-04-24 PROCEDURE — 80053 COMPREHEN METABOLIC PANEL: CPT | Performed by: EMERGENCY MEDICINE

## 2024-04-24 PROCEDURE — 85027 COMPLETE CBC AUTOMATED: CPT | Performed by: EMERGENCY MEDICINE

## 2024-04-24 PROCEDURE — 94640 AIRWAY INHALATION TREATMENT: CPT

## 2024-04-24 PROCEDURE — 94760 N-INVAS EAR/PLS OXIMETRY 1: CPT

## 2024-04-24 PROCEDURE — 94664 DEMO&/EVAL PT USE INHALER: CPT

## 2024-04-24 PROCEDURE — 87040 BLOOD CULTURE FOR BACTERIA: CPT

## 2024-04-24 PROCEDURE — 36415 COLL VENOUS BLD VENIPUNCTURE: CPT | Performed by: EMERGENCY MEDICINE

## 2024-04-24 PROCEDURE — 83605 ASSAY OF LACTIC ACID: CPT | Performed by: PHYSICIAN ASSISTANT

## 2024-04-24 PROCEDURE — 99285 EMERGENCY DEPT VISIT HI MDM: CPT

## 2024-04-24 RX ORDER — CEFTRIAXONE 1 G/50ML
1000 INJECTION, SOLUTION INTRAVENOUS ONCE
Status: COMPLETED | OUTPATIENT
Start: 2024-04-24 | End: 2024-04-24

## 2024-04-24 RX ORDER — HEPARIN SODIUM 5000 [USP'U]/ML
5000 INJECTION, SOLUTION INTRAVENOUS; SUBCUTANEOUS EVERY 8 HOURS SCHEDULED
Status: DISCONTINUED | OUTPATIENT
Start: 2024-04-24 | End: 2024-04-26 | Stop reason: HOSPADM

## 2024-04-24 RX ORDER — ACETAMINOPHEN 325 MG/1
650 TABLET ORAL ONCE
Status: COMPLETED | OUTPATIENT
Start: 2024-04-24 | End: 2024-04-24

## 2024-04-24 RX ORDER — MONTELUKAST SODIUM 10 MG/1
10 TABLET ORAL
Status: DISCONTINUED | OUTPATIENT
Start: 2024-04-24 | End: 2024-04-26 | Stop reason: HOSPADM

## 2024-04-24 RX ORDER — CEFTRIAXONE 1 G/50ML
1000 INJECTION, SOLUTION INTRAVENOUS EVERY 24 HOURS
Status: DISCONTINUED | OUTPATIENT
Start: 2024-04-25 | End: 2024-04-26 | Stop reason: HOSPADM

## 2024-04-24 RX ORDER — ALPRAZOLAM 0.5 MG/1
0.5 TABLET ORAL
Status: DISCONTINUED | OUTPATIENT
Start: 2024-04-24 | End: 2024-04-26 | Stop reason: HOSPADM

## 2024-04-24 RX ORDER — ONDANSETRON 2 MG/ML
4 INJECTION INTRAMUSCULAR; INTRAVENOUS EVERY 6 HOURS PRN
Status: DISCONTINUED | OUTPATIENT
Start: 2024-04-24 | End: 2024-04-26 | Stop reason: HOSPADM

## 2024-04-24 RX ORDER — FLUTICASONE PROPIONATE 50 MCG
1 SPRAY, SUSPENSION (ML) NASAL DAILY
Status: DISCONTINUED | OUTPATIENT
Start: 2024-04-24 | End: 2024-04-26 | Stop reason: HOSPADM

## 2024-04-24 RX ORDER — DULOXETIN HYDROCHLORIDE 60 MG/1
60 CAPSULE, DELAYED RELEASE ORAL DAILY
Status: DISCONTINUED | OUTPATIENT
Start: 2024-04-24 | End: 2024-04-26 | Stop reason: HOSPADM

## 2024-04-24 RX ORDER — FLUTICASONE FUROATE AND VILANTEROL 200; 25 UG/1; UG/1
1 POWDER RESPIRATORY (INHALATION) DAILY
Status: DISCONTINUED | OUTPATIENT
Start: 2024-04-24 | End: 2024-04-26 | Stop reason: HOSPADM

## 2024-04-24 RX ORDER — ACETAMINOPHEN 325 MG/1
650 TABLET ORAL EVERY 6 HOURS PRN
Status: DISCONTINUED | OUTPATIENT
Start: 2024-04-24 | End: 2024-04-26 | Stop reason: HOSPADM

## 2024-04-24 RX ORDER — ISOSORBIDE MONONITRATE 30 MG/1
30 TABLET, EXTENDED RELEASE ORAL DAILY
Status: DISCONTINUED | OUTPATIENT
Start: 2024-04-24 | End: 2024-04-26 | Stop reason: HOSPADM

## 2024-04-24 RX ORDER — CARVEDILOL 25 MG/1
25 TABLET ORAL 2 TIMES DAILY WITH MEALS
Status: DISCONTINUED | OUTPATIENT
Start: 2024-04-24 | End: 2024-04-26 | Stop reason: HOSPADM

## 2024-04-24 RX ORDER — IPRATROPIUM BROMIDE AND ALBUTEROL SULFATE 2.5; .5 MG/3ML; MG/3ML
3 SOLUTION RESPIRATORY (INHALATION) ONCE
Status: COMPLETED | OUTPATIENT
Start: 2024-04-24 | End: 2024-04-24

## 2024-04-24 RX ORDER — ALBUTEROL SULFATE 90 UG/1
2 AEROSOL, METERED RESPIRATORY (INHALATION) EVERY 6 HOURS PRN
Status: DISCONTINUED | OUTPATIENT
Start: 2024-04-24 | End: 2024-04-24

## 2024-04-24 RX ORDER — ALBUTEROL SULFATE 2.5 MG/3ML
2.5 SOLUTION RESPIRATORY (INHALATION) EVERY 4 HOURS PRN
Status: DISCONTINUED | OUTPATIENT
Start: 2024-04-24 | End: 2024-04-26 | Stop reason: HOSPADM

## 2024-04-24 RX ORDER — ATORVASTATIN CALCIUM 20 MG/1
20 TABLET, FILM COATED ORAL
Status: DISCONTINUED | OUTPATIENT
Start: 2024-04-24 | End: 2024-04-26 | Stop reason: HOSPADM

## 2024-04-24 RX ADMIN — IPRATROPIUM BROMIDE AND ALBUTEROL SULFATE 3 ML: 2.5; .5 SOLUTION RESPIRATORY (INHALATION) at 17:35

## 2024-04-24 RX ADMIN — ALPRAZOLAM 0.5 MG: 0.5 TABLET ORAL at 21:14

## 2024-04-24 RX ADMIN — SODIUM CHLORIDE 500 ML: 0.9 INJECTION, SOLUTION INTRAVENOUS at 11:25

## 2024-04-24 RX ADMIN — CEFTRIAXONE 1000 MG: 1 INJECTION, SOLUTION INTRAVENOUS at 08:24

## 2024-04-24 RX ADMIN — CARVEDILOL 25 MG: 25 TABLET, FILM COATED ORAL at 15:50

## 2024-04-24 RX ADMIN — HEPARIN SODIUM 5000 UNITS: 5000 INJECTION, SOLUTION INTRAVENOUS; SUBCUTANEOUS at 21:09

## 2024-04-24 RX ADMIN — DULOXETINE HYDROCHLORIDE 60 MG: 60 CAPSULE, DELAYED RELEASE ORAL at 11:25

## 2024-04-24 RX ADMIN — EMPAGLIFLOZIN 10 MG: 10 TABLET, FILM COATED ORAL at 11:25

## 2024-04-24 RX ADMIN — FLUTICASONE FUROATE AND VILANTEROL TRIFENATATE 1 PUFF: 200; 25 POWDER RESPIRATORY (INHALATION) at 11:26

## 2024-04-24 RX ADMIN — ATORVASTATIN CALCIUM 20 MG: 20 TABLET, FILM COATED ORAL at 15:50

## 2024-04-24 RX ADMIN — FLUTICASONE PROPIONATE 1 SPRAY: 50 SPRAY, METERED NASAL at 11:24

## 2024-04-24 RX ADMIN — AZITHROMYCIN MONOHYDRATE 500 MG: 500 INJECTION, POWDER, LYOPHILIZED, FOR SOLUTION INTRAVENOUS at 08:54

## 2024-04-24 RX ADMIN — SACUBITRIL AND VALSARTAN 1 TABLET: 49; 51 TABLET, FILM COATED ORAL at 17:28

## 2024-04-24 RX ADMIN — UMECLIDINIUM 1 PUFF: 62.5 AEROSOL, POWDER ORAL at 11:27

## 2024-04-24 RX ADMIN — MONTELUKAST 10 MG: 10 TABLET, FILM COATED ORAL at 21:09

## 2024-04-24 RX ADMIN — HEPARIN SODIUM 5000 UNITS: 5000 INJECTION, SOLUTION INTRAVENOUS; SUBCUTANEOUS at 14:16

## 2024-04-24 RX ADMIN — ACETAMINOPHEN 650 MG: 325 TABLET ORAL at 23:55

## 2024-04-24 RX ADMIN — ACETAMINOPHEN 650 MG: 325 TABLET ORAL at 08:24

## 2024-04-24 NOTE — ASSESSMENT & PLAN NOTE
Patient presents with constellation of symptoms including fever, chills, cough, shortness of breath, intermittent dizziness/lightheadedness, and some intermittent disorientation with weakness.  In the emergency room met criteria for sepsis with leukocytosis 25K, fever, right lower extremity cellulitis, and suspected pneumonia  Started empirically on ceftriaxone/azithromycin in the emergency room  Fluids were not administered due to patient's history of CHF    Does appear clinically dry we will provide 500 cc of fluid will monitor volume status closely  Continue empiric IV ceftriaxone for coverage of cellulitis  Not particularly impressed by chest x-ray; will follow-up radiology read  Monitor WBC count and fever curve  Follow-up blood cultures

## 2024-04-24 NOTE — ASSESSMENT & PLAN NOTE
Baseline renal function around 1.4-1.8  Currently at baseline  Currently undergoing renal workup as outpatient  Monitor daily BMP

## 2024-04-24 NOTE — ASSESSMENT & PLAN NOTE
Troponin mildly elevated at 58, follow-up 50  This likely reflects nonischemic myocardial injury in the setting of sepsis and CKD  No further cardiac workup warranted at this time; monitor clinically

## 2024-04-24 NOTE — ASSESSMENT & PLAN NOTE
Hypotonic hyponatremia in the setting of poor p.o. intake, infection, diuretic use  Will hold home Lasix for now  Provide 500 cc of normal saline  Recheck BMP in the morning

## 2024-04-24 NOTE — ED PROVIDER NOTES
History  Chief Complaint   Patient presents with    Dizziness     Fever and dizzy since yesterday. Slight productive cough, history of copd. Denies any other URI symptoms.     This is a 68-year-old male patient with a history of COPD, CHF cardiovascular disease resulting in stenting.  Started yesterday with a fever and chills states a mild cough without production.  Upon arrival he has extremely red and warm right lower extremity.  He states he normally looks like that but his wife states it does not at all it is more red and warm.  It is all anterior not really calf.  He denies any shortness of breath states that he has intermittent chest pressure and took 324 of aspirin at home and  is not reproducible there is no pleuritic pain there is no hypoxia there is no dyspnea.  Denies any headache blurred vision double vision.  He has difficulty describing the cough wife states it has very mild no nausea vomit diarrhea abdominal pain no urinary symptoms.  Also described dizziness but more of weakness.  Nothing makes this better or worse she tried nothing over-the-counter differential diagnosis includes not limited to cellulitis of right lower extremity, DVT, PE less likely, ACS, pneumonia, COVID, flu, RSV        Prior to Admission Medications   Prescriptions Last Dose Informant Patient Reported? Taking?   ALPRAZolam (XANAX) 0.5 mg tablet 4/23/2024  No Yes   Sig: TAKE 1 TABLET BY MOUTH ONCE DAILY AT BEDTIME AS NEEDED FOR ANXIETY   Accu-Chek FastClix Lancets MISC Past Week Self No Yes   Sig: Use 1 each daily to test blood sugar.   Blood Glucose Monitoring Suppl (Accu-Chek Guide Me) w/Device KIT Past Week Self No Yes   Sig: Use 1 each daily to test blood sugar.   DULoxetine (CYMBALTA) 60 mg delayed release capsule 4/23/2024 Self No Yes   Sig: Take 1 capsule by mouth once daily   Diclofenac Sodium (VOLTAREN) 1 % 4/23/2024  No Yes   Sig: Apply 2 g topically 2 (two) times a day   Empagliflozin (Jardiance) 10 MG TABS tablet  4/23/2024 Self No Yes   Sig: Take 1 tablet (10 mg total) by mouth every morning   Entresto 49-51 MG TABS 4/23/2024 Self No Yes   Sig: Take 1 tablet by mouth twice daily   Fluticasone-Salmeterol (Advair) 500-50 mcg/dose inhaler 4/23/2024 Self No Yes   Sig: INHALE 1 DOSE BY MOUTH TWICE DAILY RINSE MOUTH AFTER USE   Fluticasone-Salmeterol (Advair) 500-50 mcg/dose inhaler 4/23/2024 Self No Yes   Sig: INHALE ONE PUFF BY MOUTH AND INTO THE LUNGS TWICE A DAY. RINSE MOUTH AFTER USE   albuterol (2.5 mg/3 mL) 0.083 % nebulizer solution Past Week Self No Yes   Sig: Take 3 mL (2.5 mg total) by nebulization every 6 (six) hours as needed for wheezing or shortness of breath   albuterol (ProAir HFA) 90 mcg/act inhaler Past Week Self No Yes   Sig: Inhale 2 puffs every 6 (six) hours as needed for wheezing   aspirin (ECOTRIN LOW STRENGTH) 81 mg EC tablet 4/24/2024  No Yes   Sig: Take 1 tablet (81 mg total) by mouth 2 (two) times a day for 14 days   atorvastatin (LIPITOR) 20 mg tablet 4/23/2024 Self No Yes   Sig: Take 1 tablet (20 mg total) by mouth daily   carvedilol (COREG) 25 mg tablet 4/23/2024 Self No Yes   Sig: Take 1 tablet (25 mg total) by mouth 2 (two) times a day with meals   fluticasone (FLONASE) 50 mcg/act nasal spray 4/23/2024 Self No Yes   Sig: Use 1 spray(s) in each nostril twice daily   furosemide (LASIX) 40 mg tablet Past Week Self No Yes   Sig: Take 1 tablet (40 mg total) by mouth every other day   glucose blood test strip Past Week Self No Yes   Sig: Use 1 each daily to test blood sugar.   ipratropium-albuterol (DUO-NEB) 0.5-2.5 mg/3 mL nebulizer solution   No No   Sig: Take 3 mL by nebulization every 6 (six) hours as needed for wheezing or shortness of breath   isosorbide mononitrate (IMDUR) 30 mg 24 hr tablet 4/23/2024 Self No Yes   Sig: Take 1 tablet by mouth once daily   montelukast (SINGULAIR) 10 mg tablet 4/23/2024 Self No Yes   Sig: Take 1 tablet (10 mg total) by mouth daily at bedtime   tiotropium (Spiriva  Respimat) 2.5 MCG/ACT AERS inhaler 4/23/2024  No Yes   Sig: INHALE 2 SPRAY(S) BY MOUTH ONCE DAILY      Facility-Administered Medications: None       Past Medical History:   Diagnosis Date    Acute on chronic combined systolic and diastolic CHF (congestive heart failure) ([facility]) 7/27/2023    Alcohol abuse 7/27/2023    Ambulates with cane     Arthritis     Asthma     Back pain     Chest pain 12/22/2022    CHF (congestive heart failure) ([facility])     Claustrophobia     COPD (chronic obstructive pulmonary disease) ([facility])     COPD with acute exacerbation ([facility]) 05/06/2022    COVID-19     COVID-19 virus infection 12/03/2021    Depression     Hypertension     Mumps     Neck pain     Old MI (myocardial infarction)     Pneumonia     Pulmonary emphysema ([facility])     Rectal bleeding 01/14/2019    Skin abnormalities     rashes and wounds on both legs - scabbed over and healing    Sleep apnea     no CPAP    Tingling of both feet     Wears glasses        Past Surgical History:   Procedure Laterality Date    APPENDECTOMY      CARDIAC CATHETERIZATION  07/24/2015    Left main- normal and maidly tortuous.  Circumflex - normal and moderately tortuous.  RCA- normal and mildy tortuous.  Global LV function was severely depressed..    CARDIAC CATHETERIZATION Left 09/27/2022    Procedure: Cardiac Left Heart Cath;  Surgeon: Doreen Briones DO;  Location: [facility];  Service: Cardiology    CARDIAC CATHETERIZATION N/A 09/27/2022    Procedure: Cardiac Coronary Angiogram;  Surgeon: Doreen Briones DO;  Location:  CARDIAC CATH LAB;  Service: Cardiology    CARDIAC CATHETERIZATION  09/27/2022    Procedure: Cardiac catheterization;  Surgeon: Doreen Briones DO;  Location:  CARDIAC CATH LAB;  Service: Cardiology    INGUINAL HERNIA REPAIR Bilateral     PA COLONOSCOPY FLX DX W/COLLJ SPEC WHEN PFRMD N/A 03/11/2019    Procedure: COLONOSCOPY with removal of anal papilla;  Surgeon: ANIL Dale MD;  Location: [facility] OR;  Service:  Colorectal    TONSILLECTOMY      TOTAL KNEE ARTHROPLASTY Left 2023    Procedure: ARTHROPLASTY KNEE TOTAL;  Surgeon: David Mullen DO;  Location: [facility];  Service: Orthopedics    UMBILICAL HERNIA REPAIR  2006       Family History   Problem Relation Age of Onset    Heart attack Father         MI    Heart disease Father     Diabetes Sister         DM    Arthritis Family     Coronary artery disease Family     Hypertension Family     Tuberculosis Son     Alzheimer's disease Mother      I have reviewed and agree with the history as documented.    E-Cigarette/Vaping    E-Cigarette Use Never User      E-Cigarette/Vaping Substances    Nicotine No     THC No     CBD No     Flavoring No     Other No     Unknown No      Social History     Tobacco Use    Smoking status: Former     Current packs/day: 0.00     Average packs/day: 3.0 packs/day for 43.0 years (129.0 ttl pk-yrs)     Types: Cigarettes     Start date:      Quit date:      Years since quittin.3    Smokeless tobacco: Never   Vaping Use    Vaping status: Never Used   Substance Use Topics    Alcohol use: Not Currently    Drug use: Yes     Types: Marijuana     Comment: occasionally edible       Review of Systems   Constitutional:  Positive for chills and fever. Negative for diaphoresis and fatigue.   HENT:  Negative for congestion, ear pain, nosebleeds and sore throat.    Eyes:  Negative for photophobia, pain, discharge and visual disturbance.   Respiratory:  Positive for cough and chest tightness. Negative for choking, shortness of breath and wheezing.    Cardiovascular:  Negative for chest pain and palpitations.   Gastrointestinal:  Negative for abdominal distention, abdominal pain, diarrhea and vomiting.   Genitourinary:  Negative for dysuria, flank pain and frequency.   Musculoskeletal:  Negative for back pain, gait problem and joint swelling.        Right lower leg pain and redness     Skin:  Negative for color change and rash.    Neurological:  Negative for dizziness, syncope and headaches.   Psychiatric/Behavioral:  Negative for behavioral problems and confusion. The patient is not nervous/anxious.    All other systems reviewed and are negative.      Physical Exam  Physical Exam  Vitals and nursing note reviewed.   Constitutional:       General: He is not in acute distress.     Appearance: Normal appearance. He is well-developed. He is not ill-appearing, toxic-appearing or diaphoretic.   HENT:      Head: Normocephalic and atraumatic.      Right Ear: Tympanic membrane, ear canal and external ear normal.      Left Ear: Tympanic membrane, ear canal and external ear normal.      Nose: Nose normal.      Mouth/Throat:      Mouth: Mucous membranes are moist.      Pharynx: Oropharynx is clear. No oropharyngeal exudate or posterior oropharyngeal erythema.   Eyes:      General: No scleral icterus.        Right eye: No discharge.         Left eye: No discharge.      Conjunctiva/sclera: Conjunctivae normal.      Pupils: Pupils are equal, round, and reactive to light.   Cardiovascular:      Rate and Rhythm: Normal rate and regular rhythm.   Pulmonary:      Effort: Pulmonary effort is normal.      Breath sounds: Normal breath sounds.   Abdominal:      General: Bowel sounds are normal.      Palpations: Abdomen is soft.      Tenderness: There is no abdominal tenderness.   Musculoskeletal:         General: Normal range of motion.      Cervical back: Normal range of motion and neck supple.      Right lower leg: Edema present.      Left lower leg: No edema.        Legs:    Skin:     General: Skin is warm.      Capillary Refill: Capillary refill takes less than 2 seconds.   Neurological:      General: No focal deficit present.      Mental Status: He is alert and oriented to person, place, and time. Mental status is at baseline.   Psychiatric:         Mood and Affect: Mood normal.         Behavior: Behavior normal.         Vital Signs  ED Triage Vitals    Temperature Pulse Respirations Blood Pressure SpO2   04/24/24 0811 04/24/24 0808 04/24/24 0808 04/24/24 0808 04/24/24 0808   (!) 101.4 °F (38.6 °C) 85 20 130/59 92 %      Temp Source Heart Rate Source Patient Position - Orthostatic VS BP Location FiO2 (%)   04/24/24 0811 04/24/24 0808 04/24/24 0808 04/24/24 0808 --   Tympanic Monitor Lying Left arm       Pain Score       04/24/24 0808       7           Vitals:    04/24/24 1000 04/24/24 1015 04/24/24 1030 04/24/24 1130   BP: 108/55 110/54 110/54 107/50   Pulse: 80 75 75 72   Patient Position - Orthostatic VS: Sitting Sitting Sitting Sitting         Visual Acuity      ED Medications  Medications   acetaminophen (TYLENOL) tablet 650 mg (has no administration in time range)   ondansetron (ZOFRAN) injection 4 mg (has no administration in time range)   heparin (porcine) subcutaneous injection 5,000 Units (has no administration in time range)   cefTRIAXone (ROCEPHIN) IVPB (premix in dextrose) 1,000 mg 50 mL (has no administration in time range)   albuterol (PROVENTIL HFA,VENTOLIN HFA) inhaler 2 puff (has no administration in time range)   ALPRAZolam (XANAX) tablet 0.5 mg (has no administration in time range)   aspirin (ECOTRIN LOW STRENGTH) EC tablet 81 mg (has no administration in time range)   atorvastatin (LIPITOR) tablet 20 mg (has no administration in time range)   carvedilol (COREG) tablet 25 mg (has no administration in time range)   DULoxetine (CYMBALTA) delayed release capsule 60 mg (60 mg Oral Given 4/24/24 1125)   Empagliflozin (JARDIANCE) tablet 10 mg (10 mg Oral Given 4/24/24 1125)   sacubitril-valsartan (ENTRESTO) 49-51 MG per tablet 1 tablet (1 tablet Oral Not Given 4/24/24 1125)   fluticasone (FLONASE) 50 mcg/act nasal spray 1 spray (1 spray Each Nare Given 4/24/24 1124)   fluticasone-vilanterol 200-25 mcg/actuation 1 puff (1 puff Inhalation Given 4/24/24 1126)   isosorbide mononitrate (IMDUR) 24 hr tablet 30 mg (30 mg Oral Not Given 4/24/24 1126)    montelukast (SINGULAIR) tablet 10 mg (has no administration in time range)   umeclidinium 62.5 mcg/actuation inhaler AEPB 1 puff (1 puff Inhalation Given 4/24/24 1127)   sodium chloride 0.9 % bolus 500 mL (500 mL Intravenous New Bag 4/24/24 1125)   acetaminophen (TYLENOL) tablet 650 mg (650 mg Oral Given 4/24/24 0824)   cefTRIAXone (ROCEPHIN) IVPB (premix in dextrose) 1,000 mg 50 mL (0 mg Intravenous Stopped 4/24/24 0854)   azithromycin (ZITHROMAX) 500 mg in sodium chloride 0.9 % 250 mL IVPB (0 mg Intravenous Stopped 4/24/24 0954)       Diagnostic Studies  Results Reviewed       Procedure Component Value Units Date/Time    Procalcitonin [[id number]]  (Abnormal) Collected: 04/24/24 0815    Lab Status: Final result Specimen: Blood from Arm, Right Updated: 04/24/24 1118     Procalcitonin 7.75 ng/ml     HS Troponin I 2hr [[id number]]  (Abnormal) Collected: 04/24/24 1004    Lab Status: Final result Specimen: Blood from Arm, Right Updated: 04/24/24 1037     hs TnI 2hr 50 ng/L      Delta 2hr hsTnI -8 ng/L     B-Type Natriuretic Peptide(BNP) [[id number]]  (Abnormal) Collected: 04/24/24 0815    Lab Status: Final result Specimen: Blood from Arm, Right Updated: 04/24/24 0909      pg/mL     FLU/RSV/COVID - if FLU/RSV clinically relevant [[id number]]  (Normal) Collected: 04/24/24 0825    Lab Status: Final result Specimen: Nares from Nose Updated: 04/24/24 0907     SARS-CoV-2 Negative     INFLUENZA A PCR Negative     INFLUENZA B PCR Negative     RSV PCR Negative    Narrative:      FOR PEDIATRIC PATIENTS - copy/paste COVID Guidelines URL to browser: [email or web address]    SARS-CoV-2 assay is a Nucleic Acid Amplification assay intended for the  qualitative detection of nucleic acid from SARS-CoV-2 in nasopharyngeal  swabs. Results are for the presumptive identification of SARS-CoV-2 RNA.    Positive results are indicative of infection with SARS-CoV-2, the virus  causing  COVID-19, but do not rule out bacterial infection or co-infection  with other viruses. Laboratories within the [place] and its  territories are required to report all positive results to the appropriate  public health authorities. Negative results do not preclude SARS-CoV-2  infection and should not be used as the sole basis for treatment or other  patient management decisions. Negative results must be combined with  clinical observations, patient history, and epidemiological information.  This test has not been FDA cleared or approved.    This test has been authorized by FDA under an Emergency Use Authorization  (EUA). This test is only authorized for the duration of time the  declaration that circumstances exist justifying the authorization of the  emergency use of an in vitro diagnostic tests for detection of SARS-CoV-2  virus and/or diagnosis of COVID-19 infection under section 564(b)(1) of  the Act, 21 U.S.C. 360bbb-3(b)(1), unless the authorization is terminated  or revoked sooner. The test has been validated but independent review by FDA  and CLIA is pending.    Test performed using [employer] GeneXpert: This RT-PCR assay targets N2,  a region unique to SARS-CoV-2. A conserved region in the E-gene was chosen  for pan-Sarbecovirus detection which includes SARS-CoV-2.    According to CMS-2020-01-R, this platform meets the definition of high-throughput technology.    Lactic acid, plasma (w/reflex if result > 2.0) [[id number]]  (Normal) Collected: 04/24/24 0825    Lab Status: Final result Specimen: Blood from Arm, Right Updated: 04/24/24 0904     LACTIC ACID 1.3 mmol/L     Narrative:      Result may be elevated if tourniquet was used during collection.    RBC Morphology Reflex Test [[id number]] Collected: 04/24/24 0815    Lab Status: Final result Specimen: Blood from Arm, Right Updated: 04/24/24 0902    CBC and differential [[id number]]  (Abnormal) Collected: 04/24/24 0815    Lab Status: Final result Specimen:  Blood from Arm, Right Updated: 04/24/24 0854     WBC 25.10 Thousand/uL      RBC 5.18 Million/uL      Hemoglobin 13.5 g/dL      Hematocrit 42.2 %      MCV 82 fL      MCH 26.1 pg      MCHC 32.0 g/dL      RDW 16.1 %      MPV 9.4 fL      Platelets 259 Thousands/uL     Narrative:      This is an appended report.  These results have been appended to a previously verified report.    Manual Differential(PHLEBS Do Not Order) [[id number]]  (Abnormal) Collected: 04/24/24 0815    Lab Status: Final result Specimen: Blood from Arm, Right Updated: 04/24/24 0854     Segmented % 79 %      Bands % 14 %      Lymphocytes % 3 %      Monocytes % 3 %      Eosinophils % 0 %      Basophils % 0 %      Atypical Lymphocytes % 1 %      Absolute Neutrophils 23.34 Thousand/uL      Absolute Lymphocytes 1.00 Thousand/uL      Absolute Monocytes 0.75 Thousand/uL      Absolute Eosinophils 0.00 Thousand/uL      Absolute Basophils 0.00 Thousand/uL      Total Counted --     RBC Morphology Present     Platelet Estimate Adequate     Anisocytosis 1+    HS Troponin 0hr (reflex protocol) [[id number]]  (Abnormal) Collected: 04/24/24 0815    Lab Status: Final result Specimen: Blood from Arm, Right Updated: 04/24/24 0842     hs TnI 0hr 58 ng/L     Comprehensive metabolic panel [[id number]]  (Abnormal) Collected: 04/24/24 0815    Lab Status: Final result Specimen: Blood from Arm, Right Updated: 04/24/24 0838     Sodium 128 mmol/L      Potassium 3.9 mmol/L      Chloride 97 mmol/L      CO2 22 mmol/L      ANION GAP 9 mmol/L      BUN 40 mg/dL      Creatinine 1.87 mg/dL      Glucose 160 mg/dL      Calcium 8.9 mg/dL      AST 13 U/L      ALT 19 U/L      Alkaline Phosphatase 68 U/L      Total Protein 6.8 g/dL      Albumin 3.9 g/dL      Total Bilirubin 1.31 mg/dL      eGFR 36 ml/min/1.73sq m     Narrative:      National Kidney Disease Foundation guidelines for Chronic Kidney Disease (CKD):     Stage 1 with normal or high GFR (GFR > 90 mL/min/1.73 square meters)    Stage 2  Mild CKD (GFR = 60-89 mL/min/1.73 square meters)    Stage 3A Moderate CKD (GFR = 45-59 mL/min/1.73 square meters)    Stage 3B Moderate CKD (GFR = 30-44 mL/min/1.73 square meters)    Stage 4 Severe CKD (GFR = 15-29 mL/min/1.73 square meters)    Stage 5 End Stage CKD (GFR <15 mL/min/1.73 square meters)  Note: GFR calculation is accurate only with a steady state creatinine    UA w Reflex to Microscopic w Reflex to Culture [[id number]]     Lab Status: No result Specimen: Urine     Blood culture [[id number]] Collected: 04/24/24 0817    Lab Status: In process Specimen: Blood from Arm, Left Updated: 04/24/24 0822    Blood culture [[id number]] Collected: 04/24/24 0817    Lab Status: In process Specimen: Blood from Arm, Right Updated: 04/24/24 0822                    VAS VENOUS DUPLEX - LOWER LIMB BILATERAL   Final Result by Bob Chaudhari MD (04/24 1139)      XR chest 1 view portable   ED Interpretation by Davidson Walters PA-C (04/24 0900)   Consolidated noted right lower lobe consistent with pneumonia                 Procedures  Procedures         ED Course  ED Course as of 04/24/24 1244   Wed Apr 24, 2024   0831 I personally interpreted the EKG 80 bpm sinus rhythm with multiple unifocal PAC first-degree AV block left axis deviation no ST elevation QTc 442.  When compared to previous the PACs are new   0903 Did not have pneumococcal vaccine this started with shaking chills.  Patient has hyponatremia possibility of pneumococcal pneumonia   0956 BNP(!): 422   0956 WBC(!): 25.10   0956 Bands %(!): 14   0956 Sodium(!): 128   1012 Delta EKG interpreted by me 78 bpm sinus rhythm with ongoing PACs no ST elevation left axis deviation QTc 469 similar to previous             HEART Risk Score      Flowsheet Row Most Recent Value   Heart Score Risk Calculator    History 0 Filed at: 04/24/2024 1000   ECG 0 Filed at: 04/24/2024 1000   Age 2 Filed at: 04/24/2024 1000   Risk Factors 2 Filed at: 04/24/2024 1000   Troponin 2 Filed at:  04/24/2024 1000   HEART Score 6 Filed at: 04/24/2024 1000                       Initial Sepsis Screening       Row Name 04/24/24 1001 04/24/24 1000             Is the patient's history suggestive of a new or worsening infection? -- Yes (Proceed)  -DD       Suspected source of infection -- pneumonia  -DD       Indicate SIRS criteria -- Hyperthemia > 38.3C (100.9F) OR Hypothermia <36C (96.8F);Leukocytosis (WBC > 44669 IJL) OR Leukopenia (WBC <4000 IJL) OR Bandemia (WBC >10% bands)  -DD       Are two or more of the above signs & symptoms of infection both present and new to the patient? No  -DD Yes (Proceed)  -DD       Assess for evidence of organ dysfunction: Are any of the below criteria present within 6 hours of suspected infection and SIRS criteria that are NOT considered to be chronic conditions? -- --                 User Key  (r) = Recorded By, (t) = Taken By, (c) = Cosigned By      Initials Name Provider Type    DD Davidson Walters PA-C Physician Assistant                    SBIRT 22yo+      Flowsheet Row Most Recent Value   Initial Alcohol Screen: US AUDIT-C     1. How often do you have a drink containing alcohol? 0 Filed at: 04/24/2024 0810   2. How many drinks containing alcohol do you have on a typical day you are drinking?  0 Filed at: 04/24/2024 0810   3b. FEMALE Any Age, or MALE 65+: How often do you have 4 or more drinks on one occassion? 0 Filed at: 04/24/2024 0810   Audit-C Score 0 Filed at: 04/24/2024 0810   MAURY: How many times in the past year have you...    Used an illegal drug or used a prescription medication for non-medical reasons? Never Filed at: 04/24/2024 0810                      Medical Decision Making  68-year-old male patient who started with shaking chills and a mild cough yesterday.  Presents today with 101 fever mild cough and a red lower right leg intermittent chest pressure however he gets that often.  No pleuritic pain.  No urinary symptoms differential diagnose includes not  limited to cellulitis of right lower extremity, DVT, ACS, pneumonia, flu, COVID, RSV    Problems Addressed:  Cellulitis of right lower extremity: acute illness or injury     Details: Patient has anterior shin was red warm without fluctuance induration a ultrasound was ordered to rule out DVT both legs which was negative  Elevated troponin: acute illness or injury     Details: Patient troponin was 56 upon arrival he does run high when I trended it in the past there was no ST elevation this could be demand ischemia because he is septic  Fever: acute illness or injury     Details: 101 upon arrival given Tylenol 99.1 upon admission he was feeling better  Hyponatremia: acute illness or injury     Details: sodium 128, that is not his baseline concern for pneumonia with hyponatremia did not get Pneumovax concern for pneumococcal pneumonia based on the hyponatremia  Pneumonia: acute illness or injury     Details: Right lower lobe infiltrate consistent with pneumonia given Rocephin and Zithromax  Sepsis (HCC): acute illness or injury     Details: Based on sepsis criteria he did have sepsis a sepsis alert was called he was given Rocephin off the bat he did not require 30 mL/kg    Amount and/or Complexity of Data Reviewed  External Data Reviewed: labs.     Details: I did review previous labs for trending  Labs: ordered. Decision-making details documented in ED Course.     Details: All labs reviewed by this provider he had hyponatremia elevated white count of approximately 25,000 elevated troponin which did delta -6.  As stated elevated troponin possibly due to demand ischemia  Radiology: ordered and independent interpretation performed.     Details: I personally interpreted the chest x-ray and found a consolidation in the right lower lobe  ECG/medicine tests: ordered and independent interpretation performed. Decision-making details documented in ED Course.     Details: I personally interpreted both EKGs there is no ST  elevation x 2 please see chart  Discussion of management or test interpretation with external provider(s): Using joint decision-making with the patient and the admitting doctor patient will be admitted for the diagnoses listed above for further treatment and evaluation    Risk  OTC drugs.  Prescription drug management.  Decision regarding hospitalization.             Disposition  Final diagnoses:   Hyponatremia   Pneumonia   Cellulitis of right lower extremity   Sepsis (HCC)   Fever   Elevated troponin     Time reflects when diagnosis was documented in both MDM as applicable and the Disposition within this note       Time User Action Codes Description Comment    4/24/2024  9:59 AM Davidson Rojas Add [E87.1] Hyponatremia     4/24/2024  9:59 AM DiNapoliParveenk Add [J18.9] Pneumonia     4/24/2024  9:59 AM Davidson Rojas Add [L03.115] Cellulitis of right lower extremity     4/24/2024  9:59 AM Davidson Rojas Add [A41.9] Sepsis (HCC)     4/24/2024 10:00 AM Davidson Rojas Add [R50.9] Fever     4/24/2024 10:16 AM Davidson Rojas Add [R79.89] Elevated troponin           ED Disposition       ED Disposition   Admit    Condition   Stable    Date/Time   Wed Apr 24, 2024 1027    Comment   Case was discussed with Dr. Arora and the patient's admission status was agreed to be Admission Status: inpatient status to the service of Dr. Arora .               Follow-up Information    None         Patient's Medications   Discharge Prescriptions    No medications on file       No discharge procedures on file.    PDMP Review         Value Time User    PDMP Reviewed  Yes 4/21/2024  7:07 AM Lauren Vee DO            ED Provider  Electronically Signed by             Davidson Rojas PA-C  04/24/24 2748

## 2024-04-24 NOTE — PLAN OF CARE
Problem: INFECTION - ADULT  Goal: Absence or prevention of progression during hospitalization  Description: INTERVENTIONS:  - Assess and monitor for signs and symptoms of infection  - Monitor lab/diagnostic results  - Monitor all insertion sites, i.e. indwelling lines, tubes, and drains  - Administer medications as ordered  - Instruct and encourage patient and family to use good hand hygiene technique  - Identify and instruct in appropriate isolation precautions for identified infection/condition  Outcome: Progressing       Problem: SAFETY ADULT  Goal: Maintain or return to baseline ADL function  Description: INTERVENTIONS:  - Assess range of motion  - Assess patient's mobility; develop plan if impaired  - Assess patient's need for assistive devices and provide as appropriate  - Encourage maximum independence but intervene and supervise when necessary  - Involve family in performance of ADLs  Outcome: Progressing       Problem: Potential for Falls  Goal: Patient will remain free of falls  Description: INTERVENTIONS:  - Educate patient/family on patient safety including physical limitations  - Instruct patient to call for assistance with activity   - Keep Call bell within reach  - Keep bed low and locked with side rails adjusted as appropriate  - Keep care items and personal belongings within reach  - Initiate and maintain comfort rounds  - Make Fall Risk Sign visible to staff  Outcome: Progressing

## 2024-04-24 NOTE — ASSESSMENT & PLAN NOTE
Erythema and warmth of the right lower extremity anterior tibial region.  There is also some associated scabbing but no open sores or purulence  Continue on IV ceftriaxone  Monitor WBC count and fever curve  Monitor clinically with serial exams

## 2024-04-24 NOTE — H&P
[facility]  H&P  Name: Cricket Rosales 68 y.o. male I MRN: [id number]  Unit/Bed#: ED 27 I Date of Admission: 4/24/2024   Date of Service: 4/24/2024 I Hospital Day: 0      Assessment/Plan   * Sepsis (Formerly [facility])  Assessment & Plan  Patient presents with constellation of symptoms including fever, chills, cough, shortness of breath, intermittent dizziness/lightheadedness, and some intermittent disorientation with weakness.  In the emergency room met criteria for sepsis with leukocytosis 25K, fever, right lower extremity cellulitis, and suspected pneumonia  Started empirically on ceftriaxone/azithromycin in the emergency room  Fluids were not administered due to patient's history of CHF    Does appear clinically dry we will provide 500 cc of fluid will monitor volume status closely  Continue empiric IV ceftriaxone for coverage of cellulitis  Not particularly impressed by chest x-ray; will follow-up radiology read  Monitor WBC count and fever curve  Follow-up blood cultures    Cellulitis  Assessment & Plan  Erythema and warmth of the right lower extremity anterior tibial region.  There is also some associated scabbing but no open sores or purulence  Continue on IV ceftriaxone  Monitor WBC count and fever curve  Monitor clinically with serial exams    Elevated troponin  Assessment & Plan  Troponin mildly elevated at 58, follow-up 50  This likely reflects nonischemic myocardial injury in the setting of sepsis and CKD  No further cardiac workup warranted at this time; monitor clinically    Hyponatremia  Assessment & Plan  Hypotonic hyponatremia in the setting of poor p.o. intake, infection, diuretic use  Will hold home Lasix for now  Provide 500 cc of normal saline  Recheck BMP in the morning    CKD (chronic kidney disease) stage 3, GFR 30-59 ml/min ([facility])  Assessment & Plan  Baseline renal function around 1.4-1.8  Currently at baseline  Currently undergoing renal workup as outpatient  Monitor daily  BMP    Chronic combined systolic and diastolic CHF (congestive heart failure) (HCC)  Assessment & Plan  History of nonischemic cardiomyopathy  Currently not in exacerbation and examining dry  Will provide 500 cc of normal saline  Hold home Lasix for now  Continue home carvedilol Entresto for goal-directed medical therapy  Monitor volume status      COPD, severe (HCC)  Assessment & Plan  Not in exacerbation  Continue home inhaler regimen  Albuterol as needed    VTE Pharmacologic Prophylaxis: VTE Score: 6 High Risk (Score >/= 5) - Pharmacological DVT Prophylaxis Ordered: heparin. Sequential Compression Devices Ordered.  Code Status: Level 1 - Full Code   Discussion with family: Updated  (wife) at bedside.    Anticipated Length of Stay: Patient will be admitted on an inpatient basis with an anticipated length of stay of greater than 2 midnights secondary to sepsis, infectious workup, IV antibiotics, medication titration, serial lab monitoring.    Total Time Spent on Date of Encounter in care of patient: 75 mins. This time was spent on one or more of the following: performing physical exam; counseling and coordination of care; obtaining or reviewing history; documenting in the medical record; reviewing/ordering tests, medications or procedures; communicating with other healthcare professionals and discussing with patient's family/caregivers.    Chief Complaint:     History of Present Illness:  Cricket Rosales is a 68 y.o. male with a PMH of chronic combined CHF, severe COPD, asthma, CKD 3, hypertension, obesity, CORDELL not on CPAP, who presents with a constellation of symptoms.  Patient first began feeling unwell yesterday while at a .  At that time he developed chills and later in the afternoon subjective fevers.  During that time he also developed some dizziness and difficulty with ambulation.  Wife reports he had some intermittent disorientation though he appears appropriate at this time.  Patient  began to feel worse throughout the evening and into this morning.  Continued to have intermittent fevers overnight and into this morning.  He admits to a cough but feels that this is chronic in nature and not any worse than usual.  Does admit to some redness and swelling of his right lower extremity in the anterior tibial region.  This appears to be is only infectious etiologies.  There was some concern for possible right sided pneumonia on chest x-ray per ER, however not necessarily appreciated by myself.  Will wait for radiology read.  Nonetheless he was started on empiric IV ceftriaxone and azithromycin in the emergency room.  Referred to the hospitalist service for further evaluation and management of sepsis.  Patient pleasant and conversive with his wife at bedside during my evaluation.  All questions and concerns addressed.    REVIEW OF SYSTEMS  General Admits to fevers and chills.  Admits to fatigue.  Admits to weakness.   HEENT Denies hearing or vision changes.   Cardiovascular Denies chest pain. Denies LE swelling. Denies palpitations. Denies dyspnea on exertion.   Respiratory Admits to chronic cough. Denies difficulty breathing. Denies shortness of breath.   Genitourinary Denies hematuria. Denies dysuria. Denies difficulty voiding.Denies incontinence.   Gastrointestinal Denies nausea, vomiting, or diarrhea. Denies hematochezia, melena, or hematemesis.    Musculoskeletal Denies arthralgias or myalgias. Denies joint swelling.  Admits to swelling and erythema of his right lower extremity.   Psychiatric  Denies changes in mood. Denies anxiety or depression.   Neurologic Denies headache. Denies lightheadedness/dizziness.Denies numbness/tingling. Denies weakness.   Endocrine Denies weight loss or weight gain. Denies excessive thirst, sweating, urination.         Past Medical and Surgical History:   Past Medical History:   Diagnosis Date    Acute on chronic combined systolic and diastolic CHF (congestive heart  failure) ([facility]) 7/27/2023    Alcohol abuse 7/27/2023    Ambulates with cane     Arthritis     Asthma     Back pain     Chest pain 12/22/2022    CHF (congestive heart failure) ([facility])     Claustrophobia     COPD (chronic obstructive pulmonary disease) ([facility])     COPD with acute exacerbation ([facility]) 05/06/2022    COVID-19     COVID-19 virus infection 12/03/2021    Depression     Hypertension     Mumps     Neck pain     Old MI (myocardial infarction)     Pneumonia     Pulmonary emphysema ([facility])     Rectal bleeding 01/14/2019    Skin abnormalities     rashes and wounds on both legs - scabbed over and healing    Sleep apnea     no CPAP    Tingling of both feet     Wears glasses        Past Surgical History:   Procedure Laterality Date    APPENDECTOMY      CARDIAC CATHETERIZATION  07/24/2015    Left main- normal and maidly tortuous.  Circumflex - normal and moderately tortuous.  RCA- normal and mildy tortuous.  Global LV function was severely depressed..    CARDIAC CATHETERIZATION Left 09/27/2022    Procedure: Cardiac Left Heart Cath;  Surgeon: Doreen Briones DO;  Location: [facility];  Service: Cardiology    CARDIAC CATHETERIZATION N/A 09/27/2022    Procedure: Cardiac Coronary Angiogram;  Surgeon: Doreen Briones DO;  Location: [facility];  Service: Cardiology    CARDIAC CATHETERIZATION  09/27/2022    Procedure: Cardiac catheterization;  Surgeon: Doreen Briones DO;  Location: [facility];  Service: Cardiology    INGUINAL HERNIA REPAIR Bilateral     FL COLONOSCOPY FLX DX W/COLLJ SPEC WHEN PFRMD N/A 03/11/2019    Procedure: COLONOSCOPY with removal of anal papilla;  Surgeon: ANIL Dale MD;  Location: [facility];  Service: Colorectal    TONSILLECTOMY      TOTAL KNEE ARTHROPLASTY Left 9/18/2023    Procedure: ARTHROPLASTY KNEE TOTAL;  Surgeon: David Mullen DO;  Location: [facility];  Service: Orthopedics    UMBILICAL HERNIA REPAIR  06/21/2006       Meds/Allergies:  Prior to Admission  medications    Medication Sig Start Date End Date Taking? Authorizing Provider   Accu-Chek FastClix Lancets MISC Use 1 each daily to test blood sugar. 1/23/23  Yes Lauren Vee DO   albuterol (2.5 mg/3 mL) 0.083 % nebulizer solution Take 3 mL (2.5 mg total) by nebulization every 6 (six) hours as needed for wheezing or shortness of breath 3/22/23  Yes Carlos Landaverde PA-C   albuterol (ProAir HFA) 90 mcg/act inhaler Inhale 2 puffs every 6 (six) hours as needed for wheezing 6/2/23  Yes Carlos Landaverde PA-C   ALPRAZolam (XANAX) 0.5 mg tablet TAKE 1 TABLET BY MOUTH ONCE DAILY AT BEDTIME AS NEEDED FOR ANXIETY 4/21/24  Yes Lauren Vee DO   aspirin (ECOTRIN LOW STRENGTH) 81 mg EC tablet Take 1 tablet (81 mg total) by mouth 2 (two) times a day for 14 days 9/19/23 4/24/24 Yes Jeffrey Galeano PA-C   atorvastatin (LIPITOR) 20 mg tablet Take 1 tablet (20 mg total) by mouth daily 3/11/24  Yes Meli Pa PA-C   Blood Glucose Monitoring Suppl (Accu-Chek Guide Me) w/Device KIT Use 1 each daily to test blood sugar. 1/23/23  Yes Lauren Vee DO   carvedilol (COREG) 25 mg tablet Take 1 tablet (25 mg total) by mouth 2 (two) times a day with meals 5/11/23  Yes KATIA Matute   Diclofenac Sodium (VOLTAREN) 1 % Apply 2 g topically 2 (two) times a day 4/2/24  Yes Paola Andrews DPM   DULoxetine (CYMBALTA) 60 mg delayed release capsule Take 1 capsule by mouth once daily 12/8/23  Yes Lauren Vee DO   Empagliflozin (Jardiance) 10 MG TABS tablet Take 1 tablet (10 mg total) by mouth every morning 12/12/23  Yes Xiomy Phillip DO   Entresto 49-51 MG TABS Take 1 tablet by mouth twice daily 3/1/24  Yes Tia Marga Pa, PA-C   fluticasone (FLONASE) 50 mcg/act nasal spray Use 1 spray(s) in each nostril twice daily 3/19/24  Yes Kandy Layne MD   Fluticasone-Salmeterol (Advair) 500-50 mcg/dose inhaler INHALE 1 DOSE BY MOUTH TWICE DAILY RINSE MOUTH AFTER USE 6/1/23  Yes Alvaro Pro MD    Fluticasone-Salmeterol (Advair) 500-50 mcg/dose inhaler INHALE ONE PUFF BY MOUTH AND INTO THE LUNGS TWICE A DAY. RINSE MOUTH AFTER USE 3/4/24  Yes KATIA Tavarez   furosemide (LASIX) 40 mg tablet Take 1 tablet (40 mg total) by mouth every other day 9/5/23  Yes Meli Pa PA-C   glucose blood test strip Use 1 each daily to test blood sugar. 1/23/23  Yes Lauren Vee DO   isosorbide mononitrate (IMDUR) 30 mg 24 hr tablet Take 1 tablet by mouth once daily 8/1/23  Yes Meli Pa PA-C   montelukast (SINGULAIR) 10 mg tablet Take 1 tablet (10 mg total) by mouth daily at bedtime 7/30/23  Yes Nay Ryan PA-C   tiotropium (Spiriva Respimat) 2.5 MCG/ACT AERS inhaler INHALE 2 SPRAY(S) BY MOUTH ONCE DAILY 4/22/24  Yes Carlos Landaverde PA-C   ipratropium-albuterol (DUO-NEB) 0.5-2.5 mg/3 mL nebulizer solution Take 3 mL by nebulization every 6 (six) hours as needed for wheezing or shortness of breath 8/30/23 4/9/24  Alvaro Pro MD   buPROPion (Wellbutrin XL) 150 mg 24 hr tablet Take 1 tablet (150 mg total) by mouth daily  Patient not taking: Reported on 1/5/2024 7/30/23 4/24/24  Nay Ryan PA-C   Dupilumab (Dupixent) 300 MG/2ML SOPN Inject 300 mg under the skin every 14 (fourteen) days  Patient not taking: Reported on 11/2/2023 4/4/23 4/24/24  KATIA Briceno     I have reviewed home medications using recent Epic encounter.    Allergies:   Allergies   Allergen Reactions    Ciprofloxacin Shortness Of Breath and Diarrhea       Social History:  Marital Status: /Civil Union   Occupation:   Patient Pre-hospital Living Situation: Home  Patient Pre-hospital Level of Mobility: walks  Patient Pre-hospital Diet Restrictions: low sodium  Substance Use History:   Social History     Substance and Sexual Activity   Alcohol Use Not Currently     Social History     Tobacco Use   Smoking Status Former    Current packs/day: 0.00    Average packs/day: 3.0 packs/day for 43.0 years  "(129.0 ttl pk-yrs)    Types: Cigarettes    Start date:     Quit date: 2018    Years since quittin.3   Smokeless Tobacco Never     Social History     Substance and Sexual Activity   Drug Use Yes    Types: Marijuana    Comment: occasionally edible       Family History:  Family History   Problem Relation Age of Onset    Heart attack Father         MI    Heart disease Father     Diabetes Sister         DM    Arthritis Family     Coronary artery disease Family     Hypertension Family     Tuberculosis Son     Alzheimer's disease Mother        Physical Exam:     Vitals:   Blood Pressure: 110/54 (24 1030)  Pulse: 75 (24 1030)  Temperature: 99.1 °F (37.3 °C) (24 0946)  Temp Source: Tympanic (24 0946)  Respirations: 16 (24 1030)  Height: 6' 2\" (188 cm) (24 0808)  Weight - Scale: 129 kg (285 lb) (24 0808)  SpO2: 94 % (24 1030)    PHYSICAL EXAM:    Vitals signs reviewed  Constitutional   Awake and cooperative. NAD.  Appears unwell but is nontoxic.   Head/Neck   Normocephalic. Atraumatic.   HEENT   No scleral icterus. EOMI.   Heart   Regular rate and rhythm. No murmers.   Lungs   Clear to auscultation bilaterally. Respirations unlaboured.   Abdomen   Soft. Nontender. Nondistended.    Skin   Skin color normal. No rashes.  Skin is warm to the touch and mildly diaphoretic on his back.   Extremities   No deformities. No peripheral edema.  There is some mild erythema and mild swelling of the right lower extremity in the anterior tibial region.   Neuro   Alert and oriented. No new deficits.   Psych   Mood stable. Affect normal.         Additional Data:     Lab Results:  Results from last 7 days   Lab Units 24  0815   WBC Thousand/uL 25.10*   HEMOGLOBIN g/dL 13.5   HEMATOCRIT % 42.2   PLATELETS Thousands/uL 259   BANDS PCT % 14*   LYMPHO PCT % 3*   MONO PCT % 3*   EOS PCT % 0     Results from last 7 days   Lab Units 24  0815   SODIUM mmol/L 128*   POTASSIUM mmol/L 3.9 "   CHLORIDE mmol/L 97   CO2 mmol/L 22   BUN mg/dL 40*   CREATININE mg/dL 1.87*   ANION GAP mmol/L 9   CALCIUM mg/dL 8.9   ALBUMIN g/dL 3.9   TOTAL BILIRUBIN mg/dL 1.31*   ALK PHOS U/L 68   ALT U/L 19   AST U/L 13   GLUCOSE RANDOM mg/dL 160*                 Results from last 7 days   Lab Units 04/24/24  0825   LACTIC ACID mmol/L 1.3       Lines/Drains:  Invasive Devices       Peripheral Intravenous Line  Duration             Peripheral IV 04/24/24 Left Wrist <1 day                        Imaging: Personally reviewed the following imaging: chest xray  XR chest 1 view portable   ED Interpretation by Davidson Walters PA-C (04/24 0900)   Consolidated noted right lower lobe consistent with pneumonia       VAS VENOUS DUPLEX - LOWER LIMB BILATERAL    (Results Pending)       EKG and Other Studies Reviewed on Admission:   EKG: NSR with PVCs    ** Please Note: This note has been constructed using a voice recognition system. **

## 2024-04-24 NOTE — RESPIRATORY THERAPY NOTE
RT Protocol Note  Cricket Rosales 68 y.o. male MRN: [id number]  Unit/Bed#: ED 27 Encounter: [id number]    Assessment    Principal Problem:    Sepsis ([facility])  Active Problems:    COPD, severe ([facility])    Hyponatremia    Chronic combined systolic and diastolic CHF (congestive heart failure) ([facility])    CKD (chronic kidney disease) stage 3, GFR 30-59 ml/min ([facility])    Elevated troponin    Cellulitis      Home Pulmonary Medications:  Advair, duoneb prn, spiriva       Past Medical History:   Diagnosis Date    Acute on chronic combined systolic and diastolic CHF (congestive heart failure) ([facility]) 2023    Alcohol abuse 2023    Ambulates with cane     Arthritis     Asthma     Back pain     Chest pain 2022    CHF (congestive heart failure) ([facility])     Claustrophobia     COPD (chronic obstructive pulmonary disease) ([facility])     COPD with acute exacerbation ([facility]) 2022    COVID-19     COVID-19 virus infection 2021    Depression     Hypertension     Mumps     Neck pain     Old MI (myocardial infarction)     Pneumonia     Pulmonary emphysema ([facility])     Rectal bleeding 2019    Skin abnormalities     rashes and wounds on both legs - scabbed over and healing    Sleep apnea     no CPAP    Tingling of both feet     Wears glasses      Social History     Socioeconomic History    Marital status: /Civil Union     Spouse name: None    Number of children: None    Years of education: None    Highest education level: None   Occupational History    None   Tobacco Use    Smoking status: Former     Current packs/day: 0.00     Average packs/day: 3.0 packs/day for 43.0 years (129.0 ttl pk-yrs)     Types: Cigarettes     Start date:      Quit date: 2018     Years since quittin.3    Smokeless tobacco: Never   Vaping Use    Vaping status: Never Used   Substance and Sexual Activity    Alcohol use: Not Currently    Drug use: Yes     Types: Marijuana     Comment: occasionally edible    Sexual activity: None   Other Topics  "Concern    None   Social History Narrative    Caffeine use     Social Determinants of Health     Financial Resource Strain: Low Risk  (3/23/2023)    Overall Financial Resource Strain (CARDIA)     Difficulty of Paying Living Expenses: Not very hard   Food Insecurity: No Food Insecurity (7/28/2023)    Hunger Vital Sign     Worried About Running Out of Food in the Last Year: Never true     Ran Out of Food in the Last Year: Never true   Transportation Needs: No Transportation Needs (7/28/2023)    PRAPARE - Transportation     Lack of Transportation (Medical): No     Lack of Transportation (Non-Medical): No   Physical Activity: Not on file   Stress: Not on file   Social Connections: Not on file   Intimate Partner Violence: Not on file   Housing Stability: Low Risk  (7/28/2023)    Housing Stability Vital Sign     Unable to Pay for Housing in the Last Year: No     Number of Places Lived in the Last Year: 1     Unstable Housing in the Last Year: No       Subjective         Objective    Physical Exam:   Assessment Type: Assess only  General Appearance: Alert, Awake  Respiratory Pattern: Normal  Chest Assessment: Chest expansion symmetrical, Trachea midline  Bilateral Breath Sounds: Clear, Diminished  Cough: Non-productive  O2 Device: rma    Vitals:  Blood pressure 107/50, pulse 84, temperature 99.1 °F (37.3 °C), temperature source Tympanic, resp. rate 18, height 6' 2\" (1.88 m), weight 129 kg (285 lb), SpO2 96%.          Imaging and other studies: I have personally reviewed pertinent reports.      O2 Device: rma     Plan    Respiratory Plan: Home Bronchodilator Patient pathway        Resp Comments: Pt admitted for sepsis. Hx of severe COPD. Pt uses inhalers and neb at home. Former smoker of 3ppd. Quit over 30 years ago. Will order prn neb at this time.   "

## 2024-04-24 NOTE — ED NOTES
"Patient ringing call bell at this time stating \" I sharted \" Pt incontinent of stool at this time. Ambulatory to bathroom and given supplies - new linens placed.     Lillian Jones RN  04/24/24 1147    "

## 2024-04-24 NOTE — SEPSIS NOTE
Sepsis Note   Cricket Rosales 68 y.o. male MRN: [id number]  Unit/Bed#: ED 27 Encounter: [id number]       Initial Sepsis Screening       Row Name 04/24/24 1000                Is the patient's history suggestive of a new or worsening infection? Yes (Proceed)  -DD        Suspected source of infection pneumonia  -DD        Indicate SIRS criteria Hyperthemia > 38.3C (100.9F) OR Hypothermia <36C (96.8F);Leukocytosis (WBC > 16808 IJL) OR Leukopenia (WBC <4000 IJL) OR Bandemia (WBC >10% bands)  -DD        Are two or more of the above signs & symptoms of infection both present and new to the patient? Yes (Proceed)  -DD        Assess for evidence of organ dysfunction: Are any of the below criteria present within 6 hours of suspected infection and SIRS criteria that are NOT considered to be chronic conditions? --                  User Key  (r) = Recorded By, (t) = Taken By, (c) = Cosigned By      Initials Name Provider Type    DD Davidson Walters PA-C Physician Assistant                        Body mass index is 36.59 kg/m².  Wt Readings from Last 1 Encounters:   04/24/24 129 kg (285 lb)     IBW (Ideal Body Weight): 82.2 kg    Ideal body weight: 82.2 kg (181 lb 3.5 oz)  Adjusted ideal body weight: 101 kg (222 lb 11.7 oz)

## 2024-04-24 NOTE — ASSESSMENT & PLAN NOTE
History of nonischemic cardiomyopathy  Currently not in exacerbation and examining dry  Will provide 500 cc of normal saline  Hold home Lasix for now  Continue home carvedilol Entresto for goal-directed medical therapy  Monitor volume status

## 2024-04-25 PROBLEM — R19.7 DIARRHEA: Status: ACTIVE | Noted: 2024-04-25

## 2024-04-25 LAB
ANION GAP SERPL CALCULATED.3IONS-SCNC: 10 MMOL/L (ref 4–13)
BACTERIA UR QL AUTO: ABNORMAL /HPF
BILIRUB UR QL STRIP: NEGATIVE
BUN SERPL-MCNC: 41 MG/DL (ref 5–25)
CALCIUM SERPL-MCNC: 8.8 MG/DL (ref 8.4–10.2)
CHLORIDE SERPL-SCNC: 97 MMOL/L (ref 96–108)
CLARITY UR: ABNORMAL
CO2 SERPL-SCNC: 22 MMOL/L (ref 21–32)
COLOR UR: YELLOW
CREAT SERPL-MCNC: 2.12 MG/DL (ref 0.6–1.3)
CREAT UR-MCNC: 94.5 MG/DL
ERYTHROCYTE [DISTWIDTH] IN BLOOD BY AUTOMATED COUNT: 16.2 % (ref 11.6–15.1)
GFR SERPL CREATININE-BSD FRML MDRD: 31 ML/MIN/1.73SQ M
GLUCOSE SERPL-MCNC: 127 MG/DL (ref 65–140)
GLUCOSE UR STRIP-MCNC: ABNORMAL MG/DL
HCT VFR BLD AUTO: 43.6 % (ref 36.5–49.3)
HGB BLD-MCNC: 13.5 G/DL (ref 12–17)
HGB UR QL STRIP.AUTO: ABNORMAL
KETONES UR STRIP-MCNC: NEGATIVE MG/DL
LEUKOCYTE ESTERASE UR QL STRIP: NEGATIVE
MAGNESIUM SERPL-MCNC: 2 MG/DL (ref 1.9–2.7)
MCH RBC QN AUTO: 25.9 PG (ref 26.8–34.3)
MCHC RBC AUTO-ENTMCNC: 31 G/DL (ref 31.4–37.4)
MCV RBC AUTO: 84 FL (ref 82–98)
NITRITE UR QL STRIP: NEGATIVE
NON-SQ EPI CELLS URNS QL MICRO: ABNORMAL /HPF
PH UR STRIP.AUTO: 6 [PH]
PLATELET # BLD AUTO: 229 THOUSANDS/UL (ref 149–390)
PMV BLD AUTO: 9.9 FL (ref 8.9–12.7)
POTASSIUM SERPL-SCNC: 3.7 MMOL/L (ref 3.5–5.3)
PROCALCITONIN SERPL-MCNC: 6.98 NG/ML
PROT UR STRIP-MCNC: ABNORMAL MG/DL
RBC # BLD AUTO: 5.21 MILLION/UL (ref 3.88–5.62)
RBC #/AREA URNS AUTO: ABNORMAL /HPF
SODIUM 24H UR-SCNC: 25 MOL/L
SODIUM SERPL-SCNC: 129 MMOL/L (ref 135–147)
SP GR UR STRIP.AUTO: 1.02
UROBILINOGEN UR QL STRIP.AUTO: 0.2 E.U./DL
WBC # BLD AUTO: 15.66 THOUSAND/UL (ref 4.31–10.16)
WBC #/AREA URNS AUTO: ABNORMAL /HPF

## 2024-04-25 PROCEDURE — 97165 OT EVAL LOW COMPLEX 30 MIN: CPT

## 2024-04-25 PROCEDURE — 94664 DEMO&/EVAL PT USE INHALER: CPT

## 2024-04-25 PROCEDURE — 84300 ASSAY OF URINE SODIUM: CPT

## 2024-04-25 PROCEDURE — 94760 N-INVAS EAR/PLS OXIMETRY 1: CPT

## 2024-04-25 PROCEDURE — 83735 ASSAY OF MAGNESIUM: CPT | Performed by: INTERNAL MEDICINE

## 2024-04-25 PROCEDURE — 81001 URINALYSIS AUTO W/SCOPE: CPT

## 2024-04-25 PROCEDURE — 85027 COMPLETE CBC AUTOMATED: CPT | Performed by: INTERNAL MEDICINE

## 2024-04-25 PROCEDURE — 84145 PROCALCITONIN (PCT): CPT | Performed by: INTERNAL MEDICINE

## 2024-04-25 PROCEDURE — 82570 ASSAY OF URINE CREATININE: CPT

## 2024-04-25 PROCEDURE — 97162 PT EVAL MOD COMPLEX 30 MIN: CPT

## 2024-04-25 PROCEDURE — 99223 1ST HOSP IP/OBS HIGH 75: CPT | Performed by: INTERNAL MEDICINE

## 2024-04-25 PROCEDURE — 94640 AIRWAY INHALATION TREATMENT: CPT

## 2024-04-25 PROCEDURE — 80048 BASIC METABOLIC PNL TOTAL CA: CPT | Performed by: INTERNAL MEDICINE

## 2024-04-25 PROCEDURE — 99233 SBSQ HOSP IP/OBS HIGH 50: CPT | Performed by: INTERNAL MEDICINE

## 2024-04-25 RX ORDER — SODIUM CHLORIDE, SODIUM GLUCONATE, SODIUM ACETATE, POTASSIUM CHLORIDE, MAGNESIUM CHLORIDE, SODIUM PHOSPHATE, DIBASIC, AND POTASSIUM PHOSPHATE .53; .5; .37; .037; .03; .012; .00082 G/100ML; G/100ML; G/100ML; G/100ML; G/100ML; G/100ML; G/100ML
100 INJECTION, SOLUTION INTRAVENOUS CONTINUOUS
Status: DISPENSED | OUTPATIENT
Start: 2024-04-25 | End: 2024-04-25

## 2024-04-25 RX ADMIN — CEFTRIAXONE 1000 MG: 1 INJECTION, SOLUTION INTRAVENOUS at 08:56

## 2024-04-25 RX ADMIN — ISOSORBIDE MONONITRATE 30 MG: 30 TABLET, EXTENDED RELEASE ORAL at 08:55

## 2024-04-25 RX ADMIN — HEPARIN SODIUM 5000 UNITS: 5000 INJECTION, SOLUTION INTRAVENOUS; SUBCUTANEOUS at 22:18

## 2024-04-25 RX ADMIN — MONTELUKAST 10 MG: 10 TABLET, FILM COATED ORAL at 22:18

## 2024-04-25 RX ADMIN — ALPRAZOLAM 0.5 MG: 0.5 TABLET ORAL at 22:18

## 2024-04-25 RX ADMIN — FLUTICASONE PROPIONATE 1 SPRAY: 50 SPRAY, METERED NASAL at 09:04

## 2024-04-25 RX ADMIN — ATORVASTATIN CALCIUM 20 MG: 20 TABLET, FILM COATED ORAL at 17:22

## 2024-04-25 RX ADMIN — UMECLIDINIUM 1 PUFF: 62.5 AEROSOL, POWDER ORAL at 09:04

## 2024-04-25 RX ADMIN — EMPAGLIFLOZIN 10 MG: 10 TABLET, FILM COATED ORAL at 08:56

## 2024-04-25 RX ADMIN — CARVEDILOL 25 MG: 25 TABLET, FILM COATED ORAL at 08:56

## 2024-04-25 RX ADMIN — DULOXETINE HYDROCHLORIDE 60 MG: 60 CAPSULE, DELAYED RELEASE ORAL at 08:56

## 2024-04-25 RX ADMIN — ASPIRIN 81 MG: 81 TABLET, COATED ORAL at 08:56

## 2024-04-25 RX ADMIN — FLUTICASONE FUROATE AND VILANTEROL TRIFENATATE 1 PUFF: 200; 25 POWDER RESPIRATORY (INHALATION) at 09:04

## 2024-04-25 RX ADMIN — ALBUTEROL SULFATE 2.5 MG: 2.5 SOLUTION RESPIRATORY (INHALATION) at 11:02

## 2024-04-25 RX ADMIN — SODIUM CHLORIDE, SODIUM GLUCONATE, SODIUM ACETATE, POTASSIUM CHLORIDE, MAGNESIUM CHLORIDE, SODIUM PHOSPHATE, DIBASIC, AND POTASSIUM PHOSPHATE 100 ML/HR: .53; .5; .37; .037; .03; .012; .00082 INJECTION, SOLUTION INTRAVENOUS at 12:40

## 2024-04-25 RX ADMIN — HEPARIN SODIUM 5000 UNITS: 5000 INJECTION, SOLUTION INTRAVENOUS; SUBCUTANEOUS at 14:27

## 2024-04-25 RX ADMIN — HEPARIN SODIUM 5000 UNITS: 5000 INJECTION, SOLUTION INTRAVENOUS; SUBCUTANEOUS at 05:42

## 2024-04-25 RX ADMIN — ALBUTEROL SULFATE 2.5 MG: 2.5 SOLUTION RESPIRATORY (INHALATION) at 21:01

## 2024-04-25 RX ADMIN — CARVEDILOL 25 MG: 25 TABLET, FILM COATED ORAL at 17:22

## 2024-04-25 NOTE — ASSESSMENT & PLAN NOTE
Patient complains of diarrhea today.  States she has had 5-6 watery bowel movements since admission.  Denies any abdominal pain, bloating  Symptoms are improving per patient, with stool already becoming more solid.  Will monitor more clinically, however if symptoms persist or worsen will need to rule out C. difficile.

## 2024-04-25 NOTE — ASSESSMENT & PLAN NOTE
Erythema and warmth of the right lower extremity anterior tibial region.  There is also some associated scabbing but no open sores or purulence    Last fever evening of 4/24 around midnight  Continue on IV ceftriaxone  Monitor WBC count and fever curve  Monitor clinically with serial exams

## 2024-04-25 NOTE — PLAN OF CARE
Problem: PHYSICAL THERAPY ADULT  Goal: Performs mobility at highest level of function for planned discharge setting.  See evaluation for individualized goals.  Description: Treatment/Interventions: Functional transfer training, LE strengthening/ROM, Elevations, Therapeutic exercise, Endurance training, Patient/family training, Equipment eval/education, Bed mobility, Gait training          See flowsheet documentation for full assessment, interventions and recommendations.  4/25/2024 1413 by Mabel Siegel, PT  Note: Prognosis: Good  Problem List: Decreased strength, Decreased endurance, Decreased mobility, Impaired balance, Decreased safety awareness  Assessment: Pt is 68 y.o. male seen for PT evaluation on 4/25/25 s/p admit to [facility] on 4/24/24 due to sepsis, R LE cellulitis, hyponatremia, elevated troponins. PT consulted to assess pt's functional mobility and d/c needs. Order placed for PT eval and tx.. Performed at least 2 patient identifiers during session: Name and wristband. Comorbidities affecting pt's physical performance at time of assessment include: COPD, CHF, cardiovascular disease with stenting, CKD stage 3, L TKR 8/2023. LOF prior to admission was I with gait with SPC for community mobility, drives, I with steps. Personal factors affecting pt at time of IE include: requiring use of device for gait, steps to enter home. Please find objective findings from PT assessment regarding body systems outlined above with impairments and limitations including weakness, impaired balance, decreased endurance, pain, decreased activity tolerance and fall risk, as well as mobility assessment. Pt agreeable to OOB.  Gait trained 70' with RW with CGA to S.  Pt limited by reports of fatigue. Upon return to room, pt gait trained 10' without a device with CGA to S.  O2 sats 95% on room air after gait, HR 67 bpm.  Pt will benefit from PT to progress gait with least restrictive device vs without device, progress  transfers, steps, balance, and safety in order to prepare pt for d/c to home.  Pt's clinical presentation is currently unstable/unpredictable seen in pt's presentation of ongoing medical assessment. Given co-morbidities and presented presentation, moderate level eval was completed.  Pt to benefit from continued PT tx to address deficits as defined above and maximize level of functional independent mobility and consistency. From PT/mobility standpoint, recommendation at time of d/c would be minimum frequency therapy in order to promote return to PLOF and independence. The patient's AM-PAC Basic Mobility Inpatient Short Form Raw Score is 20. A Raw score of greater than 16 suggests the patient may benefit from discharge to home. Please also refer to the recommendation of the Physical Therapist for safe discharge planning.        Rehab Resource Intensity Level, PT: III (Minimum Resource Intensity)    See flowsheet documentation for full assessment.     4/25/2024 1412 by Mabel Siegel PT  Note: Prognosis: Good  Problem List: Decreased strength, Decreased endurance, Decreased mobility, Impaired balance, Decreased safety awareness  Assessment: Pt is 68 y.o. male seen for PT evaluation on 4/25/25 s/p admit to [facility] on 4/24/24 due to sepsis, R LE cellulitis, hyponatremia, elevated troponins. PT consulted to assess pt's functional mobility and d/c needs. Order placed for PT eval and tx.. Performed at least 2 patient identifiers during session: Name and wristband. Comorbidities affecting pt's physical performance at time of assessment include: COPD, CHF, cardiovascular disease with stenting, CKD stage 3, L TKR 8/2023. LOF prior to admission was I with gait with SPC for community mobility, drives, I with steps. Personal factors affecting pt at time of IE include: requiring use of device for gait, steps to enter home. Please find objective findings from PT assessment regarding body systems outlined above with  impairments and limitations including weakness, impaired balance, decreased endurance, pain, decreased activity tolerance and fall risk, as well as mobility assessment. Pt agreeable to OOB.  Gait trained 70' with RW with CGA to S.  Pt limited by reports of fatigue. Upon return to room, pt gait trained 10' without a device with CGA to S.  O2 sats 95% on room air after gait, HR 67 bpm.  Pt will benefit from PT to progress gait with least restrictive device vs without device, progress transfers, steps, balance, and safety in order to prepare pt for d/c to home.  Pt's clinical presentation is currently unstable/unpredictable seen in pt's presentation of ongoing medical assessment. Given co-morbidities and presented presentation, moderate level eval was completed.  Pt to benefit from continued PT tx to address deficits as defined above and maximize level of functional independent mobility and consistency. From PT/mobility standpoint, recommendation at time of d/c would be minimum frequency therapy in order to promote return to PLOF and independence. The patient's AM-PAC Basic Mobility Inpatient Short Form Raw Score is 20. A Raw score of greater than 16 suggests the patient may benefit from discharge to home. Please also refer to the recommendation of the Physical Therapist for safe discharge planning.        Rehab Resource Intensity Level, PT: III (Minimum Resource Intensity)    See flowsheet documentation for full assessment.

## 2024-04-25 NOTE — CONSULTS
Consultation - Nephrology   Cricket Rosales 68 y.o. male MRN: [id number]  Unit/Bed#: -01 Encounter: [id number]    ASSESSMENT/PLAN:    MAGDA (POA)  -Creatinine on admission 1.87 mg deciliter, most recent creatinine 2.12 mg deciliter  -Etiology: Likely prerenal secondary to diarrhea, poor oral intake and diuretic use  -Will start the patient on Isolyte 100 cc/hr and monitor response.  -Hold Jardiance.  Continue to hold Lasix and Entresto.  -Check urine studies and postvoid residual  -Avoid hypotension, avoid nephrotoxins, avoid NSAIDS  -Trend BMP    CKD stage 3a  -Baseline creatinine 1.4-1.6 mg/dL  -Etiology: cardiorenal syndrome, hypertension, obesity, tobacco use, age related nephron loss   -Follows with Dr Muir as OP    Hypertension/blood pressure  -OP regimen: Carvedilol 25 mg twice daily, isosorbide 30 mg daily, Lasix 40 mg every other day  -Current regimen: Carvedilol 25 mg twice daily and isosorbide 30 mg daily.  Lasix on hold in setting of acute kidney injury  -Recent blood pressures have been a bit low but stable    Hyponatremia  -Etiology: likely hypovolemic hyponatremia from GI losses   -Workup: check urine sodium   -Treatment plan: start volume expansion with isolyte     Chronic combined systolic and diastolic congestive heart failure  Maintained on carvedilol 25 mg twice a day, Entresto 49/51 mg twice a day, Jardiance 10 mg/day.  Jardiance and Entresto currently on hold due to MAGDA.  Patient is at his baseline weight of around 280 pounds  Low-salt diet    Sepsis  In the setting of cellulitis and pneumonia.  Antibiotic recommendations per primary team    Additional Medical Problems: COPD, dyslipidemia, obstructive sleep apnea    HISTORY OF PRESENT ILLNESS:  Requesting Physician: Deven Arora, *  Reason for Consult: MAGDA    Cricket Rosales is a 68 y.o. year old male with a past medical history of CKD 3 baseline creatinine 1.4 to 1.6 mg/dL, chronic combined systolic and diastolic congestive  heart failure, hypertension, type 2 diabetes, nonischemic cardiomyopathy secondary to alcohol abuse who [place] [facility] with fevers, chills, cough, shortness of breath, dizziness, lightheadedness, disorientation and weakness.  He was found to have right lower extremity cellulitis.  Chest x-ray revealed possible right-sided pneumonia and he was started on empiric antibiotics.  He was given a 500 mL normal saline bolus for hypovolemia.  Home Lasix was held.  A renal consultation is requested today for assistance in the management of acute kidney injury.  Patient admits to diarrhea that had started yesterday.  He reports he is still having several loose bowel movements per day.  This is associated with stomach upset and nausea.  He questions whether he may have food poisoning from calamari that he had eaten recently. He also admits to urinary frequency and urgency.    PAST MEDICAL HISTORY:  Past Medical History:   Diagnosis Date    Acute on chronic combined systolic and diastolic CHF (congestive heart failure) ([facility]) 7/27/2023    Alcohol abuse 7/27/2023    Ambulates with cane     Arthritis     Asthma     Back pain     Chest pain 12/22/2022    CHF (congestive heart failure) ([facility])     Claustrophobia     COPD (chronic obstructive pulmonary disease) ([facility])     COPD with acute exacerbation ([facility]) 05/06/2022    COVID-19     COVID-19 virus infection 12/03/2021    Depression     Hypertension     Mumps     Neck pain     Old MI (myocardial infarction)     Pneumonia     Pulmonary emphysema ([facility])     Rectal bleeding 01/14/2019    Skin abnormalities     rashes and wounds on both legs - scabbed over and healing    Sleep apnea     no CPAP    Tingling of both feet     Wears glasses        PAST SURGICAL HISTORY:  Past Surgical History:   Procedure Laterality Date    APPENDECTOMY      CARDIAC CATHETERIZATION  07/24/2015    Left main- normal and maidly tortuous.  Circumflex - normal and moderately tortuous.  RCA- normal  and mildy tortuous.  Global LV function was severely depressed..    CARDIAC CATHETERIZATION Left 2022    Procedure: Cardiac Left Heart Cath;  Surgeon: Doreen Briones DO;  Location: [facility];  Service: Cardiology    CARDIAC CATHETERIZATION N/A 2022    Procedure: Cardiac Coronary Angiogram;  Surgeon: Doreen Briones DO;  Location: [facility];  Service: Cardiology    CARDIAC CATHETERIZATION  2022    Procedure: Cardiac catheterization;  Surgeon: Doreen Briones DO;  Location: [facility];  Service: Cardiology    INGUINAL HERNIA REPAIR Bilateral     NJ COLONOSCOPY FLX DX W/COLLJ SPEC WHEN PFRMD N/A 2019    Procedure: COLONOSCOPY with removal of anal papilla;  Surgeon: ANIL Dale MD;  Location: [facility];  Service: Colorectal    TONSILLECTOMY      TOTAL KNEE ARTHROPLASTY Left 2023    Procedure: ARTHROPLASTY KNEE TOTAL;  Surgeon: David Mullen DO;  Location: [facility];  Service: Orthopedics    UMBILICAL HERNIA REPAIR  2006       ALLERGIES:  Allergies   Allergen Reactions    Ciprofloxacin Shortness Of Breath and Diarrhea       SOCIAL HISTORY:  Social History     Substance and Sexual Activity   Alcohol Use Not Currently     Social History     Substance and Sexual Activity   Drug Use Yes    Types: Marijuana    Comment: occasionally edible     Social History     Tobacco Use   Smoking Status Former    Current packs/day: 0.00    Average packs/day: 3.0 packs/day for 43.0 years (129.0 ttl pk-yrs)    Types: Cigarettes    Start date:     Quit date: 2018    Years since quittin.3   Smokeless Tobacco Never       FAMILY HISTORY:  Family History   Problem Relation Age of Onset    Heart attack Father         MI    Heart disease Father     Diabetes Sister         DM    Arthritis Family     Coronary artery disease Family     Hypertension Family     Tuberculosis Son     Alzheimer's disease Mother        MEDICATIONS:    Current Facility-Administered  Medications:     acetaminophen (TYLENOL) tablet 650 mg, 650 mg, Oral, Q6H PRN, Deven rAora DO, 650 mg at 04/24/24 2355    albuterol inhalation solution 2.5 mg, 2.5 mg, Nebulization, Q4H PRN, Deven Arora DO, 2.5 mg at 04/25/24 1102    ALPRAZolam (XANAX) tablet 0.5 mg, 0.5 mg, Oral, HS PRN, Deven Arora DO, 0.5 mg at 04/24/24 2114    aspirin (ECOTRIN LOW STRENGTH) EC tablet 81 mg, 81 mg, Oral, Daily, Deven Arora DO, 81 mg at 04/25/24 0856    atorvastatin (LIPITOR) tablet 20 mg, 20 mg, Oral, Daily With Dinner, Deven Arora DO, 20 mg at 04/24/24 1550    carvedilol (COREG) tablet 25 mg, 25 mg, Oral, BID With Meals, Deven Arora DO, 25 mg at 04/25/24 0856    cefTRIAXone (ROCEPHIN) IVPB (premix in dextrose) 1,000 mg 50 mL, 1,000 mg, Intravenous, Q24H, Deven Arora DO, Last Rate: 100 mL/hr at 04/25/24 0856, 1,000 mg at 04/25/24 0856    DULoxetine (CYMBALTA) delayed release capsule 60 mg, 60 mg, Oral, Daily, Deven Arora DO, 60 mg at 04/25/24 0856    fluticasone (FLONASE) 50 mcg/act nasal spray 1 spray, 1 spray, Each Nare, Daily, Deven Arora DO, 1 spray at 04/25/24 0904    fluticasone-vilanterol 200-25 mcg/actuation 1 puff, 1 puff, Inhalation, Daily, Deven Arora DO, 1 puff at 04/25/24 0904    heparin (porcine) subcutaneous injection 5,000 Units, 5,000 Units, Subcutaneous, Q8H PEDRO, Deven Arora DO, 5,000 Units at 04/25/24 0542    isosorbide mononitrate (IMDUR) 24 hr tablet 30 mg, 30 mg, Oral, Daily, Deven Arora DO, 30 mg at 04/25/24 0855    montelukast (SINGULAIR) tablet 10 mg, 10 mg, Oral, HS, Deven Arora DO, 10 mg at 04/24/24 2109    ondansetron (ZOFRAN) injection 4 mg, 4 mg, Intravenous, Q6H PRN, Deven Arora DO    umeclidinium 62.5 mcg/actuation inhaler AEPB 1 puff, 1 puff, Inhalation, Daily, Deven Arora DO, 1 puff at 04/25/24 0904    REVIEW OF  SYSTEMS:  Review of Systems   Constitutional:  Negative for chills and fever.   Respiratory:  Positive for shortness of breath.    Cardiovascular:  Negative for chest pain.   Gastrointestinal:  Positive for diarrhea and nausea. Negative for abdominal pain and vomiting.   Neurological:  Positive for dizziness. Negative for headaches.   All other systems reviewed and are negative.     A complete 10 point review of systems was performed and found to be negative unless otherwise noted above or in the HPI.    PHYSICAL EXAM:  Current Weight: Weight - Scale: 127 kg (280 lb 1.5 oz)  First Weight: Weight - Scale: 129 kg (285 lb)  Vitals:    04/25/24 0728 04/25/24 0747 04/25/24 0903 04/25/24 1102   BP: 124/67  109/62    BP Location:       Pulse: 83 91 79    Resp: 20      Temp: (!) 96.9 °F (36.1 °C) 98.3 °F (36.8 °C)     TempSrc: Oral      SpO2: 93% 97% 95% 95%   Weight:       Height:           Intake/Output Summary (Last 24 hours) at 4/25/2024 1117  Last data filed at 4/25/2024 0903  Gross per 24 hour   Intake 1080 ml   Output 460 ml   Net 620 ml     Physical Exam  Vitals reviewed.   Constitutional:       General: He is not in acute distress.     Appearance: He is well-developed.   HENT:      Head: Normocephalic and atraumatic.      Mouth/Throat:      Mouth: Mucous membranes are moist.   Cardiovascular:      Rate and Rhythm: Normal rate and regular rhythm.      Pulses: Normal pulses.      Heart sounds: No murmur heard.  Pulmonary:      Effort: Pulmonary effort is normal. No respiratory distress.      Breath sounds: Normal breath sounds.   Abdominal:      General: Bowel sounds are normal.      Palpations: Abdomen is soft.      Tenderness: There is no abdominal tenderness.   Musculoskeletal:         General: No swelling.      Right lower leg: Edema present.      Left lower leg: Edema present.      Comments: Mild lower extremity edema appears to be chronic in nature   Skin:     General: Skin is warm and dry.      Capillary  Refill: Capillary refill takes less than 2 seconds.   Neurological:      Mental Status: He is alert.   Psychiatric:         Mood and Affect: Mood normal.         Behavior: Behavior normal.          Invasive Devices:      Lab Results:   Results from last 7 days   Lab Units 04/25/24  0428 04/24/24  0815   WBC Thousand/uL 15.66* 25.10*   HEMOGLOBIN g/dL 13.5 13.5   HEMATOCRIT % 43.6 42.2   PLATELETS Thousands/uL 229 259   POTASSIUM mmol/L 3.7 3.9   CHLORIDE mmol/L 97 97   CO2 mmol/L 22 22   BUN mg/dL 41* 40*   CREATININE mg/dL 2.12* 1.87*   CALCIUM mg/dL 8.8 8.9   MAGNESIUM mg/dL 2.0  --    ALK PHOS U/L  --  68   ALT U/L  --  19   AST U/L  --  13       I have personally reviewed the blood work as stated above and in my note.  I have personally reviewed chest x-ray imaging studies.  I have personally reviewed internal medicine note.

## 2024-04-25 NOTE — PHYSICAL THERAPY NOTE
Physical Therapy Evaluation    Patient Name: Cricket Rosales    Today's Date: 4/25/2024     Problem List  Principal Problem:    Sepsis ([facility])  Active Problems:    COPD, severe ([facility])    Hyponatremia    Chronic combined systolic and diastolic CHF (congestive heart failure) ([facility])    CKD (chronic kidney disease) stage 3, GFR 30-59 ml/min ([facility])    Elevated troponin    Cellulitis    Diarrhea       Past Medical History  Past Medical History:   Diagnosis Date    Acute on chronic combined systolic and diastolic CHF (congestive heart failure) ([facility]) 7/27/2023    Alcohol abuse 7/27/2023    Ambulates with cane     Arthritis     Asthma     Back pain     Chest pain 12/22/2022    CHF (congestive heart failure) ([facility])     Claustrophobia     COPD (chronic obstructive pulmonary disease) ([facility])     COPD with acute exacerbation ([facility]) 05/06/2022    COVID-19     COVID-19 virus infection 12/03/2021    Depression     Hypertension     Mumps     Neck pain     Old MI (myocardial infarction)     Pneumonia     Pulmonary emphysema ([facility])     Rectal bleeding 01/14/2019    Skin abnormalities     rashes and wounds on both legs - scabbed over and healing    Sleep apnea     no CPAP    Tingling of both feet     Wears glasses         Past Surgical History  Past Surgical History:   Procedure Laterality Date    APPENDECTOMY      CARDIAC CATHETERIZATION  07/24/2015    Left main- normal and maidly tortuous.  Circumflex - normal and moderately tortuous.  RCA- normal and mildy tortuous.  Global LV function was severely depressed..    CARDIAC CATHETERIZATION Left 09/27/2022    Procedure: Cardiac Left Heart Cath;  Surgeon: Doreen Briones DO;  [facility];  Service: Cardiology    CARDIAC CATHETERIZATION N/A 09/27/2022    Procedure: Cardiac Coronary Angiogram;  Surgeon: Doreen Briones DO;  [facility];  Service: Cardiology    CARDIAC CATHETERIZATION  09/27/2022     "Procedure: Cardiac catheterization;  Surgeon: Doreen Briones DO;  Location:  CARDIAC CATH LAB;  Service: Cardiology    INGUINAL HERNIA REPAIR Bilateral     IN COLONOSCOPY FLX DX W/COLLJ SPEC WHEN PFRMD N/A 03/11/2019    Procedure: COLONOSCOPY with removal of anal papilla;  Surgeon: ANIL Dale MD;  Location: [facility];  Service: Colorectal    TONSILLECTOMY      TOTAL KNEE ARTHROPLASTY Left 9/18/2023    Procedure: ARTHROPLASTY KNEE TOTAL;  Surgeon: David Mullen DO;  Location: [facility];  Service: Orthopedics    UMBILICAL HERNIA REPAIR  06/21/2006 04/25/24 1138   PT Last Visit   PT Visit Date 04/25/24   Note Type   Note type Evaluation   Pain Assessment   Pain Assessment Tool 0-10   Pain Score No Pain   Restrictions/Precautions   Other Precautions Fall Risk   Home Living   Type of Home House   Home Layout Able to live on main level with bedroom/bathroom;Stairs to enter with rails  (Split level home. 7 AXEL with L rail.)   Bathroom Shower/Tub Tub/shower unit   Bathroom Toilet Standard   Bathroom Equipment Shower chair   Bathroom Accessibility Accessible   Home Equipment Walker;Cane   Additional Comments Pt uses SPC as needed for community mobility.   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  I'm weak.\"   RLE Assessment   RLE Assessment WFL   Strength RLE   RLE " Overall Strength 4/5   LLE Assessment   LLE Assessment WFL   Strength LLE   LLE Overall Strength 4/5   Coordination   Movements are Fluid and Coordinated 1   Bed Mobility   Supine to Sit 7  Independent   Transfers   Sit to Stand 5  Supervision   Additional items Increased time required;Verbal cues   Stand to Sit 5  Supervision   Additional items Increased time required;Verbal cues   Additional Comments Cues for safe hand placement.   Ambulation/Elevation   Gait pattern   (Decreased gait speed, decreased step length)   Gait Assistance   (CGA to S)   Assistive Device Rolling walker;None   Distance 80'  (70' with RW, 10' without device)   Ambulation/Elevation Additional Comments Pt slow with gait with reports of fatigue and weakness.   Balance   Static Sitting Normal   Dynamic Sitting Good   Static Standing Fair  (With RW and without device)   Dynamic Standing Fair -  (With RW and without device)   Ambulatory Fair -  (With RW and without device)   Activity Tolerance   Activity Tolerance Patient limited by fatigue   Assessment   Prognosis Good   Problem List Decreased strength;Decreased endurance;Decreased mobility;Impaired balance;Decreased safety awareness   Assessment Pt is 68 y.o. male seen for PT evaluation on 4/25/25 s/p admit to [facility] 4/24[facility]24 due to sepsis, R LE cellulitis, hyponatremia, elevated troponins. PT consulted to assess pt's functional mobility and d/c needs. Order placed for PT eval and tx.. Performed at least 2 patient identifiers during session: Name and wristband. Comorbidities affecting pt's physical performance at time of assessment include: COPD, CHF, cardiovascular disease with stenting, CKD stage 3, L TKR 8/2023. LOF prior to admission was I with gait with SPC for community mobility, drives, I with steps. Personal factors affecting pt at time of IE include: requiring use of device for gait, steps to enter home. Please find objective findings from PT assessment regarding body  systems outlined above with impairments and limitations including weakness, impaired balance, decreased endurance, pain, decreased activity tolerance and fall risk, as well as mobility assessment. Pt agreeable to OOB.  Gait trained 70' with RW with CGA to S.  Pt limited by reports of fatigue. Upon return to room, pt gait trained 10' without a device with CGA to S.  O2 sats 95% on room air after gait, HR 67 bpm.  Pt will benefit from PT to progress gait with least restrictive device vs without device, progress transfers, steps, balance, and safety in order to prepare pt for d/c to home.  Pt's clinical presentation is currently unstable/unpredictable seen in pt's presentation of ongoing medical assessment. Given co-morbidities and presented presentation, moderate level eval was completed.  Pt to benefit from continued PT tx to address deficits as defined above and maximize level of functional independent mobility and consistency. From PT/mobility standpoint, recommendation at time of d/c would be minimum frequency therapy in order to promote return to PLOF and independence. The patient's AM-PAC Basic Mobility Inpatient Short Form Raw Score is 20. A Raw score of greater than 16 suggests the patient may benefit from discharge to home. Please also refer to the recommendation of the Physical Therapist for safe discharge planning.   Goals   Patient Goals To go home   LTG Expiration Date 05/09/24   Long Term Goal #1 Transfers - mod I from all surfaces.   Long Term Goal #2 Gait - 150' with least restrictive device vs without device with mod I.  Steps - 7 steps with L rail with mod I so that pt can enter/exit his home.   Plan   Treatment/Interventions Functional transfer training;LE strengthening/ROM;Elevations;Therapeutic exercise;Endurance training;Patient/family training;Equipment eval/education;Bed mobility;Gait training   PT Frequency 3-5x/wk   Discharge Recommendation   Rehab Resource Intensity Level, PT III (Minimum  Resource Intensity)   AM-PAC Basic Mobility Inpatient   Turning in Flat Bed Without Bedrails 4   Lying on Back to Sitting on Edge of Flat Bed Without Bedrails 4   Moving Bed to Chair 3   Standing Up From Chair Using Arms 3   Walk in Room 3   Climb 3-5 Stairs With Railing 3   Basic Mobility Inpatient Raw Score 20   Basic Mobility Standardized Score 43.99   [facility] Highest Level Of Mobility   -HL Goal 6: Walk 10 steps or more   -HLM Achieved 7: Walk 25 feet or more   End of Consult   Patient Position at End of Consult Bedside chair;All needs within reach

## 2024-04-25 NOTE — ASSESSMENT & PLAN NOTE
MAGDA superimposed on CKD with bump in creatinine to 2.1 on 4/25.  Baseline renal function around 1.4-1.8    Currently at baseline  Currently undergoing renal workup as outpatient  Started on isolate today by nephrology  Recheck BMP in the morning

## 2024-04-25 NOTE — ASSESSMENT & PLAN NOTE
Patient presents with constellation of symptoms including fever, chills, cough, shortness of breath, intermittent dizziness/lightheadedness, and some intermittent disorientation with weakness.  In the emergency room met criteria for sepsis with leukocytosis 25K, fever, right lower extremity cellulitis, and suspected pneumonia  Started empirically on ceftriaxone/azithromycin in the emergency room  Fluids were not administered due to patient's history of CHF    Continue empiric IV ceftriaxone for coverage of cellulitis  Monitor WBC count and fever curve  Follow-up blood cultures

## 2024-04-25 NOTE — OCCUPATIONAL THERAPY NOTE
Occupational Therapy Evaluation     Patient Name: Cricket Rosales  Today's Date: 4/25/2024  Problem List  Principal Problem:    Sepsis ([facility])  Active Problems:    COPD, severe ([facility])    Hyponatremia    Chronic combined systolic and diastolic CHF (congestive heart failure) ([facility])    CKD (chronic kidney disease) stage 3, GFR 30-59 ml/min ([facility])    Elevated troponin    Cellulitis    Diarrhea    Past Medical History  Past Medical History:   Diagnosis Date    Acute on chronic combined systolic and diastolic CHF (congestive heart failure) ([facility]) 7/27/2023    Alcohol abuse 7/27/2023    Ambulates with cane     Arthritis     Asthma     Back pain     Chest pain 12/22/2022    CHF (congestive heart failure) ([facility])     Claustrophobia     COPD (chronic obstructive pulmonary disease) ([facility])     COPD with acute exacerbation ([facility]) 05/06/2022    COVID-19     COVID-19 virus infection 12/03/2021    Depression     Hypertension     Mumps     Neck pain     Old MI (myocardial infarction)     Pneumonia     Pulmonary emphysema ([facility])     Rectal bleeding 01/14/2019    Skin abnormalities     rashes and wounds on both legs - scabbed over and healing    Sleep apnea     no CPAP    Tingling of both feet     Wears glasses      Past Surgical History  Past Surgical History:   Procedure Laterality Date    APPENDECTOMY      CARDIAC CATHETERIZATION  07/24/2015    Left main- normal and maidly tortuous.  Circumflex - normal and moderately tortuous.  RCA- normal and mildy tortuous.  Global LV function was severely depressed..    CARDIAC CATHETERIZATION Left 09/27/2022    Procedure: Cardiac Left Heart Cath;  Surgeon: Doreen Briones DO;  [facility];  Service: Cardiology    CARDIAC CATHETERIZATION N/A 09/27/2022    Procedure: Cardiac Coronary Angiogram;  Surgeon: Doreen Briones DO;  [facility];  Service: Cardiology    CARDIAC CATHETERIZATION  09/27/2022    Procedure: Cardiac catheterization;  Surgeon: Doreen FIGUEREDO  DO Anselmo;  Location:  CARDIAC CATH LAB;  Service: Cardiology    INGUINAL HERNIA REPAIR Bilateral     MI COLONOSCOPY FLX DX W/COLLJ SPEC WHEN PFRMD N/A 03/11/2019    Procedure: COLONOSCOPY with removal of anal papilla;  Surgeon: ANIL Dale MD;  [facility];  Service: Colorectal    TONSILLECTOMY      TOTAL KNEE ARTHROPLASTY Left 9/18/2023    Procedure: ARTHROPLASTY KNEE TOTAL;  Surgeon: David Mullen DO;  [facility];  Service: Orthopedics    UMBILICAL HERNIA REPAIR  06/21/2006 04/25/24 1213   OT Last Visit   OT Visit Date 04/25/24   Note Type   Note type Evaluation   Additional Comments Nsg staff verbally cleared pt for OT evaluation.  Pt received  seated in bedside recliner on this date reporting no pain, min (2-3 dizziness) + is agreeable to OT session @ this time. @ exit, pt remains in  seated in bedside recliner c all lines intact, all needs met, call bell in hand + nsg aware of location/disposition.   Pain Assessment   Pain Assessment Tool 0-10   Pain Score No Pain   Home Living   Type of Home House   Home Layout Able to live on main level with bedroom/bathroom;Performs ADLs on one level;One level  ((no AXEL; 6 steps LHR ascending from entryway- main level); once up 6 stairs, all living space is on 1 level)   Bathroom Shower/Tub Tub/shower unit   Bathroom Toilet Standard   Bathroom Equipment Shower chair   Bathroom Accessibility Accessible   Home Equipment Walker;Cane   Additional Comments Pt reports use of SPC at baseline during functional mobility   Prior Function   Level of [place] Independent with ADLs;Independent with functional mobility;Independent with IADLS   Lives With Spouse   Receives Help From Family   IADLs Independent with driving  (Wife completes laundry)   Falls in the last 6 months 0   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 (+) . Pt is retired.   Reciprocal Relationships Wife   Service to Others 1 daughter, family relations   Intrinsic Gratification Art, guitar   Subjective   Subjective \"the dizziness is only about 2 or 3\"   ADL   Eating Assistance 7  Independent   Grooming Assistance 7  Independent   UB Bathing Assistance 7  Independent   LB Bathing Assistance 5  Supervision/Setup   UB Dressing Assistance 7  Independent   LB Dressing Assistance 4  Minimal Assistance  (Wears Birks; unable to bend/reach to don/doff socks)   Toileting Assistance  6  Modified independent   Toileting Deficit   (RW use)   Additional Comments Given fxl performance skills + medical complexity, therapist suspecting via clinical judgement + skilled analysis; pt currently requires stated assist above to perform each area of ADL d/t limitations including: dizziness, poor ability to bend/reach.   Bed Mobility   Additional Comments DNT bed mobility; received + remained in bedside recliner.   Transfers   Sit to Stand 6  Modified independent   Additional items Armrests  (RW)   Stand to Sit 6  Modified independent   Additional items Armrests  (RW)   Functional Mobility   Additional Comments Pt performed short distance ADL related fxl mobility in room c MI, with RW simulating toileting distances.   No overt LOB demonstrated + pt denies pain /  denies SOB/ minimal dizziness during transitional movements. Reports feeling @ baseline fxn c abilities related to ADL-focused fxl mobility. G safety awareness noted while navigating t/o room. Transitions to bedside recliner for remainder of session.   Balance   Static Sitting Normal   Dynamic Sitting Good   Static Standing Fair   Dynamic Standing Fair -   Ambulatory Fair -   Activity Tolerance   Activity Tolerance Patient tolerated treatment well   RUE Assessment   RUE Assessment WFL   LUE Assessment   LUE Assessment WFL   Hand Function   Gross Motor Coordination Functional   Fine Motor Coordination Functional "   Sensation   Additional Comments Severe neuropathy : LUE (hand)-severe, BLE-intermittent- especially at night   Vision-Basic Assessment   Current Vision Wears glasses for distance only   Psychosocial   Psychosocial (WDL) WDL   Cognition   Overall Cognitive Status WFL   Arousal/Participation Alert;Cooperative;Responsive   Attention Within functional limits   Orientation Level Oriented X4   Memory Within functional limits   Following Commands Follows all commands and directions without difficulty   Assessment   Prognosis Good   Assessment Patient is a 68 y.o. male seen for OT evaluation s/p admit to  [facility] on 4/24/2024 w/Sepsis (HCC) + commorbidities/PMHx (as listed in medical record) affecting patient's functional performance c ADL tasks at time of assessment. OT orders placed for evaluation and treatment to assess pt's ADLs, cognitive status + performance during functional tasks in order to formulate appropriate d/c recommendations.     Therapist performed at least two patient identifiers during session including name and wristband. Pt's clinical presentation is currently stable . This evaluation required an extensive review of medical and/or therapy records and additional review of physical, cognitive and psychosocial history related to functional performance. Based upon functional performance deficits and assessments, this evaluation has been identified as a  low complexity evaluation.    @ this time, pt presents @ baseline fxn including I c ADLs/fxl mobility. D/t aforementioned factors, no further skilled occupational therapy services warranted during hospitalization/ following return home.   Discharge Recommendation   Rehab Resource Intensity Level, OT No post-acute rehabilitation needs   AM-PAC Daily Activity Inpatient   Lower Body Dressing 3   Bathing 4   Toileting 4   Upper Body Dressing 4   Grooming 4   Eating 4   Daily Activity Raw Score 23   Daily Activity Standardized Score (Calc for Raw  Score >=11) 51.12   AM-PAC Applied Cognition Inpatient   Following a Speech/Presentation 4   Understanding Ordinary Conversation 4   Taking Medications 4   Remembering Where Things Are Placed or Put Away 4   Remembering List of 4-5 Errands 4   Taking Care of Complicated Tasks 4   Applied Cognition Raw Score 24   Applied Cognition Standardized Score 62.21   Barthel Index   Feeding 10   Bathing 5   Grooming Score 5   Dressing Score 5   Bladder Score 10   Bowels Score 10   Toilet Use Score 10   Transfers (Bed/Chair) Score 10   Mobility (Level Surface) Score 10   Stairs Score 5   Barthel Index Score 80   End of Consult   Patient Position at End of Consult Bedside chair       Tere Dover OTR/L

## 2024-04-25 NOTE — PROGRESS NOTES
[facility]  Progress Note  Name: Cricket Rosales I  MRN: [id number]  Unit/Bed#: -01 I Date of Admission: 4/24/2024   Date of Service: 4/25/2024 I Hospital Day: 1    Assessment/Plan   * Sepsis (HCC)  Assessment & Plan  Patient presents with constellation of symptoms including fever, chills, cough, shortness of breath, intermittent dizziness/lightheadedness, and some intermittent disorientation with weakness.  In the emergency room met criteria for sepsis with leukocytosis 25K, fever, right lower extremity cellulitis, and suspected pneumonia  Started empirically on ceftriaxone/azithromycin in the emergency room  Fluids were not administered due to patient's history of CHF    Continue empiric IV ceftriaxone for coverage of cellulitis  Monitor WBC count and fever curve  Follow-up blood cultures    Diarrhea  Assessment & Plan  Patient complains of diarrhea today.  States she has had 5-6 watery bowel movements since admission.  Denies any abdominal pain, bloating  Symptoms are improving per patient, with stool already becoming more solid.  Will monitor more clinically, however if symptoms persist or worsen will need to rule out C. difficile.    Cellulitis  Assessment & Plan  Erythema and warmth of the right lower extremity anterior tibial region.  There is also some associated scabbing but no open sores or purulence    Last fever evening of 4/24 around midnight  Continue on IV ceftriaxone  Monitor WBC count and fever curve  Monitor clinically with serial exams    Elevated troponin  Assessment & Plan  Troponin mildly elevated at 58, follow-up 50  This likely reflects nonischemic myocardial injury in the setting of sepsis and CKD  No further cardiac workup warranted at this time; monitor clinically    Hyponatremia  Assessment & Plan  Hypotonic hyponatremia in the setting of poor p.o. intake, infection, diuretic use  Will hold home Lasix for now  Started on isolated by nephrology  Daily  BMP    CKD (chronic kidney disease) stage 3, GFR 30-59 ml/min ([facility])  Assessment & Plan  MAGDA superimposed on CKD with bump in creatinine to 2.1 on 4/25.  Baseline renal function around 1.4-1.8    Currently at baseline  Currently undergoing renal workup as outpatient  Started on isolate today by nephrology  Recheck BMP in the morning    Chronic combined systolic and diastolic CHF (congestive heart failure) ([facility])  Assessment & Plan  History of nonischemic cardiomyopathy  Currently not in exacerbation and examining dry    Continue to hold home Entresto and Lasix  Appreciate nephrology recommendations regarding acute kidney injury  Continue  Isolate per nephrology  Recheck BMP in the morning  Monitor volume status      COPD, severe ([facility])  Assessment & Plan  Not in exacerbation  Continue home inhaler regimen  Albuterol as needed           VTE Pharmacologic Prophylaxis: VTE Score: 6 High Risk (Score >/= 5) - Pharmacological DVT Prophylaxis Ordered: heparin. Sequential Compression Devices Ordered.    Mobility:   Basic Mobility Inpatient Raw Score: 24  JH-HLM Goal: 8: Walk 250 feet or more  JH-HLM Achieved: 8: Walk 250 feet ot more  JH-HLM Goal achieved. Continue to encourage appropriate mobility.    Patient Centered Rounds: I performed bedside rounds with nursing staff today.   Discussions with Specialists or Other Care Team Provider: [facility].     Education and Discussions with Family / Patient:  will call wife later this afternoon.     Total Time Spent on Date of Encounter in care of patient: 45 mins. This time was spent on one or more of the following: performing physical exam; counseling and coordination of care; obtaining or reviewing history; documenting in the medical record; reviewing/ordering tests, medications or procedures; communicating with other healthcare professionals and discussing with patient's family/caregivers.    Current Length of Stay: 1 day(s)  Current Patient Status: Inpatient   Certification  Statement: The patient will continue to require additional inpatient hospital stay due to IV antibiotics, blood cultures, medication titration, serial lab monitoring, IV fluid  Discharge Plan: Anticipate discharge in 24-48 hrs to home.    Code Status: Level 1 - Full Code    Subjective:   Patient seen and examined.  High-grade fever last night.  However seems to have defervesced at this point.  He is feeling better now from that standpoint.  Breathing feels at baseline.  Only new complaint is some watery diarrhea.  Denies any abdominal pain.  Cellulitis is improving.    Objective:     Vitals:   Temp (24hrs), Av.1 °F (37.3 °C), Min:96.9 °F (36.1 °C), Max:102.4 °F (39.1 °C)    Temp:  [96.9 °F (36.1 °C)-102.4 °F (39.1 °C)] 98.3 °F (36.8 °C)  HR:  [69-91] 79  Resp:  [18-20] 20  BP: (109-128)/(62-71) 109/62  SpO2:  [93 %-97 %] 95 %  Body mass index is 35.96 kg/m².     Input and Output Summary (last 24 hours):     Intake/Output Summary (Last 24 hours) at 2024 1228  Last data filed at 2024 0903  Gross per 24 hour   Intake 1080 ml   Output 460 ml   Net 620 ml     PHYSICAL EXAM:    Vitals signs reviewed  Constitutional   Awake and cooperative. NAD.   Head/Neck   Normocephalic. Atraumatic.   HEENT   No scleral icterus. EOMI.   Heart   Regular rate and rhythm. No murmers.   Lungs   Clear to auscultation bilaterally. Respirations unlaboured.   Abdomen   Soft. Nontender. Nondistended.    Skin   Skin color normal. No rashes.   Extremities   No deformities. No peripheral edema.  Erythema over the right anterior tibial region nearly completely resolved.   Neuro   Alert and oriented. No new deficits.   Psych   Mood stable. Affect normal.       Additional Data:     Labs:  Results from last 7 days   Lab Units 24  0428 24  0815   WBC Thousand/uL 15.66* 25.10*   HEMOGLOBIN g/dL 13.5 13.5   HEMATOCRIT % 43.6 42.2   PLATELETS Thousands/uL 229 259   BANDS PCT %  --  14*   LYMPHO PCT %  --  3*   MONO PCT %  --  3*    EOS PCT %  --  0     Results from last 7 days   Lab Units 04/25/24  0428 04/24/24  0815   SODIUM mmol/L 129* 128*   POTASSIUM mmol/L 3.7 3.9   CHLORIDE mmol/L 97 97   CO2 mmol/L 22 22   BUN mg/dL 41* 40*   CREATININE mg/dL 2.12* 1.87*   ANION GAP mmol/L 10 9   CALCIUM mg/dL 8.8 8.9   ALBUMIN g/dL  --  3.9   TOTAL BILIRUBIN mg/dL  --  1.31*   ALK PHOS U/L  --  68   ALT U/L  --  19   AST U/L  --  13   GLUCOSE RANDOM mg/dL 127 160*                 Results from last 7 days   Lab Units 04/25/24  0428 04/24/24  0825 04/24/24  0815   LACTIC ACID mmol/L  --  1.3  --    PROCALCITONIN ng/ml 6.98*  --  7.75*       Lines/Drains:  Invasive Devices       Peripheral Intravenous Line  Duration             Peripheral IV 04/24/24 Left Wrist 1 day                          Imaging: Reviewed radiology reports from this admission including: chest xray    Recent Cultures (last 7 days):   Results from last 7 days   Lab Units 04/24/24  0817   BLOOD CULTURE  No Growth at 24 hrs.  No Growth at 24 hrs.       Last 24 Hours Medication List:   Current Facility-Administered Medications   Medication Dose Route Frequency Provider Last Rate    acetaminophen  650 mg Oral Q6H PRN Deven Arora DO      albuterol  2.5 mg Nebulization Q4H PRN Deven Arora DO      ALPRAZolam  0.5 mg Oral HS PRN Deven Arora DO      aspirin  81 mg Oral Daily Deven Arora DO      atorvastatin  20 mg Oral Daily With Dinner Deven Arora DO      carvedilol  25 mg Oral BID With Meals Deven Aroar DO      cefTRIAXone  1,000 mg Intravenous Q24H Deven Arora DO 1,000 mg (04/25/24 0856)    DULoxetine  60 mg Oral Daily Deven Arora DO      fluticasone  1 spray Each Nare Daily Deven Arora DO      fluticasone-vilanterol  1 puff Inhalation Daily Deven Arora DO      heparin (porcine)  5,000 Units Subcutaneous Q8H [facility] Deven Arora DO      isosorbide mononitrate  30  mg Oral Daily Deven Arora DO      montelukast  10 mg Oral HS Deven Arora DO      multi-electrolyte  100 mL/hr Intravenous Continuous Manolo Wall PA-C      ondansetron  4 mg Intravenous Q6H PRN Deven Arora DO      umeclidinium  1 puff Inhalation Daily Deven Arora DO          Today, Patient Was Seen By: Deven Arora DO    **Please Note: This note may have been constructed using a voice recognition system.**

## 2024-04-25 NOTE — PLAN OF CARE
Problem: INFECTION - ADULT  Goal: Absence or prevention of progression during hospitalization  Description: INTERVENTIONS:  - Assess and monitor for signs and symptoms of infection  - Monitor lab/diagnostic results  - Monitor all insertion sites, i.e. indwelling lines, tubes, and drains  - Monitor endotracheal if appropriate and nasal secretions for changes in amount and color  - [place] appropriate cooling/warming therapies per order  - Administer medications as ordered  - Instruct and encourage patient and family to use good hand hygiene technique  - Identify and instruct in appropriate isolation precautions for identified infection/condition  Outcome: Progressing     Problem: SAFETY ADULT  Goal: Patient will remain free of falls  Description: INTERVENTIONS:  - Educate patient/family on patient safety including physical limitations  - Instruct patient to call for assistance with activity   - Consult OT/PT to assist with strengthening/mobility   - Keep Call bell within reach  - Keep bed low and locked with side rails adjusted as appropriate  - Keep care items and personal belongings within reach  - Initiate and maintain comfort rounds  - Make Fall Risk Sign visible to staff  - Offer Toileting every 2 Hours, in advance of need  - Initiate/Maintain bed/chair alarm  - Obtain necessary fall risk management equipment: non-slip socks  - Apply yellow socks and bracelet for high fall risk patients  - Consider moving patient to room near nurses station  Outcome: Progressing  Goal: Maintain or return to baseline ADL function  Description: INTERVENTIONS:  - Educate patient/family on patient safety including physical limitations  - Instruct patient to call for assistance with activity   - Consult OT/PT to assist with strengthening/mobility   - Keep Call bell within reach  - Keep bed low and locked with side rails adjusted as appropriate  - Keep care items and personal belongings within reach  - Initiate and maintain comfort  rounds  - Make Fall Risk Sign visible to staff  - Offer Toileting every 2 Hours, in advance of need  - Initiate/Maintain bed/chair alarm  - Obtain necessary fall risk management equipment: non-slip socks  - Apply yellow socks and bracelet for high fall risk patients  - Consider moving patient to room near nurses station  Outcome: Progressing  Goal: Maintains/Returns to pre admission functional level  Description: INTERVENTIONS:  - Perform AM-PAC 6 Click Basic Mobility/ Daily Activity assessment daily.  - Set and communicate daily mobility goal to care team and patient/family/caregiver.   - Collaborate with rehabilitation services on mobility goals if consulted  - Perform Range of Motion 3 times a day.  - Reposition patient every 2 hours.  - Dangle patient 3 times a day  - Stand patient 3 times a day  - Ambulate patient 3 times a day  - Out of bed to chair 3 times a day for meals  - Out of bed for toileting  - Record patient progress and toleration of activity level   Outcome: Progressing     Problem: DISCHARGE PLANNING  Goal: Discharge to home or other facility with appropriate resources  Description: INTERVENTIONS:  - Identify barriers to discharge w/patient and caregiver  - Arrange for needed discharge resources and transportation as appropriate  - Identify discharge learning needs (meds, wound care, etc.)  - Arrange for interpretive services to assist at discharge as needed  - Refer to Case Management Department for coordinating discharge planning if the patient needs post-hospital services based on physician/advanced practitioner order or complex needs related to functional status, cognitive ability, or social support system  Outcome: Progressing     Problem: GASTROINTESTINAL - ADULT  Goal: Minimal or absence of nausea and/or vomiting  Description: INTERVENTIONS:  - Administer IV fluids if ordered to ensure adequate hydration  - Maintain NPO status until nausea and vomiting are resolved  - Nasogastric tube if  ordered  - Administer ordered antiemetic medications as needed  - Provide nonpharmacologic comfort measures as appropriate  - Advance diet as tolerated, if ordered  - Consider nutrition services referral to assist patient with adequate nutrition and appropriate food choices  Outcome: Progressing  Goal: Maintains or returns to baseline bowel function  Description: INTERVENTIONS:  - Assess bowel function  - Encourage oral fluids to ensure adequate hydration  - Administer IV fluids if ordered to ensure adequate hydration  - Administer ordered medications as needed  - Encourage mobilization and activity  - Consider nutritional services referral to assist patient with adequate nutrition and appropriate food choices  Outcome: Progressing     Problem: GENITOURINARY - ADULT  Goal: Absence of urinary retention  Description: INTERVENTIONS:  - Assess patient’s ability to void and empty bladder  - Monitor I/O  - Bladder scan as needed  - Discuss with physician/AP medications to alleviate retention as needed  - Discuss catheterization for long term situations as appropriate  Outcome: Progressing     Problem: METABOLIC, FLUID AND ELECTROLYTES - ADULT  Goal: Electrolytes maintained within normal limits  Description: INTERVENTIONS:  - Monitor labs and assess patient for signs and symptoms of electrolyte imbalances  - Administer electrolyte replacement as ordered  - Monitor response to electrolyte replacements, including repeat lab results as appropriate  - Instruct patient on fluid and nutrition as appropriate  Outcome: Progressing

## 2024-04-26 ENCOUNTER — TELEPHONE (OUTPATIENT)
Dept: OTHER | Facility: HOSPITAL | Age: 69
End: 2024-04-26

## 2024-04-26 VITALS
BODY MASS INDEX: 35.81 KG/M2 | HEIGHT: 74 IN | SYSTOLIC BLOOD PRESSURE: 122 MMHG | RESPIRATION RATE: 18 BRPM | OXYGEN SATURATION: 95 % | WEIGHT: 278.99 LBS | TEMPERATURE: 96.8 F | DIASTOLIC BLOOD PRESSURE: 66 MMHG | HEART RATE: 76 BPM

## 2024-04-26 LAB
ANION GAP SERPL CALCULATED.3IONS-SCNC: 9 MMOL/L (ref 4–13)
BASOPHILS # BLD AUTO: 0.03 THOUSANDS/ÂΜL (ref 0–0.1)
BASOPHILS NFR BLD AUTO: 0 % (ref 0–1)
BUN SERPL-MCNC: 39 MG/DL (ref 5–25)
CALCIUM SERPL-MCNC: 8.6 MG/DL (ref 8.4–10.2)
CHLORIDE SERPL-SCNC: 100 MMOL/L (ref 96–108)
CO2 SERPL-SCNC: 22 MMOL/L (ref 21–32)
CREAT SERPL-MCNC: 1.65 MG/DL (ref 0.6–1.3)
EOSINOPHIL # BLD AUTO: 0.12 THOUSAND/ÂΜL (ref 0–0.61)
EOSINOPHIL NFR BLD AUTO: 2 % (ref 0–6)
ERYTHROCYTE [DISTWIDTH] IN BLOOD BY AUTOMATED COUNT: 16.1 % (ref 11.6–15.1)
GFR SERPL CREATININE-BSD FRML MDRD: 42 ML/MIN/1.73SQ M
GLUCOSE SERPL-MCNC: 161 MG/DL (ref 65–140)
HCT VFR BLD AUTO: 40.1 % (ref 36.5–49.3)
HGB BLD-MCNC: 12.9 G/DL (ref 12–17)
IMM GRANULOCYTES # BLD AUTO: 0.02 THOUSAND/UL (ref 0–0.2)
IMM GRANULOCYTES NFR BLD AUTO: 0 % (ref 0–2)
LYMPHOCYTES # BLD AUTO: 0.77 THOUSANDS/ÂΜL (ref 0.6–4.47)
LYMPHOCYTES NFR BLD AUTO: 11 % (ref 14–44)
MCH RBC QN AUTO: 26.5 PG (ref 26.8–34.3)
MCHC RBC AUTO-ENTMCNC: 32.2 G/DL (ref 31.4–37.4)
MCV RBC AUTO: 83 FL (ref 82–98)
MONOCYTES # BLD AUTO: 0.54 THOUSAND/ÂΜL (ref 0.17–1.22)
MONOCYTES NFR BLD AUTO: 8 % (ref 4–12)
NEUTROPHILS # BLD AUTO: 5.76 THOUSANDS/ÂΜL (ref 1.85–7.62)
NEUTS SEG NFR BLD AUTO: 79 % (ref 43–75)
NRBC BLD AUTO-RTO: 0 /100 WBCS
PLATELET # BLD AUTO: 197 THOUSANDS/UL (ref 149–390)
PMV BLD AUTO: 9.7 FL (ref 8.9–12.7)
POTASSIUM SERPL-SCNC: 3.7 MMOL/L (ref 3.5–5.3)
RBC # BLD AUTO: 4.86 MILLION/UL (ref 3.88–5.62)
SODIUM SERPL-SCNC: 131 MMOL/L (ref 135–147)
WBC # BLD AUTO: 7.24 THOUSAND/UL (ref 4.31–10.16)

## 2024-04-26 PROCEDURE — 94760 N-INVAS EAR/PLS OXIMETRY 1: CPT

## 2024-04-26 PROCEDURE — 85025 COMPLETE CBC W/AUTO DIFF WBC: CPT | Performed by: INTERNAL MEDICINE

## 2024-04-26 PROCEDURE — 94664 DEMO&/EVAL PT USE INHALER: CPT

## 2024-04-26 PROCEDURE — 80048 BASIC METABOLIC PNL TOTAL CA: CPT | Performed by: INTERNAL MEDICINE

## 2024-04-26 PROCEDURE — 99238 HOSP IP/OBS DSCHRG MGMT 30/<: CPT | Performed by: INTERNAL MEDICINE

## 2024-04-26 PROCEDURE — 99232 SBSQ HOSP IP/OBS MODERATE 35: CPT | Performed by: INTERNAL MEDICINE

## 2024-04-26 PROCEDURE — NC001 PR NO CHARGE: Performed by: INTERNAL MEDICINE

## 2024-04-26 PROCEDURE — 94640 AIRWAY INHALATION TREATMENT: CPT

## 2024-04-26 RX ORDER — CEFPODOXIME PROXETIL 200 MG/1
200 TABLET, FILM COATED ORAL 2 TIMES DAILY
Qty: 10 TABLET | Refills: 0 | Status: SHIPPED | OUTPATIENT
Start: 2024-04-26 | End: 2024-05-02

## 2024-04-26 RX ADMIN — ISOSORBIDE MONONITRATE 30 MG: 30 TABLET, EXTENDED RELEASE ORAL at 08:01

## 2024-04-26 RX ADMIN — ALBUTEROL SULFATE 2.5 MG: 2.5 SOLUTION RESPIRATORY (INHALATION) at 10:12

## 2024-04-26 RX ADMIN — FLUTICASONE FUROATE AND VILANTEROL TRIFENATATE 1 PUFF: 200; 25 POWDER RESPIRATORY (INHALATION) at 08:01

## 2024-04-26 RX ADMIN — UMECLIDINIUM 1 PUFF: 62.5 AEROSOL, POWDER ORAL at 08:01

## 2024-04-26 RX ADMIN — ASPIRIN 81 MG: 81 TABLET, COATED ORAL at 08:01

## 2024-04-26 RX ADMIN — CARVEDILOL 25 MG: 25 TABLET, FILM COATED ORAL at 08:01

## 2024-04-26 RX ADMIN — CEFTRIAXONE 1000 MG: 1 INJECTION, SOLUTION INTRAVENOUS at 08:01

## 2024-04-26 RX ADMIN — FLUTICASONE PROPIONATE 1 SPRAY: 50 SPRAY, METERED NASAL at 08:02

## 2024-04-26 RX ADMIN — DULOXETINE HYDROCHLORIDE 60 MG: 60 CAPSULE, DELAYED RELEASE ORAL at 08:01

## 2024-04-26 RX ADMIN — HEPARIN SODIUM 5000 UNITS: 5000 INJECTION, SOLUTION INTRAVENOUS; SUBCUTANEOUS at 05:41

## 2024-04-26 NOTE — PROGRESS NOTES
[facility]  Name: Cricket Rosales I  MRN: [id number]  Unit/Bed#: -01 I Date of Admission: 4/24/2024   Date of Service: 4/26/2024 I Hospital Day: 2    Assessment/Plan   * Sepsis ([facility])  Assessment & Plan  Patient presents with constellation of symptoms including fever, chills, cough, shortness of breath, intermittent dizziness/lightheadedness, and some intermittent disorientation with weakness.  In the emergency room met criteria for sepsis with leukocytosis 25K, fever, right lower extremity cellulitis, and suspected pneumonia  Clinically improved with IV ceftriaxone  Blood cultures thus far negative  WBC count yesterday noted to be 15 K  Follow-up with morning CBC    Diarrhea  Assessment & Plan  States she has had 5-6 watery bowel movements since admission.  Denies any abdominal pain, bloating  Has since resolved    Cellulitis  Assessment & Plan  Erythema and warmth of the right lower extremity anterior tibial region.  There is also some associated scabbing but no open sores or purulence  Continue ceftriaxone for now  WBC count noted to be 15 K yesterday  Clinically improved  Follow-up morning CBC    CKD (chronic kidney disease) stage 3, GFR 30-59 ml/min ([facility])  Assessment & Plan  MAGDA superimposed on CKD with bump in creatinine to 2.1 on 4/25.  Baseline renal function around 1.4-1.8  Follow-up morning kidney function  Follow-up further nephrology recommendation  Continue IV fluids for now   hold Entresto and Lasix for now        Chronic combined systolic and diastolic CHF (congestive heart failure) ([facility])  Assessment & Plan  History of nonischemic cardiomyopathy  Currently not in exacerbation and examining dry  Continue to hold home Entresto and Lasix until creatinine improves  Appreciate nephrology recommendations regarding acute kidney injury      Hyponatremia  Assessment & Plan  Hypotonic hyponatremia in the setting of poor p.o. intake, infection, diuretic use  Continue  IV fluids  Sodium noted to be 129 yesterday follow-up BMP    COPD, severe (HCC)  Assessment & Plan  Not in acute exacerbation  Continue home inhalers           VTE Pharmacologic Prophylaxis:   Pharmacologic: Heparin  Mechanical VTE Prophylaxis in Place: Yes    Patient Centered Rounds: I have performed bedside rounds with nursing staff today.    Discussions with Specialists or Other Care Team Provider: cm, nursing    Education and Discussions with Family / Patient: pt, declined family update    Time Spent for Care: 30 minutes.  More than 50% of total time spent on counseling and coordination of care as described above.    Current Length of Stay: 2 day(s)    Current Patient Status: Inpatient   Certification Statement: The patient will continue to require additional inpatient hospital stay due to see below    Discharge Plan: Still requiring IV antibiotics and further management of acute kidney injury.  Anticipate discharge in next 24 hours if continues to clinically improve    Code Status: Level 1 - Full Code      Subjective:   Currently without any acute complaints.  Denies chest pain, shortness of breath, cough, fevers, chills    Objective:     Vitals:   Temp (24hrs), Av.1 °F (36.7 °C), Min:96.8 °F (36 °C), Max:98.9 °F (37.2 °C)    Temp:  [96.8 °F (36 °C)-98.9 °F (37.2 °C)] 96.8 °F (36 °C)  HR:  [60-76] 76  Resp:  [18] 18  BP: ()/(57-67) 122/66  SpO2:  [91 %-97 %] 92 %  Body mass index is 35.82 kg/m².     Input and Output Summary (last 24 hours):       Intake/Output Summary (Last 24 hours) at 2024 0932  Last data filed at 2024 0828  Gross per 24 hour   Intake 1847 ml   Output 43 ml   Net 1804 ml       Physical Exam:     Physical Exam  Constitutional:       General: He is not in acute distress.     Appearance: He is well-developed. He is not diaphoretic.   HENT:      Head: Normocephalic and atraumatic.      Nose: Nose normal.      Mouth/Throat:      Pharynx: No oropharyngeal exudate.   Eyes:       General: No scleral icterus.     Conjunctiva/sclera: Conjunctivae normal.   Cardiovascular:      Rate and Rhythm: Normal rate and regular rhythm.      Heart sounds: Normal heart sounds. No murmur heard.     No friction rub. No gallop.   Pulmonary:      Effort: Pulmonary effort is normal. No respiratory distress.      Breath sounds: Normal breath sounds. No wheezing or rales.   Chest:      Chest wall: No tenderness.   Abdominal:      General: Bowel sounds are normal. There is no distension.      Palpations: Abdomen is soft.      Tenderness: There is no abdominal tenderness. There is no guarding.   Musculoskeletal:         General: No tenderness or deformity. Normal range of motion.      Cervical back: Normal range of motion and neck supple.   Skin:     General: Skin is warm and dry.      Findings: No erythema.   Neurological:      Mental Status: He is alert. Mental status is at baseline.         Additional Data:     Labs:    Results from last 7 days   Lab Units 04/25/24  0428 04/24/24  0815   WBC Thousand/uL 15.66* 25.10*   HEMOGLOBIN g/dL 13.5 13.5   HEMATOCRIT % 43.6 42.2   PLATELETS Thousands/uL 229 259   BANDS PCT %  --  14*   LYMPHO PCT %  --  3*   MONO PCT %  --  3*   EOS PCT %  --  0     Results from last 7 days   Lab Units 04/25/24  0428 04/24/24  0815   SODIUM mmol/L 129* 128*   POTASSIUM mmol/L 3.7 3.9   CHLORIDE mmol/L 97 97   CO2 mmol/L 22 22   BUN mg/dL 41* 40*   CREATININE mg/dL 2.12* 1.87*   ANION GAP mmol/L 10 9   CALCIUM mg/dL 8.8 8.9   ALBUMIN g/dL  --  3.9   TOTAL BILIRUBIN mg/dL  --  1.31*   ALK PHOS U/L  --  68   ALT U/L  --  19   AST U/L  --  13   GLUCOSE RANDOM mg/dL 127 160*                 Results from last 7 days   Lab Units 04/25/24  0428 04/24/24  0825 04/24/24  0815   LACTIC ACID mmol/L  --  1.3  --    PROCALCITONIN ng/ml 6.98*  --  7.75*           * I Have Reviewed All Lab Data Listed Above.  * Additional Pertinent Lab Tests Reviewed: All Labs Within Last 24 Hours  Reviewed    Imaging:    Imaging Reports Reviewed Today Include: na  Imaging Personally Reviewed by Myself Includes:  na    Recent Cultures (last 7 days):     Results from last 7 days   Lab Units 04/24/24  0817   BLOOD CULTURE  No Growth at 24 hrs.  No Growth at 24 hrs.       Last 24 Hours Medication List:   Current Facility-Administered Medications   Medication Dose Route Frequency Provider Last Rate    acetaminophen  650 mg Oral Q6H PRN Deven Arora, DO      albuterol  2.5 mg Nebulization Q4H PRN Deven Arora, DO      ALPRAZolam  0.5 mg Oral HS PRN Deven Arora, DO      aspirin  81 mg Oral Daily Deven Arora DO      atorvastatin  20 mg Oral Daily With Dinner Deven Arora DO      carvedilol  25 mg Oral BID With Meals Deven Arora DO      cefTRIAXone  1,000 mg Intravenous Q24H Deven Arora DO 1,000 mg (04/26/24 0801)    DULoxetine  60 mg Oral Daily Deven Arora, DO      fluticasone  1 spray Each Nare Daily Deven Arora, DO      fluticasone-vilanterol  1 puff Inhalation Daily Deven Arora DO      heparin (porcine)  5,000 Units Subcutaneous Q8H [facility] Deven Arora, DO      isosorbide mononitrate  30 mg Oral Daily Deven Arora, DO      montelukast  10 mg Oral HS Deven Arora, DO      ondansetron  4 mg Intravenous Q6H PRN Deven Arora, DO      umeclidinium  1 puff Inhalation Daily Deven Arora DO          Today, Patient Was Seen By: Radu Adams MD    ** Please Note: Dictation voice to text software may have been used in the creation of this document. **

## 2024-04-26 NOTE — ASSESSMENT & PLAN NOTE
MAGDA superimposed on CKD with bump in creatinine to 2.1 on 4/25.  Baseline renal function around 1.4-1.8  Follow-up morning kidney function  Follow-up further nephrology recommendation  Continue IV fluids for now   hold Entresto and Lasix for now

## 2024-04-26 NOTE — ASSESSMENT & PLAN NOTE
Erythema and warmth of the right lower extremity anterior tibial region.  There is also some associated scabbing but no open sores or purulence  Continue ceftriaxone for now  WBC count noted to be 15 K yesterday  Clinically improved  Follow-up morning CBC

## 2024-04-26 NOTE — CASE MANAGEMENT
Case Management Assessment & Discharge Planning Note    Patient name Cricket Rosales  Location /-01 MRN [id number]  : 1955 Date 2024       Current Admission Date: 2024  Current Admission Diagnosis:Sepsis ([facility])   Patient Active Problem List    Diagnosis Date Noted    Diarrhea 2024    Sepsis ([facility]) 2024    Elevated troponin 2024    Cellulitis 2024    Bunion 2024    Other diabetic neurological complication associated with type 2 diabetes mellitus ([facility]) 2024    Left foot pain 2024    Right foot pain 2024    Pes planus of both feet 2024    Severe persistent asthma without complication 2024    S/P TKR (total knee replacement), left 2023    CKD (chronic kidney disease) stage 3, GFR 30-59 ml/min ([facility]) 2023    Leukocytosis 2023    Chronic combined systolic and diastolic CHF (congestive heart failure) ([facility]) 2023    Moderate episode of recurrent major depressive disorder ([facility]) 2023    Controlled type 2 diabetes mellitus with diabetic nephropathy, without long-term current use of insulin ([facility]) 2022    Hyponatremia 2022    PLMD (periodic limb movement disorder)     Hypertrophied anal papilla 2019    History of tobacco abuse 2018    Constipation 2018    Chronic asthma, moderate persistent, uncomplicated 2018    Obstructive sleep apnea 2018    Hemorrhoids 2017    COPD, severe ([facility]) 10/16/2017    Chronic combined systolic and diastolic congestive heart failure ([facility]) 2015    Non-ischemic cardiomyopathy with HFmEF ([facility]) 2015    Anxiety 2014    Thyroid disease 2014    Benign essential hypertension 2013    Vitamin D deficiency 2013    Dyslipidemia 2013    Asthma-COPD overlap syndrome 2012      LOS (days): 2  Geometric Mean LOS (GMLOS) (days): 5.1  Days to GMLOS:2.9     OBJECTIVE:    Risk of Unplanned Readmission Score:  15.28         Current admission status: Inpatient  Referral Reason:  (d/c planning)    Preferred Pharmacy:   [id number]  [place] - [address]  [address] [place] [address]  Phone: [phone] Fax: [phone]    [id number] - [place] - [address]  [address]  [place] [address]  Phone: [phone] Fax: [phone]    Primary Care Provider: Lauren Vee DO    Primary Insurance: MEDICARE  Secondary Insurance: AETNA    ASSESSMENT:  Active Health Care Proxies       Jaskaran Rosales Health Care Representative - Spouse   Primary Phone: [phone] (Home)                 Advance Directives  Does patient have a Health Care POA?: No  Was patient offered paperwork?: Yes (declined - pt has paperwork at home)  Does patient have Advance Directives?: No  Was patient offered paperwork?: Yes (declined- pt has paperwork at home)         Readmission Root Cause  30 Day Readmission: No    Patient Information  Admitted from:: Home  Mental Status: Alert  During Assessment patient was accompanied by: Not accompanied during assessment  Assessment information provided by:: Patient  Primary Caregiver: Self  Support Systems: Spouse/significant other  County of Residence: [facility]  What [facility] do you live in?: [place]  Home entry access options. Select all that apply.: Stairs  Number of steps to enter home.: 2  Do the steps have railings?: Yes  Type of Current Residence: Bi-level (6 steps up & 6 steps down- pt stays on [facility] upper level)  Upon entering residence, is there a bedroom on the main floor (no further steps)?: No  A bedroom is located on the following floor levels of residence (select all that apply):: 2nd Floor  Upon entering residence, is there a bathroom on the main floor (no further steps)?: No  Indicate which floors of current residence have a bathroom (select all the apply):: 2nd Floor  Number of steps to 2nd floor  from main floor: 6  Living Arrangements: Lives w/ Spouse/significant other  Is patient a ?: No    Activities of Daily Living Prior to Admission  Functional Status: Independent  Completes ADLs independently?: Yes  Ambulates independently?: Yes  Does patient use assisted devices?: No  Does patient currently own DME?: Yes  What DME does the patient currently own?: Walker, Other (Comment), Bedside Commode, Crutches, Nebulizer, CPAP (pulse ox, pt does not use his cpap)  Does patient have a history of Outpatient Therapy (PT/OT)?: Yes  Does the patient have a history of Short-Term Rehab?: No  Does patient have a history of HHC?: No  Does patient currently have HHC?: No         Patient Information Continued  Income Source: Pension/group home  Does patient have prescription coverage?: Yes ([employer] [place])  Does patient receive dialysis treatments?: No  Does patient have a history of substance abuse?: No         Means of Transportation  Means of Transport to [facility]:: Drives Self      Social Determinants of Health (SDOH)      Flowsheet Row Most Recent Value   Housing Stability    In the last 12 months, was there a time when you were not able to pay the mortgage or rent on time? N   In the last 12 months, how many places have you lived? 1   In the last 12 months, was there a time when you did not have a steady place to sleep or slept in a shelter (including now)? N   Transportation Needs    In the past 12 months, has lack of transportation kept you from medical appointments or from getting medications? no   In the past 12 months, has lack of transportation kept you from meetings, work, or from getting things needed for daily living? No   Food Insecurity    Within the past 12 months, you worried that your food would run out before you got the money to buy more. Never true   Within the past 12 months, the food you bought just didn't last and you didn't have money to get more. Never true   Utilities    In the past 12  months has the electric, gas, oil, or water company threatened to shut off services in your home? No            DISCHARGE DETAILS:    Discharge planning discussed with:: patient & wife  was called  Freedom of Choice: Yes  Comments - Freedom of Choice: pt denies any d/c needs-  pt & wife are in agreement with the d/c & d/c plan  CM contacted family/caregiver?: Yes  Were Treatment Team discharge recommendations reviewed with patient/caregiver?: Yes  Did patient/caregiver verbalize understanding of patient care needs?: Yes  Were patient/caregiver advised of the risks associated with not following Treatment Team discharge recommendations?: Yes    Contacts  Patient Contacts: Jaskaran Rosales  Relationship to Patient:: Family (wife)  Contact Method: Phone  Phone Number: [phone]  Reason/Outcome: Discharge Planning    Requested Home Health Care         Is the patient interested in HHC at discharge?: No    DME Referral Provided  Referral made for DME?: No    Other Referral/Resources/Interventions Provided:  Referral Comments: pt denies any d/c needs    Would you like to participate in our [facility] service program?  : No - Declined    Treatment Team Recommendation: Home (home with spouse & outpt follow up)  Discharge Destination Plan:: Home (home with spouse & outpt follow up- family)  Transport at Discharge : Automobile, Family         IMM reviewed with patient, patient agrees with discharge determination.    Pt & family are in agreement with the d/c & d/c plan           Accompanied by: Family member     IMM Given (Date):: 04/26/24  IMM Given to:: Patient  Family notified:: wife was called

## 2024-04-26 NOTE — ASSESSMENT & PLAN NOTE
Patient presents with constellation of symptoms including fever, chills, cough, shortness of breath, intermittent dizziness/lightheadedness, and some intermittent disorientation with weakness.  In the emergency room met criteria for sepsis with leukocytosis 25K, fever, right lower extremity cellulitis, and suspected pneumonia  Clinically improved with IV ceftriaxone  Blood cultures thus far negative  WBC count yesterday noted to be 15 K  Follow-up with morning CBC

## 2024-04-26 NOTE — ASSESSMENT & PLAN NOTE
History of nonischemic cardiomyopathy  Currently not in exacerbation and examining dry  Continue to hold home Entresto and Lasix until creatinine improves  Appreciate nephrology recommendations regarding acute kidney injury

## 2024-04-26 NOTE — PROGRESS NOTES
NEPHROLOGY PROGRESS NOTE   Cricket Rosales 68 y.o. male MRN: [id number]  Unit/Bed#: -01 Encounter: [id number]      HPI/24hr EVENTS:    -68 year old male history of CKD 3 baseline creatinine 1.4-1.6 mg/dL, CHF, hypertension, DM2 presented with various nonspecific complaints. Nephrology was consulted for management of MAGDA.     -patient reports that he is feeling much better today. Reports diarrhea has resolved. Says he is feeling more like himself today after fluids. Denies complaints     ASSESSMENT/PLAN:    MAGDA (POA)  -creatinine has improved from 2.1 mg/dL down to 1.6 mg/dL today s/p IV fluids. Patient's creatinine back to baseline, kidney function improved. Patient is eating and drinking as normal. He reports diarrhea has resolved. Will hold off on further IV fluids today. Has remained hemodynamically stable. Continue to hold nephrotoxins such as jardiance, entresto, and lasix. May resume as outpatient once kidney function has stabilized.   -Etiology: likely prerenal azotemia as evidenced by FeNa  -Avoid hypotension, avoid nephrotoxins, avoid NSAIDS  -Trend BMP    CKD stage 3  -Baseline creatinine 1.4-1.6 mg/dL  -follow up with nephrology as an outpatient    Hypertension  Continue carvedilol and isosorbide, blood pressures stable    Hypovolemic hyponatremia  Serum sodium level has improved with volume expansion. Patient reports diarrhea has improved. Continue to hold loop diuretics at this time    Chronic congestive heart failure  Patient appears compensated. Will hold off on lasix today. May resume as clinically indicated. Has chronic lower extremity edema appears to be baseline.     Cellulitis  Continue antibiotics as indicated per primary team       ROS:  Review of Systems   Constitutional:  Negative for chills and fever.   HENT:  Negative for rhinorrhea and sore throat.    Respiratory:  Negative for cough and shortness of breath.    Cardiovascular:  Negative for chest pain and palpitations.    Gastrointestinal:  Negative for abdominal pain and nausea.   Genitourinary:  Negative for dysuria and hematuria.   Neurological:  Negative for dizziness and headaches.      A complete 10 point review of systems was performed and found to be negative unless otherwise noted above or in the HPI.    OBJECTIVE:  Current Weight: Weight - Scale: 127 kg (278 lb 15.9 oz)  Vitals:    04/26/24 0728 04/26/24 0754 04/26/24 0828 04/26/24 1013   BP:  122/66     BP Location:  Right arm     Pulse:  76     Resp:  18     Temp:  (!) 96.8 °F (36 °C)     TempSrc:  Axillary     SpO2: 96% 95% 92% 95%   Weight:       Height:           Intake/Output Summary (Last 24 hours) at 4/26/2024 1042  Last data filed at 4/26/2024 0828  Gross per 24 hour   Intake 1847 ml   Output 43 ml   Net 1804 ml     Physical Exam  Vitals and nursing note reviewed.   Constitutional:       General: He is not in acute distress.     Appearance: He is well-developed.   HENT:      Head: Normocephalic and atraumatic.   Cardiovascular:      Rate and Rhythm: Normal rate and regular rhythm.      Heart sounds: No murmur heard.  Pulmonary:      Effort: Pulmonary effort is normal. No respiratory distress.      Breath sounds: Normal breath sounds.   Abdominal:      Palpations: Abdomen is soft.      Tenderness: There is no abdominal tenderness.   Musculoskeletal:      Right lower leg: Edema present.      Left lower leg: Edema present.   Skin:     General: Skin is warm and dry.      Capillary Refill: Capillary refill takes less than 2 seconds.   Neurological:      Mental Status: He is alert.   Psychiatric:         Mood and Affect: Mood normal.         Behavior: Behavior normal.          Medications:    Current Facility-Administered Medications:     acetaminophen (TYLENOL) tablet 650 mg, 650 mg, Oral, Q6H PRN, Deven Arora DO, 650 mg at 04/24/24 0293    albuterol inhalation solution 2.5 mg, 2.5 mg, Nebulization, Q4H PRN, Deven Arora DO, 2.5 mg at 04/26/24  1012    ALPRAZolam (XANAX) tablet 0.5 mg, 0.5 mg, Oral, HS PRN, Deven Arora DO, 0.5 mg at 04/25/24 2218    aspirin (ECOTRIN LOW STRENGTH) EC tablet 81 mg, 81 mg, Oral, Daily, Deven Arora DO, 81 mg at 04/26/24 0801    atorvastatin (LIPITOR) tablet 20 mg, 20 mg, Oral, Daily With Dinner, Deven Arora DO, 20 mg at 04/25/24 1722    carvedilol (COREG) tablet 25 mg, 25 mg, Oral, BID With Meals, Deven Arora DO, 25 mg at 04/26/24 0801    cefTRIAXone (ROCEPHIN) IVPB (premix in dextrose) 1,000 mg 50 mL, 1,000 mg, Intravenous, Q24H, Deven Arora DO, Last Rate: 100 mL/hr at 04/26/24 0801, 1,000 mg at 04/26/24 0801    DULoxetine (CYMBALTA) delayed release capsule 60 mg, 60 mg, Oral, Daily, Deven Arora DO, 60 mg at 04/26/24 0801    fluticasone (FLONASE) 50 mcg/act nasal spray 1 spray, 1 spray, Each Nare, Daily, Deven Arora DO, 1 spray at 04/26/24 0802    fluticasone-vilanterol 200-25 mcg/actuation 1 puff, 1 puff, Inhalation, Daily, Deven Arora DO, 1 puff at 04/26/24 0801    heparin (porcine) subcutaneous injection 5,000 Units, 5,000 Units, Subcutaneous, Q8H PEDRO, Deven Arora DO, 5,000 Units at 04/26/24 0541    isosorbide mononitrate (IMDUR) 24 hr tablet 30 mg, 30 mg, Oral, Daily, Deven Arora DO, 30 mg at 04/26/24 0801    montelukast (SINGULAIR) tablet 10 mg, 10 mg, Oral, HS, Deven Arora DO, 10 mg at 04/25/24 2218    ondansetron (ZOFRAN) injection 4 mg, 4 mg, Intravenous, Q6H PRN, Deven Arora DO    umeclidinium 62.5 mcg/actuation inhaler AEPB 1 puff, 1 puff, Inhalation, Daily, eDven Arora DO, 1 puff at 04/26/24 0801    Laboratory Results:  Results from last 7 days   Lab Units 04/26/24  0946 04/25/24  0428 04/24/24  0815   WBC Thousand/uL 7.24 15.66* 25.10*   HEMOGLOBIN g/dL 12.9 13.5 13.5   HEMATOCRIT % 40.1 43.6 42.2   PLATELETS Thousands/uL 197 229 259   POTASSIUM mmol/L 3.7 3.7  3.9   CHLORIDE mmol/L 100 97 97   CO2 mmol/L 22 22 22   BUN mg/dL 39* 41* 40*   CREATININE mg/dL 1.65* 2.12* 1.87*   CALCIUM mg/dL 8.6 8.8 8.9   MAGNESIUM mg/dL  --  2.0  --        I have personally reviewed the blood work as stated above and in my note.  I have personally reviewed internal medicine note.

## 2024-04-26 NOTE — NURSING NOTE
Patient discharged to home, no needs. Reviewed discharge instructions with patient and family - all questions/concerns addressed prior to d/c.

## 2024-04-26 NOTE — DISCHARGE SUMMARY
[facility]  Discharge- Cricket Rosales 1955, 68 y.o. male MRN: [id number]  Unit/Bed#: -01 Encounter: [id number]  Primary Care Provider: Lauren Vee DO   Date and time admitted to hospital: 4/24/2024  8:03 AM    Discharging Physician / Practitioner: Radu Adams MD  PCP: Lauren Vee DO  Admission Date:   Admission Orders (From admission, onward)       Ordered        04/24/24 1024  Inpatient Admission  Once                          Discharge Date: 04/26/24    Medical Problems       Resolved Problems  Date Reviewed: 4/26/2024   None         Consultations During Hospital Stay:  nephro    Procedures Performed:   none    Significant Findings / Test Results:   XR chest 1 view portable    Result Date: 4/24/2024  Impression: Small left effusion. Bibasilar atelectasis. Infiltrates in the differential. Workstation performed: [id number]      Incidental Findings:   none     Test Results Pending at Discharge (will require follow up):   none     Outpatient Tests Requested:  none    Complications:  none    Reason for Admission: Sepsis secondary to lower extremity cellulitis    Hospital Course:     Cricket Rosales is a 68 y.o. male patient who originally presented to the hospital on 4/24/2024 with past medical history significant systolic heart failure, CKD, COPD who initially presented with sepsis secondary lower extremity cellulitis.  Treated with IV antibiotics with significant improvement ultimately transition to oral antibiotics.  Noted to have acute kidney injury was treated with IV fluids with resolution.  Will follow-up outpatient with primary care      Please see above list of diagnoses and related plan for additional information.     Condition at Discharge: stable     Discharge Day Visit / Exam:     * Please refer to separate progress note for these details *    Discussion with Family: pt, declined family update    Discharge instructions/Information to patient and family:    See after visit summary for information provided to patient and family.      Provisions for Follow-Up Care:  See after visit summary for information related to follow-up care and any pertinent home health orders.      Disposition:     Home    For Discharges to [facility]:   Not Applicable to this Patient - Not Applicable to this Patient    Planned Readmission: none     Discharge Statement:  I spent 60 minutes discharging the patient. This time was spent on the day of discharge. I had direct contact with the patient on the day of discharge. Greater than 50% of the total time was spent examining patient, answering all patient questions, arranging and discussing plan of care with patient as well as directly providing post-discharge instructions.  Additional time then spent on discharge activities.    Discharge Medications:  See after visit summary for reconciled discharge medications provided to patient and family.      ** Please Note: This note has been constructed using a voice recognition system **

## 2024-04-26 NOTE — ASSESSMENT & PLAN NOTE
Hypotonic hyponatremia in the setting of poor p.o. intake, infection, diuretic use  Continue IV fluids  Sodium noted to be 129 yesterday follow-up BMP

## 2024-04-26 NOTE — ASSESSMENT & PLAN NOTE
States she has had 5-6 watery bowel movements since admission.  Denies any abdominal pain, bloating  Has since resolved

## 2024-04-27 ENCOUNTER — HOSPITAL ENCOUNTER (INPATIENT)
Facility: HOSPITAL | Age: 69
LOS: 4 days | Discharge: HOME | DRG: 603 | End: 2024-05-02
Attending: EMERGENCY MEDICINE | Admitting: FAMILY MEDICINE
Payer: MEDICARE

## 2024-04-27 DIAGNOSIS — E83.42 HYPOMAGNESEMIA: ICD-10-CM

## 2024-04-27 DIAGNOSIS — L03.90 CELLULITIS: ICD-10-CM

## 2024-04-27 DIAGNOSIS — L03.115 CELLULITIS OF RIGHT LOWER LEG: Primary | ICD-10-CM

## 2024-04-27 DIAGNOSIS — E87.1 HYPONATREMIA: ICD-10-CM

## 2024-04-27 LAB
ALBUMIN SERPL BCP-MCNC: 3.8 G/DL (ref 3.5–5)
ALP SERPL-CCNC: 67 U/L (ref 34–104)
ALT SERPL W P-5'-P-CCNC: 35 U/L (ref 7–52)
AMORPH URATE CRY URNS QL MICRO: ABNORMAL
ANION GAP SERPL CALCULATED.3IONS-SCNC: 12 MMOL/L (ref 4–13)
APTT PPP: 32 SECONDS (ref 23–37)
AST SERPL W P-5'-P-CCNC: 22 U/L (ref 13–39)
BACTERIA UR QL AUTO: ABNORMAL /HPF
BACTERIA UR QL AUTO: ABNORMAL /HPF
BASE EX.OXY STD BLDV CALC-SCNC: 83.9 % (ref 60–80)
BASE EXCESS BLDV CALC-SCNC: -4.1 MMOL/L
BASOPHILS # BLD AUTO: 0.07 THOUSANDS/ÂΜL (ref 0–0.1)
BASOPHILS NFR BLD AUTO: 1 % (ref 0–1)
BILIRUB SERPL-MCNC: 0.64 MG/DL (ref 0.2–1)
BILIRUB UR QL STRIP: NEGATIVE
BILIRUB UR QL STRIP: NEGATIVE
BUN SERPL-MCNC: 33 MG/DL (ref 5–25)
CALCIUM SERPL-MCNC: 9.1 MG/DL (ref 8.4–10.2)
CHLORIDE SERPL-SCNC: 99 MMOL/L (ref 96–108)
CLARITY UR: ABNORMAL
CLARITY UR: CLEAR
CO2 SERPL-SCNC: 20 MMOL/L (ref 21–32)
COLOR UR: YELLOW
COLOR UR: YELLOW
CREAT SERPL-MCNC: 1.51 MG/DL (ref 0.6–1.3)
EOSINOPHIL # BLD AUTO: 0.05 THOUSAND/ÂΜL (ref 0–0.61)
EOSINOPHIL NFR BLD AUTO: 0 % (ref 0–6)
ERYTHROCYTE [DISTWIDTH] IN BLOOD BY AUTOMATED COUNT: 15.9 % (ref 11.6–15.1)
GFR SERPL CREATININE-BSD FRML MDRD: 47 ML/MIN/1.73SQ M
GLUCOSE SERPL-MCNC: 145 MG/DL (ref 65–140)
GLUCOSE SERPL-MCNC: 147 MG/DL (ref 65–140)
GLUCOSE SERPL-MCNC: 149 MG/DL (ref 65–140)
GLUCOSE SERPL-MCNC: 159 MG/DL (ref 65–140)
GLUCOSE UR STRIP-MCNC: ABNORMAL MG/DL
GLUCOSE UR STRIP-MCNC: ABNORMAL MG/DL
GRAN CASTS #/AREA URNS LPF: ABNORMAL /[LPF]
HCO3 BLDV-SCNC: 20.1 MMOL/L (ref 24–30)
HCT VFR BLD AUTO: 42.2 % (ref 36.5–49.3)
HGB BLD-MCNC: 13.3 G/DL (ref 12–17)
HGB UR QL STRIP.AUTO: ABNORMAL
HGB UR QL STRIP.AUTO: ABNORMAL
IMM GRANULOCYTES # BLD AUTO: 0.08 THOUSAND/UL (ref 0–0.2)
IMM GRANULOCYTES NFR BLD AUTO: 1 % (ref 0–2)
INR PPP: 1.05 (ref 0.84–1.19)
KETONES UR STRIP-MCNC: NEGATIVE MG/DL
KETONES UR STRIP-MCNC: NEGATIVE MG/DL
LACTATE SERPL-SCNC: 1.3 MMOL/L (ref 0.5–2)
LEUKOCYTE ESTERASE UR QL STRIP: NEGATIVE
LEUKOCYTE ESTERASE UR QL STRIP: NEGATIVE
LYMPHOCYTES # BLD AUTO: 0.91 THOUSANDS/ÂΜL (ref 0.6–4.47)
LYMPHOCYTES NFR BLD AUTO: 7 % (ref 14–44)
MAGNESIUM SERPL-MCNC: 1.8 MG/DL (ref 1.9–2.7)
MCH RBC QN AUTO: 25.8 PG (ref 26.8–34.3)
MCHC RBC AUTO-ENTMCNC: 31.5 G/DL (ref 31.4–37.4)
MCV RBC AUTO: 82 FL (ref 82–98)
MONOCYTES # BLD AUTO: 1.1 THOUSAND/ÂΜL (ref 0.17–1.22)
MONOCYTES NFR BLD AUTO: 9 % (ref 4–12)
NEUTROPHILS # BLD AUTO: 10.11 THOUSANDS/ÂΜL (ref 1.85–7.62)
NEUTS SEG NFR BLD AUTO: 82 % (ref 43–75)
NITRITE UR QL STRIP: NEGATIVE
NITRITE UR QL STRIP: NEGATIVE
NON-SQ EPI CELLS URNS QL MICRO: ABNORMAL /HPF
NON-SQ EPI CELLS URNS QL MICRO: ABNORMAL /HPF
NRBC BLD AUTO-RTO: 0 /100 WBCS
O2 CT BLDV-SCNC: 16.6 ML/DL
PCO2 BLDV: 34.2 MM HG (ref 42–50)
PH BLDV: 7.39 [PH] (ref 7.3–7.4)
PH UR STRIP.AUTO: 5.5 [PH]
PH UR STRIP.AUTO: 5.5 [PH]
PLATELET # BLD AUTO: 251 THOUSANDS/UL (ref 149–390)
PMV BLD AUTO: 10 FL (ref 8.9–12.7)
PO2 BLDV: 50.5 MM HG (ref 35–45)
POTASSIUM SERPL-SCNC: 4 MMOL/L (ref 3.5–5.3)
PROCALCITONIN SERPL-MCNC: 2.12 NG/ML
PROT SERPL-MCNC: 7.1 G/DL (ref 6.4–8.4)
PROT UR STRIP-MCNC: ABNORMAL MG/DL
PROT UR STRIP-MCNC: ABNORMAL MG/DL
PROTHROMBIN TIME: 13.6 SECONDS (ref 11.6–14.5)
RBC # BLD AUTO: 5.16 MILLION/UL (ref 3.88–5.62)
RBC #/AREA URNS AUTO: ABNORMAL /HPF
RBC #/AREA URNS AUTO: ABNORMAL /HPF
SODIUM SERPL-SCNC: 131 MMOL/L (ref 135–147)
SP GR UR STRIP.AUTO: 1.02 (ref 1–1.03)
SP GR UR STRIP.AUTO: 1.02 (ref 1–1.03)
UROBILINOGEN UR STRIP-ACNC: <2 MG/DL
UROBILINOGEN UR STRIP-ACNC: <2 MG/DL
WBC # BLD AUTO: 12.32 THOUSAND/UL (ref 4.31–10.16)
WBC #/AREA URNS AUTO: ABNORMAL /HPF
WBC #/AREA URNS AUTO: ABNORMAL /HPF

## 2024-04-27 PROCEDURE — 81001 URINALYSIS AUTO W/SCOPE: CPT | Performed by: EMERGENCY MEDICINE

## 2024-04-27 PROCEDURE — 94640 AIRWAY INHALATION TREATMENT: CPT

## 2024-04-27 PROCEDURE — 87040 BLOOD CULTURE FOR BACTERIA: CPT | Performed by: EMERGENCY MEDICINE

## 2024-04-27 PROCEDURE — 82948 REAGENT STRIP/BLOOD GLUCOSE: CPT

## 2024-04-27 PROCEDURE — 85730 THROMBOPLASTIN TIME PARTIAL: CPT | Performed by: EMERGENCY MEDICINE

## 2024-04-27 PROCEDURE — 94664 DEMO&/EVAL PT USE INHALER: CPT

## 2024-04-27 PROCEDURE — 36415 COLL VENOUS BLD VENIPUNCTURE: CPT | Performed by: EMERGENCY MEDICINE

## 2024-04-27 PROCEDURE — 99223 1ST HOSP IP/OBS HIGH 75: CPT | Performed by: PHYSICIAN ASSISTANT

## 2024-04-27 PROCEDURE — 83735 ASSAY OF MAGNESIUM: CPT | Performed by: EMERGENCY MEDICINE

## 2024-04-27 PROCEDURE — 99285 EMERGENCY DEPT VISIT HI MDM: CPT | Performed by: EMERGENCY MEDICINE

## 2024-04-27 PROCEDURE — 99283 EMERGENCY DEPT VISIT LOW MDM: CPT

## 2024-04-27 PROCEDURE — 81001 URINALYSIS AUTO W/SCOPE: CPT | Performed by: PHYSICIAN ASSISTANT

## 2024-04-27 PROCEDURE — 85610 PROTHROMBIN TIME: CPT | Performed by: EMERGENCY MEDICINE

## 2024-04-27 PROCEDURE — 85025 COMPLETE CBC W/AUTO DIFF WBC: CPT | Performed by: EMERGENCY MEDICINE

## 2024-04-27 PROCEDURE — 94760 N-INVAS EAR/PLS OXIMETRY 1: CPT

## 2024-04-27 PROCEDURE — 96367 TX/PROPH/DG ADDL SEQ IV INF: CPT

## 2024-04-27 PROCEDURE — 82805 BLOOD GASES W/O2 SATURATION: CPT | Performed by: EMERGENCY MEDICINE

## 2024-04-27 PROCEDURE — 84145 PROCALCITONIN (PCT): CPT | Performed by: EMERGENCY MEDICINE

## 2024-04-27 PROCEDURE — 96365 THER/PROPH/DIAG IV INF INIT: CPT

## 2024-04-27 PROCEDURE — 99223 1ST HOSP IP/OBS HIGH 75: CPT | Performed by: FAMILY MEDICINE

## 2024-04-27 PROCEDURE — 83605 ASSAY OF LACTIC ACID: CPT | Performed by: EMERGENCY MEDICINE

## 2024-04-27 PROCEDURE — 80053 COMPREHEN METABOLIC PANEL: CPT | Performed by: EMERGENCY MEDICINE

## 2024-04-27 RX ORDER — FUROSEMIDE 40 MG/1
40 TABLET ORAL EVERY OTHER DAY
Status: DISCONTINUED | OUTPATIENT
Start: 2024-04-28 | End: 2024-05-02 | Stop reason: HOSPADM

## 2024-04-27 RX ORDER — CEFTRIAXONE 1 G/50ML
1000 INJECTION, SOLUTION INTRAVENOUS EVERY 24 HOURS
Status: DISCONTINUED | OUTPATIENT
Start: 2024-04-28 | End: 2024-04-27

## 2024-04-27 RX ORDER — ALPRAZOLAM 0.5 MG/1
0.5 TABLET ORAL
Status: DISCONTINUED | OUTPATIENT
Start: 2024-04-27 | End: 2024-05-02 | Stop reason: HOSPADM

## 2024-04-27 RX ORDER — ACETAMINOPHEN 325 MG/1
650 TABLET ORAL EVERY 6 HOURS PRN
Status: DISCONTINUED | OUTPATIENT
Start: 2024-04-27 | End: 2024-05-02 | Stop reason: HOSPADM

## 2024-04-27 RX ORDER — CEFAZOLIN SODIUM 2 G/50ML
2000 SOLUTION INTRAVENOUS EVERY 8 HOURS
Status: DISCONTINUED | OUTPATIENT
Start: 2024-04-27 | End: 2024-04-29

## 2024-04-27 RX ORDER — ACETAMINOPHEN 325 MG/1
975 TABLET ORAL ONCE
Status: COMPLETED | OUTPATIENT
Start: 2024-04-27 | End: 2024-04-27

## 2024-04-27 RX ORDER — INSULIN LISPRO 100 [IU]/ML
1-5 INJECTION, SOLUTION INTRAVENOUS; SUBCUTANEOUS
Status: DISCONTINUED | OUTPATIENT
Start: 2024-04-27 | End: 2024-05-02 | Stop reason: HOSPADM

## 2024-04-27 RX ORDER — CARVEDILOL 12.5 MG/1
25 TABLET ORAL 2 TIMES DAILY WITH MEALS
Status: DISCONTINUED | OUTPATIENT
Start: 2024-04-27 | End: 2024-05-02 | Stop reason: HOSPADM

## 2024-04-27 RX ORDER — FLUTICASONE PROPIONATE 50 MCG
1 SPRAY, SUSPENSION (ML) NASAL DAILY
Status: DISCONTINUED | OUTPATIENT
Start: 2024-04-27 | End: 2024-05-02 | Stop reason: HOSPADM

## 2024-04-27 RX ORDER — ONDANSETRON 2 MG/ML
4 INJECTION INTRAMUSCULAR; INTRAVENOUS EVERY 6 HOURS PRN
Status: DISCONTINUED | OUTPATIENT
Start: 2024-04-27 | End: 2024-05-02 | Stop reason: HOSPADM

## 2024-04-27 RX ORDER — HEPARIN SODIUM 5000 [USP'U]/ML
5000 INJECTION, SOLUTION INTRAVENOUS; SUBCUTANEOUS EVERY 8 HOURS SCHEDULED
Status: DISCONTINUED | OUTPATIENT
Start: 2024-04-27 | End: 2024-05-02 | Stop reason: HOSPADM

## 2024-04-27 RX ORDER — CEFTRIAXONE 1 G/50ML
1000 INJECTION, SOLUTION INTRAVENOUS ONCE
Status: COMPLETED | OUTPATIENT
Start: 2024-04-27 | End: 2024-04-27

## 2024-04-27 RX ORDER — ATORVASTATIN CALCIUM 10 MG/1
20 TABLET, FILM COATED ORAL
Status: DISCONTINUED | OUTPATIENT
Start: 2024-04-27 | End: 2024-05-02 | Stop reason: HOSPADM

## 2024-04-27 RX ORDER — FLUTICASONE FUROATE AND VILANTEROL 200; 25 UG/1; UG/1
1 POWDER RESPIRATORY (INHALATION) DAILY
Status: DISCONTINUED | OUTPATIENT
Start: 2024-04-27 | End: 2024-05-02 | Stop reason: HOSPADM

## 2024-04-27 RX ORDER — DULOXETIN HYDROCHLORIDE 30 MG/1
60 CAPSULE, DELAYED RELEASE ORAL DAILY
Status: DISCONTINUED | OUTPATIENT
Start: 2024-04-27 | End: 2024-05-02 | Stop reason: HOSPADM

## 2024-04-27 RX ORDER — MAGNESIUM SULFATE HEPTAHYDRATE 40 MG/ML
2 INJECTION, SOLUTION INTRAVENOUS ONCE
Status: COMPLETED | OUTPATIENT
Start: 2024-04-27 | End: 2024-04-27

## 2024-04-27 RX ORDER — ALBUTEROL SULFATE 2.5 MG/3ML
2.5 SOLUTION RESPIRATORY (INHALATION) EVERY 4 HOURS PRN
Status: DISCONTINUED | OUTPATIENT
Start: 2024-04-27 | End: 2024-05-02 | Stop reason: HOSPADM

## 2024-04-27 RX ORDER — LEVALBUTEROL INHALATION SOLUTION 1.25 MG/3ML
1.25 SOLUTION RESPIRATORY (INHALATION)
Status: DISCONTINUED | OUTPATIENT
Start: 2024-04-27 | End: 2024-04-28

## 2024-04-27 RX ORDER — ISOSORBIDE MONONITRATE 30 MG/1
30 TABLET, EXTENDED RELEASE ORAL DAILY
Status: DISCONTINUED | OUTPATIENT
Start: 2024-04-27 | End: 2024-05-02 | Stop reason: HOSPADM

## 2024-04-27 RX ORDER — MONTELUKAST SODIUM 10 MG/1
10 TABLET ORAL
Status: DISCONTINUED | OUTPATIENT
Start: 2024-04-27 | End: 2024-05-02 | Stop reason: HOSPADM

## 2024-04-27 RX ADMIN — HEPARIN SODIUM 5000 UNITS: 5000 INJECTION, SOLUTION INTRAVENOUS; SUBCUTANEOUS at 13:13

## 2024-04-27 RX ADMIN — ACETAMINOPHEN 650 MG: 325 TABLET, FILM COATED ORAL at 21:01

## 2024-04-27 RX ADMIN — MONTELUKAST 10 MG: 10 TABLET, FILM COATED ORAL at 21:02

## 2024-04-27 RX ADMIN — ACETAMINOPHEN 975 MG: 325 TABLET, FILM COATED ORAL at 08:37

## 2024-04-27 RX ADMIN — UMECLIDINIUM 1 PUFF: 62.5 AEROSOL, POWDER ORAL at 13:14

## 2024-04-27 RX ADMIN — FLUTICASONE FUROATE AND VILANTEROL TRIFENATATE 1 PUFF: 200; 25 POWDER RESPIRATORY (INHALATION) at 13:14

## 2024-04-27 RX ADMIN — MAGNESIUM SULFATE HEPTAHYDRATE 2 G: 40 INJECTION, SOLUTION INTRAVENOUS at 09:50

## 2024-04-27 RX ADMIN — ASPIRIN 81 MG: 81 TABLET, COATED ORAL at 13:13

## 2024-04-27 RX ADMIN — DULOXETINE HYDROCHLORIDE 60 MG: 30 CAPSULE, DELAYED RELEASE ORAL at 21:02

## 2024-04-27 RX ADMIN — CEFAZOLIN SODIUM 2000 MG: 2 SOLUTION INTRAVENOUS at 17:30

## 2024-04-27 RX ADMIN — FLUTICASONE PROPIONATE 1 SPRAY: 50 SPRAY, METERED NASAL at 13:15

## 2024-04-27 RX ADMIN — ALPRAZOLAM 0.5 MG: 0.5 TABLET ORAL at 21:01

## 2024-04-27 RX ADMIN — CEFTRIAXONE 1000 MG: 1 INJECTION, SOLUTION INTRAVENOUS at 09:22

## 2024-04-27 RX ADMIN — ISOSORBIDE MONONITRATE 30 MG: 30 TABLET, EXTENDED RELEASE ORAL at 13:13

## 2024-04-27 RX ADMIN — ATORVASTATIN CALCIUM 20 MG: 10 TABLET, FILM COATED ORAL at 17:23

## 2024-04-27 RX ADMIN — CARVEDILOL 25 MG: 12.5 TABLET, FILM COATED ORAL at 17:23

## 2024-04-27 RX ADMIN — LEVALBUTEROL HYDROCHLORIDE 1.25 MG: 1.25 SOLUTION RESPIRATORY (INHALATION) at 19:28

## 2024-04-27 RX ADMIN — ALBUTEROL SULFATE 2.5 MG: 2.5 SOLUTION RESPIRATORY (INHALATION) at 13:31

## 2024-04-27 RX ADMIN — HEPARIN SODIUM 5000 UNITS: 5000 INJECTION, SOLUTION INTRAVENOUS; SUBCUTANEOUS at 21:02

## 2024-04-27 NOTE — PLAN OF CARE
Problem: PAIN - ADULT  Goal: Verbalizes/displays adequate comfort level or baseline comfort level  Description: Interventions:  - Encourage patient to monitor pain and request assistance  - Assess pain using appropriate pain scale  - Administer analgesics based on type and severity of pain and evaluate response  - Implement non-pharmacological measures as appropriate and evaluate response  - Consider cultural and social influences on pain and pain management  - Notify physician/advanced practitioner if interventions unsuccessful or patient reports new pain  Outcome: Progressing     Problem: INFECTION - ADULT  Goal: Absence or prevention of progression during hospitalization  Description: INTERVENTIONS:  - Assess and monitor for signs and symptoms of infection  - Monitor lab/diagnostic results  - Monitor all insertion sites, i.e. indwelling lines, tubes, and drains  - Monitor endotracheal if appropriate and nasal secretions for changes in amount and color  - [place] appropriate cooling/warming therapies per order  - Administer medications as ordered  - Instruct and encourage patient and family to use good hand hygiene technique  - Identify and instruct in appropriate isolation precautions for identified infection/condition  Outcome: Progressing     Problem: SAFETY ADULT  Goal: Patient will remain free of falls  Description: INTERVENTIONS:  - Educate patient/family on patient safety including physical limitations  - Instruct patient to call for assistance with activity   - Consult OT/PT to assist with strengthening/mobility   - Keep Call bell within reach  - Keep bed low and locked with side rails adjusted as appropriate  - Keep care items and personal belongings within reach  - Initiate and maintain comfort rounds  - Make Fall Risk Sign visible to staff  Outcome: Progressing     Problem: DISCHARGE PLANNING  Goal: Discharge to home or other facility with appropriate resources  Description: INTERVENTIONS:  -  Identify barriers to discharge w/patient and caregiver  - Arrange for needed discharge resources and transportation as appropriate  - Identify discharge learning needs (meds, wound care, etc.)  - Arrange for interpretive services to assist at discharge as needed  - Refer to Case Management Department for coordinating discharge planning if the patient needs post-hospital services based on physician/advanced practitioner order or complex needs related to functional status, cognitive ability, or social support system  Outcome: Progressing     Problem: Knowledge Deficit  Goal: Patient/family/caregiver demonstrates understanding of disease process, treatment plan, medications, and discharge instructions  Description: Complete learning assessment and assess knowledge base.  Interventions:  - Provide teaching at level of understanding  - Provide teaching via preferred learning methods  Outcome: Progressing     Problem: METABOLIC, FLUID AND ELECTROLYTES - ADULT  Goal: Glucose maintained within target range  Description: INTERVENTIONS:  - Monitor Blood Glucose as ordered  - Assess for signs and symptoms of hyperglycemia and hypoglycemia  - Administer ordered medications to maintain glucose within target range  - Assess nutritional intake and initiate nutrition service referral as needed  Outcome: Progressing     Problem: SKIN/TISSUE INTEGRITY - ADULT  Goal: Skin Integrity remains intact(Skin Breakdown Prevention)  Description: Assess:  -Perform Clifford assessment every 8 hours  -Clean and moisturize skin   -Inspect skin when repositioning, toileting, and assisting with ADLS  -Assess under medical devices   -Assess extremities for adequate circulation and sensation     Bed Management:  -Have minimal linens on bed & keep smooth, unwrinkled  -Change linens as needed when moist or perspiring  -Avoid sitting or lying in one position for more than 2 hours while in bed    Toileting:  Bathroom    Activity:  -Mobilize patient 3 times  a day  -Encourage activity and walks on unit  -Encourage or provide ROM exercises   -Instruct/ Assist with weight shifting every 15 minutes when out of bed in chair  -Consider limitation of chair time 2 hour intervals    Skin Care:  -Avoid use of baby powder, tape, friction and shearing, hot water or constrictive clothing  -Relieve pressure over bony prominences   -Do not massage red bony areas    Next Steps:  -Teach patient strategies to minimize risks    -Consider consults to  interdisciplinary teams   Outcome: Progressing  Goal: Incision(s), wounds(s) or drain site(s) healing without S/S of infection  Description: INTERVENTIONS  - Assess and document dressing, incision, wound bed, drain sites and surrounding tissue  - Provide patient and family education  - Perform skin care/dressing changes as ordered  Outcome: Progressing

## 2024-04-27 NOTE — ED PROVIDER NOTES
History  Chief Complaint   Patient presents with    Cellulitis     Pt discharged yesterday from hospital for cellulitis and has fever and burning in right lower leg     This is a 68-year-old man with the noted past medical history who presents to the emergency department for concern of worsening cellulitis in the right lower extremity.  He was hospitalized at [facility] between 24-26 April for right lower extremity cellulitis; he had developed fever/chills and pain in the right lower extremity on 24 April. Received ceftriaxone for 2 days and was discharged home with cefpodoxime.  He has taken 1 dose of that so far.  After discharge home, he was febrile yesterday evening to 101.4 °F and has noted increasing pain and discomfort in the right leg since yesterday along with continuing fatigue and malaise worsening since his hospital discharge.  He has not noted any other areas of swelling or erythema on the body.  States he is effectively unable to function because of the pain in his right lower extremity.  States that he did have several episodes of diarrhea yesterday that has since resolved; denies any new dyspnea/wheeze.  Denies any abdominal pain/nausea/vomiting/dysuria/hematuria/urgency.  States that he has urinary frequency notably worse than what he typically has attributable to BPH.  Did not take or use anything for pain today.  Laboratory testing during the prior hospitalization notable for hyponatremia about 130, significant leukocytosis that markedly improved with antibiotic therapy, and elevated procalcitonin that slightly improved (7.75-->6.98).    A/P: Treatment for cellulitis of right lower extremity with apparent improvement.  Did have multiple metabolic derangements and appeared to improve with treatment but now with reported symptomatic worsening including fever and worsening pain in the right lower extremity.  Check sepsis laboratory markers assess metabolic disturbances and assist  with disposition decision.  No findings suggest necrotizing infection of the right lower extremity.      History provided by:  Patient, medical records and spouse      Prior to Admission Medications   Prescriptions Last Dose Informant Patient Reported? Taking?   ALPRAZolam (XANAX) 0.5 mg tablet   No No   Sig: TAKE 1 TABLET BY MOUTH ONCE DAILY AT BEDTIME AS NEEDED FOR ANXIETY   Accu-Chek FastClix Lancets MISC  Self No No   Sig: Use 1 each daily to test blood sugar.   Blood Glucose Monitoring Suppl (Accu-Chek Guide Me) w/Device KIT  Self No No   Sig: Use 1 each daily to test blood sugar.   DULoxetine (CYMBALTA) 60 mg delayed release capsule  Self No No   Sig: Take 1 capsule by mouth once daily   Diclofenac Sodium (VOLTAREN) 1 %   No No   Sig: Apply 2 g topically 2 (two) times a day   Empagliflozin (Jardiance) 10 MG TABS tablet  Self No No   Sig: Take 1 tablet (10 mg total) by mouth every morning   Entresto 49-51 MG TABS  Self No No   Sig: Take 1 tablet by mouth twice daily   Fluticasone-Salmeterol (Advair) 500-50 mcg/dose inhaler  Self No No   Sig: INHALE 1 DOSE BY MOUTH TWICE DAILY RINSE MOUTH AFTER USE   albuterol (2.5 mg/3 mL) 0.083 % nebulizer solution  Self No No   Sig: Take 3 mL (2.5 mg total) by nebulization every 6 (six) hours as needed for wheezing or shortness of breath   albuterol (ProAir HFA) 90 mcg/act inhaler  Self No No   Sig: Inhale 2 puffs every 6 (six) hours as needed for wheezing   aspirin (ECOTRIN LOW STRENGTH) 81 mg EC tablet   No No   Sig: Take 1 tablet (81 mg total) by mouth 2 (two) times a day for 14 days   atorvastatin (LIPITOR) 20 mg tablet  Self No No   Sig: Take 1 tablet (20 mg total) by mouth daily   carvedilol (COREG) 25 mg tablet  Self No No   Sig: Take 1 tablet (25 mg total) by mouth 2 (two) times a day with meals   cefpodoxime (VANTIN) 200 mg tablet   No No   Sig: Take 1 tablet (200 mg total) by mouth 2 (two) times a day for 5 days   fluticasone (FLONASE) 50 mcg/act nasal spray  Self  No No   Sig: Use 1 spray(s) in each nostril twice daily   furosemide (LASIX) 40 mg tablet  Self No No   Sig: Take 1 tablet (40 mg total) by mouth every other day   glucose blood test strip  Self No No   Sig: Use 1 each daily to test blood sugar.   isosorbide mononitrate (IMDUR) 30 mg 24 hr tablet  Self No No   Sig: Take 1 tablet by mouth once daily   montelukast (SINGULAIR) 10 mg tablet  Self No No   Sig: Take 1 tablet (10 mg total) by mouth daily at bedtime   tiotropium (Spiriva Respimat) 2.5 MCG/ACT AERS inhaler   No No   Sig: INHALE 2 SPRAY(S) BY MOUTH ONCE DAILY      Facility-Administered Medications: None       Past Medical History:   Diagnosis Date    Acute on chronic combined systolic and diastolic CHF (congestive heart failure) ([facility]) 7/27/2023    Alcohol abuse 7/27/2023    Ambulates with cane     Arthritis     Asthma     Back pain     Chest pain 12/22/2022    CHF (congestive heart failure) ([facility])     Claustrophobia     COPD (chronic obstructive pulmonary disease) ([facility])     COPD with acute exacerbation ([facility]) 05/06/2022    COVID-19     COVID-19 virus infection 12/03/2021    Depression     Hypertension     Mumps     Neck pain     Old MI (myocardial infarction)     Pneumonia     Pulmonary emphysema ([facility])     Rectal bleeding 01/14/2019    Skin abnormalities     rashes and wounds on both legs - scabbed over and healing    Sleep apnea     no CPAP    Tingling of both feet     Wears glasses        Past Surgical History:   Procedure Laterality Date    APPENDECTOMY      CARDIAC CATHETERIZATION  07/24/2015    Left main- normal and maidly tortuous.  Circumflex - normal and moderately tortuous.  RCA- normal and mildy tortuous.  Global LV function was severely depressed..    CARDIAC CATHETERIZATION Left 09/27/2022    Procedure: Cardiac Left Heart Cath;  Surgeon: Doreen Briones DO;  Location: [facility];  Service: Cardiology    CARDIAC CATHETERIZATION N/A 09/27/2022    Procedure: Cardiac Coronary Angiogram;   Surgeon: Doreen Briones DO;  Location: [facility];  Service: Cardiology    CARDIAC CATHETERIZATION  2022    Procedure: Cardiac catheterization;  Surgeon: Doreen Briones DO;  Location: [facility];  Service: Cardiology    INGUINAL HERNIA REPAIR Bilateral     MD COLONOSCOPY FLX DX W/COLLJ SPEC WHEN PFRMD N/A 2019    Procedure: COLONOSCOPY with removal of anal papilla;  Surgeon: ANIL Dale MD;  Location: [facility];  Service: Colorectal    TONSILLECTOMY      TOTAL KNEE ARTHROPLASTY Left 2023    Procedure: ARTHROPLASTY KNEE TOTAL;  Surgeon: David Mullen DO;  Location: [facility];  Service: Orthopedics    UMBILICAL HERNIA REPAIR  2006       Family History   Problem Relation Age of Onset    Heart attack Father         MI    Heart disease Father     Diabetes Sister         DM    Arthritis Family     Coronary artery disease Family     Hypertension Family     Tuberculosis Son     Alzheimer's disease Mother      I have reviewed and agree with the history as documented.    E-Cigarette/Vaping    E-Cigarette Use Never User      E-Cigarette/Vaping Substances    Nicotine No     THC No     CBD No     Flavoring No     Other No     Unknown No      Social History     Tobacco Use    Smoking status: Former     Current packs/day: 0.00     Average packs/day: 3.0 packs/day for 43.0 years (129.0 ttl pk-yrs)     Types: Cigarettes     Start date:      Quit date: 2018     Years since quittin.3    Smokeless tobacco: Never   Vaping Use    Vaping status: Never Used   Substance Use Topics    Alcohol use: Not Currently    Drug use: Yes     Types: Marijuana     Comment: occasionally edible       Review of Systems   Constitutional:  Positive for chills, fatigue and fever. Negative for diaphoresis.   Respiratory: Negative.  Negative for chest tightness and shortness of breath.    Cardiovascular:  Positive for leg swelling. Negative for chest pain.   Gastrointestinal:  Positive for  diarrhea. Negative for abdominal pain, nausea and vomiting.   Genitourinary:  Positive for frequency. Negative for dysuria, flank pain and hematuria.   Musculoskeletal:  Positive for arthralgias and myalgias. Negative for joint swelling.   Skin:  Positive for color change and wound.   Neurological:  Negative for syncope, weakness and light-headedness.   Hematological: Negative.        Physical Exam  Physical Exam  Vitals and nursing note reviewed.   Constitutional:       General: He is awake. He is not in acute distress.     Appearance: Normal appearance. He is well-developed.   HENT:      Head: Normocephalic and atraumatic.      Right Ear: Hearing and external ear normal.      Left Ear: Hearing and external ear normal.   Neck:      Trachea: Trachea and phonation normal.   Cardiovascular:      Rate and Rhythm: Normal rate and regular rhythm.      Pulses:           Radial pulses are 2+ on the right side and 2+ on the left side.        Dorsalis pedis pulses are 2+ on the right side and 2+ on the left side.        Posterior tibial pulses are 2+ on the right side and 2+ on the left side.      Heart sounds: Normal heart sounds, S1 normal and S2 normal. No murmur heard.     No friction rub. No gallop.   Pulmonary:      Effort: Pulmonary effort is normal. No respiratory distress.      Breath sounds: Normal breath sounds. No stridor. No decreased breath sounds, wheezing, rhonchi or rales.   Abdominal:      General: There is no distension.      Palpations: There is no mass.      Tenderness: There is no abdominal tenderness. There is no guarding or rebound.   Skin:     General: Skin is warm and dry.      Comments: Diffuse swelling of the right calf with erythema on the anterior aspect tracking up to the level of the knee.  There is some chronic appearing discoloration of the anterior aspect of the leg with areas of acute erythema distinct from this.  No bullae/ecchymosis.  No induration.  No open or draining areas.    Neurological:      Mental Status: He is alert and oriented to person, place, and time.      GCS: GCS eye subscore is 4. GCS verbal subscore is 5. GCS motor subscore is 6.      Cranial Nerves: No cranial nerve deficit.      Sensory: No sensory deficit.      Motor: No abnormal muscle tone.      Comments: PERRLA; EOMI. Sensation intact to light touch over face in V1-V3 distribution bilaterally. Facial expressions symmetric. Tongue/uvula midline. Shoulder shrug equal bilaterally. Strength 5/5 in UE/LE bilaterally. Sensation intact to light touch in UE/LE bilaterally.         Vital Signs  ED Triage Vitals [04/27/24 0743]   Temperature Pulse Respirations Blood Pressure SpO2   98.4 °F (36.9 °C) 74 20 148/69 95 %      Temp Source Heart Rate Source Patient Position - Orthostatic VS BP Location FiO2 (%)   Temporal Monitor Sitting Left arm --      Pain Score       10 - Worst Possible Pain           Vitals:    04/27/24 0900 04/27/24 0930 04/27/24 1000 04/27/24 1041   BP: 143/65 132/60 128/61 136/74   Pulse: 72 (!) 48 74 76   Patient Position - Orthostatic VS: Lying Lying Lying Sitting         Visual Acuity      ED Medications  Medications   magnesium sulfate 2 g/50 mL IVPB (premix) 2 g (2 g Intravenous New Bag 4/27/24 0950)   acetaminophen (TYLENOL) tablet 975 mg (975 mg Oral Given 4/27/24 0837)   cefTRIAXone (ROCEPHIN) IVPB (premix in dextrose) 1,000 mg 50 mL (0 mg Intravenous Stopped 4/27/24 0950)       Diagnostic Studies  Results Reviewed       Procedure Component Value Units Date/Time    Blood culture #1 [[id number]] Collected: 04/27/24 0837    Lab Status: In process Specimen: Blood from Arm, Right Updated: 04/27/24 0926    Blood culture #2 [[id number]] Collected: 04/27/24 0837    Lab Status: In process Specimen: Blood from Arm, Left Updated: 04/27/24 0919    Procalcitonin [[id number]]  (Abnormal) Collected: 04/27/24 0837    Lab Status: Final result Specimen: Blood from Arm, Right Updated: 04/27/24 0911     Procalcitonin  2.12 ng/ml     Lactic acid [[id number]]  (Normal) Collected: 04/27/24 0837    Lab Status: Final result Specimen: Blood from Arm, Right Updated: 04/27/24 0905     LACTIC ACID 1.3 mmol/L     Narrative:      Result may be elevated if tourniquet was used during collection.    Comprehensive metabolic panel [[id number]]  (Abnormal) Collected: 04/27/24 0837    Lab Status: Final result Specimen: Blood from Arm, Right Updated: 04/27/24 0904     Sodium 131 mmol/L      Potassium 4.0 mmol/L      Chloride 99 mmol/L      CO2 20 mmol/L      ANION GAP 12 mmol/L      BUN 33 mg/dL      Creatinine 1.51 mg/dL      Glucose 145 mg/dL      Calcium 9.1 mg/dL      AST 22 U/L      ALT 35 U/L      Alkaline Phosphatase 67 U/L      Total Protein 7.1 g/dL      Albumin 3.8 g/dL      Total Bilirubin 0.64 mg/dL      eGFR 47 ml/min/1.73sq m     Narrative:      National Kid[employer] guidelines for Chronic Kidney Disease (CKD):     Stage 1 with normal or high GFR (GFR > 90 mL/min/1.73 square meters)    Stage 2 Mild CKD (GFR = 60-89 mL/min/1.73 square meters)    Stage 3A Moderate CKD (GFR = 45-59 mL/min/1.73 square meters)    Stage 3B Moderate CKD (GFR = 30-44 mL/min/1.73 square meters)    Stage 4 Severe CKD (GFR = 15-29 mL/min/1.73 square meters)    Stage 5 End Stage CKD (GFR <15 mL/min/1.73 square meters)  Note: GFR calculation is accurate only with a steady state creatinine    Magnesium [[id number]]  (Abnormal) Collected: 04/27/24 0837    Lab Status: Final result Specimen: Blood from Arm, Right Updated: 04/27/24 0904     Magnesium 1.8 mg/dL     Protime-INR [[id number]]  (Normal) Collected: 04/27/24 0837    Lab Status: Final result Specimen: Blood from Arm, Right Updated: 04/27/24 0857     Protime 13.6 seconds      INR 1.05    APTT [[id number]]  (Normal) Collected: 04/27/24 0837    Lab Status: Final result Specimen: Blood from Arm, Right Updated: 04/27/24 0857     PTT 32 seconds     Blood gas, Venous [[id number]]  (Abnormal) Collected:  04/27/24 0837    Lab Status: Final result Specimen: Blood from Arm, Right Updated: 04/27/24 0849     pH, Naveed 7.387     pCO2, Naveed 34.2 mm Hg      pO2, Naveed 50.5 mm Hg      HCO3, Naveed 20.1 mmol/L      Base Excess, Naveed -4.1 mmol/L      O2 Content, Naveed 16.6 ml/dL      O2 HGB, VENOUS 83.9 %     CBC and differential [[id number]]  (Abnormal) Collected: 04/27/24 0837    Lab Status: Final result Specimen: Blood from Arm, Right Updated: 04/27/24 0847     WBC 12.32 Thousand/uL      RBC 5.16 Million/uL      Hemoglobin 13.3 g/dL      Hematocrit 42.2 %      MCV 82 fL      MCH 25.8 pg      MCHC 31.5 g/dL      RDW 15.9 %      MPV 10.0 fL      Platelets 251 Thousands/uL      nRBC 0 /100 WBCs      Segmented % 82 %      Immature Grans % 1 %      Lymphocytes % 7 %      Monocytes % 9 %      Eosinophils Relative 0 %      Basophils Relative 1 %      Absolute Neutrophils 10.11 Thousands/µL      Absolute Immature Grans 0.08 Thousand/uL      Absolute Lymphocytes 0.91 Thousands/µL      Absolute Monocytes 1.10 Thousand/µL      Eosinophils Absolute 0.05 Thousand/µL      Basophils Absolute 0.07 Thousands/µL     UA w Reflex to Microscopic w Reflex to Culture [[id number]]     Lab Status: No result Specimen: Urine                    No orders to display              Procedures  Procedures         ED Course  ED Course as of 04/27/24 1107   Sat Apr 27, 2024   0850 CBC and differential(!)  New leukocytosis, worsened compared to prior.  Hemoglobin/hematocrit are normal.  Platelets normal.   0850 Blood gas, Venous(!)  Metabolic acidosis with respiratory compensation.  Hyperoxia   0851 Received IV ceftriaxone while hospitalized; will administer dose now while remainder of workup is ongoing.   0903 APTT  Normal   0903 Protime-INR  Normal   0905 Lactic acid  Normal   0905 Magnesium(!)  Mild hypomagnesemia; will replete intravenously   0906 Comprehensive metabolic panel(!)  Hyponatremia unchanged from prior.  Decreased CO2 new compared to prior.  Improved  BUN/creatinine compared to prior.  Mild hyperglycemia.  Transaminases normal.   0913 Procalcitonin(!)  Improved compared to prior   0951 Overall, a mixed picture of some consistently improving laboratory markers and some worsening laboratory markers.  Patient stating he is unable to function at home secondary to the pain that he is having in his leg with difficulty walking that has developed yesterday after his discharge.  Also some clinical evidence of worsening (fever at home).  Will discuss with internal medicine.   0956 Michael text to internal medicine Dr. Jackson to discuss   1016 Dr Jackson accepts in observation admission           SBIRT 20yo+      Flowsheet Row Most Recent Value   Initial Alcohol Screen: US AUDIT-C     1. How often do you have a drink containing alcohol? 0 Filed at: 04/27/2024 0745   2. How many drinks containing alcohol do you have on a typical day you are drinking?  0 Filed at: 04/27/2024 0745   3a. Male UNDER 65: How often do you have five or more drinks on one occasion? 0 Filed at: 04/27/2024 0745   Audit-C Score 0 Filed at: 04/27/2024 0745   MAURY: How many times in the past year have you...    Used an illegal drug or used a prescription medication for non-medical reasons? Never Filed at: 04/27/2024 0745            Medical Decision Making  Amount and/or Complexity of Data Reviewed  Labs: ordered. Decision-making details documented in ED Course.    Risk  OTC drugs.  Prescription drug management.  Decision regarding hospitalization.             Disposition  Final diagnoses:   Cellulitis of right lower leg   Hypomagnesemia   Hyponatremia     Time reflects when diagnosis was documented in both MDM as applicable and the Disposition within this note       Time User Action Codes Description Comment    4/27/2024 10:17 AM Tomi Scott Add [L03.115] Cellulitis of right lower leg     4/27/2024 10:17 AM Tomi Scott Add [E83.42] Hypomagnesemia     4/27/2024 10:17 AM Tomi Scott  Add [E87.1] Hyponatremia     4/27/2024 10:17 AM Tomi Scott Add [R79.81] Blood CO2 decreased     4/27/2024 10:17 AM Tomi Scott Raffaele Remove [R79.81] Blood CO2 decreased           ED Disposition       ED Disposition   Admit    Condition   Stable    Date/Time   Sat Apr 27, 2024 1017    Comment   Case was discussed with Dr Jackson and the patient's admission status was agreed to be Admission Status: observation status to the service of Dr. Jackson .               Follow-up Information    None         Current Discharge Medication List        CONTINUE these medications which have NOT CHANGED    Details   Accu-Chek FastClix Lancets MISC Use 1 each daily to test blood sugar.  Qty: 100 each, Refills: 5    Comments: Brand: Accu-Chek Guide.  Associated Diagnoses: Type 2 diabetes mellitus without complication, without long-term current use of insulin (HCC)      albuterol (2.5 mg/3 mL) 0.083 % nebulizer solution Take 3 mL (2.5 mg total) by nebulization every 6 (six) hours as needed for wheezing or shortness of breath  Qty: 360 mL, Refills: 2    Associated Diagnoses: Chronic asthma, moderate persistent, uncomplicated      albuterol (ProAir HFA) 90 mcg/act inhaler Inhale 2 puffs every 6 (six) hours as needed for wheezing  Qty: 18 g, Refills: 11    Comments: Substitution to a formulary equivalent within the same pharmaceutical class is authorized.  Associated Diagnoses: Asthma-COPD overlap syndrome      ALPRAZolam (XANAX) 0.5 mg tablet TAKE 1 TABLET BY MOUTH ONCE DAILY AT BEDTIME AS NEEDED FOR ANXIETY  Qty: 30 tablet, Refills: 0    Associated Diagnoses: Anxiety      aspirin (ECOTRIN LOW STRENGTH) 81 mg EC tablet Take 1 tablet (81 mg total) by mouth 2 (two) times a day for 14 days  Qty: 28 tablet, Refills: 0    Associated Diagnoses: Primary osteoarthritis of left knee; Aftercare following surgery of the musculoskeletal system      atorvastatin (LIPITOR) 20 mg tablet Take 1 tablet (20 mg total) by mouth daily  Qty: 30  tablet, Refills: 6    Associated Diagnoses: Dyslipidemia      Blood Glucose Monitoring Suppl (Accu-Chek Guide Me) w/Device KIT Use 1 each daily to test blood sugar.  Qty: 1 kit, Refills: 0    Associated Diagnoses: Type 2 diabetes mellitus without complication, without long-term current use of insulin ([facility])      carvedilol (COREG) 25 mg tablet Take 1 tablet (25 mg total) by mouth 2 (two) times a day with meals  Qty: 60 tablet, Refills: 11    Associated Diagnoses: Benign essential hypertension      cefpodoxime (VANTIN) 200 mg tablet Take 1 tablet (200 mg total) by mouth 2 (two) times a day for 5 days  Qty: 10 tablet, Refills: 0    Associated Diagnoses: Sepsis ([facility])      Diclofenac Sodium (VOLTAREN) 1 % Apply 2 g topically 2 (two) times a day  Qty: 100 g, Refills: 2    Associated Diagnoses: Left foot pain; Right foot pain      DULoxetine (CYMBALTA) 60 mg delayed release capsule Take 1 capsule by mouth once daily  Qty: 30 capsule, Refills: 5    Associated Diagnoses: Moderate episode of recurrent major depressive disorder ([facility])      Empagliflozin (Jardiance) 10 MG TABS tablet Take 1 tablet (10 mg total) by mouth every morning  Qty: 30 tablet, Refills: 11    Associated Diagnoses: Chronic combined systolic and diastolic congestive heart failure ([facility])      Entresto 49-51 MG TABS Take 1 tablet by mouth twice daily  Qty: 60 tablet, Refills: 11    Associated Diagnoses: Non-ischemic cardiomyopathy ([facility])      fluticasone (FLONASE) 50 mcg/act nasal spray Use 1 spray(s) in each nostril twice daily  Qty: 16 g, Refills: 1    Associated Diagnoses: Sinus drainage      Fluticasone-Salmeterol (Advair) 500-50 mcg/dose inhaler INHALE 1 DOSE BY MOUTH TWICE DAILY RINSE MOUTH AFTER USE  Qty: 60 blister, Refills: 11    Comments: Substitution to a formulary equivalent within the same pharmaceutical class is authorized.  Associated Diagnoses: Asthma-COPD overlap syndrome      furosemide (LASIX) 40 mg tablet Take 1 tablet (40 mg total) by mouth  every other day  Qty: 45 tablet, Refills: 3    Associated Diagnoses: Non-ischemic cardiomyopathy (HCC)      glucose blood test strip Use 1 each daily to test blood sugar.  Qty: 100 strip, Refills: 5    Comments: Brand: Accu-Chek Guide.  Associated Diagnoses: Type 2 diabetes mellitus without complication, without long-term current use of insulin (HCC)      isosorbide mononitrate (IMDUR) 30 mg 24 hr tablet Take 1 tablet by mouth once daily  Qty: 90 tablet, Refills: 3    Associated Diagnoses: Chest pain, unspecified type      montelukast (SINGULAIR) 10 mg tablet Take 1 tablet (10 mg total) by mouth daily at bedtime  Qty: 30 tablet, Refills: 0    Associated Diagnoses: Asthma-COPD overlap syndrome      tiotropium (Spiriva Respimat) 2.5 MCG/ACT AERS inhaler INHALE 2 SPRAY(S) BY MOUTH ONCE DAILY  Qty: 4 g, Refills: 5    Associated Diagnoses: Asthma-COPD overlap syndrome             No discharge procedures on file.    PDMP Review         Value Time User    PDMP Reviewed  Yes 4/21/2024  7:07 AM Lauren Vee DO            ED Provider  Electronically Signed by             Tomi Scott DO  04/27/24 7030

## 2024-04-27 NOTE — ASSESSMENT & PLAN NOTE
Wt Readings from Last 3 Encounters:   04/27/24 127 kg (279 lb 5.2 oz)   04/26/24 127 kg (278 lb 15.9 oz)   04/09/24 129 kg (285 lb)     Does not appear to be in exacerbation at this time  Continued on lasix PO 40 mg qod

## 2024-04-27 NOTE — ASSESSMENT & PLAN NOTE
Lab Results   Component Value Date    HGBA1C 7.0 (H) 03/06/2024       Recent Labs     04/27/24  1055   POCGLU 149*       Blood Sugar Average: Last 72 hrs:  (P) 149  Holding home meds  SSI  Fingersticks with meals

## 2024-04-27 NOTE — H&P
[facility]  H&P  Name: Cricket Rosales 68 y.o. male I MRN: [id number]  Unit/Bed#: 407-01 I Date of Admission: 4/27/2024   Date of Service: 4/27/2024 I Hospital Day: 0      Assessment/Plan   Severe persistent asthma without complication  Assessment & Plan  Does not appear to be in an exacerbation at this time  Continue home inhaler regimen    Chronic combined systolic and diastolic CHF (congestive heart failure) ([facility])  Assessment & Plan  Wt Readings from Last 3 Encounters:   04/27/24 127 kg (279 lb 5.2 oz)   04/26/24 127 kg (278 lb 15.9 oz)   04/09/24 129 kg (285 lb)     Does not appear to be in exacerbation at this time  Continued on lasix PO 40 mg qod          Controlled type 2 diabetes mellitus with diabetic nephropathy, without long-term current use of insulin ([facility])  Assessment & Plan  Lab Results   Component Value Date    HGBA1C 7.0 (H) 03/06/2024       Recent Labs     04/27/24  1055   POCGLU 149*       Blood Sugar Average: Last 72 hrs:  (P) 149  Holding home meds  SSI  Fingersticks with meals      Benign essential hypertension  Assessment & Plan  BP stable at this time  Continue home medications    Obstructive sleep apnea  Assessment & Plan  Can offer cpap hs but pt typically declines    * Cellulitis of right lower extremity  Assessment & Plan  Treated with IV ceftriaxone x 2 days at [facility] and discharged on PO abx 4/26. Returns with reported fevers and worsening erythema to right lower extremity.   Not meetings sepsis/SIRs at this time, only with leukocytosis  Re-initiate on ceftriaxone IV therapy  Monitor for any signs of developing sepsis  If no improvement noted in the next 24 hours can complete CT scan  Pain control         VTE Pharmacologic Prophylaxis: VTE Score: 4 Moderate Risk (Score 3-4) - Pharmacological DVT Prophylaxis Ordered: heparin.  Code Status: Level 1 - Full Code   Discussion with family: Patient declined call to .     Anticipated Length of Stay:  Patient will be admitted on an observation basis with an anticipated length of stay of less than 2 midnights secondary to monitoring overnight infection.    Total Time Spent on Date of Encounter in care of patient: 75 mins. This time was spent on one or more of the following: performing physical exam; counseling and coordination of care; obtaining or reviewing history; documenting in the medical record; reviewing/ordering tests, medications or procedures; communicating with other healthcare professionals and discussing with patient's family/caregivers.    Chief Complaint: right lower extremity erythema and fever    History of Present Illness:  Cricket Rosales is a 68 y.o. male with a PMH of chf, copd, htn, kevin who presents with right lower extremity erythema and fever.  Symptoms started approximately 3 days ago.  He was admitted to [facility] for 2 days and placed on IV ceftriaxone with improvement noted in his right lower extremity.  Patient was discharged on p.o. antibiotics.  He is only able to take 1 dose.  He developed fevers and felt that his leg began to worsen.  He therefore came back to the hospital.  He does have leukocytosis on lab work but not meeting any other sepsis criteria.  He was given IV ceftriaxone in the ED and we will continue with this.    Review of Systems:  Review of Systems   Constitutional:  Positive for fatigue and fever.   Respiratory:  Negative for shortness of breath and wheezing.    Cardiovascular:  Positive for leg swelling. Negative for chest pain and palpitations.   Gastrointestinal:  Negative for constipation, diarrhea, nausea and vomiting.   Genitourinary:  Negative for difficulty urinating and flank pain.   Skin:  Positive for color change, rash and wound.   Neurological:  Negative for weakness, light-headedness and headaches.   Hematological:  Negative for adenopathy. Does not bruise/bleed easily.   Psychiatric/Behavioral:  Negative for confusion. The patient is not  nervous/anxious.        Past Medical and Surgical History:   Past Medical History:   Diagnosis Date    Acute on chronic combined systolic and diastolic CHF (congestive heart failure) ([facility]) 7/27/2023    Alcohol abuse 7/27/2023    Ambulates with cane     Arthritis     Asthma     Back pain     Chest pain 12/22/2022    CHF (congestive heart failure) ([facility])     Claustrophobia     COPD (chronic obstructive pulmonary disease) ([facility])     COPD with acute exacerbation ([facility]) 05/06/2022    COVID-19     COVID-19 virus infection 12/03/2021    Depression     Hypertension     Mumps     Neck pain     Old MI (myocardial infarction)     Pneumonia     Pulmonary emphysema ([facility])     Rectal bleeding 01/14/2019    Skin abnormalities     rashes and wounds on both legs - scabbed over and healing    Sleep apnea     no CPAP    Tingling of both feet     Wears glasses        Past Surgical History:   Procedure Laterality Date    APPENDECTOMY      CARDIAC CATHETERIZATION  07/24/2015    Left main- normal and maidly tortuous.  Circumflex - normal and moderately tortuous.  RCA- normal and mildy tortuous.  Global LV function was severely depressed..    CARDIAC CATHETERIZATION Left 09/27/2022    Procedure: Cardiac Left Heart Cath;  Surgeon: Doreen Briones DO;  Location: [facility];  Service: Cardiology    CARDIAC CATHETERIZATION N/A 09/27/2022    Procedure: Cardiac Coronary Angiogram;  Surgeon: Doreen Briones DO;  Location: [facility];  Service: Cardiology    CARDIAC CATHETERIZATION  09/27/2022    Procedure: Cardiac catheterization;  Surgeon: Doreen Briones DO;  Location: [facility];  Service: Cardiology    INGUINAL HERNIA REPAIR Bilateral     WA COLONOSCOPY FLX DX W/COLLJ SPEC WHEN PFRMD N/A 03/11/2019    Procedure: COLONOSCOPY with removal of anal papilla;  Surgeon: ANIL Dale MD;  Location: [facility] OR;  Service: Colorectal    TONSILLECTOMY      TOTAL KNEE ARTHROPLASTY Left 9/18/2023    Procedure:  ARTHROPLASTY KNEE TOTAL;  Surgeon: David Mullen DO;  Location: [facility] MA[facility] OR;  Service: Orthopedics    UMBILICAL HERNIA REPAIR  06/21/2006       Meds/Allergies:  Prior to Admission medications    Medication Sig Start Date End Date Taking? Authorizing Provider   Accu-Chek FastClix Lancets MISC Use 1 each daily to test blood sugar. 1/23/23  Yes Lauren Vee DO   albuterol (2.5 mg/3 mL) 0.083 % nebulizer solution Take 3 mL (2.5 mg total) by nebulization every 6 (six) hours as needed for wheezing or shortness of breath 3/22/23  Yes Carlos Landaverde PA-C   albuterol (ProAir HFA) 90 mcg/act inhaler Inhale 2 puffs every 6 (six) hours as needed for wheezing 6/2/23  Yes Carlos Landaverde PA-C   ALPRAZolam (XANAX) 0.5 mg tablet TAKE 1 TABLET BY MOUTH ONCE DAILY AT BEDTIME AS NEEDED FOR ANXIETY 4/21/24  Yes Lauren Vee DO   atorvastatin (LIPITOR) 20 mg tablet Take 1 tablet (20 mg total) by mouth daily 3/11/24  Yes Meli Pa PA-C   Blood Glucose Monitoring Suppl (Accu-Chek Guide Me) w/Device KIT Use 1 each daily to test blood sugar. 1/23/23  Yes Lauren Vee DO   carvedilol (COREG) 25 mg tablet Take 1 tablet (25 mg total) by mouth 2 (two) times a day with meals 5/11/23  Yes KATIA Matute   cefpodoxime (VANTIN) 200 mg tablet Take 1 tablet (200 mg total) by mouth 2 (two) times a day for 5 days 4/26/24 5/1/24 Yes Radu Adams MD   Diclofenac Sodium (VOLTAREN) 1 % Apply 2 g topically 2 (two) times a day 4/2/24  Yes Paola Andrews DPM   DULoxetine (CYMBALTA) 60 mg delayed release capsule Take 1 capsule by mouth once daily 12/8/23  Yes Lauren Vee DO   Empagliflozin (Jardiance) 10 MG TABS tablet Take 1 tablet (10 mg total) by mouth every morning 12/12/23  Yes Xiomy Phillip, DO   Entresto 49-51 MG TABS Take 1 tablet by mouth twice daily 3/1/24  Yes Meli Pa PA-C   fluticasone (FLONASE) 50 mcg/act nasal spray Use 1 spray(s) in each nostril twice daily 3/19/24  Yes Kandy Layne MD    Fluticasone-Salmeterol (Advair) 500-50 mcg/dose inhaler INHALE 1 DOSE BY MOUTH TWICE DAILY RINSE MOUTH AFTER USE 23  Yes Alvaro Pro MD   furosemide (LASIX) 40 mg tablet Take 1 tablet (40 mg total) by mouth every other day 23  Yes Meli Pa PA-C   glucose blood test strip Use 1 each daily to test blood sugar. 23  Yes Lauren Vee,    isosorbide mononitrate (IMDUR) 30 mg 24 hr tablet Take 1 tablet by mouth once daily 23  Yes Meli Pa PA-C   montelukast (SINGULAIR) 10 mg tablet Take 1 tablet (10 mg total) by mouth daily at bedtime 23  Yes Nay Ryan PA-C   tiotropium (Spiriva Respimat) 2.5 MCG/ACT AERS inhaler INHALE 2 SPRAY(S) BY MOUTH ONCE DAILY 24  Yes Carlos Landaverde PA-C   aspirin (ECOTRIN LOW STRENGTH) 81 mg EC tablet Take 1 tablet (81 mg total) by mouth 2 (two) times a day for 14 days 23  Jeffrey Galeano PA-C     I have reviewed home medications using recent Epic encounter.    Allergies:   Allergies   Allergen Reactions    Ciprofloxacin Shortness Of Breath and Diarrhea       Social History:  Marital Status: /Civil Union   Occupation: noncontributory  Patient Pre-hospital Living Situation: Home  Patient Pre-hospital Level of Mobility: walks  Patient Pre-hospital Diet Restrictions: none  Substance Use History:   Social History     Substance and Sexual Activity   Alcohol Use Not Currently     Social History     Tobacco Use   Smoking Status Former    Current packs/day: 0.00    Average packs/day: 3.0 packs/day for 43.0 years (129.0 ttl pk-yrs)    Types: Cigarettes    Start date:     Quit date: 2018    Years since quittin.3   Smokeless Tobacco Never     Social History     Substance and Sexual Activity   Drug Use Yes    Types: Marijuana    Comment: occasionally edible       Family History:  Family History   Problem Relation Age of Onset    Heart attack Father         MI    Heart disease Father     Diabetes Sister         " DM    Arthritis Family     Coronary artery disease Family     Hypertension Family     Tuberculosis Son     Alzheimer's disease Mother        Physical Exam:     Vitals:   Blood Pressure: 119/72 (04/27/24 1354)  Pulse: 77 (04/27/24 1354)  Temperature: 99.1 °F (37.3 °C) (04/27/24 1354)  Temp Source: Oral (04/27/24 1354)  Respirations: 18 (04/27/24 1354)  Height: 6' 2\" (188 cm) (04/27/24 1041)  Weight - Scale: 127 kg (279 lb 5.2 oz) (04/27/24 1041)  SpO2: 94 % (04/27/24 1354)    Physical Exam  Vitals and nursing note reviewed.   Constitutional:       Appearance: He is obese. He is ill-appearing.   Cardiovascular:      Rate and Rhythm: Normal rate and regular rhythm.      Pulses: Normal pulses.      Heart sounds: Murmur heard.   Pulmonary:      Effort: Pulmonary effort is normal.      Breath sounds: No wheezing, rhonchi or rales.   Abdominal:      General: Bowel sounds are normal.      Palpations: Abdomen is soft.   Skin:     Comments: Right lower extremity erythema, warm to palpation, edema noted. Stops at the knee. Posterior small leg abrasion   Neurological:      Mental Status: He is alert and oriented to person, place, and time. Mental status is at baseline.   Psychiatric:         Mood and Affect: Mood normal.         Behavior: Behavior normal.          Additional Data:     Lab Results:  Results from last 7 days   Lab Units 04/27/24  0837 04/25/24  0428 04/24/24  0815   WBC Thousand/uL 12.32*   < > 25.10*   HEMOGLOBIN g/dL 13.3   < > 13.5   HEMATOCRIT % 42.2   < > 42.2   PLATELETS Thousands/uL 251   < > 259   BANDS PCT %  --   --  14*   SEGS PCT % 82*   < >  --    LYMPHO PCT % 7*   < > 3*   MONO PCT % 9   < > 3*   EOS PCT % 0   < > 0    < > = values in this interval not displayed.     Results from last 7 days   Lab Units 04/27/24  0837   SODIUM mmol/L 131*   POTASSIUM mmol/L 4.0   CHLORIDE mmol/L 99   CO2 mmol/L 20*   BUN mg/dL 33*   CREATININE mg/dL 1.51*   ANION GAP mmol/L 12   CALCIUM mg/dL 9.1   ALBUMIN g/dL 3.8 "   TOTAL BILIRUBIN mg/dL 0.64   ALK PHOS U/L 67   ALT U/L 35   AST U/L 22   GLUCOSE RANDOM mg/dL 145*     Results from last 7 days   Lab Units 04/27/24  0837   INR  1.05     Results from last 7 days   Lab Units 04/27/24  1055   POC GLUCOSE mg/dl 149*         Results from last 7 days   Lab Units 04/27/24  0837 04/25/24  0428 04/24/24  0825 04/24/24  0815   LACTIC ACID mmol/L 1.3  --  1.3  --    PROCALCITONIN ng/ml 2.12* 6.98*  --  7.75*       Lines/Drains:  Invasive Devices       Peripheral Intravenous Line  Duration             Peripheral IV 04/27/24 Right Antecubital <1 day                        Imaging: No pertinent imaging reviewed.  No orders to display       EKG and Other Studies Reviewed on Admission:   EKG: No EKG obtained.    ** Please Note: This note has been constructed using a voice recognition system. **

## 2024-04-27 NOTE — RESPIRATORY THERAPY NOTE
04/27/24 1335   Inhalation Therapy Tx   $ Inhalation Therapy Performed Yes   $ Pulse Oximetry Spot Check Charge Completed   SpO2 97 %  (ra)   Pre-Treatment Pulse 83   Pre-Treatment Respirations 22   Duration 15   Breath Sounds Pre-Treatment Bilateral Diminished   Breath Sounds Pre-Treatment Right Expiratory wheezes  (faint exp wheeze RT posteriorly)   Delivery Source Air;UDN   Position High Velásquez's   Treatment Tolerance Tolerated well   Resp Comments added a regular scheduled  resp tx  for wheezing xopenex 1.25mg BID

## 2024-04-27 NOTE — ASSESSMENT & PLAN NOTE
Treated with IV ceftriaxone x 2 days at [facility] and discharged on PO abx 4/26. Returns with reported fevers and worsening erythema to right lower extremity.   Not meetings sepsis/SIRs at this time, only with leukocytosis  Re-initiate on ceftriaxone IV therapy  Monitor for any signs of developing sepsis  If no improvement noted in the next 24 hours can complete CT scan  Pain control

## 2024-04-27 NOTE — CASE MANAGEMENT
Case Management Progress Note    Patient name Cricket Rosales  Location [facility] 407/407-01 MRN [id number]  : 1955 Date 2024       LOS (days): 0  Geometric Mean LOS (GMLOS) (days):   Days to GMLOS:        OBJECTIVE:        Current admission status: Observation  Preferred Pharmacy:   [id number]  [place] - [address]  [address] [place] [place] [address]  Phone: [phone] Fax: [phone]    [id number] [place] - [address]  [address]  [place] [address]  Phone: [phone] Fax: [phone]    Primary Care Provider: Lauren Vee DO    Primary Insurance: MEDICARE  Secondary Insurance: AET[employer]    PROGRESS NOTE:chart reviewed. No current discharge needs noted. Cm to follow.

## 2024-04-28 ENCOUNTER — TELEPHONE (OUTPATIENT)
Dept: OTHER | Facility: OTHER | Age: 69
End: 2024-04-28

## 2024-04-28 LAB
ANION GAP SERPL CALCULATED.3IONS-SCNC: 9 MMOL/L (ref 4–13)
BACTERIA BLD CULT: NORMAL
BACTERIA BLD CULT: NORMAL
BASOPHILS # BLD AUTO: 0.07 THOUSANDS/ÂΜL (ref 0–0.1)
BASOPHILS NFR BLD AUTO: 1 % (ref 0–1)
BUN SERPL-MCNC: 29 MG/DL (ref 5–25)
CALCIUM SERPL-MCNC: 8.5 MG/DL (ref 8.4–10.2)
CHLORIDE SERPL-SCNC: 100 MMOL/L (ref 96–108)
CO2 SERPL-SCNC: 23 MMOL/L (ref 21–32)
CREAT SERPL-MCNC: 1.66 MG/DL (ref 0.6–1.3)
EOSINOPHIL # BLD AUTO: 0.11 THOUSAND/ÂΜL (ref 0–0.61)
EOSINOPHIL NFR BLD AUTO: 1 % (ref 0–6)
ERYTHROCYTE [DISTWIDTH] IN BLOOD BY AUTOMATED COUNT: 15.9 % (ref 11.6–15.1)
GFR SERPL CREATININE-BSD FRML MDRD: 42 ML/MIN/1.73SQ M
GLUCOSE P FAST SERPL-MCNC: 125 MG/DL (ref 65–99)
GLUCOSE SERPL-MCNC: 125 MG/DL (ref 65–140)
GLUCOSE SERPL-MCNC: 132 MG/DL (ref 65–140)
GLUCOSE SERPL-MCNC: 134 MG/DL (ref 65–140)
GLUCOSE SERPL-MCNC: 150 MG/DL (ref 65–140)
HCT VFR BLD AUTO: 37.2 % (ref 36.5–49.3)
HGB BLD-MCNC: 11.5 G/DL (ref 12–17)
IMM GRANULOCYTES # BLD AUTO: 0.16 THOUSAND/UL (ref 0–0.2)
IMM GRANULOCYTES NFR BLD AUTO: 1 % (ref 0–2)
LYMPHOCYTES # BLD AUTO: 1.49 THOUSANDS/ÂΜL (ref 0.6–4.47)
LYMPHOCYTES NFR BLD AUTO: 10 % (ref 14–44)
MCH RBC QN AUTO: 25.8 PG (ref 26.8–34.3)
MCHC RBC AUTO-ENTMCNC: 30.9 G/DL (ref 31.4–37.4)
MCV RBC AUTO: 83 FL (ref 82–98)
MONOCYTES # BLD AUTO: 1.55 THOUSAND/ÂΜL (ref 0.17–1.22)
MONOCYTES NFR BLD AUTO: 10 % (ref 4–12)
NEUTROPHILS # BLD AUTO: 11.49 THOUSANDS/ÂΜL (ref 1.85–7.62)
NEUTS SEG NFR BLD AUTO: 77 % (ref 43–75)
NRBC BLD AUTO-RTO: 0 /100 WBCS
PLATELET # BLD AUTO: 225 THOUSANDS/UL (ref 149–390)
PMV BLD AUTO: 9.8 FL (ref 8.9–12.7)
POTASSIUM SERPL-SCNC: 3.6 MMOL/L (ref 3.5–5.3)
RBC # BLD AUTO: 4.46 MILLION/UL (ref 3.88–5.62)
SODIUM SERPL-SCNC: 132 MMOL/L (ref 135–147)
WBC # BLD AUTO: 14.87 THOUSAND/UL (ref 4.31–10.16)

## 2024-04-28 PROCEDURE — 80048 BASIC METABOLIC PNL TOTAL CA: CPT | Performed by: PHYSICIAN ASSISTANT

## 2024-04-28 PROCEDURE — 85025 COMPLETE CBC W/AUTO DIFF WBC: CPT | Performed by: PHYSICIAN ASSISTANT

## 2024-04-28 PROCEDURE — 99232 SBSQ HOSP IP/OBS MODERATE 35: CPT | Performed by: PHYSICIAN ASSISTANT

## 2024-04-28 PROCEDURE — 82948 REAGENT STRIP/BLOOD GLUCOSE: CPT

## 2024-04-28 PROCEDURE — 94760 N-INVAS EAR/PLS OXIMETRY 1: CPT

## 2024-04-28 PROCEDURE — 94640 AIRWAY INHALATION TREATMENT: CPT

## 2024-04-28 PROCEDURE — 94664 DEMO&/EVAL PT USE INHALER: CPT

## 2024-04-28 RX ORDER — LEVALBUTEROL INHALATION SOLUTION 1.25 MG/3ML
1.25 SOLUTION RESPIRATORY (INHALATION)
Status: DISCONTINUED | OUTPATIENT
Start: 2024-04-29 | End: 2024-05-02 | Stop reason: HOSPADM

## 2024-04-28 RX ADMIN — INSULIN LISPRO 1 UNITS: 100 INJECTION, SOLUTION INTRAVENOUS; SUBCUTANEOUS at 11:26

## 2024-04-28 RX ADMIN — ATORVASTATIN CALCIUM 20 MG: 10 TABLET, FILM COATED ORAL at 17:07

## 2024-04-28 RX ADMIN — ACETAMINOPHEN 650 MG: 325 TABLET, FILM COATED ORAL at 19:36

## 2024-04-28 RX ADMIN — UMECLIDINIUM 1 PUFF: 62.5 AEROSOL, POWDER ORAL at 08:12

## 2024-04-28 RX ADMIN — DULOXETINE HYDROCHLORIDE 60 MG: 30 CAPSULE, DELAYED RELEASE ORAL at 21:09

## 2024-04-28 RX ADMIN — ASPIRIN 81 MG: 81 TABLET, COATED ORAL at 08:11

## 2024-04-28 RX ADMIN — FLUTICASONE PROPIONATE 1 SPRAY: 50 SPRAY, METERED NASAL at 08:12

## 2024-04-28 RX ADMIN — LEVALBUTEROL HYDROCHLORIDE 1.25 MG: 1.25 SOLUTION RESPIRATORY (INHALATION) at 20:59

## 2024-04-28 RX ADMIN — ALBUTEROL SULFATE 2.5 MG: 2.5 SOLUTION RESPIRATORY (INHALATION) at 05:11

## 2024-04-28 RX ADMIN — HEPARIN SODIUM 5000 UNITS: 5000 INJECTION, SOLUTION INTRAVENOUS; SUBCUTANEOUS at 13:06

## 2024-04-28 RX ADMIN — MONTELUKAST 10 MG: 10 TABLET, FILM COATED ORAL at 21:09

## 2024-04-28 RX ADMIN — ALPRAZOLAM 0.5 MG: 0.5 TABLET ORAL at 21:09

## 2024-04-28 RX ADMIN — CEFAZOLIN SODIUM 2000 MG: 2 SOLUTION INTRAVENOUS at 17:07

## 2024-04-28 RX ADMIN — LEVALBUTEROL HYDROCHLORIDE 1.25 MG: 1.25 SOLUTION RESPIRATORY (INHALATION) at 07:32

## 2024-04-28 RX ADMIN — CEFAZOLIN SODIUM 2000 MG: 2 SOLUTION INTRAVENOUS at 01:09

## 2024-04-28 RX ADMIN — HEPARIN SODIUM 5000 UNITS: 5000 INJECTION, SOLUTION INTRAVENOUS; SUBCUTANEOUS at 21:09

## 2024-04-28 RX ADMIN — ISOSORBIDE MONONITRATE 30 MG: 30 TABLET, EXTENDED RELEASE ORAL at 08:12

## 2024-04-28 RX ADMIN — FLUTICASONE FUROATE AND VILANTEROL TRIFENATATE 1 PUFF: 200; 25 POWDER RESPIRATORY (INHALATION) at 08:12

## 2024-04-28 RX ADMIN — FUROSEMIDE 40 MG: 40 TABLET ORAL at 08:12

## 2024-04-28 RX ADMIN — CEFAZOLIN SODIUM 2000 MG: 2 SOLUTION INTRAVENOUS at 09:01

## 2024-04-28 RX ADMIN — ACETAMINOPHEN 650 MG: 325 TABLET, FILM COATED ORAL at 05:20

## 2024-04-28 RX ADMIN — CARVEDILOL 25 MG: 12.5 TABLET, FILM COATED ORAL at 08:12

## 2024-04-28 RX ADMIN — HEPARIN SODIUM 5000 UNITS: 5000 INJECTION, SOLUTION INTRAVENOUS; SUBCUTANEOUS at 05:07

## 2024-04-28 RX ADMIN — CARVEDILOL 25 MG: 12.5 TABLET, FILM COATED ORAL at 17:07

## 2024-04-28 RX ADMIN — ALBUTEROL SULFATE 2.5 MG: 2.5 SOLUTION RESPIRATORY (INHALATION) at 16:14

## 2024-04-28 NOTE — PROGRESS NOTES
[facility]  Progress Note  Name: Cricket Rosales I  MRN: [id number]  Unit/Bed#: 407-01 I Date of Admission: 4/27/2024   Date of Service: 4/28/2024 I Hospital Day: 0    Assessment/Plan   Severe persistent asthma without complication  Assessment & Plan  Does not appear to be in an exacerbation at this time  Continue home inhaler regimen    Chronic combined systolic and diastolic CHF (congestive heart failure) ([facility])  Assessment & Plan  Wt Readings from Last 3 Encounters:   04/28/24 129 kg (284 lb 9.8 oz)   04/26/24 127 kg (278 lb 15.9 oz)   04/09/24 129 kg (285 lb)     Does not appear to be in exacerbation at this time  Continued on lasix PO 40 mg qod          Controlled type 2 diabetes mellitus with diabetic nephropathy, without long-term current use of insulin ([facility])  Assessment & Plan  Lab Results   Component Value Date    HGBA1C 7.0 (H) 03/06/2024       Recent Labs     04/27/24  1601 04/27/24  2105 04/28/24  0718 04/28/24  1114   POCGLU 147* 159* 132 150*         Blood Sugar Average: Last 72 hrs:  (P) 147.4  Holding home meds  SSI  Fingersticks with meals      Benign essential hypertension  Assessment & Plan  BP stable at this time  Continue home medications    Obstructive sleep apnea  Assessment & Plan  Can offer cpap hs but pt typically declines    * Cellulitis of right lower leg  Assessment & Plan  Treated with IV ceftriaxone x 2 days at [facility] and discharged on PO abx 4/26. Returns with reported fevers and worsening erythema to right lower extremity.   Not meetings sepsis/SIRs at this time, only with leukocytosis  Re-initiate on ceftriaxone IV therapy day 2  Monitor for any signs of developing sepsis  Pain control               VTE Pharmacologic Prophylaxis: VTE Score: 4 Moderate Risk (Score 3-4) - Pharmacological DVT Prophylaxis Ordered: heparin.    Mobility:   Basic Mobility Inpatient Raw Score: 24  JH-HLM Goal: 8: Walk 250 feet or more  JH-HLM Achieved: 7: Walk 25 feet or  more  JH-HLM Goal NOT achieved. Continue with multidisciplinary rounding and encourage appropriate mobility to improve upon JH-HLM goals.    Patient Centered Rounds: I performed bedside rounds with nursing staff today.   Discussions with Specialists or Other Care Team Provider: none    Education and Discussions with Family / Patient: Patient declined call to .     Total Time Spent on Date of Encounter in care of patient: 40 mins. This time was spent on one or more of the following: performing physical exam; counseling and coordination of care; obtaining or reviewing history; documenting in the medical record; reviewing/ordering tests, medications or procedures; communicating with other healthcare professionals and discussing with patient's family/caregivers.    Current Length of Stay: 0 day(s)  Current Patient Status: Inpatient   Certification Statement: The patient will continue to require additional inpatient hospital stay due to iv abx  Discharge Plan: Anticipate discharge in 24-48 hrs to home.    Code Status: Level 1 - Full Code    Subjective:   Patient reports feeling fatigued and run down but slept well last night    Objective:     Vitals:   Temp (24hrs), Av.5 °F (36.9 °C), Min:97.3 °F (36.3 °C), Max:99.5 °F (37.5 °C)    Temp:  [97.3 °F (36.3 °C)-99.5 °F (37.5 °C)] 97.3 °F (36.3 °C)  HR:  [63-77] 67  Resp:  [16-18] 17  BP: (119-127)/(60-72) 122/60  SpO2:  [94 %-97 %] 96 %  Body mass index is 36.54 kg/m².     Input and Output Summary (last 24 hours):     Intake/Output Summary (Last 24 hours) at 2024 1305  Last data filed at 2024 1247  Gross per 24 hour   Intake 1620 ml   Output 1100 ml   Net 520 ml       Physical Exam:   Physical Exam  Vitals and nursing note reviewed.   Constitutional:       General: He is not in acute distress.     Appearance: He is obese. He is ill-appearing.   Cardiovascular:      Rate and Rhythm: Normal rate and regular rhythm.      Pulses: Normal pulses.       Heart sounds: Normal heart sounds.   Pulmonary:      Effort: Pulmonary effort is normal.      Breath sounds: Normal breath sounds.   Abdominal:      General: Bowel sounds are normal.      Palpations: Abdomen is soft.   Musculoskeletal:      Right lower leg: Edema present.      Left lower leg: No edema.   Skin:     Comments: Erythema improving right leg   Neurological:      Mental Status: He is alert.   Psychiatric:         Mood and Affect: Mood normal.         Behavior: Behavior normal.          Additional Data:     Labs:  Results from last 7 days   Lab Units 04/28/24  0433 04/25/24  0428 04/24/24  0815   WBC Thousand/uL 14.87*   < > 25.10*   HEMOGLOBIN g/dL 11.5*   < > 13.5   HEMATOCRIT % 37.2   < > 42.2   PLATELETS Thousands/uL 225   < > 259   BANDS PCT %  --   --  14*   SEGS PCT % 77*   < >  --    LYMPHO PCT % 10*   < > 3*   MONO PCT % 10   < > 3*   EOS PCT % 1   < > 0    < > = values in this interval not displayed.     Results from last 7 days   Lab Units 04/28/24  0433 04/27/24  0837   SODIUM mmol/L 132* 131*   POTASSIUM mmol/L 3.6 4.0   CHLORIDE mmol/L 100 99   CO2 mmol/L 23 20*   BUN mg/dL 29* 33*   CREATININE mg/dL 1.66* 1.51*   ANION GAP mmol/L 9 12   CALCIUM mg/dL 8.5 9.1   ALBUMIN g/dL  --  3.8   TOTAL BILIRUBIN mg/dL  --  0.64   ALK PHOS U/L  --  67   ALT U/L  --  35   AST U/L  --  22   GLUCOSE RANDOM mg/dL 125 145*     Results from last 7 days   Lab Units 04/27/24  0837   INR  1.05     Results from last 7 days   Lab Units 04/28/24  1114 04/28/24  0718 04/27/24  2105 04/27/24  1601 04/27/24  1055   POC GLUCOSE mg/dl 150* 132 159* 147* 149*         Results from last 7 days   Lab Units 04/27/24  0837 04/25/24  0428 04/24/24  0825 04/24/24  0815   LACTIC ACID mmol/L 1.3  --  1.3  --    PROCALCITONIN ng/ml 2.12* 6.98*  --  7.75*       Lines/Drains:  Invasive Devices       Peripheral Intravenous Line  Duration             Peripheral IV 04/27/24 Right Antecubital 1 day                          Imaging: No  pertinent imaging reviewed.    Recent Cultures (last 7 days):   Results from last 7 days   Lab Units 04/27/24  0837 04/24/24  0817   BLOOD CULTURE  Received in Microbiology Lab. Culture in Progress.  Received in Microbiology Lab. Culture in Progress. No Growth After 4 Days.  No Growth After 4 Days.       Last 24 Hours Medication List:   Current Facility-Administered Medications   Medication Dose Route Frequency Provider Last Rate    acetaminophen  650 mg Oral Q6H PRN Nay Ryan PA-C      albuterol  2.5 mg Nebulization Q4H PRN Nay Ryan PA-C      ALPRAZolam  0.5 mg Oral HS PRN Nay yRan PA-C      aspirin  81 mg Oral Daily Nay Ryan PA-C      atorvastatin  20 mg Oral Daily With Dinner Nay Ryan PA-C      carvedilol  25 mg Oral BID With Meals Nya Ryan PA-C      cefazolin  2,000 mg Intravenous Q8H Ophelia Jackson MD Stopped (04/28/24 1123)    DULoxetine  60 mg Oral Daily Nay Ryan PA-C      fluticasone  1 spray Each Nare Daily Nay Ryan PA-C      fluticasone-vilanterol  1 puff Inhalation Daily Nay Ryan PA-C      furosemide  40 mg Oral Every Other Day Nay Ryan PA-C      heparin (porcine)  5,000 Units Subcutaneous Q8H PEDRO Nay Ryan PA-C      insulin lispro  1-5 Units Subcutaneous TID AC Nay Ryan PA-C      isosorbide mononitrate  30 mg Oral Daily Nay Ryan PA-C      levalbuterol  1.25 mg Nebulization BID Ophelia Jackson MD      montelukast  10 mg Oral HS Nay Ryan PA-C      ondansetron  4 mg Intravenous Q6H PRN Nay Ryan PA-C      umeclidinium  1 puff Inhalation Daily Nay Ryan PA-C          Today, Patient Was Seen By: Nay Ryan PA-C    **Please Note: This note may have been constructed using a voice recognition system.**

## 2024-04-28 NOTE — PLAN OF CARE
Problem: PAIN - ADULT  Goal: Verbalizes/displays adequate comfort level or baseline comfort level  Description: Interventions:  - Encourage patient to monitor pain and request assistance  - Assess pain using appropriate pain scale  - Administer analgesics based on type and severity of pain and evaluate response  - Implement non-pharmacological measures as appropriate and evaluate response  - Consider cultural and social influences on pain and pain management  - Notify physician/advanced practitioner if interventions unsuccessful or patient reports new pain  Outcome: Progressing     Problem: INFECTION - ADULT  Goal: Absence or prevention of progression during hospitalization  Description: INTERVENTIONS:  - Assess and monitor for signs and symptoms of infection  - Monitor lab/diagnostic results  - Monitor all insertion sites, i.e. indwelling lines, tubes, and drains  - Monitor endotracheal if appropriate and nasal secretions for changes in amount and color  - [place] appropriate cooling/warming therapies per order  - Administer medications as ordered  - Instruct and encourage patient and family to use good hand hygiene technique  - Identify and instruct in appropriate isolation precautions for identified infection/condition  Outcome: Progressing     Problem: SAFETY ADULT  Goal: Patient will remain free of falls  Description: INTERVENTIONS:  - Educate patient/family on patient safety including physical limitations  - Instruct patient to call for assistance with activity   - Consult OT/PT to assist with strengthening/mobility   - Keep Call bell within reach  - Keep bed low and locked with side rails adjusted as appropriate  - Keep care items and personal belongings within reach  - Initiate and maintain comfort rounds  - Make Fall Risk Sign visible to staff  Outcome: Progressing     Problem: DISCHARGE PLANNING  Goal: Discharge to home or other facility with appropriate resources  Description: INTERVENTIONS:  -  Identify barriers to discharge w/patient and caregiver  - Arrange for needed discharge resources and transportation as appropriate  - Identify discharge learning needs (meds, wound care, etc.)  - Arrange for interpretive services to assist at discharge as needed  - Refer to Case Management Department for coordinating discharge planning if the patient needs post-hospital services based on physician/advanced practitioner order or complex needs related to functional status, cognitive ability, or social support system  Outcome: Progressing     Problem: Knowledge Deficit  Goal: Patient/family/caregiver demonstrates understanding of disease process, treatment plan, medications, and discharge instructions  Description: Complete learning assessment and assess knowledge base.  Interventions:  - Provide teaching at level of understanding  - Provide teaching via preferred learning methods  Outcome: Progressing     Problem: METABOLIC, FLUID AND ELECTROLYTES - ADULT  Goal: Glucose maintained within target range  Description: INTERVENTIONS:  - Monitor Blood Glucose as ordered  - Assess for signs and symptoms of hyperglycemia and hypoglycemia  - Administer ordered medications to maintain glucose within target range  - Assess nutritional intake and initiate nutrition service referral as needed  Outcome: Progressing     Problem: SKIN/TISSUE INTEGRITY - ADULT  Goal: Skin Integrity remains intact(Skin Breakdown Prevention)  Description: Assess:  -Perform Clifford assessment every 8 hours  -Clean and moisturize skin   -Inspect skin when repositioning, toileting, and assisting with ADLS  -Assess under medical devices   -Assess extremities for adequate circulation and sensation     Bed Management:  -Have minimal linens on bed & keep smooth, unwrinkled  -Change linens as needed when moist or perspiring  -Avoid sitting or lying in one position for more than 2 hours while in bed    Toileting:  Bathroom    Activity:  -Mobilize patient 3 times  a day  -Encourage activity and walks on unit  -Encourage or provide ROM exercises   -Instruct/ Assist with weight shifting every 15 minutes when out of bed in chair  -Consider limitation of chair time 2 hour intervals    Skin Care:  -Avoid use of baby powder, tape, friction and shearing, hot water or constrictive clothing  -Relieve pressure over bony prominences   -Do not massage red bony areas    Next Steps:  -Teach patient strategies to minimize risks    -Consider consults to  interdisciplinary teams   Outcome: Progressing  Goal: Incision(s), wounds(s) or drain site(s) healing without S/S of infection  Description: INTERVENTIONS  - Assess and document dressing, incision, wound bed, drain sites and surrounding tissue  - Provide patient and family education  - Perform skin care/dressing changes as ordered  Outcome: Progressing

## 2024-04-28 NOTE — ASSESSMENT & PLAN NOTE
Lab Results   Component Value Date    HGBA1C 7.0 (H) 03/06/2024       Recent Labs     04/27/24  1601 04/27/24  2105 04/28/24  0718 04/28/24  1114   POCGLU 147* 159* 132 150*         Blood Sugar Average: Last 72 hrs:  (P) 147.4  Holding home meds  SSI  Fingersticks with meals

## 2024-04-28 NOTE — PLAN OF CARE
Problem: PAIN - ADULT  Goal: Verbalizes/displays adequate comfort level or baseline comfort level  Description: Interventions:  - Encourage patient to monitor pain and request assistance  - Assess pain using appropriate pain scale  - Administer analgesics based on type and severity of pain and evaluate response  - Implement non-pharmacological measures as appropriate and evaluate response  - Consider cultural and social influences on pain and pain management  - Notify physician/advanced practitioner if interventions unsuccessful or patient reports new pain  Outcome: Progressing     Problem: INFECTION - ADULT  Goal: Absence or prevention of progression during hospitalization  Description: INTERVENTIONS:  - Assess and monitor for signs and symptoms of infection  - Monitor lab/diagnostic results  - Monitor all insertion sites, i.e. indwelling lines, tubes, and drains  - Monitor endotracheal if appropriate and nasal secretions for changes in amount and color  - [place] appropriate cooling/warming therapies per order  - Administer medications as ordered  - Instruct and encourage patient and family to use good hand hygiene technique  - Identify and instruct in appropriate isolation precautions for identified infection/condition  Outcome: Progressing     Problem: SAFETY ADULT  Goal: Patient will remain free of falls  Description: INTERVENTIONS:  - Educate patient/family on patient safety including physical limitations  - Instruct patient to call for assistance with activity   - Consult OT/PT to assist with strengthening/mobility   - Keep Call bell within reach  - Keep bed low and locked with side rails adjusted as appropriate  - Keep care items and personal belongings within reach  - Initiate and maintain comfort rounds  - Make Fall Risk Sign visible to staff  Outcome: Progressing     Problem: DISCHARGE PLANNING  Goal: Discharge to home or other facility with appropriate resources  Description: INTERVENTIONS:  -  Identify barriers to discharge w/patient and caregiver  - Arrange for needed discharge resources and transportation as appropriate  - Identify discharge learning needs (meds, wound care, etc.)  - Arrange for interpretive services to assist at discharge as needed  - Refer to Case Management Department for coordinating discharge planning if the patient needs post-hospital services based on physician/advanced practitioner order or complex needs related to functional status, cognitive ability, or social support system  Outcome: Progressing     Problem: Knowledge Deficit  Goal: Patient/family/caregiver demonstrates understanding of disease process, treatment plan, medications, and discharge instructions  Description: Complete learning assessment and assess knowledge base.  Interventions:  - Provide teaching at level of understanding  - Provide teaching via preferred learning methods  Outcome: Progressing     Problem: METABOLIC, FLUID AND ELECTROLYTES - ADULT  Goal: Glucose maintained within target range  Description: INTERVENTIONS:  - Monitor Blood Glucose as ordered  - Assess for signs and symptoms of hyperglycemia and hypoglycemia  - Administer ordered medications to maintain glucose within target range  - Assess nutritional intake and initiate nutrition service referral as needed  Outcome: Progressing     Problem: SKIN/TISSUE INTEGRITY - ADULT  Goal: Skin Integrity remains intact(Skin Breakdown Prevention)  Description: Assess:  -Perform Clifford assessment every 8 hours  -Clean and moisturize skin   -Inspect skin when repositioning, toileting, and assisting with ADLS  -Assess under medical devices   -Assess extremities for adequate circulation and sensation     Bed Management:  -Have minimal linens on bed & keep smooth, unwrinkled  -Change linens as needed when moist or perspiring  -Avoid sitting or lying in one position for more than 2 hours while in bed    Toileting:  Bathroom    Activity:  -Mobilize patient 3 times  a day  -Encourage activity and walks on unit  -Encourage or provide ROM exercises   -Instruct/ Assist with weight shifting every 15 minutes when out of bed in chair  -Consider limitation of chair time 2 hour intervals    Skin Care:  -Avoid use of baby powder, tape, friction and shearing, hot water or constrictive clothing  -Relieve pressure over bony prominences   -Do not massage red bony areas    Next Steps:  -Teach patient strategies to minimize risks    -Consider consults to  interdisciplinary teams   Outcome: Progressing  Goal: Incision(s), wounds(s) or drain site(s) healing without S/S of infection  Description: INTERVENTIONS  - Assess and document dressing, incision, wound bed, drain sites and surrounding tissue  - Provide patient and family education  - Perform skin care/dressing changes as ordered  Outcome: Progressing

## 2024-04-28 NOTE — TELEPHONE ENCOUNTER
Patient called canceled his appointment because he is unable to make the appointment due to him being admitted in the hospital.Patient stated that when he is discharge from the hospital he will give the office a call to reschedule back his appointment.

## 2024-04-28 NOTE — ASSESSMENT & PLAN NOTE
Treated with IV ceftriaxone x 2 days at [facility] and discharged on PO abx 4/26. Returns with reported fevers and worsening erythema to right lower extremity.   Not meetings sepsis/SIRs at this time, only with leukocytosis  Re-initiate on ceftriaxone IV therapy day 2  Monitor for any signs of developing sepsis  Pain control

## 2024-04-28 NOTE — RESPIRATORY THERAPY NOTE
04/28/24 0734   Inhalation Therapy Tx   $ Inhalation Therapy Performed Yes   $ Pulse Oximetry Spot Check Charge Completed   SpO2 96 %  (ra)   Pre-Treatment Pulse 61   Pre-Treatment Respirations 18   Duration 15   Breath Sounds Pre-Treatment Bilateral Diminished;Expiratory wheeze  (exp wheezes upper lobes)   Breath Sounds Post-Treatment Bilateral Diminished   Delivery Source Air;UDN   Position Up in chair   Treatment Tolerance Tolerated well

## 2024-04-28 NOTE — ASSESSMENT & PLAN NOTE
Wt Readings from Last 3 Encounters:   04/28/24 129 kg (284 lb 9.8 oz)   04/26/24 127 kg (278 lb 15.9 oz)   04/09/24 129 kg (285 lb)     Does not appear to be in exacerbation at this time  Continued on lasix PO 40 mg qod

## 2024-04-29 ENCOUNTER — APPOINTMENT (INPATIENT)
Dept: CT IMAGING | Facility: HOSPITAL | Age: 69
DRG: 603 | End: 2024-04-29
Payer: MEDICARE

## 2024-04-29 ENCOUNTER — TRANSITIONAL CARE MANAGEMENT (OUTPATIENT)
Dept: FAMILY MEDICINE CLINIC | Facility: CLINIC | Age: 69
End: 2024-04-29

## 2024-04-29 LAB
ANION GAP SERPL CALCULATED.3IONS-SCNC: 9 MMOL/L (ref 4–13)
BACTERIA BLD CULT: NORMAL
BACTERIA BLD CULT: NORMAL
BASOPHILS # BLD AUTO: 0.07 THOUSANDS/ÂΜL (ref 0–0.1)
BASOPHILS NFR BLD AUTO: 1 % (ref 0–1)
BUN SERPL-MCNC: 29 MG/DL (ref 5–25)
CALCIUM SERPL-MCNC: 9 MG/DL (ref 8.4–10.2)
CHLORIDE SERPL-SCNC: 100 MMOL/L (ref 96–108)
CO2 SERPL-SCNC: 26 MMOL/L (ref 21–32)
CREAT SERPL-MCNC: 1.7 MG/DL (ref 0.6–1.3)
EOSINOPHIL # BLD AUTO: 0.34 THOUSAND/ÂΜL (ref 0–0.61)
EOSINOPHIL NFR BLD AUTO: 2 % (ref 0–6)
ERYTHROCYTE [DISTWIDTH] IN BLOOD BY AUTOMATED COUNT: 15.9 % (ref 11.6–15.1)
GFR SERPL CREATININE-BSD FRML MDRD: 41 ML/MIN/1.73SQ M
GLUCOSE SERPL-MCNC: 119 MG/DL (ref 65–140)
GLUCOSE SERPL-MCNC: 120 MG/DL (ref 65–140)
GLUCOSE SERPL-MCNC: 125 MG/DL (ref 65–140)
GLUCOSE SERPL-MCNC: 139 MG/DL (ref 65–140)
GLUCOSE SERPL-MCNC: 146 MG/DL (ref 65–140)
HCT VFR BLD AUTO: 37.4 % (ref 36.5–49.3)
HGB BLD-MCNC: 11.6 G/DL (ref 12–17)
IMM GRANULOCYTES # BLD AUTO: 0.23 THOUSAND/UL (ref 0–0.2)
IMM GRANULOCYTES NFR BLD AUTO: 2 % (ref 0–2)
LYMPHOCYTES # BLD AUTO: 2.02 THOUSANDS/ÂΜL (ref 0.6–4.47)
LYMPHOCYTES NFR BLD AUTO: 14 % (ref 14–44)
MCH RBC QN AUTO: 25.8 PG (ref 26.8–34.3)
MCHC RBC AUTO-ENTMCNC: 31 G/DL (ref 31.4–37.4)
MCV RBC AUTO: 83 FL (ref 82–98)
MONOCYTES # BLD AUTO: 1.12 THOUSAND/ÂΜL (ref 0.17–1.22)
MONOCYTES NFR BLD AUTO: 8 % (ref 4–12)
NEUTROPHILS # BLD AUTO: 11.03 THOUSANDS/ÂΜL (ref 1.85–7.62)
NEUTS SEG NFR BLD AUTO: 73 % (ref 43–75)
NRBC BLD AUTO-RTO: 0 /100 WBCS
PLATELET # BLD AUTO: 269 THOUSANDS/UL (ref 149–390)
PMV BLD AUTO: 9.5 FL (ref 8.9–12.7)
POTASSIUM SERPL-SCNC: 3.6 MMOL/L (ref 3.5–5.3)
RBC # BLD AUTO: 4.5 MILLION/UL (ref 3.88–5.62)
SODIUM SERPL-SCNC: 135 MMOL/L (ref 135–147)
WBC # BLD AUTO: 14.81 THOUSAND/UL (ref 4.31–10.16)

## 2024-04-29 PROCEDURE — 82948 REAGENT STRIP/BLOOD GLUCOSE: CPT

## 2024-04-29 PROCEDURE — 99232 SBSQ HOSP IP/OBS MODERATE 35: CPT | Performed by: FAMILY MEDICINE

## 2024-04-29 PROCEDURE — 85025 COMPLETE CBC W/AUTO DIFF WBC: CPT | Performed by: PHYSICIAN ASSISTANT

## 2024-04-29 PROCEDURE — 94640 AIRWAY INHALATION TREATMENT: CPT

## 2024-04-29 PROCEDURE — 73700 CT LOWER EXTREMITY W/O DYE: CPT

## 2024-04-29 PROCEDURE — 87081 CULTURE SCREEN ONLY: CPT | Performed by: FAMILY MEDICINE

## 2024-04-29 PROCEDURE — 94760 N-INVAS EAR/PLS OXIMETRY 1: CPT

## 2024-04-29 PROCEDURE — 80048 BASIC METABOLIC PNL TOTAL CA: CPT | Performed by: PHYSICIAN ASSISTANT

## 2024-04-29 PROCEDURE — 87147 CULTURE TYPE IMMUNOLOGIC: CPT | Performed by: FAMILY MEDICINE

## 2024-04-29 RX ADMIN — HEPARIN SODIUM 5000 UNITS: 5000 INJECTION, SOLUTION INTRAVENOUS; SUBCUTANEOUS at 14:44

## 2024-04-29 RX ADMIN — CARVEDILOL 25 MG: 12.5 TABLET, FILM COATED ORAL at 17:24

## 2024-04-29 RX ADMIN — UMECLIDINIUM 1 PUFF: 62.5 AEROSOL, POWDER ORAL at 08:53

## 2024-04-29 RX ADMIN — ASPIRIN 81 MG: 81 TABLET, COATED ORAL at 08:53

## 2024-04-29 RX ADMIN — ATORVASTATIN CALCIUM 20 MG: 10 TABLET, FILM COATED ORAL at 17:24

## 2024-04-29 RX ADMIN — CEFAZOLIN SODIUM 2000 MG: 2 SOLUTION INTRAVENOUS at 08:53

## 2024-04-29 RX ADMIN — CEFAZOLIN SODIUM 2000 MG: 2 SOLUTION INTRAVENOUS at 01:26

## 2024-04-29 RX ADMIN — CARVEDILOL 25 MG: 12.5 TABLET, FILM COATED ORAL at 08:53

## 2024-04-29 RX ADMIN — MONTELUKAST 10 MG: 10 TABLET, FILM COATED ORAL at 21:42

## 2024-04-29 RX ADMIN — FLUTICASONE FUROATE AND VILANTEROL TRIFENATATE 1 PUFF: 200; 25 POWDER RESPIRATORY (INHALATION) at 08:53

## 2024-04-29 RX ADMIN — DULOXETINE HYDROCHLORIDE 60 MG: 30 CAPSULE, DELAYED RELEASE ORAL at 21:42

## 2024-04-29 RX ADMIN — HEPARIN SODIUM 5000 UNITS: 5000 INJECTION, SOLUTION INTRAVENOUS; SUBCUTANEOUS at 21:42

## 2024-04-29 RX ADMIN — LEVALBUTEROL HYDROCHLORIDE 1.25 MG: 1.25 SOLUTION RESPIRATORY (INHALATION) at 14:13

## 2024-04-29 RX ADMIN — LEVALBUTEROL HYDROCHLORIDE 1.25 MG: 1.25 SOLUTION RESPIRATORY (INHALATION) at 07:12

## 2024-04-29 RX ADMIN — ISOSORBIDE MONONITRATE 30 MG: 30 TABLET, EXTENDED RELEASE ORAL at 08:53

## 2024-04-29 RX ADMIN — LEVALBUTEROL HYDROCHLORIDE 1.25 MG: 1.25 SOLUTION RESPIRATORY (INHALATION) at 21:43

## 2024-04-29 RX ADMIN — VANCOMYCIN HYDROCHLORIDE 1500 MG: 5 INJECTION, POWDER, LYOPHILIZED, FOR SOLUTION INTRAVENOUS at 12:03

## 2024-04-29 RX ADMIN — HEPARIN SODIUM 5000 UNITS: 5000 INJECTION, SOLUTION INTRAVENOUS; SUBCUTANEOUS at 05:40

## 2024-04-29 RX ADMIN — ACETAMINOPHEN 650 MG: 325 TABLET, FILM COATED ORAL at 21:42

## 2024-04-29 RX ADMIN — ALPRAZOLAM 0.5 MG: 0.5 TABLET ORAL at 21:42

## 2024-04-29 RX ADMIN — FLUTICASONE PROPIONATE 1 SPRAY: 50 SPRAY, METERED NASAL at 08:53

## 2024-04-29 NOTE — ASSESSMENT & PLAN NOTE
Wt Readings from Last 3 Encounters:   04/29/24 126 kg (278 lb)   04/26/24 127 kg (278 lb 15.9 oz)   04/09/24 129 kg (285 lb)     Euvolemic on exam without edema.  Lasix PO 40 mg q daily   Low salt diet, I&O monitoring

## 2024-04-29 NOTE — PROGRESS NOTES
[facility]  Progress Note  Name: Cricket Rosales I  MRN: [id number]  Unit/Bed#: 407-01 I Date of Admission: 4/27/2024   Date of Service: 4/29/2024 I Hospital Day: 1    Assessment/Plan   * Cellulitis of right lower leg  Assessment & Plan  Treated with IV ceftriaxone x 2 days at [facility] and discharged on PO abx 4/26. Returns with reported fevers and worsening erythema to right lower extremity.   Not meetings sepsis/SIRs at this time, only with leukocytosis  WBC unchanged, patient feels poorly, will escalate to vancomycin , check MRSA screen and CT RLE 4/29    Severe persistent asthma without complication  Assessment & Plan  Continue home inhaler regimen    Chronic combined systolic and diastolic CHF (congestive heart failure) (Formerly [facility])  Assessment & Plan  Wt Readings from Last 3 Encounters:   04/29/24 126 kg (278 lb)   04/26/24 127 kg (278 lb 15.9 oz)   04/09/24 129 kg (285 lb)     Euvolemic on exam without edema.  Lasix PO 40 mg q daily   Low salt diet, I&O monitoring           Controlled type 2 diabetes mellitus with diabetic nephropathy, without long-term current use of insulin (Former[facility])  Assessment & Plan  Lab Results   Component Value Date    HGBA1C 7.0 (H) 03/06/2024       Recent Labs     04/28/24  0718 04/28/24  1114 04/28/24  1604 04/29/24  0654   POCGLU 132 150* 134 139         Blood Sugar Average: Last 72 hrs:  (P) 144.2717104736679915  Holding home meds  SSI  Fingersticks with meals      Benign essential hypertension  Assessment & Plan  Well controlled on Coreg 25mg BID              Progress Note - Cricket Rosales 68 y.o. male MRN: [id number]    Unit/Bed#: 407-01 Encounter: [id number]        Subjective:   Patient seen and examined, he feels weak, leg redness not improving.     Objective:     Vitals:   Vitals:    04/29/24 0712   BP:    Pulse:    Resp:    Temp:    SpO2: 96%     Body mass index is 35.69 kg/m².    Intake/Output Summary (Last 24 hours) at 4/29/2024 0953  Last data filed at  "4/29/2024 0856  Gross per 24 hour   Intake 960 ml   Output 2350 ml   Net -1390 ml       Physical Exam:   /68   Pulse 67   Temp (!) 97.3 °F (36.3 °C)   Resp 20   Ht 6' 2\" (1.88 m)   Wt 126 kg (278 lb)   SpO2 96%   BMI 35.69 kg/m²   General appearance: alert and oriented, in no acute distress  Head: Normocephalic, without obvious abnormality, atraumatic  Lungs: clear to auscultation bilaterally  Heart: regular rate and rhythm, S1, S2 normal, no murmur, click, rub or gallop  Abdomen: soft, non-tender; bowel sounds normal; no masses,  no organomegaly  Extremities: area demarcated does not show improvement, its warm to touch, no fluctuance  Pulses: 2+ and symmetric  Neurologic: Grossly normal     Invasive Devices       Peripheral Intravenous Line  Duration             Peripheral IV 04/27/24 Right Antecubital 2 days                    Results from last 7 days   Lab Units 04/29/24 0445 04/28/24 0433 04/27/24  0837   WBC Thousand/uL 14.81* 14.87* 12.32*   HEMOGLOBIN g/dL 11.6* 11.5* 13.3   HEMATOCRIT % 37.4 37.2 42.2   PLATELETS Thousands/uL 269 225 251       Results from last 7 days   Lab Units 04/29/24 0445 04/28/24  0433 04/27/24  0837 04/25/24  0428 04/24/24  0815   POTASSIUM mmol/L 3.6 3.6 4.0   < > 3.9   CHLORIDE mmol/L 100 100 99   < > 97   CO2 mmol/L 26 23 20*   < > 22   BUN mg/dL 29* 29* 33*   < > 40*   CREATININE mg/dL 1.70* 1.66* 1.51*   < > 1.87*   CALCIUM mg/dL 9.0 8.5 9.1   < > 8.9   ALK PHOS U/L  --   --  67  --  68   ALT U/L  --   --  35  --  19   AST U/L  --   --  22  --  13    < > = values in this interval not displayed.       Medication Administration - last 24 hours from 04/28/2024 0953 to 04/29/2024 0953         Date/Time Order Dose Route Action Action by     04/28/2024 1936 EDT acetaminophen (TYLENOL) tablet 650 mg 650 mg Oral Given Rolf Roland Reyes, RN     04/29/2024 0540 EDT heparin (porcine) subcutaneous injection 5,000 Units 5,000 Units Subcutaneous Given Rolf Roland Reyes, RN     " 04/28/2024 2109 EDT heparin (porcine) subcutaneous injection 5,000 Units 5,000 Units Subcutaneous Given Rolf Roland Reyes, RN     04/28/2024 1306 EDT heparin (porcine) subcutaneous injection 5,000 Units 5,000 Units Subcutaneous Given Nupur Morrison RN     04/28/2024 2109 EDT ALPRAZolam (XANAX) tablet 0.5 mg 0.5 mg Oral Given Rolf Roland Reyes, RN     04/29/2024 0853 EDT aspirin (ECOTRIN LOW STRENGTH) EC tablet 81 mg 81 mg Oral Given Blanche Andre RN     04/28/2024 1707 EDT atorvastatin (LIPITOR) tablet 20 mg 20 mg Oral Given Nupur Morrison RN     04/29/2024 0853 EDT carvedilol (COREG) tablet 25 mg 25 mg Oral Given Blanche Andre RN     04/28/2024 1707 EDT carvedilol (COREG) tablet 25 mg 25 mg Oral Given Nupur Morrison RN     04/28/2024 2109 EDT DULoxetine (CYMBALTA) delayed release capsule 60 mg 60 mg Oral Given Rolf Roland Reyes, RN     04/29/2024 0853 EDT fluticasone (FLONASE) 50 mcg/act nasal spray 1 spray 1 spray Each Nare Given Blanche Andre RN     04/29/2024 0853 EDT fluticasone-vilanterol 200-25 mcg/actuation 1 puff 1 puff Inhalation Given Blanche Andre RN     04/29/2024 0853 EDT isosorbide mononitrate (IMDUR) 24 hr tablet 30 mg 30 mg Oral Given Blanche Andre RN     04/28/2024 2109 EDT montelukast (SINGULAIR) tablet 10 mg 10 mg Oral Given Rolf Roland Reyes, RN     04/29/2024 0853 EDT umeclidinium 62.5 mcg/actuation inhaler AEPB 1 puff 1 puff Inhalation Given Blanche Andre RN     04/28/2024 1614 EDT albuterol inhalation solution 2.5 mg 2.5 mg Nebulization Given Katiuska Mccabe,      04/29/2024 0726 EDT insulin lispro (HumALOG/ADMELOG) 100 units/mL subcutaneous injection 1-5 Units -- Subcutaneous Not Given Blanche Andre RN     04/28/2024 1646 EDT insulin lispro (HumALOG/ADMELOG) 100 units/mL subcutaneous injection 1-5 Units -- Subcutaneous Not Given Nupur Morrison RN     04/28/2024 1126 EDT insulin lispro (HumALOG/ADMELOG) 100 units/mL subcutaneous injection 1-5 Units 1 Units  Subcutaneous Given Nupur Morrison RN     04/28/2024 2059 EDT levalbuterol (XOPENEX) inhalation solution 1.25 mg 1.25 mg Nebulization Given Abdoulaye Arredondo RT     04/29/2024 0853 EDT ceFAZolin (ANCEF) IVPB (premix in dextrose) 2,000 mg 50 mL 2,000 mg Intravenous New Bag Blanche Andre, ALETA     04/29/2024 0126 EDT ceFAZolin (ANCEF) IVPB (premix in dextrose) 2,000 mg 50 mL 2,000 mg Intravenous New Bag Rolf Roland Reyes, ALETA     04/28/2024 1707 EDT ceFAZolin (ANCEF) IVPB (premix in dextrose) 2,000 mg 50 mL 2,000 mg Intravenous New Bag Nupur Morrison RN     04/28/2024 1123 EDT ceFAZolin (ANCEF) IVPB (premix in dextrose) 2,000 mg 50 mL 0 mg Intravenous Stopped Nupur Morrison RN     04/29/2024 0712 EDT levalbuterol (XOPENEX) inhalation solution 1.25 mg 1.25 mg Nebulization Given Ina Urrutia, RT              Lab, Imaging and other studies: I have personally reviewed pertinent reports.    VTE Pharmacologic Prophylaxis: Heparin   VTE Mechanical Prophylaxis: sequential compression device     Leonarda Hdz MD  4/29/2024,9:53 AM

## 2024-04-29 NOTE — ASSESSMENT & PLAN NOTE
Lab Results   Component Value Date    HGBA1C 7.0 (H) 03/06/2024       Recent Labs     04/28/24  0718 04/28/24  1114 04/28/24  1604 04/29/24  0654   POCGLU 132 150* 134 139         Blood Sugar Average: Last 72 hrs:  (P) 144.9683552826243690  Holding home meds  SSI  Fingersticks with meals

## 2024-04-29 NOTE — ASSESSMENT & PLAN NOTE
Treated with IV ceftriaxone x 2 days at [facility] and discharged on PO abx 4/26. Returns with reported fevers and worsening erythema to right lower extremity.   Not meetings sepsis/SIRs at this time, only with leukocytosis  WBC unchanged, patient feels poorly, will escalate to vancomycin , check MRSA screen and CT RLE 4/29

## 2024-04-29 NOTE — RESPIRATORY THERAPY NOTE
04/29/24 0712   Inhalation Therapy Tx   $ Inhalation Therapy Performed Yes   $ Pulse Oximetry Spot Check Charge Completed   SpO2 96 %   Pre-Treatment Pulse 57   Pre-Treatment Respirations 20   Duration 20   Breath Sounds Pre-Treatment Bilateral Diminished   Delivery Source Air;UDN   Position Up in chair   Treatment Tolerance Tolerated well   Resp Comments patient is on room air

## 2024-04-29 NOTE — PLAN OF CARE
Problem: PAIN - ADULT  Goal: Verbalizes/displays adequate comfort level or baseline comfort level  Description: Interventions:  - Encourage patient to monitor pain and request assistance  - Assess pain using appropriate pain scale  - Administer analgesics based on type and severity of pain and evaluate response  - Implement non-pharmacological measures as appropriate and evaluate response  - Consider cultural and social influences on pain and pain management  - Notify physician/advanced practitioner if interventions unsuccessful or patient reports new pain  Outcome: Progressing     Problem: INFECTION - ADULT  Goal: Absence or prevention of progression during hospitalization  Description: INTERVENTIONS:  - Assess and monitor for signs and symptoms of infection  - Monitor lab/diagnostic results  - Monitor all insertion sites, i.e. indwelling lines, tubes, and drains  - Monitor endotracheal if appropriate and nasal secretions for changes in amount and color  - [place] appropriate cooling/warming therapies per order  - Administer medications as ordered  - Instruct and encourage patient and family to use good hand hygiene technique  - Identify and instruct in appropriate isolation precautions for identified infection/condition  Outcome: Progressing     Problem: SAFETY ADULT  Goal: Patient will remain free of falls  Description: INTERVENTIONS:  - Educate patient/family on patient safety including physical limitations  - Instruct patient to call for assistance with activity   - Consult OT/PT to assist with strengthening/mobility   - Keep Call bell within reach  - Keep bed low and locked with side rails adjusted as appropriate  - Keep care items and personal belongings within reach  - Initiate and maintain comfort rounds  - Make Fall Risk Sign visible to staff  Outcome: Progressing     Problem: DISCHARGE PLANNING  Goal: Discharge to home or other facility with appropriate resources  Description: INTERVENTIONS:  -  Identify barriers to discharge w/patient and caregiver  - Arrange for needed discharge resources and transportation as appropriate  - Identify discharge learning needs (meds, wound care, etc.)  - Arrange for interpretive services to assist at discharge as needed  - Refer to Case Management Department for coordinating discharge planning if the patient needs post-hospital services based on physician/advanced practitioner order or complex needs related to functional status, cognitive ability, or social support system  Outcome: Progressing     Problem: Knowledge Deficit  Goal: Patient/family/caregiver demonstrates understanding of disease process, treatment plan, medications, and discharge instructions  Description: Complete learning assessment and assess knowledge base.  Interventions:  - Provide teaching at level of understanding  - Provide teaching via preferred learning methods  Outcome: Progressing     Problem: METABOLIC, FLUID AND ELECTROLYTES - ADULT  Goal: Glucose maintained within target range  Description: INTERVENTIONS:  - Monitor Blood Glucose as ordered  - Assess for signs and symptoms of hyperglycemia and hypoglycemia  - Administer ordered medications to maintain glucose within target range  - Assess nutritional intake and initiate nutrition service referral as needed  Outcome: Progressing     Problem: SKIN/TISSUE INTEGRITY - ADULT  Goal: Skin Integrity remains intact(Skin Breakdown Prevention)  Description: Assess:  -Perform Clifford assessment every 8 hours  -Clean and moisturize skin   -Inspect skin when repositioning, toileting, and assisting with ADLS  -Assess under medical devices   -Assess extremities for adequate circulation and sensation     Bed Management:  -Have minimal linens on bed & keep smooth, unwrinkled  -Change linens as needed when moist or perspiring  -Avoid sitting or lying in one position for more than 2 hours while in bed    Toileting:  Bathroom    Activity:  -Mobilize patient 3 times  a day  -Encourage activity and walks on unit  -Encourage or provide ROM exercises   -Instruct/ Assist with weight shifting every 15 minutes when out of bed in chair  -Consider limitation of chair time 2 hour intervals    Skin Care:  -Avoid use of baby powder, tape, friction and shearing, hot water or constrictive clothing  -Relieve pressure over bony prominences   -Do not massage red bony areas    Next Steps:  -Teach patient strategies to minimize risks    -Consider consults to  interdisciplinary teams   Outcome: Progressing  Goal: Incision(s), wounds(s) or drain site(s) healing without S/S of infection  Description: INTERVENTIONS  - Assess and document dressing, incision, wound bed, drain sites and surrounding tissue  - Provide patient and family education  - Perform skin care/dressing changes as ordered  Outcome: Progressing

## 2024-04-29 NOTE — PROGRESS NOTES
Cricket Rosales is a 68 y.o. male who is currently ordered Vancomycin IV with management by the Pharmacy Consult service.  Relevant clinical data and objective / subjective history reviewed.  Vancomycin Assessment:  Indication and Goal AUC/Trough: Soft tissue (goal -600, trough >10), -600, trough >10  Clinical Status:  new  Micro:     Renal Function:  SCr: 1.70 mg/dL  CrCl: 60.3 mL/min  Renal replacement: Not on dialysis  Days of Therapy: 1  Current Dose: 1500mg q24h  Vancomycin Plan:  New Dosing: same  Estimated AUC: 455 mcg*hr/mL  Estimated Trough: 13 mcg/mL  Next Level: 0600 on 5/2/24  Renal Function Monitoring: Daily BMP and UOP  Pharmacy will continue to follow closely for s/sx of nephrotoxicity, infusion reactions and appropriateness of therapy.  BMP and CBC will be ordered per protocol. We will continue to follow the patient’s culture results and clinical progress daily.    Alexi Thomas, Pharmacist

## 2024-04-29 NOTE — PLAN OF CARE
Problem: PAIN - ADULT  Goal: Verbalizes/displays adequate comfort level or baseline comfort level  Description: Interventions:  - Encourage patient to monitor pain and request assistance  - Assess pain using appropriate pain scale  - Administer analgesics based on type and severity of pain and evaluate response  - Implement non-pharmacological measures as appropriate and evaluate response  - Consider cultural and social influences on pain and pain management  - Notify physician/advanced practitioner if interventions unsuccessful or patient reports new pain  Outcome: Progressing     Problem: INFECTION - ADULT  Goal: Absence or prevention of progression during hospitalization  Description: INTERVENTIONS:  - Assess and monitor for signs and symptoms of infection  - Monitor lab/diagnostic results  - Monitor all insertion sites, i.e. indwelling lines, tubes, and drains  - Monitor endotracheal if appropriate and nasal secretions for changes in amount and color  - [place] appropriate cooling/warming therapies per order  - Administer medications as ordered  - Instruct and encourage patient and family to use good hand hygiene technique  - Identify and instruct in appropriate isolation precautions for identified infection/condition  Outcome: Progressing     Problem: SAFETY ADULT  Goal: Patient will remain free of falls  Description: INTERVENTIONS:  - Educate patient/family on patient safety including physical limitations  - Instruct patient to call for assistance with activity   - Consult OT/PT to assist with strengthening/mobility   - Keep Call bell within reach  - Keep bed low and locked with side rails adjusted as appropriate  - Keep care items and personal belongings within reach  - Initiate and maintain comfort rounds  - Make Fall Risk Sign visible to staff  Outcome: Progressing     Problem: DISCHARGE PLANNING  Goal: Discharge to home or other facility with appropriate resources  Description: INTERVENTIONS:  -  Identify barriers to discharge w/patient and caregiver  - Arrange for needed discharge resources and transportation as appropriate  - Identify discharge learning needs (meds, wound care, etc.)  - Arrange for interpretive services to assist at discharge as needed  - Refer to Case Management Department for coordinating discharge planning if the patient needs post-hospital services based on physician/advanced practitioner order or complex needs related to functional status, cognitive ability, or social support system  Outcome: Progressing     Problem: Knowledge Deficit  Goal: Patient/family/caregiver demonstrates understanding of disease process, treatment plan, medications, and discharge instructions  Description: Complete learning assessment and assess knowledge base.  Interventions:  - Provide teaching at level of understanding  - Provide teaching via preferred learning methods  Outcome: Progressing     Problem: METABOLIC, FLUID AND ELECTROLYTES - ADULT  Goal: Glucose maintained within target range  Description: INTERVENTIONS:  - Monitor Blood Glucose as ordered  - Assess for signs and symptoms of hyperglycemia and hypoglycemia  - Administer ordered medications to maintain glucose within target range  - Assess nutritional intake and initiate nutrition service referral as needed  Outcome: Progressing     Problem: SKIN/TISSUE INTEGRITY - ADULT  Goal: Skin Integrity remains intact(Skin Breakdown Prevention)  Description: Assess:  -Perform Clifford assessment every 8 hours  -Clean and moisturize skin   -Inspect skin when repositioning, toileting, and assisting with ADLS  -Assess under medical devices   -Assess extremities for adequate circulation and sensation     Bed Management:  -Have minimal linens on bed & keep smooth, unwrinkled  -Change linens as needed when moist or perspiring  -Avoid sitting or lying in one position for more than 2 hours while in bed    Toileting:  Bathroom    Activity:  -Mobilize patient 3 times  a day  -Encourage activity and walks on unit  -Encourage or provide ROM exercises   -Instruct/ Assist with weight shifting every 15 minutes when out of bed in chair  -Consider limitation of chair time 2 hour intervals    Skin Care:  -Avoid use of baby powder, tape, friction and shearing, hot water or constrictive clothing  -Relieve pressure over bony prominences   -Do not massage red bony areas    Next Steps:  -Teach patient strategies to minimize risks    -Consider consults to  interdisciplinary teams   Outcome: Progressing  Goal: Incision(s), wounds(s) or drain site(s) healing without S/S of infection  Description: INTERVENTIONS  - Assess and document dressing, incision, wound bed, drain sites and surrounding tissue  - Provide patient and family education  - Perform skin care/dressing changes as ordered  Outcome: Progressing

## 2024-04-30 ENCOUNTER — TELEPHONE (OUTPATIENT)
Dept: NEPHROLOGY | Facility: CLINIC | Age: 69
End: 2024-04-30

## 2024-04-30 DIAGNOSIS — N18.30 STAGE 3 CHRONIC KIDNEY DISEASE, UNSPECIFIED WHETHER STAGE 3A OR 3B CKD (HCC): Primary | ICD-10-CM

## 2024-04-30 LAB
ANION GAP SERPL CALCULATED.3IONS-SCNC: 10 MMOL/L (ref 4–13)
ANISOCYTOSIS BLD QL SMEAR: PRESENT
BASOPHILS # BLD MANUAL: 0.1 THOUSAND/UL (ref 0–0.1)
BASOPHILS NFR MAR MANUAL: 1 % (ref 0–1)
BUN SERPL-MCNC: 26 MG/DL (ref 5–25)
CALCIUM SERPL-MCNC: 9.2 MG/DL (ref 8.4–10.2)
CHLORIDE SERPL-SCNC: 100 MMOL/L (ref 96–108)
CO2 SERPL-SCNC: 25 MMOL/L (ref 21–32)
CREAT SERPL-MCNC: 1.5 MG/DL (ref 0.6–1.3)
EOSINOPHIL # BLD MANUAL: 0.31 THOUSAND/UL (ref 0–0.4)
EOSINOPHIL NFR BLD MANUAL: 3 % (ref 0–6)
ERYTHROCYTE [DISTWIDTH] IN BLOOD BY AUTOMATED COUNT: 15.6 % (ref 11.6–15.1)
GFR SERPL CREATININE-BSD FRML MDRD: 47 ML/MIN/1.73SQ M
GLUCOSE SERPL-MCNC: 109 MG/DL (ref 65–140)
GLUCOSE SERPL-MCNC: 113 MG/DL (ref 65–140)
GLUCOSE SERPL-MCNC: 119 MG/DL (ref 65–140)
GLUCOSE SERPL-MCNC: 150 MG/DL (ref 65–140)
HCT VFR BLD AUTO: 36.5 % (ref 36.5–49.3)
HGB BLD-MCNC: 11.4 G/DL (ref 12–17)
LG PLATELETS BLD QL SMEAR: PRESENT
LYMPHOCYTES # BLD AUTO: 2.06 THOUSAND/UL (ref 0.6–4.47)
LYMPHOCYTES # BLD AUTO: 20 % (ref 14–44)
MCH RBC QN AUTO: 25.7 PG (ref 26.8–34.3)
MCHC RBC AUTO-ENTMCNC: 31.2 G/DL (ref 31.4–37.4)
MCV RBC AUTO: 82 FL (ref 82–98)
METAMYELOCYTE ABSOLUTE CT: 0.21 THOUSAND/UL (ref 0–0.1)
METAMYELOCYTES NFR BLD MANUAL: 2 % (ref 0–1)
MONOCYTES # BLD AUTO: 0.52 THOUSAND/UL (ref 0–1.22)
MONOCYTES NFR BLD: 5 % (ref 4–12)
MRSA NOSE QL CULT: ABNORMAL
MRSA NOSE QL CULT: ABNORMAL
NEUTROPHILS # BLD MANUAL: 7.11 THOUSAND/UL (ref 1.85–7.62)
NEUTS SEG NFR BLD AUTO: 69 % (ref 43–75)
PLATELET # BLD AUTO: 323 THOUSANDS/UL (ref 149–390)
PLATELET BLD QL SMEAR: ADEQUATE
PMV BLD AUTO: 9.3 FL (ref 8.9–12.7)
POTASSIUM SERPL-SCNC: 4 MMOL/L (ref 3.5–5.3)
RBC # BLD AUTO: 4.43 MILLION/UL (ref 3.88–5.62)
RBC MORPH BLD: PRESENT
SODIUM SERPL-SCNC: 135 MMOL/L (ref 135–147)
WBC # BLD AUTO: 10.3 THOUSAND/UL (ref 4.31–10.16)

## 2024-04-30 PROCEDURE — 85007 BL SMEAR W/DIFF WBC COUNT: CPT | Performed by: FAMILY MEDICINE

## 2024-04-30 PROCEDURE — 80048 BASIC METABOLIC PNL TOTAL CA: CPT | Performed by: PHYSICIAN ASSISTANT

## 2024-04-30 PROCEDURE — 94664 DEMO&/EVAL PT USE INHALER: CPT

## 2024-04-30 PROCEDURE — 94640 AIRWAY INHALATION TREATMENT: CPT

## 2024-04-30 PROCEDURE — 82948 REAGENT STRIP/BLOOD GLUCOSE: CPT

## 2024-04-30 PROCEDURE — 99232 SBSQ HOSP IP/OBS MODERATE 35: CPT | Performed by: FAMILY MEDICINE

## 2024-04-30 PROCEDURE — 85027 COMPLETE CBC AUTOMATED: CPT | Performed by: FAMILY MEDICINE

## 2024-04-30 PROCEDURE — 94760 N-INVAS EAR/PLS OXIMETRY 1: CPT

## 2024-04-30 RX ADMIN — CARVEDILOL 25 MG: 12.5 TABLET, FILM COATED ORAL at 17:10

## 2024-04-30 RX ADMIN — ALPRAZOLAM 0.5 MG: 0.5 TABLET ORAL at 21:21

## 2024-04-30 RX ADMIN — VANCOMYCIN HYDROCHLORIDE 1500 MG: 5 INJECTION, POWDER, LYOPHILIZED, FOR SOLUTION INTRAVENOUS at 11:58

## 2024-04-30 RX ADMIN — DULOXETINE HYDROCHLORIDE 60 MG: 30 CAPSULE, DELAYED RELEASE ORAL at 21:21

## 2024-04-30 RX ADMIN — HEPARIN SODIUM 5000 UNITS: 5000 INJECTION, SOLUTION INTRAVENOUS; SUBCUTANEOUS at 14:43

## 2024-04-30 RX ADMIN — ATORVASTATIN CALCIUM 20 MG: 10 TABLET, FILM COATED ORAL at 17:10

## 2024-04-30 RX ADMIN — LEVALBUTEROL HYDROCHLORIDE 1.25 MG: 1.25 SOLUTION RESPIRATORY (INHALATION) at 20:55

## 2024-04-30 RX ADMIN — LEVALBUTEROL HYDROCHLORIDE 1.25 MG: 1.25 SOLUTION RESPIRATORY (INHALATION) at 13:01

## 2024-04-30 RX ADMIN — HEPARIN SODIUM 5000 UNITS: 5000 INJECTION, SOLUTION INTRAVENOUS; SUBCUTANEOUS at 05:22

## 2024-04-30 RX ADMIN — ACETAMINOPHEN 650 MG: 325 TABLET, FILM COATED ORAL at 21:21

## 2024-04-30 RX ADMIN — CARVEDILOL 25 MG: 12.5 TABLET, FILM COATED ORAL at 09:07

## 2024-04-30 RX ADMIN — HEPARIN SODIUM 5000 UNITS: 5000 INJECTION, SOLUTION INTRAVENOUS; SUBCUTANEOUS at 21:21

## 2024-04-30 RX ADMIN — ASPIRIN 81 MG: 81 TABLET, COATED ORAL at 09:07

## 2024-04-30 RX ADMIN — ISOSORBIDE MONONITRATE 30 MG: 30 TABLET, EXTENDED RELEASE ORAL at 09:07

## 2024-04-30 RX ADMIN — MONTELUKAST 10 MG: 10 TABLET, FILM COATED ORAL at 21:21

## 2024-04-30 RX ADMIN — FLUTICASONE PROPIONATE 1 SPRAY: 50 SPRAY, METERED NASAL at 09:07

## 2024-04-30 RX ADMIN — FLUTICASONE FUROATE AND VILANTEROL TRIFENATATE 1 PUFF: 200; 25 POWDER RESPIRATORY (INHALATION) at 09:07

## 2024-04-30 RX ADMIN — UMECLIDINIUM 1 PUFF: 62.5 AEROSOL, POWDER ORAL at 09:07

## 2024-04-30 RX ADMIN — FUROSEMIDE 40 MG: 40 TABLET ORAL at 09:07

## 2024-04-30 RX ADMIN — LEVALBUTEROL HYDROCHLORIDE 1.25 MG: 1.25 SOLUTION RESPIRATORY (INHALATION) at 07:09

## 2024-04-30 RX ADMIN — INSULIN LISPRO 1 UNITS: 100 INJECTION, SOLUTION INTRAVENOUS; SUBCUTANEOUS at 17:10

## 2024-04-30 NOTE — PROGRESS NOTES
"[facility]  Progress Note  Name: Cricket Rosales I  MRN: [id number]  Unit/Bed#: 407-01 I Date of Admission: 4/27/2024   Date of Service: 4/30/2024 I Hospital Day: 2    Assessment/Plan   * Cellulitis of right lower leg  Assessment & Plan  Treated with IV ceftriaxone x 2 days at [facility] and discharged on PO abx 4/26. Returns with reported fevers and worsening erythema to right lower extremity.   Not meetings sepsis/SIRs at this time, only with leukocytosis  WBC unchanged, patient feels poorly, will escalate to vancomycin , check MRSA screen and CT RLE 4/29  WBC improved, CT reviewed, patient feels much better, continue vancomycin with transition to doxycycline on dc 4/30    Severe persistent asthma without complication  Assessment & Plan  Continue home inhaler regimen    Controlled type 2 diabetes mellitus with diabetic nephropathy, without long-term current use of insulin ([facility])  Assessment & Plan  Lab Results   Component Value Date    HGBA1C 7.0 (H) 03/06/2024       Recent Labs     04/29/24  1125 04/29/24  1621 04/29/24  2046 04/30/24  0744   POCGLU 119 146* 125 113         Blood Sugar Average: Last 72 hrs:  (P) [id number]  Holding home meds  SSI  Fingersticks with meals      Obstructive sleep apnea  Assessment & Plan  Can offer cpap hs but pt typically declines             Progress Note - Cricket Rosales 68 y.o. male MRN: [id number]    Unit/Bed#: 407-01 Encounter: [id number]        Subjective:   Patient seen and examined, feels better today, less fatigue and improving redness / swelling     Objective:     Vitals:   Vitals:    04/30/24 0746   BP: 124/63   Pulse: 66   Resp: 18   Temp:    SpO2: 94%     Body mass index is 35.64 kg/m².    Intake/Output Summary (Last 24 hours) at 4/30/2024 0816  Last data filed at 4/29/2024 1747  Gross per 24 hour   Intake 1080 ml   Output 1275 ml   Net -195 ml       Physical Exam:   /63   Pulse 66   Temp 98.6 °F (37 °C)   Resp 18   Ht 6' 2\" " (1.88 m)   Wt 126 kg (277 lb 9 oz)   SpO2 94%   BMI 35.64 kg/m²   General appearance: alert and oriented, in no acute distress  Head: Normocephalic, without obvious abnormality, atraumatic  Lungs: clear to auscultation bilaterally  Heart: regular rate and rhythm, S1, S2 normal, no murmur, click, rub or gallop  Abdomen: soft, non-tender; bowel sounds normal; no masses,  no organomegaly  Extremities: improving RLE erythema and edema  Pulses: 2+ and symmetric  Neurologic: Grossly normal     Invasive Devices       Peripheral Intravenous Line  Duration             Peripheral IV 04/27/24 Right Antecubital 2 days                    Results from last 7 days   Lab Units 04/30/24 0449 04/29/24 0445 04/28/24  0433   WBC Thousand/uL 10.30* 14.81* 14.87*   HEMOGLOBIN g/dL 11.4* 11.6* 11.5*   HEMATOCRIT % 36.5 37.4 37.2   PLATELETS Thousands/uL 323 269 225       Results from last 7 days   Lab Units 04/30/24  0449 04/29/24  0445 04/28/24  0433 04/27/24  0837 04/25/24  0428 04/24/24  0815   POTASSIUM mmol/L 4.0 3.6 3.6 4.0   < > 3.9   CHLORIDE mmol/L 100 100 100 99   < > 97   CO2 mmol/L 25 26 23 20*   < > 22   BUN mg/dL 26* 29* 29* 33*   < > 40*   CREATININE mg/dL 1.50* 1.70* 1.66* 1.51*   < > 1.87*   CALCIUM mg/dL 9.2 9.0 8.5 9.1   < > 8.9   ALK PHOS U/L  --   --   --  67  --  68   ALT U/L  --   --   --  35  --  19   AST U/L  --   --   --  22  --  13    < > = values in this interval not displayed.       Medication Administration - last 24 hours from 04/29/2024 0816 to 04/30/2024 0816         Date/Time Order Dose Route Action Action by     04/29/2024 2142 EDT acetaminophen (TYLENOL) tablet 650 mg 650 mg Oral Given Rolf Roland Reyes, RN     04/30/2024 0522 EDT heparin (porcine) subcutaneous injection 5,000 Units 5,000 Units Subcutaneous Given Rolf Roland Reyes, RN     04/29/2024 2142 EDT heparin (porcine) subcutaneous injection 5,000 Units 5,000 Units Subcutaneous Given Rolf Roland Reyes, RN     04/29/2024 1444 EDT heparin  (porcine) subcutaneous injection 5,000 Units 5,000 Units Subcutaneous Given Blanche Andre RN     04/29/2024 2142 EDT ALPRAZolam (XANAX) tablet 0.5 mg 0.5 mg Oral Given Rolf Roland Reyes, RN     04/29/2024 0853 EDT aspirin (ECOTRIN LOW STRENGTH) EC tablet 81 mg 81 mg Oral Given Blanche Andre RN     04/29/2024 1724 EDT atorvastatin (LIPITOR) tablet 20 mg 20 mg Oral Given Blanche Andre RN     04/29/2024 1724 EDT carvedilol (COREG) tablet 25 mg 25 mg Oral Given Blanche Andre RN     04/29/2024 0853 EDT carvedilol (COREG) tablet 25 mg 25 mg Oral Given Blanche Andre RN     04/29/2024 2142 EDT DULoxetine (CYMBALTA) delayed release capsule 60 mg 60 mg Oral Given Rolf Roland Reyes, RN     04/29/2024 0853 EDT fluticasone (FLONASE) 50 mcg/act nasal spray 1 spray 1 spray Each Nare Given Blanche Andre RN     04/29/2024 0853 EDT fluticasone-vilanterol 200-25 mcg/actuation 1 puff 1 puff Inhalation Given Blanche Andre RN     04/29/2024 0853 EDT isosorbide mononitrate (IMDUR) 24 hr tablet 30 mg 30 mg Oral Given Blanche Andre RN     04/29/2024 2142 EDT montelukast (SINGULAIR) tablet 10 mg 10 mg Oral Given Rolf Roland Reyes, RN     04/29/2024 0853 EDT umeclidinium 62.5 mcg/actuation inhaler AEPB 1 puff 1 puff Inhalation Given Blanche Andre RN     04/29/2024 1709 EDT insulin lispro (HumALOG/ADMELOG) 100 units/mL subcutaneous injection 1-5 Units -- Subcutaneous Not Given Blanche Andre RN     04/29/2024 1129 EDT insulin lispro (HumALOG/ADMELOG) 100 units/mL subcutaneous injection 1-5 Units -- Subcutaneous Not Given Blanche Andre RN     04/29/2024 0853 EDT ceFAZolin (ANCEF) IVPB (premix in dextrose) 2,000 mg 50 mL 2,000 mg Intravenous New Nallely Andre, ALETA     04/30/2024 0709 EDT levalbuterol (XOPENEX) inhalation solution 1.25 mg 1.25 mg Nebulization Given Ina Urrutia, RT     04/29/2024 2143 EDT levalbuterol (XOPENEX) inhalation solution 1.25 mg 1.25 mg Nebulization Given Rolf Roland Reyes, ALETA     04/29/2024  1413 EDT levalbuterol (XOPENEX) inhalation solution 1.25 mg 1.25 mg Nebulization Given Ina Vazquezhilda, RT     04/29/2024 1203 EDT vancomycin (VANCOCIN) 1,500 mg in sodium chloride 0.9 % 250 mL IVPB 1,500 mg Intravenous New Bag Blanche Andre RN              Lab, Imaging and other studies: I have personally reviewed pertinent reports.    VTE Pharmacologic Prophylaxis: Enoxaparin (Lovenox)  VTE Mechanical Prophylaxis: sequential compression device     Leonarda Hdz MD  4/30/2024,8:16 AM

## 2024-04-30 NOTE — PLAN OF CARE
Problem: PAIN - ADULT  Goal: Verbalizes/displays adequate comfort level or baseline comfort level  Description: Interventions:  - Encourage patient to monitor pain and request assistance  - Assess pain using appropriate pain scale  - Administer analgesics based on type and severity of pain and evaluate response  - Implement non-pharmacological measures as appropriate and evaluate response  - Consider cultural and social influences on pain and pain management  - Notify physician/advanced practitioner if interventions unsuccessful or patient reports new pain  Outcome: Progressing     Problem: INFECTION - ADULT  Goal: Absence or prevention of progression during hospitalization  Description: INTERVENTIONS:  - Assess and monitor for signs and symptoms of infection  - Monitor lab/diagnostic results  - Monitor all insertion sites, i.e. indwelling lines, tubes, and drains  - Monitor endotracheal if appropriate and nasal secretions for changes in amount and color  - [place] appropriate cooling/warming therapies per order  - Administer medications as ordered  - Instruct and encourage patient and family to use good hand hygiene technique  - Identify and instruct in appropriate isolation precautions for identified infection/condition  Outcome: Progressing     Problem: SAFETY ADULT  Goal: Patient will remain free of falls  Description: INTERVENTIONS:  - Educate patient/family on patient safety including physical limitations  - Instruct patient to call for assistance with activity   - Consult OT/PT to assist with strengthening/mobility   - Keep Call bell within reach  - Keep bed low and locked with side rails adjusted as appropriate  - Keep care items and personal belongings within reach  - Initiate and maintain comfort rounds  - Make Fall Risk Sign visible to staff  Outcome: Progressing     Problem: DISCHARGE PLANNING  Goal: Discharge to home or other facility with appropriate resources  Description: INTERVENTIONS:  -  Identify barriers to discharge w/patient and caregiver  - Arrange for needed discharge resources and transportation as appropriate  - Identify discharge learning needs (meds, wound care, etc.)  - Arrange for interpretive services to assist at discharge as needed  - Refer to Case Management Department for coordinating discharge planning if the patient needs post-hospital services based on physician/advanced practitioner order or complex needs related to functional status, cognitive ability, or social support system  Outcome: Progressing     Problem: Knowledge Deficit  Goal: Patient/family/caregiver demonstrates understanding of disease process, treatment plan, medications, and discharge instructions  Description: Complete learning assessment and assess knowledge base.  Interventions:  - Provide teaching at level of understanding  - Provide teaching via preferred learning methods  Outcome: Progressing     Problem: METABOLIC, FLUID AND ELECTROLYTES - ADULT  Goal: Glucose maintained within target range  Description: INTERVENTIONS:  - Monitor Blood Glucose as ordered  - Assess for signs and symptoms of hyperglycemia and hypoglycemia  - Administer ordered medications to maintain glucose within target range  - Assess nutritional intake and initiate nutrition service referral as needed  Outcome: Progressing     Problem: SKIN/TISSUE INTEGRITY - ADULT  Goal: Skin Integrity remains intact(Skin Breakdown Prevention)  Description: Assess:  -Perform Clifford assessment every 8 hours  -Clean and moisturize skin   -Inspect skin when repositioning, toileting, and assisting with ADLS  -Assess under medical devices   -Assess extremities for adequate circulation and sensation     Bed Management:  -Have minimal linens on bed & keep smooth, unwrinkled  -Change linens as needed when moist or perspiring  -Avoid sitting or lying in one position for more than 2 hours while in bed    Toileting:  Bathroom    Activity:  -Mobilize patient 3 times  a day  -Encourage activity and walks on unit  -Encourage or provide ROM exercises   -Instruct/ Assist with weight shifting every 15 minutes when out of bed in chair  -Consider limitation of chair time 2 hour intervals    Skin Care:  -Avoid use of baby powder, tape, friction and shearing, hot water or constrictive clothing  -Relieve pressure over bony prominences   -Do not massage red bony areas    Next Steps:  -Teach patient strategies to minimize risks    -Consider consults to  interdisciplinary teams   Outcome: Progressing  Goal: Incision(s), wounds(s) or drain site(s) healing without S/S of infection  Description: INTERVENTIONS  - Assess and document dressing, incision, wound bed, drain sites and surrounding tissue  - Provide patient and family education  - Perform skin care/dressing changes as ordered  Outcome: Progressing

## 2024-04-30 NOTE — PLAN OF CARE
Problem: PAIN - ADULT  Goal: Verbalizes/displays adequate comfort level or baseline comfort level  Description: Interventions:  - Encourage patient to monitor pain and request assistance  - Assess pain using appropriate pain scale  - Administer analgesics based on type and severity of pain and evaluate response  - Implement non-pharmacological measures as appropriate and evaluate response  - Consider cultural and social influences on pain and pain management  - Notify physician/advanced practitioner if interventions unsuccessful or patient reports new pain  Outcome: Progressing     Problem: INFECTION - ADULT  Goal: Absence or prevention of progression during hospitalization  Description: INTERVENTIONS:  - Assess and monitor for signs and symptoms of infection  - Monitor lab/diagnostic results  - Monitor all insertion sites, i.e. indwelling lines, tubes, and drains  - Monitor endotracheal if appropriate and nasal secretions for changes in amount and color  - [place] appropriate cooling/warming therapies per order  - Administer medications as ordered  - Instruct and encourage patient and family to use good hand hygiene technique  - Identify and instruct in appropriate isolation precautions for identified infection/condition  Outcome: Progressing     Problem: SAFETY ADULT  Goal: Patient will remain free of falls  Description: INTERVENTIONS:  - Educate patient/family on patient safety including physical limitations  - Instruct patient to call for assistance with activity   - Consult OT/PT to assist with strengthening/mobility   - Keep Call bell within reach  - Keep bed low and locked with side rails adjusted as appropriate  - Keep care items and personal belongings within reach  - Initiate and maintain comfort rounds  - Make Fall Risk Sign visible to staff  Outcome: Progressing     Problem: DISCHARGE PLANNING  Goal: Discharge to home or other facility with appropriate resources  Description: INTERVENTIONS:  -  Identify barriers to discharge w/patient and caregiver  - Arrange for needed discharge resources and transportation as appropriate  - Identify discharge learning needs (meds, wound care, etc.)  - Arrange for interpretive services to assist at discharge as needed  - Refer to Case Management Department for coordinating discharge planning if the patient needs post-hospital services based on physician/advanced practitioner order or complex needs related to functional status, cognitive ability, or social support system  Outcome: Progressing     Problem: Knowledge Deficit  Goal: Patient/family/caregiver demonstrates understanding of disease process, treatment plan, medications, and discharge instructions  Description: Complete learning assessment and assess knowledge base.  Interventions:  - Provide teaching at level of understanding  - Provide teaching via preferred learning methods  Outcome: Progressing     Problem: METABOLIC, FLUID AND ELECTROLYTES - ADULT  Goal: Glucose maintained within target range  Description: INTERVENTIONS:  - Monitor Blood Glucose as ordered  - Assess for signs and symptoms of hyperglycemia and hypoglycemia  - Administer ordered medications to maintain glucose within target range  - Assess nutritional intake and initiate nutrition service referral as needed  Outcome: Progressing     Problem: SKIN/TISSUE INTEGRITY - ADULT  Goal: Skin Integrity remains intact(Skin Breakdown Prevention)  Description: Assess:  -Perform Clifford assessment every 8 hours  -Clean and moisturize skin   -Inspect skin when repositioning, toileting, and assisting with ADLS  -Assess under medical devices   -Assess extremities for adequate circulation and sensation     Bed Management:  -Have minimal linens on bed & keep smooth, unwrinkled  -Change linens as needed when moist or perspiring  -Avoid sitting or lying in one position for more than 2 hours while in bed    Toileting:  Bathroom    Activity:  -Mobilize patient 3 times  a day  -Encourage activity and walks on unit  -Encourage or provide ROM exercises   -Instruct/ Assist with weight shifting every 15 minutes when out of bed in chair  -Consider limitation of chair time 2 hour intervals    Skin Care:  -Avoid use of baby powder, tape, friction and shearing, hot water or constrictive clothing  -Relieve pressure over bony prominences   -Do not massage red bony areas    Next Steps:  -Teach patient strategies to minimize risks    -Consider consults to  interdisciplinary teams   Outcome: Progressing  Goal: Incision(s), wounds(s) or drain site(s) healing without S/S of infection  Description: INTERVENTIONS  - Assess and document dressing, incision, wound bed, drain sites and surrounding tissue  - Provide patient and family education  - Perform skin care/dressing changes as ordered  Outcome: Progressing

## 2024-04-30 NOTE — CASE MANAGEMENT
Case Management Discharge Planning Note    Patient name Cricket Rosales  Location [facility] [id number] MRN [id number]  : 1955 Date 2024       Current Admission Date: 2024  Current Admission Diagnosis:Cellulitis of right lower leg   Patient Active Problem List    Diagnosis Date Noted    Cellulitis of right lower leg 2024    Diarrhea 2024    Sepsis ([facility]) 2024    Elevated troponin 2024    Cellulitis 2024    Bunion 2024    Other diabetic neurological complication associated with type 2 diabetes mellitus ([facility]) 2024    Left foot pain 2024    Right foot pain 2024    Pes planus of both feet 2024    Severe persistent asthma without complication 2024    S/P TKR (total knee replacement), left 2023    CKD (chronic kidney disease) stage 3, GFR 30-59 ml/min ([facility]) 2023    Leukocytosis 2023    Chronic combined systolic and diastolic CHF (congestive heart failure) ([facility]) 2023    Moderate episode of recurrent major depressive disorder ([facility]) 2023    Controlled type 2 diabetes mellitus with diabetic nephropathy, without long-term current use of insulin ([facility]) 2022    Hyponatremia 2022    PLMD (periodic limb movement disorder)     Hypertrophied anal papilla 2019    History of tobacco abuse 2018    Constipation 2018    Chronic asthma, moderate persistent, uncomplicated 2018    Obstructive sleep apnea 2018    Hemorrhoids 2017    COPD, severe ([facility]) 10/16/2017    Chronic combined systolic and diastolic congestive heart failure ([facility]) 2015    Non-ischemic cardiomyopathy with HFmEF ([facility]) 2015    Anxiety 2014    Thyroid disease 2014    Benign essential hypertension 2013    Vitamin D deficiency 2013    Dyslipidemia 2013    Asthma-COPD overlap syndrome 2012      LOS (days): 2  Geometric Mean LOS (GMLOS) (days): 3.2  Days to GMLOS:1.1      OBJECTIVE:  Risk of Unplanned Readmission Score: 19.93         Current admission status: Inpatient   Preferred Pharmacy:   [id number] - [place] - [address]  [address] [place] [address]  Phone: [phone] Fax: [phone]    [id number] - [place] - [address]  [address]  [place] [address]  Phone: [phone] Fax: [phone]    Primary Care Provider: Lauren Vee,     Primary Insurance: MEDICARE  Secondary Insurance: AETNA    DISCHARGE DETAILS:    Discharge planning discussed with:: patient        CM contacted family/caregiver?: No- see comments (declined)  Were Treatment Team discharge recommendations reviewed with patient/caregiver?: Yes  Did patient/caregiver verbalize understanding of patient care needs?: Yes  Were patient/caregiver advised of the risks associated with not following Treatment Team discharge recommendations?: Yes    Contacts  Contact Method: In Person  Reason/Outcome: Discharge Planning    Requested Home Health Care         Is the patient interested in C at discharge?: No      Would you like to participate in our [facility] service program?  : No - Declined    Treatment Team Recommendation: Home  Discharge Destination Plan:: Home     IMM Given (Date):: 04/30/24  IMM Given to:: Patient      Tentative discharge home tomorrow with o/p follow up.

## 2024-04-30 NOTE — RESPIRATORY THERAPY NOTE
04/30/24 0854   Inhalation Therapy Tx   $ Inhalation Therapy Performed Yes   $ Pulse Oximetry Spot Check Charge Completed   SpO2 94 %   Pre-Treatment Pulse 67   Pre-Treatment Respirations 20   Duration 20   Breath Sounds Pre-Treatment Bilateral Diminished   Delivery Source Air;UDN   Position Up in chair   Treatment Tolerance Tolerated well   Resp Comments patient remains on room air

## 2024-04-30 NOTE — ASSESSMENT & PLAN NOTE
Treated with IV ceftriaxone x 2 days at [facility] and discharged on PO abx 4/26. Returns with reported fevers and worsening erythema to right lower extremity.   Not meetings sepsis/SIRs at this time, only with leukocytosis  WBC unchanged, patient feels poorly, will escalate to vancomycin , check MRSA screen and CT RLE 4/29  WBC improved, CT reviewed, patient feels much better, continue vancomycin with transition to doxycycline on dc 4/30

## 2024-04-30 NOTE — PROGRESS NOTES
Cricket Rosales is a 68 y.o. male who is currently ordered Vancomycin IV with management by the Pharmacy Consult service.  Relevant clinical data and objective / subjective history reviewed.  Vancomycin Assessment:  Indication and Goal AUC/Trough: Soft tissue (goal -600, trough >10), -600, trough >10  Clinical Status: stable  Micro:     Renal Function:  SCr: 1.5 mg/dL  CrCl: 68.3 mL/min  Renal replacement: Not on dialysis  Days of Therapy: 2  Current Dose: 1500mg q24h  Vancomycin Plan:  New Dosing: same  Estimated AUC: 435 mcg*hr/mL  Estimated Trough: 12.1 mcg/mL  Next Level: 0600 on 5/2/24  Renal Function Monitoring: Daily BMP and UOP  Pharmacy will continue to follow closely for s/sx of nephrotoxicity, infusion reactions and appropriateness of therapy.  BMP and CBC will be ordered per protocol. We will continue to follow the patient’s culture results and clinical progress daily.    Alexi Thomas, Pharmacist

## 2024-04-30 NOTE — ASSESSMENT & PLAN NOTE
Lab Results   Component Value Date    HGBA1C 7.0 (H) 03/06/2024       Recent Labs     04/29/24  1125 04/29/24  1621 04/29/24 2046 04/30/24  0744   POCGLU 119 146* 125 113         Blood Sugar Average: Last 72 hrs:  (P) [id number]  Holding home meds  SSI  Fingersticks with meals

## 2024-05-01 LAB
ANION GAP SERPL CALCULATED.3IONS-SCNC: 11 MMOL/L (ref 4–13)
BASOPHILS # BLD AUTO: 0.07 THOUSANDS/ÂΜL (ref 0–0.1)
BASOPHILS NFR BLD AUTO: 1 % (ref 0–1)
BUN SERPL-MCNC: 29 MG/DL (ref 5–25)
CALCIUM SERPL-MCNC: 9.3 MG/DL (ref 8.4–10.2)
CHLORIDE SERPL-SCNC: 99 MMOL/L (ref 96–108)
CO2 SERPL-SCNC: 26 MMOL/L (ref 21–32)
CREAT SERPL-MCNC: 1.55 MG/DL (ref 0.6–1.3)
EOSINOPHIL # BLD AUTO: 0.45 THOUSAND/ÂΜL (ref 0–0.61)
EOSINOPHIL NFR BLD AUTO: 5 % (ref 0–6)
ERYTHROCYTE [DISTWIDTH] IN BLOOD BY AUTOMATED COUNT: 15.6 % (ref 11.6–15.1)
GFR SERPL CREATININE-BSD FRML MDRD: 45 ML/MIN/1.73SQ M
GLUCOSE SERPL-MCNC: 108 MG/DL (ref 65–140)
GLUCOSE SERPL-MCNC: 117 MG/DL (ref 65–140)
GLUCOSE SERPL-MCNC: 125 MG/DL (ref 65–140)
GLUCOSE SERPL-MCNC: 131 MG/DL (ref 65–140)
HCT VFR BLD AUTO: 37.6 % (ref 36.5–49.3)
HGB BLD-MCNC: 11.6 G/DL (ref 12–17)
IMM GRANULOCYTES # BLD AUTO: 0.47 THOUSAND/UL (ref 0–0.2)
IMM GRANULOCYTES NFR BLD AUTO: 6 % (ref 0–2)
LYMPHOCYTES # BLD AUTO: 2.02 THOUSANDS/ÂΜL (ref 0.6–4.47)
LYMPHOCYTES NFR BLD AUTO: 24 % (ref 14–44)
MCH RBC QN AUTO: 25.7 PG (ref 26.8–34.3)
MCHC RBC AUTO-ENTMCNC: 30.9 G/DL (ref 31.4–37.4)
MCV RBC AUTO: 83 FL (ref 82–98)
MONOCYTES # BLD AUTO: 0.77 THOUSAND/ÂΜL (ref 0.17–1.22)
MONOCYTES NFR BLD AUTO: 9 % (ref 4–12)
NEUTROPHILS # BLD AUTO: 4.78 THOUSANDS/ÂΜL (ref 1.85–7.62)
NEUTS SEG NFR BLD AUTO: 55 % (ref 43–75)
NRBC BLD AUTO-RTO: 0 /100 WBCS
PLATELET # BLD AUTO: 371 THOUSANDS/UL (ref 149–390)
PMV BLD AUTO: 9.3 FL (ref 8.9–12.7)
POTASSIUM SERPL-SCNC: 3.8 MMOL/L (ref 3.5–5.3)
RBC # BLD AUTO: 4.52 MILLION/UL (ref 3.88–5.62)
SODIUM SERPL-SCNC: 136 MMOL/L (ref 135–147)
WBC # BLD AUTO: 8.56 THOUSAND/UL (ref 4.31–10.16)

## 2024-05-01 PROCEDURE — 94640 AIRWAY INHALATION TREATMENT: CPT

## 2024-05-01 PROCEDURE — 80048 BASIC METABOLIC PNL TOTAL CA: CPT | Performed by: FAMILY MEDICINE

## 2024-05-01 PROCEDURE — 82948 REAGENT STRIP/BLOOD GLUCOSE: CPT

## 2024-05-01 PROCEDURE — 99232 SBSQ HOSP IP/OBS MODERATE 35: CPT | Performed by: FAMILY MEDICINE

## 2024-05-01 PROCEDURE — 94760 N-INVAS EAR/PLS OXIMETRY 1: CPT

## 2024-05-01 PROCEDURE — 94664 DEMO&/EVAL PT USE INHALER: CPT

## 2024-05-01 PROCEDURE — 85027 COMPLETE CBC AUTOMATED: CPT | Performed by: FAMILY MEDICINE

## 2024-05-01 RX ADMIN — FLUTICASONE PROPIONATE 1 SPRAY: 50 SPRAY, METERED NASAL at 09:22

## 2024-05-01 RX ADMIN — ATORVASTATIN CALCIUM 20 MG: 10 TABLET, FILM COATED ORAL at 17:25

## 2024-05-01 RX ADMIN — LEVALBUTEROL HYDROCHLORIDE 1.25 MG: 1.25 SOLUTION RESPIRATORY (INHALATION) at 19:46

## 2024-05-01 RX ADMIN — UMECLIDINIUM 1 PUFF: 62.5 AEROSOL, POWDER ORAL at 09:22

## 2024-05-01 RX ADMIN — HEPARIN SODIUM 5000 UNITS: 5000 INJECTION, SOLUTION INTRAVENOUS; SUBCUTANEOUS at 21:19

## 2024-05-01 RX ADMIN — LEVALBUTEROL HYDROCHLORIDE 1.25 MG: 1.25 SOLUTION RESPIRATORY (INHALATION) at 14:24

## 2024-05-01 RX ADMIN — VANCOMYCIN HYDROCHLORIDE 1500 MG: 5 INJECTION, POWDER, LYOPHILIZED, FOR SOLUTION INTRAVENOUS at 11:53

## 2024-05-01 RX ADMIN — MONTELUKAST 10 MG: 10 TABLET, FILM COATED ORAL at 21:19

## 2024-05-01 RX ADMIN — ISOSORBIDE MONONITRATE 30 MG: 30 TABLET, EXTENDED RELEASE ORAL at 09:21

## 2024-05-01 RX ADMIN — FLUTICASONE FUROATE AND VILANTEROL TRIFENATATE 1 PUFF: 200; 25 POWDER RESPIRATORY (INHALATION) at 09:22

## 2024-05-01 RX ADMIN — ALPRAZOLAM 0.5 MG: 0.5 TABLET ORAL at 21:19

## 2024-05-01 RX ADMIN — HEPARIN SODIUM 5000 UNITS: 5000 INJECTION, SOLUTION INTRAVENOUS; SUBCUTANEOUS at 14:25

## 2024-05-01 RX ADMIN — HEPARIN SODIUM 5000 UNITS: 5000 INJECTION, SOLUTION INTRAVENOUS; SUBCUTANEOUS at 05:14

## 2024-05-01 RX ADMIN — CARVEDILOL 25 MG: 12.5 TABLET, FILM COATED ORAL at 09:21

## 2024-05-01 RX ADMIN — DULOXETINE HYDROCHLORIDE 60 MG: 30 CAPSULE, DELAYED RELEASE ORAL at 21:19

## 2024-05-01 RX ADMIN — ASPIRIN 81 MG: 81 TABLET, COATED ORAL at 09:21

## 2024-05-01 RX ADMIN — CARVEDILOL 25 MG: 12.5 TABLET, FILM COATED ORAL at 17:25

## 2024-05-01 RX ADMIN — ACETAMINOPHEN 650 MG: 325 TABLET, FILM COATED ORAL at 21:19

## 2024-05-01 RX ADMIN — LEVALBUTEROL HYDROCHLORIDE 1.25 MG: 1.25 SOLUTION RESPIRATORY (INHALATION) at 07:06

## 2024-05-01 NOTE — RESPIRATORY THERAPY NOTE
05/01/24 0706   Inhalation Therapy Tx   $ Inhalation Therapy Performed Yes   $ Pulse Oximetry Spot Check Charge Completed   SpO2 95 %   Pre-Treatment Pulse 63   Pre-Treatment Respirations 20   Duration 20   Breath Sounds Pre-Treatment Bilateral Diminished;Clear   Delivery Source Air;UDN   Position Up in chair   Treatment Tolerance Tolerated well   Resp Comments patient denies sob, he remains on room air

## 2024-05-01 NOTE — ASSESSMENT & PLAN NOTE
Treated with IV ceftriaxone x 2 days at [facility] and discharged on PO abx 4/26. Returns with reported fevers and worsening erythema to right lower extremity.   Not meetings sepsis/SIRs at this time, only with leukocytosis  WBC unchanged, patient feels poorly, will escalate to vancomycin , check MRSA screen and CT RLE 4/29  WBC continues to improve, CT reviewed, patient feels much better, continue vancomycin with transition to doxycycline on dc tomorrow

## 2024-05-01 NOTE — ASSESSMENT & PLAN NOTE
Wt Readings from Last 3 Encounters:   05/01/24 124 kg (273 lb 13 oz)   04/26/24 127 kg (278 lb 15.9 oz)   04/09/24 129 kg (285 lb)     Euvolemic on exam without edema.  Lasix PO 40 mg q daily   Low salt diet, I&O monitoring

## 2024-05-01 NOTE — PROGRESS NOTES
Cricket Rosales is a 68 y.o. male who is currently ordered Vancomycin IV with management by the Pharmacy Consult service.  Relevant clinical data and objective / subjective history reviewed.  Vancomycin Assessment:  Indication and Goal AUC/Trough: Soft tissue (goal -600, trough >10), -600, trough >10  Clinical Status: improving  Micro:     Renal Function:  SCr: 1.55 mg/dL  CrCl: 45 mL/min  Renal replacement: Not on dialysis  Days of Therapy: 3  Current Dose: 1500mg IV q24h  Vancomycin Plan:  New Dosing: same  Estimated AUC: 465 mcg*hr/mL  Estimated Trough: 13.1 mcg/mL  Next Level: 5/2/24 with AM labs  Renal Function Monitoring: Daily BMP and UOP  Pharmacy will continue to follow closely for s/sx of nephrotoxicity, infusion reactions and appropriateness of therapy.  BMP and CBC will be ordered per protocol. We will continue to follow the patient’s culture results and clinical progress daily.    Blaine Ha, Pharmacist

## 2024-05-01 NOTE — PLAN OF CARE
Problem: PAIN - ADULT  Goal: Verbalizes/displays adequate comfort level or baseline comfort level  Description: Interventions:  - Encourage patient to monitor pain and request assistance  - Assess pain using appropriate pain scale  - Administer analgesics based on type and severity of pain and evaluate response  - Implement non-pharmacological measures as appropriate and evaluate response  - Consider cultural and social influences on pain and pain management  - Notify physician/advanced practitioner if interventions unsuccessful or patient reports new pain  Outcome: Progressing     Problem: INFECTION - ADULT  Goal: Absence or prevention of progression during hospitalization  Description: INTERVENTIONS:  - Assess and monitor for signs and symptoms of infection  - Monitor lab/diagnostic results  - Monitor all insertion sites, i.e. indwelling lines, tubes, and drains  - Monitor endotracheal if appropriate and nasal secretions for changes in amount and color  - [place] appropriate cooling/warming therapies per order  - Administer medications as ordered  - Instruct and encourage patient and family to use good hand hygiene technique  - Identify and instruct in appropriate isolation precautions for identified infection/condition  Outcome: Progressing     Problem: SAFETY ADULT  Goal: Patient will remain free of falls  Description: INTERVENTIONS:  - Educate patient/family on patient safety including physical limitations  - Instruct patient to call for assistance with activity   - Consult OT/PT to assist with strengthening/mobility   - Keep Call bell within reach  - Keep bed low and locked with side rails adjusted as appropriate  - Keep care items and personal belongings within reach  - Initiate and maintain comfort rounds  - Make Fall Risk Sign visible to staff  Outcome: Progressing     Problem: DISCHARGE PLANNING  Goal: Discharge to home or other facility with appropriate resources  Description: INTERVENTIONS:  -  Identify barriers to discharge w/patient and caregiver  - Arrange for needed discharge resources and transportation as appropriate  - Identify discharge learning needs (meds, wound care, etc.)  - Arrange for interpretive services to assist at discharge as needed  - Refer to Case Management Department for coordinating discharge planning if the patient needs post-hospital services based on physician/advanced practitioner order or complex needs related to functional status, cognitive ability, or social support system  Outcome: Progressing     Problem: Knowledge Deficit  Goal: Patient/family/caregiver demonstrates understanding of disease process, treatment plan, medications, and discharge instructions  Description: Complete learning assessment and assess knowledge base.  Interventions:  - Provide teaching at level of understanding  - Provide teaching via preferred learning methods  Outcome: Progressing     Problem: METABOLIC, FLUID AND ELECTROLYTES - ADULT  Goal: Glucose maintained within target range  Description: INTERVENTIONS:  - Monitor Blood Glucose as ordered  - Assess for signs and symptoms of hyperglycemia and hypoglycemia  - Administer ordered medications to maintain glucose within target range  - Assess nutritional intake and initiate nutrition service referral as needed  Outcome: Progressing     Problem: SKIN/TISSUE INTEGRITY - ADULT  Goal: Skin Integrity remains intact(Skin Breakdown Prevention)  Description: Assess:  -Perform Clifford assessment every 8 hours  -Clean and moisturize skin   -Inspect skin when repositioning, toileting, and assisting with ADLS  -Assess under medical devices   -Assess extremities for adequate circulation and sensation     Bed Management:  -Have minimal linens on bed & keep smooth, unwrinkled  -Change linens as needed when moist or perspiring  -Avoid sitting or lying in one position for more than 2 hours while in bed    Toileting:  Bathroom    Activity:  -Mobilize patient 3 times  a day  -Encourage activity and walks on unit  -Encourage or provide ROM exercises   -Instruct/ Assist with weight shifting every 15 minutes when out of bed in chair  -Consider limitation of chair time 2 hour intervals    Skin Care:  -Avoid use of baby powder, tape, friction and shearing, hot water or constrictive clothing  -Relieve pressure over bony prominences   -Do not massage red bony areas    Next Steps:  -Teach patient strategies to minimize risks    -Consider consults to  interdisciplinary teams   Outcome: Progressing  Goal: Incision(s), wounds(s) or drain site(s) healing without S/S of infection  Description: INTERVENTIONS  - Assess and document dressing, incision, wound bed, drain sites and surrounding tissue  - Provide patient and family education  - Perform skin care/dressing changes as ordered  Outcome: Progressing

## 2024-05-01 NOTE — ASSESSMENT & PLAN NOTE
Lab Results   Component Value Date    HGBA1C 7.0 (H) 03/06/2024       Recent Labs     04/30/24  0744 04/30/24  1109 04/30/24  1559 05/01/24  0724   POCGLU 113 119 150* 117         Blood Sugar Average: Last 72 hrs:  (P) [id number]  Holding home meds  SSI  Fingersticks with meals

## 2024-05-01 NOTE — PLAN OF CARE
Problem: PAIN - ADULT  Goal: Verbalizes/displays adequate comfort level or baseline comfort level  Description: Interventions:  - Encourage patient to monitor pain and request assistance  - Assess pain using appropriate pain scale  - Administer analgesics based on type and severity of pain and evaluate response  - Implement non-pharmacological measures as appropriate and evaluate response  - Consider cultural and social influences on pain and pain management  - Notify physician/advanced practitioner if interventions unsuccessful or patient reports new pain  Outcome: Progressing     Problem: INFECTION - ADULT  Goal: Absence or prevention of progression during hospitalization  Description: INTERVENTIONS:  - Assess and monitor for signs and symptoms of infection  - Monitor lab/diagnostic results  - Monitor all insertion sites, i.e. indwelling lines, tubes, and drains  - Monitor endotracheal if appropriate and nasal secretions for changes in amount and color  - [place] appropriate cooling/warming therapies per order  - Administer medications as ordered  - Instruct and encourage patient and family to use good hand hygiene technique  - Identify and instruct in appropriate isolation precautions for identified infection/condition  Outcome: Progressing     Problem: SAFETY ADULT  Goal: Patient will remain free of falls  Description: INTERVENTIONS:  - Educate patient/family on patient safety including physical limitations  - Instruct patient to call for assistance with activity   - Consult OT/PT to assist with strengthening/mobility   - Keep Call bell within reach  - Keep bed low and locked with side rails adjusted as appropriate  - Keep care items and personal belongings within reach  - Initiate and maintain comfort rounds  - Make Fall Risk Sign visible to staff  Outcome: Progressing     Problem: DISCHARGE PLANNING  Goal: Discharge to home or other facility with appropriate resources  Description: INTERVENTIONS:  -  Identify barriers to discharge w/patient and caregiver  - Arrange for needed discharge resources and transportation as appropriate  - Identify discharge learning needs (meds, wound care, etc.)  - Arrange for interpretive services to assist at discharge as needed  - Refer to Case Management Department for coordinating discharge planning if the patient needs post-hospital services based on physician/advanced practitioner order or complex needs related to functional status, cognitive ability, or social support system  Outcome: Progressing     Problem: Knowledge Deficit  Goal: Patient/family/caregiver demonstrates understanding of disease process, treatment plan, medications, and discharge instructions  Description: Complete learning assessment and assess knowledge base.  Interventions:  - Provide teaching at level of understanding  - Provide teaching via preferred learning methods  Outcome: Progressing     Problem: METABOLIC, FLUID AND ELECTROLYTES - ADULT  Goal: Glucose maintained within target range  Description: INTERVENTIONS:  - Monitor Blood Glucose as ordered  - Assess for signs and symptoms of hyperglycemia and hypoglycemia  - Administer ordered medications to maintain glucose within target range  - Assess nutritional intake and initiate nutrition service referral as needed  Outcome: Progressing     Problem: SKIN/TISSUE INTEGRITY - ADULT  Goal: Skin Integrity remains intact(Skin Breakdown Prevention)  Description: Assess:  -Perform Clifford assessment every 8 hours  -Clean and moisturize skin   -Inspect skin when repositioning, toileting, and assisting with ADLS  -Assess under medical devices   -Assess extremities for adequate circulation and sensation     Bed Management:  -Have minimal linens on bed & keep smooth, unwrinkled  -Change linens as needed when moist or perspiring  -Avoid sitting or lying in one position for more than 2 hours while in bed    Toileting:  Bathroom    Activity:  -Mobilize patient 3 times  a day  -Encourage activity and walks on unit  -Encourage or provide ROM exercises   -Instruct/ Assist with weight shifting every 15 minutes when out of bed in chair  -Consider limitation of chair time 2 hour intervals    Skin Care:  -Avoid use of baby powder, tape, friction and shearing, hot water or constrictive clothing  -Relieve pressure over bony prominences   -Do not massage red bony areas    Next Steps:  -Teach patient strategies to minimize risks    -Consider consults to  interdisciplinary teams   Outcome: Progressing  Goal: Incision(s), wounds(s) or drain site(s) healing without S/S of infection  Description: INTERVENTIONS  - Assess and document dressing, incision, wound bed, drain sites and surrounding tissue  - Provide patient and family education  - Perform skin care/dressing changes as ordered  Outcome: Progressing

## 2024-05-01 NOTE — PROGRESS NOTES
[facility]  Progress Note  Name: Cricket Rosales I  MRN: [id number]  Unit/Bed#: 407-01 I Date of Admission: 4/27/2024   Date of Service: 5/1/2024 I Hospital Day: 3    Assessment/Plan   * Cellulitis of right lower leg  Assessment & Plan  Treated with IV ceftriaxone x 2 days at [facility] and discharged on PO abx 4/26. Returns with reported fevers and worsening erythema to right lower extremity.   Not meetings sepsis/SIRs at this time, only with leukocytosis  WBC unchanged, patient feels poorly, will escalate to vancomycin , check MRSA screen and CT RLE 4/29  WBC continues to improve, CT reviewed, patient feels much better, continue vancomycin with transition to doxycycline on dc tomorrow     Severe persistent asthma without complication  Assessment & Plan  Continue home inhaler regimen    Chronic combined systolic and diastolic CHF (congestive heart failure) ([facility])  Assessment & Plan  Wt Readings from Last 3 Encounters:   05/01/24 124 kg (273 lb 13 oz)   04/26/24 127 kg (278 lb 15.9 oz)   04/09/24 129 kg (285 lb)     Euvolemic on exam without edema.  Lasix PO 40 mg q daily   Low salt diet, I&O monitoring           Controlled type 2 diabetes mellitus with diabetic nephropathy, without long-term current use of insulin ([facility])  Assessment & Plan  Lab Results   Component Value Date    HGBA1C 7.0 (H) 03/06/2024       Recent Labs     04/30/24  0744 04/30/24  1109 04/30/24  1559 05/01/24  0724   POCGLU 113 119 150* 117         Blood Sugar Average: Last 72 hrs:  (P) [id number]  Holding home meds  SSI  Fingersticks with meals               Progress Note - Cricket Rosales 68 y.o. male MRN: [id number]    Unit/Bed#: 407-01 Encounter: [id number]        Subjective:   Patient seen and examined, feels much better today. Less pain and swelling of leg     Objective:     Vitals:   Vitals:    05/01/24 0726   BP: 137/69   Pulse: 62   Resp:    Temp: 97.5 °F (36.4 °C)   SpO2: 96%     Body mass index is 35.16  "kg/m².    Intake/Output Summary (Last 24 hours) at 5/1/2024 0738  Last data filed at 5/1/2024 0422  Gross per 24 hour   Intake 1560 ml   Output 1650 ml   Net -90 ml       Physical Exam:   /69   Pulse 62   Temp 97.5 °F (36.4 °C)   Resp 17   Ht 6' 2\" (1.88 m)   Wt 124 kg (273 lb 13 oz)   SpO2 96%   BMI 35.16 kg/m²   General appearance: alert and oriented, in no acute distress  Head: Normocephalic, without obvious abnormality, atraumatic  Lungs: clear to auscultation bilaterally  Heart: regular rate and rhythm, S1, S2 normal, no murmur, click, rub or gallop  Abdomen: soft, non-tender; bowel sounds normal; no masses,  no organomegaly  Extremities: significant improvement in erythema and edema of RLE. LLE WNL  Pulses: 2+ and symmetric  Neurologic: Grossly normal     Invasive Devices       Peripheral Intravenous Line  Duration             Peripheral IV 04/27/24 Right Antecubital 3 days                    Results from last 7 days   Lab Units 05/01/24 0427 04/30/24 0449 04/29/24 0445   WBC Thousand/uL 8.56 10.30* 14.81*   HEMOGLOBIN g/dL 11.6* 11.4* 11.6*   HEMATOCRIT % 37.6 36.5 37.4   PLATELETS Thousands/uL 371 323 269       Results from last 7 days   Lab Units 05/01/24 0427 04/30/24  0449 04/29/24  0445 04/28/24  0433 04/27/24  0837 04/25/24  0428 04/24/24  0815   POTASSIUM mmol/L 3.8 4.0 3.6   < > 4.0   < > 3.9   CHLORIDE mmol/L 99 100 100   < > 99   < > 97   CO2 mmol/L 26 25 26   < > 20*   < > 22   BUN mg/dL 29* 26* 29*   < > 33*   < > 40*   CREATININE mg/dL 1.55* 1.50* 1.70*   < > 1.51*   < > 1.87*   CALCIUM mg/dL 9.3 9.2 9.0   < > 9.1   < > 8.9   ALK PHOS U/L  --   --   --   --  67  --  68   ALT U/L  --   --   --   --  35  --  19   AST U/L  --   --   --   --  22  --  13    < > = values in this interval not displayed.       Medication Administration - last 24 hours from 04/30/2024 0738 to 05/01/2024 0738         Date/Time Order Dose Route Action Action by     04/30/2024 2129 EDT acetaminophen " (TYLENOL) tablet 650 mg 650 mg Oral Given Rolf Roland Reyes, RN     05/01/2024 0514 EDT heparin (porcine) subcutaneous injection 5,000 Units 5,000 Units Subcutaneous Given Rolf Roland Reyes, RN     04/30/2024 2121 EDT heparin (porcine) subcutaneous injection 5,000 Units 5,000 Units Subcutaneous Given Rolf Roland Reyes, RN     04/30/2024 1443 EDT heparin (porcine) subcutaneous injection 5,000 Units 5,000 Units Subcutaneous Given Blanche Andre RN     04/30/2024 2121 EDT ALPRAZolam (XANAX) tablet 0.5 mg 0.5 mg Oral Given Rolf Roland Reyes, RN     04/30/2024 0907 EDT aspirin (ECOTRIN LOW STRENGTH) EC tablet 81 mg 81 mg Oral Given Blanche Andre RN     04/30/2024 1710 EDT atorvastatin (LIPITOR) tablet 20 mg 20 mg Oral Given Blanche Andre RN     04/30/2024 1710 EDT carvedilol (COREG) tablet 25 mg 25 mg Oral Given Blanche Andre RN     04/30/2024 0907 EDT carvedilol (COREG) tablet 25 mg 25 mg Oral Given Blanche Andre RN     04/30/2024 2121 EDT DULoxetine (CYMBALTA) delayed release capsule 60 mg 60 mg Oral Given Rolf Roland Reyes, RN     04/30/2024 0907 EDT fluticasone (FLONASE) 50 mcg/act nasal spray 1 spray 1 spray Each Nare Given Blanche Andre RN     04/30/2024 0907 EDT fluticasone-vilanterol 200-25 mcg/actuation 1 puff 1 puff Inhalation Given Blanche Andre RN     04/30/2024 0907 EDT isosorbide mononitrate (IMDUR) 24 hr tablet 30 mg 30 mg Oral Given Blanche Andre RN     04/30/2024 2121 EDT montelukast (SINGULAIR) tablet 10 mg 10 mg Oral Given Rolf Roland Reyes, RN     04/30/2024 0907 EDT umeclidinium 62.5 mcg/actuation inhaler AEPB 1 puff 1 puff Inhalation Given Blanche Andre RN     04/30/2024 1710 EDT insulin lispro (HumALOG/ADMELOG) 100 units/mL subcutaneous injection 1-5 Units 1 Units Subcutaneous Given Blanche Andre RN     04/30/2024 1146 EDT insulin lispro (HumALOG/ADMELOG) 100 units/mL subcutaneous injection 1-5 Units -- Subcutaneous Not Given Blanche Andre RN     04/30/2024 0830 EDT insulin  lispro (HumALOG/ADMELOG) 100 units/mL subcutaneous injection 1-5 Units -- Subcutaneous Not Given Blanche Andre RN     04/30/2024 0907 EDT furosemide (LASIX) tablet 40 mg 40 mg Oral Given Blanche Andre RN     05/01/2024 0706 EDT levalbuterol (XOPENEX) inhalation solution 1.25 mg 1.25 mg Nebulization Given Ina Urrutia, RT     04/30/2024 2055 EDT levalbuterol (XOPENEX) inhalation solution 1.25 mg 1.25 mg Nebulization Given Mi Fry     04/30/2024 1301 EDT levalbuterol (XOPENEX) inhalation solution 1.25 mg 1.25 mg Nebulization Given Ina Urrutia, RT     04/30/2024 1158 EDT vancomycin (VANCOCIN) 1,500 mg in sodium chloride 0.9 % 250 mL IVPB 1,500 mg Intravenous New Bag Blanche Andre RN              Lab, Imaging and other studies: I have personally reviewed pertinent reports.    VTE Pharmacologic Prophylaxis: Heparin  VTE Mechanical Prophylaxis: sequential compression device     Leonarda Hdz MD  5/1/2024,7:38 AM

## 2024-05-02 ENCOUNTER — TRANSITIONAL CARE MANAGEMENT (OUTPATIENT)
Dept: FAMILY MEDICINE CLINIC | Facility: CLINIC | Age: 69
End: 2024-05-02

## 2024-05-02 VITALS
WEIGHT: 274.69 LBS | DIASTOLIC BLOOD PRESSURE: 66 MMHG | OXYGEN SATURATION: 96 % | TEMPERATURE: 97.7 F | HEART RATE: 65 BPM | RESPIRATION RATE: 18 BRPM | HEIGHT: 74 IN | BODY MASS INDEX: 35.25 KG/M2 | SYSTOLIC BLOOD PRESSURE: 133 MMHG

## 2024-05-02 LAB
BACTERIA BLD CULT: NORMAL
BACTERIA BLD CULT: NORMAL
GLUCOSE SERPL-MCNC: 121 MG/DL (ref 65–140)
GLUCOSE SERPL-MCNC: 146 MG/DL (ref 65–140)
VANCOMYCIN SERPL-MCNC: 11.7 UG/ML (ref 10–20)

## 2024-05-02 PROCEDURE — 80202 ASSAY OF VANCOMYCIN: CPT | Performed by: FAMILY MEDICINE

## 2024-05-02 PROCEDURE — 82948 REAGENT STRIP/BLOOD GLUCOSE: CPT

## 2024-05-02 PROCEDURE — 99239 HOSP IP/OBS DSCHRG MGMT >30: CPT | Performed by: FAMILY MEDICINE

## 2024-05-02 PROCEDURE — 94640 AIRWAY INHALATION TREATMENT: CPT

## 2024-05-02 PROCEDURE — 94664 DEMO&/EVAL PT USE INHALER: CPT

## 2024-05-02 PROCEDURE — 94760 N-INVAS EAR/PLS OXIMETRY 1: CPT

## 2024-05-02 RX ORDER — GUAIFENESIN 600 MG/1
600 TABLET, EXTENDED RELEASE ORAL EVERY 12 HOURS SCHEDULED
Status: DISCONTINUED | OUTPATIENT
Start: 2024-05-02 | End: 2024-05-02 | Stop reason: HOSPADM

## 2024-05-02 RX ORDER — DOXYCYCLINE 100 MG/1
100 TABLET ORAL 2 TIMES DAILY
Qty: 10 TABLET | Refills: 0 | Status: SHIPPED | OUTPATIENT
Start: 2024-05-02 | End: 2024-05-07

## 2024-05-02 RX ADMIN — CARVEDILOL 25 MG: 12.5 TABLET, FILM COATED ORAL at 07:37

## 2024-05-02 RX ADMIN — FUROSEMIDE 40 MG: 40 TABLET ORAL at 08:35

## 2024-05-02 RX ADMIN — LEVALBUTEROL HYDROCHLORIDE 1.25 MG: 1.25 SOLUTION RESPIRATORY (INHALATION) at 08:40

## 2024-05-02 RX ADMIN — GUAIFENESIN 600 MG: 600 TABLET, EXTENDED RELEASE ORAL at 08:40

## 2024-05-02 RX ADMIN — HEPARIN SODIUM 5000 UNITS: 5000 INJECTION, SOLUTION INTRAVENOUS; SUBCUTANEOUS at 05:02

## 2024-05-02 RX ADMIN — GUAIFENESIN 600 MG: 600 TABLET, EXTENDED RELEASE ORAL at 02:36

## 2024-05-02 RX ADMIN — UMECLIDINIUM 1 PUFF: 62.5 AEROSOL, POWDER ORAL at 08:36

## 2024-05-02 RX ADMIN — FLUTICASONE FUROATE AND VILANTEROL TRIFENATATE 1 PUFF: 200; 25 POWDER RESPIRATORY (INHALATION) at 08:36

## 2024-05-02 RX ADMIN — VANCOMYCIN HYDROCHLORIDE 1500 MG: 5 INJECTION, POWDER, LYOPHILIZED, FOR SOLUTION INTRAVENOUS at 12:17

## 2024-05-02 RX ADMIN — ASPIRIN 81 MG: 81 TABLET, COATED ORAL at 08:35

## 2024-05-02 RX ADMIN — ALBUTEROL SULFATE 2.5 MG: 2.5 SOLUTION RESPIRATORY (INHALATION) at 02:17

## 2024-05-02 RX ADMIN — FLUTICASONE PROPIONATE 1 SPRAY: 50 SPRAY, METERED NASAL at 08:36

## 2024-05-02 RX ADMIN — ISOSORBIDE MONONITRATE 30 MG: 30 TABLET, EXTENDED RELEASE ORAL at 08:34

## 2024-05-02 NOTE — PROGRESS NOTES
Cricket Rosales is a 68 y.o. male who is currently ordered Vancomycin IV with management by the Pharmacy Consult service.  Relevant clinical data and objective / subjective history reviewed.  Vancomycin Assessment:  Indication and Goal AUC/Trough: Soft tissue (goal -600, trough >10), -600, trough >10  Clinical Status: stable  Micro:     Renal Function:  SCr: 1.55 mg/dL  CrCl: 65.8 mL/min  Renal replacement: Not on dialysis  Days of Therapy: 4  Current Dose: 1500mg Q24H  Vancomycin Plan:  New Dosing: same dose  Estimated AUC: 460 mcg*hr/mL  Estimated Trough: 13 mcg/mL  Next Level: 05/05/24 in the AM  Renal Function Monitoring: Daily BMP and UOP  Pharmacy will continue to follow closely for s/sx of nephrotoxicity, infusion reactions and appropriateness of therapy.  BMP and CBC will be ordered per protocol. We will continue to follow the patient’s culture results and clinical progress daily.    Heriberto Larios, Pharmacist

## 2024-05-02 NOTE — CASE MANAGEMENT
Case Management Discharge Planning Note    Patient name Cricket Rosales  Location [facility] 407/407-01 MRN [id number]  : 1955 Date 2024       Current Admission Date: 2024  Current Admission Diagnosis:Cellulitis of right lower leg   Patient Active Problem List    Diagnosis Date Noted    Cellulitis of right lower leg 2024    Diarrhea 2024    Sepsis ([facility]) 2024    Elevated troponin 2024    Cellulitis 2024    Bunion 2024    Other diabetic neurological complication associated with type 2 diabetes mellitus ([facility]) 2024    Left foot pain 2024    Right foot pain 2024    Pes planus of both feet 2024    Severe persistent asthma without complication 2024    S/P TKR (total knee replacement), left 2023    CKD (chronic kidney disease) stage 3, GFR 30-59 ml/min ([facility]) 2023    Leukocytosis 2023    Chronic combined systolic and diastolic CHF (congestive heart failure) ([facility]) 2023    Moderate episode of recurrent major depressive disorder ([facility]) 2023    Controlled type 2 diabetes mellitus with diabetic nephropathy, without long-term current use of insulin ([facility]) 2022    Hyponatremia 2022    PLMD (periodic limb movement disorder)     Hypertrophied anal papilla 2019    History of tobacco abuse 2018    Constipation 2018    Chronic asthma, moderate persistent, uncomplicated 2018    Obstructive sleep apnea 2018    Hemorrhoids 2017    COPD, severe ([facility]) 10/16/2017    Chronic combined systolic and diastolic congestive heart failure ([facility]) 2015    Non-ischemic cardiomyopathy with HFmEF ([facility]) 2015    Anxiety 2014    Thyroid disease 2014    Benign essential hypertension 2013    Vitamin D deficiency 2013    Dyslipidemia 2013    Asthma-COPD overlap syndrome 2012      LOS (days): 4  Geometric Mean LOS (GMLOS) (days): 3.2  Days to GMLOS:-0.7      OBJECTIVE:  Risk of Unplanned Readmission Score: 20.86         Current admission status: Inpatient   Preferred Pharmacy:   [id number] - [place] - [address]  [address] [place] [address]  Phone: [phone] Fax: [phone]    [id number] - [place] - [address]  [address]  [place] [address]  Phone: [phone] Fax: [phone]    Primary Care Provider: Lauren Vee DO    Primary Insurance: MEDICARE  Secondary Insurance: [employer]ET[employer]    DISCHARGE DETAILS:          Patient stable for discharge today.    Follow up providers listed on AVS.     Family to transport patient home.

## 2024-05-02 NOTE — PLAN OF CARE
Problem: PAIN - ADULT  Goal: Verbalizes/displays adequate comfort level or baseline comfort level  Description: Interventions:  - Encourage patient to monitor pain and request assistance  - Assess pain using appropriate pain scale  - Administer analgesics based on type and severity of pain and evaluate response  - Implement non-pharmacological measures as appropriate and evaluate response  - Consider cultural and social influences on pain and pain management  - Notify physician/advanced practitioner if interventions unsuccessful or patient reports new pain  Outcome: Progressing     Problem: INFECTION - ADULT  Goal: Absence or prevention of progression during hospitalization  Description: INTERVENTIONS:  - Assess and monitor for signs and symptoms of infection  - Monitor lab/diagnostic results  - Monitor all insertion sites, i.e. indwelling lines, tubes, and drains  - Monitor endotracheal if appropriate and nasal secretions for changes in amount and color  - [place] appropriate cooling/warming therapies per order  - Administer medications as ordered  - Instruct and encourage patient and family to use good hand hygiene technique  - Identify and instruct in appropriate isolation precautions for identified infection/condition  Outcome: Progressing     Problem: SAFETY ADULT  Goal: Patient will remain free of falls  Description: INTERVENTIONS:  - Educate patient/family on patient safety including physical limitations  - Instruct patient to call for assistance with activity   - Consult OT/PT to assist with strengthening/mobility   - Keep Call bell within reach  - Keep bed low and locked with side rails adjusted as appropriate  - Keep care items and personal belongings within reach  - Initiate and maintain comfort rounds  - Make Fall Risk Sign visible to staff  Outcome: Progressing     Problem: DISCHARGE PLANNING  Goal: Discharge to home or other facility with appropriate resources  Description: INTERVENTIONS:  -  Identify barriers to discharge w/patient and caregiver  - Arrange for needed discharge resources and transportation as appropriate  - Identify discharge learning needs (meds, wound care, etc.)  - Arrange for interpretive services to assist at discharge as needed  - Refer to Case Management Department for coordinating discharge planning if the patient needs post-hospital services based on physician/advanced practitioner order or complex needs related to functional status, cognitive ability, or social support system  Outcome: Progressing     Problem: Knowledge Deficit  Goal: Patient/family/caregiver demonstrates understanding of disease process, treatment plan, medications, and discharge instructions  Description: Complete learning assessment and assess knowledge base.  Interventions:  - Provide teaching at level of understanding  - Provide teaching via preferred learning methods  Outcome: Progressing     Problem: METABOLIC, FLUID AND ELECTROLYTES - ADULT  Goal: Glucose maintained within target range  Description: INTERVENTIONS:  - Monitor Blood Glucose as ordered  - Assess for signs and symptoms of hyperglycemia and hypoglycemia  - Administer ordered medications to maintain glucose within target range  - Assess nutritional intake and initiate nutrition service referral as needed  Outcome: Progressing     Problem: SKIN/TISSUE INTEGRITY - ADULT  Goal: Skin Integrity remains intact(Skin Breakdown Prevention)  Description: Assess:  -Perform Clifford assessment every 8 hours  -Clean and moisturize skin   -Inspect skin when repositioning, toileting, and assisting with ADLS  -Assess under medical devices   -Assess extremities for adequate circulation and sensation     Bed Management:  -Have minimal linens on bed & keep smooth, unwrinkled  -Change linens as needed when moist or perspiring  -Avoid sitting or lying in one position for more than 2 hours while in bed    Toileting:  Bathroom    Activity:  -Mobilize patient 3 times  a day  -Encourage activity and walks on unit  -Encourage or provide ROM exercises   -Instruct/ Assist with weight shifting every 15 minutes when out of bed in chair  -Consider limitation of chair time 2 hour intervals    Skin Care:  -Avoid use of baby powder, tape, friction and shearing, hot water or constrictive clothing  -Relieve pressure over bony prominences   -Do not massage red bony areas    Next Steps:  -Teach patient strategies to minimize risks    -Consider consults to  interdisciplinary teams   Outcome: Progressing  Goal: Incision(s), wounds(s) or drain site(s) healing without S/S of infection  Description: INTERVENTIONS  - Assess and document dressing, incision, wound bed, drain sites and surrounding tissue  - Provide patient and family education  - Perform skin care/dressing changes as ordered  Outcome: Progressing

## 2024-05-02 NOTE — DISCHARGE SUMMARY
Discharge Summary - Cricket Rosales 68 y.o. male MRN: [id number]    Unit/Bed#: 407-01 Encounter: [id number]    Admission Date:   Admission Orders (From admission, onward)       Ordered        04/28/24 1044  INPATIENT ADMISSION  Once            04/27/24 1016  Place in Observation  Once                            Admitting Diagnosis: Hypomagnesemia [E83.42]  Cellulitis [L03.90]  Hyponatremia [E87.1]  Cellulitis of right lower leg [L03.115]    HPI: Cricket Rosales is a 68 y.o. male with a PMH of chf, copd, htn, kevin who presents with right lower extremity erythema and fever.  Symptoms started approximately 3 days ago.  He was admitted to [facility] for 2 days and placed on IV ceftriaxone with improvement noted in his right lower extremity.  Patient was discharged on p.o. antibiotics.  He is only able to take 1 dose.  He developed fevers and felt that his leg began to worsen.  He therefore came back to the hospital.  He does have leukocytosis on lab work but not meeting any other sepsis criteria.  He was given IV ceftriaxone in the ED and we will continue with this.     Procedures Performed: No orders of the defined types were placed in this encounter.      Summary of Hospital Course:     RLE Cellulitis     68-year-old male with a history of diabetes, hypertension and CHF presented to the emergency room with redness and swelling of the right lower extremity, not meeting sepsis criteria, but recommended for inpatient treatment.  Initially placed on cefazolin with no improvement of erythema or edema.  Transitioned to IV vancomycin, MRSA screen positive, significant improvement of erythema within 2 days and the patient will be discharged on doxycycline to complete 7 days of ab     Chronic combined CHF: Patient remains euvolemic, Lasix 40 mg was administered orally during hospitalizations.    Type 2 diabetes mellitus: A1c 7, PCP follow-up on discharge.    Significant Findings, Care, Treatment and Services Provided:      CT    Subcutaneous edema in the lower leg extending into the dorsum of the foot as above. This may either reflect bland edema or cellulitis. No discrete abscess or soft tissue gas.     Complications: none    Discharge Diagnosis: see above    Medical Problems       Resolved Problems  Date Reviewed: 5/1/2024   None         Condition at Discharge: fair         Discharge instructions/Information to patient and family:   See after visit summary for information provided to patient and family.      Provisions for Follow-Up Care:  See after visit summary for information related to follow-up care and any pertinent home health orders.      PCP: Lauren Vee,     Disposition: Home    Planned Readmission: No      Discharge Statement   I spent 45 minutes discharging the patient. This time was spent on the day of discharge. I had direct contact with the patient on the day of discharge. Additional documentation is required if more than 30 minutes were spent on discharge.     Discharge Medications:  See after visit summary for reconciled discharge medications provided to patient and family.

## 2024-05-07 ENCOUNTER — OFFICE VISIT (OUTPATIENT)
Dept: FAMILY MEDICINE CLINIC | Facility: CLINIC | Age: 69
End: 2024-05-07
Payer: MEDICARE

## 2024-05-07 VITALS
HEIGHT: 74 IN | WEIGHT: 265.8 LBS | SYSTOLIC BLOOD PRESSURE: 126 MMHG | DIASTOLIC BLOOD PRESSURE: 62 MMHG | RESPIRATION RATE: 18 BRPM | HEART RATE: 73 BPM | OXYGEN SATURATION: 96 % | BODY MASS INDEX: 34.11 KG/M2 | TEMPERATURE: 97.6 F

## 2024-05-07 DIAGNOSIS — E11.21 CONTROLLED TYPE 2 DIABETES MELLITUS WITH DIABETIC NEPHROPATHY, WITHOUT LONG-TERM CURRENT USE OF INSULIN (HCC): ICD-10-CM

## 2024-05-07 DIAGNOSIS — N18.31 STAGE 3A CHRONIC KIDNEY DISEASE (HCC): ICD-10-CM

## 2024-05-07 DIAGNOSIS — I42.8 NON-ISCHEMIC CARDIOMYOPATHY (HCC): Chronic | ICD-10-CM

## 2024-05-07 DIAGNOSIS — L03.115 CELLULITIS OF RIGHT LOWER LEG: Primary | ICD-10-CM

## 2024-05-07 PROBLEM — L03.90 CELLULITIS: Status: RESOLVED | Noted: 2024-04-24 | Resolved: 2024-05-07

## 2024-05-07 PROBLEM — M79.671 RIGHT FOOT PAIN: Status: RESOLVED | Noted: 2024-04-02 | Resolved: 2024-05-07

## 2024-05-07 PROBLEM — R19.7 DIARRHEA: Status: RESOLVED | Noted: 2024-04-25 | Resolved: 2024-05-07

## 2024-05-07 PROBLEM — D72.829 LEUKOCYTOSIS: Status: RESOLVED | Noted: 2023-09-19 | Resolved: 2024-05-07

## 2024-05-07 PROCEDURE — 99495 TRANSJ CARE MGMT MOD F2F 14D: CPT | Performed by: INTERNAL MEDICINE

## 2024-05-07 NOTE — PROGRESS NOTES
Name: Cricket Rosales      : 1955      MRN: [id number]  Encounter Provider: Lauren Vee DO  Encounter Date: 2024   Encounter department: [facility]    Assessment & Plan     1. Cellulitis of right lower leg  Sxs improving He finished doxy yesterday  Apply mositurizing lotion thin layer daily and keep legs elevated at rest  Daily weights     2. Non-ischemic cardiomyopathy with HFmEF ([facility])  Pt encouraged to do daily weights  Lo sodium lo carb diet He has plans to lose more weight and wants to improve glycemic control   Pt will monitor BP and if lower readings he will call Cardiology     3. Controlled type 2 diabetes mellitus with diabetic nephropathy, without long-term current use of insulin ([facility])  A1c 7 in March Discussed lo carb diet, several small meals/day and pt and his wife will work on diet changes/portion control     4. Stage 3a chronic kidney disease ([facility])  Discussed hydration and fluid restrict Daily weights Avoid nsaids        July/prn     Subjective      HPI  Pt recently admitted and then readmitted for cellulitis of lower extremity He was treated with IV antibx and switched to doxy He finished Doxy yesterday No fever or chills The leg looks better and denies pain or itch No open areas or drainage Weight patient states has been stable since home and he is monitoring Breathing ok although today since out he feels some sob and will use nebulizer when he gets home although has not been needing No chest pain He feels lightheaded and tired since hospital He hwang snot monitor BP at home but feels it was lower in hospital No change in meds in hospital Appetite ok He is concerned about BS and currently does not monitor BS at home No falls They would like a rx for wheelchair transport so they can get around more easily santosh this weekend his daughter is getting  He has limited exertional capacity santosh since recent admissions He is sleeping well and more than he would like since home  No falls No diarrhea or stomach issues from antibx   TCM Call       Date and time call was made  5/2/2024 10:58 AM    Hospital care reviewed  Records reviewed    Patient was hospitialized at  [facility]    Date of Admission  04/27/24    Date of discharge  05/02/24    Diagnosis  cellulits of right lower leg    Disposition  Home  self care    Were the patients medications reviewed and updated  No    Current Symptoms  None          TCM Call       Post hospital issues  None    Should patient be enrolled in anticoag monitoring?  No    Scheduled for follow up?  Yes    Patients specialists  --  ORTHO, PSYCH    Pulmonologist contact #  [facility]    Endocrinologist name   [facility]    Other specialists names  DR FERRER-[facility]    Did you obtain your prescribed medications  Yes    Do you need help managing your prescriptions or medications  No    Is transportation to your appointment needed  No    I have advised the patient to call PCP with any new or worsening symptoms  Rosita Skelton,     Counseling  Patient            Review of Systems   Constitutional:  Positive for activity change and fatigue. Negative for chills and fever.   HENT: Negative.     Eyes:  Negative for visual disturbance.   Respiratory:  Positive for shortness of breath. Negative for cough and wheezing.    Cardiovascular:  Negative for chest pain, palpitations and leg swelling.   Gastrointestinal: Negative.    Genitourinary: Negative.    Musculoskeletal:  Positive for gait problem.   Skin:  Positive for color change. Negative for rash and wound.        Improved color change to rle    Neurological:  Positive for dizziness and weakness. Negative for light-headedness and headaches.   Psychiatric/Behavioral:  Negative for sleep disturbance. The patient is not nervous/anxious.        Current Outpatient Medications on File Prior to Visit   Medication Sig   • Accu-Chek FastClix Lancets MISC Use 1 each daily to test blood sugar.   • albuterol  (2.5 mg/3 mL) 0.083 % nebulizer solution Take 3 mL (2.5 mg total) by nebulization every 6 (six) hours as needed for wheezing or shortness of breath   • albuterol (ProAir HFA) 90 mcg/act inhaler Inhale 2 puffs every 6 (six) hours as needed for wheezing   • ALPRAZolam (XANAX) 0.5 mg tablet TAKE 1 TABLET BY MOUTH ONCE DAILY AT BEDTIME AS NEEDED FOR ANXIETY   • aspirin (ECOTRIN LOW STRENGTH) 81 mg EC tablet Take 1 tablet (81 mg total) by mouth 2 (two) times a day for 14 days   • atorvastatin (LIPITOR) 20 mg tablet Take 1 tablet (20 mg total) by mouth daily   • Blood Glucose Monitoring Suppl (Accu-Chek Guide Me) w/Device KIT Use 1 each daily to test blood sugar.   • carvedilol (COREG) 25 mg tablet Take 1 tablet (25 mg total) by mouth 2 (two) times a day with meals   • Diclofenac Sodium (VOLTAREN) 1 % Apply 2 g topically 2 (two) times a day   • DULoxetine (CYMBALTA) 60 mg delayed release capsule Take 1 capsule by mouth once daily   • Empagliflozin (Jardiance) 10 MG TABS tablet Take 1 tablet (10 mg total) by mouth every morning   • Entresto 49-51 MG TABS Take 1 tablet by mouth twice daily   • fluticasone (FLONASE) 50 mcg/act nasal spray Use 1 spray(s) in each nostril twice daily   • Fluticasone-Salmeterol (Advair) 500-50 mcg/dose inhaler INHALE 1 DOSE BY MOUTH TWICE DAILY RINSE MOUTH AFTER USE   • furosemide (LASIX) 40 mg tablet Take 1 tablet (40 mg total) by mouth every other day   • glucose blood test strip Use 1 each daily to test blood sugar.   • isosorbide mononitrate (IMDUR) 30 mg 24 hr tablet Take 1 tablet by mouth once daily   • montelukast (SINGULAIR) 10 mg tablet Take 1 tablet (10 mg total) by mouth daily at bedtime   • tiotropium (Spiriva Respimat) 2.5 MCG/ACT AERS inhaler INHALE 2 SPRAY(S) BY MOUTH ONCE DAILY   • doxycycline (ADOXA) 100 MG tablet Take 1 tablet (100 mg total) by mouth 2 (two) times a day for 5 days (Patient not taking: Reported on 5/7/2024)       Objective     Pulse 73   Temp 97.6 °F (36.4 °C)  "(Temporal)   Ht 6' 2\" (1.88 m)   Wt 121 kg (265 lb 12.8 oz)   SpO2 96%   BMI 34.13 kg/m²     Physical Exam  Vitals and nursing note reviewed.   Constitutional:       General: He is not in acute distress.     Appearance: Normal appearance. He is not ill-appearing, toxic-appearing or diaphoretic.   HENT:      Head: Normocephalic and atraumatic.      Right Ear: External ear normal.      Left Ear: External ear normal.      Nose: Nose normal.      Mouth/Throat:      Mouth: Mucous membranes are moist.   Eyes:      General: No scleral icterus.     Extraocular Movements: Extraocular movements intact.      Conjunctiva/sclera: Conjunctivae normal.      Pupils: Pupils are equal, round, and reactive to light.   Cardiovascular:      Rate and Rhythm: Normal rate and regular rhythm.      Pulses: Normal pulses.   Pulmonary:      Effort: Pulmonary effort is normal. No respiratory distress.      Breath sounds: Normal breath sounds. No wheezing or rhonchi.   Abdominal:      General: Bowel sounds are normal. There is no distension.      Palpations: Abdomen is soft.      Tenderness: There is no abdominal tenderness.   Musculoskeletal:         General: No swelling or tenderness.      Cervical back: Normal range of motion and neck supple.      Right lower leg: No edema.      Left lower leg: No edema.   Lymphadenopathy:      Cervical: No cervical adenopathy.   Skin:     General: Skin is warm and dry.      Coloration: Skin is not pale.      Comments: Faint discoloration right le no open areas no blister    Neurological:      General: No focal deficit present.      Mental Status: He is alert and oriented to person, place, and time. Mental status is at baseline.      Cranial Nerves: No cranial nerve deficit.      Sensory: Sensory deficit present.      Gait: Gait abnormal.   Psychiatric:         Mood and Affect: Mood normal.         Behavior: Behavior normal.         Thought Content: Thought content normal.         Judgment: Judgment " normal.     Lauren Vee, DO

## 2024-05-20 ENCOUNTER — TELEPHONE (OUTPATIENT)
Dept: NEPHROLOGY | Facility: CLINIC | Age: 69
End: 2024-05-20

## 2024-05-20 NOTE — TELEPHONE ENCOUNTER
lvm to reschedule appt due to provider being out of the office on maternity leave. Patient can see either CRNP or PA.

## 2024-05-21 ENCOUNTER — TELEPHONE (OUTPATIENT)
Dept: NEPHROLOGY | Facility: CLINIC | Age: 69
End: 2024-05-21

## 2024-05-23 DIAGNOSIS — I10 BENIGN ESSENTIAL HYPERTENSION: ICD-10-CM

## 2024-05-24 PROBLEM — A41.9 SEPSIS (HCC): Status: RESOLVED | Noted: 2024-04-24 | Resolved: 2024-05-24

## 2024-05-24 RX ORDER — CARVEDILOL 25 MG/1
25 TABLET ORAL 2 TIMES DAILY WITH MEALS
Qty: 60 TABLET | Refills: 5 | Status: SHIPPED | OUTPATIENT
Start: 2024-05-24

## 2024-05-30 ENCOUNTER — RA CDI HCC (OUTPATIENT)
Dept: OTHER | Facility: HOSPITAL | Age: 69
End: 2024-05-30

## 2024-05-30 NOTE — PROGRESS NOTES
HCC coding opportunities       Chart reviewed, no opportunity found: CHART REVIEWED, NO OPPORTUNITY FOUND        Patients Insurance        Commercial Insurance: [email or web address]

## 2024-06-03 DIAGNOSIS — J44.89 ASTHMA-COPD OVERLAP SYNDROME: ICD-10-CM

## 2024-06-03 RX ORDER — FLUTICASONE PROPIONATE AND SALMETEROL 500; 50 UG/1; UG/1
POWDER RESPIRATORY (INHALATION)
Qty: 60 BLISTER | Refills: 3 | Status: SHIPPED | OUTPATIENT
Start: 2024-06-03

## 2024-06-03 NOTE — TELEPHONE ENCOUNTER
Reason for call: Totally out of medication. Patient asked to be transferred to schedule an appointment wait time was 10 mins pt ph number entered for call back. Patient advise and verbalized understanding    [x] Refill   [] Prior Auth  [] Other:     Office:   [] PCP/Provider -   [x] Specialty/Provider - Pulm    Medication: Advair     Dose/Frequency:  inhale BID    Quantity:  90    Pharmacy: [facility] [place]    Does the patient have enough for 3 days?   [] Yes   [x] No - Send as HP to POD

## 2024-06-04 ENCOUNTER — OFFICE VISIT (OUTPATIENT)
Dept: PODIATRY | Facility: CLINIC | Age: 69
End: 2024-06-04
Payer: MEDICARE

## 2024-06-04 VITALS
SYSTOLIC BLOOD PRESSURE: 107 MMHG | HEIGHT: 74 IN | WEIGHT: 265 LBS | HEART RATE: 76 BPM | DIASTOLIC BLOOD PRESSURE: 58 MMHG | BODY MASS INDEX: 34.01 KG/M2

## 2024-06-04 DIAGNOSIS — B35.1 ONYCHOMYCOSIS: ICD-10-CM

## 2024-06-04 DIAGNOSIS — E11.49 OTHER DIABETIC NEUROLOGICAL COMPLICATION ASSOCIATED WITH TYPE 2 DIABETES MELLITUS (HCC): Primary | ICD-10-CM

## 2024-06-04 DIAGNOSIS — L84 CALLUS: ICD-10-CM

## 2024-06-04 PROCEDURE — 11056 PARNG/CUTG B9 HYPRKR LES 2-4: CPT | Performed by: STUDENT IN AN ORGANIZED HEALTH CARE EDUCATION/TRAINING PROGRAM

## 2024-06-04 PROCEDURE — 11721 DEBRIDE NAIL 6 OR MORE: CPT | Performed by: STUDENT IN AN ORGANIZED HEALTH CARE EDUCATION/TRAINING PROGRAM

## 2024-06-04 NOTE — PROGRESS NOTES
Ambulatory Visit  Name: Cricket Rosales      : 1955      MRN: [id number]  Encounter Provider: Paola Andrews DPM  Encounter Date: 2024   Encounter department: [facility] [place]    Assessment & Plan   1. Other diabetic neurological complication associated with type 2 diabetes mellitus (HCC)  2. Onychomycosis  3. Callus    Plan:      1.  Patient was counseled and educated on the condition and the diagnosis.  The diagnosis, treatment options and prognosis were discussed in detail.      2. All 10 toenails were debridement as follow: using nail nipper, benjamin, and curette, nails were sharply debrided, reduced in thickness and length. Devitalized nail tissue and fungal debris excised and removed. Patient tolerated well.       3. Hyperkeratotic lesion was sharply trimmed to normal epithelium with a 15 blade without incident.  Patient was instructed to use OTC lotion or prescription to their feet.      4. Patient was educated on importance of glycemic control, daily foot assessment and proper shoe gear. Educational materials were provided.     5. Call if any questions or occurrence of any foot and ankle related complications      6. Return in 10-12 weeks.     History of Present Illness   HPI:  Cricket Rosales is a 69 y.o. male who presents with painful, elongated toenails and callus. They have difficulty applying their socks and shoes due to the elongation of the nails. The pressure within their shoe gear is painful and they have been unable to cut their nails adequately. Patient states pain is 1/10 in shoe gear. Pain with pressure. Requires at risk foot care.       Review of Systems   All other systems reviewed and are negative.    Medical History Reviewed by provider this encounter:       Past Medical History   Past Medical History:   Diagnosis Date    Acute on chronic combined systolic and diastolic CHF (congestive heart failure) (HCC) 2023    Alcohol abuse 2023    Ambulates with cane      Arthritis     Asthma     Back pain     Chest pain 12/22/2022    CHF (congestive heart failure) ([facility])     Claustrophobia     COPD (chronic obstructive pulmonary disease) ([facility])     COPD with acute exacerbation ([facility]) 05/06/2022    COVID-19     COVID-19 virus infection 12/03/2021    Depression     Hypertension     Mumps     Neck pain     Old MI (myocardial infarction)     Pneumonia     Pulmonary emphysema ([facility])     Rectal bleeding 01/14/2019    Skin abnormalities     rashes and wounds on both legs - scabbed over and healing    Sleep apnea     no CPAP    Tingling of both feet     Wears glasses      Past Surgical History:   Procedure Laterality Date    APPENDECTOMY      CARDIAC CATHETERIZATION  07/24/2015    Left main- normal and maidly tortuous.  Circumflex - normal and moderately tortuous.  RCA- normal and mildy tortuous.  Global LV function was severely depressed..    CARDIAC CATHETERIZATION Left 09/27/2022    Procedure: Cardiac Left Heart Cath;  Surgeon: Doreen Briones DO;  Location: [facility];  Service: Cardiology    CARDIAC CATHETERIZATION N/A 09/27/2022    Procedure: Cardiac Coronary Angiogram;  Surgeon: Doreen Briones DO;  Location: [facility];  Service: Cardiology    CARDIAC CATHETERIZATION  09/27/2022    Procedure: Cardiac catheterization;  Surgeon: Doreen Briones DO;  Location: [facility];  Service: Cardiology    INGUINAL HERNIA REPAIR Bilateral     ID COLONOSCOPY FLX DX W/COLLJ SPEC WHEN PFRMD N/A 03/11/2019    Procedure: COLONOSCOPY with removal of anal papilla;  Surgeon: ANIL Dale MD;  Location: [facility];  Service: Colorectal    TONSILLECTOMY      TOTAL KNEE ARTHROPLASTY Left 9/18/2023    Procedure: ARTHROPLASTY KNEE TOTAL;  Surgeon: David Mullen DO;  Location: [facility];  Service: Orthopedics    UMBILICAL HERNIA REPAIR  06/21/2006     Family History   Problem Relation Age of Onset    Heart attack Father         MI    Heart disease Father     Diabetes  Sister         DM    Arthritis Family     Coronary artery disease Family     Hypertension Family     Tuberculosis Son     Alzheimer's disease Mother      Current Outpatient Medications on File Prior to Visit   Medication Sig Dispense Refill    Accu-Chek FastClix Lancets MISC Use 1 each daily to test blood sugar. 100 each 5    albuterol (2.5 mg/3 mL) 0.083 % nebulizer solution Take 3 mL (2.5 mg total) by nebulization every 6 (six) hours as needed for wheezing or shortness of breath 360 mL 2    albuterol (ProAir HFA) 90 mcg/act inhaler Inhale 2 puffs every 6 (six) hours as needed for wheezing 18 g 11    ALPRAZolam (XANAX) 0.5 mg tablet TAKE 1 TABLET BY MOUTH ONCE DAILY AT BEDTIME AS NEEDED FOR ANXIETY 30 tablet 0    atorvastatin (LIPITOR) 20 mg tablet Take 1 tablet (20 mg total) by mouth daily 30 tablet 6    Blood Glucose Monitoring Suppl (Accu-Chek Guide Me) w/Device KIT Use 1 each daily to test blood sugar. 1 kit 0    carvedilol (COREG) 25 mg tablet TAKE 1 TABLET BY MOUTH TWICE DAILY WITH MEALS 60 tablet 5    Diclofenac Sodium (VOLTAREN) 1 % Apply 2 g topically 2 (two) times a day 100 g 2    DULoxetine (CYMBALTA) 60 mg delayed release capsule Take 1 capsule by mouth once daily 30 capsule 5    Empagliflozin (Jardiance) 10 MG TABS tablet Take 1 tablet (10 mg total) by mouth every morning 30 tablet 11    Entresto 49-51 MG TABS Take 1 tablet by mouth twice daily 60 tablet 11    fluticasone (FLONASE) 50 mcg/act nasal spray Use 1 spray(s) in each nostril twice daily 16 g 1    Fluticasone-Salmeterol (Advair) 500-50 mcg/dose inhaler INHALE 1 DOSE BY MOUTH TWICE DAILY RINSE MOUTH AFTER USE 60 blister 3    furosemide (LASIX) 40 mg tablet Take 1 tablet (40 mg total) by mouth every other day 45 tablet 3    glucose blood test strip Use 1 each daily to test blood sugar. 100 strip 5    isosorbide mononitrate (IMDUR) 30 mg 24 hr tablet Take 1 tablet by mouth once daily 90 tablet 3    montelukast (SINGULAIR) 10 mg tablet Take 1  tablet (10 mg total) by mouth daily at bedtime 30 tablet 0    tiotropium (Spiriva Respimat) 2.5 MCG/ACT AERS inhaler INHALE 2 SPRAY(S) BY MOUTH ONCE DAILY 4 g 5    aspirin (ECOTRIN LOW STRENGTH) 81 mg EC tablet Take 1 tablet (81 mg total) by mouth 2 (two) times a day for 14 days 28 tablet 0     No current facility-administered medications on file prior to visit.     Allergies   Allergen Reactions    Ciprofloxacin Shortness Of Breath and Diarrhea      Current Outpatient Medications on File Prior to Visit   Medication Sig Dispense Refill    Accu-Chek FastClix Lancets MISC Use 1 each daily to test blood sugar. 100 each 5    albuterol (2.5 mg/3 mL) 0.083 % nebulizer solution Take 3 mL (2.5 mg total) by nebulization every 6 (six) hours as needed for wheezing or shortness of breath 360 mL 2    albuterol (ProAir HFA) 90 mcg/act inhaler Inhale 2 puffs every 6 (six) hours as needed for wheezing 18 g 11    ALPRAZolam (XANAX) 0.5 mg tablet TAKE 1 TABLET BY MOUTH ONCE DAILY AT BEDTIME AS NEEDED FOR ANXIETY 30 tablet 0    atorvastatin (LIPITOR) 20 mg tablet Take 1 tablet (20 mg total) by mouth daily 30 tablet 6    Blood Glucose Monitoring Suppl (Accu-Chek Guide Me) w/Device KIT Use 1 each daily to test blood sugar. 1 kit 0    carvedilol (COREG) 25 mg tablet TAKE 1 TABLET BY MOUTH TWICE DAILY WITH MEALS 60 tablet 5    Diclofenac Sodium (VOLTAREN) 1 % Apply 2 g topically 2 (two) times a day 100 g 2    DULoxetine (CYMBALTA) 60 mg delayed release capsule Take 1 capsule by mouth once daily 30 capsule 5    Empagliflozin (Jardiance) 10 MG TABS tablet Take 1 tablet (10 mg total) by mouth every morning 30 tablet 11    Entresto 49-51 MG TABS Take 1 tablet by mouth twice daily 60 tablet 11    fluticasone (FLONASE) 50 mcg/act nasal spray Use 1 spray(s) in each nostril twice daily 16 g 1    Fluticasone-Salmeterol (Advair) 500-50 mcg/dose inhaler INHALE 1 DOSE BY MOUTH TWICE DAILY RINSE MOUTH AFTER USE 60 blister 3    furosemide (LASIX) 40 mg  "tablet Take 1 tablet (40 mg total) by mouth every other day 45 tablet 3    glucose blood test strip Use 1 each daily to test blood sugar. 100 strip 5    isosorbide mononitrate (IMDUR) 30 mg 24 hr tablet Take 1 tablet by mouth once daily 90 tablet 3    montelukast (SINGULAIR) 10 mg tablet Take 1 tablet (10 mg total) by mouth daily at bedtime 30 tablet 0    tiotropium (Spiriva Respimat) 2.5 MCG/ACT AERS inhaler INHALE 2 SPRAY(S) BY MOUTH ONCE DAILY 4 g 5    aspirin (ECOTRIN LOW STRENGTH) 81 mg EC tablet Take 1 tablet (81 mg total) by mouth 2 (two) times a day for 14 days 28 tablet 0     No current facility-administered medications on file prior to visit.      Objective     /58 (BP Location: Right arm, Patient Position: Sitting, Cuff Size: Large)   Pulse 76   Ht 6' 2\" (1.88 m)   Wt 120 kg (265 lb)   BMI 34.02 kg/m²     Physical Exam  Vitals reviewed.   Feet:      Comments: On exam patient has thickened, hypertrophic, discolored, brittle toenails with subungual debris and tenderness x10.   Callus: Bilateral foot medial 1st MTPJ  Patient has lower extremity edema  Patients skin is atrophic, thickened nails, and decreased pedal hair. Patient has decreased pinprick and vibratory sensation to his feet and parasthesia.   Bunion foot deformity noted bilaterally.  Pes planus foot deformity noted.           Lesion Destruction    Date/Time: 6/4/2024 10:00 AM    Performed by: Paola Andrews DPM  Authorized by: Paola Andrews DPM  Universal Protocol:  Consent: Verbal consent obtained.  Risks and benefits: risks, benefits and alternatives were discussed  Consent given by: patient  Time out: Immediately prior to procedure a \"time out\" was called to verify the correct patient, procedure, equipment, support staff and site/side marked as required.  Patient understanding: patient states understanding of the procedure being performed  Patient identity confirmed: verbally with patient    Procedure Details - Lesion Destruction:     " Number of Lesions:  2  Lesion 1:     Body area:  Lower extremity    Lower extremity location:  R foot    Malignancy: benign hyperkeratotic lesion      Destruction method: scissors used for extraction    Lesion 2:     Body area:  Lower extremity    Lower extremity location:  L foot    Malignancy: benign hyperkeratotic lesion      Destruction method: scissors used for extraction

## 2024-06-05 ENCOUNTER — HOSPITAL ENCOUNTER (OUTPATIENT)
Dept: ULTRASOUND IMAGING | Facility: HOSPITAL | Age: 69
Discharge: HOME | End: 2024-06-05
Attending: INTERNAL MEDICINE
Payer: MEDICARE

## 2024-06-05 DIAGNOSIS — N18.30 STAGE 3 CHRONIC KIDNEY DISEASE, UNSPECIFIED WHETHER STAGE 3A OR 3B CKD (HCC): ICD-10-CM

## 2024-06-05 PROCEDURE — 76775 US EXAM ABDO BACK WALL LIM: CPT

## 2024-06-10 NOTE — PROGRESS NOTES
Cardiology Follow Up    Cricket Rosales  1955  [id number]  [facility] [address] [address]  Phone#  [phone]  Fax#  [phone]      1. Non-ischemic cardiomyopathy with HFmEF (HCC)        2. Benign essential hypertension        3. Dyslipidemia  Lipid Panel With Direct LDL    Comprehensive metabolic panel      4. Obstructive sleep apnea        5. History of tobacco abuse              Discussion/Summary:  Mr. Rosales is a pleasant 69-year-old male who presents to the office today for routine follow up.    Regarding his heart failure, he appears euvolemic on exam on every other day dosing of Lasix.  Otherwise he is on proper GDMT for his cardiomyopathy with Entresto and carvedilol.  He had an echocardiogram in 2023 revealing a stable ejection fraction at 45%.  Jardiance was added to his medication regimen after his last visit.    His blood pressure is adequately controlled in the office today.  He is maintained on statin therapy in the setting of coronary artery calcifications on CT scan.  He had a recent direct LDL revealing an acceptable number on his current statin regimen.  I have asked her ago an updated lipid panel prior to his next visit.    He is noncompliant with CPAP due to inability to tolerate the mask.  We discussed evaluation for Inspire.  He declines.     I will see him back in the office in six months or sooner if necessary.    Interval History:   Mr. Rosales is a pleasant 69-year-old male who presents to the office for routine follow-up.      After his last visit with me he was started on Jardiance which he is tolerating without difficulty.    Since his last visit he feels pretty well.  A few days a week he attends the gym.  He rides a stationary bike for about 20 minutes.  He also lifts weights.  He reports his chronic shortness of breath is at baseline.  He denies any exertional chest  pain.  He sleeps chronically in a recliner.  He denies any increasing abdominal girth, paroxysmal nocturnal dyspnea or orthopnea.  He reports compliance with a low-salt diet.  He weighs about 266 to 270 pounds on his home scale.   He denies lightheadedness, syncope or presyncope.  He denies palpitations.  He denies symptoms of claudication.    He remains noncompliant with CPAP for his known obstructive sleep apnea.    He has one or two alcoholic beverages a week.    Problem List       Chronic combined systolic and diastolic CHF (congestive heart failure) ([facility])    Nonischemic cardiomyopathy ([facility])    Dyslipidemia    Hypertension    Asthma, chronic, moderate persistent, uncomplicated    Obstructive sleep apnea          Past Medical History:   Diagnosis Date    Acute on chronic combined systolic and diastolic CHF (congestive heart failure) ([facility]) 7/27/2023    Alcohol abuse 7/27/2023    Ambulates with cane     Arthritis     Asthma     Back pain     Chest pain 12/22/2022    CHF (congestive heart failure) ([facility])     Claustrophobia     COPD (chronic obstructive pulmonary disease) ([facility])     COPD with acute exacerbation ([facility]) 05/06/2022    COVID-19     COVID-19 virus infection 12/03/2021    Depression     Hypertension     Mumps     Neck pain     Old MI (myocardial infarction)     Pneumonia     Pulmonary emphysema ([facility])     Rectal bleeding 01/14/2019    Skin abnormalities     rashes and wounds on both legs - scabbed over and healing    Sleep apnea     no CPAP    Tingling of both feet     Wears glasses      Social History     Socioeconomic History    Marital status: /Civil Union     Spouse name: Not on file    Number of children: Not on file    Years of education: Not on file    Highest education level: Not on file   Occupational History    Not on file   Tobacco Use    Smoking status: Former     Current packs/day: 0.00     Average packs/day: 3.0 packs/day for 43.0 years (129.0 total pack years)     Types: Cigarettes      Start date:      Quit date: 2018     Years since quittin.4    Smokeless tobacco: Never   Vaping Use    Vaping status: Never Used   Substance and Sexual Activity    Alcohol use: Not Currently    Drug use: Yes     Types: Marijuana     Comment: occasionally edible    Sexual activity: Not on file   Other Topics Concern    Not on file   Social History Narrative    Caffeine use     Social Determinants of Health     Financial Resource Strain: Low Risk  (3/23/2023)    Overall Financial Resource Strain (CARDIA)     Difficulty of Paying Living Expenses: Not very hard   Food Insecurity: No Food Insecurity (2024)    Hunger Vital Sign     Worried About Running Out of Food in the Last Year: Never true     Ran Out of Food in the Last Year: Never true   Transportation Needs: No Transportation Needs (2024)    PRAPARE - Transportation     Lack of Transportation (Medical): No     Lack of Transportation (Non-Medical): No   Physical Activity: Not on file   Stress: Not on file   Social Connections: Not on file   Intimate Partner Violence: Not on file   Housing Stability: Low Risk  (2024)    Housing Stability Vital Sign     Unable to Pay for Housing in the Last Year: No     Number of Times Moved in the Last Year: 1     Homeless in the Last Year: No      Family History   Problem Relation Age of Onset    Heart attack Father         MI    Heart disease Father     Diabetes Sister         DM    Arthritis Family     Coronary artery disease Family     Hypertension Family     Tuberculosis Son     Alzheimer's disease Mother      Past Surgical History:   Procedure Laterality Date    APPENDECTOMY      CARDIAC CATHETERIZATION  2015    Left main- normal and maidly tortuous.  Circumflex - normal and moderately tortuous.  RCA- normal and mildy tortuous.  Global LV function was severely depressed..    CARDIAC CATHETERIZATION Left 2022    Procedure: Cardiac Left Heart Cath;  Surgeon: Doreen Briones DO;  [facility];  Service: Cardiology    CARDIAC CATHETERIZATION N/A 09/27/2022    Procedure: Cardiac Coronary Angiogram;  Surgeon: Doreen Briones DO;  [facility];  Service: Cardiology    CARDIAC CATHETERIZATION  09/27/2022    Procedure: Cardiac catheterization;  Surgeon: Doreen Briones DO;  [facility]: [facility]ARDIAC C[facility];  Service: Cardiology    INGUINAL HERNIA REPAIR Bilateral     KS COLONOSCOPY FLX DX W/COLLJ SPEC WHEN PFRMD N/A 03/11/2019    Procedure: COLONOSCOPY with removal of anal papilla;  Surgeon: ANIL Dale MD;  Location: [facility];  Service: Colorectal    TONSILLECTOMY      TOTAL KNEE ARTHROPLASTY Left 9/18/2023    Procedure: ARTHROPLASTY KNEE TOTAL;  Surgeon: David Mullen DO;  Location: [facility];  Service: Orthopedics    UMBILICAL HERNIA REPAIR  06/21/2006       Current Outpatient Medications:     Accu-Chek FastClix Lancets MISC, Use 1 each daily to test blood sugar., Disp: 100 each, Rfl: 5    albuterol (2.5 mg/3 mL) 0.083 % nebulizer solution, Take 3 mL (2.5 mg total) by nebulization every 6 (six) hours as needed for wheezing or shortness of breath, Disp: 360 mL, Rfl: 2    albuterol (ProAir HFA) 90 mcg/act inhaler, Inhale 2 puffs every 6 (six) hours as needed for wheezing, Disp: 18 g, Rfl: 11    ALPRAZolam (XANAX) 0.5 mg tablet, TAKE 1 TABLET BY MOUTH ONCE DAILY AT BEDTIME AS NEEDED FOR ANXIETY, Disp: 30 tablet, Rfl: 0    atorvastatin (LIPITOR) 20 mg tablet, Take 1 tablet (20 mg total) by mouth daily, Disp: 30 tablet, Rfl: 6    Blood Glucose Monitoring Suppl (Accu-Chek Guide Me) w/Device KIT, Use 1 each daily to test blood sugar., Disp: 1 kit, Rfl: 0    carvedilol (COREG) 25 mg tablet, TAKE 1 TABLET BY MOUTH TWICE DAILY WITH MEALS, Disp: 60 tablet, Rfl: 5    Diclofenac Sodium (VOLTAREN) 1 %, Apply 2 g topically 2 (two) times a day, Disp: 100 g, Rfl: 2    DULoxetine (CYMBALTA) 60 mg delayed release capsule, Take 1 capsule by mouth once daily, Disp: 30 capsule,  Rfl: 5    Empagliflozin (Jardiance) 10 MG TABS tablet, Take 1 tablet (10 mg total) by mouth every morning, Disp: 30 tablet, Rfl: 11    Entresto 49-51 MG TABS, Take 1 tablet by mouth twice daily, Disp: 60 tablet, Rfl: 11    fluticasone (FLONASE) 50 mcg/act nasal spray, Use 1 spray(s) in each nostril twice daily, Disp: 16 g, Rfl: 1    Fluticasone-Salmeterol (Advair) 500-50 mcg/dose inhaler, INHALE 1 DOSE BY MOUTH TWICE DAILY RINSE MOUTH AFTER USE, Disp: 60 blister, Rfl: 3    furosemide (LASIX) 40 mg tablet, Take 1 tablet (40 mg total) by mouth every other day, Disp: 45 tablet, Rfl: 3    glucose blood test strip, Use 1 each daily to test blood sugar., Disp: 100 strip, Rfl: 5    isosorbide mononitrate (IMDUR) 30 mg 24 hr tablet, Take 1 tablet by mouth once daily, Disp: 90 tablet, Rfl: 3    montelukast (SINGULAIR) 10 mg tablet, Take 1 tablet (10 mg total) by mouth daily at bedtime, Disp: 30 tablet, Rfl: 0    tiotropium (Spiriva Respimat) 2.5 MCG/ACT AERS inhaler, INHALE 2 SPRAY(S) BY MOUTH ONCE DAILY, Disp: 4 g, Rfl: 5    aspirin (ECOTRIN LOW STRENGTH) 81 mg EC tablet, Take 1 tablet (81 mg total) by mouth 2 (two) times a day for 14 days, Disp: 28 tablet, Rfl: 0  Allergies   Allergen Reactions    Ciprofloxacin Shortness Of Breath and Diarrhea       Labs:     Chemistry        Component Value Date/Time     07/27/2015 0559    K 3.8 05/01/2024 0427    K 3.8 07/27/2015 0559    CL 99 05/01/2024 0427     07/27/2015 0559    CO2 26 05/01/2024 0427    CO2 32 07/27/2015 0559    BUN 29 (H) 05/01/2024 0427    BUN 19 07/27/2015 0559    CREATININE 1.55 (H) 05/01/2024 0427    CREATININE 1.05 07/27/2015 0559        Component Value Date/Time    CALCIUM 9.3 05/01/2024 0427    CALCIUM 8.6 07/27/2015 0559    ALKPHOS 67 04/27/2024 0837    ALKPHOS 75 07/22/2015 1021    AST 22 04/27/2024 0837    AST 30 07/22/2015 1021    ALT 35 04/27/2024 0837    ALT 74 07/22/2015 1021    BILITOT 1.05 (H) 07/22/2015 1021            Lab Results  "  Component Value Date    CHOL 105 07/23/2015     Lab Results   Component Value Date    HDL 27 (L) 09/01/2022    HDL 27 (L) 09/22/2021    HDL 24 (L) 12/18/2018     Lab Results   Component Value Date    LDLCALC 49 09/01/2022    LDLCALC 107 (H) 09/22/2021    LDLCALC 89 12/18/2018     Lab Results   Component Value Date    TRIG 138 09/01/2022    TRIG 131 09/22/2021    TRIG 107 12/18/2018     No results found for: \"CHOLHDL\"    Imaging: No results found.      Review of Systems   Cardiovascular:  Positive for dyspnea on exertion. Negative for chest pain, leg swelling, near-syncope, palpitations and paroxysmal nocturnal dyspnea.   Musculoskeletal:  Positive for arthritis and joint pain.   All other systems reviewed and are negative.      Vitals:    06/11/24 0948   BP: 120/64   Pulse: 66   SpO2: 97%         There were no vitals filed for this visit.          There is no height or weight on file to calculate BMI.    Physical Exam:  General:  Alert and cooperative, appears stated age  HEENT:  PERRLA, EOMI, no scleral icterus, no conjunctival pallor  Neck:  No lymphadenopathy, no thyromegaly, no carotid bruits, no elevated JVP  Heart:  Regular rate and rhythm, normal S1/S2, no S3/S4, no murmur  Lungs:  Clear to auscultation bilaterally   Abdomen:  Soft, non-tender, positive bowel sounds, no rebound or guarding,   no organomegaly   Extremities:  No edema   Skin:  No rashes or lesions on exposed skin  Neurologic:  Cranial nerves II-XII grossly intact without focal deficits   "

## 2024-06-11 ENCOUNTER — OFFICE VISIT (OUTPATIENT)
Dept: CARDIOLOGY CLINIC | Facility: HOSPITAL | Age: 69
End: 2024-06-11
Payer: MEDICARE

## 2024-06-11 VITALS — HEART RATE: 66 BPM | SYSTOLIC BLOOD PRESSURE: 120 MMHG | DIASTOLIC BLOOD PRESSURE: 64 MMHG | OXYGEN SATURATION: 97 %

## 2024-06-11 DIAGNOSIS — G47.33 OBSTRUCTIVE SLEEP APNEA: ICD-10-CM

## 2024-06-11 DIAGNOSIS — Z87.891 HISTORY OF TOBACCO ABUSE: ICD-10-CM

## 2024-06-11 DIAGNOSIS — E78.5 DYSLIPIDEMIA: ICD-10-CM

## 2024-06-11 DIAGNOSIS — I42.8 NON-ISCHEMIC CARDIOMYOPATHY (HCC): Primary | Chronic | ICD-10-CM

## 2024-06-11 DIAGNOSIS — I10 BENIGN ESSENTIAL HYPERTENSION: ICD-10-CM

## 2024-06-11 PROCEDURE — 99214 OFFICE O/P EST MOD 30 MIN: CPT | Performed by: INTERNAL MEDICINE

## 2024-06-13 ENCOUNTER — TELEPHONE (OUTPATIENT)
Dept: NEPHROLOGY | Facility: CLINIC | Age: 69
End: 2024-06-13

## 2024-06-13 DIAGNOSIS — F41.9 ANXIETY: ICD-10-CM

## 2024-06-13 RX ORDER — ALPRAZOLAM 0.5 MG/1
TABLET ORAL
Qty: 30 TABLET | Refills: 0 | Status: SHIPPED | OUTPATIENT
Start: 2024-06-13

## 2024-06-13 NOTE — TELEPHONE ENCOUNTER
----- Message from KATIA Lizarraga sent at 6/13/2024 12:50 PM EDT -----  Covering for Dr. Muir. Please let Brenden know he has two kidneys without masses, stones, obstruction. There is a benign cyst in the left kidney which is a common finding

## 2024-06-22 ENCOUNTER — OFFICE VISIT (OUTPATIENT)
Dept: URGENT CARE | Facility: CLINIC | Age: 69
End: 2024-06-22
Payer: MEDICARE

## 2024-06-22 VITALS
TEMPERATURE: 98.2 F | BODY MASS INDEX: 35.55 KG/M2 | WEIGHT: 277 LBS | DIASTOLIC BLOOD PRESSURE: 66 MMHG | HEART RATE: 64 BPM | SYSTOLIC BLOOD PRESSURE: 99 MMHG | HEIGHT: 74 IN | OXYGEN SATURATION: 97 % | RESPIRATION RATE: 20 BRPM

## 2024-06-22 DIAGNOSIS — R10.9 FLANK PAIN: ICD-10-CM

## 2024-06-22 DIAGNOSIS — K59.00 CONSTIPATION, UNSPECIFIED CONSTIPATION TYPE: Primary | ICD-10-CM

## 2024-06-22 LAB
GLUCOSE SERPL-MCNC: 131 MG/DL (ref 65–140)
SL AMB  POCT GLUCOSE, UA: 500
SL AMB LEUKOCYTE ESTERASE,UA: ABNORMAL
SL AMB POCT BILIRUBIN,UA: ABNORMAL
SL AMB POCT BLOOD,UA: ABNORMAL
SL AMB POCT CLARITY,UA: ABNORMAL
SL AMB POCT COLOR,UA: YELLOW
SL AMB POCT KETONES,UA: ABNORMAL
SL AMB POCT NITRITE,UA: ABNORMAL
SL AMB POCT PH,UA: 5
SL AMB POCT SPECIFIC GRAVITY,UA: 1.01
SL AMB POCT URINE PROTEIN: ABNORMAL
SL AMB POCT UROBILINOGEN: 0.2

## 2024-06-22 PROCEDURE — G0463 HOSPITAL OUTPT CLINIC VISIT: HCPCS | Performed by: PHYSICIAN ASSISTANT

## 2024-06-22 PROCEDURE — 81002 URINALYSIS NONAUTO W/O SCOPE: CPT | Performed by: PHYSICIAN ASSISTANT

## 2024-06-22 PROCEDURE — 87086 URINE CULTURE/COLONY COUNT: CPT | Performed by: PHYSICIAN ASSISTANT

## 2024-06-22 PROCEDURE — 99285 EMERGENCY DEPT VISIT HI MDM: CPT | Performed by: EMERGENCY MEDICINE

## 2024-06-22 PROCEDURE — 99213 OFFICE O/P EST LOW 20 MIN: CPT | Performed by: PHYSICIAN ASSISTANT

## 2024-06-22 PROCEDURE — 82948 REAGENT STRIP/BLOOD GLUCOSE: CPT | Performed by: PHYSICIAN ASSISTANT

## 2024-06-22 PROCEDURE — 99284 EMERGENCY DEPT VISIT MOD MDM: CPT

## 2024-06-22 NOTE — PROGRESS NOTES
[facility]    NAME: Cricket Rosales is a 69 y.o. male  : 1955    MRN: [id number]  DATE: 2024  TIME: 3:44 PM    Assessment and Plan   Constipation, unspecified constipation type [K59.00]  1. Constipation, unspecified constipation type        2. Flank pain  Urine culture    POCT urine dip    Urine culture          Patient Instructions     Patient Instructions   Continue miralax.    Go to the emergency room with any worsening symptoms.  Follow up with pcp on Monday.    Chief Complaint     Chief Complaint   Patient presents with    Flank Pain     Flank pain for 2 days, denies urinary symptoms    Constipation     For 3 days        History of Present Illness   69-year-old male here with complaint of back pain.  Patient states that he has stage III kidney disease.  He is concerned about his kidneys.  He does have a history of constipation and is dealing with a bout of constipation now.  He was able to have a very small bowel movement today but it was very little stool and he is still uncomfortable.  Has abdominal discomfort and distention along with the back pain.  He is able to urinate and does not notice any difference there.  No new leg swelling.  He does have a history of CHF as well.  But he does not feel like his legs are more swollen than normal.  Symptoms started 2 to 3 days ago.  He denies any nausea or vomiting.  No fever or chills.        Review of Systems   Review of Systems   Constitutional:  Negative for chills, fatigue and fever.   Respiratory:  Negative for cough, shortness of breath and wheezing.    Gastrointestinal:  Positive for abdominal distention, abdominal pain and constipation. Negative for diarrhea, nausea and vomiting.   Genitourinary:  Positive for flank pain. Negative for dysuria, frequency, hematuria, scrotal swelling, testicular pain and urgency.   Musculoskeletal:  Positive for back pain.   Neurological:  Negative for headaches.   All other systems reviewed and are  negative.      Current Medications     Current Outpatient Medications:     Accu-Chek FastClix Lancets MISC, Use 1 each daily to test blood sugar., Disp: 100 each, Rfl: 5    albuterol (2.5 mg/3 mL) 0.083 % nebulizer solution, Take 3 mL (2.5 mg total) by nebulization every 6 (six) hours as needed for wheezing or shortness of breath, Disp: 360 mL, Rfl: 2    albuterol (ProAir HFA) 90 mcg/act inhaler, Inhale 2 puffs every 6 (six) hours as needed for wheezing, Disp: 18 g, Rfl: 11    ALPRAZolam (XANAX) 0.5 mg tablet, TAKE 1 TABLET BY MOUTH ONCE DAILY AT BEDTIME AS NEEDED FOR ANXIETY, Disp: 30 tablet, Rfl: 0    atorvastatin (LIPITOR) 20 mg tablet, Take 1 tablet (20 mg total) by mouth daily, Disp: 30 tablet, Rfl: 6    Blood Glucose Monitoring Suppl (Accu-Chek Guide Me) w/Device KIT, Use 1 each daily to test blood sugar., Disp: 1 kit, Rfl: 0    carvedilol (COREG) 25 mg tablet, TAKE 1 TABLET BY MOUTH TWICE DAILY WITH MEALS, Disp: 60 tablet, Rfl: 5    Diclofenac Sodium (VOLTAREN) 1 %, Apply 2 g topically 2 (two) times a day, Disp: 100 g, Rfl: 2    DULoxetine (CYMBALTA) 60 mg delayed release capsule, Take 1 capsule by mouth once daily, Disp: 30 capsule, Rfl: 5    Empagliflozin (Jardiance) 10 MG TABS tablet, Take 1 tablet (10 mg total) by mouth every morning, Disp: 30 tablet, Rfl: 11    Entresto 49-51 MG TABS, Take 1 tablet by mouth twice daily, Disp: 60 tablet, Rfl: 11    fluticasone (FLONASE) 50 mcg/act nasal spray, Use 1 spray(s) in each nostril twice daily, Disp: 16 g, Rfl: 1    Fluticasone-Salmeterol (Advair) 500-50 mcg/dose inhaler, INHALE 1 DOSE BY MOUTH TWICE DAILY RINSE MOUTH AFTER USE, Disp: 60 blister, Rfl: 3    furosemide (LASIX) 40 mg tablet, Take 1 tablet (40 mg total) by mouth every other day, Disp: 45 tablet, Rfl: 3    glucose blood test strip, Use 1 each daily to test blood sugar., Disp: 100 strip, Rfl: 5    isosorbide mononitrate (IMDUR) 30 mg 24 hr tablet, Take 1 tablet by mouth once daily, Disp: 90 tablet,  Rfl: 3    montelukast (SINGULAIR) 10 mg tablet, Take 1 tablet (10 mg total) by mouth daily at bedtime, Disp: 30 tablet, Rfl: 0    tiotropium (Spiriva Respimat) 2.5 MCG/ACT AERS inhaler, INHALE 2 SPRAY(S) BY MOUTH ONCE DAILY, Disp: 4 g, Rfl: 5    aspirin (ECOTRIN LOW STRENGTH) 81 mg EC tablet, Take 1 tablet (81 mg total) by mouth 2 (two) times a day for 14 days, Disp: 28 tablet, Rfl: 0    Current Allergies     Allergies as of 06/22/2024 - Reviewed 06/22/2024   Allergen Reaction Noted    Ciprofloxacin Shortness Of Breath and Diarrhea 12/18/2018          The following portions of the patient's history were reviewed and updated as appropriate: allergies, current medications, past family history, past medical history, past social history, past surgical history and problem list.   Past Medical History:   Diagnosis Date    Acute on chronic combined systolic and diastolic CHF (congestive heart failure) ([facility]) 7/27/2023    Alcohol abuse 7/27/2023    Ambulates with cane     Arthritis     Asthma     Back pain     Chest pain 12/22/2022    CHF (congestive heart failure) ([facility])     Claustrophobia     COPD (chronic obstructive pulmonary disease) ([facility])     COPD with acute exacerbation ([facility]) 05/06/2022    COVID-19     COVID-19 virus infection 12/03/2021    Depression     Hypertension     Mumps     Neck pain     Old MI (myocardial infarction)     Pneumonia     Pulmonary emphysema ([facility])     Rectal bleeding 01/14/2019    Skin abnormalities     rashes and wounds on both legs - scabbed over and healing    Sleep apnea     no CPAP    Tingling of both feet     Wears glasses      Past Surgical History:   Procedure Laterality Date    APPENDECTOMY      CARDIAC CATHETERIZATION  07/24/2015    Left main- normal and maidly tortuous.  Circumflex - normal and moderately tortuous.  RCA- normal and mildy tortuous.  Global LV function was severely depressed..    CARDIAC CATHETERIZATION Left 09/27/2022    Procedure: Cardiac Left Heart Cath;  Surgeon:  Doreen Briones DO;  [facility];  Service: Cardiology    CARDIAC CATHETERIZATION N/A 2022    Procedure: Cardiac Coronary Angiogram;  Surgeon: Doreen Briones DO;  [facility];  Service: Cardiology    CARDIAC CATHETERIZATION  2022    Procedure: Cardiac catheterization;  Surgeon: Doreen Briones DO;  [facility];  Service: Cardiology    INGUINAL HERNIA REPAIR Bilateral     OH COLONOSCOPY FLX DX W/COLLJ SPEC WHEN PFRMD N/A 2019    Procedure: COLONOSCOPY with removal of anal papilla;  Surgeon: ANIL Dale MD;  [facility];  Service: Colorectal    TONSILLECTOMY      TOTAL KNEE ARTHROPLASTY Left 2023    Procedure: ARTHROPLASTY KNEE TOTAL;  Surgeon: David Mullen DO;  [facility];  Service: Orthopedics    UMBILICAL HERNIA REPAIR  2006     Family History   Problem Relation Age of Onset    Heart attack Father         MI    Heart disease Father     Diabetes Sister         DM    Arthritis Family     Coronary artery disease Family     Hypertension Family     Tuberculosis Son     Alzheimer's disease Mother      Social History     Socioeconomic History    Marital status: /Civil Union     Spouse name: Not on file    Number of children: Not on file    Years of education: Not on file    Highest education level: Not on file   Occupational History    Not on file   Tobacco Use    Smoking status: Former     Current packs/day: 0.00     Average packs/day: 3.0 packs/day for 43.0 years (129.0 ttl pk-yrs)     Types: Cigarettes     Start date:      Quit date: 2018     Years since quittin.4    Smokeless tobacco: Never   Vaping Use    Vaping status: Never Used   Substance and Sexual Activity    Alcohol use: Not Currently    Drug use: Yes     Types: Marijuana     Comment: occasionally edible    Sexual activity: Not on file   Other Topics Concern    Not on file   Social History Narrative    Caffeine use     Social  "Determinants of Health     Financial Resource Strain: Low Risk  (3/23/2023)    Overall Financial Resource Strain (CARDIA)     Difficulty of Paying Living Expenses: Not very hard   Food Insecurity: No Food Insecurity (5/2/2024)    Hunger Vital Sign     Worried About Running Out of Food in the Last Year: Never true     Ran Out of Food in the Last Year: Never true   Transportation Needs: No Transportation Needs (5/2/2024)    PRAPARE - Transportation     Lack of Transportation (Medical): No     Lack of Transportation (Non-Medical): No   Physical Activity: Not on file   Stress: Not on file   Social Connections: Not on file   Intimate Partner Violence: Not on file   Housing Stability: Low Risk  (5/2/2024)    Housing Stability Vital Sign     Unable to Pay for Housing in the Last Year: No     Number of Times Moved in the Last Year: 1     Homeless in the Last Year: No     Medications have been verified.    Objective   BP 99/66   Pulse 64   Temp 98.2 °F (36.8 °C)   Resp 20   Ht 6' 2\" (1.88 m)   Wt 126 kg (277 lb)   SpO2 97%   BMI 35.56 kg/m²      Physical Exam   Physical Exam  Vitals and nursing note reviewed.   Constitutional:       General: He is not in acute distress.     Appearance: Normal appearance. He is well-developed.   HENT:      Head: Normocephalic and atraumatic.   Cardiovascular:      Rate and Rhythm: Normal rate and regular rhythm.      Heart sounds: Normal heart sounds. No murmur heard.  Pulmonary:      Effort: Pulmonary effort is normal. No respiratory distress.      Breath sounds: Normal breath sounds.   Abdominal:      General: Bowel sounds are normal. There is distension.      Tenderness: There is abdominal tenderness. There is no CVA tenderness, right CVA tenderness or left CVA tenderness.                     "

## 2024-06-22 NOTE — PATIENT INSTRUCTIONS
Continue miralax.    Go to the emergency room with any worsening symptoms.  Follow up with pcp on Monday.

## 2024-06-23 ENCOUNTER — HOSPITAL ENCOUNTER (EMERGENCY)
Facility: HOSPITAL | Age: 69
Discharge: HOME | End: 2024-06-23
Attending: EMERGENCY MEDICINE
Payer: MEDICARE

## 2024-06-23 ENCOUNTER — APPOINTMENT (EMERGENCY)
Dept: CT IMAGING | Facility: HOSPITAL | Age: 69
End: 2024-06-23
Payer: MEDICARE

## 2024-06-23 VITALS
DIASTOLIC BLOOD PRESSURE: 59 MMHG | WEIGHT: 277 LBS | BODY MASS INDEX: 35.55 KG/M2 | HEIGHT: 74 IN | OXYGEN SATURATION: 91 % | TEMPERATURE: 97.6 F | RESPIRATION RATE: 18 BRPM | SYSTOLIC BLOOD PRESSURE: 128 MMHG | HEART RATE: 63 BPM

## 2024-06-23 DIAGNOSIS — K80.20 CALCULUS OF GALLBLADDER WITHOUT CHOLECYSTITIS WITHOUT OBSTRUCTION: ICD-10-CM

## 2024-06-23 DIAGNOSIS — N18.9 ACUTE KIDNEY INJURY SUPERIMPOSED ON CHRONIC KIDNEY DISEASE  (HCC): Primary | ICD-10-CM

## 2024-06-23 DIAGNOSIS — R10.9 NONSPECIFIC ABDOMINAL PAIN: ICD-10-CM

## 2024-06-23 DIAGNOSIS — N17.9 ACUTE KIDNEY INJURY SUPERIMPOSED ON CHRONIC KIDNEY DISEASE  (HCC): Primary | ICD-10-CM

## 2024-06-23 DIAGNOSIS — K59.00 CONSTIPATION: ICD-10-CM

## 2024-06-23 LAB
ALBUMIN SERPL BCG-MCNC: 4 G/DL (ref 3.5–5)
ALP SERPL-CCNC: 69 U/L (ref 34–104)
ALT SERPL W P-5'-P-CCNC: 14 U/L (ref 7–52)
ANION GAP SERPL CALCULATED.3IONS-SCNC: 12 MMOL/L (ref 4–13)
AST SERPL W P-5'-P-CCNC: 14 U/L (ref 13–39)
BACTERIA UR CULT: NORMAL
BACTERIA UR QL AUTO: ABNORMAL /HPF
BASOPHILS # BLD AUTO: 0.05 THOUSANDS/ÂΜL (ref 0–0.1)
BASOPHILS NFR BLD AUTO: 0 % (ref 0–1)
BILIRUB SERPL-MCNC: 1.36 MG/DL (ref 0.2–1)
BILIRUB UR QL STRIP: NEGATIVE
BUN SERPL-MCNC: 60 MG/DL (ref 5–25)
CALCIUM SERPL-MCNC: 9.2 MG/DL (ref 8.4–10.2)
CHLORIDE SERPL-SCNC: 99 MMOL/L (ref 96–108)
CLARITY UR: CLEAR
CO2 SERPL-SCNC: 20 MMOL/L (ref 21–32)
COLOR UR: YELLOW
CREAT SERPL-MCNC: 2.97 MG/DL (ref 0.6–1.3)
EOSINOPHIL # BLD AUTO: 0.12 THOUSAND/ÂΜL (ref 0–0.61)
EOSINOPHIL NFR BLD AUTO: 1 % (ref 0–6)
ERYTHROCYTE [DISTWIDTH] IN BLOOD BY AUTOMATED COUNT: 15.9 % (ref 11.6–15.1)
GFR SERPL CREATININE-BSD FRML MDRD: 20 ML/MIN/1.73SQ M
GLUCOSE SERPL-MCNC: 148 MG/DL (ref 65–140)
GLUCOSE UR STRIP-MCNC: ABNORMAL MG/DL
GRAN CASTS #/AREA URNS LPF: ABNORMAL /[LPF]
HCT VFR BLD AUTO: 39.3 % (ref 36.5–49.3)
HGB BLD-MCNC: 12.3 G/DL (ref 12–17)
HGB UR QL STRIP.AUTO: ABNORMAL
IMM GRANULOCYTES # BLD AUTO: 0.06 THOUSAND/UL (ref 0–0.2)
IMM GRANULOCYTES NFR BLD AUTO: 1 % (ref 0–2)
KETONES UR STRIP-MCNC: NEGATIVE MG/DL
LACTATE SERPL-SCNC: 0.7 MMOL/L (ref 0.5–2)
LEUKOCYTE ESTERASE UR QL STRIP: NEGATIVE
LIPASE SERPL-CCNC: 30 U/L (ref 11–82)
LYMPHOCYTES # BLD AUTO: 0.98 THOUSANDS/ÂΜL (ref 0.6–4.47)
LYMPHOCYTES NFR BLD AUTO: 8 % (ref 14–44)
MCH RBC QN AUTO: 25.7 PG (ref 26.8–34.3)
MCHC RBC AUTO-ENTMCNC: 31.3 G/DL (ref 31.4–37.4)
MCV RBC AUTO: 82 FL (ref 82–98)
MONOCYTES # BLD AUTO: 1.23 THOUSAND/ÂΜL (ref 0.17–1.22)
MONOCYTES NFR BLD AUTO: 9 % (ref 4–12)
NEUTROPHILS # BLD AUTO: 10.61 THOUSANDS/ÂΜL (ref 1.85–7.62)
NEUTS SEG NFR BLD AUTO: 81 % (ref 43–75)
NITRITE UR QL STRIP: NEGATIVE
NON-SQ EPI CELLS URNS QL MICRO: ABNORMAL /HPF
NRBC BLD AUTO-RTO: 0 /100 WBCS
PH UR STRIP.AUTO: 5.5 [PH]
PLATELET # BLD AUTO: 261 THOUSANDS/UL (ref 149–390)
PMV BLD AUTO: 9.7 FL (ref 8.9–12.7)
POTASSIUM SERPL-SCNC: 4.2 MMOL/L (ref 3.5–5.3)
PROT SERPL-MCNC: 7.2 G/DL (ref 6.4–8.4)
PROT UR STRIP-MCNC: ABNORMAL MG/DL
RBC # BLD AUTO: 4.78 MILLION/UL (ref 3.88–5.62)
RBC #/AREA URNS AUTO: ABNORMAL /HPF
SODIUM SERPL-SCNC: 131 MMOL/L (ref 135–147)
SP GR UR STRIP.AUTO: 1.02 (ref 1–1.03)
UROBILINOGEN UR STRIP-ACNC: <2 MG/DL
WBC # BLD AUTO: 13.05 THOUSAND/UL (ref 4.31–10.16)
WBC #/AREA URNS AUTO: ABNORMAL /HPF

## 2024-06-23 PROCEDURE — 74176 CT ABD & PELVIS W/O CONTRAST: CPT

## 2024-06-23 PROCEDURE — 96374 THER/PROPH/DIAG INJ IV PUSH: CPT

## 2024-06-23 PROCEDURE — 83605 ASSAY OF LACTIC ACID: CPT | Performed by: EMERGENCY MEDICINE

## 2024-06-23 PROCEDURE — 36415 COLL VENOUS BLD VENIPUNCTURE: CPT | Performed by: EMERGENCY MEDICINE

## 2024-06-23 PROCEDURE — 81001 URINALYSIS AUTO W/SCOPE: CPT | Performed by: EMERGENCY MEDICINE

## 2024-06-23 PROCEDURE — 83690 ASSAY OF LIPASE: CPT | Performed by: EMERGENCY MEDICINE

## 2024-06-23 PROCEDURE — 85025 COMPLETE CBC W/AUTO DIFF WBC: CPT | Performed by: EMERGENCY MEDICINE

## 2024-06-23 PROCEDURE — 80053 COMPREHEN METABOLIC PANEL: CPT | Performed by: EMERGENCY MEDICINE

## 2024-06-23 PROCEDURE — 96361 HYDRATE IV INFUSION ADD-ON: CPT

## 2024-06-23 RX ORDER — ONDANSETRON 2 MG/ML
4 INJECTION INTRAMUSCULAR; INTRAVENOUS ONCE
Status: DISCONTINUED | OUTPATIENT
Start: 2024-06-23 | End: 2024-06-23 | Stop reason: HOSPADM

## 2024-06-23 RX ORDER — POLYETHYLENE GLYCOL 3350 17 G/17G
17 POWDER, FOR SOLUTION ORAL DAILY
Qty: 14 EACH | Refills: 0 | Status: SHIPPED | OUTPATIENT
Start: 2024-06-23

## 2024-06-23 RX ORDER — FENTANYL CITRATE 50 UG/ML
50 INJECTION, SOLUTION INTRAMUSCULAR; INTRAVENOUS ONCE
Status: COMPLETED | OUTPATIENT
Start: 2024-06-23 | End: 2024-06-23

## 2024-06-23 RX ORDER — DOCUSATE SODIUM 100 MG/1
100 CAPSULE, LIQUID FILLED ORAL 2 TIMES DAILY PRN
Qty: 60 CAPSULE | Refills: 0 | Status: SHIPPED | OUTPATIENT
Start: 2024-06-23

## 2024-06-23 RX ADMIN — FENTANYL CITRATE 50 MCG: 50 INJECTION INTRAMUSCULAR; INTRAVENOUS at 00:48

## 2024-06-23 RX ADMIN — SODIUM CHLORIDE 500 ML: 0.9 INJECTION, SOLUTION INTRAVENOUS at 03:32

## 2024-06-23 RX ADMIN — SODIUM CHLORIDE 500 ML: 0.9 INJECTION, SOLUTION INTRAVENOUS at 00:48

## 2024-06-23 NOTE — ED PROVIDER NOTES
History  Chief Complaint   Patient presents with    Constipation     Patient states that he hasn't had a bowel movement in 3 days and is now having abd pain from not being able to go.      Cricket Rosales is a 69 y.o. year old male with PMH of CAD, HFrEF, COPD, CKD, HTN presenting to the [facility] ED for abdominal pain and constipation. Patient reporting three days of difficulty passing bowel movements and abdominal pain. Patient reports pain is severe, left-sided and nonradiating. Patient has had nausea and dry-heaving. He reports decrease in flatus. Patient reports mild history of constipation previously. Patient does not take chronic opioid medications. He has undergone a colonoscopy previously though is reportedly due for another colonoscopy. Patient denies fevers, chest pain, cough or dyspnea. No dysuria/hematuria or flank pain and patient reports voiding normally. Patient has taken miralax and 81mg asprin x6 at home today for symptomatic treatment. Surgical history reportedly includes appendectomy and hernia repair.      History provided by:  Medical records and patient   used: No        Prior to Admission Medications   Prescriptions Last Dose Informant Patient Reported? Taking?   ALPRAZolam (XANAX) 0.5 mg tablet   No No   Sig: TAKE 1 TABLET BY MOUTH ONCE DAILY AT BEDTIME AS NEEDED FOR ANXIETY   Accu-Chek FastClix Lancets MISC  Self No No   Sig: Use 1 each daily to test blood sugar.   Blood Glucose Monitoring Suppl (Accu-Chek Guide Me) w/Device KIT  Self No No   Sig: Use 1 each daily to test blood sugar.   DULoxetine (CYMBALTA) 60 mg delayed release capsule  Self No No   Sig: Take 1 capsule by mouth once daily   Diclofenac Sodium (VOLTAREN) 1 %  Self No No   Sig: Apply 2 g topically 2 (two) times a day   Empagliflozin (Jardiance) 10 MG TABS tablet  Self No No   Sig: Take 1 tablet (10 mg total) by mouth every morning   Entresto 49-51 MG TABS  Self No No   Sig: Take 1 tablet by mouth twice daily    Fluticasone-Salmeterol (Advair) 500-50 mcg/dose inhaler  Self No No   Sig: INHALE 1 DOSE BY MOUTH TWICE DAILY RINSE MOUTH AFTER USE   albuterol (2.5 mg/3 mL) 0.083 % nebulizer solution  Self No No   Sig: Take 3 mL (2.5 mg total) by nebulization every 6 (six) hours as needed for wheezing or shortness of breath   albuterol (ProAir HFA) 90 mcg/act inhaler  Self No No   Sig: Inhale 2 puffs every 6 (six) hours as needed for wheezing   aspirin (ECOTRIN LOW STRENGTH) 81 mg EC tablet   No No   Sig: Take 1 tablet (81 mg total) by mouth 2 (two) times a day for 14 days   atorvastatin (LIPITOR) 20 mg tablet  Self No No   Sig: Take 1 tablet (20 mg total) by mouth daily   carvedilol (COREG) 25 mg tablet  Self No No   Sig: TAKE 1 TABLET BY MOUTH TWICE DAILY WITH MEALS   fluticasone (FLONASE) 50 mcg/act nasal spray  Self No No   Sig: Use 1 spray(s) in each nostril twice daily   furosemide (LASIX) 40 mg tablet  Self No No   Sig: Take 1 tablet (40 mg total) by mouth every other day   glucose blood test strip  Self No No   Sig: Use 1 each daily to test blood sugar.   isosorbide mononitrate (IMDUR) 30 mg 24 hr tablet  Self No No   Sig: Take 1 tablet by mouth once daily   montelukast (SINGULAIR) 10 mg tablet  Self No No   Sig: Take 1 tablet (10 mg total) by mouth daily at bedtime   tiotropium (Spiriva Respimat) 2.5 MCG/ACT AERS inhaler  Self No No   Sig: INHALE 2 SPRAY(S) BY MOUTH ONCE DAILY      Facility-Administered Medications: None       Past Medical History:   Diagnosis Date    Acute on chronic combined systolic and diastolic CHF (congestive heart failure) ([facility]) 7/27/2023    Alcohol abuse 7/27/2023    Ambulates with cane     Arthritis     Asthma     Back pain     Chest pain 12/22/2022    CHF (congestive heart failure) ([facility])     Claustrophobia     COPD (chronic obstructive pulmonary disease) ([facility])     COPD with acute exacerbation ([facility]) 05/06/2022    COVID-19     COVID-19 virus infection 12/03/2021    Depression     Hypertension      Mumps     Neck pain     Old MI (myocardial infarction)     Pneumonia     Pulmonary emphysema (HCC)     Rectal bleeding 01/14/2019    Skin abnormalities     rashes and wounds on both legs - scabbed over and healing    Sleep apnea     no CPAP    Tingling of both feet     Wears glasses        Past Surgical History:   Procedure Laterality Date    APPENDECTOMY      CARDIAC CATHETERIZATION  07/24/2015    Left main- normal and maidly tortuous.  Circumflex - normal and moderately tortuous.  RCA- normal and mildy tortuous.  Global LV function was severely depressed..    CARDIAC CATHETERIZATION Left 09/27/2022    Procedure: Cardiac Left Heart Cath;  Surgeon: Doreen Briones DO;  Location: [facility];  Service: Cardiology    CARDIAC CATHETERIZATION N/A 09/27/2022    Procedure: Cardiac Coronary Angiogram;  Surgeon: Doreen Briones DO;  Location: [facility];  Service: Cardiology    CARDIAC CATHETERIZATION  09/27/2022    Procedure: Cardiac catheterization;  Surgeon: Doreen Briones DO;  Location: [facility];  Service: Cardiology    INGUINAL HERNIA REPAIR Bilateral     CT COLONOSCOPY FLX DX W/COLLJ SPEC WHEN PFRMD N/A 03/11/2019    Procedure: COLONOSCOPY with removal of anal papilla;  Surgeon: ANIL Dale MD;  Location: [facility];  Service: Colorectal    TONSILLECTOMY      TOTAL KNEE ARTHROPLASTY Left 9/18/2023    Procedure: ARTHROPLASTY KNEE TOTAL;  Surgeon: David Mullen DO;  Location: [facility];  Service: Orthopedics    UMBILICAL HERNIA REPAIR  06/21/2006       Family History   Problem Relation Age of Onset    Heart attack Father         MI    Heart disease Father     Diabetes Sister         DM    Arthritis Family     Coronary artery disease Family     Hypertension Family     Tuberculosis Son     Alzheimer's disease Mother      I have reviewed and agree with the history as documented.    E-Cigarette/Vaping    E-Cigarette Use Never User      E-Cigarette/Vaping Substances     Nicotine No     THC No     CBD No     Flavoring No     Other No     Unknown No      Social History     Tobacco Use    Smoking status: Former     Current packs/day: 0.00     Average packs/day: 3.0 packs/day for 43.0 years (129.0 ttl pk-yrs)     Types: Cigarettes     Start date:      Quit date: 2018     Years since quittin.4    Smokeless tobacco: Never   Vaping Use    Vaping status: Never Used   Substance Use Topics    Alcohol use: Not Currently    Drug use: Yes     Types: Marijuana     Comment: occasionally edible       Review of Systems   Constitutional:  Negative for chills and fever.   HENT:  Negative for congestion.    Respiratory:  Negative for cough and shortness of breath.    Cardiovascular:  Negative for chest pain.   Gastrointestinal:  Positive for abdominal pain, constipation and nausea. Negative for vomiting.   Genitourinary:  Negative for difficulty urinating, dysuria, flank pain and hematuria.   Musculoskeletal:  Negative for back pain.   Skin:  Negative for rash.   All other systems reviewed and are negative.      Physical Exam  Physical Exam  Vitals and nursing note reviewed.   Constitutional:       General: He is not in acute distress.     Appearance: Normal appearance. He is well-developed. He is obese. He is not ill-appearing, toxic-appearing or diaphoretic.   HENT:      Head: Normocephalic and atraumatic.      Nose: No congestion or rhinorrhea.   Eyes:      General:         Right eye: No discharge.         Left eye: No discharge.   Cardiovascular:      Rate and Rhythm: Normal rate and regular rhythm.   Pulmonary:      Effort: Pulmonary effort is normal. No respiratory distress.      Breath sounds: Normal breath sounds. No wheezing or rales.   Abdominal:      General: There is distension.      Palpations: Abdomen is soft.      Tenderness: There is abdominal tenderness in the left lower quadrant. There is no right CVA tenderness, left CVA tenderness, guarding or rebound.   Skin:     General:  Skin is warm.      Capillary Refill: Capillary refill takes less than 2 seconds.   Neurological:      Mental Status: He is alert and oriented to person, place, and time.      GCS: GCS eye subscore is 4. GCS verbal subscore is 5. GCS motor subscore is 6.      Comments: 5/5 strength b/l LE.  Sensation grossly intact throughout.     Psychiatric:         Mood and Affect: Mood and affect and mood normal.         Behavior: Behavior normal.         Vital Signs  ED Triage Vitals [06/23/24 0006]   Temperature Pulse Respirations Blood Pressure SpO2   97.6 °F (36.4 °C) 76 18 135/78 94 %      Temp Source Heart Rate Source Patient Position - Orthostatic VS BP Location FiO2 (%)   Temporal Monitor Lying Left arm --      Pain Score       10 - Worst Possible Pain           Vitals:    06/23/24 0130 06/23/24 0200 06/23/24 0230 06/23/24 0300   BP: 135/67 142/70 115/63 128/59   Pulse: 70 64 71 63   Patient Position - Orthostatic VS:  Lying           Visual Acuity      ED Medications  Medications   ondansetron (ZOFRAN) injection 4 mg (4 mg Intravenous Not Given 6/23/24 0050)   sodium chloride 0.9 % bolus 500 mL (500 mL Intravenous New Bag 6/23/24 0332)   sodium chloride 0.9 % bolus 500 mL (0 mL Intravenous Stopped 6/23/24 0329)   fentaNYL injection 50 mcg (50 mcg Intravenous Given 6/23/24 0048)       Diagnostic Studies  Results Reviewed       Procedure Component Value Units Date/Time    Urine Microscopic [[id number]]  (Abnormal) Collected: 06/23/24 0333    Lab Status: Final result Specimen: Urine, Clean Catch Updated: 06/23/24 0359     RBC, UA 2-4 /hpf      WBC, UA 2-4 /hpf      Epithelial Cells None Seen /hpf      Bacteria, UA Occasional /hpf      Granular Casts, UA 0-3    UA w Reflex to Microscopic w Reflex to Culture [[id number]]  (Abnormal) Collected: 06/23/24 0333    Lab Status: Final result Specimen: Urine, Clean Catch Updated: 06/23/24 0340     Color, UA Yellow     Clarity, UA Clear     Specific Gravity, UA 1.025     pH, UA 5.5      Leukocytes, UA Negative     Nitrite, UA Negative     Protein, UA 30 (1+) mg/dl      Glucose,  (3/10%) mg/dl      Ketones, UA Negative mg/dl      Urobilinogen, UA <2.0 mg/dl      Bilirubin, UA Negative     Occult Blood, UA Moderate    Lipase [[id number]]  (Normal) Collected: 06/23/24 0046    Lab Status: Final result Specimen: Blood from Arm, Right Updated: 06/23/24 0314     Lipase 30 u/L     Comprehensive metabolic panel [[id number]]  (Abnormal) Collected: 06/23/24 0046    Lab Status: Final result Specimen: Blood from Arm, Right Updated: 06/23/24 0120     Sodium 131 mmol/L      Potassium 4.2 mmol/L      Chloride 99 mmol/L      CO2 20 mmol/L      ANION GAP 12 mmol/L      BUN 60 mg/dL      Creatinine 2.97 mg/dL      Glucose 148 mg/dL      Calcium 9.2 mg/dL      AST 14 U/L      ALT 14 U/L      Alkaline Phosphatase 69 U/L      Total Protein 7.2 g/dL      Albumin 4.0 g/dL      Total Bilirubin 1.36 mg/dL      eGFR 20 ml/min/1.73sq m     Narrative:      National Kidney Disease Foundation guidelines for Chronic Kidney Disease (CKD):     Stage 1 with normal or high GFR (GFR > 90 mL/min/1.73 square meters)    Stage 2 Mild CKD (GFR = 60-89 mL/min/1.73 square meters)    Stage 3A Moderate CKD (GFR = 45-59 mL/min/1.73 square meters)    Stage 3B Moderate CKD (GFR = 30-44 mL/min/1.73 square meters)    Stage 4 Severe CKD (GFR = 15-29 mL/min/1.73 square meters)    Stage 5 End Stage CKD (GFR <15 mL/min/1.73 square meters)  Note: GFR calculation is accurate only with a steady state creatinine    Lactic acid, plasma (w/reflex if result > 2.0) [[id number]]  (Normal) Collected: 06/23/24 0046    Lab Status: Final result Specimen: Blood from Arm, Right Updated: 06/23/24 0118     LACTIC ACID 0.7 mmol/L     Narrative:      Result may be elevated if tourniquet was used during collection.    CBC and differential [[id number]]  (Abnormal) Collected: 06/23/24 0046    Lab Status: Final result Specimen: Blood from Arm, Right Updated: 06/23/24 0104      WBC 13.05 Thousand/uL      RBC 4.78 Million/uL      Hemoglobin 12.3 g/dL      Hematocrit 39.3 %      MCV 82 fL      MCH 25.7 pg      MCHC 31.3 g/dL      RDW 15.9 %      MPV 9.7 fL      Platelets 261 Thousands/uL      nRBC 0 /100 WBCs      Segmented % 81 %      Immature Grans % 1 %      Lymphocytes % 8 %      Monocytes % 9 %      Eosinophils Relative 1 %      Basophils Relative 0 %      Absolute Neutrophils 10.61 Thousands/µL      Absolute Immature Grans 0.06 Thousand/uL      Absolute Lymphocytes 0.98 Thousands/µL      Absolute Monocytes 1.23 Thousand/µL      Eosinophils Absolute 0.12 Thousand/µL      Basophils Absolute 0.05 Thousands/µL                    CT abdomen pelvis wo contrast    (Results Pending)              Procedures  Procedures         ED Course  ED Course as of 06/23/24 0426   Sun Jun 23, 2024   0232 Reassessed, pain is much improved. Reviewed labs results with patient. Will await CT results for dispo.   0318 CT via vRAD:  No evidence of obstructive calculus at the UPJs on either side; however, there  is asymmetrical fullness of the left renal collecting system, may represent sequelae of recently  passed calculus vs. Ascending infection.                               SBIRT 22yo+      Flowsheet Row Most Recent Value   Initial Alcohol Screen: US AUDIT-C     1. How often do you have a drink containing alcohol? 0 Filed at: 06/23/2024 0006   2. How many drinks containing alcohol do you have on a typical day you are drinking?  0 Filed at: 06/23/2024 0006   3a. Male UNDER 65: How often do you have five or more drinks on one occasion? 0 Filed at: 06/23/2024 0006   3b. FEMALE Any Age, or MALE 65+: How often do you have 4 or more drinks on one occassion? 0 Filed at: 06/23/2024 0006   Audit-C Score 0 Filed at: 06/23/2024 0006   MAURY: How many times in the past year have you...    Used an illegal drug or used a prescription medication for non-medical reasons? Never Filed at: 06/23/2024 0006                       Medical Decision Making    69 y.o. male presenting for abdominal pain and constipation.  VSS, nontoxic appearing.  Will order labs, UA and CT to evaluate for leukocytosis, anemia, electrolyte abnormality, MAGDA, UTI, kidney stone, diverticulitis, SBO or AAA.  Differential would include ileus or primary constipation.  Will treat symptomatically.    Reassessment: Pain completely resolved during ED course. Patient well appearing, VSS and ambulatory to bathroom.  Patient voiding spontaneously.  Reviewed labs, UA and CT results in detail.  UA showing hematuria and CT findings compatible with likely passed kidney stone.  No evidence of AAA, sepsis, UTI or diverticulitis.    Reviewed creatinine results in detail. Suspect MAGDA on CKD in setting of prerenal etiology and from taking Asprin today.    Disposition: Extensive discussion regarding disposition given significant MAGDA. Patient given 1L IV fluids in ED and no other electrolyte abnormality noted. Patient offered admission vs. Prompt outpatient evaluation and repeat BMP this week to trend creatinine. Patient agreeable to prompt outpatient management.    Discharge Plan: Will provide script for repeat BMP in 4 days. Advised to avoid NSAIDs and encouraged oral hydration.  Will provide Rx for fleet enema, miralax and colace for constipation. RTED precautions emphasized. The patient was provided a written after visit summary with strict RTED precautions.     Followup: I have discussed with the patient plan to follow up with their PCP. Contact information provided in AVS.    Amount and/or Complexity of Data Reviewed  Labs: ordered.  Radiology: ordered.    Risk  Prescription drug management.             Disposition  Final diagnoses:   Acute kidney injury superimposed on chronic kidney disease  (HCC)   Calculus of gallbladder without cholecystitis without obstruction   Nonspecific abdominal pain   Constipation     Time reflects when diagnosis was documented in both  MDM as applicable and the Disposition within this note       Time User Action Codes Description Comment    6/23/2024  3:01 AM uGero Ceron [N17.9,  N18.9] Acute kidney injury superimposed on chronic kidney disease  (HCC)     6/23/2024  3:32 AM Guero Creon [K80.20] Calculus of gallbladder without cholecystitis without obstruction     6/23/2024  3:59 AM Guero Ceron [R10.9] Nonspecific abdominal pain     6/23/2024  3:59 AM Guero Ceron [K59.00] Constipation           ED Disposition       ED Disposition   Discharge    Condition   Stable    Date/Time   Sun Jun 23, 2024 0412    Comment   Cricket Rosales discharge to home/self care.                   Follow-up Information       Follow up With Specialties Details Why Contact Info    Lauren Vee DO Internal Medicine, Hospice Services Schedule an appointment as soon as possible for a visit  To make appointment for reevaluation in 3-5 days. [address] [address]  [phone]              Patient's Medications   Discharge Prescriptions    BISACODYL (FLEET) 10 MG/30ML ENEM    Insert 30 mL (10 mg total) into the rectum once for 1 dose       Start Date: 6/23/2024 End Date: 6/23/2024       Order Dose: 10 mg       Quantity: 30 mL    Refills: 0    DOCUSATE SODIUM (COLACE) 100 MG CAPSULE    Take 1 capsule (100 mg total) by mouth 2 (two) times a day as needed for constipation       Start Date: 6/23/2024 End Date: --       Order Dose: 100 mg       Quantity: 60 capsule    Refills: 0    POLYETHYLENE GLYCOL (MIRALAX) 17 G PACKET    Take 17 g by mouth daily       Start Date: 6/23/2024 End Date: --       Order Dose: 17 g       Quantity: 14 each    Refills: 0       Outpatient Discharge Orders   Basic metabolic panel   Standing Status: Future Standing Exp. Date: 06/23/25       PDMP Review         Value Time User    PDMP Reviewed  Yes 6/13/2024 10:00 AM Lauren Vee DO            ED Provider  Electronically Signed by             Guero GREEN  DO [place]  06/23/24 0426

## 2024-06-23 NOTE — DISCHARGE INSTRUCTIONS
You have been seen for evaluation of constipation and abdominal pain. Please take tylenol as needed for pain. Please take miralax, colace and the prescribed enema for constipation. Your labs show worsening of your kidney disease. Please complete the outpatient blood test this week to monitor your kidney numbers. Return to the emergency department if you develop worsening pain, vomiting, fevers, weakness, confusion or any other symptoms of concern. Please follow up with your PCP by calling the number provided.

## 2024-06-24 ENCOUNTER — LAB (OUTPATIENT)
Dept: LAB | Facility: CLINIC | Age: 69
End: 2024-06-24
Payer: MEDICARE

## 2024-06-24 DIAGNOSIS — N18.9 ACUTE KIDNEY INJURY SUPERIMPOSED ON CHRONIC KIDNEY DISEASE  (HCC): ICD-10-CM

## 2024-06-24 DIAGNOSIS — N17.9 ACUTE KIDNEY INJURY SUPERIMPOSED ON CHRONIC KIDNEY DISEASE  (HCC): ICD-10-CM

## 2024-06-24 DIAGNOSIS — N18.30 STAGE 3 CHRONIC KIDNEY DISEASE, UNSPECIFIED WHETHER STAGE 3A OR 3B CKD (HCC): ICD-10-CM

## 2024-06-24 DIAGNOSIS — E78.5 DYSLIPIDEMIA: ICD-10-CM

## 2024-06-24 LAB
ALBUMIN SERPL BCG-MCNC: 3.7 G/DL (ref 3.5–5)
ALP SERPL-CCNC: 74 U/L (ref 34–104)
ALT SERPL W P-5'-P-CCNC: 21 U/L (ref 7–52)
ANION GAP SERPL CALCULATED.3IONS-SCNC: 12 MMOL/L (ref 4–13)
AST SERPL W P-5'-P-CCNC: 14 U/L (ref 13–39)
BILIRUB SERPL-MCNC: 1.01 MG/DL (ref 0.2–1)
BUN SERPL-MCNC: 50 MG/DL (ref 5–25)
CALCIUM SERPL-MCNC: 8.9 MG/DL (ref 8.4–10.2)
CHLORIDE SERPL-SCNC: 103 MMOL/L (ref 96–108)
CHOLEST SERPL-MCNC: 92 MG/DL
CO2 SERPL-SCNC: 21 MMOL/L (ref 21–32)
CREAT SERPL-MCNC: 2.45 MG/DL (ref 0.6–1.3)
GFR SERPL CREATININE-BSD FRML MDRD: 26 ML/MIN/1.73SQ M
GLUCOSE P FAST SERPL-MCNC: 115 MG/DL (ref 65–99)
HDLC SERPL-MCNC: 27 MG/DL
LDLC SERPL CALC-MCNC: 48 MG/DL (ref 0–100)
POTASSIUM SERPL-SCNC: 4.3 MMOL/L (ref 3.5–5.3)
PROT SERPL-MCNC: 7 G/DL (ref 6.4–8.4)
SODIUM SERPL-SCNC: 136 MMOL/L (ref 135–147)
TRIGL SERPL-MCNC: 86 MG/DL

## 2024-06-24 PROCEDURE — 80053 COMPREHEN METABOLIC PANEL: CPT

## 2024-06-24 PROCEDURE — 80061 LIPID PANEL: CPT

## 2024-06-24 PROCEDURE — 36415 COLL VENOUS BLD VENIPUNCTURE: CPT

## 2024-06-26 ENCOUNTER — OFFICE VISIT (OUTPATIENT)
Dept: FAMILY MEDICINE CLINIC | Facility: CLINIC | Age: 69
End: 2024-06-26
Payer: MEDICARE

## 2024-06-26 VITALS
HEART RATE: 67 BPM | OXYGEN SATURATION: 98 % | WEIGHT: 270 LBS | BODY MASS INDEX: 34.65 KG/M2 | HEIGHT: 74 IN | TEMPERATURE: 97.7 F

## 2024-06-26 DIAGNOSIS — K59.00 CONSTIPATION, UNSPECIFIED CONSTIPATION TYPE: Primary | ICD-10-CM

## 2024-06-26 DIAGNOSIS — R31.29 MICROSCOPIC HEMATURIA: ICD-10-CM

## 2024-06-26 PROBLEM — Z96.652 S/P TKR (TOTAL KNEE REPLACEMENT), LEFT: Status: RESOLVED | Noted: 2023-11-02 | Resolved: 2024-06-26

## 2024-06-26 PROBLEM — M21.619 BUNION: Status: RESOLVED | Noted: 2024-04-02 | Resolved: 2024-06-26

## 2024-06-26 PROBLEM — L03.115 CELLULITIS OF RIGHT LOWER LEG: Status: RESOLVED | Noted: 2024-04-27 | Resolved: 2024-06-26

## 2024-06-26 PROBLEM — Z87.891 HISTORY OF TOBACCO ABUSE: Status: RESOLVED | Noted: 2018-12-19 | Resolved: 2024-06-26

## 2024-06-26 PROCEDURE — 99214 OFFICE O/P EST MOD 30 MIN: CPT | Performed by: INTERNAL MEDICINE

## 2024-06-26 PROCEDURE — G2211 COMPLEX E/M VISIT ADD ON: HCPCS | Performed by: INTERNAL MEDICINE

## 2024-06-26 RX ORDER — LACTULOSE 10 G/15ML
20 SOLUTION ORAL 3 TIMES DAILY
Qty: 473 ML | Refills: 0 | Status: SHIPPED | OUTPATIENT
Start: 2024-06-26

## 2024-06-26 NOTE — PROGRESS NOTES
Ambulatory Visit  Name: Cricket Rosales      : 1955      MRN: [id number]  Encounter Provider: Lauren Vee DO  Encounter Date: 2024   Encounter department: [facility]    Assessment & Plan   1. Constipation, unspecified constipation type  -     lactulose (CHRONULAC) 10 g/15 mL solution; Take 30 mL (20 g total) by mouth 3 (three) times a day  Stay hydrated Hi fiber diet  Start lactulose in hopes of improving bowel function Abdominal exam non acute today   2. Microscopic hematuria  -     UA (URINE) with reflex to Scope; Future  Urine with moderate blood and sxs suspicious for passage of recent stone recheck urine prior to appt next week      Rto Tuesday as scheduled   History of Present Illness     HPI  Pt was in ER for abdominal pain/constipation Acute onset of constipation and no chronic hx of such He had CT which suggested possible kidney stone passage Pt did describe flank pain that migrated across his back NO hematuria No fever or chills Some nausea which has resolved He is using miralax and colace He does have colonoscopy schedule end of the month for regular followup No vomiting He is eating and drinking fluids No sob or chest pain BS was 130 range today   Review of Systems   Constitutional:  Negative for activity change, appetite change, chills and fever.   HENT: Negative.     Respiratory:  Negative for cough and shortness of breath.    Cardiovascular:  Negative for chest pain, palpitations and leg swelling.   Gastrointestinal:  Positive for constipation. Negative for nausea and vomiting.   Genitourinary: Negative.    Musculoskeletal:  Positive for arthralgias.   Neurological:  Negative for dizziness, light-headedness and headaches.   Psychiatric/Behavioral:  Negative for sleep disturbance. The patient is not nervous/anxious.    Past Medical History:   Diagnosis Date   • Acute on chronic combined systolic and diastolic CHF (congestive heart failure) (HCC) 2023   • Alcohol  abuse 2023   • Ambulates with cane    • Arthritis    • Asthma    • Back pain    • Bunion 2024   • Chest pain 2022   • CHF (congestive heart failure) ([facility])    • Claustrophobia    • COPD (chronic obstructive pulmonary disease) ([facility])    • COPD with acute exacerbation ([facility]) 2022   • COVID-19    • COVID-19 virus infection 2021   • Depression    • Hypertension    • Mumps    • Neck pain    • Old MI (myocardial infarction)    • Pneumonia    • Pulmonary emphysema ([facility])    • Rectal bleeding 2019   • S/P TKR (total knee replacement), left 2023   • Skin abnormalities     rashes and wounds on both legs - scabbed over and healing   • Sleep apnea     no CPAP   • Tingling of both feet    • Wears glasses      .[facility]  Social History     Socioeconomic History   • Marital status: /Civil Union     Spouse name: Not on file   • Number of children: Not on file   • Years of education: Not on file   • Highest education level: Not on file   Occupational History   • Not on file   Tobacco Use   • Smoking status: Former     Current packs/day: 0.00     Average packs/day: 3.0 packs/day for 43.0 years (129.0 ttl pk-yrs)     Types: Cigarettes     Start date:      Quit date: 2018     Years since quittin.4   • Smokeless tobacco: Never   Vaping Use   • Vaping status: Never Used   Substance and Sexual Activity   • Alcohol use: Not Currently   • Drug use: Yes     Types: Marijuana     Comment: occasionally edible   • Sexual activity: Not on file   Other Topics Concern   • Not on file   Social History Narrative    Caffeine use     Social Determinants of Health     Financial Resource Strain: Low Risk  (3/23/2023)    Overall Financial Resource Strain (CARDIA)    • Difficulty of Paying Living Expenses: Not very hard   Food Insecurity: No Food Insecurity (2024)    Hunger Vital Sign    • Worried About Running Out of Food in the Last Year: Never true    • Ran Out of Food in the Last Year: Never true  "  Transportation Needs: No Transportation Needs (5/2/2024)    PRAPARE - Transportation    • Lack of Transportation (Medical): No    • Lack of Transportation (Non-Medical): No   Physical Activity: Not on file   Stress: Not on file   Social Connections: Not on file   Intimate Partner Violence: Not on file   Housing Stability: Low Risk  (5/2/2024)    Housing Stability Vital Sign    • Unable to Pay for Housing in the Last Year: No    • Number of Times Moved in the Last Year: 1    • Homeless in the Last Year: No     Allergies   Allergen Reactions   • Ciprofloxacin Shortness Of Breath and Diarrhea       Objective     Pulse 67   Temp 97.7 °F (36.5 °C) (Temporal)   Ht 6' 2\" (1.88 m)   Wt 122 kg (270 lb)   SpO2 98%   BMI 34.67 kg/m²     Physical Exam  Vitals and nursing note reviewed.   Constitutional:       General: He is not in acute distress.     Appearance: Normal appearance. He is not ill-appearing, toxic-appearing or diaphoretic.   HENT:      Head: Normocephalic and atraumatic.      Right Ear: External ear normal.      Left Ear: External ear normal.      Nose: Nose normal.      Mouth/Throat:      Mouth: Mucous membranes are moist.   Eyes:      General: No scleral icterus.     Extraocular Movements: Extraocular movements intact.      Conjunctiva/sclera: Conjunctivae normal.      Pupils: Pupils are equal, round, and reactive to light.   Cardiovascular:      Rate and Rhythm: Normal rate. Rhythm irregular.      Pulses: Normal pulses.   Pulmonary:      Effort: Pulmonary effort is normal. No respiratory distress.      Breath sounds: Normal breath sounds. No wheezing.   Abdominal:      General: Bowel sounds are normal. There is no distension.      Palpations: Abdomen is soft.      Tenderness: There is no abdominal tenderness. There is no right CVA tenderness, left CVA tenderness, guarding or rebound.   Musculoskeletal:      Cervical back: Normal range of motion and neck supple.      Right lower leg: No edema.      Left " lower leg: No edema.   Lymphadenopathy:      Cervical: No cervical adenopathy.   Skin:     General: Skin is warm and dry.      Coloration: Skin is not jaundiced or pale.   Neurological:      General: No focal deficit present.      Mental Status: He is alert and oriented to person, place, and time. Mental status is at baseline.      Cranial Nerves: No cranial nerve deficit.      Sensory: Sensory deficit present.   Psychiatric:         Mood and Affect: Mood normal.         Behavior: Behavior normal.         Thought Content: Thought content normal.         Judgment: Judgment normal.   Administrative Statements

## 2024-06-28 ENCOUNTER — TELEPHONE (OUTPATIENT)
Age: 69
End: 2024-06-28

## 2024-06-28 ENCOUNTER — APPOINTMENT (OUTPATIENT)
Dept: LAB | Facility: CLINIC | Age: 69
End: 2024-06-28
Payer: MEDICARE

## 2024-06-28 DIAGNOSIS — R31.29 OTHER MICROSCOPIC HEMATURIA: ICD-10-CM

## 2024-06-28 DIAGNOSIS — R35.0 URINARY FREQUENCY: ICD-10-CM

## 2024-06-28 DIAGNOSIS — N30.00 ACUTE CYSTITIS WITHOUT HEMATURIA: Primary | ICD-10-CM

## 2024-06-28 DIAGNOSIS — R35.0 URINARY FREQUENCY: Primary | ICD-10-CM

## 2024-06-28 LAB
BACTERIA UR QL AUTO: ABNORMAL /HPF
BILIRUB UR QL STRIP: NEGATIVE
CLARITY UR: ABNORMAL
COLOR UR: ABNORMAL
GLUCOSE UR STRIP-MCNC: ABNORMAL MG/DL
GRAN CASTS #/AREA URNS LPF: ABNORMAL /[LPF]
HGB UR QL STRIP.AUTO: ABNORMAL
KETONES UR STRIP-MCNC: NEGATIVE MG/DL
LEUKOCYTE ESTERASE UR QL STRIP: ABNORMAL
NITRITE UR QL STRIP: NEGATIVE
NON-SQ EPI CELLS URNS QL MICRO: ABNORMAL /HPF
PH UR STRIP.AUTO: 6 [PH]
PROT UR STRIP-MCNC: ABNORMAL MG/DL
RBC #/AREA URNS AUTO: ABNORMAL /HPF
SP GR UR STRIP.AUTO: 1.01 (ref 1–1.03)
UROBILINOGEN UR STRIP-ACNC: <2 MG/DL
WBC #/AREA URNS AUTO: ABNORMAL /HPF
WBC CLUMPS # UR AUTO: PRESENT /UL

## 2024-06-28 PROCEDURE — 81001 URINALYSIS AUTO W/SCOPE: CPT

## 2024-06-28 RX ORDER — CEPHALEXIN 250 MG/1
250 CAPSULE ORAL EVERY 8 HOURS SCHEDULED
Qty: 21 CAPSULE | Refills: 0 | Status: SHIPPED | OUTPATIENT
Start: 2024-06-28 | End: 2024-07-05

## 2024-06-28 NOTE — TELEPHONE ENCOUNTER
Pt called stating the constipation has cleared up but not he is having urine frequency and urine urgency.  No appointments available today.  Pt will have the Urine test ordered done today.    Please advise

## 2024-07-02 ENCOUNTER — OFFICE VISIT (OUTPATIENT)
Dept: FAMILY MEDICINE CLINIC | Facility: CLINIC | Age: 69
End: 2024-07-02
Payer: MEDICARE

## 2024-07-02 VITALS
SYSTOLIC BLOOD PRESSURE: 126 MMHG | DIASTOLIC BLOOD PRESSURE: 78 MMHG | WEIGHT: 272.2 LBS | HEIGHT: 74 IN | HEART RATE: 66 BPM | OXYGEN SATURATION: 95 % | BODY MASS INDEX: 34.93 KG/M2 | RESPIRATION RATE: 18 BRPM | TEMPERATURE: 97.4 F

## 2024-07-02 DIAGNOSIS — I42.8 NON-ISCHEMIC CARDIOMYOPATHY (HCC): Chronic | ICD-10-CM

## 2024-07-02 DIAGNOSIS — F33.1 MODERATE EPISODE OF RECURRENT MAJOR DEPRESSIVE DISORDER (HCC): ICD-10-CM

## 2024-07-02 DIAGNOSIS — Z00.00 MEDICARE ANNUAL WELLNESS VISIT, SUBSEQUENT: Primary | ICD-10-CM

## 2024-07-02 PROBLEM — K59.00 CONSTIPATION: Status: RESOLVED | Noted: 2018-12-18 | Resolved: 2024-07-02

## 2024-07-02 PROBLEM — M79.672 LEFT FOOT PAIN: Status: RESOLVED | Noted: 2024-04-02 | Resolved: 2024-07-02

## 2024-07-02 PROBLEM — R79.89 ELEVATED TROPONIN: Status: RESOLVED | Noted: 2024-04-24 | Resolved: 2024-07-02

## 2024-07-02 PROCEDURE — G0438 PPPS, INITIAL VISIT: HCPCS | Performed by: INTERNAL MEDICINE

## 2024-07-02 RX ORDER — DULOXETIN HYDROCHLORIDE 60 MG/1
60 CAPSULE, DELAYED RELEASE ORAL DAILY
Qty: 90 CAPSULE | Refills: 3 | Status: SHIPPED | OUTPATIENT
Start: 2024-07-02

## 2024-07-02 NOTE — PROGRESS NOTES
Ambulatory Visit  Name: Cricket Rosales      : 1955      MRN: [id number]  Encounter Provider: Lauren Vee DO  Encounter Date: 2024   Encounter department: [facility]    Assessment & Plan   1. Medicare annual wellness visit, subsequent  Pt has freezer file and encouraged him to complete and return copy of advocate form   Lo sodium diet  Resume exercise gradually even at home  Stay hydrated and cool with warmer temps ahead   2. Moderate episode of recurrent major depressive disorder (HCC)  -     DULoxetine (CYMBALTA) 60 mg delayed release capsule; Take 1 capsule (60 mg total) by mouth daily  3. Non-ischemic cardiomyopathy with HFmEF (HCC)  Pt si following with cardiology and stable on current regimen Daily weights, healthy diet portions and choices discussed       Depression Screening and Follow-up Plan: Patient was screened for depression during today's encounter. They screened negative with a PHQ-9 score of 4.    Rto 3months   Preventive health issues were discussed with patient, and age appropriate screening tests were ordered as noted in patient's After Visit Summary. Personalized health advice and appropriate referrals for health education or preventive services given if needed, as noted in patient's After Visit Summary.    History of Present Illness     HPI   Pt bowels issues resolved and bladder sxs seem improved so is feeling better than he had He is trying to follow lo carb/lo sodium diet again and work on weight loss No chest pain Some hwang but he is not as active as he had been BS fairly stable and has endocrine appt upcoming   Patient Care Team:  Lauren Vee DO as PCP - General  Lauren Vee DO as PCP - PCP-[facility] ([facility])  Chuy Mcconnell MD as PCP - PCP-[facility] ([facility])  DO Xiomy Ortega DO Giuseppe Guglielmello, DO W Terence Reilly, MD as Endoscopist  Alvaro Pro MD (Pulmonary Disease)  Moo Portillo MD  (Pulmonary Disease)  KATIA Tavarez as Nurse Practitioner (Pulmonology)  Susan Muir DO (Nephrology)  KATIA Duenas as Nurse Practitioner (Nephrology)    Review of Systems   Constitutional:  Negative for chills and fever.   HENT:  Positive for postnasal drip.    Eyes:  Negative for visual disturbance.   Respiratory:  Positive for shortness of breath. Negative for cough.    Cardiovascular:  Negative for chest pain, palpitations and leg swelling.   Gastrointestinal:  Negative for abdominal distention, abdominal pain, constipation and diarrhea.   Genitourinary: Negative.  Negative for dysuria, frequency and hematuria.   Musculoskeletal:  Positive for arthralgias.        Right scapular discomfort    Skin:  Negative for rash.   Neurological:  Negative for dizziness, light-headedness and headaches.   Psychiatric/Behavioral:  Negative for sleep disturbance. The patient is not nervous/anxious.      Medical History Reviewed by provider this encounter:  [facility]       Annual Wellness Visit Questionnaire   Cricket is here for his Subsequent Wellness visit. Last Medicare Wellness visit information reviewed, patient interviewed, no change since last AWV.     Health Risk Assessment:   Patient rates overall health as fair. Patient feels that their physical health rating is same. Patient is very satisfied with their life. Eyesight was rated as same. Hearing was rated as same. Patient feels that their emotional and mental health rating is same. Patients states they are never, rarely angry. Patient states they are often unusually tired/fatigued. Pain experienced in the last 7 days has been none. Patient states that he has experienced no weight loss or gain in last 6 months.     Depression Screening:   PHQ-9 Score: 4      Fall Risk Screening:   In the past year, patient has experienced: no history of falling in past year      Home Safety:  Patient does not have trouble with stairs inside or outside of  their home. Patient has working smoke alarms and has working carbon monoxide detector. Home safety hazards include: none.     Nutrition:   Current diet is Regular.     Medications:   Patient is currently taking over-the-counter supplements. OTC medications include: see medication list. Patient is able to manage medications.     Activities of Daily Living (ADLs)/Instrumental Activities of Daily Living (IADLs):   Walk and transfer into and out of bed and chair?: Yes  Dress and groom yourself?: Yes    Bathe or shower yourself?: Yes    Feed yourself? Yes  Do your laundry/housekeeping?: No  Manage your money, pay your bills and track your expenses?: Yes  Make your own meals?: No    Do your own shopping?: No    Previous Hospitalizations:   Any hospitalizations or ED visits within the last 12 months?: Yes    How many hospitalizations have you had in the last year?: more than 4    Advance Care Planning:   Living will: No    Durable POA for healthcare: No    Advanced directive: No    Advanced directive counseling given: Yes    ACP document given: Yes    End of Life Decisions reviewed with patient: Yes    Provider agrees with end of life decisions: Yes      Cognitive Screening:   Provider or family/friend/caregiver concerned regarding cognition?: No    PREVENTIVE SCREENINGS      Cardiovascular Screening:    General: Screening Current      Diabetes Screening:     General: History Diabetes and Screening Current      Colorectal Cancer Screening:     General: Risks and Benefits Discussed      Prostate Cancer Screening:    General: Screening Current      Osteoporosis Screening:    General: Patient Declines      Abdominal Aortic Aneurysm (AAA) Screening:    Risk factors include: age between 65-74 yo and tobacco use        Lung Cancer Screening:     General: Screening Current      Hepatitis C Screening:    General: Screening Current    Screening, Brief Intervention, and Referral to Treatment (SBIRT)    Screening  Typical number of  "drinks in a day: 0  Typical number of drinks in a week: 1  Interpretation: Low risk drinking behavior.    AUDIT-C Screenin) How often did you have a drink containing alcohol in the past year? never  2) How many drinks did you have on a typical day when you were drinking in the past year? 0  3) How often did you have 6 or more drinks on one occasion in the past year? never    AUDIT-C Score: 0  Interpretation: Score 0-3 (male): Negative screen for alcohol misuse    Single Item Drug Screening:  How often have you used an illegal drug (including marijuana) or a prescription medication for non-medical reasons in the past year? never    Single Item Drug Screen Score: 0  Interpretation: Negative screen for possible drug use disorder    Brief Intervention  Alcohol & drug use screenings were reviewed. No concerns regarding substance use disorder identified.     Other Counseling Topics:   Regular weightbearing exercise and calcium and vitamin D intake.     Social Determinants of Health     Financial Resource Strain: Low Risk  (3/23/2023)    Overall Financial Resource Strain (CARDIA)     Difficulty of Paying Living Expenses: Not very hard   Food Insecurity: No Food Insecurity (2024)    Hunger Vital Sign     Worried About Running Out of Food in the Last Year: Never true     Ran Out of Food in the Last Year: Never true   Transportation Needs: No Transportation Needs (2024)    PRAPARE - Transportation     Lack of Transportation (Medical): No     Lack of Transportation (Non-Medical): No   Housing Stability: Low Risk  (2024)    Housing Stability Vital Sign     Unable to Pay for Housing in the Last Year: No     Number of Times Moved in the Last Year: 1     Homeless in the Last Year: No   Utilities: Not At Risk (2024)    [facility] Utilities     Threatened with loss of utilities: No     No results found.    Objective     /78   Pulse 66   Temp (!) 97.4 °F (36.3 °C) (Temporal)   Resp 18   Ht 6' 2\" (1.88 m)   Wt " 123 kg (272 lb 3.2 oz)   SpO2 95%   BMI 34.95 kg/m²     Physical Exam  Vitals and nursing note reviewed.   Constitutional:       General: He is not in acute distress.     Appearance: Normal appearance. He is not ill-appearing, toxic-appearing or diaphoretic.   HENT:      Head: Normocephalic and atraumatic.      Right Ear: External ear normal.      Left Ear: External ear normal.      Nose: Nose normal.      Mouth/Throat:      Mouth: Mucous membranes are moist.   Eyes:      General: No scleral icterus.     Extraocular Movements: Extraocular movements intact.      Conjunctiva/sclera: Conjunctivae normal.      Pupils: Pupils are equal, round, and reactive to light.   Cardiovascular:      Rate and Rhythm: Normal rate. Rhythm irregular.      Pulses: Normal pulses.   Pulmonary:      Effort: Pulmonary effort is normal. No respiratory distress.      Breath sounds: Rales present. No wheezing.   Abdominal:      General: Bowel sounds are normal. There is no distension.      Palpations: Abdomen is soft.      Tenderness: There is no abdominal tenderness.   Musculoskeletal:      Cervical back: Normal range of motion and neck supple.      Right lower leg: No edema.      Left lower leg: No edema.   Lymphadenopathy:      Cervical: No cervical adenopathy.   Skin:     General: Skin is warm and dry.      Coloration: Skin is not jaundiced or pale.   Neurological:      General: No focal deficit present.      Mental Status: He is alert and oriented to person, place, and time. Mental status is at baseline.      Cranial Nerves: No cranial nerve deficit.      Sensory: Sensory deficit present.   Psychiatric:         Mood and Affect: Mood normal.         Behavior: Behavior normal.         Thought Content: Thought content normal.         Judgment: Judgment normal.       Administrative Statements

## 2024-07-02 NOTE — PATIENT INSTRUCTIONS
Medicare Preventive Visit Patient Instructions  Thank you for completing your Welcome to Medicare Visit or Medicare Annual Wellness Visit today. Your next wellness visit will be due in one year (7/3/2025).  The screening/preventive services that you may require over the next 5-10 years are detailed below. Some tests may not apply to you based off risk factors and/or age. Screening tests ordered at today's visit but not completed yet may show as past due. Also, please note that scanned in results may not display below.  Preventive Screenings:  Service Recommendations Previous Testing/Comments   Colorectal Cancer Screening  Colonoscopy    Fecal Occult Blood Test (FOBT)/Fecal Immunochemical Test (FIT)  Fecal DNA/Cologuard Test  Flexible Sigmoidoscopy Age: 45-75 years old   Colonoscopy: every 10 years (May be performed more frequently if at higher risk)  OR  FOBT/FIT: every 1 year  OR  Cologuard: every 3 years  OR  Sigmoidoscopy: every 5 years  Screening may be recommended earlier than age 45 if at higher risk for colorectal cancer. Also, an individualized decision between you and your healthcare provider will decide whether screening between the ages of 76-85 would be appropriate. Colonoscopy: 03/11/2019  FOBT/FIT: Not on file  Cologuard: Not on file  Sigmoidoscopy: Not on file          Prostate Cancer Screening Individualized decision between patient and health care provider in men between ages of 55-69   Medicare will cover every 12 months beginning on the day after your 50th birthday PSA: 0.56 ng/mL     Screening Current     Hepatitis C Screening Once for adults born between 1945 and 1965  More frequently in patients at high risk for Hepatitis C Hep C Antibody: 09/19/2023        Diabetes Screening 1-2 times per year if you're at risk for diabetes or have pre-diabetes Fasting glucose: 115 mg/dL (6/24/2024)  A1C: 7.0 % (3/6/2024)  Screening Not Indicated  History Diabetes   Cholesterol Screening Once every 5 years if  you don't have a lipid disorder. May order more often based on risk factors. Lipid panel: 06/24/2024  Screening Current      Other Preventive Screenings Covered by Medicare:  Abdominal Aortic Aneurysm (AAA) Screening: covered once if your at risk. You're considered to be at risk if you have a family history of AAA or a male between the age of 65-75 who smoking at least 100 cigarettes in your lifetime.  Lung Cancer Screening: covers low dose CT scan once per year if you meet all of the following conditions: (1) Age 55-77; (2) No signs or symptoms of lung cancer; (3) Current smoker or have quit smoking within the last 15 years; (4) You have a tobacco smoking history of at least 20 pack years (packs per day x number of years you smoked); (5) You get a written order from a healthcare provider.  Glaucoma Screening: covered annually if you're considered high risk: (1) You have diabetes OR (2) Family history of glaucoma OR (3)  aged 50 and older OR (4)  American aged 65 and older  Osteoporosis Screening: covered every 2 years if you meet one of the following conditions: (1) Have a vertebral abnormality; (2) On glucocorticoid therapy for more than 3 months; (3) Have primary hyperparathyroidism; (4) On osteoporosis medications and need to assess response to drug therapy.  HIV Screening: covered annually if you're between the age of 15-65. Also covered annually if you are younger than 15 and older than 65 with risk factors for HIV infection. For pregnant patients, it is covered up to 3 times per pregnancy.    Immunizations:  Immunization Recommendations   Influenza Vaccine Annual influenza vaccination during flu season is recommended for all persons aged >= 6 months who do not have contraindications   Pneumococcal Vaccine   * Pneumococcal conjugate vaccine = PCV13 (Prevnar 13), PCV15 (Vaxneuvance), PCV20 (Prevnar 20)  * Pneumococcal polysaccharide vaccine = PPSV23 (Pneumovax) Adults 19-65 yo with  certain risk factors or if 65+ yo  If never received any pneumonia vaccine: recommend Prevnar 20 (PCV20)  Give PCV20 if previously received 1 dose of PCV13 or PPSV23   Hepatitis B Vaccine 3 dose series if at intermediate or high risk (ex: diabetes, end stage renal disease, liver disease)   Respiratory syncytial virus (RSV) Vaccine - COVERED BY MEDICARE PART D  * RSVPreF3 (Arexvy) CDC recommends that adults 60 years of age and older may receive a single dose of RSV vaccine using shared clinical decision-making (SCDM)   Tetanus (Td) Vaccine - COST NOT COVERED BY MEDICARE PART B Following completion of primary series, a booster dose should be given every 10 years to maintain immunity against tetanus. Td may also be given as tetanus wound prophylaxis.   Tdap Vaccine - COST NOT COVERED BY MEDICARE PART B Recommended at least once for all adults. For pregnant patients, recommended with each pregnancy.   Shingles Vaccine (Shingrix) - COST NOT COVERED BY MEDICARE PART B  2 shot series recommended in those 19 years and older who have or will have weakened immune systems or those 50 years and older     Health Maintenance Due:      Topic Date Due   • Colorectal Cancer Screening  03/11/2024   • Lung Cancer Screening  07/27/2024   • Hepatitis C Screening  Discontinued     Immunizations Due:      Topic Date Due   • Hepatitis A Vaccine (1 of 2 - Risk 2-dose series) Never done   • Pneumococcal Vaccine: 65+ Years (2 of 2 - PCV) 07/26/2022   • COVID-19 Vaccine (3 - 2023-24 season) 09/01/2023   • Influenza Vaccine (1) 09/01/2024     Advance Directives   What are advance directives?  Advance directives are legal documents that state your wishes and plans for medical care. These plans are made ahead of time in case you lose your ability to make decisions for yourself. Advance directives can apply to any medical decision, such as the treatments you want, and if you want to donate organs.   What are the types of advance directives?  There  are many types of advance directives, and each state has rules about how to use them. You may choose a combination of any of the following:  Living will:  This is a written record of the treatment you want. You can also choose which treatments you do not want, which to limit, and which to stop at a certain time. This includes surgery, medicine, IV fluid, and tube feedings.   Durable power of  for healthcare (DPAHC):  This is a written record that states who you want to make healthcare choices for you when you are unable to make them for yourself. This person, called a proxy, is usually a family member or a friend. You may choose more than 1 proxy.  Do not resuscitate (DNR) order:  A DNR order is used in case your heart stops beating or you stop breathing. It is a request not to have certain forms of treatment, such as CPR. A DNR order may be included in other types of advance directives.  Medical directive:  This covers the care that you want if you are in a coma, near death, or unable to make decisions for yourself. You can list the treatments you want for each condition. Treatment may include pain medicine, surgery, blood transfusions, dialysis, IV or tube feedings, and a ventilator (breathing machine).  Values history:  This document has questions about your views, beliefs, and how you feel and think about life. This information can help others choose the care that you would choose.  Why are advance directives important?  An advance directive helps you control your care. Although spoken wishes may be used, it is better to have your wishes written down. Spoken wishes can be misunderstood, or not followed. Treatments may be given even if you do not want them. An advance directive may make it easier for your family to make difficult choices about your care.   Weight Management   Why it is important to manage your weight:  Being overweight increases your risk of health conditions such as heart disease, high  blood pressure, type 2 diabetes, and certain types of cancer. It can also increase your risk for osteoarthritis, sleep apnea, and other respiratory problems. Aim for a slow, steady weight loss. Even a small amount of weight loss can lower your risk of health problems.  How to lose weight safely:  A safe and healthy way to lose weight is to eat fewer calories and get regular exercise. You can lose up about 1 pound a week by decreasing the number of calories you eat by 500 calories each day.   Healthy meal plan for weight management:  A healthy meal plan includes a variety of foods, contains fewer calories, and helps you stay healthy. A healthy meal plan includes the following:  Eat whole-grain foods more often.  A healthy meal plan should contain fiber. Fiber is the part of grains, fruits, and vegetables that is not broken down by your body. Whole-grain foods are healthy and provide extra fiber in your diet. Some examples of whole-grain foods are whole-wheat breads and pastas, oatmeal, brown rice, and bulgur.  Eat a variety of vegetables every day.  Include dark, leafy greens such as spinach, kale, eloy greens, and mustard greens. Eat yellow and orange vegetables such as carrots, sweet potatoes, and winter squash.   Eat a variety of fruits every day.  Choose fresh or canned fruit (canned in its own juice or light syrup) instead of juice. Fruit juice has very little or no fiber.  Eat low-fat dairy foods.  Drink fat-free (skim) milk or 1% milk. Eat fat-free yogurt and low-fat cottage cheese. Try low-fat cheeses such as mozzarella and other reduced-fat cheeses.  Choose meat and other protein foods that are low in fat.  Choose beans or other legumes such as split peas or lentils. Choose fish, skinless poultry (chicken or turkey), or lean cuts of red meat (beef or pork). Before you cook meat or poultry, cut off any visible fat.   Use less fat and oil.  Try baking foods instead of frying them. Add less fat, such as  margarine, sour cream, regular salad dressing and mayonnaise to foods. Eat fewer high-fat foods. Some examples of high-fat foods include french fries, doughnuts, ice cream, and cakes.  Eat fewer sweets.  Limit foods and drinks that are high in sugar. This includes candy, cookies, regular soda, and sweetened drinks.  Exercise:  Exercise at least 30 minutes per day on most days of the week. Some examples of exercise include walking, biking, dancing, and swimming. You can also fit in more physical activity by taking the stairs instead of the elevator or parking farther away from stores. Ask your healthcare provider about the best exercise plan for you.      © Copyright [employer] 2018 Information is for End User's use only and may not be sold, redistributed or otherwise used for commercial purposes. All illustrations and images included in CareNotes® are the copyrighted property of A.[employer].A[employer]M., Inc. or [employer]

## 2024-07-12 DIAGNOSIS — F41.9 ANXIETY: ICD-10-CM

## 2024-07-12 RX ORDER — ALPRAZOLAM 0.5 MG/1
0.5 TABLET ORAL
Qty: 30 TABLET | Refills: 0 | Status: SHIPPED | OUTPATIENT
Start: 2024-07-12

## 2024-07-16 ENCOUNTER — CONSULT (OUTPATIENT)
Dept: ENDOCRINOLOGY | Facility: CLINIC | Age: 69
End: 2024-07-16
Payer: MEDICARE

## 2024-07-16 VITALS
WEIGHT: 273.6 LBS | BODY MASS INDEX: 35.11 KG/M2 | HEIGHT: 74 IN | HEART RATE: 65 BPM | DIASTOLIC BLOOD PRESSURE: 60 MMHG | OXYGEN SATURATION: 95 % | SYSTOLIC BLOOD PRESSURE: 104 MMHG

## 2024-07-16 DIAGNOSIS — R80.9 TYPE 2 DIABETES MELLITUS WITH PROTEINURIA  (HCC): Primary | ICD-10-CM

## 2024-07-16 DIAGNOSIS — E11.29 TYPE 2 DIABETES MELLITUS WITH PROTEINURIA  (HCC): Primary | ICD-10-CM

## 2024-07-16 DIAGNOSIS — N18.30 STAGE 3 CHRONIC KIDNEY DISEASE, UNSPECIFIED WHETHER STAGE 3A OR 3B CKD (HCC): ICD-10-CM

## 2024-07-16 DIAGNOSIS — E78.5 DYSLIPIDEMIA: ICD-10-CM

## 2024-07-16 PROBLEM — N17.9 ACUTE KIDNEY FAILURE (HCC): Status: ACTIVE | Noted: 2024-07-16

## 2024-07-16 PROBLEM — R80.1 PERSISTENT PROTEINURIA: Status: ACTIVE | Noted: 2024-07-16

## 2024-07-16 LAB — SL AMB POCT HEMOGLOBIN AIC: 6.6 (ref ?–6.5)

## 2024-07-16 PROCEDURE — 83036 HEMOGLOBIN GLYCOSYLATED A1C: CPT | Performed by: STUDENT IN AN ORGANIZED HEALTH CARE EDUCATION/TRAINING PROGRAM

## 2024-07-16 PROCEDURE — 99204 OFFICE O/P NEW MOD 45 MIN: CPT | Performed by: STUDENT IN AN ORGANIZED HEALTH CARE EDUCATION/TRAINING PROGRAM

## 2024-07-16 NOTE — ASSESSMENT & PLAN NOTE
A1c reflects sound control at a level of 6.6%.  Patient is on Jardiance which is an appropriate therapy given his moderate CKD.  I did discuss the use of brenzavvy through online pharmacy which would may be off for significant cost savings.  Patient presently pays $150 a month for Jardiance.  We discussed elements of diabetes care including the importance of blood sugar monitoring and nutrition.  I am encouraging rec to follow whole food based meal plan.  We did also discuss referrals to diabetes education, however that was politely declined today.  I will otherwise plan to to see Brenden again in follow-up in 4 months, sooner should he have any concerns.

## 2024-07-16 NOTE — PROGRESS NOTES
Cricket Rosales 69 y.o. male MRN: [id number]    Encounter: [id number]      Assessment & Plan     Problem List Items Addressed This Visit     Dyslipidemia     This is well-controlled on statin.         Type 2 diabetes mellitus with proteinuria  (HCC) - Primary     A1c reflects sound control at a level of 6.6%.  Patient is on Jardiance which is an appropriate therapy given his moderate CKD.  I did discuss the use of brenzavvy through online pharmacy which would may be off for significant cost savings.  Patient presently pays $150 a month for Jardiance.  We discussed elements of diabetes care including the importance of blood sugar monitoring and nutrition.  I am encouraging rec to follow whole food based meal plan.  We did also discuss referrals to diabetes education, however that was politely declined today.  I will otherwise plan to to see Brenden again in follow-up in 4 months, sooner should he have any concerns.         Relevant Orders    POCT hemoglobin A1c (Completed)    Basic metabolic panel    Hemoglobin A1C    Lipid Panel with Direct LDL reflex    CKD (chronic kidney disease) stage 3, GFR 30-59 ml/min ([facility])     RTC 4-mo    CC: Diabetes    History of Present Illness     HPI:    Cricket Rosales is a 69 y.o. male who presents for an initial evaluation of Type 2 diabetes mellitus.      Patient diagnosed with diabetes for several years. He was in the hospital for [facility], and developed steroid induced hyperglycemia.     Known diabetic complications: nephropathy, peripheral neuropathy, and cardiovascular disease  Cardiovascular risk factors: advanced age (older than 55 for men, 65 for women), diabetes mellitus, dyslipidemia, hypertension, male gender, and obesity (BMI >= 30 kg/m2)  Current diabetic medications include jardiance 10 mg (~$150/mo).     Eye exam current (within one year): no, last 3/23/23 wnl  Weight trend: stable, tend to gradual reduction in weight  Prior visit with dietician: yes - knows what supposed  to do. No sugary beverages, just water and coffee.   Current diet: trying to eat intelligently, avoiding pigging out.   Current exercise: walking    Current monitoring regimen: none  Any episodes of hypoglycemia? no    For hyperlipidemia he takes statin.      Review of Systems   Constitutional:  Negative for diaphoresis and unexpected weight change.   Gastrointestinal:  Negative for nausea and vomiting.   Endocrine: Negative for polydipsia and polyuria.   All other systems reviewed and are negative.      Historical Information   Past Medical History:   Diagnosis Date   • Acute on chronic combined systolic and diastolic CHF (congestive heart failure) ([facility]) 07/27/2023   • Alcohol abuse 07/27/2023   • Ambulates with cane    • Arthritis    • Asthma    • Back pain    • Bunion 04/02/2024   • Chest pain 12/22/2022   • CHF (congestive heart failure) ([facility])    • Claustrophobia    • Constipation 12/18/2018   • COPD (chronic obstructive pulmonary disease) ([facility])    • COPD with acute exacerbation ([facility]) 05/06/2022   • COVID-19    • COVID-19 virus infection 12/03/2021   • Depression    • Hypertension    • Mumps    • Neck pain    • Old MI (myocardial infarction)    • Pneumonia    • Pulmonary emphysema ([facility])    • Rectal bleeding 01/14/2019   • S/P TKR (total knee replacement), left 11/02/2023   • Skin abnormalities     rashes and wounds on both legs - scabbed over and healing   • Sleep apnea     no CPAP   • Tingling of both feet    • Wears glasses      Past Surgical History:   Procedure Laterality Date   • APPENDECTOMY     • CARDIAC CATHETERIZATION  07/24/2015    Left main- normal and maidly tortuous.  Circumflex - normal and moderately tortuous.  RCA- normal and mildy tortuous.  Global LV function was severely depressed..   • CARDIAC CATHETERIZATION Left 09/27/2022    Procedure: Cardiac Left Heart Cath;  Surgeon: Doreen Briones DO;  Location: [facility];  Service: Cardiology   • CARDIAC CATHETERIZATION N/A 09/27/2022     Procedure: Cardiac Coronary Angiogram;  Surgeon: Doreen Briones DO;  Location: [facility];  Service: Cardiology   • CARDIAC CATHETERIZATION  2022    Procedure: Cardiac catheterization;  Surgeon: Doreen Briones DO;  Location: [facility];  Service: Cardiology   • INGUINAL HERNIA REPAIR Bilateral    • CT COLONOSCOPY FLX DX W/COLLJ SPEC WHEN PFRMD N/A 2019    Procedure: COLONOSCOPY with removal of anal papilla;  Surgeon: ANIL Dale MD;  Location: [facility];  Service: Colorectal   • TONSILLECTOMY     • TOTAL KNEE ARTHROPLASTY Left 2023    Procedure: ARTHROPLASTY KNEE TOTAL;  Surgeon: David Mullen DO;  Location: [facility];  Service: Orthopedics   • UMBILICAL HERNIA REPAIR  2006     Social History   Social History     Substance and Sexual Activity   Alcohol Use Not Currently     Social History     Substance and Sexual Activity   Drug Use Yes   • Types: Marijuana    Comment: occasionally edible     Social History     Tobacco Use   Smoking Status Former   • Current packs/day: 0.00   • Average packs/day: 3.0 packs/day for 43.0 years (129.0 ttl pk-yrs)   • Types: Cigarettes   • Start date:    • Quit date:    • Years since quittin.5   Smokeless Tobacco Never     Family History:   Family History   Problem Relation Age of Onset   • Heart attack Father         MI   • Heart disease Father    • Diabetes Sister         DM   • Arthritis Family    • Coronary artery disease Family    • Hypertension Family    • Tuberculosis Son    • Alzheimer's disease Mother        Meds/Allergies   Current Outpatient Medications   Medication Sig Dispense Refill   • Accu-Chek FastClix Lancets MISC Use 1 each daily to test blood sugar. 100 each 5   • albuterol (2.5 mg/3 mL) 0.083 % nebulizer solution Take 3 mL (2.5 mg total) by nebulization every 6 (six) hours as needed for wheezing or shortness of breath 360 mL 2   • albuterol (ProAir HFA) 90 mcg/act inhaler Inhale 2 puffs every 6  (six) hours as needed for wheezing 18 g 11   • ALPRAZolam (XANAX) 0.5 mg tablet Take 1 tablet (0.5 mg total) by mouth daily at bedtime as needed for anxiety 30 tablet 0   • atorvastatin (LIPITOR) 20 mg tablet Take 1 tablet (20 mg total) by mouth daily 30 tablet 6   • Blood Glucose Monitoring Suppl (Accu-Chek Guide Me) w/Device KIT Use 1 each daily to test blood sugar. 1 kit 0   • carvedilol (COREG) 25 mg tablet TAKE 1 TABLET BY MOUTH TWICE DAILY WITH MEALS 60 tablet 5   • Diclofenac Sodium (VOLTAREN) 1 % Apply 2 g topically 2 (two) times a day 100 g 2   • docusate sodium (COLACE) 100 mg capsule Take 1 capsule (100 mg total) by mouth 2 (two) times a day as needed for constipation 60 capsule 0   • DULoxetine (CYMBALTA) 60 mg delayed release capsule Take 1 capsule (60 mg total) by mouth daily 90 capsule 3   • Empagliflozin (Jardiance) 10 MG TABS tablet Take 1 tablet (10 mg total) by mouth every morning 30 tablet 11   • Entresto 49-51 MG TABS Take 1 tablet by mouth twice daily 60 tablet 11   • fluticasone (FLONASE) 50 mcg/act nasal spray Use 1 spray(s) in each nostril twice daily 16 g 1   • Fluticasone-Salmeterol (Advair) 500-50 mcg/dose inhaler INHALE 1 DOSE BY MOUTH TWICE DAILY RINSE MOUTH AFTER USE 60 blister 3   • furosemide (LASIX) 40 mg tablet Take 1 tablet (40 mg total) by mouth every other day 45 tablet 3   • glucose blood test strip Use 1 each daily to test blood sugar. 100 strip 5   • isosorbide mononitrate (IMDUR) 30 mg 24 hr tablet Take 1 tablet by mouth once daily 90 tablet 3   • montelukast (SINGULAIR) 10 mg tablet Take 1 tablet (10 mg total) by mouth daily at bedtime 30 tablet 0   • tiotropium (Spiriva Respimat) 2.5 MCG/ACT AERS inhaler INHALE 2 SPRAY(S) BY MOUTH ONCE DAILY 4 g 5   • aspirin (ECOTRIN LOW STRENGTH) 81 mg EC tablet Take 1 tablet (81 mg total) by mouth 2 (two) times a day for 14 days 28 tablet 0   • bisacodyl (FLEET) 10 MG/30ML ENEM Insert 30 mL (10 mg total) into the rectum once for 1 dose  "30 mL 0   • lactulose (CHRONULAC) 10 g/15 mL solution Take 30 mL (20 g total) by mouth 3 (three) times a day (Patient not taking: Reported on 7/16/2024) 473 mL 0   • polyethylene glycol (MIRALAX) 17 g packet Take 17 g by mouth daily (Patient not taking: Reported on 7/16/2024) 14 each 0     No current facility-administered medications for this visit.     Allergies   Allergen Reactions   • Ciprofloxacin Shortness Of Breath and Diarrhea       Objective   Vitals: Blood pressure 104/60, pulse 65, height 6' 2\" (1.88 m), weight 124 kg (273 lb 9.6 oz), SpO2 95%.    Physical Exam  Vitals reviewed.   Constitutional:       Appearance: Normal appearance.   HENT:      Head: Normocephalic and atraumatic.      Nose: Nose normal.   Eyes:      General: No scleral icterus.     Conjunctiva/sclera: Conjunctivae normal.   Pulmonary:      Effort: Pulmonary effort is normal. No respiratory distress.   Abdominal:      Palpations: Abdomen is soft.      Tenderness: There is no abdominal tenderness.   Musculoskeletal:         General: No deformity.      Cervical back: Normal range of motion.   Skin:     General: Skin is warm and dry.   Neurological:      General: No focal deficit present.      Mental Status: He is alert.   Psychiatric:         Mood and Affect: Mood normal.         Behavior: Behavior normal.         The history was obtained from the review of the chart, patient.    Lab Results:   Lab Results   Component Value Date/Time    Hemoglobin A1C 6.6 (A) 07/16/2024 09:16 AM    Hemoglobin A1C 7.0 (H) 03/06/2024 12:12 PM    Hemoglobin A1C 6.9 (H) 08/23/2023 01:35 PM    WBC 13.05 (H) 06/23/2024 12:46 AM    WBC 8.56 05/01/2024 04:27 AM    WBC 10.30 (H) 04/30/2024 04:49 AM    Hemoglobin 12.3 06/23/2024 12:46 AM    Hemoglobin 11.6 (L) 05/01/2024 04:27 AM    Hemoglobin 11.4 (L) 04/30/2024 04:49 AM    Hematocrit 39.3 06/23/2024 12:46 AM    Hematocrit 37.6 05/01/2024 04:27 AM    Hematocrit 36.5 04/30/2024 04:49 AM    MCV 82 06/23/2024 12:46 AM " "   MCV 83 05/01/2024 04:27 AM    MCV 82 04/30/2024 04:49 AM    Platelets 261 06/23/2024 12:46 AM    Platelets 371 05/01/2024 04:27 AM    Platelets 323 04/30/2024 04:49 AM    BUN 50 (H) 06/24/2024 10:44 AM    BUN 60 (H) 06/23/2024 12:46 AM    BUN 29 (H) 05/01/2024 04:27 AM    Potassium 4.3 06/24/2024 10:44 AM    Potassium 4.2 06/23/2024 12:46 AM    Potassium 3.8 05/01/2024 04:27 AM    Chloride 103 06/24/2024 10:44 AM    Chloride 99 06/23/2024 12:46 AM    Chloride 99 05/01/2024 04:27 AM    CO2 21 06/24/2024 10:44 AM    CO2 20 (L) 06/23/2024 12:46 AM    CO2 26 05/01/2024 04:27 AM    Creatinine 2.45 (H) 06/24/2024 10:44 AM    Creatinine 2.97 (H) 06/23/2024 12:46 AM    Creatinine 1.55 (H) 05/01/2024 04:27 AM    AST 14 06/24/2024 10:44 AM    AST 14 06/23/2024 12:46 AM    AST 22 04/27/2024 08:37 AM    ALT 21 06/24/2024 10:44 AM    ALT 14 06/23/2024 12:46 AM    ALT 35 04/27/2024 08:37 AM    Total Protein 7.0 06/24/2024 10:44 AM    Total Protein 7.2 06/23/2024 12:46 AM    Total Protein 7.1 04/27/2024 08:37 AM    Albumin 3.7 06/24/2024 10:44 AM    Albumin 4.0 06/23/2024 12:46 AM    Albumin 3.8 04/27/2024 08:37 AM    HDL, Direct 27 (L) 06/24/2024 10:44 AM    Triglycerides 86 06/24/2024 10:44 AM           Imaging Studies: I have personally reviewed pertinent reports.      Portions of the record may have been created with voice recognition software. Occasional wrong word or \"sound a like\" substitutions may have occurred due to the inherent limitations of voice recognition software. Read the chart carefully and recognize, using context, where substitutions have occurred.    "

## 2024-07-17 NOTE — PROGRESS NOTES
OFFICE FOLLOW UP - Nephrology   Cricket Rosales 69 y.o. male MRN: [id number]       ASSESSMENT/PLAN:      Brenden was seen today for follow-up.    Diagnoses and all orders for this visit:    Stage 3b chronic kidney disease (HCC)  -     Blood Pressure KIT; Use 3 (three) times a week    Acute renal failure, unspecified acute renal failure type (HCC)    Hyponatremia    Benign essential hypertension  -     Blood Pressure KIT; Use 3 (three) times a week    Persistent proteinuria    Type 2 diabetes mellitus with proteinuria  (HCC)    Non-ischemic cardiomyopathy with HFmEF (HCC)    Obesity, morbid (HCC)      Acute kidney injury  Recent acute kidney injury ED visit to [facility] with peak creatinine 2.97 mg/dL 6/23, discharge the next day with creatinine 2.45 mg/dL.   Etiology likely multifactorial including prerenal due to hypovolemia with vomiting and decreased oral intake, hemodynamic changes associated with diuretic use, and possible obstructive nephropathy with possible passed renal calculi. Will recheck labs this week.     Stage IIIb chronic kidney disease, baseline creatinine 1.4-1.6 mg/dL  Etiology: age related nephron loss, hypertensive nephrosclerosis, obesity, cardiorenal syndrome and tobacco use.   Creatinine 2.45 mg/dL, elevated, GFR 26 ml/min, 6/24.     UA turbid, 1% glucose, innumerable WBC, 0-3 granular casts, large blood, large leukocytes, 1+ protein, RBC 30-50, WBC innumerable, 6/28.  CT A/P - Mild fullness of the left renal collecting system with mild-moderate stranding in the renal sinus more so than perinephric fat. No urolithiasis is identified. Findings consistent with either a recently passed calculus or ureteropyelitis, 6/23.   Continue to avoid NSAIDs and nephrotoxins. Denies NSAID use, uses tylenol as needed for pain. Stay well hydrated. Continue taking Jardiance 10 mg and Entresto 49-51 mg twice daily.   Patient notes some increase in urinary frequency but notes he has not difficulty emptying his bladder.  Recommend follow up with urology or PCP.     Hyponatremia  Sodium 136 mmol/L, appropriate, 6/24.  Sodium improved from 131 mmol/L on 6/23. Patient notes he has been drinking to thirst and not to a fluid restriction. Will recheck labs this week.      Essential hypertension  Office blood pressure 118/62 mmHg. Does not check blood pressure at home. Recommend checking at home 3-4 times per week. Will sent to pharmacy for BP kit. Continue 2-3 gm sodium restriction.   Continue carvedilol 25 mg twice daily, Lasix 40 mg every other day, isosorbide 30 mg daily.  Entresto 49-51 mg tablets twice daily,     Persistent proteinuria  UACR 472 mg/g, UPCR 1.55, 4/6 in setting of recent MAGDA. Repeat urine studies in approximately 2 months when renal function returns to baseline.     Ischemic cardiomyopathy with HFmEF  Echo EF 45%, 7/28/2023. Please continue following with cardiology.     Type 2 diabetes mellitus   HA1C 6.6%, appropriate, 7/16. Continue management as per endocrinology.      PATIENT INSTRUCTIONS:  Patient Instructions   Pleasure seeing you today.    Your most recent lab work was conducted in the hospital.  Kidney function was declined with a creatinine of 2.45 mg/dL and a GFR of 26 mL/min.  Recommend repeating lab work within the next week to reassess renal function.  Please continue to stay well-hydrated, aim for about 1.8 quarts of fluid intake per day.  Please also follow-up approximate 2 g sodium restriction daily as this will help with hypertension and kidney disease progression.  Continue to avoid NSAIDs such as Advil, Motrin, Aleve, ibuprofen and naproxen.    Recommend follow up with urology or primary care regarding possible enlarged prostate with increased urinary frequency to start medication if needed.     Recommend repeating urine protein studies in approximately 2 months after kidney function returns to baseline.  Please continue taking Jardiance 10 mg daily.    Recommend repeat labs within the next week to  assess current renal function. Also suggest follow up with advanced practitioner in 3 months and physician in 6-8 months with labs prior to next visits.       HPI: Cricket Rosales is a 69 y.o. male who is seen in [place] office for follow up after recent department visit to [facility] for abdominal pain and constipation and was noted to have elevated creatinine indicative of acute kidney injury.  PMH includes cardiomyopathy, COPD, thyroid disease, HFmEF and dyslipidemia.      Denies recent hospitalizations, emergency department visits or illness. States overall has been feeling well.     ROS:   A complete review of systems was done. Pertinent positives and negatives as noted in the HPI, otherwise the review of systems is negative.    Constitutional: Denies fever, excessive fatigue, or unintentional weight loss.  HEENT: Denies headaches, dizziness, lightheadedness, or blurred vision.  Respiratory: Denies cough, shortness of breath, wheezing or chest pain.  Cardiovascular: Denies palpitation, chest pain, cramping or edema.  Gastrointestinal: Denies abdominal pain, nausea, vomiting, diarrhea, constipation, or changes in appetite or bowel habits.  Genitourinary: Denies changes in urinary frequency or amount, dysuria, hematuria, flank pain, abdominal pain, difficulty with urinary stream or incomplete bladder emptying.  Musculoskeletal: Denies joint pain, muscle weakness, back pain or cramping.  Neurological: Denies headaches, dizziness or tingling.  Psychiatric: Denies depression, anxiety, or changes in mood.  Endocrine: Denies heat or cold intolerance, excessive thirst or excessive hunger.  Hematological/lymphatic: Denies easy bruising, bleeding or swollen lymph nodes.  Integumentary: Denies skin lesions, rashes or wounds.        Physical Exam:   Vitals:    07/19/24 0932   BP: 118/62   BP Location: Right arm   Patient Position: Sitting   Cuff Size: Standard   Pulse: (!) 53   SpO2: 99%   Weight: 124 kg (273 lb 12.8 oz)  "  Height: 6' 2\" (1.88 m)     Body mass index is 35.15 kg/m².    General: no acute distress   Eyes: conjunctivae pink, anicteric sclerae  ENT: mucous membranes moist  Neck: supple, no JVD  Chest: clear to auscultation bilaterally with no wheezes, rale or rhochi  CVS: regular rate and rhythm   Abdomen: soft, non-tender, non-distended  Extremities: no lower extremity edema   Skin: no rash  Neuro: awake and alert       Allergies: Ciprofloxacin    Medications:   Current Outpatient Medications:     Accu-Chek FastClix Lancets MISC, Use 1 each daily to test blood sugar., Disp: 100 each, Rfl: 5    albuterol (2.5 mg/3 mL) 0.083 % nebulizer solution, Take 3 mL (2.5 mg total) by nebulization every 6 (six) hours as needed for wheezing or shortness of breath, Disp: 360 mL, Rfl: 2    albuterol (ProAir HFA) 90 mcg/act inhaler, Inhale 2 puffs every 6 (six) hours as needed for wheezing, Disp: 18 g, Rfl: 11    ALPRAZolam (XANAX) 0.5 mg tablet, Take 1 tablet (0.5 mg total) by mouth daily at bedtime as needed for anxiety, Disp: 30 tablet, Rfl: 0    atorvastatin (LIPITOR) 20 mg tablet, Take 1 tablet (20 mg total) by mouth daily, Disp: 30 tablet, Rfl: 6    Blood Glucose Monitoring Suppl (Accu-Chek Guide Me) w/Device KIT, Use 1 each daily to test blood sugar., Disp: 1 kit, Rfl: 0    Blood Pressure KIT, Use 3 (three) times a week, Disp: 1 kit, Rfl: 0    carvedilol (COREG) 25 mg tablet, TAKE 1 TABLET BY MOUTH TWICE DAILY WITH MEALS, Disp: 60 tablet, Rfl: 5    Diclofenac Sodium (VOLTAREN) 1 %, Apply 2 g topically 2 (two) times a day, Disp: 100 g, Rfl: 2    DULoxetine (CYMBALTA) 60 mg delayed release capsule, Take 1 capsule (60 mg total) by mouth daily, Disp: 90 capsule, Rfl: 3    Empagliflozin (Jardiance) 10 MG TABS tablet, Take 1 tablet (10 mg total) by mouth every morning, Disp: 30 tablet, Rfl: 11    Entresto 49-51 MG TABS, Take 1 tablet by mouth twice daily, Disp: 60 tablet, Rfl: 11    fluticasone (FLONASE) 50 mcg/act nasal spray, Use 1 " spray(s) in each nostril twice daily, Disp: 16 g, Rfl: 1    Fluticasone-Salmeterol (Advair) 500-50 mcg/dose inhaler, INHALE 1 DOSE BY MOUTH TWICE DAILY RINSE MOUTH AFTER USE, Disp: 60 blister, Rfl: 3    furosemide (LASIX) 40 mg tablet, Take 1 tablet (40 mg total) by mouth every other day, Disp: 45 tablet, Rfl: 3    glucose blood test strip, Use 1 each daily to test blood sugar., Disp: 100 strip, Rfl: 5    isosorbide mononitrate (IMDUR) 30 mg 24 hr tablet, Take 1 tablet by mouth once daily, Disp: 90 tablet, Rfl: 3    montelukast (SINGULAIR) 10 mg tablet, Take 1 tablet (10 mg total) by mouth daily at bedtime, Disp: 30 tablet, Rfl: 0    tiotropium (Spiriva Respimat) 2.5 MCG/ACT AERS inhaler, INHALE 2 SPRAY(S) BY MOUTH ONCE DAILY, Disp: 4 g, Rfl: 5    bisacodyl (FLEET) 10 MG/30ML ENEM, Insert 30 mL (10 mg total) into the rectum once for 1 dose, Disp: 30 mL, Rfl: 0    docusate sodium (COLACE) 100 mg capsule, Take 1 capsule (100 mg total) by mouth 2 (two) times a day as needed for constipation (Patient not taking: Reported on 7/19/2024), Disp: 60 capsule, Rfl: 0    lactulose (CHRONULAC) 10 g/15 mL solution, Take 30 mL (20 g total) by mouth 3 (three) times a day (Patient not taking: Reported on 7/16/2024), Disp: 473 mL, Rfl: 0    polyethylene glycol (MIRALAX) 17 g packet, Take 17 g by mouth daily (Patient not taking: Reported on 7/16/2024), Disp: 14 each, Rfl: 0    Past Medical History:   Diagnosis Date    Acute on chronic combined systolic and diastolic CHF (congestive heart failure) ([facility]) 07/27/2023    Alcohol abuse 07/27/2023    Ambulates with cane     Arthritis     Asthma     Back pain     Bunion 04/02/2024    Chest pain 12/22/2022    CHF (congestive heart failure) ([facility])     Claustrophobia     Constipation 12/18/2018    COPD (chronic obstructive pulmonary disease) (HCC)     COPD with acute exacerbation (HCC) 05/06/2022    COVID-19     COVID-19 virus infection 12/03/2021    Depression     Hypertension     Mumps     Neck  pain     Old MI (myocardial infarction)     Pneumonia     Pulmonary emphysema (HCC)     Rectal bleeding 01/14/2019    S/P TKR (total knee replacement), left 11/02/2023    Skin abnormalities     rashes and wounds on both legs - scabbed over and healing    Sleep apnea     no CPAP    Tingling of both feet     Wears glasses      Past Surgical History:   Procedure Laterality Date    APPENDECTOMY      CARDIAC CATHETERIZATION  07/24/2015    Left main- normal and maidly tortuous.  Circumflex - normal and moderately tortuous.  RCA- normal and mildy tortuous.  Global LV function was severely depressed..    CARDIAC CATHETERIZATION Left 09/27/2022    Procedure: Cardiac Left Heart Cath;  Surgeon: Doreen Briones DO;  [facility];  Service: Cardiology    CARDIAC CATHETERIZATION N/A 09/27/2022    Procedure: Cardiac Coronary Angiogram;  Surgeon: Doreen Briones DO;  [facility];  Service: Cardiology    CARDIAC CATHETERIZATION  09/27/2022    Procedure: Cardiac catheterization;  Surgeon: Doreen Briones DO;  [facility];  Service: Cardiology    INGUINAL HERNIA REPAIR Bilateral     MS COLONOSCOPY FLX DX W/COLLJ SPEC WHEN PFRMD N/A 03/11/2019    Procedure: COLONOSCOPY with removal of anal papilla;  Surgeon: ANIL Dlae MD;  Location: [facility];  Service: Colorectal    TONSILLECTOMY      TOTAL KNEE ARTHROPLASTY Left 9/18/2023    Procedure: ARTHROPLASTY KNEE TOTAL;  Surgeon: David Mullen DO;  Location: [facility];  Service: Orthopedics    UMBILICAL HERNIA REPAIR  06/21/2006     Family History   Problem Relation Age of Onset    Heart attack Father         MI    Heart disease Father     Diabetes Sister         DM    Arthritis Family     Coronary artery disease Family     Hypertension Family     Tuberculosis Son     Alzheimer's disease Mother       reports that he quit smoking about 6 years ago. His smoking use included cigarettes. He started smoking about 49 years  "ago. He has a 129 pack-year smoking history. He has never used smokeless tobacco. He reports current alcohol use. He reports current drug use. Drug: Marijuana.      Lab Results:  Results for orders placed or performed in visit on 07/16/24   POCT hemoglobin A1c   Result Value Ref Range    Hemoglobin A1C 6.6 (A) 6.5             Invalid input(s): \"ALBUMIN\"      Portions of the record may have been created with voice recognition software. Occasional wrong word or \"sound a like\" substitutions may have occurred due to the inherent limitations of voice recognition software. Read the chart carefully and recognize, using context, where substitutions have occurred.If you have any questions, please contact the dictating provider.   "

## 2024-07-17 NOTE — PATIENT INSTRUCTIONS
Pleasure seeing you today.    Your most recent lab work was conducted in the hospital.  Kidney function was declined with a creatinine of 2.45 mg/dL and a GFR of 26 mL/min.  Recommend repeating lab work within the next week to reassess renal function.  Please continue to stay well-hydrated, aim for about 1.8 quarts of fluid intake per day.  Please also follow-up approximate 2 g sodium restriction daily as this will help with hypertension and kidney disease progression.  Continue to avoid NSAIDs such as Advil, Motrin, Aleve, ibuprofen and naproxen.    Recommend follow up with urology or primary care regarding possible enlarged prostate with increased urinary frequency to start medication if needed.     Recommend repeating urine protein studies in approximately 4 months after kidney function returns to baseline.  Please continue taking Jardiance 10 mg daily.    Recommend repeat labs within the next week to assess current renal function. Also suggest follow up with advanced practitioner in 4 months and physician in 8-9 months with labs prior to next visits.

## 2024-07-19 ENCOUNTER — OFFICE VISIT (OUTPATIENT)
Dept: NEPHROLOGY | Facility: CLINIC | Age: 69
End: 2024-07-19
Payer: MEDICARE

## 2024-07-19 VITALS
SYSTOLIC BLOOD PRESSURE: 118 MMHG | WEIGHT: 273.8 LBS | DIASTOLIC BLOOD PRESSURE: 62 MMHG | HEIGHT: 74 IN | OXYGEN SATURATION: 99 % | BODY MASS INDEX: 35.14 KG/M2 | HEART RATE: 53 BPM

## 2024-07-19 DIAGNOSIS — N25.81 SECONDARY HYPERPARATHYROIDISM OF RENAL ORIGIN (HCC): ICD-10-CM

## 2024-07-19 DIAGNOSIS — E66.01 OBESITY, MORBID (HCC): ICD-10-CM

## 2024-07-19 DIAGNOSIS — N17.9 ACUTE RENAL FAILURE, UNSPECIFIED ACUTE RENAL FAILURE TYPE (HCC): ICD-10-CM

## 2024-07-19 DIAGNOSIS — E55.9 VITAMIN D DEFICIENCY: ICD-10-CM

## 2024-07-19 DIAGNOSIS — I10 BENIGN ESSENTIAL HYPERTENSION: ICD-10-CM

## 2024-07-19 DIAGNOSIS — I42.8 NON-ISCHEMIC CARDIOMYOPATHY (HCC): Chronic | ICD-10-CM

## 2024-07-19 DIAGNOSIS — R80.1 PERSISTENT PROTEINURIA: ICD-10-CM

## 2024-07-19 DIAGNOSIS — E87.1 HYPONATREMIA: ICD-10-CM

## 2024-07-19 DIAGNOSIS — R80.9 TYPE 2 DIABETES MELLITUS WITH PROTEINURIA  (HCC): ICD-10-CM

## 2024-07-19 DIAGNOSIS — E83.42 HYPOMAGNESEMIA: ICD-10-CM

## 2024-07-19 DIAGNOSIS — E11.29 TYPE 2 DIABETES MELLITUS WITH PROTEINURIA  (HCC): ICD-10-CM

## 2024-07-19 DIAGNOSIS — N18.32 STAGE 3B CHRONIC KIDNEY DISEASE (HCC): Primary | ICD-10-CM

## 2024-07-19 PROCEDURE — 99214 OFFICE O/P EST MOD 30 MIN: CPT

## 2024-07-19 RX ORDER — BLOOD PRESSURE TEST KIT
KIT MISCELLANEOUS 3 TIMES WEEKLY
Qty: 1 KIT | Refills: 0 | Status: SHIPPED | OUTPATIENT
Start: 2024-07-19

## 2024-07-30 ENCOUNTER — HOSPITAL ENCOUNTER (OUTPATIENT)
Dept: PERIOP | Facility: HOSPITAL | Age: 69
Setting detail: OUTPATIENT SURGERY
Discharge: HOME | End: 2024-07-30
Attending: COLON & RECTAL SURGERY
Payer: MEDICARE

## 2024-07-30 ENCOUNTER — ANESTHESIA (OUTPATIENT)
Dept: PERIOP | Facility: HOSPITAL | Age: 69
End: 2024-07-30

## 2024-07-30 ENCOUNTER — ANESTHESIA EVENT (OUTPATIENT)
Dept: PERIOP | Facility: HOSPITAL | Age: 69
End: 2024-07-30

## 2024-07-30 VITALS
HEIGHT: 74 IN | WEIGHT: 266 LBS | OXYGEN SATURATION: 99 % | SYSTOLIC BLOOD PRESSURE: 145 MMHG | DIASTOLIC BLOOD PRESSURE: 67 MMHG | RESPIRATION RATE: 18 BRPM | HEART RATE: 51 BPM | TEMPERATURE: 98.1 F | BODY MASS INDEX: 34.14 KG/M2

## 2024-07-30 LAB — GLUCOSE SERPL-MCNC: 110 MG/DL (ref 65–140)

## 2024-07-30 PROCEDURE — 82948 REAGENT STRIP/BLOOD GLUCOSE: CPT

## 2024-07-30 RX ORDER — PROPOFOL 10 MG/ML
INJECTION, EMULSION INTRAVENOUS AS NEEDED
Status: DISCONTINUED | OUTPATIENT
Start: 2024-07-30 | End: 2024-07-30

## 2024-07-30 RX ORDER — SODIUM CHLORIDE, SODIUM LACTATE, POTASSIUM CHLORIDE, CALCIUM CHLORIDE 600; 310; 30; 20 MG/100ML; MG/100ML; MG/100ML; MG/100ML
125 INJECTION, SOLUTION INTRAVENOUS CONTINUOUS
Status: DISCONTINUED | OUTPATIENT
Start: 2024-07-30 | End: 2024-08-03 | Stop reason: HOSPADM

## 2024-07-30 RX ORDER — LIDOCAINE HYDROCHLORIDE 20 MG/ML
INJECTION, SOLUTION EPIDURAL; INFILTRATION; INTRACAUDAL; PERINEURAL AS NEEDED
Status: DISCONTINUED | OUTPATIENT
Start: 2024-07-30 | End: 2024-07-30

## 2024-07-30 RX ORDER — PROPOFOL 10 MG/ML
INJECTION, EMULSION INTRAVENOUS CONTINUOUS PRN
Status: DISCONTINUED | OUTPATIENT
Start: 2024-07-30 | End: 2024-07-30

## 2024-07-30 RX ORDER — SODIUM CHLORIDE, SODIUM LACTATE, POTASSIUM CHLORIDE, CALCIUM CHLORIDE 600; 310; 30; 20 MG/100ML; MG/100ML; MG/100ML; MG/100ML
INJECTION, SOLUTION INTRAVENOUS CONTINUOUS PRN
Status: DISCONTINUED | OUTPATIENT
Start: 2024-07-30 | End: 2024-07-30

## 2024-07-30 RX ADMIN — LIDOCAINE HYDROCHLORIDE 100 MG: 20 INJECTION, SOLUTION EPIDURAL; INFILTRATION; INTRACAUDAL; PERINEURAL at 13:47

## 2024-07-30 RX ADMIN — PROPOFOL 100 MG: 10 INJECTION, EMULSION INTRAVENOUS at 13:47

## 2024-07-30 RX ADMIN — PROPOFOL 100 MCG/KG/MIN: 10 INJECTION, EMULSION INTRAVENOUS at 13:50

## 2024-07-30 RX ADMIN — SODIUM CHLORIDE, SODIUM LACTATE, POTASSIUM CHLORIDE, AND CALCIUM CHLORIDE: .6; .31; .03; .02 INJECTION, SOLUTION INTRAVENOUS at 13:40

## 2024-07-30 RX ADMIN — PROPOFOL 50 MG: 10 INJECTION, EMULSION INTRAVENOUS at 13:48

## 2024-07-30 NOTE — ANESTHESIA POSTPROCEDURE EVALUATION
Post-Op Assessment Note    CV Status:  Stable  Pain Score: 0    Pain management: adequate       Mental Status:  Sleepy and arousable   Hydration Status:  Euvolemic   PONV Controlled:  Controlled   Airway Patency:  Patent     Post Op Vitals Reviewed: Yes    No anethesia notable event occurred.    Staff: Anesthesiologist, CRNA               /59 (07/30/24 1402)    Temp (!) 97.2 °F (36.2 °C) (07/30/24 1402)    Pulse 68 (07/30/24 1402)   Resp (!) 23 (07/30/24 1402)    SpO2 93 % (07/30/24 1402)

## 2024-07-30 NOTE — H&P
History and Physical -  [facility]  Cricket Rosales 69 y.o. male MRN: [id number]                  HPI: Cricket Rosales is a 69 y.o. year old male who presents for personal history of colon polyps      REVIEW OF SYSTEMS: Per the HPI, and otherwise unremarkable.    Historical Information   Past Medical History:   Diagnosis Date    Acute on chronic combined systolic and diastolic CHF (congestive heart failure) ([facility]) 07/27/2023    Alcohol abuse 07/27/2023    Ambulates with cane     Arthritis     Asthma     Back pain     Bunion 04/02/2024    Chest pain 12/22/2022    CHF (congestive heart failure) ([facility])     Claustrophobia     Constipation 12/18/2018    COPD (chronic obstructive pulmonary disease) ([facility])     COPD with acute exacerbation ([facility]) 05/06/2022    COVID-19     COVID-19 virus infection 12/03/2021    Depression     Hypertension     Mumps     Neck pain     Old MI (myocardial infarction)     Pneumonia     Pulmonary emphysema ([facility])     Rectal bleeding 01/14/2019    S/P TKR (total knee replacement), left 11/02/2023    Skin abnormalities     rashes and wounds on both legs - scabbed over and healing    Sleep apnea     no CPAP    Tingling of both feet     Wears glasses      Past Surgical History:   Procedure Laterality Date    APPENDECTOMY      CARDIAC CATHETERIZATION  07/24/2015    Left main- normal and maidly tortuous.  Circumflex - normal and moderately tortuous.  RCA- normal and mildy tortuous.  Global LV function was severely depressed..    CARDIAC CATHETERIZATION Left 09/27/2022    Procedure: Cardiac Left Heart Cath;  Surgeon: Doreen Briones DO;  [facility];  Service: Cardiology    CARDIAC CATHETERIZATION N/A 09/27/2022    Procedure: Cardiac Coronary Angiogram;  Surgeon: Doreen Briones DO;  [facility];  Service: Cardiology    CARDIAC CATHETERIZATION  09/27/2022    Procedure: Cardiac catheterization;  Surgeon: Doreen Briones DO;  [facility]ARD[facility];  Service: Cardiology    INGUINAL HERNIA REPAIR Bilateral     KS COLONOSCOPY FLX DX W/COLLJ SPEC WHEN PFRMD N/A 2019    Procedure: COLONOSCOPY with removal of anal papilla;  Surgeon: ANIL Dale MD;  [facility];  Service: Colorectal    TONSILLECTOMY      TOTAL KNEE ARTHROPLASTY Left 2023    Procedure: ARTHROPLASTY KNEE TOTAL;  Surgeon: David Mullen DO;  [facility]: [facility];  Service: Orthopedics    UMBILICAL HERNIA REPAIR  2006     Social History   Social History     Substance and Sexual Activity   Alcohol Use Yes    Comment: socially     Social History     Substance and Sexual Activity   Drug Use Yes    Types: Marijuana    Comment: occasionally edible     Social History     Tobacco Use   Smoking Status Former    Current packs/day: 0.00    Average packs/day: 3.0 packs/day for 43.0 years (129.0 ttl pk-yrs)    Types: Cigarettes    Start date:     Quit date:     Years since quittin.5   Smokeless Tobacco Never     Family History   Problem Relation Age of Onset    Heart attack Father         MI    Heart disease Father     Diabetes Sister         DM    Arthritis Family     Coronary artery disease Family     Hypertension Family     Tuberculosis Son     Alzheimer's disease Mother        Meds/Allergies     Not in a hospital admission.    Allergies   Allergen Reactions    Ciprofloxacin Shortness Of Breath and Diarrhea       Objective     There were no vitals taken for this visit.      PHYSICAL EXAMINATION:    General Appearance:   Alert, cooperative, no distress   HEENT:  Normocephalic, atraumatic, anicteric. Neck supple, symmetrical, trachea midline.   Lungs:   Equal chest rise and unlabored breathing, normal effort, no coughing.   Cardiovascular:   No visualized JVD.   Abdomen:   No abdominal distension.   Skin:   No jaundice, rashes, or lesions.    Musculoskeletal:   Normal range of motion visualized.   Psych:  Normal affect and normal insight.   Neuro:   Alert and appropriate.           ASSESSMENT/PLAN:  This is a 69 y.o. year old male here for colonoscopy, and he is stable and optimized for his procedure.

## 2024-07-30 NOTE — ANESTHESIA PREPROCEDURE EVALUATION
Procedure:  COLONOSCOPY    Relevant Problems   CARDIO   (+) Benign essential hypertension      ENDO   (+) Type 2 diabetes mellitus with proteinuria  (HCC)      /RENAL   (+) Acute kidney failure (HCC)   (+) CKD (chronic kidney disease) stage 3, GFR 30-59 ml/min (HCC)      NEURO/PSYCH   (+) Anxiety   (+) Moderate episode of recurrent major depressive disorder (HCC)      PULMONARY   (+) Asthma-COPD overlap syndrome   (+) COPD, severe (HCC)   (+) Chronic asthma, moderate persistent, uncomplicated   (+) Obstructive sleep apnea   (+) Severe persistent asthma without complication      Neurology/Sleep   (+) PLMD (periodic limb movement disorder)      Cardiovascular/Peripheral Vascular   (+) Non-ischemic cardiomyopathy with HFmEF (HCC)      Other   (+) Dyslipidemia   (+) Obesity, morbid (HCC)   (+) Persistent proteinuria   (+) Vitamin D deficiency      <4 mets  Untreated CORDELL & Orthopnea (sleeping in recliner) due to untreated CORDELL. Euvolemic on exam  Physical Exam    Airway    Mallampati score: III  TM Distance: >3 FB  Neck ROM: full     Dental       Cardiovascular  Cardiovascular exam normal    Pulmonary  Pulmonary exam normal     Other Findings        Anesthesia Plan  ASA Score- 3     Anesthesia Type- IV sedation with anesthesia with ASA Monitors.         Additional Monitors:     Airway Plan:            Plan Factors-Exercise tolerance (METS): <4 METS.    Chart reviewed. EKG reviewed. Imaging results reviewed. Existing labs reviewed. Patient summary reviewed.    Patient is not a current smoker.              Induction- intravenous.    Postoperative Plan-         Informed Consent- Anesthetic plan and risks discussed with patient and spouse.  I personally reviewed this patient with the CRNA. Discussed and agreed on the Anesthesia Plan with the CRNA..

## 2024-08-01 PROBLEM — Z00.00 MEDICARE ANNUAL WELLNESS VISIT, SUBSEQUENT: Status: RESOLVED | Noted: 2019-02-27 | Resolved: 2024-08-01

## 2024-08-08 ENCOUNTER — HOSPITAL ENCOUNTER (OUTPATIENT)
Dept: ULTRASOUND IMAGING | Facility: HOSPITAL | Age: 69
Discharge: HOME | End: 2024-08-08
Attending: INTERNAL MEDICINE
Payer: MEDICARE

## 2024-08-08 DIAGNOSIS — R31.29 OTHER MICROSCOPIC HEMATURIA: ICD-10-CM

## 2024-08-08 DIAGNOSIS — N30.00 ACUTE CYSTITIS WITHOUT HEMATURIA: ICD-10-CM

## 2024-08-08 PROCEDURE — 76775 US EXAM ABDO BACK WALL LIM: CPT

## 2024-08-23 ENCOUNTER — TELEPHONE (OUTPATIENT)
Age: 69
End: 2024-08-23

## 2024-08-23 NOTE — TELEPHONE ENCOUNTER
Patient called Rx Refill Line inquiring refill on   ipratropium-albuterol (DUO-NEB) 0.5-2.5 mg/3 mL inhalation solution. The Rx status indicates being completed 4/24/24. Patient states pharmacy needs new script and is in need this medication as a high priority.   If appropriate please send to [facility], [place] [address].

## 2024-08-26 NOTE — TELEPHONE ENCOUNTER
Patient called the RX Refill Line. Message is being forwarded to the office.     Patient is calling again about ipratropium-albuterol (DUO-NEB) 0.5-2.5 mg/3 mL inhalation solution. The Rx status indicates being completed 4/24/24. Patient states pharmacy needs new script and is in need this medication as a high priority.     Please contact patient at [phone]

## 2024-08-26 NOTE — TELEPHONE ENCOUNTER
Patient called to check the status of this refill ipratropium-albuterol (DUO-NEB) 0.5-2.5 mg/3 mL inhalation solution he is completely out.       Please review

## 2024-08-27 ENCOUNTER — OFFICE VISIT (OUTPATIENT)
Dept: OBGYN CLINIC | Facility: CLINIC | Age: 69
End: 2024-08-27
Payer: MEDICARE

## 2024-08-27 ENCOUNTER — APPOINTMENT (OUTPATIENT)
Dept: RADIOLOGY | Facility: MEDICAL CENTER | Age: 69
End: 2024-08-27
Payer: MEDICARE

## 2024-08-27 VITALS
HEIGHT: 74 IN | OXYGEN SATURATION: 98 % | HEART RATE: 79 BPM | TEMPERATURE: 97.2 F | WEIGHT: 283 LBS | RESPIRATION RATE: 16 BRPM | SYSTOLIC BLOOD PRESSURE: 128 MMHG | BODY MASS INDEX: 36.32 KG/M2 | DIASTOLIC BLOOD PRESSURE: 62 MMHG

## 2024-08-27 DIAGNOSIS — J45.40 CHRONIC ASTHMA, MODERATE PERSISTENT, UNCOMPLICATED: ICD-10-CM

## 2024-08-27 DIAGNOSIS — Z51.89 ENCOUNTER FOR OTHER SPECIFIED AFTERCARE: Primary | ICD-10-CM

## 2024-08-27 PROCEDURE — 73562 X-RAY EXAM OF KNEE 3: CPT

## 2024-08-27 PROCEDURE — 99214 OFFICE O/P EST MOD 30 MIN: CPT | Performed by: STUDENT IN AN ORGANIZED HEALTH CARE EDUCATION/TRAINING PROGRAM

## 2024-08-27 RX ORDER — ALBUTEROL SULFATE 0.83 MG/ML
2.5 SOLUTION RESPIRATORY (INHALATION) EVERY 6 HOURS PRN
Qty: 360 ML | Refills: 0 | Status: CANCELLED | OUTPATIENT
Start: 2024-08-27

## 2024-08-27 RX ORDER — ALBUTEROL SULFATE 0.83 MG/ML
2.5 SOLUTION RESPIRATORY (INHALATION) EVERY 6 HOURS PRN
Qty: 360 ML | Refills: 2 | Status: SHIPPED | OUTPATIENT
Start: 2024-08-27

## 2024-08-27 NOTE — PROGRESS NOTES
"Post-Operative Note: Total Knee Arthroplasty  Cricket Rosales (69 y.o. male)  : 1955 Encounter Date: 2024  Dr. David Mullen DO, Orthopedic Surgeon  [facility]     Assessment/Plan  69 y.o. male 1 year follow up status post left total knee arthroplasty     Wound Care   { wound care:10885}  Pain control: PRN  Continue ice packs/elevation  Can continue compression   Continue with physical therapy  Continue with activities as tolerated  Complete DVT ppx as prescribed  Return in about 1 year (around 2025).  for evaluation {WITH/WITHOUT:71003} x-ray    Subjective  69 y.o. male presenting to the office for 1 year follow up status post left total knee arthroplasty  DOS: 2023    Current concerns: {NONE:10310::\"none\"}  Patient is controlling pain with ***  Numbness/weakness extremity: {none / NA:62728::\"None Reported\"}   Physical Therapy Progress: { PO PT progress:92036}  DVT ppx: { PO DVT PPX:54584}  Abx: { PO abx- total:81948::\"Completed 5 days of duricef\"}  Eating/Drinking improving. Bowel/Bladder: WNL  Patient {Actions; denies-reports:139337::\"denies\"} symptoms of an infection.     Physical Exam:  Exam: left Knee  Incision: {Exam; incision:63878:s:\"healing well\"} ***  ROM: {rom:812133}  Sensation: {Post op lower extremity sensation:32593} ***  Brisk Capillary Refill    Imaging  Post-operative Xrays of left knee from 24 show ***     Scribe Attestation      I,:  Daisha Moyer am acting as a scribe while in the presence of the attending physician.:       I,:  David Mullen DO personally performed the services described in this documentation    as scribed in my presence.:              "

## 2024-08-27 NOTE — TELEPHONE ENCOUNTER
Reason for call:   [x] Refill   [] Prior Auth  [] Other: Patient has been without medication and stated his had not been feeling well    Office:   [] PCP/Provider -   [x] Specialty/Provider - Pulmonology     Medication: albuterol (2.5 mg/3 mL) 0.083 % nebulizer solution     Dose/Frequency: enmanuel 3 mL (2.5 mg total) by nebulization every 6 (six) hours as needed for wheezing or shortness of breath,     Quantity: 360 mL     Pharmacy: [id number]  [address] [facility] [address] [phone]        Does the patient have enough for 3 days?   [] Yes   [x] No - Send as HP to POD

## 2024-08-27 NOTE — PROGRESS NOTES
Post-Operative Note: Total Knee Arthroplasty  Cricket Rosales (69 y.o. male)  : 1955 Encounter Date: 2024  Dr. David Mullen, , Orthopedic Surgeon  [facility]     Assessment/Plan  69 y.o. male 12 month follow up status post left total knee arthroplasty     Wound has completely healed no need for additional care  Continue with activities as tolerated  Patient has resumed physical activities weight lifting  Return in about 1 year (around 2025) for Repeat X-ray.   for evaluation with x-ray      Subjective  69 y.o. male presenting to the office for 12 months follow up status post left total knee arthroplasty  DOS: 2023    Feeling well today.  He is able to return to daily activities without limitations.     Current concerns: none  Patient states he does not experience any pain  Numbness/weakness extremity: None Reported   Physical Therapy Progress: Patient has been successfully discharged from physical therapy  DVT ppx: Patient has completed anticoagulation.  Eating/Drinking improving. Bowel/Bladder: WNL  Patient denies symptoms of an infection.     Physical Exam:  left knee(s) -   Patient ambulates with steady gait pattern  No anatomical deformity  Incision is well-healed with no signs of erythema, ecchymosis, or infection   Distal anterior shin lesions  No generalized soft tissue swelling or effusion noted  ROM 0-120  Stable to varus/valgus without gapping  Appropriate anterior translocation   Calf compartments are soft and supple  intact pulses with brisk capillary refill to the toes  Sural, saphenous, tibial, superficial, and deep peroneal motor and sensory distributions intact  Sensation light touch intact distally      Imaging  Post-operative x-rays of left knee from 24 show well-seated total prosthesis with adequate anatomical alignment with no sign of loosening.      Scribe Attestation      I,:  Daisha Moyer am acting as a scribe while in the  presence of the attending physician.:       I,:  David Mullen, DO personally performed the services described in this documentation    as scribed in my presence.:

## 2024-08-29 NOTE — TELEPHONE ENCOUNTER
Pls notify the pt that albuterol via nebulizer is no longer covered by insurance, instead they covers xopenex which is similar in effect.    Is he ok if we order xopenex?

## 2024-09-02 RX ORDER — LEVALBUTEROL INHALATION SOLUTION 1.25 MG/3ML
1.25 SOLUTION RESPIRATORY (INHALATION) EVERY 4 HOURS PRN
Qty: 90 ML | Refills: 5 | Status: SHIPPED | OUTPATIENT
Start: 2024-09-02 | End: 2024-10-02

## 2024-09-02 NOTE — PROGRESS NOTES
Pulmonary Follow Up Note   Cricket Rosales 69 y.o. male MRN: [id number]  9/3/2024    Assessment:  Severe persistent asthma  Previously had multiple exacerbations until 3/2023, started on Dupixent for 3 months discontinued in 7/2023  No frequent exacerbation, last oral steroid use was once within the past 6 months  This is an allergic type II asthma, positiveallergy panel/peripheral eosinophilia 850 cells in 7/2021  Suspect not well-controlled from continuous exposure to allergens/, dog danders at home    Plan:  Continue Advair 500/Spiriva Respimat and Singulair  Pt elected to continue current management given the lack of frequent exacerbation  Will reassess in 3 months, could qualify for benralizumab or Tezspire    Former tobacco abuse  40-pack-year quit in 2019  No suspicious lesion on last LDCT 4/2023, due for lung cancer screening now/ordered    CORDELL  Not using the CPAP    Return in about 3 months (around 12/3/2024).    History of Present Illness     Follow up for: Asthma/COPD     Background:   69 y.o. male with a h/o CORDELL, asthma COPD, class 1 obesity, dyslipidemia, CHF (systolic and diastolic), tobacco abuse about 40 pack year history quit 2019, marijuana use, quit 2020.      1st diagnosis of asthma approximately 30 years ago, known triggers of extreme temperature/changes, exposure to grass/stronger orders and dog dander.  No history of severe exacerbation, last oral steroid intake was in mid 2019.  Completed pulmonary rehab program in 05/2021     Initially evaluated by Pulmonary on 05/04/21-recommended to continue Tudorza 400 mcg, Breo 200, p.r.n. DuoNeb and Atrovent.      7/26 visit-continued on tudorza and Breo Ellipta 200, discontinued Atrovent.  6 minute walk test showed no oxygen desaturation.  [facility] allergy panel weakly positive for dog dander 0.4, common a ragweed positive at 1.13, IgE 38.5.  CBC with eosinophilia at 850 cell.  Given the pneumococcal vaccine, given trial of mucolytic with Mucinex.      9/27 visit-switched to high-dose ICS Advair 500     11/29/2021 visit-treated for exacerbation with steroid taper, doxycycline     1/2022 - continued on advair 500/tudorza, declined vaccination     5/6/22 - treated for exacerbation OP, LDCT     09/29/2022 visit-not interested in deescalation of therapy, continued on Tudorza/Advair 500, not interested in vaccination.     12/2022-hospitalized twice for exacerbation symptoms, positive PCR for COVID-19 treated with remdesivir     3/2023 visit-continue triple inhalers Spiriva/Advair 500, started dupilumab.  Mask fitting trial/new supplies for CPAP    6/2023 visit-continue high-dose Advair 500/Spiriva and Singulair.  Still on Dupixent/received 3 times injection by that time.  No exacerbation.  Counseled about avoiding exposure to allergens.  Admits to not using his CPAP every day.      Interval History  Since seen treated for COVID 19 bronchitis twice, used oral steroids once within the past 6 months.  Discontinued the Dupixent in 7/2024, states that he felt no difference we did.  Remained stable on Advair 500/Spiriva Respimat.  Reports chronic chest congestion, mucus stuck in the back of the throat/chest that loosen up with the DuoNeb.  DuoNeb is no longer covered by insurance and switched to albuterol and now has to switch to Xopenex because of insurance not covering.  No frequent use of PRN HFA.  No ED visit, or hospitalization for asthma.      Review of Systems  As per hpi, all other systems reviewed and were negative    Studies:     Imaging and other studies: I have personally reviewed pertinent films in PACS  CT chest 02/25/2021-no suspicious lesions, or nodules, few calcified granuloma at the lingula that unchanged from before, posterior mediastinal lymph node/top normal size unchanged from 2019     CT chest 1/19/2022 - mild biapical/scarring. Calcified granuloma att he lingula. No suspicious lesions     CT PE 12/23/2022-during admission, no pulmonary embolism.   "Secretion at the RML bronchus/associated collapse.  Secretions at the RLL/with consolidation.     CT chest 4/26/2023-linear opacity likely scarring along the RML/medial border and LLL.  Resolved some other atelectatic changes.  Improved mucus impaction no consolidation or other concerning lesions.     Pulmonary function testing:   PFT 02/04/2021-ratio 54%, FEV1 1.92 L/55%, FVC 3.54 L/69%, TLC 87%, %, DLCO 104%     PFT 12/18/2018-ratio 54%, FEV1 2.6 L/73%, FVC 4.8 L/93%, positive bronchodilator response at the level of FVC and FEV1, %, %, DLCO 114%      PFT 01/30/2017-ratio 41%, FEV1 1.74 L/49%, FVC 4.25 L/84%, positive bronchodilator response at the level of FEV1, %, %, DLCO 95%     6 minute walk test 07/26/2021-baseline SpO2 96% on room air, heart rate 64, able to walk 336 m in 6 minutes, lowest SpO2 at 94%, maximal heart rate at 88, dyspnea scale of 4        EKG, Pathology, and Other Studies: I have personally reviewed pertinent reports.       Myocardial perfusion scan 09/25/2020-moderate-size infarct in the distribution of RCA/left circumflex, reduced LV systolic function with WM a     TTE 09/10/2020-EF 40%, akinesis at basal/mid inferior/mid inferior lateral wall, mildly increased LV wall thickness, grade 2 diastolic dysfunction, LA mildly dilated, normal RV size and function       Past medical, surgical, social and family histories reviewed.      Medications/Allergies: Reviewed      Vitals: Blood pressure 116/70, pulse 78, height 6' 2\" (1.88 m), weight 122 kg (268 lb 3.2 oz), SpO2 98%. Body mass index is 34.43 kg/m². Oxygen Therapy  SpO2: 98 %      Physical Exam  Body mass index is 34.43 kg/m².   Gen: not in acute distress,   Neck/Eyes: supple, no adenopathy, PERRL  Ear: normal appearance, no significant hearing impairment  Nose:  normal nasal mucosa, no drainage  Chest: normal respiratory efforts, diminished but clear breath sounds bilaterally  CV: RRR, no murmurs appreciated, " "soft mild lower extremity pitting edema  Abdomen: soft, non tender  Extremities:  No observed deformity   Skin: unremarkable  Neuro: AAO X3, no focal motor deficit        Labs:  Lab Results   Component Value Date    WBC 13.05 (H) 06/23/2024    HGB 12.3 06/23/2024    HCT 39.3 06/23/2024    MCV 82 06/23/2024     06/23/2024     Lab Results   Component Value Date    GLUCOSE 87 07/27/2015    CALCIUM 8.9 06/24/2024     07/27/2015    K 4.3 06/24/2024    CO2 21 06/24/2024     06/24/2024    BUN 50 (H) 06/24/2024    CREATININE 2.45 (H) 06/24/2024     Lab Results   Component Value Date    IGE 20.5 08/09/2023     Lab Results   Component Value Date    ALT 21 06/24/2024    AST 14 06/24/2024    ALKPHOS 74 06/24/2024    BILITOT 1.05 (H) 07/22/2015           Portions of the record may have been created with voice recognition software.  Occasional wrong word or \"sound a like\" substitutions may have occurred due to the inherent limitations of voice recognition software.  Read the chart carefully and recognize, using context, where substitutions have occurred    Alvaro Quinonez M.D.  [facility]  "

## 2024-09-03 ENCOUNTER — OFFICE VISIT (OUTPATIENT)
Dept: PULMONOLOGY | Facility: CLINIC | Age: 69
End: 2024-09-03
Payer: MEDICARE

## 2024-09-03 VITALS
DIASTOLIC BLOOD PRESSURE: 70 MMHG | OXYGEN SATURATION: 98 % | WEIGHT: 268.2 LBS | HEIGHT: 74 IN | HEART RATE: 78 BPM | SYSTOLIC BLOOD PRESSURE: 116 MMHG | BODY MASS INDEX: 34.42 KG/M2

## 2024-09-03 DIAGNOSIS — J45.40 CHRONIC ASTHMA, MODERATE PERSISTENT, UNCOMPLICATED: ICD-10-CM

## 2024-09-03 DIAGNOSIS — F17.211 NICOTINE DEPENDENCE, CIGARETTES, IN REMISSION: Primary | ICD-10-CM

## 2024-09-03 PROCEDURE — 99214 OFFICE O/P EST MOD 30 MIN: CPT | Performed by: INTERNAL MEDICINE

## 2024-09-03 RX ORDER — SODIUM CHLORIDE FOR INHALATION 3 %
4 VIAL, NEBULIZER (ML) INHALATION 2 TIMES DAILY
Qty: 240 ML | Refills: 2 | Status: SHIPPED | OUTPATIENT
Start: 2024-09-03 | End: 2024-10-03

## 2024-09-04 DIAGNOSIS — F41.9 ANXIETY: ICD-10-CM

## 2024-09-04 RX ORDER — ALPRAZOLAM 0.5 MG
TABLET ORAL
Qty: 30 TABLET | Refills: 0 | Status: SHIPPED | OUTPATIENT
Start: 2024-09-04

## 2024-09-10 ENCOUNTER — HOSPITAL ENCOUNTER (OUTPATIENT)
Dept: CT IMAGING | Facility: HOSPITAL | Age: 69
Discharge: HOME | End: 2024-09-10
Attending: INTERNAL MEDICINE
Payer: MEDICARE

## 2024-09-10 DIAGNOSIS — F17.211 NICOTINE DEPENDENCE, CIGARETTES, IN REMISSION: ICD-10-CM

## 2024-09-10 PROCEDURE — 71271 CT THORAX LUNG CANCER SCR C-: CPT

## 2024-09-12 DIAGNOSIS — R07.9 CHEST PAIN, UNSPECIFIED TYPE: ICD-10-CM

## 2024-09-12 RX ORDER — ISOSORBIDE MONONITRATE 30 MG/1
30 TABLET, EXTENDED RELEASE ORAL DAILY
Qty: 90 TABLET | Refills: 1 | Status: SHIPPED | OUTPATIENT
Start: 2024-09-12

## 2024-10-07 DIAGNOSIS — F41.9 ANXIETY: ICD-10-CM

## 2024-10-07 DIAGNOSIS — I42.8 NON-ISCHEMIC CARDIOMYOPATHY (HCC): Chronic | ICD-10-CM

## 2024-10-07 DIAGNOSIS — J44.89 ASTHMA-COPD OVERLAP SYNDROME (HCC): ICD-10-CM

## 2024-10-07 RX ORDER — FLUTICASONE PROPIONATE AND SALMETEROL 500; 50 UG/1; UG/1
POWDER RESPIRATORY (INHALATION)
Qty: 60 BLISTER | Refills: 5 | Status: SHIPPED | OUTPATIENT
Start: 2024-10-07

## 2024-10-07 RX ORDER — ALPRAZOLAM 0.5 MG
TABLET ORAL
Qty: 30 TABLET | Refills: 0 | Status: SHIPPED | OUTPATIENT
Start: 2024-10-07

## 2024-10-08 ENCOUNTER — OFFICE VISIT (OUTPATIENT)
Dept: FAMILY MEDICINE CLINIC | Facility: CLINIC | Age: 69
End: 2024-10-08
Payer: MEDICARE

## 2024-10-08 VITALS
OXYGEN SATURATION: 95 % | WEIGHT: 286.2 LBS | HEIGHT: 74 IN | HEART RATE: 71 BPM | TEMPERATURE: 97.4 F | BODY MASS INDEX: 36.73 KG/M2

## 2024-10-08 DIAGNOSIS — J44.9 COPD, SEVERE (HCC): ICD-10-CM

## 2024-10-08 DIAGNOSIS — I10 BENIGN ESSENTIAL HYPERTENSION: ICD-10-CM

## 2024-10-08 DIAGNOSIS — I42.8 NON-ISCHEMIC CARDIOMYOPATHY (HCC): Primary | Chronic | ICD-10-CM

## 2024-10-08 DIAGNOSIS — E11.49 OTHER DIABETIC NEUROLOGICAL COMPLICATION ASSOCIATED WITH TYPE 2 DIABETES MELLITUS (HCC): ICD-10-CM

## 2024-10-08 PROBLEM — N17.9 ACUTE KIDNEY FAILURE (HCC): Status: RESOLVED | Noted: 2024-07-16 | Resolved: 2024-10-08

## 2024-10-08 PROBLEM — E87.1 HYPONATREMIA: Status: RESOLVED | Noted: 2022-12-24 | Resolved: 2024-10-08

## 2024-10-08 PROBLEM — E66.01 OBESITY, MORBID (HCC): Status: RESOLVED | Noted: 2024-07-19 | Resolved: 2024-10-08

## 2024-10-08 PROCEDURE — 99214 OFFICE O/P EST MOD 30 MIN: CPT | Performed by: INTERNAL MEDICINE

## 2024-10-08 PROCEDURE — G2211 COMPLEX E/M VISIT ADD ON: HCPCS | Performed by: INTERNAL MEDICINE

## 2024-10-08 RX ORDER — LEVALBUTEROL INHALATION SOLUTION 1.25 MG/3ML
SOLUTION RESPIRATORY (INHALATION)
COMMUNITY
Start: 2024-10-06

## 2024-10-08 RX ORDER — FUROSEMIDE 40 MG
40 TABLET ORAL EVERY OTHER DAY
Qty: 45 TABLET | Refills: 1 | Status: SHIPPED | OUTPATIENT
Start: 2024-10-08

## 2024-10-08 NOTE — PROGRESS NOTES
Ambulatory Visit  Name: Cricket Rosales      : 1955      MRN: [id number]  Encounter Provider: Lauren Vee DO  Encounter Date: 10/8/2024   Encounter department: [facility]    Assessment & Plan  Non-ischemic cardiomyopathy with HFmEF (HCC)  Lo sodium diet  Has Cardio appt in December   Continued smoking cessation encouraged        COPD, severe (HCC)  Stable on current regimen Follows with Pulmonary  Deferred flu shot        Other diabetic neurological complication associated with type 2 diabetes mellitus (HCC)    Lab Results   Component Value Date    HGBA1C 6.6 (A) 2024   Has labs and endocrine appt next week - he will go for labs this week  Setup eye exam - due December          Benign essential hypertension  Continue current rx Stable on current rx Improve diet choices/weight loss encouraged          Depression Screening and Follow-up Plan: Patient's depression screening was positive with a PHQ-9 score of 8. Patient assessed for underlying major depression. Brief counseling provided and recommend additional follow-up/re-evaluation next office visit.     Pt will call to setup repeat colon screen   History of Present Illness     HPI  Pt doing ok Breathing fairly stable but notes some change with the weather changing He does tend to stay in more during the cold weather Diet not as well controlled and not checking BS regularly but trying to get back on track No chest pain   Sleep has been stable No falls   Past Medical History:   Diagnosis Date    Acute on chronic combined systolic and diastolic CHF (congestive heart failure) (HCC) 2023    Alcohol abuse 2023    Ambulates with cane     Arthritis     Asthma     Back pain     Bunion 2024    Chest pain 2022    CHF (congestive heart failure) (HCC)     Claustrophobia     Constipation 2018    COPD (chronic obstructive pulmonary disease) (HCC)     COPD with acute exacerbation (HCC) 2022    COVID-19      COVID-19 virus infection 12/03/2021    Depression     Hypertension     Mumps     Neck pain     Old MI (myocardial infarction)     Pneumonia     Pulmonary emphysema (HCC)     Rectal bleeding 01/14/2019    S/P TKR (total knee replacement), left 11/02/2023    Skin abnormalities     rashes and wounds on both legs - scabbed over and healing    Sleep apnea     no CPAP    Tingling of both feet     Wears glasses        Past Surgical History:   Procedure Laterality Date    APPENDECTOMY      CARDIAC CATHETERIZATION  07/24/2015    Left main- normal and maidly tortuous.  Circumflex - normal and moderately tortuous.  RCA- normal and mildy tortuous.  Global LV function was severely depressed..    CARDIAC CATHETERIZATION Left 09/27/2022    Procedure: Cardiac Left Heart Cath;  Surgeon: Doreen Briones DO;  Location: [facility];  Service: Cardiology    CARDIAC CATHETERIZATION N/A 09/27/2022    Procedure: Cardiac Coronary Angiogram;  Surgeon: Doreen Briones DO;  Location: [facility];  Service: Cardiology    CARDIAC CATHETERIZATION  09/27/2022    Procedure: Cardiac catheterization;  Surgeon: Doreen Briones DO;  Location: [facility];  Service: Cardiology    INGUINAL HERNIA REPAIR Bilateral     HI COLONOSCOPY FLX DX W/COLLJ SPEC WHEN PFRMD N/A 03/11/2019    Procedure: COLONOSCOPY with removal of anal papilla;  Surgeon: ANIL Dale MD;  Location: [facility];  Service: Colorectal    TONSILLECTOMY      TOTAL KNEE ARTHROPLASTY Left 9/18/2023    Procedure: ARTHROPLASTY KNEE TOTAL;  Surgeon: David Mullen DO;  Location: [facility];  Service: Orthopedics    UMBILICAL HERNIA REPAIR  06/21/2006     Social History     Socioeconomic History    Marital status: /Civil Union     Spouse name: Not on file    Number of children: Not on file    Years of education: Not on file    Highest education level: Not on file   Occupational History    Not on file   Tobacco Use    Smoking status: Former      Current packs/day: 0.00     Average packs/day: 3.0 packs/day for 43.0 years (129.0 ttl pk-yrs)     Types: Cigarettes     Start date:      Quit date: 2018     Years since quittin.7    Smokeless tobacco: Never   Vaping Use    Vaping status: Never Used   Substance and Sexual Activity    Alcohol use: Yes     Comment: socially    Drug use: Yes     Types: Marijuana     Comment: occasionally edible    Sexual activity: Not on file   Other Topics Concern    Not on file   Social History Narrative    Caffeine use     Social Determinants of Health     Financial Resource Strain: Low Risk  (3/23/2023)    Overall Financial Resource Strain (CARDIA)     Difficulty of Paying Living Expenses: Not very hard   Food Insecurity: No Food Insecurity (2024)    Hunger Vital Sign     Worried About Running Out of Food in the Last Year: Never true     Ran Out of Food in the Last Year: Never true   Transportation Needs: No Transportation Needs (2024)    PRAPARE - Transportation     Lack of Transportation (Medical): No     Lack of Transportation (Non-Medical): No   Physical Activity: Not on file   Stress: Not on file   Social Connections: Not on file   Intimate Partner Violence: Not on file   Housing Stability: Low Risk  (2024)    Housing Stability Vital Sign     Unable to Pay for Housing in the Last Year: No     Number of Times Moved in the Last Year: 1     Homeless in the Last Year: No     Allergies   Allergen Reactions    Ciprofloxacin Shortness Of Breath and Diarrhea       Review of Systems   Constitutional:  Negative for chills and fever.   HENT: Negative.     Eyes:  Negative for visual disturbance.   Respiratory:  Negative for cough and shortness of breath.    Cardiovascular:  Negative for chest pain, palpitations and leg swelling.   Gastrointestinal: Negative.    Genitourinary: Negative.    Musculoskeletal:  Positive for arthralgias.   Neurological:  Negative for dizziness, light-headedness and headaches.  "  Psychiatric/Behavioral:  Negative for sleep disturbance. The patient is not nervous/anxious.            Objective     Pulse 71   Temp (!) 97.4 °F (36.3 °C) (Temporal)   Ht 6' 2\" (1.88 m)   Wt 130 kg (286 lb 3.2 oz)   SpO2 95%   BMI 36.75 kg/m²     Physical Exam  Vitals and nursing note reviewed.   Constitutional:       General: He is not in acute distress.     Appearance: Normal appearance. He is not ill-appearing, toxic-appearing or diaphoretic.   HENT:      Head: Normocephalic and atraumatic.      Right Ear: External ear normal.      Left Ear: External ear normal.      Nose: Nose normal.      Mouth/Throat:      Mouth: Mucous membranes are dry.   Eyes:      General: No scleral icterus.     Extraocular Movements: Extraocular movements intact.      Conjunctiva/sclera: Conjunctivae normal.      Pupils: Pupils are equal, round, and reactive to light.   Cardiovascular:      Rate and Rhythm: Normal rate. Rhythm irregular.      Pulses: Normal pulses.      Heart sounds: Murmur heard.   Pulmonary:      Effort: Pulmonary effort is normal. No respiratory distress.      Breath sounds: Normal breath sounds. No wheezing.   Abdominal:      General: Bowel sounds are normal. There is no distension.      Palpations: Abdomen is soft.      Tenderness: There is no abdominal tenderness.   Musculoskeletal:      Cervical back: Normal range of motion and neck supple.      Right lower leg: No edema.      Left lower leg: No edema.   Skin:     General: Skin is warm and dry.      Coloration: Skin is not jaundiced or pale.   Neurological:      General: No focal deficit present.      Mental Status: He is alert and oriented to person, place, and time. Mental status is at baseline.      Cranial Nerves: No cranial nerve deficit.      Sensory: No sensory deficit.   Psychiatric:         Mood and Affect: Mood normal.         Behavior: Behavior normal.         Thought Content: Thought content normal.         Judgment: Judgment normal.         "

## 2024-10-08 NOTE — ASSESSMENT & PLAN NOTE
Lab Results   Component Value Date    HGBA1C 6.6 (A) 07/16/2024   Has labs and endocrine appt next week - he will go for labs this week  Setup eye exam - due December

## 2024-10-08 NOTE — ASSESSMENT & PLAN NOTE
Continue current rx Stable on current rx Improve diet choices/weight loss encouraged         Notified patient of results she verbalized understanding.

## 2024-10-10 DIAGNOSIS — J44.89 ASTHMA-COPD OVERLAP SYNDROME (HCC): ICD-10-CM

## 2024-10-10 NOTE — TELEPHONE ENCOUNTER
Refill must be reviewed and completed by the office or provider. The refill is unable to be approved or denied by the medication management team.    Albuterol as needed for wheezing, last refill 06.2023 - Please review to see if the refill is appropriate.

## 2024-10-12 RX ORDER — ALBUTEROL SULFATE 90 UG/1
2 INHALANT RESPIRATORY (INHALATION) EVERY 6 HOURS PRN
Qty: 18 G | Refills: 0 | Status: SHIPPED | OUTPATIENT
Start: 2024-10-12

## 2024-10-14 ENCOUNTER — APPOINTMENT (OUTPATIENT)
Dept: LAB | Facility: CLINIC | Age: 69
End: 2024-10-14
Payer: MEDICARE

## 2024-10-14 DIAGNOSIS — N18.30 STAGE 3 CHRONIC KIDNEY DISEASE, UNSPECIFIED WHETHER STAGE 3A OR 3B CKD (HCC): ICD-10-CM

## 2024-10-14 DIAGNOSIS — E87.1 HYPONATREMIA: ICD-10-CM

## 2024-10-14 DIAGNOSIS — E11.21 CONTROLLED TYPE 2 DIABETES MELLITUS WITH DIABETIC NEPHROPATHY, WITHOUT LONG-TERM CURRENT USE OF INSULIN (HCC): ICD-10-CM

## 2024-10-14 DIAGNOSIS — N17.9 ACUTE RENAL FAILURE, UNSPECIFIED ACUTE RENAL FAILURE TYPE (HCC): ICD-10-CM

## 2024-10-14 LAB
ALBUMIN SERPL BCG-MCNC: 4.3 G/DL (ref 3.5–5)
ALP SERPL-CCNC: 77 U/L (ref 34–104)
ALT SERPL W P-5'-P-CCNC: 22 U/L (ref 7–52)
ANA SER QL IA: POSITIVE
ANION GAP SERPL CALCULATED.3IONS-SCNC: 10 MMOL/L (ref 4–13)
AST SERPL W P-5'-P-CCNC: 15 U/L (ref 13–39)
BACTERIA UR QL AUTO: ABNORMAL /HPF
BASOPHILS # BLD AUTO: 0.12 THOUSANDS/ΜL (ref 0–0.1)
BASOPHILS NFR BLD AUTO: 1 % (ref 0–1)
BILIRUB SERPL-MCNC: 0.79 MG/DL (ref 0.2–1)
BILIRUB UR QL STRIP: NEGATIVE
BUN SERPL-MCNC: 39 MG/DL (ref 5–25)
CALCIUM SERPL-MCNC: 9.6 MG/DL (ref 8.4–10.2)
CHLORIDE SERPL-SCNC: 104 MMOL/L (ref 96–108)
CLARITY UR: CLEAR
CO2 SERPL-SCNC: 25 MMOL/L (ref 21–32)
COLOR UR: COLORLESS
CREAT SERPL-MCNC: 1.66 MG/DL (ref 0.6–1.3)
CREAT UR-MCNC: 38.8 MG/DL
EOSINOPHIL # BLD AUTO: 0.59 THOUSAND/ΜL (ref 0–0.61)
EOSINOPHIL NFR BLD AUTO: 5 % (ref 0–6)
ERYTHROCYTE [DISTWIDTH] IN BLOOD BY AUTOMATED COUNT: 15.3 % (ref 11.6–15.1)
EST. AVERAGE GLUCOSE BLD GHB EST-MCNC: 146 MG/DL
GFR SERPL CREATININE-BSD FRML MDRD: 42 ML/MIN/1.73SQ M
GLUCOSE SERPL-MCNC: 103 MG/DL (ref 65–140)
GLUCOSE UR STRIP-MCNC: ABNORMAL MG/DL
HBA1C MFR BLD: 6.7 %
HCT VFR BLD AUTO: 46.4 % (ref 36.5–49.3)
HGB BLD-MCNC: 14.3 G/DL (ref 12–17)
HGB UR QL STRIP.AUTO: NEGATIVE
IMM GRANULOCYTES # BLD AUTO: 0.09 THOUSAND/UL (ref 0–0.2)
IMM GRANULOCYTES NFR BLD AUTO: 1 % (ref 0–2)
KETONES UR STRIP-MCNC: NEGATIVE MG/DL
LEUKOCYTE ESTERASE UR QL STRIP: NEGATIVE
LYMPHOCYTES # BLD AUTO: 2.26 THOUSANDS/ΜL (ref 0.6–4.47)
LYMPHOCYTES NFR BLD AUTO: 18 % (ref 14–44)
MAGNESIUM SERPL-MCNC: 2.3 MG/DL (ref 1.9–2.7)
MCH RBC QN AUTO: 27 PG (ref 26.8–34.3)
MCHC RBC AUTO-ENTMCNC: 30.8 G/DL (ref 31.4–37.4)
MCV RBC AUTO: 88 FL (ref 82–98)
MICROALBUMIN UR-MCNC: 88.3 MG/L
MICROALBUMIN/CREAT 24H UR: 228 MG/G CREATININE (ref 0–30)
MONOCYTES # BLD AUTO: 0.95 THOUSAND/ΜL (ref 0.17–1.22)
MONOCYTES NFR BLD AUTO: 8 % (ref 4–12)
NEUTROPHILS # BLD AUTO: 8.27 THOUSANDS/ΜL (ref 1.85–7.62)
NEUTS SEG NFR BLD AUTO: 67 % (ref 43–75)
NITRITE UR QL STRIP: NEGATIVE
NON-SQ EPI CELLS URNS QL MICRO: ABNORMAL /HPF
NRBC BLD AUTO-RTO: 0 /100 WBCS
PH UR STRIP.AUTO: 6 [PH]
PHOSPHATE SERPL-MCNC: 3.2 MG/DL (ref 2.3–4.1)
PLATELET # BLD AUTO: 323 THOUSANDS/UL (ref 149–390)
PMV BLD AUTO: 10.3 FL (ref 8.9–12.7)
POTASSIUM SERPL-SCNC: 4.4 MMOL/L (ref 3.5–5.3)
PROT SERPL-MCNC: 7.3 G/DL (ref 6.4–8.4)
PROT UR STRIP-MCNC: ABNORMAL MG/DL
PTH-INTACT SERPL-MCNC: 58.9 PG/ML (ref 12–88)
RBC # BLD AUTO: 5.3 MILLION/UL (ref 3.88–5.62)
RBC #/AREA URNS AUTO: ABNORMAL /HPF
SODIUM SERPL-SCNC: 139 MMOL/L (ref 135–147)
SP GR UR STRIP.AUTO: 1.01 (ref 1–1.03)
URATE SERPL-MCNC: 9 MG/DL (ref 3.5–8.5)
UROBILINOGEN UR STRIP-ACNC: <2 MG/DL
WBC # BLD AUTO: 12.28 THOUSAND/UL (ref 4.31–10.16)
WBC #/AREA URNS AUTO: ABNORMAL /HPF

## 2024-10-14 PROCEDURE — 86038 ANTINUCLEAR ANTIBODIES: CPT

## 2024-10-14 PROCEDURE — 83036 HEMOGLOBIN GLYCOSYLATED A1C: CPT

## 2024-10-14 PROCEDURE — 83970 ASSAY OF PARATHORMONE: CPT

## 2024-10-14 PROCEDURE — 81001 URINALYSIS AUTO W/SCOPE: CPT

## 2024-10-14 PROCEDURE — 85025 COMPLETE CBC W/AUTO DIFF WBC: CPT

## 2024-10-14 PROCEDURE — 82043 UR ALBUMIN QUANTITATIVE: CPT

## 2024-10-14 PROCEDURE — 80053 COMPREHEN METABOLIC PANEL: CPT

## 2024-10-14 PROCEDURE — 82570 ASSAY OF URINE CREATININE: CPT

## 2024-10-14 PROCEDURE — 86037 ANCA TITER EACH ANTIBODY: CPT

## 2024-10-14 PROCEDURE — 83735 ASSAY OF MAGNESIUM: CPT

## 2024-10-14 PROCEDURE — 84166 PROTEIN E-PHORESIS/URINE/CSF: CPT

## 2024-10-14 PROCEDURE — 86235 NUCLEAR ANTIGEN ANTIBODY: CPT

## 2024-10-14 PROCEDURE — 86039 ANTINUCLEAR ANTIBODIES (ANA): CPT

## 2024-10-14 PROCEDURE — 36415 COLL VENOUS BLD VENIPUNCTURE: CPT

## 2024-10-14 PROCEDURE — 86334 IMMUNOFIX E-PHORESIS SERUM: CPT

## 2024-10-14 PROCEDURE — 86225 DNA ANTIBODY NATIVE: CPT

## 2024-10-14 PROCEDURE — 84165 PROTEIN E-PHORESIS SERUM: CPT

## 2024-10-14 PROCEDURE — 83520 IMMUNOASSAY QUANT NOS NONAB: CPT

## 2024-10-14 PROCEDURE — 86335 IMMUNFIX E-PHORSIS/URINE/CSF: CPT

## 2024-10-14 PROCEDURE — 84550 ASSAY OF BLOOD/URIC ACID: CPT

## 2024-10-14 PROCEDURE — 84100 ASSAY OF PHOSPHORUS: CPT

## 2024-10-15 ENCOUNTER — TELEPHONE (OUTPATIENT)
Dept: NEPHROLOGY | Facility: CLINIC | Age: 69
End: 2024-10-15

## 2024-10-15 DIAGNOSIS — R80.9 PROTEINURIA, UNSPECIFIED TYPE: Primary | ICD-10-CM

## 2024-10-15 DIAGNOSIS — R76.8 ANA POSITIVE: ICD-10-CM

## 2024-10-15 NOTE — TELEPHONE ENCOUNTER
----- Message from Susan Muir DO sent at 10/15/2024  9:30 AM EDT -----  Please call patient , kidney function improved to 42%. Uric acid elevated, please ask about any gout concerns. Patient has elevated YANN which is a nonspecific immune test. I am going to add some additional tests to look into this to check on autoimmune disease.

## 2024-10-15 NOTE — TELEPHONE ENCOUNTER
Patient calling back, relayed the above message and he states no, he has not had any gout concerns/symptoms.  He will get the additional lab work done.  Just an FYI

## 2024-10-16 ENCOUNTER — OFFICE VISIT (OUTPATIENT)
Dept: ENDOCRINOLOGY | Facility: CLINIC | Age: 69
End: 2024-10-16
Payer: MEDICARE

## 2024-10-16 VITALS
HEIGHT: 74 IN | SYSTOLIC BLOOD PRESSURE: 120 MMHG | DIASTOLIC BLOOD PRESSURE: 74 MMHG | TEMPERATURE: 97.1 F | HEART RATE: 63 BPM | WEIGHT: 293 LBS | BODY MASS INDEX: 37.6 KG/M2

## 2024-10-16 DIAGNOSIS — N18.30 STAGE 3 CHRONIC KIDNEY DISEASE, UNSPECIFIED WHETHER STAGE 3A OR 3B CKD (HCC): ICD-10-CM

## 2024-10-16 DIAGNOSIS — E11.29 TYPE 2 DIABETES MELLITUS WITH PROTEINURIA  (HCC): Primary | ICD-10-CM

## 2024-10-16 DIAGNOSIS — R80.9 TYPE 2 DIABETES MELLITUS WITH PROTEINURIA  (HCC): Primary | ICD-10-CM

## 2024-10-16 DIAGNOSIS — E78.5 DYSLIPIDEMIA: ICD-10-CM

## 2024-10-16 LAB
ANA HOMOGEN SER QL IF: NORMAL
ANA HOMOGEN TITR SER: NORMAL {TITER}
C-ANCA TITR SER IF: NORMAL TITER
DSDNA AB SER-ACNC: <4 IU/ML
ENA RNP AB SER IA-ACNC: POSITIVE
ENA SM AB SER IA-ACNC: NEGATIVE
ENA SM+RNP IGG SER-ACNC: NEGATIVE
ENA SS-A AB SER IA-ACNC: NEGATIVE
ENA SS-B AB SER IA-ACNC: NEGATIVE
MYELOPEROXIDASE AB SER IA-ACNC: <0.2 UNITS (ref 0–0.9)
P-ANCA ATYPICAL TITR SER IF: NORMAL TITER
P-ANCA TITR SER IF: NORMAL TITER
PROTEINASE3 AB SER IA-ACNC: <0.2 UNITS (ref 0–0.9)

## 2024-10-16 PROCEDURE — G2211 COMPLEX E/M VISIT ADD ON: HCPCS | Performed by: STUDENT IN AN ORGANIZED HEALTH CARE EDUCATION/TRAINING PROGRAM

## 2024-10-16 PROCEDURE — 99214 OFFICE O/P EST MOD 30 MIN: CPT | Performed by: STUDENT IN AN ORGANIZED HEALTH CARE EDUCATION/TRAINING PROGRAM

## 2024-10-16 NOTE — PROGRESS NOTES
Ambulatory Visit  Name: Cricket Rosales      : 1955      MRN: [id number]  Encounter Provider: Tom Avila DO  Encounter Date: 10/16/2024   Encounter department: [facility]    Assessment & Plan  Type 2 diabetes mellitus with proteinuria  (HCC)  Control is stable. A1c is at goal <7%. Continue jardiance. Contact provider if having financial struggles, we may consider bexagliflozin. RTC in 6-mo with labs.   Lab Results   Component Value Date    HGBA1C 6.7 (H) 10/14/2024       Orders:    Basic metabolic panel; Future    Hemoglobin A1C; Future    Stage 3 chronic kidney disease, unspecified whether stage 3a or 3b CKD (HCC)  Stable, with recent interval improvement. Continue jardiance  Lab Results   Component Value Date    EGFR 42 10/14/2024    EGFR 26 2024    EGFR 20 2024    CREATININE 1.66 (H) 10/14/2024    CREATININE 2.45 (H) 2024    CREATININE 2.97 (H) 2024            Dyslipidemia  Continue statin       RTC 6-mo    History of Present Illness     Cricket Rosales is a 69 y.o. male who presents in follow up of T2D. DM is longstanding, c/b DPN, CKD w nephropathy, ASCVD. He follows diabetic friendly diet and he takes jardiance. Jardiance is costly, but he is currently in the donut [facility] and it is being paid for. He denies any symptoms commensurate with hyperglycemia. Denies hypoglycemia. For hyperlipidemia he is on statin.     History obtained from : patient  Review of Systems   Constitutional:  Negative for unexpected weight change.   Endocrine: Negative for polydipsia and polyuria.   All other systems reviewed and are negative.    Medical History Reviewed by provider this encounter:       Current Outpatient Medications on File Prior to Visit   Medication Sig Dispense Refill    Accu-Chek FastClix Lancets MISC Use 1 each daily to test blood sugar. 100 each 5    albuterol (PROVENTIL HFA,VENTOLIN HFA) 90 mcg/act inhaler INHALE 2 PUFFS BY  MOUTH EVERY 6 HOURS AS NEEDED FOR WHEEZING 18 g 0    ALPRAZolam (XANAX) 0.5 mg tablet TAKE 1 TABLET BY MOUTH ONCE DAILY AT BEDTIME AS NEEDED FOR ANXIETY 30 tablet 0    atorvastatin (LIPITOR) 20 mg tablet Take 1 tablet (20 mg total) by mouth daily 30 tablet 6    bisacodyl (FLEET) 10 MG/30ML ENEM Insert 30 mL (10 mg total) into the rectum once for 1 dose 30 mL 0    Blood Glucose Monitoring Suppl (Accu-Chek Guide Me) w/Device KIT Use 1 each daily to test blood sugar. 1 kit 0    Blood Pressure KIT Use 3 (three) times a week 1 kit 0    carvedilol (COREG) 25 mg tablet TAKE 1 TABLET BY MOUTH TWICE DAILY WITH MEALS 60 tablet 5    Diclofenac Sodium (VOLTAREN) 1 % Apply 2 g topically 2 (two) times a day 100 g 2    docusate sodium (COLACE) 100 mg capsule Take 1 capsule (100 mg total) by mouth 2 (two) times a day as needed for constipation 60 capsule 0    DULoxetine (CYMBALTA) 60 mg delayed release capsule Take 1 capsule (60 mg total) by mouth daily 90 capsule 3    Empagliflozin (Jardiance) 10 MG TABS tablet Take 1 tablet (10 mg total) by mouth every morning 30 tablet 11    Entresto 49-51 MG TABS Take 1 tablet by mouth twice daily 60 tablet 11    fluticasone (FLONASE) 50 mcg/act nasal spray Use 1 spray(s) in each nostril twice daily 16 g 1    Fluticasone-Salmeterol (Advair) 500-50 mcg/dose inhaler INHALE 1 DOSE BY MOUTH TWICE DAILY RINSE MOUTH AFTER USE 60 blister 5    furosemide (LASIX) 40 mg tablet TAKE 1 TABLET BY MOUTH EVERY OTHER DAY 45 tablet 1    glucose blood test strip Use 1 each daily to test blood sugar. 100 strip 5    isosorbide mononitrate (IMDUR) 30 mg 24 hr tablet Take 1 tablet by mouth once daily 90 tablet 1    lactulose (CHRONULAC) 10 g/15 mL solution Take 30 mL (20 g total) by mouth 3 (three) times a day (Patient not taking: Reported on 7/16/2024) 473 mL 0    levalbuterol (XOPENEX) 1.25 mg/3 mL nebulizer solution inhale contents of 1 vial in nebulizer every 4 hours if needed for wheezing or shortness of breath       montelukast (SINGULAIR) 10 mg tablet Take 1 tablet (10 mg total) by mouth daily at bedtime 30 tablet 0    polyethylene glycol (MIRALAX) 17 g packet Take 17 g by mouth daily (Patient not taking: Reported on 2024) 14 each 0    tiotropium (Spiriva Respimat) 2.5 MCG/ACT AERS inhaler INHALE 2 SPRAY(S) BY MOUTH ONCE DAILY 4 g 5     No current facility-administered medications on file prior to visit.      Social History     Tobacco Use    Smoking status: Former     Current packs/day: 0.00     Average packs/day: 3.0 packs/day for 43.0 years (129.0 ttl pk-yrs)     Types: Cigarettes     Start date:      Quit date: 2018     Years since quittin.7    Smokeless tobacco: Never   Vaping Use    Vaping status: Never Used   Substance and Sexual Activity    Alcohol use: Yes     Comment: socially    Drug use: Yes     Types: Marijuana     Comment: occasionally edible    Sexual activity: Not on file         Objective     There were no vitals taken for this visit.    Physical Exam  Vitals reviewed.   Constitutional:       General: He is not in acute distress.     Appearance: Normal appearance.   HENT:      Head: Normocephalic and atraumatic.      Nose: Nose normal.   Eyes:      General: No scleral icterus.     Conjunctiva/sclera: Conjunctivae normal.   Pulmonary:      Effort: Pulmonary effort is normal. No respiratory distress.   Musculoskeletal:         General: No deformity.      Cervical back: Normal range of motion.   Skin:     General: Skin is warm and dry.   Neurological:      Mental Status: He is alert.   Psychiatric:         Mood and Affect: Mood normal.         Behavior: Behavior normal.         Lab Results   Component Value Date     2015    SODIUM 139 10/14/2024    K 4.4 10/14/2024     10/14/2024    CO2 25 10/14/2024    ANIONGAP 4 2015    AGAP 10 10/14/2024    BUN 39 (H) 10/14/2024    CREATININE 1.66 (H) 10/14/2024    GLUC 103 10/14/2024    GLUF 115 (H) 2024    CALCIUM 9.6  10/14/2024    AST 15 10/14/2024    ALT 22 10/14/2024    ALKPHOS 77 10/14/2024    PROT 6.1 (L) 07/22/2015    TP 7.3 10/14/2024    BILITOT 1.05 (H) 07/22/2015    TBILI 0.79 10/14/2024    EGFR 42 10/14/2024     Lab Results   Component Value Date    HGBA1C 6.7 (H) 10/14/2024

## 2024-10-16 NOTE — ASSESSMENT & PLAN NOTE
Stable, with recent interval improvement. Continue jardiance  Lab Results   Component Value Date    EGFR 42 10/14/2024    EGFR 26 06/24/2024    EGFR 20 06/23/2024    CREATININE 1.66 (H) 10/14/2024    CREATININE 2.45 (H) 06/24/2024    CREATININE 2.97 (H) 06/23/2024

## 2024-10-16 NOTE — ASSESSMENT & PLAN NOTE
Control is stable. A1c is at goal <7%. Continue jardiance. Contact provider if having financial struggles, we may consider bexagliflozin. RTC in 6-mo with labs.   Lab Results   Component Value Date    HGBA1C 6.7 (H) 10/14/2024       Orders:    Basic metabolic panel; Future    Hemoglobin A1C; Future

## 2024-10-17 ENCOUNTER — TELEPHONE (OUTPATIENT)
Dept: NEPHROLOGY | Facility: CLINIC | Age: 69
End: 2024-10-17

## 2024-10-17 LAB
ALBUMIN SERPL ELPH-MCNC: 4.15 G/DL (ref 3.2–5.1)
ALBUMIN SERPL ELPH-MCNC: 58.5 % (ref 48–70)
ALBUMIN UR ELPH-MCNC: 35 %
ALPHA1 GLOB MFR UR ELPH: 16.2 %
ALPHA1 GLOB SERPL ELPH-MCNC: 0.29 G/DL (ref 0.15–0.47)
ALPHA1 GLOB SERPL ELPH-MCNC: 4.1 % (ref 1.8–7)
ALPHA2 GLOB MFR UR ELPH: 12.2 %
ALPHA2 GLOB SERPL ELPH-MCNC: 0.78 G/DL (ref 0.42–1.04)
ALPHA2 GLOB SERPL ELPH-MCNC: 11 % (ref 5.9–14.9)
B-GLOBULIN MFR UR ELPH: 8.6 %
BETA GLOB ABNORMAL SERPL ELPH-MCNC: 0.45 G/DL (ref 0.31–0.57)
BETA1 GLOB SERPL ELPH-MCNC: 6.4 % (ref 4.7–7.7)
BETA2 GLOB SERPL ELPH-MCNC: 5.9 % (ref 3.1–7.9)
BETA2+GAMMA GLOB SERPL ELPH-MCNC: 0.42 G/DL (ref 0.2–0.58)
GAMMA GLOB ABNORMAL SERPL ELPH-MCNC: 1 G/DL (ref 0.4–1.66)
GAMMA GLOB MFR UR ELPH: 28 %
GAMMA GLOB SERPL ELPH-MCNC: 14.1 % (ref 6.9–22.3)
IGG/ALB SER: 1.41 {RATIO} (ref 1.1–1.8)
INTERPRETATION UR IFE-IMP: NORMAL
INTERPRETATION UR IFE-IMP: NORMAL
PROT PATTERN SERPL ELPH-IMP: NORMAL
PROT PATTERN UR ELPH-IMP: NORMAL
PROT SERPL-MCNC: 7.1 G/DL (ref 6.4–8.2)
PROT UR-MCNC: 39.5 MG/DL

## 2024-10-17 PROCEDURE — 86334 IMMUNOFIX E-PHORESIS SERUM: CPT | Performed by: STUDENT IN AN ORGANIZED HEALTH CARE EDUCATION/TRAINING PROGRAM

## 2024-10-17 PROCEDURE — 84165 PROTEIN E-PHORESIS SERUM: CPT | Performed by: STUDENT IN AN ORGANIZED HEALTH CARE EDUCATION/TRAINING PROGRAM

## 2024-10-17 PROCEDURE — 86335 IMMUNFIX E-PHORSIS/URINE/CSF: CPT | Performed by: STUDENT IN AN ORGANIZED HEALTH CARE EDUCATION/TRAINING PROGRAM

## 2024-10-17 PROCEDURE — 84166 PROTEIN E-PHORESIS/URINE/CSF: CPT | Performed by: STUDENT IN AN ORGANIZED HEALTH CARE EDUCATION/TRAINING PROGRAM

## 2024-10-17 NOTE — TELEPHONE ENCOUNTER
I called and left a VM for Brenden to call the office back to discuss the following:      ----- Message from Susan Muir DO sent at 10/17/2024  1:40 PM EDT -----  Please let patient know some of his testing was found to be positive including YANN and RNP antibodies. These tests are not specific to one disease but can be seen in setting of autoimmune disease. Further evaluation and discussion can be at your visit on 12/2.

## 2024-10-21 ENCOUNTER — APPOINTMENT (OUTPATIENT)
Dept: LAB | Facility: CLINIC | Age: 69
End: 2024-10-21
Payer: MEDICARE

## 2024-10-21 DIAGNOSIS — R76.8 ANA POSITIVE: ICD-10-CM

## 2024-10-21 DIAGNOSIS — R80.9 PROTEINURIA, UNSPECIFIED TYPE: ICD-10-CM

## 2024-10-21 LAB
C3 SERPL-MCNC: 116 MG/DL (ref 87–200)
C4 SERPL-MCNC: 34 MG/DL (ref 19–52)

## 2024-10-21 PROCEDURE — 36415 COLL VENOUS BLD VENIPUNCTURE: CPT

## 2024-10-21 PROCEDURE — 86160 COMPLEMENT ANTIGEN: CPT

## 2024-10-22 ENCOUNTER — TELEPHONE (OUTPATIENT)
Age: 69
End: 2024-10-22

## 2024-10-22 NOTE — TELEPHONE ENCOUNTER
Patient called back.  Informed him of results.  He is asking if there is anything else he needs to do about this before his appointment?  Please advise and call patient.

## 2024-10-22 NOTE — TELEPHONE ENCOUNTER
It looks like nothing further was recommended by Dr. Muir.  I see that he will be seeing our nurse practitioner in early December.  Please inform the patient that as of now Dr. Muir is out of the office but I agree at this moment in time nothing further is indicated.  However, we should route this back to Dr. Muir to see if she had any other specific thoughts with respect to further workup or referral given blood work results.

## 2024-10-29 ENCOUNTER — TELEPHONE (OUTPATIENT)
Age: 69
End: 2024-10-29

## 2024-10-29 NOTE — TELEPHONE ENCOUNTER
Scheduled date of colonoscopy (as of today):  12/17/24    Physician performing colonoscopy: Dr. Ernandez    Location of colonoscopy:  [facility]    Bowel prep reviewed with patient:  NO prep sent.  Pt states he is to have a script sent for prep.  Please email prep and prep instructions once a prep is sent to [id number] [place].

## 2024-10-30 DIAGNOSIS — Z83.719 FAMILY HISTORY OF COLONIC POLYPS: Primary | ICD-10-CM

## 2024-10-30 NOTE — TELEPHONE ENCOUNTER
Called the patient and was connected with . I relayed in  patient to return call to office to confirm bowel prep which needed to be sent to the pharmacy.

## 2024-10-31 RX ORDER — POLYETHYLENE GLYCOL 3350 17 G/17G
POWDER, FOR SOLUTION ORAL
Qty: 238 G | Refills: 0 | Status: SHIPPED | OUTPATIENT
Start: 2024-10-31

## 2024-10-31 RX ORDER — BISACODYL 5 MG/1
TABLET, DELAYED RELEASE ORAL
Qty: 4 TABLET | Refills: 0 | Status: SHIPPED | OUTPATIENT
Start: 2024-10-31

## 2024-11-04 ENCOUNTER — TELEPHONE (OUTPATIENT)
Age: 69
End: 2024-11-04

## 2024-11-04 DIAGNOSIS — J44.89 ASTHMA-COPD OVERLAP SYNDROME (HCC): Primary | ICD-10-CM

## 2024-11-04 RX ORDER — IPRATROPIUM BROMIDE AND ALBUTEROL SULFATE 2.5; .5 MG/3ML; MG/3ML
3 SOLUTION RESPIRATORY (INHALATION)
Status: DISCONTINUED | OUTPATIENT
Start: 2024-11-04 | End: 2024-11-05

## 2024-11-04 NOTE — TELEPHONE ENCOUNTER
Patient was on ipratropium-albuterol (DUO-NEB) 0.5-2.5 mg/3 mL inhalation solution 3 mL was switched to levalbuterol (XOPENEX) 1.25 mg/3 mL nebulizer solution which does not work as well as  ipratropium-albuterol. Please switch back to duo-neb. levalburterol is a lot more expensive. Please send prescription for ipratropium-albuterol (DUO-NEB) 0.5-2.5 mg/3 mL Patient stated will need prior authorization for. Patient out of medication please make high priority.    Please call patient at [phone]

## 2024-11-04 NOTE — TELEPHONE ENCOUNTER
The patient would like to get back on duo neb and they do not like what they are on now they are not comfortable with it . Please advise the patient thank you

## 2024-11-05 DIAGNOSIS — J44.89 ASTHMA-COPD OVERLAP SYNDROME (HCC): Primary | ICD-10-CM

## 2024-11-05 RX ORDER — IPRATROPIUM BROMIDE AND ALBUTEROL SULFATE 2.5; .5 MG/3ML; MG/3ML
3 SOLUTION RESPIRATORY (INHALATION) 4 TIMES DAILY
Qty: 360 ML | Refills: 3 | Status: SHIPPED | OUTPATIENT
Start: 2024-11-05

## 2024-11-05 NOTE — TELEPHONE ENCOUNTER
Patient called the RX Refill Line. Message is being forwarded to the office.     Patient called to check the status of the request to Switch back to ipratropium-albuterol (DUO-NEB) 0.5-2.5 mg/3 mL. It looks like the order was put in but was not sent to the pharmacy. Patient is out of the medication and would like this sent as soon as possible. Please review Patient would like 360ml for a 30 days supply sent to [id number] - [place] - [address]      Please contact patient at [phone]

## 2024-11-06 ENCOUNTER — TELEPHONE (OUTPATIENT)
Age: 69
End: 2024-11-06

## 2024-11-06 NOTE — TELEPHONE ENCOUNTER
Patient calling to check on the status of of the prior auth for  Ipratropium-Albuterol for nebulizer.   Patient is completely out of medication, he is getting very upset this hasn't been taken care of.  He stated this is the medication that helps him.    Patient is requesting a call back today

## 2024-11-12 DIAGNOSIS — I42.8 NON-ISCHEMIC CARDIOMYOPATHY (HCC): Chronic | ICD-10-CM

## 2024-11-12 DIAGNOSIS — E78.5 DYSLIPIDEMIA: ICD-10-CM

## 2024-11-13 DIAGNOSIS — I50.42 CHRONIC COMBINED SYSTOLIC AND DIASTOLIC CONGESTIVE HEART FAILURE (HCC): ICD-10-CM

## 2024-11-13 RX ORDER — SACUBITRIL AND VALSARTAN 49; 51 MG/1; MG/1
1 TABLET, FILM COATED ORAL 2 TIMES DAILY
Qty: 180 TABLET | Refills: 1 | Status: SHIPPED | OUTPATIENT
Start: 2024-11-13

## 2024-11-13 RX ORDER — ATORVASTATIN CALCIUM 20 MG/1
20 TABLET, FILM COATED ORAL DAILY
Qty: 90 TABLET | Refills: 1 | Status: SHIPPED | OUTPATIENT
Start: 2024-11-13

## 2024-11-14 ENCOUNTER — APPOINTMENT (EMERGENCY)
Dept: CT IMAGING | Facility: HOSPITAL | Age: 69
End: 2024-11-14
Payer: MEDICARE

## 2024-11-14 ENCOUNTER — APPOINTMENT (EMERGENCY)
Dept: ULTRASOUND IMAGING | Facility: HOSPITAL | Age: 69
End: 2024-11-14
Payer: MEDICARE

## 2024-11-14 ENCOUNTER — HOSPITAL ENCOUNTER (EMERGENCY)
Facility: HOSPITAL | Age: 69
Discharge: HOME | End: 2024-11-14
Attending: EMERGENCY MEDICINE | Admitting: EMERGENCY MEDICINE
Payer: MEDICARE

## 2024-11-14 ENCOUNTER — HOSPITAL ENCOUNTER (EMERGENCY)
Facility: HOSPITAL | Age: 69
Discharge: HOME | End: 2024-11-14
Attending: EMERGENCY MEDICINE
Payer: MEDICARE

## 2024-11-14 ENCOUNTER — NURSE TRIAGE (OUTPATIENT)
Age: 69
End: 2024-11-14

## 2024-11-14 VITALS
HEART RATE: 57 BPM | DIASTOLIC BLOOD PRESSURE: 85 MMHG | SYSTOLIC BLOOD PRESSURE: 178 MMHG | OXYGEN SATURATION: 96 % | RESPIRATION RATE: 20 BRPM

## 2024-11-14 VITALS
SYSTOLIC BLOOD PRESSURE: 158 MMHG | DIASTOLIC BLOOD PRESSURE: 74 MMHG | TEMPERATURE: 98.2 F | BODY MASS INDEX: 38.5 KG/M2 | RESPIRATION RATE: 20 BRPM | HEART RATE: 69 BPM | OXYGEN SATURATION: 96 % | WEIGHT: 299.83 LBS

## 2024-11-14 DIAGNOSIS — R07.9 CHEST PAIN: Primary | ICD-10-CM

## 2024-11-14 DIAGNOSIS — K29.70 GASTRITIS: Primary | ICD-10-CM

## 2024-11-14 DIAGNOSIS — K80.20 GALLSTONE: ICD-10-CM

## 2024-11-14 DIAGNOSIS — R07.9 CHEST PAIN: ICD-10-CM

## 2024-11-14 DIAGNOSIS — M54.9 BACK PAIN: ICD-10-CM

## 2024-11-14 LAB
2HR DELTA HS TROPONIN: 0 NG/L
2HR DELTA HS TROPONIN: 1 NG/L
ALBUMIN SERPL BCG-MCNC: 4 G/DL (ref 3.5–5)
ALBUMIN SERPL BCG-MCNC: 4.2 G/DL (ref 3.5–5)
ALP SERPL-CCNC: 78 U/L (ref 34–104)
ALP SERPL-CCNC: 83 U/L (ref 34–104)
ALT SERPL W P-5'-P-CCNC: 19 U/L (ref 7–52)
ALT SERPL W P-5'-P-CCNC: 20 U/L (ref 7–52)
ANION GAP SERPL CALCULATED.3IONS-SCNC: 10 MMOL/L (ref 4–13)
ANION GAP SERPL CALCULATED.3IONS-SCNC: 8 MMOL/L (ref 4–13)
AST SERPL W P-5'-P-CCNC: 13 U/L (ref 13–39)
AST SERPL W P-5'-P-CCNC: 14 U/L (ref 13–39)
ATRIAL RATE: 55 BPM
ATRIAL RATE: 70 BPM
ATRIAL RATE: 73 BPM
BASOPHILS # BLD AUTO: 0.07 THOUSANDS/ÂΜL (ref 0–0.1)
BASOPHILS # BLD AUTO: 0.08 THOUSANDS/ÂΜL (ref 0–0.1)
BASOPHILS NFR BLD AUTO: 1 % (ref 0–1)
BASOPHILS NFR BLD AUTO: 1 % (ref 0–1)
BILIRUB SERPL-MCNC: 0.51 MG/DL (ref 0.2–1)
BILIRUB SERPL-MCNC: 0.55 MG/DL (ref 0.2–1)
BNP SERPL-MCNC: 46 PG/ML (ref 0–100)
BUN SERPL-MCNC: 37 MG/DL (ref 5–25)
BUN SERPL-MCNC: 42 MG/DL (ref 5–25)
CALCIUM SERPL-MCNC: 8.8 MG/DL (ref 8.4–10.2)
CALCIUM SERPL-MCNC: 9 MG/DL (ref 8.4–10.2)
CARDIAC TROPONIN I PNL SERPL HS: 32 NG/L (ref ?–50)
CARDIAC TROPONIN I PNL SERPL HS: 32 NG/L (ref ?–50)
CARDIAC TROPONIN I PNL SERPL HS: 34 NG/L (ref ?–50)
CARDIAC TROPONIN I PNL SERPL HS: 35 NG/L (ref ?–50)
CHLORIDE SERPL-SCNC: 103 MMOL/L (ref 96–108)
CHLORIDE SERPL-SCNC: 104 MMOL/L (ref 96–108)
CO2 SERPL-SCNC: 24 MMOL/L (ref 21–32)
CO2 SERPL-SCNC: 27 MMOL/L (ref 21–32)
CREAT SERPL-MCNC: 1.75 MG/DL (ref 0.6–1.3)
CREAT SERPL-MCNC: 2.01 MG/DL (ref 0.6–1.3)
EOSINOPHIL # BLD AUTO: 0.35 THOUSAND/ÂΜL (ref 0–0.61)
EOSINOPHIL # BLD AUTO: 0.76 THOUSAND/ÂΜL (ref 0–0.61)
EOSINOPHIL NFR BLD AUTO: 3 % (ref 0–6)
EOSINOPHIL NFR BLD AUTO: 7 % (ref 0–6)
ERYTHROCYTE [DISTWIDTH] IN BLOOD BY AUTOMATED COUNT: 14.3 % (ref 11.6–15.1)
ERYTHROCYTE [DISTWIDTH] IN BLOOD BY AUTOMATED COUNT: 14.3 % (ref 11.6–15.1)
GFR SERPL CREATININE-BSD FRML MDRD: 33 ML/MIN/1.73SQ M
GFR SERPL CREATININE-BSD FRML MDRD: 39 ML/MIN/1.73SQ M
GLUCOSE SERPL-MCNC: 165 MG/DL (ref 65–140)
GLUCOSE SERPL-MCNC: 168 MG/DL (ref 65–140)
HCT VFR BLD AUTO: 44.2 % (ref 36.5–49.3)
HCT VFR BLD AUTO: 45 % (ref 36.5–49.3)
HGB BLD-MCNC: 13.6 G/DL (ref 12–17)
HGB BLD-MCNC: 14 G/DL (ref 12–17)
IMM GRANULOCYTES # BLD AUTO: 0.04 THOUSAND/UL (ref 0–0.2)
IMM GRANULOCYTES # BLD AUTO: 0.06 THOUSAND/UL (ref 0–0.2)
IMM GRANULOCYTES NFR BLD AUTO: 0 % (ref 0–2)
IMM GRANULOCYTES NFR BLD AUTO: 1 % (ref 0–2)
LIPASE SERPL-CCNC: 35 U/L (ref 11–82)
LIPASE SERPL-CCNC: 47 U/L (ref 11–82)
LYMPHOCYTES # BLD AUTO: 1.09 THOUSANDS/ÂΜL (ref 0.6–4.47)
LYMPHOCYTES # BLD AUTO: 2.05 THOUSANDS/ÂΜL (ref 0.6–4.47)
LYMPHOCYTES NFR BLD AUTO: 10 % (ref 14–44)
LYMPHOCYTES NFR BLD AUTO: 19 % (ref 14–44)
MCH RBC QN AUTO: 27 PG (ref 26.8–34.3)
MCH RBC QN AUTO: 27.3 PG (ref 26.8–34.3)
MCHC RBC AUTO-ENTMCNC: 30.8 G/DL (ref 31.4–37.4)
MCHC RBC AUTO-ENTMCNC: 31.1 G/DL (ref 31.4–37.4)
MCV RBC AUTO: 88 FL (ref 82–98)
MCV RBC AUTO: 88 FL (ref 82–98)
MONOCYTES # BLD AUTO: 0.74 THOUSAND/ÂΜL (ref 0.17–1.22)
MONOCYTES # BLD AUTO: 0.91 THOUSAND/ÂΜL (ref 0.17–1.22)
MONOCYTES NFR BLD AUTO: 7 % (ref 4–12)
MONOCYTES NFR BLD AUTO: 8 % (ref 4–12)
NEUTROPHILS # BLD AUTO: 7.32 THOUSANDS/ÂΜL (ref 1.85–7.62)
NEUTROPHILS # BLD AUTO: 9.06 THOUSANDS/ÂΜL (ref 1.85–7.62)
NEUTS SEG NFR BLD AUTO: 65 % (ref 43–75)
NEUTS SEG NFR BLD AUTO: 78 % (ref 43–75)
NRBC BLD AUTO-RTO: 0 /100 WBCS
NRBC BLD AUTO-RTO: 0 /100 WBCS
P AXIS: 76 DEGREES
P AXIS: 81 DEGREES
P AXIS: 83 DEGREES
PLATELET # BLD AUTO: 247 THOUSANDS/UL (ref 149–390)
PLATELET # BLD AUTO: 256 THOUSANDS/UL (ref 149–390)
PMV BLD AUTO: 9.2 FL (ref 8.9–12.7)
PMV BLD AUTO: 9.4 FL (ref 8.9–12.7)
POTASSIUM SERPL-SCNC: 4.2 MMOL/L (ref 3.5–5.3)
POTASSIUM SERPL-SCNC: 4.6 MMOL/L (ref 3.5–5.3)
PR INTERVAL: 248 MS
PR INTERVAL: 250 MS
PR INTERVAL: 252 MS
PROT SERPL-MCNC: 6.9 G/DL (ref 6.4–8.4)
PROT SERPL-MCNC: 6.9 G/DL (ref 6.4–8.4)
QRS AXIS: -71 DEGREES
QRS AXIS: -71 DEGREES
QRS AXIS: -72 DEGREES
QRSD INTERVAL: 114 MS
QRSD INTERVAL: 116 MS
QRSD INTERVAL: 118 MS
QT INTERVAL: 398 MS
QT INTERVAL: 404 MS
QT INTERVAL: 416 MS
QTC INTERVAL: 386 MS
QTC INTERVAL: 438 MS
QTC INTERVAL: 449 MS
RBC # BLD AUTO: 5.03 MILLION/UL (ref 3.88–5.62)
RBC # BLD AUTO: 5.12 MILLION/UL (ref 3.88–5.62)
SODIUM SERPL-SCNC: 138 MMOL/L (ref 135–147)
SODIUM SERPL-SCNC: 138 MMOL/L (ref 135–147)
T WAVE AXIS: 115 DEGREES
T WAVE AXIS: 84 DEGREES
T WAVE AXIS: 89 DEGREES
VENTRICULAR RATE: 55 BPM
VENTRICULAR RATE: 70 BPM
VENTRICULAR RATE: 73 BPM
WBC # BLD AUTO: 11.01 THOUSAND/UL (ref 4.31–10.16)
WBC # BLD AUTO: 11.52 THOUSAND/UL (ref 4.31–10.16)

## 2024-11-14 PROCEDURE — 96374 THER/PROPH/DIAG INJ IV PUSH: CPT

## 2024-11-14 PROCEDURE — 83690 ASSAY OF LIPASE: CPT | Performed by: EMERGENCY MEDICINE

## 2024-11-14 PROCEDURE — 36415 COLL VENOUS BLD VENIPUNCTURE: CPT | Performed by: EMERGENCY MEDICINE

## 2024-11-14 PROCEDURE — 71275 CT ANGIOGRAPHY CHEST: CPT

## 2024-11-14 PROCEDURE — 93005 ELECTROCARDIOGRAM TRACING: CPT

## 2024-11-14 PROCEDURE — 96366 THER/PROPH/DIAG IV INF ADDON: CPT

## 2024-11-14 PROCEDURE — 80053 COMPREHEN METABOLIC PANEL: CPT | Performed by: EMERGENCY MEDICINE

## 2024-11-14 PROCEDURE — 74174 CTA ABD&PLVS W/CONTRAST: CPT

## 2024-11-14 PROCEDURE — 99285 EMERGENCY DEPT VISIT HI MDM: CPT

## 2024-11-14 PROCEDURE — 96375 TX/PRO/DX INJ NEW DRUG ADDON: CPT

## 2024-11-14 PROCEDURE — 76705 ECHO EXAM OF ABDOMEN: CPT

## 2024-11-14 PROCEDURE — 85025 COMPLETE CBC W/AUTO DIFF WBC: CPT | Performed by: EMERGENCY MEDICINE

## 2024-11-14 PROCEDURE — 84484 ASSAY OF TROPONIN QUANT: CPT | Performed by: EMERGENCY MEDICINE

## 2024-11-14 PROCEDURE — 83880 ASSAY OF NATRIURETIC PEPTIDE: CPT | Performed by: EMERGENCY MEDICINE

## 2024-11-14 PROCEDURE — 93010 ELECTROCARDIOGRAM REPORT: CPT | Performed by: INTERNAL MEDICINE

## 2024-11-14 PROCEDURE — 99285 EMERGENCY DEPT VISIT HI MDM: CPT | Performed by: EMERGENCY MEDICINE

## 2024-11-14 PROCEDURE — 96365 THER/PROPH/DIAG IV INF INIT: CPT

## 2024-11-14 RX ORDER — SUCRALFATE 1 G/1
1 TABLET ORAL 4 TIMES DAILY
Qty: 120 TABLET | Refills: 0 | Status: SHIPPED | OUTPATIENT
Start: 2024-11-14

## 2024-11-14 RX ORDER — ACETAMINOPHEN 10 MG/ML
1000 INJECTION, SOLUTION INTRAVENOUS ONCE
Status: COMPLETED | OUTPATIENT
Start: 2024-11-14 | End: 2024-11-14

## 2024-11-14 RX ORDER — PANTOPRAZOLE SODIUM 40 MG/10ML
40 INJECTION, POWDER, LYOPHILIZED, FOR SOLUTION INTRAVENOUS ONCE
Status: COMPLETED | OUTPATIENT
Start: 2024-11-14 | End: 2024-11-14

## 2024-11-14 RX ORDER — PANTOPRAZOLE SODIUM 20 MG/1
40 TABLET, DELAYED RELEASE ORAL DAILY
Qty: 20 TABLET | Refills: 0 | Status: SHIPPED | OUTPATIENT
Start: 2024-11-14

## 2024-11-14 RX ORDER — FAMOTIDINE 10 MG/ML
20 INJECTION, SOLUTION INTRAVENOUS ONCE
Status: COMPLETED | OUTPATIENT
Start: 2024-11-14 | End: 2024-11-14

## 2024-11-14 RX ORDER — SUCRALFATE 1 G/1
1 TABLET ORAL ONCE
Status: COMPLETED | OUTPATIENT
Start: 2024-11-14 | End: 2024-11-14

## 2024-11-14 RX ORDER — MAGNESIUM HYDROXIDE/ALUMINUM HYDROXICE/SIMETHICONE 120; 1200; 1200 MG/30ML; MG/30ML; MG/30ML
30 SUSPENSION ORAL ONCE
Status: COMPLETED | OUTPATIENT
Start: 2024-11-14 | End: 2024-11-14

## 2024-11-14 RX ADMIN — PANTOPRAZOLE SODIUM 40 MG: 40 INJECTION, POWDER, FOR SOLUTION INTRAVENOUS at 10:28

## 2024-11-14 RX ADMIN — ALUMINUM HYDROXIDE, MAGNESIUM HYDROXIDE, AND DIMETHICONE 30 ML: 200; 20; 200 SUSPENSION ORAL at 00:47

## 2024-11-14 RX ADMIN — SUCRALFATE 1 G: 1 TABLET ORAL at 00:46

## 2024-11-14 RX ADMIN — FAMOTIDINE 20 MG: 10 INJECTION, SOLUTION INTRAVENOUS at 00:47

## 2024-11-14 RX ADMIN — IOHEXOL 85 ML: 350 INJECTION, SOLUTION INTRAVENOUS at 00:44

## 2024-11-14 RX ADMIN — ACETAMINOPHEN 1000 MG: 10 INJECTION INTRAVENOUS at 01:58

## 2024-11-14 NOTE — DISCHARGE INSTRUCTIONS
Take Protonix daily.  You may take Carafate up to 4 times a day as needed for abdominal pain.  Please follow-up with GI within 1 to 2 weeks.

## 2024-11-14 NOTE — ED PROVIDER NOTES
Time reflects when diagnosis was documented in both MDM as applicable and the Disposition within this note       Time User Action Codes Description Comment    11/14/2024  3:41 AM Khadar Frost Add [R07.9] Chest pain           ED Disposition       ED Disposition   Discharge    Condition   Stable    Date/Time   Thu Nov 14, 2024  3:41 AM    Comment   Cricket Rosales discharge to home/self care.                   Assessment & Plan       Medical Decision Making  I reviewed the patient's medical chart, PMHx, prior encounters, medications.    My independent interpretation of ECG: NSR, 1st degree AV block,left axis deviation    My DDx includes: ACS, PE, dissection, PTX, arrhythmia, electrolyte abnormality, costochondritis    Will perform cardiac workup. CMP to evaluate electrolytes, CBC to evaluate for anemia, troponin to evaluate for cardiac ischemia, ECG. Imaging of chest. Will reassess symptoms.     0335 States that if it would be okay for him to leave, he would like to be.  I explained that his CTA dissection study was negative.  That his delta EKGs and troponins were negative.  His pain has significantly improved, he states it is around a 2 out of 10 currently.  He would like to go home.  At this time, will discharge with strict return precautions      Amount and/or Complexity of Data Reviewed  Labs: ordered. Decision-making details documented in ED Course.  Radiology: ordered.    Risk  OTC drugs.  Prescription drug management.        ED Course as of 11/14/24 0343   Thu Nov 14, 2024   0106 Creatinine(!): 2.01  Baseline.   0122 hs TnI 0hr: 32  Baseline will delta   0207 Repeat ecg does not demonstrate ischemia.   0227 LIPASE: 47  Negative.   0320 hs TnI 2hr: 32  Stable, reassuring.   0335 States that if it would be okay for him to leave, he would like to be.  I explained that his CTA dissection study was negative.  That his delta EKGs and troponins were negative.  His pain has significantly improved, he states  it is around a 2 out of 10 currently.  He would like to go home.  At this time, will discharge with strict return precautions       Medications   aluminum-magnesium hydroxide-simethicone (MAALOX) oral suspension 30 mL (30 mL Oral Given 11/14/24 0047)   sucralfate (CARAFATE) tablet 1 g (1 g Oral Given 11/14/24 0046)   Famotidine (PF) (PEPCID) injection 20 mg (20 mg Intravenous Given 11/14/24 0047)   iohexol (OMNIPAQUE) 350 MG/ML injection (MULTI-DOSE) 100 mL (85 mL Intravenous Given 11/14/24 0044)   acetaminophen (Ofirmev) injection 1,000 mg (1,000 mg Intravenous New Bag 11/14/24 0158)       ED Risk Strat Scores   HEART Risk Score      Flowsheet Row Most Recent Value   Heart Score Risk Calculator    History 1 Filed at: 11/14/2024 0339   ECG 0 Filed at: 11/14/2024 0339   Age 2 Filed at: 11/14/2024 0339   Risk Factors 2 Filed at: 11/14/2024 0339   Troponin 1 Filed at: 11/14/2024 0339   HEART Score 6 Filed at: 11/14/2024 0339                               SBIRT 22yo+      Flowsheet Row Most Recent Value   Initial Alcohol Screen: US AUDIT-C     1. How often do you have a drink containing alcohol? 0 Filed at: 11/14/2024 0036   2. How many drinks containing alcohol do you have on a typical day you are drinking?  0 Filed at: 11/14/2024 0036   3a. Male UNDER 65: How often do you have five or more drinks on one occasion? 0 Filed at: 11/14/2024 0036   3b. FEMALE Any Age, or MALE 65+: How often do you have 4 or more drinks on one occassion? 0 Filed at: 11/14/2024 0036   Audit-C Score 0 Filed at: 11/14/2024 0036   MAURY: How many times in the past year have you...    Used an illegal drug or used a prescription medication for non-medical reasons? Never Filed at: 11/14/2024 0036                            History of Present Illness       Chief Complaint   Patient presents with    Chest Pain     Pt states he has been having mid sternal chest pain radiates to his back been going on for the past few hours        Past Medical History:    Diagnosis Date    Acute on chronic combined systolic and diastolic CHF (congestive heart failure) ([facility]) 07/27/2023    Alcohol abuse 07/27/2023    Ambulates with cane     Arthritis     Asthma     Back pain     Bunion 04/02/2024    Chest pain 12/22/2022    CHF (congestive heart failure) ([facility])     Claustrophobia     Constipation 12/18/2018    COPD (chronic obstructive pulmonary disease) ([facility])     COPD with acute exacerbation ([facility]) 05/06/2022    COVID-19     COVID-19 virus infection 12/03/2021    Depression     Hypertension     Mumps     Neck pain     Old MI (myocardial infarction)     Pneumonia     Pulmonary emphysema ([facility])     Rectal bleeding 01/14/2019    S/P TKR (total knee replacement), left 11/02/2023    Skin abnormalities     rashes and wounds on both legs - scabbed over and healing    Sleep apnea     no CPAP    Tingling of both feet     Wears glasses       Past Surgical History:   Procedure Laterality Date    APPENDECTOMY      CARDIAC CATHETERIZATION  07/24/2015    Left main- normal and maidly tortuous.  Circumflex - normal and moderately tortuous.  RCA- normal and mildy tortuous.  Global LV function was severely depressed..    CARDIAC CATHETERIZATION Left 09/27/2022    Procedure: Cardiac Left Heart Cath;  Surgeon: Doreen Briones DO;  Location: [facility];  Service: Cardiology    CARDIAC CATHETERIZATION N/A 09/27/2022    Procedure: Cardiac Coronary Angiogram;  Surgeon: Doreen Briones DO;  Location: [facility];  Service: Cardiology    CARDIAC CATHETERIZATION  09/27/2022    Procedure: Cardiac catheterization;  Surgeon: Doreen Briones DO;  Location: [facility];  Service: Cardiology    INGUINAL HERNIA REPAIR Bilateral     MO COLONOSCOPY FLX DX W/COLLJ SPEC WHEN PFRMD N/A 03/11/2019    Procedure: COLONOSCOPY with removal of anal papilla;  Surgeon: ANIL Dale MD;  Location: [facility] OR;  Service: Colorectal    TONSILLECTOMY      TOTAL KNEE ARTHROPLASTY Left 9/18/2023     Procedure: ARTHROPLASTY KNEE TOTAL;  Surgeon: David Mullen DO;  Location: [facility];  Service: Orthopedics    UMBILICAL HERNIA REPAIR  2006      Family History   Problem Relation Age of Onset    Heart attack Father         MI    Heart disease Father     Diabetes Sister         DM    Arthritis Family     Coronary artery disease Family     Hypertension Family     Tuberculosis Son     Alzheimer's disease Mother       Social History     Tobacco Use    Smoking status: Former     Current packs/day: 0.00     Average packs/day: 3.0 packs/day for 43.0 years (129.0 ttl pk-yrs)     Types: Cigarettes     Start date:      Quit date: 2018     Years since quittin.8    Smokeless tobacco: Never   Vaping Use    Vaping status: Never Used   Substance Use Topics    Alcohol use: Yes     Comment: socially    Drug use: Yes     Types: Marijuana     Comment: occasionally edible      E-Cigarette/Vaping    E-Cigarette Use Never User       E-Cigarette/Vaping Substances    Nicotine No     THC No     CBD No     Flavoring No     Other No     Unknown No       I have reviewed and agree with the history as documented.     69-year-old male with a past medical history of MI, CHF with diastolic dysfunction and systolic ejection fraction of 45%, who presents for chest pain.  Patient reports that this began approximately half hour before arrival.  Describes that it radiates to the back.  He has never had pain like this.  Denies any nausea or vomiting.  Does feel mildly short of breath.  Denies any significant leg pain or swelling.  ROS otherwise negative.        Review of Systems   Constitutional:  Negative for chills, diaphoresis, fatigue and fever.   HENT:  Negative for congestion and sore throat.    Eyes:  Negative for visual disturbance.   Respiratory:  Negative for cough, chest tightness and shortness of breath.    Cardiovascular:  Positive for chest pain. Negative for palpitations and leg swelling.   Gastrointestinal:  Negative  for abdominal distention, abdominal pain, constipation, diarrhea, nausea and vomiting.   Genitourinary:  Negative for difficulty urinating and dysuria.   Musculoskeletal:  Negative for arthralgias and myalgias.   Skin:  Negative for rash.   Neurological:  Negative for dizziness, weakness, light-headedness, numbness and headaches.   Psychiatric/Behavioral:  Negative for agitation, behavioral problems and confusion. The patient is not nervous/anxious.    All other systems reviewed and are negative.          Objective       ED Triage Vitals [11/14/24 0032]   Temperature Pulse Blood Pressure Respirations SpO2 Patient Position - Orthostatic VS   98.2 °F (36.8 °C) 66 (!) 179/79 20 96 % Lying      Temp Source Heart Rate Source BP Location FiO2 (%) Pain Score    Temporal -- Left arm -- 6      Vitals      Date and Time Temp Pulse SpO2 Resp BP Pain Score FACES Pain Rating User   11/14/24 0215 -- 57 95 % 20 179/85 -- -- MS   11/14/24 0115 -- 79 95 % 18 153/71 -- -- CP   11/14/24 0032 98.2 °F (36.8 °C) 66 96 % 20 179/79 6 -- RJP            Physical Exam  Constitutional:       Appearance: He is well-developed.   HENT:      Head: Normocephalic and atraumatic.   Cardiovascular:      Rate and Rhythm: Normal rate and regular rhythm.      Heart sounds: Normal heart sounds. No murmur heard.  Pulmonary:      Effort: Pulmonary effort is normal. No respiratory distress.      Breath sounds: Normal breath sounds. No decreased breath sounds, wheezing, rhonchi or rales.   Abdominal:      General: Bowel sounds are normal. There is no distension.      Palpations: Abdomen is soft.      Tenderness: There is no abdominal tenderness.   Musculoskeletal:         General: No deformity.   Skin:     General: Skin is warm.      Findings: No rash.   Neurological:      Mental Status: He is alert and oriented to person, place, and time.   Psychiatric:         Behavior: Behavior normal.         Thought Content: Thought content normal.         Judgment:  Judgment normal.         Results Reviewed       Procedure Component Value Units Date/Time    HS Troponin I 2hr [[id number]]  (Normal) Collected: 11/14/24 0252    Lab Status: Final result Specimen: Blood from Arm, Right Updated: 11/14/24 0320     hs TnI 2hr 32 ng/L      Delta 2hr hsTnI 0 ng/L     HS Troponin I 4hr [[id number]]     Lab Status: No result Specimen: Blood     Lipase [[id number]]  (Normal) Collected: 11/14/24 0039    Lab Status: Final result Specimen: Blood from Arm, Left Updated: 11/14/24 0226     Lipase 47 u/L     HS Troponin 0hr (reflex protocol) [[id number]]  (Normal) Collected: 11/14/24 0039    Lab Status: Final result Specimen: Blood from Arm, Left Updated: 11/14/24 0109     hs TnI 0hr 32 ng/L     B-Type Natriuretic Peptide(BNP) [[id number]]  (Normal) Collected: 11/14/24 0039    Lab Status: Final result Specimen: Blood from Arm, Left Updated: 11/14/24 0108     BNP 46 pg/mL     Comprehensive metabolic panel [[id number]]  (Abnormal) Collected: 11/14/24 0039    Lab Status: Final result Specimen: Blood from Arm, Left Updated: 11/14/24 0105     Sodium 138 mmol/L      Potassium 4.2 mmol/L      Chloride 104 mmol/L      CO2 24 mmol/L      ANION GAP 10 mmol/L      BUN 42 mg/dL      Creatinine 2.01 mg/dL      Glucose 165 mg/dL      Calcium 8.8 mg/dL      AST 14 U/L      ALT 20 U/L      Alkaline Phosphatase 78 U/L      Total Protein 6.9 g/dL      Albumin 4.0 g/dL      Total Bilirubin 0.51 mg/dL      eGFR 33 ml/min/1.73sq m     Narrative:      National Kid[employer] guidelines for Chronic Kidney Disease (CKD):     Stage 1 with normal or high GFR (GFR > 90 mL/min/1.73 square meters)    Stage 2 Mild CKD (GFR = 60-89 mL/min/1.73 square meters)    Stage 3A Moderate CKD (GFR = 45-59 mL/min/1.73 square meters)    Stage 3B Moderate CKD (GFR = 30-44 mL/min/1.73 square meters)    Stage 4 Severe CKD (GFR = 15-29 mL/min/1.73 square meters)    Stage 5 End Stage CKD (GFR <15 mL/min/1.73 square meters)  Note: GFR  calculation is accurate only with a steady state creatinine    CBC and differential [[id number]]  (Abnormal) Collected: 11/14/24 0039    Lab Status: Final result Specimen: Blood from Arm, Left Updated: 11/14/24 0047     WBC 11.01 Thousand/uL      RBC 5.03 Million/uL      Hemoglobin 13.6 g/dL      Hematocrit 44.2 %      MCV 88 fL      MCH 27.0 pg      MCHC 30.8 g/dL      RDW 14.3 %      MPV 9.4 fL      Platelets 256 Thousands/uL      nRBC 0 /100 WBCs      Segmented % 65 %      Immature Grans % 1 %      Lymphocytes % 19 %      Monocytes % 7 %      Eosinophils Relative 7 %      Basophils Relative 1 %      Absolute Neutrophils 7.32 Thousands/µL      Absolute Immature Grans 0.06 Thousand/uL      Absolute Lymphocytes 2.05 Thousands/µL      Absolute Monocytes 0.74 Thousand/µL      Eosinophils Absolute 0.76 Thousand/µL      Basophils Absolute 0.08 Thousands/µL             CTA dissection protocol chest/abdomen/pelvis   Final Interpretation by Luis Reese MD (11/14 0201)      No evidence of aortic dissection or aneurysm.               Workstation performed: [id number]             ECG 12 Lead Documentation Only    Date/Time: 11/14/2024 12:48 AM    Performed by: Khadar Frost MD  Authorized by: Khadar Frost MD    Indications / Diagnosis:  Cp  ECG reviewed by me, the ED Provider: yes    Patient location:  ED  Previous ECG:     Previous ECG:  Unavailable  Interpretation:     Interpretation: abnormal    Rate:     ECG rate:  73    ECG rate assessment: normal    Rhythm:     Rhythm: sinus rhythm    Ectopy:     Ectopy: none    QRS:     QRS axis:  Left    QRS intervals:  Normal  Conduction:     Conduction: normal    ST segments:     ST segments:  Normal  T waves:     T waves: normal    ECG 12 Lead Documentation Only    Date/Time: 11/14/2024 3:40 AM    Performed by: Khadar Frost MD  Authorized by: Khadar Frost MD    Indications / Diagnosis:  Cp  ECG reviewed by me, the ED  Provider: yes    Patient location:  ED  Previous ECG:     Previous ECG:  Compared to current    Similarity:  No change    Comparison to cardiac monitor: No    Interpretation:     Interpretation: abnormal    Rate:     ECG rate:  70    ECG rate assessment: normal    Rhythm:     Rhythm: sinus rhythm    Ectopy:     Ectopy: none    QRS:     QRS axis:  Left    QRS intervals:  Normal  Conduction:     Conduction: abnormal      Abnormal conduction: 1st degree    ST segments:     ST segments:  Normal  T waves:     T waves: normal        ED Medication and Procedure Management   Prior to Admission Medications   Prescriptions Last Dose Informant Patient Reported? Taking?   ALPRAZolam (XANAX) 0.5 mg tablet   No No   Sig: TAKE 1 TABLET BY MOUTH ONCE DAILY AT BEDTIME AS NEEDED FOR ANXIETY   Accu-Chek FastClix Lancets MISC  Self No No   Sig: Use 1 each daily to test blood sugar.   Patient not taking: Reported on 10/16/2024   Blood Glucose Monitoring Suppl (Accu-Chek Guide Me) w/Device KIT  Self No No   Sig: Use 1 each daily to test blood sugar.   Patient not taking: Reported on 10/16/2024   Blood Pressure KIT  Self No No   Sig: Use 3 (three) times a week   DULoxetine (CYMBALTA) 60 mg delayed release capsule  Self No No   Sig: Take 1 capsule (60 mg total) by mouth daily   Diclofenac Sodium (VOLTAREN) 1 %  Self No No   Sig: Apply 2 g topically 2 (two) times a day   Patient not taking: Reported on 10/16/2024   Empagliflozin (Jardiance) 10 MG TABS tablet   No No   Sig: Take 1 tablet (10 mg total) by mouth every morning   Fluticasone-Salmeterol (Advair) 500-50 mcg/dose inhaler   No No   Sig: INHALE 1 DOSE BY MOUTH TWICE DAILY RINSE MOUTH AFTER USE   albuterol (PROVENTIL HFA,VENTOLIN HFA) 90 mcg/act inhaler   No No   Sig: INHALE 2 PUFFS BY MOUTH EVERY 6 HOURS AS NEEDED FOR WHEEZING   atorvastatin (LIPITOR) 20 mg tablet   No No   Sig: Take 1 tablet (20 mg total) by mouth daily   bisacodyl (DULCOLAX) 5 mg EC tablet   No No   Sig: Take as  directed by GI for colonoscopy.   bisacodyl (FLEET) 10 MG/30ML ENEM   No No   Sig: Insert 30 mL (10 mg total) into the rectum once for 1 dose   carvedilol (COREG) 25 mg tablet  Self No No   Sig: TAKE 1 TABLET BY MOUTH TWICE DAILY WITH MEALS   docusate sodium (COLACE) 100 mg capsule  Self No No   Sig: Take 1 capsule (100 mg total) by mouth 2 (two) times a day as needed for constipation   Patient not taking: Reported on 10/16/2024   fluticasone (FLONASE) 50 mcg/act nasal spray  Self No No   Sig: Use 1 spray(s) in each nostril twice daily   furosemide (LASIX) 40 mg tablet   No No   Sig: TAKE 1 TABLET BY MOUTH EVERY OTHER DAY   glucose blood test strip  Self No No   Sig: Use 1 each daily to test blood sugar.   ipratropium-albuterol (DUO-NEB) 0.5-2.5 mg/3 mL nebulizer solution   No No   Sig: Take 3 mL by nebulization 4 (four) times a day   isosorbide mononitrate (IMDUR) 30 mg 24 hr tablet   No No   Sig: Take 1 tablet by mouth once daily   Patient not taking: Reported on 10/16/2024   lactulose (CHRONULAC) 10 g/15 mL solution  Self No No   Sig: Take 30 mL (20 g total) by mouth 3 (three) times a day   Patient not taking: Reported on 7/16/2024   montelukast (SINGULAIR) 10 mg tablet  Self No No   Sig: Take 1 tablet (10 mg total) by mouth daily at bedtime   polyethylene glycol (GLYCOLAX) 17 GM/SCOOP powder   No No   Sig: Take as directed by GI office for colonoscopy.   polyethylene glycol (MIRALAX) 17 g packet  Self No No   Sig: Take 17 g by mouth daily   Patient not taking: Reported on 7/16/2024   sacubitril-valsartan (Entresto) 49-51 MG TABS   No No   Sig: Take 1 tablet by mouth 2 (two) times a day   tiotropium (Spiriva Respimat) 2.5 MCG/ACT AERS inhaler  Self No No   Sig: INHALE 2 SPRAY(S) BY MOUTH ONCE DAILY      Facility-Administered Medications: None     Patient's Medications   Discharge Prescriptions    No medications on file       ED SEPSIS DOCUMENTATION   Time reflects when diagnosis was documented in both MDM as  applicable and the Disposition within this note       Time User Action Codes Description Comment    11/14/2024  3:41 AM Khadar Frost Add [R07.9] Chest pain                  Khadar Frost MD  11/14/24 0343

## 2024-11-14 NOTE — TELEPHONE ENCOUNTER
"Received call from pt stating he was seen in the ED overnight for severe back pain radiating into the chest. He states they did an EKG and a CT scan which was negative. He stated they gave him some pain meds which helped his pain and was d/c'd. He was given strict return precautions. Pt called this am  stating the pain is back and is severe. He states it starts in his mid back and travels into is chest. He states it feels like \"hard pressure\" is being applied to him and feels as if he needs to crack his back. Pt is also stating he has sob and is lightheaded.    Advised pt to return to the ED for further evaluation. Pt voiced understanding.       Sending to Dr. Phillip as an fyi. Pt has a follow up appt on 11/18.       Reason for Disposition   SEVERE chest pain    Answer Assessment - Initial Assessment Questions  1. LOCATION: \"Where does it hurt?\"        B/l mid back traveling into chest   2. RADIATION: \"Does the pain go anywhere else?\" (e.g., into neck, jaw, arms, back)      Radiates between chest and back   3. ONSET: \"When did the chest pain begin?\" (Minutes, hours or days)       Has been pretty constant since yesterday   4. PATTERN: \"Does the pain come and go, or has it been constant since it started?\"  \"Does it get worse with exertion?\"       constant  5. DURATION: \"How long does it last\" (e.g., seconds, minutes, hours)      constant  6. SEVERITY: \"How bad is the pain?\"  (e.g., Scale 1-10; mild, moderate, or severe)      8/10 - described as a \"hard pressure\" and feels like he needs to crack his back.  7. CARDIAC RISK FACTORS: \"Do you have any history of heart problems or risk factors for heart disease?\" (e.g., angina, prior heart attack; diabetes, high blood pressure, high cholesterol, smoker, or strong family history of heart disease)      HTN, cardiomyopathy   8. PULMONARY RISK FACTORS: \"Do you have any history of lung disease?\"  (e.g., blood clots in lung, asthma, emphysema, birth control pills)      Unknown " "  9. CAUSE: \"What do you think is causing the chest pain?\"      unsure  10. OTHER SYMPTOMS: \"Do you have any other symptoms?\" (e.g., dizziness, nausea, vomiting, sweating, fever, difficulty breathing, cough)        States has sob and lightheadedness    Protocols used: Chest Pain-Adult-[place]    "

## 2024-11-14 NOTE — ED PROVIDER NOTES
Time reflects when diagnosis was documented in both MDM as applicable and the Disposition within this note       Time User Action Codes Description Comment    11/14/2024  1:09 PM Gay Sauer [K29.70] Gastritis     11/14/2024  1:09 PM Gay Sauer [R07.9] Chest pain     11/14/2024  1:09 PM Gay Sauer [M54.9] Back pain     11/14/2024  1:09 PM Gay Sauer [K80.20] Gallstone           ED Disposition       ED Disposition   Discharge    Condition   Stable    Date/Time   Thu Nov 14, 2024  1:09 PM    Comment   Cricket Rosales discharge to home/self care.                   Assessment & Plan       Medical Decision Making      69-year-old male presenting for evaluation of chest and back pain which started last night around 11 PM.   Differential diagnosis includes: ACS, GI related pain, biliary disease, pancreatitis, hepatitis, musculoskeletal pain.  Patient did have CTA dissection study performed earlier today, negative for dissection, did show slightly dilated gallbladder but no calcified gallstones.  Otherwise no acute pathology.  No obvious large PE.  Will obtain labs to evaluate for anemia, CMP to evaluate for metabolic abnormality, lipase to evaluate for pancreatitis, EKG and troponin to evaluate for ACS or arrhythmia.  Will obtain right upper quadrant ultrasound to evaluate for cholecystitis.  Will treat with Protonix and reassess.  Reviewed labs, no marked abnormalities.  Reviewed interpreted EKG, shows normal sinus rhythm without acute ischemic changes.  Ultrasound shows gallstones but no evidence of acute cholecystitis.  Reassessed patient, pain is improving.  Patient does not have any abdominal tenderness so believe symptomatic biliary colic is less likely at this time.  Will start patient on Protonix daily for suspected gastritis.  Will also add Carafate 4 times a day.  Will provide referral for GI for follow-up.  Discussed with patient strict return precautions.  Patient expressed  understanding and was agreeable for discharge.       Medications   pantoprazole (PROTONIX) injection 40 mg (40 mg Intravenous Given 11/14/24 1028)       ED Risk Strat Scores                           SBIRT 22yo+      Flowsheet Row Most Recent Value   Initial Alcohol Screen: US AUDIT-C     1. How often do you have a drink containing alcohol? 0 Filed at: 11/14/2024 0956   2. How many drinks containing alcohol do you have on a typical day you are drinking?  0 Filed at: 11/14/2024 0956   3a. Male UNDER 65: How often do you have five or more drinks on one occasion? 0 Filed at: 11/14/2024 0956   Audit-C Score 0 Filed at: 11/14/2024 0956                            History of Present Illness       Chief Complaint   Patient presents with    Back Pain     Back pain radiating to chest. Started last night about MN. Was here last night for the same thing, just left at 0430. Very bloated. Not improving.        Past Medical History:   Diagnosis Date    Acute on chronic combined systolic and diastolic CHF (congestive heart failure) ([facility]) 07/27/2023    Alcohol abuse 07/27/2023    Ambulates with cane     Arthritis     Asthma     Back pain     Bunion 04/02/2024    Chest pain 12/22/2022    CHF (congestive heart failure) ([facility])     Claustrophobia     Constipation 12/18/2018    COPD (chronic obstructive pulmonary disease) ([facility])     COPD with acute exacerbation ([facility]) 05/06/2022    COVID-19     COVID-19 virus infection 12/03/2021    Depression     Hypertension     Mumps     Neck pain     Old MI (myocardial infarction)     Pneumonia     Pulmonary emphysema ([facility])     Rectal bleeding 01/14/2019    S/P TKR (total knee replacement), left 11/02/2023    Skin abnormalities     rashes and wounds on both legs - scabbed over and healing    Sleep apnea     no CPAP    Tingling of both feet     Wears glasses       Past Surgical History:   Procedure Laterality Date    APPENDECTOMY      CARDIAC CATHETERIZATION  07/24/2015    Left main- normal and maidly  tortuous.  Circumflex - normal and moderately tortuous.  RCA- normal and mildy tortuous.  Global LV function was severely depressed..    CARDIAC CATHETERIZATION Left 2022    Procedure: Cardiac Left Heart Cath;  Surgeon: Doreen Briones DO;  Location: [facility];  Service: Cardiology    CARDIAC CATHETERIZATION N/A 2022    Procedure: Cardiac Coronary Angiogram;  Surgeon: Doreen Briones DO;  Location: [facility];  Service: Cardiology    CARDIAC CATHETERIZATION  2022    Procedure: Cardiac catheterization;  Surgeon: Doreen Briones DO;  Location: [facility];  Service: Cardiology    INGUINAL HERNIA REPAIR Bilateral     LA COLONOSCOPY FLX DX W/COLLJ SPEC WHEN PFRMD N/A 2019    Procedure: COLONOSCOPY with removal of anal papilla;  Surgeon: ANIL Dale MD;  Location: [facility];  Service: Colorectal    TONSILLECTOMY      TOTAL KNEE ARTHROPLASTY Left 2023    Procedure: ARTHROPLASTY KNEE TOTAL;  Surgeon: David Mullen DO;  Location: [facility];  Service: Orthopedics    UMBILICAL HERNIA REPAIR  2006      Family History   Problem Relation Age of Onset    Heart attack Father         MI    Heart disease Father     Diabetes Sister         DM    Arthritis Family     Coronary artery disease Family     Hypertension Family     Tuberculosis Son     Alzheimer's disease Mother       Social History     Tobacco Use    Smoking status: Former     Current packs/day: 0.00     Average packs/day: 3.0 packs/day for 43.0 years (129.0 ttl pk-yrs)     Types: Cigarettes     Start date:      Quit date: 2018     Years since quittin.8    Smokeless tobacco: Never   Vaping Use    Vaping status: Never Used   Substance Use Topics    Alcohol use: Yes     Comment: socially    Drug use: Yes     Types: Marijuana     Comment: occasionally edible      E-Cigarette/Vaping    E-Cigarette Use Never User       E-Cigarette/Vaping Substances    Nicotine No     THC No     CBD No      Flavoring No     Other No     Unknown No       I have reviewed and agree with the history as documented.     HPI    69-year-old male presenting for evaluation of chest and back pain which started last night around 11 PM.  He was seen in the emergency department overnight for similar symptoms.  He had labs, EKG, and CTA dissection study which was negative for any acute pathology.  He was treated with GI medications with mild improvement of pain.  He went home and the pain returned.  He states he is having the same pain that he was having earlier.  He states pain is in the epigastric area and lower chest and radiates into the back and shoulder blades.  He did have some mild shortness of breath earlier.  He states he has had decreased appetite but denies nausea, vomiting, or diarrhea.  He states he has not had pain like this in the past.  Denies fevers, chills, or cough.    Review of Systems   Constitutional:  Positive for appetite change. Negative for chills and fever.   HENT:  Negative for congestion, rhinorrhea and sore throat.    Respiratory:  Positive for shortness of breath. Negative for cough.    Cardiovascular:  Positive for chest pain.   Gastrointestinal:  Negative for abdominal pain, diarrhea, nausea and vomiting.   Genitourinary:  Negative for dysuria, frequency, hematuria and urgency.   Musculoskeletal:  Positive for back pain. Negative for arthralgias and myalgias.   Skin:  Negative for rash.   Neurological:  Negative for dizziness, weakness, light-headedness, numbness and headaches.   All other systems reviewed and are negative.          Objective       ED Triage Vitals   Temp Pulse Blood Pressure Respirations SpO2 Patient Position - Orthostatic VS   -- 11/14/24 0956 11/14/24 0956 11/14/24 1145 11/14/24 0956 11/14/24 1145    61 167/81 20 96 % Sitting      Temp src Heart Rate Source BP Location FiO2 (%) Pain Score    -- 11/14/24 1145 11/14/24 1145 -- 11/14/24 1001     Monitor Right arm  9      Vitals       Date and Time Temp Pulse SpO2 Resp BP Pain Score FACES Pain Rating User   11/14/24 1300 -- 57 96 % 20 178/85 -- -- KTR   11/14/24 1230 -- 56 96 % 20 167/78 5 -- KTR   11/14/24 1200 -- 55 96 % 20 165/85 -- -- KTR   11/14/24 1145 -- 65 97 % 20 124/81 -- -- KTR   11/14/24 1001 -- -- -- -- -- 9 -- RJY   11/14/24 0956 -- 61 96 % -- 167/81 -- -- RJY            Physical Exam  Vitals and nursing note reviewed.   Constitutional:       General: He is not in acute distress.     Appearance: Normal appearance. He is well-developed. He is obese. He is not ill-appearing, toxic-appearing or diaphoretic.   HENT:      Head: Normocephalic and atraumatic.      Right Ear: External ear normal.      Left Ear: External ear normal.      Nose: Nose normal.      Mouth/Throat:      Mouth: Mucous membranes are moist.      Pharynx: Oropharynx is clear.   Eyes:      Extraocular Movements: Extraocular movements intact.      Conjunctiva/sclera: Conjunctivae normal.   Cardiovascular:      Rate and Rhythm: Normal rate and regular rhythm.      Pulses: Normal pulses.      Heart sounds: Normal heart sounds. No murmur heard.     No friction rub. No gallop.   Pulmonary:      Effort: Pulmonary effort is normal. No respiratory distress.      Breath sounds: Normal breath sounds. No wheezing or rales.   Abdominal:      General: There is no distension.      Palpations: Abdomen is soft.      Tenderness: There is no abdominal tenderness. There is no guarding or rebound.   Musculoskeletal:         General: No tenderness.      Cervical back: Neck supple.      Right lower leg: No edema.      Left lower leg: No edema.   Skin:     General: Skin is warm and dry.      Coloration: Skin is not pale.      Findings: No rash.   Neurological:      General: No focal deficit present.      Mental Status: He is alert and oriented to person, place, and time.      Cranial Nerves: No cranial nerve deficit.      Sensory: No sensory deficit.      Motor: No weakness.    Psychiatric:         Mood and Affect: Mood normal.         Behavior: Behavior normal.         Results Reviewed       Procedure Component Value Units Date/Time    HS Troponin I 2hr [[id number]]  (Normal) Collected: 11/14/24 1231    Lab Status: Final result Specimen: Blood from Arm, Left Updated: 11/14/24 1259     hs TnI 2hr 35 ng/L      Delta 2hr hsTnI 1 ng/L     HS Troponin 0hr (reflex protocol) [[id number]]  (Normal) Collected: 11/14/24 1021    Lab Status: Final result Specimen: Blood from Arm, Left Updated: 11/14/24 1053     hs TnI 0hr 34 ng/L     Comprehensive metabolic panel [[id number]]  (Abnormal) Collected: 11/14/24 1021    Lab Status: Final result Specimen: Blood from Arm, Left Updated: 11/14/24 1047     Sodium 138 mmol/L      Potassium 4.6 mmol/L      Chloride 103 mmol/L      CO2 27 mmol/L      ANION GAP 8 mmol/L      BUN 37 mg/dL      Creatinine 1.75 mg/dL      Glucose 168 mg/dL      Calcium 9.0 mg/dL      AST 13 U/L      ALT 19 U/L      Alkaline Phosphatase 83 U/L      Total Protein 6.9 g/dL      Albumin 4.2 g/dL      Total Bilirubin 0.55 mg/dL      eGFR 39 ml/min/1.73sq m     Narrative:      National [employer] guidelines for Chronic Kidney Disease (CKD):     Stage 1 with normal or high GFR (GFR > 90 mL/min/1.73 square meters)    Stage 2 Mild CKD (GFR = 60-89 mL/min/1.73 square meters)    Stage 3A Moderate CKD (GFR = 45-59 mL/min/1.73 square meters)    Stage 3B Moderate CKD (GFR = 30-44 mL/min/1.73 square meters)    Stage 4 Severe CKD (GFR = 15-29 mL/min/1.73 square meters)    Stage 5 End Stage CKD (GFR <15 mL/min/1.73 square meters)  Note: GFR calculation is accurate only with a steady state creatinine    Lipase [[id number]]  (Normal) Collected: 11/14/24 1021    Lab Status: Final result Specimen: Blood from Arm, Left Updated: 11/14/24 1047     Lipase 35 u/L     CBC and differential [[id number]]  (Abnormal) Collected: 11/14/24 1021    Lab Status: Final result Specimen: Blood from Arm, Left  Updated: 11/14/24 1031     WBC 11.52 Thousand/uL      RBC 5.12 Million/uL      Hemoglobin 14.0 g/dL      Hematocrit 45.0 %      MCV 88 fL      MCH 27.3 pg      MCHC 31.1 g/dL      RDW 14.3 %      MPV 9.2 fL      Platelets 247 Thousands/uL      nRBC 0 /100 WBCs      Segmented % 78 %      Immature Grans % 0 %      Lymphocytes % 10 %      Monocytes % 8 %      Eosinophils Relative 3 %      Basophils Relative 1 %      Absolute Neutrophils 9.06 Thousands/µL      Absolute Immature Grans 0.04 Thousand/uL      Absolute Lymphocytes 1.09 Thousands/µL      Absolute Monocytes 0.91 Thousand/µL      Eosinophils Absolute 0.35 Thousand/µL      Basophils Absolute 0.07 Thousands/µL             US right upper quadrant   Final Interpretation by Alton Wilson MD (11/14 1123)      Cholelithiasis without acute cholecystitis.      Workstation performed: [id number]             ECG 12 Lead Documentation Only    Date/Time: 11/14/2024 5:51 PM    Performed by: Gay Sauer MD  Authorized by: Gay Sauer MD    Indications / Diagnosis:  Chest pain  ECG reviewed by me, the ED Provider: yes    Patient location:  ED  Previous ECG:     Previous ECG:  Compared to current    Similarity:  No change    Comparison to cardiac monitor: Yes    Interpretation:     Interpretation: normal    Rate:     ECG rate:  55    ECG rate assessment: bradycardic    Rhythm:     Rhythm: sinus bradycardia    Ectopy:     Ectopy: none    QRS:     QRS axis:  Normal    QRS intervals:  Normal  Conduction:     Conduction: normal    ST segments:     ST segments:  Normal  T waves:     T waves: normal        ED Medication and Procedure Management   Prior to Admission Medications   Prescriptions Last Dose Informant Patient Reported? Taking?   ALPRAZolam (XANAX) 0.5 mg tablet   No No   Sig: TAKE 1 TABLET BY MOUTH ONCE DAILY AT BEDTIME AS NEEDED FOR ANXIETY   Accu-Chek FastClix Lancets MISC  Self No No   Sig: Use 1 each daily to test blood sugar.   Patient not taking: Reported  on 10/16/2024   Blood Glucose Monitoring Suppl (Accu-Chek Guide Me) w/Device KIT  Self No No   Sig: Use 1 each daily to test blood sugar.   Patient not taking: Reported on 10/16/2024   Blood Pressure KIT  Self No No   Sig: Use 3 (three) times a week   DULoxetine (CYMBALTA) 60 mg delayed release capsule  Self No No   Sig: Take 1 capsule (60 mg total) by mouth daily   Diclofenac Sodium (VOLTAREN) 1 %  Self No No   Sig: Apply 2 g topically 2 (two) times a day   Patient not taking: Reported on 10/16/2024   Empagliflozin (Jardiance) 10 MG TABS tablet   No No   Sig: Take 1 tablet (10 mg total) by mouth every morning   Fluticasone-Salmeterol (Advair) 500-50 mcg/dose inhaler   No No   Sig: INHALE 1 DOSE BY MOUTH TWICE DAILY RINSE MOUTH AFTER USE   albuterol (PROVENTIL HFA,VENTOLIN HFA) 90 mcg/act inhaler   No No   Sig: INHALE 2 PUFFS BY MOUTH EVERY 6 HOURS AS NEEDED FOR WHEEZING   atorvastatin (LIPITOR) 20 mg tablet   No No   Sig: Take 1 tablet (20 mg total) by mouth daily   bisacodyl (DULCOLAX) 5 mg EC tablet   No No   Sig: Take as directed by GI for colonoscopy.   bisacodyl (FLEET) 10 MG/30ML ENEM   No No   Sig: Insert 30 mL (10 mg total) into the rectum once for 1 dose   carvedilol (COREG) 25 mg tablet  Self No No   Sig: TAKE 1 TABLET BY MOUTH TWICE DAILY WITH MEALS   docusate sodium (COLACE) 100 mg capsule  Self No No   Sig: Take 1 capsule (100 mg total) by mouth 2 (two) times a day as needed for constipation   Patient not taking: Reported on 10/16/2024   fluticasone (FLONASE) 50 mcg/act nasal spray  Self No No   Sig: Use 1 spray(s) in each nostril twice daily   furosemide (LASIX) 40 mg tablet   No No   Sig: TAKE 1 TABLET BY MOUTH EVERY OTHER DAY   glucose blood test strip  Self No No   Sig: Use 1 each daily to test blood sugar.   ipratropium-albuterol (DUO-NEB) 0.5-2.5 mg/3 mL nebulizer solution   No No   Sig: Take 3 mL by nebulization 4 (four) times a day   isosorbide mononitrate (IMDUR) 30 mg 24 hr tablet   No No    Sig: Take 1 tablet by mouth once daily   Patient not taking: Reported on 10/16/2024   lactulose (CHRONULAC) 10 g/15 mL solution  Self No No   Sig: Take 30 mL (20 g total) by mouth 3 (three) times a day   Patient not taking: Reported on 7/16/2024   montelukast (SINGULAIR) 10 mg tablet  Self No No   Sig: Take 1 tablet (10 mg total) by mouth daily at bedtime   polyethylene glycol (GLYCOLAX) 17 GM/SCOOP powder   No No   Sig: Take as directed by GI office for colonoscopy.   polyethylene glycol (MIRALAX) 17 g packet  Self No No   Sig: Take 17 g by mouth daily   Patient not taking: Reported on 7/16/2024   sacubitril-valsartan (Entresto) 49-51 MG TABS   No No   Sig: Take 1 tablet by mouth 2 (two) times a day   tiotropium (Spiriva Respimat) 2.5 MCG/ACT AERS inhaler  Self No No   Sig: INHALE 2 SPRAY(S) BY MOUTH ONCE DAILY      Facility-Administered Medications: None     Discharge Medication List as of 11/14/2024  1:10 PM        START taking these medications    Details   pantoprazole (PROTONIX) 20 mg tablet Take 2 tablets (40 mg total) by mouth daily, Starting Thu 11/14/2024, Normal      sucralfate (CARAFATE) 1 g tablet Take 1 tablet (1 g total) by mouth 4 (four) times a day, Starting Thu 11/14/2024, Normal           CONTINUE these medications which have NOT CHANGED    Details   Accu-Chek FastClix Lancets MISC Use 1 each daily to test blood sugar., Starting Mon 1/23/2023, Normal      albuterol (PROVENTIL HFA,VENTOLIN HFA) 90 mcg/act inhaler INHALE 2 PUFFS BY MOUTH EVERY 6 HOURS AS NEEDED FOR WHEEZING, Starting Sat 10/12/2024, Normal      ALPRAZolam (XANAX) 0.5 mg tablet TAKE 1 TABLET BY MOUTH ONCE DAILY AT BEDTIME AS NEEDED FOR ANXIETY, Normal      atorvastatin (LIPITOR) 20 mg tablet Take 1 tablet (20 mg total) by mouth daily, Starting Wed 11/13/2024, Normal      bisacodyl (DULCOLAX) 5 mg EC tablet Take as directed by GI for colonoscopy., Normal      bisacodyl (FLEET) 10 MG/30ML ENEM Insert 30 mL (10 mg total) into the  rectum once for 1 dose, Starting Sun 6/23/2024, Normal      Blood Glucose Monitoring Suppl (Accu-Chek Guide Me) w/Device KIT Use 1 each daily to test blood sugar., Starting Mon 1/23/2023, Normal      Blood Pressure KIT Use 3 (three) times a week, Starting Fri 7/19/2024, Normal      carvedilol (COREG) 25 mg tablet TAKE 1 TABLET BY MOUTH TWICE DAILY WITH MEALS, Starting Fri 5/24/2024, Normal      Diclofenac Sodium (VOLTAREN) 1 % Apply 2 g topically 2 (two) times a day, Starting Tue 4/2/2024, Normal      docusate sodium (COLACE) 100 mg capsule Take 1 capsule (100 mg total) by mouth 2 (two) times a day as needed for constipation, Starting Sun 6/23/2024, Normal      DULoxetine (CYMBALTA) 60 mg delayed release capsule Take 1 capsule (60 mg total) by mouth daily, Starting Tue 7/2/2024, Normal      Empagliflozin (Jardiance) 10 MG TABS tablet Take 1 tablet (10 mg total) by mouth every morning, Starting Wed 11/13/2024, Normal      fluticasone (FLONASE) 50 mcg/act nasal spray Use 1 spray(s) in each nostril twice daily, Normal      Fluticasone-Salmeterol (Advair) 500-50 mcg/dose inhaler INHALE 1 DOSE BY MOUTH TWICE DAILY RINSE MOUTH AFTER USE, Normal      furosemide (LASIX) 40 mg tablet TAKE 1 TABLET BY MOUTH EVERY OTHER DAY, Starting Tue 10/8/2024, Normal      glucose blood test strip Use 1 each daily to test blood sugar., Starting Mon 1/23/2023, Normal      ipratropium-albuterol (DUO-NEB) 0.5-2.5 mg/3 mL nebulizer solution Take 3 mL by nebulization 4 (four) times a day, Starting Tue 11/5/2024, Normal      isosorbide mononitrate (IMDUR) 30 mg 24 hr tablet Take 1 tablet by mouth once daily, Starting Thu 9/12/2024, Normal      lactulose (CHRONULAC) 10 g/15 mL solution Take 30 mL (20 g total) by mouth 3 (three) times a day, Starting Wed 6/26/2024, Normal      montelukast (SINGULAIR) 10 mg tablet Take 1 tablet (10 mg total) by mouth daily at bedtime, Starting Sun 7/30/2023, Normal      polyethylene glycol (GLYCOLAX) 17 GM/SCOOP  powder Take as directed by GI office for colonoscopy., Normal      polyethylene glycol (MIRALAX) 17 g packet Take 17 g by mouth daily, Starting Sun 6/23/2024, Normal      sacubitril-valsartan (Entresto) 49-51 MG TABS Take 1 tablet by mouth 2 (two) times a day, Starting Wed 11/13/2024, Normal      tiotropium (Spiriva Respimat) 2.5 MCG/ACT AERS inhaler INHALE 2 SPRAY(S) BY MOUTH ONCE DAILY, Normal             ED SEPSIS DOCUMENTATION   Time reflects when diagnosis was documented in both MDM as applicable and the Disposition within this note       Time User Action Codes Description Comment    11/14/2024  1:09 PM Gay Sauer [K29.70] Gastritis     11/14/2024  1:09 PM Gay Sauer [R07.9] Chest pain     11/14/2024  1:09 PM Gay Sauer [M54.9] Back pain     11/14/2024  1:09 PM Gay Sauer [K80.20] Alejandro Sauer MD  11/15/24 0822

## 2024-11-15 ENCOUNTER — APPOINTMENT (EMERGENCY)
Dept: RADIOLOGY | Facility: HOSPITAL | Age: 69
End: 2024-11-15
Payer: MEDICARE

## 2024-11-15 ENCOUNTER — HOSPITAL ENCOUNTER (EMERGENCY)
Facility: HOSPITAL | Age: 69
Discharge: HOME | End: 2024-11-15
Attending: EMERGENCY MEDICINE
Payer: MEDICARE

## 2024-11-15 ENCOUNTER — NURSE TRIAGE (OUTPATIENT)
Age: 69
End: 2024-11-15

## 2024-11-15 VITALS
HEART RATE: 86 BPM | DIASTOLIC BLOOD PRESSURE: 90 MMHG | SYSTOLIC BLOOD PRESSURE: 141 MMHG | OXYGEN SATURATION: 95 % | TEMPERATURE: 98.9 F | RESPIRATION RATE: 20 BRPM

## 2024-11-15 DIAGNOSIS — K59.00 CONSTIPATION: Primary | ICD-10-CM

## 2024-11-15 LAB
BACTERIA UR QL AUTO: NORMAL /HPF
BILIRUB UR QL STRIP: NEGATIVE
CLARITY UR: CLEAR
COLOR UR: YELLOW
GLUCOSE UR STRIP-MCNC: ABNORMAL MG/DL
HGB UR QL STRIP.AUTO: ABNORMAL
KETONES UR STRIP-MCNC: NEGATIVE MG/DL
LEUKOCYTE ESTERASE UR QL STRIP: ABNORMAL
NITRITE UR QL STRIP: NEGATIVE
NON-SQ EPI CELLS URNS QL MICRO: NORMAL /HPF
PH UR STRIP.AUTO: 6 [PH]
PROT UR STRIP-MCNC: ABNORMAL MG/DL
RBC #/AREA URNS AUTO: NORMAL /HPF
SP GR UR STRIP.AUTO: 1.02 (ref 1–1.03)
UROBILINOGEN UR STRIP-ACNC: <2 MG/DL
WBC #/AREA URNS AUTO: NORMAL /HPF

## 2024-11-15 PROCEDURE — 81001 URINALYSIS AUTO W/SCOPE: CPT | Performed by: PHYSICIAN ASSISTANT

## 2024-11-15 PROCEDURE — 74022 RADEX COMPL AQT ABD SERIES: CPT

## 2024-11-15 PROCEDURE — 99283 EMERGENCY DEPT VISIT LOW MDM: CPT

## 2024-11-15 PROCEDURE — 99284 EMERGENCY DEPT VISIT MOD MDM: CPT | Performed by: EMERGENCY MEDICINE

## 2024-11-15 RX ORDER — SODIUM PHOSPHATE,MONO-DIBASIC 19G-7G/118
1 ENEMA (ML) RECTAL ONCE
Status: COMPLETED | OUTPATIENT
Start: 2024-11-15 | End: 2024-11-15

## 2024-11-15 RX ADMIN — SODIUM PHOSPHATE, DIBASIC AND SODIUM PHOSPHATE, MONOBASIC 1 ENEMA: 7; 19 ENEMA RECTAL at 21:03

## 2024-11-15 RX ADMIN — MINERAL OIL 1 ENEMA: 100 ENEMA RECTAL at 20:29

## 2024-11-15 NOTE — TELEPHONE ENCOUNTER
Can try lactulose but if he does not move bowels and cannot take PO, has pain should go to ER for eval/imaging

## 2024-11-15 NOTE — TELEPHONE ENCOUNTER
"Brenden reports no bowel movement in three days. He reports he had some abdominal pain yesterday, relieved with burping and flatulence; mild discomfort today. He reports slight abdominal bloating.    Brenden has taken two doses of Miralax without improvement.     He asks if it is okay to also take lactulose solution.    Reason for Disposition   Unable to have a bowel movement (BM) without using a laxative, suppository, or enema    Answer Assessment - Initial Assessment Questions  1. STOOL PATTERN OR FREQUENCY: \"How often do you have a bowel movement (BM)?\"  (Normal range: 3 times a day to every 3 days)  \"When was your last BM?\"        Last BM three days ago    2. CHRONIC CONSTIPATION: \"Is this a new problem for you?\"  If No, ask: \"How long have you had this problem?\" (days, weeks, months)       Denies new issue    3. LAXATIVES: \"Have you been using any stool softeners, laxatives, or enemas?\"  If Yes, ask \"What, how often, and when was the last time?\"        Two doses of Miralax    4. OTHER SYMPTOMS: \"Do you have any other symptoms?\" (e.g., abdomen pain, bloating, fever, vomiting)        Abdominal discomfort, was painful yesterday but able to burp and pass gas; slight bloating    Protocols used: Constipation-Adult-[place]    "

## 2024-11-16 DIAGNOSIS — F41.9 ANXIETY: ICD-10-CM

## 2024-11-16 NOTE — DISCHARGE INSTRUCTIONS
Continue taking your prescribed lactulose 3 times daily in addition to Miralax.   Stay well-hydrated and eat a high-fiber diet.  Schedule an appointment with your PCP for close follow-up.  Return to the ED if you experience any vomiting, abdominal pain, inability to tolerate oral intake, fevers or any other new/worsening symptoms.

## 2024-11-16 NOTE — ED PROVIDER NOTES
Time reflects when diagnosis was documented in both MDM as applicable and the Disposition within this note       Time User Action Codes Description Comment    11/15/2024  9:23 PM Cornelia Tilley Add [K59.00] Constipation           ED Disposition       ED Disposition   Discharge    Condition   Stable    Date/Time   Fri Nov 15, 2024  9:37 PM    Comment   Cricket ALEX Bobby discharge to home/self care.                   Assessment & Plan       Medical Decision Making  Patient is a 69-year-old male with a PMHx of asthma, CHF, CKD, COPD, HTN and sleep apnea, presenting to the ED for evaluation of constipation x 3 days.    Patient had a CT scan yesterday showing no evidence of bowel obstruction or other acute intra-abdominal abnormality.  I reviewed the CT scan from yesterday which does not show any significant amount of stool in the rectal vault to suggest a fecal impaction.  UA obtained to rule out UTI as a possible cause of constipation and was unremarkable.  Patient was given 2 enemas and was able to have a bowel movement with significant relief of symptoms.  X-ray shows a nonobstructed bowel gas pattern.  Patient was advised to stay well-hydrated and eat a high-fiber diet.  I advised him to continue his prescribed lactulose 3 times daily in addition to a dose of MiraLAX daily.  He was advised to contact his PCP first thing Monday morning to arrange close follow-up for reevaluation and continued management of constipation.  He was instructed to return to the ED if he develops any fevers, chills, nausea/vomiting, abdominal pain, inability to tolerate oral intake or any other new/concerning symptoms.    The management plan was discussed in detail with the patient at bedside and all questions were answered. Strict ED return instructions were discussed at bedside. Prior to discharge, both verbal and written instructions were provided. We discussed the signs and symptoms that should prompt the patient to return to the ED.  All questions were answered and the patient was comfortable with the plan of care and discharged home. The patient agrees to return to the Emergency Department for concerns and/or progression of illness.     Amount and/or Complexity of Data Reviewed  Radiology: ordered.    Risk  OTC drugs.        ED Course as of 11/15/24 2153   Fri Nov 15, 2024   2127 Patient was able to have a large bowel movement after the second enema.  He reports feeling significant improved at this time.       Medications   sodium phosphate-biphosphate (FLEET) enema 1 enema (1 enema Rectal Given 11/15/24 2103)   mineral oil enema 1 enema (1 enema Rectal Given 11/15/24 2029)       ED Risk Strat Scores                           SBIRT 22yo+      Flowsheet Row Most Recent Value   Initial Alcohol Screen: US AUDIT-C     1. How often do you have a drink containing alcohol? 0 Filed at: 11/15/2024 1935   2. How many drinks containing alcohol do you have on a typical day you are drinking?  0 Filed at: 11/15/2024 1935   3a. Male UNDER 65: How often do you have five or more drinks on one occasion? 0 Filed at: 11/15/2024 1935   3b. FEMALE Any Age, or MALE 65+: How often do you have 4 or more drinks on one occassion? 0 Filed at: 11/15/2024 1935   Audit-C Score 0 Filed at: 11/15/2024 1935   MAURY: How many times in the past year have you...    Used an illegal drug or used a prescription medication for non-medical reasons? Never Filed at: 11/15/2024 1935                            History of Present Illness       Chief Complaint   Patient presents with    Constipation     Pt states he feels that he is constipated. States his last normal BM was 3 days ago and he had a small, soft BM yesterday.        Past Medical History:   Diagnosis Date    Acute on chronic combined systolic and diastolic CHF (congestive heart failure) (HCC) 07/27/2023    Alcohol abuse 07/27/2023    Ambulates with cane     Arthritis     Asthma     Back pain     Bunion 04/02/2024    Chest pain  12/22/2022    CHF (congestive heart failure) ([facility])     Claustrophobia     Constipation 12/18/2018    COPD (chronic obstructive pulmonary disease) ([facility])     COPD with acute exacerbation ([facility]) 05/06/2022    COVID-19     COVID-19 virus infection 12/03/2021    Depression     Hypertension     Mumps     Neck pain     Old MI (myocardial infarction)     Pneumonia     Pulmonary emphysema ([facility])     Rectal bleeding 01/14/2019    S/P TKR (total knee replacement), left 11/02/2023    Skin abnormalities     rashes and wounds on both legs - scabbed over and healing    Sleep apnea     no CPAP    Tingling of both feet     Wears glasses       Past Surgical History:   Procedure Laterality Date    APPENDECTOMY      CARDIAC CATHETERIZATION  07/24/2015    Left main- normal and maidly tortuous.  Circumflex - normal and moderately tortuous.  RCA- normal and mildy tortuous.  Global LV function was severely depressed..    CARDIAC CATHETERIZATION Left 09/27/2022    Procedure: Cardiac Left Heart Cath;  Surgeon: Doreen Briones DO;  Location: [facility];  Service: Cardiology    CARDIAC CATHETERIZATION N/A 09/27/2022    Procedure: Cardiac Coronary Angiogram;  Surgeon: Doreen Briones DO;  Location: [facility];  Service: Cardiology    CARDIAC CATHETERIZATION  09/27/2022    Procedure: Cardiac catheterization;  Surgeon: Doreen Briones DO;  Location: [facility];  Service: Cardiology    INGUINAL HERNIA REPAIR Bilateral     NE COLONOSCOPY FLX DX W/COLLJ SPEC WHEN PFRMD N/A 03/11/2019    Procedure: COLONOSCOPY with removal of anal papilla;  Surgeon: ANIL Dale MD;  Location: [facility];  Service: Colorectal    TONSILLECTOMY      TOTAL KNEE ARTHROPLASTY Left 9/18/2023    Procedure: ARTHROPLASTY KNEE TOTAL;  Surgeon: David Mullen DO;  Location: [facility];  Service: Orthopedics    UMBILICAL HERNIA REPAIR  06/21/2006      Family History   Problem Relation Age of Onset    Heart attack Father         MI     Heart disease Father     Diabetes Sister         DM    Arthritis Family     Coronary artery disease Family     Hypertension Family     Tuberculosis Son     Alzheimer's disease Mother       Social History     Tobacco Use    Smoking status: Former     Current packs/day: 0.00     Average packs/day: 3.0 packs/day for 43.0 years (129.0 ttl pk-yrs)     Types: Cigarettes     Start date:      Quit date: 2018     Years since quittin.8    Smokeless tobacco: Never   Vaping Use    Vaping status: Never Used   Substance Use Topics    Alcohol use: Yes     Comment: socially    Drug use: Yes     Types: Marijuana     Comment: occasionally edible      E-Cigarette/Vaping    E-Cigarette Use Never User       E-Cigarette/Vaping Substances    Nicotine No     THC No     CBD No     Flavoring No     Other No     Unknown No       I have reviewed and agree with the history as documented.     Patient is a 69-year-old male with a PMHx of asthma, CHF, CKD, COPD, HTN and sleep apnea, presenting to the ED for evaluation of constipation x 3 days.  Patient states that his last normal bowel movement was 3 days ago.  He states that he was able to pass a very small amount of soft stool yesterday but says this required a lot of straining.  He denies any fevers, chills, nausea, vomiting, flank pain, dysuria, hematuria or black/bloody stools.  He took 2 doses of MiraLAX as well as a dose of lactulose today with no relief.  Patient was seen in the ED yesterday on 2 separate occasions for chest/back pain.  He had a CTA dissection study that was unremarkable and showed no evidence of a bowel obstruction or other acute intra-abdominal abnormality.  He also had a right upper quadrant ultrasound that showed cholelithiasis without evidence of acute cholecystitis.  Prior abdominal surgeries include an appendectomy, inguinal hernia repair and umbilical hernia repair.        Review of Systems   Constitutional:  Negative for chills and fever.   HENT:  Negative  for congestion, ear pain and sore throat.    Respiratory:  Negative for cough and shortness of breath.    Cardiovascular:  Negative for chest pain, palpitations and leg swelling.   Gastrointestinal:  Positive for abdominal distention and constipation. Negative for abdominal pain, blood in stool, diarrhea, nausea and vomiting.   Genitourinary:  Negative for dysuria, flank pain and hematuria.   Musculoskeletal:  Negative for back pain and neck pain.   Skin:  Negative for rash.   Neurological:  Negative for dizziness, seizures, syncope, light-headedness and headaches.   All other systems reviewed and are negative.          Objective       ED Triage Vitals [11/15/24 1934]   Temperature Pulse Blood Pressure Respirations SpO2 Patient Position - Orthostatic VS   98.9 °F (37.2 °C) 86 141/90 20 95 % Sitting      Temp Source Heart Rate Source BP Location FiO2 (%) Pain Score    Temporal Monitor Left arm -- --      Vitals      Date and Time Temp Pulse SpO2 Resp BP Pain Score FACES Pain Rating User   11/15/24 1934 98.9 °F (37.2 °C) 86 95 % 20 141/90 -- -- CS            Physical Exam  Vitals and nursing note reviewed.   Constitutional:       General: He is awake. He is not in acute distress.     Appearance: Normal appearance. He is well-developed. He is not ill-appearing or diaphoretic.   HENT:      Head: Normocephalic and atraumatic.      Right Ear: External ear normal.      Left Ear: External ear normal.      Nose: Nose normal.      Mouth/Throat:      Lips: Pink.      Mouth: Mucous membranes are moist.   Eyes:      General: Lids are normal. No scleral icterus.     Conjunctiva/sclera: Conjunctivae normal.      Pupils: Pupils are equal, round, and reactive to light.   Cardiovascular:      Rate and Rhythm: Normal rate and regular rhythm.      Pulses: Normal pulses.           Radial pulses are 2+ on the right side and 2+ on the left side.      Heart sounds: Normal heart sounds, S1 normal and S2 normal.   Pulmonary:      Effort:  Pulmonary effort is normal. No accessory muscle usage.      Breath sounds: Normal breath sounds. No stridor. No decreased breath sounds, wheezing, rhonchi or rales.   Abdominal:      General: Bowel sounds are normal.      Palpations: Abdomen is soft.      Tenderness: There is abdominal tenderness. There is no right CVA tenderness, left CVA tenderness, guarding or rebound.      Comments: Mild generalized tenderness to deep palpation.  No rebound tenderness, guarding or rigidity.  Abdomen is mildly distended, normal bowel sounds in all 4 quadrants.   Musculoskeletal:      Cervical back: Full passive range of motion without pain, normal range of motion and neck supple. No signs of trauma. No pain with movement. Normal range of motion.      Right lower leg: No edema.      Left lower leg: No edema.   Lymphadenopathy:      Cervical: No cervical adenopathy.   Skin:     General: Skin is warm and dry.      Capillary Refill: Capillary refill takes less than 2 seconds.      Coloration: Skin is not cyanotic, jaundiced or pale.   Neurological:      Mental Status: He is alert and oriented to person, place, and time.      GCS: GCS eye subscore is 4. GCS verbal subscore is 5. GCS motor subscore is 6.      Cranial Nerves: No dysarthria or facial asymmetry.      Gait: Gait normal.   Psychiatric:         Attention and Perception: Attention normal.         Mood and Affect: Mood normal.         Speech: Speech normal.         Behavior: Behavior normal. Behavior is cooperative.         Results Reviewed       Procedure Component Value Units Date/Time    Urine Microscopic [[id number]]  (Normal) Collected: 11/15/24 2002    Lab Status: Final result Specimen: Urine, Other Updated: 11/15/24 2046     RBC, UA 0-1 /hpf      WBC, UA 0-5 /hpf      Epithelial Cells Occasional /hpf      Bacteria, UA Occasional /hpf     UA w Reflex to Microscopic w Reflex to Culture [[id number]]  (Abnormal) Collected: 11/15/24 2002    Lab Status: Final result Specimen:  Urine, Other Updated: 11/15/24 2021     Color, UA Yellow     Clarity, UA Clear     Specific Gravity, UA 1.020     pH, UA 6.0     Leukocytes, UA Trace     Nitrite, UA Negative     Protein,  (2+) mg/dl      Glucose,  (3/10%) mg/dl      Ketones, UA Negative mg/dl      Urobilinogen, UA <2.0 mg/dl      Bilirubin, UA Negative     Occult Blood, UA Small            XR abdomen obstruction series    (Results Pending)       Procedures    ED Medication and Procedure Management   Prior to Admission Medications   Prescriptions Last Dose Informant Patient Reported? Taking?   ALPRAZolam (XANAX) 0.5 mg tablet   No No   Sig: TAKE 1 TABLET BY MOUTH ONCE DAILY AT BEDTIME AS NEEDED FOR ANXIETY   Accu-Chek FastClix Lancets MISC  Self No No   Sig: Use 1 each daily to test blood sugar.   Patient not taking: Reported on 10/16/2024   Blood Glucose Monitoring Suppl (Accu-Chek Guide Me) w/Device KIT  Self No No   Sig: Use 1 each daily to test blood sugar.   Patient not taking: Reported on 10/16/2024   Blood Pressure KIT  Self No No   Sig: Use 3 (three) times a week   DULoxetine (CYMBALTA) 60 mg delayed release capsule  Self No No   Sig: Take 1 capsule (60 mg total) by mouth daily   Diclofenac Sodium (VOLTAREN) 1 %  Self No No   Sig: Apply 2 g topically 2 (two) times a day   Patient not taking: Reported on 10/16/2024   Empagliflozin (Jardiance) 10 MG TABS tablet   No No   Sig: Take 1 tablet (10 mg total) by mouth every morning   Fluticasone-Salmeterol (Advair) 500-50 mcg/dose inhaler   No No   Sig: INHALE 1 DOSE BY MOUTH TWICE DAILY RINSE MOUTH AFTER USE   albuterol (PROVENTIL HFA,VENTOLIN HFA) 90 mcg/act inhaler   No No   Sig: INHALE 2 PUFFS BY MOUTH EVERY 6 HOURS AS NEEDED FOR WHEEZING   atorvastatin (LIPITOR) 20 mg tablet   No No   Sig: Take 1 tablet (20 mg total) by mouth daily   bisacodyl (DULCOLAX) 5 mg EC tablet   No No   Sig: Take as directed by GI for colonoscopy.   bisacodyl (FLEET) 10 MG/30ML ENEM   No No   Sig: Insert 30  mL (10 mg total) into the rectum once for 1 dose   carvedilol (COREG) 25 mg tablet  Self No No   Sig: TAKE 1 TABLET BY MOUTH TWICE DAILY WITH MEALS   docusate sodium (COLACE) 100 mg capsule  Self No No   Sig: Take 1 capsule (100 mg total) by mouth 2 (two) times a day as needed for constipation   Patient not taking: Reported on 10/16/2024   fluticasone (FLONASE) 50 mcg/act nasal spray  Self No No   Sig: Use 1 spray(s) in each nostril twice daily   furosemide (LASIX) 40 mg tablet   No No   Sig: TAKE 1 TABLET BY MOUTH EVERY OTHER DAY   glucose blood test strip  Self No No   Sig: Use 1 each daily to test blood sugar.   ipratropium-albuterol (DUO-NEB) 0.5-2.5 mg/3 mL nebulizer solution   No No   Sig: Take 3 mL by nebulization 4 (four) times a day   isosorbide mononitrate (IMDUR) 30 mg 24 hr tablet   No No   Sig: Take 1 tablet by mouth once daily   Patient not taking: Reported on 10/16/2024   lactulose (CHRONULAC) 10 g/15 mL solution  Self No No   Sig: Take 30 mL (20 g total) by mouth 3 (three) times a day   Patient not taking: Reported on 7/16/2024   montelukast (SINGULAIR) 10 mg tablet  Self No No   Sig: Take 1 tablet (10 mg total) by mouth daily at bedtime   pantoprazole (PROTONIX) 20 mg tablet   No No   Sig: Take 2 tablets (40 mg total) by mouth daily   polyethylene glycol (GLYCOLAX) 17 GM/SCOOP powder   No No   Sig: Take as directed by GI office for colonoscopy.   polyethylene glycol (MIRALAX) 17 g packet  Self No No   Sig: Take 17 g by mouth daily   Patient not taking: Reported on 7/16/2024   sacubitril-valsartan (Entresto) 49-51 MG TABS   No No   Sig: Take 1 tablet by mouth 2 (two) times a day   sucralfate (CARAFATE) 1 g tablet   No No   Sig: Take 1 tablet (1 g total) by mouth 4 (four) times a day   tiotropium (Spiriva Respimat) 2.5 MCG/ACT AERS inhaler  Self No No   Sig: INHALE 2 SPRAY(S) BY MOUTH ONCE DAILY      Facility-Administered Medications: None     Patient's Medications   Discharge Prescriptions    No  medications on file     No discharge procedures on file.  ED SEPSIS DOCUMENTATION   Time reflects when diagnosis was documented in both MDM as applicable and the Disposition within this note       Time User Action Codes Description Comment    11/15/2024  9:23 PM Cornelia Tilley Add [K59.00] Constipation                  Cornelia Tilley PA-C  11/15/24 6337

## 2024-11-18 ENCOUNTER — OFFICE VISIT (OUTPATIENT)
Dept: CARDIOLOGY CLINIC | Facility: CLINIC | Age: 69
End: 2024-11-18
Payer: MEDICARE

## 2024-11-18 ENCOUNTER — VBI (OUTPATIENT)
Dept: FAMILY MEDICINE CLINIC | Facility: CLINIC | Age: 69
End: 2024-11-18

## 2024-11-18 VITALS
OXYGEN SATURATION: 95 % | HEIGHT: 74 IN | WEIGHT: 293.6 LBS | DIASTOLIC BLOOD PRESSURE: 78 MMHG | HEART RATE: 63 BPM | SYSTOLIC BLOOD PRESSURE: 132 MMHG | BODY MASS INDEX: 37.68 KG/M2

## 2024-11-18 DIAGNOSIS — Z09 HOSPITAL DISCHARGE FOLLOW-UP: ICD-10-CM

## 2024-11-18 DIAGNOSIS — I10 BENIGN ESSENTIAL HYPERTENSION: ICD-10-CM

## 2024-11-18 DIAGNOSIS — I50.42 CHRONIC COMBINED SYSTOLIC AND DIASTOLIC CONGESTIVE HEART FAILURE (HCC): Primary | ICD-10-CM

## 2024-11-18 DIAGNOSIS — R07.9 CHEST PAIN, UNSPECIFIED TYPE: ICD-10-CM

## 2024-11-18 DIAGNOSIS — I42.8 NON-ISCHEMIC CARDIOMYOPATHY (HCC): ICD-10-CM

## 2024-11-18 PROCEDURE — 99214 OFFICE O/P EST MOD 30 MIN: CPT | Performed by: PHYSICIAN ASSISTANT

## 2024-11-18 RX ORDER — ALPRAZOLAM 0.5 MG
TABLET ORAL
Qty: 30 TABLET | Refills: 0 | Status: SHIPPED | OUTPATIENT
Start: 2024-11-18

## 2024-11-18 NOTE — PROGRESS NOTES
Heart Failure Outpatient Visit    Cricket Rosales 69 y.o. male   MRN: [id number]  Encounter: [id number]    Assessment:  Patient Active Problem List    Diagnosis Date Noted    Persistent proteinuria 07/16/2024    Other diabetic neurological complication associated with type 2 diabetes mellitus ([facility]) 04/02/2024    Pes planus of both feet 04/02/2024    Severe persistent asthma without complication 02/02/2024    CKD (chronic kidney disease) stage 3, GFR 30-59 ml/min ([facility]) 09/19/2023    Moderate episode of recurrent major depressive disorder ([facility]) 01/03/2023    Type 2 diabetes mellitus with proteinuria  ([facility]) 12/24/2022    PLMD (periodic limb movement disorder)     Hypertrophied anal papilla 01/14/2019    Chronic asthma, moderate persistent, uncomplicated 02/14/2018    Obstructive sleep apnea 02/14/2018    Hemorrhoids 11/27/2017    COPD, severe ([facility]) 10/16/2017    Non-ischemic cardiomyopathy with HFmEF ([facility]) 08/04/2015    Anxiety 08/06/2014    Thyroid disease 05/06/2014    Benign essential hypertension 06/14/2013    Vitamin D deficiency 06/14/2013    Dyslipidemia 04/03/2013    Asthma-COPD overlap syndrome ([facility]) 09/25/2012       Today's Plan:  Warm, compensated on exam.  No further episodes of chest pain.    Continue current medications.  Follow-up as scheduled next month.    Plan:  HFmrEF; LVEF 45%  Etiology: NICM    GDMT:  --Beta Blocker: Carvedilol 25 mg twice daily  --ARNi / ACEi / ARB: Entresto 49-51 mg twice daily  --Aldosterone Antagonist: No  --SGLT2 Inhibitor: Jardiance 10 mg daily    Volume:  --Diuretic: Lasix 40 mg every other day    HTN  Dyslipidemia   CORDELL  Tobacco use       HPI:   Cricket Rosales is a 69-year-old male with a PMH as above who presents for follow up.  Follows with Dr. Phillip.  Seen in the ER on 11/14/2024 after presenting for chest pain.  Reported chest pain rating to the back with mild shortness of breath.  EKG nonischemic, troponins 32> 32.  CT dissection study negative.  Pain improved and  patient discharged.     11/18/24: Presents today for follow up.  Reports that he feels that the chest pain he experienced was related to severe constipation, which was resolved on a subsequent ER visit.  No further episodes of chest pain.  No exertional symptoms.  Denies shortness of breath, lower extremity edema.  Does admit to overindulging over the summer, but is working on getting back on track.  Has not weighed himself recently.    Past Medical History:   Diagnosis Date    Acute on chronic combined systolic and diastolic CHF (congestive heart failure) ([facility]) 07/27/2023    Alcohol abuse 07/27/2023    Ambulates with cane     Arthritis     Asthma     Back pain     Bunion 04/02/2024    Chest pain 12/22/2022    CHF (congestive heart failure) ([facility])     Claustrophobia     Constipation 12/18/2018    COPD (chronic obstructive pulmonary disease) ([facility])     COPD with acute exacerbation ([facility]) 05/06/2022    COVID-19     COVID-19 virus infection 12/03/2021    Depression     Hypertension     Mumps     Neck pain     Old MI (myocardial infarction)     Pneumonia     Pulmonary emphysema ([facility])     Rectal bleeding 01/14/2019    S/P TKR (total knee replacement), left 11/02/2023    Skin abnormalities     rashes and wounds on both legs - scabbed over and healing    Sleep apnea     no CPAP    Tingling of both feet     Wears glasses      Review of Systems   Constitutional:  Negative for chills and fever.   HENT:  Negative for ear pain and sore throat.    Eyes:  Negative for pain and visual disturbance.   Respiratory:  Negative for cough and shortness of breath.    Cardiovascular:  Positive for chest pain (Resolved). Negative for palpitations and leg swelling.   Gastrointestinal:  Negative for abdominal pain and vomiting.   Genitourinary:  Negative for dysuria and hematuria.   Musculoskeletal:  Negative for arthralgias and back pain.   Skin:  Negative for color change and rash.   Neurological:  Negative for seizures and syncope.   All  other systems reviewed and are negative.  14-point ROS completed and negative except as stated above and/or in the HPI.      Allergies   Allergen Reactions    Ciprofloxacin Shortness Of Breath and Diarrhea       Current Outpatient Medications:     Accu-Chek FastClix Lancets MISC, Use 1 each daily to test blood sugar., Disp: 100 each, Rfl: 5    albuterol (PROVENTIL HFA,VENTOLIN HFA) 90 mcg/act inhaler, INHALE 2 PUFFS BY MOUTH EVERY 6 HOURS AS NEEDED FOR WHEEZING, Disp: 18 g, Rfl: 0    ALPRAZolam (XANAX) 0.5 mg tablet, TAKE 1 TABLET BY MOUTH ONCE DAILY AT BEDTIME AS NEEDED FOR ANXIETY, Disp: 30 tablet, Rfl: 0    atorvastatin (LIPITOR) 20 mg tablet, Take 1 tablet (20 mg total) by mouth daily, Disp: 90 tablet, Rfl: 1    bisacodyl (DULCOLAX) 5 mg EC tablet, Take as directed by GI for colonoscopy., Disp: 4 tablet, Rfl: 0    Blood Glucose Monitoring Suppl (Accu-Chek Guide Me) w/Device KIT, Use 1 each daily to test blood sugar., Disp: 1 kit, Rfl: 0    Blood Pressure KIT, Use 3 (three) times a week, Disp: 1 kit, Rfl: 0    carvedilol (COREG) 25 mg tablet, TAKE 1 TABLET BY MOUTH TWICE DAILY WITH MEALS, Disp: 60 tablet, Rfl: 5    Diclofenac Sodium (VOLTAREN) 1 %, Apply 2 g topically 2 (two) times a day, Disp: 100 g, Rfl: 2    DULoxetine (CYMBALTA) 60 mg delayed release capsule, Take 1 capsule (60 mg total) by mouth daily, Disp: 90 capsule, Rfl: 3    Empagliflozin (Jardiance) 10 MG TABS tablet, Take 1 tablet (10 mg total) by mouth every morning, Disp: 90 tablet, Rfl: 3    fluticasone (FLONASE) 50 mcg/act nasal spray, Use 1 spray(s) in each nostril twice daily, Disp: 16 g, Rfl: 1    Fluticasone-Salmeterol (Advair) 500-50 mcg/dose inhaler, INHALE 1 DOSE BY MOUTH TWICE DAILY RINSE MOUTH AFTER USE, Disp: 60 blister, Rfl: 5    furosemide (LASIX) 40 mg tablet, TAKE 1 TABLET BY MOUTH EVERY OTHER DAY, Disp: 45 tablet, Rfl: 1    glucose blood test strip, Use 1 each daily to test blood sugar., Disp: 100 strip, Rfl: 5     ipratropium-albuterol (DUO-NEB) 0.5-2.5 mg/3 mL nebulizer solution, Take 3 mL by nebulization 4 (four) times a day, Disp: 360 mL, Rfl: 3    isosorbide mononitrate (IMDUR) 30 mg 24 hr tablet, Take 1 tablet by mouth once daily, Disp: 90 tablet, Rfl: 1    lactulose (CHRONULAC) 10 g/15 mL solution, Take 30 mL (20 g total) by mouth 3 (three) times a day, Disp: 473 mL, Rfl: 0    montelukast (SINGULAIR) 10 mg tablet, Take 1 tablet (10 mg total) by mouth daily at bedtime, Disp: 30 tablet, Rfl: 0    pantoprazole (PROTONIX) 20 mg tablet, Take 2 tablets (40 mg total) by mouth daily, Disp: 20 tablet, Rfl: 0    polyethylene glycol (GLYCOLAX) 17 GM/SCOOP powder, Take as directed by GI office for colonoscopy., Disp: 238 g, Rfl: 0    polyethylene glycol (MIRALAX) 17 g packet, Take 17 g by mouth daily, Disp: 14 each, Rfl: 0    sacubitril-valsartan (Entresto) 49-51 MG TABS, Take 1 tablet by mouth 2 (two) times a day, Disp: 180 tablet, Rfl: 1    sucralfate (CARAFATE) 1 g tablet, Take 1 tablet (1 g total) by mouth 4 (four) times a day, Disp: 120 tablet, Rfl: 0    tiotropium (Spiriva Respimat) 2.5 MCG/ACT AERS inhaler, INHALE 2 SPRAY(S) BY MOUTH ONCE DAILY, Disp: 4 g, Rfl: 5    bisacodyl (FLEET) 10 MG/30ML ENEM, Insert 30 mL (10 mg total) into the rectum once for 1 dose, Disp: 30 mL, Rfl: 0    docusate sodium (COLACE) 100 mg capsule, Take 1 capsule (100 mg total) by mouth 2 (two) times a day as needed for constipation (Patient not taking: Reported on 11/18/2024), Disp: 60 capsule, Rfl: 0    Social History     Socioeconomic History    Marital status: /Civil Union     Spouse name: Not on file    Number of children: Not on file    Years of education: Not on file    Highest education level: Not on file   Occupational History    Not on file   Tobacco Use    Smoking status: Former     Current packs/day: 0.00     Average packs/day: 3.0 packs/day for 43.0 years (129.0 ttl pk-yrs)     Types: Cigarettes     Start date: 1975     Quit date:  "2018     Years since quittin.8    Smokeless tobacco: Never   Vaping Use    Vaping status: Never Used   Substance and Sexual Activity    Alcohol use: Yes     Comment: socially    Drug use: Yes     Types: Marijuana     Comment: occasionally edible    Sexual activity: Not Currently   Other Topics Concern    Not on file   Social History Narrative    Caffeine use     Social Drivers of Health     Financial Resource Strain: Low Risk  (3/23/2023)    Overall Financial Resource Strain (CARDIA)     Difficulty of Paying Living Expenses: Not very hard   Food Insecurity: No Food Insecurity (2024)    Nursing - Inadequate Food Risk Classification     Worried About Running Out of Food in the Last Year: Never true     Ran Out of Food in the Last Year: Never true     Ran Out of Food in the Last Year: Not on file   Transportation Needs: No Transportation Needs (2024)    PRAPARE - Transportation     Lack of Transportation (Medical): No     Lack of Transportation (Non-Medical): No   Physical Activity: Not on file   Stress: Not on file   Social Connections: Not on file   Intimate Partner Violence: Not on file   Housing Stability: Low Risk  (2024)    Housing Stability Vital Sign     Unable to Pay for Housing in the Last Year: No     Number of Times Moved in the Last Year: 1     Homeless in the Last Year: No     Family History   Problem Relation Age of Onset    Heart attack Father         MI    Heart disease Father     Diabetes Sister         DM    Arthritis Family     Coronary artery disease Family     Hypertension Family     Tuberculosis Son     Alzheimer's disease Mother        Vitals:  Blood pressure 132/78, pulse 63, height 6' 2\" (1.88 m), weight 133 kg (293 lb 9.6 oz), SpO2 95%.  Body mass index is 37.7 kg/m².  Wt Readings from Last 10 Encounters:   24 133 kg (293 lb 9.6 oz)   24 136 kg (299 lb 13.2 oz)   10/16/24 133 kg (293 lb)   10/08/24 130 kg (286 lb 3.2 oz)   24 122 kg (268 lb 3.2 oz)   24 " "128 kg (283 lb)   07/30/24 121 kg (266 lb)   07/19/24 124 kg (273 lb 12.8 oz)   07/16/24 124 kg (273 lb)   07/16/24 124 kg (273 lb 9.6 oz)     Vitals:    11/18/24 1601   BP: 132/78   BP Location: Left arm   Patient Position: Sitting   Cuff Size: Standard   Pulse: 63   SpO2: 95%   Weight: 133 kg (293 lb 9.6 oz)   Height: 6' 2\" (1.88 m)       Physical Exam  Constitutional:       Appearance: Normal appearance. He is obese.   HENT:      Head: Normocephalic.      Nose: Nose normal.      Mouth/Throat:      Mouth: Mucous membranes are moist.   Eyes:      Conjunctiva/sclera: Conjunctivae normal.   Neck:      Vascular: No JVD.   Cardiovascular:      Rate and Rhythm: Normal rate and regular rhythm.   Pulmonary:      Effort: Pulmonary effort is normal.      Breath sounds: Normal breath sounds.   Musculoskeletal:      Cervical back: Neck supple.      Right lower leg: No edema.      Left lower leg: No edema.   Skin:     General: Skin is warm and dry.   Neurological:      General: No focal deficit present.      Mental Status: He is alert and oriented to person, place, and time.   Psychiatric:         Mood and Affect: Mood normal.         Behavior: Behavior normal.         Labs & Results:  Lab Results   Component Value Date    WBC 11.52 (H) 11/14/2024    HGB 14.0 11/14/2024    HCT 45.0 11/14/2024    MCV 88 11/14/2024     11/14/2024     Lab Results   Component Value Date    SODIUM 138 11/14/2024    K 4.6 11/14/2024     11/14/2024    CO2 27 11/14/2024    BUN 37 (H) 11/14/2024    CREATININE 1.75 (H) 11/14/2024    GLUC 168 (H) 11/14/2024    CALCIUM 9.0 11/14/2024     Lab Results   Component Value Date    INR 1.05 04/27/2024    INR 1.08 09/19/2023    INR 1.04 08/23/2023    PROTIME 13.6 04/27/2024    PROTIME 13.9 09/19/2023    PROTIME 13.7 08/23/2023     Lab Results   Component Value Date    BNP 46 11/14/2024      Lab Results   Component Value Date    NTBNP 333 (H) 12/21/2022        Thank you for the opportunity to " participate in the care of this patient.    Sade Dumont PA-C

## 2024-11-18 NOTE — TELEPHONE ENCOUNTER
11/18/24 11:34 AM    Patient contacted post ED visit, [facility] department spoke with patient/caregiver and outreach was successful.    Thank you.  Leah Donis MA  [facility]

## 2024-11-18 NOTE — PATIENT INSTRUCTIONS
Please weigh yourself every day (after emptying your bladder) and keep a detailed log of weights.   Contact the Heart Failure program at [phone] if you gain 3+ lbs overnight or 5+ lbs in 5-7 days.  Limit daily sodium/salt intake to 2000 mg daily to prevent fluid retention.  Avoid canned foods, fast food/Chinese food, and processed meats (hot dogs, lunch meat, and sausage etc.). Caution with condiments.  Limit fluid intake to 2000 mL or 2 liters (about 60-65 ounces) daily.  Avoid electrolyte replacement drinks (such as Gatorade, Pedialyte, Propel, Liquid IV, etc.).  Bring complete list of medications and log of daily weights to your follow-up appointment.

## 2024-11-26 DIAGNOSIS — I50.42 CHRONIC COMBINED SYSTOLIC AND DIASTOLIC CONGESTIVE HEART FAILURE (HCC): ICD-10-CM

## 2024-11-26 NOTE — TELEPHONE ENCOUNTER
Patient out of medication changing to retail pharmacy     Reason for call:   [x] Refill   [] Prior Auth  [] Other:     Office:   [] PCP/Provider -   [x] Specialty/Provider - Xiomy Sifuentes    Medication: Jardiance    Dose/Frequency: 10 mg Q AM    Quantity: 90    Pharmacy: [facility] [place]     Does the patient have enough for 3 days?   [] Yes   [x] No - Send as HP to POD

## 2024-12-04 ENCOUNTER — TELEPHONE (OUTPATIENT)
Age: 69
End: 2024-12-04

## 2024-12-04 NOTE — TELEPHONE ENCOUNTER
Patient contacted office stating he was disconnected with GI PAT team. He did express he was not aware what prep to do. Made aware as per documentation 2 day miralax/dulcolax prep. Prep sent via email to [email or web address]. Procedure directions were explained at time of phone call. Pt was transferred to GI PAT team to finish phone call.

## 2024-12-09 DIAGNOSIS — J44.89 ASTHMA-COPD OVERLAP SYNDROME (HCC): ICD-10-CM

## 2024-12-09 RX ORDER — TIOTROPIUM BROMIDE INHALATION SPRAY 3.12 UG/1
SPRAY, METERED RESPIRATORY (INHALATION)
Qty: 4 G | Refills: 5 | Status: SHIPPED | OUTPATIENT
Start: 2024-12-09

## 2024-12-10 NOTE — PROGRESS NOTES
Cardiology Follow Up    Cricket Rosales  1955  [id number]  [facility] [address] [address] [phone]    1. Non-ischemic cardiomyopathy with HFmEF (HCC)        2. Benign essential hypertension        3. Obstructive sleep apnea        4. Dyslipidemia        5. Chest pain  Ambulatory Referral to Cardiology          Discussion/Summary:  Chronic heart failure with mid-range ejection fraction:   Takes lasix 40 mg every other day. Examines compensated. Weight gain likely caloric in nature.  I did encourage him to start daily weights again. Resume low sodium diet. Ultimately he needs treatment for his depression. He has the phone number for the [facility] clinic and will make an appointment.      Non-ischemic cardiomyopathy:  Thought to be secondary to alcohol use. LHC in September 2022 did not show any evidence of obstructive CAD.   Last echo 7/28/23: LVEF stable at 45%  GDMT: Carvedilol 25 mg BID, Entresto 49-51 mg BID, Jardiance 10 mg daily.   Continued to reinforce importance of complete alcohol cessation. Patient still has been drinking on occasion.     Hypertension:  Controlled.    Dyslipidemia  Controlled on atorvastatin 20 mg daily      He will RTO in 6 months or sooner if necessary. He will call with any concerns in the interim.    Interval History:   Cricket Rosales is a 69 y.o. male with a non-ischemic cardiomyopathy thought to be secondary to alcohol abuse, chronic combined systolic and diastolic congestive heart failure, hypertension, dyslipidemia, obstructive sleep apnea, and COPD/asthma who presents to the office today for routine follow up.     Had ER visit in November for chest and back pain. Ruled out for ACS. CTA negative for aneurysm/dissection.     Since his last office visit he has been struggling with depression.  He admits that he has not been following a low-sodium diet.  He  "eats takeout many nights during the week including Chinese food and sushi.  He also has not been weighing himself on a regular basis. He overall gained about 30 lbs over the last 6 months.  He just has no motivation.  He denies any suicidal ideation.  He states his shortness of breath \"comes and goes\" and is worse in the cold/damp weather.  He has not really noted an increase in his lower extremity edema.  He chronically sleeps upright in a recliner.  He reports compliance with his diuretics.  He has not had any chest pain in the recent past.  He denies palpitations.  He does note occasional lightheadedness.     He still has been drinking alcohol on occasion and states he has decided to not abstain completely.     He also has been having intermittent rectal bleeding and is scheduled for a colonoscopy next week.     Medical Problems       Problem List       Chronic asthma, moderate persistent, uncomplicated    Obstructive sleep apnea    Overview Signed 12/19/2018 10:16 AM by Lala Jimenez DO     Declines CPAP         Non-ischemic cardiomyopathy with HFmEF ([facility]) (Chronic)    Dyslipidemia    Benign essential hypertension    Anxiety (Chronic)    Asthma-COPD overlap syndrome ([facility])    COPD, severe ([facility])    Overview Signed 12/19/2018 10:16 AM by Lala Jimenez DO    Post bronchodilator FEV1 is 68% predicted, 2017         Hemorrhoids    Thyroid disease    Vitamin D deficiency    Hypertrophied anal papilla    PLMD (periodic limb movement disorder)    Type 2 diabetes mellitus with proteinuria  ([facility])      Lab Results   Component Value Date    HGBA1C 6.7 (H) 10/14/2024         Moderate episode of recurrent major depressive disorder ([facility])    CKD (chronic kidney disease) stage 3, GFR 30-59 ml/min ([facility])    Lab Results   Component Value Date    EGFR 39 11/14/2024    EGFR 33 11/14/2024    EGFR 42 10/14/2024    CREATININE 1.75 (H) 11/14/2024    CREATININE 2.01 (H) 11/14/2024    CREATININE 1.66 (H) 10/14/2024         " Severe persistent asthma without complication    Other diabetic neurological complication associated with type 2 diabetes mellitus ([facility])      Lab Results   Component Value Date    HGBA1C 6.7 (H) 10/14/2024         Pes planus of both feet    Persistent proteinuria        Past Medical History:   Diagnosis Date    Acute on chronic combined systolic and diastolic CHF (congestive heart failure) ([facility]) 2023    Alcohol abuse 2023    Ambulates with cane     Arthritis     Asthma     Back pain     Bunion 2024    Chest pain 2022    CHF (congestive heart failure) ([facility])     Claustrophobia     Constipation 2018    COPD (chronic obstructive pulmonary disease) ([facility])     COPD with acute exacerbation ([facility]) 2022    COVID-19     COVID-19 virus infection 2021    Depression     Hypertension     Mumps     Neck pain     Old MI (myocardial infarction)     Pneumonia     Pulmonary emphysema ([facility])     Rectal bleeding 2019    S/P TKR (total knee replacement), left 2023    Skin abnormalities     rashes and wounds on both legs - scabbed over and healing    Sleep apnea     no CPAP    Tingling of both feet     Wears glasses      Social History     Socioeconomic History    Marital status: /Civil Union     Spouse name: Not on file    Number of children: Not on file    Years of education: Not on file    Highest education level: Not on file   Occupational History    Not on file   Tobacco Use    Smoking status: Former     Current packs/day: 0.00     Average packs/day: 3.0 packs/day for 43.0 years (129.0 ttl pk-yrs)     Types: Cigarettes     Start date:      Quit date: 2018     Years since quittin.9    Smokeless tobacco: Never   Vaping Use    Vaping status: Never Used   Substance and Sexual Activity    Alcohol use: Yes     Comment: socially    Drug use: Yes     Types: Marijuana     Comment: occasionally edible    Sexual activity: Not Currently   Other Topics Concern    Not on file    Social History Narrative    Caffeine use     Social Drivers of Health     Financial Resource Strain: Low Risk  (3/23/2023)    Overall Financial Resource Strain (CARDIA)     Difficulty of Paying Living Expenses: Not very hard   Food Insecurity: No Food Insecurity (7/2/2024)    Nursing - Inadequate Food Risk Classification     Worried About Running Out of Food in the Last Year: Never true     Ran Out of Food in the Last Year: Never true     Ran Out of Food in the Last Year: Not on file   Transportation Needs: No Transportation Needs (7/2/2024)    PRAPARE - Transportation     Lack of Transportation (Medical): No     Lack of Transportation (Non-Medical): No   Physical Activity: Not on file   Stress: Not on file   Social Connections: Not on file   Intimate Partner Violence: Not on file   Housing Stability: Low Risk  (7/2/2024)    Housing Stability Vital Sign     Unable to Pay for Housing in the Last Year: No     Number of Times Moved in the Last Year: 1     Homeless in the Last Year: No      Family History   Problem Relation Age of Onset    Heart attack Father         MI    Heart disease Father     Diabetes Sister         DM    Arthritis Family     Coronary artery disease Family     Hypertension Family     Tuberculosis Son     Alzheimer's disease Mother      Past Surgical History:   Procedure Laterality Date    APPENDECTOMY      CARDIAC CATHETERIZATION  07/24/2015    Left main- normal and maidly tortuous.  Circumflex - normal and moderately tortuous.  RCA- normal and mildy tortuous.  Global LV function was severely depressed..    CARDIAC CATHETERIZATION Left 09/27/2022    Procedure: Cardiac Left Heart Cath;  Surgeon: Doreen Briones DO;  Location: [facility];  Service: Cardiology    CARDIAC CATHETERIZATION N/A 09/27/2022    Procedure: Cardiac Coronary Angiogram;  Surgeon: Doreen Briones DO;  Location: [facility];  Service: Cardiology    CARDIAC CATHETERIZATION  09/27/2022    Procedure: Cardiac  catheterization;  Surgeon: Doreen Briones DO;  Location:  CARDIAC CATH LAB;  Service: Cardiology    INGUINAL HERNIA REPAIR Bilateral     SD COLONOSCOPY FLX DX W/COLLJ SPEC WHEN PFRMD N/A 03/11/2019    Procedure: COLONOSCOPY with removal of anal papilla;  Surgeon: ANIL Dale MD;  Location: [facility];  Service: Colorectal    TONSILLECTOMY      TOTAL KNEE ARTHROPLASTY Left 9/18/2023    Procedure: ARTHROPLASTY KNEE TOTAL;  Surgeon: David Mullen DO;  Location: [facility];  Service: Orthopedics    UMBILICAL HERNIA REPAIR  06/21/2006       Current Outpatient Medications:     Accu-Chek FastClix Lancets MISC, Use 1 each daily to test blood sugar., Disp: 100 each, Rfl: 5    albuterol (PROVENTIL HFA,VENTOLIN HFA) 90 mcg/act inhaler, INHALE 2 PUFFS BY MOUTH EVERY 6 HOURS AS NEEDED FOR WHEEZING, Disp: 18 g, Rfl: 0    ALPRAZolam (XANAX) 0.5 mg tablet, TAKE 1 TABLET BY MOUTH ONCE DAILY AT BEDTIME AS NEEDED FOR ANXIETY, Disp: 30 tablet, Rfl: 0    atorvastatin (LIPITOR) 20 mg tablet, Take 1 tablet (20 mg total) by mouth daily, Disp: 90 tablet, Rfl: 1    bisacodyl (DULCOLAX) 5 mg EC tablet, Take as directed by GI for colonoscopy., Disp: 4 tablet, Rfl: 0    bisacodyl (FLEET) 10 MG/30ML ENEM, Insert 30 mL (10 mg total) into the rectum once for 1 dose, Disp: 30 mL, Rfl: 0    Blood Glucose Monitoring Suppl (Accu-Chek Guide Me) w/Device KIT, Use 1 each daily to test blood sugar., Disp: 1 kit, Rfl: 0    Blood Pressure KIT, Use 3 (three) times a week, Disp: 1 kit, Rfl: 0    carvedilol (COREG) 25 mg tablet, TAKE 1 TABLET BY MOUTH TWICE DAILY WITH MEALS, Disp: 60 tablet, Rfl: 5    Diclofenac Sodium (VOLTAREN) 1 %, Apply 2 g topically 2 (two) times a day, Disp: 100 g, Rfl: 2    docusate sodium (COLACE) 100 mg capsule, Take 1 capsule (100 mg total) by mouth 2 (two) times a day as needed for constipation, Disp: 60 capsule, Rfl: 0    DULoxetine (CYMBALTA) 60 mg delayed release capsule, Take 1 capsule (60 mg total) by mouth  daily, Disp: 90 capsule, Rfl: 3    Empagliflozin (Jardiance) 10 MG TABS tablet, Take 1 tablet (10 mg total) by mouth every morning, Disp: 90 tablet, Rfl: 1    fluticasone (FLONASE) 50 mcg/act nasal spray, Use 1 spray(s) in each nostril twice daily, Disp: 16 g, Rfl: 1    Fluticasone-Salmeterol (Advair) 500-50 mcg/dose inhaler, INHALE 1 DOSE BY MOUTH TWICE DAILY RINSE MOUTH AFTER USE, Disp: 60 blister, Rfl: 5    furosemide (LASIX) 40 mg tablet, TAKE 1 TABLET BY MOUTH EVERY OTHER DAY, Disp: 45 tablet, Rfl: 1    glucose blood test strip, Use 1 each daily to test blood sugar., Disp: 100 strip, Rfl: 5    ipratropium-albuterol (DUO-NEB) 0.5-2.5 mg/3 mL nebulizer solution, Take 3 mL by nebulization 4 (four) times a day, Disp: 360 mL, Rfl: 3    isosorbide mononitrate (IMDUR) 30 mg 24 hr tablet, Take 1 tablet by mouth once daily, Disp: 90 tablet, Rfl: 1    lactulose (CHRONULAC) 10 g/15 mL solution, Take 30 mL (20 g total) by mouth 3 (three) times a day, Disp: 473 mL, Rfl: 0    montelukast (SINGULAIR) 10 mg tablet, Take 1 tablet (10 mg total) by mouth daily at bedtime, Disp: 30 tablet, Rfl: 0    pantoprazole (PROTONIX) 20 mg tablet, Take 2 tablets (40 mg total) by mouth daily, Disp: 20 tablet, Rfl: 0    polyethylene glycol (GLYCOLAX) 17 GM/SCOOP powder, Take as directed by GI office for colonoscopy., Disp: 238 g, Rfl: 0    polyethylene glycol (MIRALAX) 17 g packet, Take 17 g by mouth daily, Disp: 14 each, Rfl: 0    sacubitril-valsartan (Entresto) 49-51 MG TABS, Take 1 tablet by mouth 2 (two) times a day, Disp: 180 tablet, Rfl: 1    sucralfate (CARAFATE) 1 g tablet, Take 1 tablet (1 g total) by mouth 4 (four) times a day, Disp: 120 tablet, Rfl: 0    tiotropium (Spiriva Respimat) 2.5 MCG/ACT AERS inhaler, INHALE 2 SPRAY(S) BY MOUTH ONCE DAILY, Disp: 4 g, Rfl: 5  Allergies   Allergen Reactions    Ciprofloxacin Shortness Of Breath and Diarrhea       Labs:     Chemistry        Component Value Date/Time     07/27/2015 0559     "K 4.6 11/14/2024 1021    K 3.8 07/27/2015 0559     11/14/2024 1021     07/27/2015 0559    CO2 27 11/14/2024 1021    CO2 32 07/27/2015 0559    BUN 37 (H) 11/14/2024 1021    BUN 19 07/27/2015 0559    CREATININE 1.75 (H) 11/14/2024 1021    CREATININE 1.05 07/27/2015 0559        Component Value Date/Time    CALCIUM 9.0 11/14/2024 1021    CALCIUM 8.6 07/27/2015 0559    ALKPHOS 83 11/14/2024 1021    ALKPHOS 75 07/22/2015 1021    AST 13 11/14/2024 1021    AST 30 07/22/2015 1021    ALT 19 11/14/2024 1021    ALT 74 07/22/2015 1021    BILITOT 1.05 (H) 07/22/2015 1021            Lab Results   Component Value Date    CHOL 105 07/23/2015     Lab Results   Component Value Date    HDL 27 (L) 06/24/2024    HDL 27 (L) 09/01/2022    HDL 27 (L) 09/22/2021     Lab Results   Component Value Date    LDLCALC 48 06/24/2024    LDLCALC 49 09/01/2022    LDLCALC 107 (H) 09/22/2021     Lab Results   Component Value Date    TRIG 86 06/24/2024    TRIG 138 09/01/2022    TRIG 131 09/22/2021     No results found for: \"CHOLHDL\"    Imaging: XR abdomen obstruction series  Result Date: 11/17/2024  Narrative: XR ABDOMEN OBSTRUCTION SERIES INDICATION: Constipation. COMPARISON: CTA chest abdomen pelvis 11/14/2024. FINDINGS: Gaseous dilation of small bowel loops in the left mid abdomen measuring up to 4.5 cm in diameter is nonspecific and could be due to focal ileus. Additional scattered gaseous distention of small and large bowel loops. No obstruction on prior CT 11/14/2024. No pneumoperitoneum. No pathologic calcification or soft tissue mass. Phleboliths within the pelvis. Degenerative changes of the lumbar spine with a mild levocurvature. Examination of the chest reveals blunting of the left costophrenic angle secondary to prominent subpleural fat with basilar atelectasis and stable borderline cardiomegaly.     Impression: Gaseous dilation of small bowel loops in the left mid abdomen is nonspecific and could be due to focal ileus. No " obstruction on prior CT 11/14/2024. Follow-up radiographs may be obtained to assess for resolution. Resident: Nicki Michel I, the attending radiologist, have reviewed the images and agree with the final report above. Workstation performed: [id number]     US right upper quadrant  Result Date: 11/14/2024  Narrative: RIGHT UPPER QUADRANT ULTRASOUND INDICATION: epigastric pain, back pain, nausea. COMPARISON: No prior right upper quadrant ultrasound. Correlation with CT abdomen/pelvis performed earlier the same day TECHNIQUE: Real-time ultrasound of the right upper quadrant was performed with a curvilinear transducer with both volumetric sweeps and still imaging techniques. FINDINGS: PANCREAS: Visualized portions of the pancreas are within normal limits. AORTA AND IVC: Visualized portions are normal for patient age. LIVER: Size:  The liver measures 18.7 cm in the midclavicular line. Contour: Surface contour is smooth. Parenchyma: Echogenicity and echotexture are within normal limits. No liver mass identified. Limited imaging of the main portal vein shows it to be patent and hepatopetal. BILIARY: The gallbladder is normal in caliber. No wall thickening or pericholecystic fluid. Shadowing gallstone(s) identified. No sonographic Hernandez's sign. No intrahepatic biliary dilatation. CBD measures 5.0 mm. No choledocholithiasis. KIDNEY: Right kidney measures 12.6 x 4.5 x 5.4 cm. Volume 160.4 mL Kidney within normal limits. ASCITES: None.     Impression: Cholelithiasis without acute cholecystitis. Workstation performed: [id number]     CTA dissection protocol chest/abdomen/pelvis  Result Date: 11/14/2024  Narrative: CTA - CHEST, ABDOMEN AND PELVIS - WITHOUT AND WITH IV CONTRAST INDICATION: Evaluating for dissection, CP radiating into back. COMPARISON: CT of the chest on September 10, 2024. CT of the abdomen pelvis on June 23, 2024. TECHNIQUE: CT examination of the chest, abdomen and pelvis was performed both prior to and after the  administration of intravenous contrast. The noncontrast portion of this examination was performed utilizing low radiation dose technique.  Thin section angiographic arterial phase post contrast technique was used in order to evaluate for aortic dissection. 3D reformatted images and volume rendering were performed on an independent workstation. Multiplanar 2D reformatted images were created from the source data. Radiation dose length product (DLP) for this visit: 2393 mGy-cm . This examination, like all CT scans performed in the [facility], was performed utilizing techniques to minimize radiation dose exposure, including the use of iterative reconstruction and automated exposure control. IV Contrast: 85 mL of iohexol (OMNIPAQUE) Enteric Contrast: Not administered. FINDINGS: AORTA: No aortic dissection or intramural hematoma. No aortic aneurysm. Mild atherosclerotic disease. Moderate narrowing of the origin of the celiac artery and right renal artery. Otherwise, no flow limiting atherosclerotic stenosis of aorta or major aortic branch vessel in the chest, abdomen or pelvis. CHEST LUNGS: No focal consolidation.. No tracheal or endobronchial lesion. PLEURA: Unremarkable. HEART/PULMONARY ARTERIAL TREE: Coronary artery calcifications. Cardiomegaly. No pericardial effusion. MEDIASTINUM AND KEATON: Unremarkable. CHEST WALL AND LOWER NECK: Unremarkable. ABDOMEN LIVER/BILIARY TREE: Unremarkable. GALLBLADDER: No calcified gallstones. No pericholecystic inflammatory change. SPLEEN: Unremarkable. PANCREAS: Unremarkable. ADRENAL GLANDS: Unremarkable. KIDNEYS/URETERS: Incompletely rotated right kidney. No hydronephrosis. STOMACH AND BOWEL: No bowel obstruction. Colonic diverticulosis without evidence of diverticulitis. APPENDIX: No findings to suggest appendicitis. ABDOMINOPELVIC CAVITY: No ascites. No pneumoperitoneum. No lymphadenopathy. PELVIS REPRODUCTIVE ORGANS: Unremarkable for patient's age. URINARY BLADDER:  "Unremarkable. ABDOMINAL WALL/INGUINAL REGIONS: Small fat-containing right inguinal hernia. BONES: No acute fracture or suspicious osseous lesion.     Impression: No evidence of aortic dissection or aneurysm. Workstation performed: [id number]       ECG:      Review of Systems   All other systems reviewed and are negative.      Vitals:    12/11/24 1218   BP: 130/80   Pulse: 72   SpO2: 95%     Vitals:    12/11/24 1218   Weight: 134 kg (296 lb)     Height: 6' 2\" (188 cm)   Body mass index is 38 kg/m².    Physical Exam:  Physical Exam  Vitals reviewed.   Constitutional:       General: He is not in acute distress.     Appearance: He is well-developed. He is obese. He is not diaphoretic.   HENT:      Head: Normocephalic and atraumatic.   Eyes:      Pupils: Pupils are equal, round, and reactive to light.   Neck:      Vascular: No carotid bruit.   Cardiovascular:      Rate and Rhythm: Normal rate and regular rhythm.      Pulses:           Radial pulses are 2+ on the right side and 2+ on the left side.      Heart sounds: S1 normal and S2 normal. No murmur heard.  Pulmonary:      Effort: Pulmonary effort is normal. No respiratory distress.      Breath sounds: Wheezing (slight expiratory wheezing noted) present. No rales.   Abdominal:      General: There is no distension.      Palpations: Abdomen is soft.      Tenderness: There is no abdominal tenderness.   Musculoskeletal:         General: Normal range of motion.      Cervical back: Normal range of motion.      Right lower leg: Edema (trace edema bilaterally) present.      Left lower leg: Edema present.   Skin:     General: Skin is warm and dry.      Findings: No erythema.   Neurological:      General: No focal deficit present.      Mental Status: He is alert and oriented to person, place, and time.      Gait: Gait normal.   Psychiatric:         Mood and Affect: Mood normal.         Behavior: Behavior normal.         "

## 2024-12-11 ENCOUNTER — OFFICE VISIT (OUTPATIENT)
Dept: CARDIOLOGY CLINIC | Facility: HOSPITAL | Age: 69
End: 2024-12-11
Payer: MEDICARE

## 2024-12-11 VITALS
HEART RATE: 72 BPM | OXYGEN SATURATION: 95 % | HEIGHT: 74 IN | WEIGHT: 296 LBS | BODY MASS INDEX: 37.99 KG/M2 | DIASTOLIC BLOOD PRESSURE: 80 MMHG | SYSTOLIC BLOOD PRESSURE: 130 MMHG

## 2024-12-11 DIAGNOSIS — E78.5 DYSLIPIDEMIA: ICD-10-CM

## 2024-12-11 DIAGNOSIS — G47.33 OBSTRUCTIVE SLEEP APNEA: ICD-10-CM

## 2024-12-11 DIAGNOSIS — R07.9 CHEST PAIN: ICD-10-CM

## 2024-12-11 DIAGNOSIS — I10 BENIGN ESSENTIAL HYPERTENSION: ICD-10-CM

## 2024-12-11 DIAGNOSIS — I42.8 NON-ISCHEMIC CARDIOMYOPATHY (HCC): Primary | Chronic | ICD-10-CM

## 2024-12-11 PROCEDURE — 99214 OFFICE O/P EST MOD 30 MIN: CPT | Performed by: PHYSICIAN ASSISTANT

## 2024-12-16 ENCOUNTER — ANESTHESIA EVENT (OUTPATIENT)
Dept: ANESTHESIOLOGY | Facility: HOSPITAL | Age: 69
End: 2024-12-16

## 2024-12-16 ENCOUNTER — ANESTHESIA (OUTPATIENT)
Dept: ANESTHESIOLOGY | Facility: HOSPITAL | Age: 69
End: 2024-12-16

## 2024-12-16 ENCOUNTER — TELEPHONE (OUTPATIENT)
Dept: SURGERY | Facility: HOSPITAL | Age: 69
End: 2024-12-16

## 2024-12-16 NOTE — ANESTHESIA PREPROCEDURE EVALUATION
Procedure:  PRE-OP ONLY    Relevant Problems   CARDIO   (+) Benign essential hypertension   (+) Hemorrhoids      ENDO   (+) Type 2 diabetes mellitus with proteinuria  (HCC)      /RENAL   (+) CKD (chronic kidney disease) stage 3, GFR 30-59 ml/min (HCC)      NEURO/PSYCH   (+) Anxiety   (+) Moderate episode of recurrent major depressive disorder (HCC)      PULMONARY   (+) Asthma-COPD overlap syndrome (HCC)   (+) COPD, severe (HCC)   (+) Chronic asthma, moderate persistent, uncomplicated   (+) Obstructive sleep apnea   (+) Severe persistent asthma without complication      Non-ischemic cardiomyopathy:  Thought to be secondary to alcohol use. LHC in September 2022 did not show any evidence of obstructive CAD.   Last echo 7/28/23: LVEF stable at 45%    Sinus bradycardia with 1st degree A-V block  Left axis deviation  Low voltage QRS  Cannot rule out Anterior infarct (cited on or before 14-Nov-2024)  Abnormal ECG       Physical Exam    Airway    Mallampati score: II  TM Distance: >3 FB  Neck ROM: full     Dental   No notable dental hx     Cardiovascular  Cardiovascular exam normal    Pulmonary  Pulmonary exam normal     Other Findings        Anesthesia Plan  ASA Score- 3     Anesthesia Type- IV sedation with anesthesia with ASA Monitors.         Additional Monitors:     Airway Plan:            Plan Factors-Exercise tolerance (METS): >4 METS.    Chart reviewed. EKG reviewed. Imaging results reviewed. Existing labs reviewed. Patient summary reviewed.    Patient is not a current smoker.      Obstructive sleep apnea risk education given perioperatively.        Induction- intravenous.    Postoperative Plan-     Perioperative Resuscitation Plan - Level 1 - Full Code.       Informed Consent-

## 2024-12-17 ENCOUNTER — ANESTHESIA (OUTPATIENT)
Dept: PERIOP | Facility: HOSPITAL | Age: 69
End: 2024-12-17
Payer: MEDICARE

## 2024-12-17 ENCOUNTER — ANESTHESIA EVENT (OUTPATIENT)
Dept: PERIOP | Facility: HOSPITAL | Age: 69
End: 2024-12-17
Payer: MEDICARE

## 2024-12-17 ENCOUNTER — HOSPITAL ENCOUNTER (OUTPATIENT)
Dept: PERIOP | Facility: HOSPITAL | Age: 69
Setting detail: OUTPATIENT SURGERY
Discharge: HOME | End: 2024-12-17
Attending: INTERNAL MEDICINE
Payer: MEDICARE

## 2024-12-17 VITALS
TEMPERATURE: 98.1 F | OXYGEN SATURATION: 95 % | WEIGHT: 288 LBS | RESPIRATION RATE: 22 BRPM | SYSTOLIC BLOOD PRESSURE: 139 MMHG | HEART RATE: 62 BPM | DIASTOLIC BLOOD PRESSURE: 68 MMHG | BODY MASS INDEX: 36.96 KG/M2 | HEIGHT: 74 IN

## 2024-12-17 DIAGNOSIS — Z83.719 FAMILY HISTORY OF COLONIC POLYPS: ICD-10-CM

## 2024-12-17 LAB — GLUCOSE SERPL-MCNC: 131 MG/DL (ref 65–140)

## 2024-12-17 PROCEDURE — 88305 TISSUE EXAM BY PATHOLOGIST: CPT | Performed by: PATHOLOGY

## 2024-12-17 PROCEDURE — 82948 REAGENT STRIP/BLOOD GLUCOSE: CPT

## 2024-12-17 PROCEDURE — 45385 COLONOSCOPY W/LESION REMOVAL: CPT | Performed by: INTERNAL MEDICINE

## 2024-12-17 RX ORDER — PROPOFOL 10 MG/ML
INJECTION, EMULSION INTRAVENOUS CONTINUOUS PRN
Status: DISCONTINUED | OUTPATIENT
Start: 2024-12-17 | End: 2024-12-17

## 2024-12-17 RX ORDER — SODIUM CHLORIDE, SODIUM LACTATE, POTASSIUM CHLORIDE, CALCIUM CHLORIDE 600; 310; 30; 20 MG/100ML; MG/100ML; MG/100ML; MG/100ML
INJECTION, SOLUTION INTRAVENOUS CONTINUOUS PRN
Status: DISCONTINUED | OUTPATIENT
Start: 2024-12-17 | End: 2024-12-17

## 2024-12-17 RX ORDER — PROPOFOL 10 MG/ML
INJECTION, EMULSION INTRAVENOUS AS NEEDED
Status: DISCONTINUED | OUTPATIENT
Start: 2024-12-17 | End: 2024-12-17

## 2024-12-17 RX ADMIN — PROPOFOL 150 MG: 10 INJECTION, EMULSION INTRAVENOUS at 12:58

## 2024-12-17 RX ADMIN — PROPOFOL 100 MCG/KG/MIN: 10 INJECTION, EMULSION INTRAVENOUS at 12:58

## 2024-12-17 RX ADMIN — SODIUM CHLORIDE, SODIUM LACTATE, POTASSIUM CHLORIDE, AND CALCIUM CHLORIDE: .6; .31; .03; .02 INJECTION, SOLUTION INTRAVENOUS at 12:39

## 2024-12-17 NOTE — H&P
History and Physical -  [facility]  Cricket Rosales 69 y.o. male MRN: [id number]                  HPI: Cricket Rosales is a 69 y.o. year old male who presents for personal history of colon polyps      REVIEW OF SYSTEMS: Per the HPI, and otherwise unremarkable.    Historical Information   Past Medical History:   Diagnosis Date    Acute on chronic combined systolic and diastolic CHF (congestive heart failure) ([facility]) 07/27/2023    Alcohol abuse 07/27/2023    Ambulates with cane     Arthritis     Asthma     Back pain     Bunion 04/02/2024    Chest pain 12/22/2022    CHF (congestive heart failure) ([facility])     Claustrophobia     Constipation 12/18/2018    COPD (chronic obstructive pulmonary disease) ([facility])     COPD with acute exacerbation ([facility]) 05/06/2022    COVID-19     COVID-19 virus infection 12/03/2021    Depression     Hypertension     Mumps     Neck pain     Old MI (myocardial infarction)     Pneumonia     Pulmonary emphysema ([facility])     Rectal bleeding 01/14/2019    S/P TKR (total knee replacement), left 11/02/2023    Skin abnormalities     rashes and wounds on both legs - scabbed over and healing    Sleep apnea     no CPAP    Tingling of both feet     Wears glasses      Past Surgical History:   Procedure Laterality Date    APPENDECTOMY      CARDIAC CATHETERIZATION  07/24/2015    Left main- normal and maidly tortuous.  Circumflex - normal and moderately tortuous.  RCA- normal and mildy tortuous.  Global LV function was severely depressed..    CARDIAC CATHETERIZATION Left 09/27/2022    Procedure: Cardiac Left Heart Cath;  Surgeon: Doreen Briones DO;  Location: [facility];  Service: Cardiology    CARDIAC CATHETERIZATION N/A 09/27/2022    Procedure: Cardiac Coronary Angiogram;  Surgeon: Doreen Briones DO;  Location: [facility];  Service: Cardiology    CARDIAC CATHETERIZATION  09/27/2022    Procedure: Cardiac catheterization;  Surgeon: Doreen Briones DO;  Location: [facility]  CARDIAC [facility];  Service: Cardiology    INGUINAL HERNIA REPAIR Bilateral     CA COLONOSCOPY FLX DX W/COLLJ SPEC WHEN PFRMD N/A 2019    Procedure: COLONOSCOPY with removal of anal papilla;  Surgeon: ANIL Dale MD;  Location: [facility];  Service: Colorectal    TONSILLECTOMY      TOTAL KNEE ARTHROPLASTY Left 2023    Procedure: ARTHROPLASTY KNEE TOTAL;  Surgeon: David Mullen DO;  Location: [facility];  Service: Orthopedics    UMBILICAL HERNIA REPAIR  2006     Social History   Social History     Substance and Sexual Activity   Alcohol Use Yes    Comment: socially     Social History     Substance and Sexual Activity   Drug Use Yes    Types: Marijuana    Comment: occasionally edible     Social History     Tobacco Use   Smoking Status Former    Current packs/day: 0.00    Average packs/day: 3.0 packs/day for 43.0 years (129.0 ttl pk-yrs)    Types: Cigarettes    Start date:     Quit date:     Years since quittin.9   Smokeless Tobacco Never     Family History   Problem Relation Age of Onset    Heart attack Father         MI    Heart disease Father     Diabetes Sister         DM    Arthritis Family     Coronary artery disease Family     Hypertension Family     Tuberculosis Son     Alzheimer's disease Mother        Meds/Allergies     Not in a hospital admission.    Allergies   Allergen Reactions    Ciprofloxacin Shortness Of Breath and Diarrhea       Objective     There were no vitals taken for this visit.      PHYSICAL EXAMINATION:    General Appearance:   Alert, cooperative, no distress   HEENT:  Normocephalic, atraumatic, anicteric. Neck supple, symmetrical, trachea midline.   Lungs:   Equal chest rise and unlabored breathing, normal effort, no coughing.   Cardiovascular:   No visualized JVD.   Abdomen:   No abdominal distension.   Skin:   No jaundice, rashes, or lesions.    Musculoskeletal:   Normal range of motion visualized.   Psych:  Normal affect and normal insight.   Neuro:   Alert and appropriate.           ASSESSMENT/PLAN:  This is a 69 y.o. year old male here for colonoscopy, and he is stable and optimized for his procedure.

## 2024-12-17 NOTE — ANESTHESIA PREPROCEDURE EVALUATION
Procedure:  COLONOSCOPY    Relevant Problems   CARDIO   (+) Benign essential hypertension   (+) Hemorrhoids      ENDO   (+) Type 2 diabetes mellitus with proteinuria  (HCC)      /RENAL   (+) CKD (chronic kidney disease) stage 3, GFR 30-59 ml/min (HCC)      NEURO/PSYCH   (+) Anxiety   (+) Moderate episode of recurrent major depressive disorder (HCC)      PULMONARY   (+) Asthma-COPD overlap syndrome (HCC)   (+) COPD, severe (HCC)   (+) Chronic asthma, moderate persistent, uncomplicated   (+) Obstructive sleep apnea   (+) Severe persistent asthma without complication      Non-ischemic cardiomyopathy:  Thought to be secondary to alcohol use. LHC in September 2022 did not show any evidence of obstructive CAD.   Last echo 7/28/23: LVEF stable at 45%    Sinus bradycardia with 1st degree A-V block  Left axis deviation  Low voltage QRS  Cannot rule out Anterior infarct (cited on or before 14-Nov-2024)  Abnormal ECG       Physical Exam    Airway    Mallampati score: II  TM Distance: >3 FB  Neck ROM: full     Dental   No notable dental hx     Cardiovascular  Cardiovascular exam normal    Pulmonary  Pulmonary exam normal     Other Findings        Anesthesia Plan  ASA Score- 3     Anesthesia Type- IV sedation with anesthesia with ASA Monitors.         Additional Monitors:     Airway Plan:            Plan Factors-Exercise tolerance (METS): >4 METS.    Chart reviewed. EKG reviewed. Imaging results reviewed. Existing labs reviewed. Patient summary reviewed.    Patient is not a current smoker.      Obstructive sleep apnea risk education given perioperatively.        Induction- intravenous.    Postoperative Plan-     Perioperative Resuscitation Plan - Level 1 - Full Code.       Informed Consent- Anesthetic plan and risks discussed with patient.  I personally reviewed this patient with the CRNA. Discussed and agreed on the Anesthesia Plan with the CRNA..

## 2024-12-17 NOTE — ANESTHESIA POSTPROCEDURE EVALUATION
Post-Op Assessment Note    CV Status:  Stable  Pain Score: 0    Pain management: adequate       Mental Status:  Alert and awake   Hydration Status:  Euvolemic   PONV Controlled:  Controlled   Airway Patency:  Patent     Post Op Vitals Reviewed: Yes    No anethesia notable event occurred.    Staff: CRNA           Last Filed PACU Vitals:  Vitals Value Taken Time   Temp     Pulse 76    /76    Resp 16    SpO2 98        Modified [place]:  No data recorded

## 2024-12-20 ENCOUNTER — OFFICE VISIT (OUTPATIENT)
Dept: GASTROENTEROLOGY | Facility: CLINIC | Age: 69
End: 2024-12-20
Payer: MEDICARE

## 2024-12-20 VITALS
DIASTOLIC BLOOD PRESSURE: 79 MMHG | WEIGHT: 293 LBS | HEART RATE: 76 BPM | SYSTOLIC BLOOD PRESSURE: 136 MMHG | BODY MASS INDEX: 37.6 KG/M2 | HEIGHT: 74 IN | TEMPERATURE: 98 F | OXYGEN SATURATION: 94 %

## 2024-12-20 DIAGNOSIS — K59.09 INTERMITTENT CONSTIPATION: ICD-10-CM

## 2024-12-20 DIAGNOSIS — Z86.0100 PERSONAL HISTORY OF COLON POLYPS, UNSPECIFIED: ICD-10-CM

## 2024-12-20 DIAGNOSIS — R07.89 ATYPICAL CHEST PAIN: Primary | ICD-10-CM

## 2024-12-20 DIAGNOSIS — R10.13 DYSPEPSIA: ICD-10-CM

## 2024-12-20 DIAGNOSIS — K80.20 CALCULUS OF GALLBLADDER WITHOUT CHOLECYSTITIS WITHOUT OBSTRUCTION: ICD-10-CM

## 2024-12-20 PROCEDURE — 88305 TISSUE EXAM BY PATHOLOGIST: CPT | Performed by: PATHOLOGY

## 2024-12-20 PROCEDURE — 99214 OFFICE O/P EST MOD 30 MIN: CPT | Performed by: PHYSICIAN ASSISTANT

## 2024-12-20 RX ORDER — SODIUM CHLORIDE, SODIUM LACTATE, POTASSIUM CHLORIDE, CALCIUM CHLORIDE 600; 310; 30; 20 MG/100ML; MG/100ML; MG/100ML; MG/100ML
125 INJECTION, SOLUTION INTRAVENOUS CONTINUOUS
OUTPATIENT
Start: 2024-12-20

## 2024-12-20 NOTE — PATIENT INSTRUCTIONS
Unclear origin of the upper GI/atypical chest pain.  May be esophageal or stomach in nature.  We will perform an upper endoscopy when you are due for the colonoscopy.  You also have gallstones, something to keep in mind.  In general a low-fat diet would be recommended.    For your bowels, you can take Benefiber powder every single day to help promote regulation of stool output.  MiraLAX is also incredibly safe to add into this regimen and take 1-2 capfuls in 8 ounces of liquid every day.  You can be on this regimen when chronically it is typically safe for your kidneys, liver, and GI tract.

## 2024-12-20 NOTE — PROGRESS NOTES
[facility] - Outpatient Follow-up Note  Cricket Rosales 69 y.o. male MRN: [id number]  Encounter: [id number]    ASSESSMENT AND PLAN:      1. Atypical chest pain (Primary)  2. Dyspepsia    Pt with hx of cardiac dz has been dealing with waxing/waning upper abd/chest discomfort.   ER evaluation in 11/2024 with negative ACS w/u.   3D imaging and US with gallstones. LFTs wnl.   Symptoms resolved and he was started on PPI.   Ddx includes GI pathology such as esophagitis, esophageal spasm, biliary colic etc. Cardiac disease appears stable, he recently saw Cards in 12/2024.   We will plan to pursue EGD at time of his repeat colonoscopy to evaluate for intraluminal pathology that may be cause of symptoms.   In the interim, recommend small frequent meals, avoidance of NSAIDs, tobacco, caffeine, etoh, etc. Avoid fatty greasy foods.    I discussed informed consent with the patient. The risks/benefits/alternatives of the procedure were discussed with the patient. Risks included, but not limited to, infection, bleeding, perforation, injury to organs in the abdomen, missed lesion and incomplete procedure were discussed. Patient was agreeable.    If w/u is negative, consider manometry testing if esophageal dysmotility component is suspected.     - EGD; Future    3. Calculus of gallbladder without cholecystitis without obstruction    Incidental finding on US from 11/2024.   Unclear if pt's atypical chest pain may be presentation of symptomatic gallstones.   We will continue to monitor at this time, if EGD unremarkable and symptoms recur, consider general surgery evaluation.     4. Personal history of colon polyps, unspecified    Noted, pt had recent colonoscopy attempt in 12/2024 with several adenomas removed and sub-optimal prep.   Recall in 6 months per endoscopist.   Will attempt two day Golytely prep, which was reviewed with pt today.     - Colonoscopy; Future  - polyethylene glycol (GOLYTELY) 4000 mL  solution; Take 4,000 mL by mouth once for 1 dose  Dispense: 4000 mL; Refill: 0    5. Intermittent constipation    Noted historically.   Recommend pt initiate fiber powder supplementation now with excellent hydration.   If needed, initiate daily miralax 17g/8 ounces of liquid daily, can titrate to BID if needed.   If constipation persists, pt should contact clinic for additional recommendations as he may benefit from a prescription cathartic.     - Ambulatory Referral to Gastroenterology  - EGD; Future  Repeat colonoscopy in 6 months, suboptimal bweol prep     We will follow up after pt has repeat colonoscopy as well as EGD in 6 months.   ______________________________________________________________________    SUBJECTIVE: Patient is a 69 y.o. male who presents today for follow-up regarding colonoscopy/gastritis. Pmhx sig for nonischemic cardiomyopathy, HTN, COPD, CORDELL, DM2, CKD 3, MDD, HLD, BMI 38.    Pt is new to clinic. Was recently evaluated for a colonoscopy. Was evaluated in ER in 11/2024 for chest pain, ACS w/u was negative. Was started on PPI as there was concern this could be gastritis. Pt is not really having any heartburn/reflux symptoms. Gets intermittent sharp pain/pressure in the chest. Radiates up through neck and in through ears. No diaphoresis. No nausea, emesis, no dysphagia or odynphagia. Follows with Cardiology and recently saw them in 12/2024 with stability of cardiac dz documented, LHC in 10/2022 with no evidence of obstructive dz.     Recent issue with constipation that is now improved. Pt is having formed brown stools, but frequency of stools depending on dietary intake. At times dealing with small volume stooling,  Pt denies BRBPR or melena. No nocturnal BM. No abd pain or rectal pain related to defecation.     NSAIDs: none  Etoh: none  Tobacco: none    11/2024: CTA C/A/P: wnl  11/2024: RUQ US: Cholelithiasis without acute cholecystitis.   11/2024: Hb 14.0, MCV 88, Plt 247, BUN 37, Cr 1.75,  AST 13, ALT 19, ALP 83, t bili 0.55, t bili 0.55, lipase 35      Endoscopic history:   EGD:   Colon: 12/2024: Five polyps measuring smaller than 5 mm in the transverse colon, descending colon and sigmoid colon; Pan-colonic diverticula of moderate severity   A. Large Intestine, Transverse Colon, cold snare of polyp x 3: Tubular adenoma; Negative for high grade dysplasia/ carcinoma.  B. Large Intestine, Left/Descending Colon, cold snare of polyp x 1: Tubular adenoma. Negative for high grade dysplasia/ carcinoma.  C. Large Intestine, Sigmoid Colon, cold snare of polyp x 1: Tubular adenoma; Negative for high grade dysplasia/ carcinoma.  Colon: 07/2024: Pancolonic diverticula     Review of Systems   Otherwise Per HPI    Historical Information   Past Medical History:   Diagnosis Date    Acute on chronic combined systolic and diastolic CHF (congestive heart failure) ([facility]) 07/27/2023    Alcohol abuse 07/27/2023    Ambulates with cane     Arthritis     Asthma     Back pain     Bunion 04/02/2024    Chest pain 12/22/2022    CHF (congestive heart failure) ([facility])     Claustrophobia     Constipation 12/18/2018    COPD (chronic obstructive pulmonary disease) ([facility])     COPD with acute exacerbation ([facility]) 05/06/2022    COVID-19     COVID-19 virus infection 12/03/2021    Depression     Hypertension     Mumps     Neck pain     Old MI (myocardial infarction)     Pneumonia     Pulmonary emphysema ([facility])     Rectal bleeding 01/14/2019    S/P TKR (total knee replacement), left 11/02/2023    Skin abnormalities     rashes and wounds on both legs - scabbed over and healing    Sleep apnea     no CPAP    Tingling of both feet     Wears glasses      Past Surgical History:   Procedure Laterality Date    APPENDECTOMY      CARDIAC CATHETERIZATION  07/24/2015    Left main- normal and maidly tortuous.  Circumflex - normal and moderately tortuous.  RCA- normal and mildy tortuous.  Global LV function was severely depressed..    CARDIAC CATHETERIZATION  Left 2022    Procedure: Cardiac Left Heart Cath;  Surgeon: Doreen Briones DO;  Location: [facility];  Service: Cardiology    CARDIAC CATHETERIZATION N/A 2022    Procedure: Cardiac Coronary Angiogram;  Surgeon: Doreen Briones DO;  Location: [facility];  Service: Cardiology    CARDIAC CATHETERIZATION  2022    Procedure: Cardiac catheterization;  Surgeon: Doreen Briones DO;  Location: [facility];  Service: Cardiology    INGUINAL HERNIA REPAIR Bilateral     VT COLONOSCOPY FLX DX W/COLLJ SPEC WHEN PFRMD N/A 2019    Procedure: COLONOSCOPY with removal of anal papilla;  Surgeon: ANIL Dale MD;  Location: [facility];  Service: Colorectal    TONSILLECTOMY      TOTAL KNEE ARTHROPLASTY Left 2023    Procedure: ARTHROPLASTY KNEE TOTAL;  Surgeon: David Mullen DO;  Location: [facility];  Service: Orthopedics    UMBILICAL HERNIA REPAIR  2006     Social History   Social History     Substance and Sexual Activity   Alcohol Use Yes    Comment: socially     Social History     Substance and Sexual Activity   Drug Use Yes    Types: Marijuana    Comment: occasionally edible     Social History     Tobacco Use   Smoking Status Former    Current packs/day: 0.00    Average packs/day: 3.0 packs/day for 43.0 years (129.0 ttl pk-yrs)    Types: Cigarettes    Start date:     Quit date:     Years since quittin.9   Smokeless Tobacco Never     Family History   Problem Relation Age of Onset    Heart attack Father         MI    Heart disease Father     Diabetes Sister         DM    Arthritis Family     Coronary artery disease Family     Hypertension Family     Tuberculosis Son     Alzheimer's disease Mother      Meds/Allergies       Current Outpatient Medications:     Accu-Chek FastClix Lancets MISC    albuterol (PROVENTIL HFA,VENTOLIN HFA) 90 mcg/act inhaler    ALPRAZolam (XANAX) 0.5 mg tablet    atorvastatin (LIPITOR) 20 mg tablet    bisacodyl (DULCOLAX) 5  "mg EC tablet    Blood Glucose Monitoring Suppl (Accu-Chek Guide Me) w/Device KIT    Blood Pressure KIT    carvedilol (COREG) 25 mg tablet    Diclofenac Sodium (VOLTAREN) 1 %    docusate sodium (COLACE) 100 mg capsule    DULoxetine (CYMBALTA) 60 mg delayed release capsule    Empagliflozin (Jardiance) 10 MG TABS tablet    fluticasone (FLONASE) 50 mcg/act nasal spray    Fluticasone-Salmeterol (Advair) 500-50 mcg/dose inhaler    furosemide (LASIX) 40 mg tablet    glucose blood test strip    ipratropium-albuterol (DUO-NEB) 0.5-2.5 mg/3 mL nebulizer solution    isosorbide mononitrate (IMDUR) 30 mg 24 hr tablet    montelukast (SINGULAIR) 10 mg tablet    pantoprazole (PROTONIX) 20 mg tablet    polyethylene glycol (GLYCOLAX) 17 GM/SCOOP powder    polyethylene glycol (GOLYTELY) 4000 mL solution    polyethylene glycol (MIRALAX) 17 g packet    sacubitril-valsartan (Entresto) 49-51 MG TABS    tiotropium (Spiriva Respimat) 2.5 MCG/ACT AERS inhaler    bisacodyl (FLEET) 10 MG/30ML ENEM    Allergies   Allergen Reactions    Ciprofloxacin Shortness Of Breath and Diarrhea     Objective     Blood pressure 136/79, pulse 76, temperature 98 °F (36.7 °C), temperature source Temporal, height 6' 2\" (1.88 m), weight 133 kg (293 lb), SpO2 94%. Body mass index is 37.62 kg/m².    Physical Exam  Vitals and nursing note reviewed.   Constitutional:       General: He is not in acute distress.     Appearance: He is well-developed. He is obese.   HENT:      Head: Normocephalic and atraumatic.   Eyes:      General: No scleral icterus.     Conjunctiva/sclera: Conjunctivae normal.   Cardiovascular:      Rate and Rhythm: Normal rate.   Pulmonary:      Effort: Pulmonary effort is normal. No respiratory distress.   Abdominal:      General: Bowel sounds are normal. There is no distension.      Palpations: Abdomen is soft.      Tenderness: There is no abdominal tenderness. There is no guarding or rebound.   Skin:     General: Skin is warm and dry.      " Coloration: Skin is not jaundiced.   Neurological:      General: No focal deficit present.      Mental Status: He is alert. Mental status is at baseline.   Psychiatric:         Mood and Affect: Mood normal.         Behavior: Behavior normal.       Lab Results:   No visits with results within 1 Day(s) from this visit.   Latest known visit with results is:   Hospital Outpatient Visit on 12/17/2024   Component Date Value    POC Glucose 12/17/2024 131     Case Report 12/17/2024                      Value:Surgical Pathology Report                         Case: [id number]                                  Authorizing Provider:  Serafin Ernandez MD    Collected:           12/17/2024 1314              Ordering Location:     [facility] Received:            12/17/2024 1411                                     Operating Room                                                               Pathologist:           Mishel Pineda MD                                                    Specimens:   A) - Large Intestine, Transverse Colon, cold snare of polyp x 3                                     B) - Large Intestine, Left/Descending Colon, cold snare of polyp x 1                                C) - Large Intestine, Sigmoid Colon, cold snare of polyp x 1                               Final Diagnosis 12/17/2024                      Value:A. Large Intestine, Transverse Colon, cold snare of polyp x 3:  - Tubular adenoma.  - Negative for high grade dysplasia/ carcinoma.    B. Large Intestine, Left/Descending Colon, cold snare of polyp x 1:  - Tubular adenoma.  - Negative for high grade dysplasia/ carcinoma.    C. Large Intestine, Sigmoid Colon, cold snare of polyp x 1:  - Tubular adenoma.  - Negative for high grade dysplasia/ carcinoma.      Additional Information 12/17/2024                      Value:All reported additional testing was performed with appropriately reactive controls.  These tests were developed and  "their performance characteristics determined by [facility] or appropriate performing facility, though some tests may be performed on tissues which have not been validated for performance characteristics (such as staining performed on alcohol exposed cell blocks and decalcified tissues).  Results should be interpreted with caution and in the context of the patients’ clinical condition. These tests may not be cleared or approved by the U.S. Food and Drug Administration, though the FDA has determined that such clearance or approval is not necessary. These tests are used for clinical purposes and they should not be regarded as investigational or for research. This laboratory has been approved by CLIA 88, designated as a high-complexity laboratory and is qualified to perform these tests.    Interpretation performed at [facility], [address] [place] [id number]      Synoptic Checklist 12/17/2024                      Value:                            COLON/RECTUM POLYP FORM - GI - All Specimens                                                                                     :    Adenoma(s)      Gross Description 12/17/2024                      Value:A. The specimen is received in formalin, labeled with the patient's name and hospital number, and is designated \" transverse colon polyp x 3\".  The specimen consists of multiple tan soft tissue fragment measuring loose aggregate 0.9 x 0.9 x 0.2 cm.  Entirely submitted. One screened cassette.  B. The specimen is received in formalin, labeled with the patient's name and hospital number, and is designated \" left/descending colon polyp x 1\".  The specimen consists of multiple tan to green to yellow soft tissue fragments and fragments consistent with fecal material measuring in loose aggregate 1.9 x 1.0 x 0.2 cm.  Entirely submitted. One screened cassette.  C. The specimen is received in formalin, labeled with the " "patient's name and hospital number, and is designated \" sigmoid colon polyp x 1\".  The specimen consists of multiple tan-yellow green soft tissue fragments and fragments consistent with fecal material measuring in loose aggregate 1.1 x 1.0 x 0.1 cm.  Due to the size and                           consistency of the specimen it is questionable whether it will survive processing.  Entirely submitted. One screened cassette.    Note: The estimated total formalin fixation time based upon information provided by the submitting clinician and the standard processing schedule is under 72 hours. [facility]         Radiology Results:   Colonoscopy  Result Date: 12/17/2024  Narrative: Table formatting from the original result was not included. [facility] Operating Room [address] [facility] [address] [phone] DATE OF SERVICE: 12/17/24 PHYSICIAN(S): Attending: Serafin Ernandez MD Fellow: No Staff Documented INDICATION: Family history of colonic polyps POST-OP DIAGNOSIS: See the impression below. HISTORY: Prior colonoscopy: Less than 3 years ago. It is being repeated at an interval of less than 3 years because: Last colonoscopy had inadequate bowel prep BOWEL PREPARATION: Two-day Bowel Prep; Miralax/Dulcolax PREPROCEDURE: Informed consent was obtained for the procedure, including sedation. Risks including but not limited to bleeding, infection, perforation, adverse drug reaction and aspiration were explained in detail. Also explained about less than 100% sensitivity with the exam and other alternatives. The patient was placed in the left lateral decubitus position. Procedure: Colonoscopy DETAILS OF PROCEDURE: Patient was taken to the procedure room where a time out was performed to confirm correct patient and correct procedure. The patient underwent monitored anesthesia care, which was administered by an anesthesia professional. The patient's blood pressure, ECG, ETCO2, heart rate, level of consciousness, " oxygen and respirations were monitored throughout the procedure. A digital rectal exam was performed. A perianal exam was performed. The scope was introduced through the anus and advanced to the cecum. Retroflexion was performed in the rectum. The quality of bowel preparation was evaluated using the [place] Bowel Preparation Scale with scores of: right colon = 1, transverse colon = 2, left colon = 2. The total BBPS score was 5. Bowel prep was not adequate. The patient experienced no blood loss. The procedure was not difficult. The patient tolerated the procedure well. There were no apparent adverse events. ANESTHESIA INFORMATION: ASA: III Anesthesia Type: IV Sedation with Anesthesia MEDICATIONS: No administrations occurring from 1247 to 1324 on 12/17/24 FINDINGS: Five polyps measuring smaller than 5 mm in the transverse colon, descending colon and sigmoid colon; performed cold snare with complete en bloc removal and retrieved specimen Pancolonic diverticula of moderate severity EVENTS: Procedure Events Event Event Time ENDO CECUM REACHED 12/17/2024  1:10 PM ENDO SCOPE OUT TIME 12/17/2024  1:22 PM SPECIMENS: ID Type Source Tests Collected by Time Destination 1 : cold snare of polyp x 3 Tissue Large Intestine, Transverse Colon TISSUE EXAM Serafin Ernandez MD 12/17/2024  1:14 PM  2 : cold snare of polyp x 1 Tissue Large Intestine, Left/Descending Colon TISSUE EXAM Serafin Ernandez MD 12/17/2024  1:18 PM  3 : cold snare of polyp x 1 Tissue Large Intestine, Sigmoid Colon TISSUE EXAM Serafin Ernandez MD 12/17/2024  1:20 PM  EQUIPMENT: Colonoscope -CF [id number] ENDOCUFF VISION LRG GREEN ID [id number]     Impression: 5 subcentimeter polyps in the transverse colon, descending colon and sigmoid colon were removed with cold snare Diverticulosis of moderate severity RECOMMENDATION: Repeat colonoscopy in 6 months, due: 6/15/2025 Personal history of colon polyps Inadequate bowel preparation  Await pathology results   Serafin  MD Veronica Ernandez PA-C    **Please note:  Dictation voice to text software may have been used in the creation of this record.  Occasional wrong word or “sound alike” substitutions may have occurred due to the inherent limitations of voice recognition software.  Read the chart carefully and recognize, using context, where substitutions have occurred.**

## 2024-12-21 DIAGNOSIS — I10 BENIGN ESSENTIAL HYPERTENSION: ICD-10-CM

## 2024-12-23 ENCOUNTER — RESULTS FOLLOW-UP (OUTPATIENT)
Age: 69
End: 2024-12-23

## 2024-12-23 RX ORDER — CARVEDILOL 25 MG/1
25 TABLET ORAL 2 TIMES DAILY WITH MEALS
Qty: 180 TABLET | Refills: 1 | Status: SHIPPED | OUTPATIENT
Start: 2024-12-23

## 2025-01-06 DIAGNOSIS — F41.9 ANXIETY: ICD-10-CM

## 2025-01-06 RX ORDER — ALPRAZOLAM 0.5 MG
TABLET ORAL
Qty: 30 TABLET | Refills: 0 | Status: SHIPPED | OUTPATIENT
Start: 2025-01-06

## 2025-01-08 ENCOUNTER — OFFICE VISIT (OUTPATIENT)
Dept: ENDOCRINOLOGY | Facility: CLINIC | Age: 70
End: 2025-01-08
Payer: MEDICARE

## 2025-01-08 VITALS
WEIGHT: 294 LBS | OXYGEN SATURATION: 96 % | DIASTOLIC BLOOD PRESSURE: 78 MMHG | TEMPERATURE: 97.9 F | HEART RATE: 69 BPM | SYSTOLIC BLOOD PRESSURE: 130 MMHG | BODY MASS INDEX: 37.73 KG/M2 | HEIGHT: 74 IN

## 2025-01-08 DIAGNOSIS — E78.5 DYSLIPIDEMIA: ICD-10-CM

## 2025-01-08 DIAGNOSIS — I50.42 CHRONIC COMBINED SYSTOLIC AND DIASTOLIC CONGESTIVE HEART FAILURE (HCC): ICD-10-CM

## 2025-01-08 DIAGNOSIS — E11.319 DIABETIC RETINOPATHY OF BOTH EYES ASSOCIATED WITH TYPE 2 DIABETES MELLITUS, MACULAR EDEMA PRESENCE UNSPECIFIED, UNSPECIFIED RETINOPATHY SEVERITY (HCC): ICD-10-CM

## 2025-01-08 DIAGNOSIS — E11.22 TYPE 2 DIABETES MELLITUS WITH STAGE 3B CHRONIC KIDNEY DISEASE, WITHOUT LONG-TERM CURRENT USE OF INSULIN (HCC): Primary | ICD-10-CM

## 2025-01-08 DIAGNOSIS — I42.8 NON-ISCHEMIC CARDIOMYOPATHY (HCC): ICD-10-CM

## 2025-01-08 DIAGNOSIS — N18.30 STAGE 3 CHRONIC KIDNEY DISEASE, UNSPECIFIED WHETHER STAGE 3A OR 3B CKD (HCC): ICD-10-CM

## 2025-01-08 DIAGNOSIS — E66.01 OBESITY, MORBID (HCC): ICD-10-CM

## 2025-01-08 DIAGNOSIS — N18.32 TYPE 2 DIABETES MELLITUS WITH STAGE 3B CHRONIC KIDNEY DISEASE, WITHOUT LONG-TERM CURRENT USE OF INSULIN (HCC): Primary | ICD-10-CM

## 2025-01-08 PROCEDURE — 99214 OFFICE O/P EST MOD 30 MIN: CPT | Performed by: STUDENT IN AN ORGANIZED HEALTH CARE EDUCATION/TRAINING PROGRAM

## 2025-01-08 NOTE — ASSESSMENT & PLAN NOTE
Wt Readings from Last 3 Encounters:   01/08/25 133 kg (294 lb)   12/20/24 133 kg (293 lb)   12/17/24 131 kg (288 lb)

## 2025-01-08 NOTE — ASSESSMENT & PLAN NOTE
Lab Results   Component Value Date    EGFR 39 11/14/2024    EGFR 33 11/14/2024    EGFR 42 10/14/2024    CREATININE 1.75 (H) 11/14/2024    CREATININE 2.01 (H) 11/14/2024    CREATININE 1.66 (H) 10/14/2024

## 2025-01-08 NOTE — ASSESSMENT & PLAN NOTE
Stable. Brenden will continue present Rx jardiance 10 mg daily. He should continue to focus on lifestyle interventions conducive to his health and control of his diabetes. We will plan an A1c in 4-weeks, and again prior to RTC in 6-mo. He is recommended updated DM eye exam  Lab Results   Component Value Date    HGBA1C 6.7 (H) 10/14/2024     Orders:  •  Basic metabolic panel; Future  •  Hemoglobin A1C; Future  •  Lipid Panel with Direct LDL reflex; Future  •  Hemoglobin A1C; Future

## 2025-01-08 NOTE — PROGRESS NOTES
Name: Cricket Rosales      : 1955      MRN: [id number]  Encounter Provider: Tom Avila DO  Encounter Date: 2025   Encounter department: [facility] [place]  :  Assessment & Plan  Type 2 diabetes mellitus with stage 3b chronic kidney disease, without long-term current use of insulin (HCC)  Stable. Brenden will continue present Rx jardiance 10 mg daily. He should continue to focus on lifestyle interventions conducive to his health and control of his diabetes. We will plan an A1c in 4-weeks, and again prior to RTC in 6-mo. He is recommended updated DM eye exam  Lab Results   Component Value Date    HGBA1C 6.7 (H) 10/14/2024     Orders:  •  Basic metabolic panel; Future  •  Hemoglobin A1C; Future  •  Lipid Panel with Direct LDL reflex; Future  •  Hemoglobin A1C; Future    Stage 3 chronic kidney disease, unspecified whether stage 3a or 3b CKD (HCC)  Lab Results   Component Value Date    EGFR 39 2024    EGFR 33 2024    EGFR 42 10/14/2024    CREATININE 1.75 (H) 2024    CREATININE 2.01 (H) 2024    CREATININE 1.66 (H) 10/14/2024          Dyslipidemia  Cw statin       Diabetic retinopathy of both eyes associated with type 2 diabetes mellitus, macular edema presence unspecified, unspecified retinopathy severity (HCC)    Lab Results   Component Value Date    HGBA1C 6.7 (H) 10/14/2024          Non-ischemic cardiomyopathy (HCC)         Chronic combined systolic and diastolic congestive heart failure (HCC)  Wt Readings from Last 3 Encounters:   25 133 kg (294 lb)   24 133 kg (293 lb)   24 131 kg (288 lb)                  Obesity, morbid (HCC)           RTC 6-mo    History of Present Illness   HPI  Cricket Rosales is a 69 y.o. male who presents in follow up of T2D. T2D is c/b DPN, DR, CKD w nephropathy, ASCVD.    There have been recent health complications, including constipation requiring hospital. He is feeling better.     For  "diabetes, he is taking jardiance. He denies cbg testing, he denies symptoms commensurate with hyperglycemia. No hypoglycemia. Last DM eye exam was last year.     He is on statin. He also takes entresto.         Review of Systems   Constitutional:  Negative for unexpected weight change.   Endocrine: Negative for polydipsia and polyuria.   All other systems reviewed and are negative.         Objective   /78 (BP Location: Left arm, Patient Position: Sitting)   Pulse 69   Temp 97.9 °F (36.6 °C)   Ht 6' 2\" (1.88 m)   Wt 133 kg (294 lb)   SpO2 96%   BMI 37.75 kg/m²      Physical Exam  Vitals reviewed.   Constitutional:       General: He is not in acute distress.     Appearance: Normal appearance.   HENT:      Head: Normocephalic and atraumatic.      Nose: Nose normal.   Eyes:      General: No scleral icterus.     Conjunctiva/sclera: Conjunctivae normal.   Pulmonary:      Effort: Pulmonary effort is normal. No respiratory distress.   Musculoskeletal:         General: No deformity.      Cervical back: Normal range of motion.   Skin:     General: Skin is warm and dry.   Neurological:      Mental Status: He is alert.   Psychiatric:         Mood and Affect: Mood normal.         Behavior: Behavior normal.       Lab Results   Component Value Date     07/27/2015    SODIUM 138 11/14/2024    K 4.6 11/14/2024     11/14/2024    CO2 27 11/14/2024    ANIONGAP 4 07/27/2015    AGAP 8 11/14/2024    BUN 37 (H) 11/14/2024    CREATININE 1.75 (H) 11/14/2024    GLUC 168 (H) 11/14/2024    GLUF 115 (H) 06/24/2024    CALCIUM 9.0 11/14/2024    AST 13 11/14/2024    ALT 19 11/14/2024    ALKPHOS 83 11/14/2024    PROT 6.1 (L) 07/22/2015    TP 6.9 11/14/2024    BILITOT 1.05 (H) 07/22/2015    TBILI 0.55 11/14/2024    EGFR 39 11/14/2024     Lab Results   Component Value Date    HGBA1C 6.7 (H) 10/14/2024     Lab Results   Component Value Date    CHOLESTEROL 92 06/24/2024    CHOLESTEROL 104 09/01/2022    CHOLESTEROL 160 09/22/2021 "     Lab Results   Component Value Date    HDL 27 (L) 06/24/2024    HDL 27 (L) 09/01/2022    HDL 27 (L) 09/22/2021     Lab Results   Component Value Date    TRIG 86 06/24/2024    TRIG 138 09/01/2022    TRIG 131 09/22/2021     Lab Results   Component Value Date    NONHDLC 77 09/01/2022    NONHDLC 110 12/18/2018     Lab Results   Component Value Date    LDLCALC 48 06/24/2024

## 2025-01-09 ENCOUNTER — TELEPHONE (OUTPATIENT)
Age: 70
End: 2025-01-09

## 2025-01-09 ENCOUNTER — APPOINTMENT (OUTPATIENT)
Dept: LAB | Facility: CLINIC | Age: 70
End: 2025-01-09
Payer: MEDICARE

## 2025-01-09 DIAGNOSIS — E83.42 HYPOMAGNESEMIA: ICD-10-CM

## 2025-01-09 DIAGNOSIS — R80.1 PERSISTENT PROTEINURIA: ICD-10-CM

## 2025-01-09 DIAGNOSIS — R80.9 TYPE 2 DIABETES MELLITUS WITH PROTEINURIA  (HCC): ICD-10-CM

## 2025-01-09 DIAGNOSIS — N25.81 SECONDARY HYPERPARATHYROIDISM OF RENAL ORIGIN (HCC): ICD-10-CM

## 2025-01-09 DIAGNOSIS — E55.9 VITAMIN D DEFICIENCY: ICD-10-CM

## 2025-01-09 DIAGNOSIS — E11.29 TYPE 2 DIABETES MELLITUS WITH PROTEINURIA  (HCC): ICD-10-CM

## 2025-01-09 DIAGNOSIS — N17.9 ACUTE RENAL FAILURE, UNSPECIFIED ACUTE RENAL FAILURE TYPE (HCC): ICD-10-CM

## 2025-01-09 LAB
25(OH)D3 SERPL-MCNC: 9.9 NG/ML (ref 30–100)
ANION GAP SERPL CALCULATED.3IONS-SCNC: 11 MMOL/L (ref 4–13)
BACTERIA UR QL AUTO: ABNORMAL /HPF
BILIRUB UR QL STRIP: NEGATIVE
BUN SERPL-MCNC: 38 MG/DL (ref 5–25)
CALCIUM SERPL-MCNC: 9.2 MG/DL (ref 8.4–10.2)
CHLORIDE SERPL-SCNC: 105 MMOL/L (ref 96–108)
CLARITY UR: CLEAR
CO2 SERPL-SCNC: 23 MMOL/L (ref 21–32)
COLOR UR: ABNORMAL
CREAT SERPL-MCNC: 1.76 MG/DL (ref 0.6–1.3)
CREAT UR-MCNC: 102.7 MG/DL
GFR SERPL CREATININE-BSD FRML MDRD: 39 ML/MIN/1.73SQ M
GLUCOSE SERPL-MCNC: 162 MG/DL (ref 65–140)
GLUCOSE UR STRIP-MCNC: ABNORMAL MG/DL
HGB UR QL STRIP.AUTO: ABNORMAL
KETONES UR STRIP-MCNC: NEGATIVE MG/DL
LEUKOCYTE ESTERASE UR QL STRIP: ABNORMAL
MAGNESIUM SERPL-MCNC: 2.1 MG/DL (ref 1.9–2.7)
MICROALBUMIN UR-MCNC: 183.7 MG/L
MICROALBUMIN/CREAT 24H UR: 179 MG/G CREATININE (ref 0–30)
MUCOUS THREADS UR QL AUTO: ABNORMAL
NITRITE UR QL STRIP: NEGATIVE
NON-SQ EPI CELLS URNS QL MICRO: ABNORMAL /HPF
PH UR STRIP.AUTO: 6 [PH]
PHOSPHATE SERPL-MCNC: 4 MG/DL (ref 2.3–4.1)
POTASSIUM SERPL-SCNC: 4.5 MMOL/L (ref 3.5–5.3)
PROT UR STRIP-MCNC: ABNORMAL MG/DL
PTH-INTACT SERPL-MCNC: 77 PG/ML (ref 12–88)
RBC #/AREA URNS AUTO: ABNORMAL /HPF
SODIUM SERPL-SCNC: 139 MMOL/L (ref 135–147)
SP GR UR STRIP.AUTO: 1.02 (ref 1–1.03)
UROBILINOGEN UR STRIP-ACNC: <2 MG/DL
WBC #/AREA URNS AUTO: ABNORMAL /HPF

## 2025-01-09 PROCEDURE — 36415 COLL VENOUS BLD VENIPUNCTURE: CPT

## 2025-01-09 PROCEDURE — 80048 BASIC METABOLIC PNL TOTAL CA: CPT

## 2025-01-09 PROCEDURE — 82306 VITAMIN D 25 HYDROXY: CPT

## 2025-01-09 PROCEDURE — 82043 UR ALBUMIN QUANTITATIVE: CPT

## 2025-01-09 PROCEDURE — 83970 ASSAY OF PARATHORMONE: CPT

## 2025-01-09 PROCEDURE — 81001 URINALYSIS AUTO W/SCOPE: CPT

## 2025-01-09 PROCEDURE — 84100 ASSAY OF PHOSPHORUS: CPT

## 2025-01-09 PROCEDURE — 82570 ASSAY OF URINE CREATININE: CPT

## 2025-01-09 PROCEDURE — 83036 HEMOGLOBIN GLYCOSYLATED A1C: CPT

## 2025-01-09 PROCEDURE — 83735 ASSAY OF MAGNESIUM: CPT

## 2025-01-09 NOTE — PROGRESS NOTES
Name: Cricket Rosales      : 1955      MRN: [id number]  Encounter Provider: KATIA Duenas  Encounter Date: 1/10/2025   Encounter department: [facility]  :  Assessment & Plan  Stage 3b chronic kidney disease (HCC)  Lab Results   Component Value Date    EGFR 39 2025    EGFR 39 2024    EGFR 33 2024    CREATININE 1.76 (H) 2025    CREATININE 1.75 (H) 2024    CREATININE 2.01 (H) 2024   Baseline creatinine 1.4-1.6 mg/dL  Follows with Dr. Muir  Etiology nephron loss, hypertensive nephrosclerosis, obesity related FSGS, cardiorenal syndrome and tobacco use.  Creatinine 1.76 mg/dL, GFR 39 mL/min, 2025.  UA with glucose 300 mg/dl, trace blood, trace leukocytes, 2+ protein, 25.  US KUB with no hydronephrosis or calculi, 2024.  Volume status examines euvolemic.  Continue to avoid NSAIDs and nephrotoxins.  Uses tylenol as needed. Stay well-hydrated.    Continue Entresto 1 tablet twice daily and Jardiance 10 mg dailyOrders:    Basic metabolic panel; Future    CBC and differential; Future    Vitamin D 25 hydroxy; Future    Benign essential hypertension  Blood pressure 137/83 mmHg  Home blood pressure does not check at home. Usually around 125-130/70s at MD visits.  Continue 2 g sodium restriction  Continue carvedilol 25 mg twice daily, isosorbide 30 mg daily, Entresto 49-51 mg twice daily.     Persistent proteinuria  UACR 179 mg/g, 2025.  SPEP/UPEP with no monoclonal gammopathy, 10/14/2024.  Continue Jardiance 10 mg daily and Entresto twice daily.Orders:    Albumin / creatinine urine ratio; Future    Urinalysis with microscopic; Future    Obesity, morbid (HCC)  Improved with weight loss.     Type 2 diabetes mellitus with proteinuria  (HCC)    Lab Results   Component Value Date    HGBA1C 7.4 (H) 2025   Continue to encourage adequate glycemic control.     Chronic combined systolic and diastolic  congestive heart failure (HCC)  Wt Readings from Last 3 Encounters:   01/10/25 136 kg (299 lb)   01/08/25 133 kg (294 lb)   12/20/24 133 kg (293 lb)   45%, 7/28/2023.  Continue management as per cardiology.     Secondary hyperparathyroidism of renal origin ([facility])    Orders:    Phosphorus; Future    PTH, intact; Future  1.  Hypomagnesemia    Orders:    Magnesium; Future    Acute on chronic combined systolic and diastolic CHF (congestive heart failure) ([facility])  Wt Readings from Last 3 Encounters:   01/10/25 136 kg (299 lb)   01/08/25 133 kg (294 lb)   12/20/24 133 kg (293 lb)   45%, 7/28/2023.  Continue management as per cardiology.       Vitamin D deficiency  Vitamin D 9.9 ng/mL, 1/9/25.  Will start OTC vitamin 2000 units per day. Will recheck with next labs.            History of Present Illness   HPI  Cricket Rosales is a 69 y.o. male who presents for follow-up of stage IIIb chronic, hypertension and proteinuria.  PMH includes T2DM, fine systolic and diastolic CHF, cardiomyopathy CORDELL, COPD and obesity.  Denies recent hospitalizations, emergency department visits or illness.  Overall states has been feeling well.  Labs are from January 9, 2025 which we have reviewed together.        Review of Systems   Constitutional:  Negative for activity change, appetite change, chills, fatigue and fever.   HENT:  Positive for congestion. Negative for ear pain and sore throat.         Asthma exacerbation with cold weather, goes to ED if needed.    Eyes:  Negative for pain and visual disturbance.   Respiratory:  Positive for cough. Negative for shortness of breath.    Cardiovascular:  Negative for chest pain, palpitations and leg swelling.   Gastrointestinal:  Negative for abdominal pain, constipation, diarrhea, nausea and vomiting.        Takes miralax as needed for constipation   Genitourinary:  Negative for decreased urine volume, difficulty urinating, dysuria, frequency, hematuria and urgency.   Musculoskeletal:  Negative for  "arthralgias and back pain.   Skin:  Negative for color change and rash.   Neurological:  Negative for dizziness, seizures, syncope, weakness, light-headedness and headaches.        Occasional lightheadedness with position changes   Hematological: Negative.    Psychiatric/Behavioral: Negative.     All other systems reviewed and are negative.         Objective   /83 (Patient Position: Sitting, Cuff Size: Large)   Pulse 55   Temp 97.7 °F (36.5 °C) (Temporal)   Resp 16   Ht 6' 2\" (1.88 m)   Wt 136 kg (299 lb)   SpO2 97%   BMI 38.39 kg/m²      Physical Exam  Vitals reviewed.   Constitutional:       General: He is not in acute distress.     Appearance: He is well-developed. He is obese. He is not ill-appearing.   HENT:      Head: Normocephalic and atraumatic.      Right Ear: External ear normal.      Left Ear: External ear normal.      Nose: Nose normal.      Mouth/Throat:      Mouth: Mucous membranes are moist.      Pharynx: Oropharynx is clear.   Eyes:      Extraocular Movements: Extraocular movements intact.      Conjunctiva/sclera: Conjunctivae normal.      Pupils: Pupils are equal, round, and reactive to light.   Cardiovascular:      Rate and Rhythm: Normal rate and regular rhythm.      Heart sounds: Normal heart sounds. No murmur heard.  Pulmonary:      Effort: Pulmonary effort is normal. No respiratory distress.      Breath sounds: Normal breath sounds.   Abdominal:      Palpations: Abdomen is soft.      Tenderness: There is no abdominal tenderness.   Musculoskeletal:         General: No swelling.      Cervical back: Neck supple.      Right lower leg: No edema.      Left lower leg: No edema.   Skin:     General: Skin is warm and dry.      Capillary Refill: Capillary refill takes less than 2 seconds.   Neurological:      General: No focal deficit present.      Mental Status: He is alert and oriented to person, place, and time.   Psychiatric:         Mood and Affect: Mood normal.         Behavior: " Behavior normal.

## 2025-01-09 NOTE — ASSESSMENT & PLAN NOTE
Lab Results   Component Value Date    HGBA1C 7.4 (H) 01/09/2025   Continue to encourage adequate glycemic control.

## 2025-01-09 NOTE — ASSESSMENT & PLAN NOTE
Blood pressure 137/83 mmHg  Home blood pressure does not check at home. Usually around 125-130/70s at MD visits.  Continue 2 g sodium restriction  Continue carvedilol 25 mg twice daily, isosorbide 30 mg daily, Entresto 49-51 mg twice daily.

## 2025-01-09 NOTE — ASSESSMENT & PLAN NOTE
UACR 179 mg/g, 1/9/2025.  SPEP/UPEP with no monoclonal gammopathy, 10/14/2024.  Continue Jardiance 10 mg daily and Entresto twice daily.Orders:    Albumin / creatinine urine ratio; Future    Urinalysis with microscopic; Future

## 2025-01-09 NOTE — ASSESSMENT & PLAN NOTE
Lab Results   Component Value Date    EGFR 39 01/09/2025    EGFR 39 11/14/2024    EGFR 33 11/14/2024    CREATININE 1.76 (H) 01/09/2025    CREATININE 1.75 (H) 11/14/2024    CREATININE 2.01 (H) 11/14/2024   Baseline creatinine 1.4-1.6 mg/dL  Follows with Dr. Muir  Etiology nephron loss, hypertensive nephrosclerosis, obesity related FSGS, cardiorenal syndrome and tobacco use.  Creatinine 1.76 mg/dL, GFR 39 mL/min, 1/9/2025.  UA with glucose 300 mg/dl, trace blood, trace leukocytes, 2+ protein, 1/9/25.  US KUB with no hydronephrosis or calculi, 8/8/2024.  Volume status examines euvolemic.  Continue to avoid NSAIDs and nephrotoxins.  Uses tylenol as needed. Stay well-hydrated.    Continue Entresto 1 tablet twice daily and Jardiance 10 mg dailyOrders:    Basic metabolic panel; Future    CBC and differential; Future    Vitamin D 25 hydroxy; Future

## 2025-01-09 NOTE — ASSESSMENT & PLAN NOTE
Wt Readings from Last 3 Encounters:   01/10/25 136 kg (299 lb)   01/08/25 133 kg (294 lb)   12/20/24 133 kg (293 lb)   45%, 7/28/2023.  Continue management as per cardiology.

## 2025-01-10 ENCOUNTER — OFFICE VISIT (OUTPATIENT)
Dept: NEPHROLOGY | Facility: CLINIC | Age: 70
End: 2025-01-10
Payer: MEDICARE

## 2025-01-10 ENCOUNTER — RESULTS FOLLOW-UP (OUTPATIENT)
Dept: ENDOCRINOLOGY | Facility: CLINIC | Age: 70
End: 2025-01-10

## 2025-01-10 VITALS
RESPIRATION RATE: 16 BRPM | HEART RATE: 55 BPM | HEIGHT: 74 IN | SYSTOLIC BLOOD PRESSURE: 137 MMHG | DIASTOLIC BLOOD PRESSURE: 83 MMHG | BODY MASS INDEX: 38.37 KG/M2 | WEIGHT: 299 LBS | OXYGEN SATURATION: 97 % | TEMPERATURE: 97.7 F

## 2025-01-10 DIAGNOSIS — E66.01 OBESITY, MORBID (HCC): ICD-10-CM

## 2025-01-10 DIAGNOSIS — N25.81 SECONDARY HYPERPARATHYROIDISM OF RENAL ORIGIN (HCC): ICD-10-CM

## 2025-01-10 DIAGNOSIS — N18.32 STAGE 3B CHRONIC KIDNEY DISEASE (HCC): Primary | ICD-10-CM

## 2025-01-10 DIAGNOSIS — E55.9 VITAMIN D DEFICIENCY: ICD-10-CM

## 2025-01-10 DIAGNOSIS — R80.9 TYPE 2 DIABETES MELLITUS WITH PROTEINURIA  (HCC): ICD-10-CM

## 2025-01-10 DIAGNOSIS — E83.42 HYPOMAGNESEMIA: ICD-10-CM

## 2025-01-10 DIAGNOSIS — I50.42 CHRONIC COMBINED SYSTOLIC AND DIASTOLIC CONGESTIVE HEART FAILURE (HCC): ICD-10-CM

## 2025-01-10 DIAGNOSIS — R80.1 PERSISTENT PROTEINURIA: ICD-10-CM

## 2025-01-10 DIAGNOSIS — E11.29 TYPE 2 DIABETES MELLITUS WITH PROTEINURIA  (HCC): ICD-10-CM

## 2025-01-10 DIAGNOSIS — I50.43 ACUTE ON CHRONIC COMBINED SYSTOLIC AND DIASTOLIC CHF (CONGESTIVE HEART FAILURE) (HCC): ICD-10-CM

## 2025-01-10 DIAGNOSIS — I10 BENIGN ESSENTIAL HYPERTENSION: ICD-10-CM

## 2025-01-10 LAB
EST. AVERAGE GLUCOSE BLD GHB EST-MCNC: 166 MG/DL
HBA1C MFR BLD: 7.4 %

## 2025-01-10 PROCEDURE — 99214 OFFICE O/P EST MOD 30 MIN: CPT

## 2025-01-10 NOTE — ASSESSMENT & PLAN NOTE
Vitamin D 9.9 ng/mL, 1/9/25.  Will start OTC vitamin 2000 units per day. Will recheck with next labs.

## 2025-02-19 DIAGNOSIS — F41.9 ANXIETY: ICD-10-CM

## 2025-02-19 RX ORDER — ALPRAZOLAM 0.5 MG
TABLET ORAL
Qty: 30 TABLET | Refills: 0 | Status: SHIPPED | OUTPATIENT
Start: 2025-02-19

## 2025-03-11 ENCOUNTER — TELEPHONE (OUTPATIENT)
Dept: PULMONOLOGY | Facility: CLINIC | Age: 70
End: 2025-03-11

## 2025-03-11 DIAGNOSIS — J44.89 ASTHMA-COPD OVERLAP SYNDROME (HCC): Primary | ICD-10-CM

## 2025-03-11 DIAGNOSIS — J45.40 CHRONIC ASTHMA, MODERATE PERSISTENT, UNCOMPLICATED: ICD-10-CM

## 2025-03-11 RX ORDER — UMECLIDINIUM 62.5 UG/1
1 AEROSOL, POWDER ORAL DAILY
Qty: 30 BLISTER | Refills: 0 | Status: SHIPPED | OUTPATIENT
Start: 2025-03-11 | End: 2025-04-10

## 2025-03-11 NOTE — TELEPHONE ENCOUNTER
Patient called the RX Refill Line. Message is being forwarded to the office.     Patient is requesting an alternate medication in place of  tiotropium (Spiriva Respimat) 2.5 MCG/ACT AERS inhaler.  Patient stated insurance will no longer cover it and he does not want to go through having to do prior auths constantly.  Please send script to [facility].  Patient is currently completely out of this medication     Please contact patient at [phone] if further information is needed

## 2025-03-11 NOTE — TELEPHONE ENCOUNTER
Patient calling back. Insurance would like for him to have incruse ellipta. Patient requesting this be expedited as he does not have any medication at this time.

## 2025-03-11 NOTE — TELEPHONE ENCOUNTER
Called patient on to see if the insurance company gave a list of alternative, patient stated they mentioned one but forgot the name so he will call the insurance company again to get the name and will call us back.

## 2025-03-18 DIAGNOSIS — R07.9 CHEST PAIN, UNSPECIFIED TYPE: ICD-10-CM

## 2025-03-20 RX ORDER — ISOSORBIDE MONONITRATE 30 MG/1
30 TABLET, EXTENDED RELEASE ORAL DAILY
Qty: 90 TABLET | Refills: 1 | Status: SHIPPED | OUTPATIENT
Start: 2025-03-20

## 2025-03-27 ENCOUNTER — APPOINTMENT (EMERGENCY)
Dept: RADIOLOGY | Facility: HOSPITAL | Age: 70
End: 2025-03-27
Payer: MEDICARE

## 2025-03-27 ENCOUNTER — HOSPITAL ENCOUNTER (EMERGENCY)
Facility: HOSPITAL | Age: 70
Discharge: HOME | End: 2025-03-27
Attending: EMERGENCY MEDICINE
Payer: MEDICARE

## 2025-03-27 VITALS
WEIGHT: 312.17 LBS | RESPIRATION RATE: 18 BRPM | DIASTOLIC BLOOD PRESSURE: 71 MMHG | TEMPERATURE: 98.9 F | HEART RATE: 62 BPM | BODY MASS INDEX: 40.08 KG/M2 | OXYGEN SATURATION: 97 % | SYSTOLIC BLOOD PRESSURE: 131 MMHG

## 2025-03-27 DIAGNOSIS — N18.9 CKD (CHRONIC KIDNEY DISEASE): ICD-10-CM

## 2025-03-27 DIAGNOSIS — J44.1 COPD WITH ACUTE EXACERBATION (HCC): Primary | ICD-10-CM

## 2025-03-27 DIAGNOSIS — E87.5 HYPERKALEMIA: ICD-10-CM

## 2025-03-27 LAB
ALBUMIN SERPL BCG-MCNC: 4.3 G/DL (ref 3.5–5)
ALP SERPL-CCNC: 80 U/L (ref 34–104)
ALT SERPL W P-5'-P-CCNC: 25 U/L (ref 7–52)
ANION GAP SERPL CALCULATED.3IONS-SCNC: 10 MMOL/L (ref 4–13)
AST SERPL W P-5'-P-CCNC: 26 U/L (ref 13–39)
ATRIAL RATE: 69 BPM
BASE EX.OXY STD BLDV CALC-SCNC: 95.7 % (ref 60–80)
BASE EXCESS BLDV CALC-SCNC: -3.4 MMOL/L
BASOPHILS # BLD AUTO: 0.08 THOUSANDS/ÂΜL (ref 0–0.1)
BASOPHILS NFR BLD AUTO: 1 % (ref 0–1)
BILIRUB SERPL-MCNC: 0.96 MG/DL (ref 0.2–1)
BNP SERPL-MCNC: 76 PG/ML (ref 0–100)
BUN SERPL-MCNC: 50 MG/DL (ref 5–25)
CALCIUM SERPL-MCNC: 9.2 MG/DL (ref 8.4–10.2)
CARDIAC TROPONIN I PNL SERPL HS: 35 NG/L (ref ?–50)
CHLORIDE SERPL-SCNC: 102 MMOL/L (ref 96–108)
CO2 SERPL-SCNC: 21 MMOL/L (ref 21–32)
CREAT SERPL-MCNC: 1.91 MG/DL (ref 0.6–1.3)
EOSINOPHIL # BLD AUTO: 0.43 THOUSAND/ÂΜL (ref 0–0.61)
EOSINOPHIL NFR BLD AUTO: 4 % (ref 0–6)
ERYTHROCYTE [DISTWIDTH] IN BLOOD BY AUTOMATED COUNT: 15.5 % (ref 11.6–15.1)
FLUAV RNA RESP QL NAA+PROBE: NEGATIVE
FLUBV RNA RESP QL NAA+PROBE: NEGATIVE
GFR SERPL CREATININE-BSD FRML MDRD: 35 ML/MIN/1.73SQ M
GLUCOSE SERPL-MCNC: 134 MG/DL (ref 65–140)
HCO3 BLDV-SCNC: 20.3 MMOL/L (ref 24–30)
HCT VFR BLD AUTO: 45.3 % (ref 36.5–49.3)
HGB BLD-MCNC: 14.4 G/DL (ref 12–17)
IMM GRANULOCYTES # BLD AUTO: 0.06 THOUSAND/UL (ref 0–0.2)
IMM GRANULOCYTES NFR BLD AUTO: 1 % (ref 0–2)
LYMPHOCYTES # BLD AUTO: 1.8 THOUSANDS/ÂΜL (ref 0.6–4.47)
LYMPHOCYTES NFR BLD AUTO: 17 % (ref 14–44)
MCH RBC QN AUTO: 27.3 PG (ref 26.8–34.3)
MCHC RBC AUTO-ENTMCNC: 31.8 G/DL (ref 31.4–37.4)
MCV RBC AUTO: 86 FL (ref 82–98)
MONOCYTES # BLD AUTO: 0.72 THOUSAND/ÂΜL (ref 0.17–1.22)
MONOCYTES NFR BLD AUTO: 7 % (ref 4–12)
NEUTROPHILS # BLD AUTO: 7.85 THOUSANDS/ÂΜL (ref 1.85–7.62)
NEUTS SEG NFR BLD AUTO: 70 % (ref 43–75)
NRBC BLD AUTO-RTO: 0 /100 WBCS
O2 CT BLDV-SCNC: 19.9 ML/DL
P AXIS: 67 DEGREES
PCO2 BLDV: 32.7 MM HG (ref 42–50)
PH BLDV: 7.41 [PH] (ref 7.3–7.4)
PLATELET # BLD AUTO: 256 THOUSANDS/UL (ref 149–390)
PMV BLD AUTO: 9.9 FL (ref 8.9–12.7)
PO2 BLDV: 122.5 MM HG (ref 35–45)
POTASSIUM SERPL-SCNC: 5.6 MMOL/L (ref 3.5–5.3)
POTASSIUM SERPL-SCNC: 5.8 MMOL/L (ref 3.5–5.3)
PR INTERVAL: 234 MS
PROCALCITONIN SERPL-MCNC: <0.05 NG/ML
PROT SERPL-MCNC: 7 G/DL (ref 6.4–8.4)
QRS AXIS: -77 DEGREES
QRSD INTERVAL: 112 MS
QT INTERVAL: 398 MS
QTC INTERVAL: 426 MS
RBC # BLD AUTO: 5.28 MILLION/UL (ref 3.88–5.62)
RSV RNA RESP QL NAA+PROBE: NEGATIVE
SARS-COV-2 RNA RESP QL NAA+PROBE: NEGATIVE
SODIUM SERPL-SCNC: 133 MMOL/L (ref 135–147)
T WAVE AXIS: 86 DEGREES
VENTRICULAR RATE: 69 BPM
WBC # BLD AUTO: 10.94 THOUSAND/UL (ref 4.31–10.16)

## 2025-03-27 PROCEDURE — 96365 THER/PROPH/DIAG IV INF INIT: CPT

## 2025-03-27 PROCEDURE — 84484 ASSAY OF TROPONIN QUANT: CPT | Performed by: PHYSICIAN ASSISTANT

## 2025-03-27 PROCEDURE — 36415 COLL VENOUS BLD VENIPUNCTURE: CPT | Performed by: PHYSICIAN ASSISTANT

## 2025-03-27 PROCEDURE — 83880 ASSAY OF NATRIURETIC PEPTIDE: CPT | Performed by: PHYSICIAN ASSISTANT

## 2025-03-27 PROCEDURE — 71045 X-RAY EXAM CHEST 1 VIEW: CPT

## 2025-03-27 PROCEDURE — 84145 PROCALCITONIN (PCT): CPT | Performed by: PHYSICIAN ASSISTANT

## 2025-03-27 PROCEDURE — 99285 EMERGENCY DEPT VISIT HI MDM: CPT

## 2025-03-27 PROCEDURE — 0241U HB NFCT DS VIR RESP RNA 4 TRGT: CPT | Performed by: PHYSICIAN ASSISTANT

## 2025-03-27 PROCEDURE — 84132 ASSAY OF SERUM POTASSIUM: CPT | Performed by: PHYSICIAN ASSISTANT

## 2025-03-27 PROCEDURE — 99285 EMERGENCY DEPT VISIT HI MDM: CPT | Performed by: PHYSICIAN ASSISTANT

## 2025-03-27 PROCEDURE — 82805 BLOOD GASES W/O2 SATURATION: CPT | Performed by: PHYSICIAN ASSISTANT

## 2025-03-27 PROCEDURE — 93010 ELECTROCARDIOGRAM REPORT: CPT | Performed by: INTERNAL MEDICINE

## 2025-03-27 PROCEDURE — 85025 COMPLETE CBC W/AUTO DIFF WBC: CPT | Performed by: PHYSICIAN ASSISTANT

## 2025-03-27 PROCEDURE — 96375 TX/PRO/DX INJ NEW DRUG ADDON: CPT

## 2025-03-27 PROCEDURE — 80053 COMPREHEN METABOLIC PANEL: CPT | Performed by: PHYSICIAN ASSISTANT

## 2025-03-27 PROCEDURE — 93005 ELECTROCARDIOGRAM TRACING: CPT

## 2025-03-27 RX ORDER — BENZONATATE 100 MG/1
100 CAPSULE ORAL 3 TIMES DAILY PRN
Qty: 21 CAPSULE | Refills: 0 | Status: SHIPPED | OUTPATIENT
Start: 2025-03-27

## 2025-03-27 RX ORDER — AZITHROMYCIN 250 MG/1
250 TABLET, FILM COATED ORAL EVERY 24 HOURS
Qty: 4 TABLET | Refills: 0 | Status: SHIPPED | OUTPATIENT
Start: 2025-03-28 | End: 2025-04-01

## 2025-03-27 RX ORDER — SODIUM POLYSTYRENE SULFONATE 4.1 MEQ/G
15 POWDER, FOR SUSPENSION ORAL; RECTAL ONCE
Status: COMPLETED | OUTPATIENT
Start: 2025-03-27 | End: 2025-03-27

## 2025-03-27 RX ORDER — CALCIUM GLUCONATE 20 MG/ML
1 INJECTION, SOLUTION INTRAVENOUS ONCE
Status: COMPLETED | OUTPATIENT
Start: 2025-03-27 | End: 2025-03-27

## 2025-03-27 RX ORDER — PREDNISONE 20 MG/1
40 TABLET ORAL DAILY
Qty: 10 TABLET | Refills: 0 | Status: SHIPPED | OUTPATIENT
Start: 2025-03-27 | End: 2025-04-01

## 2025-03-27 RX ORDER — IPRATROPIUM BROMIDE AND ALBUTEROL SULFATE 2.5; .5 MG/3ML; MG/3ML
3 SOLUTION RESPIRATORY (INHALATION) ONCE
Status: COMPLETED | OUTPATIENT
Start: 2025-03-27 | End: 2025-03-27

## 2025-03-27 RX ORDER — METHYLPREDNISOLONE SODIUM SUCCINATE 125 MG/2ML
100 INJECTION, POWDER, LYOPHILIZED, FOR SOLUTION INTRAMUSCULAR; INTRAVENOUS ONCE
Status: COMPLETED | OUTPATIENT
Start: 2025-03-27 | End: 2025-03-27

## 2025-03-27 RX ORDER — BENZONATATE 100 MG/1
100 CAPSULE ORAL ONCE
Status: COMPLETED | OUTPATIENT
Start: 2025-03-27 | End: 2025-03-27

## 2025-03-27 RX ORDER — AZITHROMYCIN 250 MG/1
500 TABLET, FILM COATED ORAL ONCE
Status: COMPLETED | OUTPATIENT
Start: 2025-03-27 | End: 2025-03-27

## 2025-03-27 RX ADMIN — IPRATROPIUM BROMIDE AND ALBUTEROL SULFATE 3 ML: 2.5; .5 SOLUTION RESPIRATORY (INHALATION) at 11:37

## 2025-03-27 RX ADMIN — METHYLPREDNISOLONE SODIUM SUCCINATE 100 MG: 125 INJECTION, POWDER, FOR SOLUTION INTRAMUSCULAR; INTRAVENOUS at 11:37

## 2025-03-27 RX ADMIN — BENZONATATE 100 MG: 100 CAPSULE ORAL at 11:37

## 2025-03-27 RX ADMIN — SODIUM POLYSTYRENE SULFONATE 15 G: 4.1 POWDER, FOR SUSPENSION ORAL; RECTAL at 13:38

## 2025-03-27 RX ADMIN — AZITHROMYCIN DIHYDRATE 500 MG: 250 TABLET ORAL at 13:37

## 2025-03-27 RX ADMIN — CALCIUM GLUCONATE 1 G: 20 INJECTION, SOLUTION INTRAVENOUS at 13:37

## 2025-03-27 NOTE — ED PROVIDER NOTES
Time reflects when diagnosis was documented in both MDM as applicable and the Disposition within this note       Time User Action Codes Description Comment    3/27/2025 12:42 PM Doreen Nunez [J44.1] COPD with acute exacerbation (HCC)     3/27/2025 12:42 PM Doreen Nunez [E87.5] Hyperkalemia     3/27/2025 12:43 PM Doreen Nunez [N18.9] CKD (chronic kidney disease)           ED Disposition       ED Disposition   Discharge    Condition   Stable    Date/Time   u Mar 27, 2025  1:08 PM    Comment   Cricket Rosales discharge to home/self care.                   Assessment & Plan       Medical Decision Making  68 yo male presenting for evaluation of dyspnea.  Prior records reviewed.  Appears to be in setting of recent URI symptoms.  Will obtain EKG, CXR, labs, viral swab.  Will provide dose of steroid, breathing treatment and continue to monitor.    Respiratory status stable on room air.    Work up obtained as noted above.  EKG and troponin not c/w ACS.  CXR does not reveal pneumonia, pneumothorax, vascular congestion.  Small pleural effusion similar to prior.  BNP normal limits.  Mild non specific leukocytosis, no anemia. No hypo or hyperglycemia.  Renal function appears baseline CKD.  Mild hyperkalemia but specimen appears hemolyzed, this was repeated.   Procalcitonin negative.  Covid/flu/rsv - negative  Symptomatically pt felt improved after breathing treatment and steroid.  Suspect COPD exacerbation in setting of recent URI.  Respiratory status stable on room air.  Discussed further observation and monitoring which pt declined.  He reports feeing improved, is hungry and wants to go home.  Will cover with zpack as well as steroid burst.  He notes having sufficient inhalers and neb treatments at home.  Strict return precautions outlined, recommended recheck of potassium in outpatient setting.    Reviewed symptomatic management.  OTC meds reviewed.  Anticipatory guidance.  Advised recheck with PCP  or return to ER as needed.  Strict return precautions outlined.  Patient and spouse voiced understanding and had no further questions.    Please refer to above ER course for further details/discussion.    Problems Addressed:  CKD (chronic kidney disease): chronic illness or injury  COPD with acute exacerbation (HCC): acute illness or injury  Hyperkalemia: acute illness or injury     Details: Initially hemolyzed.  Mild elevated, no EKG changes.  Pt was given meds as well as PO kayexalate.      Amount and/or Complexity of Data Reviewed  External Data Reviewed: labs, radiology and notes.  Labs: ordered. Decision-making details documented in ED Course.  Radiology: ordered and independent interpretation performed. Decision-making details documented in ED Course.  ECG/medicine tests: ordered and independent interpretation performed. Decision-making details documented in ED Course.    Risk  OTC drugs.  Prescription drug management.  Decision regarding hospitalization.        ED Course as of 03/27/25 1631   Thu Mar 27, 2025   1135 WBC(!): 10.94  Mildly elevated but improved from chronic values over the past five months   1135 Hemoglobin: 14.4   1135 Platelet Count: 256   1141 pH, Naveed(!): 7.410   1141 pCO2, Naveed(!): 32.7   1141 pO2, Naveed(!): 122.5   1141 HCO3, Naveed(!): 20.3   1203 GLUCOSE: 134   1203 Creatinine(!): 1.91  Underlying CKD; ranges from 1.75-2.01 over the past four months   1203 BUN(!): 50   1203 Sodium(!): 133   1203 Potassium(!): 5.8  Slight hemolysis noted; will repeat   1203 Chloride: 102   1203 Carbon Dioxide: 21   1203 ANION GAP: 10   1203 Calcium: 9.2   1203 AST: 26   1203 ALT: 25   1203 ALK PHOS: 80   1203 Total Protein: 7.0   1203 Albumin: 4.3   1203 Total Bilirubin: 0.96   1203 GFR, Calculated: 35   1203 BNP: 76   1203 Procalcitonin: <0.05   1204 hs TnI 0hr: 35  Appears stable/baseline, ranges from 32-58 over the past eleven months   1205 XR chest 1 view portable  Independently viewed and interpreted by me  - small left pleural effusion, appears similar to last Apr 2024, otherwise no acute cardiopulmonary process; pending official read.   1213 SARS-COV-2: Negative   1213 INFLU A PCR: Negative   1213 INFLU B PCR: Negative   1213 RSV PCR: Negative   1230 Pt reassessed.  He reports feeling much improved.  He feels the steroid has helped him immensely.  He does note that he seems to get this way when he runs out of his advair which recently happened.  He notes he did get Rx refilled.  He notes having sufficient inhalers/nebs at home.  He is requesting discharge home at this time.  Pending a repeat potassium at present.   1301 Potassium(!): 5.6       Medications   methylPREDNISolone sodium succinate (Solu-MEDROL) injection 100 mg (100 mg Intravenous Given 3/27/25 1137)   benzonatate (TESSALON PERLES) capsule 100 mg (100 mg Oral Given 3/27/25 1137)   ipratropium-albuterol (DUO-NEB) 0.5-2.5 mg/3 mL inhalation solution 3 mL (3 mL Nebulization Given 3/27/25 1137)   calcium gluconate 1 g in sodium chloride 0.9% 50 mL (premix) (0 g Intravenous Stopped 3/27/25 1415)   sodium polystyrene (KAYEXALATE) powder 15 g (15 g Oral Given 3/27/25 1338)   azithromycin (ZITHROMAX) tablet 500 mg (500 mg Oral Given 3/27/25 1337)       ED Risk Strat Scores   HEART Risk Score      Flowsheet Row Most Recent Value   Heart Score Risk Calculator    History 0 Filed at: 03/27/2025 1120   ECG 0 Filed at: 03/27/2025 1120   Age 2 Filed at: 03/27/2025 1120   Risk Factors 2 Filed at: 03/27/2025 1120   Troponin 1 Filed at: 03/27/2025 1120   HEART Score 5 Filed at: 03/27/2025 1120          HEART Risk Score      Flowsheet Row Most Recent Value   Heart Score Risk Calculator    History 0 Filed at: 03/27/2025 1120   ECG 0 Filed at: 03/27/2025 1120   Age 2 Filed at: 03/27/2025 1120   Risk Factors 2 Filed at: 03/27/2025 1120   Troponin 1 Filed at: 03/27/2025 1120   HEART Score 5 Filed at: 03/27/2025 1120                              SBIRT 20yo+      Flowsheet Row  Most Recent Value   Initial Alcohol Screen: US AUDIT-C     1. How often do you have a drink containing alcohol? 0 Filed at: 03/27/2025 1117   2. How many drinks containing alcohol do you have on a typical day you are drinking?  0 Filed at: 03/27/2025 1117   3a. Male UNDER 65: How often do you have five or more drinks on one occasion? 0 Filed at: 03/27/2025 1117   3b. FEMALE Any Age, or MALE 65+: How often do you have 4 or more drinks on one occassion? 0 Filed at: 03/27/2025 1117   Audit-C Score 0 Filed at: 03/27/2025 1117   MAURY: How many times in the past year have you...    Used an illegal drug or used a prescription medication for non-medical reasons? Never Filed at: 03/27/2025 1117                            History of Present Illness       Chief Complaint   Patient presents with    Shortness of Breath     Pt states that he started with a strong cough this morning around 0300. States that the cough is productive, complains of SOB        Past Medical History:   Diagnosis Date    Acute on chronic combined systolic and diastolic CHF (congestive heart failure) (Formerly [facility]) 07/27/2023    Alcohol abuse 07/27/2023    Ambulates with cane     Arthritis     Asthma     Back pain     Bunion 04/02/2024    Chest pain 12/22/2022    CHF (congestive heart failure) (Formerly [facility])     Claustrophobia     Constipation 12/18/2018    COPD (chronic obstructive pulmonary disease) (Formerly [facility])     COPD with acute exacerbation (Formerly [facility]) 05/06/2022    COVID-19     COVID-19 virus infection 12/03/2021    Depression     Hypertension     Mumps     Neck pain     Old MI (myocardial infarction)     Pneumonia     Pulmonary emphysema (Formerly [facility])     Rectal bleeding 01/14/2019    S/P TKR (total knee replacement), left 11/02/2023    Skin abnormalities     rashes and wounds on both legs - scabbed over and healing    Sleep apnea     no CPAP    Tingling of both feet     Wears glasses       Past Surgical History:   Procedure Laterality Date    APPENDECTOMY      CARDIAC CATHETERIZATION   2015    Left main- normal and maidly tortuous.  Circumflex - normal and moderately tortuous.  RCA- normal and mildy tortuous.  Global LV function was severely depressed..    CARDIAC CATHETERIZATION Left 2022    Procedure: Cardiac Left Heart Cath;  Surgeon: Doreen Briones DO;  Location: [facility];  Service: Cardiology    CARDIAC CATHETERIZATION N/A 2022    Procedure: Cardiac Coronary Angiogram;  Surgeon: Doreen Briones DO;  Location: [facility];  Service: Cardiology    CARDIAC CATHETERIZATION  2022    Procedure: Cardiac catheterization;  Surgeon: Doreen Briones DO;  Location: [facility];  Service: Cardiology    INGUINAL HERNIA REPAIR Bilateral     KY COLONOSCOPY FLX DX W/COLLJ SPEC WHEN PFRMD N/A 2019    Procedure: COLONOSCOPY with removal of anal papilla;  Surgeon: ANIL Dale MD;  Location: [facility];  Service: Colorectal    TONSILLECTOMY      TOTAL KNEE ARTHROPLASTY Left 2023    Procedure: ARTHROPLASTY KNEE TOTAL;  Surgeon: David Mullen DO;  Location: [facility];  Service: Orthopedics    UMBILICAL HERNIA REPAIR  2006      Family History   Problem Relation Age of Onset    Heart attack Father         MI    Heart disease Father     Diabetes Sister         DM    Arthritis Family     Coronary artery disease Family     Hypertension Family     Tuberculosis Son     Alzheimer's disease Mother       Social History     Tobacco Use    Smoking status: Former     Current packs/day: 0.00     Average packs/day: 3.0 packs/day for 43.0 years (129.0 ttl pk-yrs)     Types: Cigarettes     Start date:      Quit date: 2018     Years since quittin.2    Smokeless tobacco: Never   Vaping Use    Vaping status: Never Used   Substance Use Topics    Alcohol use: Yes     Comment: socially    Drug use: Yes     Types: Marijuana     Comment: occasionally edible      E-Cigarette/Vaping    E-Cigarette Use Never User       E-Cigarette/Vaping Substances     Nicotine No     THC No     CBD No     Flavoring No     Other No     Unknown No       I have reviewed and agree with the history as documented.     69 year old male with PMH COPD, CHF, HTN, CORDELL presenting via EMS from home for evaluation of shortness of breath.  He reports symptoms started with sinus congestion in his nose and post nasal drip.  He feels this moved to his chest.  He now reports coughing up phlegm.  Reports feeling short of breath.  Worse with coughing.  No chest pain but reports chest tightness.  Denies fever, chills.  Denies N/V/D, abdominal pain.  Denies leg swelling or feeling like he retaining fluid.  He does note he used breathing treatments prior to arrival and was also given one via EMS which he feels helped a lot.  Spouse had flu about 2 weeks ago; daughter was also sick.  No specific alleviating factors.      History provided by:  Patient, medical records and EMS personnel   used: No    Shortness of Breath  Context: URI    Relieved by:  Nothing  Worsened by:  Coughing  Associated symptoms: cough and sputum production    Associated symptoms: no abdominal pain, no chest pain, no fever, no headaches, no hemoptysis, no neck pain, no rash, no sore throat, no syncope, no vomiting and no wheezing    Risk factors: obesity    Risk factors: no hx of cancer, no recent surgery and no tobacco use (former)        Review of Systems   Constitutional: Negative.  Negative for chills, fatigue and fever.   HENT:  Positive for congestion and postnasal drip. Negative for rhinorrhea and sore throat.    Eyes: Negative.  Negative for visual disturbance.   Respiratory:  Positive for cough, sputum production, chest tightness and shortness of breath. Negative for hemoptysis and wheezing.    Cardiovascular: Negative.  Negative for chest pain, palpitations, leg swelling and syncope.   Gastrointestinal: Negative.  Negative for abdominal pain, diarrhea, nausea and vomiting.   Genitourinary: Negative.   Negative for dysuria, flank pain, frequency and hematuria.   Musculoskeletal: Negative.  Negative for back pain and neck pain.   Skin: Negative.  Negative for rash.   Neurological: Negative.  Negative for dizziness, light-headedness, numbness and headaches.   Psychiatric/Behavioral: Negative.     All other systems reviewed and are negative.          Objective       ED Triage Vitals   Temperature Pulse Blood Pressure Respirations SpO2 Patient Position - Orthostatic VS   03/27/25 1121 03/27/25 1121 03/27/25 1121 03/27/25 1121 03/27/25 1121 03/27/25 1121   98.9 °F (37.2 °C) 68 120/65 18 95 % Sitting      Temp Source Heart Rate Source BP Location FiO2 (%) Pain Score    03/27/25 1121 03/27/25 1221 03/27/25 1121 -- 03/27/25 1121    Temporal Monitor Right arm  No Pain      Vitals      Date and Time Temp Pulse SpO2 Resp BP Pain Score FACES Pain Rating User   03/27/25 1400 98.9 °F (37.2 °C) 62 97 % 18 131/71 No Pain -- AB   03/27/25 1321 98.9 °F (37.2 °C) 65 97 % 19 120/65 No Pain -- AB   03/27/25 1221 98.9 °F (37.2 °C) 64 98 % 21 120/65 No Pain -- AB   03/27/25 1121 98.9 °F (37.2 °C) 68 95 % 18 120/65 No Pain -- AB            Physical Exam  Vitals and nursing note reviewed.   Constitutional:       General: He is awake. He is not in acute distress.     Appearance: Normal appearance. He is well-developed. He is obese. He is not toxic-appearing or diaphoretic.   HENT:      Head: Normocephalic and atraumatic.      Right Ear: Hearing, tympanic membrane, ear canal and external ear normal.      Left Ear: Hearing, tympanic membrane, ear canal and external ear normal.      Nose: Congestion present.      Mouth/Throat:      Mouth: Mucous membranes are moist.      Tongue: Tongue does not deviate from midline.      Pharynx: Oropharynx is clear. Uvula midline.   Eyes:      General: Lids are normal. No scleral icterus.     Conjunctiva/sclera: Conjunctivae normal.   Neck:      Trachea: Trachea and phonation normal.   Cardiovascular:       Rate and Rhythm: Normal rate and regular rhythm.      Pulses: Normal pulses.      Heart sounds: Normal heart sounds, S1 normal and S2 normal.   Pulmonary:      Effort: Pulmonary effort is normal. No tachypnea or respiratory distress.      Breath sounds: Rhonchi present. No wheezing or rales.      Comments: +coughing  Abdominal:      General: Bowel sounds are normal. There is no distension.      Palpations: Abdomen is soft.      Tenderness: There is no abdominal tenderness. There is no guarding or rebound.      Hernia: A hernia is present. Hernia is present in the umbilical area.   Musculoskeletal:         General: No tenderness.      Cervical back: Normal range of motion and neck supple.      Right lower leg: No edema.      Left lower leg: No edema.   Skin:     General: Skin is warm and dry.      Capillary Refill: Capillary refill takes less than 2 seconds.      Findings: No rash.   Neurological:      General: No focal deficit present.      Mental Status: He is alert and oriented to person, place, and time.      GCS: GCS eye subscore is 4. GCS verbal subscore is 5. GCS motor subscore is 6.   Psychiatric:         Mood and Affect: Mood normal.         Speech: Speech normal.         Behavior: Behavior normal. Behavior is cooperative.         Results Reviewed       Procedure Component Value Units Date/Time    Potassium [[id number]]  (Abnormal) Collected: 03/27/25 1234    Lab Status: Final result Specimen: Blood from Arm, Left Updated: 03/27/25 1253     Potassium 5.6 mmol/L     FLU/RSV/COVID - if FLU/RSV clinically relevant (2hr TAT) [[id number]]  (Normal) Collected: 03/27/25 1123    Lab Status: Final result Specimen: Nares from Nose Updated: 03/27/25 1212     SARS-CoV-2 Negative     INFLUENZA A PCR Negative     INFLUENZA B PCR Negative     RSV PCR Negative    Narrative:      This test has been performed using the CoV-2/Flu/RSV plus assay on the "[employer] GeneXpert platform. This test has been validated by the   and verified by the performing laboratory.     This test is designed to amplify and detect the following: nucleocapsid (N), envelope (E), and RNA-dependent RNA polymerase (RdRP) genes of the SARS-CoV-2 genome; matrix (M), basic polymerase (PB2), and acidic protein (PA) segments of the influenza A genome; matrix (M) and non-structural protein (NS) segments of the influenza B genome, and the nucleocapsid genes of RSV A and RSV B.     Positive results are indicative of the presence of Flu A, Flu B, RSV, and/or SARS-CoV-2 RNA. Positive results for SARS-CoV-2 or suspected novel influenza should be reported to state, local, or federal health departments according to local reporting requirements.      All results should be assessed in conjunction with clinical presentation and other laboratory markers for clinical management.     FOR PEDIATRIC PATIENTS - copy/paste COVID Guidelines URL to browser: [email or web address]       Procalcitonin [[id number]]  (Normal) Collected: 03/27/25 1123    Lab Status: Final result Specimen: Blood from Arm, Left Updated: 03/27/25 1201     Procalcitonin <0.05 ng/ml     HS Troponin 0hr (reflex protocol) [[id number]]  (Normal) Collected: 03/27/25 1123    Lab Status: Final result Specimen: Blood from Arm, Left Updated: 03/27/25 1159     hs TnI 0hr 35 ng/L     B-Type Natriuretic Peptide(BNP) [[id number]]  (Normal) Collected: 03/27/25 1123    Lab Status: Final result Specimen: Blood from Arm, Left Updated: 03/27/25 1158     BNP 76 pg/mL     Comprehensive metabolic panel [[id number]]  (Abnormal) Collected: 03/27/25 1123    Lab Status: Final result Specimen: Blood from Arm, Left Updated: 03/27/25 1158     Sodium 133 mmol/L      Potassium 5.8 mmol/L      Chloride 102 mmol/L      CO2 21 mmol/L      ANION GAP 10 mmol/L      BUN 50 mg/dL      Creatinine 1.91 mg/dL      Glucose 134 mg/dL      Calcium 9.2 mg/dL      AST 26 U/L      ALT 25 U/L      Alkaline  Phosphatase 80 U/L      Total Protein 7.0 g/dL      Albumin 4.3 g/dL      Total Bilirubin 0.96 mg/dL      eGFR 35 ml/min/1.73sq m     Narrative:      National Kidney Disease Foundation guidelines for Chronic Kidney Disease (CKD):     Stage 1 with normal or high GFR (GFR > 90 mL/min/1.73 square meters)    Stage 2 Mild CKD (GFR = 60-89 mL/min/1.73 square meters)    Stage 3A Moderate CKD (GFR = 45-59 mL/min/1.73 square meters)    Stage 3B Moderate CKD (GFR = 30-44 mL/min/1.73 square meters)    Stage 4 Severe CKD (GFR = 15-29 mL/min/1.73 square meters)    Stage 5 End Stage CKD (GFR <15 mL/min/1.73 square meters)  Note: GFR calculation is accurate only with a steady state creatinine    Blood gas, venous [[id number]]  (Abnormal) Collected: 03/27/25 1123    Lab Status: Final result Specimen: Blood from Arm, Left Updated: 03/27/25 1136     pH, Naveed 7.410     pCO2, Naveed 32.7 mm Hg      pO2, Naveed 122.5 mm Hg      HCO3, Naveed 20.3 mmol/L      Base Excess, Naveed -3.4 mmol/L      O2 Content, Naveed 19.9 ml/dL      O2 HGB, VENOUS 95.7 %     CBC and differential [[id number]]  (Abnormal) Collected: 03/27/25 1123    Lab Status: Final result Specimen: Blood from Arm, Left Updated: 03/27/25 1134     WBC 10.94 Thousand/uL      RBC 5.28 Million/uL      Hemoglobin 14.4 g/dL      Hematocrit 45.3 %      MCV 86 fL      MCH 27.3 pg      MCHC 31.8 g/dL      RDW 15.5 %      MPV 9.9 fL      Platelets 256 Thousands/uL      nRBC 0 /100 WBCs      Segmented % 70 %      Immature Grans % 1 %      Lymphocytes % 17 %      Monocytes % 7 %      Eosinophils Relative 4 %      Basophils Relative 1 %      Absolute Neutrophils 7.85 Thousands/µL      Absolute Immature Grans 0.06 Thousand/uL      Absolute Lymphocytes 1.80 Thousands/µL      Absolute Monocytes 0.72 Thousand/µL      Eosinophils Absolute 0.43 Thousand/µL      Basophils Absolute 0.08 Thousands/µL             XR chest 1 view portable   Final Interpretation by Gabriel Stanley MD (03/27 6813)      Small left basilar  pleural effusion with lungs otherwise clear.            Workstation performed: [id number]             ECG 12 Lead Documentation Only    Date/Time: 3/27/2025 11:18 AM    Performed by: Doreen Nunez PA-C  Authorized by: Doreen Nunez PA-C    Indications / Diagnosis:  Dyspnea  ECG reviewed by me, the ED Provider: yes    Patient location:  ED  Previous ECG:     Comparison to cardiac monitor: Yes    Interpretation:     Interpretation: abnormal    Rate:     ECG rate:  69    ECG rate assessment: normal    Rhythm:     Rhythm: sinus rhythm and A-V block    Conduction:     Conduction: abnormal      Abnormal conduction: 1st degree    ST segments:     ST segments:  Normal  T waves:     T waves: normal    Comments:      , , QT//426; low voltage QRS      ED Medication and Procedure Management   Prior to Admission Medications   Prescriptions Last Dose Informant Patient Reported? Taking?   ALPRAZolam (XANAX) 0.5 mg tablet 3/26/2025  No Yes   Sig: TAKE 1 TABLET BY MOUTH ONCE DAILY AT BEDTIME AS NEEDED FOR ANXIETY   Accu-Chek FastClix Lancets MISC  Self No No   Sig: Use 1 each daily to test blood sugar.   Blood Glucose Monitoring Suppl (Accu-Chek Guide Me) w/Device KIT  Self No No   Sig: Use 1 each daily to test blood sugar.   Blood Pressure KIT  Self No No   Sig: Use 3 (three) times a week   DULoxetine (CYMBALTA) 60 mg delayed release capsule 3/27/2025 Self No Yes   Sig: Take 1 capsule (60 mg total) by mouth daily   Diclofenac Sodium (VOLTAREN) 1 %  Self No No   Sig: Apply 2 g topically 2 (two) times a day   Empagliflozin (Jardiance) 10 MG TABS tablet  Self No No   Sig: Take 1 tablet (10 mg total) by mouth every morning   Fluticasone-Salmeterol (Advair) 500-50 mcg/dose inhaler 3/27/2025 Self No Yes   Sig: INHALE 1 DOSE BY MOUTH TWICE DAILY RINSE MOUTH AFTER USE   albuterol (PROVENTIL HFA,VENTOLIN HFA) 90 mcg/act inhaler 3/27/2025 Self No Yes   Sig: INHALE 2 PUFFS BY MOUTH EVERY 6 HOURS AS NEEDED FOR  WHEEZING   atorvastatin (LIPITOR) 20 mg tablet 3/27/2025 Self No Yes   Sig: Take 1 tablet (20 mg total) by mouth daily   carvedilol (COREG) 25 mg tablet 3/27/2025 Self No Yes   Sig: TAKE 1 TABLET BY MOUTH TWICE DAILY WITH MEALS   fluticasone (FLONASE) 50 mcg/act nasal spray 3/27/2025 Self No Yes   Sig: Use 1 spray(s) in each nostril twice daily   furosemide (LASIX) 40 mg tablet 3/27/2025 Self No Yes   Sig: TAKE 1 TABLET BY MOUTH EVERY OTHER DAY   glucose blood test strip  Self No No   Sig: Use 1 each daily to test blood sugar.   ipratropium-albuterol (DUO-NEB) 0.5-2.5 mg/3 mL nebulizer solution 3/27/2025 Self No Yes   Sig: Take 3 mL by nebulization 4 (four) times a day   isosorbide mononitrate (IMDUR) 30 mg 24 hr tablet 3/27/2025  No Yes   Sig: Take 1 tablet by mouth once daily   montelukast (SINGULAIR) 10 mg tablet Past Month Self No Yes   Sig: Take 1 tablet (10 mg total) by mouth daily at bedtime   polyethylene glycol (MIRALAX) 17 g packet  Self No No   Sig: Take 17 g by mouth daily   sacubitril-valsartan (Entresto) 49-51 MG TABS 3/27/2025 Self No Yes   Sig: Take 1 tablet by mouth 2 (two) times a day   tiotropium (Spiriva Respimat) 2.5 MCG/ACT AERS inhaler Not Taking Self No No   Sig: INHALE 2 SPRAY(S) BY MOUTH ONCE DAILY   Patient not taking: Reported on 3/27/2025   umeclidinium (Incruse Ellipta) 62.5 mcg/actuation AEPB inhaler 3/27/2025  No Yes   Sig: Inhale 1 puff daily      Facility-Administered Medications: None     Discharge Medication List as of 3/27/2025  1:21 PM        START taking these medications    Details   azithromycin (ZITHROMAX) 250 mg tablet Take 1 tablet (250 mg total) by mouth every 24 hours for 4 days Do not start before March 28, 2025., Starting Fri 3/28/2025, Until Tue 4/1/2025, Normal      benzonatate (TESSALON PERLES) 100 mg capsule Take 1 capsule (100 mg total) by mouth 3 (three) times a day as needed for cough, Starting Thu 3/27/2025, Normal      predniSONE 20 mg tablet Take 2 tablets  (40 mg total) by mouth daily for 5 days, Starting Thu 3/27/2025, Until Tue 4/1/2025, Normal           CONTINUE these medications which have NOT CHANGED    Details   albuterol (PROVENTIL HFA,VENTOLIN HFA) 90 mcg/act inhaler INHALE 2 PUFFS BY MOUTH EVERY 6 HOURS AS NEEDED FOR WHEEZING, Starting Sat 10/12/2024, Normal      ALPRAZolam (XANAX) 0.5 mg tablet TAKE 1 TABLET BY MOUTH ONCE DAILY AT BEDTIME AS NEEDED FOR ANXIETY, Normal      atorvastatin (LIPITOR) 20 mg tablet Take 1 tablet (20 mg total) by mouth daily, Starting Wed 11/13/2024, Normal      carvedilol (COREG) 25 mg tablet TAKE 1 TABLET BY MOUTH TWICE DAILY WITH MEALS, Starting Mon 12/23/2024, Normal      DULoxetine (CYMBALTA) 60 mg delayed release capsule Take 1 capsule (60 mg total) by mouth daily, Starting Tue 7/2/2024, Normal      fluticasone (FLONASE) 50 mcg/act nasal spray Use 1 spray(s) in each nostril twice daily, Normal      Fluticasone-Salmeterol (Advair) 500-50 mcg/dose inhaler INHALE 1 DOSE BY MOUTH TWICE DAILY RINSE MOUTH AFTER USE, Normal      furosemide (LASIX) 40 mg tablet TAKE 1 TABLET BY MOUTH EVERY OTHER DAY, Starting Tue 10/8/2024, Normal      ipratropium-albuterol (DUO-NEB) 0.5-2.5 mg/3 mL nebulizer solution Take 3 mL by nebulization 4 (four) times a day, Starting Tue 11/5/2024, Normal      isosorbide mononitrate (IMDUR) 30 mg 24 hr tablet Take 1 tablet by mouth once daily, Starting Thu 3/20/2025, Normal      montelukast (SINGULAIR) 10 mg tablet Take 1 tablet (10 mg total) by mouth daily at bedtime, Starting Sun 7/30/2023, Normal      sacubitril-valsartan (Entresto) 49-51 MG TABS Take 1 tablet by mouth 2 (two) times a day, Starting Wed 11/13/2024, Normal      umeclidinium (Incruse Ellipta) 62.5 mcg/actuation AEPB inhaler Inhale 1 puff daily, Starting Tue 3/11/2025, Until Thu 4/10/2025, Normal      Accu-Chek FastClix Lancets MISC Use 1 each daily to test blood sugar., Starting Mon 1/23/2023, Normal      Blood Glucose Monitoring Suppl  (Accu-Chek Guide Me) w/Device KIT Use 1 each daily to test blood sugar., Starting Mon 1/23/2023, Normal      Blood Pressure KIT Use 3 (three) times a week, Starting Fri 7/19/2024, Normal      Diclofenac Sodium (VOLTAREN) 1 % Apply 2 g topically 2 (two) times a day, Starting Tue 4/2/2024, Normal      Empagliflozin (Jardiance) 10 MG TABS tablet Take 1 tablet (10 mg total) by mouth every morning, Starting Tue 11/26/2024, Normal      glucose blood test strip Use 1 each daily to test blood sugar., Starting Mon 1/23/2023, Normal      polyethylene glycol (MIRALAX) 17 g packet Take 17 g by mouth daily, Starting Sun 6/23/2024, Normal      tiotropium (Spiriva Respimat) 2.5 MCG/ACT AERS inhaler INHALE 2 SPRAY(S) BY MOUTH ONCE DAILY, Normal           No discharge procedures on file.  ED SEPSIS DOCUMENTATION   Time reflects when diagnosis was documented in both MDM as applicable and the Disposition within this note       Time User Action Codes Description Comment    3/27/2025 12:42 PM Doreen Nunez [J44.1] COPD with acute exacerbation (HCC)     3/27/2025 12:42 PM Doreen Nunez [E87.5] Hyperkalemia     3/27/2025 12:43 PM Doreen Nunez [N18.9] CKD (chronic kidney disease)                  Doreen Nunez PA-C  03/27/25 [id number]

## 2025-03-27 NOTE — DISCHARGE INSTRUCTIONS
Continue your home inhalers and nebulized treatments.  Take antibiotic as directed for the full duration.  Take steroid with food.  Tessalon as needed for cough.  Continue to alternate OTC tylenol as needed for discomfort.  Follow up with PCP for recheck or return to ER as needed.

## 2025-03-28 ENCOUNTER — VBI (OUTPATIENT)
Dept: FAMILY MEDICINE CLINIC | Facility: CLINIC | Age: 70
End: 2025-03-28

## 2025-03-28 NOTE — TELEPHONE ENCOUNTER
03/28/25 2:39 PM    Patient contacted post ED visit, [facility] department spoke with patient/caregiver and outreach was successful.    Thank you.  Javier Ibarra MA  [facility]

## 2025-04-01 ENCOUNTER — RA CDI HCC (OUTPATIENT)
Dept: OTHER | Facility: HOSPITAL | Age: 70
End: 2025-04-01

## 2025-04-02 DIAGNOSIS — J34.89 SINUS DRAINAGE: ICD-10-CM

## 2025-04-02 DIAGNOSIS — F41.9 ANXIETY: ICD-10-CM

## 2025-04-02 RX ORDER — FLUTICASONE PROPIONATE 50 MCG
SPRAY, SUSPENSION (ML) NASAL
Qty: 16 G | Refills: 0 | Status: SHIPPED | OUTPATIENT
Start: 2025-04-02

## 2025-04-02 NOTE — TELEPHONE ENCOUNTER
Received request for refill on Flonase.  Patient has not been seen for two years.  Called and gave patient ENT appointment for 8/5/2025

## 2025-04-03 RX ORDER — ALPRAZOLAM 0.5 MG
TABLET ORAL
Qty: 30 TABLET | Refills: 0 | Status: SHIPPED | OUTPATIENT
Start: 2025-04-03

## 2025-04-08 ENCOUNTER — OFFICE VISIT (OUTPATIENT)
Dept: FAMILY MEDICINE CLINIC | Facility: CLINIC | Age: 70
End: 2025-04-08
Payer: MEDICARE

## 2025-04-08 VITALS
HEIGHT: 74 IN | SYSTOLIC BLOOD PRESSURE: 130 MMHG | BODY MASS INDEX: 38.91 KG/M2 | HEART RATE: 76 BPM | TEMPERATURE: 97.2 F | WEIGHT: 303.2 LBS | OXYGEN SATURATION: 95 % | RESPIRATION RATE: 18 BRPM | DIASTOLIC BLOOD PRESSURE: 78 MMHG

## 2025-04-08 DIAGNOSIS — J44.9 COPD, SEVERE (HCC): Primary | ICD-10-CM

## 2025-04-08 DIAGNOSIS — E08.01 DIABETES MELLITUS DUE TO UNDERLYING CONDITION WITH HYPEROSMOLAR COMA, UNSPECIFIED WHETHER LONG TERM INSULIN USE (HCC): ICD-10-CM

## 2025-04-08 DIAGNOSIS — J44.89 ASTHMA-COPD OVERLAP SYNDROME (HCC): ICD-10-CM

## 2025-04-08 DIAGNOSIS — J45.40 CHRONIC ASTHMA, MODERATE PERSISTENT, UNCOMPLICATED: ICD-10-CM

## 2025-04-08 DIAGNOSIS — I42.8 NON-ISCHEMIC CARDIOMYOPATHY (HCC): Chronic | ICD-10-CM

## 2025-04-08 DIAGNOSIS — F33.1 MODERATE EPISODE OF RECURRENT MAJOR DEPRESSIVE DISORDER (HCC): ICD-10-CM

## 2025-04-08 DIAGNOSIS — B35.1 ONYCHOMYCOSIS: ICD-10-CM

## 2025-04-08 DIAGNOSIS — J45.50 SEVERE PERSISTENT ASTHMA, UNCOMPLICATED: ICD-10-CM

## 2025-04-08 PROCEDURE — 99214 OFFICE O/P EST MOD 30 MIN: CPT | Performed by: INTERNAL MEDICINE

## 2025-04-08 PROCEDURE — G2211 COMPLEX E/M VISIT ADD ON: HCPCS | Performed by: INTERNAL MEDICINE

## 2025-04-08 RX ORDER — BUPROPION HYDROCHLORIDE 150 MG/1
150 TABLET ORAL EVERY MORNING
Qty: 30 TABLET | Refills: 5 | Status: SHIPPED | OUTPATIENT
Start: 2025-04-08 | End: 2025-10-05

## 2025-04-08 RX ORDER — UMECLIDINIUM 62.5 UG/1
1 AEROSOL, POWDER ORAL DAILY
Qty: 30 EACH | Refills: 5 | Status: SHIPPED | OUTPATIENT
Start: 2025-04-08

## 2025-04-08 NOTE — ASSESSMENT & PLAN NOTE
Depression Screening Follow-up Plan: Patient's depression screening was positive with a PHQ-9 score of 7. Patient assessed for underlying major depression. They have no active suicidal ideations. Brief counseling provided and recommend additional follow-up/re-evaluation next office visit.  Add Wellbutrin daily  He deferred therapy appt for now but will consider

## 2025-04-08 NOTE — ASSESSMENT & PLAN NOTE
Pt has followup with Pulmonary and is stable on current regimen- has active Advair rx and will monitor closely for when it is close to being done

## 2025-04-08 NOTE — ASSESSMENT & PLAN NOTE
Stable Encouraged resumption of some exercise even at home Has cardio followup scheduled Shivani

## 2025-04-08 NOTE — PROGRESS NOTES
Name: Cricket Rosales      : 1955      MRN: [id number]  Encounter Provider: Lauren Vee DO  Encounter Date: 2025   Encounter department: [facility]  :  Assessment & Plan  Severe persistent asthma, uncomplicated  Pt stable now He is back on advair daily and will monitor rx   Has nebulizer and uses with benefit   Orders:    buPROPion (WELLBUTRIN XL) 150 mg 24 hr tablet; Take 1 tablet (150 mg total) by mouth every morning    Moderate episode of recurrent major depressive disorder (HCC)  Depression Screening Follow-up Plan: Patient's depression screening was positive with a PHQ-9 score of 7. Patient assessed for underlying major depression. They have no active suicidal ideations. Brief counseling provided and recommend additional follow-up/re-evaluation next office visit.  Add Wellbutrin daily  He deferred therapy appt for now but will consider          COPD, severe (HCC)  Pt has followup with Pulmonary and is stable on current regimen- has active Advair rx and will monitor closely for when it is close to being done       Non-ischemic cardiomyopathy with HFmEF (HCC)  Stable Encouraged resumption of some exercise even at home Has cardio followup scheduled        Onychomycosis    Orders:    Ambulatory Referral to Podiatry; Future    Diabetes mellitus due to underlying condition with hyperosmolar coma, unspecified whether long term insulin use (HCC)    Lab Results   Component Value Date    HGBA1C 7.4 (H) 2025   Schedule podiatry followup  Pt has endo appt this month Lo carb diet and provided dates for upcoming appts to pt today     Orders:    Ambulatory Referral to Podiatry; Future      Rto 3months/awv    Depression Screening and Follow-up Plan: Patient's depression screening was positive with a PHQ-9 score of 7.   Patient assessed for underlying major depression. Brief counseling provided and recommend additional follow-up/re-evaluation next office visit.       History of Present  Illness   HPI  Pt was in ER recently for exac of copd He states he was not aware his advair had run out until he was not using it/getting med for couple days He developed acute sob He improved in ER with nebulizer and meds and within 1-2 days felt at baseline level He continues to feel stable He thinks change in weather is going to continue to stablize him as winter is hard for him he is not as active He does feel depressed at times but trying to use music or art to divert No chest pain He does not monitor BS He is following healthier diet and wants to lose some weight No cough or congestion No dizziness He is not exercising regularly but may increase activity as the weather improves He did not go out much this winter   Review of Systems   Constitutional:  Negative for chills and fever.   HENT: Negative.     Eyes:  Negative for visual disturbance.   Respiratory:  Positive for shortness of breath. Negative for cough.    Cardiovascular: Negative.    Gastrointestinal: Negative.    Genitourinary: Negative.    Musculoskeletal:  Positive for arthralgias.   Neurological:  Negative for dizziness, light-headedness and headaches.   Psychiatric/Behavioral:  Negative for sleep disturbance. The patient is not nervous/anxious.      Past Medical History:   Diagnosis Date    Acute on chronic combined systolic and diastolic CHF (congestive heart failure) (Formerly [facility]) 07/27/2023    Alcohol abuse 07/27/2023    Ambulates with cane     Arthritis     Asthma     Back pain     Bunion 04/02/2024    Chest pain 12/22/2022    CHF (congestive heart failure) (Formerly [facility])     Claustrophobia     Constipation 12/18/2018    COPD (chronic obstructive pulmonary disease) (Former[facility])     COPD with acute exacerbation ([facility]) 05/06/2022    COVID-19     COVID-19 virus infection 12/03/2021    Depression     Hypertension     Mumps     Neck pain     Old MI (myocardial infarction)     Pneumonia     Pulmonary emphysema ([facility])     Rectal bleeding 01/14/2019    S/P TKR (total knee  replacement), left 2023    Skin abnormalities     rashes and wounds on both legs - scabbed over and healing    Sleep apnea     no CPAP    Tingling of both feet     Wears glasses      Past Surgical History:   Procedure Laterality Date    APPENDECTOMY      CARDIAC CATHETERIZATION  2015    Left main- normal and maidly tortuous.  Circumflex - normal and moderately tortuous.  RCA- normal and mildy tortuous.  Global LV function was severely depressed..    CARDIAC CATHETERIZATION Left 2022    Procedure: Cardiac Left Heart Cath;  Surgeon: Doreen Briones DO;  Location: [facility];  Service: Cardiology    CARDIAC CATHETERIZATION N/A 2022    Procedure: Cardiac Coronary Angiogram;  Surgeon: Doreen Briones DO;  Location: [facility];  Service: Cardiology    CARDIAC CATHETERIZATION  2022    Procedure: Cardiac catheterization;  Surgeon: Doreen Briones DO;  Location: [facility];  Service: Cardiology    INGUINAL HERNIA REPAIR Bilateral     OR COLONOSCOPY FLX DX W/COLLJ SPEC WHEN PFRMD N/A 2019    Procedure: COLONOSCOPY with removal of anal papilla;  Surgeon: ANIL Dale MD;  Location: [facility];  Service: Colorectal    TONSILLECTOMY      TOTAL KNEE ARTHROPLASTY Left 2023    Procedure: ARTHROPLASTY KNEE TOTAL;  Surgeon: David Mullen DO;  Location: [facility];  Service: Orthopedics    UMBILICAL HERNIA REPAIR  2006     Social History     Socioeconomic History    Marital status: /Civil Union     Spouse name: Not on file    Number of children: Not on file    Years of education: Not on file    Highest education level: Not on file   Occupational History    Not on file   Tobacco Use    Smoking status: Former     Current packs/day: 0.00     Average packs/day: 3.0 packs/day for 43.0 years (129.0 ttl pk-yrs)     Types: Cigarettes     Start date:      Quit date: 2018     Years since quittin.2    Smokeless tobacco: Never   Vaping Use     "Vaping status: Never Used   Substance and Sexual Activity    Alcohol use: Yes     Comment: socially    Drug use: Yes     Types: Marijuana     Comment: occasionally edible    Sexual activity: Not Currently   Other Topics Concern    Not on file   Social History Narrative    Caffeine use     Social Drivers of Health     Financial Resource Strain: Low Risk  (3/23/2023)    Overall Financial Resource Strain (CARDIA)     Difficulty of Paying Living Expenses: Not very hard   Food Insecurity: No Food Insecurity (7/2/2024)    Nursing - Inadequate Food Risk Classification     Worried About Running Out of Food in the Last Year: Never true     Ran Out of Food in the Last Year: Never true     Ran Out of Food in the Last Year: Not on file   Transportation Needs: No Transportation Needs (7/2/2024)    PRAPARE - Transportation     Lack of Transportation (Medical): No     Lack of Transportation (Non-Medical): No   Physical Activity: Not on file   Stress: Not on file   Social Connections: Not on file   Intimate Partner Violence: Not on file   Housing Stability: Low Risk  (7/2/2024)    Housing Stability Vital Sign     Unable to Pay for Housing in the Last Year: No     Number of Times Moved in the Last Year: 1     Homeless in the Last Year: No     Allergies   Allergen Reactions    Ciprofloxacin Shortness Of Breath and Diarrhea       Objective   /78   Pulse 76   Temp (!) 97.2 °F (36.2 °C) (Temporal)   Resp 18   Ht 6' 2\" (1.88 m)   Wt (!) 138 kg (303 lb 3.2 oz)   SpO2 95%   BMI 38.93 kg/m²      Physical Exam  Vitals and nursing note reviewed.   Constitutional:       General: He is not in acute distress.     Appearance: Normal appearance. He is not ill-appearing, toxic-appearing or diaphoretic.   HENT:      Head: Normocephalic and atraumatic.      Right Ear: External ear normal.      Left Ear: External ear normal.      Nose: Nose normal.      Mouth/Throat:      Mouth: Mucous membranes are moist.   Eyes:      General: No scleral " icterus.     Extraocular Movements: Extraocular movements intact.      Conjunctiva/sclera: Conjunctivae normal.      Pupils: Pupils are equal, round, and reactive to light.   Cardiovascular:      Rate and Rhythm: Normal rate and regular rhythm.      Pulses: Pulses are weak.           Dorsalis pedis pulses are 1+ on the right side and 1+ on the left side.        Posterior tibial pulses are 1+ on the right side and 1+ on the left side.      Heart sounds: Normal heart sounds.   Pulmonary:      Effort: Pulmonary effort is normal. No respiratory distress.      Breath sounds: Normal breath sounds. No wheezing.   Abdominal:      General: Bowel sounds are normal. There is no distension.      Palpations: Abdomen is soft.      Tenderness: There is no abdominal tenderness.   Musculoskeletal:      Cervical back: Normal range of motion and neck supple.      Right lower leg: No edema.      Left lower leg: No edema.   Feet:      Right foot:      Skin integrity: Callus and dry skin present.      Left foot:      Skin integrity: Callus and dry skin present.   Lymphadenopathy:      Cervical: No cervical adenopathy.   Skin:     General: Skin is warm and dry.      Coloration: Skin is not jaundiced or pale.   Neurological:      General: No focal deficit present.      Mental Status: He is alert and oriented to person, place, and time. Mental status is at baseline.      Cranial Nerves: No cranial nerve deficit.      Sensory: No sensory deficit.   Psychiatric:         Mood and Affect: Mood normal.         Behavior: Behavior normal.         Thought Content: Thought content normal.         Judgment: Judgment normal.     Diabetic Foot Exam    Patient's shoes and socks removed.    Right Foot/Ankle   Right Foot Inspection  Skin Exam: dry skin, callus and callus.     Toe Exam: ROM and strength within normal limits and right toe deformity.     Sensory   Vibration: diminished  Monofilament testing: diminished    Vascular  The right DP pulse is 1+.  The right PT pulse is 1+.     Left Foot/Ankle  Left Foot Inspection  Skin Exam: dry skin and callus.     Toe Exam: ROM and strength within normal limits and left toe deformity.     Sensory   Vibration: diminished  Monofilament testing: diminished    Vascular  The left DP pulse is 1+. The left PT pulse is 1+.     Assign Risk Category  Deformity present  Loss of protective sensation  Weak pulses  Risk: 2  Onchomycosistoes both feet and pt admits he has difficulty reaching to clip toenails safely

## 2025-04-09 RX ORDER — FUROSEMIDE 40 MG/1
40 TABLET ORAL EVERY OTHER DAY
Qty: 45 TABLET | Refills: 1 | Status: SHIPPED | OUTPATIENT
Start: 2025-04-09

## 2025-04-17 ENCOUNTER — OFFICE VISIT (OUTPATIENT)
Dept: PODIATRY | Facility: CLINIC | Age: 70
End: 2025-04-17
Payer: MEDICARE

## 2025-04-17 VITALS
BODY MASS INDEX: 38.89 KG/M2 | RESPIRATION RATE: 16 BRPM | HEART RATE: 96 BPM | TEMPERATURE: 98.3 F | OXYGEN SATURATION: 98 % | WEIGHT: 303 LBS | HEIGHT: 74 IN

## 2025-04-17 DIAGNOSIS — B35.1 ONYCHOMYCOSIS: Primary | ICD-10-CM

## 2025-04-17 DIAGNOSIS — E08.01 DIABETES MELLITUS DUE TO UNDERLYING CONDITION WITH HYPEROSMOLAR COMA, UNSPECIFIED WHETHER LONG TERM INSULIN USE (HCC): ICD-10-CM

## 2025-04-17 DIAGNOSIS — E11.49 OTHER DIABETIC NEUROLOGICAL COMPLICATION ASSOCIATED WITH TYPE 2 DIABETES MELLITUS (HCC): ICD-10-CM

## 2025-04-17 PROCEDURE — 99202 OFFICE O/P NEW SF 15 MIN: CPT | Performed by: PODIATRIST

## 2025-04-17 PROCEDURE — 99212 OFFICE O/P EST SF 10 MIN: CPT | Performed by: PODIATRIST

## 2025-04-17 PROCEDURE — 11721 DEBRIDE NAIL 6 OR MORE: CPT | Performed by: PODIATRIST

## 2025-04-17 NOTE — PROGRESS NOTES
"Name: Cricket Rosales      : 1955      MRN: [id number]  Encounter Provider: Evonne Jenkins DPM  Encounter Date: 2025   [facility]  :  Assessment & Plan  Onychomycosis    Orders:    Ambulatory Referral to Podiatry    Diabetes mellitus due to underlying condition with hyperosmolar coma, unspecified whether long term insulin use (HCC)    Lab Results   Component Value Date    HGBA1C 7.4 (H) 2025       Orders:    Ambulatory Referral to Podiatry         IMPRESSION:  Onychomycosis  Neuropathy  Diabetic footcare    PLAN:  Nail care, does not come in that often.  Onychomycosis, neuropathy  Onychomycosis reviewed.  Treatment options including topical versus oral versus palliative nail care reviewed.  Risks and benefits of each reviewed  Patient would like to continue with palliative care for onychomycotic nails  Nails thinned and shortened patient's tolerance without incidence  PCP note from 2025 reviewed   Patient counseled on neuropathy and importance of regular foot checks  Patient advised to wear supportive shoes with wide toebox  Reviewed importance of glycemic control  Follow-up 9 to 10 weeks for continued at risk foot evaluation    History of Present Illness   HPI  Cricket Rosales is a 69 y.o. male who presents for hours for evaluation of painful elongated onychomycotic nails.  Patient also has neuropathy in bilateral lower extremities.  Patient has difficulty evaluating his feet      Review of Systems   Constitutional:  Negative for chills and fever.   Respiratory:  Negative for chest tightness.    Cardiovascular:  Negative for leg swelling.   Musculoskeletal:  Positive for arthralgias and gait problem.   Skin:  Negative for wound.          Objective   Pulse 96   Temp 98.3 °F (36.8 °C) (Temporal)   Resp 16   Ht 6' 2\" (1.88 m)   Wt (!) 137 kg (303 lb)   SpO2 98%   BMI 38.90 kg/m²      Physical Exam  Constitutional:       General: He is not in " acute distress.     Appearance: He is not ill-appearing.   Cardiovascular:      Pulses: Pulses are weak.           Dorsalis pedis pulses are 1+ on the right side and 1+ on the left side.        Posterior tibial pulses are 1+ on the right side and 1+ on the left side.      Comments: DP/PT pulse 1/4  Skin temperature gradient decreases from knees to digits bilaterally  Absent pedal hair growth    Feet:      Right foot:      Skin integrity: Dry skin present. No ulcer.      Left foot:      Skin integrity: Dry skin present. No ulcer.   Skin:     Capillary Refill: Capillary refill takes less than 2 seconds.      Comments: Nails thickened, dystrophic, discolored, elongated, tender with palpation       Diabetic Foot Exam    Patient's shoes and socks removed.    Right Foot/Ankle   Right Foot Inspection  Skin Exam: dry skin. Skin not intact and no ulcer.     Toe Exam: tenderness.     Sensory   Monofilament testing: diminished    Vascular  Capillary refills: < 3 seconds  The right DP pulse is 1+. The right PT pulse is 1+.     Left Foot/Ankle  Left Foot Inspection  Skin Exam: dry skin. Skin not intact and no ulcer.     Toe Exam: tenderness.     Sensory   Monofilament testing: diminished    Vascular  Capillary refills: < 3 seconds  The left DP pulse is 1+. The left PT pulse is 1+.     Assign Risk Category  No deformity present  Loss of protective sensation  Weak pulses  Risk: 2

## 2025-04-18 ENCOUNTER — OFFICE VISIT (OUTPATIENT)
Dept: ENDOCRINOLOGY | Facility: CLINIC | Age: 70
End: 2025-04-18
Payer: MEDICARE

## 2025-04-18 VITALS
WEIGHT: 306 LBS | TEMPERATURE: 97 F | SYSTOLIC BLOOD PRESSURE: 104 MMHG | OXYGEN SATURATION: 95 % | HEIGHT: 74 IN | BODY MASS INDEX: 39.27 KG/M2 | HEART RATE: 63 BPM | DIASTOLIC BLOOD PRESSURE: 60 MMHG

## 2025-04-18 DIAGNOSIS — N18.32 TYPE 2 DIABETES MELLITUS WITH STAGE 3B CHRONIC KIDNEY DISEASE, WITHOUT LONG-TERM CURRENT USE OF INSULIN (HCC): ICD-10-CM

## 2025-04-18 DIAGNOSIS — E78.5 DYSLIPIDEMIA: ICD-10-CM

## 2025-04-18 DIAGNOSIS — E66.01 OBESITY, MORBID (HCC): ICD-10-CM

## 2025-04-18 DIAGNOSIS — E11.22 TYPE 2 DIABETES MELLITUS WITH STAGE 3B CHRONIC KIDNEY DISEASE, WITHOUT LONG-TERM CURRENT USE OF INSULIN (HCC): ICD-10-CM

## 2025-04-18 DIAGNOSIS — E11.29 TYPE 2 DIABETES MELLITUS WITH PROTEINURIA  (HCC): Primary | ICD-10-CM

## 2025-04-18 DIAGNOSIS — R80.9 TYPE 2 DIABETES MELLITUS WITH PROTEINURIA  (HCC): Primary | ICD-10-CM

## 2025-04-18 LAB — SL AMB POCT HEMOGLOBIN AIC: 7.1 (ref ?–6.5)

## 2025-04-18 PROCEDURE — 83036 HEMOGLOBIN GLYCOSYLATED A1C: CPT | Performed by: PHYSICIAN ASSISTANT

## 2025-04-18 PROCEDURE — G2211 COMPLEX E/M VISIT ADD ON: HCPCS | Performed by: PHYSICIAN ASSISTANT

## 2025-04-18 PROCEDURE — 99214 OFFICE O/P EST MOD 30 MIN: CPT | Performed by: PHYSICIAN ASSISTANT

## 2025-04-18 NOTE — PROGRESS NOTES
Name: Cricket Rosales      : 1955      MRN: [id number]  Encounter Provider: Skip Pa PA-C  Encounter Date: 2025   Encounter department: [facility]    No chief complaint on file.  :  Assessment & Plan  Type 2 diabetes mellitus with proteinuria  (HCC)    Lab Results   Component Value Date    HGBA1C 7.1 (A) 2025   A1c improving, but not quite at goal, which ideally should be < 6.5%.     Would benefit from GLP1 therapy - reviewed MOA, AE. Recommend start Ozempic 0.25mg weekly x 4 weeks, then increase to 0.5mg weekly thereafter if well tolerated. Sample pen provided. Will send in Rx as well.   To continue Jardiance at current dose - may consider dose escalation in near future.     BG monitoring - encouraged to try CGM - sample Timothy 3 provided. Will also try to troubleshoot glucometer, check BG via fingerstick more routinely.     Diet - provided formal referral to dietitian for initial MNT visit. May wish to consider Living well with DM class to follow.    Labs - follow up A1c, BMP, lipid panel recommended in 3 months.  Follow up - 3-4 months.    Orders:    POCT hemoglobin A1c    Ambulatory referral to Diabetic Education; Future    semaglutide, 0.25 or 0.5 mg/dose, (Ozempic, 0.25 or 0.5 MG/DOSE,) 2 mg/3 mL injection pen; Inject 0.75 mL (0.5 mg total) under the skin every 7 days    Type 2 diabetes mellitus with stage 3b chronic kidney disease, without long-term current use of insulin (HCC)    Lab Results   Component Value Date    HGBA1C 7.1 (A) 2025            Obesity, morbid (HCC)  Would benefit from GLP1 therapy       Dyslipidemia  C/w statin.       I have spent a total time of 35 minutes in caring for this patient on the day of the visit/encounter including Diagnostic results, Prognosis, Instructions for management, Impressions, Counseling / Coordination of care, Documenting in the medical record, and Obtaining or reviewing history  .  "    History of Present Illness     Cricket Rosales is a 69 y.o. male with type 2 DM here for routine follow up visit today.   DM is c/b: DPN, DR, CKD w nephropathy, ASCVD.   Most recent endocrinology visit: 1/8/25 with Dr. Avila.     A1c in office today: 7.1%.    Diet - appetite good, tries to be mindful of healthy dietary choices overall. Using Factor meals - 1-2 times / day.  Had dietician visit   Exercise - unfortunately cannot tolerate due to COPD / CHF.     BG monitoring - not checking routinely at home, has glucometer and supplies but did not have an experience so far - received error message, could not use.     Hyperglycemia symptoms - feels thirsty a lot, no polyuria. Nocturia - occasionally.   Hypoglycemia symptoms - none.     DM foot exam - follows routinely with podiatry.  DM eye exam - overdue, Pa eyes - due for follow up.     No h/o pancreatitis, no h/o MEN2 syndrome or medullary thyroid CA.         Review of Systems as per HPI      Medical History Reviewed by provider this encounter:  Tobacco  Allergies  Meds  Problems  Med Hx  Surg Hx  Fam Hx     .    Objective   /60 (BP Location: Right arm, Patient Position: Sitting)   Pulse 63   Temp (!) 97 °F (36.1 °C)   Ht 6' 2\" (1.88 m)   Wt (!) 139 kg (306 lb)   SpO2 95%   BMI 39.29 kg/m²      Body mass index is 39.29 kg/m².  Wt Readings from Last 3 Encounters:   04/18/25 (!) 139 kg (306 lb)   04/17/25 (!) 137 kg (303 lb)   04/08/25 (!) 138 kg (303 lb 3.2 oz)     Physical Exam  Vitals reviewed.   Constitutional:       General: He is not in acute distress.     Appearance: He is obese. He is not ill-appearing.   Pulmonary:      Effort: No respiratory distress.   Neurological:      Mental Status: He is alert.   Psychiatric:         Mood and Affect: Mood normal.         Labs:   Lab Results   Component Value Date    HGBA1C 7.4 (H) 01/09/2025    HGBA1C 6.7 (H) 10/14/2024    HGBA1C 6.6 (A) 07/16/2024     Lab Results   Component Value Date    " "CREATININE 1.91 (H) 03/27/2025    CREATININE 1.76 (H) 01/09/2025    CREATININE 1.75 (H) 11/14/2024    BUN 50 (H) 03/27/2025     07/27/2015    K 5.6 (H) 03/27/2025     03/27/2025    CO2 21 03/27/2025     eGFR   Date Value Ref Range Status   03/27/2025 35 ml/min/1.73sq m Final     Lab Results   Component Value Date    CHOL 105 07/23/2015    HDL 27 (L) 06/24/2024    TRIG 86 06/24/2024     Lab Results   Component Value Date    ALT 25 03/27/2025    AST 26 03/27/2025    ALKPHOS 80 03/27/2025    BILITOT 1.05 (H) 07/22/2015     Lab Results   Component Value Date    WAV1XGVDMEVS 1.812 07/28/2023    MXG1BLHRNNST 0.413 (L) 12/17/2022    OZQ0LEAMMOLB 1.570 12/18/2018     No results found for: \"FREET4\", \"TSI\"    There are no Patient Instructions on file for this visit.    Discussed with the patient and all questioned fully answered. He will call me if any problems arise.      "

## 2025-04-18 NOTE — ASSESSMENT & PLAN NOTE
Lab Results   Component Value Date    HGBA1C 7.1 (A) 04/18/2025   A1c improving, but not quite at goal, which ideally should be < 6.5%.     Would benefit from GLP1 therapy - reviewed MOA, AE. Recommend start Ozempic 0.25mg weekly x 4 weeks, then increase to 0.5mg weekly thereafter if well tolerated. Sample pen provided. Will send in Rx as well.   To continue Jardiance at current dose - may consider dose escalation in near future.     BG monitoring - encouraged to try CGM - sample Timothy 3 provided. Will also try to troubleshoot glucometer, check BG via fingerstick more routinely.     Diet - provided formal referral to dietitian for initial MNT visit. May wish to consider Living well with DM class to follow.    Labs - follow up A1c, BMP, lipid panel recommended in 3 months.  Follow up - 3-4 months.    Orders:    POCT hemoglobin A1c    Ambulatory referral to Diabetic Education; Future    semaglutide, 0.25 or 0.5 mg/dose, (Ozempic, 0.25 or 0.5 MG/DOSE,) 2 mg/3 mL injection pen; Inject 0.75 mL (0.5 mg total) under the skin every 7 days

## 2025-04-18 NOTE — PATIENT INSTRUCTIONS
Recommend dietitian consult - Please call [phone] to schedule your appointment.     May wish to consider Stelo CGM monitoring.

## 2025-04-22 ENCOUNTER — APPOINTMENT (OUTPATIENT)
Dept: LAB | Facility: CLINIC | Age: 70
End: 2025-04-22
Attending: STUDENT IN AN ORGANIZED HEALTH CARE EDUCATION/TRAINING PROGRAM
Payer: MEDICARE

## 2025-04-22 DIAGNOSIS — R80.9 TYPE 2 DIABETES MELLITUS WITH PROTEINURIA  (HCC): ICD-10-CM

## 2025-04-22 DIAGNOSIS — N18.32 TYPE 2 DIABETES MELLITUS WITH STAGE 3B CHRONIC KIDNEY DISEASE, WITHOUT LONG-TERM CURRENT USE OF INSULIN (HCC): ICD-10-CM

## 2025-04-22 DIAGNOSIS — E11.29 TYPE 2 DIABETES MELLITUS WITH PROTEINURIA  (HCC): ICD-10-CM

## 2025-04-22 DIAGNOSIS — J44.89 ASTHMA-COPD OVERLAP SYNDROME (HCC): ICD-10-CM

## 2025-04-22 DIAGNOSIS — E11.22 TYPE 2 DIABETES MELLITUS WITH STAGE 3B CHRONIC KIDNEY DISEASE, WITHOUT LONG-TERM CURRENT USE OF INSULIN (HCC): ICD-10-CM

## 2025-04-22 LAB
ANION GAP SERPL CALCULATED.3IONS-SCNC: 9 MMOL/L (ref 4–13)
BUN SERPL-MCNC: 41 MG/DL (ref 5–25)
CALCIUM SERPL-MCNC: 9.1 MG/DL (ref 8.4–10.2)
CHLORIDE SERPL-SCNC: 103 MMOL/L (ref 96–108)
CHOLEST SERPL-MCNC: 102 MG/DL (ref ?–200)
CO2 SERPL-SCNC: 23 MMOL/L (ref 21–32)
CREAT SERPL-MCNC: 1.83 MG/DL (ref 0.6–1.3)
EST. AVERAGE GLUCOSE BLD GHB EST-MCNC: 166 MG/DL
GFR SERPL CREATININE-BSD FRML MDRD: 37 ML/MIN/1.73SQ M
GLUCOSE P FAST SERPL-MCNC: 126 MG/DL (ref 65–99)
HBA1C MFR BLD: 7.4 %
HDLC SERPL-MCNC: 25 MG/DL
LDLC SERPL CALC-MCNC: 49 MG/DL (ref 0–100)
POTASSIUM SERPL-SCNC: 4.6 MMOL/L (ref 3.5–5.3)
SODIUM SERPL-SCNC: 135 MMOL/L (ref 135–147)
TRIGL SERPL-MCNC: 140 MG/DL (ref ?–150)

## 2025-04-22 PROCEDURE — 36415 COLL VENOUS BLD VENIPUNCTURE: CPT

## 2025-04-22 PROCEDURE — 83036 HEMOGLOBIN GLYCOSYLATED A1C: CPT

## 2025-04-22 PROCEDURE — 80048 BASIC METABOLIC PNL TOTAL CA: CPT

## 2025-04-22 PROCEDURE — 80061 LIPID PANEL: CPT

## 2025-04-29 RX ORDER — ALBUTEROL SULFATE 90 UG/1
2 INHALANT RESPIRATORY (INHALATION) EVERY 6 HOURS PRN
Qty: 18 G | Refills: 0 | Status: SHIPPED | OUTPATIENT
Start: 2025-04-29

## 2025-04-29 NOTE — TELEPHONE ENCOUNTER
Patient called to request a refill for their Albuterol Sulfate 90mcg inhaler advised a refill was requested on 4/22/28 and is pending approval. Patient verbalized understanding and is in agreement.     Does the patient have enough for 3 days?   [] Yes   [x] No - Send as HP to POD

## 2025-05-29 ENCOUNTER — HOSPITAL ENCOUNTER (EMERGENCY)
Facility: HOSPITAL | Age: 70
Discharge: HOME | End: 2025-05-29
Attending: EMERGENCY MEDICINE | Admitting: EMERGENCY MEDICINE
Payer: MEDICARE

## 2025-05-29 VITALS
DIASTOLIC BLOOD PRESSURE: 64 MMHG | SYSTOLIC BLOOD PRESSURE: 126 MMHG | HEART RATE: 64 BPM | OXYGEN SATURATION: 95 % | TEMPERATURE: 97.2 F | RESPIRATION RATE: 16 BRPM

## 2025-05-29 DIAGNOSIS — K59.00 CONSTIPATION, UNSPECIFIED CONSTIPATION TYPE: Primary | ICD-10-CM

## 2025-05-29 DIAGNOSIS — J44.89 ASTHMA-COPD OVERLAP SYNDROME (HCC): ICD-10-CM

## 2025-05-29 DIAGNOSIS — F41.9 ANXIETY: ICD-10-CM

## 2025-05-29 PROCEDURE — 99283 EMERGENCY DEPT VISIT LOW MDM: CPT

## 2025-05-29 PROCEDURE — 99284 EMERGENCY DEPT VISIT MOD MDM: CPT | Performed by: EMERGENCY MEDICINE

## 2025-05-29 RX ORDER — SODIUM PHOSPHATE,MONO-DIBASIC 19G-7G/118
1 ENEMA (ML) RECTAL ONCE
Status: COMPLETED | OUTPATIENT
Start: 2025-05-29 | End: 2025-05-29

## 2025-05-29 RX ORDER — IPRATROPIUM BROMIDE AND ALBUTEROL SULFATE 2.5; .5 MG/3ML; MG/3ML
SOLUTION RESPIRATORY (INHALATION)
Qty: 360 ML | Refills: 1 | Status: SHIPPED | OUTPATIENT
Start: 2025-05-29

## 2025-05-29 RX ORDER — ALBUTEROL SULFATE 90 UG/1
2 INHALANT RESPIRATORY (INHALATION) EVERY 6 HOURS PRN
Qty: 18 G | Refills: 5 | Status: SHIPPED | OUTPATIENT
Start: 2025-05-29

## 2025-05-29 RX ORDER — ALPRAZOLAM 0.5 MG
TABLET ORAL
Qty: 30 TABLET | Refills: 0 | Status: SHIPPED | OUTPATIENT
Start: 2025-05-29

## 2025-05-29 RX ADMIN — SODIUM PHOSPHATE, DIBASIC AND SODIUM PHOSPHATE, MONOBASIC 1 ENEMA: 7; 19 ENEMA RECTAL at 09:45

## 2025-05-29 NOTE — ED NOTES
Went to reassess patient. Patient was not in room. Registration stated they saw patient walking out of facility in his home clothing. Provider notified.      Caprice Watson RN  05/29/25 2843

## 2025-05-29 NOTE — ED ATTENDING ATTESTATION
Final Diagnosis:  1. Constipation, unspecified constipation type           I, Pankaj Lutz MD, saw and evaluated the patient. All available labs and X-rays were ordered by me or the resident and have been reviewed by myself. I discussed the patient with the resident / non-physician and agree with the resident's / non-physician practitioner's findings and plan as documented in the resident's / non-physician practicitioner's note, except where noted.   At this point, I agree with the current assessment done in the ED.   I was present during key portions of all procedures performed unless otherwise stated.     Chief Complaint   Patient presents with    Constipation     Reports no BM in a week. No relief from miralax or colace. Reports mild discomfort in the lower abdomen      This is a 70 y.o. male presenting for evaluation of abdominal pain.  Patient states that he has not had a bowel movement in about a week.  He denies any erika abdominal pain but he states that he does have some generalized discomfort which he gets when he gets backed up.  Denies any nausea or vomiting.  Patient tells me that he is scheduled for colonoscopy on Shivani 3.  He is concerned that between now and then he will be proceeding they backed up.  He states that he does have of medication ordered to take right before the procedure but he is not supposed start that until June 2.  He denies any fever or chills.    PMH:   has a past medical history of Acute on chronic combined systolic and diastolic CHF (congestive heart failure) ([facility]) (07/27/2023), Alcohol abuse (07/27/2023), Ambulates with cane, Arthritis, Asthma, Back pain, Bunion (04/02/2024), Chest pain (12/22/2022), CHF (congestive heart failure) ([facility]), Claustrophobia, Constipation (12/18/2018), COPD (chronic obstructive pulmonary disease) ([facility]), COPD with acute exacerbation ([facility]) (05/06/2022), COVID-19, COVID-19 virus infection (12/03/2021), Depression, Hypertension, Mumps, Neck pain, Old  MI (myocardial infarction), Pneumonia, Pulmonary emphysema (HCC), Rectal bleeding (01/14/2019), S/P TKR (total knee replacement), left (11/02/2023), Skin abnormalities, Sleep apnea, Tingling of both feet, and Wears glasses.    PSH:   has a past surgical history that includes Cardiac catheterization (07/24/2015); Inguinal hernia repair (Bilateral); Tonsillectomy; Umbilical hernia repair (06/21/2006); pr colonoscopy flx dx w/collj spec when pfrmd (N/A, 03/11/2019); Cardiac catheterization (Left, 09/27/2022); Cardiac catheterization (N/A, 09/27/2022); Cardiac catheterization (09/27/2022); Appendectomy; and TOTAL KNEE ARTHROPLASTY (Left, 9/18/2023).    Procedures     Social:  Social History     Substance and Sexual Activity   Alcohol Use Yes    Comment: socially     Tobacco Use History[1]  Social History     Substance and Sexual Activity   Drug Use Yes    Types: Marijuana    Comment: occasionally edible     PE:  Vitals:    05/29/25 0854 05/29/25 0930   BP: 127/66 126/64   BP Location: Right arm Right arm   Pulse: 71 64   Resp: 16 16   Temp: (!) 97.2 °F (36.2 °C)    SpO2: 96% 95%       A:    Unless otherwise specified above:     General: VS reviewed  Appears in NAD     Head: Normocephalic, atraumatic     CV: No pallor noted  Lungs:   No respiratory distress     Abdomen:  Soft, non-tender, non-distended     MSK:   No obvious deformity     Skin: No obvious rash.     Neuro: Awake, alert, GCS15, CN II-XII grossly intact. Speaking in full sentences.   Motor grossly intact.     Psychiatric/Behavioral: Appropriate mood and affect   Exam: deferred    P:  - Patient was somewhat large abdomen without tenderness to palpation.  Overall he appears very comfortable.  I discussed with him possible blood work as well as CT imaging to rule out other things like obstruction, diverticulitis, other sources of infection, pancreatitis, hepatitis.  Patient states that overall he feels well and the only reason that he came into the emergency  department is that he want to get an enema to help get everything moving.  He would like to defer blood work and imaging at this time and try symptomatic management first.  Based on how well the patient appears I think this would be a reasonable course of action.  He tells me that he has a lot of specialist looking into what is going on and overall feels well.  - Will provide enema and reassess  - Patient left the emergency department after receiving enema.  Overall he was in good physical condition.  - 13 point ROS was performed and all are normal unless stated in the history above.   - Nursing note reviewed. Vitals reviewed.   - Orders placed by myself and/or advanced practitioner / resident.    - Previous chart was reviewed  - No language barrier.   - History obtained from patient.   - There are no limitations to the history obtained.     Unless otherwise specified:  CC is exclusive from any separately billable procedures  CC is exclusive of treating other patients  CC is exclusive of teaching time     Code Status: Prior  Advance Directive and Living Will:      Power of :    POLST:      Medications   sodium phosphate-biphosphate (FLEET) enema 1 enema (1 enema Rectal Given 5/29/25 0945)     No orders to display     No orders of the defined types were placed in this encounter.    Labs Reviewed - No data to display  Time reflects when diagnosis was documented in both MDM as applicable and the Disposition within this note       Time User Action Codes Description Comment    5/29/2025 11:05 AM Carrie Benavidez Add [K59.00] Constipation, unspecified constipation type           ED Disposition       ED Disposition   Discharge    Condition   Stable    Date/Time   u May 29, 2025 11:05 AM    Comment   Cricket Rosales discharge to home/self care.                   Follow-up Information       Follow up With Specialties Details Why Contact Info    Lauren Vee, DO Family Medicine Schedule an appointment as  soon as possible for a visit   [address] [facility] [address]  [phone]            Discharge Medication List as of 5/29/2025 11:07 AM        CONTINUE these medications which have NOT CHANGED    Details   atorvastatin (LIPITOR) 20 mg tablet Take 1 tablet (20 mg total) by mouth daily, Starting Wed 11/13/2024, Normal      Blood Glucose Monitoring Suppl (Accu-Chek Guide Me) w/Device KIT Use 1 each daily to test blood sugar., Starting Mon 1/23/2023, Normal      Blood Pressure KIT Use 3 (three) times a week, Starting Fri 7/19/2024, Normal      buPROPion (WELLBUTRIN XL) 150 mg 24 hr tablet Take 1 tablet (150 mg total) by mouth every morning, Starting Tue 4/8/2025, Until Sun 10/5/2025, Normal      carvedilol (COREG) 25 mg tablet TAKE 1 TABLET BY MOUTH TWICE DAILY WITH MEALS, Starting Mon 12/23/2024, Normal      Diclofenac Sodium (VOLTAREN) 1 % Apply 2 g topically 2 (two) times a day, Starting Tue 4/2/2024, Normal      DULoxetine (CYMBALTA) 60 mg delayed release capsule Take 1 capsule (60 mg total) by mouth daily, Starting Tue 7/2/2024, Normal      Empagliflozin (Jardiance) 10 MG TABS tablet Take 1 tablet (10 mg total) by mouth every morning, Starting Tue 11/26/2024, Normal      Fluticasone-Salmeterol (Advair) 500-50 mcg/dose inhaler INHALE 1 DOSE BY MOUTH TWICE DAILY RINSE MOUTH AFTER USE, Normal      furosemide (LASIX) 40 mg tablet TAKE 1 TABLET BY MOUTH EVERY OTHER DAY, Starting Wed 4/9/2025, Normal      isosorbide mononitrate (IMDUR) 30 mg 24 hr tablet Take 1 tablet by mouth once daily, Starting Thu 3/20/2025, Normal      polyethylene glycol (MIRALAX) 17 g packet Take 17 g by mouth daily, Starting Sun 6/23/2024, Normal      sacubitril-valsartan (Entresto) 49-51 MG TABS Take 1 tablet by mouth 2 (two) times a day, Starting Wed 11/13/2024, Normal      semaglutide, 0.25 or 0.5 mg/dose, (Ozempic, 0.25 or 0.5 MG/DOSE,) 2 mg/3 mL injection pen Inject 0.75 mL (0.5 mg total) under the skin every 7 days, Starting Fri  4/18/2025, Normal      umeclidinium (Incruse Ellipta) 62.5 mcg/actuation AEPB inhaler Inhale 1 puff by mouth once daily, Starting Tue 4/8/2025, Normal      Accu-Chek FastClix Lancets MISC Use 1 each daily to test blood sugar., Starting Mon 1/23/2023, Normal      benzonatate (TESSALON PERLES) 100 mg capsule Take 1 capsule (100 mg total) by mouth 3 (three) times a day as needed for cough, Starting u 3/27/2025, Normal      fluticasone (FLONASE) 50 mcg/act nasal spray Use 1 spray(s) in each nostril twice daily, Normal      glucose blood test strip Use 1 each daily to test blood sugar., Starting Mon 1/23/2023, Normal      montelukast (SINGULAIR) 10 mg tablet Take 1 tablet (10 mg total) by mouth daily at bedtime, Starting Sun 7/30/2023, Normal      tiotropium (Spiriva Respimat) 2.5 MCG/ACT AERS inhaler INHALE 2 SPRAY(S) BY MOUTH ONCE DAILY, Normal      albuterol (PROVENTIL HFA,VENTOLIN HFA) 90 mcg/act inhaler INHALE 2 PUFFS BY MOUTH EVERY 6 HOURS AS NEEDED FOR WHEEZING, Starting Tue 4/29/2025, Normal      ALPRAZolam (XANAX) 0.5 mg tablet TAKE 1 TABLET BY MOUTH ONCE DAILY AT BEDTIME AS NEEDED FOR ANXIETY, Normal      ipratropium-albuterol (DUO-NEB) 0.5-2.5 mg/3 mL nebulizer solution Take 3 mL by nebulization 4 (four) times a day, Starting Tue 11/5/2024, Normal           No discharge procedures on file.  Prior to Admission Medications   Prescriptions Last Dose Informant Patient Reported? Taking?   Accu-Chek FastClix Lancets MISC Unknown Self No No   Sig: Use 1 each daily to test blood sugar.   Blood Glucose Monitoring Suppl (Accu-Chek Guide Me) w/Device KIT 5/29/2025 Morning Self No Yes   Sig: Use 1 each daily to test blood sugar.   Blood Pressure KIT 5/29/2025 Morning Self No Yes   Sig: Use 3 (three) times a week   DULoxetine (CYMBALTA) 60 mg delayed release capsule 5/29/2025 Morning Self No Yes   Sig: Take 1 capsule (60 mg total) by mouth daily   Diclofenac Sodium (VOLTAREN) 1 % 5/29/2025 Morning Self No Yes   Sig:  Apply 2 g topically 2 (two) times a day   Empagliflozin (Jardiance) 10 MG TABS tablet 5/28/2025 Self No Yes   Sig: Take 1 tablet (10 mg total) by mouth every morning   Fluticasone-Salmeterol (Advair) 500-50 mcg/dose inhaler 5/29/2025 Morning Self No Yes   Sig: INHALE 1 DOSE BY MOUTH TWICE DAILY RINSE MOUTH AFTER USE   atorvastatin (LIPITOR) 20 mg tablet 5/29/2025 Morning Self No Yes   Sig: Take 1 tablet (20 mg total) by mouth daily   benzonatate (TESSALON PERLES) 100 mg capsule Unknown Self No No   Sig: Take 1 capsule (100 mg total) by mouth 3 (three) times a day as needed for cough   buPROPion (WELLBUTRIN XL) 150 mg 24 hr tablet 5/28/2025 Self No Yes   Sig: Take 1 tablet (150 mg total) by mouth every morning   carvedilol (COREG) 25 mg tablet 5/29/2025 Morning Self No Yes   Sig: TAKE 1 TABLET BY MOUTH TWICE DAILY WITH MEALS   fluticasone (FLONASE) 50 mcg/act nasal spray Unknown Self No No   Sig: Use 1 spray(s) in each nostril twice daily   furosemide (LASIX) 40 mg tablet Past Week Self No Yes   Sig: TAKE 1 TABLET BY MOUTH EVERY OTHER DAY   glucose blood test strip Unknown Self No No   Sig: Use 1 each daily to test blood sugar.   isosorbide mononitrate (IMDUR) 30 mg 24 hr tablet 5/29/2025 Morning Self No Yes   Sig: Take 1 tablet by mouth once daily   montelukast (SINGULAIR) 10 mg tablet Unknown Self No No   Sig: Take 1 tablet (10 mg total) by mouth daily at bedtime   polyethylene glycol (MIRALAX) 17 g packet 5/28/2025 Self No Yes   Sig: Take 17 g by mouth daily   sacubitril-valsartan (Entresto) 49-51 MG TABS 5/29/2025 Morning Self No Yes   Sig: Take 1 tablet by mouth 2 (two) times a day   semaglutide, 0.25 or 0.5 mg/dose, (Ozempic, 0.25 or 0.5 MG/DOSE,) 2 mg/3 mL injection pen Past Month  No Yes   Sig: Inject 0.75 mL (0.5 mg total) under the skin every 7 days   tiotropium (Spiriva Respimat) 2.5 MCG/ACT AERS inhaler Not Taking Self No No   Sig: INHALE 2 SPRAY(S) BY MOUTH ONCE DAILY   Patient not taking: No sig  "reported   umeclidinium (Incruse Ellipta) 62.5 mcg/actuation AEPB inhaler 2025 Morning Self No Yes   Sig: Inhale 1 puff by mouth once daily      Facility-Administered Medications: None       Portions of the record may have been created with voice recognition software. Occasional wrong word or \"sound a like\" substitutions may have occurred due to the inherent limitations of voice recognition software. Read the chart carefully and recognize, using context, where substitutions have occurred.    Electronically signed by:  Pankaj Lutz         [1]   Social History  Tobacco Use   Smoking Status Former    Current packs/day: 0.00    Average packs/day: 3.0 packs/day for 43.0 years (129.0 ttl pk-yrs)    Types: Cigarettes    Start date:     Quit date:     Years since quittin.4   Smokeless Tobacco Never     "

## 2025-05-29 NOTE — ED PROVIDER NOTES
ED Disposition       None          Assessment & Plan       Medical Decision Making  Amount and/or Complexity of Data Reviewed  Labs: ordered.    Risk  OTC drugs.    Patient is a 70-year-old male who presents to the ER with complaints of constipation since 5 to 6 days.  Patient has very mild discomfort in the lower abdomen, denies abdominal pain, nausea, vomiting, blood in stool.    Abdomen soft, nontender on examination.  Patient declines blood work or imaging at the moment.  He is mainly looking to get any edema for now.  Patient states that he has colonoscopy scheduled 6/3/2025 and therefore would like to hold off on any workup today.    Fleet enema ordered in the ER.  Patient eventually left without any notice.  Likely, patient had a bowel movement and left without notice.  Patient to follow-up with PCP outpatient.           Medications - No data to display    ED Risk Strat Scores                    No data recorded        SBIRT 22yo+      Flowsheet Row Most Recent Value   Initial Alcohol Screen: US AUDIT-C     1. How often do you have a drink containing alcohol? 0 Filed at: 05/29/2025 0854   2. How many drinks containing alcohol do you have on a typical day you are drinking?  0 Filed at: 05/29/2025 0854   3a. Male UNDER 65: How often do you have five or more drinks on one occasion? 0 Filed at: 05/29/2025 0854   3b. FEMALE Any Age, or MALE 65+: How often do you have 4 or more drinks on one occassion? 0 Filed at: 05/29/2025 0854   Audit-C Score 0 Filed at: 05/29/2025 0854   MAURY: How many times in the past year have you...    Used an illegal drug or used a prescription medication for non-medical reasons? Never Filed at: 05/29/2025 0854                            History of Present Illness       Chief Complaint   Patient presents with    Constipation     Reports no BM in a week. No relief from miralax or colace. Reports mild discomfort in the lower abdomen        Past Medical History[1]   Past Surgical History[2]    Family History[3]   Social History[4]   E-Cigarette/Vaping    E-Cigarette Use Never User       E-Cigarette/Vaping Substances    Nicotine No     THC No     CBD No     Flavoring No     Other No     Unknown No       I have reviewed and agree with the history as documented.     Patient is a 70-year-old male who presents to the ER today with complaints of no bowel movement since 5 to 6 days.  He states that his stools are like hard geovanni and has been having abdominal discomfort since.  Patient states that he takes MiraLAX as needed.  Denies fever, chills, nausea, vomiting, diarrhea, chest pain, blood in stool, abdominal pain.  He states that he tried Colace and MiraLAX with no improvement in his symptoms.  Complains of increased gassiness.          Review of Systems   Constitutional:  Negative for chills and fever.   HENT:  Negative for ear pain and sore throat.    Eyes:  Negative for pain and visual disturbance.   Respiratory:  Negative for cough and shortness of breath.    Cardiovascular:  Negative for chest pain and palpitations.   Gastrointestinal:  Positive for constipation. Negative for abdominal pain and vomiting.   Genitourinary:  Negative for dysuria and hematuria.   Musculoskeletal:  Negative for arthralgias and back pain.   Skin:  Negative for color change and rash.   Neurological:  Negative for seizures and syncope.   All other systems reviewed and are negative.          Objective       ED Triage Vitals [05/29/25 0854]   Temperature Pulse Blood Pressure Respirations SpO2 Patient Position - Orthostatic VS   (!) 97.2 °F (36.2 °C) 71 127/66 16 96 % Lying      Temp src Heart Rate Source BP Location FiO2 (%) Pain Score    -- Monitor Right arm -- 1      Vitals      Date and Time Temp Pulse SpO2 Resp BP Pain Score FACES Pain Rating User   05/29/25 0854 97.2 °F (36.2 °C) 71 96 % 16 127/66 1 -- CLS            Physical Exam  Vitals and nursing note reviewed.   Constitutional:       General: He is not in acute  distress.     Appearance: He is well-developed.   HENT:      Head: Normocephalic and atraumatic.     Eyes:      Conjunctiva/sclera: Conjunctivae normal.       Cardiovascular:      Rate and Rhythm: Normal rate and regular rhythm.      Heart sounds: No murmur heard.  Pulmonary:      Effort: Pulmonary effort is normal. No respiratory distress.      Breath sounds: Normal breath sounds.   Abdominal:      Palpations: Abdomen is soft.      Tenderness: There is no abdominal tenderness.     Musculoskeletal:         General: No swelling.      Cervical back: Neck supple.     Skin:     General: Skin is warm and dry.      Capillary Refill: Capillary refill takes less than 2 seconds.     Neurological:      Mental Status: He is alert.     Psychiatric:         Mood and Affect: Mood normal.         Results Reviewed       None            No orders to display       Procedures    ED Medication and Procedure Management   Prior to Admission Medications   Prescriptions Last Dose Informant Patient Reported? Taking?   ALPRAZolam (XANAX) 0.5 mg tablet Unknown Self No No   Sig: TAKE 1 TABLET BY MOUTH ONCE DAILY AT BEDTIME AS NEEDED FOR ANXIETY   Accu-Chek FastClix Lancets MISC Unknown Self No No   Sig: Use 1 each daily to test blood sugar.   Blood Glucose Monitoring Suppl (Accu-Chek Guide Me) w/Device KIT 5/29/2025 Morning Self No Yes   Sig: Use 1 each daily to test blood sugar.   Blood Pressure KIT 5/29/2025 Morning Self No Yes   Sig: Use 3 (three) times a week   DULoxetine (CYMBALTA) 60 mg delayed release capsule 5/29/2025 Morning Self No Yes   Sig: Take 1 capsule (60 mg total) by mouth daily   Diclofenac Sodium (VOLTAREN) 1 % 5/29/2025 Morning Self No Yes   Sig: Apply 2 g topically 2 (two) times a day   Empagliflozin (Jardiance) 10 MG TABS tablet 5/28/2025 Self No Yes   Sig: Take 1 tablet (10 mg total) by mouth every morning   Fluticasone-Salmeterol (Advair) 500-50 mcg/dose inhaler 5/29/2025 Morning Self No Yes   Sig: INHALE 1 DOSE BY  MOUTH TWICE DAILY RINSE MOUTH AFTER USE   albuterol (PROVENTIL HFA,VENTOLIN HFA) 90 mcg/act inhaler Unknown  No No   Sig: INHALE 2 PUFFS BY MOUTH EVERY 6 HOURS AS NEEDED FOR WHEEZING   atorvastatin (LIPITOR) 20 mg tablet 5/29/2025 Morning Self No Yes   Sig: Take 1 tablet (20 mg total) by mouth daily   benzonatate (TESSALON PERLES) 100 mg capsule Unknown Self No No   Sig: Take 1 capsule (100 mg total) by mouth 3 (three) times a day as needed for cough   buPROPion (WELLBUTRIN XL) 150 mg 24 hr tablet 5/28/2025 Self No Yes   Sig: Take 1 tablet (150 mg total) by mouth every morning   carvedilol (COREG) 25 mg tablet 5/29/2025 Morning Self No Yes   Sig: TAKE 1 TABLET BY MOUTH TWICE DAILY WITH MEALS   fluticasone (FLONASE) 50 mcg/act nasal spray Unknown Self No No   Sig: Use 1 spray(s) in each nostril twice daily   furosemide (LASIX) 40 mg tablet Past Week Self No Yes   Sig: TAKE 1 TABLET BY MOUTH EVERY OTHER DAY   glucose blood test strip Unknown Self No No   Sig: Use 1 each daily to test blood sugar.   ipratropium-albuterol (DUO-NEB) 0.5-2.5 mg/3 mL nebulizer solution Unknown Self No No   Sig: Take 3 mL by nebulization 4 (four) times a day   isosorbide mononitrate (IMDUR) 30 mg 24 hr tablet 5/29/2025 Morning Self No Yes   Sig: Take 1 tablet by mouth once daily   montelukast (SINGULAIR) 10 mg tablet Unknown Self No No   Sig: Take 1 tablet (10 mg total) by mouth daily at bedtime   polyethylene glycol (MIRALAX) 17 g packet 5/28/2025 Self No Yes   Sig: Take 17 g by mouth daily   sacubitril-valsartan (Entresto) 49-51 MG TABS 5/29/2025 Morning Self No Yes   Sig: Take 1 tablet by mouth 2 (two) times a day   semaglutide, 0.25 or 0.5 mg/dose, (Ozempic, 0.25 or 0.5 MG/DOSE,) 2 mg/3 mL injection pen Past Month  No Yes   Sig: Inject 0.75 mL (0.5 mg total) under the skin every 7 days   tiotropium (Spiriva Respimat) 2.5 MCG/ACT AERS inhaler Not Taking Self No No   Sig: INHALE 2 SPRAY(S) BY MOUTH ONCE DAILY   Patient not taking: No sig  reported   umeclidinium (Incruse Ellipta) 62.5 mcg/actuation AEPB inhaler 5/29/2025 Morning Self No Yes   Sig: Inhale 1 puff by mouth once daily      Facility-Administered Medications: None     Patient's Medications   Discharge Prescriptions    No medications on file     No discharge procedures on file.  ED SEPSIS DOCUMENTATION                [1]   Past Medical History:  Diagnosis Date    Acute on chronic combined systolic and diastolic CHF (congestive heart failure) ([facility]) 07/27/2023    Alcohol abuse 07/27/2023    Ambulates with cane     Arthritis     Asthma     Back pain     Bunion 04/02/2024    Chest pain 12/22/2022    CHF (congestive heart failure) ([facility])     Claustrophobia     Constipation 12/18/2018    COPD (chronic obstructive pulmonary disease) ([facility])     COPD with acute exacerbation ([facility]) 05/06/2022    COVID-19     COVID-19 virus infection 12/03/2021    Depression     Hypertension     Mumps     Neck pain     Old MI (myocardial infarction)     Pneumonia     Pulmonary emphysema ([facility])     Rectal bleeding 01/14/2019    S/P TKR (total knee replacement), left 11/02/2023    Skin abnormalities     rashes and wounds on both legs - scabbed over and healing    Sleep apnea     no CPAP    Tingling of both feet     Wears glasses    [2]   Past Surgical History:  Procedure Laterality Date    APPENDECTOMY      CARDIAC CATHETERIZATION  07/24/2015    Left main- normal and maidly tortuous.  Circumflex - normal and moderately tortuous.  RCA- normal and mildy tortuous.  Global LV function was severely depressed..    CARDIAC CATHETERIZATION Left 09/27/2022    Procedure: Cardiac Left Heart Cath;  Surgeon: Doreen Briones DO;  [facility];  Service: Cardiology    CARDIAC CATHETERIZATION N/A 09/27/2022    Procedure: Cardiac Coronary Angiogram;  Surgeon: Doreen Briones DO;  [facility];  Service: Cardiology    CARDIAC CATHETERIZATION  09/27/2022    Procedure: Cardiac catheterization;  Surgeon:  Doreen Briones DO;  Location:  CARDIAC CATH LAB;  Service: Cardiology    INGUINAL HERNIA REPAIR Bilateral     AR COLONOSCOPY FLX DX W/COLLJ SPEC WHEN PFRMD N/A 2019    Procedure: COLONOSCOPY with removal of anal papilla;  Surgeon: ANIL Dale MD;  Location: [facility];  Service: Colorectal    TONSILLECTOMY      TOTAL KNEE ARTHROPLASTY Left 2023    Procedure: ARTHROPLASTY KNEE TOTAL;  Surgeon: David Mullen DO;  Location: [facility];  Service: Orthopedics    UMBILICAL HERNIA REPAIR  2006   [3]   Family History  Problem Relation Name Age of Onset    Heart attack Father          MI    Heart disease Father      Diabetes Sister          DM    Arthritis Family      Coronary artery disease Family      Hypertension Family      Tuberculosis Son      Alzheimer's disease Mother     [4]   Social History  Tobacco Use    Smoking status: Former     Current packs/day: 0.00     Average packs/day: 3.0 packs/day for 43.0 years (129.0 ttl pk-yrs)     Types: Cigarettes     Start date:      Quit date:      Years since quittin.4    Smokeless tobacco: Never   Vaping Use    Vaping status: Never Used   Substance Use Topics    Alcohol use: Yes     Comment: socially    Drug use: Yes     Types: Marijuana     Comment: occasionally edible        Carrie Benavidez MD  [phone]

## 2025-05-29 NOTE — ED NOTES
Attempted to contact patient VIA phone number on file, patient did not . Voicemail left.      Caprice Watson RN  05/29/25 [id number]

## 2025-05-30 ENCOUNTER — TELEPHONE (OUTPATIENT)
Age: 70
End: 2025-05-30

## 2025-05-30 ENCOUNTER — NURSE TRIAGE (OUTPATIENT)
Age: 70
End: 2025-05-30

## 2025-05-30 ENCOUNTER — VBI (OUTPATIENT)
Dept: FAMILY MEDICINE CLINIC | Facility: CLINIC | Age: 70
End: 2025-05-30

## 2025-05-30 ENCOUNTER — VBI (OUTPATIENT)
Dept: ADMINISTRATIVE | Facility: OTHER | Age: 70
End: 2025-05-30

## 2025-05-30 NOTE — TELEPHONE ENCOUNTER
Pt called stated he received a jug of golytely [id number] and want to make sure that is the right one. I advised pt that is the correct one.  Pt understood and no further assistance required

## 2025-05-30 NOTE — TELEPHONE ENCOUNTER
Pt appears to have colonoscopy scheduled thru GI for 6/3 He should reach out to them for instructions as he would likely start prep by Monday and not sure if they would have other recommendations given symptoms

## 2025-05-30 NOTE — TELEPHONE ENCOUNTER
"REASON FOR CONVERSATION: Constipation    SYMPTOMS: mild abdominal tenderness. Last normal BM was 6 days ago. Yesterday he had a very small soft one. Patient denies vomiting or nausea, rectal pain or seen any blood in the stool or toilet paper.    OTHER HEALTH INFORMATION: patient went to the ED yesterday for this and he was given an enema, which patient states was not given correctly and he end up leaving. Patient has been taking miralax and colace and had another enema at home. Patient states having lactulose in the past and feels like he needs it. Patient normally goes once a day normally. States his activity is low and doesn't walk much.    PROTOCOL DISPOSITION: Discuss With PCP and Callback by Nurse Today (overriding See Within 3 Days in Office)    CARE ADVICE PROVIDED: patient was advised to drinking prune, apple or pear juice, to increase walking and increase his fiber. If symptoms worsen to call back    PRACTICE FOLLOW-UP: follow up with patient as he requested something stronger to treat his constipation.                Reason for Disposition   Unable to have a bowel movement (BM) without using a laxative, suppository, or enema    Answer Assessment - Initial Assessment Questions  1. STOOL PATTERN OR FREQUENCY: \"How often do you have a bowel movement (BM)?\"  (Normal range: 3 times a day to every 3 days)  \"When was your last BM?\"        Normally he goes once a day and last time he had a good normal amount was about 6 days agos     2. STRAINING: \"Do you have to strain to have a BM?\"       Yes     3. ONSET: \"When did the constipation begin?\"      1 week ago     4. RECTAL PAIN: \"Does your rectum hurt when the stool comes out?\" If Yes, ask: \"Do you have hemorrhoids? How bad is the pain?\"  (Scale 1-10; or mild, moderate, severe)      Denies     5. BM COMPOSITION: \"Are the stools hard?\"    No. Patient states yesterday's was very small and  soft     6. BLOOD ON STOOLS: \"Has there been any blood on the toilet tissue " "or on the surface of the BM?\" If Yes, ask: \"When was the last time?\"      Denies     7. CHRONIC CONSTIPATION: \"Is this a new problem for you?\"  If No, ask: \"How long have you had this problem?\" (days, weeks, months)       Not a new problem but its not a recurrent problem either. States the last time was earlier in the year. In the past he has been prescribed lactulose     8. CHANGES IN DIET OR HYDRATION: \"Have there been any recent changes in your diet?\" \"How much fluids are you drinking on a daily basis?\"  \"How much have you had to drink today?\"      Denies     9. MEDICINES: \"Have you been taking any new medicines?\" \"Are you taking any narcotic pain medicines?\" (e.g., Dilaudid, morphine, Percocet, Vicodin)      Denies     10. LAXATIVES: \"Have you been using any stool softeners, laxatives, or enemas?\"  If Yes, ask \"What, how often, and when was the last time?\"        Yes patient has been taking miralax and colace and yesterday he went to the ED and they gave him an enema that was done wrong and then he had one at home    11. ACTIVITY:  \"How much walking do you do every day?\"  \"Has your activity level decreased in the past week?\"         Patient states not doing to much walking and his activity level is low          14. OTHER SYMPTOMS: \"Do you have any other symptoms?\" (e.g., abdomen pain, bloating, fever, vomiting)        Abdominal tenderness of 2-3/10 and feeling bloated    Protocols used: Constipation-Adult-[place]    "

## 2025-05-30 NOTE — TELEPHONE ENCOUNTER
05/30/25 10:27 AM    Patient contacted post ED visit, [facility] department spoke with patient/caregiver and outreach was successful.    Thank you.  Nevin Cho MA  [facility]

## 2025-05-30 NOTE — TELEPHONE ENCOUNTER
05/30/25 10:17 AM     Chart reviewed for CRC: Colonoscopy ; nothing is submitted to the patient's insurance at this time.     Nevin Cho MA   [facility]

## 2025-06-03 ENCOUNTER — HOSPITAL ENCOUNTER (OUTPATIENT)
Dept: PERIOP | Facility: HOSPITAL | Age: 70
Setting detail: OUTPATIENT SURGERY
Discharge: HOME | End: 2025-06-03
Attending: PHYSICIAN ASSISTANT
Payer: MEDICARE

## 2025-06-03 ENCOUNTER — ANESTHESIA (OUTPATIENT)
Dept: PERIOP | Facility: HOSPITAL | Age: 70
End: 2025-06-03
Payer: MEDICARE

## 2025-06-03 ENCOUNTER — ANESTHESIA EVENT (OUTPATIENT)
Dept: PERIOP | Facility: HOSPITAL | Age: 70
End: 2025-06-03
Payer: MEDICARE

## 2025-06-03 VITALS
HEART RATE: 83 BPM | RESPIRATION RATE: 20 BRPM | WEIGHT: 306 LBS | OXYGEN SATURATION: 94 % | HEIGHT: 74 IN | SYSTOLIC BLOOD PRESSURE: 113 MMHG | BODY MASS INDEX: 39.27 KG/M2 | TEMPERATURE: 97.5 F | DIASTOLIC BLOOD PRESSURE: 56 MMHG

## 2025-06-03 DIAGNOSIS — R07.89 ATYPICAL CHEST PAIN: ICD-10-CM

## 2025-06-03 DIAGNOSIS — K59.09 INTERMITTENT CONSTIPATION: ICD-10-CM

## 2025-06-03 DIAGNOSIS — R10.13 DYSPEPSIA: ICD-10-CM

## 2025-06-03 DIAGNOSIS — Z86.0100 PERSONAL HISTORY OF COLON POLYPS, UNSPECIFIED: ICD-10-CM

## 2025-06-03 DIAGNOSIS — K29.71 GASTRITIS WITH HEMORRHAGE, UNSPECIFIED CHRONICITY, UNSPECIFIED GASTRITIS TYPE: Primary | ICD-10-CM

## 2025-06-03 LAB — GLUCOSE SERPL-MCNC: 113 MG/DL (ref 65–140)

## 2025-06-03 PROCEDURE — 88305 TISSUE EXAM BY PATHOLOGIST: CPT | Performed by: PATHOLOGY

## 2025-06-03 PROCEDURE — 88341 IMHCHEM/IMCYTCHM EA ADD ANTB: CPT | Performed by: PATHOLOGY

## 2025-06-03 PROCEDURE — 88342 IMHCHEM/IMCYTCHM 1ST ANTB: CPT | Performed by: PATHOLOGY

## 2025-06-03 PROCEDURE — 82948 REAGENT STRIP/BLOOD GLUCOSE: CPT

## 2025-06-03 RX ORDER — CETIRIZINE HYDROCHLORIDE 10 MG/1
10 TABLET ORAL DAILY
COMMUNITY

## 2025-06-03 RX ORDER — GLYCOPYRROLATE 0.2 MG/ML
INJECTION INTRAMUSCULAR; INTRAVENOUS AS NEEDED
Status: DISCONTINUED | OUTPATIENT
Start: 2025-06-03 | End: 2025-06-03

## 2025-06-03 RX ORDER — PROPOFOL 10 MG/ML
INJECTION, EMULSION INTRAVENOUS AS NEEDED
Status: DISCONTINUED | OUTPATIENT
Start: 2025-06-03 | End: 2025-06-03

## 2025-06-03 RX ORDER — GUAIFENESIN 400 MG/1
400 TABLET ORAL
COMMUNITY

## 2025-06-03 RX ORDER — SODIUM CHLORIDE, SODIUM LACTATE, POTASSIUM CHLORIDE, CALCIUM CHLORIDE 600; 310; 30; 20 MG/100ML; MG/100ML; MG/100ML; MG/100ML
125 INJECTION, SOLUTION INTRAVENOUS CONTINUOUS
Status: DISCONTINUED | OUTPATIENT
Start: 2025-06-03 | End: 2025-06-07 | Stop reason: HOSPADM

## 2025-06-03 RX ORDER — ALBUTEROL SULFATE 0.83 MG/ML
2.5 SOLUTION RESPIRATORY (INHALATION) ONCE AS NEEDED
Status: DISCONTINUED | OUTPATIENT
Start: 2025-06-03 | End: 2025-06-07 | Stop reason: HOSPADM

## 2025-06-03 RX ORDER — ONDANSETRON 2 MG/ML
4 INJECTION INTRAMUSCULAR; INTRAVENOUS ONCE AS NEEDED
Status: DISCONTINUED | OUTPATIENT
Start: 2025-06-03 | End: 2025-06-07 | Stop reason: HOSPADM

## 2025-06-03 RX ORDER — LIDOCAINE HYDROCHLORIDE 20 MG/ML
INJECTION, SOLUTION EPIDURAL; INFILTRATION; INTRACAUDAL; PERINEURAL AS NEEDED
Status: DISCONTINUED | OUTPATIENT
Start: 2025-06-03 | End: 2025-06-03

## 2025-06-03 RX ORDER — OMEPRAZOLE 40 MG/1
40 CAPSULE, DELAYED RELEASE ORAL
Qty: 30 CAPSULE | Refills: 2 | Status: SHIPPED | OUTPATIENT
Start: 2025-06-03

## 2025-06-03 RX ADMIN — PROPOFOL 20 MG: 10 INJECTION, EMULSION INTRAVENOUS at 08:18

## 2025-06-03 RX ADMIN — PROPOFOL 20 MG: 10 INJECTION, EMULSION INTRAVENOUS at 08:05

## 2025-06-03 RX ADMIN — PROPOFOL 20 MG: 10 INJECTION, EMULSION INTRAVENOUS at 08:01

## 2025-06-03 RX ADMIN — PROPOFOL 10 MG: 10 INJECTION, EMULSION INTRAVENOUS at 08:17

## 2025-06-03 RX ADMIN — PROPOFOL 20 MG: 10 INJECTION, EMULSION INTRAVENOUS at 08:11

## 2025-06-03 RX ADMIN — PROPOFOL 20 MG: 10 INJECTION, EMULSION INTRAVENOUS at 08:20

## 2025-06-03 RX ADMIN — GLYCOPYRROLATE 0.2 MG: 0.2 INJECTION, SOLUTION INTRAMUSCULAR; INTRAVENOUS at 07:58

## 2025-06-03 RX ADMIN — PROPOFOL 10 MG: 10 INJECTION, EMULSION INTRAVENOUS at 08:29

## 2025-06-03 RX ADMIN — PROPOFOL 100 MG: 10 INJECTION, EMULSION INTRAVENOUS at 07:59

## 2025-06-03 RX ADMIN — PROPOFOL 20 MG: 10 INJECTION, EMULSION INTRAVENOUS at 08:03

## 2025-06-03 RX ADMIN — PROPOFOL 20 MG: 10 INJECTION, EMULSION INTRAVENOUS at 08:15

## 2025-06-03 RX ADMIN — LIDOCAINE HYDROCHLORIDE 100 MG: 20 INJECTION, SOLUTION EPIDURAL; INFILTRATION; INTRACAUDAL; PERINEURAL at 07:59

## 2025-06-03 RX ADMIN — PROPOFOL 10 MG: 10 INJECTION, EMULSION INTRAVENOUS at 08:16

## 2025-06-03 RX ADMIN — SODIUM CHLORIDE, SODIUM LACTATE, POTASSIUM CHLORIDE, AND CALCIUM CHLORIDE: .6; .31; .03; .02 INJECTION, SOLUTION INTRAVENOUS at 08:35

## 2025-06-03 RX ADMIN — PROPOFOL 20 MG: 10 INJECTION, EMULSION INTRAVENOUS at 08:23

## 2025-06-03 RX ADMIN — PROPOFOL 20 MG: 10 INJECTION, EMULSION INTRAVENOUS at 08:30

## 2025-06-03 RX ADMIN — PROPOFOL 20 MG: 10 INJECTION, EMULSION INTRAVENOUS at 08:09

## 2025-06-03 RX ADMIN — PROPOFOL 20 MG: 10 INJECTION, EMULSION INTRAVENOUS at 08:07

## 2025-06-03 NOTE — H&P
H&P - Gastroenterology   Name: Cricket Rosales 70 y.o. male I MRN: [id number]  Unit/Bed#:  I Date of Admission: 6/3/2025   Date of Service: 6/3/2025 I Hospital Day: 0     Assessment & Plan   This is a 70 y.o. year old male here for egd/colonoscopy, and he is stable and optimized for his procedure.    History of Present Illness    Cricket Rosales is a 70 y.o. year old male who presents for dyspepsia, history of colon polyps    REVIEW OF SYSTEMS: Per the HPI, and otherwise unremarkable.    Historical Information   Past Medical History[1]  Past Surgical History[2]  Social History[3]  E-Cigarette/Vaping    E-Cigarette Use Never User      E-Cigarette/Vaping Substances    Nicotine No     THC No     CBD No     Flavoring No     Other No     Unknown No      Family history non-contributory    Meds/Allergies   Current Medications[4]  Allergies[5]    Objective :  Temp:  [97.1 °F (36.2 °C)] 97.1 °F (36.2 °C)  HR:  [91] 91  BP: (130-138)/() 130/68  Resp:  [20] 20  SpO2:  [96 %] 96 %  O2 Device: None (Room air)    Physical Exam  Gen: NAD  Head: NCAT  CV: RRR  CHEST: Clear  ABD: soft, NT/ND  EXT: no edema       [1]   Past Medical History:  Diagnosis Date    Acute on chronic combined systolic and diastolic CHF (congestive heart failure) ([facility]) 07/27/2023    Alcohol abuse 07/27/2023    Ambulates with cane     Arthritis     Asthma     Back pain     Bunion 04/02/2024    Chest pain 12/22/2022    CHF (congestive heart failure) ([facility])     Claustrophobia     Constipation 12/18/2018    COPD (chronic obstructive pulmonary disease) ([facility])     COPD with acute exacerbation ([facility]) 05/06/2022    COVID-19     COVID-19 virus infection 12/03/2021    Depression     Hypertension     Mumps     Neck pain     Old MI (myocardial infarction)     Pneumonia     Pulmonary emphysema ([facility])     Rectal bleeding 01/14/2019    S/P TKR (total knee replacement), left 11/02/2023    Skin abnormalities     rashes and wounds on both legs - scabbed over and healing     Sleep apnea     no CPAP    Tingling of both feet     Wears glasses    [2]   Past Surgical History:  Procedure Laterality Date    APPENDECTOMY      CARDIAC CATHETERIZATION  2015    Left main- normal and maidly tortuous.  Circumflex - normal and moderately tortuous.  RCA- normal and mildy tortuous.  Global LV function was severely depressed..    CARDIAC CATHETERIZATION Left 2022    Procedure: Cardiac Left Heart Cath;  Surgeon: Doreen Briones DO;  [facility];  Service: Cardiology    CARDIAC CATHETERIZATION N/A 2022    Procedure: Cardiac Coronary Angiogram;  Surgeon: Doreen Briones DO;  [facility];  Service: Cardiology    CARDIAC CATHETERIZATION  2022    Procedure: Cardiac catheterization;  Surgeon: Doreen Briones DO;  [facility];  Service: Cardiology    INGUINAL HERNIA REPAIR Bilateral     AZ COLONOSCOPY FLX DX W/COLLJ SPEC WHEN PFRMD N/A 2019    Procedure: COLONOSCOPY with removal of anal papilla;  Surgeon: ANIL Dale MD;  [facility];  Service: Colorectal    TONSILLECTOMY      TOTAL KNEE ARTHROPLASTY Left 2023    Procedure: ARTHROPLASTY KNEE TOTAL;  Surgeon: David Mullen DO;  [facility];  Service: Orthopedics    UMBILICAL HERNIA REPAIR  2006   [3]   Social History  Tobacco Use    Smoking status: Former     Current packs/day: 0.00     Average packs/day: 3.0 packs/day for 43.0 years (129.0 ttl pk-yrs)     Types: Cigarettes     Start date:      Quit date: 2018     Years since quittin.4    Smokeless tobacco: Never   Vaping Use    Vaping status: Never Used   Substance and Sexual Activity    Alcohol use: Yes     Alcohol/week: 12.0 standard drinks of alcohol     Types: 6 Cans of beer, 6 Shots of liquor per week     Comment: 1-2 beers & 1-2 shots 3x/week    Drug use: Yes     Types: Marijuana     Comment: occasionally edible    Sexual activity: Not Currently   [4]   Current  Outpatient Medications:     albuterol (PROVENTIL HFA,VENTOLIN HFA) 90 mcg/act inhaler    ALPRAZolam (XANAX) 0.5 mg tablet    atorvastatin (LIPITOR) 20 mg tablet    buPROPion (WELLBUTRIN XL) 150 mg 24 hr tablet    carvedilol (COREG) 25 mg tablet    cetirizine (ZyrTEC) 10 mg tablet    DULoxetine (CYMBALTA) 60 mg delayed release capsule    Empagliflozin (Jardiance) 10 MG TABS tablet    fluticasone (FLONASE) 50 mcg/act nasal spray    Fluticasone-Salmeterol (Advair) 500-50 mcg/dose inhaler    furosemide (LASIX) 40 mg tablet    guaiFENesin 400 mg    ipratropium-albuterol (DUO-NEB) 0.5-2.5 mg/3 mL nebulizer solution    isosorbide mononitrate (IMDUR) 30 mg 24 hr tablet    polyethylene glycol (MIRALAX) 17 g packet    sacubitril-valsartan (Entresto) 49-51 MG TABS    semaglutide, 0.25 or 0.5 mg/dose, (Ozempic, 0.25 or 0.5 MG/DOSE,) 2 mg/3 mL injection pen    Accu-Chek FastClix Lancets MISC    benzonatate (TESSALON PERLES) 100 mg capsule    Blood Glucose Monitoring Suppl (Accu-Chek Guide Me) w/Device KIT    Blood Pressure KIT    Diclofenac Sodium (VOLTAREN) 1 %    glucose blood test strip    montelukast (SINGULAIR) 10 mg tablet    tiotropium (Spiriva Respimat) 2.5 MCG/ACT AERS inhaler    umeclidinium (Incruse Ellipta) 62.5 mcg/actuation AEPB inhaler    Current Facility-Administered Medications:     lactated ringers infusion, 125 mL/hr, Intravenous, Continuous  [5]   Allergies  Allergen Reactions    Ciprofloxacin Shortness Of Breath and Diarrhea

## 2025-06-03 NOTE — ANESTHESIA POSTPROCEDURE EVALUATION
Post-Op Assessment Note    CV Status:  Stable    Pain management: adequate       Mental Status:  Alert and awake   Hydration Status:  Euvolemic   PONV Controlled:  Controlled   Airway Patency:  Patent     Post Op Vitals Reviewed: Yes    No anethesia notable event occurred.    Staff: Anesthesiologist, CRNA           Last Filed PACU Vitals:  Vitals Value Taken Time   Temp 99.3    Pulse 96 06/03/25 08:38   /59    Resp 17 06/03/25 08:38   SpO2 95 % 06/03/25 08:38   Vitals shown include unfiled device data.

## 2025-06-03 NOTE — ANESTHESIA PREPROCEDURE EVALUATION
Procedure:  EGD  COLONOSCOPY    Relevant Problems   CARDIO   (+) Benign essential hypertension   (+) Hemorrhoids      ENDO   (+) Type 2 diabetes mellitus with proteinuria  (HCC)      /RENAL   (+) CKD (chronic kidney disease) stage 3, GFR 30-59 ml/min (HCC)      NEURO/PSYCH   (+) Anxiety   (+) Moderate episode of recurrent major depressive disorder (HCC)      PULMONARY   (+) Asthma-COPD overlap syndrome (HCC)   (+) COPD, severe (HCC)   (+) Chronic asthma, moderate persistent, uncomplicated   (+) Obstructive sleep apnea   (+) Severe persistent asthma without complication        Physical Exam    Airway     Mallampati score: III  TM Distance: >3 FB  Neck ROM: full  Mouth opening: >= 4 cm      Cardiovascular  Rate: normal    Dental       Pulmonary  Pulmonary exam normal     Neurological    He appears awake and alert.      Other Findings  Per pt denies anything remaining that is loose or removeablePer pt denies anything remaining that is loose or removeable      Anesthesia Plan  ASA Score- 3     Anesthesia Type- IV sedation with anesthesia with ASA Monitors.         Additional Monitors:     Airway Plan:            Plan Factors-Exercise tolerance (METS): >4 METS.    Chart reviewed.    Patient summary reviewed.    Patient is a current smoker.              Induction-     Postoperative Plan- .   Monitoring Plan - Monitoring plan - standard ASA monitoring  Post Operative Pain Plan - non-opiod analgesics        Informed Consent- Anesthetic plan and risks discussed with patient.  I personally reviewed this patient with the CRNA. Discussed and agreed on the Anesthesia Plan with the CRNA..      NPO Status:  Vitals Value Taken Time   Date of last liquid 06/03/25 06/03/25 07:24   Time of last liquid 0500 06/03/25 07:24   Date of last solid 06/01/25 06/03/25 07:24   Time of last solid 1600 06/03/25 07:24

## 2025-06-05 DIAGNOSIS — J44.89 ASTHMA-COPD OVERLAP SYNDROME (HCC): ICD-10-CM

## 2025-06-05 PROBLEM — I50.22 HEART FAILURE WITH MID-RANGE EJECTION FRACTION (HFMEF) (HCC): Status: ACTIVE | Noted: 2023-07-27

## 2025-06-05 RX ORDER — FLUTICASONE PROPIONATE AND SALMETEROL 500; 50 UG/1; UG/1
POWDER RESPIRATORY (INHALATION) 2 TIMES DAILY
Qty: 60 BLISTER | Refills: 5 | Status: SHIPPED | OUTPATIENT
Start: 2025-06-05

## 2025-06-05 NOTE — TELEPHONE ENCOUNTER
Patient called to request a refill for generic Advair inhaler. He was advised a refill was requested on 06/05/25 and is pending approval. Patient verbalized understanding and is in agreement.     Does the patient have enough for 3 days?   [] Yes   [x] No - Send as HP to POD   Patient is out of medication and requests that refill be ready today, 06/05/25

## 2025-06-10 ENCOUNTER — RESULTS FOLLOW-UP (OUTPATIENT)
Dept: GASTROENTEROLOGY | Facility: MEDICAL CENTER | Age: 70
End: 2025-06-10

## 2025-06-10 PROCEDURE — 88341 IMHCHEM/IMCYTCHM EA ADD ANTB: CPT | Performed by: PATHOLOGY

## 2025-06-10 PROCEDURE — 88342 IMHCHEM/IMCYTCHM 1ST ANTB: CPT | Performed by: PATHOLOGY

## 2025-06-10 PROCEDURE — 88305 TISSUE EXAM BY PATHOLOGIST: CPT | Performed by: PATHOLOGY

## 2025-06-12 DIAGNOSIS — E78.5 DYSLIPIDEMIA: ICD-10-CM

## 2025-06-12 DIAGNOSIS — I42.8 NON-ISCHEMIC CARDIOMYOPATHY (HCC): Chronic | ICD-10-CM

## 2025-06-12 RX ORDER — ATORVASTATIN CALCIUM 20 MG/1
20 TABLET, FILM COATED ORAL DAILY
Qty: 90 TABLET | Refills: 1 | Status: SHIPPED | OUTPATIENT
Start: 2025-06-12

## 2025-06-12 RX ORDER — SACUBITRIL AND VALSARTAN 49; 51 MG/1; MG/1
1 TABLET, FILM COATED ORAL 2 TIMES DAILY
Qty: 180 TABLET | Refills: 1 | Status: SHIPPED | OUTPATIENT
Start: 2025-06-12

## 2025-06-17 ENCOUNTER — OFFICE VISIT (OUTPATIENT)
Dept: GASTROENTEROLOGY | Facility: CLINIC | Age: 70
End: 2025-06-17
Payer: MEDICARE

## 2025-06-17 VITALS
WEIGHT: 301.8 LBS | HEIGHT: 74 IN | BODY MASS INDEX: 38.73 KG/M2 | DIASTOLIC BLOOD PRESSURE: 75 MMHG | TEMPERATURE: 98.3 F | HEART RATE: 77 BPM | SYSTOLIC BLOOD PRESSURE: 125 MMHG | OXYGEN SATURATION: 94 %

## 2025-06-17 DIAGNOSIS — Z86.0100 PERSONAL HISTORY OF COLON POLYPS, UNSPECIFIED: ICD-10-CM

## 2025-06-17 DIAGNOSIS — R10.13 DYSPEPSIA: ICD-10-CM

## 2025-06-17 DIAGNOSIS — K80.20 CALCULUS OF GALLBLADDER WITHOUT CHOLECYSTITIS WITHOUT OBSTRUCTION: ICD-10-CM

## 2025-06-17 DIAGNOSIS — K59.09 INTERMITTENT CONSTIPATION: Primary | ICD-10-CM

## 2025-06-17 DIAGNOSIS — R07.89 ATYPICAL CHEST PAIN: ICD-10-CM

## 2025-06-17 PROCEDURE — 99214 OFFICE O/P EST MOD 30 MIN: CPT | Performed by: PHYSICIAN ASSISTANT

## 2025-06-17 RX ORDER — FAMOTIDINE 40 MG/1
40 TABLET, FILM COATED ORAL
Qty: 90 TABLET | Refills: 3 | Status: SHIPPED | OUTPATIENT
Start: 2025-06-17

## 2025-06-17 NOTE — PROGRESS NOTES
Name: Cricket Rosales      : 1955      MRN: [id number]  Encounter Provider: Veronica Le PA-C  Encounter Date: 2025   Encounter department: [facility] [place]  Assessment & Plan  Intermittent constipation  Notes constipation historically.   Colon in 2025 with no obvious luminal abnormalities to account for constipation.   Suspect in part due to medication SE, motility, hydration, dietary intake, etc.   Potential trigger is GLP1 agonist however pt would like to continue at this time.   Given he has been trying miralax and fiber supplement daily with no sig improvement, I am recommending trial of linaclotide.   Start 145 mcg QAM. Reviewed dosing MOA, SE.   If needed, can augment regimen with nightly miralax.   Stay well-hydrated and physically active as possible.   Low threshold to repeat xray.  Orders:    linaCLOtide 145 MCG CAPS; Take 1 capsule (145 mcg total) by mouth in the morning    Atypical chest pain  Dyspepsia  Pt was dealing with atypical chest pain and heartburn symptoms.   Started on PPI, EGD completed in 2025 with severe gastritis, bx negative for  h pylori, intestinal metaplasia or dysplasia.   Continue on PPI. Start H2RA QHS for added relief.     Recommend diet and lifestyle modifications for GERD.  This includes avoiding spicy, saucy, greasy/oily foods, citrus, EtOH, NSAIDs, tobacco.  Avoid eating within 2 to 3 hours of bed.  Elevating height of bed 6 inches on blocks may be beneficial.  Weight loss would likely be beneficial.  Orders:    famotidine (PEPCID) 40 MG tablet; Take 1 tablet (40 mg total) by mouth daily at bedtime    Personal history of colon polyps, unspecified  4 sub-centimeter adenomas removed during colonoscopy.   Recall in 3 years for surveillance purposes was recommended.        Calculus of gallbladder without cholecystitis without obstruction  Noted on previous imaging.   No symptoms consistent with biliary colic at this time.    Continue to monitor conservatively.        We will follow up in 6 months to reassess symptoms.    History of Present Illness   Cricket Rosales is a 70 y.o. male who presents for f/u for EGD/colon. Pmhx sig for nonischemic cardiomyopathy, HTN, COPD, CORDELL, DM2, CKD 3, MDD, HLD, BMI 38.   HPI  History obtained from: patient    Pt was last evaluated in 12/2024. At that time, he was complaining of atypical chest pain. He was being evaluated regularly by Cardiology. He had negative ACS investigation. He had gallstones on imaging. He underwent EGD which demonstrated gastritis.     06/17/25:    Was dealing with some constipation leading up to the colonoscopy. Went to ER for constipation in 05/2025 and had enema. Now once again is having some issues with constipation. Unclear precipitants, however after further discussion thinks perhaps around onset of ozempic initiation. Taking miralax just about daily. Also taking metamucil. Some straining related to defecation. Doesn't feel completely evacuating. No BRBPR or melena. No sig abd pain or rectal pain relation to defecation. No abnormal weight loss over past 6 months.    Some heartburn, regurgitation. Notes some excess belching. Some excess flatus. Hasn't had atypical chest pain in several months. No nausea, emesis. No dysphagia or odynophagia. No early satiety.     NSAIDs: none  Etoh: none  Tobacco: none     11/2024: CTA C/A/P: wnl  11/2024: RUQ US: Cholelithiasis without acute cholecystitis.   11/2024: Hb 14.0, MCV 88, Plt 247, BUN 37, Cr 1.75, AST 13, ALT 19, ALP 83, t bili 0.55, t bili 0.55, lipase 35    03/2025: Hb 14.4, MCV 86, Plt 256, BUN 50, Cr 1.91, AST 26, ALT 25, ALP 80, albumin 4.3, t bili 0.96      Endoscopic history:   EGD: 06/2025: Severe, generalized erythematous mucosa with erosion in the body of the stomach   A.  Stomach (biopsy): Mild chronic inactive gastritis involving oxyntic and antral mucosa. Immunostain with no definitive Helicobacter (control  stains appropriately). No intestinal metaplasia, dysplasia or neoplasia identified  Colon: 12/2024: Five polyps measuring smaller than 5 mm in the transverse colon, descending colon and sigmoid colon; Pan-colonic diverticula of moderate severity   A. Large Intestine, Transverse Colon, cold snare of polyp x 3: Tubular adenoma; Negative for high grade dysplasia/ carcinoma.  B. Large Intestine, Left/Descending Colon, cold snare of polyp x 1: Tubular adenoma. Negative for high grade dysplasia/ carcinoma.  C. Large Intestine, Sigmoid Colon, cold snare of polyp x 1: Tubular adenoma; Negative for high grade dysplasia/ carcinoma.  Colon: 07/2024: Pancolonic diverticula   Colon: 06/2025: 4 polyps were removed with cold snare; Extensive diverticulosis of moderate severity; Small hemorrhoids   B.  Ascending colon polyp x 2 (cold snare): Portions of polypoid colonic mucosa with focus equivocal for adenoma; negative for high-grade dysplasia.   C.  Transverse colon polyp x 2 (cold snare): Portions of tubular adenomas; negative for high-grade dysplasia. Fecal material noted    Review of Systems A complete review of systems is negative other than that noted above in the HPI.    Past Medical History   Past Medical History[1]  Past Surgical History[2]  Family History[3]   reports that he quit smoking about 7 years ago. His smoking use included cigarettes. He started smoking about 50 years ago. He has a 129 pack-year smoking history. He has never used smokeless tobacco. He reports current alcohol use of about 12.0 standard drinks of alcohol per week. He reports current drug use. Drug: Marijuana.  Current Outpatient Medications   Medication Instructions    Accu-Chek FastClix Lancets MISC 1 each, Does not apply, Daily, to test blood sugar.     albuterol (PROVENTIL HFA,VENTOLIN HFA) 90 mcg/act inhaler 2 puffs, Inhalation, Every 6 hours PRN    ALPRAZolam (XANAX) 0.5 mg tablet TAKE 1 TABLET BY MOUTH ONCE DAILY AT BEDTIME AS NEEDED FOR  "ANXIETY    atorvastatin (LIPITOR) 20 mg, Oral, Daily    benzonatate (TESSALON PERLES) 100 mg, Oral, 3 times daily PRN    Blood Glucose Monitoring Suppl (Accu-Chek Guide Me) w/Device KIT 1 each, Does not apply, Daily, to test blood sugar.    Blood Pressure KIT Does not apply, 3 times weekly    buPROPion (WELLBUTRIN XL) 150 mg, Oral, Every morning    carvedilol (COREG) 25 mg, Oral, 2 times daily with meals    cetirizine (ZYRTEC) 10 mg, Daily    Diclofenac Sodium (VOLTAREN) 2 g, Topical, 2 times daily    DULoxetine (CYMBALTA) 60 mg, Oral, Daily    Empagliflozin (JARDIANCE) 10 mg, Oral, Every morning    fluticasone (FLONASE) 50 mcg/act nasal spray Use 1 spray(s) in each nostril twice daily    Fluticasone-Salmeterol (Advair) 500-50 mcg/dose inhaler Inhalation, 2 times daily, Rinse mouth after use    furosemide (LASIX) 40 mg, Oral, Every other day    glucose blood test strip 1 each, Other, Daily, to test blood sugar.     guaiFENesin 400 mg, Every 4 hours scheduled    ipratropium-albuterol (DUO-NEB) 0.5-2.5 mg/3 mL nebulizer solution USE 1 AMPULE IN NEBULIZER 4 TIMES DAILY    isosorbide mononitrate (IMDUR) 30 mg, Oral, Daily    montelukast (SINGULAIR) 10 mg, Oral, Daily at bedtime    omeprazole (PRILOSEC) 40 mg, Oral, Daily before breakfast    polyethylene glycol (MIRALAX) 17 g, Oral, Daily    sacubitril-valsartan (Entresto) 49-51 MG TABS 1 tablet, Oral, 2 times daily    semaglutide (0.25 or 0.5 mg/dose) (OZEMPIC (0.25 OR 0.5 MG/DOSE)) 0.5 mg, Subcutaneous, Every 7 days    tiotropium (Spiriva Respimat) 2.5 MCG/ACT AERS inhaler INHALE 2 SPRAY(S) BY MOUTH ONCE DAILY    umeclidinium (Incruse Ellipta) 62.5 mcg/actuation AEPB inhaler 1 puff, Inhalation, Daily   Allergies[4]   Current Medications[5]  Objective   /75   Pulse 77   Temp 98.3 °F (36.8 °C) (Temporal)   Ht 6' 2\" (1.88 m)   Wt (!) 137 kg (301 lb 12.8 oz)   SpO2 94%   BMI 38.75 kg/m²     Physical Exam  Vitals and nursing note reviewed.   Constitutional:      "  General: He is not in acute distress.     Appearance: He is well-developed. He is obese.   HENT:      Head: Normocephalic and atraumatic.     Eyes:      General: No scleral icterus.     Conjunctiva/sclera: Conjunctivae normal.       Cardiovascular:      Rate and Rhythm: Normal rate.   Pulmonary:      Effort: Pulmonary effort is normal. No respiratory distress.   Abdominal:      General: There is no distension.      Palpations: Abdomen is soft.      Tenderness: There is no abdominal tenderness. There is no guarding or rebound.     Skin:     General: Skin is warm and dry.      Coloration: Skin is not jaundiced.     Neurological:      General: No focal deficit present.      Mental Status: He is alert.     Psychiatric:         Mood and Affect: Mood normal.         Behavior: Behavior normal.        Lab Results: I personally reviewed relevant lab results. CBC/BMP: No new results in last 24 hours. , Creatinine Clearance: CrCl cannot be calculated (Patient's most recent lab result is older than the maximum 7 days allowed.)., LFTs: No new results in last 24 hours.     Radiology Results Review: I have reviewed radiology reports from [facility] including: procedure reports.  Results for orders placed during the hospital encounter of 06/03/25    Colonoscopy    Impression  4 polyps were removed with cold snare  Extensive diverticulosis of moderate severity  Small hemorrhoids  Otherwise normal colonic mucosa        RECOMMENDATION:  Await pathology results  Repeat colonoscopy in 3 years, due: 6/2/2028  Personal history of colon polyps              Diana M Jaiyeola, MD    **Please note:  Dictation voice to text software may have been used in the creation of this record.  Occasional wrong word or “sound alike” substitutions may have occurred due to the inherent limitations of voice recognition software.  Read the chart carefully and recognize, using context, where substitutions have occurred.**       [1]   Past Medical  History:  Diagnosis Date    Acute on chronic combined systolic and diastolic CHF (congestive heart failure) ([facility]) 07/27/2023    Alcohol abuse 07/27/2023    Ambulates with cane     Arthritis     Asthma     Back pain     Bunion 04/02/2024    Chest pain 12/22/2022    CHF (congestive heart failure) ([facility])     Claustrophobia     Constipation 12/18/2018    COPD (chronic obstructive pulmonary disease) ([facility])     COPD with acute exacerbation ([facility]) 05/06/2022    COVID-19     COVID-19 virus infection 12/03/2021    Depression     Hypertension     Mumps     Neck pain     Old MI (myocardial infarction)     Pneumonia     Pulmonary emphysema ([facility])     Rectal bleeding 01/14/2019    S/P TKR (total knee replacement), left 11/02/2023    Skin abnormalities     rashes and wounds on both legs - scabbed over and healing    Sleep apnea     no CPAP    Tingling of both feet     Wears glasses    [2]   Past Surgical History:  Procedure Laterality Date    APPENDECTOMY      CARDIAC CATHETERIZATION  07/24/2015    Left main- normal and maidly tortuous.  Circumflex - normal and moderately tortuous.  RCA- normal and mildy tortuous.  Global LV function was severely depressed..    CARDIAC CATHETERIZATION Left 09/27/2022    Procedure: Cardiac Left Heart Cath;  Surgeon: Doreen Briones DO;  Location: [facility];  Service: Cardiology    CARDIAC CATHETERIZATION N/A 09/27/2022    Procedure: Cardiac Coronary Angiogram;  Surgeon: Doreen Briones DO;  Location: [facility];  Service: Cardiology    CARDIAC CATHETERIZATION  09/27/2022    Procedure: Cardiac catheterization;  Surgeon: Doreen Briones DO;  Location: [facility];  Service: Cardiology    INGUINAL HERNIA REPAIR Bilateral     CA COLONOSCOPY FLX DX W/COLLJ SPEC WHEN PFRMD N/A 03/11/2019    Procedure: COLONOSCOPY with removal of anal papilla;  Surgeon: ANIL Dale MD;  Location: [facility];  Service: Colorectal    TONSILLECTOMY      TOTAL KNEE ARTHROPLASTY Left  9/18/2023    Procedure: ARTHROPLASTY KNEE TOTAL;  Surgeon: David Mullen DO;  Location: [facility];  Service: Orthopedics    UMBILICAL HERNIA REPAIR  06/21/2006   [3]   Family History  Problem Relation Name Age of Onset    Heart attack Father          MI    Heart disease Father      Diabetes Sister          DM    Arthritis Family      Coronary artery disease Family      Hypertension Family      Tuberculosis Son      Alzheimer's disease Mother     [4]   Allergies  Allergen Reactions    Ciprofloxacin Shortness Of Breath and Diarrhea   [5]   Current Outpatient Medications   Medication Sig Dispense Refill    albuterol (PROVENTIL HFA,VENTOLIN HFA) 90 mcg/act inhaler INHALE 2 PUFFS BY MOUTH EVERY 6 HOURS AS NEEDED FOR WHEEZING 18 g 5    ALPRAZolam (XANAX) 0.5 mg tablet TAKE 1 TABLET BY MOUTH ONCE DAILY AT BEDTIME AS NEEDED FOR ANXIETY 30 tablet 0    atorvastatin (LIPITOR) 20 mg tablet Take 1 tablet by mouth once daily 90 tablet 1    buPROPion (WELLBUTRIN XL) 150 mg 24 hr tablet Take 1 tablet (150 mg total) by mouth every morning 30 tablet 5    carvedilol (COREG) 25 mg tablet TAKE 1 TABLET BY MOUTH TWICE DAILY WITH MEALS 180 tablet 1    cetirizine (ZyrTEC) 10 mg tablet Take 10 mg by mouth daily      DULoxetine (CYMBALTA) 60 mg delayed release capsule Take 1 capsule (60 mg total) by mouth daily 90 capsule 3    Empagliflozin (Jardiance) 10 MG TABS tablet Take 1 tablet (10 mg total) by mouth every morning 90 tablet 1    fluticasone (FLONASE) 50 mcg/act nasal spray Use 1 spray(s) in each nostril twice daily 16 g 0    Fluticasone-Salmeterol (Advair) 500-50 mcg/dose inhaler INHALE 1 DOSE BY MOUTH TWICE DAILY RINSE  MOUTH  AFTER  USE 60 blister 5    furosemide (LASIX) 40 mg tablet TAKE 1 TABLET BY MOUTH EVERY OTHER DAY 45 tablet 1    ipratropium-albuterol (DUO-NEB) 0.5-2.5 mg/3 mL nebulizer solution USE 1 AMPULE IN NEBULIZER 4 TIMES DAILY 360 mL 1    isosorbide mononitrate (IMDUR) 30 mg 24 hr tablet Take 1 tablet by mouth once  daily 90 tablet 1    montelukast (SINGULAIR) 10 mg tablet Take 1 tablet (10 mg total) by mouth daily at bedtime 30 tablet 0    omeprazole (PriLOSEC) 40 MG capsule Take 1 capsule (40 mg total) by mouth daily before breakfast 30 capsule 2    polyethylene glycol (MIRALAX) 17 g packet Take 17 g by mouth daily 14 each 0    sacubitril-valsartan (Entresto) 49-51 MG TABS Take 1 tablet by mouth twice daily 180 tablet 1    semaglutide, 0.25 or 0.5 mg/dose, (Ozempic, 0.25 or 0.5 MG/DOSE,) 2 mg/3 mL injection pen Inject 0.75 mL (0.5 mg total) under the skin every 7 days 3 mL 2    umeclidinium (Incruse Ellipta) 62.5 mcg/actuation AEPB inhaler Inhale 1 puff by mouth once daily 30 each 5    Accu-Chek FastClix Lancets MISC Use 1 each daily to test blood sugar. 100 each 5    benzonatate (TESSALON PERLES) 100 mg capsule Take 1 capsule (100 mg total) by mouth 3 (three) times a day as needed for cough 21 capsule 0    Blood Glucose Monitoring Suppl (Accu-Chek Guide Me) w/Device KIT Use 1 each daily to test blood sugar. 1 kit 0    Blood Pressure KIT Use 3 (three) times a week 1 kit 0    Diclofenac Sodium (VOLTAREN) 1 % Apply 2 g topically 2 (two) times a day 100 g 2    glucose blood test strip Use 1 each daily to test blood sugar. 100 strip 5    guaiFENesin 400 mg Take 400 mg by mouth every 4 (four) hours      tiotropium (Spiriva Respimat) 2.5 MCG/ACT AERS inhaler INHALE 2 SPRAY(S) BY MOUTH ONCE DAILY (Patient not taking: No sig reported) 4 g 5     No current facility-administered medications for this visit.

## 2025-06-17 NOTE — PATIENT INSTRUCTIONS
Start on linaclotide/linzess every morning.  You can still take fiber supplement daily.   You can still take miralax daily if you need.     You may have diarrhea at first. I would give the medication a 1-2 week trial and reach with an update.     For upper GI tract, I would recommend omeprazole every morning.   I would recommend trial of addition of the famotidine at night. 0

## 2025-06-25 ENCOUNTER — TELEPHONE (OUTPATIENT)
Age: 70
End: 2025-06-25

## 2025-06-25 NOTE — TELEPHONE ENCOUNTER
History  Chief Complaint   Patient presents with   • Abscess     Reports had something on his nose since age 17 picked at it x2 days ago with redness and swelling since      Patient is a 43-year-old male, no pertinent past medical history, who presents to the emergency department for right facial pain and swelling. Patient states he has had a bump on his nose since a teenager. 2 days ago he scratched it. Since then he has had increasing swelling and pain to that area. He states this started to spread to just below his right eye. He now presents for further evaluation. No modifying factors. No other associated symptoms. Denies any vision changes but does have some discomfort when his eye is open. No other complaints or concerns. Prior to Admission Medications   Prescriptions Last Dose Informant Patient Reported? Taking?    ALPRAZolam (XANAX) 0.5 mg tablet   Yes No   Sig: TAKE 1 TABLET(0.5 MG) BY MOUTH THREE TIMES DAILY AS NEEDED FOR ANXIETY   Diclofenac Sodium (VOLTAREN) 1 %   No No   Sig: Apply 2 g topically 4 (four) times a day   Influenza Vac Typ A&B Surf Ant SUSP   Yes No   Sig: Fluvirin 9380-5787 45 mcg (15 mcg x 3)/0.5 mL intramuscular suspension   albuterol (Ventolin HFA) 90 mcg/act inhaler   No No   Sig: Inhale 2 puffs every 6 (six) hours as needed for wheezing or shortness of breath   budesonide-formoterol (Symbicort) 160-4.5 mcg/act inhaler   Yes No   Sig: Inhale 2 puffs 2 (two) times a day   dicyclomine (BENTYL) 20 mg tablet Not Taking  Yes No   Sig: dicyclomine 20 mg tablet   Patient not taking: Reported on 9/24/2023   methocarbamol (ROBAXIN) 500 mg tablet   No No   Sig: Take 1 tablet (500 mg total) by mouth 2 (two) times a day   methocarbamol (ROBAXIN) 750 mg tablet   No No   Sig: Take 1 tablet (750 mg total) by mouth every 6 (six) hours as needed for muscle spasms   naproxen (EC NAPROSYN) 500 MG EC tablet   No No   Sig: Take 1 tablet (500 mg total) by mouth 2 (two) times a day with Patient called to ask which provider prescribed his ozempic. This information was given to patient. Patient also requested the phone number to endocrinology and was transferred to their department.   meals for 5 days   traMADol (ULTRAM) 50 mg tablet   Yes No   Sig: Take 50 mg by mouth every 6 (six) hours as needed for moderate pain   traMADol (ULTRAM-ER) 200 MG 24 hr tablet   Yes No   Sig: Take 200 mg by mouth daily      Facility-Administered Medications: None       Past Medical History:   Diagnosis Date   • Asthma    • Back pain    • Psychiatric disorder        Past Surgical History:   Procedure Laterality Date   • HAND SURGERY     • NO PAST SURGERIES         History reviewed. No pertinent family history. I have reviewed and agree with the history as documented. E-Cigarette/Vaping   • E-Cigarette Use Never User      E-Cigarette/Vaping Substances     Social History     Tobacco Use   • Smoking status: Former     Packs/day: 0.25     Types: Cigarettes   • Smokeless tobacco: Never   Vaping Use   • Vaping Use: Never used   Substance Use Topics   • Alcohol use: Not Currently   • Drug use: Yes     Types: Marijuana       Review of Systems   Constitutional: Negative for chills and fever. HENT: Positive for facial swelling. All other systems reviewed and are negative. Physical Exam  Physical Exam  Vitals and nursing note reviewed. Constitutional:       General: He is not in acute distress. Appearance: He is well-developed. He is not ill-appearing, toxic-appearing or diaphoretic. HENT:      Head: Normocephalic and atraumatic. Right Ear: External ear normal.      Left Ear: External ear normal.      Nose: Nose normal.   Eyes:      General: Lids are normal. No scleral icterus. Cardiovascular:      Rate and Rhythm: Normal rate and regular rhythm. Heart sounds: Normal heart sounds. No murmur heard. No friction rub. No gallop. Pulmonary:      Effort: Pulmonary effort is normal. No respiratory distress. Breath sounds: Normal breath sounds. No wheezing or rales. Musculoskeletal:         General: No deformity. Normal range of motion.       Cervical back: Normal range of motion and neck supple. Skin:     General: Skin is warm and dry. Neurological:      General: No focal deficit present. Mental Status: He is alert.    Psychiatric:         Mood and Affect: Mood normal.         Behavior: Behavior normal.         Vital Signs  ED Triage Vitals [09/23/23 1930]   Temperature Pulse Respirations Blood Pressure SpO2   99.2 °F (37.3 °C) 105 18 145/92 100 %      Temp Source Heart Rate Source Patient Position - Orthostatic VS BP Location FiO2 (%)   Oral Monitor Sitting Right arm --      Pain Score       9           Vitals:    09/23/23 2211 09/23/23 2330 09/24/23 0145 09/24/23 0737   BP: 127/83 117/86 109/71 119/81   Pulse: 92 84 84 92   Patient Position - Orthostatic VS: Lying Lying           Visual Acuity      ED Medications  Medications   albuterol (PROVENTIL HFA,VENTOLIN HFA) inhaler 2 puff (2 puffs Inhalation Given 9/24/23 0142)   ALPRAZolam (XANAX) tablet 0.5 mg (has no administration in time range)   budesonide-formoterol (SYMBICORT) 160-4.5 mcg/act inhaler 2 puff (2 puffs Inhalation Given 9/24/23 0743)   methocarbamol (ROBAXIN) tablet 750 mg (has no administration in time range)   traMADol (ULTRAM) tablet 50 mg (50 mg Oral Given 9/24/23 0653)   ondansetron (ZOFRAN) injection 4 mg (has no administration in time range)   aluminum-magnesium hydroxide-simethicone (MAALOX) oral suspension 30 mL (has no administration in time range)   nicotine (NICODERM CQ) 7 mg/24hr TD 24 hr patch 1 patch (1 patch Transdermal Not Given 9/24/23 0740)   enoxaparin (LOVENOX) subcutaneous injection 40 mg (40 mg Subcutaneous Not Given 9/24/23 0741)   ampicillin-sulbactam (UNASYN) 3 g in sodium chloride 0.9 % 100 mL IVPB (3 g Intravenous New Bag 9/24/23 1139)   multi-electrolyte (PLASMALYTE-A/ISOLYTE-S PH 7.4) IV solution (125 mL/hr Intravenous New Bag 9/24/23 0151)   dexamethasone (PF) (DECADRON) injection 10 mg (10 mg Intravenous Given 9/24/23 0741)   metoclopramide (REGLAN) injection 10 mg (10 mg Intravenous Given 9/24/23 0918)   diphenhydrAMINE (BENADRYL) injection 25 mg (25 mg Intravenous Given 9/24/23 0919)   magnesium sulfate 2 g/50 mL IVPB (premix) 2 g (2 g Intravenous New Bag 9/24/23 0740)   vancomycin (VANCOCIN) IVPB (premix in dextrose) 1,000 mg 200 mL (has no administration in time range)   iohexol (OMNIPAQUE) 350 MG/ML injection (MULTI-DOSE) 100 mL (100 mL Intravenous Given 9/23/23 2132)   dexamethasone (PF) (DECADRON) injection 10 mg (10 mg Intravenous Given 9/23/23 2327)   ampicillin-sulbactam (UNASYN) 3 g in sodium chloride 0.9 % 100 mL IVPB (0 g Intravenous Stopped 9/24/23 0701)   vancomycin (VANCOCIN) 1,250 mg in sodium chloride 0.9 % 250 mL IVPB (0 mg Intravenous Stopped 9/24/23 0700)   multi-electrolyte (ISOLYTE-S PH 7.4) bolus 1,000 mL (0 mL Intravenous Stopped 9/24/23 0700)   multi-electrolyte (ISOLYTE-S PH 7.4) bolus 1,000 mL (0 mL Intravenous Stopped 9/24/23 0700)       Diagnostic Studies  Results Reviewed     Procedure Component Value Units Date/Time    Blood culture #1 [[id number]] Collected: 09/23/23 2326    Lab Status: Preliminary result Specimen: Blood from Arm, Left Updated: 09/24/23 0901     Blood Culture Received in Microbiology Lab. Culture in Progress. Blood culture #2 [[id number]] Collected: 09/23/23 2326    Lab Status: Preliminary result Specimen: Blood from Arm, Right Updated: 09/24/23 0901     Blood Culture Received in Microbiology Lab. Culture in Progress.     Basic metabolic panel [[id number]]  (Abnormal) Collected: 09/24/23 0531    Lab Status: Final result Specimen: Blood from Hand, Left Updated: 09/24/23 0814     Sodium 136 mmol/L      Potassium 4.0 mmol/L      Chloride 103 mmol/L      CO2 24 mmol/L      ANION GAP 9 mmol/L      BUN 9 mg/dL      Creatinine 0.90 mg/dL      Glucose 219 mg/dL      Calcium 9.0 mg/dL      eGFR 105 ml/min/1.73sq m     Narrative:      [facility] guidelines for Chronic Kidney Disease (CKD):   •  Stage 1 with normal or high GFR (GFR > 90 mL/min/1.73 square meters)  •  Stage 2 Mild CKD (GFR = 60-89 mL/min/1.73 square meters)  •  Stage 3A Moderate CKD (GFR = 45-59 mL/min/1.73 square meters)  •  Stage 3B Moderate CKD (GFR = 30-44 mL/min/1.73 square meters)  •  Stage 4 Severe CKD (GFR = 15-29 mL/min/1.73 square meters)  •  Stage 5 End Stage CKD (GFR <15 mL/min/1.73 square meters)  Note: GFR calculation is accurate only with a steady state creatinine    Magnesium [[id number]]  (Normal) Collected: 09/24/23 0531    Lab Status: Final result Specimen: Blood from Hand, Left Updated: 09/24/23 0814     Magnesium 2.1 mg/dL     Procalcitonin, Next Day AM Collection [[id number]]  (Normal) Collected: 09/24/23 0531    Lab Status: Final result Specimen: Blood from Hand, Left Updated: 09/24/23 0709     Procalcitonin 0.06 ng/ml     CBC and differential [[id number]]  (Abnormal) Collected: 09/24/23 0531    Lab Status: Final result Specimen: Blood from Hand, Left Updated: 09/24/23 0634     WBC 8.80 Thousand/uL      RBC 4.55 Million/uL      Hemoglobin 13.2 g/dL      Hematocrit 41.5 %      MCV 91 fL      MCH 29.0 pg      MCHC 31.8 g/dL      RDW 13.2 %      MPV 10.7 fL      Platelets 279 Thousands/uL      nRBC 0 /100 WBCs      Neutrophils Relative 87 %      Immat GRANS % 1 %      Lymphocytes Relative 11 %      Monocytes Relative 1 %      Eosinophils Relative 0 %      Basophils Relative 0 %      Neutrophils Absolute 7.69 Thousands/µL      Immature Grans Absolute 0.06 Thousand/uL      Lymphocytes Absolute 0.92 Thousands/µL      Monocytes Absolute 0.09 Thousand/µL      Eosinophils Absolute 0.01 Thousand/µL      Basophils Absolute 0.03 Thousands/µL     Lactic acid, plasma (w/reflex if result > 2.0) [[id number]]  (Normal) Collected: 09/24/23 0014    Lab Status: Final result Specimen: Blood from Arm, Right Updated: 09/24/23 0035     LACTIC ACID 0.7 mmol/L     Narrative:      Result may be elevated if tourniquet was used during collection.     Procalcitonin [[id number]]  (Normal) Collected: 09/23/23 2012    Lab Status: Final result Specimen: Blood from Arm, Right Updated: 09/23/23 2356     Procalcitonin 0.05 ng/ml     Basic metabolic panel [[id number]]  (Abnormal) Collected: 09/23/23 2012    Lab Status: Final result Specimen: Blood from Arm, Right Updated: 09/23/23 2032     Sodium 140 mmol/L      Potassium 4.5 mmol/L      Chloride 102 mmol/L      CO2 33 mmol/L      ANION GAP 5 mmol/L      BUN 8 mg/dL      Creatinine 1.21 mg/dL      Glucose 93 mg/dL      Calcium 9.3 mg/dL      eGFR 73 ml/min/1.73sq m     Narrative:      [facility] guidelines for Chronic Kidney Disease (CKD):   •  Stage 1 with normal or high GFR (GFR > 90 mL/min/1.73 square meters)  •  Stage 2 Mild CKD (GFR = 60-89 mL/min/1.73 square meters)  •  Stage 3A Moderate CKD (GFR = 45-59 mL/min/1.73 square meters)  •  Stage 3B Moderate CKD (GFR = 30-44 mL/min/1.73 square meters)  •  Stage 4 Severe CKD (GFR = 15-29 mL/min/1.73 square meters)  •  Stage 5 End Stage CKD (GFR <15 mL/min/1.73 square meters)  Note: GFR calculation is accurate only with a steady state creatinine    CBC and differential [[id number]]  (Abnormal) Collected: 09/23/23 2012    Lab Status: Final result Specimen: Blood from Arm, Right Updated: 09/23/23 2016     WBC 13.49 Thousand/uL      RBC 4.54 Million/uL      Hemoglobin 13.1 g/dL      Hematocrit 41.8 %      MCV 92 fL      MCH 28.9 pg      MCHC 31.3 g/dL      RDW 13.2 %      MPV 9.9 fL      Platelets 016 Thousands/uL      nRBC 0 /100 WBCs      Neutrophils Relative 65 %      Immat GRANS % 0 %      Lymphocytes Relative 26 %      Monocytes Relative 7 %      Eosinophils Relative 2 %      Basophils Relative 0 %      Neutrophils Absolute 8.84 Thousands/µL      Immature Grans Absolute 0.05 Thousand/uL      Lymphocytes Absolute 3.45 Thousands/µL      Monocytes Absolute 0.89 Thousand/µL      Eosinophils Absolute 0.21 Thousand/µL      Basophils Absolute 0.05 Thousands/µL                  CT facial bones with contrast   Final Result by [address] [place] [address] DO Fior (09/23 2226)      Small rim-enhancing collection in the right paranasal soft tissues (axial image 44, series 3) measures approximately 1.1 cm in size, suspicious for small abscess. There are surrounding inflammatory changes and fat stranding in this region which extends    along the anterior right premaxillary region and the right inferior periorbital region suggesting associated premaxillary and preseptal cellulitis. No discrete evidence of post septal cellulitis. Shotty right submandibular and cervical chain lymph nodes, possibly reactive. Other findings as above. The study was marked in [facility] for immediate notification. Workstation performed: [id number]                    Procedures  Procedures         ED Course  ED Course as of 09/24/23 1202   Sat Sep 23, 2023   2020 WBC(!): 13.49   2200 Pt signed out to Dr Tatyana Pedro. Pending CT scan. Medical Decision Making  Patient is a 41 y.o. male who presents to the ED for right-sided facial swelling, pain, and redness. Patient is nontoxic and well-appearing. Vitals are stable. On exam there is a small papule to the right nose with surrounding erythema and swelling. Differential includes but is not limited to: Abscess, cellulitis, preseptal cellulitis. Low suspicion for cavernous sinus thrombosis. Low suspicion for orbital cellulitis. Plan: Labs, CT facial bones with contrast, reassessment                 Portions of the record may have been created with voice recognition software. Occasional wrong word or "sound a like" substitutions may have occurred due to the inherent limitations of voice recognition software. Read the chart carefully and recognize, using context, where substitutions have occurred. Facial cellulitis: acute illness or injury  Amount and/or Complexity of Data Reviewed  Labs: ordered.  Decision-making details documented in ED Course. Radiology: ordered. Risk  Prescription drug management. Decision regarding hospitalization. Disposition  Final diagnoses:   Facial cellulitis     Time reflects when diagnosis was documented in both MDM as applicable and the Disposition within this note     Time User Action Codes Description Comment    9/23/2023 11:17 PM Melonie Kaur [A78.499] Facial cellulitis       ED Disposition     ED Disposition   Admit    Condition   Stable    Date/Time   Sat Sep 23, 2023 11:18 PM    Comment   Case was discussed with Claudell Belfast and the patient's admission status was agreed to be Admission Status: inpatient status to the service of Dr. Laila Ly. Follow-up Information    None         Current Discharge Medication List      CONTINUE these medications which have NOT CHANGED    Details   albuterol (Ventolin HFA) 90 mcg/act inhaler Inhale 2 puffs every 6 (six) hours as needed for wheezing or shortness of breath  Qty: 18 g, Refills: 0    Comments: Substitution to a formulary equivalent within the same pharmaceutical class is authorized.   Associated Diagnoses: Close exposure to COVID-19 virus      ALPRAZolam (XANAX) 0.5 mg tablet TAKE 1 TABLET(0.5 MG) BY MOUTH THREE TIMES DAILY AS NEEDED FOR ANXIETY      budesonide-formoterol (Symbicort) 160-4.5 mcg/act inhaler Inhale 2 puffs 2 (two) times a day      Diclofenac Sodium (VOLTAREN) 1 % Apply 2 g topically 4 (four) times a day  Qty: 100 g, Refills: 0    Associated Diagnoses: Musculoskeletal back pain      dicyclomine (BENTYL) 20 mg tablet dicyclomine 20 mg tablet      Influenza Vac Typ A&B Surf Ant SUSP Fluvirin [id number] 45 mcg (15 mcg x 3)/0.5 mL intramuscular suspension      !! methocarbamol (ROBAXIN) 500 mg tablet Take 1 tablet (500 mg total) by mouth 2 (two) times a day  Qty: 20 tablet, Refills: 0    Associated Diagnoses: Musculoskeletal back pain      !! methocarbamol (ROBAXIN) 750 mg tablet Take 1 tablet (750 mg total) by mouth every 6 (six) hours as needed for muscle spasms  Qty: 20 tablet, Refills: 0    Associated Diagnoses: Acute right-sided low back pain with right-sided sciatica      naproxen (EC NAPROSYN) 500 MG EC tablet Take 1 tablet (500 mg total) by mouth 2 (two) times a day with meals for 5 days  Qty: 10 tablet, Refills: 0    Associated Diagnoses: Laceration of left thumb      traMADol (ULTRAM) 50 mg tablet Take 50 mg by mouth every 6 (six) hours as needed for moderate pain      traMADol (ULTRAM-ER) 200 MG 24 hr tablet Take 200 mg by mouth daily       ! ! - Potential duplicate medications found. Please discuss with provider. No discharge procedures on file.     PDMP Review       Value Time User    PDMP Reviewed  Yes 9/24/2023 12:42 AM Emily Ch PA-C          ED Provider  Electronically Signed by           Ivonne Cutler DO  09/24/23 2762

## 2025-06-25 NOTE — TELEPHONE ENCOUNTER
Patient was  prescribe  Ozempic  he is having severe constipation he want to know should  he continue to take it. He would like a call back.

## 2025-06-29 DIAGNOSIS — I50.42 CHRONIC COMBINED SYSTOLIC AND DIASTOLIC CONGESTIVE HEART FAILURE (HCC): ICD-10-CM

## 2025-06-29 DIAGNOSIS — I10 BENIGN ESSENTIAL HYPERTENSION: ICD-10-CM

## 2025-06-30 RX ORDER — CARVEDILOL 25 MG/1
25 TABLET ORAL 2 TIMES DAILY WITH MEALS
Qty: 180 TABLET | Refills: 1 | Status: SHIPPED | OUTPATIENT
Start: 2025-06-30

## 2025-06-30 RX ORDER — EMPAGLIFLOZIN 10 MG/1
10 TABLET, FILM COATED ORAL EVERY MORNING
Qty: 90 TABLET | Refills: 1 | Status: SHIPPED | OUTPATIENT
Start: 2025-06-30

## 2025-06-30 NOTE — PROGRESS NOTES
Cardiology Follow Up    Cricket Rosales  1955  [id number]  [facility] [address] [address] [phone]    1. Heart failure with mid-range ejection fraction (HFmEF) (HCC)        2. Non-ischemic cardiomyopathy with HFmEF (HCC)  Echo complete w/ contrast if indicated      3. Benign essential hypertension        4. Dyslipidemia        5. Stage 3b chronic kidney disease (HCC)            Discussion/Summary:  Chronic heart failure with mid-range ejection fraction:   Takes lasix 40 mg every other day. Currently appears compensated on exam.  I did encourage him to weigh himself regularly.  Low sodium diet discussed, pre-prepared meals at home are higher in sodium.  He may take extra lasix prn.     Non-ischemic cardiomyopathy:  Thought to be secondary to alcohol use. LHC in September 2022 did not show any evidence of obstructive CAD.   Last echo 7/28/23: LVEF stable at 45%  With increased dyspnea on exertion will update echo.   GDMT: Carvedilol 25 mg BID, Entresto 49-51 mg BID, Jardiance 10 mg daily. He has CKD with baseline creatinine 1.7-2. He has upcoming appointment with nephrology  Patient has been drinking 2 drinks a few days per week. He reinforced today that he is at the point where he would like to continue drinking occasionally. Patient is aware alcohol has been suspected to be the cause of his cardiomyopathy.     Hypertension:  Mildly elevated in office today but otherwise has been well controlled.  No changes at this time  Low sodium diet discussed      Dyslipidemia  Controlled on atorvastatin 20 mg daily   We did discuss increasing activity level by adding small amounts of exercise throughout the day    COPD/asthma  Overdue for pulmonary follow up. Encouraged him to schedule appointment      He will RTO in 6 months or sooner if necessary. He will call with any concerns in the  interim.    Interval History:   Cricket Rosales is a 70 y.o. male with a non-ischemic cardiomyopathy thought to be secondary to alcohol abuse, chronic combined systolic and diastolic congestive heart failure, hypertension, dyslipidemia, obstructive sleep apnea, and COPD/asthma who presents to the office today for routine follow up.     Since his last office visit he has noticed some increased dyspnea on exertion.  He has been attributing this to his asthma/COPD.  He does get improvement with the use of his nebulizers.  He has not noticed any lower extremity edema.  He has had some abdominal bloating but has been constipated.  He does not weigh himself on a regular basis but it appears his weight has been stable or even down a few pounds over the last 6 months.  He denies any significant lightheadedness or dizziness.  He denies palpitations.    He drinks 1 beer and 1 shot a few days per week.  He has 2 preprepared meals daily which have about 700-800 mg of sodium each.    Medical Problems       Problem List       Chronic asthma, moderate persistent, uncomplicated    Obstructive sleep apnea    Overview Signed 12/19/2018 10:16 AM by Lala Jimenez DO     Declines CPAP         Non-ischemic cardiomyopathy with HFmEF (HCC) (Chronic)    Dyslipidemia    Benign essential hypertension    Anxiety (Chronic)    Asthma-COPD overlap syndrome (HCC)    COPD, severe (HCC)    Overview Signed 12/19/2018 10:16 AM by Lala Jimenez DO    Post bronchodilator FEV1 is 68% predicted, 2017         Hemorrhoids    Thyroid disease    Vitamin D deficiency    Hypertrophied anal papilla    PLMD (periodic limb movement disorder)    Type 2 diabetes mellitus with proteinuria  ([facility])      Lab Results   Component Value Date    HGBA1C 7.4 (H) 04/22/2025         Moderate episode of recurrent major depressive disorder (HCC)    Heart failure with mid-range ejection fraction (HFmEF) ([facility])    Wt Readings from Last 3 Encounters:   07/01/25 135 kg  (297 lb)   25 (!) 137 kg (301 lb 12.8 oz)   25 (!) 139 kg (306 lb)                 CKD (chronic kidney disease) stage 3, GFR 30-59 ml/min ([facility])    Lab Results   Component Value Date    EGFR 37 2025    EGFR 35 2025    EGFR 39 2025    CREATININE 1.83 (H) 2025    CREATININE 1.91 (H) 2025    CREATININE 1.76 (H) 2025         Severe persistent asthma without complication    Other diabetic neurological complication associated with type 2 diabetes mellitus ([facility])      Lab Results   Component Value Date    HGBA1C 7.4 (H) 2025         Pes planus of both feet    Persistent proteinuria    Obesity, morbid ([facility])        Past Medical History[1]  Social History     Socioeconomic History    Marital status: /Civil Union     Spouse name: Not on file    Number of children: Not on file    Years of education: Not on file    Highest education level: Not on file   Occupational History    Not on file   Tobacco Use    Smoking status: Former     Current packs/day: 0.00     Average packs/day: 3.0 packs/day for 43.0 years (129.0 ttl pk-yrs)     Types: Cigarettes     Start date:      Quit date: 2018     Years since quittin.5    Smokeless tobacco: Never   Vaping Use    Vaping status: Never Used   Substance and Sexual Activity    Alcohol use: Yes     Alcohol/week: 12.0 standard drinks of alcohol     Types: 6 Cans of beer, 6 Shots of liquor per week     Comment: 1-2 beers & 1-2 shots 3x/week    Drug use: Yes     Types: Marijuana     Comment: occasionally edible    Sexual activity: Not Currently   Other Topics Concern    Not on file   Social History Narrative    Caffeine use     Social Drivers of Health     Financial Resource Strain: Low Risk  (3/23/2023)    Overall Financial Resource Strain (CARDIA)     Difficulty of Paying Living Expenses: Not very hard   Food Insecurity: No Food Insecurity (2024)    Nursing - Inadequate Food Risk Classification     Worried About Running Out of  "Food in the Last Year: Never true     Ran Out of Food in the Last Year: Never true     Ran Out of Food in the Last Year: Not on file   Transportation Needs: No Transportation Needs (7/2/2024)    PRAPARE - Transportation     Lack of Transportation (Medical): No     Lack of Transportation (Non-Medical): No   Physical Activity: Not on file   Stress: Not on file   Social Connections: Not on file   Intimate Partner Violence: Not on file   Housing Stability: Low Risk  (7/2/2024)    Housing Stability Vital Sign     Unable to Pay for Housing in the Last Year: No     Number of Times Moved in the Last Year: 1     Homeless in the Last Year: No      Family History[2]  Past Surgical History[3]  Current Medications[4]  Allergies   Allergen Reactions    Ciprofloxacin Shortness Of Breath and Diarrhea       Labs:     Chemistry        Component Value Date/Time     07/27/2015 0559    K 4.6 04/22/2025 1043    K 3.8 07/27/2015 0559     04/22/2025 1043     07/27/2015 0559    CO2 23 04/22/2025 1043    CO2 32 07/27/2015 0559    BUN 41 (H) 04/22/2025 1043    BUN 19 07/27/2015 0559    CREATININE 1.83 (H) 04/22/2025 1043    CREATININE 1.05 07/27/2015 0559        Component Value Date/Time    CALCIUM 9.1 04/22/2025 1043    CALCIUM 8.6 07/27/2015 0559    ALKPHOS 80 03/27/2025 1123    ALKPHOS 75 07/22/2015 1021    AST 26 03/27/2025 1123    AST 30 07/22/2015 1021    ALT 25 03/27/2025 1123    ALT 74 07/22/2015 1021    BILITOT 1.05 (H) 07/22/2015 1021            Lab Results   Component Value Date    CHOL 105 07/23/2015     Lab Results   Component Value Date    HDL 25 (L) 04/22/2025    HDL 27 (L) 06/24/2024    HDL 27 (L) 09/01/2022     Lab Results   Component Value Date    LDLCALC 49 04/22/2025    LDLCALC 48 06/24/2024    LDLCALC 49 09/01/2022     Lab Results   Component Value Date    TRIG 140 04/22/2025    TRIG 86 06/24/2024    TRIG 138 09/01/2022     No results found for: \"CHOLHDL\"    Imaging: Colonoscopy  Result Date: " 6/3/2025  Narrative: Table formatting from the original result was not included. [facility] [address] [address] DATE OF SERVICE: 6/03/25 PHYSICIAN(S): Attending: Diana M Jaiyeola, MD Fellow: No Staff Documented INDICATION: Personal history of colon polyps, unspecified POST-OP DIAGNOSIS: See the impression below. HISTORY: Prior colonoscopy: Less than 3 years ago. It is being repeated at an interval of less than 3 years because: Last colonoscopy had inadequate bowel prep BOWEL PREPARATION: Two-day Bowel Prep PREPROCEDURE: Informed consent was obtained for the procedure, including sedation. Risks including but not limited to bleeding, infection, perforation, adverse drug reaction and aspiration were explained in detail. Also explained about less than 100% sensitivity with the exam and other alternatives. The patient was placed in the left lateral decubitus position. Procedure: Colonoscopy DETAILS OF PROCEDURE: Patient was taken to the procedure room where a time out was performed to confirm correct patient and correct procedure. The patient underwent monitored anesthesia care, which was administered by an anesthesia professional. The patient's blood pressure, ECG, ETCO2, heart rate, level of consciousness, oxygen and respirations were monitored throughout the procedure. A digital rectal exam was performed. The scope was introduced through the anus and advanced to the cecum. Retroflexion was performed in the rectum. The quality of bowel preparation was evaluated using the Rembrandt Bowel Preparation Scale with scores of: right colon = 2, transverse colon = 2, left colon = 2. The total BBPS score was 6. Bowel prep was adequate. The patient experienced no blood loss. The procedure was not difficult. The patient tolerated the procedure well. There were no apparent adverse events. ANESTHESIA INFORMATION: ASA: III Anesthesia Type: IV Sedation with Anesthesia MEDICATIONS: No  administrations occurring from 0751 to 0834 on 06/03/25 FINDINGS: Two sessile polyps measuring 5-9 mm in the ascending colon; performed cold snare with complete en bloc removal and retrieved specimen Two sessile polyps measuring 5-9 mm in the transverse colon; performed cold snare with complete en bloc removal and retrieved specimen Multiple small and large, extensive pancolonic diverticula of moderate severity Internal small hemorrhoids Otherwise normal colonic mucosa EVENTS: Procedure Events Event Event Time ENDO CECUM REACHED 6/3/2025  8:19 AM ENDO SCOPE OUT TIME 6/3/2025  8:33 AM SPECIMENS: ID Type Source Tests Collected by Time Destination 1 : gastric biopsies, rule out h pylori, cold biopsy forcep Tissue Stomach TISSUE EXAM Diana M Jaiyeola, MD 6/3/2025  8:03 AM  2 : cold snare of polyp x 2 Tissue Large Intestine, Right/Ascending Colon TISSUE EXAM Diana M Jaiyeola, MD 6/3/2025  8:21 AM  3 : cold snare of polyp x 2 Tissue Large Intestine, Transverse Colon TISSUE EXAM Diana M Jaiyeola, MD 6/3/2025  8:27 AM  EQUIPMENT: Colonoscope -[id number]     Impression: 4 polyps were removed with cold snare Extensive diverticulosis of moderate severity Small hemorrhoids Otherwise normal colonic mucosa RECOMMENDATION: Await pathology results Repeat colonoscopy in 3 years, due: 6/2/2028 Personal history of colon polyps    Diana M Jaiyeola, MD     EGD  Result Date: 6/3/2025  Narrative: Table formatting from the original result was not included. [facility] [address] [address] DATE OF SERVICE: 6/03/25 PHYSICIAN(S): Attending: Diana M Jaiyeola, MD Fellow: No Staff Documented INDICATION: Dyspepsia Intermittent constipation Atypical chest pain POST-OP DIAGNOSIS: See the impression below. PREPROCEDURE: Informed consent was obtained for the procedure, including sedation.  Risks of perforation, hemorrhage, adverse drug reaction and aspiration were discussed. The patient was placed in the left  lateral decubitus position. Patient was explained about the risks and benefits of the procedure. Risks including but not limited to bleeding, infection, and perforation were explained in detail. Also explained about less than 100% sensitivity with the exam and other alternatives. PROCEDURE: EGD DETAILS OF PROCEDURE: Patient was taken to the procedure room where a time out was performed to confirm correct patient and correct procedure. The patient underwent monitored anesthesia care, which was administered by an anesthesia professional. The patient's blood pressure, heart rate, level of consciousness, respirations, oxygen, ECG and ETCO2 were monitored throughout the procedure. The scope was introduced through the mouth and advanced to the second part of the duodenum. Retroflexion was performed in the fundus. The patient experienced no blood loss. The procedure was not difficult. The patient tolerated the procedure well. There were no apparent adverse events. ANESTHESIA INFORMATION: ASA: III Anesthesia Type: IV Sedation with Anesthesia MEDICATIONS: No administrations occurring from 0751 to 0806 on 06/03/25 FINDINGS: Regular Z-line 45 cm from the incisors The esophagus appeared normal. Severe, generalized erythematous mucosa with erosion in the body of the stomach, suggestive of gastritis Performed multiple forceps biopsies in the stomach to rule out H. pylori The duodenum appeared normal. SPECIMENS: ID Type Source Tests Collected by Time Destination 1 : gastric biopsies, rule out h pylori, cold biopsy forcep Tissue Stomach TISSUE EXAM Diana M Jaiyeola, MD 6/3/2025  8:03 AM      Impression: The esophagus appeared normal. Severe erythematous mucosa with erosion in the body of the stomach, suggestive of gastritis Performed forceps biopsies in the stomach to rule out H. pylori The duodenum appeared normal. RECOMMENDATION: Await pathology results Proceed with colonoscopy Avoid NSAIDs Daily PPI for 8 weeks    Jeimy BEARDEN  "Jaiyeola, MD       Review of Systems   All other systems reviewed and are negative.      Vitals:    07/01/25 1522   BP: 140/72   Pulse: 86   SpO2: 94%     Vitals:    07/01/25 1522   Weight: 135 kg (297 lb)     Height: 6' 2\" (188 cm)   Body mass index is 38.13 kg/m².    Physical Exam:  Physical Exam  Vitals reviewed.   Constitutional:       General: He is not in acute distress.     Appearance: He is well-developed. He is obese. He is not diaphoretic.   HENT:      Head: Normocephalic and atraumatic.     Eyes:      Pupils: Pupils are equal, round, and reactive to light.     Neck:      Vascular: No carotid bruit.     Cardiovascular:      Rate and Rhythm: Normal rate and regular rhythm.      Pulses:           Radial pulses are 2+ on the right side and 2+ on the left side.      Heart sounds: S1 normal and S2 normal. No murmur heard.  Pulmonary:      Effort: Pulmonary effort is normal. No respiratory distress.      Breath sounds: Normal breath sounds. No wheezing or rales.   Abdominal:      General: There is distension.      Palpations: Abdomen is soft.      Tenderness: There is no abdominal tenderness.     Musculoskeletal:         General: Normal range of motion.      Cervical back: Normal range of motion.      Right lower leg: No edema.      Left lower leg: No edema.     Skin:     General: Skin is warm and dry.      Findings: No erythema.     Neurological:      General: No focal deficit present.      Mental Status: He is alert and oriented to person, place, and time.      Gait: Gait normal.     Psychiatric:         Mood and Affect: Mood normal.         Behavior: Behavior normal.              [1]   Past Medical History:  Diagnosis Date    Acute on chronic combined systolic and diastolic CHF (congestive heart failure) ([facility]) 07/27/2023    Alcohol abuse 07/27/2023    Ambulates with cane     Arthritis     Asthma     Back pain     Bunion 04/02/2024    Chest pain 12/22/2022    CHF (congestive heart failure) ([facility])     " Claustrophobia     Constipation 12/18/2018    COPD (chronic obstructive pulmonary disease) (HCC)     COPD with acute exacerbation (HCC) 05/06/2022    COVID-19     COVID-19 virus infection 12/03/2021    Depression     Hypertension     Mumps     Neck pain     Old MI (myocardial infarction)     Pneumonia     Pulmonary emphysema (HCC)     Rectal bleeding 01/14/2019    S/P TKR (total knee replacement), left 11/02/2023    Skin abnormalities     rashes and wounds on both legs - scabbed over and healing    Sleep apnea     no CPAP    Tingling of both feet     Wears glasses    [2]   Family History  Problem Relation Name Age of Onset    Heart attack Father          MI    Heart disease Father      Diabetes Sister          DM    Arthritis Family      Coronary artery disease Family      Hypertension Family      Tuberculosis Son      Alzheimer's disease Mother     [3]   Past Surgical History:  Procedure Laterality Date    APPENDECTOMY      CARDIAC CATHETERIZATION  07/24/2015    Left main- normal and maidly tortuous.  Circumflex - normal and moderately tortuous.  RCA- normal and mildy tortuous.  Global LV function was severely depressed..    CARDIAC CATHETERIZATION Left 09/27/2022    Procedure: Cardiac Left Heart Cath;  Surgeon: Doreen Briones DO;  Location: [facility];  Service: Cardiology    CARDIAC CATHETERIZATION N/A 09/27/2022    Procedure: Cardiac Coronary Angiogram;  Surgeon: Doreen Briones DO;  Location: [facility];  Service: Cardiology    CARDIAC CATHETERIZATION  09/27/2022    Procedure: Cardiac catheterization;  Surgeon: Doreen Briones DO;  Location: [facility];  Service: Cardiology    INGUINAL HERNIA REPAIR Bilateral     NH COLONOSCOPY FLX DX W/COLLJ SPEC WHEN PFRMD N/A 03/11/2019    Procedure: COLONOSCOPY with removal of anal papilla;  Surgeon: ANIL Dale MD;  Location: [facility] OR;  Service: Colorectal    TONSILLECTOMY      TOTAL KNEE ARTHROPLASTY Left 9/18/2023     Procedure: ARTHROPLASTY KNEE TOTAL;  Surgeon: David Mullen DO;  Location: [facility];  Service: Orthopedics    UMBILICAL HERNIA REPAIR  06/21/2006   [4]   Current Outpatient Medications:     albuterol (PROVENTIL HFA,VENTOLIN HFA) 90 mcg/act inhaler, INHALE 2 PUFFS BY MOUTH EVERY 6 HOURS AS NEEDED FOR WHEEZING, Disp: 18 g, Rfl: 5    ALPRAZolam (XANAX) 0.5 mg tablet, TAKE 1 TABLET BY MOUTH ONCE DAILY AT BEDTIME AS NEEDED FOR ANXIETY, Disp: 30 tablet, Rfl: 0    atorvastatin (LIPITOR) 20 mg tablet, Take 1 tablet by mouth once daily, Disp: 90 tablet, Rfl: 1    buPROPion (WELLBUTRIN XL) 150 mg 24 hr tablet, Take 1 tablet (150 mg total) by mouth every morning, Disp: 30 tablet, Rfl: 5    carvedilol (COREG) 25 mg tablet, TAKE 1 TABLET BY MOUTH TWICE DAILY WITH MEALS, Disp: 180 tablet, Rfl: 1    cetirizine (ZyrTEC) 10 mg tablet, Take 10 mg by mouth daily, Disp: , Rfl:     DULoxetine (CYMBALTA) 60 mg delayed release capsule, Take 1 capsule (60 mg total) by mouth daily, Disp: 90 capsule, Rfl: 3    Empagliflozin (Jardiance) 10 MG TABS tablet, TAKE 1 TABLET BY MOUTH ONCE DAILY IN THE MORNING, Disp: 90 tablet, Rfl: 1    famotidine (PEPCID) 40 MG tablet, Take 1 tablet (40 mg total) by mouth daily at bedtime, Disp: 90 tablet, Rfl: 3    fluticasone (FLONASE) 50 mcg/act nasal spray, Use 1 spray(s) in each nostril twice daily, Disp: 16 g, Rfl: 0    Fluticasone-Salmeterol (Advair) 500-50 mcg/dose inhaler, INHALE 1 DOSE BY MOUTH TWICE DAILY RINSE  MOUTH  AFTER  USE, Disp: 60 blister, Rfl: 5    furosemide (LASIX) 40 mg tablet, TAKE 1 TABLET BY MOUTH EVERY OTHER DAY, Disp: 45 tablet, Rfl: 1    ipratropium-albuterol (DUO-NEB) 0.5-2.5 mg/3 mL nebulizer solution, USE 1 AMPULE IN NEBULIZER 4 TIMES DAILY, Disp: 360 mL, Rfl: 1    isosorbide mononitrate (IMDUR) 30 mg 24 hr tablet, Take 1 tablet by mouth once daily, Disp: 90 tablet, Rfl: 1    linaCLOtide 145 MCG CAPS, Take 1 capsule (145 mcg total) by mouth in the morning, Disp: 30 capsule,  Rfl: 1    montelukast (SINGULAIR) 10 mg tablet, Take 1 tablet (10 mg total) by mouth daily at bedtime, Disp: 30 tablet, Rfl: 0    omeprazole (PriLOSEC) 40 MG capsule, Take 1 capsule (40 mg total) by mouth daily before breakfast, Disp: 30 capsule, Rfl: 2    polyethylene glycol (MIRALAX) 17 g packet, Take 17 g by mouth daily, Disp: 14 each, Rfl: 0    sacubitril-valsartan (Entresto) 49-51 MG TABS, Take 1 tablet by mouth twice daily, Disp: 180 tablet, Rfl: 1    semaglutide, 0.25 or 0.5 mg/dose, (Ozempic, 0.25 or 0.5 MG/DOSE,) 2 mg/3 mL injection pen, Inject 0.75 mL (0.5 mg total) under the skin every 7 days, Disp: 3 mL, Rfl: 2    umeclidinium (Incruse Ellipta) 62.5 mcg/actuation AEPB inhaler, Inhale 1 puff by mouth once daily, Disp: 30 each, Rfl: 5    Blood Glucose Monitoring Suppl (Accu-Chek Guide Me) w/Device KIT, Use 1 each daily to test blood sugar., Disp: 1 kit, Rfl: 0    Blood Pressure KIT, Use 3 (three) times a week, Disp: 1 kit, Rfl: 0

## 2025-07-01 ENCOUNTER — OFFICE VISIT (OUTPATIENT)
Dept: CARDIOLOGY CLINIC | Facility: HOSPITAL | Age: 70
End: 2025-07-01
Payer: MEDICARE

## 2025-07-01 VITALS
HEIGHT: 74 IN | DIASTOLIC BLOOD PRESSURE: 72 MMHG | HEART RATE: 86 BPM | WEIGHT: 297 LBS | BODY MASS INDEX: 38.12 KG/M2 | OXYGEN SATURATION: 94 % | SYSTOLIC BLOOD PRESSURE: 140 MMHG

## 2025-07-01 DIAGNOSIS — I50.22 HEART FAILURE WITH MID-RANGE EJECTION FRACTION (HFMEF) (HCC): Primary | ICD-10-CM

## 2025-07-01 DIAGNOSIS — N18.32 STAGE 3B CHRONIC KIDNEY DISEASE (HCC): ICD-10-CM

## 2025-07-01 DIAGNOSIS — I10 BENIGN ESSENTIAL HYPERTENSION: ICD-10-CM

## 2025-07-01 DIAGNOSIS — I42.8 NON-ISCHEMIC CARDIOMYOPATHY (HCC): Chronic | ICD-10-CM

## 2025-07-01 DIAGNOSIS — E78.5 DYSLIPIDEMIA: ICD-10-CM

## 2025-07-01 PROCEDURE — 99214 OFFICE O/P EST MOD 30 MIN: CPT | Performed by: PHYSICIAN ASSISTANT

## 2025-07-03 DIAGNOSIS — J44.89 ASTHMA-COPD OVERLAP SYNDROME (HCC): ICD-10-CM

## 2025-07-03 NOTE — TELEPHONE ENCOUNTER
Reason for call:   [x] Refill   [] Prior Auth  [] Other:     Office:   [] PCP/Provider -   [x] Specialty/Provider - Pulmonology    Medication: montelukast (SINGULAIR) 10 mg     Dose/Frequency: Take 1 tablet (10 mg total) by mouth daily at bedtime     Quantity: 30    Pharmacy: [facility] [address] [place] [address] [id number]     Local Pharmacy   Does the patient have enough for 3 days?   [] Yes   [x] No - Send as HP to POD    Mail Away Pharmacy   Does the patient have enough for 10 days?   [] Yes   [] No - Send as HP to POD

## 2025-07-08 ENCOUNTER — RA CDI HCC (OUTPATIENT)
Dept: OTHER | Facility: HOSPITAL | Age: 70
End: 2025-07-08

## 2025-07-08 DIAGNOSIS — F41.9 ANXIETY: ICD-10-CM

## 2025-07-09 RX ORDER — ALPRAZOLAM 0.5 MG
TABLET ORAL
Qty: 30 TABLET | Refills: 0 | Status: SHIPPED | OUTPATIENT
Start: 2025-07-09

## 2025-07-11 ENCOUNTER — NURSE TRIAGE (OUTPATIENT)
Age: 70
End: 2025-07-11

## 2025-07-11 ENCOUNTER — TELEPHONE (OUTPATIENT)
Age: 70
End: 2025-07-11

## 2025-07-11 NOTE — TELEPHONE ENCOUNTER
Patient calling regarding Ozempic and its side effects.  Asking if there is another medication that would be better d/t his severe constipation.  Warm transferred to Ivana at endocrinology..

## 2025-07-11 NOTE — TELEPHONE ENCOUNTER
----- Message from Ivana HAQUE sent at 7/11/2025  8:18 AM EDT -----  Patient calling. He is on Ozempic,  It is causing such severe constipation in him, he is doing complete cleanse at least once a week.  He wants to know if its worth him to be on the medication. Please call pt

## 2025-07-11 NOTE — TELEPHONE ENCOUNTER
"REASON FOR CONVERSATION: Constipation    SYMPTOMS: bloating, last BM was 7/4 and he gave himself a \"cleanse.\"    OTHER HEALTH INFORMATION: believes the constipation is from the ozempic    PROTOCOL DISPOSITION: See Today in Office    CARE ADVICE PROVIDED: advised patient that if he develops abdominal pain, fever, or vomiting to seek the ED ASAP.    PRACTICE FOLLOW-UP: Manju is suggestion patient be seen in office today. No same day appt available. He is asking If he should continue with the ozempic? Or what we can suggest or recommend? If he continues he will need a refill.             Reason for Disposition   Last bowel movement (BM) > 4 days ago    Answer Assessment - Initial Assessment Questions  1. STOOL PATTERN OR FREQUENCY: \"How often do you have a bowel movement (BM)?\"  (Normal range: 3 times a day to every 3 days)  \"When was your last BM?\"        He says he hasn't had a proper bowel movement yet, except for the times he cleanses himself. At least 1 week   2. STRAINING: \"Do you have to strain to have a BM?\"       yes  3. ONSET: \"When did the constipation begin?\"      Soon as he started the ozempic  4. RECTAL PAIN: \"Does your rectum hurt when the stool comes out?\" If Yes, ask: \"Do you have hemorrhoids? How bad is the pain?\"  (Scale 1-10; or mild, moderate, severe)      denies  5. BM COMPOSITION: \"Are the stools hard?\"       Diarrhea   6. BLOOD ON STOOLS: \"Has there been any blood on the toilet tissue or on the surface of the BM?\" If Yes, ask: \"When was the last time?\"      denies  7. CHRONIC CONSTIPATION: \"Is this a new problem for you?\"  If No, ask: \"How long have you had this problem?\" (days, weeks, months)       About 1 week   8. CHANGES IN DIET OR HYDRATION: \"Have there been any recent changes in your diet?\" \"How much fluids are you drinking on a daily basis?\"  \"How much have you had to drink today?\"      denies  9. MEDICINES: \"Have you been taking any new medicines?\" \"Are you taking any narcotic pain " "medicines?\" (e.g., Dilaudid, morphine, Percocet, Vicodin)      Started on ozempic   10. LAXATIVES: \"Have you been using any stool softeners, laxatives, or enemas?\"  If Yes, ask \"What, how often, and when was the last time?\"        Yes, taking miralax and metamucil, and a weekly cleanse  11. ACTIVITY:  \"How much walking do you do every day?\"  \"Has your activity level decreased in the past week?\"         He said he doesn't get around too much with his COPD  12. CAUSE: \"What do you think is causing the constipation?\"         ozempic  13. MEDICAL HISTORY: \"Do you have a history of hemorrhoids, rectal fissures, rectal surgery, or rectal abscess?\"          hemorrhoids  14. OTHER SYMPTOMS: \"Do you have any other symptoms?\" (e.g., abdomen pain, bloating, fever, vomiting)        bloating    Protocols used: Constipation-Adult-[place]    "

## 2025-07-14 DIAGNOSIS — N18.32 STAGE 3B CHRONIC KIDNEY DISEASE (HCC): Primary | ICD-10-CM

## 2025-07-14 RX ORDER — MONTELUKAST SODIUM 10 MG/1
10 TABLET ORAL
Qty: 30 TABLET | Refills: 2 | Status: SHIPPED | OUTPATIENT
Start: 2025-07-14

## 2025-07-15 ENCOUNTER — OFFICE VISIT (OUTPATIENT)
Dept: FAMILY MEDICINE CLINIC | Facility: CLINIC | Age: 70
End: 2025-07-15
Payer: MEDICARE

## 2025-07-15 VITALS
OXYGEN SATURATION: 96 % | TEMPERATURE: 98 F | HEIGHT: 74 IN | DIASTOLIC BLOOD PRESSURE: 78 MMHG | BODY MASS INDEX: 37.99 KG/M2 | WEIGHT: 296 LBS | RESPIRATION RATE: 18 BRPM | SYSTOLIC BLOOD PRESSURE: 124 MMHG | HEART RATE: 88 BPM

## 2025-07-15 DIAGNOSIS — F17.201 NICOTINE DEPENDENCE IN REMISSION, UNSPECIFIED NICOTINE PRODUCT TYPE: ICD-10-CM

## 2025-07-15 DIAGNOSIS — Z00.00 MEDICARE ANNUAL WELLNESS VISIT, SUBSEQUENT: Primary | ICD-10-CM

## 2025-07-15 DIAGNOSIS — Z87.891 PERSONAL HISTORY OF NICOTINE DEPENDENCE: ICD-10-CM

## 2025-07-15 DIAGNOSIS — E08.00 DIABETES MELLITUS DUE TO UNDERLYING CONDITION WITH HYPEROSMOLARITY WITHOUT COMA, WITHOUT LONG-TERM CURRENT USE OF INSULIN (HCC): ICD-10-CM

## 2025-07-15 PROCEDURE — 92250 FUNDUS PHOTOGRAPHY W/I&R: CPT | Performed by: INTERNAL MEDICINE

## 2025-07-15 PROCEDURE — G0439 PPPS, SUBSEQ VISIT: HCPCS | Performed by: INTERNAL MEDICINE

## 2025-07-22 ENCOUNTER — TELEPHONE (OUTPATIENT)
Dept: NEPHROLOGY | Facility: CLINIC | Age: 70
End: 2025-07-22

## 2025-07-22 NOTE — TELEPHONE ENCOUNTER
I called and spoke with Brenden regarding completing lab work prior to upcoming appt on 08/01/2025

## 2025-07-31 DIAGNOSIS — F33.1 MODERATE EPISODE OF RECURRENT MAJOR DEPRESSIVE DISORDER (HCC): ICD-10-CM

## 2025-07-31 RX ORDER — DULOXETIN HYDROCHLORIDE 60 MG/1
60 CAPSULE, DELAYED RELEASE ORAL DAILY
Qty: 90 CAPSULE | Refills: 1 | Status: SHIPPED | OUTPATIENT
Start: 2025-07-31

## 2025-08-01 ENCOUNTER — TELEPHONE (OUTPATIENT)
Dept: HEMATOLOGY ONCOLOGY | Facility: CLINIC | Age: 70
End: 2025-08-01

## 2025-08-01 ENCOUNTER — TELEPHONE (OUTPATIENT)
Dept: GASTROENTEROLOGY | Facility: CLINIC | Age: 70
End: 2025-08-01

## 2025-08-01 ENCOUNTER — OFFICE VISIT (OUTPATIENT)
Dept: NEPHROLOGY | Facility: CLINIC | Age: 70
End: 2025-08-01
Payer: MEDICARE

## 2025-08-01 ENCOUNTER — APPOINTMENT (OUTPATIENT)
Dept: LAB | Facility: CLINIC | Age: 70
End: 2025-08-01
Attending: STUDENT IN AN ORGANIZED HEALTH CARE EDUCATION/TRAINING PROGRAM
Payer: MEDICARE

## 2025-08-01 ENCOUNTER — OFFICE VISIT (OUTPATIENT)
Dept: LAB | Facility: HOSPITAL | Age: 70
End: 2025-08-01
Payer: MEDICARE

## 2025-08-01 VITALS
SYSTOLIC BLOOD PRESSURE: 112 MMHG | TEMPERATURE: 97.8 F | OXYGEN SATURATION: 97 % | WEIGHT: 297 LBS | DIASTOLIC BLOOD PRESSURE: 67 MMHG | RESPIRATION RATE: 16 BRPM | BODY MASS INDEX: 38.12 KG/M2 | HEART RATE: 76 BPM | HEIGHT: 74 IN

## 2025-08-01 DIAGNOSIS — R80.1 PERSISTENT PROTEINURIA: ICD-10-CM

## 2025-08-01 DIAGNOSIS — N18.32 STAGE 3B CHRONIC KIDNEY DISEASE (HCC): ICD-10-CM

## 2025-08-01 DIAGNOSIS — E11.22 TYPE 2 DIABETES MELLITUS WITH STAGE 3B CHRONIC KIDNEY DISEASE, WITHOUT LONG-TERM CURRENT USE OF INSULIN (HCC): ICD-10-CM

## 2025-08-01 DIAGNOSIS — K59.00 CONSTIPATION, UNSPECIFIED CONSTIPATION TYPE: ICD-10-CM

## 2025-08-01 DIAGNOSIS — I49.9 IRREGULAR HEART BEAT: ICD-10-CM

## 2025-08-01 DIAGNOSIS — N25.81 SECONDARY HYPERPARATHYROIDISM OF RENAL ORIGIN (HCC): ICD-10-CM

## 2025-08-01 DIAGNOSIS — I10 BENIGN ESSENTIAL HYPERTENSION: ICD-10-CM

## 2025-08-01 DIAGNOSIS — E11.29 TYPE 2 DIABETES MELLITUS WITH PROTEINURIA  (HCC): Primary | ICD-10-CM

## 2025-08-01 DIAGNOSIS — N18.32 TYPE 2 DIABETES MELLITUS WITH STAGE 3B CHRONIC KIDNEY DISEASE, WITHOUT LONG-TERM CURRENT USE OF INSULIN (HCC): ICD-10-CM

## 2025-08-01 DIAGNOSIS — K59.09 INTERMITTENT CONSTIPATION: Primary | ICD-10-CM

## 2025-08-01 DIAGNOSIS — E83.42 HYPOMAGNESEMIA: ICD-10-CM

## 2025-08-01 DIAGNOSIS — R80.9 TYPE 2 DIABETES MELLITUS WITH PROTEINURIA  (HCC): Primary | ICD-10-CM

## 2025-08-01 DIAGNOSIS — I50.43 ACUTE ON CHRONIC COMBINED SYSTOLIC AND DIASTOLIC CHF (CONGESTIVE HEART FAILURE) (HCC): ICD-10-CM

## 2025-08-01 LAB
ATRIAL RATE: 65 BPM
P AXIS: 51 DEGREES
PR INTERVAL: 242 MS
QRS AXIS: -40 DEGREES
QRSD INTERVAL: 114 MS
QT INTERVAL: 398 MS
QTC INTERVAL: 414 MS
T WAVE AXIS: 120 DEGREES
VENTRICULAR RATE: 65 BPM

## 2025-08-01 PROCEDURE — 99214 OFFICE O/P EST MOD 30 MIN: CPT | Performed by: INTERNAL MEDICINE

## 2025-08-01 PROCEDURE — 93010 ELECTROCARDIOGRAM REPORT: CPT | Performed by: INTERNAL MEDICINE

## 2025-08-01 PROCEDURE — 83970 ASSAY OF PARATHORMONE: CPT

## 2025-08-01 PROCEDURE — G2211 COMPLEX E/M VISIT ADD ON: HCPCS | Performed by: INTERNAL MEDICINE

## 2025-08-01 PROCEDURE — 81001 URINALYSIS AUTO W/SCOPE: CPT

## 2025-08-01 PROCEDURE — 82043 UR ALBUMIN QUANTITATIVE: CPT

## 2025-08-01 PROCEDURE — 83036 HEMOGLOBIN GLYCOSYLATED A1C: CPT

## 2025-08-01 PROCEDURE — 85025 COMPLETE CBC W/AUTO DIFF WBC: CPT

## 2025-08-01 PROCEDURE — 82570 ASSAY OF URINE CREATININE: CPT

## 2025-08-01 PROCEDURE — 36415 COLL VENOUS BLD VENIPUNCTURE: CPT

## 2025-08-01 PROCEDURE — 82306 VITAMIN D 25 HYDROXY: CPT

## 2025-08-01 PROCEDURE — 80048 BASIC METABOLIC PNL TOTAL CA: CPT

## 2025-08-01 PROCEDURE — 84100 ASSAY OF PHOSPHORUS: CPT

## 2025-08-01 PROCEDURE — 83735 ASSAY OF MAGNESIUM: CPT

## 2025-08-02 LAB
25(OH)D3 SERPL-MCNC: 15.1 NG/ML (ref 30–100)
AMORPH URATE CRY URNS QL MICRO: ABNORMAL
ANION GAP SERPL CALCULATED.3IONS-SCNC: 10 MMOL/L (ref 4–13)
BACTERIA UR QL AUTO: ABNORMAL /HPF
BASOPHILS # BLD AUTO: 0.07 THOUSANDS/ÂΜL (ref 0–0.1)
BASOPHILS NFR BLD AUTO: 1 % (ref 0–1)
BILIRUB UR QL STRIP: NEGATIVE
BUN SERPL-MCNC: 27 MG/DL (ref 5–25)
CALCIUM SERPL-MCNC: 9.4 MG/DL (ref 8.4–10.2)
CHLORIDE SERPL-SCNC: 103 MMOL/L (ref 96–108)
CLARITY UR: CLEAR
CO2 SERPL-SCNC: 22 MMOL/L (ref 21–32)
COLOR UR: ABNORMAL
CREAT SERPL-MCNC: 1.52 MG/DL (ref 0.6–1.3)
CREAT UR-MCNC: 82.5 MG/DL
EOSINOPHIL # BLD AUTO: 0.34 THOUSAND/ÂΜL (ref 0–0.61)
EOSINOPHIL NFR BLD AUTO: 3 % (ref 0–6)
ERYTHROCYTE [DISTWIDTH] IN BLOOD BY AUTOMATED COUNT: 15.7 % (ref 11.6–15.1)
EST. AVERAGE GLUCOSE BLD GHB EST-MCNC: 137 MG/DL
GFR SERPL CREATININE-BSD FRML MDRD: 46 ML/MIN/1.73SQ M
GLUCOSE SERPL-MCNC: 129 MG/DL (ref 65–140)
GLUCOSE UR STRIP-MCNC: ABNORMAL MG/DL
GRAN CASTS #/AREA URNS LPF: ABNORMAL /[LPF]
HBA1C MFR BLD: 6.4 %
HCT VFR BLD AUTO: 43.7 % (ref 36.5–49.3)
HGB BLD-MCNC: 13.9 G/DL (ref 12–17)
HGB UR QL STRIP.AUTO: NEGATIVE
HYALINE CASTS #/AREA URNS LPF: ABNORMAL /LPF
IMM GRANULOCYTES # BLD AUTO: 0.05 THOUSAND/UL (ref 0–0.2)
IMM GRANULOCYTES NFR BLD AUTO: 1 % (ref 0–2)
KETONES UR STRIP-MCNC: NEGATIVE MG/DL
LEUKOCYTE ESTERASE UR QL STRIP: ABNORMAL
LYMPHOCYTES # BLD AUTO: 1.77 THOUSANDS/ÂΜL (ref 0.6–4.47)
LYMPHOCYTES NFR BLD AUTO: 17 % (ref 14–44)
MAGNESIUM SERPL-MCNC: 1.9 MG/DL (ref 1.9–2.7)
MCH RBC QN AUTO: 27.4 PG (ref 26.8–34.3)
MCHC RBC AUTO-ENTMCNC: 31.8 G/DL (ref 31.4–37.4)
MCV RBC AUTO: 86 FL (ref 82–98)
MICROALBUMIN UR-MCNC: 68.1 MG/L
MICROALBUMIN/CREAT 24H UR: 83 MG/G CREATININE (ref 0–30)
MONOCYTES # BLD AUTO: 0.66 THOUSAND/ÂΜL (ref 0.17–1.22)
MONOCYTES NFR BLD AUTO: 6 % (ref 4–12)
MUCOUS THREADS UR QL AUTO: ABNORMAL
NEUTROPHILS # BLD AUTO: 7.6 THOUSANDS/ÂΜL (ref 1.85–7.62)
NEUTS SEG NFR BLD AUTO: 72 % (ref 43–75)
NITRITE UR QL STRIP: NEGATIVE
NON-SQ EPI CELLS URNS QL MICRO: ABNORMAL /HPF
NRBC BLD AUTO-RTO: 0 /100 WBCS
PH UR STRIP.AUTO: 6 [PH]
PHOSPHATE SERPL-MCNC: 3.2 MG/DL (ref 2.3–4.1)
PLATELET # BLD AUTO: 281 THOUSANDS/UL (ref 149–390)
PMV BLD AUTO: 10.1 FL (ref 8.9–12.7)
POTASSIUM SERPL-SCNC: 4 MMOL/L (ref 3.5–5.3)
PROT UR STRIP-MCNC: ABNORMAL MG/DL
PTH-INTACT SERPL-MCNC: 72.4 PG/ML (ref 12–88)
RBC # BLD AUTO: 5.07 MILLION/UL (ref 3.88–5.62)
RBC #/AREA URNS AUTO: ABNORMAL /HPF
SODIUM SERPL-SCNC: 135 MMOL/L (ref 135–147)
SP GR UR STRIP.AUTO: 1.01 (ref 1–1.03)
UROBILINOGEN UR STRIP-ACNC: <2 MG/DL
WBC # BLD AUTO: 10.49 THOUSAND/UL (ref 4.31–10.16)
WBC #/AREA URNS AUTO: ABNORMAL /HPF
WBC CLUMPS # UR AUTO: PRESENT /UL

## 2025-08-04 ENCOUNTER — TELEPHONE (OUTPATIENT)
Age: 70
End: 2025-08-04

## 2025-08-14 PROBLEM — Z00.00 MEDICARE ANNUAL WELLNESS VISIT, SUBSEQUENT: Status: RESOLVED | Noted: 2019-02-27 | Resolved: 2025-08-14

## (undated) DEVICE — CEMENT MIXING SYSTEM WITH FEMORAL BREAKWAY NOZZLE: Brand: REVOLUTION

## (undated) DEVICE — BETHLEHEM UNIV TOTAL KNEE, KIT: Brand: CARDINAL HEALTH

## (undated) DEVICE — 1820 FOAM BLOCK NEEDLE COUNTER: Brand: DEVON

## (undated) DEVICE — DRESSING MEPILEX AG BORDER 4 X 12 IN

## (undated) DEVICE — SUT CHROMIC 2-0 SH 27 IN G123H

## (undated) DEVICE — IMPERVIOUS STOCKINETTE: Brand: DEROYAL

## (undated) DEVICE — GLIDESHEATH SLENDER STAINLESS STEEL KIT: Brand: GLIDESHEATH SLENDER

## (undated) DEVICE — HEAVY DUTY TABLE COVER: Brand: CONVERTORS

## (undated) DEVICE — RECIPROCATING BLADE, DOUBLE SIDED, OFFSET  (70.0 X 0.64 X 12.6MM)

## (undated) DEVICE — SPONGE LAP 18 X 18 IN

## (undated) DEVICE — KERLIX BANDAGE ROLL: Brand: KERLIX

## (undated) DEVICE — SUT MONOCRYL 2-0 CT-1 36 IN Y945H

## (undated) DEVICE — FORCEP ELECSURG RADIAL JAW4 2.2 X 240CM  HOT BX

## (undated) DEVICE — COMFORT HOOD -75 EA/BX: Brand: CARDINAL HEALTH

## (undated) DEVICE — PAD GROUNDING ADULT

## (undated) DEVICE — GLOVE SRG BIOGEL ECLIPSE 7.5

## (undated) DEVICE — SPONGE LAP 18 X 18 IN STRL RFD

## (undated) DEVICE — SUT VICRYL 1 CT-1 36 IN J947H

## (undated) DEVICE — CATH GUIDE LAUNCHER 6FR EBU 3.5

## (undated) DEVICE — SUT CHROMIC 3-0 SH 27 IN G122H

## (undated) DEVICE — ADHESIVE SKIN HIGH VISCOSITY EXOFIN 1ML

## (undated) DEVICE — BASIC SINGLE BASIN-LF: Brand: MEDLINE INDUSTRIES, INC.

## (undated) DEVICE — LIGHT GLOVE GREEN

## (undated) DEVICE — PADDING CAST 4 IN  COTTON STRL

## (undated) DEVICE — PLUMEPEN PRO 10FT

## (undated) DEVICE — SPONGE STICK WITH PVP-I: Brand: KENDALL

## (undated) DEVICE — NEEDLE 25G X 1 1/2

## (undated) DEVICE — WEBRIL 6 IN UNSTERILE

## (undated) DEVICE — SAW BLADE OSCILLATING BRAZOL 167

## (undated) DEVICE — 3M™ IOBAN™ 2 ANTIMICROBIAL INCISE DRAPE 6648EZ: Brand: IOBAN™ 2

## (undated) DEVICE — U-DRAPE: Brand: CONVERTORS

## (undated) DEVICE — SYRINGE 20ML LL

## (undated) DEVICE — ACE WRAP 6 IN STERILE

## (undated) DEVICE — COBAN 6 IN STERILE

## (undated) DEVICE — BASIC SINGLE BASIN 2-LF: Brand: MEDLINE INDUSTRIES, INC.

## (undated) DEVICE — ABDOMINAL PAD: Brand: DERMACEA

## (undated) DEVICE — SINGLE-USE POLYPECTOMY SNARE: Brand: SENSATION SHORT THROW

## (undated) DEVICE — 3M™ STERI-DRAPE™ U-DRAPE 1015: Brand: STERI-DRAPE™

## (undated) DEVICE — TR BAND RADIAL ARTERY COMPRESSION DEVICE: Brand: TR BAND

## (undated) DEVICE — NEEDLE 18 G X 1 1/2

## (undated) DEVICE — PEEL-AWAY HOOD: Brand: FLYTE, SURGICOOL

## (undated) DEVICE — MAYO STAND COVER: Brand: CONVERTORS

## (undated) DEVICE — TUBING SUCTION 5MM X 12 FT

## (undated) DEVICE — ACE WRAP 4 IN STERILE

## (undated) DEVICE — SUT MONOCRYL 3-0 PS-2 27 IN Y427H

## (undated) DEVICE — SKIN MARKER DUAL TIP WITH RULER CAP, FLEXIBLE RULER AND LABELS: Brand: DEVON

## (undated) DEVICE — CATH SIZING 5FR 100CM PERFORMA 2 BAND

## (undated) DEVICE — INTENDED FOR TISSUE SEPARATION, AND OTHER PROCEDURES THAT REQUIRE A SHARP SURGICAL BLADE TO PUNCTURE OR CUT.: Brand: BARD-PARKER ® CARBON RIB-BACK BLADES

## (undated) DEVICE — THREE-QUARTER SHEET: Brand: CONVERTORS

## (undated) DEVICE — CAPIT KNEE ATTUNE FB W/DOM AOX

## (undated) DEVICE — SYRINGE 50ML LL

## (undated) DEVICE — SCD SEQUENTIAL COMPRESSION COMFORT SLEEVE MEDIUM KNEE LENGTH: Brand: KENDALL SCD

## (undated) DEVICE — PADDING CAST 6IN COTTON STRL

## (undated) DEVICE — GLOVE INDICATOR PI UNDERGLOVE SZ 7.5 BLUE

## (undated) DEVICE — ELECTRODE EXTENDED BLADE 162CM

## (undated) DEVICE — VIOLET BRAIDED (POLYGLACTIN 910), SYNTHETIC ABSORBABLE SUTURE: Brand: COATED VICRYL

## (undated) DEVICE — LUBRICANT SURGILUBE TUBE 4 OZ  FLIP TOP

## (undated) DEVICE — SYRINGE 10ML LL

## (undated) DEVICE — RADIFOCUS OPTITORQUE ANGIOGRAPHIC CATHETER: Brand: OPTITORQUE

## (undated) DEVICE — 4-PORT MANIFOLD: Brand: NEPTUNE 2

## (undated) DEVICE — HANDPIECE SET WITH RETRACTABLE COAXIAL FAN SPRAY TIP AND SUCTION TUBE: Brand: INTERPULSE

## (undated) DEVICE — NON-STERILE REUSABLE TOURNIQUET CUFF SINGLE BLADDER, DUAL PORT AND QUICK CONNECT CONNECTOR: Brand: COLOR CUFF

## (undated) DEVICE — DUAL CUT SAGITTAL BLADE

## (undated) DEVICE — DGW .035 FC J3MM 260CM TEF: Brand: EMERALD

## (undated) DEVICE — SPONGE LAP 18 X 4 IN

## (undated) DEVICE — ASTOUND IMPERVIOUS SURGICAL GOWN: Brand: CONVERTORS

## (undated) DEVICE — GUIDEWIRE WHOLEY HI TORQUE INTERM MOD J .035 145CM

## (undated) DEVICE — 2000CC GUARDIAN II: Brand: GUARDIAN

## (undated) DEVICE — DRAPE TOWEL: Brand: CONVERTORS